# Patient Record
Sex: MALE | Race: BLACK OR AFRICAN AMERICAN | Employment: OTHER | URBAN - METROPOLITAN AREA
[De-identification: names, ages, dates, MRNs, and addresses within clinical notes are randomized per-mention and may not be internally consistent; named-entity substitution may affect disease eponyms.]

---

## 2017-01-05 ENCOUNTER — OFFICE VISIT (OUTPATIENT)
Dept: INTERNAL MEDICINE CLINIC | Age: 62
End: 2017-01-05

## 2017-01-05 VITALS
HEIGHT: 66 IN | WEIGHT: 158.8 LBS | DIASTOLIC BLOOD PRESSURE: 80 MMHG | SYSTOLIC BLOOD PRESSURE: 140 MMHG | HEART RATE: 64 BPM | BODY MASS INDEX: 25.52 KG/M2 | TEMPERATURE: 98.6 F

## 2017-01-05 DIAGNOSIS — Z01.818 PREOP EXAMINATION: ICD-10-CM

## 2017-01-05 DIAGNOSIS — R60.0 EDEMA OF LEFT FOOT: ICD-10-CM

## 2017-01-05 DIAGNOSIS — I10 ESSENTIAL HYPERTENSION, BENIGN: Primary | ICD-10-CM

## 2017-01-05 DIAGNOSIS — I10 ESSENTIAL HYPERTENSION, BENIGN: ICD-10-CM

## 2017-01-05 LAB
A/G RATIO: 1.4 (ref 1.1–2.2)
ALBUMIN SERPL-MCNC: 4 G/DL (ref 3.4–5)
ALP BLD-CCNC: 81 U/L (ref 40–129)
ALT SERPL-CCNC: 15 U/L (ref 10–40)
ANION GAP SERPL CALCULATED.3IONS-SCNC: 16 MMOL/L (ref 3–16)
AST SERPL-CCNC: 17 U/L (ref 15–37)
BASOPHILS ABSOLUTE: 0.1 K/UL (ref 0–0.2)
BASOPHILS RELATIVE PERCENT: 1 %
BILIRUB SERPL-MCNC: 0.7 MG/DL (ref 0–1)
BUN BLDV-MCNC: 28 MG/DL (ref 7–20)
CALCIUM SERPL-MCNC: 9.4 MG/DL (ref 8.3–10.6)
CHLORIDE BLD-SCNC: 97 MMOL/L (ref 99–110)
CHOLESTEROL, TOTAL: 183 MG/DL (ref 0–199)
CO2: 26 MMOL/L (ref 21–32)
CREAT SERPL-MCNC: 1.5 MG/DL (ref 0.9–1.3)
EOSINOPHILS ABSOLUTE: 0.3 K/UL (ref 0–0.6)
EOSINOPHILS RELATIVE PERCENT: 4 %
GFR AFRICAN AMERICAN: 58
GFR NON-AFRICAN AMERICAN: 48
GLOBULIN: 2.9 G/DL
GLUCOSE BLD-MCNC: 145 MG/DL (ref 70–99)
GLUCOSE BLD-MCNC: 173 MG/DL
HCT VFR BLD CALC: 40.6 % (ref 40.5–52.5)
HDLC SERPL-MCNC: 93 MG/DL (ref 40–60)
HEMOGLOBIN: 13.5 G/DL (ref 13.5–17.5)
LDL CHOLESTEROL CALCULATED: 68 MG/DL
LYMPHOCYTES ABSOLUTE: 1.3 K/UL (ref 1–5.1)
LYMPHOCYTES RELATIVE PERCENT: 20 %
MCH RBC QN AUTO: 31.5 PG (ref 26–34)
MCHC RBC AUTO-ENTMCNC: 33.2 G/DL (ref 31–36)
MCV RBC AUTO: 94.9 FL (ref 80–100)
MONOCYTES ABSOLUTE: 0.6 K/UL (ref 0–1.3)
MONOCYTES RELATIVE PERCENT: 9 %
NEUTROPHILS ABSOLUTE: 4.2 K/UL (ref 1.7–7.7)
NEUTROPHILS RELATIVE PERCENT: 66 %
PDW BLD-RTO: 13.4 % (ref 12.4–15.4)
PLATELET # BLD: 243 K/UL (ref 135–450)
PMV BLD AUTO: 8.5 FL (ref 5–10.5)
POTASSIUM SERPL-SCNC: 4 MMOL/L (ref 3.5–5.1)
RBC # BLD: 4.28 M/UL (ref 4.2–5.9)
RBC # BLD: NORMAL 10*6/UL
SODIUM BLD-SCNC: 139 MMOL/L (ref 136–145)
TOTAL PROTEIN: 6.9 G/DL (ref 6.4–8.2)
TRIGL SERPL-MCNC: 108 MG/DL (ref 0–150)
URIC ACID, SERUM: 9.7 MG/DL (ref 3.5–7.2)
VLDLC SERPL CALC-MCNC: 22 MG/DL
WBC # BLD: 6.4 K/UL (ref 4–11)

## 2017-01-05 PROCEDURE — 99243 OFF/OP CNSLTJ NEW/EST LOW 30: CPT | Performed by: NURSE PRACTITIONER

## 2017-01-05 PROCEDURE — 82962 GLUCOSE BLOOD TEST: CPT | Performed by: NURSE PRACTITIONER

## 2017-01-05 PROCEDURE — 93000 ELECTROCARDIOGRAM COMPLETE: CPT | Performed by: NURSE PRACTITIONER

## 2017-01-05 RX ORDER — INDOMETHACIN 50 MG/1
50 CAPSULE ORAL 2 TIMES DAILY WITH MEALS
Qty: 60 CAPSULE | Refills: 3 | Status: SHIPPED | OUTPATIENT
Start: 2017-01-05 | End: 2017-01-05 | Stop reason: CLARIF

## 2017-01-05 ASSESSMENT — ENCOUNTER SYMPTOMS
RESPIRATORY NEGATIVE: 1
BLURRED VISION: 1
ALLERGIC/IMMUNOLOGIC NEGATIVE: 1
GASTROINTESTINAL NEGATIVE: 1

## 2017-01-06 ENCOUNTER — TELEPHONE (OUTPATIENT)
Dept: INTERNAL MEDICINE CLINIC | Age: 62
End: 2017-01-06

## 2017-01-06 DIAGNOSIS — E79.0 ELEVATED BLOOD URIC ACID LEVEL: ICD-10-CM

## 2017-01-06 DIAGNOSIS — M10.9 ACUTE GOUT OF LEFT FOOT, UNSPECIFIED CAUSE: Primary | ICD-10-CM

## 2017-01-06 LAB
ESTIMATED AVERAGE GLUCOSE: 134.1 MG/DL
HBA1C MFR BLD: 6.3 %

## 2017-01-06 RX ORDER — INDOMETHACIN 50 MG/1
50 CAPSULE ORAL 2 TIMES DAILY WITH MEALS
Qty: 60 CAPSULE | Refills: 1 | Status: SHIPPED | OUTPATIENT
Start: 2017-01-06 | End: 2019-02-12

## 2017-01-06 RX ORDER — COLCHICINE 0.6 MG/1
0.6 CAPSULE ORAL 2 TIMES DAILY
Qty: 30 CAPSULE | Refills: 0 | Status: SHIPPED | OUTPATIENT
Start: 2017-01-06 | End: 2021-01-01

## 2017-01-10 ENCOUNTER — TELEPHONE (OUTPATIENT)
Dept: INTERNAL MEDICINE CLINIC | Age: 62
End: 2017-01-10

## 2017-01-30 ENCOUNTER — TELEPHONE (OUTPATIENT)
Dept: INTERNAL MEDICINE CLINIC | Age: 62
End: 2017-01-30

## 2017-01-30 RX ORDER — BLOOD-GLUCOSE METER
KIT MISCELLANEOUS
Qty: 1 KIT | Refills: 0 | Status: SHIPPED | OUTPATIENT
Start: 2017-01-30 | End: 2017-03-03 | Stop reason: SDUPTHER

## 2017-02-01 ENCOUNTER — TELEPHONE (OUTPATIENT)
Dept: INTERNAL MEDICINE CLINIC | Age: 62
End: 2017-02-01

## 2017-02-02 RX ORDER — LANCETS 30 GAUGE
EACH MISCELLANEOUS
Qty: 100 EACH | Refills: 3 | Status: SHIPPED | OUTPATIENT
Start: 2017-02-02 | End: 2021-01-01

## 2017-03-03 ENCOUNTER — OFFICE VISIT (OUTPATIENT)
Dept: INTERNAL MEDICINE CLINIC | Age: 62
End: 2017-03-03

## 2017-03-03 VITALS
DIASTOLIC BLOOD PRESSURE: 82 MMHG | HEART RATE: 60 BPM | BODY MASS INDEX: 26.58 KG/M2 | SYSTOLIC BLOOD PRESSURE: 140 MMHG | HEIGHT: 66 IN | WEIGHT: 165.4 LBS | TEMPERATURE: 98.4 F

## 2017-03-03 DIAGNOSIS — E78.00 PURE HYPERCHOLESTEROLEMIA: ICD-10-CM

## 2017-03-03 DIAGNOSIS — I10 ESSENTIAL HYPERTENSION: Primary | ICD-10-CM

## 2017-03-03 DIAGNOSIS — E11.9 TYPE 2 DIABETES MELLITUS WITHOUT COMPLICATION, WITHOUT LONG-TERM CURRENT USE OF INSULIN (HCC): ICD-10-CM

## 2017-03-03 DIAGNOSIS — Z01.818 PRE-OPERATIVE EXAM: ICD-10-CM

## 2017-03-03 DIAGNOSIS — I10 ESSENTIAL HYPERTENSION: ICD-10-CM

## 2017-03-03 LAB
A/G RATIO: 1.7 (ref 1.1–2.2)
ALBUMIN SERPL-MCNC: 4.3 G/DL (ref 3.4–5)
ALP BLD-CCNC: 79 U/L (ref 40–129)
ALT SERPL-CCNC: 28 U/L (ref 10–40)
ANION GAP SERPL CALCULATED.3IONS-SCNC: 15 MMOL/L (ref 3–16)
AST SERPL-CCNC: 25 U/L (ref 15–37)
BASOPHILS ABSOLUTE: 0.1 K/UL (ref 0–0.2)
BASOPHILS RELATIVE PERCENT: 0.9 %
BILIRUB SERPL-MCNC: 0.5 MG/DL (ref 0–1)
BUN BLDV-MCNC: 30 MG/DL (ref 7–20)
CALCIUM SERPL-MCNC: 9.5 MG/DL (ref 8.3–10.6)
CHLORIDE BLD-SCNC: 100 MMOL/L (ref 99–110)
CO2: 25 MMOL/L (ref 21–32)
CREAT SERPL-MCNC: 1.3 MG/DL (ref 0.9–1.3)
EOSINOPHILS ABSOLUTE: 0.4 K/UL (ref 0–0.6)
EOSINOPHILS RELATIVE PERCENT: 6.6 %
GFR AFRICAN AMERICAN: >60
GFR NON-AFRICAN AMERICAN: 56
GLOBULIN: 2.6 G/DL
GLUCOSE BLD-MCNC: 114 MG/DL
GLUCOSE BLD-MCNC: 92 MG/DL (ref 70–99)
HCT VFR BLD CALC: 41.7 % (ref 40.5–52.5)
HEMOGLOBIN: 13.9 G/DL (ref 13.5–17.5)
LYMPHOCYTES ABSOLUTE: 1.9 K/UL (ref 1–5.1)
LYMPHOCYTES RELATIVE PERCENT: 29.5 %
MCH RBC QN AUTO: 31.9 PG (ref 26–34)
MCHC RBC AUTO-ENTMCNC: 33.4 G/DL (ref 31–36)
MCV RBC AUTO: 95.6 FL (ref 80–100)
MONOCYTES ABSOLUTE: 0.5 K/UL (ref 0–1.3)
MONOCYTES RELATIVE PERCENT: 7.1 %
NEUTROPHILS ABSOLUTE: 3.5 K/UL (ref 1.7–7.7)
NEUTROPHILS RELATIVE PERCENT: 55.9 %
PDW BLD-RTO: 13.8 % (ref 12.4–15.4)
PLATELET # BLD: 238 K/UL (ref 135–450)
PMV BLD AUTO: 8.7 FL (ref 5–10.5)
POTASSIUM SERPL-SCNC: 4.6 MMOL/L (ref 3.5–5.1)
RBC # BLD: 4.36 M/UL (ref 4.2–5.9)
SODIUM BLD-SCNC: 140 MMOL/L (ref 136–145)
TOTAL PROTEIN: 6.9 G/DL (ref 6.4–8.2)
WBC # BLD: 6.3 K/UL (ref 4–11)

## 2017-03-03 PROCEDURE — 82962 GLUCOSE BLOOD TEST: CPT | Performed by: NURSE PRACTITIONER

## 2017-03-03 PROCEDURE — 99242 OFF/OP CONSLTJ NEW/EST SF 20: CPT | Performed by: NURSE PRACTITIONER

## 2017-03-03 ASSESSMENT — ENCOUNTER SYMPTOMS
RESPIRATORY NEGATIVE: 1
EYES NEGATIVE: 1
GASTROINTESTINAL NEGATIVE: 1
ALLERGIC/IMMUNOLOGIC NEGATIVE: 1

## 2017-03-06 ENCOUNTER — TELEPHONE (OUTPATIENT)
Dept: INTERNAL MEDICINE CLINIC | Age: 62
End: 2017-03-06

## 2017-03-23 RX ORDER — LISINOPRIL AND HYDROCHLOROTHIAZIDE 20; 12.5 MG/1; MG/1
1 TABLET ORAL DAILY
Qty: 90 TABLET | Refills: 3 | Status: SHIPPED | OUTPATIENT
Start: 2017-03-23 | End: 2018-03-20 | Stop reason: SDUPTHER

## 2017-03-23 RX ORDER — AMLODIPINE BESYLATE 10 MG/1
10 TABLET ORAL DAILY
Qty: 90 TABLET | Refills: 3 | Status: SHIPPED | OUTPATIENT
Start: 2017-03-23 | End: 2018-03-20 | Stop reason: SDUPTHER

## 2017-03-23 RX ORDER — SIMVASTATIN 20 MG
20 TABLET ORAL NIGHTLY
Qty: 90 TABLET | Refills: 3 | Status: SHIPPED | OUTPATIENT
Start: 2017-03-23 | End: 2017-08-30 | Stop reason: SDUPTHER

## 2017-05-16 ENCOUNTER — OFFICE VISIT (OUTPATIENT)
Dept: INTERNAL MEDICINE CLINIC | Age: 62
End: 2017-05-16

## 2017-05-16 VITALS
BODY MASS INDEX: 26.52 KG/M2 | WEIGHT: 165 LBS | HEART RATE: 87 BPM | HEIGHT: 66 IN | SYSTOLIC BLOOD PRESSURE: 136 MMHG | TEMPERATURE: 97.9 F | DIASTOLIC BLOOD PRESSURE: 82 MMHG | OXYGEN SATURATION: 96 %

## 2017-05-16 DIAGNOSIS — I10 ESSENTIAL HYPERTENSION, BENIGN: Primary | ICD-10-CM

## 2017-05-16 DIAGNOSIS — E79.0 HYPERURICEMIA: ICD-10-CM

## 2017-05-16 DIAGNOSIS — E78.00 PURE HYPERCHOLESTEROLEMIA: ICD-10-CM

## 2017-05-16 LAB — GLUCOSE BLD-MCNC: 106 MG/DL

## 2017-05-16 PROCEDURE — 82962 GLUCOSE BLOOD TEST: CPT | Performed by: INTERNAL MEDICINE

## 2017-05-16 PROCEDURE — 99214 OFFICE O/P EST MOD 30 MIN: CPT | Performed by: INTERNAL MEDICINE

## 2017-05-16 RX ORDER — ASPIRIN 81 MG/1
81 TABLET ORAL DAILY
Qty: 30 TABLET | Refills: 3 | Status: SHIPPED | OUTPATIENT
Start: 2017-05-16 | End: 2017-08-30 | Stop reason: SDUPTHER

## 2017-05-16 RX ORDER — ALLOPURINOL 300 MG/1
300 TABLET ORAL DAILY
Qty: 30 TABLET | Refills: 5 | Status: SHIPPED | OUTPATIENT
Start: 2017-05-16 | End: 2017-08-30 | Stop reason: SDUPTHER

## 2017-05-16 ASSESSMENT — PATIENT HEALTH QUESTIONNAIRE - PHQ9
2. FEELING DOWN, DEPRESSED OR HOPELESS: 0
1. LITTLE INTEREST OR PLEASURE IN DOING THINGS: 0
SUM OF ALL RESPONSES TO PHQ9 QUESTIONS 1 & 2: 0
SUM OF ALL RESPONSES TO PHQ QUESTIONS 1-9: 0

## 2017-05-16 ASSESSMENT — ENCOUNTER SYMPTOMS
RESPIRATORY NEGATIVE: 1
EYES NEGATIVE: 1
GASTROINTESTINAL NEGATIVE: 1

## 2017-05-17 LAB
ALBUMIN SERPL-MCNC: 4.2 G/DL (ref 3.4–5)
ANION GAP SERPL CALCULATED.3IONS-SCNC: 14 MMOL/L (ref 3–16)
BUN BLDV-MCNC: 19 MG/DL (ref 7–20)
CALCIUM SERPL-MCNC: 9.4 MG/DL (ref 8.3–10.6)
CHLORIDE BLD-SCNC: 103 MMOL/L (ref 99–110)
CO2: 25 MMOL/L (ref 21–32)
CREAT SERPL-MCNC: 1.3 MG/DL (ref 0.8–1.3)
ESTIMATED AVERAGE GLUCOSE: 148.5 MG/DL
GFR AFRICAN AMERICAN: >60
GFR NON-AFRICAN AMERICAN: 56
GLUCOSE BLD-MCNC: 88 MG/DL (ref 70–99)
HBA1C MFR BLD: 6.8 %
PHOSPHORUS: 3.4 MG/DL (ref 2.5–4.9)
POTASSIUM SERPL-SCNC: 4.5 MMOL/L (ref 3.5–5.1)
SODIUM BLD-SCNC: 142 MMOL/L (ref 136–145)
URIC ACID, SERUM: 7.8 MG/DL (ref 3.5–7.2)

## 2017-08-30 ENCOUNTER — OFFICE VISIT (OUTPATIENT)
Dept: INTERNAL MEDICINE CLINIC | Age: 62
End: 2017-08-30

## 2017-08-30 VITALS
OXYGEN SATURATION: 99 % | DIASTOLIC BLOOD PRESSURE: 84 MMHG | BODY MASS INDEX: 26.84 KG/M2 | HEART RATE: 97 BPM | TEMPERATURE: 98.1 F | HEIGHT: 66 IN | WEIGHT: 167 LBS | SYSTOLIC BLOOD PRESSURE: 138 MMHG

## 2017-08-30 DIAGNOSIS — E79.0 ELEVATED BLOOD URIC ACID LEVEL: ICD-10-CM

## 2017-08-30 DIAGNOSIS — I10 ESSENTIAL HYPERTENSION, BENIGN: Primary | ICD-10-CM

## 2017-08-30 DIAGNOSIS — E78.00 PURE HYPERCHOLESTEROLEMIA: ICD-10-CM

## 2017-08-30 DIAGNOSIS — E11.21 DIABETIC NEPHROPATHY ASSOCIATED WITH TYPE 2 DIABETES MELLITUS (HCC): ICD-10-CM

## 2017-08-30 LAB
ALBUMIN SERPL-MCNC: 4.5 G/DL (ref 3.4–5)
ANION GAP SERPL CALCULATED.3IONS-SCNC: 19 MMOL/L (ref 3–16)
BILIRUBIN URINE: NEGATIVE
BLOOD, URINE: NEGATIVE
BUN BLDV-MCNC: 29 MG/DL (ref 7–20)
CALCIUM SERPL-MCNC: 9.9 MG/DL (ref 8.3–10.6)
CHLORIDE BLD-SCNC: 98 MMOL/L (ref 99–110)
CLARITY: CLEAR
CO2: 24 MMOL/L (ref 21–32)
COLOR: YELLOW
CREAT SERPL-MCNC: 1.4 MG/DL (ref 0.8–1.3)
EPITHELIAL CELLS, UA: 0 /HPF (ref 0–5)
GFR AFRICAN AMERICAN: >60
GFR NON-AFRICAN AMERICAN: 52
GLUCOSE BLD-MCNC: 100 MG/DL (ref 70–99)
GLUCOSE BLD-MCNC: 111 MG/DL
GLUCOSE URINE: NEGATIVE MG/DL
HYALINE CASTS: 1 /LPF (ref 0–8)
KETONES, URINE: NEGATIVE MG/DL
LEUKOCYTE ESTERASE, URINE: NEGATIVE
MICROSCOPIC EXAMINATION: YES
NITRITE, URINE: NEGATIVE
PH UA: 5.5
PHOSPHORUS: 3.8 MG/DL (ref 2.5–4.9)
POTASSIUM SERPL-SCNC: 4.6 MMOL/L (ref 3.5–5.1)
PROTEIN UA: 100 MG/DL
RBC UA: 4 /HPF (ref 0–4)
SODIUM BLD-SCNC: 141 MMOL/L (ref 136–145)
SPECIFIC GRAVITY UA: 1.02
URIC ACID, SERUM: 3.8 MG/DL (ref 3.5–7.2)
URINE TYPE: ABNORMAL
UROBILINOGEN, URINE: 0.2 E.U./DL
WBC UA: 2 /HPF (ref 0–5)

## 2017-08-30 PROCEDURE — 99214 OFFICE O/P EST MOD 30 MIN: CPT | Performed by: INTERNAL MEDICINE

## 2017-08-30 PROCEDURE — 82962 GLUCOSE BLOOD TEST: CPT | Performed by: INTERNAL MEDICINE

## 2017-08-30 RX ORDER — ASPIRIN 81 MG/1
81 TABLET ORAL DAILY
Qty: 30 TABLET | Refills: 3 | Status: SHIPPED | OUTPATIENT
Start: 2017-08-30 | End: 2018-03-07 | Stop reason: SDUPTHER

## 2017-08-30 RX ORDER — ALLOPURINOL 300 MG/1
300 TABLET ORAL DAILY
Qty: 30 TABLET | Refills: 5 | Status: SHIPPED | OUTPATIENT
Start: 2017-08-30 | End: 2018-03-20 | Stop reason: SDUPTHER

## 2017-08-30 RX ORDER — TADALAFIL 5 MG/1
5 TABLET ORAL DAILY
Qty: 30 TABLET | Refills: 11 | Status: SHIPPED | OUTPATIENT
Start: 2017-08-30 | End: 2021-01-01

## 2017-08-30 RX ORDER — SIMVASTATIN 20 MG
20 TABLET ORAL NIGHTLY
Qty: 90 TABLET | Refills: 3 | Status: SHIPPED | OUTPATIENT
Start: 2017-08-30 | End: 2018-03-20 | Stop reason: SDUPTHER

## 2017-08-30 ASSESSMENT — ENCOUNTER SYMPTOMS
RESPIRATORY NEGATIVE: 1
EYES NEGATIVE: 1
GASTROINTESTINAL NEGATIVE: 1

## 2017-08-30 ASSESSMENT — PATIENT HEALTH QUESTIONNAIRE - PHQ9
1. LITTLE INTEREST OR PLEASURE IN DOING THINGS: 0
SUM OF ALL RESPONSES TO PHQ QUESTIONS 1-9: 0
SUM OF ALL RESPONSES TO PHQ9 QUESTIONS 1 & 2: 0
2. FEELING DOWN, DEPRESSED OR HOPELESS: 0

## 2017-08-31 LAB
ESTIMATED AVERAGE GLUCOSE: 154.2 MG/DL
HBA1C MFR BLD: 7 %

## 2018-02-07 ENCOUNTER — OFFICE VISIT (OUTPATIENT)
Dept: INTERNAL MEDICINE CLINIC | Age: 63
End: 2018-02-07

## 2018-02-07 VITALS
WEIGHT: 166 LBS | HEIGHT: 66 IN | BODY MASS INDEX: 26.68 KG/M2 | HEART RATE: 87 BPM | OXYGEN SATURATION: 98 % | DIASTOLIC BLOOD PRESSURE: 84 MMHG | SYSTOLIC BLOOD PRESSURE: 130 MMHG

## 2018-02-07 DIAGNOSIS — E78.00 PURE HYPERCHOLESTEROLEMIA: ICD-10-CM

## 2018-02-07 DIAGNOSIS — E79.0 HYPERURICEMIA: ICD-10-CM

## 2018-02-07 DIAGNOSIS — I10 ESSENTIAL HYPERTENSION, BENIGN: Primary | ICD-10-CM

## 2018-02-07 DIAGNOSIS — M79.89 RIGHT LEG SWELLING: ICD-10-CM

## 2018-02-07 LAB
A/G RATIO: 1.6 (ref 1.1–2.2)
ALBUMIN SERPL-MCNC: 4.1 G/DL (ref 3.4–5)
ALP BLD-CCNC: 83 U/L (ref 40–129)
ALT SERPL-CCNC: 40 U/L (ref 10–40)
ANION GAP SERPL CALCULATED.3IONS-SCNC: 14 MMOL/L (ref 3–16)
AST SERPL-CCNC: 32 U/L (ref 15–37)
BILIRUB SERPL-MCNC: 0.5 MG/DL (ref 0–1)
BUN BLDV-MCNC: 28 MG/DL (ref 7–20)
CALCIUM SERPL-MCNC: 9.5 MG/DL (ref 8.3–10.6)
CHLORIDE BLD-SCNC: 98 MMOL/L (ref 99–110)
CHOLESTEROL, TOTAL: 168 MG/DL (ref 0–199)
CO2: 28 MMOL/L (ref 21–32)
CREAT SERPL-MCNC: 1.2 MG/DL (ref 0.8–1.3)
EPITHELIAL CELLS, UA: 0 /HPF (ref 0–5)
GFR AFRICAN AMERICAN: >60
GFR NON-AFRICAN AMERICAN: >60
GLOBULIN: 2.6 G/DL
GLUCOSE BLD-MCNC: 102 MG/DL
GLUCOSE BLD-MCNC: 86 MG/DL (ref 70–99)
HDLC SERPL-MCNC: 90 MG/DL (ref 40–60)
HYALINE CASTS: 0 /LPF (ref 0–8)
LDL CHOLESTEROL CALCULATED: 65 MG/DL
POTASSIUM SERPL-SCNC: 5.1 MMOL/L (ref 3.5–5.1)
RBC UA: 5 /HPF (ref 0–4)
SODIUM BLD-SCNC: 140 MMOL/L (ref 136–145)
TOTAL PROTEIN: 6.7 G/DL (ref 6.4–8.2)
TRIGL SERPL-MCNC: 63 MG/DL (ref 0–150)
URIC ACID, SERUM: 3.7 MG/DL (ref 3.5–7.2)
VLDLC SERPL CALC-MCNC: 13 MG/DL
WBC UA: 0 /HPF (ref 0–5)

## 2018-02-07 PROCEDURE — 82962 GLUCOSE BLOOD TEST: CPT | Performed by: INTERNAL MEDICINE

## 2018-02-07 PROCEDURE — 99214 OFFICE O/P EST MOD 30 MIN: CPT | Performed by: INTERNAL MEDICINE

## 2018-02-07 ASSESSMENT — ENCOUNTER SYMPTOMS
RESPIRATORY NEGATIVE: 1
GASTROINTESTINAL NEGATIVE: 1
EYES NEGATIVE: 1

## 2018-02-08 LAB
ESTIMATED AVERAGE GLUCOSE: 142.7 MG/DL
HBA1C MFR BLD: 6.6 %

## 2018-03-05 ENCOUNTER — TELEPHONE (OUTPATIENT)
Dept: INTERNAL MEDICINE CLINIC | Age: 63
End: 2018-03-05

## 2018-03-06 RX ORDER — ASPIRIN 81 MG/1
81 TABLET ORAL DAILY
Qty: 30 TABLET | Refills: 3 | Status: CANCELLED | OUTPATIENT
Start: 2018-03-06

## 2018-03-07 RX ORDER — ASPIRIN 81 MG/1
81 TABLET ORAL DAILY
Qty: 30 TABLET | Refills: 3 | Status: SHIPPED | OUTPATIENT
Start: 2018-03-07 | End: 2019-06-12

## 2018-03-20 ENCOUNTER — TELEPHONE (OUTPATIENT)
Dept: INTERNAL MEDICINE CLINIC | Age: 63
End: 2018-03-20

## 2018-03-20 RX ORDER — LISINOPRIL AND HYDROCHLOROTHIAZIDE 20; 12.5 MG/1; MG/1
1 TABLET ORAL DAILY
Qty: 90 TABLET | Refills: 3 | Status: SHIPPED | OUTPATIENT
Start: 2018-03-20 | End: 2019-02-12

## 2018-03-20 RX ORDER — AMLODIPINE BESYLATE 10 MG/1
10 TABLET ORAL DAILY
Qty: 90 TABLET | Refills: 3 | Status: SHIPPED | OUTPATIENT
Start: 2018-03-20 | End: 2021-01-01

## 2018-03-20 RX ORDER — ALLOPURINOL 300 MG/1
300 TABLET ORAL DAILY
Qty: 30 TABLET | Refills: 5 | Status: SHIPPED | OUTPATIENT
Start: 2018-03-20 | End: 2021-01-01

## 2018-03-20 RX ORDER — SIMVASTATIN 20 MG
20 TABLET ORAL NIGHTLY
Qty: 90 TABLET | Refills: 3 | Status: SHIPPED | OUTPATIENT
Start: 2018-03-20 | End: 2019-02-12

## 2018-05-02 ENCOUNTER — TELEPHONE (OUTPATIENT)
Dept: INTERNAL MEDICINE CLINIC | Age: 63
End: 2018-05-02

## 2018-05-09 RX ORDER — DULAGLUTIDE 1.5 MG/.5ML
INJECTION, SOLUTION SUBCUTANEOUS
Qty: 4 PEN | Refills: 5 | Status: SHIPPED | OUTPATIENT
Start: 2018-05-09 | End: 2021-01-01

## 2019-02-12 ENCOUNTER — OFFICE VISIT (OUTPATIENT)
Dept: INTERNAL MEDICINE CLINIC | Age: 64
End: 2019-02-12
Payer: MEDICARE

## 2019-02-12 VITALS
BODY MASS INDEX: 26.52 KG/M2 | HEART RATE: 67 BPM | SYSTOLIC BLOOD PRESSURE: 100 MMHG | OXYGEN SATURATION: 95 % | HEIGHT: 66 IN | DIASTOLIC BLOOD PRESSURE: 60 MMHG | WEIGHT: 165 LBS

## 2019-02-12 DIAGNOSIS — Z12.11 ENCOUNTER FOR SCREENING COLONOSCOPY: Primary | ICD-10-CM

## 2019-02-12 DIAGNOSIS — Z12.5 PROSTATE CANCER SCREENING: ICD-10-CM

## 2019-02-12 DIAGNOSIS — E11.65 UNCONTROLLED TYPE 2 DIABETES MELLITUS WITH HYPERGLYCEMIA (HCC): ICD-10-CM

## 2019-02-12 DIAGNOSIS — I10 ESSENTIAL HYPERTENSION, BENIGN: ICD-10-CM

## 2019-02-12 DIAGNOSIS — I10 ESSENTIAL HYPERTENSION: ICD-10-CM

## 2019-02-12 DIAGNOSIS — E11.9 TYPE 2 DIABETES MELLITUS WITHOUT COMPLICATION, WITHOUT LONG-TERM CURRENT USE OF INSULIN (HCC): ICD-10-CM

## 2019-02-12 DIAGNOSIS — E78.00 PURE HYPERCHOLESTEROLEMIA: ICD-10-CM

## 2019-02-12 DIAGNOSIS — Z13.220 SCREENING FOR HYPERLIPIDEMIA: ICD-10-CM

## 2019-02-12 DIAGNOSIS — I25.118 CORONARY ARTERY DISEASE OF NATIVE ARTERY OF NATIVE HEART WITH STABLE ANGINA PECTORIS (HCC): ICD-10-CM

## 2019-02-12 PROBLEM — I25.10 CORONARY ARTERY DISEASE INVOLVING NATIVE CORONARY ARTERY: Status: ACTIVE | Noted: 2019-02-12

## 2019-02-12 LAB
ALBUMIN SERPL-MCNC: 4 G/DL (ref 3.4–5)
ALP BLD-CCNC: 108 U/L (ref 40–129)
ALT SERPL-CCNC: 31 U/L (ref 10–40)
AST SERPL-CCNC: 30 U/L (ref 15–37)
BILIRUB SERPL-MCNC: 0.4 MG/DL (ref 0–1)
BILIRUBIN DIRECT: <0.2 MG/DL (ref 0–0.3)
BILIRUBIN, INDIRECT: NORMAL MG/DL (ref 0–1)
CHOLESTEROL, TOTAL: 146 MG/DL (ref 0–199)
HDLC SERPL-MCNC: 73 MG/DL (ref 40–60)
LDL CHOLESTEROL CALCULATED: 51 MG/DL
PROSTATE SPECIFIC ANTIGEN: 0.32 NG/ML (ref 0–4)
TOTAL PROTEIN: 7.2 G/DL (ref 6.4–8.2)
TRIGL SERPL-MCNC: 109 MG/DL (ref 0–150)
VLDLC SERPL CALC-MCNC: 22 MG/DL

## 2019-02-12 PROCEDURE — G8484 FLU IMMUNIZE NO ADMIN: HCPCS | Performed by: INTERNAL MEDICINE

## 2019-02-12 PROCEDURE — 1036F TOBACCO NON-USER: CPT | Performed by: INTERNAL MEDICINE

## 2019-02-12 PROCEDURE — G8598 ASA/ANTIPLAT THER USED: HCPCS | Performed by: INTERNAL MEDICINE

## 2019-02-12 PROCEDURE — 99214 OFFICE O/P EST MOD 30 MIN: CPT | Performed by: INTERNAL MEDICINE

## 2019-02-12 PROCEDURE — G8419 CALC BMI OUT NRM PARAM NOF/U: HCPCS | Performed by: INTERNAL MEDICINE

## 2019-02-12 PROCEDURE — G8427 DOCREV CUR MEDS BY ELIG CLIN: HCPCS | Performed by: INTERNAL MEDICINE

## 2019-02-12 PROCEDURE — 2022F DILAT RTA XM EVC RTNOPTHY: CPT | Performed by: INTERNAL MEDICINE

## 2019-02-12 PROCEDURE — 3045F PR MOST RECENT HEMOGLOBIN A1C LEVEL 7.0-9.0%: CPT | Performed by: INTERNAL MEDICINE

## 2019-02-12 PROCEDURE — 3017F COLORECTAL CA SCREEN DOC REV: CPT | Performed by: INTERNAL MEDICINE

## 2019-02-12 RX ORDER — CLOPIDOGREL BISULFATE 75 MG/1
75 TABLET ORAL DAILY
Status: ON HOLD | COMMUNITY
Start: 2019-01-08 | End: 2021-01-01 | Stop reason: HOSPADM

## 2019-02-12 RX ORDER — ATORVASTATIN CALCIUM 80 MG/1
80 TABLET, FILM COATED ORAL NIGHTLY
COMMUNITY
Start: 2019-01-08

## 2019-02-12 RX ORDER — CARVEDILOL 12.5 MG/1
12.5 TABLET ORAL 2 TIMES DAILY WITH MEALS
Status: ON HOLD | COMMUNITY
Start: 2019-01-15 | End: 2021-01-01 | Stop reason: HOSPADM

## 2019-02-12 ASSESSMENT — ENCOUNTER SYMPTOMS
GASTROINTESTINAL NEGATIVE: 1
RESPIRATORY NEGATIVE: 1
EYES NEGATIVE: 1

## 2019-02-12 ASSESSMENT — PATIENT HEALTH QUESTIONNAIRE - PHQ9
2. FEELING DOWN, DEPRESSED OR HOPELESS: 0
1. LITTLE INTEREST OR PLEASURE IN DOING THINGS: 0
SUM OF ALL RESPONSES TO PHQ QUESTIONS 1-9: 0
SUM OF ALL RESPONSES TO PHQ9 QUESTIONS 1 & 2: 0
SUM OF ALL RESPONSES TO PHQ QUESTIONS 1-9: 0

## 2019-02-13 LAB
ESTIMATED AVERAGE GLUCOSE: 165.7 MG/DL
HBA1C MFR BLD: 7.4 %

## 2019-04-09 DIAGNOSIS — R97.20 ELEVATED PSA: Primary | ICD-10-CM

## 2019-06-12 ENCOUNTER — OFFICE VISIT (OUTPATIENT)
Dept: INTERNAL MEDICINE CLINIC | Age: 64
End: 2019-06-12
Payer: MEDICARE

## 2019-06-12 VITALS
HEIGHT: 65 IN | OXYGEN SATURATION: 99 % | WEIGHT: 161 LBS | BODY MASS INDEX: 26.82 KG/M2 | SYSTOLIC BLOOD PRESSURE: 120 MMHG | HEART RATE: 98 BPM | DIASTOLIC BLOOD PRESSURE: 70 MMHG

## 2019-06-12 DIAGNOSIS — E11.9 TYPE 2 DIABETES MELLITUS WITHOUT COMPLICATION, WITHOUT LONG-TERM CURRENT USE OF INSULIN (HCC): Primary | ICD-10-CM

## 2019-06-12 DIAGNOSIS — E11.9 TYPE 2 DIABETES MELLITUS WITHOUT COMPLICATION, WITHOUT LONG-TERM CURRENT USE OF INSULIN (HCC): ICD-10-CM

## 2019-06-12 DIAGNOSIS — Z12.11 ENCOUNTER FOR SCREENING COLONOSCOPY: ICD-10-CM

## 2019-06-12 LAB
A/G RATIO: 1.5 (ref 1.1–2.2)
ALBUMIN SERPL-MCNC: 4.3 G/DL (ref 3.4–5)
ALP BLD-CCNC: 105 U/L (ref 40–129)
ALT SERPL-CCNC: 22 U/L (ref 10–40)
ANION GAP SERPL CALCULATED.3IONS-SCNC: 16 MMOL/L (ref 3–16)
AST SERPL-CCNC: 24 U/L (ref 15–37)
BILIRUB SERPL-MCNC: 0.3 MG/DL (ref 0–1)
BUN BLDV-MCNC: 48 MG/DL (ref 7–20)
CALCIUM SERPL-MCNC: 9.5 MG/DL (ref 8.3–10.6)
CHLORIDE BLD-SCNC: 96 MMOL/L (ref 99–110)
CO2: 28 MMOL/L (ref 21–32)
CREAT SERPL-MCNC: 1.7 MG/DL (ref 0.8–1.3)
EPITHELIAL CELLS, UA: 0 /HPF (ref 0–5)
GFR AFRICAN AMERICAN: 50
GFR NON-AFRICAN AMERICAN: 41
GLOBULIN: 2.8 G/DL
GLUCOSE BLD-MCNC: 258 MG/DL (ref 70–99)
HYALINE CASTS: 0 /LPF (ref 0–8)
POTASSIUM SERPL-SCNC: 4 MMOL/L (ref 3.5–5.1)
RBC UA: 1 /HPF (ref 0–4)
SODIUM BLD-SCNC: 140 MMOL/L (ref 136–145)
TOTAL PROTEIN: 7.1 G/DL (ref 6.4–8.2)
WBC UA: 1 /HPF (ref 0–5)

## 2019-06-12 PROCEDURE — 99214 OFFICE O/P EST MOD 30 MIN: CPT | Performed by: INTERNAL MEDICINE

## 2019-06-12 PROCEDURE — 3045F PR MOST RECENT HEMOGLOBIN A1C LEVEL 7.0-9.0%: CPT | Performed by: INTERNAL MEDICINE

## 2019-06-12 PROCEDURE — G8598 ASA/ANTIPLAT THER USED: HCPCS | Performed by: INTERNAL MEDICINE

## 2019-06-12 PROCEDURE — 83036 HEMOGLOBIN GLYCOSYLATED A1C: CPT | Performed by: INTERNAL MEDICINE

## 2019-06-12 PROCEDURE — 3017F COLORECTAL CA SCREEN DOC REV: CPT | Performed by: INTERNAL MEDICINE

## 2019-06-12 PROCEDURE — G8427 DOCREV CUR MEDS BY ELIG CLIN: HCPCS | Performed by: INTERNAL MEDICINE

## 2019-06-12 PROCEDURE — 1036F TOBACCO NON-USER: CPT | Performed by: INTERNAL MEDICINE

## 2019-06-12 PROCEDURE — 2022F DILAT RTA XM EVC RTNOPTHY: CPT | Performed by: INTERNAL MEDICINE

## 2019-06-12 PROCEDURE — G8419 CALC BMI OUT NRM PARAM NOF/U: HCPCS | Performed by: INTERNAL MEDICINE

## 2019-06-12 RX ORDER — ISOSORBIDE MONONITRATE 30 MG/1
30 TABLET, EXTENDED RELEASE ORAL DAILY
Status: ON HOLD | COMMUNITY
Start: 2019-03-06 | End: 2022-01-01 | Stop reason: HOSPADM

## 2019-06-12 ASSESSMENT — PATIENT HEALTH QUESTIONNAIRE - PHQ9
1. LITTLE INTEREST OR PLEASURE IN DOING THINGS: 0
2. FEELING DOWN, DEPRESSED OR HOPELESS: 0
SUM OF ALL RESPONSES TO PHQ QUESTIONS 1-9: 0
SUM OF ALL RESPONSES TO PHQ9 QUESTIONS 1 & 2: 0
SUM OF ALL RESPONSES TO PHQ QUESTIONS 1-9: 0

## 2019-06-12 ASSESSMENT — ENCOUNTER SYMPTOMS
EYES NEGATIVE: 1
SHORTNESS OF BREATH: 1
GASTROINTESTINAL NEGATIVE: 1

## 2019-06-12 NOTE — PROGRESS NOTES
normal.   Eyes: Pupils are equal, round, and reactive to light. Conjunctivae and EOM are normal.   Neck: Normal range of motion. Neck supple. No thyromegaly present. Cardiovascular: Normal rate, regular rhythm and normal heart sounds. No murmur heard. Pulmonary/Chest: Effort normal and breath sounds normal. No respiratory distress. He has no wheezes. He has no rales. Abdominal: Soft. Bowel sounds are normal.   Musculoskeletal: Normal range of motion. Neurological: He is alert and oriented to person, place, and time. Skin: Skin is warm and dry. Psychiatric: He has a normal mood and affect. Current Outpatient Medications on File Prior to Visit   Medication Sig Dispense Refill    isosorbide mononitrate (IMDUR) 60 MG extended release tablet Take 60 mg by mouth      sacubitril-valsartan (ENTRESTO) 49-51 MG per tablet Take 1 tablet by mouth      atorvastatin (LIPITOR) 80 MG tablet Take 80 mg by mouth      carvedilol (COREG) 6.25 MG tablet       clopidogrel (PLAVIX) 75 MG tablet Take 75 mg by mouth      Dulaglutide (TRULICITY) 0.19 YD/8.2VY SOPN Lot# [id number] VDT.32/38 2 pen 0    TRULICITY 1.5 LD/2.5FN SOPN INJECT 1.5 MG INTO THE SKIN EVERY 7 DAYS 4 pen 5    allopurinol (ZYLOPRIM) 300 MG tablet Take 1 tablet by mouth daily 30 tablet 5    amLODIPine (NORVASC) 10 MG tablet Take 1 tablet by mouth daily 90 tablet 3    Multiple Vitamins-Minerals (MULTIVITAMIN MEN) TABS Take 1 tablet by mouth daily      Blood Glucose Monitoring Suppl (TRUE METRIX METER) W/DEVICE KIT 1 kit by Does not apply route 2 times daily 1 kit 0    glucose blood VI test strips (ASCENSIA AUTODISC VI;ONE TOUCH ULTRA TEST VI) strip Pt test 2-3 times daily E11.9 100 each 3    Lancets MISC Patient test 2-3 times daily 100 each 3    Colchicine 0.6 MG CAPS Take 1 capsule by mouth 2 times daily 30 capsule 0    aspirin 81 MG chewable tablet Take 81 mg by mouth daily.       tadalafil (CIALIS) 5 MG tablet Take 1 tablet by mouth daily 30 tablet 11     No current facility-administered medications on file prior to visit. Assessment:        Diagnosis Orders   1.  Type 2 diabetes mellitus without complication, without long-term current use of insulin (HCC)  Comprehensive Metabolic Panel    POCT glycosylated hemoglobin (Hb A1C)    MICROSCOPIC URINALYSIS       Hypertension  Stable    Hyperlipidemia  On meds    Dyspnea  From CAD      Plan:      Continue meds    Refills    Refer to GI     Labs     Return in 3 months        DAGO Sandoval MD

## 2019-08-14 ENCOUNTER — TELEPHONE (OUTPATIENT)
Dept: INTERNAL MEDICINE CLINIC | Age: 64
End: 2019-08-14

## 2021-01-01 ENCOUNTER — OFFICE VISIT (OUTPATIENT)
Dept: CARDIOLOGY CLINIC | Age: 66
End: 2021-01-01
Payer: MEDICAID

## 2021-01-01 ENCOUNTER — FOLLOWUP TELEPHONE ENCOUNTER (OUTPATIENT)
Dept: INPATIENT UNIT | Age: 66
End: 2021-01-01

## 2021-01-01 ENCOUNTER — TELEPHONE (OUTPATIENT)
Dept: INTERNAL MEDICINE CLINIC | Age: 66
End: 2021-01-01

## 2021-01-01 ENCOUNTER — APPOINTMENT (OUTPATIENT)
Dept: GENERAL RADIOLOGY | Age: 66
DRG: 286 | End: 2021-01-01
Payer: MEDICARE

## 2021-01-01 ENCOUNTER — TELEPHONE (OUTPATIENT)
Dept: FAMILY MEDICINE CLINIC | Age: 66
End: 2021-01-01

## 2021-01-01 ENCOUNTER — HOSPITAL ENCOUNTER (INPATIENT)
Age: 66
LOS: 16 days | Discharge: HOME | DRG: 286 | End: 2021-11-30
Attending: EMERGENCY MEDICINE | Admitting: INTERNAL MEDICINE
Payer: MEDICARE

## 2021-01-01 ENCOUNTER — TELEPHONE (OUTPATIENT)
Dept: CARDIOLOGY CLINIC | Age: 66
End: 2021-01-01

## 2021-01-01 ENCOUNTER — TELEPHONE (OUTPATIENT)
Dept: PHARMACY | Age: 66
End: 2021-01-01

## 2021-01-01 ENCOUNTER — APPOINTMENT (OUTPATIENT)
Dept: CT IMAGING | Age: 66
DRG: 286 | End: 2021-01-01
Payer: MEDICARE

## 2021-01-01 ENCOUNTER — CLINICAL DOCUMENTATION (OUTPATIENT)
Dept: OTHER | Age: 66
End: 2021-01-01

## 2021-01-01 VITALS
OXYGEN SATURATION: 97 % | SYSTOLIC BLOOD PRESSURE: 110 MMHG | DIASTOLIC BLOOD PRESSURE: 70 MMHG | RESPIRATION RATE: 18 BRPM | HEART RATE: 93 BPM | WEIGHT: 149.25 LBS | TEMPERATURE: 97.7 F | BODY MASS INDEX: 23.99 KG/M2 | HEIGHT: 66 IN

## 2021-01-01 VITALS
BODY MASS INDEX: 27.66 KG/M2 | DIASTOLIC BLOOD PRESSURE: 70 MMHG | HEART RATE: 77 BPM | HEIGHT: 65 IN | WEIGHT: 166 LBS | SYSTOLIC BLOOD PRESSURE: 118 MMHG | OXYGEN SATURATION: 98 %

## 2021-01-01 DIAGNOSIS — I25.10 CORONARY ARTERY DISEASE INVOLVING NATIVE CORONARY ARTERY OF NATIVE HEART WITHOUT ANGINA PECTORIS: Primary | ICD-10-CM

## 2021-01-01 DIAGNOSIS — E78.5 HYPERLIPIDEMIA LDL GOAL <70: ICD-10-CM

## 2021-01-01 DIAGNOSIS — J96.01 ACUTE RESPIRATORY FAILURE WITH HYPOXIA (HCC): ICD-10-CM

## 2021-01-01 DIAGNOSIS — R06.09 DYSPNEA ON EXERTION: ICD-10-CM

## 2021-01-01 DIAGNOSIS — Z95.810 ICD (IMPLANTABLE CARDIOVERTER-DEFIBRILLATOR) IN PLACE: ICD-10-CM

## 2021-01-01 DIAGNOSIS — I50.23 ACUTE ON CHRONIC SYSTOLIC CONGESTIVE HEART FAILURE (HCC): Primary | ICD-10-CM

## 2021-01-01 DIAGNOSIS — I50.22 CHRONIC SYSTOLIC CHF (CONGESTIVE HEART FAILURE), NYHA CLASS 3 (HCC): ICD-10-CM

## 2021-01-01 DIAGNOSIS — I10 ESSENTIAL HYPERTENSION: ICD-10-CM

## 2021-01-01 DIAGNOSIS — E11.65 UNCONTROLLED TYPE 2 DIABETES MELLITUS WITH HYPERGLYCEMIA (HCC): ICD-10-CM

## 2021-01-01 DIAGNOSIS — I51.3 LV (LEFT VENTRICULAR) MURAL THROMBUS: ICD-10-CM

## 2021-01-01 LAB
ALBUMIN SERPL-MCNC: 2.9 G/DL (ref 3.1–4.9)
ALBUMIN SERPL-MCNC: 3.4 G/DL (ref 3.4–5)
ALP BLD-CCNC: 92 U/L (ref 40–129)
ALPHA-1-GLOBULIN: 0.2 G/DL (ref 0.2–0.4)
ALPHA-2-GLOBULIN: 0.5 G/DL (ref 0.4–1.1)
ALT SERPL-CCNC: 21 U/L (ref 10–40)
ANION GAP SERPL CALCULATED.3IONS-SCNC: 11 MMOL/L (ref 3–16)
ANION GAP SERPL CALCULATED.3IONS-SCNC: 12 MMOL/L (ref 3–16)
ANION GAP SERPL CALCULATED.3IONS-SCNC: 13 MMOL/L (ref 3–16)
ANION GAP SERPL CALCULATED.3IONS-SCNC: 13 MMOL/L (ref 3–16)
ANION GAP SERPL CALCULATED.3IONS-SCNC: 14 MMOL/L (ref 3–16)
ANION GAP SERPL CALCULATED.3IONS-SCNC: 14 MMOL/L (ref 3–16)
ANION GAP SERPL CALCULATED.3IONS-SCNC: 15 MMOL/L (ref 3–16)
ANION GAP SERPL CALCULATED.3IONS-SCNC: 16 MMOL/L (ref 3–16)
ANION GAP SERPL CALCULATED.3IONS-SCNC: 8 MMOL/L (ref 3–16)
AST SERPL-CCNC: 32 U/L (ref 15–37)
BASOPHILS ABSOLUTE: 0 K/UL (ref 0–0.2)
BASOPHILS RELATIVE PERCENT: 1.3 %
BETA GLOBULIN: 1 G/DL (ref 0.9–1.6)
BILIRUB SERPL-MCNC: 1.6 MG/DL (ref 0–1)
BILIRUBIN DIRECT: 0.9 MG/DL (ref 0–0.3)
BILIRUBIN URINE: NEGATIVE
BILIRUBIN, INDIRECT: 0.7 MG/DL (ref 0–1)
BLOOD, URINE: ABNORMAL
BUN BLDV-MCNC: 102 MG/DL (ref 7–20)
BUN BLDV-MCNC: 103 MG/DL (ref 7–20)
BUN BLDV-MCNC: 109 MG/DL (ref 7–20)
BUN BLDV-MCNC: 109 MG/DL (ref 7–20)
BUN BLDV-MCNC: 119 MG/DL (ref 7–20)
BUN BLDV-MCNC: 120 MG/DL (ref 7–20)
BUN BLDV-MCNC: 36 MG/DL (ref 7–20)
BUN BLDV-MCNC: 43 MG/DL (ref 7–20)
BUN BLDV-MCNC: 49 MG/DL (ref 7–20)
BUN BLDV-MCNC: 54 MG/DL (ref 7–20)
BUN BLDV-MCNC: 55 MG/DL (ref 7–20)
BUN BLDV-MCNC: 56 MG/DL (ref 7–20)
BUN BLDV-MCNC: 58 MG/DL (ref 7–20)
BUN BLDV-MCNC: 62 MG/DL (ref 7–20)
BUN BLDV-MCNC: 62 MG/DL (ref 7–20)
BUN BLDV-MCNC: 67 MG/DL (ref 7–20)
BUN BLDV-MCNC: 74 MG/DL (ref 7–20)
BUN BLDV-MCNC: 87 MG/DL (ref 7–20)
CALCIUM SERPL-MCNC: 8.3 MG/DL (ref 8.3–10.6)
CALCIUM SERPL-MCNC: 8.4 MG/DL (ref 8.3–10.6)
CALCIUM SERPL-MCNC: 8.4 MG/DL (ref 8.3–10.6)
CALCIUM SERPL-MCNC: 8.5 MG/DL (ref 8.3–10.6)
CALCIUM SERPL-MCNC: 8.6 MG/DL (ref 8.3–10.6)
CALCIUM SERPL-MCNC: 8.7 MG/DL (ref 8.3–10.6)
CALCIUM SERPL-MCNC: 8.8 MG/DL (ref 8.3–10.6)
CALCIUM SERPL-MCNC: 8.9 MG/DL (ref 8.3–10.6)
CHLORIDE BLD-SCNC: 100 MMOL/L (ref 99–110)
CHLORIDE BLD-SCNC: 101 MMOL/L (ref 99–110)
CHLORIDE BLD-SCNC: 103 MMOL/L (ref 99–110)
CHLORIDE BLD-SCNC: 105 MMOL/L (ref 99–110)
CHLORIDE BLD-SCNC: 86 MMOL/L (ref 99–110)
CHLORIDE BLD-SCNC: 88 MMOL/L (ref 99–110)
CHLORIDE BLD-SCNC: 88 MMOL/L (ref 99–110)
CHLORIDE BLD-SCNC: 89 MMOL/L (ref 99–110)
CHLORIDE BLD-SCNC: 90 MMOL/L (ref 99–110)
CHLORIDE BLD-SCNC: 92 MMOL/L (ref 99–110)
CHLORIDE BLD-SCNC: 93 MMOL/L (ref 99–110)
CHLORIDE BLD-SCNC: 93 MMOL/L (ref 99–110)
CHLORIDE BLD-SCNC: 95 MMOL/L (ref 99–110)
CHLORIDE BLD-SCNC: 97 MMOL/L (ref 99–110)
CLARITY: CLEAR
CO2: 24 MMOL/L (ref 21–32)
CO2: 25 MMOL/L (ref 21–32)
CO2: 26 MMOL/L (ref 21–32)
CO2: 27 MMOL/L (ref 21–32)
CO2: 27 MMOL/L (ref 21–32)
CO2: 28 MMOL/L (ref 21–32)
CO2: 28 MMOL/L (ref 21–32)
CO2: 29 MMOL/L (ref 21–32)
CO2: 30 MMOL/L (ref 21–32)
CO2: 31 MMOL/L (ref 21–32)
CO2: 31 MMOL/L (ref 21–32)
CO2: 32 MMOL/L (ref 21–32)
CO2: 32 MMOL/L (ref 21–32)
CO2: 35 MMOL/L (ref 21–32)
COLOR: YELLOW
CREAT SERPL-MCNC: 1.6 MG/DL (ref 0.8–1.3)
CREAT SERPL-MCNC: 1.7 MG/DL (ref 0.8–1.3)
CREAT SERPL-MCNC: 1.7 MG/DL (ref 0.8–1.3)
CREAT SERPL-MCNC: 1.8 MG/DL (ref 0.8–1.3)
CREAT SERPL-MCNC: 1.9 MG/DL (ref 0.8–1.3)
CREAT SERPL-MCNC: 2 MG/DL (ref 0.8–1.3)
CREAT SERPL-MCNC: 2.1 MG/DL (ref 0.8–1.3)
CREAT SERPL-MCNC: 2.1 MG/DL (ref 0.8–1.3)
CREAT SERPL-MCNC: 2.2 MG/DL (ref 0.8–1.3)
CREAT SERPL-MCNC: 2.4 MG/DL (ref 0.8–1.3)
CREAT SERPL-MCNC: 2.5 MG/DL (ref 0.8–1.3)
CREAT SERPL-MCNC: 2.7 MG/DL (ref 0.8–1.3)
CREATININE URINE: 17.6 MG/DL (ref 39–259)
EKG ATRIAL RATE: 81 BPM
EKG DIAGNOSIS: NORMAL
EKG P AXIS: 23 DEGREES
EKG P-R INTERVAL: 188 MS
EKG Q-T INTERVAL: 390 MS
EKG QRS DURATION: 96 MS
EKG QTC CALCULATION (BAZETT): 453 MS
EKG R AXIS: 69 DEGREES
EKG T AXIS: 251 DEGREES
EKG VENTRICULAR RATE: 81 BPM
EOSINOPHILS ABSOLUTE: 0.1 K/UL (ref 0–0.6)
EOSINOPHILS RELATIVE PERCENT: 1.9 %
EPITHELIAL CELLS, UA: 1 /HPF (ref 0–5)
ESTIMATED AVERAGE GLUCOSE: 174.3 MG/DL
FERRITIN: 99.3 NG/ML (ref 30–400)
GAMMA GLOBULIN: 1.3 G/DL (ref 0.6–1.8)
GFR AFRICAN AMERICAN: 29
GFR AFRICAN AMERICAN: 32
GFR AFRICAN AMERICAN: 33
GFR AFRICAN AMERICAN: 37
GFR AFRICAN AMERICAN: 39
GFR AFRICAN AMERICAN: 39
GFR AFRICAN AMERICAN: 41
GFR AFRICAN AMERICAN: 43
GFR AFRICAN AMERICAN: 46
GFR AFRICAN AMERICAN: 49
GFR AFRICAN AMERICAN: 49
GFR AFRICAN AMERICAN: 53
GFR NON-AFRICAN AMERICAN: 24
GFR NON-AFRICAN AMERICAN: 26
GFR NON-AFRICAN AMERICAN: 27
GFR NON-AFRICAN AMERICAN: 30
GFR NON-AFRICAN AMERICAN: 32
GFR NON-AFRICAN AMERICAN: 32
GFR NON-AFRICAN AMERICAN: 34
GFR NON-AFRICAN AMERICAN: 36
GFR NON-AFRICAN AMERICAN: 38
GFR NON-AFRICAN AMERICAN: 41
GFR NON-AFRICAN AMERICAN: 41
GFR NON-AFRICAN AMERICAN: 44
GLUCOSE BLD-MCNC: 106 MG/DL (ref 70–99)
GLUCOSE BLD-MCNC: 110 MG/DL (ref 70–99)
GLUCOSE BLD-MCNC: 115 MG/DL (ref 70–99)
GLUCOSE BLD-MCNC: 118 MG/DL (ref 70–99)
GLUCOSE BLD-MCNC: 121 MG/DL (ref 70–99)
GLUCOSE BLD-MCNC: 124 MG/DL (ref 70–99)
GLUCOSE BLD-MCNC: 125 MG/DL (ref 70–99)
GLUCOSE BLD-MCNC: 125 MG/DL (ref 70–99)
GLUCOSE BLD-MCNC: 130 MG/DL (ref 70–99)
GLUCOSE BLD-MCNC: 131 MG/DL (ref 70–99)
GLUCOSE BLD-MCNC: 136 MG/DL (ref 70–99)
GLUCOSE BLD-MCNC: 138 MG/DL (ref 70–99)
GLUCOSE BLD-MCNC: 138 MG/DL (ref 70–99)
GLUCOSE BLD-MCNC: 141 MG/DL (ref 70–99)
GLUCOSE BLD-MCNC: 141 MG/DL (ref 70–99)
GLUCOSE BLD-MCNC: 145 MG/DL (ref 70–99)
GLUCOSE BLD-MCNC: 146 MG/DL (ref 70–99)
GLUCOSE BLD-MCNC: 146 MG/DL (ref 70–99)
GLUCOSE BLD-MCNC: 149 MG/DL (ref 70–99)
GLUCOSE BLD-MCNC: 151 MG/DL (ref 70–99)
GLUCOSE BLD-MCNC: 152 MG/DL (ref 70–99)
GLUCOSE BLD-MCNC: 153 MG/DL (ref 70–99)
GLUCOSE BLD-MCNC: 154 MG/DL (ref 70–99)
GLUCOSE BLD-MCNC: 155 MG/DL (ref 70–99)
GLUCOSE BLD-MCNC: 156 MG/DL (ref 70–99)
GLUCOSE BLD-MCNC: 157 MG/DL (ref 70–99)
GLUCOSE BLD-MCNC: 162 MG/DL (ref 70–99)
GLUCOSE BLD-MCNC: 163 MG/DL (ref 70–99)
GLUCOSE BLD-MCNC: 164 MG/DL (ref 70–99)
GLUCOSE BLD-MCNC: 165 MG/DL (ref 70–99)
GLUCOSE BLD-MCNC: 166 MG/DL (ref 70–99)
GLUCOSE BLD-MCNC: 167 MG/DL (ref 70–99)
GLUCOSE BLD-MCNC: 167 MG/DL (ref 70–99)
GLUCOSE BLD-MCNC: 172 MG/DL (ref 70–99)
GLUCOSE BLD-MCNC: 172 MG/DL (ref 70–99)
GLUCOSE BLD-MCNC: 176 MG/DL (ref 70–99)
GLUCOSE BLD-MCNC: 177 MG/DL (ref 70–99)
GLUCOSE BLD-MCNC: 177 MG/DL (ref 70–99)
GLUCOSE BLD-MCNC: 184 MG/DL (ref 70–99)
GLUCOSE BLD-MCNC: 185 MG/DL (ref 70–99)
GLUCOSE BLD-MCNC: 186 MG/DL (ref 70–99)
GLUCOSE BLD-MCNC: 190 MG/DL (ref 70–99)
GLUCOSE BLD-MCNC: 193 MG/DL (ref 70–99)
GLUCOSE BLD-MCNC: 194 MG/DL (ref 70–99)
GLUCOSE BLD-MCNC: 197 MG/DL (ref 70–99)
GLUCOSE BLD-MCNC: 198 MG/DL (ref 70–99)
GLUCOSE BLD-MCNC: 198 MG/DL (ref 70–99)
GLUCOSE BLD-MCNC: 200 MG/DL (ref 70–99)
GLUCOSE BLD-MCNC: 207 MG/DL (ref 70–99)
GLUCOSE BLD-MCNC: 211 MG/DL (ref 70–99)
GLUCOSE BLD-MCNC: 217 MG/DL (ref 70–99)
GLUCOSE BLD-MCNC: 218 MG/DL (ref 70–99)
GLUCOSE BLD-MCNC: 221 MG/DL (ref 70–99)
GLUCOSE BLD-MCNC: 222 MG/DL (ref 70–99)
GLUCOSE BLD-MCNC: 227 MG/DL (ref 70–99)
GLUCOSE BLD-MCNC: 241 MG/DL (ref 70–99)
GLUCOSE BLD-MCNC: 251 MG/DL (ref 70–99)
GLUCOSE BLD-MCNC: 270 MG/DL (ref 70–99)
GLUCOSE BLD-MCNC: 272 MG/DL (ref 70–99)
GLUCOSE BLD-MCNC: 273 MG/DL (ref 70–99)
GLUCOSE BLD-MCNC: 285 MG/DL (ref 70–99)
GLUCOSE BLD-MCNC: 285 MG/DL (ref 70–99)
GLUCOSE BLD-MCNC: 294 MG/DL (ref 70–99)
GLUCOSE BLD-MCNC: 295 MG/DL (ref 70–99)
GLUCOSE BLD-MCNC: 305 MG/DL (ref 70–99)
GLUCOSE BLD-MCNC: 307 MG/DL (ref 70–99)
GLUCOSE BLD-MCNC: 322 MG/DL (ref 70–99)
GLUCOSE BLD-MCNC: 334 MG/DL (ref 70–99)
GLUCOSE BLD-MCNC: 345 MG/DL (ref 70–99)
GLUCOSE BLD-MCNC: 346 MG/DL (ref 70–99)
GLUCOSE BLD-MCNC: 354 MG/DL (ref 70–99)
GLUCOSE BLD-MCNC: 355 MG/DL (ref 70–99)
GLUCOSE BLD-MCNC: 365 MG/DL (ref 70–99)
GLUCOSE BLD-MCNC: 369 MG/DL (ref 70–99)
GLUCOSE BLD-MCNC: 380 MG/DL (ref 70–99)
GLUCOSE BLD-MCNC: 413 MG/DL (ref 70–99)
GLUCOSE BLD-MCNC: 69 MG/DL (ref 70–99)
GLUCOSE BLD-MCNC: 71 MG/DL (ref 70–99)
GLUCOSE BLD-MCNC: 75 MG/DL (ref 70–99)
GLUCOSE BLD-MCNC: 82 MG/DL (ref 70–99)
GLUCOSE URINE: NEGATIVE MG/DL
HBA1C MFR BLD: 7.7 %
HCT VFR BLD CALC: 26.2 % (ref 40.5–52.5)
HCT VFR BLD CALC: 26.8 % (ref 40.5–52.5)
HCT VFR BLD CALC: 28.1 % (ref 40.5–52.5)
HCT VFR BLD CALC: 28.3 % (ref 40.5–52.5)
HCT VFR BLD CALC: 28.8 % (ref 40.5–52.5)
HCT VFR BLD CALC: 29.9 % (ref 40.5–52.5)
HCT VFR BLD CALC: 30.2 % (ref 40.5–52.5)
HCT VFR BLD CALC: 30.4 % (ref 40.5–52.5)
HCT VFR BLD CALC: 30.4 % (ref 40.5–52.5)
HCT VFR BLD CALC: 31.4 % (ref 40.5–52.5)
HCT VFR BLD CALC: 31.4 % (ref 40.5–52.5)
HCT VFR BLD CALC: 31.6 % (ref 40.5–52.5)
HCT VFR BLD CALC: 31.8 % (ref 40.5–52.5)
HCT VFR BLD CALC: 32.5 % (ref 40.5–52.5)
HCT VFR BLD CALC: 33.3 % (ref 40.5–52.5)
HCT VFR BLD CALC: 34.9 % (ref 40.5–52.5)
HCT VFR BLD CALC: 38 % (ref 40.5–52.5)
HEMOGLOBIN: 10.2 G/DL (ref 13.5–17.5)
HEMOGLOBIN: 10.3 G/DL (ref 13.5–17.5)
HEMOGLOBIN: 10.4 G/DL (ref 13.5–17.5)
HEMOGLOBIN: 10.5 G/DL (ref 13.5–17.5)
HEMOGLOBIN: 10.7 G/DL (ref 13.5–17.5)
HEMOGLOBIN: 10.9 G/DL (ref 13.5–17.5)
HEMOGLOBIN: 11.1 G/DL (ref 13.5–17.5)
HEMOGLOBIN: 11.7 G/DL (ref 13.5–17.5)
HEMOGLOBIN: 8.7 G/DL (ref 13.5–17.5)
HEMOGLOBIN: 8.7 G/DL (ref 13.5–17.5)
HEMOGLOBIN: 9.4 G/DL (ref 13.5–17.5)
HEMOGLOBIN: 9.7 G/DL (ref 13.5–17.5)
HEMOGLOBIN: 9.7 G/DL (ref 13.5–17.5)
HEMOGLOBIN: 9.8 G/DL (ref 13.5–17.5)
HEMOGLOBIN: 9.9 G/DL (ref 13.5–17.5)
HYALINE CASTS: 0 /LPF (ref 0–8)
INR BLD: 1.65 (ref 0.88–1.12)
INR BLD: 1.66 (ref 0.88–1.12)
INR BLD: 1.73 (ref 0.88–1.12)
INR BLD: 1.75 (ref 0.88–1.12)
INR BLD: 1.9 (ref 0.88–1.12)
INR BLD: 2.06 (ref 0.88–1.12)
INR BLD: 2.09 (ref 0.88–1.12)
INR BLD: 2.56 (ref 0.88–1.12)
INR BLD: 2.66 (ref 0.88–1.12)
INR BLD: 2.71 (ref 0.88–1.12)
INR BLD: 2.8 (ref 0.88–1.12)
INR BLD: 2.83 (ref 0.88–1.12)
INR BLD: 2.95 (ref 0.88–1.12)
INR BLD: 3.09 (ref 0.88–1.12)
INR BLD: 3.42 (ref 0.88–1.12)
INR BLD: 3.51 (ref 0.88–1.12)
INR BLD: 4.08 (ref 0.88–1.12)
IRON SATURATION: 9 % (ref 20–50)
IRON: 24 UG/DL (ref 59–158)
KAPPA, FREE LIGHT CHAINS, SERUM: 87.44 MG/L (ref 3.3–19.4)
KAPPA/LAMBDA RATIO: 2.19 (ref 0.26–1.65)
KAPPA/LAMBDA TEST COMMENT: ABNORMAL
KETONES, URINE: NEGATIVE MG/DL
LAMBDA, FREE LIGHT CHAINS, SERUM: 39.86 MG/L (ref 5.71–26.3)
LEUKOCYTE ESTERASE, URINE: NEGATIVE
LV EF: 20 %
LVEF MODALITY: NORMAL
LYMPHOCYTES ABSOLUTE: 0.6 K/UL (ref 1–5.1)
LYMPHOCYTES RELATIVE PERCENT: 15.8 %
MAGNESIUM: 2.3 MG/DL (ref 1.8–2.4)
MAGNESIUM: 2.4 MG/DL (ref 1.8–2.4)
MAGNESIUM: 2.5 MG/DL (ref 1.8–2.4)
MCH RBC QN AUTO: 32 PG (ref 26–34)
MCH RBC QN AUTO: 32.1 PG (ref 26–34)
MCH RBC QN AUTO: 32.2 PG (ref 26–34)
MCH RBC QN AUTO: 32.3 PG (ref 26–34)
MCH RBC QN AUTO: 32.4 PG (ref 26–34)
MCH RBC QN AUTO: 32.4 PG (ref 26–34)
MCH RBC QN AUTO: 32.5 PG (ref 26–34)
MCH RBC QN AUTO: 32.6 PG (ref 26–34)
MCH RBC QN AUTO: 32.7 PG (ref 26–34)
MCH RBC QN AUTO: 33 PG (ref 26–34)
MCHC RBC AUTO-ENTMCNC: 30.9 G/DL (ref 31–36)
MCHC RBC AUTO-ENTMCNC: 31.7 G/DL (ref 31–36)
MCHC RBC AUTO-ENTMCNC: 32.1 G/DL (ref 31–36)
MCHC RBC AUTO-ENTMCNC: 32.2 G/DL (ref 31–36)
MCHC RBC AUTO-ENTMCNC: 32.2 G/DL (ref 31–36)
MCHC RBC AUTO-ENTMCNC: 32.5 G/DL (ref 31–36)
MCHC RBC AUTO-ENTMCNC: 32.6 G/DL (ref 31–36)
MCHC RBC AUTO-ENTMCNC: 32.6 G/DL (ref 31–36)
MCHC RBC AUTO-ENTMCNC: 32.7 G/DL (ref 31–36)
MCHC RBC AUTO-ENTMCNC: 32.7 G/DL (ref 31–36)
MCHC RBC AUTO-ENTMCNC: 32.8 G/DL (ref 31–36)
MCHC RBC AUTO-ENTMCNC: 32.8 G/DL (ref 31–36)
MCHC RBC AUTO-ENTMCNC: 33 G/DL (ref 31–36)
MCHC RBC AUTO-ENTMCNC: 33.1 G/DL (ref 31–36)
MCHC RBC AUTO-ENTMCNC: 33.1 G/DL (ref 31–36)
MCHC RBC AUTO-ENTMCNC: 33.3 G/DL (ref 31–36)
MCHC RBC AUTO-ENTMCNC: 33.4 G/DL (ref 31–36)
MCV RBC AUTO: 100.4 FL (ref 80–100)
MCV RBC AUTO: 100.4 FL (ref 80–100)
MCV RBC AUTO: 100.6 FL (ref 80–100)
MCV RBC AUTO: 101.4 FL (ref 80–100)
MCV RBC AUTO: 103.8 FL (ref 80–100)
MCV RBC AUTO: 97.4 FL (ref 80–100)
MCV RBC AUTO: 98.5 FL (ref 80–100)
MCV RBC AUTO: 98.6 FL (ref 80–100)
MCV RBC AUTO: 98.7 FL (ref 80–100)
MCV RBC AUTO: 99 FL (ref 80–100)
MCV RBC AUTO: 99.4 FL (ref 80–100)
MCV RBC AUTO: 99.5 FL (ref 80–100)
MICROSCOPIC EXAMINATION: YES
MONOCYTES ABSOLUTE: 0.4 K/UL (ref 0–1.3)
MONOCYTES RELATIVE PERCENT: 11.5 %
NEUTROPHILS ABSOLUTE: 2.4 K/UL (ref 1.7–7.7)
NEUTROPHILS RELATIVE PERCENT: 69.5 %
NITRITE, URINE: NEGATIVE
PDW BLD-RTO: 16.3 % (ref 12.4–15.4)
PDW BLD-RTO: 16.8 % (ref 12.4–15.4)
PDW BLD-RTO: 17.1 % (ref 12.4–15.4)
PDW BLD-RTO: 17.1 % (ref 12.4–15.4)
PDW BLD-RTO: 17.2 % (ref 12.4–15.4)
PDW BLD-RTO: 17.2 % (ref 12.4–15.4)
PDW BLD-RTO: 17.3 % (ref 12.4–15.4)
PDW BLD-RTO: 17.7 % (ref 12.4–15.4)
PDW BLD-RTO: 18.2 % (ref 12.4–15.4)
PDW BLD-RTO: 18.3 % (ref 12.4–15.4)
PDW BLD-RTO: 18.3 % (ref 12.4–15.4)
PDW BLD-RTO: 18.4 % (ref 12.4–15.4)
PDW BLD-RTO: 18.9 % (ref 12.4–15.4)
PDW BLD-RTO: 19.1 % (ref 12.4–15.4)
PDW BLD-RTO: 19.3 % (ref 12.4–15.4)
PERFORMED ON: ABNORMAL
PH UA: 5.5 (ref 5–8)
PLATELET # BLD: 128 K/UL (ref 135–450)
PLATELET # BLD: 146 K/UL (ref 135–450)
PLATELET # BLD: 152 K/UL (ref 135–450)
PLATELET # BLD: 154 K/UL (ref 135–450)
PLATELET # BLD: 163 K/UL (ref 135–450)
PLATELET # BLD: 178 K/UL (ref 135–450)
PLATELET # BLD: 185 K/UL (ref 135–450)
PLATELET # BLD: 196 K/UL (ref 135–450)
PLATELET # BLD: 198 K/UL (ref 135–450)
PLATELET # BLD: 204 K/UL (ref 135–450)
PLATELET # BLD: 206 K/UL (ref 135–450)
PLATELET # BLD: 239 K/UL (ref 135–450)
PLATELET # BLD: 250 K/UL (ref 135–450)
PLATELET # BLD: 272 K/UL (ref 135–450)
PLATELET # BLD: 303 K/UL (ref 135–450)
PLATELET # BLD: 314 K/UL (ref 135–450)
PLATELET # BLD: 319 K/UL (ref 135–450)
PMV BLD AUTO: 7.6 FL (ref 5–10.5)
PMV BLD AUTO: 7.6 FL (ref 5–10.5)
PMV BLD AUTO: 7.7 FL (ref 5–10.5)
PMV BLD AUTO: 7.7 FL (ref 5–10.5)
PMV BLD AUTO: 8.1 FL (ref 5–10.5)
PMV BLD AUTO: 8.2 FL (ref 5–10.5)
PMV BLD AUTO: 8.3 FL (ref 5–10.5)
PMV BLD AUTO: 8.3 FL (ref 5–10.5)
PMV BLD AUTO: 8.4 FL (ref 5–10.5)
PMV BLD AUTO: 8.4 FL (ref 5–10.5)
PMV BLD AUTO: 8.5 FL (ref 5–10.5)
PMV BLD AUTO: 8.6 FL (ref 5–10.5)
PMV BLD AUTO: 8.6 FL (ref 5–10.5)
POTASSIUM REFLEX MAGNESIUM: 3.3 MMOL/L (ref 3.5–5.1)
POTASSIUM REFLEX MAGNESIUM: 3.4 MMOL/L (ref 3.5–5.1)
POTASSIUM REFLEX MAGNESIUM: 3.5 MMOL/L (ref 3.5–5.1)
POTASSIUM REFLEX MAGNESIUM: 3.6 MMOL/L (ref 3.5–5.1)
POTASSIUM REFLEX MAGNESIUM: 3.6 MMOL/L (ref 3.5–5.1)
POTASSIUM REFLEX MAGNESIUM: 3.9 MMOL/L (ref 3.5–5.1)
POTASSIUM REFLEX MAGNESIUM: 4.1 MMOL/L (ref 3.5–5.1)
POTASSIUM REFLEX MAGNESIUM: 4.1 MMOL/L (ref 3.5–5.1)
POTASSIUM REFLEX MAGNESIUM: 4.2 MMOL/L (ref 3.5–5.1)
POTASSIUM REFLEX MAGNESIUM: 4.5 MMOL/L (ref 3.5–5.1)
POTASSIUM SERPL-SCNC: 3.4 MMOL/L (ref 3.5–5.1)
POTASSIUM SERPL-SCNC: 3.5 MMOL/L (ref 3.5–5.1)
POTASSIUM SERPL-SCNC: 3.7 MMOL/L (ref 3.5–5.1)
POTASSIUM SERPL-SCNC: 3.9 MMOL/L (ref 3.5–5.1)
POTASSIUM SERPL-SCNC: 3.9 MMOL/L (ref 3.5–5.1)
POTASSIUM SERPL-SCNC: 4 MMOL/L (ref 3.5–5.1)
PRO-BNP: ABNORMAL PG/ML (ref 0–124)
PROTEIN PROTEIN: 12 MG/DL
PROTEIN UA: NEGATIVE MG/DL
PROTEIN/CREAT RATIO: 0.7 MG/DL
PROTHROMBIN TIME: 19 SEC (ref 9.9–12.7)
PROTHROMBIN TIME: 19.1 SEC (ref 9.9–12.7)
PROTHROMBIN TIME: 20 SEC (ref 9.9–12.7)
PROTHROMBIN TIME: 20.2 SEC (ref 9.9–12.7)
PROTHROMBIN TIME: 22 SEC (ref 9.9–12.7)
PROTHROMBIN TIME: 24 SEC (ref 9.9–12.7)
PROTHROMBIN TIME: 24.3 SEC (ref 9.9–12.7)
PROTHROMBIN TIME: 30.1 SEC (ref 9.9–12.7)
PROTHROMBIN TIME: 31.3 SEC (ref 9.9–12.7)
PROTHROMBIN TIME: 31.9 SEC (ref 9.9–12.7)
PROTHROMBIN TIME: 33 SEC (ref 9.9–12.7)
PROTHROMBIN TIME: 33.4 SEC (ref 9.9–12.7)
PROTHROMBIN TIME: 34.9 SEC (ref 9.9–12.7)
PROTHROMBIN TIME: 36.6 SEC (ref 9.9–12.7)
PROTHROMBIN TIME: 40.7 SEC (ref 9.9–12.7)
PROTHROMBIN TIME: 41.8 SEC (ref 9.9–12.7)
PROTHROMBIN TIME: 48.9 SEC (ref 9.9–12.7)
RBC # BLD: 2.66 M/UL (ref 4.2–5.9)
RBC # BLD: 2.72 M/UL (ref 4.2–5.9)
RBC # BLD: 2.85 M/UL (ref 4.2–5.9)
RBC # BLD: 2.87 M/UL (ref 4.2–5.9)
RBC # BLD: 2.92 M/UL (ref 4.2–5.9)
RBC # BLD: 3.01 M/UL (ref 4.2–5.9)
RBC # BLD: 3.01 M/UL (ref 4.2–5.9)
RBC # BLD: 3.03 M/UL (ref 4.2–5.9)
RBC # BLD: 3.08 M/UL (ref 4.2–5.9)
RBC # BLD: 3.15 M/UL (ref 4.2–5.9)
RBC # BLD: 3.16 M/UL (ref 4.2–5.9)
RBC # BLD: 3.19 M/UL (ref 4.2–5.9)
RBC # BLD: 3.22 M/UL (ref 4.2–5.9)
RBC # BLD: 3.3 M/UL (ref 4.2–5.9)
RBC # BLD: 3.38 M/UL (ref 4.2–5.9)
RBC # BLD: 3.44 M/UL (ref 4.2–5.9)
RBC # BLD: 3.66 M/UL (ref 4.2–5.9)
RBC UA: 1 /HPF (ref 0–4)
SODIUM BLD-SCNC: 129 MMOL/L (ref 136–145)
SODIUM BLD-SCNC: 130 MMOL/L (ref 136–145)
SODIUM BLD-SCNC: 133 MMOL/L (ref 136–145)
SODIUM BLD-SCNC: 134 MMOL/L (ref 136–145)
SODIUM BLD-SCNC: 135 MMOL/L (ref 136–145)
SODIUM BLD-SCNC: 138 MMOL/L (ref 136–145)
SODIUM BLD-SCNC: 138 MMOL/L (ref 136–145)
SODIUM BLD-SCNC: 139 MMOL/L (ref 136–145)
SODIUM BLD-SCNC: 140 MMOL/L (ref 136–145)
SODIUM BLD-SCNC: 141 MMOL/L (ref 136–145)
SODIUM BLD-SCNC: 141 MMOL/L (ref 136–145)
SODIUM BLD-SCNC: 143 MMOL/L (ref 136–145)
SPE/IFE INTERPRETATION: NORMAL
SPECIFIC GRAVITY UA: 1.01 (ref 1–1.03)
TOTAL IRON BINDING CAPACITY: 272 UG/DL (ref 260–445)
TOTAL PROTEIN: 5.9 G/DL (ref 6.4–8.2)
TOTAL PROTEIN: 7 G/DL (ref 6.4–8.2)
TROPONIN: 0.05 NG/ML
URIC ACID, SERUM: 11.2 MG/DL (ref 3.5–7.2)
URINE TYPE: ABNORMAL
UROBILINOGEN, URINE: 1 E.U./DL
WBC # BLD: 10.2 K/UL (ref 4–11)
WBC # BLD: 3.4 K/UL (ref 4–11)
WBC # BLD: 3.5 K/UL (ref 4–11)
WBC # BLD: 3.8 K/UL (ref 4–11)
WBC # BLD: 4.2 K/UL (ref 4–11)
WBC # BLD: 4.4 K/UL (ref 4–11)
WBC # BLD: 4.9 K/UL (ref 4–11)
WBC # BLD: 5.5 K/UL (ref 4–11)
WBC # BLD: 5.7 K/UL (ref 4–11)
WBC # BLD: 6.1 K/UL (ref 4–11)
WBC # BLD: 6.3 K/UL (ref 4–11)
WBC # BLD: 6.6 K/UL (ref 4–11)
WBC # BLD: 6.8 K/UL (ref 4–11)
WBC # BLD: 7.5 K/UL (ref 4–11)
WBC # BLD: 9 K/UL (ref 4–11)
WBC # BLD: 9.4 K/UL (ref 4–11)
WBC # BLD: 9.7 K/UL (ref 4–11)
WBC UA: 1 /HPF (ref 0–5)

## 2021-01-01 PROCEDURE — 97530 THERAPEUTIC ACTIVITIES: CPT

## 2021-01-01 PROCEDURE — 6370000000 HC RX 637 (ALT 250 FOR IP): Performed by: INTERNAL MEDICINE

## 2021-01-01 PROCEDURE — 6360000002 HC RX W HCPCS: Performed by: NURSE PRACTITIONER

## 2021-01-01 PROCEDURE — 4A023N6 MEASUREMENT OF CARDIAC SAMPLING AND PRESSURE, RIGHT HEART, PERCUTANEOUS APPROACH: ICD-10-PCS | Performed by: INTERNAL MEDICINE

## 2021-01-01 PROCEDURE — 2700000000 HC OXYGEN THERAPY PER DAY

## 2021-01-01 PROCEDURE — 36415 COLL VENOUS BLD VENIPUNCTURE: CPT

## 2021-01-01 PROCEDURE — 6370000000 HC RX 637 (ALT 250 FOR IP): Performed by: NURSE PRACTITIONER

## 2021-01-01 PROCEDURE — 85610 PROTHROMBIN TIME: CPT

## 2021-01-01 PROCEDURE — 2580000003 HC RX 258: Performed by: INTERNAL MEDICINE

## 2021-01-01 PROCEDURE — 2060000000 HC ICU INTERMEDIATE R&B

## 2021-01-01 PROCEDURE — 85025 COMPLETE CBC W/AUTO DIFF WBC: CPT

## 2021-01-01 PROCEDURE — 85027 COMPLETE CBC AUTOMATED: CPT

## 2021-01-01 PROCEDURE — 83880 ASSAY OF NATRIURETIC PEPTIDE: CPT

## 2021-01-01 PROCEDURE — 94761 N-INVAS EAR/PLS OXIMETRY MLT: CPT

## 2021-01-01 PROCEDURE — 71045 X-RAY EXAM CHEST 1 VIEW: CPT

## 2021-01-01 PROCEDURE — 97530 THERAPEUTIC ACTIVITIES: CPT | Performed by: PHYSICAL THERAPIST

## 2021-01-01 PROCEDURE — 83550 IRON BINDING TEST: CPT

## 2021-01-01 PROCEDURE — 71046 X-RAY EXAM CHEST 2 VIEWS: CPT

## 2021-01-01 PROCEDURE — 80048 BASIC METABOLIC PNL TOTAL CA: CPT

## 2021-01-01 PROCEDURE — C8929 TTE W OR WO FOL WCON,DOPPLER: HCPCS

## 2021-01-01 PROCEDURE — 81001 URINALYSIS AUTO W/SCOPE: CPT

## 2021-01-01 PROCEDURE — 51798 US URINE CAPACITY MEASURE: CPT

## 2021-01-01 PROCEDURE — 84155 ASSAY OF PROTEIN SERUM: CPT

## 2021-01-01 PROCEDURE — 6360000002 HC RX W HCPCS: Performed by: INTERNAL MEDICINE

## 2021-01-01 PROCEDURE — 97110 THERAPEUTIC EXERCISES: CPT

## 2021-01-01 PROCEDURE — 83540 ASSAY OF IRON: CPT

## 2021-01-01 PROCEDURE — 6360000002 HC RX W HCPCS: Performed by: EMERGENCY MEDICINE

## 2021-01-01 PROCEDURE — 97535 SELF CARE MNGMENT TRAINING: CPT

## 2021-01-01 PROCEDURE — 94760 N-INVAS EAR/PLS OXIMETRY 1: CPT

## 2021-01-01 PROCEDURE — 99232 SBSQ HOSP IP/OBS MODERATE 35: CPT | Performed by: INTERNAL MEDICINE

## 2021-01-01 PROCEDURE — 84165 PROTEIN E-PHORESIS SERUM: CPT

## 2021-01-01 PROCEDURE — 83735 ASSAY OF MAGNESIUM: CPT

## 2021-01-01 PROCEDURE — 99233 SBSQ HOSP IP/OBS HIGH 50: CPT | Performed by: INTERNAL MEDICINE

## 2021-01-01 PROCEDURE — 93005 ELECTROCARDIOGRAM TRACING: CPT | Performed by: EMERGENCY MEDICINE

## 2021-01-01 PROCEDURE — 1200000000 HC SEMI PRIVATE

## 2021-01-01 PROCEDURE — 2580000003 HC RX 258: Performed by: NURSE PRACTITIONER

## 2021-01-01 PROCEDURE — 83036 HEMOGLOBIN GLYCOSYLATED A1C: CPT

## 2021-01-01 PROCEDURE — 99233 SBSQ HOSP IP/OBS HIGH 50: CPT | Performed by: NURSE PRACTITIONER

## 2021-01-01 PROCEDURE — C1769 GUIDE WIRE: HCPCS

## 2021-01-01 PROCEDURE — 99284 EMERGENCY DEPT VISIT MOD MDM: CPT

## 2021-01-01 PROCEDURE — 97116 GAIT TRAINING THERAPY: CPT | Performed by: PHYSICAL THERAPIST

## 2021-01-01 PROCEDURE — 93971 EXTREMITY STUDY: CPT

## 2021-01-01 PROCEDURE — 2500000003 HC RX 250 WO HCPCS

## 2021-01-01 PROCEDURE — 93000 ELECTROCARDIOGRAM COMPLETE: CPT | Performed by: INTERNAL MEDICINE

## 2021-01-01 PROCEDURE — G0008 ADMIN INFLUENZA VIRUS VAC: HCPCS | Performed by: FAMILY MEDICINE

## 2021-01-01 PROCEDURE — 93451 RIGHT HEART CATH: CPT

## 2021-01-01 PROCEDURE — 73070 X-RAY EXAM OF ELBOW: CPT

## 2021-01-01 PROCEDURE — 6360000002 HC RX W HCPCS: Performed by: FAMILY MEDICINE

## 2021-01-01 PROCEDURE — 82728 ASSAY OF FERRITIN: CPT

## 2021-01-01 PROCEDURE — 99232 SBSQ HOSP IP/OBS MODERATE 35: CPT | Performed by: NURSE PRACTITIONER

## 2021-01-01 PROCEDURE — 80076 HEPATIC FUNCTION PANEL: CPT

## 2021-01-01 PROCEDURE — 83883 ASSAY NEPHELOMETRY NOT SPEC: CPT

## 2021-01-01 PROCEDURE — 6360000004 HC RX CONTRAST MEDICATION: Performed by: INTERNAL MEDICINE

## 2021-01-01 PROCEDURE — 84484 ASSAY OF TROPONIN QUANT: CPT

## 2021-01-01 PROCEDURE — 97116 GAIT TRAINING THERAPY: CPT

## 2021-01-01 PROCEDURE — 84156 ASSAY OF PROTEIN URINE: CPT

## 2021-01-01 PROCEDURE — 93451 RIGHT HEART CATH: CPT | Performed by: INTERNAL MEDICINE

## 2021-01-01 PROCEDURE — 96374 THER/PROPH/DIAG INJ IV PUSH: CPT

## 2021-01-01 PROCEDURE — 82570 ASSAY OF URINE CREATININE: CPT

## 2021-01-01 PROCEDURE — 99223 1ST HOSP IP/OBS HIGH 75: CPT | Performed by: INTERNAL MEDICINE

## 2021-01-01 PROCEDURE — 93010 ELECTROCARDIOGRAM REPORT: CPT | Performed by: INTERNAL MEDICINE

## 2021-01-01 PROCEDURE — 90686 IIV4 VACC NO PRSV 0.5 ML IM: CPT | Performed by: FAMILY MEDICINE

## 2021-01-01 PROCEDURE — 99244 OFF/OP CNSLTJ NEW/EST MOD 40: CPT | Performed by: INTERNAL MEDICINE

## 2021-01-01 PROCEDURE — 71275 CT ANGIOGRAPHY CHEST: CPT

## 2021-01-01 PROCEDURE — 84550 ASSAY OF BLOOD/URIC ACID: CPT

## 2021-01-01 PROCEDURE — 97162 PT EVAL MOD COMPLEX 30 MIN: CPT

## 2021-01-01 PROCEDURE — C1751 CATH, INF, PER/CENT/MIDLINE: HCPCS

## 2021-01-01 PROCEDURE — 99024 POSTOP FOLLOW-UP VISIT: CPT | Performed by: NURSE PRACTITIONER

## 2021-01-01 PROCEDURE — C1894 INTRO/SHEATH, NON-LASER: HCPCS

## 2021-01-01 RX ORDER — DOCUSATE SODIUM 50 MG AND SENNOSIDES 8.6 MG 8.6; 5 MG/1; MG/1
1 TABLET, FILM COATED ORAL PRN
COMMUNITY
Start: 2021-01-01 | End: 2022-01-01

## 2021-01-01 RX ORDER — SODIUM CHLORIDE 0.9 % (FLUSH) 0.9 %
5-40 SYRINGE (ML) INJECTION PRN
Status: DISCONTINUED | OUTPATIENT
Start: 2021-01-01 | End: 2021-01-01 | Stop reason: SDUPTHER

## 2021-01-01 RX ORDER — SODIUM CHLORIDE 0.9 % (FLUSH) 0.9 %
5-40 SYRINGE (ML) INJECTION EVERY 12 HOURS SCHEDULED
Status: DISCONTINUED | OUTPATIENT
Start: 2021-01-01 | End: 2021-01-01 | Stop reason: SDUPTHER

## 2021-01-01 RX ORDER — ALLOPURINOL 100 MG/1
100 TABLET ORAL DAILY
Status: DISCONTINUED | OUTPATIENT
Start: 2021-01-01 | End: 2021-01-01 | Stop reason: HOSPADM

## 2021-01-01 RX ORDER — METOPROLOL SUCCINATE 25 MG/1
12.5 TABLET, EXTENDED RELEASE ORAL DAILY
Qty: 30 TABLET | Refills: 3 | Status: SHIPPED | OUTPATIENT
Start: 2021-01-01 | End: 2021-01-01

## 2021-01-01 RX ORDER — TORSEMIDE 20 MG/1
40 TABLET ORAL DAILY
Qty: 30 TABLET | Refills: 3 | Status: ON HOLD | OUTPATIENT
Start: 2021-01-01 | End: 2022-01-01 | Stop reason: HOSPADM

## 2021-01-01 RX ORDER — SODIUM CHLORIDE 9 MG/ML
25 INJECTION, SOLUTION INTRAVENOUS PRN
Status: DISCONTINUED | OUTPATIENT
Start: 2021-01-01 | End: 2021-01-01 | Stop reason: SDUPTHER

## 2021-01-01 RX ORDER — SODIUM CHLORIDE 9 MG/ML
25 INJECTION, SOLUTION INTRAVENOUS PRN
Status: DISCONTINUED | OUTPATIENT
Start: 2021-01-01 | End: 2021-01-01 | Stop reason: HOSPADM

## 2021-01-01 RX ORDER — FUROSEMIDE 10 MG/ML
40 INJECTION INTRAMUSCULAR; INTRAVENOUS ONCE
Status: COMPLETED | OUTPATIENT
Start: 2021-01-01 | End: 2021-01-01

## 2021-01-01 RX ORDER — ACETAMINOPHEN 325 MG/1
650 TABLET ORAL EVERY 4 HOURS PRN
Status: DISCONTINUED | OUTPATIENT
Start: 2021-01-01 | End: 2021-01-01 | Stop reason: HOSPADM

## 2021-01-01 RX ORDER — ISOSORBIDE MONONITRATE 30 MG/1
30 TABLET, EXTENDED RELEASE ORAL DAILY
Status: DISCONTINUED | OUTPATIENT
Start: 2021-01-01 | End: 2021-01-01 | Stop reason: HOSPADM

## 2021-01-01 RX ORDER — ACETAMINOPHEN 650 MG/1
650 SUPPOSITORY RECTAL EVERY 6 HOURS PRN
Status: DISCONTINUED | OUTPATIENT
Start: 2021-01-01 | End: 2021-01-01 | Stop reason: HOSPADM

## 2021-01-01 RX ORDER — NITROGLYCERIN 0.4 MG/1
0.4 TABLET SUBLINGUAL EVERY 5 MIN PRN
COMMUNITY
Start: 2021-01-01 | End: 2022-01-01

## 2021-01-01 RX ORDER — MILRINONE LACTATE 0.2 MG/ML
0.25 INJECTION, SOLUTION INTRAVENOUS CONTINUOUS
Status: DISCONTINUED | OUTPATIENT
Start: 2021-01-01 | End: 2021-01-01

## 2021-01-01 RX ORDER — ALLOPURINOL 100 MG/1
100 TABLET ORAL DAILY
Qty: 30 TABLET | Refills: 3 | Status: SHIPPED | OUTPATIENT
Start: 2021-01-01 | End: 2021-01-01

## 2021-01-01 RX ORDER — TORSEMIDE 20 MG/1
40 TABLET ORAL DAILY
Qty: 30 TABLET | Refills: 3 | Status: SHIPPED | OUTPATIENT
Start: 2021-01-01 | End: 2021-01-01

## 2021-01-01 RX ORDER — ONDANSETRON 2 MG/ML
4 INJECTION INTRAMUSCULAR; INTRAVENOUS EVERY 6 HOURS PRN
Status: DISCONTINUED | OUTPATIENT
Start: 2021-01-01 | End: 2021-01-01 | Stop reason: HOSPADM

## 2021-01-01 RX ORDER — TORSEMIDE 20 MG/1
20 TABLET ORAL DAILY
Status: DISCONTINUED | OUTPATIENT
Start: 2021-01-01 | End: 2021-01-01

## 2021-01-01 RX ORDER — CARVEDILOL 12.5 MG/1
12.5 TABLET ORAL 2 TIMES DAILY WITH MEALS
Status: DISCONTINUED | OUTPATIENT
Start: 2021-01-01 | End: 2021-01-01

## 2021-01-01 RX ORDER — ISOSORBIDE MONONITRATE 30 MG/1
60 TABLET, EXTENDED RELEASE ORAL DAILY
Status: DISCONTINUED | OUTPATIENT
Start: 2021-01-01 | End: 2021-01-01

## 2021-01-01 RX ORDER — PREDNISONE 10 MG/1
10 TABLET ORAL DAILY
Status: COMPLETED | OUTPATIENT
Start: 2021-01-01 | End: 2021-01-01

## 2021-01-01 RX ORDER — PREDNISONE 20 MG/1
20 TABLET ORAL DAILY
Status: COMPLETED | OUTPATIENT
Start: 2021-01-01 | End: 2021-01-01

## 2021-01-01 RX ORDER — FUROSEMIDE 10 MG/ML
40 INJECTION INTRAMUSCULAR; INTRAVENOUS 3 TIMES DAILY
Status: DISCONTINUED | OUTPATIENT
Start: 2021-01-01 | End: 2021-01-01

## 2021-01-01 RX ORDER — POLYETHYLENE GLYCOL 3350 17 G/17G
17 POWDER, FOR SOLUTION ORAL DAILY PRN
Status: DISCONTINUED | OUTPATIENT
Start: 2021-01-01 | End: 2021-01-01 | Stop reason: HOSPADM

## 2021-01-01 RX ORDER — PREDNISONE 1 MG/1
5 TABLET ORAL DAILY
Status: COMPLETED | OUTPATIENT
Start: 2021-01-01 | End: 2021-01-01

## 2021-01-01 RX ORDER — SODIUM CHLORIDE 0.9 % (FLUSH) 0.9 %
10 SYRINGE (ML) INJECTION EVERY 12 HOURS SCHEDULED
Status: DISCONTINUED | OUTPATIENT
Start: 2021-01-01 | End: 2021-01-01

## 2021-01-01 RX ORDER — LEVOTHYROXINE SODIUM 0.03 MG/1
25 TABLET ORAL DAILY
Status: DISCONTINUED | OUTPATIENT
Start: 2021-01-01 | End: 2021-01-01 | Stop reason: HOSPADM

## 2021-01-01 RX ORDER — SODIUM CHLORIDE 0.9 % (FLUSH) 0.9 %
10 SYRINGE (ML) INJECTION PRN
Status: DISCONTINUED | OUTPATIENT
Start: 2021-01-01 | End: 2021-01-01 | Stop reason: HOSPADM

## 2021-01-01 RX ORDER — SPIRONOLACTONE 25 MG/1
25 TABLET ORAL DAILY
Status: DISCONTINUED | OUTPATIENT
Start: 2021-01-01 | End: 2021-01-01

## 2021-01-01 RX ORDER — NICOTINE POLACRILEX 4 MG
15 LOZENGE BUCCAL PRN
Status: DISCONTINUED | OUTPATIENT
Start: 2021-01-01 | End: 2021-01-01 | Stop reason: HOSPADM

## 2021-01-01 RX ORDER — DEXTROSE MONOHYDRATE 25 G/50ML
12.5 INJECTION, SOLUTION INTRAVENOUS PRN
Status: DISCONTINUED | OUTPATIENT
Start: 2021-01-01 | End: 2021-01-01 | Stop reason: HOSPADM

## 2021-01-01 RX ORDER — M-VIT,TX,IRON,MINS/CALC/FOLIC 27MG-0.4MG
1 TABLET ORAL DAILY
COMMUNITY

## 2021-01-01 RX ORDER — TORSEMIDE 20 MG/1
40 TABLET ORAL DAILY
Status: DISCONTINUED | OUTPATIENT
Start: 2021-01-01 | End: 2021-01-01 | Stop reason: HOSPADM

## 2021-01-01 RX ORDER — TORSEMIDE 20 MG/1
40 TABLET ORAL DAILY
Status: DISCONTINUED | OUTPATIENT
Start: 2021-01-01 | End: 2021-01-01

## 2021-01-01 RX ORDER — FUROSEMIDE 10 MG/ML
40 INJECTION INTRAMUSCULAR; INTRAVENOUS 2 TIMES DAILY
Status: DISCONTINUED | OUTPATIENT
Start: 2021-01-01 | End: 2021-01-01

## 2021-01-01 RX ORDER — SPIRONOLACTONE 25 MG/1
12.5 TABLET ORAL DAILY
Status: DISCONTINUED | OUTPATIENT
Start: 2021-01-01 | End: 2021-01-01

## 2021-01-01 RX ORDER — ACETAMINOPHEN 325 MG/1
650 TABLET ORAL EVERY 6 HOURS PRN
Status: DISCONTINUED | OUTPATIENT
Start: 2021-01-01 | End: 2021-01-01 | Stop reason: HOSPADM

## 2021-01-01 RX ORDER — LEVOTHYROXINE SODIUM 0.03 MG/1
25 TABLET ORAL DAILY
Status: ON HOLD | COMMUNITY
Start: 2021-01-01 | End: 2022-01-01 | Stop reason: HOSPADM

## 2021-01-01 RX ORDER — TORSEMIDE 10 MG/1
10 TABLET ORAL 2 TIMES DAILY
Status: ON HOLD | COMMUNITY
Start: 2021-01-01 | End: 2021-01-01 | Stop reason: HOSPADM

## 2021-01-01 RX ORDER — DEXTROSE MONOHYDRATE 50 MG/ML
100 INJECTION, SOLUTION INTRAVENOUS PRN
Status: DISCONTINUED | OUTPATIENT
Start: 2021-01-01 | End: 2021-01-01 | Stop reason: HOSPADM

## 2021-01-01 RX ORDER — SODIUM CHLORIDE 0.9 % (FLUSH) 0.9 %
5-40 SYRINGE (ML) INJECTION EVERY 12 HOURS SCHEDULED
Status: DISCONTINUED | OUTPATIENT
Start: 2021-01-01 | End: 2021-01-01 | Stop reason: HOSPADM

## 2021-01-01 RX ORDER — CARVEDILOL 6.25 MG/1
6.25 TABLET ORAL 2 TIMES DAILY WITH MEALS
Status: DISCONTINUED | OUTPATIENT
Start: 2021-01-01 | End: 2021-01-01

## 2021-01-01 RX ORDER — INSULIN GLARGINE 100 [IU]/ML
15 INJECTION, SOLUTION SUBCUTANEOUS NIGHTLY
Status: DISCONTINUED | OUTPATIENT
Start: 2021-01-01 | End: 2021-01-01 | Stop reason: HOSPADM

## 2021-01-01 RX ORDER — SODIUM FERRIC GLUCONATE COMPLEX IN SUCROSE 12.5 MG/ML
250 INJECTION INTRAVENOUS DAILY
Status: DISCONTINUED | OUTPATIENT
Start: 2021-01-01 | End: 2021-01-01 | Stop reason: CLARIF

## 2021-01-01 RX ORDER — HYDROCORTISONE 0.5 %
CREAM (GRAM) TOPICAL 3 TIMES DAILY PRN
Status: DISCONTINUED | OUTPATIENT
Start: 2021-01-01 | End: 2021-01-01 | Stop reason: HOSPADM

## 2021-01-01 RX ORDER — CLOPIDOGREL BISULFATE 75 MG/1
75 TABLET ORAL DAILY
Status: DISCONTINUED | OUTPATIENT
Start: 2021-01-01 | End: 2021-01-01

## 2021-01-01 RX ORDER — ALLOPURINOL 100 MG/1
100 TABLET ORAL DAILY
Qty: 30 TABLET | Refills: 3 | Status: SHIPPED | OUTPATIENT
Start: 2021-01-01

## 2021-01-01 RX ORDER — MILRINONE LACTATE 0.2 MG/ML
0.38 INJECTION, SOLUTION INTRAVENOUS CONTINUOUS
Status: DISCONTINUED | OUTPATIENT
Start: 2021-01-01 | End: 2021-01-01

## 2021-01-01 RX ORDER — FUROSEMIDE 10 MG/ML
60 INJECTION INTRAMUSCULAR; INTRAVENOUS 2 TIMES DAILY
Status: DISCONTINUED | OUTPATIENT
Start: 2021-01-01 | End: 2021-01-01

## 2021-01-01 RX ORDER — NITROGLYCERIN 0.4 MG/1
0.4 TABLET SUBLINGUAL EVERY 5 MIN PRN
Status: DISCONTINUED | OUTPATIENT
Start: 2021-01-01 | End: 2021-01-01 | Stop reason: HOSPADM

## 2021-01-01 RX ORDER — METOLAZONE 5 MG/1
5 TABLET ORAL ONCE
Status: COMPLETED | OUTPATIENT
Start: 2021-01-01 | End: 2021-01-01

## 2021-01-01 RX ORDER — ATORVASTATIN CALCIUM 40 MG/1
80 TABLET, FILM COATED ORAL NIGHTLY
Status: DISCONTINUED | OUTPATIENT
Start: 2021-01-01 | End: 2021-01-01 | Stop reason: HOSPADM

## 2021-01-01 RX ORDER — GUAIFENESIN/DEXTROMETHORPHAN 100-10MG/5
10 SYRUP ORAL EVERY 4 HOURS PRN
Status: DISCONTINUED | OUTPATIENT
Start: 2021-01-01 | End: 2021-01-01 | Stop reason: HOSPADM

## 2021-01-01 RX ORDER — SODIUM CHLORIDE 0.9 % (FLUSH) 0.9 %
5-40 SYRINGE (ML) INJECTION PRN
Status: DISCONTINUED | OUTPATIENT
Start: 2021-01-01 | End: 2021-01-01 | Stop reason: HOSPADM

## 2021-01-01 RX ORDER — WARFARIN SODIUM 5 MG/1
5 TABLET ORAL
Status: DISCONTINUED | OUTPATIENT
Start: 2021-01-01 | End: 2021-01-01

## 2021-01-01 RX ORDER — WARFARIN SODIUM 7.5 MG/1
TABLET ORAL
Status: ON HOLD | COMMUNITY
Start: 2021-01-01 | End: 2021-01-01 | Stop reason: HOSPADM

## 2021-01-01 RX ORDER — MAGNESIUM SULFATE IN WATER 40 MG/ML
2000 INJECTION, SOLUTION INTRAVENOUS PRN
Status: DISCONTINUED | OUTPATIENT
Start: 2021-01-01 | End: 2021-01-01

## 2021-01-01 RX ORDER — METOPROLOL SUCCINATE 25 MG/1
12.5 TABLET, EXTENDED RELEASE ORAL DAILY
Qty: 30 TABLET | Refills: 3 | Status: ON HOLD | OUTPATIENT
Start: 2021-01-01 | End: 2022-01-01 | Stop reason: HOSPADM

## 2021-01-01 RX ORDER — PREDNISONE 20 MG/1
40 TABLET ORAL DAILY
Status: COMPLETED | OUTPATIENT
Start: 2021-01-01 | End: 2021-01-01

## 2021-01-01 RX ORDER — METOPROLOL SUCCINATE 25 MG/1
12.5 TABLET, EXTENDED RELEASE ORAL DAILY
Status: DISCONTINUED | OUTPATIENT
Start: 2021-01-01 | End: 2021-01-01 | Stop reason: HOSPADM

## 2021-01-01 RX ORDER — POTASSIUM CHLORIDE 20 MEQ/1
40 TABLET, EXTENDED RELEASE ORAL ONCE
Status: COMPLETED | OUTPATIENT
Start: 2021-01-01 | End: 2021-01-01

## 2021-01-01 RX ORDER — ALLOPURINOL 300 MG/1
300 TABLET ORAL DAILY
Status: DISCONTINUED | OUTPATIENT
Start: 2021-01-01 | End: 2021-01-01

## 2021-01-01 RX ORDER — POTASSIUM CHLORIDE 7.45 MG/ML
10 INJECTION INTRAVENOUS PRN
Status: DISCONTINUED | OUTPATIENT
Start: 2021-01-01 | End: 2021-01-01

## 2021-01-01 RX ORDER — ONDANSETRON 4 MG/1
4 TABLET, ORALLY DISINTEGRATING ORAL EVERY 8 HOURS PRN
Status: DISCONTINUED | OUTPATIENT
Start: 2021-01-01 | End: 2021-01-01 | Stop reason: HOSPADM

## 2021-01-01 RX ORDER — INSULIN GLARGINE 100 [IU]/ML
10 INJECTION, SOLUTION SUBCUTANEOUS NIGHTLY
Status: DISCONTINUED | OUTPATIENT
Start: 2021-01-01 | End: 2021-01-01

## 2021-01-01 RX ORDER — ALLOPURINOL 100 MG/1
100 TABLET ORAL DAILY
Status: DISCONTINUED | OUTPATIENT
Start: 2021-01-01 | End: 2021-01-01

## 2021-01-01 RX ORDER — SODIUM CHLORIDE 9 MG/ML
INJECTION, SOLUTION INTRAVENOUS CONTINUOUS
Status: DISCONTINUED | OUTPATIENT
Start: 2021-01-01 | End: 2021-01-01

## 2021-01-01 RX ORDER — TORSEMIDE 20 MG/1
60 TABLET ORAL DAILY
Status: DISCONTINUED | OUTPATIENT
Start: 2021-01-01 | End: 2021-01-01

## 2021-01-01 RX ADMIN — FUROSEMIDE 10 MG/HR: 10 INJECTION, SOLUTION INTRAMUSCULAR; INTRAVENOUS at 19:26

## 2021-01-01 RX ADMIN — INSULIN LISPRO 10 UNITS: 100 INJECTION, SOLUTION INTRAVENOUS; SUBCUTANEOUS at 13:08

## 2021-01-01 RX ADMIN — INSULIN LISPRO 4 UNITS: 100 INJECTION, SOLUTION INTRAVENOUS; SUBCUTANEOUS at 12:50

## 2021-01-01 RX ADMIN — ATORVASTATIN CALCIUM 80 MG: 40 TABLET, FILM COATED ORAL at 21:03

## 2021-01-01 RX ADMIN — RIVAROXABAN 15 MG: 15 TABLET, FILM COATED ORAL at 17:48

## 2021-01-01 RX ADMIN — GUAIFENESIN SYRUP AND DEXTROMETHORPHAN 10 ML: 100; 10 SYRUP ORAL at 23:21

## 2021-01-01 RX ADMIN — SODIUM CHLORIDE, PRESERVATIVE FREE 10 ML: 5 INJECTION INTRAVENOUS at 09:18

## 2021-01-01 RX ADMIN — RIVAROXABAN 15 MG: 15 TABLET, FILM COATED ORAL at 17:35

## 2021-01-01 RX ADMIN — ATORVASTATIN CALCIUM 80 MG: 40 TABLET, FILM COATED ORAL at 21:28

## 2021-01-01 RX ADMIN — FUROSEMIDE 10 MG/HR: 10 INJECTION, SOLUTION INTRAMUSCULAR; INTRAVENOUS at 23:15

## 2021-01-01 RX ADMIN — INSULIN LISPRO 4 UNITS: 100 INJECTION, SOLUTION INTRAVENOUS; SUBCUTANEOUS at 21:04

## 2021-01-01 RX ADMIN — MILRINONE LACTATE 0.38 MCG/KG/MIN: 0.2 INJECTION, SOLUTION INTRAVENOUS at 02:23

## 2021-01-01 RX ADMIN — ATORVASTATIN CALCIUM 80 MG: 40 TABLET, FILM COATED ORAL at 21:45

## 2021-01-01 RX ADMIN — SODIUM CHLORIDE, PRESERVATIVE FREE 10 ML: 5 INJECTION INTRAVENOUS at 10:37

## 2021-01-01 RX ADMIN — ATORVASTATIN CALCIUM 80 MG: 40 TABLET, FILM COATED ORAL at 20:53

## 2021-01-01 RX ADMIN — TORSEMIDE 20 MG: 20 TABLET ORAL at 08:36

## 2021-01-01 RX ADMIN — RIVAROXABAN 15 MG: 15 TABLET, FILM COATED ORAL at 17:39

## 2021-01-01 RX ADMIN — INSULIN LISPRO 12 UNITS: 100 INJECTION, SOLUTION INTRAVENOUS; SUBCUTANEOUS at 17:48

## 2021-01-01 RX ADMIN — POTASSIUM CHLORIDE 10 MEQ: 7.46 INJECTION, SOLUTION INTRAVENOUS at 17:21

## 2021-01-01 RX ADMIN — ATORVASTATIN CALCIUM 80 MG: 40 TABLET, FILM COATED ORAL at 21:08

## 2021-01-01 RX ADMIN — FUROSEMIDE 60 MG: 10 INJECTION, SOLUTION INTRAVENOUS at 18:12

## 2021-01-01 RX ADMIN — INSULIN LISPRO 8 UNITS: 100 INJECTION, SOLUTION INTRAVENOUS; SUBCUTANEOUS at 12:36

## 2021-01-01 RX ADMIN — SODIUM CHLORIDE, PRESERVATIVE FREE 10 ML: 5 INJECTION INTRAVENOUS at 20:35

## 2021-01-01 RX ADMIN — INSULIN LISPRO 3 UNITS: 100 INJECTION, SOLUTION INTRAVENOUS; SUBCUTANEOUS at 21:46

## 2021-01-01 RX ADMIN — Medication 10 ML: at 10:26

## 2021-01-01 RX ADMIN — TORSEMIDE 60 MG: 20 TABLET ORAL at 09:06

## 2021-01-01 RX ADMIN — RIVAROXABAN 15 MG: 15 TABLET, FILM COATED ORAL at 17:41

## 2021-01-01 RX ADMIN — LEVOTHYROXINE SODIUM 25 MCG: 0.03 TABLET ORAL at 05:55

## 2021-01-01 RX ADMIN — INSULIN GLARGINE 15 UNITS: 100 INJECTION, SOLUTION SUBCUTANEOUS at 21:46

## 2021-01-01 RX ADMIN — ATORVASTATIN CALCIUM 80 MG: 40 TABLET, FILM COATED ORAL at 21:22

## 2021-01-01 RX ADMIN — GUAIFENESIN SYRUP AND DEXTROMETHORPHAN 10 ML: 100; 10 SYRUP ORAL at 21:13

## 2021-01-01 RX ADMIN — MILRINONE LACTATE 0.38 MCG/KG/MIN: 0.2 INJECTION, SOLUTION INTRAVENOUS at 13:57

## 2021-01-01 RX ADMIN — ALLOPURINOL 100 MG: 100 TABLET ORAL at 08:11

## 2021-01-01 RX ADMIN — LEVOTHYROXINE SODIUM 25 MCG: 0.03 TABLET ORAL at 06:29

## 2021-01-01 RX ADMIN — SODIUM CHLORIDE, PRESERVATIVE FREE 10 ML: 5 INJECTION INTRAVENOUS at 11:50

## 2021-01-01 RX ADMIN — MILRINONE LACTATE IN DEXTROSE 0.25 MCG/KG/MIN: 200 INJECTION, SOLUTION INTRAVENOUS at 12:50

## 2021-01-01 RX ADMIN — METOPROLOL SUCCINATE 12.5 MG: 25 TABLET, EXTENDED RELEASE ORAL at 08:16

## 2021-01-01 RX ADMIN — METOPROLOL SUCCINATE 12.5 MG: 25 TABLET, EXTENDED RELEASE ORAL at 11:26

## 2021-01-01 RX ADMIN — RIVAROXABAN 15 MG: 15 TABLET, FILM COATED ORAL at 16:52

## 2021-01-01 RX ADMIN — LEVOTHYROXINE SODIUM 25 MCG: 0.03 TABLET ORAL at 06:35

## 2021-01-01 RX ADMIN — FUROSEMIDE 40 MG: 10 INJECTION, SOLUTION INTRAVENOUS at 08:27

## 2021-01-01 RX ADMIN — INSULIN LISPRO 4 UNITS: 100 INJECTION, SOLUTION INTRAVENOUS; SUBCUTANEOUS at 18:27

## 2021-01-01 RX ADMIN — FUROSEMIDE 10 MG/HR: 10 INJECTION, SOLUTION INTRAMUSCULAR; INTRAVENOUS at 12:25

## 2021-01-01 RX ADMIN — INSULIN LISPRO 8 UNITS: 100 INJECTION, SOLUTION INTRAVENOUS; SUBCUTANEOUS at 08:16

## 2021-01-01 RX ADMIN — Medication 10 ML: at 21:10

## 2021-01-01 RX ADMIN — ATORVASTATIN CALCIUM 80 MG: 40 TABLET, FILM COATED ORAL at 20:36

## 2021-01-01 RX ADMIN — POTASSIUM CHLORIDE 10 MEQ: 7.46 INJECTION, SOLUTION INTRAVENOUS at 14:30

## 2021-01-01 RX ADMIN — METOPROLOL SUCCINATE 12.5 MG: 25 TABLET, EXTENDED RELEASE ORAL at 12:38

## 2021-01-01 RX ADMIN — ALLOPURINOL 100 MG: 100 TABLET ORAL at 11:26

## 2021-01-01 RX ADMIN — INSULIN LISPRO 6 UNITS: 100 INJECTION, SOLUTION INTRAVENOUS; SUBCUTANEOUS at 12:51

## 2021-01-01 RX ADMIN — FUROSEMIDE 10 MG/HR: 10 INJECTION, SOLUTION INTRAMUSCULAR; INTRAVENOUS at 15:20

## 2021-01-01 RX ADMIN — ISOSORBIDE MONONITRATE 30 MG: 30 TABLET, EXTENDED RELEASE ORAL at 08:52

## 2021-01-01 RX ADMIN — Medication 10 ML: at 21:45

## 2021-01-01 RX ADMIN — FUROSEMIDE 40 MG: 10 INJECTION, SOLUTION INTRAMUSCULAR; INTRAVENOUS at 21:16

## 2021-01-01 RX ADMIN — INSULIN LISPRO 6 UNITS: 100 INJECTION, SOLUTION INTRAVENOUS; SUBCUTANEOUS at 18:23

## 2021-01-01 RX ADMIN — INSULIN LISPRO 7 UNITS: 100 INJECTION, SOLUTION INTRAVENOUS; SUBCUTANEOUS at 20:33

## 2021-01-01 RX ADMIN — PREDNISONE 10 MG: 10 TABLET ORAL at 09:38

## 2021-01-01 RX ADMIN — MILRINONE LACTATE IN DEXTROSE 0.25 MCG/KG/MIN: 200 INJECTION, SOLUTION INTRAVENOUS at 12:46

## 2021-01-01 RX ADMIN — MILRINONE LACTATE 0.38 MCG/KG/MIN: 0.2 INJECTION, SOLUTION INTRAVENOUS at 12:13

## 2021-01-01 RX ADMIN — CLOPIDOGREL BISULFATE 75 MG: 75 TABLET ORAL at 08:27

## 2021-01-01 RX ADMIN — SACUBITRIL AND VALSARTAN 1 TABLET: 49; 51 TABLET, FILM COATED ORAL at 08:27

## 2021-01-01 RX ADMIN — INSULIN LISPRO 2 UNITS: 100 INJECTION, SOLUTION INTRAVENOUS; SUBCUTANEOUS at 12:25

## 2021-01-01 RX ADMIN — ISOSORBIDE MONONITRATE 30 MG: 30 TABLET, EXTENDED RELEASE ORAL at 09:12

## 2021-01-01 RX ADMIN — INSULIN GLARGINE 15 UNITS: 100 INJECTION, SOLUTION SUBCUTANEOUS at 20:34

## 2021-01-01 RX ADMIN — INSULIN LISPRO 2 UNITS: 100 INJECTION, SOLUTION INTRAVENOUS; SUBCUTANEOUS at 12:14

## 2021-01-01 RX ADMIN — INSULIN LISPRO 6 UNITS: 100 INJECTION, SOLUTION INTRAVENOUS; SUBCUTANEOUS at 18:08

## 2021-01-01 RX ADMIN — ATORVASTATIN CALCIUM 80 MG: 40 TABLET, FILM COATED ORAL at 21:16

## 2021-01-01 RX ADMIN — GUAIFENESIN SYRUP AND DEXTROMETHORPHAN 10 ML: 100; 10 SYRUP ORAL at 04:47

## 2021-01-01 RX ADMIN — TORSEMIDE 40 MG: 20 TABLET ORAL at 12:51

## 2021-01-01 RX ADMIN — METOPROLOL SUCCINATE 12.5 MG: 25 TABLET, EXTENDED RELEASE ORAL at 08:52

## 2021-01-01 RX ADMIN — INSULIN LISPRO 4 UNITS: 100 INJECTION, SOLUTION INTRAVENOUS; SUBCUTANEOUS at 11:59

## 2021-01-01 RX ADMIN — SODIUM CHLORIDE, PRESERVATIVE FREE 10 ML: 5 INJECTION INTRAVENOUS at 21:45

## 2021-01-01 RX ADMIN — RIVAROXABAN 15 MG: 15 TABLET, FILM COATED ORAL at 17:24

## 2021-01-01 RX ADMIN — SACUBITRIL AND VALSARTAN 1 TABLET: 49; 51 TABLET, FILM COATED ORAL at 21:45

## 2021-01-01 RX ADMIN — METOPROLOL SUCCINATE 12.5 MG: 25 TABLET, EXTENDED RELEASE ORAL at 08:59

## 2021-01-01 RX ADMIN — PREDNISONE 20 MG: 20 TABLET ORAL at 09:00

## 2021-01-01 RX ADMIN — MILRINONE LACTATE IN DEXTROSE 0.38 MCG/KG/MIN: 200 INJECTION, SOLUTION INTRAVENOUS at 17:34

## 2021-01-01 RX ADMIN — INSULIN LISPRO 2 UNITS: 100 INJECTION, SOLUTION INTRAVENOUS; SUBCUTANEOUS at 13:10

## 2021-01-01 RX ADMIN — INSULIN LISPRO 1 UNITS: 100 INJECTION, SOLUTION INTRAVENOUS; SUBCUTANEOUS at 21:03

## 2021-01-01 RX ADMIN — ALLOPURINOL 100 MG: 100 TABLET ORAL at 14:38

## 2021-01-01 RX ADMIN — SODIUM CHLORIDE, PRESERVATIVE FREE 10 ML: 5 INJECTION INTRAVENOUS at 08:27

## 2021-01-01 RX ADMIN — FUROSEMIDE 40 MG: 10 INJECTION, SOLUTION INTRAMUSCULAR; INTRAVENOUS at 20:32

## 2021-01-01 RX ADMIN — SODIUM CHLORIDE, PRESERVATIVE FREE 10 ML: 5 INJECTION INTRAVENOUS at 20:55

## 2021-01-01 RX ADMIN — ATORVASTATIN CALCIUM 80 MG: 40 TABLET, FILM COATED ORAL at 20:32

## 2021-01-01 RX ADMIN — RIVAROXABAN 15 MG: 15 TABLET, FILM COATED ORAL at 18:13

## 2021-01-01 RX ADMIN — INSULIN LISPRO 3 UNITS: 100 INJECTION, SOLUTION INTRAVENOUS; SUBCUTANEOUS at 12:46

## 2021-01-01 RX ADMIN — ISOSORBIDE MONONITRATE 30 MG: 30 TABLET, EXTENDED RELEASE ORAL at 09:55

## 2021-01-01 RX ADMIN — METOPROLOL SUCCINATE 12.5 MG: 25 TABLET, EXTENDED RELEASE ORAL at 09:06

## 2021-01-01 RX ADMIN — INSULIN LISPRO 6 UNITS: 100 INJECTION, SOLUTION INTRAVENOUS; SUBCUTANEOUS at 09:13

## 2021-01-01 RX ADMIN — INSULIN LISPRO 2 UNITS: 100 INJECTION, SOLUTION INTRAVENOUS; SUBCUTANEOUS at 12:39

## 2021-01-01 RX ADMIN — INSULIN LISPRO 2 UNITS: 100 INJECTION, SOLUTION INTRAVENOUS; SUBCUTANEOUS at 08:43

## 2021-01-01 RX ADMIN — FUROSEMIDE 60 MG: 10 INJECTION, SOLUTION INTRAVENOUS at 08:43

## 2021-01-01 RX ADMIN — FUROSEMIDE 40 MG: 10 INJECTION, SOLUTION INTRAMUSCULAR; INTRAVENOUS at 18:08

## 2021-01-01 RX ADMIN — INSULIN LISPRO 2 UNITS: 100 INJECTION, SOLUTION INTRAVENOUS; SUBCUTANEOUS at 12:37

## 2021-01-01 RX ADMIN — TORSEMIDE 60 MG: 20 TABLET ORAL at 09:00

## 2021-01-01 RX ADMIN — INSULIN LISPRO 10 UNITS: 100 INJECTION, SOLUTION INTRAVENOUS; SUBCUTANEOUS at 17:35

## 2021-01-01 RX ADMIN — INSULIN LISPRO 2 UNITS: 100 INJECTION, SOLUTION INTRAVENOUS; SUBCUTANEOUS at 17:40

## 2021-01-01 RX ADMIN — ACETAMINOPHEN 650 MG: 325 TABLET ORAL at 08:46

## 2021-01-01 RX ADMIN — ISOSORBIDE MONONITRATE 30 MG: 30 TABLET, EXTENDED RELEASE ORAL at 09:53

## 2021-01-01 RX ADMIN — INSULIN LISPRO 2 UNITS: 100 INJECTION, SOLUTION INTRAVENOUS; SUBCUTANEOUS at 17:22

## 2021-01-01 RX ADMIN — INSULIN LISPRO 1 UNITS: 100 INJECTION, SOLUTION INTRAVENOUS; SUBCUTANEOUS at 20:36

## 2021-01-01 RX ADMIN — ISOSORBIDE MONONITRATE 30 MG: 30 TABLET, EXTENDED RELEASE ORAL at 08:11

## 2021-01-01 RX ADMIN — POTASSIUM BICARBONATE 40 MEQ: 782 TABLET, EFFERVESCENT ORAL at 12:26

## 2021-01-01 RX ADMIN — SPIRONOLACTONE 12.5 MG: 25 TABLET ORAL at 08:35

## 2021-01-01 RX ADMIN — INSULIN LISPRO 2 UNITS: 100 INJECTION, SOLUTION INTRAVENOUS; SUBCUTANEOUS at 18:10

## 2021-01-01 RX ADMIN — ISOSORBIDE MONONITRATE 30 MG: 30 TABLET, EXTENDED RELEASE ORAL at 09:29

## 2021-01-01 RX ADMIN — ALLOPURINOL 100 MG: 100 TABLET ORAL at 09:37

## 2021-01-01 RX ADMIN — LEVOTHYROXINE SODIUM 25 MCG: 0.03 TABLET ORAL at 06:02

## 2021-01-01 RX ADMIN — POTASSIUM CHLORIDE 40 MEQ: 1500 TABLET, EXTENDED RELEASE ORAL at 08:57

## 2021-01-01 RX ADMIN — LEVOTHYROXINE SODIUM 25 MCG: 0.03 TABLET ORAL at 05:17

## 2021-01-01 RX ADMIN — MILRINONE LACTATE 0.38 MCG/KG/MIN: 0.2 INJECTION, SOLUTION INTRAVENOUS at 04:01

## 2021-01-01 RX ADMIN — INSULIN LISPRO 2 UNITS: 100 INJECTION, SOLUTION INTRAVENOUS; SUBCUTANEOUS at 08:35

## 2021-01-01 RX ADMIN — INSULIN LISPRO 6 UNITS: 100 INJECTION, SOLUTION INTRAVENOUS; SUBCUTANEOUS at 08:57

## 2021-01-01 RX ADMIN — TORSEMIDE 40 MG: 20 TABLET ORAL at 08:11

## 2021-01-01 RX ADMIN — SPIRONOLACTONE 12.5 MG: 25 TABLET ORAL at 13:58

## 2021-01-01 RX ADMIN — FUROSEMIDE 10 MG/HR: 10 INJECTION, SOLUTION INTRAMUSCULAR; INTRAVENOUS at 06:24

## 2021-01-01 RX ADMIN — INSULIN GLARGINE 15 UNITS: 100 INJECTION, SOLUTION SUBCUTANEOUS at 21:23

## 2021-01-01 RX ADMIN — SODIUM CHLORIDE, PRESERVATIVE FREE 10 ML: 5 INJECTION INTRAVENOUS at 21:16

## 2021-01-01 RX ADMIN — MILRINONE LACTATE 0.38 MCG/KG/MIN: 0.2 INJECTION, SOLUTION INTRAVENOUS at 00:44

## 2021-01-01 RX ADMIN — GUAIFENESIN SYRUP AND DEXTROMETHORPHAN 10 ML: 100; 10 SYRUP ORAL at 22:20

## 2021-01-01 RX ADMIN — METOLAZONE 5 MG: 5 TABLET ORAL at 12:26

## 2021-01-01 RX ADMIN — INSULIN LISPRO 6 UNITS: 100 INJECTION, SOLUTION INTRAVENOUS; SUBCUTANEOUS at 20:43

## 2021-01-01 RX ADMIN — MILRINONE LACTATE IN DEXTROSE 0.38 MCG/KG/MIN: 200 INJECTION, SOLUTION INTRAVENOUS at 06:37

## 2021-01-01 RX ADMIN — INSULIN LISPRO 2 UNITS: 100 INJECTION, SOLUTION INTRAVENOUS; SUBCUTANEOUS at 08:13

## 2021-01-01 RX ADMIN — ISOSORBIDE MONONITRATE 30 MG: 30 TABLET, EXTENDED RELEASE ORAL at 08:27

## 2021-01-01 RX ADMIN — SODIUM CHLORIDE, PRESERVATIVE FREE 10 ML: 5 INJECTION INTRAVENOUS at 21:28

## 2021-01-01 RX ADMIN — SODIUM CHLORIDE, PRESERVATIVE FREE 10 ML: 5 INJECTION INTRAVENOUS at 21:03

## 2021-01-01 RX ADMIN — INSULIN LISPRO 2 UNITS: 100 INJECTION, SOLUTION INTRAVENOUS; SUBCUTANEOUS at 17:41

## 2021-01-01 RX ADMIN — GUAIFENESIN SYRUP AND DEXTROMETHORPHAN 10 ML: 100; 10 SYRUP ORAL at 06:39

## 2021-01-01 RX ADMIN — ACETAMINOPHEN 650 MG: 325 TABLET ORAL at 23:48

## 2021-01-01 RX ADMIN — INSULIN LISPRO 2 UNITS: 100 INJECTION, SOLUTION INTRAVENOUS; SUBCUTANEOUS at 16:43

## 2021-01-01 RX ADMIN — LEVOTHYROXINE SODIUM 25 MCG: 0.03 TABLET ORAL at 06:23

## 2021-01-01 RX ADMIN — TORSEMIDE 40 MG: 20 TABLET ORAL at 11:26

## 2021-01-01 RX ADMIN — LEVOTHYROXINE SODIUM 25 MCG: 0.03 TABLET ORAL at 05:12

## 2021-01-01 RX ADMIN — POTASSIUM CHLORIDE 10 MEQ: 7.46 INJECTION, SOLUTION INTRAVENOUS at 19:16

## 2021-01-01 RX ADMIN — Medication 10 ML: at 20:39

## 2021-01-01 RX ADMIN — LEVOTHYROXINE SODIUM 25 MCG: 0.03 TABLET ORAL at 07:05

## 2021-01-01 RX ADMIN — FUROSEMIDE 40 MG: 10 INJECTION, SOLUTION INTRAVENOUS at 15:55

## 2021-01-01 RX ADMIN — MENTHOL, METHYL SALICYLATE: 10; 15 CREAM TOPICAL at 19:44

## 2021-01-01 RX ADMIN — MILRINONE LACTATE 0.38 MCG/KG/MIN: 0.2 INJECTION, SOLUTION INTRAVENOUS at 17:03

## 2021-01-01 RX ADMIN — LEVOTHYROXINE SODIUM 25 MCG: 0.03 TABLET ORAL at 06:27

## 2021-01-01 RX ADMIN — GUAIFENESIN SYRUP AND DEXTROMETHORPHAN 10 ML: 100; 10 SYRUP ORAL at 21:19

## 2021-01-01 RX ADMIN — LEVOTHYROXINE SODIUM 25 MCG: 0.03 TABLET ORAL at 05:42

## 2021-01-01 RX ADMIN — FUROSEMIDE 10 MG/HR: 10 INJECTION, SOLUTION INTRAMUSCULAR; INTRAVENOUS at 01:01

## 2021-01-01 RX ADMIN — CARVEDILOL 12.5 MG: 12.5 TABLET, FILM COATED ORAL at 08:43

## 2021-01-01 RX ADMIN — FUROSEMIDE 10 MG/HR: 10 INJECTION, SOLUTION INTRAMUSCULAR; INTRAVENOUS at 08:02

## 2021-01-01 RX ADMIN — LEVOTHYROXINE SODIUM 25 MCG: 0.03 TABLET ORAL at 05:32

## 2021-01-01 RX ADMIN — INSULIN LISPRO 3 UNITS: 100 INJECTION, SOLUTION INTRAVENOUS; SUBCUTANEOUS at 21:07

## 2021-01-01 RX ADMIN — POTASSIUM CHLORIDE 10 MEQ: 7.46 INJECTION, SOLUTION INTRAVENOUS at 15:43

## 2021-01-01 RX ADMIN — METOPROLOL SUCCINATE 12.5 MG: 25 TABLET, EXTENDED RELEASE ORAL at 09:38

## 2021-01-01 RX ADMIN — GUAIFENESIN SYRUP AND DEXTROMETHORPHAN 10 ML: 100; 10 SYRUP ORAL at 21:26

## 2021-01-01 RX ADMIN — INSULIN LISPRO 3 UNITS: 100 INJECTION, SOLUTION INTRAVENOUS; SUBCUTANEOUS at 12:40

## 2021-01-01 RX ADMIN — INSULIN LISPRO 1 UNITS: 100 INJECTION, SOLUTION INTRAVENOUS; SUBCUTANEOUS at 20:55

## 2021-01-01 RX ADMIN — SPIRONOLACTONE 12.5 MG: 25 TABLET ORAL at 08:55

## 2021-01-01 RX ADMIN — ACETAMINOPHEN 650 MG: 325 TABLET ORAL at 08:53

## 2021-01-01 RX ADMIN — LEVOTHYROXINE SODIUM 25 MCG: 0.03 TABLET ORAL at 05:58

## 2021-01-01 RX ADMIN — RIVAROXABAN 15 MG: 15 TABLET, FILM COATED ORAL at 17:22

## 2021-01-01 RX ADMIN — SODIUM CHLORIDE, PRESERVATIVE FREE 10 ML: 5 INJECTION INTRAVENOUS at 10:32

## 2021-01-01 RX ADMIN — POLYETHYLENE GLYCOL 3350 17 G: 17 POWDER, FOR SOLUTION ORAL at 16:43

## 2021-01-01 RX ADMIN — ATORVASTATIN CALCIUM 80 MG: 40 TABLET, FILM COATED ORAL at 21:26

## 2021-01-01 RX ADMIN — MILRINONE LACTATE IN DEXTROSE 0.38 MCG/KG/MIN: 200 INJECTION, SOLUTION INTRAVENOUS at 21:09

## 2021-01-01 RX ADMIN — INSULIN LISPRO 2 UNITS: 100 INJECTION, SOLUTION INTRAVENOUS; SUBCUTANEOUS at 12:52

## 2021-01-01 RX ADMIN — CARVEDILOL 12.5 MG: 12.5 TABLET, FILM COATED ORAL at 08:27

## 2021-01-01 RX ADMIN — MILRINONE LACTATE 0.38 MCG/KG/MIN: 0.2 INJECTION, SOLUTION INTRAVENOUS at 05:43

## 2021-01-01 RX ADMIN — LEVOTHYROXINE SODIUM 25 MCG: 0.03 TABLET ORAL at 05:27

## 2021-01-01 RX ADMIN — ALLOPURINOL 100 MG: 100 TABLET ORAL at 09:00

## 2021-01-01 RX ADMIN — PREDNISONE 20 MG: 20 TABLET ORAL at 08:16

## 2021-01-01 RX ADMIN — INSULIN LISPRO 1 UNITS: 100 INJECTION, SOLUTION INTRAVENOUS; SUBCUTANEOUS at 21:35

## 2021-01-01 RX ADMIN — INSULIN LISPRO 6 UNITS: 100 INJECTION, SOLUTION INTRAVENOUS; SUBCUTANEOUS at 12:42

## 2021-01-01 RX ADMIN — LEVOTHYROXINE SODIUM 25 MCG: 0.03 TABLET ORAL at 06:38

## 2021-01-01 RX ADMIN — INSULIN LISPRO 5 UNITS: 100 INJECTION, SOLUTION INTRAVENOUS; SUBCUTANEOUS at 21:10

## 2021-01-01 RX ADMIN — INSULIN LISPRO 3 UNITS: 100 INJECTION, SOLUTION INTRAVENOUS; SUBCUTANEOUS at 09:01

## 2021-01-01 RX ADMIN — ACETAMINOPHEN 650 MG: 325 TABLET ORAL at 08:55

## 2021-01-01 RX ADMIN — LEVOTHYROXINE SODIUM 25 MCG: 0.03 TABLET ORAL at 06:49

## 2021-01-01 RX ADMIN — ISOSORBIDE MONONITRATE 30 MG: 30 TABLET, EXTENDED RELEASE ORAL at 09:06

## 2021-01-01 RX ADMIN — Medication 10 ML: at 08:58

## 2021-01-01 RX ADMIN — FUROSEMIDE 40 MG: 10 INJECTION, SOLUTION INTRAMUSCULAR; INTRAVENOUS at 10:26

## 2021-01-01 RX ADMIN — INSULIN LISPRO 10 UNITS: 100 INJECTION, SOLUTION INTRAVENOUS; SUBCUTANEOUS at 17:13

## 2021-01-01 RX ADMIN — ISOSORBIDE MONONITRATE 30 MG: 30 TABLET, EXTENDED RELEASE ORAL at 08:43

## 2021-01-01 RX ADMIN — FUROSEMIDE 10 MG/HR: 10 INJECTION, SOLUTION INTRAMUSCULAR; INTRAVENOUS at 22:01

## 2021-01-01 RX ADMIN — CLOPIDOGREL BISULFATE 75 MG: 75 TABLET ORAL at 21:45

## 2021-01-01 RX ADMIN — RIVAROXABAN 15 MG: 15 TABLET, FILM COATED ORAL at 17:26

## 2021-01-01 RX ADMIN — FUROSEMIDE 10 MG/HR: 10 INJECTION, SOLUTION INTRAMUSCULAR; INTRAVENOUS at 09:37

## 2021-01-01 RX ADMIN — PREDNISONE 20 MG: 20 TABLET ORAL at 09:06

## 2021-01-01 RX ADMIN — ATORVASTATIN CALCIUM 80 MG: 40 TABLET, FILM COATED ORAL at 20:55

## 2021-01-01 RX ADMIN — ISOSORBIDE MONONITRATE 30 MG: 30 TABLET, EXTENDED RELEASE ORAL at 08:46

## 2021-01-01 RX ADMIN — INSULIN LISPRO 4 UNITS: 100 INJECTION, SOLUTION INTRAVENOUS; SUBCUTANEOUS at 17:31

## 2021-01-01 RX ADMIN — CARVEDILOL 12.5 MG: 12.5 TABLET, FILM COATED ORAL at 18:12

## 2021-01-01 RX ADMIN — INSULIN LISPRO 2 UNITS: 100 INJECTION, SOLUTION INTRAVENOUS; SUBCUTANEOUS at 17:24

## 2021-01-01 RX ADMIN — IRON SUCROSE 200 MG: 20 INJECTION, SOLUTION INTRAVENOUS at 09:53

## 2021-01-01 RX ADMIN — RIVAROXABAN 15 MG: 15 TABLET, FILM COATED ORAL at 18:23

## 2021-01-01 RX ADMIN — FUROSEMIDE 10 MG/HR: 10 INJECTION, SOLUTION INTRAMUSCULAR; INTRAVENOUS at 04:16

## 2021-01-01 RX ADMIN — ACETAMINOPHEN 650 MG: 325 TABLET ORAL at 08:36

## 2021-01-01 RX ADMIN — INSULIN LISPRO 2 UNITS: 100 INJECTION, SOLUTION INTRAVENOUS; SUBCUTANEOUS at 18:24

## 2021-01-01 RX ADMIN — INSULIN GLARGINE 15 UNITS: 100 INJECTION, SOLUTION SUBCUTANEOUS at 21:07

## 2021-01-01 RX ADMIN — INSULIN LISPRO 5 UNITS: 100 INJECTION, SOLUTION INTRAVENOUS; SUBCUTANEOUS at 21:23

## 2021-01-01 RX ADMIN — MILRINONE LACTATE 0.38 MCG/KG/MIN: 0.2 INJECTION, SOLUTION INTRAVENOUS at 15:18

## 2021-01-01 RX ADMIN — INSULIN LISPRO 2 UNITS: 100 INJECTION, SOLUTION INTRAVENOUS; SUBCUTANEOUS at 18:13

## 2021-01-01 RX ADMIN — METOPROLOL SUCCINATE 12.5 MG: 25 TABLET, EXTENDED RELEASE ORAL at 08:11

## 2021-01-01 RX ADMIN — ALLOPURINOL 100 MG: 100 TABLET ORAL at 08:52

## 2021-01-01 RX ADMIN — LEVOTHYROXINE SODIUM 25 MCG: 0.03 TABLET ORAL at 06:36

## 2021-01-01 RX ADMIN — ATORVASTATIN CALCIUM 80 MG: 40 TABLET, FILM COATED ORAL at 20:50

## 2021-01-01 RX ADMIN — Medication 10 ML: at 20:26

## 2021-01-01 RX ADMIN — INSULIN LISPRO 2 UNITS: 100 INJECTION, SOLUTION INTRAVENOUS; SUBCUTANEOUS at 12:26

## 2021-01-01 RX ADMIN — MILRINONE LACTATE IN DEXTROSE 0.38 MCG/KG/MIN: 200 INJECTION, SOLUTION INTRAVENOUS at 13:08

## 2021-01-01 RX ADMIN — IRON SUCROSE 200 MG: 20 INJECTION, SOLUTION INTRAVENOUS at 09:55

## 2021-01-01 RX ADMIN — Medication 10 ML: at 21:23

## 2021-01-01 RX ADMIN — IRON SUCROSE 200 MG: 20 INJECTION, SOLUTION INTRAVENOUS at 10:34

## 2021-01-01 RX ADMIN — FUROSEMIDE 10 MG/HR: 10 INJECTION, SOLUTION INTRAMUSCULAR; INTRAVENOUS at 10:46

## 2021-01-01 RX ADMIN — MENTHOL, METHYL SALICYLATE: 10; 15 CREAM TOPICAL at 22:20

## 2021-01-01 RX ADMIN — ISOSORBIDE MONONITRATE 30 MG: 30 TABLET, EXTENDED RELEASE ORAL at 09:37

## 2021-01-01 RX ADMIN — INSULIN LISPRO 1 UNITS: 100 INJECTION, SOLUTION INTRAVENOUS; SUBCUTANEOUS at 20:51

## 2021-01-01 RX ADMIN — ATORVASTATIN CALCIUM 80 MG: 40 TABLET, FILM COATED ORAL at 20:25

## 2021-01-01 RX ADMIN — PREDNISONE 40 MG: 20 TABLET ORAL at 08:52

## 2021-01-01 RX ADMIN — RIVAROXABAN 15 MG: 15 TABLET, FILM COATED ORAL at 18:08

## 2021-01-01 RX ADMIN — SPIRONOLACTONE 25 MG: 25 TABLET ORAL at 15:53

## 2021-01-01 RX ADMIN — INSULIN LISPRO 6 UNITS: 100 INJECTION, SOLUTION INTRAVENOUS; SUBCUTANEOUS at 17:40

## 2021-01-01 RX ADMIN — PREDNISONE 5 MG: 5 TABLET ORAL at 08:11

## 2021-01-01 RX ADMIN — ATORVASTATIN CALCIUM 80 MG: 40 TABLET, FILM COATED ORAL at 21:07

## 2021-01-01 RX ADMIN — PREDNISONE 40 MG: 20 TABLET ORAL at 17:26

## 2021-01-01 RX ADMIN — INSULIN LISPRO 2 UNITS: 100 INJECTION, SOLUTION INTRAVENOUS; SUBCUTANEOUS at 18:14

## 2021-01-01 RX ADMIN — MILRINONE LACTATE IN DEXTROSE 0.38 MCG/KG/MIN: 200 INJECTION, SOLUTION INTRAVENOUS at 00:46

## 2021-01-01 RX ADMIN — ALLOPURINOL 100 MG: 100 TABLET ORAL at 08:16

## 2021-01-01 RX ADMIN — ALLOPURINOL 100 MG: 100 TABLET ORAL at 09:06

## 2021-01-01 RX ADMIN — ISOSORBIDE MONONITRATE 30 MG: 30 TABLET, EXTENDED RELEASE ORAL at 09:00

## 2021-01-01 RX ADMIN — FUROSEMIDE 10 MG/HR: 10 INJECTION, SOLUTION INTRAMUSCULAR; INTRAVENOUS at 17:40

## 2021-01-01 RX ADMIN — SODIUM CHLORIDE, PRESERVATIVE FREE 10 ML: 5 INJECTION INTRAVENOUS at 08:43

## 2021-01-01 RX ADMIN — PERFLUTREN 1.5 ML: 6.52 INJECTION, SUSPENSION INTRAVENOUS at 13:19

## 2021-01-01 RX ADMIN — INFLUENZA A VIRUS A/VICTORIA/2570/2019 IVR-215 (H1N1) ANTIGEN (PROPIOLACTONE INACTIVATED), INFLUENZA A VIRUS A/CAMBODIA/E0826360/2020 IVR-224 (H3N2) ANTIGEN (PROPIOLACTONE INACTIVATED), INFLUENZA B VIRUS B/VICTORIA/705/2018 BVR-11 ANTIGEN (PROPIOLACTONE INACTIVATED), INFLUENZA B VIRUS B/PHUKET/3073/2013 BVR-1B ANTIGEN (PROPIOLACTONE INACTIVATED) 0.5 ML: 15; 15; 15; 15 INJECTION, SUSPENSION INTRAMUSCULAR at 15:22

## 2021-01-01 RX ADMIN — ISOSORBIDE MONONITRATE 30 MG: 30 TABLET, EXTENDED RELEASE ORAL at 08:36

## 2021-01-01 RX ADMIN — ACETAMINOPHEN 650 MG: 325 TABLET ORAL at 22:09

## 2021-01-01 RX ADMIN — SPIRONOLACTONE 25 MG: 25 TABLET ORAL at 09:05

## 2021-01-01 RX ADMIN — ACETAMINOPHEN 650 MG: 325 TABLET ORAL at 04:47

## 2021-01-01 RX ADMIN — IOPAMIDOL 75 ML: 755 INJECTION, SOLUTION INTRAVENOUS at 10:53

## 2021-01-01 RX ADMIN — INSULIN LISPRO 1 UNITS: 100 INJECTION, SOLUTION INTRAVENOUS; SUBCUTANEOUS at 20:53

## 2021-01-01 RX ADMIN — ISOSORBIDE MONONITRATE 30 MG: 30 TABLET, EXTENDED RELEASE ORAL at 08:55

## 2021-01-01 RX ADMIN — GUAIFENESIN SYRUP AND DEXTROMETHORPHAN 10 ML: 100; 10 SYRUP ORAL at 09:21

## 2021-01-01 RX ADMIN — RIVAROXABAN 15 MG: 15 TABLET, FILM COATED ORAL at 15:53

## 2021-01-01 RX ADMIN — ISOSORBIDE MONONITRATE 30 MG: 30 TABLET, EXTENDED RELEASE ORAL at 11:26

## 2021-01-01 RX ADMIN — INSULIN LISPRO 6 UNITS: 100 INJECTION, SOLUTION INTRAVENOUS; SUBCUTANEOUS at 11:49

## 2021-01-01 RX ADMIN — ISOSORBIDE MONONITRATE 30 MG: 30 TABLET, EXTENDED RELEASE ORAL at 08:16

## 2021-01-01 RX ADMIN — INSULIN LISPRO 8 UNITS: 100 INJECTION, SOLUTION INTRAVENOUS; SUBCUTANEOUS at 17:46

## 2021-01-01 ASSESSMENT — PAIN DESCRIPTION - ORIENTATION
ORIENTATION: RIGHT

## 2021-01-01 ASSESSMENT — PAIN DESCRIPTION - ONSET
ONSET: ON-GOING
ONSET: ON-GOING
ONSET: GRADUAL
ONSET: ON-GOING
ONSET: ON-GOING

## 2021-01-01 ASSESSMENT — PAIN SCALES - GENERAL
PAINLEVEL_OUTOF10: 0
PAINLEVEL_OUTOF10: 8
PAINLEVEL_OUTOF10: 0
PAINLEVEL_OUTOF10: 5
PAINLEVEL_OUTOF10: 0
PAINLEVEL_OUTOF10: 3
PAINLEVEL_OUTOF10: 0
PAINLEVEL_OUTOF10: 0
PAINLEVEL_OUTOF10: 8
PAINLEVEL_OUTOF10: 0
PAINLEVEL_OUTOF10: 3
PAINLEVEL_OUTOF10: 0
PAINLEVEL_OUTOF10: 8
PAINLEVEL_OUTOF10: 0
PAINLEVEL_OUTOF10: 8
PAINLEVEL_OUTOF10: 0
PAINLEVEL_OUTOF10: 0

## 2021-01-01 ASSESSMENT — PAIN - FUNCTIONAL ASSESSMENT
PAIN_FUNCTIONAL_ASSESSMENT: PREVENTS OR INTERFERES SOME ACTIVE ACTIVITIES AND ADLS

## 2021-01-01 ASSESSMENT — ENCOUNTER SYMPTOMS
SHORTNESS OF BREATH: 1
RESPIRATORY NEGATIVE: 1
SHORTNESS OF BREATH: 0
CHOKING: 0
GASTROINTESTINAL NEGATIVE: 1
SHORTNESS OF BREATH: 1
GASTROINTESTINAL NEGATIVE: 1
EYES NEGATIVE: 1
COUGH: 0
GASTROINTESTINAL NEGATIVE: 1
WHEEZING: 0
COUGH: 0
WHEEZING: 0
EYES NEGATIVE: 1
CHOKING: 0
EYES NEGATIVE: 1

## 2021-01-01 ASSESSMENT — PAIN DESCRIPTION - LOCATION
LOCATION: ARM

## 2021-01-01 ASSESSMENT — PAIN DESCRIPTION - PAIN TYPE
TYPE: ACUTE PAIN;OTHER (COMMENT)
TYPE: ACUTE PAIN

## 2021-01-01 ASSESSMENT — PAIN DESCRIPTION - FREQUENCY
FREQUENCY: CONTINUOUS
FREQUENCY: INTERMITTENT
FREQUENCY: CONTINUOUS

## 2021-01-01 ASSESSMENT — PAIN DESCRIPTION - PROGRESSION
CLINICAL_PROGRESSION: NOT CHANGED
CLINICAL_PROGRESSION: NOT CHANGED
CLINICAL_PROGRESSION: GRADUALLY WORSENING

## 2021-01-01 ASSESSMENT — PAIN SCALES - WONG BAKER
WONGBAKER_NUMERICALRESPONSE: 0

## 2021-01-01 ASSESSMENT — PAIN DESCRIPTION - DIRECTION: RADIATING_TOWARDS: NO

## 2021-01-01 ASSESSMENT — PAIN DESCRIPTION - DESCRIPTORS
DESCRIPTORS: ACHING;TENDER;OTHER (COMMENT)
DESCRIPTORS: ACHING;THROBBING
DESCRIPTORS: ACHING
DESCRIPTORS: DISCOMFORT
DESCRIPTORS: ACHING

## 2021-10-21 NOTE — PROGRESS NOTES
[address] [facility]  1955 October 22, 2021    Reason for Consult: Establish care     CC: \"I have a hernia\"     HPI:  The patient is 61 y.o. male with a past medical history significant for CAD, chronic systolic CHF, ICD in place (8/13/19), LV thrombus, hyperlipidemia and hypertension. He was previously following at [facility]. He presents today to establish care. He denies any recent admissions for heart failure treatment. His biggest complaint seems to be his hernia and not knowing if this can be repaired. He admits to occasional shortness of breath when laying flat to sleep at night. He does experience mild lower extremity swelling and will elevate them when sitting. The swelling will improve by morning. He denies chest pain with rest or exertion. Review of Systems:  Constitutional: No fatigue, weakness, night sweats or fever. HEENT: No new vision difficulties or ringing in the ears. Respiratory: No new SOB, PND, orthopnea or cough. Cardiovascular: See HPI   GI: No n/v, diarrhea, constipation, abdominal pain or changes in bowel habits. No melena, no hematochezia  : No urinary frequency, urgency, incontinence, hematuria or dysuria. Skin: No cyanosis or skin lesions. Musculoskeletal: No new muscle or joint pain. Neurological: No syncope or TIA-like symptoms.   Psychiatric: No anxiety, insomnia or depression     Past Medical History:   Diagnosis Date    Diabetes type 2, uncontrolled ([address] 7/28/2014    Diabetic nephropathy 9/12/2013    ED (erectile dysfunction) 9/12/2013    Essential hypertension, benign 9/12/2013    Hyperlipidemia 9/12/2013    Noncompliance 7/28/2014     Past Surgical History:   Procedure Laterality Date    EYE SURGERY       Family History   Problem Relation Age of Onset    Diabetes Mother     High Blood Pressure Mother     Diabetes Sister     Diabetes Brother      Social History     Tobacco Use    Smoking status: Former Smoker Packs/day: 1.50     Years: 7.00     Pack years: 10.50     Quit date: 1981     Years since quittin.7    Smokeless tobacco: Never Used   Vaping Use    Vaping Use: Never used   Substance Use Topics    Alcohol use: Yes     Alcohol/week: 1.0 standard drinks     Types: 1 Cans of beer per week    Drug use: No       No Known Allergies  Current Outpatient Medications   Medication Sig Dispense Refill    STIMULANT LAXATIVE 8.6-50 MG per tablet       torsemide (DEMADEX) 10 MG tablet       warfarin (COUMADIN) 7.5 MG tablet       nitroGLYCERIN (NITROSTAT) 0.4 MG SL tablet       levothyroxine (SYNTHROID) 25 MCG tablet       isosorbide mononitrate (IMDUR) 60 MG extended release tablet Take 60 mg by mouth      sacubitril-valsartan (ENTRESTO) 49-51 MG per tablet Take 1 tablet by mouth      atorvastatin (LIPITOR) 80 MG tablet Take 80 mg by mouth      carvedilol (COREG) 6.25 MG tablet       clopidogrel (PLAVIX) 75 MG tablet Take 75 mg by mouth      Dulaglutide (TRULICITY) 0.27 NF/4.6QK SOPN Lot# [id number] Exp. 2 pen 0    Blood Glucose Monitoring Suppl (TRUE METRIX METER) W/DEVICE KIT 1 kit by Does not apply route 2 times daily 1 kit 0    glucose blood VI test strips (ASCENSIA AUTODISC VI;ONE TOUCH ULTRA TEST VI) strip Pt test 2-3 times daily E11.9 100 each 3    Lancets MISC Patient test 2-3 times daily 100 each 3     No current facility-administered medications for this visit. Physical Exam:   /70   Pulse 77   Ht 5' 5.35\" (1.66 m)   Wt 166 lb (75.3 kg)   SpO2 98%   BMI 27.33 kg/m²   No intake or output data in the 24 hours ending 10/22/21 1105  Wt Readings from Last 2 Encounters:   10/22/21 166 lb (75.3 kg)   19 161 lb (73 kg)     Constitutional: He is oriented to person, place, and time. He appears well-developed and well-nourished. In no acute distress. Head: Normocephalic and atraumatic. Neck: Neck supple. No JVD present. Carotid bruit is not present.  No mass and no thyromegaly present. No lymphadenopathy present. Cardiovascular: Normal rate, regular rhythm, normal heart sounds and intact distal pulses. Exam reveals no gallop and no friction rub. 3/6 systolic ejection murmur heard at left lower sternal border. Pulmonary/Chest: Effort normal and breath sounds normal. No respiratory distress. He has no wheezes, rhonchi or rales. Abdominal: Soft, non-tender. Bowel sounds and aorta are normal. He exhibits no organomegaly, mass or bruit. Extremities: No edema, cyanosis, or clubbing. Pulses are 2+ radial/carotid/dorsalis pedis and posterior tibial bilaterally. Neurological: He is alert and oriented to person, place, and time. He has normal reflexes. No cranial nerve deficit. Coordination normal.   Skin: Skin is warm and dry. There is no rash or diaphoresis. Psychiatric: He has a normal mood and affect. His speech is normal and behavior is normal.     Personally reviewed and interpreted   EKG Interpretation 10/22/21: Sinus rhythm with frequent uniform PVC's, prior anteroseptal infarct      Procedures:     [facility] 11/30/18 ([facility])   IMPRESSIONS:  Severe single vessel coronary artery disease with 99%   stenosis of the mid LAD. Acute decompensated heart failure in the setting of new onset   cardiomyopathy. Elevated filling pressures with preserved cardiac output and cardiac   index. SUMMARY:     1. LAD: Mid-vessel lesion: There is a discrete, 99%stenosis. 2. Right sided filling pressures are elevated.      RA: 18 mm hg      RV: 60/18 mm hg      PA:60/25(40) mm hg   3. Elevated pulmonary capillary wedge pressure (24 mm Hg). 4. Preserved cardiac output(5 L/min) and cardiac index (2.55      L.min-m2)     L/[facility]HC 12/10/18 ([facility])   IMPRESSIONS:  Selective angiography of LMCA showing critical mid LAD   lesion. Successful PCI with 3.5 x 18 mm MIKE.  Normal cardiac output, m   ildly increased pulmonary pressures with mildly increased filling pre   ssure on inotropic support. SUMMARY:     1. Wedge pressure in the pulmonary capillaries is mildly elevated. 2. Coronary arteries: The left main is unusually long and      trifurcates into the LAD, a ramus intermedius, and the left      circumflex. The left anterior descending gives rise to 2      diagonals and 3 septals and wraps around the apex. The left      circumflex gives rise to 2 obtuse marginals and no      posterolaterals. The first obtuse marginal has a high origin. 3. LAD: Mid-vessel lesion: There is a discrete, 99% stenosis. The      distal vessel supplies a moderate-sized vascular territory. The      lesion is a likely culprit for the patient's clinical      presentation. The lesion was stented (see 1st lesion      intervention). Following intervention, the lesion has a residual      stenosis of 0% and MUKUND grade 3 flow (brisk flow). 4. AV was not crossed due to known apical LV thrombus. 5. RA 6      RV 48/4      PA 50/20 (28)      W 18.   6. Blu CO 5.2 Blu CI 2.9 on Milrinone 0.25 mcg/mg/min. [address] 6/7/19 ([facility])   IMPRESSIONS:  Normal filling pressures. Mildly elevated pulmonary artery pressures. RECOMMENDATIONS:  Continue current medications. Follow up in heart failure clinic. Imaging:     Cardiac MRI 12/4/18 ([facility])   1. The left ventricular size is severely dilated. The left ventricular ejection fraction is 19 % by Smallwood's method. Global left ventricular function is severely decreased. There is akinesis of the apical segments, severe hypokinesis of the basal and mid septal and basal and mid anterior segments and moderate hypokinesis of the lateral segments and mid inferior segment and mild hypokinesis of the basal inferior segment. The left ventricular mass is moderately increased. There appears to be a small intracavitary thrombus (6.5 x 5 mm).    2. There is no evidence of increased iron deposition within the myocardium (T2*myocardium = 51 msec; if < 20 msec, suggestive of iron-overloading of the myocardium).  There is no evidence of myocardial edema. 3. The right ventricular size is mildly dilated. Global right ventricular function is moderately decreased. 4. Left atrial size is severely enlarged. Right atrial size is mildly enlarged.   The Qp/Qs is 1.0 by phase contrast imaging and is consistent with no evidence of shunt. 5. The calculated aortic regurgitant fraction is 1.55 %. There is moderate mitral regurgitation. The calculated mitral regurgitant fraction is 29.02 %. There is moderate tricuspid regurgitation. The calculated tricuspid regurgitant fraction is 37.1 %. The calculated pulmonic regurgitant fraction is 2.39 %. 6. There is a small pericardial effusion. 7. The descending aorta is dilated measuring 27.2 mm. 8. The main pulmonary artery is dilated measuring 34.0 mm.  The IVC and coronary sinus are dilated. 9. There are large left and moderate right pleural effusions.  Basilar atelectasis is associated with the effusions.  There is evidence of mild pulmonary edema. Echo 5/24/19 ([facility])   - Left ventricle: The cavity size was severely dilated. Wall     thickness was normal. Systolic function was severely reduced. The     estimated ejection fraction was in the range of 15% to 20%. Severe     diffuse hypokinesis. Contrast evaluation for LV thrombus not     performed. Doppler parameters are consistent with abnormal left     ventricular relaxation (grade 1 diastolic dysfunction). - Left atrium: The atrium was moderately dilated. - Right ventricle: The cavity size was normal. Systolic function was     mildly reduced. TAPSE: 1.5cm.   - Pulmonary arteries: Systolic pressure could not be accurately     estimated. - Inferior vena cava: The vessel was normal in size. The     respirophasic diameter changes were in the normal range (>= 50%),     consistent with normal central venous pressure.        Lab Review:   Lab Results   Component Value Date    TRIG 109 02/12/2019    HDL 73 02/12/2019    HDL 58 12/13/2011    LDLCALC 51 02/12/2019    LABVLDL 22 02/12/2019   9/3/21  Trig 102 HDL 46 LDL 36  Lab Results   Component Value Date     06/12/2019    K 4.0 06/12/2019    BUN 48 06/12/2019    CREATININE 1.7 06/12/2019     No results for input(s): WBC, HGB, HCT, PLT in the last 72 hours. Assessment:  1. CAD of native coronary arteries without angina  2. Hyperlipidemia with LDL goal <70 mg/dL   3. Chronic systolic CHF, class 3   4. ICD in place   5. LV thrombus     Plan:  I recommend the patient pursue a right heart catheterization. The risks, benefits and alternatives of the procedure were discussed with the patient. The risks include but are not limited to: vascular injury, bleeding, infection, damage to the pulmonary arteries requiring surgery, injury to surrounding structures and death. The patient is amenable to undergoing the procedure. I will have this scheduled. I have personally reviewed all previous testing for this visit today including imaging, lab results and EKG as detailed above. He will continue on oral anticoagulation with Warfarin. I will see him in the office following procedure. This note was scribed in the presence of Lynda Rosenbaum MD by [employer], RN. Physician Attestation:  The scribes documentation has been prepared under my direction and personally reviewed by me in its entirety. I, Dr. Jaelyn Tracy personally performed the services described in this documentation as scribed by my RN in my presence, and I confirm that the note above accurately reflects all work, treatment, procedures, and medical decision making performed by me.

## 2021-10-26 NOTE — TELEPHONE ENCOUNTER
Left message for patient to return the call to the office. Tentative date/time for right heart catheterization on 11/1/21 at 11:00 am with 9:30 am arrival time. The morning of your procedure you will park in the hospital parking lot and report directly to the cath lab to check in.     Pre-Procedure Instructions   1. You will need to fast for at least 8 hours prior to procedure. No caffeine, gum or mints the morning of procedure. 2. You will need to hold your anticoagulation, Coumadin for 2 days prior. 3. Hold your diuretic, torsemide the morning of procedure. 4. Hold all diabetic medications including, Metfomin. If you take Lantus/Levemir only take ½ your normal dose the evening before. All other medications can be taken in the morning with sips of water. 5. Do not use any lotions, creams or perfume the morning of procedure. 6. Pre-procedure lab work will need to be completed 5-7 days prior to procedure. 7. Please have a responsible adult to drive you home after procedure. We advise you have someone stay with you for the first 24 hours for precautionary measures. Depending on your procedure you may require an overnight stay. 8. Cath lab will provide you with all post procedure instructions. If you have any questions regarding the procedure itself or medications, please call [phone] and ask to speak with a nurse.

## 2021-10-28 NOTE — TELEPHONE ENCOUNTER
Patient returned call and he cannot do 11/1. His son can only do later in the week. He is going to check if 11/5 at 1:00 with an arrival time of 11:30. Will place on [address]. He will call to confirm and go over instructions that time.

## 2021-11-01 NOTE — TELEPHONE ENCOUNTER
Pt called needs to reschedule his cath that is scheduled for 11/5/21. He is moving and needs it pushed out.       Stephie Dean # [phone]

## 2021-11-08 NOTE — TELEPHONE ENCOUNTER
----- Message from Bailey Cerrato sent at 2021  2:37 PM EST -----  Subject: Appointment Request    Reason for Call: New Patient Request Appointment    QUESTIONS  Type of Appointment? Established Patient  Reason for appointment request? No appointments available during search  Additional Information for Provider? Patient states that he was left a   voice message from clinical staff stating that he could schedule an   appointment with Dr. Vesna Yancey to establish PCP but it states there are no   appointments listed. I tried calling clinical and there was no answer. ---------------------------------------------------------------------------  --------------  Jaime KAPLAN  What is the best way for the office to contact you? OK to leave message on   voicemail  Preferred Call Back Phone Number? [phone]  ---------------------------------------------------------------------------  --------------  SCRIPT ANSWERS  Relationship to Patient? Self  Specialty Confirmation? Primary Care  Is this the first appointment to establish care for a ? No  Have you been diagnosed with, awaiting test results for, or told that you   are suspected of having COVID-19 (Coronavirus)? (If patient has tested   negative or was tested as a requirement for work, school, or travel and   not based on symptoms, answer no)? No  Within the past two weeks have you developed any of the following symptoms   (answer no if symptoms have been present longer than 2 weeks or began   more than 2 weeks ago)? Fever or Chills, Cough, Shortness of breath or   difficulty breathing, Loss of taste or smell, Sore throat, Nasal   congestion, Sneezing or runny nose, Fatigue or generalized body aches   (answer no if pain is specific to a body part e.g. back pain), Diarrhea,   Headache? No  Have you had close contact with someone with COVID-19 in the last 14 days? No  (Service Expert  click yes below to proceed with [email or web address] As Usual   Scheduling)? Yes

## 2021-11-08 NOTE — TELEPHONE ENCOUNTER
----- Message from [facility] sent at 11/8/2021  1:28 PM EST -----  Subject: Appointment Request    Reason for Call: Routine (Patient Request) No Script    QUESTIONS  Type of Appointment? New Patient/New to Provider  Reason for appointment request? No appointments available during search  Additional Information for Provider? NEW PATIENT REQUESTING TO SEE DR. Dolores Healy FOR CHECK UP  ---------------------------------------------------------------------------  --------------  CALL BACK INFO  What is the best way for the office to contact you? OK to leave message on   voicemail  Preferred Call Back Phone Number? [phone]  ---------------------------------------------------------------------------  --------------  SCRIPT ANSWERS  Relationship to Patient? Self  Specialty Confirmation? Primary Care  (Is the patient requesting to see the provider for a procedure?)? No  (Is the patient requesting to see the provider urgently  today or   tomorrow. )? No  Have you been diagnosed with, awaiting test results for, or told that you   are suspected of having COVID-19 (Coronavirus)? (If patient has tested   negative or was tested as a requirement for work, school, or travel and   not based on symptoms, answer no)? No  Within the past two weeks have you developed any of the following symptoms   (answer no if symptoms have been present longer than 2 weeks or began   more than 2 weeks ago)? Fever or Chills, Cough, Shortness of breath or   difficulty breathing, Loss of taste or smell, Sore throat, Nasal   congestion, Sneezing or runny nose, Fatigue or generalized body aches   (answer no if pain is specific to a body part e.g. back pain), Diarrhea,   Headache? No  Have you had close contact with someone with COVID-19 in the last 14 days? No  (Service Expert  click yes below to proceed with [email or web address] As Usual   Scheduling)?  Yes

## 2021-11-08 NOTE — TELEPHONE ENCOUNTER
Called pt  Lmom advised Dr. Marina Conklin is not currently taking new pts but Dr. Serge Siemens is and can call back to schedule.

## 2021-11-14 PROBLEM — I50.43 CHF (CONGESTIVE HEART FAILURE), NYHA CLASS I, ACUTE ON CHRONIC, COMBINED (HCC): Status: ACTIVE | Noted: 2021-01-01

## 2021-11-14 NOTE — ED PROVIDER NOTES
[facility]  EMERGENCY DEPARTMENTENCOUNTER      Pt Name: Elba Calix  MRN: [id number]  Robgfpetar 1955  Date ofevaluation: 11/14/2021  Provider: Willian Fuentes MD    CHIEF COMPLAINT       Chief Complaint   Patient presents with    Shortness of Breath     Pt c/o SOB x 2 days. Pt reports having a defibrillator and a stent. Denies chest pain at this time. No respiratory distress noted. HISTORY OF PRESENT ILLNESS   (Location/Symptom, Timing/Onset,Context/Setting, Quality, Duration, Modifying Factors, Severity)  Note limiting factors. Elba Calix is a 61 y.o. male  who  has a past medical history of Diabetes type 2, uncontrolled ([address] Diabetic nephropathy, ED (erectile dysfunction), Essential hypertension, benign, Hyperlipidemia, and Noncompliance. 24-year-old male with history of CAD, severe CHF, systolic dysfunction who presents for 2 days of acute on chronic worsening shortness of breath. Has been getting worse over the last few weeks. Patient notes that he has been moving hospitals from [address] to [facility] as well as moving physically from one house to the other and this is caused him to miss many doses of his medication. He is not sure how many doses he has missed over the last week. Having significant leg swelling. Associated with orthopnea and mild cough. Patient does not wear oxygen at baseline. Symptoms are worse with exertion or lying flat. Better with sitting up or rest.  Symptoms are moderate constant and worsening. Similar to previous episodes of CHF exacerbation. Patient was planning on outpatient heart cath with cardiology service. Denies any fever, nausea, vomiting, diarrhea, chest pain, dysuria, hematuria, back pain. NursingNotes were reviewed.     REVIEW OF SYSTEMS    (2-9 systems for level 4, 10 or more for level 5)     Review of Systems    Except as noted above the remainder of the review of systems was reviewed and negative. PAST MEDICAL HISTORY     Past Medical History:   Diagnosis Date    Diabetes type 2, uncontrolled ([facility] 7/28/2014    Diabetic nephropathy 9/12/2013    ED (erectile dysfunction) 9/12/2013    Essential hypertension, benign 9/12/2013    Hyperlipidemia 9/12/2013    Noncompliance 7/28/2014         SURGICALHISTORY       Past Surgical History:   Procedure Laterality Date    EYE SURGERY           CURRENT MEDICATIONS       Previous Medications    ATORVASTATIN (LIPITOR) 80 MG TABLET    Take 80 mg by mouth    BLOOD GLUCOSE MONITORING SUPPL (TRUE METRIX METER) W/DEVICE KIT    1 kit by Does not apply route 2 times daily    CARVEDILOL (COREG) 6.25 MG TABLET        CLOPIDOGREL (PLAVIX) 75 MG TABLET    Take 75 mg by mouth    DULAGLUTIDE (TRULICITY) 6.33 TO/0.3BF SOPN    Lot# [id number] Exp.06/20    GLUCOSE BLOOD VI TEST STRIPS (ASCENSIA AUTODISC VI;ONE TOUCH ULTRA TEST VI) STRIP    Pt test 2-3 times daily E11.9    ISOSORBIDE MONONITRATE (IMDUR) 60 MG EXTENDED RELEASE TABLET    Take 60 mg by mouth    LANCETS MISC    Patient test 2-3 times daily    LEVOTHYROXINE (SYNTHROID) 25 MCG TABLET        NITROGLYCERIN (NITROSTAT) 0.4 MG SL TABLET        SACUBITRIL-VALSARTAN (ENTRESTO) 49-51 MG PER TABLET    Take 1 tablet by mouth    STIMULANT LAXATIVE 8.6-50 MG PER TABLET        TORSEMIDE (DEMADEX) 10 MG TABLET        WARFARIN (COUMADIN) 7.5 MG TABLET                Patient has no known allergies.     FAMILY HISTORY       Family History   Problem Relation Age of Onset    Diabetes Mother     High Blood Pressure Mother     Diabetes Sister     Diabetes Brother           SOCIAL HISTORY       Social History     Socioeconomic History    Marital status:      Spouse name: Not on file    Number of children: Not on file    Years of education: Not on file    Highest education level: Not on file   Occupational History    Not on file   Tobacco Use    Smoking status: Former Smoker     Packs/day: 1.50 Years: 7.00     Pack years: 10.50     Quit date: 1981     Years since quittin.7    Smokeless tobacco: Never Used   Vaping Use    Vaping Use: Never used   Substance and Sexual Activity    Alcohol use: Yes     Alcohol/week: 1.0 standard drink     Types: 1 Cans of beer per week    Drug use: No    Sexual activity: Yes     Partners: Female   Other Topics Concern    Not on file   Social History Narrative    Not on file     Social Determinants of Health     Financial Resource Strain:     Difficulty of Paying Living Expenses: Not on file   Food Insecurity:     Worried About Running Out of Food in the Last Year: Not on file    Karli of Food in the Last Year: Not on file   Transportation Needs:     Lack of Transportation (Medical): Not on file    Lack of Transportation (Non-Medical):  Not on file   Physical Activity:     Days of Exercise per Week: Not on file    Minutes of Exercise per Session: Not on file   Stress:     Feeling of Stress : Not on file   Social Connections:     Frequency of Communication with Friends and Family: Not on file    Frequency of Social Gatherings with Friends and Family: Not on file    Attends Taoist Services: Not on file    Active Member of [address] [place] [address] or Organizations: Not on file    Attends Club or Organization Meetings: Not on file    Marital Status: Not on file   Intimate Partner Violence:     Fear of Current or Ex-Partner: Not on file    Emotionally Abused: Not on file    Physically Abused: Not on file    Sexually Abused: Not on file   Housing Stability:     Unable to Pay for Housing in the Last Year: Not on file    Number of [place] in the Last Year: Not on file    Unstable Housing in the Last Year: Not on file       SCREENINGS             PHYSICAL EXAM    (up to 7 for level 4, 8 or more for level 5)     ED Triage Vitals [21 1404]   BP Temp Temp Source Pulse Resp SpO2 Height Weight   127/83 97.3 °F (36.3 °C) Oral 85 18 95 % 5' 6.5\" (1.689 m) 175 lb 11.3 oz (79.7 kg)       Physical Exam  Vitals and nursing note reviewed. Constitutional:       General: He is not in acute distress. Appearance: Normal appearance. He is well-developed and normal weight. He is not ill-appearing, toxic-appearing or diaphoretic. HENT:      Head: Normocephalic and atraumatic. Mouth/Throat:      Mouth: Mucous membranes are moist.      Pharynx: Oropharynx is clear. Eyes:      Extraocular Movements: Extraocular movements intact. Cardiovascular:      Rate and Rhythm: Normal rate and regular rhythm. Pulses: Normal pulses. Heart sounds: Normal heart sounds. No murmur heard. No gallop. Pulmonary:      Effort: Pulmonary effort is normal. No tachypnea, accessory muscle usage or respiratory distress. Breath sounds: Examination of the right-middle field reveals rales. Examination of the left-middle field reveals rales. Examination of the right-lower field reveals rales. Examination of the left-lower field reveals rales. Rales present. No decreased breath sounds, wheezing or rhonchi. Chest:      Chest wall: No tenderness. Abdominal:      General: Bowel sounds are normal.      Palpations: Abdomen is soft. Tenderness: There is no abdominal tenderness. Musculoskeletal:         General: Normal range of motion. Cervical back: Normal range of motion and neck supple. Right lower leg: Edema present. Left lower leg: Edema present. Skin:     General: Skin is warm and dry. Capillary Refill: Capillary refill takes less than 2 seconds. Findings: No rash. Neurological:      General: No focal deficit present. Mental Status: He is alert and oriented to person, place, and time.    Psychiatric:         Mood and Affect: Mood normal.         Behavior: Behavior normal.         RESULTS     EKG: All EKG's are interpreted by the Emergency Department Physician who either signs or Co-signsthis chart in the absence of a cardiologist.    EKG Interpretation    Interpreted by emergency department physician    Rhythm: normal sinus   Rate: normal  Axis: normal  Ectopy: none  Conduction: normal  ST Segments: normal  T Waves: non specific changes  Q Waves: none  Clinical Impression: non-specific EKG    Paola Dumont MD    RADIOLOGY:   Non-plain filmimages such as CT, Ultrasound and MRI are read by the radiologist.     Interpretation per the Radiologist below, if available at the time ofthis note:    XR CHEST (2 VW)   Final Result   The cardiac silhouette is enlarged, with somewhat globular configuration   which can be seen in pericardial effusion. No focal airspace disease or   pulmonary edema.                ED BEDSIDE ULTRASOUND:   Performed by ED Physician - none    LABS:  Labs Reviewed   CBC WITH AUTO DIFFERENTIAL - Abnormal; Notable for the following components:       Result Value    WBC 3.5 (*)     RBC 3.66 (*)     Hemoglobin 11.7 (*)     Hematocrit 38.0 (*)     .8 (*)     MCHC 30.9 (*)     RDW 19.3 (*)     Lymphocytes Absolute 0.6 (*)     All other components within normal limits    Narrative:     Performed at:  [facility]  [address] [facility] [place] [address]   Phone [phone]   BASIC METABOLIC PANEL W/ REFLEX TO MG FOR LOW K - Abnormal; Notable for the following components:    Glucose 155 (*)     BUN 36 (*)     CREATININE 1.8 (*)     GFR Non- 38 (*)     GFR  46 (*)     All other components within normal limits    Narrative:     Performed at:  [facility]  [address] [place] [place] [address]   Phone [phone]   BRAIN NATRIURETIC PEPTIDE - Abnormal; Notable for the following components:    Pro-BNP 65,067 (*)     All other components within normal limits    Narrative:     Performed at:  [facility]  [address] [facility] [place] [address]   Phone [phone]   TROPONIN - Abnormal; Notable for the following components:    Troponin 0.05 (*)     All other components within normal limits    Narrative:     Performed at:  [facility]  [address] [place], [place] [address]   Phone [phone]       All other labs were within normal range or not returned as of this dictation. EMERGENCY DEPARTMENT COURSE and DIFFERENTIAL DIAGNOSIS/MDM:   Vitals:    Vitals:    11/14/21 1404   BP: 127/83   Pulse: 85   Resp: 18   Temp: 97.3 °F (36.3 °C)   TempSrc: Oral   SpO2: 95%   Weight: 175 lb 11.3 oz (79.7 kg)   Height: 5' 6.5\" (1.689 m)       Patient was given thefollowing medications:  Medications   furosemide (LASIX) injection 40 mg (40 mg IntraVENous Given 11/14/21 9575)       ED COURSE & MEDICAL DECISION MAKING    Pertinent Labs & Imaging studies reviewed. (See chart for details)   -  Patient seen and evaluated in the emergency department. -  Triage and nursing notes reviewed and incorporated. -  Old chart records reviewed and incorporated. -  Differential diagnosis includes: Differential Diagnosis: Acute Coronary Syndrome, Congestive Heart Failure, Myocardial Infarction, Pulmonary Embolus, Pneumonia, Pneumothorax, other    62-year-old male with significant shortness of breath 87% on room air now 95% on 2 L nasal cannula. Patient is normotensive afebrile not tachycardic. Patient has missed multiple doses of his diuretic is supposed to be on torsemide per chart review. Patient has clear signs and symptoms of fluid overload. No chest pain at this time. Will order cardiac work-up chest x-ray and likely plan on admission. Will give IV Lasix and monitor urine output closely. -  Work-up included:  See above  -  ED treatment included: See above  -  Results discussed with patient. REASSESSMENT     ED Course as of 11/14/21 1609   Sun Nov 14, 2021   1608 Labs show a severely elevated BNP slightly elevated troponin.   Patient's oxygen remained stable 95% on 2 L nasal cannula. [SC]      ED Course User Index  [SC] Kane Fitzpatrick MD         CRITICAL CARE TIME   Total Critical Care time was 30 minutes, excluding separately reportable procedures. There was a high probability of clinically significant/life threatening deterioration in the patient's condition which required my urgent intervention. This includes multiple reevaluations, vital sign monitoring, pulse oximetry monitoring, telemetry monitoring, clinical response to the IV medications, reviewing the nursing notes, consultation time, dictation/documentation time, and interpretation of the labwork. (This time excludes time spent performing procedures). CONSULTS:  None    PROCEDURES:  Unless otherwise noted below, none     Procedures    FINAL IMPRESSION      1. Acute on chronic systolic congestive heart failure ([address]    2. Acute respiratory failure with hypoxia (HCC)    3. Dyspnea on exertion          DISPOSITION/PLAN   DISPOSITION Decision To Admit 11/14/2021 04:07:51 PM      PATIENT REFERREDTO:  No follow-up provider specified.     DISCHARGEMEDICATIONS:  New Prescriptions    No medications on file          (Please note that portions of this note were completed with a voice recognition program.  Efforts were made to edit the dictations but occasionally words are mis-transcribed.)    Kane Fitzpatrick MD (electronically signed)  Attending Emergency Physician         Kane Fitzpatrick MD  11/14/21 9444

## 2021-11-14 NOTE — ED NOTES
Pt still unable to void, does not want to be straight cath'd.   Male Vik Mando applied      eLo Curry RN  11/14/21 4178

## 2021-11-14 NOTE — ED NOTES
Pts brother at bedside     Pt here with CC of SOB that started last night, he denies any chest pain. He denies needing 02 at home. Pt is alert and communicating appropriately with RN.      Oleg Fowler RN  11/14/21 3415

## 2021-11-15 NOTE — PROGRESS NOTES
Patient is alert & oriented x4, 2/4 bed rails up, bed in lowest position, fall precautions in place, call light within reach. Morning assessment complete. Per pt, feeling a little better than yesterday. Morning medications given. Pt sitting up in bed eating breakfast. Will cont to monitor and reassess.   Electronically signed by Phuong Dover RN on 11/15/2021 at 8:48 AM

## 2021-11-15 NOTE — PROGRESS NOTES
Comprehensive Nutrition Assessment    Type and Reason for Visit:  Initial    Nutrition Recommendations/Plan:   Continue 2 gm Na / 1500 ml per day  Encouraged pt to contact Dietitian should any questions arise regarding diet    Nutrition Assessment:  Pt with pmh HF, DM, HTN, HLD admitted for acute on chronic CHF. Noted that pt did not take his diuretic for the past 3 weeks. Diet adv to 2 gm Na / 1500 ml per day. Pt reported good po intake. Recall revealed that pt tries to watch salty foods, but was never really told what to avoid. Pt consume 2-3 meals per day, including carryout meals, like fried chicken, fried fish, frequently. Pt also shared that he receives Meals on Wheels to help supplement diet. Information given and discussed on healthy eating with DM and CHF. Discussed appropriate foods to include and choices to avoid. Discussed daily weights, and pt reported current scale not working. Encouraged pt to speak with Nursing to see if a scale can be provided. Discussed dining out and appropriate choices to include. Discussed rationale for fluid modification. Comprehended per feedback. Appears motivated to follow diet prescribed. Time 12 minutes. Malnutrition Assessment:  Malnutrition Status:  No malnutrition    Context:  Acute Illness         Nutrition Related Findings:  Noted BM 11/13. Noted BNP of 65,067. Noted +1 edema to BLE      Wounds:  None       Current Nutrition Therapies:    ADULT DIET; Regular; Low Sodium (2 gm); 1500 ml    Anthropometric Measures:  · Height: 5' 6.5\" (168.9 cm)  · Current Body Weight: 169 lb (76.7 kg)   · Admission Body Weight: 176 lb (79.8 kg)    · Usual Body Weight: 166 lb (75.3 kg) (although MD mentioned a discharge wt last month of 159 lbs.)     · Ideal Body Weight: 145 lbs; % Ideal Body Weight 116.6 %   · BMI: 26.9  · Adjusted Body Weight:  ; No Adjustment   · BMI Categories: Overweight (BMI 25.0-29. 9)       Nutrition Diagnosis:   · Food & Nutrition-related knowledge deficit related to cardiac dysfunction as evidenced by other (comment) (pt report)      Nutrition Interventions:   Food and/or Nutrient Delivery:  Continue Current Diet  Nutrition Education/Counseling:  Education completed   Coordination of Nutrition Care:  Continue to monitor while inpatient    Goals:  consume >/= to 50%       Nutrition Monitoring and Evaluation:   Behavioral-Environmental Outcomes:  None Identified   Food/Nutrient Intake Outcomes:  Food and Nutrient Intake  Physical Signs/Symptoms Outcomes:  Biochemical Data, Constipation, Diarrhea, Weight, Skin, Fluid Status or Edema     Discharge Planning:    Continue current diet     Electronically signed by Phyllis Morel RD, LD on 11/15/21 at 1:14 PM EST    Contact: [phone]

## 2021-11-15 NOTE — PROGRESS NOTES
Physical Therapy    Facility/Department: [address] ORTHOPEDICS  Initial Assessment    NAME: Shruti Patricio  : 1955  MRN: [id number]    Date of Service: 11/15/2021    Assessment / Discharge Recommendations:  -usual baseline is independent with basic ADLs and ambulation in household without assistive device  -anticipate discharge to home when medically ready  -may benefit from Home PT - will update recommendations as he progresses    Body structures, Functions, Activity limitations: Decreased functional mobility ; Decreased ADL status; Decreased endurance; Decreased balance  Prognosis: Good  Decision Making: Medium Complexity  REQUIRES PT FOLLOW UP: Yes  Activity Tolerance  Activity Tolerance: Patient limited by endurance; Patient limited by fatigue       Patient Diagnosis(es): The primary encounter diagnosis was Acute on chronic systolic congestive heart failure ([address] Diagnoses of Acute respiratory failure with hypoxia (HCC) and Dyspnea on exertion were also pertinent to this visit. has a past medical history of Diabetes type 2, uncontrolled ([address] Diabetic nephropathy, ED (erectile dysfunction), Essential hypertension, benign, Hyperlipidemia, and Noncompliance. has a past surgical history that includes eye surgery. Restrictions  Restrictions/Precautions  Restrictions/Precautions: Fall Risk  Position Activity Restriction  Other position/activity restrictions: O2nc, external catheter  Vision/Hearing  Vision: Within Functional Limits  Hearing: Within functional limits     Subjective  General  Chart Reviewed:  Yes  Additional Pertinent Hx: here due to increasing SOB, increasing LE edema   PMH includes CHF, ICD  Response To Previous Treatment: Not applicable  Family / Caregiver Present: No  Follows Commands: Within Functional Limits  Subjective  Subjective: arrived to room as patient awake- resting in bed with elevated HOB - he is oriented and agreeable to PT session and up to ambulate bed to recliner  Pain Screening  Patient Currently in Pain:  (reports no pain)  Orientation  Orientation  Overall Orientation Status: Within Functional Limits  Social/Functional History  Social/Functional History  Lives With: Son  Type of Home: Apartment  Home Layout: One level  Home Access: Stairs to enter without rails  Entrance Stairs - Number of Steps: 2 steps down  Bathroom Shower/Tub: Tub/Shower unit  Bathroom Toilet: Standard  Bathroom Accessibility: Walker accessible  Home Equipment:  (No DME)  Receives Help From: Family  ADL Assistance: Independent  Homemaking Assistance:  (does what he can and his son manages heavy tasks)  Ambulation Assistance: Independent  Transfer Assistance: Independent  Cognition   Cognition  Overall Cognitive Status: [facility]    Objective  Observation/Palpation  Edema: pronounced edema in distal LEs  Motor Control  Gross Motor?: WFL  Sensation  Overall Sensation Status: [facility]  Bed mobility  Supine to Sit: Stand by assistance; Contact guard assistance  Sit to Supine: Unable to assess  Transfers  Sit to Stand: Contact guard assistance  Stand to sit: Contact guard assistance; Stand by assistance  Ambulation  Ambulation?: Yes  Ambulation 1  Surface: level tile  Device: Rolling Walker  Assistance: Contact guard assistance  Quality of Gait: partial step through pattern with adequate base of support  Distance: bed to recliner for ~5 feet  Comments: mild unsteadiness  Stairs/Curb  Stairs?: No  Balance  Comments: midline in sitting at the EOB and in static stance with moderate lean on the walker   -dynamic is fair on rolling walker with cga for safety  Exercises  Ankle Pumps: 30 per hour in easy pace   Plan   Plan  Times per week: 3-5 while on acute floor  Current Treatment Recommendations: Functional Mobility Training, Positioning, Patient/Caregiver Education & Training, ADL/Self-care Training  Safety Devices  Type of devices:  All fall risk precautions in place, Call light within reach, Chair alarm in place, Left in

## 2021-11-15 NOTE — ACP (ADVANCE CARE PLANNING)
Conversation Outcomes:  [] ACP discussion completed  [] Existing advance directive reviewed with patient; no changes to patient's previously recorded wishes  [] New Advance Directive completed  [] Portable Do Not Rescitate prepared for Provider review and signature  [] POLST/POST/MOLST/MOST prepared for Provider review and signature      Follow-up plan:    [] Schedule follow-up conversation to continue planning  [] Referred individual to Provider for additional questions/concerns   [] Advised patient/agent/surrogate to review completed ACP document and update if needed with changes in condition, patient preferences or care setting    [] This note routed to one or more involved healthcare providers        {

## 2021-11-15 NOTE — PLAN OF CARE
Problem: Skin Integrity:  Goal: Will show no infection signs and symptoms  Description: Will show no infection signs and symptoms  11/15/2021 1104 by Rosenda Briseno RN  Outcome: Ongoing  Note: Monitoring pt for signs and symptoms of infection. Will observe for any redness, warmth, or pus. Problem: Skin Integrity:  Goal: Absence of new skin breakdown  Description: Absence of new skin breakdown  11/15/2021 1104 by Rosenda Briseno RN  Outcome: Ongoing  Note: Pt absent from new skin breakdown on this shift      Problem: Falls - Risk of:  Goal: Will remain free from falls  Description: Will remain free from falls  11/15/2021 1104 by Rosenda Briseno RN  Outcome: Ongoing  Note: Fall risk assessment completed. Fall precautions in place. Bed in lowest position, wheels locked, bed/chair exit alarm in place, call light within reach, and non skid footwear on. Walkway free of clutter. Pt alert and oriented and able to make needs known. Pt educated to use call light when needing to get up, and pt utilizes call light to make needs known. Will continue to monitor.         Problem: Falls - Risk of:  Goal: Absence of physical injury  Description: Absence of physical injury  11/15/2021 1104 by Rosenda Briseno RN  Outcome: Ongoing  Note: Pt absent from physical injury on this shift

## 2021-11-15 NOTE — PROGRESS NOTES
PT AAo x4 per this shift. VSS with remaining on 2 L of oxygen per nasal cannula. Pt feli pain. Pt education about diet restrictions. Pt refusing to ambulate per this shift d/t c/o of having a hard time seeing. Pt comfirms he is diabetic. NP Marisabel Chandra notified. See eMAR with NO. Pt able to void throughout this shift. See Is and Os. Pt resting well throughout this shift. No further needs voiced at this time. Fall precautions in place. Bed alarm on. Call light within reach. Will continue to round.  Electronically signed by Cheri Lemus RN on 11/15/2021 at 3:15 AM

## 2021-11-15 NOTE — PROGRESS NOTES
Medication Reconciliation    List of medications patient is currently taking is complete.     Source of information: 1. Conversation with patient at bedside                                      2. EPIC records      Allergies  Patient has no known allergies.     Notes regarding home medications:   1. Verified patient's warfarin dose as 3.75 mg on Thursdays and 7.5 mg all other days. 2. Patient denies missing many doses, but seems uncertain about how often he misses doses. 3. Patient states he took his Torsemide recently but said he was taking two tablets at night instead of the BID dosing. Seems unsure of exact medication history, and may have missed more doses than stated.      Barbara Gaitan, PharmD Candidate 2022  Date and Time: 11/15/21 10:36 AM

## 2021-11-15 NOTE — PROGRESS NOTES
Hospitalist Progress Note      PCP: No primary care provider on file. Chief Complaint. Patient is a 26-year-old male with past medical history of chronic systolic heart failure, LV thrombus, CAD, hyperlipidemia who presented to hospital for shortness of breath bilateral lower extremity swelling. According to the patient he has not been taking his diuretic medication for past 3 weeks and his lower extremity swelling has been getting worse, he also has worsening shortness of breath, he has not been able to lie down flat and also has exertional dyspnea. Otherwise denied fevers chills nausea vomiting diarrhea constipation dysuria. Date of Admission: 11/14/2021      Subjective:   denies chest pain, nausea, vomiting, shortness of breath, fever or chills.  mention feels overall better    Medications:  Reviewed    Infusion Medications    dextrose      sodium chloride       Scheduled Medications    insulin lispro  0-12 Units SubCUTAneous TID WC    insulin lispro  0-6 Units SubCUTAneous Nightly    furosemide  60 mg IntraVENous BID    sodium chloride flush  10 mL IntraVENous 2 times per day    atorvastatin  80 mg Oral Nightly    levothyroxine  25 mcg Oral Daily    carvedilol  12.5 mg Oral BID WC    isosorbide mononitrate  30 mg Oral Daily    warfarin (COUMADIN) daily dosing (placeholder)   Other RX Placeholder     PRN Meds: glucose, dextrose, glucagon (rDNA), dextrose, sodium chloride flush, sodium chloride, potassium chloride, magnesium sulfate, ondansetron **OR** ondansetron, magnesium hydroxide, acetaminophen **OR** acetaminophen, nitroGLYCERIN, perflutren lipid microspheres      Intake/Output Summary (Last 24 hours) at 11/15/2021 1816  Last data filed at 11/15/2021 1605  Gross per 24 hour   Intake 820 ml   Output 1225 ml   Net -405 ml       Physical Exam Performed:    /78   Pulse 69   Temp 97.6 °F (36.4 °C) (Oral)   Resp 14   Ht 5' 6.5\" (1.689 m)   Wt 168 lb 10.4 oz (76.5 kg)   SpO2 99% BMI 26.81 kg/m²     General appearance: No apparent distress,   HEENT:  Conjunctivae/corneas clear. Neck: Supple, with full range of motion. Respiratory:  Normal respiratory effort. Clear to auscultation, bilaterally without Rales/Wheezes/Rhonchi. Cardiovascular: Regular rate and rhythm with normal S1/S2 without murmurs or rubs  Abdomen: Soft, non-tender, non-distended, normal bowel sounds. Musculoskeletal: +1 edema bilaterally  Neurologic:  without any focal sensory/motor deficits. grossly non-focal.  Psychiatric: Alert and oriented, Normal mood  Peripheral Pulses: +2 palpable, equal bilaterally       Labs:   Recent Labs     11/14/21  1429 11/15/21  0546   WBC 3.5* 3.4*   HGB 11.7* 11.1*   HCT 38.0* 34.9*    146     Recent Labs     11/14/21  1429 11/15/21  0546    143   K 4.2 4.5    105   CO2 24 26   BUN 36* 43*   CREATININE 1.8* 2.1*   CALCIUM 8.9 8.8     No results for input(s): AST, ALT, BILIDIR, BILITOT, ALKPHOS in the last 72 hours. Recent Labs     11/14/21  2052 11/15/21  0546   INR 3.42* 3.51*     Recent Labs     11/14/21  1429   TROPONINI 0.05*       Urinalysis:      Lab Results   Component Value Date    NITRU Negative 08/30/2017    WBCUA 1 06/12/2019    RBCUA 1 06/12/2019    BLOODU Negative 08/30/2017    SPECGRAV 1.018 08/30/2017    GLUCOSEU Negative 08/30/2017       Radiology:  XR CHEST (2 VW)   Final Result   The cardiac silhouette is enlarged, with somewhat globular configuration   which can be seen in pericardial effusion. No focal airspace disease or   pulmonary edema. Assessment/Plan:    Active Hospital Problems    Diagnosis     CHF (congestive heart failure), NYHA class I, acute on chronic, combined ([facility] [address] [I50.43]      Patient is a 77-year-old male with past medical history of chronic systolic heart failure, LV thrombus, CAD, hyperlipidemia who presented to hospital for shortness of breath bilateral lower extremity swelling.   According to the patient he has not been taking his diuretic medication for past 3 weeks and his lower extremity swelling has been getting worse, he also has worsening shortness of breath, he has not been able to lie down flat and also has exertional dyspnea. Otherwise denied fevers chills nausea vomiting diarrhea constipation dysuria.     Assessment  Acute on chronic systolic CHF  CAD  Hyperlipidemia  History of LV thrombus     Plan  Start IV Lasix 60 twice daily, consult cardiology, will order echocardiogram to monitor progression of LV thrombus, resume home Coumadin  Strict I's and O's, low-salt diet  Resume home medications  DVT prophylaxis-on Coumadin  Diet: ADULT DIET; Regular;  Low Sodium (2 gm); 1500 ml  Code Status: Full Code    PT/OT Eval Status: ordered    David Wild MD

## 2021-11-15 NOTE — H&P
Hospital Medicine History & Physical      PCP: No primary care provider on file. Date of Admission: 11/14/2021    Chief Complaint:  SOB    History Of Present Illness:    Patient is a 79-year-old male with past medical history of chronic systolic heart failure, LV thrombus, CAD, hyperlipidemia who presented to hospital for shortness of breath bilateral lower extremity swelling. According to the patient he has not been taking his diuretic medication for past 3 weeks and his lower extremity swelling has been getting worse, he also has worsening shortness of breath, he has not been able to lie down flat and also has exertional dyspnea. Otherwise denied fevers chills nausea vomiting diarrhea constipation dysuria. Past Medical History:          Diagnosis Date    Diabetes type 2, uncontrolled ([facility] [address] 7/28/2014    Diabetic nephropathy 9/12/2013    ED (erectile dysfunction) 9/12/2013    Essential hypertension, benign 9/12/2013    Hyperlipidemia 9/12/2013    Noncompliance 7/28/2014       Past Surgical History:          Procedure Laterality Date    EYE SURGERY         Medications Prior to Admission:      Prior to Admission medications    Medication Sig Start Date End Date Taking?  Authorizing Provider   STIMULANT LAXATIVE 8.6-50 MG per tablet  8/23/21   Historical Provider, MD   torsemide (DEMADEX) 10 MG tablet  8/23/21   Historical Provider, MD   warfarin (COUMADIN) 7.5 MG tablet  9/5/21   Historical Provider, MD   nitroGLYCERIN (NITROSTAT) 0.4 MG SL tablet  8/22/21   Historical Provider, MD   levothyroxine (SYNTHROID) 25 MCG tablet  9/4/21   Historical Provider, MD   isosorbide mononitrate (IMDUR) 60 MG extended release tablet Take 60 mg by mouth 3/6/19   Historical Provider, MD   sacubitril-valsartan (ENTRESTO) 49-51 MG per tablet Take 1 tablet by mouth 3/22/19   Historical Provider, MD   atorvastatin (LIPITOR) 80 MG tablet Take 80 mg by mouth 1/8/19   Historical Provider, MD   carvedilol (COREG) 6.25 MG tablet  1/15/19   Historical Provider, MD   clopidogrel (PLAVIX) 75 MG tablet Take 75 mg by mouth 1/8/19   Historical Provider, MD   Dulaglutide (TRULICITY) 7.11 ON/3.3EO SOPN Lot# [id number] Exp.06/20 2/12/19   DAGO Bradley MD   Blood Glucose Monitoring Suppl (TRUE METRIX METER) W/DEVICE KIT 1 kit by Does not apply route 2 times daily 2/2/17   DAGO Bradley MD   glucose blood VI test strips (ASCENSIA AUTODISC VI;ONE TOUCH ULTRA TEST VI) strip Pt test 2-3 times daily E11.9 2/2/17   DAGO Bradley MD   Lancets MISC Patient test 2-3 times daily 2/2/17   DAGO Bradley MD       Allergies:  Patient has no known allergies. Social History:      TOBACCO:   reports that he quit smoking about 40 years ago. He has a 10.50 pack-year smoking history. He has never used smokeless tobacco.  ETOH:   reports current alcohol use of about 1.0 standard drink of alcohol per week. Family History:       Reviewed in detail and non contributory          Problem Relation Age of Onset    Diabetes Mother     High Blood Pressure Mother     Diabetes Sister     Diabetes Brother        REVIEW OF SYSTEMS:   Pertinent positives as noted in the HPI. All other systems reviewed and negative. PHYSICAL EXAM PERFORMED:    /86   Pulse 83   Temp 97.3 °F (36.3 °C) (Oral)   Resp (!) 41   Ht 5' 6.5\" (1.689 m)   Wt 175 lb 11.3 oz (79.7 kg)   SpO2 100%   BMI 27.93 kg/m²     General appearance:  No apparent distress, cooperative. HEENT:  Normal cephalic, atraumatic without obvious deformity. Conjunctivae/corneas clear. Neck: Supple, with full range of motion. No cervical lymphadenopathy  Respiratory:  Normal respiratory effort. Clear to auscultation, bilaterally without Rales/Wheezes/Rhonchi. Cardiovascular:  Regular rate and rhythm with normal S1/S2 without murmurs, rubs or gallops. Abdomen: Soft, non-tender, non-distended, normal bowel sounds.   Musculoskeletal:  +ve ededma noted bilaterally   Skin: no rash visible  Neurologic:  Neurologically intact without any focal sensory/motor deficits. grossly non-focal.  Psychiatric:  Alert and oriented, normal mood  Peripheral Pulses: +2 palpable, equal bilaterally       Labs:     Recent Labs     11/14/21  1429   WBC 3.5*   HGB 11.7*   HCT 38.0*        Recent Labs     11/14/21  1429      K 4.2      CO2 24   BUN 36*   CREATININE 1.8*   CALCIUM 8.9     No results for input(s): AST, ALT, BILIDIR, BILITOT, ALKPHOS in the last 72 hours. No results for input(s): INR in the last 72 hours. Recent Labs     11/14/21  1429   TROPONINI 0.05*       Urinalysis:      Lab Results   Component Value Date    NITRU Negative 08/30/2017    WBCUA 1 06/12/2019    RBCUA 1 06/12/2019    BLOODU Negative 08/30/2017    SPECGRAV 1.018 08/30/2017    GLUCOSEU Negative 08/30/2017       Radiology:       XR CHEST (2 VW)   Final Result   The cardiac silhouette is enlarged, with somewhat globular configuration   which can be seen in pericardial effusion. No focal airspace disease or   pulmonary edema. Active Hospital Problems    Diagnosis Date Noted    CHF (congestive heart failure), NYHA class I, acute on chronic, [facility] ([facility] [I50.43] 11/14/2021       Patient is a 62-year-old male with past medical history of chronic systolic heart failure, LV thrombus, CAD, hyperlipidemia who presented to hospital for shortness of breath bilateral lower extremity swelling. According to the patient he has not been taking his diuretic medication for past 3 weeks and his lower extremity swelling has been getting worse, he also has worsening shortness of breath, he has not been able to lie down flat and also has exertional dyspnea. Otherwise denied fevers chills nausea vomiting diarrhea constipation dysuria.     Assessment  Acute on chronic systolic CHF  CAD  Hyperlipidemia  History of LV thrombus    Plan  Start IV Lasix 40 twice daily, consult cardiology, will order echocardiogram to monitor progression of LV thrombus, resume home Coumadin  Strict I's and O's, low-salt diet  Resume home medications  DVT prophylaxis-on Coumadin  Diet: Diet NPO Effective Now  Code Status: No Order    PT/OT Eval Status: ordered    Dispo - pending clinical improvement       Morteza Ambriz MD    The note was completed using EMR and Dragon dictation system. Every effort was made to ensure accuracy; however, inadvertent computerized transcription errors may be present. Thank you No primary care provider on file. for the opportunity to be involved in this patient's care. If you have any questions or concerns please feel free to contact me at [phone].     Morteza Ambriz MD

## 2021-11-15 NOTE — CONSULTS
Clinical Pharmacy Note  Warfarin Consult    Rafael Guevara is a 61 y.o. male receiving warfarin managed by pharmacy. Patient being bridged with none. Warfarin Indication: LV thrombus  Target INR range: 2-3   Dose prior to admission: 3.75mg on Thurs; 7.5mg all other days    Current warfarin drug-drug interactions:     Recent Labs     11/14/21  1429 11/14/21 2052   HGB 11.7*  --    HCT 38.0*  --    INR  --  3.42*       Assessment/Plan:    Unclear if INR is trending up or down, will hold warfarin tonight. Daily PT/INR until stable within therapeutic range. Thank you for the consult. Will continue to follow.     DANIEL Richard [facility]  11/14/2021 9:18 PM

## 2021-11-15 NOTE — CONSULTS
[address] [facility]  1955    November 15, 2021    Reason for Consult: Shortness of breath    CC: Shortness of breath    HPI:  The patient is 61 y.o. male with a past medical history significant for CAD, chronic systolic CHF with ICD in place (8/13/19), LV thrombus, hyperlipidemia, essential hypertension, type 2 DM with complications of diabetic nephropathy stage 3 who presented to [facility] ED with shortness of breath. I was asked to evaluate him for potential CHF. He follows with me as a new outpatient. I was to perform a right heart catheterization last Friday however the patient failed to show for the scheduled procedure. Magi Craig states that he had a lot going on that caused him to miss his appointment for the right heart catheterization 3 days ago. He does state that he has been taking his diuretics as well as his newly prescribed Entresto therapy. He does not weigh himself daily. He states that he has been short of breath with ambulation and thinks his weight is probably up. He adds that is really his family that made him come to the emergency department. He was more focused on moving in his possessions than his breathing or his CHF. He does describe some lower extremity swelling and states this is somewhat increased from baseline. He denies any chest pain or tightness. Review of Systems:  Constitutional: No fatigue, weakness, night sweats or fever. HEENT: No new vision difficulties or ringing in the ears. Respiratory: Positive for chronic SOB, PND, orthopnea and cough. Cardiovascular: See HPI   GI: No n/v, diarrhea, constipation, abdominal pain or changes in bowel habits. No melena, no hematochezia  : No urinary frequency, urgency, incontinence, hematuria or dysuria. Skin: No cyanosis or skin lesions. Musculoskeletal: No new muscle or joint pain. Neurological: No syncope or TIA-like symptoms.   Psychiatric: No anxiety, insomnia or depression Past Medical History:   Diagnosis Date    Diabetes type 2, uncontrolled ([address] 2014    Diabetic nephropathy 2013    ED (erectile dysfunction) 2013    Essential hypertension, benign 2013    Hyperlipidemia 2013    Noncompliance 2014     Past Surgical History:   Procedure Laterality Date    EYE SURGERY       Family History   Problem Relation Age of Onset    Diabetes Mother     High Blood Pressure Mother     Diabetes Sister     Diabetes Brother      Social History     Tobacco Use    Smoking status: Former Smoker     Packs/day: 1.50     Years: 7.00     Pack years: 10.50     Quit date: 1981     Years since quittin.7    Smokeless tobacco: Never Used   Vaping Use    Vaping Use: Never used   Substance Use Topics    Alcohol use:  Yes     Alcohol/week: 1.0 standard drink     Types: 1 Cans of beer per week    Drug use: No       No Known Allergies  Current Facility-Administered Medications   Medication Dose Route Frequency Provider Last Rate Last Admin    insulin lispro (HUMALOG) injection vial 0-12 Units  0-12 Units SubCUTAneous TID WC Medel Naomi, APRN - CNP   6 Units at 11/15/21 1149    insulin lispro (HUMALOG) injection vial 0-6 Units  0-6 Units SubCUTAneous Nightly Medel Naomi, APRN - CNP        glucose (GLUTOSE) 40 % oral gel 15 g  15 g Oral PRN Medel Naomi, APRN - CNP        dextrose 50 % IV solution  12.5 g IntraVENous PRN Medel Naomi, APRN - CNP        glucagon (rDNA) injection 1 mg  1 mg IntraMUSCular PRN Medel Naomi, APRN - CNP        dextrose 5 % solution  100 mL/hr IntraVENous PRN Medel Naomi, APRN - CNP        sodium chloride flush 0.9 % injection 10 mL  10 mL IntraVENous 2 times per day Marily Torres MD   10 mL at 11/15/21 0827    sodium chloride flush 0.9 % injection 10 mL  10 mL IntraVENous PRN Marily Torres MD        0.9 % sodium chloride infusion  25 mL IntraVENous PRN Marily Torres MD        potassium chloride 10 mEq/100 mL IVPB (Peripheral Line)  10 mEq IntraVENous PRN Db Wall MD        magnesium sulfate 2000 mg in 50 mL IVPB premix  2,000 mg IntraVENous PRN Db Wall MD        ondansetron (ZOFRAN-ODT) disintegrating tablet 4 mg  4 mg Oral Q8H PRN Db Wall MD        Or    ondansetron (ZOFRAN) injection 4 mg  4 mg IntraVENous Q6H PRN Db Wall MD        magnesium hydroxide (MILK OF MAGNESIA) 400 MG/5ML suspension 30 mL  30 mL Oral Daily PRN Db Wall MD        acetaminophen (TYLENOL) tablet 650 mg  650 mg Oral Q6H PRN Db Wall MD        Or    acetaminophen (TYLENOL) suppository 650 mg  650 mg Rectal Q6H PRN Db Wall MD        atorvastatin (LIPITOR) tablet 80 mg  80 mg Oral Nightly Db Wall MD   80 mg at 11/14/21 2145    clopidogrel (PLAVIX) tablet 75 mg  75 mg Oral Daily Db Wall MD   75 mg at 11/15/21 0827    levothyroxine (SYNTHROID) tablet 25 mcg  25 mcg Oral Daily Db Wall MD   25 mcg at 11/15/21 0527    nitroGLYCERIN (NITROSTAT) SL tablet 0.4 mg  0.4 mg SubLINGual Q5 Min PRN Db Wall MD        sacubitril-valsartan (ENTRESTO) 49-51 MG per tablet 1 tablet  1 tablet Oral BID Db Wall MD   1 tablet at 11/15/21 0827    furosemide (LASIX) injection 40 mg  40 mg IntraVENous BID Db Wall MD   40 mg at 11/15/21 0827    perflutren lipid microspheres (DEFINITY) injection 1.65 mg  1.5 mL IntraVENous ONCE PRN Db Wall MD        carvedilol (COREG) tablet 12.5 mg  12.5 mg Oral BID WC Db Wall MD   12.5 mg at 11/15/21 0827    isosorbide mononitrate (IMDUR) extended release tablet 30 mg  30 mg Oral Daily Db Wall MD   30 mg at 11/15/21 0827    warfarin (COUMADIN) daily dosing (placeholder)   Other RX Ebonie Hickey MD           Physical Exam:   /64   Pulse 71   Temp 97.7 °F (36.5 °C) (Oral)   Resp 14   Ht 5' 6.5\" (1.689 m)   Wt 168 lb 10.4 oz (76.5 kg)   SpO2 98%   BMI 26.81 kg/m²     Intake/Output Summary (Last 24 hours) at 11/15/2021 1255  Last data filed at 11/15/2021 1104  Gross per 24 hour   Intake 700 ml   Output 525 ml   Net 175 ml     Wt Readings from Last 2 Encounters:   11/15/21 168 lb 10.4 oz (76.5 kg)   10/22/21 166 lb (75.3 kg)     Constitutional: He is oriented to person, place, and time. He appears well-developed and well-nourished. In no acute distress. Head: Normocephalic and atraumatic. Neck: Neck supple. No JVD present. Carotid bruit is not present. No mass and no thyromegaly present. No lymphadenopathy present. Cardiovascular: Normal rate, regular rhythm, normal heart sounds and intact distal pulses. Exam reveals no gallop and no friction rub. No murmur heard. Pulmonary/Chest: Effort normal and breath sounds normal. No respiratory distress. He has no wheezes, rhonchi or rales. Abdominal: Soft, non-tender. Bowel sounds and aorta are normal. He exhibits no organomegaly, mass or bruit. Extremities: No edema, cyanosis, or clubbing. Pulses are 2+ radial/carotid/dorsalis pedis and posterior tibial bilaterally. Neurological: He is alert and oriented to person, place, and time. He has normal reflexes. No cranial nerve deficit. Coordination normal.   Skin: Skin is warm and dry. There is no rash or diaphoresis. Psychiatric: He has a normal mood and affect. His speech is normal and behavior is normal.     Personally reviewed and interpreted   EKG Interpretation: Sinus rhythm with normal intervals and axis    Lab Review:   Lab Results   Component Value Date    TRIG 109 02/12/2019    HDL 73 02/12/2019    HDL 58 12/13/2011    LDLCALC 51 02/12/2019    LABVLDL 22 02/12/2019     Lab Results   Component Value Date     11/15/2021    K 4.5 11/15/2021    BUN 43 11/15/2021    CREATININE 2.1 11/15/2021     Recent Labs     11/14/21  1429 11/14/21  1429 11/15/21  0546   WBC 3.5*   < > 3.4*   HGB 11.7*  --  11.1*   HCT 38.0*  --  34.9*     --  146    < > = values in this interval not displayed. [facility] 11/30/18 ([facility])   IMPRESSIONS:  Severe single vessel coronary artery disease with 99%   stenosis of the mid LAD. Acute decompensated heart failure in the setting of new onset   cardiomyopathy. Elevated filling pressures with preserved cardiac output and cardiac   index. SUMMARY:     1. LAD: Mid-vessel lesion: There is a discrete, 99%stenosis. 2. Right sided filling pressures are elevated.      RA: 18 mm hg      RV: 60/18 mm hg      PA:60/25(40) mm hg   3. Elevated pulmonary capillary wedge pressure (24 mm Hg). 4. Preserved cardiac output(5 L/min) and cardiac index (2.55      L.min-m2)      L/[facility] 12/10/18 ([facility])   IMPRESSIONS:  Selective angiography of LMCA showing critical mid LAD   lesion. Successful PCI with 3.5 x 18 mm MIKE. Normal cardiac output, m   ildly increased pulmonary pressures with mildly increased filling pre   ssure on inotropic support. SUMMARY:     1. Wedge pressure in the pulmonary capillaries is mildly elevated. 2. Coronary arteries: The left main is unusually long and      trifurcates into the LAD, a ramus intermedius, and the left      circumflex. The left anterior descending gives rise to 2      diagonals and 3 septals and wraps around the apex. The left      circumflex gives rise to 2 obtuse marginals and no      posterolaterals. The first obtuse marginal has a high origin. 3. LAD: Mid-vessel lesion: There is a discrete, 99% stenosis. The      distal vessel supplies a moderate-sized vascular territory. The      lesion is a likely culprit for the patient's clinical      presentation. The lesion was stented (see 1st lesion      intervention). Following intervention, the lesion has a residual      stenosis of 0% and MUKUND grade 3 flow (brisk flow). 4. AV was not crossed due to known apical LV thrombus. 5. RA 6      RV 48/4      PA 50/20 (28)      W 18.   6. Blu CO 5.2 Blu CI 2.9 on Milrinone 0.25 mcg/mg/min.      [address] 6/7/19 ([address] [place] [address])   IMPRESSIONS:  Normal - Left ventricle: The cavity size was severely dilated. Wall     thickness was normal. Systolic function was severely reduced. The     estimated ejection fraction was in the range of 15% to 20%. Severe     diffuse hypokinesis. Contrast evaluation for LV thrombus not     performed. Doppler parameters are consistent with abnormal left     ventricular relaxation (grade 1 diastolic dysfunction). - Left atrium: The atrium was moderately dilated. - Right ventricle: The cavity size was normal. Systolic function was     mildly reduced. TAPSE: 1.5cm.   - Pulmonary arteries: Systolic pressure could not be accurately     estimated. - Inferior vena cava: The vessel was normal in size. The     respirophasic diameter changes were in the normal range (>= 50%),     consistent with normal central venous pressure. Assessment / Plan:  1. Acute on Chronic systolic CHF, class 3   Cedric and I had an extensive discussion about his noncompliance issues. I did tell him that hospice is an option if he does not want to comply with medical therapy. He states that he does want to keep follow-up appointments and be aggressive with his medical treatment. Continue IV Lasix for now with compression stockings. He does have some worsening of his renal function. If this persists he may need inotropic therapy in conjunction with the IV Lasix for volume removal.  I would hold his Entresto therapy for now given his worsening renal function. We can continue beta-blocker therapy. 2. CAD of native coronary arteries without angina  Continue statin and beta-blocker therapy for this. He has no symptoms suggestive of angina and no ischemic changes on his ECG. Okay to stop clopidogrel. 3. Hyperlipidemia with LDL goal <70 mg/dL    Continue statin therapy for his coronary artery disease. 4. ICD in place     5. LV thrombus   Continue Coumadin therapy for this. His INR is supratherapeutic.     6. Chronic Kidney Disease, stage 3  He has some acute worsening of his chronic kidney disease. This may be due in part to diuretic therapy.   I think if IV diuretics continue to worsen his renal function then he will need inotropic therapy in conjunction with this for adequate volume removal.

## 2021-11-15 NOTE — PROGRESS NOTES
Pt sitting in bed right now. No complaints of pain. States feeling better. Male purewick in place. Bed alarm is on. No needs right now.    Electronically signed by Holly Min RN on 11/15/2021 at 6:31 PM

## 2021-11-15 NOTE — PLAN OF CARE
Problem: Skin Integrity:  Goal: Will show no infection signs and symptoms  Description: Will show no infection signs and symptoms  Outcome: Ongoing  Note: Pt assessed for infection, VVS, WBC being monitored. Reviewed information with pt and family, pt verbalized understanding     Goal: Absence of new skin breakdown  Description: Absence of new skin breakdown  Outcome: Ongoing  Note: Tung score assessed. Patient able to turn self. Repositioned patient Q2H and assessed skin. Educated patient on importance of repositioning to prevent skin issues. Problem: Falls - Risk of:  Goal: Will remain free from falls  Description: Will remain free from falls  Outcome: Met This Shift  Note: Patient educated on fall prevention. Call light is within reach, bed locked in lowest position, personal items within reach, and bed alarm is on. Will round on patient per unit guidelines. Goal: Absence of physical injury  Description: Absence of physical injury  Outcome: Met This Shift  Note: Pt is free of injury. No injury noted. Fall precautions in place. Call light within reach. Will monitor.

## 2021-11-15 NOTE — PROGRESS NOTES
Occupational Therapy   Occupational Therapy Initial Assessment  Date: 11/15/2021   Patient Name: Sandi Mcgowan  MRN: [id number]     : 1955    Date of Service: 11/15/2021    Discharge Recommendations:  2-3 sessions per week, Patient would benefit from continued therapy after discharge, Home with Home health OT  OT Equipment Recommendations  Equipment Needed: Yes  Mobility Devices: ADL Assistive Devices  ADL Assistive Devices: Shower Chair with back    Sandi Mcgowan scored a 18/24 on the AM-PAC ADL Inpatient form. Current research shows that an AM-PAC score of 18 or greater is typically associated with a discharge to the patient's home setting. Based on the patient's AM-PAC score, and their current ADL deficits, it is recommended that the patient have 2-3 sessions per week of Occupational Therapy at d/c to increase the patient's independence. At this time, this patient demonstrates the endurance and safety to discharge home with [address] [place] [address] (home vs OP services) and a follow up treatment frequency of 2-3x/wk. Please see assessment section for further patient specific details. If patient discharges prior to next session this note will serve as a discharge summary. Please see below for the latest assessment towards goals. Assessment   Performance deficits / Impairments: Decreased functional mobility ; Decreased balance; Decreased ADL status; Decreased endurance; Decreased high-level IADLs; Decreased strength  Assessment: 62 yo male admitted  for BLE swelling with CHF exacerbation. PMH: CHF, DM, HTN. PTA, pt lives with son and reports independence with ADL, IADL, fx mobility no AD. Today, pt functioning below baseline most limited by weakness and decreased endurance. Pt requiring Min A toileting and Min/CGA for tx within stedy. Pt able to toleraet 2 min standing oral care with CGA. D/t decreased endurance pt requiring total A for fxl mobility further household distance this session. Pt with no SOB noted. Anticipate Min A LB and set up UB ADL based on balance, endurance, cognition, and PLOF. Cont acute OT to address above deficits and hoepful with improvements in CHF exac, pt endurance to improve and able to d.c home with OT 2-3x/week. Pt would benefit from shower chair for energy conservation and fall prevention  Treatment Diagnosis: impaired ADL/fxl mobility  Prognosis: Good  Decision Making: Low Complexity  OT Education: OT Role; Transfer Training; Plan of Care  Patient Education: benefits of shower chair for energy conservation and fall prevention  REQUIRES OT FOLLOW UP: Yes  Activity Tolerance  Activity Tolerance: Patient Tolerated treatment well; Patient limited by fatigue  Activity Tolerance: no SOB noted. Safety Devices  Safety Devices in place: Yes  Type of devices: Nurse notified; Gait belt; Call light within reach; Patient at risk for falls; Left in bed; Bed alarm in place      Patient Diagnosis(es): The primary encounter diagnosis was Acute on chronic systolic congestive heart failure ([address] Diagnoses of Acute respiratory failure with hypoxia (HCC) and Dyspnea on exertion were also pertinent to this visit. has a past medical history of Diabetes type 2, uncontrolled ([address] Diabetic nephropathy, ED (erectile dysfunction), Essential hypertension, benign, Hyperlipidemia, and Noncompliance. has a past surgical history that includes eye surgery. Treatment Diagnosis: impaired ADL/fxl mobility      Restrictions  Restrictions/Precautions  Restrictions/Precautions: Fall Risk  Position Activity Restriction  Other position/activity restrictions: O2nc, external catheter    Subjective   General  Chart Reviewed: Yes  Patient assessed for rehabilitation services?: Yes  Additional Pertinent Hx: 62 yo male admitted 11/13 for BLE swelling with CHF exacerbation.  PMH: CHF, DM, HTN,  Family / Caregiver Present: No  Referring Practitioner: Castro White MD  Diagnosis: CCHF exacerbation  Subjective  Subjective: Pt resting in recliner upon arrival and agreeable to OT eval. Pt with no complaints of pain- tired  General Comment  Comments: RN ok to see  Vital Signs  Temp: 97.6 °F (36.4 °C)  Temp Source: Oral  Pulse: 69  Heart Rate Source: Monitor  Resp: 14  BP: 115/78  BP Location: Right Arm  MAP (mmHg): 90  Oxygen Therapy  SpO2: 99 %  O2 Device: Nasal cannula    Social/Functional History  Social/Functional History  Lives With: Son  Type of Home: Apartment  Home Layout: One level  Home Access: Stairs to enter without rails  Entrance Stairs - Number of Steps: 2 steps down  Bathroom Shower/Tub: Tub/Shower unit  Bathroom Toilet: Standard  Bathroom Accessibility: Walker accessible  Home Equipment:  (No DME)  Receives Help From: Family  ADL Assistance: Independent  Homemaking Assistance:  (does what he can and his son manages heavy tasks)  Ambulation Assistance: Independent  Transfer Assistance: Independent       Objective   Vision: Within Functional Limits  Hearing: Within functional limits    Orientation  Overall Orientation Status: Within Normal Limits  Observation/Palpation  Edema: pronounced edema in distal LEs  Balance  Sitting Balance: Stand by assistance (recliner and EOB in prep for tx, and on stedy pads)  Standing Balance: Contact guard assistance (standing within stedy for oral care x2-3 min and PRN tx)  Functional Mobility  Assist Level: Dependent/Total  Functional Mobility Comments: stedy recliner>toilet>sink side>EOB d/t poor endurance with PT  Toilet Transfers  Toilet - Technique:  (stedy)  Equipment Used: Standard toilet  Toilet Transfer: Minimal assistance  ADL  Grooming: Contact guard assistance (sink side seated and standing oral care, and face washing)  Toileting: Minimal assistance (Assist for balance)  Additional Comments: Anticipate Min A LB and set up UB ADL based on balance, endurance, cognition, and PLOF  Tone RUE  RUE Tone: Normotonic  Tone LUE  LUE Tone: Normotonic  Coordination  Movements Are Fluid And Coordinated: Yes     Bed mobility  Sit to Supine: Contact guard assistance; Stand by assistance  Transfers  Sit to stand: Contact guard assistance  Stand to sit: Contact guard assistance  Transfer Comments: within stedy various times from recliner/toilet/stedy pads. Cues for safety     Cognition  Overall Cognitive Status: WFL        Sensation  Overall Sensation Status: WFL        LUE AROM (degrees)  LUE AROM : WFL  RUE AROM (degrees)  RUE AROM : WFL  LUE Strength  Gross LUE Strength: WFL  RUE Strength  Gross RUE Strength: WFL                Plan   Plan  Times per week: 3-5  Current Treatment Recommendations: Strengthening, Endurance Training, Patient/Caregiver Education & Training, Self-Care / ADL, Balance Training, Pain Management, Functional Mobility Training, Safety Education & Training, Positioning    AM-PAC Score        AM-[facility] Inpatient Daily Activity Raw Score: 18 (11/15/21 1619)  AM-PAC Inpatient ADL T-Scale Score : 38.66 (11/15/21 1619)  ADL Inpatient CMS 0-100% Score: 46.65 (11/15/21 1619)  ADL Inpatient CMS G-Code Modifier : CK (11/15/21 1619)    Goals  Short term goals  Time Frame for Short term goals: prior to d/c  Short term goal 1: toileting SUP  Short term goal 2: ADL tx SUP  Short term goal 3: LB dressing SUP  Short term goal 4: BUE exercises in all planes to improve strength and endurance for ADLs and tx  Short term goal 5:  Tolerate 5 min fxl standing task SUP  Patient Goals   Patient goals : go home       Therapy Time   Individual Concurrent Group Co-treatment   Time In 1450         Time Out 1530         Minutes 40         Timed Code Treatment Minutes: 25 Minutes (15 eval. 25 ADL)       IRMA Claudio, OTR/L

## 2021-11-15 NOTE — PROGRESS NOTES
4 Eyes Skin Assessment     NAME:  Morteza Mccurdy  YOB: 1955  MEDICAL RECORD NUMBER:  [id number]    The patient is being assess for  Admission    I agree that 2 RN's have performed a thorough Head to Toe Skin Assessment on the patient. ALL assessment sites listed below have been assessed. Areas assessed by both nurses:    Head, Face, Ears, Shoulders, Back, Chest, Arms, Elbows, Hands, Sacrum. Buttock, Coccyx, Ischium and Legs. Feet and Heels        Does the Patient have a Wound?  No noted wound(s)       Tung Prevention initiated:  Yes   Wound Care Orders initiated:  No    Pressure Injury (Stage 3,4, Unstageable, DTI, NWPT, and Complex wounds) if present place consult order under [de-identified] No    New and Established Ostomies if present place consult order under : No      Nurse 1 eSignature: Electronically signed by Safia Swanson RN on 11/15/21 at 1:43 AM EST    **SHARE this note so that the co-signing nurse is able to place an eSignature**    Nurse 2 eSignature: Electronically signed by Silke Vinson RN on 11/16/21 at 1:35 AM EST

## 2021-11-15 NOTE — PROGRESS NOTES
Clinical Pharmacy Note  Warfarin Consult    Tram Guevara is a 61 y.o. male receiving warfarin managed by pharmacy. Patient being bridged with none. Warfarin Indication: LV thrombus  Target INR range: 2-3   Dose prior to admission: 3.75mg on Thurs; 7.5mg all other days - GOING TO VERIFY DOSE TODAY WITH PATIENT    Current warfarin drug-drug interactions: none of significance    Recent Labs     11/14/21  1429 11/14/21  2052 11/15/21  0546   HGB 11.7*  --  11.1*   HCT 38.0*  --  34.9*   INR  --  3.42* 3.51*       Assessment/Plan:    INR 3.51 today. Hold warfarin tonight. Thank you for the consult. Will continue to follow. Shaka Servin PharmD.   11/15/2021  9:06 AM

## 2021-11-16 NOTE — PROGRESS NOTES
Report called to Teena Chiu on PCU. Pt to be transferred to 5110.    Electronically signed by Lindsey Medina RN on 11/16/2021 at 10:51 AM

## 2021-11-16 NOTE — CARE COORDINATION
[facility]  Diabetes Education   Progress Note       NAME:  Huey Manning RECORD NUMBER:  [id number]  AGE: 61 y.o. GENDER: male  : 1955  TODAY'S DATE:  2021    Subjective   Reason for Diabetic Education Evaluation and Assessment: general diabetes support    Alessandra Gu agrees to meet with me for diabetes education. He describes his diabetes medication taking as usually consistent with recent missed doses of Trulicity. He intends on resuming regular medication taking and dose not identify any barriers. \"I just got off track. \"        Visit Type: evaluation      Kyle Manning is a 61 y.o. male referred by:     [] Physician  [] Nursing  [] Chart Review   [x] Other: social work    PAST MEDICAL HISTORY        Diagnosis Date    Diabetes type 2, uncontrolled ([address] 2014    Diabetic nephropathy 2013    ED (erectile dysfunction) 2013    Essential hypertension, benign 2013    Hyperlipidemia 2013    Noncompliance 2014       PAST SURGICAL HISTORY    Past Surgical History:   Procedure Laterality Date    EYE SURGERY         FAMILY HISTORY    Family History   Problem Relation Age of Onset    Diabetes Mother     High Blood Pressure Mother     Diabetes Sister     Diabetes Brother        SOCIAL HISTORY    Social History     Tobacco Use    Smoking status: Former Smoker     Packs/day: 1.50     Years: 7.00     Pack years: 10.50     Quit date: 1981     Years since quittin.7    Smokeless tobacco: Never Used   Vaping Use    Vaping Use: Never used   Substance Use Topics    Alcohol use:  Yes     Alcohol/week: 1.0 standard drink     Types: 1 Cans of beer per week    Drug use: No       ALLERGIES    No Known Allergies    MEDICATIONS     influenza virus vaccine  0.5 mL IntraMUSCular Prior to discharge    insulin lispro  0-12 Units SubCUTAneous TID WC    insulin lispro  0-6 Units SubCUTAneous Nightly    sodium chloride flush  10 mL IntraVENous 2 times per day    atorvastatin  80 mg Oral Nightly    levothyroxine  25 mcg Oral Daily    [Held by provider] carvedilol  12.5 mg Oral BID WC    isosorbide mononitrate  30 mg Oral Daily    warfarin (COUMADIN) daily dosing (placeholder)   Other RX Placeholder       Objective        Patient Active Problem List   Diagnosis Code    ED (erectile dysfunction) N52.9    Essential hypertension, benign I10    Hyperlipidemia E78.5    Diabetic nephropathy ([facility] [address] [facility] E11.21    Diabetes type 2, uncontrolled ([facility] [address] E11.65    Noncompliance Z91.19    Elevated blood uric acid level E79.0    Coronary artery disease involving native coronary artery I25.10    Elevated PSA R97.20    CHF (congestive heart failure), NYHA class I, acute on chronic, combined ([facility]) I50.43        /70   Pulse 75   Temp 97.7 °F (36.5 °C) (Oral)   Resp 16   Ht 5' 6\" (1.676 m)   Wt 171 lb 4.8 oz (77.7 kg)   SpO2 94%   BMI 27.65 kg/m²     HgBA1c:    Lab Results   Component Value Date    LABA1C 7.4 02/12/2019       Recent Labs     11/15/21  1609 11/15/21  2131 11/16/21  0605 11/16/21  1118   POCGLU 190* 167* 152* 198*     BUN/Creatinine:    Lab Results   Component Value Date    BUN 49 11/16/2021    CREATININE 2.2 11/16/2021       Assessment        Diabetes Management and Education    Does the patient have a Primary Care Physician? No  Looking for a new PCP. Does the patient require new medication instruction? TBD - anticipate resuming Trulicity at discharge. Person responsible for administration of Insulin/Medication:        [x] Self     [] Caregiver       [] Spouse       [] Other Family Member   []  Other      Does the patient/caregiver monitor Blood Glucoses? Yes  Reviewed glycemic control targets, testing frequency and when to call PCP.      Level of patient/caregiver understanding able to:        [x] Verbalized Understanding   [] Demonstrated Understanding       [] Teach Back       [] Needs Reinforcement     []  Other:      Does the patient/caregiver follow a Meal Plan? He identifies this as an area of self care concern. He has multiple dietary considerations - low carb, low sodium, fluid restrictions and consistent Vitamin K   Reviewed importance of eating three meals per day and plate method for consistent carb intake. Identified common carbs. Reviewed sodium restrictions . Practiced label reading. Vitamin K references provided. Level of patient/caregiver understanding able to:       [] Verbalized Understanding   [] Demonstrated Understanding       [] Teach Back       [x] Needs Reinforcement     []  Other:      Does the patient/caregiver understand S/S of Hypoglycemia? Yes. No recent low blood sugars. Reviewed symptoms, prevention and treatment. Level of patient/caregiver understanding able to:       [x] Verbalized Understanding   [] Demonstrated Understanding       [] Teach Back       [] Needs Reinforcement     []  Other:                    Does the patient/caregiver understand S/S of Hyperglycemia? Yes    Reviewed symptoms, prevention and treatment. Level of patient/caregiver understanding able to:      [x] Verbalized Understanding   [] Demonstrated Understanding       [] Teach Back       [] Needs Reinforcement     []  Other:           Plan        Ongoing diabetes education and blood glucose monitoring.       The following educational and support materials were provided:  · My contact information  · Nutrition in the [employer] Resources   · Academy of Nutrition and Dietetics handout - Vitamin K content of Foods    · The Diabetes Education Program:  Planning Healthy Meals   · Academy of Nutrition and Dietetics handout - Carbohydrate Counting for People with Diabetes                                           Discharge Plan:  Discharge needs: no prescription needs identified     Teaching Time Diabetes Education:  30 minutes     Electronically signed by Ivonne Moarles on 11/16/2021 at 3:36 PM

## 2021-11-16 NOTE — PROGRESS NOTES
4 Eyes Skin Assessment     NAME:  Emilio Arellano  YOB: 1955  MEDICAL RECORD NUMBER:  [id number]    The patient is being assess for  Transfer to New Unit    I agree that 2 RN's have performed a thorough Head to Toe Skin Assessment on the patient. ALL assessment sites listed below have been assessed. Areas assessed by both nurses:    Head, Face, Ears, Shoulders, Back, Chest, Arms, Elbows, Hands, Sacrum. Buttock, Coccyx, Ischium and Legs. Feet and Heels        Does the Patient have a Wound?  No noted wound(s)       Tung Prevention initiated:  No   Wound Care Orders initiated:  No    Pressure Injury (Stage 3,4, Unstageable, DTI, NWPT, and Complex wounds) if present place consult order under [de-identified] No    New and Established Ostomies if present place consult order under : No      Nurse 1 eSignature: Electronically signed by Lj Larios RN on 11/16/21 at 2:09 PM EST    **SHARE this note so that the co-signing nurse is able to place an eSignature**    Nurse 2 eSignature: Electronically signed by Miles Toro RN on 11/16/21 at 2:09 PM EST

## 2021-11-16 NOTE — PROGRESS NOTES
Occupational Therapy  Facility/Department: [address] ORTHOPEDICS  Daily Treatment Note  NAME: Vicente Cabrera  : 1955  MRN: [id number]    Date of Service: 2021    Discharge Recommendations:  2-3 sessions per week, Patient would benefit from continued therapy after discharge, Home with Home health OT  OT Equipment Recommendations  ADL Assistive Devices: Shower Chair with back     Vicente Cabrera scored a 19/24 on the AM-PAC ADL Inpatient form. Current research shows that an AM-PAC score of 18 or greater is typically associated with a discharge to the patient's home setting. Based on the patient's AM-PAC score, and their current ADL deficits, it is recommended that the patient have 2-3 sessions per week of Occupational Therapy at d/c to increase the patient's independence. At this time, this patient demonstrates the endurance and safety to discharge home with home services and a follow up treatment frequency of 2-3x/wk. Please see assessment section for further patient specific details. If patient discharges prior to next session this note will serve as a discharge summary. Please see below for the latest assessment towards goals. Assessment   Performance deficits / Impairments: Decreased functional mobility ; Decreased balance; Decreased ADL status; Decreased endurance; Decreased high-level IADLs; Decreased strength  Assessment: Patient demonstrating improving activity tolerance from previous session, tolerating bathroom mobility at RW level with no noted fatigue or SOB. LB dressing and toileting tasks completed with CGA. Plans to return home with assist of family when medically appropriate, would continue to benefit from OT services after d/c.   Treatment Diagnosis: impaired ADL/fxl mobility  Prognosis: Good  OT Education: OT Role; Transfer Training; Plan of Care; ADL Adaptive Strategies  Patient Education: verb understanding  REQUIRES OT FOLLOW UP: Yes  Activity Tolerance  Activity Tolerance: Patient Tolerated treatment well  Activity Tolerance: no LOB or SOB noted, on NC throughout  Safety Devices  Type of devices: Nurse notified; Gait belt; Call light within reach; Patient at risk for falls; Left in bed; Bed alarm in place         Patient Diagnosis(es): The primary encounter diagnosis was Acute on chronic systolic congestive heart failure ([facility] [address] [address] Diagnoses of Acute respiratory failure with hypoxia (HCC) and Dyspnea on exertion were also pertinent to this visit. has a past medical history of Diabetes type 2, uncontrolled ([facility] [address] Diabetic nephropathy, ED (erectile dysfunction), Essential hypertension, benign, Hyperlipidemia, and Noncompliance. has a past surgical history that includes eye surgery. Restrictions  Restrictions/Precautions  Restrictions/Precautions: Fall Risk  Position Activity Restriction  Other position/activity restrictions: O2nc, external catheter  Subjective   General  Chart Reviewed: Yes  Patient assessed for rehabilitation services?: Yes  Additional Pertinent Hx: 60 yo male admitted 11/13 for BLE swelling with CHF exacerbation. PMH: CHF, DM, HTN,  Family / Caregiver Present: No  Referring Practitioner: Lauryn Menon MD  Diagnosis: CCHF exacerbation  Subjective  Subjective: Patient semi supine in bed upon arrival, agreeable to OT services.   General Comment  Comments: Rn cleared to participate  Vital Signs  Patient Currently in Pain: Denies   Orientation  Orientation  Overall Orientation Status: Within Functional Limits  Objective    ADL  Grooming: Contact guard assistance (-SBA hand hygiene in stance at sink)  UE Dressing: Setup (don gown)  LE Dressing: Contact guard assistance (don briefs)  Toileting: Contact guard assistance        Functional Mobility  Functional - Mobility Device: Rolling Walker  Activity: To/from bathroom  Assist Level: Contact guard assistance  Toilet Transfers  Toilet - Technique: Ambulating  Equipment Used: Standard toilet (with use of grab bar)  Toilet Transfer: Contact guard assistance (v/c for safe hand placement)  Bed mobility  Supine to Sit: Supervision  Transfers  Sit to stand: Stand by assistance (v/c for safe hand placement)  Stand to sit: Stand by assistance (v/c for safe hand placement)  Cognition  Overall Cognitive Status: [facility]     Plan   Plan  Times per week: 3-5  Current Treatment Recommendations: Strengthening, Endurance Training, Patient/Caregiver Education & Training, Self-Care / ADL, Balance Training, Pain Management, Functional Mobility Training, Safety Education & Training, Positioning    AM-PAC Score        AM-[facility] Inpatient Daily Activity Raw Score: 19 (11/16/21 0850)  AM-PAC Inpatient ADL T-Scale Score : 40.22 (11/16/21 0850)  ADL Inpatient CMS 0-100% Score: 42.8 (11/16/21 0850)  ADL Inpatient CMS G-Code Modifier : CK (11/16/21 0850)    Goals  Short term goals  Time Frame for Short term goals: prior to d/c  Short term goal 1: toileting SUP - not met,  ongoing  Short term goal 2: ADL tx SUP - not met,  ongoing  Short term goal 3: LB dressing SUP - not met, ongoing  Short term goal 4: BUE exercises in all planes to improve strength and endurance for ADLs and tx - not met,  ongoing  Short term goal 5:  Tolerate 5 min fxl standing task SUP - not met,  ongoing  Patient Goals   Patient goals : go home       Therapy Time   Individual Concurrent Group Co-treatment   Time In 0757         Time Out 0841         Minutes [facility], OTR/L #[id number]

## 2021-11-16 NOTE — PROGRESS NOTES
Hospitalist Progress Note      PCP: No primary care provider on file. Chief Complaint. Patient is a 71-year-old male with past medical history of chronic systolic heart failure, LV thrombus, CAD, hyperlipidemia who presented to hospital for shortness of breath bilateral lower extremity swelling. According to the patient he has not been taking his diuretic medication for past 3 weeks and his lower extremity swelling has been getting worse, he also has worsening shortness of breath, he has not been able to lie down flat and also has exertional dyspnea. Otherwise denied fevers chills nausea vomiting diarrhea constipation dysuria. Date of Admission: 11/14/2021    Subjective:   Viry Blake, denies chest pain, nausea, vomiting, shortness of breath, fever or chills.  mention feels overall better    Medications:  Reviewed    Infusion Medications    furosemide (LASIX) 1mg/ml infusion 10 mg/hr (11/16/21 1826)    milrinone 0.375 mcg/kg/min (11/16/21 1826)    dextrose      sodium chloride       Scheduled Medications    influenza virus vaccine  0.5 mL IntraMUSCular Prior to discharge    insulin lispro  0-12 Units SubCUTAneous TID WC    insulin lispro  0-6 Units SubCUTAneous Nightly    sodium chloride flush  10 mL IntraVENous 2 times per day    atorvastatin  80 mg Oral Nightly    levothyroxine  25 mcg Oral Daily    [Held by provider] carvedilol  12.5 mg Oral BID WC    isosorbide mononitrate  30 mg Oral Daily    warfarin (COUMADIN) daily dosing (placeholder)   Other RX Placeholder     PRN Meds: polyethylene glycol, glucose, dextrose, glucagon (rDNA), dextrose, sodium chloride flush, sodium chloride, potassium chloride, magnesium sulfate, ondansetron **OR** ondansetron, magnesium hydroxide, acetaminophen **OR** acetaminophen, nitroGLYCERIN      Intake/Output Summary (Last 24 hours) at 11/16/2021 1853  Last data filed at 11/16/2021 1826  Gross per 24 hour   Intake 1410.44 ml   Output 150 ml   Net 1260.44 ml Physical Exam Performed:    /73   Pulse 80   Temp 97.3 °F (36.3 °C) (Oral)   Resp 18   Ht 5' 6\" (1.676 m)   Wt 171 lb 4.8 oz (77.7 kg)   SpO2 93%   BMI 27.65 kg/m²     General appearance: NAD   HEENT:  Conjunctivae/corneas clear. Neck: Supple, with full range of motion. Respiratory:  Normal respiratory effort. Clear to auscultation, bilaterally without Rales/Wheezes/Rhonchi. Cardiovascular: Regular rate and rhythm with normal S1/S2 without murmurs or rubs  Abdomen: Soft, non-tender, non-distended, normal bowel sounds. Musculoskeletal: +1 edema bilaterally  Neurologic:  without any focal sensory/motor deficits. grossly non-focal.  Psychiatric: Alert and oriented, Normal mood  Peripheral Pulses: +2 palpable, equal bilaterally       Labs:   Recent Labs     11/14/21  1429 11/15/21  0546 11/16/21  0517   WBC 3.5* 3.4* 3.8*   HGB 11.7* 11.1* 9.7*   HCT 38.0* 34.9* 30.2*    146 128*     Recent Labs     11/14/21  1429 11/15/21  0546 11/16/21  0517    143 140   K 4.2 4.5 3.9    105 103   CO2 24 26 25   BUN 36* 43* 49*   CREATININE 1.8* 2.1* 2.2*   CALCIUM 8.9 8.8 8.3     No results for input(s): AST, ALT, BILIDIR, BILITOT, ALKPHOS in the last 72 hours. Recent Labs     11/14/21  2052 11/15/21  0546 11/16/21  0517   INR 3.42* 3.51* 4.08*     Recent Labs     11/14/21 1429   TROPONINI 0.05*       Urinalysis:      Lab Results   Component Value Date    NITRU Negative 08/30/2017    WBCUA 1 06/12/2019    RBCUA 1 06/12/2019    BLOODU Negative 08/30/2017    SPECGRAV 1.018 08/30/2017    GLUCOSEU Negative 08/30/2017       Radiology:  XR CHEST (2 VW)   Final Result   The cardiac silhouette is enlarged, with somewhat globular configuration   which can be seen in pericardial effusion. No focal airspace disease or   pulmonary edema.                Assessment/Plan:    Active Hospital Problems    Diagnosis     CHF (congestive heart failure), NYHA class I, acute on chronic, combined ([facility] [I50.43] Patient is a 60-year-old male with past medical history of chronic systolic heart failure, LV thrombus, CAD, hyperlipidemia who presented to hospital for shortness of breath bilateral lower extremity swelling. According to the patient he has not been taking his diuretic medication for past 3 weeks and his lower extremity swelling has been getting worse, he also has worsening shortness of breath, he has not been able to lie down flat and also has exertional dyspnea. Otherwise denied fevers chills nausea vomiting diarrhea constipation dysuria.     Assessment  Acute on chronic systolic CHF  CAD  Hyperlipidemia  History of LV thrombus     Plan  Start IV Lasix 60 twice daily, consult cardiology, will order echocardiogram to monitor progression of LV thrombus - pending, resume home Coumadin  Started on IV milrinon,   Strict I's and O's, low-salt diet  Resume home medications  DVT prophylaxis-on Coumadin  Diet: ADULT DIET; Regular;  Low Sodium (2 gm); 1500 ml  Code Status: Full Code    PT/OT Eval Status: ordered    Dispo - continue IV milrinon, Cr worsening, monitor BMP, consulted neprhology    Andie Franks MD

## 2021-11-16 NOTE — PROGRESS NOTES
PT AAO x4 per this shift. Pt able to ambulate with walker and steady gait. Pt feli pain. VSS with 2 L of oxygen per nasal cannula. Pt feli SOB. Pt tolerating Po fluids. Pt education on diet and fluids restriction. Pt verbalized understanding. No further needs voiced per this shift. Fall precautions in place. Bed alarm on. Call light within reach. Will continue to round.  Electronically signed by Cheri Lemus RN on 11/16/2021 at 3:58 AM

## 2021-11-16 NOTE — PROGRESS NOTES
Patient is alert & oriented x4, x1 assist with walker, 2/4 bed rails up, bed in lowest position, fall precautions in place, call light within reach. Morning medications given. Morning assessment complete. Will cont to monitor and reassess.   Electronically signed by Yesi Morales RN on 11/16/2021 at 9:13 AM

## 2021-11-16 NOTE — PROGRESS NOTES
Pt brought to room 5110 from 3W by wheelchair. Pt oriented to room and handed call light. Pts belongings with him. No questions at this time.

## 2021-11-16 NOTE — PROGRESS NOTES
[facility], [place]   Daily Progress Note      Admit Date:  11/14/2021    Reason for follow up visit: CHF    CC: \"I'm feeling a little better. 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 He was to have a RHC performed last week, however, the patient failed to show for the procedure. Given his SOB and weight gain his family brought him to ED and he was admitted for CHF. He has been receiving IV lasix intermittently. Interval History:  Pt. seen and examined; records reviewed  Remains on O2. Wt today 168# (he states baseline is ~ 145#)  Output down despite IV lasix  Remains SOB but with some improvement  Denies chest pain    Subjective:  Pt with no acute overnight cardiac events. Denies chest pain, cough, palpitations or dizziness  + SOB, + generalized edema    Review of Systems:   · Constitutional: no unanticipated weight loss. There's been no change in energy level, sleep pattern, or activity level. No fevers, chills. · Eyes: No visual changes or diplopia. No scleral icterus. · ENT: No Headaches, hearing loss or vertigo. No mouth sores or sore throat. · Cardiovascular: as reviewed in HPI + generalized edema  · Respiratory: + SOB with minimal exertion; + orthopnea  · Gastrointestinal: No abdominal pain, appetite loss, blood in stools. No change in bowel or bladder habits. · Genitourinary: No dysuria, trouble voiding, or hematuria. · Musculoskeletal:  No gait disturbance, no joint complaints. · Integumentary: No rash or pruritis. · Neurological: No headache, diplopia, change in muscle strength, numbness or tingling. · Psychiatric: No anxiety or depression. · Endocrine: No temperature intolerance. No excessive thirst, fluid intake, or urination. No tremor.   · Hematologic/Lymphatic: No abnormal bruising or bleeding, blood clots or swollen lymph nodes. · Allergic/Immunologic: No nasal congestion or hives. Objective:   /82   Pulse 75   Temp 97.9 °F (36.6 °C) (Oral)   Resp 15   Ht 5' 6.5\" (1.689 m)   Wt 168 lb 6.9 oz (76.4 kg)   SpO2 98%   BMI 26.78 kg/m²     Intake/Output Summary (Last 24 hours) at 11/16/2021 0851  Last data filed at 11/16/2021 0512  Gross per 24 hour   Intake 840 ml   Output 1050 ml   Net -210 ml     Wt Readings from Last 3 Encounters:   11/16/21 168 lb 6.9 oz (76.4 kg)   10/22/21 166 lb (75.3 kg)   06/12/19 161 lb (73 kg)       Physical Exam:  General: Awake, alert, and oriented X4. Up in chair. + SOB and on O2  Skin:  Warm and dry. No new appearing rashes or lesions. Neck:  Supple. + marked JVD (up to angle of the jaw). No carotid bruit  Chest:  Lungs with rales in posterior bases; few scattered coarse breath sounds  Cardiovascular:  RRR. Normal S1 and S2. + S3; I/VI systolic murmur   Abdomen: distended; + bowel sounds; + hepatomegaly   Extremities:  + generalized pitting edema to all extremities; presacral edema; 1+ bilateral DP/PT pulses.     Medications:    insulin lispro  0-12 Units SubCUTAneous TID     insulin lispro  0-6 Units SubCUTAneous Nightly    furosemide  60 mg IntraVENous BID    sodium chloride flush  10 mL IntraVENous 2 times per day    atorvastatin  80 mg Oral Nightly    levothyroxine  25 mcg Oral Daily    carvedilol  12.5 mg Oral BID     isosorbide mononitrate  30 mg Oral Daily    warfarin (COUMADIN) daily dosing (placeholder)   Other RX Placeholder      dextrose      sodium chloride       glucose, dextrose, glucagon (rDNA), dextrose, sodium chloride flush, sodium chloride, potassium chloride, magnesium sulfate, ondansetron **OR** ondansetron, magnesium hydroxide, acetaminophen **OR** acetaminophen, nitroGLYCERIN, perflutren lipid microspheres    Lab Data:  CBC:   Recent Labs     11/14/21  1429 11/15/21  0546 11/16/21  0517   WBC 3.5* 3.4* 3.8*   HGB 11.7* 11.1* 9.7*  146 128*     BMP:    Recent Labs     11/14/21  1429 11/15/21  0546 11/16/21  0517    143 140   K 4.2 4.5 3.9   CO2 24 26 25   BUN 36* 43* 49*   CREATININE 1.8* 2.1* 2.2*     LIVR: No results for input(s): AST, ALT in the last 72 hours. INR:    Recent Labs     11/14/21  2052 11/15/21  0546 11/16/21  0517   INR 3.42* 3.51* 4.08*     Results for Effie Mccoy (MRN [id number]) as of 11/16/2021 08:55   Ref. Range 11/14/2021 14:29   Pro-BNP Latest Ref Range: 0 - 124 pg/mL 65,067 (H)   Troponin Latest Ref Range: <0.01 ng/mL 0.05 (H)     ECG 11/14/2021:  Normal sinus rhythm  Low voltage in the limb leads  Anterolateral infarct , age undetermined  T wave abnormality, consider lateral ischemia  Abnormal ECG  No previous ECGs available    Echo 11/16/2021: pending    CXR 11/14/2021:  The cardiac silhouette is enlarged, with somewhat globular configuration   which can be seen in pericardial effusion.  No focal airspace disease or   pulmonary edema. Echo 7/2/2020 [facility]):  - Left ventricle: The cavity size is severely dilated. Wall thickness is normal. Systolic function     was severely reduced. The estimated ejection fraction was in the range of 10% to 15%. Diffuse     hypokinesis. Doppler parameters are consistent with restrictive physiology, indicative of     decreased left ventricular diastolic compliance and/or increased left atrial pressure. There was     spontaneous echo contrast, indicative of stasis. - Mitral valve: Moderate regurgitation.   - Left atrium: The atrium is severely dilated. - Right ventricle: Pacer wire or catheter noted in right ventricle. Systolic function was mildly     reduced. - Right atrium: The atrium is mildly dilated. - Atrial septum: The septum bowed from left to right, consistent with increased left atrial     pressure.    - Tricuspid valve: Mild-moderate regurgitation.   - Pulmonary arteries: Systolic pressure was severely increased, estimated to be 65mm Hg assuming   that the right atrial pressure was 15 mmHg. - Inferior vena cava: The vessel was dilated. The respirophasic diameter changes were blunted (<     50%), consistent with elevated central venous pressure. [address] 1/31/2020  IMPRESSIONS:  Normal left and right filling pressures with   preserved CO/CI. SUMMARY:   Hemodynamic:   RA 2   RV 28/3   PA 28/10 mean 16   PCWP 4   Blu 4.5/2.5   TD 4.5/2.5   PASat 66%   RECOMMENDATIONS:  Further management per [facility]. Echo 1/9/2020 ():  - Left ventricle: The cavity size is severely dilated. Wall     thickness was increased in a pattern of mild LVH. Systolic     function was severely reduced. The estimated ejection fraction     was in the range of 10% to 15%. Diffuse hypokinesis. No contrast     administered - unable to evaluate for LV thrombus. The     longitudinal strain is -2.47% (markedly reduced). - Aortic valve: Mild thickening.   - Mitral valve: Leaflet tenting. Mild to moderate regurgitation.   - Left atrium: The atrium is severely dilated. - Right ventricle: The cavity size is normal. Pacer wire or ICD     noted in right ventricle. Systolic function was mildly reduced by     objective interpretation. TAPSE: 1.6cm. Tricuspid annular systolic     velocity: 8cm/s.   - Tricuspid valve: Mild-moderate regurgitation.   - Pulmonary arteries: Systolic pressure could not be accurately     estimated, however it is elevated at at least 51 mm Hg, assuming     an RA pressure of 15 mm Hg. - Inferior vena cava: The vessel was dilated. The respirophasic     diameter changes were blunted (< 50%), consistent with elevated     central venous pressure. - Pericardium, extracardiac: A trivial pericardial effusion is     identified. 12/10/2018 LHC/PCI and RHC:  IMPRESSIONS:  Selective angiography of LMCA showing critical mid LAD   lesion. Successful PCI with 3.5 x 18 mm MIKE.  Normal cardiac output, m   ildly increased pulmonary pressures with mildly increased filling pre   ssure on inotropic support. SUMMARY:     1. Wedge pressure in the pulmonary capillaries is mildly elevated. 2. Coronary arteries: The left main is unusually long and      trifurcates into the LAD, a ramus intermedius, and the left      circumflex. The left anterior descending gives rise to 2      diagonals and 3 septals and wraps around the apex. The left      circumflex gives rise to 2 obtuse marginals and no      posterolaterals. The first obtuse marginal has a high origin. 3. LAD: Mid-vessel lesion: There is a discrete, 99% stenosis. The      distal vessel supplies a moderate-sized vascular territory. The      lesion is a likely culprit for the patient's clinical      presentation. The lesion was stented (see 1st lesion      intervention). Following intervention, the lesion has a residual      stenosis of 0% and MUKUND grade 3 flow (brisk flow). 4. AV was not crossed due to known apical LV thrombus. 5. RA 6      RV 48/4      PA 50/20 (28)      W 18.   6. Blu CO 5.2 Blu CI 2.9 on Milrinone 0.25 mcg/mg/min. 12/4/2018 Cardiac MRI ():  Findings are consistent with a severe, ischemic cardiomyopathy.  There is akinesis of the apical segments with an associated small, apical thrombus. The left ventricle is severely dilated with severely reduced systolic function. The right ventricle is mildly dilated with moderately decreased systolic function. The left atrium is severely dilated.  There is mild to moderate mitral regurgitation. The right atrium is mildly dilated.  There is moderate tricuspid regurgitation. The descending aorta is dilated. The pulmonary artery is dilated.  The IVC and coronary sinus are dilated. Please see noncardiac findings below. FINDINGS:     1. The left ventricular size is severely dilated. The left ventricular ejection fraction is 19 % by Smallwood's method. Global left ventricular function is severely decreased.  There is akinesis of the apical segments, severe hypokinesis of the basal and mid septal and basal and mid anterior segments and moderate hypokinesis of the lateral segments and mid inferior segment and mild hypokinesis of the basal inferior segment. The left ventricular mass is moderately increased. There appears to be a small intracavitary thrombus (6.5 x 5 mm). 2. There is no evidence of increased iron deposition within the myocardium (T2*myocardium = 51 msec; if < 20 msec, suggestive of iron-overloading of the myocardium).  There is no evidence of myocardial edema. 3. The right ventricular size is mildly dilated. Global right ventricular function is moderately decreased. 4. Left atrial size is severely enlarged. Right atrial size is mildly enlarged.   The Qp/Qs is 1.0 by phase contrast imaging and is consistent with no evidence of shunt. 5. The calculated aortic regurgitant fraction is 1.55 %. There is moderate mitral regurgitation. The calculated mitral regurgitant fraction is 29.02 %. There is moderate tricuspid regurgitation. The calculated tricuspid regurgitant fraction is 37.1 %. The calculated pulmonic regurgitant fraction is 2.39 %. 6. There is a small pericardial effusion. 7. The descending aorta is dilated measuring 27.2 mm. 8. The main pulmonary artery is dilated measuring 34.0 mm.  The IVC and coronary sinus are dilated. 9. There are large left and moderate right pleural effusions.  Basilar atelectasis is associated with the effusions.  There is evidence of mild pulmonary edema. Telemetry: Sinus rhythm with occasional PVC's    Assessment/Plan:    1.  Acute on chronic systolic HF  -NYHA class III-IV; last LVEF 10-15%  -repeat echo pending  -S/P ICD placement in 2019  -continue carvedilol  -Entresto on hold d/t elevated Cr (will plan to resume prior to discharge)  -no aldactone or SGLT2 inhibitor d/t decreased renal function  -currently he is massively fluid overloaded (wt ~ 20# up)  -will start Milrinone and change intermittent lasix to continuous infusion  -low sodium diet and fluid restriction  -discussed need for compliance and follow up    2. LV thrombus  -on warfarin  -follow up echo    3. SONAL on CKD , stage III  -suspect worsened by fluid retention  -monitor closely with inotropic therapy and IV lasix    4. CAD of native coronary arteries without angina  -prior PCI of LAD  -no angina  -continue medical management with statin, BB  -not on ASA d/t warfarin    5. S/P ICD    6. Hyperlipidemia with LDL goal , 70  -on high intensity statin    7. Anemia  -check iron studies  -consider IV iron infusion    Discussed with Dr. Georgette Min: will place on Milrinone and continuous IV lasix to enhance diuresis. Follow up echo.      Electronically signed by DAHIANA Rios CNP on 11/16/2021 at 8:51 AM

## 2021-11-16 NOTE — PLAN OF CARE
Problem: Skin Integrity:  Goal: Will show no infection signs and symptoms  Description: Will show no infection signs and symptoms  Outcome: Ongoing  Note: Pt assessed for infection,  VVS, WBC being monitored. Reviewed information with pt and family, pt verbalized understanding     Goal: Absence of new skin breakdown  Description: Absence of new skin breakdown  Outcome: Ongoing  Note: Tung score assessed. Patient able to ambulate and turn self. Repositioned patient Q2H and assessed skin. Educated patient on importance of repositioning to prevent skin issues. Problem: Falls - Risk of:  Goal: Will remain free from falls  Description: Will remain free from falls  Outcome: Met This Shift  Note: Patient educated on fall prevention. Call light is within reach, bed locked in lowest position, personal items within reach, and bed alarm is on. Will round on patient per unit guidelines. Goal: Absence of physical injury  Description: Absence of physical injury  Outcome: Met This Shift  Note: Pt is free of injury. No injury noted. Fall precautions in place. Call light within reach. Will monitor. Problem: OXYGENATION/RESPIRATORY FUNCTION  Goal: Patient will maintain patent airway  Outcome: Ongoing  Note: Patient remains free of significant airway secretions. Patient able to effectively cough and clear any secretions that need cleared. Will continue to monitor patient throughout shift. Problem: HEMODYNAMIC STATUS  Goal: Patient has stable vital signs and fluid balance  Outcome: Ongoing  Note: Patient has stable vital signs and fluid balance at this time. Will continue to monitor patient throughout shift and provide care for unstable vital signs or poor fluid balance as needed. Problem: FLUID AND ELECTROLYTE IMBALANCE  Goal: Fluid and electrolyte balance are achieved/maintained  Outcome: Ongoing  Note: Patient monitored for fluid and electrolyte imbalance throughout the day by use of daily labs.

## 2021-11-16 NOTE — PLAN OF CARE
Problem: Skin Integrity:  Goal: Will show no infection signs and symptoms  Description: Will show no infection signs and symptoms  11/16/2021 1407 by Sarah Lazaro RN  Outcome: Ongoing  11/16/2021 0339 by Flaca Ferreira RN  Outcome: Ongoing  Note: Pt assessed for infection,  VVS, WBC being monitored. Reviewed information with pt and family, pt verbalized understanding     Goal: Absence of new skin breakdown  Description: Absence of new skin breakdown  11/16/2021 1407 by Sarah Lazaro RN  Outcome: Ongoing  11/16/2021 0339 by Flaca Ferreira RN  Outcome: Ongoing  Note: Tung score assessed. Patient able to ambulate and turn self. Repositioned patient Q2H and assessed skin. Educated patient on importance of repositioning to prevent skin issues. Problem: Falls - Risk of:  Goal: Will remain free from falls  Description: Will remain free from falls  11/16/2021 1407 by Sarah Lazaro RN  Outcome: Ongoing  11/16/2021 0339 by Flaca Ferreira RN  Outcome: Met This Shift  Note: Patient educated on fall prevention. Call light is within reach, bed locked in lowest position, personal items within reach, and bed alarm is on. Will round on patient per unit guidelines. Goal: Absence of physical injury  Description: Absence of physical injury  11/16/2021 1407 by Sarah Lazaro RN  Outcome: Ongoing  11/16/2021 0339 by Flaca Ferreira RN  Outcome: Met This Shift  Note: Pt is free of injury. No injury noted. Fall precautions in place. Call light within reach. Will monitor.

## 2021-11-16 NOTE — PROGRESS NOTES
Clinical Pharmacy Note  Warfarin Consult    Corinna Enriquez is a 61 y.o. male receiving warfarin managed by pharmacy. Patient being bridged with none. Warfarin Indication: LV thrombus  Target INR range: 2-3   Dose prior to admission: 3.75mg on Thurs; 7.5mg all other days - verified with patient on 11/15/21    Current warfarin drug-drug interactions: none of significance    Recent Labs     11/14/21  1429 11/14/21  2052 11/15/21  0546 11/16/21  0517   HGB 11.7*  --  11.1* 9.7*   HCT 38.0*  --  34.9* 30.2*   INR  --  3.42* 3.51* 4.08*       Assessment/Plan:    INR 4.08 today. Hold warfarin tonight. Thank you for the consult. Will continue to follow.     Lucas Garcia [facility], PharmD   11/16/2021 7:55 AM

## 2021-11-16 NOTE — CONSULTS
[facility]  HEART FAILURE PROGRAM      NAME:  Huey Manning RECORD NUMBER:  [id number]  AGE: 61 y.o. GENDER: male  : 1955  TODAY'S DATE:  2021    Subjective:     VISIT TYPE: evaluation     ADMITTING PHYSICIAN:  Paz Bay MD    PAST MEDICAL HISTORY        Diagnosis Date    Diabetes type 2, uncontrolled ([facility] 2014    Diabetic nephropathy 2013    ED (erectile dysfunction) 2013    Essential hypertension, benign 2013    Hyperlipidemia 2013    Noncompliance 2014       SOCIAL HISTORY    Social History     Tobacco Use    Smoking status: Former Smoker     Packs/day: 1.50     Years: 7.00     Pack years: 10.50     Quit date: 1981     Years since quittin.7    Smokeless tobacco: Never Used   Vaping Use    Vaping Use: Never used   Substance Use Topics    Alcohol use:  Yes     Alcohol/week: 1.0 standard drink     Types: 1 Cans of beer per week    Drug use: No       ALLERGIES    No Known Allergies    MEDICATIONS  Scheduled Meds:   influenza virus vaccine  0.5 mL IntraMUSCular Prior to discharge    insulin lispro  0-12 Units SubCUTAneous TID WC    insulin lispro  0-6 Units SubCUTAneous Nightly    sodium chloride flush  10 mL IntraVENous 2 times per day    atorvastatin  80 mg Oral Nightly    levothyroxine  25 mcg Oral Daily    [Held by provider] carvedilol  12.5 mg Oral BID WC    isosorbide mononitrate  30 mg Oral Daily    warfarin (COUMADIN) daily dosing (placeholder)   Other RX Placeholder       ADMIT DATE: 2021      Objective:     ADMISSION DIAGNOSIS:   Dyspnea on exertion [R06.00]  Acute on chronic systolic congestive heart failure (HCC) [I50.23]  Acute respiratory failure with hypoxia (HCC) [J96.01]  CHF (congestive heart failure), NYHA class I, acute on chronic, combined ([facility] [I50.43]     /73   Pulse 80   Temp 97.3 °F (36.3 °C) (Oral)   Resp 18   Ht 5' 6\" (1.676 m)   Wt 171 lb 4.8 oz (77.7 kg)   SpO2 93% BMI 27.65 kg/m²     ADMIT:  Weight: 175 lb 11.3 oz (79.7 kg)    TODAY: Weight: 171 lb 4.8 oz (77.7 kg)    Wt Readings from Last 10 Encounters:   11/16/21 171 lb 4.8 oz (77.7 kg)   10/22/21 166 lb (75.3 kg)   06/12/19 161 lb (73 kg)   02/12/19 165 lb (74.8 kg)   02/07/18 166 lb (75.3 kg)   08/30/17 167 lb (75.8 kg)   05/16/17 165 lb (74.8 kg)   03/03/17 165 lb 6.4 oz (75 kg)   01/05/17 158 lb 12.8 oz (72 kg)   10/10/16 154 lb 12.8 oz (70.2 kg)          Intake/Output Summary (Last 24 hours) at 11/16/2021 1730  Last data filed at 11/16/2021 1536  Gross per 24 hour   Intake 1477.97 ml   Output 150 ml   Net 1327.97 ml       LABS  BMP:   Lab Results   Component Value Date     11/16/2021    K 3.9 11/16/2021     11/16/2021    CO2 25 11/16/2021    BUN 49 11/16/2021    LABALBU 4.3 06/12/2019    CREATININE 2.2 11/16/2021    CALCIUM 8.3 11/16/2021    GFRAA 37 11/16/2021    GFRAA >60 12/13/2011    LABGLOM 30 11/16/2021    GLUCOSE 162 11/16/2021     CBC:   Recent Labs     11/14/21  1429 11/15/21  0546 11/16/21  0517   WBC 3.5* 3.4* 3.8*   HGB 11.7* 11.1* 9.7*   HCT 38.0* 34.9* 30.2*   .8* 101.4* 100.4*    146 128*     BNP: No results found for: BNP    ECHOCARDIOGRAM:   11/16/21   Summary   Left ventricular cavity size is severely dilated. Ejection fraction is visually estimated to be <20%. There is severe diffuse hypokinesis. Diastolic filling parameters suggest grade II diastolic dysfunction. Definity contrast administered. No left ventricular thrombus was seen. moderate MR   Pacer / ICD wire is visualized in the right ventricle. The right ventricle is dilated. Right ventricular systolic function is moderate to severely reduced.    TAPSE 1.01 cm    Assessment:     CONSULTS:   IP CONSULT TO HOSPITALIST  IP CONSULT TO CARDIOLOGY  IP CONSULT TO PHARMACY  IP CONSULT TO SOCIAL WORK  IP CONSULT TO HEART FAILURE NURSE/COORDINATOR    Patient has a CARDIOLOGY CONSULT: Yes      Patient taking an \"and gets mad at me when I don't. \" I reinforced the need for him to be compliant and to put his health a priority or it will be a poor outcome. \"I know it's best for me to get better and be here. \"    Pt states he has a working digital scale at home and denies a need for a scale when I asked. I reviewed the log sheet and 3/5 rule and HF zones. I went over dietary education. Pt states \"dietician was just in and went over all this. \" He allowed me to reiterate the material. Pt did admit to eating [place] soups and now knows he can not do that. He also shared when he gets depressed he eats poorly. He is aware the possible outcome if he eats poorly. Emotional support provided. I talked with pt about Cardiac Rehab. I have place a Phase 1 referral. I also introduced our [address] I will refer him to there as well since he will need on-going education. Pt does not drive but his son does. I have pt our [facility] HF RN resource line number and encouraged him to call. Pt states he was appreciative of my \"tough love\" approach and understands he needs to do better. I will ensure Scarlet Brito gets pt a 7 day f/u appt prior to dc. CURRENT DIET: ADULT DIET; Regular; Low Sodium (2 gm); 1500 ml    EDUCATIONAL PACKETS PROVIDED- PRINTED FROM [employer]. Titles and material given:  Yes   [x]  What is Heart Failure?   [x]  Heart Failure: Warning Signs of a Flare-Up  [x]  Heart Failure: Making Changes to Your Diet  [x]  Heart Failure: Medications to Help Your Heart   [x]  Other: [facility] ([facility]) HF booklet     PATIENT/CAREGIVER TEACHING:    Level of patient/caregiver understanding able to:   [x] Verbalize understanding   [] Demonstrate understanding       [x] Teach back        [x] Needs reinforcement     []  Other:      TEACHING TIME:  35 minutes       Plan:       DISCHARGE PLAN:  Placement for patient upon discharge: home with support   Hospice Care:  no  Code Status: Full Code  Discharge appointment scheduled: will make appt closer to dc time      RECOMMENDATIONS:   [x]  Encourage to call Rober Soria with any questions or concerns. [x]  Educate further Mr. Vargas Mercado on fluid restriction 48 oz- 64 oz during inpatient stay so he can understand how to measure intake at home. [x]  Continue to educate on S/S of Heart Failure. [x]  Emphasize daily weights, diet, and if changes, to call Heart Failure Resource Line  [x]  Other: Will place Cardiac Rehab Phase 1 and will place a [facility] referral for on-going education and HF management.             Electronically signed by Kristopher Connors, RN, BSN CHFN  on 11/16/2021 at 5:30 PM

## 2021-11-17 NOTE — PROGRESS NOTES
Patient is alert and oriented x4 up in chair. Medications tolerated PO. IV site in rt arm is patent. Patient states 0 pain on 0-10 pain scale. Wound on left knee is healing well and is dry and clean. Call light within reach and will continue to monitor.  Electronically signed by Tin Dietz on 11/17/2021 at 10:54 AM

## 2021-11-17 NOTE — PROGRESS NOTES
Hospitalist Progress Note      PCP: No primary care provider on file. Chief Complaint. Patient is a 61-year-old male with past medical history of chronic systolic heart failure, LV thrombus, CAD, hyperlipidemia who presented to hospital for shortness of breath bilateral lower extremity swelling. According to the patient he has not been taking his diuretic medication for past 3 weeks and his lower extremity swelling has been getting worse, he also has worsening shortness of breath, he has not been able to lie down flat and also has exertional dyspnea. Otherwise denied fevers chills nausea vomiting diarrhea constipation dysuria. Date of Admission: 11/14/2021    Subjective: NAEON, sitting in chair comfortably, denies chest pain, nausea, vomiting, shortness of breath, fever or chills.  mention feels overall better    Medications:  Reviewed    Infusion Medications    furosemide (LASIX) 1mg/ml infusion 10 mg/hr (11/17/21 1711)    milrinone 0.375 mcg/kg/min (11/17/21 1711)    dextrose      sodium chloride       Scheduled Medications    ferric gluconate  250 mg IntraVENous Daily    influenza virus vaccine  0.5 mL IntraMUSCular Prior to discharge    insulin lispro  0-12 Units SubCUTAneous TID WC    insulin lispro  0-6 Units SubCUTAneous Nightly    sodium chloride flush  10 mL IntraVENous 2 times per day    atorvastatin  80 mg Oral Nightly    levothyroxine  25 mcg Oral Daily    [Held by provider] carvedilol  12.5 mg Oral BID WC    isosorbide mononitrate  30 mg Oral Daily     PRN Meds: guaiFENesin-dextromethorphan, polyethylene glycol, glucose, dextrose, glucagon (rDNA), dextrose, sodium chloride flush, sodium chloride, potassium chloride, magnesium sulfate, ondansetron **OR** ondansetron, magnesium hydroxide, acetaminophen **OR** acetaminophen, nitroGLYCERIN      Intake/Output Summary (Last 24 hours) at 11/17/2021 1836  Last data filed at 11/17/2021 1711  Gross per 24 hour   Intake 1852.98 ml Output 2350 ml   Net -497.02 ml       Physical Exam Performed:    /69   Pulse 81   Temp 97.8 °F (36.6 °C) (Oral)   Resp 14   Ht 5' 6\" (1.676 m)   Wt 169 lb 15.6 oz (77.1 kg)   SpO2 91%   BMI 27.43 kg/m²     General appearance: NAD sitting in chair comfortably  HEENT:  Conjunctivae/corneas clear. Neck: Supple, with full range of motion. Respiratory:  Normal respiratory effort. Clear to auscultation, bilaterally without Rales/Wheezes/Rhonchi. Cardiovascular: Regular rate and rhythm with normal S1/S2 without murmurs or rubs  Abdomen: Soft, non-tender, non-distended, normal bowel sounds. Musculoskeletal: +1 edema bilaterally  Neurologic:  without any focal sensory/motor deficits. grossly non-focal.  Psychiatric: Alert and oriented, Normal mood  Peripheral Pulses: +2 palpable, equal bilaterally       Labs:   Recent Labs     11/15/21  0546 11/16/21  0517 11/17/21  0458   WBC 3.4* 3.8* 4.9   HGB 11.1* 9.7* 10.2*   HCT 34.9* 30.2* 31.8*    128* 152     Recent Labs     11/15/21  0546 11/16/21  0517 11/17/21  0458    140 140   K 4.5 3.9 4.1    103 103   CO2 26 25 29   BUN 43* 49* 54*   CREATININE 2.1* 2.2* 1.9*   CALCIUM 8.8 8.3 8.3     No results for input(s): AST, ALT, BILIDIR, BILITOT, ALKPHOS in the last 72 hours. Recent Labs     11/15/21  0546 11/16/21  0517 11/17/21  0458   INR 3.51* 4.08* 3.09*     No results for input(s): CKTOTAL, TROPONINI in the last 72 hours. Urinalysis:      Lab Results   Component Value Date    NITRU Negative 11/17/2021    WBCUA 1 11/17/2021    RBCUA 1 11/17/2021    BLOODU TRACE 11/17/2021    SPECGRAV 1.007 11/17/2021    GLUCOSEU Negative 11/17/2021       Radiology:  XR CHEST (2 VW)   Final Result   The cardiac silhouette is enlarged, with somewhat globular configuration   which can be seen in pericardial effusion. No focal airspace disease or   pulmonary edema.                Assessment/Plan:    Active Hospital Problems    Diagnosis     CHF (congestive heart failure), NYHA class I, acute on chronic, combined ([facility] [I50.43]      Patient is a 62-year-old male with past medical history of chronic systolic heart failure, LV thrombus, CAD, hyperlipidemia who presented to hospital for shortness of breath bilateral lower extremity swelling. According to the patient he has not been taking his diuretic medication for past 3 weeks and his lower extremity swelling has been getting worse, he also has worsening shortness of breath, he has not been able to lie down flat and also has exertional dyspnea. Otherwise denied fevers chills nausea vomiting diarrhea constipation dysuria.     Assessment  Acute on chronic systolic CHF  CAD  Hyperlipidemia  History of LV thrombus     Plan  Start IV Lasix 60 twice daily, consult cardiology, will order echocardiogram to monitor progression of LV thrombus - pending, resume home Coumadin  Started on IV milrinon, IV Lasix  Strict I's and O's, low-salt diet  Resume home medications  DVT prophylaxis-on Coumadin  Diet: ADULT DIET; Regular; 4 carb choices (60 gm/meal);  Low Sodium (2 gm); 1500 ml  Code Status: Full Code    PT/OT Eval Status: ordered    Dispo - continue IV milrinon, IV Lasix, nephrology consulted, monitor BMP, monitor Kristel Lucas MD

## 2021-11-17 NOTE — PROGRESS NOTES
Clinical Pharmacy Note  Warfarin Consult    Patience Shelton is a 61 y.o. male receiving warfarin managed by pharmacy. Patient being bridged with none. Warfarin Indication: LV thrombus  Target INR range: 2-3   Dose prior to admission: 3.75mg on Thurs; 7.5mg all other days - verified with patient on 11/15/21    Current warfarin drug-drug interactions: none of significance    Recent Labs     11/15/21  0546 11/16/21  0517 11/17/21  0458   HGB 11.1* 9.7* 10.2*   HCT 34.9* 30.2* 31.8*   INR 3.51* 4.08* 3.09*       Assessment/Plan:    Warfarin 5mg tonight. PT/INR in am    Thank you for the consult. Will continue to follow.     Electronically signed by Jennifer Calvillo [facility] on 11/17/2021 at 8:08 AM

## 2021-11-17 NOTE — CONSULTS
[address] [place] [address] Nephrology   [facility] [employer]  [phone]  Nephrology Consult Note          Patient ID: Gerardo Hale  Referring/ Physician: Greg Lehman MD      HPI/Summary:   Gerardo Hale is being seen by nephrology for acute kidney injury. This is a [id number] man with systolic heart failure, history of LV thrombus, coronary artery disease, hyperlipidemia who presented the hospital with increasing shortness of breath and worsening lower extremity swelling. Patient has received most of his care at [facility] up until this point. He has a history of heart failure with reduced EF secondary to ischemic cardiomyopathy. He has a single-vessel coronary artery disease status post PCI of the LAD. ICD placed in 2019 last right heart cath was January 29, 2020. Based on review of his last heart failure clinic note it looks like he is supposed to be on carvedilol Imdur Entresto torsemide 20 mg daily. Patient told me he was instructed to stop taking torsemide but was taking it anyway. Last heart failure note said that there was discussion about evaluation for possible LVAD versus transplant. He is on Coumadin for the LV thrombus. Patient says he is never seen a kidney doctor as an outpatient. He denies any chest pain but he just had general decreased stamina and dyspnea on exertion, orthopnea and lower extremity edema. He does not weigh himself at home daily. He said he became to the hospital because he was prompted by his family members to seek medical attention. Blood pressure 100/65  Currently on a Lasix drip of 10 mg/h and milrinone at 0.375  Weight was 175 on admission and is now 169  Satting 93% on room air  Urine output only 875 cc yesterday has already had 1475 cc out today. His baseline creatinine is around 2 over the past 2 years at least.      Plan:   Patient has chronic kidney disease stage IIIb but likely renal function overestimated, his muscle mass is very low.   So probably more like chronic kidney disease stage IV  He has had longstanding albuminuria in the setting of diabetes and hypertension, likely has some underlying diabetic nephropathy as the etiology of his chronic kidney disease. He has severe end-stage heart failure currently needing diuresis. Responding to milrinone and Lasix drip currently. There is mention in his prior heart failure notes from  about possible candidacy for LVAD or transplant. Reaching out to  to see if they have discussed him at VAD/transplant meeting. He has significant CKD and renal function will likely get worse with LVAD and could end up on dialysis. Would like to get a more euvolemic before reinitiating Entresto. Chronic kidney disease stage IIIb  This is likely secondary to diabetic nephropathy in the setting of persistent longstanding albuminuria  To complete CKD work-up will need an SPEP and light chains  Baseline creatinine around 2  No acute electrolyte abnormalities  Risk factors include diabetes, hypertension, severe systolic heart failure  Has had diabetes and hypertension for over 10 years and his last EF is less than 20%  He has had event evidence of albuminuria on urinalysis going back to 2016, 2013 had an  mg  Most recent protein creatinine ratio 700 mg, no RBCs no WBCs  CT abdomen in 2020 showed symmetric kidneys no lesions no masses bilateral perinephric stranding no hydronephrosis. HCV and hepatitis C negative  Prior negative NAHOMY  Prior negative RF factor    Ischemic cardiomyopathy  Decompensated, has evidence of volume overload  proBNP 65,000  Managed with Lasix drip and is on milrinone, appreciate cardiology involvement.   Last EF less than 20%  History of coronary artery disease with stent in the LAD  Has ICD  Most of his records are in the  system  Previously on Entresto but currently not  Echo here showed severe diffuse hypokinesis grade 2 diastolic dysfunction less than 20% EF severely dilated left ventricular cavity, right ventricular systolic function is severely reduced as well has moderate MR and RV's dilated    Diabetes type 2  10 years at least  Longstanding albuminuria  A1c has been ranging between 6 and 6.5 the last few checks. Hypertension  Blood pressure is acceptable right now  Carvedilol held, on positive inotrope right now    [facility] would like to thank you for the opportunity to serve this patient. Please call with any questions or concerns. Simran Kapadia MD  [facility]  Mary Professor [address], [address]  Fax: [phone]  Office: [phone]         CC/Reason for consult:   Reason for consult: CKD  Chief Complaint   Patient presents with    Shortness of Breath     Pt c/o SOB x 2 days. Pt reports having a defibrillator and a stent. Denies chest pain at this time. No respiratory distress noted. Review of Systems:   [address] All other remaining systems are negative. Constitutional:  fever, chills, weakness, weight change, fatigue,      Skin:  rash, pruritus, hair loss, bruising, dry skin, petechiae. Head, Face, Neck   headaches, swelling,  cervical adenopathy. Respiratory: shortness of breath, cough, or wheezing  Cardiovascular: chest pain, palpitations, dizzy, edema  Gastrointestinal: nausea, vomiting, diarrhea, constipation,belly pain    Yellow skin, blood in stool  Musculoskeletal:  back pain, muscle weakness, gait problems,       joint pain or swelling. Genitourinary:  dysuria, poor urine flow, flank pain, blood in urine  Neurologic:  vertigo, TIA'S, syncope, seizures, focal weakness  Psychosocial:  insomnia, anxiety, or depression.   Additional positive findings: -     PMH/SH/FH:    Medical Hx: reviewed and updated as appropriate  Past Medical History:   Diagnosis Date    Diabetes type 2, uncontrolled ([address] 7/28/2014    Diabetic nephropathy 9/12/2013    ED (erectile dysfunction) 9/12/2013    Essential hypertension, benign 2013    Hyperlipidemia 2013    Noncompliance 2014         Surgical Hx: reviewed and updated as appropriate   has a past surgical history that includes eye surgery. Social Hx: reviewed and updated as appropriate  Social History     Tobacco Use    Smoking status: Former Smoker     Packs/day: 1.50     Years: 7.00     Pack years: 10.50     Quit date: 1981     Years since quittin.7    Smokeless tobacco: Never Used   Substance Use Topics    Alcohol use: Yes     Alcohol/week: 1.0 standard drink     Types: 1 Cans of beer per week        Family hx: reviewed and updated as appropriate  family history includes Diabetes in his brother, mother, and sister; High Blood Pressure in his mother. Medications:   influenza virus vaccine, 0.5 mL, Prior to discharge  insulin lispro, 0-12 Units, TID WC  insulin lispro, 0-6 Units, Nightly  sodium chloride flush, 10 mL, 2 times per day  atorvastatin, 80 mg, Nightly  levothyroxine, 25 mcg, Daily  [Held by provider] carvedilol, 12.5 mg, BID WC  isosorbide mononitrate, 30 mg, Daily  warfarin (COUMADIN) daily dosing (placeholder), , RX Placeholder       Patient has no known allergies. Allergies:   No Known Allergies      Physical Exam/Objective:   Vitals:    21 0525   BP: (!) 123/57   Pulse: 66   Resp: 16   Temp: 98.8 °F (37.1 °C)   SpO2: 91%       Intake/Output Summary (Last 24 hours) at 2021 0728  Last data filed at 2021 0542  Gross per 24 hour   Intake 1830.44 ml   Output 875 ml   Net 955.44 ml         General appearance: male in no acute distress, comfortable, communicative, awake and alert. HEENT: no icterus, EOM intact, trachea midline. Neck : no masses, appears symmetrical and + JVD appreciated. Respiratory: Respiratory effort normal, bilateral equal chest rise. No wheeze, basilar crackles   Cardiovascular:  Ausculation shows RRR and  + edema   Abdomen: abdomen is soft, non distended, no masses, no pain with

## 2021-11-17 NOTE — PROGRESS NOTES
[facility]   Daily Progress Note      Admit Date:  11/14/2021    Reason for follow up visit: CHF    CC: \"I think I feel a little better today. \"    62 y/o male with PMH significant for CAD, chronic systolic HF with ICD in place (8/2019), LV thrombus, HTN, HLP and type II diabetes mellitus with diabetic nephropathy stage III who was admitted to [facility] with increased SOB. He was to have a RHC performed last week, however, the patient failed to show for the procedure. Given his SOB and weight gain his family brought him to ED and he was admitted for CHF. He had been receiving IV lasix intermittently and changed to continuous IV lasix and Milrinone yesterday. Interval History:  Pt. seen and examined; records reviewed  BP stable. Wt today 169#  SOB and edema with some improvement  Cr 1.9 today (improving)  I/O inaccurate    Subjective:  Pt with no acute overnight cardiac events. Denies chest pain, cough, palpitations, dizziness or syncope    Review of Systems:   · Constitutional: no unanticipated weight loss. There's been no change in energy level, sleep pattern, or activity level. No fevers, chills. · Eyes: No visual changes or diplopia. No scleral icterus. · ENT: No Headaches, hearing loss or vertigo. No mouth sores or sore throat. · Cardiovascular: as reviewed in HPI  + generalized edema  · Respiratory: No cough or wheezing, no sputum production. No hematemesis.  + SOB with some improvement  · Gastrointestinal: No abdominal pain, appetite loss, blood in stools. No change in bowel or bladder habits. · Genitourinary: No dysuria, trouble voiding, or hematuria. · Musculoskeletal:  No gait disturbance, no joint complaints. · Integumentary: No rash or pruritis. · Neurological: No headache, diplopia, change in muscle strength, numbness or tingling. · Psychiatric: No anxiety or depression. · Endocrine: No temperature intolerance. No excessive thirst, fluid intake, or urination.  No tremor. · Hematologic/Lymphatic: No abnormal bruising or bleeding, blood clots or swollen lymph nodes. · Allergic/Immunologic: No nasal congestion or hives. Objective:   /65   Pulse 68   Temp 98.3 °F (36.8 °C) (Oral)   Resp 14   Ht 5' 6\" (1.676 m)   Wt 169 lb 15.6 oz (77.1 kg)   SpO2 92%   BMI 27.43 kg/m²     Intake/Output Summary (Last 24 hours) at 11/17/2021 1058  Last data filed at 11/17/2021 0925  Gross per 24 hour   Intake 1710.44 ml   Output 1650 ml   Net 60.44 ml     Wt Readings from Last 3 Encounters:   11/17/21 169 lb 15.6 oz (77.1 kg)   10/22/21 166 lb (75.3 kg)   06/12/19 161 lb (73 kg)       Physical Exam:  General: In no acute distress. Awake, alert, and oriented X4. Up in chair. Able to talk in full sentences today without SOB  Skin:  Warm and dry. No new appearing rashes or lesions. Neck:  Supple. + moderate JVD; no carotid bruit  Chest: Lungs with scattered rales; bibasilar rales. No wheezes/rhonchi  Cardiovascular:  irreg;normal S1 and  S2; + S3 gallop   Abdomen: distended, nontender, +bowel sounds.    Extremities:  + generalized pitting edema to lower extremities; presacral. 1+ bilateral DP/PT pulses     Medications:    warfarin  5 mg Oral Once    influenza virus vaccine  0.5 mL IntraMUSCular Prior to discharge    insulin lispro  0-12 Units SubCUTAneous TID     insulin lispro  0-6 Units SubCUTAneous Nightly    sodium chloride flush  10 mL IntraVENous 2 times per day    atorvastatin  80 mg Oral Nightly    levothyroxine  25 mcg Oral Daily    [Held by provider] carvedilol  12.5 mg Oral BID     isosorbide mononitrate  30 mg Oral Daily    warfarin (COUMADIN) daily dosing (placeholder)   Other RX Placeholder      furosemide (LASIX) 1mg/ml infusion 10 mg/hr (11/17/21 0802)    milrinone 0.375 mcg/kg/min (11/17/21 0044)    dextrose      sodium chloride       guaiFENesin-dextromethorphan, polyethylene glycol, glucose, dextrose, glucagon (rDNA), dextrose, sodium chloride flush, sodium chloride, potassium chloride, magnesium sulfate, ondansetron **OR** ondansetron, magnesium hydroxide, acetaminophen **OR** acetaminophen, nitroGLYCERIN    Lab Data:  CBC:   Recent Labs     11/15/21  0546 11/16/21 0517 11/17/21 0458   WBC 3.4* 3.8* 4.9   HGB 11.1* 9.7* 10.2*    128* 152     BMP:    Recent Labs     11/15/21  0546 11/16/21 0517 11/17/21 0458    140 140   K 4.5 3.9 4.1   CO2 26 25 29   BUN 43* 49* 54*   CREATININE 2.1* 2.2* 1.9*     LIVR: No results for input(s): AST, ALT in the last 72 hours. INR:    Recent Labs     11/15/21  0546 11/16/21  0517 11/17/21  0458   INR 3.51* 4.08* 3.09*     Results for Promise Martinez (MRN [id number]) as of 11/17/2021 11:00   Ref. Range 11/14/2021 14:29   Pro-BNP Latest Ref Range: 0 - 124 pg/mL 65,067 (H)   Troponin Latest Ref Range: <0.01 ng/mL 0.05 (H)     11/16/2021 Echo:  Left ventricular cavity size is severely dilated. Ejection fraction is visually estimated to be <20%. There is severe diffuse hypokinesis. Diastolic filling parameters suggest grade II diastolic dysfunction. Definity contrast administered. No left ventricular thrombus was seen. moderate MR   Pacer / ICD wire is visualized in the right ventricle. The right ventricle is dilated. Right ventricular systolic function is moderate to severely reduced. TAPSE 1.01 cm    ECG 11/14/2021:  Normal sinus rhythm  Low voltage in the limb leads  Anterolateral infarct , age undetermined  T wave abnormality, consider lateral ischemia  Abnormal ECG  No previous ECGs available    CXR 11/14/2021:  The cardiac silhouette is enlarged, with somewhat globular configuration   which can be seen in pericardial effusion.  No focal airspace disease or   pulmonary edema.      Echo 7/2/2020 ():  - Left ventricle: The cavity size is severely dilated. Wall thickness is normal. Systolic function     was severely reduced. The estimated ejection fraction was in the range of 10% to 15%. Diffuse     hypokinesis. Doppler parameters are consistent with restrictive physiology, indicative of     decreased left ventricular diastolic compliance and/or increased left atrial pressure. There was     spontaneous echo contrast, indicative of stasis. - Mitral valve: Moderate regurgitation.   - Left atrium: The atrium is severely dilated. - Right ventricle: Pacer wire or catheter noted in right ventricle. Systolic function was mildly     reduced. - Right atrium: The atrium is mildly dilated. - Atrial septum: The septum bowed from left to right, consistent with increased left atrial     pressure. - Tricuspid valve: Mild-moderate regurgitation.   - Pulmonary arteries: Systolic pressure was severely increased, estimated to be 65mm Hg assuming     that the right atrial pressure was 15 mmHg. - Inferior vena cava: The vessel was dilated. The respirophasic diameter changes were blunted (<     50%), consistent with elevated central venous pressure.       [facility] 1/31/2020  IMPRESSIONS:  Normal left and right filling pressures with   preserved CO/CI. SUMMARY:   Hemodynamic:   RA 2   RV 28/3   PA 28/10 mean 16   PCWP 4   Blu 4.5/2.5   TD 4.5/2.5   PASat 66%   RECOMMENDATIONS:  Further management per Heart Failure [facility].      Echo 1/9/2020 ():  - Left ventricle: The cavity size is severely dilated. Wall     thickness was increased in a pattern of mild LVH. Systolic     function was severely reduced. The estimated ejection fraction     was in the range of 10% to 15%. Diffuse hypokinesis. No contrast     administered - unable to evaluate for LV thrombus. The     longitudinal strain is -2.47% (markedly reduced). - Aortic valve: Mild thickening.   - Mitral valve: Leaflet tenting. Mild to moderate regurgitation.   - Left atrium: The atrium is severely dilated. - Right ventricle: The cavity size is normal. Pacer wire or ICD     noted in right ventricle.  Systolic function was mildly reduced by   [employer] interpretation. TAPSE: 1.6cm. Tricuspid annular systolic     velocity: 8cm/s.   - Tricuspid valve: Mild-moderate regurgitation.   - Pulmonary arteries: Systolic pressure could not be accurately     estimated, however it is elevated at at least 51 mm Hg, assuming     an RA pressure of 15 mm Hg. - Inferior vena cava: The vessel was dilated. The respirophasic     diameter changes were blunted (< 50%), consistent with elevated     central venous pressure. - Pericardium, extracardiac: A trivial pericardial effusion is     identified.      12/10/2018 LHC/PCI and RHC:  IMPRESSIONS:  Selective angiography of LMCA showing critical mid LAD   lesion. Successful PCI with 3.5 x 18 mm MIKE. Normal cardiac output, m   ildly increased pulmonary pressures with mildly increased filling pre   ssure on inotropic support. SUMMARY:     1. Wedge pressure in the pulmonary capillaries is mildly elevated. 2. Coronary arteries: The left main is unusually long and      trifurcates into the LAD, a ramus intermedius, and the left      circumflex. The left anterior descending gives rise to 2      diagonals and 3 septals and wraps around the apex. The left      circumflex gives rise to 2 obtuse marginals and no      posterolaterals. The first obtuse marginal has a high origin. 3. LAD: Mid-vessel lesion: There is a discrete, 99% stenosis. The      distal vessel supplies a moderate-sized vascular territory. The      lesion is a likely culprit for the patient's clinical      presentation. The lesion was stented (see 1st lesion      intervention). Following intervention, the lesion has a residual      stenosis of 0% and MUKUND grade 3 flow (brisk flow). 4. AV was not crossed due to known apical LV thrombus.    5. RA 6      RV 48/4      PA 50/20 (28)      W 18.   6. Blu CO 5.2 Blu CI 2.9 on Milrinone 0.25 mcg/mg/min.      12/4/2018 Cardiac MRI ():  Findings are consistent with a severe, ischemic cardiomyopathy.  There is akinesis of the apical segments with an associated small, apical thrombus. The left ventricle is severely dilated with severely reduced systolic function. The right ventricle is mildly dilated with moderately decreased systolic function. The left atrium is severely dilated.  There is mild to moderate mitral regurgitation. The right atrium is mildly dilated.  There is moderate tricuspid regurgitation. The descending aorta is dilated. The pulmonary artery is dilated.  The IVC and coronary sinus are dilated. Please see noncardiac findings below.      FINDINGS:     1. The left ventricular size is severely dilated. The left ventricular ejection fraction is 19 % by Smallwood's method. Global left ventricular function is severely decreased. There is akinesis of the apical segments, severe hypokinesis of the basal and mid septal and basal and mid anterior segments and moderate hypokinesis of the lateral segments and mid inferior segment and mild hypokinesis of the basal inferior segment. The left ventricular mass is moderately increased. There appears to be a small intracavitary thrombus (6.5 x 5 mm). 2. There is no evidence of increased iron deposition within the myocardium (T2*myocardium = 51 msec; if < 20 msec, suggestive of iron-overloading of the myocardium).  There is no evidence of myocardial edema. 3. The right ventricular size is mildly dilated. Global right ventricular function is moderately decreased. 4. Left atrial size is severely enlarged. Right atrial size is mildly enlarged.   The Qp/Qs is 1.0 by phase contrast imaging and is consistent with no evidence of shunt. 5. The calculated aortic regurgitant fraction is 1.55 %. There is moderate mitral regurgitation. The calculated mitral regurgitant fraction is 29.02 %. There is moderate tricuspid regurgitation. The calculated tricuspid regurgitant fraction is 37.1 %. The calculated pulmonic regurgitant fraction is 2.39 %.    6. There is a small pericardial effusion. 7. The descending aorta is dilated measuring 27.2 mm. 8. The main pulmonary artery is dilated measuring 34.0 mm.  The IVC and coronary sinus are dilated. 9. There are large left and moderate right pleural effusions.  Basilar atelectasis is associated with the effusions.  There is evidence of mild pulmonary edema. Telemetry: Sinus rhythm with PVC's    Assessment/Plan:    1. Acute on chronic systolic HF  -NYHA class III-IV; LVEF < 20%  -S/P ICD placement in 2019  -continue carvedilol (currently on hold d/t Milrinone)  -Entresto on hold d/t elevated Cr (will plan to resume prior to discharge)  -no aldactone or SGLT2 inhibitor d/t decreased renal function  -continue to diurese with IV lasix  -continue Milrinone  -low sodium diet and fluid restriction  -discussed and reinforced need for compliance and follow up     2. LV thrombus  -will stop warfarin and add Xarelto 15 mg for compliance reasons once INR < 2.0  -follow up echo demonstrates no thrombus     3. SONAL on CKD , stage III  -suspect worsened by fluid retention  -Cr with slight improvement today  -will continue to diurese     4. CAD of native coronary arteries without angina  -prior PCI of LAD  -nothing suggestive of recurrent angina  -elevated troponin secondary to CHF and not an ACS  -continue medical management with statin, BB  -not on ASA d/t warfarin     5. S/P ICD     6. Hyperlipidemia with LDL goal , 70  -continue high intensity statin     7.  Anemia  -iron deficiency  -defer IV iron infusion to Primary team    Electronically signed by DAHIANA Florez CNP on 11/17/2021 at 10:58 AM

## 2021-11-18 NOTE — PROGRESS NOTES
Occupational Therapy  Facility/Department: [facility]  Daily Treatment Note  NAME: Fidelina Rodríguez  : 1955  MRN: [id number]    Date of Service: 2021    Discharge Recommendations:  2-3 sessions per week, Patient would benefit from continued therapy after discharge, Home with Home health OT  OT Equipment Recommendations  ADL Assistive Devices: Shower Chair with back    Assessment   Performance deficits / Impairments: Decreased functional mobility ; Decreased balance; Decreased ADL status; Decreased endurance; Decreased high-level IADLs; Decreased strength  Assessment: Pt tolerated session well. He completed mobility around the room and in the regalado with CGA/SBA and holding the IV pole. Pt with no LOB. Pt completed transfers with CGA/SBA. Plans to return home with assist of family when medically able. Pt would benefit from [facility] at discharge. Prognosis: Good  REQUIRES OT FOLLOW UP: Yes  Activity Tolerance  Activity Tolerance: Patient Tolerated treatment well  Safety Devices  Type of devices: Call light within reach; Chair alarm in place; Left in chair; Gait belt; Nurse notified         Patient Diagnosis(es): The primary encounter diagnosis was Acute on chronic systolic congestive heart failure ([address] Diagnoses of Acute respiratory failure with hypoxia (HCC) and Dyspnea on exertion were also pertinent to this visit. has a past medical history of Diabetes type 2, uncontrolled ([address] Diabetic nephropathy, ED (erectile dysfunction), Essential hypertension, benign, Hyperlipidemia, and Noncompliance. has a past surgical history that includes eye surgery.     Restrictions  Restrictions/Precautions  Restrictions/Precautions: Fall Risk  Position Activity Restriction  Other position/activity restrictions: O2nc, external catheter  Subjective   General  Chart Reviewed: Yes, Progress Notes  Patient assessed for rehabilitation services?: Yes  Additional Pertinent Hx: 62 yo male admitted  for BLE swelling with CHF exacerbation. PMH: CHF, DM, HTN,  Family / Caregiver Present: No  Referring Practitioner: Marily Torres MD  Diagnosis: CCHF exacerbation  Subjective  Subjective: Pt seen bedside and agreed to OT treatment. General Comment  Comments: Rn cleared to participate      Objective    ADL  Feeding: Other (Comment) (Pt given ice after nursing ok'd.)  Grooming: Stand by assistance (Stood at the sink to wash his hands.)  Toileting: Contact guard assistance; Stand by assistance (Did not have to manage underwear. Pt urinated on toilet.)        Balance  Sitting Balance: Stand by assistance  Standing Balance: Contact guard assistance (CGA/SBa)  Functional Mobility  Functional - Mobility Device: Rolling Walker  Activity: To/from bathroom; Other (Ambulated to end of the regalado and back to his room. cues for navigating around obstacles.)  Assist Level: Contact guard assistance (Holding onto IV pole for stability.)  Functional Mobility Comments: pt with no LOB  Toilet Transfers  Toilet - Technique: Ambulating  Equipment Used: Grab bars  Toilet Transfer: Stand by assistance; Contact guard assistance  Toilet Transfers Comments: Cues for hand placement. Wheelchair Bed Transfers  Wheelchair/Bed - Technique: Ambulating  Equipment Used: Other (recliner)  Level of Asssistance: Stand by assistance; Contact guard assistance  Bed mobility  Supine to Sit: Unable to assess  Sit to Supine: Unable to assess  Comment: Pt up in the recliner at beginning/end of the session.   Transfers  Sit to stand: Contact guard assistance; Stand by assistance  Stand to sit: Contact guard assistance; Stand by assistance                       Cognition  Overall Cognitive Status: [facility]                                         Plan   Plan  Times per week: 3-5  Current Treatment Recommendations: Strengthening, Endurance Training, Patient/Caregiver Education & Training, Self-Care / ADL, Balance Training, Pain Management, Functional Mobility Training, Safety Education & Training, Positioning    AM-PAC Score        AM-PAC Inpatient Daily Activity Raw Score: 19 (11/18/21 1513)  AM-PAC Inpatient ADL T-Scale Score : 40.22 (11/18/21 1513)  ADL Inpatient CMS 0-100% Score: 42.8 (11/18/21 1513)  ADL Inpatient CMS G-Code Modifier : CK (11/18/21 1513)    Goals  Short term goals  Time Frame for Short term goals: prior to d/c  Short term goal 1: toileting SUP - not met,  ongoing  Short term goal 2: ADL tx SUP - not met,  ongoing  Short term goal 3: LB dressing SUP - not met, ongoing  Short term goal 4: BUE exercises in all planes to improve strength and endurance for ADLs and tx - not met,  ongoing  Short term goal 5: Tolerate 5 min fxl standing task SUP - not met,  ongoing  Patient Goals   Patient goals : go home       Therapy Time   Individual Concurrent Group Co-treatment   Time In 1430         Time Out 1510         Minutes 40              This note to serve as OT d/c summary if pt is d/c-ed from hospital prior to next OT session.       Daniela Claire, [address]

## 2021-11-18 NOTE — PROGRESS NOTES
[address] 83   Daily Progress Note      Admit Date:  11/14/2021      Subjective: The patient is 61 y.o. male with a past medical history significant for CAD, chronic systolic CHF with ICD in place (8/13/19), LV thrombus, hyperlipidemia, essential hypertension, type 2 DM with complications of diabetic nephropathy stage 3 who presented to [facility] ED with shortness of breath. I was asked to evaluate him for potential CHF. He follows with me as a new outpatient. I was to perform a right heart catheterization last Friday however the patient failed to show for the scheduled procedure.      Saqib Sampson states that he had a lot going on that caused him to miss his appointment for the right heart catheterization 3 days ago. He does state that he has been taking his diuretics as well as his newly prescribed Entresto therapy. He does not weigh himself daily.     He states that he has been short of breath with ambulation and thinks his weight is probably up. He adds that is really his family that made him come to the emergency department. He was more focused on moving in his possessions than his breathing or his CHF. He does describe some lower extremity swelling and states this is somewhat increased from baseline. He denies any chest pain or tightness. Interval History: Today he is feeling well. States he has been breathing well. Understands the importance of treating his medical problems. Feels that his LE edema is better. Denies shortness of breath, chest pain, palpitations.        Objective:     /65   Pulse 77   Temp 99.2 °F (37.3 °C) (Oral)   Resp 18   Ht 5' 6\" (1.676 m)   Wt 163 lb 5.8 oz (74.1 kg)   SpO2 92%   BMI 26.37 kg/m²      Intake/Output Summary (Last 24 hours) at 11/18/2021 1044  Last data filed at 11/18/2021 0900  Gross per 24 hour   Intake 1514.94 ml   Output 3650 ml   Net -2135.06 ml       Physical Exam:  General:  Awake, alert, NAD  Skin:  Warm and dry  Neck: JVD<8, no carotid bruits  Chest:  Clear to auscultation, no wheezes/rhonchi/rales  Cardiovascular:  RRR, normal S1/S2, no M/R/G  Abdomen:  Soft, nontender, +bowel sounds  Extremities:  No edema  Pulses: 2+ bilat carotid    2+ bilat radial    2+ bilat femoral        Medications:    iron sucrose  200 mg IntraVENous Daily    influenza virus vaccine  0.5 mL IntraMUSCular Prior to discharge    insulin lispro  0-12 Units SubCUTAneous TID     insulin lispro  0-6 Units SubCUTAneous Nightly    sodium chloride flush  10 mL IntraVENous 2 times per day    atorvastatin  80 mg Oral Nightly    levothyroxine  25 mcg Oral Daily    [Held by provider] carvedilol  12.5 mg Oral BID WC    isosorbide mononitrate  30 mg Oral Daily      furosemide (LASIX) 1mg/ml infusion 10 mg/hr (11/18/21 0624)    milrinone 0.375 mcg/kg/min (11/18/21 0620)    dextrose      sodium chloride         Lab Data:  CBC:   Recent Labs     11/16/21 0517 11/17/21 0458 11/18/21 0439   WBC 3.8* 4.9 4.4   HGB 9.7* 10.2* 10.3*   * 152 163     BMP:    Recent Labs     11/16/21 0517 11/17/21 0458 11/18/21 0439    140 140   K 3.9 4.1 3.6   CO2 25 29 27   BUN 49* 54* 56*   CREATININE 2.2* 1.9* 1.7*     LIVR: No results for input(s): AST, ALT in the last 72 hours. PT/INR:   Recent Labs     11/16/21 0517 11/17/21 0458 11/18/21 0439   PROT  --   --  5.9*   INR 4.08* 3.09* 2.06*     APTT: No results for input(s): APTT in the last 72 hours. BNP:  No results for input(s): BNP in the last 72 hours. CARDIAC ENZYMES:No results for input(s): CKMB, CKMBINDEX, TROPONINI in the last 72 hours. Invalid input(s): CKTOTAL;3  FASTING LIPID PANEL:  Lab Results   Component Value Date    CHOL 146 02/12/2019    HDL 73 02/12/2019    HDL 58 12/13/2011    TRIG 109 02/12/2019         Diagnostics:      L/RHC 11/30/18 ([facility])   IMPRESSIONS:  Severe single vessel coronary artery disease with 99%   stenosis of the mid LAD.    Acute decompensated heart failure in the setting of new onset   cardiomyopathy. Elevated filling pressures with preserved cardiac output and cardiac   index. SUMMARY:     1. LAD: Mid-vessel lesion: There is a discrete, 99%stenosis. 2. Right sided filling pressures are elevated.      RA: 18 mm hg      RV: 60/18 mm hg      PA:60/25(40) mm hg   3. Elevated pulmonary capillary wedge pressure (24 mm Hg). 4. Preserved cardiac output(5 L/min) and cardiac index (2.55      L.min-m2)      L/RHC 12/10/18 ([facility])   IMPRESSIONS:  Selective angiography of LMCA showing critical mid LAD   lesion. Successful PCI with 3.5 x 18 mm MIKE. Normal cardiac output, m   ildly increased pulmonary pressures with mildly increased filling pre   ssure on inotropic support. SUMMARY:     1. Wedge pressure in the pulmonary capillaries is mildly elevated. 2. Coronary arteries: The left main is unusually long and      trifurcates into the LAD, a ramus intermedius, and the left      circumflex. The left anterior descending gives rise to 2      diagonals and 3 septals and wraps around the apex. The left      circumflex gives rise to 2 obtuse marginals and no      posterolaterals. The first obtuse marginal has a high origin. 3. LAD: Mid-vessel lesion: There is a discrete, 99% stenosis. The      distal vessel supplies a moderate-sized vascular territory. The      lesion is a likely culprit for the patient's clinical      presentation. The lesion was stented (see 1st lesion      intervention). Following intervention, the lesion has a residual      stenosis of 0% and MUKUND grade 3 flow (brisk flow). 4. AV was not crossed due to known apical LV thrombus. 5. RA 6      RV 48/4      PA 50/20 (28)      W 18.   6. Blu CO 5.2 Blu CI 2.9 on Milrinone 0.25 mcg/mg/min. [address] 6/7/19 ([facility])   IMPRESSIONS:  Normal filling pressures. Mildly elevated pulmonary artery pressures. RECOMMENDATIONS:  Continue current medications.    Follow up in heart failure clinic.      Cardiac MRI 12/4/18 ([facility])   1. The left ventricular size is severely dilated. The left ventricular ejection fraction is 19 % by Smallwood's method. Global left ventricular function is severely decreased. There is akinesis of the apical segments, severe hypokinesis of the basal and mid septal and basal and mid anterior segments and moderate hypokinesis of the lateral segments and mid inferior segment and mild hypokinesis of the basal inferior segment. The left ventricular mass is moderately increased. There appears to be a small intracavitary thrombus (6.5 x 5 mm). 2. There is no evidence of increased iron deposition within the myocardium (T2*myocardium = 51 msec; if < 20 msec, suggestive of iron-overloading of the myocardium).  There is no evidence of myocardial edema. 3. The right ventricular size is mildly dilated. Global right ventricular function is moderately decreased. 4. Left atrial size is severely enlarged. Right atrial size is mildly enlarged.   The Qp/Qs is 1.0 by phase contrast imaging and is consistent with no evidence of shunt. 5. The calculated aortic regurgitant fraction is 1.55 %. There is moderate mitral regurgitation. The calculated mitral regurgitant fraction is 29.02 %. There is moderate tricuspid regurgitation. The calculated tricuspid regurgitant fraction is 37.1 %. The calculated pulmonic regurgitant fraction is 2.39 %. 6. There is a small pericardial effusion. 7. The descending aorta is dilated measuring 27.2 mm. 8. The main pulmonary artery is dilated measuring 34.0 mm.  The IVC and coronary sinus are dilated. 9. There are large left and moderate right pleural effusions.  Basilar atelectasis is associated with the effusions.  There is evidence of mild pulmonary edema.      Echo 5/24/19 ([facility])   - Left ventricle: The cavity size was severely dilated. Wall     thickness was normal. Systolic function was severely reduced.  The     estimated ejection fraction was in the range of 15% to 20%. Severe     diffuse hypokinesis. Contrast evaluation for LV thrombus not     performed. Doppler parameters are consistent with abnormal left     ventricular relaxation (grade 1 diastolic dysfunction). - Left atrium: The atrium was moderately dilated. - Right ventricle: The cavity size was normal. Systolic function was     mildly reduced. TAPSE: 1.5cm.   - Pulmonary arteries: Systolic pressure could not be accurately     estimated. - Inferior vena cava: The vessel was normal in size. The     respirophasic diameter changes were in the normal range (>= 50%),     consistent with normal central venous pressure.     EDUARDO 11/16/21  - Left ventricular cavity size is severely dilated. - Ejection fraction is visually estimated to be <20%. - There is severe diffuse hypokinesis. - Diastolic filling parameters suggest grade II diastolic dysfunction. - Definity contrast administered. - No left ventricular thrombus was seen. moderate MR  - Pacer / ICD wire is visualized in the right ventricle. - The right ventricle is dilated. - Right ventricular systolic function is moderate to severely reduced. Assessment / Plan    Patient Active Problem List   Diagnosis    ED (erectile dysfunction)    Essential hypertension, benign    Hyperlipidemia    Diabetic nephropathy (HCC)    Diabetes type 2, uncontrolled ([address]    Noncompliance    Elevated blood uric acid level    Coronary artery disease involving native coronary artery    Elevated PSA    CHF (congestive heart failure), NYHA class I, acute on chronic, combined ([address]     1. Acute on Chronic systolic CHF, KNJMG 0   Cedric and I had an extensive discussion about his noncompliance issues. I did tell him that hospice is an option if he does not want to comply with medical therapy.   He states that he does want to keep follow-up appointments and be aggressive with his medical treatment.     Continue IV Lasix for now with compression stockings. Added milrinone with IV Lasix for volume removal.  Continue to hold Entresto therapy for now given his worsening renal function. Continue beta-blocker therapy.     2. CAD of native coronary arteries without angina  Continue statin and beta-blocker therapy.       3. Hyperlipidemia with LDL goal <70 mg/dL    Continue statin therapy for his coronary artery disease.     4. ICD in place      5. LV thrombus   Continue Coumadin therapy. His INR has decreased, but is still mildly supratherapeutic at 2.06.     6. Chronic Kidney Disease, stage 3  He has some acute worsening of his chronic kidney disease. This may be due in part to diuretic therapy. His creatinine is still elevated, but decreasing.   Was started on Milrinone therapy in conjunction with diuretic for adequate volume removal.              Signed:  Edward RAMIREZ

## 2021-11-18 NOTE — PROGRESS NOTES
Patient urinated 500 ml. Post void residual bladder scan shows 190 ml. Patient reports no discomfort. Will continue to monitor.

## 2021-11-18 NOTE — PLAN OF CARE
Problem: Skin Integrity:  Goal: Will show no infection signs and symptoms  Description: Will show no infection signs and symptoms  11/18/2021 1337 by Cathy Guzman RN  Outcome: Ongoing     Problem: Skin Integrity:  Goal: Absence of new skin breakdown  Description: Absence of new skin breakdown  11/18/2021 1337 by Cathy Guzman RN  Outcome: Ongoing     Problem: Falls - Risk of:  Goal: Will remain free from falls  Description: Will remain free from falls  11/18/2021 1337 by Cathy Guzman RN  Outcome: Ongoing     Problem: Falls - Risk of:  Goal: Will remain free from falls  Description: Will remain free from falls  11/18/2021 1337 by Cathy Guzman RN  Outcome: Ongoing     Problem: OXYGENATION/RESPIRATORY FUNCTION  Goal: Patient will maintain patent airway  11/18/2021 1337 by Cathy Guzman RN  Outcome: Ongoing     Problem: OXYGENATION/RESPIRATORY FUNCTION  Goal: Patient will maintain patent airway  11/18/2021 1337 by Cathy Guzman RN  Outcome: Ongoing     Problem: HEMODYNAMIC STATUS  Goal: Patient has stable vital signs and fluid balance  11/18/2021 1337 by Cathy Guzman RN  Outcome: Ongoing     Problem: FLUID AND ELECTROLYTE IMBALANCE  Goal: Fluid and electrolyte balance are achieved/maintained  11/18/2021 1337 by Cathy Guzman RN  Outcome: Ongoing     Problem: ACTIVITY INTOLERANCE/IMPAIRED MOBILITY  Goal: Mobility/activity is maintained at optimum level for patient  11/18/2021 1337 by Cathy Guzman RN  Outcome: Ongoing

## 2021-11-18 NOTE — PROGRESS NOTES
Pt in with shortness of breath, chf.  Is on room air. Remains on a lasix and milrinone drips. His weight is down. He had voided 450 cc of urine and checked postvoid residual 4 times and all were 408 cc. Pt denied feeling of having to urinate any more.

## 2021-11-18 NOTE — PROGRESS NOTES
Hospitalist Progress Note      PCP: No primary care provider on file. Chief Complaint. Patient is a 62-year-old male with past medical history of chronic systolic heart failure, LV thrombus, CAD, hyperlipidemia who presented to hospital for shortness of breath bilateral lower extremity swelling. According to the patient he has not been taking his diuretic medication for past 3 weeks and his lower extremity swelling has been getting worse, he also has worsening shortness of breath, he has not been able to lie down flat and also has exertional dyspnea. Otherwise denied fevers chills nausea vomiting diarrhea constipation dysuria. Date of Admission: 11/14/2021    Subjective: NAEON, sitting in chair comfortably, denies chest pain, nausea, vomiting, shortness of breath, fever or chills.  mention feels overall better    Medications:  Reviewed    Infusion Medications    furosemide (LASIX) 1mg/ml infusion 10 mg/hr (11/18/21 1740)    milrinone 0.375 mcg/kg/min (11/18/21 1740)    dextrose      sodium chloride       Scheduled Medications    rivaroxaban  15 mg Oral Dinner    iron sucrose  200 mg IntraVENous Daily    influenza virus vaccine  0.5 mL IntraMUSCular Prior to discharge    insulin lispro  0-12 Units SubCUTAneous TID WC    insulin lispro  0-6 Units SubCUTAneous Nightly    sodium chloride flush  10 mL IntraVENous 2 times per day    atorvastatin  80 mg Oral Nightly    levothyroxine  25 mcg Oral Daily    [Held by provider] carvedilol  12.5 mg Oral BID WC    isosorbide mononitrate  30 mg Oral Daily     PRN Meds: guaiFENesin-dextromethorphan, polyethylene glycol, glucose, dextrose, glucagon (rDNA), dextrose, sodium chloride flush, sodium chloride, potassium chloride, magnesium sulfate, ondansetron **OR** ondansetron, magnesium hydroxide, acetaminophen **OR** acetaminophen, nitroGLYCERIN      Intake/Output Summary (Last 24 hours) at 11/18/2021 1812  Last data filed at 11/18/2021 1740  Gross per 24 hour   Intake 1211.82 ml   Output 2950 ml   Net -1738.18 ml       Physical Exam Performed:    /77   Pulse 85   Temp 97.7 °F (36.5 °C) (Oral)   Resp 16   Ht 5' 6\" (1.676 m)   Wt 163 lb 5.8 oz (74.1 kg)   SpO2 94%   BMI 26.37 kg/m²     General appearance: NAD sitting in chair comfortably  HEENT:  Conjunctivae/corneas clear. Neck: Supple, with full range of motion. Respiratory:  Normal respiratory effort. Clear to auscultation, bilaterally without Rales/Wheezes/Rhonchi. Cardiovascular: Regular rate and rhythm with normal S1/S2 without murmurs or rubs  Abdomen: Soft, non-tender, non-distended, normal bowel sounds. Musculoskeletal: +1 edema bilaterally  Neurologic:  without any focal sensory/motor deficits. grossly non-focal.  Psychiatric: Alert and oriented, Normal mood  Peripheral Pulses: +2 palpable, equal bilaterally       Labs:   Recent Labs     11/16/21 0517 11/17/21 0458 11/18/21 0439   WBC 3.8* 4.9 4.4   HGB 9.7* 10.2* 10.3*   HCT 30.2* 31.8* 31.4*   * 152 163     Recent Labs     11/16/21 0517 11/17/21 0458 11/18/21 0439    140 140   K 3.9 4.1 3.6    103 101   CO2 25 29 27   BUN 49* 54* 56*   CREATININE 2.2* 1.9* 1.7*   CALCIUM 8.3 8.3 8.4     No results for input(s): AST, ALT, BILIDIR, BILITOT, ALKPHOS in the last 72 hours. Recent Labs     11/16/21 0517 11/17/21 0458 11/18/21 0439   INR 4.08* 3.09* 2.06*     No results for input(s): CKTOTAL, TROPONINI in the last 72 hours. Urinalysis:      Lab Results   Component Value Date    NITRU Negative 11/17/2021    WBCUA 1 11/17/2021    RBCUA 1 11/17/2021    BLOODU TRACE 11/17/2021    SPECGRAV 1.007 11/17/2021    GLUCOSEU Negative 11/17/2021       Radiology:  XR CHEST (2 VW)   Final Result   The cardiac silhouette is enlarged, with somewhat globular configuration   which can be seen in pericardial effusion. No focal airspace disease or   pulmonary edema.                Assessment/Plan:    Active Hospital Problems Diagnosis     CHF (congestive heart failure), NYHA class I, acute on chronic, combined ([facility] [I50.43]      Patient is a 72-year-old male with past medical history of chronic systolic heart failure, LV thrombus, CAD, hyperlipidemia who presented to hospital for shortness of breath bilateral lower extremity swelling. According to the patient he has not been taking his diuretic medication for past 3 weeks and his lower extremity swelling has been getting worse, he also has worsening shortness of breath, he has not been able to lie down flat and also has exertional dyspnea. Otherwise denied fevers chills nausea vomiting diarrhea constipation dysuria.     Assessment  Acute on chronic systolic CHF  CAD  Hyperlipidemia  History of LV thrombus     Plan  Continue IV lasix,  Cardiology following,  resume home Coumadin  Started on IV milrinon, IV Lasix, EF<20% on echo, severe diffuse hypokinesis, Diastolic filling parameters suggest grade II diastolic dysfunction  Strict I's and O's, low-salt diet  Resume home medications  DVT prophylaxis-on Coumadin  Diet: ADULT DIET; Regular; 4 carb choices (60 gm/meal);  Low Sodium (2 gm); 1500 ml  Code Status: Full Code    PT/OT Eval Status: ordered    Dispo - continue IV milrinon, IV Lasix, nephrology following, monitor Jemal Vidal MD

## 2021-11-18 NOTE — CARE COORDINATION
CM continues to follow for discharge placement. Chart reviewed. Pt plans to return home at [place]. Per notes, pt may benefit [facility] to assist with medications and compliance. He is currently on Lasix and milrinone gtt's.     Electronically signed by DAVINA Parker RN-[facility]  Case Management  [phone]

## 2021-11-18 NOTE — PLAN OF CARE
Problem: Skin Integrity:  Goal: Will show no infection signs and symptoms  Description: Will show no infection signs and symptoms  Outcome: Ongoing  Goal: Absence of new skin breakdown  Description: Absence of new skin breakdown  Outcome: Ongoing     Problem: Falls - Risk of:  Goal: Will remain free from falls  Description: Will remain free from falls  Outcome: Ongoing  Goal: Absence of physical injury  Description: Absence of physical injury  Outcome: Ongoing   Fall risk assessment completed every shift. All precautions in place. Pt has call light within reach at all times. Room clear of clutter. Pt aware to call for assistance when getting up. Problem: OXYGENATION/RESPIRATORY FUNCTION  Goal: Patient will maintain patent airway  Outcome: Ongoing  Goal: Patient will achieve/maintain normal respiratory rate/effort  Description: Respiratory rate and effort will be within normal limits for the patient  Outcome: Ongoing     Problem: HEMODYNAMIC STATUS  Goal: Patient has stable vital signs and fluid balance  Outcome: Ongoing     Problem: FLUID AND ELECTROLYTE IMBALANCE  Goal: Fluid and electrolyte balance are achieved/maintained  Outcome: Ongoing     Problem: ACTIVITY INTOLERANCE/IMPAIRED MOBILITY  Goal: Mobility/activity is maintained at optimum level for patient  Outcome: Ongoing   Assess breath sounds, oxygen requirements and saturations, and activity tolerance. Monitor intake and output and daily weights and lab values. Encourage fluid and dietary restrictions.

## 2021-11-18 NOTE — PROGRESS NOTES
[facility]                                               Progress note    Summary:   Janel Spencer is being seen by nephrology for SONAL on CKD. Admitted with SOB, decompensated HF. Interval History  Seen and examined at bedside. Still feeling short of breath but improved. His edema is also improved. Reports urinating a lot, using a urinal  Appetite is okay, energy level low has not been up walking yet as we do not know how his oxygen levels will do  Currently on room air    Blood pressure 110/65  Satting 92% on room air  On milrinone 0.375 mics per minute  Lasix 10 mg/h  Urine output 3.7 L yesterday -1.9 L for the day  Weight 168> 171> 169> 163    Still some pulmonary crackles and extremity edema  Sodium 140  Potassium 3.6  Creatinine 1.9, bicarb 27, BUN 56, calcium 8.4  Hemoglobin 10.3  T sat was 9% ferritin 99.3, this is suggestive of iron deficiency in the setting of chronic kidney disease heart failure    Plan:   -Renal function is improving with diuresis.  -He may have some cardiorenal component contributing to this. He still has pulmonary crackles and lower extremity edema so would continue diuresis with IV Lasix  -Cardiology is following, he is on milrinone.  -Continue strict I's and O's and daily standing weights.  -No acute electrolyte abnormalities, check magnesium level.       Betsy Leon MD  [facility]  Office: [phone]    Assessment:   Chronic kidney disease stage IIIb  This is likely secondary to diabetic nephropathy in the setting of persistent longstanding albuminuria  To complete CKD work-up will need an SPEP and light chains  Baseline creatinine around 2  No acute electrolyte abnormalities  Risk factors include diabetes, hypertension, severe systolic heart failure  Has had diabetes and hypertension for over 10 years and his last EF is less than 20%  He has had event evidence of albuminuria on urinalysis going back to 2016, 2013 had an  mg  Most recent protein creatinine ratio 700 mg, no RBCs no WBCs  CT abdomen in 2020 showed symmetric kidneys no lesions no masses bilateral perinephric stranding no hydronephrosis. HCV and hepatitis C negative  Prior negative NAHOMY  Prior negative RF factor    Ischemic cardiomyopathy  Decompensated, has evidence of volume overload  proBNP 65,000  Managed with Lasix drip and is on milrinone, appreciate cardiology involvement. Last EF less than 20%  History of coronary artery disease with stent in the LAD  Has ICD  Most of his records are in the  system  Previously on Entresto but currently not  Echo here showed severe diffuse hypokinesis grade 2 diastolic dysfunction less than 20% EF severely dilated left ventricular cavity, right ventricular systolic function is severely reduced as well has moderate MR and RV's dilated    Diabetes type 2  10 years at least  Longstanding albuminuria  A1c has been ranging between 6 and 6.5 the last few checks. Hypertension  Blood pressure is acceptable right now  Carvedilol held, on positive inotrope right now      HPI/Summary:   Anastacio Nicholas is being seen by nephrology for acute kidney injury. This is a 47-HJJK-IZW man with systolic heart failure, history of LV thrombus, coronary artery disease, hyperlipidemia who presented the hospital with increasing shortness of breath and worsening lower extremity swelling. Patient has received most of his care at [facility] up until this point. He has a history of heart failure with reduced EF secondary to ischemic cardiomyopathy. He has a single-vessel coronary artery disease status post PCI of the LAD. ICD placed in 2019 last right heart cath was January 29, 2020. Based on review of his last heart failure clinic note it looks like he is supposed to be on carvedilol Imdur Entresto torsemide 20 mg daily. Patient told me he was instructed to stop taking torsemide but was taking it anyway.   Last heart failure note said that there was discussion about evaluation for possible LVAD versus transplant. He is on Coumadin for the LV thrombus. Patient says he is never seen a kidney doctor as an outpatient. He denies any chest pain but he just had general decreased stamina and dyspnea on exertion, orthopnea and lower extremity edema. He does not weigh himself at home daily. He said he became to the hospital because he was prompted by his family members to seek medical attention. ROS:   Positives Listed Bold. All other remaining systems are negative. Constitutional:  fever, chills, weakness, weight change, fatigue,      Skin:  rash, pruritus, hair loss, bruising, dry skin, petechiae. Head, Face, Neck   headaches, swelling,  cervical adenopathy. Respiratory: shortness of breath, cough, or wheezing  Cardiovascular: chest pain, palpitations, dizzy, edema  Gastrointestinal: nausea, vomiting, diarrhea, constipation,belly pain    Yellow skin, blood in stool  Musculoskeletal:  back pain, muscle weakness, gait problems,       joint pain or swelling. Genitourinary:  dysuria, poor urine flow, flank pain, blood in urine  Neurologic:  vertigo, TIA'S, syncope, seizures, focal weakness  Psychosocial:  insomnia, anxiety, or depression. Additional positive findings: -     PMH:   Past medical history, surgical history, social history, family history are reviewed and updated as appropriate. Reviewed current medication list.   Allergies reviewed and updated as needed. PE:   Vitals:    11/18/21 0935   BP:    Pulse:    Resp:    Temp:    SpO2: 92%       General appearance: male in NAD, fully alert and oriented. Comfortable. HEENT: EOM intact, no icterus. Trachea is midline. Neck : No masses, appears symmetrical, + JVD  Respiratory: Respiratory effort appears normal, bilateral equal chest rise, no wheeze, + bibasilar crackles left more than right   Cardiovascular:  Ausculation shows RRR + edema  Abdomen: No visible mass or tenderness, non distended. Musculoskeletal:  Joints with no swelling or deformity. Skin:no rashes, ulcers, induration, no jaundice. Neuro: face symmetric, no focal deficits. Appropriate responses.        Lab Results   Component Value Date    CREATININE 1.7 (H) 11/18/2021    BUN 56 (H) 11/18/2021     11/18/2021    K 3.6 11/18/2021     11/18/2021    CO2 27 11/18/2021      Lab Results   Component Value Date    WBC 4.4 11/18/2021    HGB 10.3 (L) 11/18/2021    HCT 31.4 (L) 11/18/2021    MCV 99.5 11/18/2021     11/18/2021     Lab Results   Component Value Date    CALCIUM 8.4 11/18/2021    PHOS 3.8 08/30/2017

## 2021-11-18 NOTE — PROGRESS NOTES
Physical Therapy  Facility/Department: [facility] 5 PROGRESSIVE CARE  Daily Treatment Note  NAME: Apollo Sheehan  : 1955  MRN: [id number]    Date of Service: 2021    Discharge Recommendations:  Home with assist PRN, 2-3 sessions per week   PT Equipment Recommendations  Other: pt would benefit from using an AD but adamantly refuses  Apollo Sheehan scored a 18/24 on the AM-PAC short mobility form. Current research shows that an AM-PAC score of 18 or greater is typically associated with a discharge to the patient's home setting. Based on the patient's AM-PAC score and their current functional mobility deficits, it is recommended that the patient have 2-3 sessions per week of Physical Therapy at d/c to increase the patient's independence. At this time, this patient demonstrates the endurance and safety to discharge home with home PT and a follow up treatment frequency of 2-3x/wk. Please see assessment section for further patient specific details. If patient discharges prior to next session this note will serve as a discharge summary. Please see below for the latest assessment towards goals. Assessment   Body structures, Functions, Activity limitations: Decreased functional mobility ; Decreased ADL status; Decreased endurance; Decreased balance  Assessment: pt is a 60 yo male adm to hosp with LE swelling and SOB; pt reports he will return home at discharge and declines use of any AD. Pt was slighlty wobbly during session but reports it is due to his poor eyesight and being in unfamiliar surroundings; pt is marginal for returning home without an AD or increased assist from his son.   Recommend home with family assist and home PT however would likely benefit from either a walker or a cane but pt refuses  Prognosis: Good  Decision Making: Medium Complexity  PT Education: General Safety  Patient Education: attempted to further discuss his unsteadiness but pt adamant it is due to his eyesight and will be fine once he is in his own apt  Barriers to Learning: decreased insight into safety issues  REQUIRES PT FOLLOW UP: Yes  Activity Tolerance  Activity Tolerance: Patient Tolerated treatment well     Patient Diagnosis(es): The primary encounter diagnosis was Acute on chronic systolic congestive heart failure ([address] Diagnoses of Acute respiratory failure with hypoxia (HCC) and Dyspnea on exertion were also pertinent to this visit. has a past medical history of Diabetes type 2, uncontrolled ([address] Diabetic nephropathy, ED (erectile dysfunction), Essential hypertension, benign, Hyperlipidemia, and Noncompliance. has a past surgical history that includes eye surgery. Restrictions  Restrictions/Precautions  Restrictions/Precautions: Fall Risk  Position Activity Restriction  Other position/activity restrictions: O2nc, external catheter  Subjective   General  Chart Reviewed: Yes  Additional Pertinent Hx: here due to increasing SOB, increasing LE edema   PMH includes CHF, ICD  Response To Previous Treatment: Patient with no complaints from previous session.   Family / Caregiver Present: No  Subjective  Subjective: pt finishing up bathing at sink with PCA upon arrival; agreeable to working with therapy; no c/o pain          Orientation  Orientation  Overall Orientation Status: Within Functional Limits  Cognition   Cognition  Overall Cognitive Status: WFL  Objective   Bed mobility  Comment: up in bathroom upon arrival and stayed in chair at end of session  Transfers  Sit to Stand: Stand by assistance; Contact guard assistance  Stand to sit: Stand by assistance; Contact guard assistance  Ambulation  Ambulation?: Yes  Ambulation 1  Surface: level tile  Device:  (pt held onto IV pole)  Assistance: Contact guard assistance; Stand by assistance  Quality of Gait: pt slighlty wobbly but reports it is because his eyesight is poor and he is in unfamiliar surroundings  Distance: 120' x2  Comments: offered to use an AD but pt reported he would not use one at home and does not feel he needs it; says his AD is his son     Balance  Comments: MI for sitting balance; SBA for static standing with holding onto IV pole            Comment: assisted with donning a new gown after his bathing tasks with PCA; assisted with positioning in chair for comfort with all needs in reach awaiting lunch tray              G-Code     OutComes Score                                                     AM-PAC Score  AM-PAC Inpatient Mobility Raw Score : 18 (11/18/21 1253)  AM-PAC Inpatient T-Scale Score : 43.63 (11/18/21 1253)  Mobility Inpatient CMS 0-100% Score: 46.58 (11/18/21 1253)  Mobility Inpatient CMS G-Code Modifier : CK (11/18/21 1253)          Goals  Short term goals  Time Frame for Short term goals: 2-3 days  Short term goal 1: bed mobility at supervision  Short term goal 2: transfers at supervision  Short term goal 3: ambulation at supervision with rolling walker as needed for safety and effectiveness for household distances    -steps as needed for home entry at [address] [place] [address]  Patient Goals   Patient goals : feel better and to return to his apartment    Plan    Plan  Times per week: 3-5 while on acute floor  Current Treatment Recommendations: Functional Mobility Training, Positioning, Patient/Caregiver Education & Training, ADL/Self-care Training  Safety Devices  Type of devices:  All fall risk precautions in place, Call light within reach, Chair alarm in place, Left in chair, Nurse notified     Therapy Time   Individual Concurrent Group Co-treatment   Time In 1150         Time Out 1243         Minutes 53                 LARA NGO PT    Electronically signed by LARA NGO PT on 11/18/2021 at 12:54 PM

## 2021-11-19 NOTE — PROGRESS NOTES
[facility]                                               Progress note    Summary:   Tolu De León is being seen by nephrology for SONAL on CKD. Admitted with SOB, decompensated HF. Interval History  Seen and examined at bedside. Shortness of breath and edema has improved. Improved pulmonary crackles today. He did well with therapy today. PVR was greater than 300 cc so Sierra inserted    Blood pressure 127/85  Satting 95% on room air  He is on milrinone Lasix 10 mg/h  Weight 168> 171> 169> 163 > 158 pounds    Creatinine 1.7 > 1.6  Bicarb 29, potassium 3.6, magnesium 2.3    Plan:   Volume status improving and renal function improving with diuretics. Suggesting some cardiorenal physiology contributing to his SONAL. -He is down 8 kg from admission but still has some plan to give based on exam.  Agree with IV diuresis today can probably transition to torsemide over the next day or 2.  -Continue strict I's and O's and daily standing weights.  -No acute electrolyte abnormalities      Lora Chavira MD  [facility]phrology  Office: [phone]    Assessment:   Chronic kidney disease stage IIIb  This is likely secondary to diabetic nephropathy in the setting of persistent longstanding albuminuria  To complete CKD work-up will need an SPEP and light chains  Baseline creatinine around 2  No acute electrolyte abnormalities  Risk factors include diabetes, hypertension, severe systolic heart failure  Has had diabetes and hypertension for over 10 years and his last EF is less than 20%  He has had event evidence of albuminuria on urinalysis going back to 2016, 2013 had an  mg  Most recent protein creatinine ratio 700 mg, no RBCs no WBCs  CT abdomen in 2020 showed symmetric kidneys no lesions no masses bilateral perinephric stranding no hydronephrosis.   HCV and hepatitis C negative  Prior negative NAHOMY  Prior negative RF factor    Ischemic cardiomyopathy  Decompensated, has evidence of volume overload  proBNP 65,000  Managed with Lasix drip and is on milrinone, appreciate cardiology involvement. Last EF less than 20%  History of coronary artery disease with stent in the LAD  Has ICD  Most of his records are in the  system  Previously on Entresto but currently not  Echo here showed severe diffuse hypokinesis grade 2 diastolic dysfunction less than 20% EF severely dilated left ventricular cavity, right ventricular systolic function is severely reduced as well has moderate MR and RV's dilated    Diabetes type 2  10 years at least  Longstanding albuminuria  A1c has been ranging between 6 and 6.5 the last few checks. Hypertension  Blood pressure is acceptable right now  Carvedilol held, on positive inotrope right now      HPI/Summary:   Rafael Guevara is being seen by nephrology for acute kidney injury. This is a 89-TBRF-QAD man with systolic heart failure, history of LV thrombus, coronary artery disease, hyperlipidemia who presented the hospital with increasing shortness of breath and worsening lower extremity swelling. Patient has received most of his care at [facility] up until this point. He has a history of heart failure with reduced EF secondary to ischemic cardiomyopathy. He has a single-vessel coronary artery disease status post PCI of the LAD. ICD placed in 2019 last right heart cath was January 29, 2020. Based on review of his last heart failure clinic note it looks like he is supposed to be on carvedilol Imdur Entresto torsemide 20 mg daily. Patient told me he was instructed to stop taking torsemide but was taking it anyway. Last heart failure note said that there was discussion about evaluation for possible LVAD versus transplant. He is on Coumadin for the LV thrombus. Patient says he is never seen a kidney doctor as an outpatient. He denies any chest pain but he just had general decreased stamina and dyspnea on exertion, orthopnea and lower extremity edema.   He does not weigh himself at home daily. He said he became to the hospital because he was prompted by his family members to seek medical attention. ROS:   Positives Listed Bold. All other remaining systems are negative. Constitutional:  fever, chills, weakness, weight change, fatigue,      Skin:  rash, pruritus, hair loss, bruising, dry skin, petechiae. Head, Face, Neck   headaches, swelling,  cervical adenopathy. Respiratory: shortness of breath, cough, or wheezing  Cardiovascular: chest pain, palpitations, dizzy, edema  Gastrointestinal: nausea, vomiting, diarrhea, constipation,belly pain    Yellow skin, blood in stool  Musculoskeletal:  back pain, muscle weakness, gait problems,       joint pain or swelling. Genitourinary:  dysuria, poor urine flow, flank pain, blood in urine  Neurologic:  vertigo, TIA'S, syncope, seizures, focal weakness  Psychosocial:  insomnia, anxiety, or depression. Additional positive findings: -     PMH:   Past medical history, surgical history, social history, family history are reviewed and updated as appropriate. Reviewed current medication list.   Allergies reviewed and updated as needed. PE:   Vitals:    11/19/21 1217   BP: 127/85   Pulse: 86   Resp:    Temp:    SpO2: 95%       General appearance: male in NAD, fully alert and oriented. Comfortable. HEENT: EOM intact, no icterus. Trachea is midline. Neck : No masses, appears symmetrical, + JVD  Respiratory: Respiratory effort appears normal, bilateral equal chest rise, no wheeze, + bibasilar crackles left more than right   Cardiovascular: Ausculation shows RRR + edema  Abdomen: No visible mass or tenderness, non distended. Musculoskeletal:  Joints with no swelling or deformity. Skin:no rashes, ulcers, induration, no jaundice. Neuro: face symmetric, no focal deficits. Appropriate responses.        Lab Results   Component Value Date    CREATININE 1.6 (H) 11/19/2021    BUN 55 (H) 11/19/2021     11/19/2021    K 3.6 11/19/2021     11/19/2021    CO2 29 11/19/2021      Lab Results   Component Value Date    WBC 4.2 11/19/2021    HGB 10.4 (L) 11/19/2021    HCT 31.6 (L) 11/19/2021    MCV 99.0 11/19/2021     11/19/2021     Lab Results   Component Value Date    CALCIUM 8.6 11/19/2021    PHOS 3.8 08/30/2017

## 2021-11-19 NOTE — PROGRESS NOTES
Physical Therapy  Facility/Department: [facility] 5W PROGRESSIVE CARE  Daily Treatment Note  NAME: Bassem Weiss  : 1955  MRN: [id number]    Date of Service: 2021    Discharge Recommendations:  Home with assist PRN, 2-3 sessions per week, Patient would benefit from continued therapy after discharge   PT Equipment Recommendations  Other: pt would benefit from using an AD but adamantly refuses    Assessment   Body structures, Functions, Activity limitations: Decreased functional mobility ; Decreased ADL status; Decreased endurance; Decreased balance  Assessment: Pt agreeable to brief activity, able to complete several standing/balance exercises with Min-CGA . Continues to plan to return home upon D/C. Will likely require assist from son, and home PT level 1. Bassem Weiss scored a 18/24 on the AM-PAC short mobility form. If patient discharges prior to next session this note will serve as a discharge summary. Please see below for the latest assessment towards goals. PT Education: General Safety  REQUIRES PT FOLLOW UP: Yes  Activity Tolerance  Activity Tolerance: Patient Tolerated treatment well     Patient Diagnosis(es): The primary encounter diagnosis was Acute on chronic systolic congestive heart failure ([address] Diagnoses of Acute respiratory failure with hypoxia (HCC) and Dyspnea on exertion were also pertinent to this visit. has a past medical history of Diabetes type 2, uncontrolled ([address] Diabetic nephropathy, ED (erectile dysfunction), Essential hypertension, benign, Hyperlipidemia, and Noncompliance. has a past surgical history that includes eye surgery. Restrictions  Restrictions/Precautions  Restrictions/Precautions: Fall Risk  Position Activity Restriction  Other position/activity restrictions: Rigo Whitman     Subjective   General  Chart Reviewed:  Yes  Additional Pertinent Hx: here due to increasing SOB, increasing LE edema   PMH includes CHF, ICD  Response To Previous Treatment: Patient Please remember to schedule your ultrasound prior to your next office visit with Dr. Gates. If Central Scheduling has not yet contacted you to schedule this, please call them at [phone].    Check in at radiologist desk on the 1st floor for ultrasound on August 15th at 9am.    with no complaints from previous session. Subjective  Subjective: Pt agreeable to brief activity. Does not want to walk far d/t Lasix. Orientation  Orientation  Overall Orientation Status: Within Functional Limits    Objective      Transfers  Sit to Stand: Stand by assistance; Contact guard assistance  Stand to sit: Stand by assistance; Contact guard assistance     Exercises  Comments: Static stand x 1 min., SBA. Lateral weight shift x 10, A/P weight shift x 20, CGA. March in place x 50, Min-CGA. Other Activities: Other (see comment)  Comment: Pt care transferred to physician at end of session. AM-PAC Score  AM-PAC Inpatient Mobility Raw Score : 18 (11/19/21 1344)  AM-PAC Inpatient T-Scale Score : 43.63 (11/19/21 1344)  Mobility Inpatient CMS 0-100% Score: 46.58 (11/19/21 1344)  Mobility Inpatient CMS G-Code Modifier : CK (11/19/21 1344)          Goals  Short term goals  Time Frame for Short term goals: 2-3 days  Short term goal 1: bed mobility at supervision  Short term goal 2: transfers at supervision  Short term goal 3: ambulation at supervision with rolling walker as needed for safety and effectiveness for household distances    -steps as needed for home entry at [address] [place] [address]  Patient Goals   Patient goals : feel better and to return to his apartment    Plan    Plan  Times per week: 3-5 while on acute floor  Current Treatment Recommendations: Functional Mobility Training, Positioning, Patient/Caregiver Education & Training, ADL/Self-care Training  Safety Devices  Type of devices:  All fall risk precautions in place, Call light within reach, Left in bed  Restraints  Initially in place: No     Therapy Time   Individual Concurrent Group Co-treatment   Time In 1330         Time Out 1340         Minutes 10         Timed Code Treatment Minutes: [address], PT    Electronically signed by Pina Morel PT [id number] on 11/19/2021 at 1:45 PM

## 2021-11-19 NOTE — PROGRESS NOTES
Earlene 83   Daily Progress Note      Admit Date:  11/14/2021    Reason for follow up visit: CHF    CC: \"I'm feeling and breathing a lot better. \"    62 y/o male with PMH significant for CAD, chronic systolic HF with ICD in place (8/2019), LV thrombus, HTN, HLP and type II diabetes mellitus with diabetic nephropathy stage III who was admitted to [facility]note 3[facility] with increased Ellin Post was to have a RHC performed last week, however, the patient failed to show for the procedure. Given his SOB and weight gain his family brought him to ED and he was admitted for CHF. He had been receiving IV lasix intermittently and changed to continuous IV lasix and Milrinone and has had good diuresis. Interval History:  Pt. seen and examined; records reviewed  BP stable. Wt today 158#, down from 175# on admit (pt claims dry weight ~ 150#)  Cr now 1.6  SOB and edema much improved  Remains on Milrinone and continuous IV lasix    Subjective:  Pt with no acute overnight cardiac events. Denies chest pain, cough, PND, palpitations or dizziness    Review of Systems:   · Constitutional: no unanticipated weight loss. There's been no change in energy level, sleep pattern, or activity level. No fevers, chills. · Eyes: No visual changes or diplopia. No scleral icterus. · ENT: No Headaches, hearing loss or vertigo. No mouth sores or sore throat. · Cardiovascular: as reviewed in HPI + generalized edema improving  · Respiratory: No cough or wheezing, no sputum production. No hematemesis.  + SOB improving  · Gastrointestinal: No abdominal pain, appetite loss, blood in stools. No change in bowel or bladder habits. · Genitourinary: No dysuria, trouble voiding, or hematuria. · Musculoskeletal:  No gait disturbance, no joint complaints. · Integumentary: No rash or pruritis. · Neurological: No headache, diplopia, change in muscle strength, numbness or tingling. · Psychiatric: No anxiety or depression.   · Endocrine: No temperature intolerance. No excessive thirst, fluid intake, or urination. No tremor. · Hematologic/Lymphatic: No abnormal bruising or bleeding, blood clots or swollen lymph nodes. · Allergic/Immunologic: No nasal congestion or hives. Objective:   /81   Pulse 97   Temp 98.3 °F (36.8 °C)   Resp 17   Ht 5' 6\" (1.676 m)   Wt 158 lb 8.2 oz (71.9 kg)   SpO2 94%   BMI 25.58 kg/m²     Intake/Output Summary (Last 24 hours) at 11/19/2021 1112  Last data filed at 11/19/2021 1003  Gross per 24 hour   Intake 1097.86 ml   Output 5040 ml   Net -3942.14 ml     Wt Readings from Last 3 Encounters:   11/19/21 158 lb 8.2 oz (71.9 kg)   10/22/21 166 lb (75.3 kg)   06/12/19 161 lb (73 kg)       Physical Exam:  General: In no acute distress. Awake, alert, and oriented X4. Up in room  Skin:  Warm and dry. No new appearing rashes or lesions. Neck:  Supple. + mild JVD. No carotid bruit  Chest: Lungs with minimally diminished breath sounds bilaterally; few fine crackles in R posterior base. No wheezes/rhonchi  Cardiovascular: slightly irreg; normal S1 and S2; + S3 gallop  Abdomen:  soft, nontender, nondistended, +bowel sounds. Extremities: 1+ bilateral lower leg edema; bilateral wraps to lower legs.  1+ bilateral Dp/PT pulses    Medications:    metOLazone  5 mg Oral Once    potassium bicarb-citric acid  40 mEq Oral Once    rivaroxaban  15 mg Oral Dinner    iron sucrose  200 mg IntraVENous Daily    influenza virus vaccine  0.5 mL IntraMUSCular Prior to discharge    insulin lispro  0-12 Units SubCUTAneous TID WC    insulin lispro  0-6 Units SubCUTAneous Nightly    sodium chloride flush  10 mL IntraVENous 2 times per day    atorvastatin  80 mg Oral Nightly    levothyroxine  25 mcg Oral Daily    [Held by provider] carvedilol  12.5 mg Oral BID WC    isosorbide mononitrate  30 mg Oral Daily      furosemide (LASIX) 1mg/ml infusion 10 mg/hr (11/19/21 0558)    milrinone 0.375 mcg/kg/min (11/19/21 0558)    dextrose      sodium chloride       guaiFENesin-dextromethorphan, polyethylene glycol, glucose, dextrose, glucagon (rDNA), dextrose, sodium chloride flush, sodium chloride, potassium chloride, magnesium sulfate, ondansetron **OR** ondansetron, magnesium hydroxide, acetaminophen **OR** acetaminophen, nitroGLYCERIN    Lab Data:  CBC:   Recent Labs     11/17/21 0458 11/18/21 0439 11/19/21 0459   WBC 4.9 4.4 4.2   HGB 10.2* 10.3* 10.4*    163 178     BMP:    Recent Labs     11/17/21 0458 11/18/21 0439 11/19/21 0459    140 141   K 4.1 3.6 3.6   CO2 29 27 29   BUN 54* 56* 55*   CREATININE 1.9* 1.7* 1.6*     LIVR: No results for input(s): AST, ALT in the last 72 hours. INR:    Recent Labs     11/17/21 0458 11/18/21 0439 11/19/21 0459   INR 3.09* 2.06* 2.56*     Results for Osbaldo Shaikh (MRN [id number]) as of 11/19/2021 11:14   Ref. Range 11/14/2021 14:29   Pro-BNP Latest Ref Range: 0 - 124 pg/mL 65,067 (H)   Troponin Latest Ref Range: <0.01 ng/mL 0.05 (H)     Results for Osbaldo Shaikh (MRN [id number]) as of 11/19/2021 11:14   Ref. Range 11/19/2021 04:59   Magnesium Latest Ref Range: 1.80 - 2.40 mg/dL 2.30     ECG 11/14/2021:  Normal sinus rhythm  Low voltage in the limb leads  Anterolateral infarct , age undetermined  T wave abnormality, consider lateral ischemia  Abnormal ECG  No previous ECGs available    11/16/2021 Echo:  Left ventricular cavity size is severely dilated.   Ejection fraction is visually estimated to be <20%.  Windell Enedelia is severe diffuse hypokinesis.   Diastolic filling parameters suggest grade II diastolic dysfunction.   Definity contrast administered.   No left ventricular thrombus was seen.   moderate MR   Pacer / ICD wire is visualized in the right ventricle.   The right ventricle is dilated.   Right ventricular systolic function is moderate to severely reduced.   TAPSE 1.01 cm    Echo 7/2/2020 ():  - Left ventricle: The cavity size is severely dilated.  Wall thickness is normal. Systolic function   was severely reduced. The estimated ejection fraction was in the range of 10% to 15%. Diffuse     hypokinesis. Doppler parameters are consistent with restrictive physiology, indicative of     decreased left ventricular diastolic compliance and/or increased left atrial pressure. There was     spontaneous echo contrast, indicative of stasis. - Mitral valve: Moderate regurgitation.   - Left atrium: The atrium is severely dilated. - Right ventricle: Pacer wire or catheter noted in right ventricle. Systolic function was mildly     reduced. - Right atrium: The atrium is mildly dilated. - Atrial septum: The septum bowed from left to right, consistent with increased left atrial     pressure. - Tricuspid valve: Mild-moderate regurgitation.   - Pulmonary arteries: Systolic pressure was severely increased, estimated to be 65mm Hg assuming     that the right atrial pressure was 15 mmHg. - Inferior vena cava: The vessel was dilated. The respirophasic diameter changes were blunted (<     50%), consistent with elevated central venous pressure.       [facility] 1/31/2020  IMPRESSIONS:  Normal left and right filling pressures with   preserved CO/CI. SUMMARY:   Hemodynamic:   RA 2   RV 28/3   PA 28/10 mean 16   PCWP 4   Blu 4.5/2.5   TD 4.5/2.5   PASat 66%   RECOMMENDATIONS:  Further management per [facility].      Echo 1/9/2020 ():  - Left ventricle: The cavity size is severely dilated. Wall     thickness was increased in a pattern of mild LVH. Systolic     function was severely reduced. The estimated ejection fraction     was in the range of 10% to 15%. Diffuse hypokinesis. No contrast     administered - unable to evaluate for LV thrombus. The     longitudinal strain is -2.47% (markedly reduced). - Aortic valve: Mild thickening.   - Mitral valve: Leaflet tenting. Mild to moderate regurgitation.   - Left atrium: The atrium is severely dilated. - Right ventricle:  The cavity size is normal. Pacer wire or ICD     noted in right ventricle. Systolic function was mildly reduced by     objective interpretation. TAPSE: 1.6cm. Tricuspid annular systolic     velocity: 8cm/s.   - Tricuspid valve: Mild-moderate regurgitation.   - Pulmonary arteries: Systolic pressure could not be accurately     estimated, however it is elevated at at least 51 mm Hg, assuming     an RA pressure of 15 mm Hg. - Inferior vena cava: The vessel was dilated. The respirophasic     diameter changes were blunted (< 50%), consistent with elevated     central venous pressure. - Pericardium, extracardiac: A trivial pericardial effusion is     identified.      12/10/2018 LHC/PCI and RHC:  IMPRESSIONS:  Selective angiography of LMCA showing critical mid LAD   lesion. Successful PCI with 3.5 x 18 mm MIKE. Normal cardiac output, m   ildly increased pulmonary pressures with mildly increased filling pre   ssure on inotropic support. SUMMARY:     1. Wedge pressure in the pulmonary capillaries is mildly elevated. 2. Coronary arteries: The left main is unusually long and      trifurcates into the LAD, a ramus intermedius, and the left      circumflex. The left anterior descending gives rise to 2      diagonals and 3 septals and wraps around the apex. The left      circumflex gives rise to 2 obtuse marginals and no      posterolaterals. The first obtuse marginal has a high origin. 3. LAD: Mid-vessel lesion: There is a discrete, 99% stenosis. The      distal vessel supplies a moderate-sized vascular territory. The      lesion is a likely culprit for the patient's clinical      presentation. The lesion was stented (see 1st lesion      intervention). Following intervention, the lesion has a residual      stenosis of 0% and MUKUND grade 3 flow (brisk flow). 4. AV was not crossed due to known apical LV thrombus.    5. RA 6      RV 48/4      PA 50/20 (28)      W 18.   6. Blu CO 5.2 Blu CI 2.9 on Milrinone 0.25 mcg/mg/min.      12/4/2018 Cardiac MRI ([facility]):  Findings are consistent with a severe, ischemic cardiomyopathy.  There is akinesis of the apical segments with an associated small, apical thrombus. The left ventricle is severely dilated with severely reduced systolic function. The right ventricle is mildly dilated with moderately decreased systolic function. The left atrium is severely dilated.  There is mild to moderate mitral regurgitation. The right atrium is mildly dilated.  There is moderate tricuspid regurgitation. The descending aorta is dilated. The pulmonary artery is dilated.  The IVC and coronary sinus are dilated. Please see noncardiac findings below.      FINDINGS:     1. The left ventricular size is severely dilated. The left ventricular ejection fraction is 19 % by Smallwood's method. Global left ventricular function is severely decreased. There is akinesis of the apical segments, severe hypokinesis of the basal and mid septal and basal and mid anterior segments and moderate hypokinesis of the lateral segments and mid inferior segment and mild hypokinesis of the basal inferior segment. The left ventricular mass is moderately increased. There appears to be a small intracavitary thrombus (6.5 x 5 mm). 2. There is no evidence of increased iron deposition within the myocardium (T2*myocardium = 51 msec; if < 20 msec, suggestive of iron-overloading of the myocardium).  There is no evidence of myocardial edema. 3. The right ventricular size is mildly dilated. Global right ventricular function is moderately decreased. 4. Left atrial size is severely enlarged. Right atrial size is mildly enlarged.   The Qp/Qs is 1.0 by phase contrast imaging and is consistent with no evidence of shunt. 5. The calculated aortic regurgitant fraction is 1.55 %. There is moderate mitral regurgitation. The calculated mitral regurgitant fraction is 29.02 %. There is moderate tricuspid regurgitation.  The calculated tricuspid regurgitant fraction is 37.1 %. The calculated pulmonic regurgitant fraction is 2.39 %. 6. There is a small pericardial effusion. 7. The descending aorta is dilated measuring 27.2 mm. 8. The main pulmonary artery is dilated measuring 34.0 mm.  The IVC and coronary sinus are dilated. 9. There are large left and moderate right pleural effusions.  Basilar atelectasis is associated with the effusions.  There is evidence of mild pulmonary edema.     Telemetry:Sinus rhythm with PVC's; 3 beat NSVT    Assessment/Plan:    1. Acute on chronic systolic HF  -NYHA class III-IV; LVEF < 20%  -S/P ICD placement in 2019  -carvedilol on hold d/t Milrinone  -Entresto on hold d/t elevated Cr (will plan to resume prior to discharge)  -no aldactone or SGLT2 inhibitor d/t decreased renal function  -would continue continuous IV lasix infusion today and change to intermittent IV or po depending on status tomorrow  -continue Milrinone at current dose; consider decrease tomorrow with weaning off over 2 days  -resume carvedilol once Milrinone discontinued  -low sodium diet and fluid restriction  -discussed and reinforced need for compliance and follow up     2. LV thrombus  -now on Xarelto  -follow up echo without thrombus  -given H/O noncompliance switched from Warfarin    3. SONAL on CKD , stage III  -suspect worsened by fluid retention  -Cr continues to improve with diuresis     4. CAD of native coronary arteries without angina  -prior PCI of LAD  -no recurrent angina  -elevated troponin secondary to CHF and not an ACS  -continue medical management with statin, BB  -not on ASA d/t Xarelto     5. S/P ICD     6. Hyperlipidemia with LDL goal , 70  -continue high intensity statin     7. Anemia  -iron deficiency  -receiving IV iron    He continues to improve with diuresis. Lengthy discussion today regarding dietary compliance, medication compliance and need for scheduled follow up.      Electronically signed by DAHIANA Jerry - CNP on 11/19/2021 at 11:12 AM

## 2021-11-19 NOTE — PROGRESS NOTES
Patient voiding large amounts, but still had over 300 ml PVR x 2. He had 400 ml returned once16 Fr medellin catheter inserted.

## 2021-11-19 NOTE — PLAN OF CARE
Skin assessment performed each shift per protocol. Patient turned and repositioned every two hours and prn with pillow support. Patient checked for incontence every two hours. Patient free from falls this shift. Fall precautions in place at all times. Bed in lowest position with two side rails up and wheels locked. Call light within reach. Patient able and agreeable to contact for safety appropriately.

## 2021-11-20 PROBLEM — I50.23 ACUTE ON CHRONIC SYSTOLIC CONGESTIVE HEART FAILURE (HCC): Status: ACTIVE | Noted: 2021-01-01

## 2021-11-20 NOTE — PROGRESS NOTES
[facility]   Daily Progress Note      Admit Date:  11/14/2021    Reason for follow up visit: CHF    CC: \"I'm feeling and breathing a lot better. \"    60 y/o male with PMH significant for CAD, chronic systolic HF with ICD in place (8/2019), LV thrombus, HTN, HLP and type II diabetes mellitus with diabetic nephropathy stage III who was admitted to [facility] with increased Velarde Joshua was to have a RHC performed last week, however, the patient failed to show for the procedure. Given his SOB and weight gain his family brought him to ED and he was admitted for CHF. He had been receiving IV lasix intermittently and changed to continuous IV lasix and Milrinone and has had good diuresis. Interval History:  Pt. seen and examined; records reviewed  BP stable. Wt today 158#, down from 175# on admit (pt claims dry weight ~ 150#)  Cr now 1.6  SOB and edema much improved  Remains on Milrinone and continuous IV lasix    Subjective:  Pt with no acute overnight cardiac events. Denies chest pain, cough, PND, palpitations or dizziness     Interval history 11/20/2021  -Patient continues to diurese well and has lost additional 6 pounds since yesterday. .  Denies any significant chest pain or shortness of breath    Interval history 11/21/2021  -Patient continues to diurese well on IV Lasix. He denies any chest pain or shortness of breath. . He is additional 3.3 L down from yesterday. His weight has dropped to 134 pounds    Review of Systems:   · Constitutional: no unanticipated weight loss. There's been no change in energy level, sleep pattern, or activity level. No fevers, chills. · Eyes: No visual changes or diplopia. No scleral icterus. · ENT: No Headaches, hearing loss or vertigo. No mouth sores or sore throat. · Cardiovascular: as reviewed in HPI + generalized edema improving  · Respiratory: No cough or wheezing, no sputum production.  No hematemesis.  + SOB improving  · Gastrointestinal: No abdominal pain, appetite loss, levothyroxine  25 mcg Oral Daily    [Held by provider] carvedilol  12.5 mg Oral BID WC    isosorbide mononitrate  30 mg Oral Daily      furosemide (LASIX) 1mg/ml infusion 10 mg/hr (11/20/21 1046)    milrinone 0.375 mcg/kg/min (11/20/21 0600)    dextrose      sodium chloride       guaiFENesin-dextromethorphan, polyethylene glycol, glucose, dextrose, glucagon (rDNA), dextrose, sodium chloride flush, sodium chloride, potassium chloride, magnesium sulfate, ondansetron **OR** ondansetron, magnesium hydroxide, acetaminophen **OR** acetaminophen, nitroGLYCERIN    Lab Data:  CBC:   Recent Labs     11/18/21 0439 11/19/21 0459 11/20/21 0456   WBC 4.4 4.2 5.5   HGB 10.3* 10.4* 10.9*    178 198     BMP:    Recent Labs     11/18/21 0439 11/19/21 0459 11/20/21 0456    141 139   K 3.6 3.6 4.1   CO2 27 29 32   BUN 56* 55* 58*   CREATININE 1.7* 1.6* 1.7*     LIVR: No results for input(s): AST, ALT in the last 72 hours. INR:    Recent Labs     11/18/21 0439 11/19/21 0459 11/20/21 0456   INR 2.06* 2.56* 2.83*     Results for Guillermo Greenberg (MRN [id number]) as of 11/19/2021 11:14   Ref. Range 11/14/2021 14:29   Pro-BNP Latest Ref Range: 0 - 124 pg/mL 65,067 (H)   Troponin Latest Ref Range: <0.01 ng/mL 0.05 (H)     Results for Guillermo Greenberg (MRN [id number]) as of 11/19/2021 11:14   Ref.  Range 11/19/2021 04:59   Magnesium Latest Ref Range: 1.80 - 2.40 mg/dL 2.30     ECG 11/14/2021:  Normal sinus rhythm  Low voltage in the limb leads  Anterolateral infarct , age undetermined  T wave abnormality, consider lateral ischemia  Abnormal ECG  No previous ECGs available    11/16/2021 Echo:  Left ventricular cavity size is severely dilated.   Ejection fraction is visually estimated to be <20%.   There is severe diffuse hypokinesis.   Diastolic filling parameters suggest grade II diastolic dysfunction.   Definity contrast administered.   No left ventricular thrombus was seen.   moderate MR   Pacer / ICD wire is visualized in the right ventricle.   The right ventricle is dilated.   Right ventricular systolic function is moderate to severely reduced.   TAPSE 1.01 cm    Echo 7/2/2020 [facility]):  - Left ventricle: The cavity size is severely dilated. Wall thickness is normal. Systolic function     was severely reduced. The estimated ejection fraction was in the range of 10% to 15%. Diffuse     hypokinesis. Doppler parameters are consistent with restrictive physiology, indicative of     decreased left ventricular diastolic compliance and/or increased left atrial pressure. There was     spontaneous echo contrast, indicative of stasis. - Mitral valve: Moderate regurgitation.   - Left atrium: The atrium is severely dilated. - Right ventricle: Pacer wire or catheter noted in right ventricle. Systolic function was mildly     reduced. - Right atrium: The atrium is mildly dilated. - Atrial septum: The septum bowed from left to right, consistent with increased left atrial     pressure. - Tricuspid valve: Mild-moderate regurgitation.   - Pulmonary arteries: Systolic pressure was severely increased, estimated to be 65mm Hg assuming     that the right atrial pressure was 15 mmHg. - Inferior vena cava: The vessel was dilated. The respirophasic diameter changes were blunted (<     50%), consistent with elevated central venous pressure.       [facility] 1/31/2020  IMPRESSIONS:  Normal left and right filling pressures with   preserved CO/CI. SUMMARY:   Hemodynamic:   RA 2   RV 28/3   PA 28/10 mean 16   PCWP 4   Blu 4.5/2.5   TD 4.5/2.5   PASat 66%   RECOMMENDATIONS:  Further management per [facility] Failure [facility].      Echo 1/9/2020 ():  - Left ventricle: The cavity size is severely dilated. Wall     thickness was increased in a pattern of mild LVH. Systolic     function was severely reduced. The estimated ejection fraction     was in the range of 10% to 15%. Diffuse hypokinesis. No contrast     administered - unable to evaluate for LV thrombus.  The   longitudinal strain is -2.47% (markedly reduced). - Aortic valve: Mild thickening.   - Mitral valve: Leaflet tenting. Mild to moderate regurgitation.   - Left atrium: The atrium is severely dilated. - Right ventricle: The cavity size is normal. Pacer wire or ICD     noted in right ventricle. Systolic function was mildly reduced by     objective interpretation. TAPSE: 1.6cm. Tricuspid annular systolic     velocity: 8cm/s.   - Tricuspid valve: Mild-moderate regurgitation.   - Pulmonary arteries: Systolic pressure could not be accurately     estimated, however it is elevated at at least 51 mm Hg, assuming     an RA pressure of 15 mm Hg. - Inferior vena cava: The vessel was dilated. The respirophasic     diameter changes were blunted (< 50%), consistent with elevated     central venous pressure. - Pericardium, extracardiac: A trivial pericardial effusion is     identified.      12/10/2018 LHC/PCI and RHC:  IMPRESSIONS:  Selective angiography of LMCA showing critical mid LAD   lesion. Successful PCI with 3.5 x 18 mm MIKE. Normal cardiac output, m   ildly increased pulmonary pressures with mildly increased filling pre   ssure on inotropic support. SUMMARY:     1. Wedge pressure in the pulmonary capillaries is mildly elevated. 2. Coronary arteries: The left main is unusually long and      trifurcates into the LAD, a ramus intermedius, and the left      circumflex. The left anterior descending gives rise to 2      diagonals and 3 septals and wraps around the apex. The left      circumflex gives rise to 2 obtuse marginals and no      posterolaterals. The first obtuse marginal has a high origin. 3. LAD: Mid-vessel lesion: There is a discrete, 99% stenosis. The      distal vessel supplies a moderate-sized vascular territory. The      lesion is a likely culprit for the patient's clinical      presentation. The lesion was stented (see 1st lesion      intervention).  Following intervention, the lesion has a residual      stenosis of 0% and MUKUND grade 3 flow (brisk flow). 4. AV was not crossed due to known apical LV thrombus. 5. RA 6      RV 48/4      PA 50/20 (28)      W 18.   6. Blu CO 5.2 Blu CI 2.9 on Milrinone 0.25 mcg/mg/min.      12/4/2018 Cardiac MRI ():  Findings are consistent with a severe, ischemic cardiomyopathy.  There is akinesis of the apical segments with an associated small, apical thrombus. The left ventricle is severely dilated with severely reduced systolic function. The right ventricle is mildly dilated with moderately decreased systolic function. The left atrium is severely dilated.  There is mild to moderate mitral regurgitation. The right atrium is mildly dilated.  There is moderate tricuspid regurgitation. The descending aorta is dilated. The pulmonary artery is dilated.  The IVC and coronary sinus are dilated. Please see noncardiac findings below.      FINDINGS:     1. The left ventricular size is severely dilated. The left ventricular ejection fraction is 19 % by Smallwood's method. Global left ventricular function is severely decreased. There is akinesis of the apical segments, severe hypokinesis of the basal and mid septal and basal and mid anterior segments and moderate hypokinesis of the lateral segments and mid inferior segment and mild hypokinesis of the basal inferior segment. The left ventricular mass is moderately increased. There appears to be a small intracavitary thrombus (6.5 x 5 mm). 2. There is no evidence of increased iron deposition within the myocardium (T2*myocardium = 51 msec; if < 20 msec, suggestive of iron-overloading of the myocardium).  There is no evidence of myocardial edema. 3. The right ventricular size is mildly dilated. Global right ventricular function is moderately decreased. 4. Left atrial size is severely enlarged.  Right atrial size is mildly enlarged.   The Qp/Qs is 1.0 by phase contrast imaging and is consistent with no evidence of shunt. 5. The calculated aortic regurgitant fraction is 1.55 %. There is moderate mitral regurgitation. The calculated mitral regurgitant fraction is 29.02 %. There is moderate tricuspid regurgitation. The calculated tricuspid regurgitant fraction is 37.1 %. The calculated pulmonic regurgitant fraction is 2.39 %. 6. There is a small pericardial effusion. 7. The descending aorta is dilated measuring 27.2 mm. 8. The main pulmonary artery is dilated measuring 34.0 mm.  The IVC and coronary sinus are dilated. 9. There are large left and moderate right pleural effusions.  Basilar atelectasis is associated with the effusions.  There is evidence of mild pulmonary edema.     Telemetry:Sinus rhythm with PVC's; 3 beat NSVT    Assessment/Plan:    1. Acute on chronic systolic HF  -NYHA class III-IV; LVEF < 20%    -S/P ICD placement in 2019    -We will resume low-dose carvedilol when blood pressure stays above 739 systolic since he is off milrinone therapy  -Entresto on hold d/t elevated Cr (will plan to resume prior to discharge as long as he is his renal function stays stable and blood pressure consistently stays above 334 systolic  -We will add low-dose Aldactone and very closely follow his renal function and potassium level  -no SGLT2 inhibitor d/t decreased renal function but will certainly address it in outpatient setting  -We will discontinue Lasix drip and switch him to oral Demadex therapy from 11/22/2021  -low sodium diet and fluid restriction  -discussed and reinforced need for compliance and follow up     2. LV thrombus  -now on Xarelto  -follow up echo without thrombus  -given H/O noncompliance switched from Warfarin    3. SONAL on CKD , stage III  -suspect worsened by fluid retention  -Cr slightly worsened since yesterday   -Lasix drip will be discontinued.  -We will follow renal function closely  4.  CAD of native coronary arteries without angina  -prior PCI of LAD  -no recurrent angina  -elevated troponin secondary to CHF and not an ACS  -continue medical management with statin, BB  -not on ASA d/t Xarelto     5. S/P ICD     6. Hyperlipidemia with LDL goal , 70  -continue high intensity statin     7. Anemia  -iron deficiency  -Last hemoglobin is up to 10.5 g.    He continues to improve with diuresis. Lengthy discussion today regarding dietary compliance, medication compliance and need for scheduled follow up.      Complexity of medical decision making-high    Electronically signed by Anabel Mcneal MD on 11/20/2021 at 11:40 AM

## 2021-11-20 NOTE — PROGRESS NOTES
[facility]                                               Progress note    Summary:   Torres Moser is being seen by nephrology for SONAL on CKD. Admitted with SOB, decompensated HF. Interval History and plan for today:  The patient serum creatinine was 1.7 today, 1 day earlier, it was 1.6  So over the past 24 hours, the patient kidney function not changed  The patient neuropathy for yesterday was 1650 mL  According to the intake and output, the patient had negative balance about 11.4 liter   He is on Lasix drip at 10 mg/h  The patient most recent blood pressure 97/58  The patient bicarb level was 32. So the patient bicarb level is rising, he presented with a bicarb level of 24 on 11/14/2021. The patient BUN is also rising from 36-58  Therefore, the patient will require diuretic break, probably start from tomorrow. I have reviewed his medication list, and I do not see any concerning medications. The patient was seen and examined in his room  He was resting comfortably  The patient does not require supplemental oxygen  The patient to me that he feels a lot better  The patient has almost no leg edema now his most recent set of vital signs showed temperature 98.7, heart rate 89 blood pressure 97/58    Lab work from this morning showed sodium 139, potassium 4.1, bicarb 32, BUN 30, creatinine 1.7, glucose 110, calcium 8.8    WBC 5.5, hemoglobin 10.9 and the platelet was 985.     Iona Gaines MD  [facility]  Office: [phone]    Assessment:   Chronic kidney disease stage IIIb  This is likely secondary to diabetic nephropathy in the setting of persistent longstanding albuminuria  To complete CKD work-up will need an SPEP and light chains  Baseline creatinine around 2  No acute electrolyte abnormalities  Risk factors include diabetes, hypertension, severe systolic heart failure  Has had diabetes and hypertension for over 10 years and his last EF is less than 20%  He has had event evidence torsemide but was taking it anyway. Last heart failure note said that there was discussion about evaluation for possible LVAD versus transplant. He is on Coumadin for the LV thrombus. Patient says he is never seen a kidney doctor as an outpatient. He denies any chest pain but he just had general decreased stamina and dyspnea on exertion, orthopnea and lower extremity edema. He does not weigh himself at home daily. He said he became to the hospital because he was prompted by his family members to seek medical attention. ROS:   Positives Listed Bold. All other remaining systems are negative. Constitutional:  fever, chills, weakness, weight change, fatigue,      Skin:  rash, pruritus, hair loss, bruising, dry skin, petechiae. Head, Face, Neck   headaches, swelling,  cervical adenopathy. Respiratory: shortness of breath, cough, or wheezing  Cardiovascular: chest pain, palpitations, dizzy, edema  Gastrointestinal: nausea, vomiting, diarrhea, constipation,belly pain    Yellow skin, blood in stool  Musculoskeletal:  back pain, muscle weakness, gait problems,       joint pain or swelling. Genitourinary:  dysuria, poor urine flow, flank pain, blood in urine  Neurologic:  vertigo, TIA'S, syncope, seizures, focal weakness  Psychosocial:  insomnia, anxiety, or depression. Additional positive findings: -     PMH:   Past medical history, surgical history, social history, family history are reviewed and updated as appropriate. Reviewed current medication list.   Allergies reviewed and updated as needed. PE:   Vitals:    11/20/21 1531   BP: (!) 97/58   Pulse: 89   Resp: 16   Temp: 98.7 °F (37.1 °C)   SpO2: 91%       General appearance: male in NAD, fully alert and oriented. Comfortable. HEENT: EOM intact, no icterus. Trachea is midline.    Neck : No masses, appears symmetrical, + JVD  Respiratory: Respiratory effort appears normal, bilateral equal chest rise, no wheeze, + bibasilar crackles left more than right   Cardiovascular: Ausculation shows RRR + edema  Abdomen: No visible mass or tenderness, non distended. Musculoskeletal:  Joints with no swelling or deformity. Skin:no rashes, ulcers, induration, no jaundice. Neuro: face symmetric, no focal deficits. Appropriate responses.        Lab Results   Component Value Date    CREATININE 1.7 (H) 11/20/2021    BUN 58 (H) 11/20/2021     11/20/2021    K 4.1 11/20/2021    CL 95 (L) 11/20/2021    CO2 32 11/20/2021      Lab Results   Component Value Date    WBC 5.5 11/20/2021    HGB 10.9 (L) 11/20/2021    HCT 33.3 (L) 11/20/2021    MCV 98.6 11/20/2021     11/20/2021     Lab Results   Component Value Date    CALCIUM 8.8 11/20/2021    PHOS 3.8 08/30/2017

## 2021-11-20 NOTE — PLAN OF CARE
Skin assessment performed each shift per protocol. Patient turned and repositioned every two hours and prn with pillow support. Patient checked for incontence every two hours. Patient free from falls this shift. Fall precautions in place at all times. Bed in lowest position with two side rails up and wheels locked. Call light within reach. Patient able and agreeable to contact for safety appropriately.     Problem: OXYGENATION/RESPIRATORY FUNCTION  Goal: Patient will maintain patent airway  11/20/2021 1241 by Sina Jamison RN  Outcome: Ongoing  11/19/2021 2333 by Srini Hernández RN  Outcome: Ongoing  Goal: Patient will achieve/maintain normal respiratory rate/effort  Description: Respiratory rate and effort will be within normal limits for the patient  11/20/2021 1241 by Sina Jamison RN  Outcome: Ongoing  11/19/2021 2333 by Srini Hernández RN  Outcome: Ongoing     Problem: HEMODYNAMIC STATUS  Goal: Patient has stable vital signs and fluid balance  11/20/2021 1241 by Sina Jamison RN  Outcome: Ongoing  11/19/2021 2333 by Srini Hernández RN  Outcome: Ongoing     Problem: FLUID AND ELECTROLYTE IMBALANCE  Goal: Fluid and electrolyte balance are achieved/maintained  11/20/2021 1241 by Sina Jamison RN  Outcome: Ongoing  11/19/2021 2333 by Srini Hernández RN  Outcome: Ongoing

## 2021-11-20 NOTE — PROGRESS NOTES
Hospitalist Progress Note      PCP: No primary care provider on file. Chief Complaint. Patient is a 53-year-old male with past medical history of chronic systolic heart failure, LV thrombus, CAD, hyperlipidemia who presented to hospital for shortness of breath bilateral lower extremity swelling. According to the patient he has not been taking his diuretic medication for past 3 weeks and his lower extremity swelling has been getting worse, he also has worsening shortness of breath, he has not been able to lie down flat and also has exertional dyspnea. Otherwise denied fevers chills nausea vomiting diarrhea constipation dysuria. Date of Admission: 11/14/2021    Subjective: Lashawn Rosa, denies chest pain, nausea, vomiting, shortness of breath, fever or chills.  mention feels overall better    Medications:  Reviewed    Infusion Medications    furosemide (LASIX) 1mg/ml infusion 10 mg/hr (11/19/21 1520)    milrinone 0.375 mcg/kg/min (11/19/21 1703)    dextrose      sodium chloride       Scheduled Medications    rivaroxaban  15 mg Oral Dinner    iron sucrose  200 mg IntraVENous Daily    influenza virus vaccine  0.5 mL IntraMUSCular Prior to discharge    insulin lispro  0-12 Units SubCUTAneous TID WC    insulin lispro  0-6 Units SubCUTAneous Nightly    sodium chloride flush  10 mL IntraVENous 2 times per day    atorvastatin  80 mg Oral Nightly    levothyroxine  25 mcg Oral Daily    [Held by provider] carvedilol  12.5 mg Oral BID WC    isosorbide mononitrate  30 mg Oral Daily     PRN Meds: guaiFENesin-dextromethorphan, polyethylene glycol, glucose, dextrose, glucagon (rDNA), dextrose, sodium chloride flush, sodium chloride, potassium chloride, magnesium sulfate, ondansetron **OR** ondansetron, magnesium hydroxide, acetaminophen **OR** acetaminophen, nitroGLYCERIN      Intake/Output Summary (Last 24 hours) at 11/19/2021 1947  Last data filed at 11/19/2021 1522  Gross per 24 hour   Intake 708.44 ml Output 4900 ml   Net -4191.56 ml       Physical Exam Performed:    /71   Pulse 90   Temp 97.6 °F (36.4 °C) (Oral)   Resp 20   Ht 5' 6\" (1.676 m)   Wt 158 lb 8.2 oz (71.9 kg)   SpO2 94%   BMI 25.58 kg/m²     General appearance: NAD  HEENT:  Conjunctivae/corneas clear. Neck: Supple, with full range of motion. Respiratory:  Normal respiratory effort. Clear to auscultation, bilaterally without Rales/Wheezes/Rhonchi. Cardiovascular: Regular rate and rhythm with normal S1/S2 without murmurs or rubs  Abdomen: Soft, non-tender, non-distended, normal bowel sounds. Musculoskeletal: +1 edema bilaterally  Neurologic:  without any focal sensory/motor deficits. grossly non-focal.  Psychiatric: Alert and oriented, Normal mood  Peripheral Pulses: +2 palpable, equal bilaterally       Labs:   Recent Labs     11/17/21 0458 11/18/21 0439 11/19/21 0459   WBC 4.9 4.4 4.2   HGB 10.2* 10.3* 10.4*   HCT 31.8* 31.4* 31.6*    163 178     Recent Labs     11/17/21 0458 11/18/21 0439 11/19/21 0459    140 141   K 4.1 3.6 3.6    101 100   CO2 29 27 29   BUN 54* 56* 55*   CREATININE 1.9* 1.7* 1.6*   CALCIUM 8.3 8.4 8.6     No results for input(s): AST, ALT, BILIDIR, BILITOT, ALKPHOS in the last 72 hours. Recent Labs     11/17/21 0458 11/18/21 0439 11/19/21 0459   INR 3.09* 2.06* 2.56*     No results for input(s): Shadi Luna in the last 72 hours. Urinalysis:      Lab Results   Component Value Date    NITRU Negative 11/17/2021    WBCUA 1 11/17/2021    RBCUA 1 11/17/2021    BLOODU TRACE 11/17/2021    SPECGRAV 1.007 11/17/2021    GLUCOSEU Negative 11/17/2021       Radiology:  XR CHEST (2 VW)   Final Result   The cardiac silhouette is enlarged, with somewhat globular configuration   which can be seen in pericardial effusion. No focal airspace disease or   pulmonary edema.                Assessment/Plan:    Active Hospital Problems    Diagnosis     CHF (congestive heart failure), NYHA class I, acute on chronic, combined [facility]) [I50.43]      Patient is a 19-year-old male with past medical history of chronic systolic heart failure, LV thrombus, CAD, hyperlipidemia who presented to hospital for shortness of breath bilateral lower extremity swelling. According to the patient he has not been taking his diuretic medication for past 3 weeks and his lower extremity swelling has been getting worse, he also has worsening shortness of breath, he has not been able to lie down flat and also has exertional dyspnea. Otherwise denied fevers chills nausea vomiting diarrhea constipation dysuria.     Assessment  Acute on chronic systolic CHF  CAD  Hyperlipidemia  History of LV thrombus     Plan  Continue IV lasix, on milrinone per cardio, resume home Coumadin  Started on IV milrinon, IV Lasix, EF<20% on echo, severe diffuse hypokinesis, Diastolic filling parameters suggest grade II diastolic dysfunction  Strict I's and O's, low-salt diet  Resume home medications  DVT prophylaxis-on Coumadin  Diet: ADULT DIET; Regular; 4 carb choices (60 gm/meal);  Low Sodium (2 gm); 1500 ml  Code Status: Full Code    PT/OT Eval Status: ordered    Dispo - continue IV milrinon, IV Lasix, nephrology following, monitor Cira Lopez MD

## 2021-11-20 NOTE — PLAN OF CARE
Problem: Skin Integrity:  Goal: Will show no infection signs and symptoms  Description: Will show no infection signs and symptoms  11/19/2021 2333 by Castillo Vizcaino RN  Outcome: Ongoing     Problem: Skin Integrity:  Goal: Absence of new skin breakdown  Description: Absence of new skin breakdown  11/19/2021 2333 by Castillo Vizcaino RN  Outcome: Ongoing     Problem: Falls - Risk of:  Goal: Will remain free from falls  Description: Will remain free from falls  11/19/2021 2333 by Castillo Vizcaino RN  Outcome: Ongoing     Problem: ACTIVITY INTOLERANCE/IMPAIRED MOBILITY  Goal: Mobility/activity is maintained at optimum level for patient  11/19/2021 2333 by Castillo Vizcaino RN  Outcome: Ongoing     Problem: FLUID AND ELECTROLYTE IMBALANCE  Goal: Fluid and electrolyte balance are achieved/maintained  11/19/2021 2333 by Castillo Vizcaino RN  Outcome: Ongoing

## 2021-11-21 NOTE — PROGRESS NOTES
Hospitalist Progress Note      PCP: No primary care provider on file. Chief Complaint. Patient is a 59-year-old male with past medical history of chronic systolic heart failure, LV thrombus, CAD, hyperlipidemia who presented to hospital for shortness of breath bilateral lower extremity swelling. According to the patient he has not been taking his diuretic medication for past 3 weeks and his lower extremity swelling has been getting worse, he also has worsening shortness of breath, he has not been able to lie down flat and also has exertional dyspnea. Otherwise denied fevers chills nausea vomiting diarrhea constipation dysuria.     Date of Admission: 11/14/2021    Subjective: Federico Bowling, denies chest pain, nausea, vomiting, shortness of breath, fever or chills    Medications:  Reviewed    Infusion Medications    furosemide (LASIX) 1mg/ml infusion 10 mg/hr (11/20/21 1046)    milrinone 0.375 mcg/kg/min (11/20/21 0600)    dextrose      sodium chloride       Scheduled Medications    rivaroxaban  15 mg Oral Dinner    influenza virus vaccine  0.5 mL IntraMUSCular Prior to discharge    insulin lispro  0-12 Units SubCUTAneous TID WC    insulin lispro  0-6 Units SubCUTAneous Nightly    sodium chloride flush  10 mL IntraVENous 2 times per day    atorvastatin  80 mg Oral Nightly    levothyroxine  25 mcg Oral Daily    [Held by provider] carvedilol  12.5 mg Oral BID WC    isosorbide mononitrate  30 mg Oral Daily     PRN Meds: guaiFENesin-dextromethorphan, polyethylene glycol, glucose, dextrose, glucagon (rDNA), dextrose, sodium chloride flush, sodium chloride, potassium chloride, magnesium sulfate, ondansetron **OR** ondansetron, magnesium hydroxide, acetaminophen **OR** acetaminophen, nitroGLYCERIN      Intake/Output Summary (Last 24 hours) at 11/20/2021 1919  Last data filed at 11/20/2021 1401  Gross per 24 hour   Intake 1000.09 ml   Output 5925 ml   Net -4924.91 ml       Physical Exam Performed:    BP (!) 97/58   Pulse 89   Temp 98.7 °F (37.1 °C) (Oral)   Resp 16   Ht 5' 6\" (1.676 m)   Wt 152 lb 8.9 oz (69.2 kg)   SpO2 91%   BMI 24.62 kg/m²     General appearance: NAD, lying in bed comfortably  HEENT:  Conjunctivae/corneas clear. Neck: Supple, with full range of motion. Respiratory:  Normal respiratory effort. Clear to auscultation, bilaterally without Rales/Wheezes/Rhonchi. Cardiovascular: Regular rate and rhythm with normal S1/S2 without murmurs or rubs  Abdomen: Soft, non-tender, non-distended, normal bowel sounds. Musculoskeletal: +1 edema bilaterally  Neurologic:  without any focal sensory/motor deficits. grossly non-focal.  Psychiatric: Alert and oriented, Normal mood  Peripheral Pulses: +2 palpable, equal bilaterally       Labs:   Recent Labs     11/18/21 0439 11/19/21 0459 11/20/21 0456   WBC 4.4 4.2 5.5   HGB 10.3* 10.4* 10.9*   HCT 31.4* 31.6* 33.3*    178 198     Recent Labs     11/18/21 0439 11/19/21 0459 11/20/21 0456    141 139   K 3.6 3.6 4.1    100 95*   CO2 27 29 32   BUN 56* 55* 58*   CREATININE 1.7* 1.6* 1.7*   CALCIUM 8.4 8.6 8.8     No results for input(s): AST, ALT, BILIDIR, BILITOT, ALKPHOS in the last 72 hours. Recent Labs     11/18/21 0439 11/19/21 0459 11/20/21 0456   INR 2.06* 2.56* 2.83*     No results for input(s): CKTOTAL, TROPONINI in the last 72 hours. Urinalysis:      Lab Results   Component Value Date    NITRU Negative 11/17/2021    WBCUA 1 11/17/2021    RBCUA 1 11/17/2021    BLOODU TRACE 11/17/2021    SPECGRAV 1.007 11/17/2021    GLUCOSEU Negative 11/17/2021       Radiology:  XR CHEST (2 VW)   Final Result   The cardiac silhouette is enlarged, with somewhat globular configuration   which can be seen in pericardial effusion. No focal airspace disease or   pulmonary edema.                Assessment/Plan:    Active Hospital Problems    Diagnosis     Stage 3 chronic kidney disease ([facility] [address] [N18.30]     Acute on chronic systolic congestive heart failure [facility]) [I50.23]      Patient is a 53-year-old male with past medical history of chronic systolic heart failure, LV thrombus, CAD, hyperlipidemia who presented to hospital for shortness of breath bilateral lower extremity swelling. According to the patient he has not been taking his diuretic medication for past 3 weeks and his lower extremity swelling has been getting worse, he also has worsening shortness of breath, he has not been able to lie down flat and also has exertional dyspnea. Otherwise denied fevers chills nausea vomiting diarrhea constipation dysuria.     Assessment  Acute on chronic systolic CHF  CAD  Hyperlipidemia  History of LV thrombus     Plan  Continue IV lasix, on milrinone per cardio, resume home Coumadin  Started on IV milrinon, IV Lasix, EF<20% on echo, severe diffuse hypokinesis, Diastolic filling parameters suggest grade II diastolic dysfunction  Strict I's and O's, low-salt diet  Resume home medications  DVT prophylaxis-on Coumadin  Diet: ADULT DIET; Regular; 4 carb choices (60 gm/meal);  Low Sodium (2 gm); 1500 ml  Code Status: Full Code    PT/OT Eval Status: ordered    Dispo - continue IV milrinon, IV Lasix, plan for dc tomorrow if ok with cardiology  Lu Kehr, MD

## 2021-11-21 NOTE — PLAN OF CARE
Skin assessment performed each shift per protocol. Patient turned and repositioned every two hours and prn with pillow support. Patient checked for incontence every two hours. Patient free from falls this shift. Fall precautions in place at all times. Bed in lowest position with two side rails up and wheels locked. Call light within reach. Patient able and agreeable to contact for safety appropriately.     Problem: OXYGENATION/RESPIRATORY FUNCTION  Goal: Patient will maintain patent airway  Outcome: Ongoing  Goal: Patient will achieve/maintain normal respiratory rate/effort  Description: Respiratory rate and effort will be within normal limits for the patient  Outcome: Ongoing     Problem: HEMODYNAMIC STATUS  Goal: Patient has stable vital signs and fluid balance  Outcome: Ongoing     Problem: FLUID AND ELECTROLYTE IMBALANCE  Goal: Fluid and electrolyte balance are achieved/maintained  Outcome: Ongoing

## 2021-11-21 NOTE — PROGRESS NOTES
mg  Most recent protein creatinine ratio 700 mg, no RBCs no WBCs  CT abdomen in 2020 showed symmetric kidneys no lesions no masses bilateral perinephric stranding no hydronephrosis. HCV and hepatitis C negative  Prior negative NAHOMY  Prior negative RF factor    Ischemic cardiomyopathy  Decompensated, has evidence of volume overload  proBNP 65,000  Managed with Lasix drip and is on milrinone, appreciate cardiology involvement. Last EF less than 20%  History of coronary artery disease with stent in the LAD  Has ICD  Most of his records are in the  system  Previously on Entresto but currently not  Echo here showed severe diffuse hypokinesis grade 2 diastolic dysfunction less than 20% EF severely dilated left ventricular cavity, right ventricular systolic function is severely reduced as well has moderate MR and RV's dilated    Diabetes type 2  10 years at least  Longstanding albuminuria  A1c has been ranging between 6 and 6.5 the last few checks. Hypertension  Blood pressure is acceptable right now  Carvedilol held, on positive inotrope right now      HPI/Summary:   Emilio Arellano is being seen by nephrology for acute kidney injury. This is a 31-[id number]-SIK man with systolic heart failure, history of LV thrombus, coronary artery disease, hyperlipidemia who presented the hospital with increasing shortness of breath and worsening lower extremity swelling. Patient has received most of his care at [facility] up until this point. He has a history of heart failure with reduced EF secondary to ischemic cardiomyopathy. He has a single-vessel coronary artery disease status post PCI of the LAD. ICD placed in 2019 last right heart cath was January 29, 2020. Based on review of his last heart failure clinic note it looks like he is supposed to be on carvedilol Imdur Entresto torsemide 20 mg daily. Patient told me he was instructed to stop taking torsemide but was taking it anyway.   Last heart failure note said that there was discussion about evaluation for possible LVAD versus transplant. He is on Coumadin for the LV thrombus. Patient says he is never seen a kidney doctor as an outpatient. He denies any chest pain but he just had general decreased stamina and dyspnea on exertion, orthopnea and lower extremity edema. He does not weigh himself at home daily. He said he became to the hospital because he was prompted by his family members to seek medical attention. ROS:   Positives Listed Bold. All other remaining systems are negative. Constitutional:  fever, chills, weakness, weight change, fatigue,      Skin:  rash, pruritus, hair loss, bruising, dry skin, petechiae. Head, Face, Neck   headaches, swelling,  cervical adenopathy. Respiratory: shortness of breath, cough, or wheezing  Cardiovascular: chest pain, palpitations, dizzy, edema  Gastrointestinal: nausea, vomiting, diarrhea, constipation,belly pain    Yellow skin, blood in stool  Musculoskeletal:  back pain, muscle weakness, gait problems,       joint pain or swelling. Genitourinary:  dysuria, poor urine flow, flank pain, blood in urine  Neurologic:  vertigo, TIA'S, syncope, seizures, focal weakness  Psychosocial:  insomnia, anxiety, or depression. Additional positive findings: -     PMH:   Past medical history, surgical history, social history, family history are reviewed and updated as appropriate. Reviewed current medication list.   Allergies reviewed and updated as needed. PE:   Vitals:    11/21/21 0808   BP: 106/70   Pulse:    Resp:    Temp: 100 °F (37.8 °C)   SpO2: 91%       General appearance: male in NAD, fully alert and oriented. Comfortable. HEENT: EOM intact, no icterus. Trachea is midline. Neck : No masses, appears symmetrical, + JVD  Respiratory: Respiratory effort appears normal, bilateral equal chest rise, no wheeze, + bibasilar crackles left more than right   Cardiovascular:  Ausculation shows RRR + edema  Abdomen: No visible mass or tenderness, non distended. Musculoskeletal:  Joints with no swelling or deformity. Skin:no rashes, ulcers, induration, no jaundice. Neuro: face symmetric, no focal deficits. Appropriate responses.        Lab Results   Component Value Date    CREATININE 1.8 (H) 11/21/2021    BUN 62 (H) 11/21/2021     (L) 11/21/2021    K 3.5 11/21/2021    CL 90 (L) 11/21/2021    CO2 32 11/21/2021      Lab Results   Component Value Date    WBC 6.8 11/21/2021    HGB 10.5 (L) 11/21/2021    HCT 31.4 (L) 11/21/2021    MCV 97.4 11/21/2021     11/21/2021     Lab Results   Component Value Date    CALCIUM 8.7 11/21/2021    PHOS 3.8 08/30/2017

## 2021-11-22 NOTE — PLAN OF CARE
Problem: Skin Integrity:  Goal: Will show no infection signs and symptoms  Description: Will show no infection signs and symptoms  11/21/2021 2309 by Robbin Floyd RN  Outcome: Ongoing  Note: Skin assessment completed every shift. Pt assessed for incontinence, appropriate barrier cream applied prn. Pt encouraged to turn/rotate every 2 hours. Assistance provided if pt unable to do so themselves. Problem: Skin Integrity:  Goal: Absence of new skin breakdown  Description: Absence of new skin breakdown  11/21/2021 2309 by Robbin Floyd RN  Outcome: Ongoing     Problem: Falls - Risk of:  Goal: Will remain free from falls  Description: Will remain free from falls  11/21/2021 2309 by Robbin Floyd RN  Outcome: Ongoing  Note: Fall risk assessment completed every shift. All precautions in place. Pt has call light within reach at all times. Room clear of clutter. Pt aware to call for assistance when getting up. Problem: OXYGENATION/RESPIRATORY FUNCTION  Goal: Patient will maintain patent airway  11/21/2021 2309 by Robbin Floyd RN  Outcome: Ongoing  Note: Assess breath sounds, oxygen requirements and saturations, and activity tolerance. Monitor intake and output and daily weights and lab values. Encourage fluid and dietary restrictions.          Problem: OXYGENATION/RESPIRATORY FUNCTION  Goal: Patient will achieve/maintain normal respiratory rate/effort  Description: Respiratory rate and effort will be within normal limits for the patient  11/21/2021 2309 by Robbin Floyd RN  Outcome: Ongoing     Problem: HEMODYNAMIC STATUS  Goal: Patient has stable vital signs and fluid balance  11/21/2021 2309 by Robbin Floyd RN  Outcome: Ongoing     Problem: FLUID AND ELECTROLYTE IMBALANCE  Goal: Fluid and electrolyte balance are achieved/maintained  11/21/2021 2309 by Robbin Floyd RN  Outcome: Ongoing     Problem: ACTIVITY INTOLERANCE/IMPAIRED MOBILITY  Goal: Mobility/activity is maintained at optimum level for patient  11/21/2021 2309 by Khushi Oliveira, RN  Outcome: Ongoing

## 2021-11-22 NOTE — PROGRESS NOTES
Hospitalist Progress Note      PCP: No primary care provider on file. Chief Complaint. Patient is a 80-year-old male with past medical history of chronic systolic heart failure, LV thrombus, CAD, hyperlipidemia who presented to hospital for shortness of breath bilateral lower extremity swelling. According to the patient he has not been taking his diuretic medication for past 3 weeks and his lower extremity swelling has been getting worse, he also has worsening shortness of breath, he has not been able to lie down flat and also has exertional dyspnea. Otherwise denied fevers chills nausea vomiting diarrhea constipation dysuria.     Date of Admission: 11/14/2021    Subjective: Lying in bed comfortably, NAEON, denies chest pain, nausea, vomiting, shortness of breath, fever or chills    Medications:  Reviewed    Infusion Medications    dextrose      sodium chloride       Scheduled Medications    spironolactone  12.5 mg Oral Daily    [START ON 11/22/2021] torsemide  20 mg Oral Daily    rivaroxaban  15 mg Oral Dinner    influenza virus vaccine  0.5 mL IntraMUSCular Prior to discharge    insulin lispro  0-12 Units SubCUTAneous TID WC    insulin lispro  0-6 Units SubCUTAneous Nightly    sodium chloride flush  10 mL IntraVENous 2 times per day    atorvastatin  80 mg Oral Nightly    levothyroxine  25 mcg Oral Daily    [Held by provider] carvedilol  12.5 mg Oral BID WC    isosorbide mononitrate  30 mg Oral Daily     PRN Meds: methyl salicylate-menthol, guaiFENesin-dextromethorphan, polyethylene glycol, glucose, dextrose, glucagon (rDNA), dextrose, sodium chloride flush, sodium chloride, potassium chloride, magnesium sulfate, ondansetron **OR** ondansetron, magnesium hydroxide, acetaminophen **OR** acetaminophen, nitroGLYCERIN      Intake/Output Summary (Last 24 hours) at 11/21/2021 1903  Last data filed at 11/21/2021 1400  Gross per 24 hour   Intake 847.28 ml   Output 4350 ml   Net -3502.72 ml Physical Exam Performed:    /75   Pulse 91   Temp 98.1 °F (36.7 °C) (Axillary)   Resp 16   Ht 5' 6\" (1.676 m)   Wt 134 lb 11.2 oz (61.1 kg)   SpO2 100%   BMI 21.74 kg/m²     General appearance: NAD  HEENT:  Conjunctivae/corneas clear. Neck: Supple, with full range of motion. Respiratory:  Normal respiratory effort. Clear to auscultation, bilaterally without Rales/Wheezes/Rhonchi. Cardiovascular: Regular rate and rhythm with normal S1/S2 without murmurs or rubs  Abdomen: Soft, non-tender, non-distended, normal bowel sounds. Musculoskeletal: +1 edema bilaterally  Neurologic:  without any focal sensory/motor deficits. grossly non-focal.  Psychiatric: Alert and oriented, Normal mood  Peripheral Pulses: +2 palpable, equal bilaterally       Labs:   Recent Labs     11/19/21 0459 11/20/21 0456 11/21/21 0448   WBC 4.2 5.5 6.8   HGB 10.4* 10.9* 10.5*   HCT 31.6* 33.3* 31.4*    198 204     Recent Labs     11/19/21 0459 11/20/21 0456 11/21/21 0448    139 135*   K 3.6 4.1 3.5    95* 90*   CO2 29 32 32   BUN 55* 58* 62*   CREATININE 1.6* 1.7* 1.8*   CALCIUM 8.6 8.8 8.7     No results for input(s): AST, ALT, BILIDIR, BILITOT, ALKPHOS in the last 72 hours. Recent Labs     11/19/21 0459 11/20/21 0456 11/21/21 0448   INR 2.56* 2.83* 2.66*     No results for input(s): CKTOTAL, TROPONINI in the last 72 hours. Urinalysis:      Lab Results   Component Value Date    NITRU Negative 11/17/2021    WBCUA 1 11/17/2021    RBCUA 1 11/17/2021    BLOODU TRACE 11/17/2021    SPECGRAV 1.007 11/17/2021    GLUCOSEU Negative 11/17/2021       Radiology:  XR CHEST (2 VW)   Final Result   The cardiac silhouette is enlarged, with somewhat globular configuration   which can be seen in pericardial effusion. No focal airspace disease or   pulmonary edema.                Assessment/Plan:    Active Hospital Problems    Diagnosis     Stage 3 chronic kidney disease ([facility] [address] [N18.30]     CHF (congestive heart failure), NYHA class I, acute on chronic, combined ([facility] [address] [I50.43]      Patient is a 81-year-old male with past medical history of chronic systolic heart failure, LV thrombus, CAD, hyperlipidemia who presented to hospital for shortness of breath bilateral lower extremity swelling. According to the patient he has not been taking his diuretic medication for past 3 weeks and his lower extremity swelling has been getting worse, he also has worsening shortness of breath, he has not been able to lie down flat and also has exertional dyspnea. Otherwise denied fevers chills nausea vomiting diarrhea constipation dysuria.     Assessment  Acute on chronic systolic CHF  CAD  Hyperlipidemia  History of LV thrombus     Plan  Discontinue IV lasix, and milrinone per cardio, resume home Coumadin  Started on IV milrinon, IV Lasix, EF<20% on echo, severe diffuse hypokinesis, Diastolic filling parameters suggest grade II diastolic dysfunction  Strict I's and O's, low-salt diet  Resume home medications  DVT prophylaxis-on Coumadin  Diet: ADULT DIET; Regular; 4 carb choices (60 gm/meal);  Low Sodium (2 gm); 1500 ml  Code Status: Full Code    PT/OT Eval Status: ordered    Dispo -milrinone, Lasix discontinued, likely discharge tomorrow  Leroy Ibarra MD

## 2021-11-22 NOTE — PROGRESS NOTES
Hospitalist Progress Note      PCP: No primary care provider on file. Chief Complaint. Patient is a 29-year-old male with past medical history of chronic systolic heart failure, LV thrombus, CAD, hyperlipidemia who presented to hospital for shortness of breath bilateral lower extremity swelling. According to the patient he has not been taking his diuretic medication for past 3 weeks and his lower extremity swelling has been getting worse, he also has worsening shortness of breath, he has not been able to lie down flat and also has exertional dyspnea. Otherwise denied fevers chills nausea vomiting diarrhea constipation dysuria.     Date of Admission: 11/14/2021    Subjective:  NAEON, sitting in chair comfortably, no chest pain, nausea, vomiting, shortness of breath, fever or chills    Medications:  Reviewed    Infusion Medications    dextrose      sodium chloride       Scheduled Medications    metoprolol succinate  12.5 mg Oral Daily    furosemide  40 mg IntraVENous TID    spironolactone  12.5 mg Oral Daily    torsemide  20 mg Oral Daily    rivaroxaban  15 mg Oral Dinner    influenza virus vaccine  0.5 mL IntraMUSCular Prior to discharge    insulin lispro  0-12 Units SubCUTAneous TID     insulin lispro  0-6 Units SubCUTAneous Nightly    sodium chloride flush  10 mL IntraVENous 2 times per day    atorvastatin  80 mg Oral Nightly    levothyroxine  25 mcg Oral Daily    isosorbide mononitrate  30 mg Oral Daily     PRN Meds: methyl salicylate-menthol, guaiFENesin-dextromethorphan, polyethylene glycol, glucose, dextrose, glucagon (rDNA), dextrose, sodium chloride flush, sodium chloride, potassium chloride, magnesium sulfate, ondansetron **OR** ondansetron, magnesium hydroxide, acetaminophen **OR** acetaminophen, nitroGLYCERIN      Intake/Output Summary (Last 24 hours) at 11/22/2021 1608  Last data filed at 11/22/2021 1531  Gross per 24 hour   Intake 1003.48 ml   Output 2425 ml   Net -1421.52 ml Physical Exam Performed:    /70   Pulse 95   Temp 98.8 °F (37.1 °C) (Oral)   Resp 16   Ht 5' 6\" (1.676 m)   Wt 140 lb 14 oz (63.9 kg)   SpO2 94%   BMI 22.74 kg/m²     General appearance: NAD  HEENT:  Conjunctivae/corneas clear. Neck: Supple, with full range of motion. Respiratory:  Normal respiratory effort. Clear to auscultation, bilaterally without Rales/Wheezes/Rhonchi. Cardiovascular: Bilateral lung crackles noted   Abdomen: Soft, non-tender, non-distended, normal bowel sounds. Musculoskeletal: +1 edema bilaterally  Neurologic:  without any focal sensory/motor deficits. grossly non-focal.  Psychiatric: Alert and oriented, Normal mood  Peripheral Pulses: +2 palpable, equal bilaterally       Labs:   Recent Labs     11/20/21 0456 11/21/21 0448 11/22/21 0434   WBC 5.5 6.8 9.4   HGB 10.9* 10.5* 10.7*   HCT 33.3* 31.4* 32.5*    204 196     Recent Labs     11/21/21 0448 11/22/21 0434 11/22/21  1244   * 135* 135*   K 3.5 3.3* 3.4*   CL 90* 89* 89*   CO2 32 30 35*   BUN 62* 67* 62*   CREATININE 1.8* 1.9* 1.8*   CALCIUM 8.7 8.8 8.6     No results for input(s): AST, ALT, BILIDIR, BILITOT, ALKPHOS in the last 72 hours. Recent Labs     11/20/21 0456 11/21/21 0448 11/22/21 0434   INR 2.83* 2.66* 2.95*     No results for input(s): CKTOTAL, TROPONINI in the last 72 hours. Urinalysis:      Lab Results   Component Value Date    NITRU Negative 11/17/2021    WBCUA 1 11/17/2021    RBCUA 1 11/17/2021    BLOODU TRACE 11/17/2021    SPECGRAV 1.007 11/17/2021    GLUCOSEU Negative 11/17/2021       Radiology:  XR CHEST 1 VIEW   Final Result   Borderline pulmonary vascular congestion. XR CHEST (2 VW)   Final Result   The cardiac silhouette is enlarged, with somewhat globular configuration   which can be seen in pericardial effusion. No focal airspace disease or   pulmonary edema.                Assessment/Plan:    Active Hospital Problems    Diagnosis     Stage 3 chronic kidney disease ([facility] [N18.30]     CHF (congestive heart failure), NYHA class I, acute on chronic, combined ([facility] [I50.43]      Patient is a 71-year-old male with past medical history of chronic systolic heart failure, LV thrombus, CAD, hyperlipidemia who presented to hospital for shortness of breath bilateral lower extremity swelling. According to the patient he has not been taking his diuretic medication for past 3 weeks and his lower extremity swelling has been getting worse, he also has worsening shortness of breath, he has not been able to lie down flat and also has exertional dyspnea. Otherwise denied fevers chills nausea vomiting diarrhea constipation dysuria.     Assessment  Acute on chronic systolic CHF  CAD  Hyperlipidemia  History of LV thrombus     Plan  Start 40 IV lasix 3 times daily, discontinue milrinone per cardio, resume home Coumadin  EF<20% on echo, severe diffuse hypokinesis, Diastolic filling parameters suggest grade II diastolic dysfunction  Strict I's and O's, low-salt diet  Resume home medications  DVT prophylaxis-on Coumadin  Diet: ADULT DIET; Regular; 4 carb choices (60 gm/meal);  Low Sodium (2 gm); 1500 ml  Code Status: Full Code    PT/OT Eval Status: ordered    Dispo -consulted palliative care/hospice for end-stage CHF, continue IV Lasix, cardiology following    Marily Torres MD

## 2021-11-22 NOTE — PROGRESS NOTES
Occupational Therapy  Facility/Department: [facility] 5W PROGRESSIVE CARE  Daily Treatment Note  Should patient be discharged prior to another treatment session, this note shall serve as the discharge summary. NAME: Rodríguez Rizvi  : 1955  MRN: [id number]    Date of Service: 2021    Discharge Recommendations:  2-3 sessions per week, Patient would benefit from continued therapy after discharge, Home with Home health OT       Assessment   Performance deficits / Impairments: Decreased functional mobility ; Decreased balance; Decreased ADL status; Decreased endurance; Decreased high-level IADLs; Decreased strength  Assessment: Pt declined mobility this session but agreed to UE HEP. Pt also discussed pain in RUE which he attributes at [employer]". Pt declined and AROM to assist with edema in RUE but agreed to better positioning. Pt educated on proper tech for supporting RUE and was comfortable after adjustment. Pt completed HEP with green theraband with LUE (tied to bedside table) with good effort. Cont OT POC, 2-3x week with assist of family  Decision Making: Low Complexity  OT Education: OT Role; Transfer Training; Plan of Care; ADL Adaptive Strategies; Home Exercise Program  REQUIRES OT FOLLOW UP: Yes  Activity Tolerance  Activity Tolerance: Patient limited by pain  Safety Devices  Safety Devices in place: Yes  Type of devices: Call light within reach; Chair alarm in place; Left in chair; Gait belt; Nurse notified         Patient Diagnosis(es): The primary encounter diagnosis was Acute on chronic systolic congestive heart failure ([address] Diagnoses of Acute respiratory failure with hypoxia (HCC) and Dyspnea on exertion were also pertinent to this visit. has a past medical history of Diabetes type 2, uncontrolled ([address] Diabetic nephropathy, ED (erectile dysfunction), Essential hypertension, benign, Hyperlipidemia, and Noncompliance.    has a past surgical history that includes eye treatment frequency of 2-3x/wk. Please see assessment section for further patient specific details. If patient discharges prior to next session this note will serve as a discharge summary. Please see below for the latest assessment towards goals. AM-PAC Score        AM-PAC Inpatient Daily Activity Raw Score: 19 (11/22/21 1345)  AM-PAC Inpatient ADL T-Scale Score : 40.22 (11/22/21 1345)  ADL Inpatient CMS 0-100% Score: 42.8 (11/22/21 1345)  ADL Inpatient CMS G-Code Modifier : CK (11/22/21 1345)    Goals  Short term goals  Time Frame for Short term goals: prior to d/c  Short term goal 1: toileting SUP - not met,  ongoing  Short term goal 2: ADL tx SUP - not met,  ongoing  Short term goal 3: LB dressing SUP - not met, ongoing  Short term goal 4: BUE exercises in all planes to improve strength and endurance for ADLs and tx - not met,  ongoing  Short term goal 5:  Tolerate 5 min fxl standing task SUP - not met,  ongoing  Patient Goals   Patient goals : go home       Therapy Time   Individual Concurrent Group Co-treatment   Time In 1310         Time Out 1340         Minutes 30         Timed Code Treatment Minutes: Trudy 91, OT

## 2021-11-22 NOTE — CONSULTS
[address]  CARDIOPULMONARY PHASE I CONSULT        NAME:  Huey Manning RECORD NUMBER:  [id number]  AGE: 61 y.o. GENDER: male  : 1955  TODAY'S DATE:  2021    Subjective:     VISIT TYPE: evaluation     ADMITTING PHYSICIAN:  Morteza Ambriz MD     PAST MEDICAL HISTORY        Diagnosis Date    Diabetes type 2, uncontrolled ([address] 2014    Diabetic nephropathy 2013    ED (erectile dysfunction) 2013    Essential hypertension, benign 2013    Hyperlipidemia 2013    Noncompliance 2014       SOCIAL HISTORY    Social History     Tobacco Use    Smoking status: Former Smoker     Packs/day: 1.50     Years: 7.00     Pack years: 10.50     Quit date: 1981     Years since quittin.8    Smokeless tobacco: Never Used   Vaping Use    Vaping Use: Never used   Substance Use Topics    Alcohol use:  Yes     Alcohol/week: 1.0 standard drink     Types: 1 Cans of beer per week    Drug use: No       ALLERGIES    No Known Allergies    MEDICATIONS  Scheduled Meds:   metoprolol succinate  12.5 mg Oral Daily    spironolactone  12.5 mg Oral Daily    torsemide  20 mg Oral Daily    rivaroxaban  15 mg Oral Dinner    influenza virus vaccine  0.5 mL IntraMUSCular Prior to discharge    insulin lispro  0-12 Units SubCUTAneous TID     insulin lispro  0-6 Units SubCUTAneous Nightly    sodium chloride flush  10 mL IntraVENous 2 times per day    atorvastatin  80 mg Oral Nightly    levothyroxine  25 mcg Oral Daily    isosorbide mononitrate  30 mg Oral Daily       ADMIT DATE: 2021      Objective:     ADMISSION DIAGNOSIS:   Dyspnea on exertion [R06.00]  Acute on chronic systolic congestive heart failure (HCC) [I50.23]  Acute respiratory failure with hypoxia (HCC) [J96.01]  CHF (congestive heart failure), NYHA class I, acute on chronic, combined (HCC) [I50.43]     /67   Pulse 97   Temp 98.7 °F (37.1 °C) (Oral)   Resp 16   Ht 5' 6\" (1.676 m)   Wt 140 lb 14 oz (63.9 kg)   SpO2 97%   BMI 22.74 kg/m²     ADMIT:  Weight: 175 lb 11.3 oz (79.7 kg)    TODAY: Weight: 140 lb 14 oz (63.9 kg)    Wt Readings from Last 3 Encounters:   11/22/21 140 lb 14 oz (63.9 kg)   10/22/21 166 lb (75.3 kg)   06/12/19 161 lb (73 kg)        ECHOCARDIOGRAM:  EF < 20%     HgBA1c:  No components found for: HGBA1C  LIPID PANEL:    Lab Results   Component Value Date    CHOL 146 02/12/2019    HDL 73 02/12/2019    HDL 58 12/13/2011    TRIG 109 02/12/2019        Assessment:     CONSULTS:   IP CONSULT TO HOSPITALIST  IP CONSULT TO CARDIOLOGY  IP CONSULT TO SOCIAL WORK  IP CONSULT TO HEART FAILURE NURSE/COORDINATOR  IP CONSULT TO NEPHROLOGY  IP CONSULT TO CARDIAC REHAB    Patient has a CARDIOLOGY CONSULT: Yes        EDUCATION STATUS: Patient  And sister  [x]  Provided both written and verbal education on Cardiopulmonary Rehabilitation. [x]  Provided instructions for smoking cessation programs. [x]  Provided education of CAD risk factors. [x]  Provided recommendations on activity and exercise. [x]  Provided education on medications. []  Provided education on coronary anatomy and coronary interventions. [x]  Other: CHF     CURRENT DIET: ADULT DIET; Regular; 4 carb choices (60 gm/meal); Low Sodium (2 gm); 1500 ml    EDUCATIONAL PACKETS PROVIDED- PRINTED FROM [employer].     Titles and material given:  Yes   [x]  Managing Heart Disease and Preventing Stroke  []  Heart Owner's Manual  []  Living Well with Heart Failure  []  Other:     PATIENT/CAREGIVER TEACHING:    Level of patient/caregiver understanding able to:   [x] Verbalize understanding   [x] Demonstrate understanding       [x] Teach back        [x] Needs reinforcement     []  Other:       TEACHING TIME:  10 minutes       Plan:       DISCHARGE PLAN:  Placement for patient upon discharge: home with support   Hospice Care:  no  Code Status: Full Code  Discharge appointment scheduled: No     RECOMMENDATIONS:   [x]  Patient/Family instructed to call Cardiopulmonary Rehab ([phone]) upon discharge schedule the initial evaluation  [x]  Encourage to call Cardiopulmonary Rehabilitation with any questions. []  Referral to alternate Cardiopulmonary Rehabilitation facility.    []  Other:    [] Appointment scheduled for   [x] Chooses to not schedule at this time  Pt does not drive, lives with his son, Discussed transportation options        Electronically signed by Nicolasa Martins RN,MS on 11/22/2021 at 1:55 PM

## 2021-11-22 NOTE — PROGRESS NOTES
1030: Patient complaining of Right arm pain. Right elbow swollen down through forearm. Pain is rated at 8/10. Patient can hardly move arm. Pt denies injury. PRN Tylenol given. DR Meade notified via secure message. Awaiting reply. 1144: Patient states he has hx of gout. Per chart review pt used to take allopurinol. Dr Millicent Martins updated on patient status and history. Patients arm remains the same despite tylenol. No new orders.       Electronically signed by Calvin Rendon RN on 11/22/2021 at 6:53 PM

## 2021-11-22 NOTE — PROGRESS NOTES
[address]   Daily Progress Note      Admit Date:  11/14/2021    CC: SOB    HPI:   Louise Bowen is a 61 y.o. male with PMH CAD, SHF s/p ICD (8/2019), LV thrombus, HTN, HLP, DM2, CKD Stage 3/ diabetic nephropathy. Seen by Dr Prachi Dodge as outpatient. Plan was for and RHC on 11/1/2021 and he did not show up for procedure. Admitted to [facility] with SOB and weight gain. He was started on Milrinone and lasix gtt for HF/hypervolemia. Developed SONAL, Neph consulted. Today he is up in a chair. He C/O generalized weakness and fatigue when transferring from bed to chair. He mostly C/O pain in right elbow and right knee he says is from gout. SOB and edema improved since admission. He remains on 2L NC. He gets Sob with activity that improves with rest.   He denies CP, LH, dizziness, palpitations, orthopnea, syncope. Review of Systems:   General: Denies fever, chills  Skin: Denies skin changes, rash, itching, lesions.   HEENT: Denies headache, dizziness, vision changes, nosebleeds, sore throat, nasal drainage  RESP: Denies cough, sputum,wheeze, snoring  CARD: Denies palpitations,  murmur  GI:Denies nausea, vomiting, heartburn, loss of appetite, change in bowels  : Denies frequency, pain, incontinence, polyuria  VASC: Denies claudication, leg cramps, clots  MUSC/SKEL: Denies pain, stiffness, arthritis  PSYCH: Denies anxiety, depression, stress  NEURO: Denies numbness, tingling, weakness,change in mood or memory  HEME: Denies abn bruising, bleeding, anemia  ENDO: Denies intolerance to heat, cold, excessive thirst or hunger, hx thyroid disease    Objective:   /81   Pulse 100   Temp 99.7 °F (37.6 °C) (Oral)   Resp 16   Ht 5' 6\" (1.676 m)   Wt 140 lb 14 oz (63.9 kg)   SpO2 98%   BMI 22.74 kg/m²         Intake/Output Summary (Last 24 hours) at 11/22/2021 0903  Last data filed at 11/22/2021 0840  Gross per 24 hour   Intake 648.48 ml   Output 2375 ml   Net -1726.52 ml     I/O since adm: Neg HCT 33.3* 31.4* 32.5*   MCV 98.6 97.4 98.5    204 196     BMP:   Recent Labs     21  0456 21  0448    135*   K 4.1 3.5   CL 95* 90*   CO2 32 32   BUN 58* 62*   CREATININE 1.7* 1.8*     GLUCOSE:   Recent Labs     21  0456 21  0448   GLUCOSE 110* 124*     LIVER PROFILE:   Lab Results   Component Value Date    AST 24 2019    ALT 22 2019    LABALBU 2.9 2021    BILIDIR <0.2 2019    BILITOT 0.3 2019    ALKPHOS 105 2019     PT/INR:   Lab Results   Component Value Date    PROTIME 34.9 2021    INR 2.95 2021    INR 2.66 2021    INR 2.83 2021     APTT: No results found for: APTT  Pro-BNP:    Lab Results   Component Value Date    PROBNP 31,194 2021    PROBNP 65,067 2021     Parameters:   > 450 pg/mL under age 48  > 900 pg/mL ages 54-65  > 1800 pg/mL over age 76    ENZYMES:  Lab Results   Component Value Date    TROPONINI 0.05 2021     FASTING LIPID PANEL:  Lab Results   Component Value Date    CHOL 146 2019    HDL 73 2019    HDL 58 2011    LDLCALC 51 2019    TRIG 109 2019       Diagnostics:    EK/15/2021 NSR HR 81 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 Ejection fraction is visually estimated to be <20%. There is severe diffuse hypokinesis. Diastolic filling parameters suggest grade II diastolic dysfunction. Definity contrast administered. No left ventricular thrombus was seen. moderate MR  Pacer / ICD wire is visualized in the right ventricle. The right ventricle is dilated. Right ventricular systolic     [facility]):  - Left ventricle: The cavity size is severely dilated. Wall thickness is normal. Systolic function     was severely reduced. The estimated ejection fraction was in the range of 10% to 15%. Diffuse     hypokinesis.  Doppler parameters are consistent with respirophasic     diameter changes were blunted (< 50%), consistent with elevated     central venous pressure. - Pericardium, extracardiac: A trivial pericardial effusion is     identified.     Stress Test:     Cardiac Angiography:   C 1/31/2020  IMPRESSIONS:  Normal left and right filling pressures with   preserved CO/CI. SUMMARY:   Hemodynamic:   RA 2   RV 28/3   PA 28/10 mean 16   PCWP 4   Blu 4.5/2.5   TD 4.5/2.5   PASat 66%   RECOMMENDATIONS:  Further management per Heart Failure Clinic.    12/10/2018 LHC/PCI and RHC:  IMPRESSIONS:  Selective angiography of LMCA showing critical mid LAD   lesion. Successful PCI with 3.5 x 18 mm MIKE. Normal cardiac output, m   ildly increased pulmonary pressures with mildly increased filling pre   ssure on inotropic support. SUMMARY:     1. Wedge pressure in the pulmonary capillaries is mildly elevated. 2. Coronary arteries: The left main is unusually long and      trifurcates into the LAD, a ramus intermedius, and the left      circumflex. The left anterior descending gives rise to 2      diagonals and 3 septals and wraps around the apex. The left      circumflex gives rise to 2 obtuse marginals and no      posterolaterals. The first obtuse marginal has a high origin. 3. LAD: Mid-vessel lesion: There is a discrete, 99% stenosis. The      distal vessel supplies a moderate-sized vascular territory. The      lesion is a likely culprit for the patient's clinical      presentation. The lesion was stented (see 1st lesion      intervention). Following intervention, the lesion has a residual      stenosis of 0% and MUKUND grade 3 flow (brisk flow). 4. AV was not crossed due to known apical LV thrombus. 5. RA 6      RV 48/4      PA 50/20 (28)      W 18.   6. Blu CO 5.2 Blu CI 2.9 on Milrinone 0.25 mcg/mg/min.      12/4/2018 Cardiac MRI ():  Findings are consistent with a severe, ischemic cardiomyopathy.  There is akinesis of the apical segments with an associated dilated measuring 27.2 mm. 8. The main pulmonary artery is dilated measuring 34.0 mm.  The IVC and coronary sinus are dilated. 9. There are large left and moderate right pleural effusions.  Basilar atelectasis is associated with the effusions.  There is evidence of mild pulmonary edema.     Assessment/Plan:  1.) Acute on chronic combined diastolic Gr 2 and systolic heart failure, EF <20%, ICM, moderate MR: Weaned off Milrinone and lasix gtts. Continues with elevated creat 1.8 today. Has been back on 02 for 2 days and lungs with significant crackles bilateral. Although edema improved. He is neg 15 L and wt down 175>140lbs. Repeat BNP today. CXR- pulm edema vs other etiology? NYHA Class III   Stage C  Diuretic: torsemide 20mg daily    Indicated for EF </=40%:   Beta Blocker (evidence based)- change coreg to selective BB>toprol XL for less BP effects  ACEi/ARB/ARNI- hold for SONAL (entresto previously)  SGLT2 Inhibitor- hld for SONAL  Aldosterone Antagonist (BNP within 1 week of DC if new or dose changed)- yeison 12.5mg daily    Cardiac Rehab for EF <35%: OP   ICD counseling: Has ICD   2gm Na diet, daily weight, 64 oz fluid restriction  Avoid NSAIDS and other nephrotoxic meds  [facility]: [facility]  HF RN referral for education  Previously seen at [place] [id number]     2. ) CAD: Prior PCI/MIKE to LAD. Mild elevated troponin most likely from decompensated HF. No evidence of angina/ischemia.    DAPT: on warfarin  Beta Blocker: Toprol XL  ACEi/ARB:  Anti anginal: Imdur  Lipid management/high intensity statin: lipitor  Risk factor management: high blood pressure, high cholesterol, Diabetes, smoking, obesity, family hx  Lifestyle modification: Heart healthy diet, regular exercise, weight loss, smoking cessation, stress reduction    3.) LV thrombus:  F/U ECHO without thrombus  Warfarin changed to xarelto    4.) SONAL on CKD 3: R/T diabetic nephropathy, Neph following  Baseline creat 2, 1.8 today    5.) Anemia: KEVIN   Hgb Stable 10.7      Electronically signed by DAHIANA Marshall CNP on 11/22/2021 at 9:03 AM

## 2021-11-22 NOTE — PROGRESS NOTES
[facility]                                               Progress note    Summary:   Tram Guevara is being seen by nephrology for SONAL on CKD. Admitted with SOB, decompensated HF. Interval History  Seen and examined at bedside. His shortness of breath is better though he is on oxygen now and was not yesterday. His edema is improved. He is having swelling and pain of his right elbow. He says he has a history of gout but has not had a flare in many years. No chest pain no cough no nausea no vomiting no diarrhea. He is off milrinone and Lasix drip. Now on torsemide 20 mg daily started today.,  He is on Aldactone 12.5 mg daily. Urine output 3 L yesterday  -2.4 L for the day  Blood pressure 115/81  Satting 98% on 2 L  Creatinine has been trending up 1.6> 1.7> 1.8 as of yesterday  Awaiting new labs. Plan:   Volume status improved on PO torsemide now. No new labs. UO and BP acceptable. No changes from renal aspect as of now. Pending lab results. Reilly Oshea MD  [facility]  Office: [phone]    Assessment:   Chronic kidney disease stage IIIb  This is likely secondary to diabetic nephropathy in the setting of persistent longstanding albuminuria  To complete CKD work-up will need an SPEP and light chains  Baseline creatinine around 2  No acute electrolyte abnormalities  Risk factors include diabetes, hypertension, severe systolic heart failure  Has had diabetes and hypertension for over 10 years and his last EF is less than 20%  He has had event evidence of albuminuria on urinalysis going back to 2016, 2013 had an  mg  Most recent protein creatinine ratio 700 mg, no RBCs no WBCs  CT abdomen in 2020 showed symmetric kidneys no lesions no masses bilateral perinephric stranding no hydronephrosis.   HCV and hepatitis C negative  Prior negative NAHOMY  Prior negative RF factor    Ischemic cardiomyopathy  Decompensated, has evidence of volume overload  proBNP 65,000  Managed with Lasix drip and is on milrinone, appreciate cardiology involvement. Last EF less than 20%  History of coronary artery disease with stent in the LAD  Has ICD  Most of his records are in the  system  Previously on Entresto but currently not  Echo here showed severe diffuse hypokinesis grade 2 diastolic dysfunction less than 20% EF severely dilated left ventricular cavity, right ventricular systolic function is severely reduced as well has moderate MR and RV's dilated    Diabetes type 2  10 years at least  Longstanding albuminuria  A1c has been ranging between 6 and 6.5 the last few checks. Hypertension  Blood pressure is acceptable right now  Carvedilol held, on positive inotrope right now      HPI/Summary:   Torres Moser is being seen by nephrology for acute kidney injury. This is a 58-HFTA-KALI man with systolic heart failure, history of LV thrombus, coronary artery disease, hyperlipidemia who presented the hospital with increasing shortness of breath and worsening lower extremity swelling. Patient has received most of his care at [facility] up until this point. He has a history of heart failure with reduced EF secondary to ischemic cardiomyopathy. He has a single-vessel coronary artery disease status post PCI of the LAD. ICD placed in 2019 last right heart cath was January 29, 2020. Based on review of his last heart failure clinic note it looks like he is supposed to be on carvedilol Imdur Entresto torsemide 20 mg daily. Patient told me he was instructed to stop taking torsemide but was taking it anyway. Last heart failure note said that there was discussion about evaluation for possible LVAD versus transplant. He is on Coumadin for the LV thrombus. Patient says he is never seen a kidney doctor as an outpatient. He denies any chest pain but he just had general decreased stamina and dyspnea on exertion, orthopnea and lower extremity edema.   He does not weigh himself at home daily. He said he became to the hospital because he was prompted by his family members to seek medical attention. ROS:   Positives Listed Bold. All other remaining systems are negative. Constitutional:  fever, chills, weakness, weight change, fatigue,      Skin:  rash, pruritus, hair loss, bruising, dry skin, petechiae. Head, Face, Neck   headaches, swelling,  cervical adenopathy. Respiratory: shortness of breath, cough, or wheezing  Cardiovascular: chest pain, palpitations, dizzy, edema  Gastrointestinal: nausea, vomiting, diarrhea, constipation,belly pain    Yellow skin, blood in stool  Musculoskeletal:  back pain, muscle weakness, gait problems,       joint pain or swelling. Genitourinary:  dysuria, poor urine flow, flank pain, blood in urine  Neurologic:  vertigo, TIA'S, syncope, seizures, focal weakness  Psychosocial:  insomnia, anxiety, or depression. Additional positive findings: - + elbow pain     PMH:   Past medical history, surgical history, social history, family history are reviewed and updated as appropriate. Reviewed current medication list.   Allergies reviewed and updated as needed. PE:   Vitals:    11/22/21 0758   BP: 115/81   Pulse: 100   Resp: 16   Temp: 99.7 °F (37.6 °C)   SpO2: 98%       General appearance: male in NAD, fully alert and oriented. Comfortable. HEENT: EOM intact, no icterus. Trachea is midline. Neck : No masses, appears symmetrical  Respiratory: Respiratory effort appears normal, bilateral equal chest rise, no wheeze, improved basilar crackles. Cardiovascular: Ausculation shows RRR + edema though improved. Abdomen: No visible mass or tenderness, non distended. Musculoskeletal:  Joints with no swelling or deformity. Skin:no rashes, ulcers, induration, no jaundice. Neuro: face symmetric, no focal deficits. Appropriate responses.        Lab Results   Component Value Date    CREATININE 1.8 (H) 11/21/2021    BUN 62 (H) 11/21/2021     (L) 11/21/2021    K 3.5 11/21/2021    CL 90 (L) 11/21/2021    CO2 32 11/21/2021      Lab Results   Component Value Date    WBC 9.4 11/22/2021    HGB 10.7 (L) 11/22/2021    HCT 32.5 (L) 11/22/2021    MCV 98.5 11/22/2021     11/22/2021     Lab Results   Component Value Date    CALCIUM 8.7 11/21/2021    PHOS 3.8 08/30/2017

## 2021-11-23 NOTE — PROGRESS NOTES
Hospitalist Progress Note      PCP: No primary care provider on file. Chief Complaint. Patient is a 44-year-old male with past medical history of chronic systolic heart failure, LV thrombus, CAD, hyperlipidemia who presented to hospital for shortness of breath bilateral lower extremity swelling. According to the patient he has not been taking his diuretic medication for past 3 weeks and his lower extremity swelling has been getting worse, he also has worsening shortness of breath, he has not been able to lie down flat and also has exertional dyspnea. Otherwise denied fevers chills nausea vomiting diarrhea constipation dysuria. Date of Admission: 11/14/2021    Subjective: Seen and evaluated at bedside, he has swelling and pain in the right elbow, has history of gout.   Lying in bed comfortably, no chest pain, nausea, vomiting, shortness of breath, fever or chills    Medications:  Reviewed    Infusion Medications    sodium chloride      milrinone 0.375 mcg/kg/min (11/23/21 1308)    sodium chloride      dextrose      sodium chloride       Scheduled Medications    sodium chloride flush  5-40 mL IntraVENous 2 times per day    sodium chloride flush  5-40 mL IntraVENous 2 times per day    predniSONE  40 mg Oral Daily    Followed by   [facility] ON 11/25/2021] predniSONE  20 mg Oral Daily    Followed by   [facility] ON 11/28/2021] predniSONE  10 mg Oral Daily    Followed by   [facility] ON 11/29/2021] predniSONE  5 mg Oral Daily    [START ON 11/24/2021] allopurinol  300 mg Oral Daily    metoprolol succinate  12.5 mg Oral Daily    [Held by provider] furosemide  40 mg IntraVENous TID    [Held by provider] torsemide  20 mg Oral Daily    rivaroxaban  15 mg Oral Dinner    influenza virus vaccine  0.5 mL IntraMUSCular Prior to discharge    insulin lispro  0-12 Units SubCUTAneous TID     insulin lispro  0-6 Units SubCUTAneous Nightly    atorvastatin  80 mg Oral Nightly    levothyroxine  25 mcg Oral Daily    isosorbide mononitrate  30 mg Oral Daily     PRN Meds: sodium chloride flush, sodium chloride, sodium chloride flush, sodium chloride, acetaminophen, ondansetron, methyl salicylate-menthol, guaiFENesin-dextromethorphan, polyethylene glycol, glucose, dextrose, glucagon (rDNA), dextrose, sodium chloride flush, sodium chloride, potassium chloride, magnesium sulfate, ondansetron **OR** ondansetron, magnesium hydroxide, acetaminophen **OR** acetaminophen, nitroGLYCERIN      Intake/Output Summary (Last 24 hours) at 11/23/2021 1724  Last data filed at 11/23/2021 1442  Gross per 24 hour   Intake 240 ml   Output 1250 ml   Net -1010 ml       Physical Exam Performed:    /69   Pulse 103   Temp 98.6 °F (37 °C) (Oral)   Resp 18   Ht 5' 6\" (1.676 m)   Wt 141 lb 5 oz (64.1 kg)   SpO2 99%   BMI 22.81 kg/m²     General appearance: NAD, lying in bed comfortably  HEENT:  Conjunctivae/corneas clear. Neck: Supple, with full range of motion. Respiratory:  Normal respiratory effort. Clear to auscultation, bilaterally without Rales/Wheezes/Rhonchi. Cardiovascular: Bilateral lung crackles noted   Abdomen: Soft, non-tender, non-distended, normal bowel sounds. Musculoskeletal: +1 edema bilaterally, right elbow swelling noted  Neurologic:  without any focal sensory/motor deficits.  grossly non-focal.  Psychiatric: Alert and oriented, Normal mood  Peripheral Pulses: +2 palpable, equal bilaterally       Labs:   Recent Labs     11/21/21  0448 11/22/21 0434 11/23/21 0428   WBC 6.8 9.4 10.2   HGB 10.5* 10.7* 9.9*   HCT 31.4* 32.5* 30.4*    196 185     Recent Labs     11/22/21  0434 11/22/21  1244 11/23/21 0428   * 135* 138   K 3.3* 3.4* 3.5   CL 89* 89* 92*   CO2 30 35* 31   BUN 67* 62* 74*   CREATININE 1.9* 1.8* 2.2*   CALCIUM 8.8 8.6 8.8     Recent Labs     11/23/21  0428   AST 32   ALT 21   BILIDIR 0.9*   BILITOT 1.6*   ALKPHOS 92     Recent Labs     11/21/21  0448 11/22/21  0434 11/23/21  0429   INR 2.66* 2.95* 2.71*     No results for input(s): Alejandra Mobley in the last 72 hours. Urinalysis:      Lab Results   Component Value Date    NITRU Negative 11/17/2021    WBCUA 1 11/17/2021    RBCUA 1 11/17/2021    BLOODU TRACE 11/17/2021    SPECGRAV 1.007 11/17/2021    GLUCOSEU Negative 11/17/2021       Radiology:  VL Extremity Venous Right         XR CHEST 1 VIEW   Final Result   Borderline pulmonary vascular congestion. XR CHEST (2 VW)   Final Result   The cardiac silhouette is enlarged, with somewhat globular configuration   which can be seen in pericardial effusion. No focal airspace disease or   pulmonary edema. Assessment/Plan:    Active Hospital Problems    Diagnosis     Stage 3 chronic kidney disease ([facility] [address] [N18.30]     Acute on chronic systolic congestive heart failure ([facility] [address] [I50.23]      Patient is a 79-year-old male with past medical history of chronic systolic heart failure, LV thrombus, CAD, hyperlipidemia who presented to hospital for shortness of breath bilateral lower extremity swelling. According to the patient he has not been taking his diuretic medication for past 3 weeks and his lower extremity swelling has been getting worse, he also has worsening shortness of breath, he has not been able to lie down flat and also has exertional dyspnea.   Otherwise denied fevers chills nausea vomiting diarrhea constipation dysuria.     Assessment  Acute on chronic systolic CHF  CAD  Hyperlipidemia  History of LV thrombus     Plan  S/p right heart cath-consistent with stage IV heart failure, Lasix, milrinone discontinued, home Coumadin  EF<20% on echo, severe diffuse hypokinesis, Diastolic filling parameters suggest grade II diastolic dysfunction  Elevated uric acid-ordered allopurinol, prednisone for elbow swelling likely secondary to gout, will order x-ray right elbow  Strict I's and O's, low-salt diet  Consulted palliative care-full medical management with full code per patient  Resume home medications  DVT prophylaxis-on Coumadin  Diet: ADULT DIET;  Regular; Low Fat/Low Chol/High Fiber/2 gm Na; 1500 ml  Code Status: Full Code    PT/OT Eval Status: ordered    Dispo - end-stage CHF,cardiology following    Tea Mcgregor MD

## 2021-11-23 NOTE — PLAN OF CARE
Problem: Skin Integrity:  Goal: Will show no infection signs and symptoms  Description: Will show no infection signs and symptoms  11/23/2021 1012 by Linda Murray RN  Outcome: Ongoing  11/23/2021 0431 by Francisco Hanks RN  Outcome: Ongoing  Goal: Absence of new skin breakdown  Description: Absence of new skin breakdown  11/23/2021 1012 by Linda Murray RN  Outcome: Ongoing  11/23/2021 0431 by Francisco Hanks RN  Outcome: Ongoing     Problem: Falls - Risk of:  Goal: Will remain free from falls  Description: Will remain free from falls  11/23/2021 1012 by Linda Murray RN  Outcome: Ongoing  11/23/2021 0431 by Francisco Hanks RN  Outcome: Ongoing  Goal: Absence of physical injury  Description: Absence of physical injury  11/23/2021 1012 by Linda Murray RN  Outcome: Ongoing  11/23/2021 0431 by Francisco Hanks RN  Outcome: Ongoing     Problem: OXYGENATION/RESPIRATORY FUNCTION  Goal: Patient will maintain patent airway  11/23/2021 1012 by Linda Murray RN  Outcome: Ongoing  11/23/2021 0431 by Francisco Hanks RN  Outcome: Ongoing  Goal: Patient will achieve/maintain normal respiratory rate/effort  Description: Respiratory rate and effort will be within normal limits for the patient  11/23/2021 1012 by Linda Murray RN  Outcome: Ongoing  11/23/2021 0431 by Francisco Hanks RN  Outcome: Ongoing     Problem: HEMODYNAMIC STATUS  Goal: Patient has stable vital signs and fluid balance  11/23/2021 1012 by Linda Murray RN  Outcome: Ongoing  11/23/2021 0431 by Francisco Hanks RN  Outcome: Ongoing     Problem: FLUID AND ELECTROLYTE IMBALANCE  Goal: Fluid and electrolyte balance are achieved/maintained  11/23/2021 1012 by Linda Murray RN  Outcome: Ongoing  11/23/2021 0431 by Francisco Hanks RN  Outcome: Ongoing     Problem: ACTIVITY INTOLERANCE/IMPAIRED MOBILITY  Goal: Mobility/activity is maintained at optimum level for patient  11/23/2021 1012 by Linda Murray RN  Outcome: Ongoing  11/23/2021 8469 by Rasheeda Mcdonald RN  Outcome: Ongoing     Problem: Pain:  Goal: Pain level will decrease  Description: Pain level will decrease  11/23/2021 1012 by Wilfrido Mayo RN  Outcome: Ongoing  11/23/2021 0431 by Rasheeda Mcdonald RN  Outcome: Ongoing  Goal: Control of acute pain  Description: Control of acute pain  11/23/2021 1012 by Wilfrido Mayo RN  Outcome: Ongoing  11/23/2021 0431 by Rasheeda Mcdonald RN  Outcome: Ongoing  Goal: Control of chronic pain  Description: Control of chronic pain  11/23/2021 1012 by Wilfrido Mayo RN  Outcome: Ongoing  11/23/2021 0431 by Rasheeda Mcdonald RN  Outcome: Ongoing

## 2021-11-23 NOTE — CONSULTS
PALLIATIVE MEDICINE CONSULTATION     Patient name:Cedric Molina   NBL:[id number]    :1955  Room/Bed:V1M-4002/5110-01   LOS: 9 days         Date of consult:2021    Consult Information    Inpatient consult to Palliative Care  Consult performed by: DAHIANA Lopez CNP  Consult ordered by: Maria Del Carmen Ken MD         Reason for consult: Goals of Care, Code Status     Number of admissions past 12 months: 1     ASSESSMENT/RECOMMENDATIONS     61 y.o. male with debility and dyspnea. Symptom Management:  Debility: Patient exhibited a generalized head to weakness with patient's right arm being weaker than his left arm today. Patient requiring assistance with his ADL's at this time. Dyspnea: Patient on 1 liter of oxygen via a nasal cannula. Goals of Care:  Met patient at bedside today. Patient alert and oriented x 4. Patient was decisional today. Reviewed patient's current health status, goals of care and code status. Patient indicated he would like to continue with all aggressive treatment at this time. Patient also indicated he would have to discuss with his family whether or not they would like to pursue home palliative care services upon the patient's discharge. Patient indicated he would like to remain a Full Code at this time. Patient indicated he does not have a living spouse and that his son Krystyna Cody would be his primary decision maker if he was unable to make decisions on his own. Patient/Family Goals of Care :    Met patient at bedside today. Patient alert and oriented x 4. Patient was decisional today. Reviewed patient's current health status, goals of care and code status. Patient indicated he would like to continue with all aggressive treatment at this time. Patient also indicated he would have to discuss with his family whether or not they would like to pursue home palliative care services upon the patient's discharge.   Patient indicated he would like to remain a Full Code at this time. Patient indicated he does not have a living spouse and that his son Samuel Willson would be his primary decision maker if he was unable to make decisions on his own. Disposition/Discharge Plan:   Pending. Advance Directives:  Surrogate Decision Maker: Per patient, Samuel Willson (son). Code status:  Full Code    Case discussed with: patient, floor RN  Thank you for allowing us to participate in the care of this patient. HISTORY     CC: Debility  HPI: The patient is a 61 y.o. male with a past medical history of chronic systolic heart failure, LV thrombus, CAD and hyperlipidemia who presented to hospital for shortness of breath bilateral lower extremity swelling. The patient was admitted with acute on chronic systolic congestive heart failure and acute respiratory failure with hypoxia. Palliative Medicine SymptomScreening/ROS:    Review of Systems   Constitutional: Positive for fatigue. HENT: Negative. Eyes: Negative. Respiratory: Positive for shortness of breath (mild). Negative for cough, choking and wheezing. Cardiovascular: Positive for leg swelling. Gastrointestinal: Negative. Musculoskeletal: Negative. Skin: Negative. Neurological: Positive for weakness (right arm weaker than left arm). Psychiatric/Behavioral: Negative. All other systems reviewed and are negative. A complete 10 count ROS was obtained. Pertinent positives mentioned above in HPI/ROS. All others if not mentioned are negative. Pain:  Patient denied having any pain during my visit. Home med list and hospital medications reviewed in chart as of 11/23/2021     EXAM     Vitals:    11/23/21 1300   BP: 106/69   Pulse: 103   Resp: 18   Temp:    SpO2:        Physical Exam  Vitals reviewed. Constitutional:       Appearance: He is ill-appearing. Interventions: Nasal cannula in place. HENT:      Head: Normocephalic and atraumatic.       Nose: Nose normal.   Eyes: Extraocular Movements: Extraocular movements intact. Pupils: Pupils are equal, round, and reactive to light. Cardiovascular:      Rate and Rhythm: Tachycardia present. Pulses: Normal pulses. Pulmonary:      Effort: Pulmonary effort is normal.      Breath sounds: Normal breath sounds. Abdominal:      General: Bowel sounds are normal.      Palpations: Abdomen is soft. Musculoskeletal:      Cervical back: Neck supple. Right lower leg: Edema (trace) present. Left lower leg: Edema (trace) present. Skin:     General: Skin is warm and dry. Neurological:      Comments: Oriented x4   Psychiatric:         Behavior: Behavior normal.         Thought Content:  Thought content normal.         Judgment: Judgment normal.              Current labs in the epic chart reviewed as of 11/23/2021   Review of previous notes, admits, labs, radiology and testing relevant to this consult done in this chart today 11/23/2021      Total time: 79 minutes  >50% of time spent counseling patient at bedside or POA/family member if applicable , reviewing information and discussing care, coordinating with care team  Signed By: Electronically signed by DAHIANA James CNP on 11/23/2021 at 2:26 PM  Palliative Medicine   [phone]    November 23, 2021

## 2021-11-23 NOTE — PROGRESS NOTES
RHC results noted, Primacor gtt resumed. I saw SW/CM white board note about pt possibly needing home care RN once discharged but would seek HF RN and diabetes educator's input. My answer is yes. Pt would definitely benefit having a home care RN to help keep eyes on him at home. Pt is noncompliant and needs on-going reiteration of education. I did place a [address] referral when I saw pt in consult bu the [facility] referral would be only telehealth (not in person). I added HF instructions to pt's DEE. Thank you.

## 2021-11-23 NOTE — PRE SEDATION
Brief Pre-Op Note/Sedation Assessment      Richi Navarro  1955  [id number]      [id number]  10:34 AM    Planned Procedure: Cardiac Catheterization Procedure    Post Procedure Plan: Return to same level of care    Consent: I have discussed with the patient and/or the patient representative the indication, alternatives, and the possible risks and/or complications of the planned procedure and the anesthesia methods. The patient and/or patient representative appear to understand and agree to proceed. Chief Complaint: Dyspnea      Indications for Cath Procedure:  LV Dysfunction  Anginal Classification within 2 weeks:  No symptoms  NYHA Heart Failure Class within 2 weeks: Class III - Symptoms of HF on less-than-ordinary exertion, Newly Diagnosed?  No, Heart Failure Type: Systolic  Is Cath Lab Visit Valve-related?: No  Surgical Risk: Low  Functional Type: Unknown    Anti- Anginal Meds within 2 weeks:   Yes: Beta Blockers    Stress or Imaging Studies Performed:  None     Vital Signs:  BP (!) 91/57   Pulse 85   Temp 99.7 °F (37.6 °C) (Oral)   Resp 18   Ht 5' 6\" (1.676 m)   Wt 141 lb 5 oz (64.1 kg)   SpO2 90%   BMI 22.81 kg/m²     Allergies:  No Known Allergies    Past Medical History:  Past Medical History:   Diagnosis Date    Diabetes type 2, uncontrolled ([facility] [address] 7/28/2014    Diabetic nephropathy 9/12/2013    ED (erectile dysfunction) 9/12/2013    Essential hypertension, benign 9/12/2013    Hyperlipidemia 9/12/2013    Noncompliance 7/28/2014         Surgical History:  Past Surgical History:   Procedure Laterality Date    EYE SURGERY           Medications:  Current Facility-Administered Medications   Medication Dose Route Frequency Provider Last Rate Last Admin    sodium chloride flush 0.9 % injection 5-40 mL  5-40 mL IntraVENous 2 times per day DAHIANA Encinas CNP   10 mL at 11/23/21 0858    sodium chloride flush 0.9 % injection 5-40 mL  5-40 mL IntraVENous PRN DAHIANA Encinas CNP mL  10 mL IntraVENous PRN Larry Yip MD        0.9 % sodium chloride infusion  25 mL IntraVENous PRN Larry Yip MD        potassium chloride 10 mEq/100 mL IVPB (Peripheral Line)  10 mEq IntraVENous PRN Larry Yip  mL/hr at 11/22/21 1916 10 mEq at 11/22/21 1916    magnesium sulfate 2000 mg in 50 mL IVPB premix  2,000 mg IntraVENous PRN Larry Yip MD        ondansetron (ZOFRAN-ODT) disintegrating tablet 4 mg  4 mg Oral Q8H PRN Larry Yip MD        Or    ondansetron (ZOFRAN) injection 4 mg  4 mg IntraVENous Q6H PRN Larry Yip MD        magnesium hydroxide (MILK OF MAGNESIA) 400 MG/5ML suspension 30 mL  30 mL Oral Daily PRN Larry Yip MD        acetaminophen (TYLENOL) tablet 650 mg  650 mg Oral Q6H PRN Larry Yip MD   650 mg at 11/23/21 0855    Or    acetaminophen (TYLENOL) suppository 650 mg  650 mg Rectal Q6H PRN Larry Yip MD        atorvastatin (LIPITOR) tablet 80 mg  80 mg Oral Nightly Larry Yip MD   80 mg at 11/22/21 2116    levothyroxine (SYNTHROID) tablet 25 mcg  25 mcg Oral Daily Larry Yip MD   25 mcg at 11/23/21 0532    nitroGLYCERIN (NITROSTAT) SL tablet 0.4 mg  0.4 mg SubLINGual Q5 Min PRN Larry Yip MD        isosorbide mononitrate (IMDUR) extended release tablet 30 mg  30 mg Oral Daily Larry Yip MD   30 mg at 11/23/21 0855           Pre-Sedation:    Pre-Sedation Documentation and Exam:  I have personally completed a history, physical exam & review of systems for this patient (see notes). Prior History of Anesthesia Complications:   none    Modified Mallampati:  I (soft palate, uvula, fauces, tonsillar pillars visible)    ASA Classification:  Class 3 - A patient with severe systemic disease that limits activity but is not incapacitating      Valeriano Scale:   Activity:  2 - Able to move 4 extremities voluntarily on command  Respiration:  2 - Able to breathe deeply and cough freely  Circulation:  2 - BP+/- 20mmHg of normal  Consciousness:  2 -

## 2021-11-23 NOTE — PROGRESS NOTES
[facility]                                               Progress note    Summary:   Tram Guevara is being seen by nephrology for SONAL on CKD. Admitted with SOB, decompensated HF. Interval History  Seen and examined at bedside. Reports that he was more somnolent prior to starting milrinone, has perked up now. He is having swelling of his right elbow. Concerned for gout flare. High uric acid and has been aggressively diuresed. No chest pain no SOB. He had RHC, index very low, started back on milrinone. His wedge was 19 and RA pressure 13 so is hypervolemic still     /69  99% on 1 L   UO 2,2 L yesterday   Negative 1.5 L     Cr 1.8 >2.2  K 3.5  Na 138   BUN 74  Uric aci 11.2     Plan:   Worsening renal failure. This acute change is likely cardiorenal . His index is low, he has elevated filling pressures. Expect him to have some improvement with inotrope support. Will start steroids taper for his gout flare and initiate low dose allopurinol. Diuretic holiday today. Will need to resume by tomorrow though. He still has some volume to give based on RHC but will need inoptrope support to do it. Big picture I discussed with him that his heart failure is end stage and the kidneys as well as other organs will be affected by this. His prognosis is poor. I do not know if he is a candidate for advanced heart failure therapies or not.  Palliative care consult is reasonable      Reilly Oshea MD  [facility]  Office: [phone]    Assessment:   Chronic kidney disease stage IIIb  This is likely secondary to diabetic nephropathy in the setting of persistent longstanding albuminuria  To complete CKD work-up will need an SPEP and light chains  Baseline creatinine around 2  No acute electrolyte abnormalities  Risk factors include diabetes, hypertension, severe systolic heart failure  Has had diabetes and hypertension for over 10 years and his last EF is less than 20%  He has had event evidence of albuminuria on urinalysis going back to 2016, 2013 had an  mg  Most recent protein creatinine ratio 700 mg, no RBCs no WBCs  CT abdomen in 2020 showed symmetric kidneys no lesions no masses bilateral perinephric stranding no hydronephrosis. HCV and hepatitis C negative  Prior negative NAHOMY  Prior negative RF factor    Ischemic cardiomyopathy  Decompensated, has evidence of volume overload  proBNP 65,000  Managed with Lasix drip and is on milrinone, appreciate cardiology involvement. Last EF less than 20%  History of coronary artery disease with stent in the LAD  Has ICD  Most of his records are in the  system  Previously on Entresto but currently not  Echo here showed severe diffuse hypokinesis grade 2 diastolic dysfunction less than 20% EF severely dilated left ventricular cavity, right ventricular systolic function is severely reduced as well has moderate MR and RV's dilated    Diabetes type 2  10 years at least  Longstanding albuminuria  A1c has been ranging between 6 and 6.5 the last few checks. Hypertension  Blood pressure is acceptable right now  Carvedilol held, on positive inotrope right now      HPI/Summary:   Ruddy Griffin is being seen by nephrology for acute kidney injury. This is a 29-BPYP-SMITH man with systolic heart failure, history of LV thrombus, coronary artery disease, hyperlipidemia who presented the hospital with increasing shortness of breath and worsening lower extremity swelling. Patient has received most of his care at [facility] up until this point. He has a history of heart failure with reduced EF secondary to ischemic cardiomyopathy. He has a single-vessel coronary artery disease status post PCI of the LAD. ICD placed in 2019 last right heart cath was January 29, 2020. Based on review of his last heart failure clinic note it looks like he is supposed to be on carvedilol Imdur Entresto torsemide 20 mg daily.   Patient told me he was instructed to Cardiovascular: Ausculation shows RRR + edema though improved. Abdomen: No visible mass or tenderness, non distended. Musculoskeletal:  Joints with no swelling or deformity. Skin:no rashes, ulcers, induration, no jaundice. Neuro: face symmetric, no focal deficits. Appropriate responses.        Lab Results   Component Value Date    CREATININE 2.2 (H) 11/23/2021    BUN 74 (H) 11/23/2021     11/23/2021    K 3.5 11/23/2021    CL 92 (L) 11/23/2021    CO2 31 11/23/2021      Lab Results   Component Value Date    WBC 10.2 11/23/2021    HGB 9.9 (L) 11/23/2021    HCT 30.4 (L) 11/23/2021    MCV 98.7 11/23/2021     11/23/2021     Lab Results   Component Value Date    CALCIUM 8.8 11/23/2021    PHOS 3.8 08/30/2017

## 2021-11-23 NOTE — BRIEF OP NOTE
Brief Postoperative Note    Eli Roy  YOB: 1955  [id number]    Pre-operative Diagnosis: CHF    Post-operative Diagnosis: Same    Procedure: RHC,O2 sats,CO    Anesthesia: Local    Surgeons/Assistants: Neo Mejia    Estimated Blood Loss: less than 50     Complications: None    Specimens: Was Not Obtained    Findings:     Pressures        O2 saturations         RA       13 mmHg          33%          RV       48/20(20)          38%          PA        60/37(48)         36%         PCW    19 mm Hg                        Blu         Thermal                            CO/CI       2.94/1.7    3.38/1.96    Findings c/w Stage D/class 4 HF. Moderate Sedation:  Start time:10:34 am   Stop time: 11:00 am  0 mg versed   0  mcg fentanyl   An independent trained observer pushed medications at my direction. We monitored the patient's level of consciousness and vital signs/physiologic status throughout the procedure duration (see start and start times above).        Will start Pt  Back on  Primacor drip  Electronically signed by Fanny Washington MD on 11/23/2021 at 11:19 AM

## 2021-11-23 NOTE — PROCEDURES
[address] [place] [address]                            CARDIAC CATHETERIZATION    PATIENT NAME: Lacie Bowen                        :        1955  MED REC NO:   [id number]                          ROOM:       5110  ACCOUNT NO:   [de-identified]                           ADMIT DATE: 2021  PROVIDER:     Haley Rosa MD    DATE OF PROCEDURE:  2021    PROCEDURE PERFORMED:  Right heart catheterization. PROCEDURE NOTE:  The patient was explained benefits and risks of the  procedure. Informed consent was obtained. The patient's existing  antecubital vein was used for the procedure. It was exchanged over a  guidewire to a 5-Iranian sheath. A 5-Iranian balloon-tip Morton-Radha  catheter was then advanced. Pressure and oxygen saturations were  obtained in the right atrium, right ventricle, pulmonary artery and  pulmonary capillary wedge positions. Cardiac output and cardiac indices  were then obtained. Morton-Radha catheter was subsequently removed and  hemostasis has been achieved post procedure. HEMODYNAMIC DATA:  Right atrial mean pressure was 13 mmHg with oxygen  saturation of 33%. RV pressure 48/20 with mean of 20 mmHg, oxygen  saturation 38%. PA pressure was 60/37, mean of 48 and oxygen saturation  of 36%. The pulmonary capillary wedge pressure was 19 mmHg. Next,  cardiac output by Blu method was 2.9 liters per minute with cardiac  index of 1.7 liters per minute. Thermodilution cardiac output was 3.38  liters per minute with cardiac index of 1.96 liters per minute per body  surface area. OVERALL IMPRESSION:  1. Elevated right heart pressures. 2.  Elevated pulmonary capillary wedge pressure. 3.  Markedly reduced cardiac output and indices with significantly low  oxygen saturation, all consistent with stage D/New York Heart  Association Class IV heart failure.   Estimated blood loss is less than 20 cc  We will start the patient back on Primacor drip. The patient's prognosis is extremely poor at this point and we will  discuss further with the patient and the family about his code status.         Martha Vizcarra MD    D: 11/23/2021 12:01:16       T: 11/23/2021 14:42:34     [email or web address]  Job#: [id number]     Doc#: [id number]    CC:  Awilda Henriquez MD

## 2021-11-23 NOTE — PROGRESS NOTES
Physician Progress Note      Idalia Segal  CSN #:                  [id number]  :                       1955  ADMIT DATE:       2021 2:58 PM  [address] [place] DATE:  RESPONDING  PROVIDER #:        Yvan Holden MD          QUERY TEXT:    Pt admitted with acute on chronic systolic CHF . Pt noted to also have    HTN,CAD, Ischemic cardiomyopathy. If possible, please document in progress   notes and discharge summary the etiology of CHF, if able to be determined. The medical record reflects the following:  Risk Factors: HTN, ischemic cardiomyopathy, CAD, CKD , noncompliance , s/p PCI   of LAD  Clinical Indicators: Documentation of  past medical history significant for   CAD,  Ischemic cardiomyopathy . Last EF less than 20%. ICD in place. ECHO-Echo   here showed severe diffuse hypokinesis grade 2 diastolic dysfunction less   than 20% EF severely dilated left ventricular cavity, right ventricular   systolic function is severely reduced as well has moderate MR and RV's   dilated. Treatment: Cardiology consult, iv lasix, milirinone, low na diet, fluid   restriction, statin, bb, monitor labs    Thank You, Viktor Teresa RN CDS CRCR  [email or web address]  Options provided:  -- CHF due to Hypertensive Heart Disease  -- CHF due to Hypertensive Heart Disease and CAD  -- CHF not due to Hypertension but due to CAD  -- CHF due to Hypertensive Heart Disease and ICMP  -- CHF not due to Hypertension but due to ICMP  -- CHF due to HTN, CAD, and ICMP  -- Other - I will add my own diagnosis  -- Disagree - Not applicable / Not valid  -- Disagree - Clinically unable to determine / Unknown  -- Refer to Clinical Documentation Reviewer    PROVIDER RESPONSE TEXT:    Provider is clinically unable to determine a response to this query. Query created by:  Kaveh Teresa on 2021 12:37 PM      Electronically signed by:  Yvan Holden MD 2021 4:59 PM

## 2021-11-23 NOTE — PROGRESS NOTES
[facility]   Daily Progress Note      Admit Date:  11/14/2021    CC: SOB    HPI:   Sandi Mcgowan is a 61 y.o. male with PMH CAD, SHF s/p ICD (8/2019), LV thrombus, HTN, HLP, DM2, CKD Stage 3/ diabetic nephropathy. Seen by Dr Jalen Yang as outpatient. Plan was for and RHC on 11/1/2021 and he did not show up for procedure. Admitted to [facility] with SOB and weight gain. He was started on Milrinone and lasix gtt for HF/hypervolemia. Developed SONAL, Neph consulted. Today he is restign in bed. O2 has been weaned off. O2 sats have been >95%. Denies SOB at rest. + SOB with activity - improves with rest.   He mostly C/O pain in right elbow and right knee he says is from gout. SOB and edema improved since admission. Rhenda Fely He denies CP, LH, dizziness, palpitations, orthopnea, syncope. Review of Systems:   General: Denies fever, chills  Skin: Denies skin changes, rash, itching, lesions.   HEENT: Denies headache, dizziness, vision changes, nosebleeds, sore throat, nasal drainage  RESP: Denies cough, sputum,wheeze, snoring  CARD: Denies palpitations,  murmur  GI:Denies nausea, vomiting, heartburn, loss of appetite, change in bowels  : Denies frequency, pain, incontinence, polyuria  VASC: Denies claudication, leg cramps, clots  MUSC/SKEL: Denies pain, stiffness, arthritis  PSYCH: Denies anxiety, depression, stress  NEURO: Denies numbness, tingling, weakness,change in mood or memory  HEME: Denies abn bruising, bleeding, anemia  ENDO: Denies intolerance to heat, cold, excessive thirst or hunger, hx thyroid disease    Objective:   /73   Pulse 101   Temp 99.1 °F (37.3 °C) (Oral)   Resp 18   Ht 5' 6\" (1.676 m)   Wt 141 lb 5 oz (64.1 kg)   SpO2 95%   BMI 22.81 kg/m²           Intake/Output Summary (Last 24 hours) at 11/23/2021 0801  Last data filed at 11/23/2021 0356  Gross per 24 hour   Intake 720 ml   Output 2250 ml   Net -1530 ml     I/O since adm: Neg 17L    WEIGHT:Admit Weight: 175 lb 11.3 oz (79.7 kg)         Today  Weight: 141 lb 5 oz (64.1 kg)   DRY WEIGHT: 150lbs  Wt Readings from Last 3 Encounters:   11/23/21 141 lb 5 oz (64.1 kg)   10/22/21 166 lb (75.3 kg)   06/12/19 161 lb (73 kg)       Physical Exam:  GEN: Appears frail, no acute distress  SKIN: Brown, warm, dry. Nails without clubbing. HEENT: PERRLA. Normocephalic, atraumatic. Neck supple. No adenopathy. LUNG: AP diameter normal. Crackles bilateral bases. Exp ronchi upper airways with moist cough. Respiratory effort normal.   HEART: S1S2 A/R. +8 JVD . No carotid bruit. + systolic murmur LSB, 5ICS, nno rub or gallop. ABD: Soft, nontender. +BS X 4 quads. No hepatomegaly. EXT: Radial and pedal pulses 2+ and symmetric. Without varicosities. 1+ pedal/ankle edema. MUSCSKEL: Good ROM X4 extremities. No deformity. NEURO: A/O X3. Calm and cooperative. Telemetry: NSR  with PVC    Medications:    metoprolol succinate  12.5 mg Oral Daily    furosemide  40 mg IntraVENous TID    spironolactone  12.5 mg Oral Daily    torsemide  20 mg Oral Daily    rivaroxaban  15 mg Oral Dinner    influenza virus vaccine  0.5 mL IntraMUSCular Prior to discharge    insulin lispro  0-12 Units SubCUTAneous TID WC    insulin lispro  0-6 Units SubCUTAneous Nightly    sodium chloride flush  10 mL IntraVENous 2 times per day    atorvastatin  80 mg Oral Nightly    levothyroxine  25 mcg Oral Daily    isosorbide mononitrate  30 mg Oral Daily      dextrose      sodium chloride       methyl salicylate-menthol, guaiFENesin-dextromethorphan, polyethylene glycol, glucose, dextrose, glucagon (rDNA), dextrose, sodium chloride flush, sodium chloride, potassium chloride, magnesium sulfate, ondansetron **OR** ondansetron, magnesium hydroxide, acetaminophen **OR** acetaminophen, nitroGLYCERIN    Lab Data: I have reviewed all labs below today.    CBC:   Recent Labs     11/21/21  0448 11/22/21  0434 11/23/21  0428   WBC 6.8 9.4 10.2   HGB 10.5* 10.7* 9.9*   HCT 31.4* 32.5* 30.4*   MCV 97.4 98.5 98.7    196 185     BMP:   Recent Labs     21  0448 21  0434 21  1244   * 135* 135*   K 3.5 3.3* 3.4*   CL 90* 89* 89*   CO2 32 30 35*   BUN 62* 67* 62*   CREATININE 1.8* 1.9* 1.8*     GLUCOSE:   Recent Labs     21  0448 21  0434 21  1244   GLUCOSE 124* 154* 149*     LIVER PROFILE:   Lab Results   Component Value Date    AST 24 2019    ALT 22 2019    LABALBU 2.9 2021    BILIDIR <0.2 2019    BILITOT 0.3 2019    ALKPHOS 105 2019     PT/INR:   Lab Results   Component Value Date    PROTIME 31.9 2021    INR 2.71 2021    INR 2.95 2021    INR 2.66 2021     APTT: No results found for: APTT  Pro-BNP:    Lab Results   Component Value Date    PROBNP >70,000 2021    PROBNP 31,194 2021    PROBNP 65,067 2021     Parameters:   > 450 pg/mL under age 48  > 900 pg/mL ages 54-65  > 1800 pg/mL over age 76    ENZYMES:  Lab Results   Component Value Date    TROPONINI 0.05 2021     FASTING LIPID PANEL:  Lab Results   Component Value Date    CHOL 146 2019    HDL 73 2019    HDL 58 2011    LDLCALC 51 2019    TRIG 109 2019       Diagnostics:    EK/15/2021 NSR HR 81 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 Ejection fraction is visually estimated to be <20%. There is severe diffuse hypokinesis. Diastolic filling parameters suggest grade II diastolic dysfunction. Definity contrast administered. No left ventricular thrombus was seen. moderate MR  Pacer / ICD wire is visualized in the right ventricle. The right ventricle is dilated. Right ventricular systolic    [facility]):  - Left ventricle: The cavity size is severely dilated. Wall thickness is normal. Systolic function     was severely reduced.  The estimated ejection fraction was in the range of 10% to 15%. Diffuse     hypokinesis. Doppler parameters are consistent with restrictive physiology, indicative of     decreased left ventricular diastolic compliance and/or increased left atrial pressure. There was     spontaneous echo contrast, indicative of stasis. - Mitral valve: Moderate regurgitation.   - Left atrium: The atrium is severely dilated. - Right ventricle: Pacer wire or catheter noted in right ventricle. Systolic function was mildly     reduced. - Right atrium: The atrium is mildly dilated. - Atrial septum: The septum bowed from left to right, consistent with increased left atrial     pressure. - Tricuspid valve: Mild-moderate regurgitation.   - Pulmonary arteries: Systolic pressure was severely increased, estimated to be 65mm Hg assuming     that the right atrial pressure was 15 mmHg. - Inferior vena cava: The vessel was dilated. The respirophasic diameter changes were blunted (<     50%), consistent with elevated central venous pressure    Echo 1/9/2020 [facility]):  - Left ventricle: The cavity size is severely dilated. Wall     thickness was increased in a pattern of mild LVH. Systolic     function was severely reduced. The estimated ejection fraction     was in the range of 10% to 15%. Diffuse hypokinesis. No contrast     administered - unable to evaluate for LV thrombus. The     longitudinal strain is -2.47% (markedly reduced). - Aortic valve: Mild thickening.   - Mitral valve: Leaflet tenting. Mild to moderate regurgitation.   - Left atrium: The atrium is severely dilated. - Right ventricle: The cavity size is normal. Pacer wire or ICD     noted in right ventricle. Systolic function was mildly reduced by     objective interpretation. TAPSE: 1.6cm. Tricuspid annular systolic     velocity: 8cm/s.   - Tricuspid valve: Mild-moderate regurgitation.   - Pulmonary arteries: Systolic pressure could not be accurately     estimated, however it is elevated at at least 51 mm Hg, assuming     an RA pressure of 15 mm Hg. - Inferior vena cava: The vessel was dilated. The respirophasic     diameter changes were blunted (< 50%), consistent with elevated     central venous pressure. - Pericardium, extracardiac: A trivial pericardial effusion is     identified.     Stress Test:     Cardiac Angiography:   [facility] 1/31/2020  IMPRESSIONS:  Normal left and right filling pressures with   preserved CO/CI. SUMMARY:   Hemodynamic:   RA 2   RV 28/3   PA 28/10 mean 16   PCWP 4   Blu 4.5/2.5   TD 4.5/2.5   PASat 66%   RECOMMENDATIONS:  Further management per Heart Failure Clinic.    12/10/2018 LHC/PCI and RHC:  IMPRESSIONS:  Selective angiography of LMCA showing critical mid LAD   lesion. Successful PCI with 3.5 x 18 mm MIKE. Normal cardiac output, m   ildly increased pulmonary pressures with mildly increased filling pre   ssure on inotropic support. SUMMARY:     1. Wedge pressure in the pulmonary capillaries is mildly elevated. 2. Coronary arteries: The left main is unusually long and      trifurcates into the LAD, a ramus intermedius, and the left      circumflex. The left anterior descending gives rise to 2      diagonals and 3 septals and wraps around the apex. The left      circumflex gives rise to 2 obtuse marginals and no      posterolaterals. The first obtuse marginal has a high origin. 3. LAD: Mid-vessel lesion: There is a discrete, 99% stenosis. The      distal vessel supplies a moderate-sized vascular territory. The      lesion is a likely culprit for the patient's clinical      presentation. The lesion was stented (see 1st lesion      intervention). Following intervention, the lesion has a residual      stenosis of 0% and MUKUND grade 3 flow (brisk flow). 4. AV was not crossed due to known apical LV thrombus. 5. RA 6      RV 48/4      PA 50/20 (28)      W 18.   6. Blu CO 5.2 Blu CI 2.9 on Milrinone 0.25 mcg/mg/min.      12/4/2018 Cardiac MRI ():  Findings are consistent with a severe, ischemic thrombus  Warfarin changed to xarelto    4.) SONAL on CKD 3: R/T diabetic nephropathy, Neph following  Baseline creat 2, 1.8 today    5.) Anemia: KEVIN   Hgb Stable 10.7    6.) RUE swelling with pain:   Has IV getting intermittent meds - will check venous doppler  Hx gout- check uric acid    Electronically signed by DAHIANA Cortes - CNP on 11/23/2021 at 8:01 AM

## 2021-11-23 NOTE — DISCHARGE INSTR - COC
Continuity of Care Form    Patient Name: Ruddy Griffin   :  1955  MRN:  [id number]    Admit date:  2021  Discharge date:      Code Status Order: Full Code   Advance Directives:      Admitting Physician:  Raghu Ignacio MD  PCP: No primary care provider on file.     Discharging Nurse: [facility] Unit/Room#: S7W-8060/1373-27  Discharging Unit Phone Number: [phone]    Emergency Contact:   Extended Emergency Contact Information  Primary Emergency Contact: Demetrio Raines, [address] [facility] Phone: [phone]  Relation: Brother/Sister  Secondary Emergency Contact: Demetriodeborah Raines, [address] Phone: [phone]  Relation: Child    Past Surgical History:  Past Surgical History:   Procedure Laterality Date    EYE SURGERY         Immunization History:   Immunization History   Administered Date(s) Administered    COVID-19, Pfizer, PF, 30mcg/0.3mL 2021, 2021    Influenza Virus Vaccine 2017       Active Problems:  Patient Active Problem List   Diagnosis Code    ED (erectile dysfunction) N52.9    Essential hypertension, benign I10    Hyperlipidemia E78.5    Diabetic nephropathy ([address] E11.21    Diabetes type 2, uncontrolled ([address] E11.65    Noncompliance Z91.19    Elevated blood uric acid level E79.0    Coronary artery disease involving native coronary artery I25.10    Elevated PSA R97.20    Acute on chronic systolic congestive heart failure (HCC) I50.23    Stage 3 chronic kidney disease ([address] N18.30       Isolation/Infection:   Isolation            No Isolation          Patient Infection Status       None to display            Nurse Assessment:  Last Vital Signs: /74   Pulse 104   Temp 97.8 °F (36.6 °C) (Oral)   Resp 18   Ht 5' 6\" (1.676 m)   Wt 141 lb 5 oz (64.1 kg)   SpO2 97%   BMI 22.81 kg/m²     Last documented pain score (0-10 scale): Pain Level: 0  Last Weight:   Wt Readings from Last 1 Encounters:   21 141 lb 5 oz (64.1 kg)     Mental Status:  oriented and alert    IV Access:  - None    Nursing Mobility/ADLs:  Walking   Assisted  Transfer  Assisted  Bathing  Assisted  Dressing  Assisted  Toileting  Assisted  Feeding  Assisted  Med Admin  Assisted  Med Delivery   whole    Wound Care Documentation and Therapy:        Elimination:  Continence: Bowel: Yes  Bladder: Yes  Urinary Catheter: Removal Date 11/30/2021    Colostomy/Ileostomy/Ileal Conduit: No       Date of Last BM:    Intake/Output Summary (Last 24 hours) at 11/23/2021 1735  Last data filed at 11/23/2021 1442  Gross per 24 hour   Intake 240 ml   Output 1250 ml   Net -1010 ml     I/O last 3 completed shifts: In: 240 [P.O.:240]  Out: 9982 [Urine:1650]    Safety Concerns:     History of Falls (last 30 days)    Impairments/Disabilities:      None    Nutrition Therapy:  Current Nutrition Therapy:   - Oral Diet:  Cardiac, Low Fat, and Low Sodium (2gm)    Routes of Feeding: Oral  Liquids: No Restrictions  Daily Fluid Restriction: yes - amount 1500  Last Modified Barium Swallow with Video (Video Swallowing Test): not done    Treatments at the Time of Hospital Discharge:   Respiratory Treatments:   Oxygen Therapy:  is not on home oxygen therapy. Ventilator:    - No ventilator support    Rehab Therapies: Physical Therapy and Occupational Therapy  Weight Bearing Status/Restrictions: No weight bearing restirctions  Other Medical Equipment (for information only, NOT a DME order):  walker  Other Treatments: ***    Heart Failure Instructions for Daily Management  Patient was treated for acute on chronic combined systolic and diastolic heart failure. he  will require the following:    Please weigh daily on the same scale and approximately the same time of day. Report weight gain of 3 pounds/day or 5 pounds/week to : [facility] # [phone]  Please use hospital discharge weight as baseline reference.    Please monitor for signs and symptoms of and report to MD:  Worsening Heart Failure: sudden weight gain, shortness of breath, lower extremity or general edema/swelling, abdominal bloating/swelling, inability to lie flat, intolerance to usual activity, or cough (especially at night). Report these finding even if no increase in weight. Dehydration:  having difficulty or a decrease in urination, dizziness, worsening fatigue, or new onset/worsening of generalized weakness. Please continue a LOW SODIUM diet and LIMIT fluid intake to 48 - 64 ounces ( 1.5 - 2 liters) per day. questions or concerns call [facility] [phone]  Please continue heart failure education to patient and family/support system. See After Visit Summary for hospital follow up appointment details. Consider spiritual care referral for support and/or completion of advance directives [phone]. Consider: [place] [address] telehealth program if patient agreeable and able to participate, palliative care consult for ongoing goals of care, end of life, and/or chronic disease management discussions, and referral to [facility] [phone]) once SNF/HHC complete. Patient's personal belongings (please select all that are sent with patient):  None    RN SIGNATURE:  Electronically signed by Olinda Pearl RN on 11/30/21 at 11:51 AM EST    CASE MANAGEMENT/SOCIAL WORK SECTION    Inpatient Status Date: ***    Readmission Risk Assessment Score:  Readmission Risk              Risk of Unplanned Readmission:  20           Discharging to Facility/ Agency   Name:   Address:  Phone:  Fax:    Dialysis Facility (if applicable)   Name:  Address:  Dialysis Schedule:  Phone:  Fax:    / signature: {Esignature:[id number]}    PHYSICIAN SECTION    Prognosis: Good    Condition at Discharge: Stable    Rehab Potential (if transferring to Rehab): Good    Recommended Labs or Other Treatments After Discharge: home PT and OT:  Evaluate and treat  Home nursing:  vital sign checks and daily weights -- BMP on Dec 2nd.   Fax result to:  [phone]:  Lindsey Abbott    Physician Certification: I certify the above information and transfer of Shruti Patricio  is necessary for the continuing treatment of the diagnosis listed and that he requires [address] for less 30 days.      Update Admission H&P: No change in H&P    PHYSICIAN SIGNATURE:  Electronically signed by Amarjit Gleason MD on 11/30/21 at 11:22 AM EST

## 2021-11-23 NOTE — PROGRESS NOTES
Patients right arm remains swollen and painful. Appears to be more swollen than normal. Spoke with Maicol Vega NP at bedside. Right arm dopplers ordered. IV access moved to left arm, see LDA. Due to Hx of Gout, uric acid was ordered as well. Uric acid resulted as high. Dr Tamra Guadarrama notified via secure message regarding elevated uric acid and increased swelling in pain. Patient has taken allopurinol in the past. Asked MD if we can possibly reorder this or if we can do anything else for this arm. No new orders at this time.     Electronically signed by Randell Pritchard RN on 11/23/2021 at 12:07 PM

## 2021-11-24 NOTE — PROGRESS NOTES
[facility]                                               Progress note    Summary:   Awa Jolly is being seen by nephrology for SONAL on CKD. Admitted with SOB, decompensated HF. Interval History  Seen and examined at bedside. He feels better today. Pain and swelling in his right elbow improved. No chest pain no SOB. He is awake and alert but seems a bit slow with comprehension. He had RHC, index very low, started back on milrinone yesterday   His wedge was 19 and RA pressure 13 so is hypervolemic still     /71  96% on 1 L   UO 1.1 L yesterday   Negative 860 cc yesterday     Cr 1.8 >2.2 > 2.1  K 3.5 > 3.4  Na 138  > 130  BUN 74 > 87  Uric acid 11.2     Plan:   - UO acceptable yesterday off diuretics  - gout flare precipitated by diuresis probably. On steroids and allopurinol, 100 mg daily to start given SONAL. - azotemia worse because of steroids and diuresis, will have to taper sooner if he is having uremic symptoms  - he has elevated filling pressures on RHC but with worsening azoetmia and gout flare would like to hold diuresis another day if cardiology agreeable. He is stable from a respiratory standpoint and still negative fluid balance       11/23/21: I discussed with him that his heart failure is end stage and the kidneys as well as other organs will be affected by this. His prognosis is poor. I do not know if he is a candidate for advanced heart failure therapies or not. Palliative care consulted.        Valeria Haddad MD  [facility]  Office: [phone]    Assessment:   Chronic kidney disease stage IIIb  This is likely secondary to diabetic nephropathy in the setting of persistent longstanding albuminuria  To complete CKD work-up will need an SPEP and light chains  Baseline creatinine around 2  No acute electrolyte abnormalities  Risk factors include diabetes, hypertension, severe systolic heart failure  Has had diabetes and hypertension for over 10 years and his last EF is less than 20%  He has had event evidence of albuminuria on urinalysis going back to 2016, 2013 had an  mg  Most recent protein creatinine ratio 700 mg, no RBCs no WBCs  CT abdomen in 2020 showed symmetric kidneys no lesions no masses bilateral perinephric stranding no hydronephrosis. HCV and hepatitis C negative  Prior negative NAHOMY  Prior negative RF factor    Ischemic cardiomyopathy  Decompensated, has evidence of volume overload  proBNP 65,000  Managed with Lasix drip and is on milrinone, appreciate cardiology involvement. Last EF less than 20%  History of coronary artery disease with stent in the LAD  Has ICD  Most of his records are in the  system  Previously on Entresto but currently not  Echo here showed severe diffuse hypokinesis grade 2 diastolic dysfunction less than 20% EF severely dilated left ventricular cavity, right ventricular systolic function is severely reduced as well has moderate MR and RV's dilated    Diabetes type 2  10 years at least  Longstanding albuminuria  A1c has been ranging between 6 and 6.5 the last few checks. Hypertension  Blood pressure is acceptable right now  Carvedilol held, on positive inotrope right now      HPI/Summary:   Ruddy Griffin is being seen by nephrology for acute kidney injury. This is a 33-SDHR-REP man with systolic heart failure, history of LV thrombus, coronary artery disease, hyperlipidemia who presented the hospital with increasing shortness of breath and worsening lower extremity swelling. Patient has received most of his care at [facility] up until this point. He has a history of heart failure with reduced EF secondary to ischemic cardiomyopathy. He has a single-vessel coronary artery disease status post PCI of the LAD. ICD placed in 2019 last right heart cath was January 29, 2020. Based on review of his last heart failure clinic note it looks like he is supposed to be on carvedilol Imdur Entresto torsemide 20 mg daily. Patient told me he was instructed to stop taking torsemide but was taking it anyway. Last heart failure note said that there was discussion about evaluation for possible LVAD versus transplant. He is on Coumadin for the LV thrombus. Patient says he is never seen a kidney doctor as an outpatient. He denies any chest pain but he just had general decreased stamina and dyspnea on exertion, orthopnea and lower extremity edema. He does not weigh himself at home daily. He said he became to the hospital because he was prompted by his family members to seek medical attention. ROS:   Positives Listed Bold. All other remaining systems are negative. Constitutional:  fever, chills, weakness, weight change, fatigue,      Skin:  rash, pruritus, hair loss, bruising, dry skin, petechiae. Head, Face, Neck   headaches, swelling,  cervical adenopathy. Respiratory: shortness of breath, cough, or wheezing  Cardiovascular: chest pain, palpitations, dizzy, edema  Gastrointestinal: nausea, vomiting, diarrhea, constipation,belly pain    Yellow skin, blood in stool  Musculoskeletal:  back pain, muscle weakness, gait problems,       joint pain or swelling. Genitourinary:  dysuria, poor urine flow, flank pain, blood in urine  Neurologic:  vertigo, TIA'S, syncope, seizures, focal weakness  Psychosocial:  insomnia, anxiety, or depression. Additional positive findings: - + elbow pain     PMH:   Past medical history, surgical history, social history, family history are reviewed and updated as appropriate. Reviewed current medication list.   Allergies reviewed and updated as needed. PE:   Vitals:    11/24/21 0806   BP: 115/71   Pulse: 92   Resp: 16   Temp: 98.3 °F (36.8 °C)   SpO2: 96%       General appearance: male in NAD, fully alert and oriented. Comfortable. HEENT: EOM intact, no icterus. Trachea is midline.    Neck : No masses, appears symmetrical  Respiratory: Respiratory effort appears normal, bilateral equal chest rise, no wheeze  Cardiovascular: Ausculation shows RRR no edema today   Abdomen: No visible mass or tenderness, non distended. Musculoskeletal:  Joints with no swelling or deformity. Skin:no rashes, ulcers, induration, no jaundice. Neuro: face symmetric, no focal deficits. Appropriate responses.        Lab Results   Component Value Date    CREATININE 2.1 (H) 11/24/2021    BUN 87 (HH) 11/24/2021     (L) 11/24/2021    K 3.4 (L) 11/24/2021    CL 86 (L) 11/24/2021    CO2 31 11/24/2021      Lab Results   Component Value Date    WBC 9.0 11/24/2021    HGB 9.4 (L) 11/24/2021    HCT 28.1 (L) 11/24/2021    MCV 98.6 11/24/2021     11/24/2021     Lab Results   Component Value Date    CALCIUM 8.4 11/24/2021    PHOS 3.8 08/30/2017

## 2021-11-24 NOTE — PROGRESS NOTES
Hospitalist Progress Note      PCP: No primary care provider on file. Chief Complaint. Patient is a 72-year-old male with past medical history of chronic systolic heart failure, LV thrombus, CAD, hyperlipidemia who presented to hospital for shortness of breath bilateral lower extremity swelling. According to the patient he has not been taking his diuretic medication for past 3 weeks and his lower extremity swelling has been getting worse, he also has worsening shortness of breath, he has not been able to lie down flat and also has exertional dyspnea. Otherwise denied fevers chills nausea vomiting diarrhea constipation dysuria.     Date of Admission: 11/14/2021    Subjective: Seen and evaluated at bedside, no acute events overnight, Lying in bed comfortably, no chest pain, nausea, vomiting, shortness of breath, fever or chills    Medications:  Reviewed    Infusion Medications    sodium chloride      milrinone 0.375 mcg/kg/min (11/24/21 0494)    sodium chloride      dextrose      sodium chloride       Scheduled Medications    allopurinol  100 mg Oral Daily    sodium chloride flush  5-40 mL IntraVENous 2 times per day    sodium chloride flush  5-40 mL IntraVENous 2 times per day    [START ON 11/25/2021] predniSONE  20 mg Oral Daily    Followed by   Davon Almaguer ON 11/28/2021] predniSONE  10 mg Oral Daily    Followed by   Davon Almaguer ON 11/29/2021] predniSONE  5 mg Oral Daily    metoprolol succinate  12.5 mg Oral Daily    [Held by provider] furosemide  40 mg IntraVENous TID    rivaroxaban  15 mg Oral Dinner    influenza virus vaccine  0.5 mL IntraMUSCular Prior to discharge    insulin lispro  0-12 Units SubCUTAneous TID     insulin lispro  0-6 Units SubCUTAneous Nightly    atorvastatin  80 mg Oral Nightly    levothyroxine  25 mcg Oral Daily    isosorbide mononitrate  30 mg Oral Daily     PRN Meds: sodium chloride flush, sodium chloride, sodium chloride flush, sodium chloride, acetaminophen, ondansetron, methyl salicylate-menthol, guaiFENesin-dextromethorphan, polyethylene glycol, glucose, dextrose, glucagon (rDNA), dextrose, sodium chloride flush, sodium chloride, potassium chloride, magnesium sulfate, ondansetron **OR** ondansetron, magnesium hydroxide, acetaminophen **OR** acetaminophen, nitroGLYCERIN      Intake/Output Summary (Last 24 hours) at 11/24/2021 1808  Last data filed at 11/24/2021 0809  Gross per 24 hour   Intake 180 ml   Output 700 ml   Net -520 ml       Physical Exam Performed:    BP (!) 100/54   Pulse 87   Temp 98.4 °F (36.9 °C) (Axillary)   Resp 12   Ht 5' 6\" (1.676 m)   Wt 147 lb 0.8 oz (66.7 kg)   SpO2 97%   BMI 23.73 kg/m²     General appearance: not in distress  HEENT:  Conjunctivae/corneas clear. Neck: Supple, with full range of motion. Respiratory:  Normal respiratory effort. Clear to auscultation, bilaterally without Rales/Wheezes/Rhonchi. Cardiovascular: Bilateral lung crackles noted   Abdomen: Soft, non-tender, non-distended, normal bowel sounds. Musculoskeletal: +1 edema bilaterally, right elbow swelling noted  Neurologic:  without any focal sensory/motor deficits. grossly non-focal.  Psychiatric: Alert and oriented, Normal mood  Peripheral Pulses: +2 palpable, equal bilaterally       Labs:   Recent Labs     11/22/21 0434 11/23/21 0428 11/24/21  0509   WBC 9.4 10.2 9.0   HGB 10.7* 9.9* 9.4*   HCT 32.5* 30.4* 28.1*    185 206     Recent Labs     11/22/21  1244 11/23/21 0428 11/24/21  0510   * 138 130*   K 3.4* 3.5 3.4*   CL 89* 92* 86*   CO2 35* 31 31   BUN 62* 74* 87*   CREATININE 1.8* 2.2* 2.1*   CALCIUM 8.6 8.8 8.4     Recent Labs     11/23/21 0428   AST 32   ALT 21   BILIDIR 0.9*   BILITOT 1.6*   ALKPHOS 92     Recent Labs     11/22/21  0434 11/23/21  0429 11/24/21  0509   INR 2.95* 2.71* 2.80*     No results for input(s): CKTOTAL, TROPONINI in the last 72 hours.     Urinalysis:      Lab Results   Component Value Date    NITRU Negative 11/17/2021    WBCUA 1 11/17/2021    RBCUA 1 11/17/2021    BLOODU TRACE 11/17/2021    SPECGRAV 1.007 11/17/2021    GLUCOSEU Negative 11/17/2021       Radiology:  CTA CHEST W CONTRAST   Final Result   There is questionable mild narrowing of the left subclavian vein as it joins   the internal jugular vein. Brachiocephalic and superior vena cava appear   widely patent without evidence of significant central venous obstruction. Multiple small venous collaterals are seen in the visualized left upper   extremity and left supraclavicular region. Findings may be related to the   patient's prior left subclavian pacemaker placement. Cardiomegaly. Main pulmonary artery is enlarged which can be seen with pulmonary   hypertension. XR ELBOW RIGHT (2 VIEWS)   Final Result   Soft tissue swelling, mainly posteriorly. No acute bony abnormality. No   radiopaque foreign body. No soft tissue gas. VL Extremity Venous Right   Final Result      XR CHEST 1 VIEW   Final Result   Borderline pulmonary vascular congestion. XR CHEST (2 VW)   Final Result   The cardiac silhouette is enlarged, with somewhat globular configuration   which can be seen in pericardial effusion. No focal airspace disease or   pulmonary edema. Assessment/Plan:    Active Hospital Problems    Diagnosis     Stage 3 chronic kidney disease ([address] [N18.30]     Acute on chronic systolic congestive heart failure ([address] [I50.23]      Patient is a 82-year-old male with past medical history of chronic systolic heart failure, LV thrombus, CAD, hyperlipidemia who presented to hospital for shortness of breath bilateral lower extremity swelling. According to the patient he has not been taking his diuretic medication for past 3 weeks and his lower extremity swelling has been getting worse, he also has worsening shortness of breath, he has not been able to lie down flat and also has exertional dyspnea.   Otherwise denied fevers chills nausea vomiting diarrhea constipation dysuria.     Assessment  Acute on chronic systolic CHF  CAD  Hyperlipidemia  History of LV thrombus     Plan  S/p right heart cath-consistent with stage IV heart failure, Lasix, milrinone discontinued, home Coumadin  EF<20% on echo, severe diffuse hypokinesis, Diastolic filling parameters suggest grade II diastolic dysfunction  Elevated uric acid-ordered allopurinol, prednisone for elbow swelling likely secondary to gout, will order x-ray right elbow  Strict I's and O's, low-salt diet  Consulted palliative care-full medical management with full code per patient  Resume home medications  DVT prophylaxis-on Coumadin  Diet: ADULT DIET;  Regular; Low Fat/Low Chol/High Fiber/2 gm Na; 1500 ml  Code Status: Full Code    PT/OT Eval Status: ordered    Dispo -  Follow up with cardiology, dc when ok with cardiology    Joselito Rabago MD

## 2021-11-24 NOTE — PROGRESS NOTES
[address]   Daily Progress Note      Admit Date:  11/14/2021    CC: SOB    HPI:   Torres Moser is a 61 y.o. male with PMH CAD, SHF s/p ICD (8/2019), LV thrombus, HTN, HLP, DM2, CKD Stage 3/ diabetic nephropathy. Seen by Dr Hung Rios as outpatient. Plan was for and RHC on 11/1/2021 and he did not show up for procedure. Admitted to [facility] with SOB and weight gain. He was started on Milrinone and lasix gtt for HF/hypervolemia. Milrinone weaned off. Developed SONAL, Neph consulted. Worsening HF after milrinone off with SONAL, elevated BNP, increased O2 demands, pulm edema. Underwent RHC yesterday with Dr. Timothy Figueroa. Showed worsening HF with elevated filling pressures, elevated PCWP and low O2 sats. . Restarted on Milrinone gtt. Today he is resting in bed. He is back on O2 1L NC. O2 sats have been >95%. Denies SOB at rest. + SOB with activity - improves with rest. Associated with generalized weakness and fatigue. SOB and edema improved since admission. He denies CP, LH, dizziness, palpitations, orthopnea, syncope. Discussed RHC results. He understands that his heart is \"weak\" and that his kidneys are effected. Discussed non compliance. He states he is compliant at home but doesn't always take his entresto on time but usually takes it twice daily. He wants to continue medical therapy. Agrees to palliative care consult. Review of Systems:   General: Denies fever, chills  Skin: Denies skin changes, rash, itching, lesions.   HEENT: Denies headache, dizziness, vision changes, nosebleeds, sore throat, nasal drainage  RESP: Denies cough, sputum,wheeze, snoring  CARD: Denies palpitations,  murmur  GI:Denies nausea, vomiting, heartburn, loss of appetite, change in bowels  : Denies frequency, pain, incontinence, polyuria  VASC: Denies claudication, leg cramps, clots  MUSC/SKEL: Denies pain, stiffness, arthritis  PSYCH: Denies anxiety, depression, stress  NEURO: Denies numbness, tingling, rivaroxaban  15 mg Oral Dinner    influenza virus vaccine  0.5 mL IntraMUSCular Prior to discharge    insulin lispro  0-12 Units SubCUTAneous TID WC    insulin lispro  0-6 Units SubCUTAneous Nightly    atorvastatin  80 mg Oral Nightly    levothyroxine  25 mcg Oral Daily    isosorbide mononitrate  30 mg Oral Daily      sodium chloride      milrinone 0.375 mcg/kg/min (11/24/21 0046)    sodium chloride      dextrose      sodium chloride       sodium chloride flush, sodium chloride, sodium chloride flush, sodium chloride, acetaminophen, ondansetron, methyl salicylate-menthol, guaiFENesin-dextromethorphan, polyethylene glycol, glucose, dextrose, glucagon (rDNA), dextrose, sodium chloride flush, sodium chloride, potassium chloride, magnesium sulfate, ondansetron **OR** ondansetron, magnesium hydroxide, acetaminophen **OR** acetaminophen, nitroGLYCERIN    Lab Data: I have reviewed all labs below today.    CBC:   Recent Labs     11/22/21  0434 11/23/21  0428 11/24/21  0509   WBC 9.4 10.2 9.0   HGB 10.7* 9.9* 9.4*   HCT 32.5* 30.4* 28.1*   MCV 98.5 98.7 98.6    185 206     BMP:   Recent Labs     11/22/21  1244 11/23/21  0428 11/24/21  0510   * 138 130*   K 3.4* 3.5 3.4*   CL 89* 92* 86*   CO2 35* 31 31   BUN 62* 74* 87*   CREATININE 1.8* 2.2* 2.1*     GLUCOSE:   Recent Labs     11/22/21  1244 11/23/21  0428 11/24/21  0510   GLUCOSE 149* 71 222*     LIVER PROFILE:   Lab Results   Component Value Date    AST 32 11/23/2021    ALT 21 11/23/2021    LABALBU 3.4 11/23/2021    BILIDIR 0.9 11/23/2021    BILITOT 1.6 11/23/2021    ALKPHOS 92 11/23/2021     PT/INR:   Lab Results   Component Value Date    PROTIME 33.0 11/24/2021    INR 2.80 11/24/2021    INR 2.71 11/23/2021    INR 2.95 11/22/2021     APTT: No results found for: APTT  Pro-BNP:    Lab Results   Component Value Date    PROBNP >70,000 11/22/2021    PROBNP 31,194 11/20/2021    PROBNP 65,067 11/14/2021     Parameters:   > 450 pg/mL under age 48  > 900 pg/mL ages 54-65  > 1800 pg/mL over age 76    ENZYMES:  Lab Results   Component Value Date    TROPONINI 0.05 2021     FASTING LIPID PANEL:  Lab Results   Component Value Date    CHOL 146 2019    HDL 73 2019    HDL 58 2011    LDLCALC 51 2019    TRIG 109 2019       Diagnostics:    EK/15/2021 NSR HR 81 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 Ejection fraction is visually estimated to be <20%. There is severe diffuse hypokinesis. Diastolic filling parameters suggest grade II diastolic dysfunction. Definity contrast administered. No left ventricular thrombus was seen. moderate MR  Pacer / ICD wire is visualized in the right ventricle. The right ventricle is dilated. Right ventricular systolic     [facility]):  - Left ventricle: The cavity size is severely dilated. Wall thickness is normal. Systolic function     was severely reduced. The estimated ejection fraction was in the range of 10% to 15%. Diffuse     hypokinesis. Doppler parameters are consistent with restrictive physiology, indicative of     decreased left ventricular diastolic compliance and/or increased left atrial pressure. There was     spontaneous echo contrast, indicative of stasis. - Mitral valve: Moderate regurgitation.   - Left atrium: The atrium is severely dilated. - Right ventricle: Pacer wire or catheter noted in right ventricle. Systolic function was mildly     reduced. - Right atrium: The atrium is mildly dilated. - Atrial septum: The septum bowed from left to right, consistent with increased left atrial     pressure. - Tricuspid valve: Mild-moderate regurgitation.   - Pulmonary arteries: Systolic pressure was severely increased, estimated to be 65mm Hg assuming     that the right atrial pressure was 15 mmHg. - Inferior vena cava: The vessel was dilated.  The respirophasic diameter changes were blunted (<     50%), consistent with elevated central venous pressure    Echo 1/9/2020 [facility]):  - Left ventricle: The cavity size is severely dilated. Wall     thickness was increased in a pattern of mild LVH. Systolic     function was severely reduced. The estimated ejection fraction     was in the range of 10% to 15%. Diffuse hypokinesis. No contrast     administered - unable to evaluate for LV thrombus. The     longitudinal strain is -2.47% (markedly reduced). - Aortic valve: Mild thickening.   - Mitral valve: Leaflet tenting. Mild to moderate regurgitation.   - Left atrium: The atrium is severely dilated. - Right ventricle: The cavity size is normal. Pacer wire or ICD     noted in right ventricle. Systolic function was mildly reduced by     objective interpretation. TAPSE: 1.6cm. Tricuspid annular systolic     velocity: 8cm/s.   - Tricuspid valve: Mild-moderate regurgitation.   - Pulmonary arteries: Systolic pressure could not be accurately     estimated, however it is elevated at at least 51 mm Hg, assuming     an RA pressure of 15 mm Hg. - Inferior vena cava: The vessel was dilated. The respirophasic     diameter changes were blunted (< 50%), consistent with elevated     central venous pressure. - Pericardium, extracardiac: A trivial pericardial effusion is     identified.     Stress Test:     Cardiac Angiography:   RHC 1/31/2020  IMPRESSIONS:  Normal left and right filling pressures with   preserved CO/CI. SUMMARY:   Hemodynamic:   RA 2   RV 28/3   PA 28/10 mean 16   PCWP 4   Blu 4.5/2.5   TD 4.5/2.5   PASat 66%   RECOMMENDATIONS:  Further management per Heart Failure Clinic.    12/10/2018 LHC/PCI and RHC:  IMPRESSIONS:  Selective angiography of LMCA showing critical mid LAD   lesion. Successful PCI with 3.5 x 18 mm MIKE. Normal cardiac output, m   ildly increased pulmonary pressures with mildly increased filling pre   ssure on inotropic support.    SUMMARY:     1. Wedge pressure in the pulmonary capillaries is mildly elevated. 2. Coronary arteries: The left main is unusually long and      trifurcates into the LAD, a ramus intermedius, and the left      circumflex. The left anterior descending gives rise to 2      diagonals and 3 septals and wraps around the apex. The left      circumflex gives rise to 2 obtuse marginals and no      posterolaterals. The first obtuse marginal has a high origin. 3. LAD: Mid-vessel lesion: There is a discrete, 99% stenosis. The      distal vessel supplies a moderate-sized vascular territory. The      lesion is a likely culprit for the patient's clinical      presentation. The lesion was stented (see 1st lesion      intervention). Following intervention, the lesion has a residual      stenosis of 0% and MUKUND grade 3 flow (brisk flow). 4. AV was not crossed due to known apical LV thrombus. 5. RA 6      RV 48/4      PA 50/20 (28)      W 18.   6. Blu CO 5.2 Blu CI 2.9 on Milrinone 0.25 mcg/mg/min. 12/4/2018 Cardiac MRI ():  Findings are consistent with a severe, ischemic cardiomyopathy.  There is akinesis of the apical segments with an associated small, apical thrombus. The left ventricle is severely dilated with severely reduced systolic function. The right ventricle is mildly dilated with moderately decreased systolic function. The left atrium is severely dilated.  There is mild to moderate mitral regurgitation. The right atrium is mildly dilated.  There is moderate tricuspid regurgitation. The descending aorta is dilated. The pulmonary artery is dilated.  The IVC and coronary sinus are dilated. Please see noncardiac findings below.      FINDINGS:     1. The left ventricular size is severely dilated. The left ventricular ejection fraction is 19 % by Smallwood's method. Global left ventricular function is severely decreased.  There is akinesis of the apical segments, severe hypokinesis of the basal and mid septal and basal and mid anterior segments and moderate hypokinesis of the lateral segments and mid inferior segment and mild hypokinesis of the basal inferior segment. The left ventricular mass is moderately increased. There appears to be a small intracavitary thrombus (6.5 x 5 mm). 2. There is no evidence of increased iron deposition within the myocardium (T2*myocardium = 51 msec; if < 20 msec, suggestive of iron-overloading of the myocardium).  There is no evidence of myocardial edema. 3. The right ventricular size is mildly dilated. Global right ventricular function is moderately decreased. 4. Left atrial size is severely enlarged. Right atrial size is mildly enlarged.   The Qp/Qs is 1.0 by phase contrast imaging and is consistent with no evidence of shunt. 5. The calculated aortic regurgitant fraction is 1.55 %. There is moderate mitral regurgitation. The calculated mitral regurgitant fraction is 29.02 %. There is moderate tricuspid regurgitation. The calculated tricuspid regurgitant fraction is 37.1 %. The calculated pulmonic regurgitant fraction is 2.39 %. 6. There is a small pericardial effusion. 7. The descending aorta is dilated measuring 27.2 mm. 8. The main pulmonary artery is dilated measuring 34.0 mm.  The IVC and coronary sinus are dilated. 9. There are large left and moderate right pleural effusions.  Basilar atelectasis is associated with the effusions.  There is evidence of mild pulmonary edema.     CXR 11/22/2021  Impression   Borderline pulmonary vascular congestion. [address] 11/23/2021  OVERALL IMPRESSION:  1. Elevated right heart pressures. 2.  Elevated pulmonary capillary wedge pressure. 3.  Markedly reduced cardiac output and indices with significantly low  oxygen saturation, all consistent with stage D/New York Heart  Association Class IV heart failure. We will start the patient back on Primacor drip.   The patient's prognosis is extremely poor at this point and we will  discuss further with the patient and the family about his code status. RUE venous doppler 11/23/2021  Summary  The left internal jugular, subclavian, axillary, brachial, radial and ulnar veins, as well as the basilic and cephalic veins,  have been examined. These veins show no thrombus but there is slow venous blood flow especially at the proximal internal jugular and  subclavian veins. This suggests a possible venous occlusive process in the chest and out of the range of the examining  ultrasound probe  Right internal jugular and subclavian veins are normal    Assessment/Plan:  1.) Acute on chronic combined diastolic Gr 2 and systolic heart failure, EF <20%, ICM, moderate MR, dilated RV: RHC shows worsening HF with elevated filling pressures, PCWP and low sats. Restarted Milrinone. Seems slightly improved today with improved lungs sounds/pulm edema. on 1L NC. He continues to auto diurese. Neg 18L. Wt 175>140>141lbs>147lbs - question if wt accurate today. . JVD +8. Trace BLE edema. Repeat BNP >70K- will repeat tomorrow. RHC today. NYHA Class IV  Stage D  Diuretic: Neph recs to hold today D/Tworsening azotemia from steroids and allopurinol started yesterday for gout. Agree with rec - consider diuresis tomorrow    Indicated for EF </=40%:   Beta Blocker (evidence based)- change coreg to selective BB>toprol XL for less BP effects  ACEi/ARB/ARNI- hold for SONAL (entresto previously)  SGLT2 Inhibitor- hld for SONAL  Aldosterone Antagonist (BNP within 1 week of DC if new or dose changed)- hold for SONAL    Cardiac Rehab for EF <35%: OP after Home Health  ICD counseling: Has ICD   2gm Na diet, daily weight, 64 oz fluid restriction  Avoid NSAIDS and other nephrotoxic meds  [facility] Referral: OP  HF RN referral for education  Previously seen at [address] needs for home care addressed by HF RN. Palliative Care consult placed- needs to address goals of care, code status, family support    2. ) CAD: Prior PCI/MIKE to LAD.  Mild elevated troponin most likely from decompensated HF. No evidence of angina/ischemia. DAPT: on xarelto  Beta Blocker: Toprol XL  ACEi/ARB:  Anti anginal: Imdur  Lipid management/high intensity statin: lipitor  Risk factor management: high blood pressure, high cholesterol, Diabetes, smoking, obesity, family hx  Lifestyle modification: Heart healthy diet, regular exercise, weight loss, smoking cessation, stress reduction    3.) LV thrombus:  F/U ECHO without thrombus  Warfarin changed to xarelto    4.) SONAL on CKD 3: R/T diabetic nephropathy, Neph following  Baseline creat 2, 2.1  Today  Per Neph, worsening azotemia D/T steroids/allopurinol started for gout, may need to DC.     5.) Anemia: KEVIN   Hgb Stable 9.4    6.) RUE swelling with pain/abn venous doppler:   Has IV getting intermittent meds - venous doppler without DVT but did show slow flow suggesting venous occlusive process. Discussed ith Dr. Jigar Aldridge- CTA Chest ordered stat.    Hx gout- uric acid elevated, started steroids and allopurinol    Electronically signed by DAHIANA Le - CNP on 11/24/2021 at 9:27 AM

## 2021-11-24 NOTE — PROGRESS NOTES
Physical Therapy  Facility/Department: [address] PROGRESSIVE CARE  Daily Treatment Note  NAME: Bassem Weiss  : 1955  MRN: [id number]    Date of Service: 2021    Discharge Recommendations:  2-3 sessions per week, Patient would benefit from continued therapy after discharge; home with 24 hour assist  PT Equipment Recommendations  Equipment Needed: Yes  Mobility Devices: Gladies Holes: Rolling     Bassem Weiss scored a 18/24 on the AM-PAC short mobility form. Current research shows that an AM-PAC score of 18 or greater is typically associated with a discharge to the patient's home setting. Based on the patient's AM-PAC score and their current functional mobility deficits, it is recommended that the patient have 2-3 sessions per week of Physical Therapy at d/c to increase the patient's independence. At this time, this patient demonstrates the endurance and safety to discharge home with home PT and a follow up treatment frequency of 2-3x/wk. Please see assessment section for further patient specific details. If patient discharges prior to next session this note will serve as a discharge summary. Please see below for the latest assessment towards goals. HOME HEALTH CARE: LEVEL 1 STANDARD     -Initial home health evaluation to occur within 24-48 hours, in patient home    -Home health agency to establish plan of care for patient over 60 day period    -Medication Reconciliation    -PCP Visit scheduled within seven days of discharge    -PT/OT to evaluate with goal of regaining prior level of functioning    -OT to evaluate if patient has [address] needs for personal care         Assessment   Body structures, Functions, Activity limitations: Decreased functional mobility ; Decreased ADL status; Decreased endurance; Decreased balance  Assessment: Pt. is a 60 y/o male here due to increasing SOB, increasing LE edema PMH includes CHF, ICD.  Today, pt was able to perform bed mobility with Rush for RLE and CGA for transfers. Pt demonstrated increrased steadiness when ambulating with RW requiring CGA compared to requiring Rush d/t decreased stability when ambulating using the IV pole for assistance. Pt continues be limited by low endurance and functional activity and would benefit from continued skilled PT to improve functional mobility. Anticipate the need for home PT level 1 at d/c. Recommend initial 24 hour assist.  Prognosis: Good  PT Education: General Safety; PT Role; Plan of Care; Transfer Training; Functional Mobility Training; Gait Training; Equipment; Home Exercise Program; Adaptive Device Training  Patient Education: Pt educated on benefits of using a AD during ambulation to increase steadiness. Pt agreed  REQUIRES PT FOLLOW UP: Yes  Activity Tolerance  Activity Tolerance: Patient Tolerated treatment well; Patient limited by endurance; Patient limited by fatigue     Patient Diagnosis(es): The primary encounter diagnosis was Acute on chronic systolic congestive heart failure ([address] Diagnoses of Acute respiratory failure with hypoxia (HCC) and Dyspnea on exertion were also pertinent to this visit. has a past medical history of Diabetes type 2, uncontrolled ([address] Diabetic nephropathy, ED (erectile dysfunction), Essential hypertension, benign, Hyperlipidemia, and Noncompliance. has a past surgical history that includes eye surgery.     Restrictions  Restrictions/Precautions  Restrictions/Precautions: Fall Risk  Required Braces or Orthoses?: No  Position Activity Restriction  Other position/activity restrictions: 1L O2 nc, Rigo, NICOLE    Social/Functional History  Lives With: Son  Type of Home: Apartment  Home Layout: One level  Home Access: Stairs to enter without rails  Entrance Stairs - Number of Steps: 2 steps down  Bathroom Shower/Tub: Tub/Shower unit  Bathroom Toilet: Standard  Bathroom Accessibility: Walker accessible  Home Equipment:  (No DME)  Receives Help From: Family  ADL Assistance: Independent  Homemaking Assistance:  (does what he can and his son manages heavy tasks)  Ambulation Assistance: Independent  Transfer Assistance: Independent    Subjective   General  Chart Reviewed: Yes  Additional Pertinent Hx: here due to increasing SOB, increasing LE edema   PMH includes CHF, ICD  Response To Previous Treatment: Patient with no complaints from previous session. Family / Caregiver Present: No  Subjective  Subjective: Supine in bed upon arrival, pt is pleasant and agreeable to therapy, states he has no pain today. Objective   Bed mobility  Supine to Sit: Minimal assistance (Pt required help with RLE)  Sit to Supine: Unable to assess (Pt seated in recliner at end of session)  Scooting: Minimal assistance (scooting R hip to EOB)     Transfers  Sit to Stand: Contact guard assistance  Stand to sit: Contact guard assistance  Comment: Pt unstead standing the first time without a RW. Pt appeared more steady with no LOB the second time with RW. Ambulation  Ambulation?: Yes  More Ambulation?: No  Ambulation 1  Surface: level tile  Device: Rolling Walker;  No Device (Pt held onto IV pole during first few steps then transitioned to the RW for better stability and balance)  Assistance: Contact guard assistance; Minimal assistance ([place] during first few steps with Pt holding onto IV pole and leaning onto bed, decreased to CGA once pt was ambulating with RW)  Quality of Gait: Pt showed increased balance when ambulating with the RW  Gait Deviations: Slow Kellie; Decreased step height; Decreased step length  Distance: 5 steps with IV pole, 13' with RW  Comments: SPTA managed lines during ambulation  Stairs/Curb  Stairs?: No     Balance  Sitting - Static: Good  Sitting - Dynamic: Good  Standing - Static: Good; -  Standing - Dynamic: Good; - (at RW)     Exercises  Hip Flexion: Seaded marches x 10 stevan  Hip Abduction: Hip ADD with pillow x10 seated  Knee Long Arc Quad: x10 steavn seated  Ankle Pumps: x20 stevan seated  Other exercises  Other exercises?: Yes  Other exercises 1: Sit to stands x 8 with stevan UE support [place] [facility]; pt required min cues for hand placement with RW                            AM-PAC Score  AM-PAC Inpatient Mobility Raw Score : 18 (11/24/21 1458)  AM-PAC Inpatient T-Scale Score : 43.63 (11/24/21 1458)  Mobility Inpatient CMS 0-100% Score: 46.58 (11/24/21 1458)  Mobility Inpatient CMS G-Code Modifier : CK (11/24/21 1458)          Goals  Short term goals  Time Frame for Short term goals: 2-3 days (ongoing as of 11/24)  Short term goal 1: bed mobility at supervision  Short term goal 2: transfers at supervision  Short term goal 3: ambulation at supervision with rolling walker as needed for safety and effectiveness for household distances    -steps as needed for home entry at [address] [place] [address]  Patient Goals   Patient goals : feel better and to return to his apartment    Plan    Plan  Times per week: 3-5 while on acute floor  Current Treatment Recommendations: Functional Mobility Training, Positioning, Patient/Caregiver Education & Training, ADL/Self-care Training  Safety Devices  Type of devices: All fall risk precautions in place, Call light within reach, Left in chair, Chair alarm in place, Nurse notified, Gait belt (RN Kyle Neumann notified)  Restraints  Initially in place: No     Therapy Time   Individual Concurrent Group Co-treatment   Time In 1405         Time Out 1456         Minutes 51         Timed Code Treatment Minutes: [address]  Therapist was present, directed the patient's care, made skilled judgement, and was responsible for assessment and treatment of the patient.       Electronically signed by Claudia Le, PT [id number] on 11/24/2021 at 4:01 PM

## 2021-11-24 NOTE — PROGRESS NOTES
Nutrition Assessment     Type and Reason for Visit: Reassess    Nutrition Recommendations/Plan:   Regular; Low Fat/Low Chol/High Fiber/2 gm Na; 1500 ml  Will monitor nutritional adequacy, nutrition-related labs, weights, BMs, and clinical progress     Nutrition Assessment:  Follow-up. Pt continues on diet, tolerating and eating most of meals. Pt did receive education from RD on admission. Pt currently -18L. Skin remains intact. Will continue to monitor. Malnutrition Assessment:  Malnutrition Status: No malnutrition    Nutrition Related Findings: Reviewed labs      Current Nutrition Therapies:    ADULT DIET;  Regular; Low Fat/Low Chol/High Fiber/2 gm Na; 1500 ml    Anthropometric Measures:  · Height: 5' 6\" (167.6 cm)  · Current Body Wt: 147 lb (66.7 kg)   · BMI: 23.7    Nutrition Diagnosis:   No nutrition diagnosis at this time    Nutrition Interventions:   Food and/or Nutrient Delivery:  Continue Current Diet  Nutrition Education/Counseling:  Education completed   Coordination of Nutrition Care:  Continue to monitor while inpatient    Goals:  consume >/= to 50%       Nutrition Monitoring and Evaluation:   Behavioral-Environmental Outcomes:  None Identified   Food/Nutrient Intake Outcomes:  Food and Nutrient Intake  Physical Signs/Symptoms Outcomes:  Biochemical Data, Constipation, Diarrhea, Weight, Skin, Fluid Status or Edema     Discharge Planning:    Continue current diet     Electronically signed by Thea Pryor RD, LD on 11/24/21 at 12:28 PM EST    Contact: [phone]

## 2021-11-24 NOTE — PROGRESS NOTES
PALLIATIVE MEDICINE PROGRESS NOTE     Patient name:Cedric QUISPE:[id number] BCS:[birth year]  Room/Bed:A1N-1559/5110-01    LOS: 10 days        ASSESSMENT/RECOMMENDATIONS     61 y.o. male with debility and dyspnea.         Symptom Management:  1. Debility: Patient continues to have bilateral extremity weakness with right arm being weaker than his left arm. He continues to require an assist for his ADL's.    2. Dyspnea: Patient was again on 1 liter of oxygen via a nasal cannula. 3. Goals of Care:  Again met the patient at bedside today. Patient again oriented x 4 and decisional today. Again reviewed patient's current health condition, treatment options, verified goals and verified code status. Patient again indicated he would like to continue on with all aggressive therapy. Patient also again indicated he would like to remain a Full Code. Reviewed possible Palliative Care service options post the patient's hospital discharge and the patient indicated he still has yet to review this service with his family. He again indicated he would review palliative home care service with his family before making a decision.       Patient/Family Goals of Care :    11/24 -  Again met the patient at bedside today. Patient again oriented x 4 and decisional today. Again reviewed patient's current health condition, treatment options, verified goals and verified code status. Patient again indicated he would like to continue on with all aggressive therapy. Patient also again indicated he would like to remain a Full Code. Reviewed possible Palliative Care service options post the patient's hospital discharge and the patient indicated he still has yet to review this service with his family. He again indicated he would review palliative home care service with his family before making a decision.       11/23 - Met patient at bedside today. Patient alert and oriented x 4. Patient was decisional today.   Reviewed patient's current health status, goals of care and code status. Patient indicated he would like to continue with all aggressive treatment at this time. Patient also indicated he would have to discuss with his family whether or not they would like to pursue home palliative care services upon the patient's discharge. Patient indicated he would like to remain a Full Code at this time. Patient indicated he does not have a living spouse and that his son Aleksandra Spencer would be his primary decision maker if he was unable to make decisions on his own.      Disposition/Discharge Plan:   Pending.      Advance Directives:  Surrogate Decision Maker: Per patient, Aleksandra Spencer (son). Code status:  Full Code     Case discussed with: patient,  Thank you for allowing us to participate in the care of this patient. SUBJECTIVE     Chief Complaint: Debility    Last 24 hours:   Patient was again on 1 liter of oxygen via a nasal cannula. Trace edema noted on right arm. ROS:    Review of Systems   Constitutional: Positive for fatigue. HENT: Negative. Eyes: Negative. Respiratory: Positive for shortness of breath. Cardiovascular: Negative. Gastrointestinal: Negative. Musculoskeletal: Negative. Skin: Negative. Neurological: Positive for weakness (right arm remains weaker than left arm). Psychiatric/Behavioral: Negative for agitation, behavioral problems and confusion. All other systems reviewed and are negative. A complete 10 count ROS was obtained. Pertinent positives mentioned above in HPI/ROS. All others if not mentioned are negative. OBJECTIVE   BP (!) 92/52   Pulse 91   Temp 97.6 °F (36.4 °C) (Oral)   Resp 16   Ht 5' 6\" (1.676 m)   Wt 147 lb 0.8 oz (66.7 kg)   SpO2 94%   BMI 23.73 kg/m²   I/O last 3 completed shifts: In: 240 [P.O.:240]  Out: 1100 [Urine:1100]  I/O this shift:  In: 180 [P.O.:180]  Out: -       Physical Exam  Vitals reviewed. Constitutional:       Appearance: He is ill-appearing. Interventions: Nasal cannula in place. HENT:      Head: Normocephalic. Nose: Nose normal.   Eyes:      Pupils: Pupils are equal, round, and reactive to light. Cardiovascular:      Rate and Rhythm: Normal rate. Pulses: Normal pulses. Pulmonary:      Comments: Breath sounds clear but diminished in bases bilaterally. Abdominal:      General: Bowel sounds are normal.      Palpations: Abdomen is soft. Tenderness: There is no guarding. Musculoskeletal:         General: Swelling (trace edema noted on right arm) present. Cervical back: Neck supple. Right lower leg: No edema. Left lower leg: No edema. Skin:     General: Skin is warm and dry. Neurological:      Comments: Was again oriented x 4 today. Psychiatric:         Mood and Affect: Mood normal.         Thought Content:  Thought content normal.         Judgment: Judgment normal.          Total time: 40 minutes  >50% of time spent counseling patient at bedside or POA/family member if applicable , reviewing information and discussing care, coordinating with care team       Signed By: Electronically signed by DAHIANA Casas CNP on 11/24/2021 at 2:11 PM   Palliative Medicine   [phone]    November 24, 2021

## 2021-11-25 NOTE — PLAN OF CARE
Pt able to verbalize understanding of and adherence to nutritional guidelines/dietary allowances or restrictions. Discussed carb/cardiac diet and close glucose monitoring. Discussed importance of strict intake and output monitoring. Pt will maintain absence of respiratory complications. Oxygen saturations >90% at all times with supplemental O2 as needed. Will assess respiratory status every shift and PRN. Encourage to cough and deep breath. Encourage IS. Encouraged patient up out of bed for meals.

## 2021-11-25 NOTE — PROGRESS NOTES
Cardiology Progress Note     Admit Date: 2021     Reason for follow up: acute systolic CHF    HPI and Interval History:   60yo M h/o CAD, ICM with LVEF < 20% s/p ICD implantation for primary prevention of sudden cardiac arrest 2019, LV thrombus, HTN who preents with dyspnea and is diagnosed with acute systolic CHF. Patient seen and examined. Clinical notes reviewed. Telemetry reviewed - SR 90's. No new complaint today. No major events overnight. Denies having angina, shortness of breath, dyspnea on exertion, Orthopnea, PND at the time of this visit. Review of System:  All other systems reviewed except for that noted above. Pertinent negatives and positives are:     · General: negative for fever, chills   · Ophthalmic ROS: negative for - eye pain or loss of vision  · ENT ROS: negative for - headaches, sore throat   · Respiratory: negative for - cough, sputum  · Cardiovascular: Reviewed in HPI  · Gastrointestinal: negative for - abdominal pain, diarrhea, N/V  · Hematology: negative for - bleeding, blood clots, bruising or jaundice  · Genito-Urinary:  negative for - Dysuria or incontinence  · Musculoskeletal: negative for - Joint swelling, muscle pain  · Neurological: negative for - confusion, dizziness, headaches   · Psychiatric: No anxiety, no depression.   · Dermatological: negative for - rash      Physical Examination:  Vitals:    21 0815   BP: 110/67   Pulse: 91   Resp: 14   Temp: 97.9 °F (36.6 °C)   SpO2: 96%        Intake/Output Summary (Last 24 hours) at 2021 1045  Last data filed at 2021 1001  Gross per 24 hour   Intake 460 ml   Output --   Net 460 ml     In: 460 [P.O.:460]  Out: -    Wt Readings from Last 3 Encounters:   21 146 lb 9.7 oz (66.5 kg)   10/22/21 166 lb (75.3 kg)   19 161 lb (73 kg)     Temp  Av.8 °F (36.6 °C)  Min: 97.6 °F (36.4 °C)  Max: 98.4 °F (36.9 °C)  Pulse  Av.8  Min: 87  Max: 98  BP  Min: 92/52  Max: 110/67  SpO2  Av.2 %  Min: 94 %  Max: 98 %    · Telemetry: Sinus rhythm with v-rates 90's bpm  · Constitutional: Alert. Oriented to person, place, and time. No distress. · Head: Normocephalic and atraumatic. · Mouth/Throat: Lips appear moist. Oropharynx is clear and moist.  · Eyes: Conjunctivae normal. EOM are normal.   · Neck: Neck supple. No lymphadenopathy. No rigidity. 14cm JVD present. · Cardiovascular: Normal rate, regular rhythm. Normal S1&S2. Carotid pulse 2+ bilaterally. · Pulmonary/Chest: Bilateral respiratory sounds present. No respiratory accessory muscle use. No wheezes, No rhonchi. + minimal bibasilar rales. · Abdominal: Soft. Normal bowel sounds present. No distension, No tenderness. No splenomegaly. No hernia. · Musculoskeletal: No tenderness. Full range of motion in bilateral upper and lower extremities. 1-2+ pitting BLE edema    · Lymphadenopathy: Has no cervical adenopathy. · Neurological: Alert and oriented. Cranial nerve II-XII grossly intact, No gross deficit to touch. · Skin: Skin is warm and dry. No rash, lesions, ulcerations noted. · Psychiatric: No anxiety nor agitation. Labs, telemetry, diagnostic and imaging results personally reviewed and interpreted. Recent Labs     11/23/21 0428 11/24/21  0510 11/25/21  0443    130* 129*   K 3.5 3.4* 3.9   CL 92* 86* 88*   CO2 31 31 29   BUN 74* 87* 103*   CREATININE 2.2* 2.1* 2.2*     Recent Labs     11/23/21  0428 11/24/21  0509 11/25/21  0443   WBC 10.2 9.0 9.7   HGB 9.9* 9.4* 8.7*   HCT 30.4* 28.1* 26.8*   MCV 98.7 98.6 98.7    206 239     Lab Results   Component Value Date    TROPONINI 0.05 11/14/2021     Estimated Creatinine Clearance: 31 mL/min (A) (based on SCr of 2.2 mg/dL (H)).    No results found for: BNP  Lab Results   Component Value Date    PROTIME 24.3 11/25/2021    PROTIME 33.0 11/24/2021    PROTIME 31.9 11/23/2021    INR 2.09 11/25/2021    INR 2.80 11/24/2021    INR 2.71 11/23/2021     Lab Results   Component Value Date    CHOL 146 02/12/2019    HDL 73 02/12/2019    HDL 58 12/13/2011    TRIG 109 02/12/2019       Scheduled Meds:   allopurinol  100 mg Oral Daily    sodium chloride flush  5-40 mL IntraVENous 2 times per day    sodium chloride flush  5-40 mL IntraVENous 2 times per day    predniSONE  20 mg Oral Daily    Followed by   [address] ON 11/28/2021] predniSONE  10 mg Oral Daily    Followed by   [address] ON 11/29/2021] predniSONE  5 mg Oral Daily    metoprolol succinate  12.5 mg Oral Daily    [Held by provider] furosemide  40 mg IntraVENous TID    rivaroxaban  15 mg Oral Dinner    influenza virus vaccine  0.5 mL IntraMUSCular Prior to discharge    insulin lispro  0-12 Units SubCUTAneous TID WC    insulin lispro  0-6 Units SubCUTAneous Nightly    atorvastatin  80 mg Oral Nightly    levothyroxine  25 mcg Oral Daily    isosorbide mononitrate  30 mg Oral Daily     Continuous Infusions:   sodium chloride      milrinone 0.375 mcg/kg/min (11/25/21 5424)    sodium chloride      dextrose      sodium chloride       PRN Meds:sodium chloride flush, sodium chloride, sodium chloride flush, sodium chloride, acetaminophen, ondansetron, methyl salicylate-menthol, guaiFENesin-dextromethorphan, polyethylene glycol, glucose, dextrose, glucagon (rDNA), dextrose, sodium chloride flush, sodium chloride, potassium chloride, magnesium sulfate, ondansetron **OR** ondansetron, magnesium hydroxide, acetaminophen **OR** acetaminophen, nitroGLYCERIN     Patient Active Problem List    Diagnosis Date Noted    Stage 3 chronic kidney disease ([facility]     Acute on chronic systolic congestive heart failure (HCC) 11/14/2021    Elevated PSA 04/09/2019    Coronary artery disease involving native coronary artery 02/12/2019    Elevated blood uric acid level 01/06/2017    Diabetes type 2, uncontrolled ([facility] 07/28/2014    Noncompliance 07/28/2014    ED (erectile dysfunction) 09/12/2013    Essential hypertension, benign 09/12/2013    Hyperlipidemia 09/12/2013    Diabetic nephropathy ([facility] 09/12/2013      Active Hospital Problems    Diagnosis Date Noted    Stage 3 chronic kidney disease ([facility] [N18.30]     Acute on chronic systolic congestive heart failure ([facility] [I50.23] 11/14/2021       Assessment and Plan:     Acute systolic CHF  -hypervolemic  -currently on Lasix 40mg IV TID. Strict I/O, daily wt, electrolyte repletion prn  -also on milrinone IV infusion  -continue observing renal function (Cr 2.2 today compared to 2.1 yesterday)    ICM with LVEF < 20%  -cont Toprol XL 12.5mg daily  -needing Milrinone but trying to down-titrate  -cont Atorva 80  -currently not on ACEi/ARB/ARNI due to worsening SONAL  -can start SGLT2-I as outpatient    Will follow with you. Thank you for allowing me to participate in the care of this patient. If you have any questions, please do not hesitate to contact me.     Edison Pisano MD, MS, [facility]  Cardiac Electrophysiology  [address]  [address] [facility], [address] [place] [address]  Chetan Gooden [address]  [phone]

## 2021-11-25 NOTE — PROGRESS NOTES
[facility]                                               Progress note    Summary:   Brunilda Solorio is being seen by nephrology for SONAL on CKD. Admitted with SOB, decompensated HF EF 20%. Interval History   feels better today. No  SOB. RHC index very low  back on milrinone   wedge was 19 and RA pressure 13  /71   UO  750  Net Negative  18 litres  JVD dddown  Still has 1 + edema     Cr 1.8 >2.1 > 2.2  K 3.5 > 3.9  Na 138  > 130-->129  BUN 74 > 87-->103  Uric acid 11.2   Wt 175-->147-->146  Has a medellin     Plan:   Seems Lasix was held today by team   If OK with cardiology will D/C IV Lasix  will start on Torsemide 60 mg  in AM   Start on spironolacotone 25 mg   - gout flare precipitated by diuresis    On steroids  allopurinol, 100 mg daily  - azotemia worse because of steroids and diuresis      11/23/21: I discussed with him that his heart failure is end stage and the kidneys as well as other organs will be affected by this. His prognosis is poor. I do not know if he is a candidate for advanced heart failure therapies or not. Palliative care consulted. Loli Stafford MD  [facility]  Office: [phone]    Assessment:   Chronic kidney disease stage IIIb  This is likely secondary to diabetic nephropathy in the setting of persistent longstanding albuminuria  To complete CKD work-up will need an SPEP and light chains  Baseline creatinine around 2  No acute electrolyte abnormalities  Risk factors include diabetes, hypertension, severe systolic heart failure  Has had diabetes and hypertension for over 10 years and his last EF is less than 20%  He has had event evidence of albuminuria on urinalysis going back to 2016, 2013 had an  mg  Most recent protein creatinine ratio 700 mg, no RBCs no WBCs  CT abdomen in 2020 showed symmetric kidneys no lesions no masses bilateral perinephric stranding no hydronephrosis.   HCV and hepatitis C negative  Prior negative NAHOMY  Prior negative RF factor    Ischemic cardiomyopathy  EF less than 20%  Decompensated  proBNP 65,000  Managed with Lasix drip    on milrinone      History of coronary artery disease with stent in the LAD  Has ICD  Most of his records are in the  system  Previously on Entresto but currently not  Echo here showed severe diffuse hypokinesis grade 2 diastolic dysfunction less than 20% EF severely dilated left ventricular cavity, right ventricular systolic function is severely reduced as well has moderate MR and RV's dilated    Diabetes type 2  10 years at least  Longstanding albuminuria  A1c has been ranging between 6 and 6.5 the last few checks. Hypertension  Blood pressure is acceptable right now  Carvedilol held, on positive inotrope right now      HPI/Summary:   Sandi Mcgowan is being seen by nephrology for acute kidney injury. This is a 8-DXWS-HFL man with systolic heart failure, history of LV thrombus, coronary artery disease, hyperlipidemia who presented the hospital with increasing shortness of breath and worsening lower extremity swelling. Patient has received most of his care at [facility] up until this point. He has a history of heart failure with reduced EF secondary to ischemic cardiomyopathy. He has a single-vessel coronary artery disease status post PCI of the LAD. ICD placed in 2019 last right heart cath was January 29, 2020. Based on review of his last heart failure clinic note it looks like he is supposed to be on carvedilol Imdur Entresto torsemide 20 mg daily. Patient told me he was instructed to stop taking torsemide but was taking it anyway. Last heart failure note said that there was discussion about evaluation for possible LVAD versus transplant. He is on Coumadin for the LV thrombus. Patient says he is never seen a kidney doctor as an outpatient. He denies any chest pain but he just had general decreased stamina and dyspnea on exertion, orthopnea and lower extremity edema.   He does not weigh himself at home daily. He said he became to the hospital because he was prompted by his family members to seek medical attention. ROS:   Positives Listed Bold. All other remaining systems are negative. Constitutional:  fever, chills, weakness, weight change, fatigue,      Skin:  rash, pruritus, hair loss, bruising, dry skin, petechiae. Head, Face, Neck   headaches, swelling,  cervical adenopathy. Respiratory: shortness of breath, cough, or wheezing  Cardiovascular: chest pain, palpitations, dizzy, edema  Gastrointestinal: nausea, vomiting, diarrhea, constipation,belly pain    Yellow skin, blood in stool  Musculoskeletal:  back pain, muscle weakness, gait problems,       joint pain or swelling. Genitourinary:  dysuria, poor urine flow, flank pain, blood in urine  Neurologic:  vertigo, TIA'S, syncope, seizures, focal weakness  Psychosocial:  insomnia, anxiety, or depression. Additional positive findings: - + elbow pain     PMH:   Past medical history, surgical history, social history, family history are reviewed and updated as appropriate. Reviewed current medication list.   Allergies reviewed and updated as needed. PE:   Vitals:    11/25/21 1140   BP: 106/63   Pulse: 86   Resp: 18   Temp: 97.9 °F (36.6 °C)   SpO2: 97%       General appearance: male in NAD, fully alert and oriented. Comfortable. HEENT: EOM intact, no icterus. Trachea is midline. Neck : No masses, appears symmetrical  Respiratory: Respiratory effort appears normal, bilateral equal chest rise, no wheeze  Cardiovascular: Ausculation shows RRR no edema today   Abdomen: No visible mass or tenderness, non distended. Musculoskeletal:  Joints with no swelling or deformity. Skin:no rashes, ulcers, induration, no jaundice. Neuro: face symmetric, no focal deficits. Appropriate responses.        Lab Results   Component Value Date    CREATININE 2.2 (H) 11/25/2021     (HH) 11/25/2021     (L) 11/25/2021    K 3.9 11/25/2021    CL 88 (L) 11/25/2021    CO2 29 11/25/2021      Lab Results   Component Value Date    WBC 9.7 11/25/2021    HGB 8.7 (L) 11/25/2021    HCT 26.8 (L) 11/25/2021    MCV 98.7 11/25/2021     11/25/2021     Lab Results   Component Value Date    CALCIUM 8.3 11/25/2021    PHOS 3.8 08/30/2017

## 2021-11-25 NOTE — PROGRESS NOTES
Hospitalist Progress Note      PCP: No primary care provider on file. Chief Complaint. Patient is a 59-year-old male with past medical history of chronic systolic heart failure, LV thrombus, CAD, hyperlipidemia who presented to hospital for shortness of breath bilateral lower extremity swelling. According to the patient he has not been taking his diuretic medication for past 3 weeks and his lower extremity swelling has been getting worse, he also has worsening shortness of breath, he has not been able to lie down flat and also has exertional dyspnea. Otherwise denied fevers chills nausea vomiting diarrhea constipation dysuria.     Date of Admission: 11/14/2021    Subjective: Seen and evaluated at bedside, no acute events overnight,  no chest pain, nausea, vomiting, shortness of breath, fever or chills    Medications:  Reviewed    Infusion Medications    sodium chloride      milrinone 0.375 mcg/kg/min (11/25/21 4033)    sodium chloride      dextrose      sodium chloride       Scheduled Medications    spironolactone  25 mg Oral Daily    torsemide  60 mg Oral Daily    allopurinol  100 mg Oral Daily    sodium chloride flush  5-40 mL IntraVENous 2 times per day    sodium chloride flush  5-40 mL IntraVENous 2 times per day    predniSONE  20 mg Oral Daily    Followed by   Florence Lopez ON 11/28/2021] predniSONE  10 mg Oral Daily    Followed by   Florence Lopez ON 11/29/2021] predniSONE  5 mg Oral Daily    metoprolol succinate  12.5 mg Oral Daily    rivaroxaban  15 mg Oral Dinner    influenza virus vaccine  0.5 mL IntraMUSCular Prior to discharge    insulin lispro  0-12 Units SubCUTAneous TID WC    insulin lispro  0-6 Units SubCUTAneous Nightly    atorvastatin  80 mg Oral Nightly    levothyroxine  25 mcg Oral Daily    isosorbide mononitrate  30 mg Oral Daily     PRN Meds: sodium chloride flush, sodium chloride, sodium chloride flush, sodium chloride, acetaminophen, ondansetron, methyl salicylate-menthol, guaiFENesin-dextromethorphan, polyethylene glycol, glucose, dextrose, glucagon (rDNA), dextrose, sodium chloride flush, sodium chloride, potassium chloride, magnesium sulfate, ondansetron **OR** ondansetron, magnesium hydroxide, acetaminophen **OR** acetaminophen, nitroGLYCERIN      Intake/Output Summary (Last 24 hours) at 11/25/2021 1828  Last data filed at 11/25/2021 1553  Gross per 24 hour   Intake 1443.96 ml   Output 750 ml   Net 693.96 ml       Physical Exam Performed:    BP (!) 107/58   Pulse 83   Temp 98.1 °F (36.7 °C) (Oral)   Resp 16   Ht 5' 6\" (1.676 m)   Wt 146 lb 9.7 oz (66.5 kg)   SpO2 98%   BMI 23.66 kg/m²     General appearance: NAD  HEENT:  Conjunctivae/corneas clear. Neck: Supple, with full range of motion. Respiratory:  Normal respiratory effort. Clear to auscultation, bilaterally without Rales/Wheezes/Rhonchi. Cardiovascular: Bilateral lung crackles noted   Abdomen: Soft, non-tender, non-distended, normal bowel sounds. Musculoskeletal: +1 edema bilaterally, right elbow swelling noted  Neurologic:  without any focal sensory/motor deficits. grossly non-focal.  Psychiatric: Alert and oriented, Normal mood  Peripheral Pulses: +2 palpable, equal bilaterally       Labs:   Recent Labs     11/23/21 0428 11/24/21 0509 11/25/21 0443   WBC 10.2 9.0 9.7   HGB 9.9* 9.4* 8.7*   HCT 30.4* 28.1* 26.8*    206 239     Recent Labs     11/23/21 0428 11/24/21  0510 11/25/21  0443    130* 129*   K 3.5 3.4* 3.9   CL 92* 86* 88*   CO2 31 31 29   BUN 74* 87* 103*   CREATININE 2.2* 2.1* 2.2*   CALCIUM 8.8 8.4 8.3     Recent Labs     11/23/21 0428   AST 32   ALT 21   BILIDIR 0.9*   BILITOT 1.6*   ALKPHOS 92     Recent Labs     11/23/21 0429 11/24/21  0509 11/25/21  0443   INR 2.71* 2.80* 2.09*     No results for input(s): Efrain Figueroa in the last 72 hours.     Urinalysis:      Lab Results   Component Value Date    NITRU Negative 11/17/2021    WBCUA 1 11/17/2021    RBCUA 1 11/17/2021 BLOODU TRACE 11/17/2021    SPECGRAV 1.007 11/17/2021    GLUCOSEU Negative 11/17/2021       Radiology:  CTA CHEST W CONTRAST   Final Result   There is questionable mild narrowing of the left subclavian vein as it joins   the internal jugular vein. Brachiocephalic and superior vena cava appear   widely patent without evidence of significant central venous obstruction. Multiple small venous collaterals are seen in the visualized left upper   extremity and left supraclavicular region. Findings may be related to the   patient's prior left subclavian pacemaker placement. Cardiomegaly. Main pulmonary artery is enlarged which can be seen with pulmonary   hypertension. XR ELBOW RIGHT (2 VIEWS)   Final Result   Soft tissue swelling, mainly posteriorly. No acute bony abnormality. No   radiopaque foreign body. No soft tissue gas. VL Extremity Venous Right   Final Result      XR CHEST 1 VIEW   Final Result   Borderline pulmonary vascular congestion. XR CHEST (2 VW)   Final Result   The cardiac silhouette is enlarged, with somewhat globular configuration   which can be seen in pericardial effusion. No focal airspace disease or   pulmonary edema. Assessment/Plan:    Active Hospital Problems    Diagnosis     Stage 3 chronic kidney disease ([address] [N18.30]     Acute on chronic systolic congestive heart failure ([address] [I50.23]      Patient is a 62-year-old male with past medical history of chronic systolic heart failure, LV thrombus, CAD, hyperlipidemia who presented to hospital for shortness of breath bilateral lower extremity swelling. According to the patient he has not been taking his diuretic medication for past 3 weeks and his lower extremity swelling has been getting worse, he also has worsening shortness of breath, he has not been able to lie down flat and also has exertional dyspnea.   Otherwise denied fevers chills nausea vomiting diarrhea constipation dysuria.     Assessment  Acute on chronic systolic CHF  CAD  Hyperlipidemia  History of LV thrombus     Plan  S/p right heart cath-consistent with stage IV heart failure, dc Lasix, On milrinone, home Coumadin  EF<20% on echo, severe diffuse hypokinesis, Diastolic filling parameters suggest grade II diastolic dysfunction  Elevated uric acid-ordered allopurinol, prednisone for elbow swelling likely secondary to gout, will order x-ray right elbow  Strict I's and O's, low-salt diet  Consulted palliative care-full medical management with full code per patient  Resume home medications  DVT prophylaxis-on Coumadin  Diet: ADULT DIET;  Regular; Low Fat/Low Chol/High Fiber/2 gm Na; 1500 ml  Code Status: Full Code    PT/OT Eval Status: ordered    Dispo -  Follow up with cardiology, on IV milrinone  Marino Claire MD

## 2021-11-25 NOTE — PLAN OF CARE
Problem: Falls - Risk of:  Goal: Will remain free from falls  Description: Will remain free from falls  Outcome: Ongoing  Note: Fall risk assessment completed every shift. All precautions in place. Pt has call light within reach at all times. Room clear of clutter. Pt aware to call for assistance when getting up. Patient assessed for fall risk; fall precautions initiated. Patient and family instructed about safety devices. Environment kept free of clutter and adequate lighting provided. Bed locked and in lowest position. Call light within reach. Will continue to monitor. Problem: FLUID AND ELECTROLYTE IMBALANCE  Goal: Fluid and electrolyte balance are achieved/maintained  Outcome: Ongoing  Note: Educated patient/family on CHF signs/ symptoms, causes, treatments, limited fluid intake, daily weights, low sodium diet, and follow-up. Pt to call attending physician if weight gain of 3 pounds in one day or 5 pounds in one week.

## 2021-11-26 NOTE — PROGRESS NOTES
[facility] [address]   Daily Progress Note      Admit Date:  11/14/2021    Reason for follow up visit:  End stage CHF    CC: \"I'm still SOB. 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 Given his SOB and weight gain his family brought him to ED and he was admitted for CHF. He was placed on IV lasix infusion and milrinone and had good diuresis. On 11/23/2021 he underwent RHC and findings c/w class IV HF (elevated fillinf pressures, elevated PCWP and low O2 sats) and placed back on Milrinone. He has had continued decline and persistent CHF. He has been seen in consult by Palliative care and wants to continue with aggressive treatment. Interval History:  Pt. seen and examined; records reviewed  BP stable. Remains on O2 @ 1L  Wt 153# (? Accuracy). Net diuresis -17+ L since admit  BUN/Cr worsening and at 109/2.7 today   Na+ 129  Hgb 8.7  Denies SOB (although visibly dyspneic with talking and minimal exertion)  Remains on primacor  IV lasix discontinued and now on po torsemide    Subjective:  Pt with no acute overnight cardiac events. + cough (productive at times)  + edema  Denies SOB, chest pain, palpitations, dizziness    Review of Systems:   · Constitutional: no unanticipated weight loss. There's been no change in energy level, sleep pattern, or activity level. No fevers, chills. · Eyes: No visual changes or diplopia. No scleral icterus. · ENT: No Headaches, hearing loss or vertigo. No mouth sores or sore throat. · Cardiovascular: as reviewed in HPI  · Respiratory: + productive cough. No hematemesis. · Gastrointestinal: No abdominal pain, appetite loss, blood in stools. No change in bowel or bladder habits.   · Genitourinary: No dysuria, trouble voiding, or hematuria. · Musculoskeletal:  No gait disturbance, no joint complaints. + uses walker  · Integumentary: No rash or pruritis. · Neurological: No headache, diplopia, change in muscle strength, numbness or tingling. · Psychiatric: No anxiety or depression. · Endocrine: No temperature intolerance. No excessive thirst, fluid intake, or urination. No tremor. · Hematologic/Lymphatic: No abnormal bruising or bleeding, blood clots or swollen lymph nodes. · Allergic/Immunologic: No nasal congestion or hives. Objective:   /73   Pulse 90   Temp 98.2 °F (36.8 °C) (Oral)   Resp 18   Ht 5' 6\" (1.676 m)   Wt 153 lb 10.6 oz (69.7 kg)   SpO2 96%   BMI 24.80 kg/m²     Intake/Output Summary (Last 24 hours) at 11/26/2021 0957  Last data filed at 11/26/2021 0559  Gross per 24 hour   Intake 1343.96 ml   Output 1350 ml   Net -6.04 ml     Wt Readings from Last 3 Encounters:   11/26/21 153 lb 10.6 oz (69.7 kg)   10/22/21 166 lb (75.3 kg)   06/12/19 161 lb (73 kg)       Physical Exam:  General: Awake, alert, and oriented X4. Frail and chronically ill in appearance. O2 in place. Dyspneic with minimal conversation  Skin:  Cool and dry. Neck:  Supple. + mod JVD  Chest:  Lungs with coarse breath sounds; bibasilar crackles  Cardiovascular:  Regular with frequent premature beats; normal S1 and S2; II/VI systolic murmur   Abdomen:  soft, nontender, nondistended,+bowel sounds. Extremities:  2+ bilateral pitting edema to lower extremities; 1+ bilateral DP/PT pulses.  Cap refill brisk    Medications:    spironolactone  25 mg Oral Daily    torsemide  60 mg Oral Daily    allopurinol  100 mg Oral Daily    sodium chloride flush  5-40 mL IntraVENous 2 times per day    sodium chloride flush  5-40 mL IntraVENous 2 times per day    predniSONE  20 mg Oral Daily    Followed by   Verner Forest ON 11/28/2021] predniSONE  10 mg Oral Daily    Followed by   Verner Forest ON 11/29/2021] predniSONE  5 mg Oral Daily  metoprolol succinate  12.5 mg Oral Daily    rivaroxaban  15 mg Oral Dinner    influenza virus vaccine  0.5 mL IntraMUSCular Prior to discharge    insulin lispro  0-12 Units SubCUTAneous TID WC    insulin lispro  0-6 Units SubCUTAneous Nightly    atorvastatin  80 mg Oral Nightly    levothyroxine  25 mcg Oral Daily    isosorbide mononitrate  30 mg Oral Daily      sodium chloride      milrinone 0.375 mcg/kg/min (11/25/21 2109)    sodium chloride      dextrose      sodium chloride       sodium chloride flush, sodium chloride, sodium chloride flush, sodium chloride, acetaminophen, ondansetron, methyl salicylate-menthol, guaiFENesin-dextromethorphan, polyethylene glycol, glucose, dextrose, glucagon (rDNA), dextrose, sodium chloride flush, sodium chloride, potassium chloride, magnesium sulfate, ondansetron **OR** ondansetron, magnesium hydroxide, acetaminophen **OR** acetaminophen, nitroGLYCERIN    Lab Data:  CBC:   Recent Labs     11/24/21  0509 11/25/21 0443 11/26/21 0428   WBC 9.0 9.7 7.5   HGB 9.4* 8.7* 8.7*    239 250     BMP:    Recent Labs     11/24/21  0510 11/25/21 0443 11/26/21 0428   * 129* 129*   K 3.4* 3.9 3.7   CO2 31 29 27   BUN 87* 103* 109*   CREATININE 2.1* 2.2* 2.7*     LIVR: No results for input(s): AST, ALT in the last 72 hours. INR:    Recent Labs     11/24/21  0509 11/25/21 0443 11/26/21 0428   INR 2.80* 2.09* 1.66*     Results for Thuy Escobar (MRN [id number]) as of 11/26/2021 10:02   Ref. Range 11/14/2021 14:29 11/20/2021 04:56 11/22/2021 12:44   Pro-BNP Latest Ref Range: 0 - 124 pg/mL 65,067 (H) 31,194 (H) >70,000 (H)     Results for Thuy Escobar (MRN [id number]) as of 11/26/2021 10:02   Ref.  Range 11/23/2021 04:28   Alk Phos Latest Ref Range: 40 - 129 U/L 92   ALT Latest Ref Range: 10 - 40 U/L 21   AST Latest Ref Range: 15 - 37 U/L 32   Bilirubin Latest Ref Range: 0.0 - 1.0 mg/dL 1.6 (H)   Bilirubin, Direct Latest Ref Range: 0.0 - 0.3 mg/dL 0.9 (H)   Bilirubin, Indirect Latest Ref Range: 0.0 - 1.0 mg/dL 0.7   Total Protein Latest Ref Range: 6.4 - 8.2 g/dL 7.0     11/24/2021 CTA chest/RUE:  There is questionable mild narrowing of the left subclavian vein as it joins   the internal jugular vein.  Brachiocephalic and superior vena cava appear   widely patent without evidence of significant central venous obstruction.       Multiple small venous collaterals are seen in the visualized left upper   extremity and left supraclavicular region.  Findings may be related to the   patient's prior left subclavian pacemaker placement.       Cardiomegaly.       Main pulmonary artery is enlarged which can be seen with pulmonary   hypertension. 11/23/2021 Venous doppler: The left internal jugular, subclavian, axillary, brachial, radial and ulnar    veins, as well as the basilic and cephalic veins, have been examined.    These veins show no thrombus but there is slow venous blood flow especially    at the proximal internal jugular and subclavian veins. This suggests a    possible venous occlusive process in the chest and out of the range of the    examining ultrasound probe    Right internal jugular and subclavian veins are normal     11/23/2021:  OVERALL IMPRESSION:  1. Elevated right heart pressures. 2.  Elevated pulmonary capillary wedge pressure. 3.  Markedly reduced cardiac output and indices with significantly low  oxygen saturation, all consistent with stage D/New York Heart  Association Class IV heart failure. Echo 11/16/2021:  Left ventricular cavity size is severely dilated. Ejection fraction is visually estimated to be <20%. There is severe diffuse hypokinesis. Diastolic filling parameters suggest grade II diastolic dysfunction. Definity contrast administered. No left ventricular thrombus was seen. moderate MR  Pacer / ICD wire is visualized in the right ventricle. The right ventricle is dilated.   Right ventricular systolic function is moderate to severely reduced. TAPSE 1.01 cm    Echo 7/2/2020 [facility]):  - Left ventricle: The cavity size is severely dilated. Wall thickness is normal. Systolic function     was severely reduced. The estimated ejection fraction was in the range of 10% to 15%. Diffuse     hypokinesis. Doppler parameters are consistent with restrictive physiology, indicative of     decreased left ventricular diastolic compliance and/or increased left atrial pressure. There was     spontaneous echo contrast, indicative of stasis. - Mitral valve: Moderate regurgitation.   - Left atrium: The atrium is severely dilated. - Right ventricle: Pacer wire or catheter noted in right ventricle. Systolic function was mildly     reduced. - Right atrium: The atrium is mildly dilated. - Atrial septum: The septum bowed from left to right, consistent with increased left atrial     pressure. - Tricuspid valve: Mild-moderate regurgitation.   - Pulmonary arteries: Systolic pressure was severely increased, estimated to be 65mm Hg assuming     that the right atrial pressure was 15 mmHg. - Inferior vena cava: The vessel was dilated. The respirophasic diameter changes were blunted (<     50%), consistent with elevated central venous pressure.       [facility] 1/31/2020  IMPRESSIONS:  Normal left and right filling pressures with   preserved CO/CI. SUMMARY:   Hemodynamic:   RA 2   RV 28/3   PA 28/10 mean 16   PCWP 4   Blu 4.5/2.5   TD 4.5/2.5   PASat 66%   RECOMMENDATIONS:  Further management per [facility] Failure [facility].      Echo 1/9/2020 ():  - Left ventricle: The cavity size is severely dilated. Wall     thickness was increased in a pattern of mild LVH. Systolic     function was severely reduced. The estimated ejection fraction     was in the range of 10% to 15%. Diffuse hypokinesis. No contrast     administered - unable to evaluate for LV thrombus. The     longitudinal strain is -2.47% (markedly reduced).    - Aortic valve: Mild thickening.   - Mitral valve: Leaflet tenting. Mild to moderate regurgitation.   - Left atrium: The atrium is severely dilated. - Right ventricle: The cavity size is normal. Pacer wire or ICD     noted in right ventricle. Systolic function was mildly reduced by     objective interpretation. TAPSE: 1.6cm. Tricuspid annular systolic     velocity: 8cm/s.   - Tricuspid valve: Mild-moderate regurgitation.   - Pulmonary arteries: Systolic pressure could not be accurately     estimated, however it is elevated at at least 51 mm Hg, assuming     an RA pressure of 15 mm Hg. - Inferior vena cava: The vessel was dilated. The respirophasic     diameter changes were blunted (< 50%), consistent with elevated     central venous pressure. - Pericardium, extracardiac: A trivial pericardial effusion is     identified.      12/10/2018 LHC/PCI and RHC:  IMPRESSIONS:  Selective angiography of LMCA showing critical mid LAD   lesion. Successful PCI with 3.5 x 18 mm MIKE. Normal cardiac output, m   ildly increased pulmonary pressures with mildly increased filling pre   ssure on inotropic support. SUMMARY:     1. Wedge pressure in the pulmonary capillaries is mildly elevated. 2. Coronary arteries: The left main is unusually long and      trifurcates into the LAD, a ramus intermedius, and the left      circumflex. The left anterior descending gives rise to 2      diagonals and 3 septals and wraps around the apex. The left      circumflex gives rise to 2 obtuse marginals and no      posterolaterals. The first obtuse marginal has a high origin. 3. LAD: Mid-vessel lesion: There is a discrete, 99% stenosis. The      distal vessel supplies a moderate-sized vascular territory. The      lesion is a likely culprit for the patient's clinical      presentation. The lesion was stented (see 1st lesion      intervention). Following intervention, the lesion has a residual      stenosis of 0% and MUKUND grade 3 flow (brisk flow). 4. AV was not crossed due to known apical LV thrombus. 5. RA 6      RV 48/4      PA 50/20 (28)      W 18.   6. Blu CO 5.2 Blu CI 2.9 on Milrinone 0.25 mcg/mg/min.      12/4/2018 Cardiac MRI ():  Findings are consistent with a severe, ischemic cardiomyopathy.  There is akinesis of the apical segments with an associated small, apical thrombus. The left ventricle is severely dilated with severely reduced systolic function. The right ventricle is mildly dilated with moderately decreased systolic function. The left atrium is severely dilated.  There is mild to moderate mitral regurgitation. The right atrium is mildly dilated.  There is moderate tricuspid regurgitation. The descending aorta is dilated. The pulmonary artery is dilated.  The IVC and coronary sinus are dilated. Please see noncardiac findings below.      FINDINGS:     1. The left ventricular size is severely dilated. The left ventricular ejection fraction is 19 % by Smallwood's method. Global left ventricular function is severely decreased. There is akinesis of the apical segments, severe hypokinesis of the basal and mid septal and basal and mid anterior segments and moderate hypokinesis of the lateral segments and mid inferior segment and mild hypokinesis of the basal inferior segment. The left ventricular mass is moderately increased. There appears to be a small intracavitary thrombus (6.5 x 5 mm). 2. There is no evidence of increased iron deposition within the myocardium (T2*myocardium = 51 msec; if < 20 msec, suggestive of iron-overloading of the myocardium).  There is no evidence of myocardial edema. 3. The right ventricular size is mildly dilated. Global right ventricular function is moderately decreased. 4. Left atrial size is severely enlarged. Right atrial size is mildly enlarged.   The Qp/Qs is 1.0 by phase contrast imaging and is consistent with no evidence of shunt. 5. The calculated aortic regurgitant fraction is 1.55 %. There is moderate mitral regurgitation.  The calculated mitral regurgitant fraction is 29.02 %. There is moderate tricuspid regurgitation. The calculated tricuspid regurgitant fraction is 37.1 %. The calculated pulmonic regurgitant fraction is 2.39 %. 6. There is a small pericardial effusion. 7. The descending aorta is dilated measuring 27.2 mm. 8. The main pulmonary artery is dilated measuring 34.0 mm.  The IVC and coronary sinus are dilated. 9. There are large left and moderate right pleural effusions.  Basilar atelectasis is associated with the effusions.  There is evidence of mild pulmonary edema.     Telemetry: Sinus rhythm-sinus tachycardia with PVC's; couplets    Assessment/Plan:    1. Acute on chronic biventricular heart failure  -acute on chronic combined systolic and diastolic HF with LVEF < 33%  -R heart dilated with severely reduced function  -remains decompensated; NYHA class IV; Stage D  -S/P ICD placement in 2019  -now on po torsemide; aldactone held d/t increased Cr   -will decrease Milrinone and plan to wean off tomorrow  -continue Imdur and Toprol  -not on ACE/ARB/ARNI d/t  elevated Cr  -no SGLT2 inhibitor d/t elevated Cr  -long term prognosis is poor. Had discussion regarding his end stage heart failure (he wants to continue with aggressive treatment)  -? LVAD candidate; will need to discuss with Dr. Isis Frazier (his primary cardiologist); ? His support system at home (son lives with him)    2. SONAL on CKD, stage III  -IV lasix now discontinued  -Cr up to 2.7  -nephrology following    3. CAD of native coronary arteries without angina  -prior PCI of LAD  -angina quiet  -continue statin and low dose BB    4. H/O LV thrombus  -recent echo without demonstrated thrombus  -warfarin changed to Xarelto     5. Hyperlipidemia with LDL goal , 70  -continue high intensity statin     6.  Anemia, iron deficiency  -received IV iron  -last Hgb 8.7      Electronically signed by DAHIANA Perez - CNP on 11/26/2021 at 9:57 AM

## 2021-11-26 NOTE — PROGRESS NOTES
ambulate with CGA, requiring min cues for hand placement during transfers and for walker contorl. Pt continues be limited by low endurance with functional activity and would benefit from continued skilled PT to improve functional mobility. Anticipate the need for home PT level 1 at d/c with initial 24 hour assist for safety. Prognosis: Good  PT Education: General Safety; PT Role; Plan of Care; Transfer Training; Functional Mobility Training; Gait Training; Equipment; Home Exercise Program; Adaptive Device Training  Patient Education: Pt educated on performing seated exercises for stevan LE edema. REQUIRES PT FOLLOW UP: Yes  Activity Tolerance  Activity Tolerance: Patient Tolerated treatment well; Patient limited by endurance; Patient limited by fatigue     Patient Diagnosis(es): The primary encounter diagnosis was Acute on chronic systolic congestive heart failure ([facility] [place] [address] Diagnoses of Acute respiratory failure with hypoxia (HCC) and Dyspnea on exertion were also pertinent to this visit. has a past medical history of Diabetes type 2, uncontrolled ([address] Diabetic nephropathy, ED (erectile dysfunction), Essential hypertension, benign, Hyperlipidemia, and Noncompliance. has a past surgical history that includes eye surgery.     Restrictions  Restrictions/Precautions  Restrictions/Precautions: Fall Risk  Required Braces or Orthoses?: No  Position Activity Restriction  Other position/activity restrictions: 1L O2 nc, Sierra, IV     Social/Functional History  Lives With: Son  Type of Home: Apartment  Home Layout: One level  Home Access: Stairs to enter without rails  Entrance Stairs - Number of Steps: 2 steps down  Bathroom Shower/Tub: Tub/Shower unit  Bathroom Toilet: Standard  Bathroom Accessibility: Walker accessible  Home Equipment:  (No DME)  Receives Help From: Family  ADL Assistance: Independent  Homemaking Assistance:  (does what he can and his son manages heavy tasks)  Ambulation Assistance: Independent  Transfer Assistance: Independent    Subjective   General  Chart Reviewed: Yes  Additional Pertinent Hx: here due to increasing SOB, increasing LE edema   PMH includes CHF, ICD  Response To Previous Treatment: Patient with no complaints from previous session. Family / Caregiver Present: No  Subjective  Subjective: Supine in bed upon arrival, pt is pleasant and agreeable to therapy. no c/o pain       Objective   Bed mobility  Supine to Sit: Contact guard assistance  Sit to Supine: Unable to assess  Comment: Pt in recliner at end of session. Requires increased time with bed mobility.      Transfers  Sit to Stand: Contact guard assistance  Stand to sit: Contact guard assistance  Comment: Pt required moderate cues for hand placement     Ambulation  Ambulation?: Yes  More Ambulation?: No  Ambulation 1  Surface: level tile  Device: Rolling Walker  Assistance: Contact guard assistance  Gait Deviations: Slow Kellie; Decreased step height; Decreased step length  Distance: 15'  Comments: SPTA managed lines during ambulation, pt required min cues for walker control     Balance  Posture: Good  Sitting - Static: Good  Sitting - Dynamic: Good  Standing - Static: Good  Standing - Dynamic: Good; -     Exercises  Hip Flexion: standing marches x 10 stevan  Hip Abduction: Hip ADD with pillow x10 seated  Knee Long Arc Quad: x15 stevan seated  Ankle Pumps: x20 stevan seated  Other exercises  Other exercises 1: Sit to stands x 10 with stevan UE support at RW; pt required min cues for hand placement with RW                     AM-PAC Score  AM-PAC Inpatient Mobility Raw Score : 18 (11/26/21 1033)  AM-PAC Inpatient T-Scale Score : 43.63 (11/26/21 1033)  Mobility Inpatient CMS 0-100% Score: 46.58 (11/26/21 1033)  Mobility Inpatient CMS G-Code Modifier : CK (11/26/21 1033)          Goals  Short term goals  Time Frame for Short term goals: 2-3 days (ongoing as of 11/26)  Short term goal 1: bed mobility at supervision  Short term goal 2: transfers at supervision  Short term goal 3: ambulation at supervision with rolling walker as needed for safety and effectiveness for household distances    -steps as needed for home entry at [facility]  Patient Goals   Patient goals : feel better and to return to his apartment    Plan    Plan  Times per week: 3-5 while on acute floor  Current Treatment Recommendations: Functional Mobility Training, Positioning, Patient/Caregiver Education & Training, ADL/Self-care Training  Safety Devices  Type of devices: All fall risk precautions in place, Call light within reach, Left in chair, Chair alarm in place, Nurse notified, Gait belt (CEZAR Bailey notified)  Restraints  Initially in place: No     Therapy Time   Individual Concurrent Group Co-treatment   Time In 0952         Time Out 1032         Minutes 40         Timed Code Treatment Minutes: [address] [place], [place]  Therapist was present, directed the patient's care, made skilled judgement, and was responsible for assessment and treatment of the patient.       Electronically signed by Shelbi Mandujano on 11/26/2021 at 11:07 AM

## 2021-11-26 NOTE — PLAN OF CARE
Problem: Falls - Risk of:  Goal: Will remain free from falls  Description: Will remain free from falls  11/26/2021 0128 by Naheed Mckeon RN  Outcome: Ongoing  Note: Fall risk assessment completed every shift. All precautions in place. Pt has call light within reach at all times. Room clear of clutter. Pt aware to call for assistance when getting up. Patient assessed for fall risk; fall precautions initiated. Patient and family instructed about safety devices. Environment kept free of clutter and adequate lighting provided. Bed locked and in lowest position. Call light within reach. Will continue to monitor. Problem: FLUID AND ELECTROLYTE IMBALANCE  Goal: Fluid and electrolyte balance are achieved/maintained  11/26/2021 0128 by Naheed Mckeon RN  Outcome: Ongoing  Note: Educated patient/family on CHF signs/ symptoms, causes, treatments, limited fluid intake, daily weights, low sodium diet, and follow-up. Pt to call attending physician if weight gain of 3 pounds in one day or 5 pounds in one week. Problem: Pain:  Goal: Pain level will decrease  Description: Pain level will decrease  Outcome: Ongoing  Note: Pain/discomfort being managed with PRN analgesics per MD orders. Pt able to express presence and absence of pain and rate pain appropriately using numerical scale.

## 2021-11-26 NOTE — PROGRESS NOTES
Occupational Therapy  Facility/Department: [facility] 5 PROGRESSIVE CARE  Daily Treatment Note and Tentative D/C    NAME: Brenna Mancilla  : 1955  MRN: [id number]    Date of Service: 2021    Discharge Recommendations: Brenna Mancilla scored a 19/24 on the AM-PAC ADL Inpatient form. Current research shows that an AM-PAC score of 18 or greater is typically associated with a discharge to the patient's home setting. Based on the patient's AM-PAC score, and their current ADL deficits, it is recommended that the patient have 2-3 sessions per week of Occupational Therapy at d/c to increase the patient's independence. At this time, this patient demonstrates the endurance and safety to discharge home with [facility] and a follow up treatment frequency of 2-3x/wk. Please see assessment section for further patient specific details. If patient discharges prior to next session this note will serve as a discharge summary. Please see below for the latest assessment towards goals. 2-3 sessions per week, Patient would benefit from continued therapy after discharge, Home with Home health OT  OT Equipment Recommendations  Equipment Needed: Yes  ADL Assistive Devices: Shower Chair with back    Assessment   Performance deficits / Impairments: Decreased functional mobility ; Decreased balance; Decreased ADL status; Decreased endurance; Decreased high-level IADLs; Decreased strength  Assessment: Pt tolerated session well. Pt completes functional mobility and transfers with SBA/CGA and use of RW. Pt able to tolerate ~2 minutes in stance while completing x10 marches x3 sets. Pt ambulates ~25ft in room with SBA/CGA. Pt with no LOB or reports of light headedness or dizziness. Continue with POC.   Prognosis: Good  OT Education: OT Role; Transfer Training; Plan of Care  REQUIRES OT FOLLOW UP: Yes  Activity Tolerance  Activity Tolerance: Patient limited by fatigue; Patient Tolerated treatment well  Safety Devices  Safety Devices in place: Yes  Type of devices: Call light within reach; Chair alarm in place; Left in chair; Gait belt; Nurse notified         Patient Diagnosis(es): The primary encounter diagnosis was Acute on chronic systolic congestive heart failure ([address] Diagnoses of Acute respiratory failure with hypoxia (HCC) and Dyspnea on exertion were also pertinent to this visit. has a past medical history of Diabetes type 2, uncontrolled ([address] Diabetic nephropathy, ED (erectile dysfunction), Essential hypertension, benign, Hyperlipidemia, and Noncompliance. has a past surgical history that includes eye surgery. Restrictions  Restrictions/Precautions  Restrictions/Precautions: Fall Risk  Required Braces or Orthoses?: No  Position Activity Restriction  Other position/activity restrictions: 1L O2 nc, Sierra, IV  Subjective   General  Chart Reviewed: Yes  Patient assessed for rehabilitation services?: Yes  Additional Pertinent Hx: 60 yo male admitted 11/13 for BLE swelling with CHF exacerbation. PMH: CHF, DM, HTN,  Family / Caregiver Present: No  Referring Practitioner: Andie Franks MD  Diagnosis: CCHF exacerbation  Subjective  Subjective: Pt agreeable to OT treatment with encouragement. Pt with no reports of pain. General Comment  Comments: Rn cleared to participate      Orientation  Orientation  Overall Orientation Status: Within Functional Limits  Objective    ADL  Feeding: Independent        Balance  Sitting Balance: Supervision  Standing Balance: Contact guard assistance (SBA/CGA; RW)  Standing Balance  Time: ~2 minutes x3 sets  Activity: static standing; x10 marches in stance x3 sets  Comment: no LOB; SBA/CGA for balance  Functional Mobility  Functional - Mobility Device: Rolling Walker  Activity: Other (~25ft)  Assist Level: Contact guard assistance (SBA/CGA)  Functional Mobility Comments: no overt LOB; no noted SOB  Wheelchair Bed Transfers  Wheelchair/Bed - Technique: Ambulating (RW)  Equipment Used:  Other (chair)  Level of Asssistance: Contact guard assistance; Stand by assistance  Bed mobility  Supine to Sit: Unable to assess  Sit to Supine: Unable to assess  Scooting: Unable to assess  Transfers  Sit to stand: Contact guard assistance; Stand by assistance  Stand to sit: Contact guard assistance; Stand by assistance  Transfer Comments: to/from RW; increased time to complete; completed x3 reps           Cognition  Overall Cognitive Status: [facility]           Plan   Plan  Times per week: 3-5  Current Treatment Recommendations: Strengthening, Endurance Training, Patient/Caregiver Education & Training, Self-Care / ADL, Balance Training, Pain Management, Functional Mobility Training, Safety Education & Training, Positioning    AM-PAC Score        AM-PAC Inpatient Daily Activity Raw Score: 19 (11/26/21 1433)  AM-PAC Inpatient ADL T-Scale Score : 40.22 (11/26/21 1433)  ADL Inpatient CMS 0-100% Score: 42.8 (11/26/21 1433)  ADL Inpatient CMS G-Code Modifier : CK (11/26/21 1433)    Goals  Short term goals  Time Frame for Short term goals: prior to d/c; ongoing 11/26  Short term goal 1: toileting SUP - not met,  ongoing  Short term goal 2: ADL tx SUP - not met,  ongoing  Short term goal 3: LB dressing SUP - not met, ongoing  Short term goal 4: BUE exercises in all planes to improve strength and endurance for ADLs and tx - not met,  ongoing  Short term goal 5:  Tolerate 5 min fxl standing task SUP - not met,  ongoing  Patient Goals   Patient goals : go home       Therapy Time   Individual Concurrent Group Co-treatment   Time In 4886         Time Out 1434         Minutes 30         Timed Code Treatment Minutes: [address] [facility], OTR/L

## 2021-11-26 NOTE — PROGRESS NOTES
[facility]                                               Progress note    Summary:   Rodríguez Rizvi is being seen by nephrology for SONAL on CKD. Admitted with SOB, decompensated HF EF 20%. Interval History  Seen at bedside. No  SOB or pain. Patient having lunch. Oriented x 3 and comfortable. RHC index very low  On milrinone   Wedge was 19 and RA pressure 13    UO  750  Net Negative  18 litres  JVD down  Still has 1 + edema   Urine output 1.3 liters  Cr 1.8 >2.1 > 2.2 > 2.7  Na 138  > 130-->129 > 129  BUN 74 > 87-->103 > 109  Uric acid 11.2   Wt 175-->147-->146 > 153([facility])  Has a medellin     Plan:   On Torsemide now  Monitor Cr trend and I/O. No urgent indication of dialysis. No signs of uremia. Patient is oriented x 3. BUN is also high due to diuretics and Prednisone  Gout flare precipitated by diuresis. On steroids and allopurinol  Monitor electrolytes and replete as needed. Will follow. Discussed with patient and he agree with plan. Ted Morales MD  [facility]  Office: [phone]    Assessment:   Chronic kidney disease stage IIIb  This is likely secondary to diabetic nephropathy in the setting of persistent longstanding albuminuria  To complete CKD work-up will need an SPEP and light chains  Baseline creatinine around 2  No acute electrolyte abnormalities  Risk factors include diabetes, hypertension, severe systolic heart failure  Has had diabetes and hypertension for over 10 years and his last EF is less than 20%  He has had event evidence of albuminuria on urinalysis going back to 2016, 2013 had an  mg  Most recent protein creatinine ratio 700 mg, no RBCs no WBCs  CT abdomen in 2020 showed symmetric kidneys no lesions no masses bilateral perinephric stranding no hydronephrosis.   HCV and hepatitis C negative  Prior negative NAHOMY  Prior negative RF factor    Ischemic cardiomyopathy  EF less than 20%  Decompensated  proBNP 65,000  Managed with Lasix drip on milrinone      History of coronary artery disease with stent in the LAD  Has ICD  Most of his records are in the  system  Previously on Entresto but currently not  Echo here showed severe diffuse hypokinesis grade 2 diastolic dysfunction less than 20% EF severely dilated left ventricular cavity, right ventricular systolic function is severely reduced as well has moderate MR and RV's dilated    Diabetes type 2  10 years at least  Longstanding albuminuria  A1c has been ranging between 6 and 6.5 the last few checks. Hypertension  Blood pressure is acceptable right now  Carvedilol held, on positive inotrope right now      HPI/Summary:   Shelley Navarrete is being seen by nephrology for acute kidney injury. This is a 77-[id number]-UFA man with systolic heart failure, history of LV thrombus, coronary artery disease, hyperlipidemia who presented the hospital with increasing shortness of breath and worsening lower extremity swelling. Patient has received most of his care at [facility] up until this point. He has a history of heart failure with reduced EF secondary to ischemic cardiomyopathy. He has a single-vessel coronary artery disease status post PCI of the LAD. ICD placed in 2019 last right heart cath was January 29, 2020. Based on review of his last heart failure clinic note it looks like he is supposed to be on carvedilol Imdur Entresto torsemide 20 mg daily. Patient told me he was instructed to stop taking torsemide but was taking it anyway. Last heart failure note said that there was discussion about evaluation for possible LVAD versus transplant. He is on Coumadin for the LV thrombus. Patient says he is never seen a kidney doctor as an outpatient. He denies any chest pain but he just had general decreased stamina and dyspnea on exertion, orthopnea and lower extremity edema. He does not weigh himself at home daily.   He said he became to the hospital because he was prompted by his family members to seek medical attention. ROS:   Positives Listed Bold. All other remaining systems are negative. Constitutional:  fever, chills, weakness, weight change, fatigue,      Skin:  rash, pruritus, hair loss, bruising, dry skin, petechiae. Head, Face, Neck   headaches, swelling,  cervical adenopathy. Respiratory: shortness of breath, cough, or wheezing  Cardiovascular: chest pain, palpitations, dizzy, edema  Gastrointestinal: nausea, vomiting, diarrhea, constipation,belly pain    Yellow skin, blood in stool  Musculoskeletal:  back pain, muscle weakness, gait problems,       joint pain or swelling. Genitourinary:  dysuria, poor urine flow, flank pain, blood in urine  Neurologic:  vertigo, TIA'S, syncope, seizures, focal weakness  Psychosocial:  insomnia, anxiety, or depression. Additional positive findings: - + elbow pain     PMH:   Past medical history, surgical history, social history, family history are reviewed and updated as appropriate. Reviewed current medication list.   Allergies reviewed and updated as needed. PE:   Vitals:    11/26/21 1105   BP: 107/67   Pulse: 91   Resp: 18   Temp: 98 °F (36.7 °C)   SpO2: 95%       General appearance: male in NAD, Comfortable. HEENT: EOM intact, no icterus. Trachea is midline. Neck : No masses, appears symmetrical  Respiratory: Respiratory effort appears normal, bilateral equal chest rise, no wheeze  Cardiovascular: Ausculation shows RRR, no edema  Abdomen: No visible mass or tenderness, non distended. Musculoskeletal:  Joints with no swelling or deformity. Skin:no rashes, ulcers, induration, no jaundice. Neuro: fully alert and oriented.       Lab Results   Component Value Date    CREATININE 2.7 (H) 11/26/2021     (HH) 11/26/2021     (L) 11/26/2021    K 3.7 11/26/2021    CL 88 (L) 11/26/2021    CO2 27 11/26/2021      Lab Results   Component Value Date    WBC 7.5 11/26/2021    HGB 8.7 (L) 11/26/2021    HCT 26.2 (L) 11/26/2021    MCV 98.7 11/26/2021     11/26/2021     Lab Results   Component Value Date    CALCIUM 8.6 11/26/2021    PHOS 3.8 08/30/2017

## 2021-11-27 NOTE — PROGRESS NOTES
Hospitalist Progress Note      PCP: No primary care provider on file. Chief Complaint. Patient is a 62-year-old male with past medical history of chronic systolic heart failure, LV thrombus, CAD, hyperlipidemia who presented to hospital for shortness of breath bilateral lower extremity swelling. According to the patient he has not been taking his diuretic medication for past 3 weeks and his lower extremity swelling has been getting worse, he also has worsening shortness of breath, he has not been able to lie down flat and also has exertional dyspnea. Otherwise denied fevers chills nausea vomiting diarrhea constipation dysuria.     Date of Admission: 11/14/2021    Subjective: No acute events overnight, seen and evaluated at bedside, no chest pain, nausea, vomiting, shortness of breath, fever or chills    Medications:  Reviewed     Infusion Medications    sodium chloride      milrinone 0.25 mcg/kg/min (11/26/21 1250)    sodium chloride      dextrose      sodium chloride       Scheduled Medications    insulin glargine  10 Units SubCUTAneous Nightly    torsemide  60 mg Oral Daily    allopurinol  100 mg Oral Daily    sodium chloride flush  5-40 mL IntraVENous 2 times per day    sodium chloride flush  5-40 mL IntraVENous 2 times per day    predniSONE  20 mg Oral Daily    Followed by   Tera Early ON 11/28/2021] predniSONE  10 mg Oral Daily    Followed by   Tera Early ON 11/29/2021] predniSONE  5 mg Oral Daily    metoprolol succinate  12.5 mg Oral Daily    rivaroxaban  15 mg Oral Dinner    influenza virus vaccine  0.5 mL IntraMUSCular Prior to discharge    insulin lispro  0-12 Units SubCUTAneous TID     insulin lispro  0-6 Units SubCUTAneous Nightly    atorvastatin  80 mg Oral Nightly    levothyroxine  25 mcg Oral Daily    isosorbide mononitrate  30 mg Oral Daily     PRN Meds: sodium chloride flush, sodium chloride, sodium chloride flush, sodium chloride, acetaminophen, ondansetron, methyl salicylate-menthol, guaiFENesin-dextromethorphan, polyethylene glycol, glucose, dextrose, glucagon (rDNA), dextrose, sodium chloride flush, sodium chloride, ondansetron **OR** ondansetron, acetaminophen **OR** acetaminophen, nitroGLYCERIN      Intake/Output Summary (Last 24 hours) at 11/26/2021 2010  Last data filed at 11/26/2021 1749  Gross per 24 hour   Intake 120 ml   Output 900 ml   Net -780 ml       Physical Exam Performed:    /65   Pulse 95   Temp 98.4 °F (36.9 °C) (Oral)   Resp 18   Ht 5' 6\" (1.676 m)   Wt 153 lb 10.6 oz (69.7 kg)   SpO2 93%   BMI 24.80 kg/m²     General appearance: NAD  HEENT:  Conjunctivae/corneas clear. Neck: Supple, with full range of motion. Respiratory:  Normal respiratory effort. Clear to auscultation, bilaterally without Rales/Wheezes/Rhonchi. Cardiovascular: Bilateral lung crackles noted   Abdomen: Soft, non-tender, non-distended, normal bowel sounds. Musculoskeletal: +1 edema bilaterally, right elbow swelling noted  Neurologic:  without any focal sensory/motor deficits. grossly non-focal.  Psychiatric: Alert and oriented, Normal mood  Peripheral Pulses: +2 palpable, equal bilaterally       Labs:   Recent Labs     11/24/21  0509 11/25/21 0443 11/26/21 0428   WBC 9.0 9.7 7.5   HGB 9.4* 8.7* 8.7*   HCT 28.1* 26.8* 26.2*    239 250     Recent Labs     11/24/21  0510 11/25/21 0443 11/26/21 0428   * 129* 129*   K 3.4* 3.9 3.7   CL 86* 88* 88*   CO2 31 29 27   BUN 87* 103* 109*   CREATININE 2.1* 2.2* 2.7*   CALCIUM 8.4 8.3 8.6     No results for input(s): AST, ALT, BILIDIR, BILITOT, ALKPHOS in the last 72 hours. Recent Labs     11/24/21  0509 11/25/21 0443 11/26/21 0428   INR 2.80* 2.09* 1.66*     No results for input(s): CKTOTAL, TROPONINI in the last 72 hours.     Urinalysis:      Lab Results   Component Value Date    NITRU Negative 11/17/2021    WBCUA 1 11/17/2021    RBCUA 1 11/17/2021    BLOODU TRACE 11/17/2021    SPECGRAV 1.007 11/17/2021 GLUCOSEU Negative 11/17/2021       Radiology:  CTA CHEST W CONTRAST   Final Result   There is questionable mild narrowing of the left subclavian vein as it joins   the internal jugular vein. Brachiocephalic and superior vena cava appear   widely patent without evidence of significant central venous obstruction. Multiple small venous collaterals are seen in the visualized left upper   extremity and left supraclavicular region. Findings may be related to the   patient's prior left subclavian pacemaker placement. Cardiomegaly. Main pulmonary artery is enlarged which can be seen with pulmonary   hypertension. XR ELBOW RIGHT (2 VIEWS)   Final Result   Soft tissue swelling, mainly posteriorly. No acute bony abnormality. No   radiopaque foreign body. No soft tissue gas. VL Extremity Venous Right   Final Result      XR CHEST 1 VIEW   Final Result   Borderline pulmonary vascular congestion. XR CHEST (2 VW)   Final Result   The cardiac silhouette is enlarged, with somewhat globular configuration   which can be seen in pericardial effusion. No focal airspace disease or   pulmonary edema. Assessment/Plan:    Active Hospital Problems    Diagnosis     Acute kidney injury superimposed on CKD ([address] [N17.9, N18.9]     Biventricular heart failure ([address] [I50.82]     Stage 3 chronic kidney disease ([address] [N18.30]     Acute on chronic combined systolic and diastolic HF (heart failure) ([address] [I50.43]      Patient is a 82-year-old male with past medical history of chronic systolic heart failure, LV thrombus, CAD, hyperlipidemia who presented to hospital for shortness of breath bilateral lower extremity swelling. According to the patient he has not been taking his diuretic medication for past 3 weeks and his lower extremity swelling has been getting worse, he also has worsening shortness of breath, he has not been able to lie down flat and also has exertional dyspnea.

## 2021-11-27 NOTE — PROGRESS NOTES
Hospitalist Progress Note      PCP: No primary care provider on file. Chief Complaint. Patient is a 81-year-old male with past medical history of chronic systolic heart failure, LV thrombus, CAD, hyperlipidemia who presented to hospital for shortness of breath bilateral lower extremity swelling. According to the patient he has not been taking his diuretic medication for past 3 weeks and his lower extremity swelling has been getting worse, he also has worsening shortness of breath, he has not been able to lie down flat and also has exertional dyspnea. Otherwise denied fevers chills nausea vomiting diarrhea constipation dysuria.     Date of Admission: 11/14/2021    Subjective: ALLEGRAON, seen and evaluated at bedside, no chest pain, nausea, vomiting, shortness of breath, fever or chills    Medications:  Reviewed     Infusion Medications    dextrose      sodium chloride      milrinone 0.25 mcg/kg/min (11/27/21 1246)    sodium chloride      dextrose      sodium chloride       Scheduled Medications    furosemide  40 mg IntraVENous BID    insulin lispro  2 Units SubCUTAneous TID WC    insulin glargine  15 Units SubCUTAneous Nightly    insulin lispro  0-18 Units SubCUTAneous TID WC    insulin lispro  0-9 Units SubCUTAneous Nightly    torsemide  60 mg Oral Daily    allopurinol  100 mg Oral Daily    sodium chloride flush  5-40 mL IntraVENous 2 times per day    sodium chloride flush  5-40 mL IntraVENous 2 times per day    [START ON 11/28/2021] predniSONE  10 mg Oral Daily    Followed by   Verner [place] ON 11/29/2021] predniSONE  5 mg Oral Daily    metoprolol succinate  12.5 mg Oral Daily    rivaroxaban  15 mg Oral Dinner    influenza virus vaccine  0.5 mL IntraMUSCular Prior to discharge    atorvastatin  80 mg Oral Nightly    levothyroxine  25 mcg Oral Daily    isosorbide mononitrate  30 mg Oral Daily     PRN Meds: glucose, dextrose, glucagon (rDNA), dextrose, sodium chloride flush, sodium chloride, sodium chloride flush, sodium chloride, acetaminophen, ondansetron, methyl salicylate-menthol, guaiFENesin-dextromethorphan, polyethylene glycol, glucose, dextrose, glucagon (rDNA), dextrose, sodium chloride flush, sodium chloride, ondansetron **OR** ondansetron, acetaminophen **OR** acetaminophen, nitroGLYCERIN      Intake/Output Summary (Last 24 hours) at 11/27/2021 1839  Last data filed at 11/27/2021 1750  Gross per 24 hour   Intake 818.54 ml   Output 1700 ml   Net -881.46 ml       Physical Exam Performed:    /74   Pulse 87   Temp 98 °F (36.7 °C) (Oral)   Resp 18   Ht 5' 6\" (1.676 m)   Wt 149 lb 4 oz (67.7 kg)   SpO2 95%   BMI 24.09 kg/m²     General appearance: NAD, sitting in recliner  HEENT:  Conjunctivae/corneas clear. Neck: Supple, with full range of motion. Respiratory:  Normal respiratory effort. Clear to auscultation, bilaterally without Rales/Wheezes/Rhonchi. Cardiovascular: Bilateral lung crackles noted   Abdomen: Soft, non-tender, non-distended, normal bowel sounds. Musculoskeletal: +1 edema bilaterally, right elbow swelling noted  Neurologic:  without any focal sensory/motor deficits. grossly non-focal.  Psychiatric: Alert and oriented, Normal mood  Peripheral Pulses: +2 palpable, equal bilaterally       Labs:   Recent Labs     11/25/21 0443 11/26/21 0428 11/27/21 0445   WBC 9.7 7.5 6.1   HGB 8.7* 8.7* 9.4*   HCT 26.8* 26.2* 28.3*    250 272     Recent Labs     11/25/21 0443 11/26/21 0428 11/27/21 0445   * 129* 129*   K 3.9 3.7 4.0   CL 88* 88* 89*   CO2 29 27 28   * 109* 120*   CREATININE 2.2* 2.7* 2.4*   CALCIUM 8.3 8.6 8.6     No results for input(s): AST, ALT, BILIDIR, BILITOT, ALKPHOS in the last 72 hours. Recent Labs     11/25/21 0443 11/26/21 0428 11/27/21  0445   INR 2.09* 1.66* 1.65*     No results for input(s): CKTOTAL, TROPONINI in the last 72 hours.     Urinalysis:      Lab Results   Component Value Date    NITRU Negative 11/17/2021    WBCUA 1 11/17/2021    RBCUA 1 11/17/2021    BLOODU TRACE 11/17/2021    SPECGRAV 1.007 11/17/2021    GLUCOSEU Negative 11/17/2021       Radiology:  CTA CHEST W CONTRAST   Final Result   There is questionable mild narrowing of the left subclavian vein as it joins   the internal jugular vein. Brachiocephalic and superior vena cava appear   widely patent without evidence of significant central venous obstruction. Multiple small venous collaterals are seen in the visualized left upper   extremity and left supraclavicular region. Findings may be related to the   patient's prior left subclavian pacemaker placement. Cardiomegaly. Main pulmonary artery is enlarged which can be seen with pulmonary   hypertension. XR ELBOW RIGHT (2 VIEWS)   Final Result   Soft tissue swelling, mainly posteriorly. No acute bony abnormality. No   radiopaque foreign body. No soft tissue gas. VL Extremity Venous Right   Final Result      XR CHEST 1 VIEW   Final Result   Borderline pulmonary vascular congestion. XR CHEST (2 VW)   Final Result   The cardiac silhouette is enlarged, with somewhat globular configuration   which can be seen in pericardial effusion. No focal airspace disease or   pulmonary edema. Assessment/Plan:    Active Hospital Problems    Diagnosis     Acute kidney injury superimposed on CKD ([address] [N17.9, N18.9]     Biventricular heart failure ([address] [I50.82]     Stage 3 chronic kidney disease ([address] [N18.30]     Acute on chronic combined systolic and diastolic HF (heart failure) ([address] [I50.43]      Patient is a 74-year-old male with past medical history of chronic systolic heart failure, LV thrombus, CAD, hyperlipidemia who presented to hospital for shortness of breath bilateral lower extremity swelling.   According to the patient he has not been taking his diuretic medication for past 3 weeks and his lower extremity swelling has been getting worse, he also has worsening shortness of breath, he has not been able to lie down flat and also has exertional dyspnea. Otherwise denied fevers chills nausea vomiting diarrhea constipation dysuria.     Assessment  Acute on chronic systolic CHF  CAD  Hyperlipidemia  History of LV thrombus  Hyperglycemia due to diabetes mellitus  Acute gout     Plan  S/p right heart cath-consistent with stage IV heart failure, dc Lasix, On milrinone, home Coumadin  EF<20% on echo, severe diffuse hypokinesis, Diastolic filling parameters suggest grade II diastolic dysfunction  Hypoglycemia has been worsened due to being on the steroids due to acute gout flare, switch to her insulin sliding scale, increase long-acting insulin, will consult diabetes coordinator  Elevated uric acid-ordered allopurinol, prednisone for elbow swelling likely secondary to gout, will order x-ray right elbow  Strict I's and O's, low-salt diet  Consulted palliative care-full medical management with full code per patient  Resume home medications  DVT prophylaxis-on Coumadin  Diet: ADULT DIET; Regular; 4 carb choices (60 gm/meal);  Low Fat/Low Chol/High Fiber/2 gm Na; 1500 ml  Code Status: Full Code    PT/OT Eval Status: ordered    Dispo -  Follow up with cardiology, on IV milrinone, dc 1-2 days    Hortencia Leong MD

## 2021-11-27 NOTE — PLAN OF CARE
Problem: Skin Integrity:  Goal: Will show no infection signs and symptoms  Description: Will show no infection signs and symptoms  Outcome: Ongoing  Goal: Absence of new skin breakdown  Description: Absence of new skin breakdown  Outcome: Ongoing     Problem: Falls - Risk of:  Goal: Will remain free from falls  Description: Will remain free from falls  Outcome: Ongoing  Goal: Absence of physical injury  Description: Absence of physical injury  Outcome: Ongoing   Fall risk assessment completed every shift. All precautions in place. Pt has call light within reach at all times. Room clear of clutter. Pt aware to call for assistance when getting up. Problem: OXYGENATION/RESPIRATORY FUNCTION  Goal: Patient will maintain patent airway  Outcome: Ongoing  Goal: Patient will achieve/maintain normal respiratory rate/effort  Description: Respiratory rate and effort will be within normal limits for the patient  Outcome: Ongoing     Problem: HEMODYNAMIC STATUS  Goal: Patient has stable vital signs and fluid balance  Outcome: Ongoing     Problem: FLUID AND ELECTROLYTE IMBALANCE  Goal: Fluid and electrolyte balance are achieved/maintained  Outcome: Ongoing     Problem: ACTIVITY INTOLERANCE/IMPAIRED MOBILITY  Goal: Mobility/activity is maintained at optimum level for patient  Outcome: Ongoing   Assess breath sounds, oxygen requirements and saturations, and activity tolerance. Monitor intake and output and daily weights and lab values. Encourage fluid and dietary restrictions. Problem: Pain:  Goal: Pain level will decrease  Description: Pain level will decrease  Outcome: Ongoing  Goal: Control of acute pain  Description: Control of acute pain  Outcome: Ongoing  Goal: Control of chronic pain  Description: Control of chronic pain  Outcome: Ongoing   Pain/discomfort being managed with PRN analgesics per MD orders. Pt able to express presence and absence of pain and rate pain appropriately using numerical scale.

## 2021-11-27 NOTE — PROGRESS NOTES
hydronephrosis. HCV and hepatitis C negative  Prior negative NAHOMY  Prior negative RF factor    Ischemic cardiomyopathy  EF less than 20%  Decompensated  proBNP 65,000  Managed with Lasix drip    on milrinone      History of coronary artery disease with stent in the LAD  Has ICD  Most of his records are in the  system  Previously on Entresto but currently not  Echo here showed severe diffuse hypokinesis grade 2 diastolic dysfunction less than 20% EF severely dilated left ventricular cavity, right ventricular systolic function is severely reduced as well has moderate MR and RV's dilated    Diabetes type 2  10 years at least  Longstanding albuminuria  A1c has been ranging between 6 and 6.5 the last few checks. Hypertension  Blood pressure is acceptable right now  Carvedilol held, on positive inotrope right now      HPI/Summary:   Tram Guevara is being seen by nephrology for acute kidney injury. This is a 66-GTUM-GYI man with systolic heart failure, history of LV thrombus, coronary artery disease, hyperlipidemia who presented the hospital with increasing shortness of breath and worsening lower extremity swelling. Patient has received most of his care at [facility] up until this point. He has a history of heart failure with reduced EF secondary to ischemic cardiomyopathy. He has a single-vessel coronary artery disease status post PCI of the LAD. ICD placed in 2019 last right heart cath was January 29, 2020. Based on review of his last heart failure clinic note it looks like he is supposed to be on carvedilol Imdur Entresto torsemide 20 mg daily. Patient told me he was instructed to stop taking torsemide but was taking it anyway. Last heart failure note said that there was discussion about evaluation for possible LVAD versus transplant. He is on Coumadin for the LV thrombus. Patient says he is never seen a kidney doctor as an outpatient.   He denies any chest pain but he just had general decreased stamina and dyspnea on exertion, orthopnea and lower extremity edema. He does not weigh himself at home daily. He said he became to the hospital because he was prompted by his family members to seek medical attention. ROS:   Positives Listed Bold. All other remaining systems are negative. Constitutional:  fever, chills, weakness, weight change, fatigue,      Skin:  rash, pruritus, hair loss, bruising, dry skin, petechiae. Head, Face, Neck   headaches, swelling,  cervical adenopathy. Respiratory: shortness of breath, cough, or wheezing  Cardiovascular: chest pain, palpitations, dizzy, edema  Gastrointestinal: nausea, vomiting, diarrhea, constipation,belly pain    Yellow skin, blood in stool  Musculoskeletal:  back pain, muscle weakness, gait problems,       joint pain or swelling. Genitourinary:  dysuria, poor urine flow, flank pain, blood in urine  Neurologic:  vertigo, TIA'S, syncope, seizures, focal weakness  Psychosocial:  insomnia, anxiety, or depression. Additional positive findings: - + elbow pain     PMH:   Past medical history, surgical history, social history, family history are reviewed and updated as appropriate. Reviewed current medication list.   Allergies reviewed and updated as needed. PE:   Vitals:    11/27/21 0950   BP:    Pulse:    Resp:    Temp:    SpO2: 94%       General appearance: male in NAD, fully alert and oriented. Comfortable. HEENT: EOM intact, no icterus. Trachea is midline. Neck : No masses, appears symmetrical  Respiratory: Respiratory effort appears normal, bilateral equal chest rise, no wheeze  Cardiovascular: Ausculation shows RRR no edema today   Abdomen: No visible mass or tenderness, non distended. Musculoskeletal:  Joints with no swelling or deformity. Skin:no rashes, ulcers, induration, no jaundice. Neuro: face symmetric, no focal deficits. Appropriate responses.        Lab Results   Component Value Date    CREATININE 2.4 (H) 11/27/2021  (HH) 11/27/2021     (L) 11/27/2021    K 4.0 11/27/2021    CL 89 (L) 11/27/2021    CO2 28 11/27/2021      Lab Results   Component Value Date    WBC 6.1 11/27/2021    HGB 9.4 (L) 11/27/2021    HCT 28.3 (L) 11/27/2021    MCV 98.6 11/27/2021     11/27/2021     Lab Results   Component Value Date    CALCIUM 8.6 11/27/2021    PHOS 3.8 08/30/2017

## 2021-11-27 NOTE — PLAN OF CARE
Problem: Skin Integrity:  Goal: Will show no infection signs and symptoms  Description: Will show no infection signs and symptoms  11/27/2021 1030 by Natalia Way RN  Outcome: Ongoing     Problem: Skin Integrity:  Goal: Absence of new skin breakdown  Description: Absence of new skin breakdown  11/27/2021 1030 by Natalia Way RN  Outcome: Ongoing     Problem: Falls - Risk of:  Goal: Will remain free from falls  Description: Will remain free from falls  11/27/2021 1030 by Natalia Way RN  Outcome: Ongoing     Problem: Falls - Risk of:  Goal: Absence of physical injury  Description: Absence of physical injury  11/27/2021 1030 by Natalia Way RN  Outcome: Ongoing     Problem: OXYGENATION/RESPIRATORY FUNCTION  Goal: Patient will maintain patent airway  11/27/2021 1030 by Natalia Way RN  Outcome: Ongoing     Problem: OXYGENATION/RESPIRATORY FUNCTION  Goal: Patient will achieve/maintain normal respiratory rate/effort  Description: Respiratory rate and effort will be within normal limits for the patient  11/27/2021 1030 by Natalia Way RN  Outcome: Ongoing     Problem: HEMODYNAMIC STATUS  Goal: Patient has stable vital signs and fluid balance  11/27/2021 1030 by Natalia Way RN  Outcome: Ongoing     Problem: FLUID AND ELECTROLYTE IMBALANCE  Goal: Fluid and electrolyte balance are achieved/maintained  11/27/2021 1030 by Natalia Way RN  Outcome: Ongoing     Problem: ACTIVITY INTOLERANCE/IMPAIRED MOBILITY  Goal: Mobility/activity is maintained at optimum level for patient  11/27/2021 1030 by Natalia Way RN  Outcome: Ongoing     Problem: Pain:  Description: Pain management should include both nonpharmacologic and pharmacologic interventions.   Goal: Pain level will decrease  Description: Pain level will decrease  11/27/2021 1030 by Natalia Way RN  Outcome: Ongoing     Problem: Pain:  Description: Pain management should include both nonpharmacologic and pharmacologic interventions. Goal: Control of acute pain  Description: Control of acute pain  11/27/2021 1030 by Jenny Francisco RN  Outcome: Ongoing     Problem: Pain:  Description: Pain management should include both nonpharmacologic and pharmacologic interventions.   Goal: Control of chronic pain  Description: Control of chronic pain  11/27/2021 1030 by Jenny Francisco RN  Outcome: Ongoing

## 2021-11-27 NOTE — PROGRESS NOTES
Cardiology Progress Note     Admit Date: 2021     Reason for follow up: acute systolic CHF    HPI and Interval History:   60yo M h/o CAD, ICM with LVEF < 20% s/p ICD implantation for primary prevention of sudden cardiac arrest 2019, LV thrombus, HTN who preents with dyspnea and is diagnosed with acute systolic CHF. Patient seen and examined. Clinical notes reviewed. Telemetry reviewed - SR 90's. No new complaint today. No major events overnight. Denies having angina, shortness of breath, dyspnea on exertion, Orthopnea, PND at the time of this visit. Review of System:  All other systems reviewed except for that noted above. Pertinent negatives and positives are:     · General: negative for fever, chills   · Ophthalmic ROS: negative for - eye pain or loss of vision  · ENT ROS: negative for - headaches, sore throat   · Respiratory: negative for - cough, sputum  · Cardiovascular: Reviewed in HPI  · Gastrointestinal: negative for - abdominal pain, diarrhea, N/V  · Hematology: negative for - bleeding, blood clots, bruising or jaundice  · Genito-Urinary:  negative for - Dysuria or incontinence  · Musculoskeletal: negative for - Joint swelling, muscle pain  · Neurological: negative for - confusion, dizziness, headaches   · Psychiatric: No anxiety, no depression. · Dermatological: negative for - rash      Physical Examination:  Vitals:    21 0600   BP:    Pulse: 99   Resp:    Temp:    SpO2:         Intake/Output Summary (Last 24 hours) at 2021 0854  Last data filed at 2021 0647  Gross per 24 hour   Intake 429.68 ml   Output 1300 ml   Net -870.32 ml     In: 429.7 [P.O.:210;  I.V.:219.7]  Out: 1300    Wt Readings from Last 3 Encounters:   21 149 lb 4 oz (67.7 kg)   10/22/21 166 lb (75.3 kg)   19 161 lb (73 kg)     Temp  Av.9 °F (36.6 °C)  Min: 97.4 °F (36.3 °C)  Max: 98.4 °F (36.9 °C)  Pulse  Av.1  Min: 85  Max: 128  BP  Min: 102/65  Max: 120/74  SpO2  Av.2 %  Min: 93 %  Max: 96 %    · Telemetry: Sinus rhythm with v-rates 90's bpm  · Constitutional: Alert. Oriented to person, place, and time. No distress. · Head: Normocephalic and atraumatic. · Mouth/Throat: Lips appear moist. Oropharynx is clear and moist.  · Eyes: Conjunctivae normal. EOM are normal.   · Neck: Neck supple. No lymphadenopathy. No rigidity. 13 JVD present. · Cardiovascular: Normal rate, regular rhythm. Normal S1&S2. Carotid pulse 2+ bilaterally. · Pulmonary/Chest: Bilateral respiratory sounds present. No respiratory accessory muscle use. No wheezes, No rhonchi. + bibasilar rales. · Abdominal: Soft. Normal bowel sounds present. No distension, No tenderness. No splenomegaly. No hernia. · Musculoskeletal: No tenderness. Full range of motion in bilateral upper and lower extremities. RLE 1+ pitting edema    · Lymphadenopathy: Has no cervical adenopathy. · Neurological: Alert and oriented. Cranial nerve II-XII grossly intact, No gross deficit to touch. · Skin: Skin is warm and dry. No rash, lesions, ulcerations noted. · Psychiatric: No anxiety nor agitation. Labs, telemetry, diagnostic and imaging results personally reviewed and interpreted. Recent Labs     11/25/21 0443 11/26/21 0428 11/27/21 0445   * 129* 129*   K 3.9 3.7 4.0   CL 88* 88* 89*   CO2 29 27 28   * 109* 120*   CREATININE 2.2* 2.7* 2.4*     Recent Labs     11/25/21 0443 11/26/21 0428 11/27/21 0445   WBC 9.7 7.5 6.1   HGB 8.7* 8.7* 9.4*   HCT 26.8* 26.2* 28.3*   MCV 98.7 98.7 98.6    250 272     Lab Results   Component Value Date    TROPONINI 0.05 11/14/2021     Estimated Creatinine Clearance: 28 mL/min (A) (based on SCr of 2.4 mg/dL (H)).    No results found for: BNP  Lab Results   Component Value Date    PROTIME 19.0 11/27/2021    PROTIME 19.1 11/26/2021    PROTIME 24.3 11/25/2021    INR 1.65 11/27/2021    INR 1.66 11/26/2021    INR 2.09 11/25/2021     Lab Results   Component Value Date    CHOL 146 02/12/2019    HDL 73 02/12/2019    HDL 58 12/13/2011    TRIG 109 02/12/2019       Scheduled Meds:   insulin glargine  15 Units SubCUTAneous Nightly    insulin lispro  0-18 Units SubCUTAneous TID WC    insulin lispro  0-9 Units SubCUTAneous Nightly    torsemide  60 mg Oral Daily    allopurinol  100 mg Oral Daily    sodium chloride flush  5-40 mL IntraVENous 2 times per day    sodium chloride flush  5-40 mL IntraVENous 2 times per day    predniSONE  20 mg Oral Daily    Followed by   Ariana Crews ON 11/28/2021] predniSONE  10 mg Oral Daily    Followed by   Ariana Crews ON 11/29/2021] predniSONE  5 mg Oral Daily    metoprolol succinate  12.5 mg Oral Daily    rivaroxaban  15 mg Oral Dinner    influenza virus vaccine  0.5 mL IntraMUSCular Prior to discharge    atorvastatin  80 mg Oral Nightly    levothyroxine  25 mcg Oral Daily    isosorbide mononitrate  30 mg Oral Daily     Continuous Infusions:   dextrose      sodium chloride      milrinone 0.25 mcg/kg/min (11/27/21 0557)    sodium chloride      dextrose      sodium chloride       PRN Meds:glucose, dextrose, glucagon (rDNA), dextrose, sodium chloride flush, sodium chloride, sodium chloride flush, sodium chloride, acetaminophen, ondansetron, methyl salicylate-menthol, guaiFENesin-dextromethorphan, polyethylene glycol, glucose, dextrose, glucagon (rDNA), dextrose, sodium chloride flush, sodium chloride, ondansetron **OR** ondansetron, acetaminophen **OR** acetaminophen, nitroGLYCERIN     Patient Active Problem List    Diagnosis Date Noted    Acute kidney [facility] superimposed on CKD [facility]     Biventricular heart failure (HCC)     Stage 3 chronic kidney disease ([address]     Acute on chronic combined systolic and diastolic HF (heart failure) (HCC) 11/14/2021    Elevated PSA 04/09/2019    Coronary artery disease involving native coronary artery 02/12/2019    Elevated blood uric acid level 01/06/2017    Diabetes type 2, uncontrolled ([address] 07/28/2014    Noncompliance 07/28/2014    ED (erectile dysfunction) 09/12/2013    Essential hypertension, benign 09/12/2013    Hyperlipidemia 09/12/2013    Diabetic nephropathy ([facility] 09/12/2013      Active Hospital Problems    Diagnosis Date Noted    Acute kidney [facility] superimposed on CKD ([facility] [N17.9, N18.9]     Biventricular heart failure ([facility] [I50.82]     Stage 3 chronic kidney disease ([facility] [N18.30]     Acute on chronic combined systolic and diastolic HF (heart failure) ([facility] [I50.43] 11/14/2021       Assessment and Plan:     Acute systolic CHF  -Lasix IV on hold due to increased Cr from 2.2 to 2.7 and back down to 2.4 today  -still on milrinone IV  -still hypervolemic. Will restart Lasix but at 40mg IV BID today. Strict I/O, daily wts, electrolyte repletion prn    ICM with LVEF < 20%  -cont Toprol XL 12.5mg daily, Atorva 80, Imdur  -not on ACEi/ARB/ARNI/SGLT2-I due to SONAL  -s/p ICD 2019 for primary prevention of SCA. Will follow with you. Thank you for allowing me to participate in the care of this patient. If you have any questions, please do not hesitate to contact me.     Edison Pisano MD, MS, [facility]  Cardiac Electrophysiology  [address]  [address] [facility], [address] [place] [address]  [facility] [address]  [phone]

## 2021-11-28 NOTE — PROGRESS NOTES
Hospitalist Progress Note      PCP: No primary care provider on file. Chief Complaint. Patient is a 81-year-old male with past medical history of chronic systolic heart failure, LV thrombus, CAD, hyperlipidemia who presented to hospital for shortness of breath bilateral lower extremity swelling. According to the patient he has not been taking his diuretic medication for past 3 weeks and his lower extremity swelling has been getting worse, he also has worsening shortness of breath, he has not been able to lie down flat and also has exertional dyspnea. Otherwise denied fevers chills nausea vomiting diarrhea constipation dysuria.     Date of Admission: 11/14/2021    Subjective: ALLEGRAON, seen and evaluated at bedside, no chest pain, nausea, vomiting, shortness of breath, fever or chills    Medications:  Reviewed     Infusion Medications    dextrose      sodium chloride      sodium chloride      dextrose      sodium chloride       Scheduled Medications    torsemide  40 mg Oral Daily    insulin lispro  2 Units SubCUTAneous TID WC    insulin glargine  15 Units SubCUTAneous Nightly    insulin lispro  0-18 Units SubCUTAneous TID WC    insulin lispro  0-9 Units SubCUTAneous Nightly    allopurinol  100 mg Oral Daily    sodium chloride flush  5-40 mL IntraVENous 2 times per day    sodium chloride flush  5-40 mL IntraVENous 2 times per day    [START ON 11/29/2021] predniSONE  5 mg Oral Daily    metoprolol succinate  12.5 mg Oral Daily    rivaroxaban  15 mg Oral Dinner    influenza virus vaccine  0.5 mL IntraMUSCular Prior to discharge    atorvastatin  80 mg Oral Nightly    levothyroxine  25 mcg Oral Daily    isosorbide mononitrate  30 mg Oral Daily     PRN Meds: glucose, dextrose, glucagon (rDNA), dextrose, sodium chloride flush, sodium chloride, sodium chloride flush, sodium chloride, acetaminophen, ondansetron, methyl salicylate-menthol, guaiFENesin-dextromethorphan, polyethylene glycol, glucose, dextrose, glucagon (rDNA), dextrose, sodium chloride flush, sodium chloride, ondansetron **OR** ondansetron, acetaminophen **OR** acetaminophen, nitroGLYCERIN      Intake/Output Summary (Last 24 hours) at 11/28/2021 1708  Last data filed at 11/28/2021 1612  Gross per 24 hour   Intake 606.58 ml   Output 3225 ml   Net -2618.42 ml       Physical Exam Performed:    /70   Pulse 90   Temp 98.2 °F (36.8 °C) (Oral)   Resp 18   Ht 5' 6\" (1.676 m)   Wt 145 lb 11.6 oz (66.1 kg)   SpO2 94%   BMI 23.52 kg/m²     General appearance: NAD  HEENT:  Conjunctivae/corneas clear. Neck: Supple, with full range of motion. Respiratory:  Normal respiratory effort. Clear to auscultation, bilaterally without Rales/Wheezes/Rhonchi. Cardiovascular: Bilateral lung crackles noted   Abdomen: Soft, non-tender, non-distended, normal bowel sounds. Musculoskeletal: +1 edema bilaterally, right elbow swelling noted  Neurologic:  without any focal sensory/motor deficits. grossly non-focal.  Psychiatric: Alert and oriented, Normal mood  Peripheral Pulses: +2 palpable, equal bilaterally       Labs:   Recent Labs     11/26/21 0428 11/27/21 0445 11/28/21 0453   WBC 7.5 6.1 6.3   HGB 8.7* 9.4* 9.7*   HCT 26.2* 28.3* 29.9*    272 303     Recent Labs     11/26/21 0428 11/27/21 0445 11/28/21 0453   * 129* 133*   K 3.7 4.0 3.9   CL 88* 89* 93*   CO2 27 28 28   * 120* 119*   CREATININE 2.7* 2.4* 2.5*   CALCIUM 8.6 8.6 8.6     No results for input(s): AST, ALT, BILIDIR, BILITOT, ALKPHOS in the last 72 hours. Recent Labs     11/26/21 0428 11/27/21 0445 11/28/21 0453   INR 1.66* 1.65* 1.75*     No results for input(s): CKTOTAL, TROPONINI in the last 72 hours.     Urinalysis:      Lab Results   Component Value Date    NITRU Negative 11/17/2021    WBCUA 1 11/17/2021    RBCUA 1 11/17/2021    BLOODU TRACE 11/17/2021    SPECGRAV 1.007 11/17/2021    GLUCOSEU Negative 11/17/2021       Radiology:  CTA CHEST W CONTRAST   Final Result   There is questionable mild narrowing of the left subclavian vein as it joins   the internal jugular vein. Brachiocephalic and superior vena cava appear   widely patent without evidence of significant central venous obstruction. Multiple small venous collaterals are seen in the visualized left upper   extremity and left supraclavicular region. Findings may be related to the   patient's prior left subclavian pacemaker placement. Cardiomegaly. Main pulmonary artery is enlarged which can be seen with pulmonary   hypertension. XR ELBOW RIGHT (2 VIEWS)   Final Result   Soft tissue swelling, mainly posteriorly. No acute bony abnormality. No   radiopaque foreign body. No soft tissue gas. VL Extremity Venous Right   Final Result      XR CHEST 1 VIEW   Final Result   Borderline pulmonary vascular congestion. XR CHEST (2 VW)   Final Result   The cardiac silhouette is enlarged, with somewhat globular configuration   which can be seen in pericardial effusion. No focal airspace disease or   pulmonary edema. Assessment/Plan:    Active Hospital Problems    Diagnosis     Acute kidney injury superimposed on CKD ([address] [N17.9, N18.9]     Biventricular heart failure ([address] [I50.82]     Stage 3 chronic kidney disease ([address] [N18.30]     Acute on chronic combined systolic and diastolic HF (heart failure) ([address] [I50.43]      Patient is a 68-year-old male with past medical history of chronic systolic heart failure, LV thrombus, CAD, hyperlipidemia who presented to hospital for shortness of breath bilateral lower extremity swelling. According to the patient he has not been taking his diuretic medication for past 3 weeks and his lower extremity swelling has been getting worse, he also has worsening shortness of breath, he has not been able to lie down flat and also has exertional dyspnea.   Otherwise denied fevers chills nausea vomiting diarrhea constipation dysuria.     Assessment  Acute on chronic systolic CHF  CAD  Hyperlipidemia  History of LV thrombus  Hyperglycemia due to diabetes mellitus  Acute gout     Plan  S/p right heart cath-consistent with stage IV heart failure, dc Lasix, plan for DC milrinone today, home Coumadin  EF<20% on echo, severe diffuse hypokinesis, Diastolic filling parameters suggest grade II diastolic dysfunction  Hypoglycemia has been worsened due to being on the steroids due to acute gout flare, switch to her insulin sliding scale, increase long-acting insulin, will consult diabetes coordinator  Elevated uric acid-ordered allopurinol, prednisone for elbow swelling likely secondary to gout, will order x-ray right elbow  Strict I's and O's, low-salt diet  Consulted palliative care-full medical management with full code per patient  Resume home medications  DVT prophylaxis-on Coumadin  Diet: ADULT DIET; Regular; 4 carb choices (60 gm/meal);  Low Fat/Low Chol/High Fiber/2 gm Na; 1500 ml  Code Status: Full Code    PT/OT Eval Status: ordered    Dispo - Follow up with cardiology, on IV milrinone, dc 1-2 days    Greg Lehman MD

## 2021-11-28 NOTE — PROGRESS NOTES
Cardiology Progress Note     Admit Date: 2021     Reason for follow up: acute systolic CHF    HPI and Interval History:   60yo M h/o CAD, ICM with LVEF < 20% s/p ICD implantation for primary prevention of sudden cardiac arrest 2019, LV thrombus, HTN who preents with dyspnea and is diagnosed with acute systolic CHF. Interval History: Patient seen and examined. Clinical notes reviewed. Telemetry reviewed - -110bpm while active in room. No new complaint today. No major events overnight. Denies having angina, shortness of breath, dyspnea on exertion, Orthopnea, PND at the time of this visit. Review of System:  All other systems reviewed except for that noted above. Pertinent negatives and positives are:     · General: negative for fever, chills   · Ophthalmic ROS: negative for - eye pain or loss of vision  · ENT ROS: negative for - headaches, sore throat   · Respiratory: negative for - cough, sputum  · Cardiovascular: Reviewed in HPI  · Gastrointestinal: negative for - abdominal pain, diarrhea, N/V  · Hematology: negative for - bleeding, blood clots, bruising or jaundice  · Genito-Urinary:  negative for - Dysuria or incontinence  · Musculoskeletal: negative for - Joint swelling, muscle pain  · Neurological: negative for - confusion, dizziness, headaches   · Psychiatric: No anxiety, no depression. · Dermatological: negative for - rash      Physical Examination:  Vitals:    21 0938   BP: 116/74   Pulse: 97   Resp: 18   Temp: 97.6 °F (36.4 °C)   SpO2: 95%        Intake/Output Summary (Last 24 hours) at 2021 1020  Last data filed at 2021 0600  Gross per 24 hour   Intake 681.44 ml   Output 1900 ml   Net -1218.56 ml     In: 652.6 [P.O.:570;  I.V.:82.6]  Out: 1900    Wt Readings from Last 3 Encounters:   21 145 lb 11.6 oz (66.1 kg)   10/22/21 166 lb (75.3 kg)   19 161 lb (73 kg)     Temp  Av °F (36.7 °C)  Min: 97.6 °F (36.4 °C)  Max: 98.6 °F (37 °C)  Pulse  Av.1 Pt saw MD today, see MD notes for full assessment.  Medications given as ordered, pt tolerated well.  Verified upcoming appointments with patient.  Reminded pt to call with any questions or concerns. Pt discharged ambulatory in stable condition at 1117.   Min: 80  Max: 107  BP  Min: 106/66  Max: 116/74  SpO2  Av.2 %  Min: 91 %  Max: 97 %    · Telemetry: Sinus rhythm with v-rates 80's bpm while at rest  · Constitutional: Alert. Oriented to person, place, and time. No distress. · Head: Normocephalic and atraumatic. · Mouth/Throat: Lips appear moist. Oropharynx is clear and moist.  · Eyes: Conjunctivae normal. EOM are normal.   · Neck: Neck supple. No lymphadenopathy. No rigidity. 12cm JVD present. · Cardiovascular: Normal rate, regular rhythm. Normal S1&S2. Carotid pulse 2+ bilaterally. · Pulmonary/Chest: Bilateral respiratory sounds present. No respiratory accessory muscle use. No wheezes, No rhonchi. Minimal bibasilar rales. · Abdominal: Soft. Normal bowel sounds present. No distension, No tenderness. No splenomegaly. No hernia. · Musculoskeletal: No tenderness. Full range of motion in bilateral upper and lower extremities. No pitting BLE edema    · Lymphadenopathy: Has no cervical adenopathy. · Neurological: Alert and oriented. Cranial nerve II-XII grossly intact, No gross deficit to touch. · Skin: Skin is warm and dry. No rash, lesions, ulcerations noted. · Psychiatric: No anxiety nor agitation. Labs, telemetry, diagnostic and imaging results personally reviewed and interpreted. Recent Labs     21   * 129* 133*   K 3.7 4.0 3.9   CL 88* 89* 93*   CO2    * 120* 119*   CREATININE 2.7* 2.4* 2.5*     Recent Labs     21   WBC 7.5 6.1 6.3   HGB 8.7* 9.4* 9.7*   HCT 26.2* 28.3* 29.9*   MCV 98.7 98.6 99.4    272 303     Lab Results   Component Value Date    TROPONINI 0.05 2021     Estimated Creatinine Clearance: 27 mL/min (A) (based on SCr of 2.5 mg/dL (H)).    No results found for: BNP  Lab Results   Component Value Date    PROTIME 20.2 2021    PROTIME 19.0 2021    PROTIME 19.1 2021    INR 1.75 2021    INR 1.65 11/27/2021    INR 1.66 11/26/2021     Lab Results   Component Value Date    CHOL 146 02/12/2019    HDL 73 02/12/2019    HDL 58 12/13/2011    TRIG 109 02/12/2019       Scheduled Meds:   furosemide  40 mg IntraVENous BID    insulin lispro  2 Units SubCUTAneous TID WC    insulin glargine  15 Units SubCUTAneous Nightly    insulin lispro  0-18 Units SubCUTAneous TID WC    insulin lispro  0-9 Units SubCUTAneous Nightly    torsemide  60 mg Oral Daily    allopurinol  100 mg Oral Daily    sodium chloride flush  5-40 mL IntraVENous 2 times per day    sodium chloride flush  5-40 mL IntraVENous 2 times per day    [START ON 11/29/2021] predniSONE  5 mg Oral Daily    metoprolol succinate  12.5 mg Oral Daily    rivaroxaban  15 mg Oral Dinner    influenza virus vaccine  0.5 mL IntraMUSCular Prior to discharge    atorvastatin  80 mg Oral Nightly    levothyroxine  25 mcg Oral Daily    isosorbide mononitrate  30 mg Oral Daily     Continuous Infusions:   dextrose      sodium chloride      milrinone 0.25 mcg/kg/min (11/28/21 0600)    sodium chloride      dextrose      sodium chloride       PRN Meds:glucose, dextrose, glucagon (rDNA), dextrose, sodium chloride flush, sodium chloride, sodium chloride flush, sodium chloride, acetaminophen, ondansetron, methyl salicylate-menthol, guaiFENesin-dextromethorphan, polyethylene glycol, glucose, dextrose, glucagon (rDNA), dextrose, sodium chloride flush, sodium chloride, ondansetron **OR** ondansetron, acetaminophen **OR** acetaminophen, nitroGLYCERIN     Patient Active Problem List    Diagnosis Date Noted    [facility]     Biventricular heart failure (HCC)     Stage 3 chronic kidney disease ([facility]     Acute on chronic combined systolic and diastolic HF (heart failure) (HCC) 11/14/2021    Elevated PSA 04/09/2019    Coronary artery disease involving native coronary artery 02/12/2019    Elevated blood uric acid level 01/06/2017    Diabetes type 2, uncontrolled ([facility] 07/28/2014    Noncompliance 07/28/2014    ED (erectile dysfunction) 09/12/2013    Essential hypertension, benign 09/12/2013    Hyperlipidemia 09/12/2013    Diabetic [facility]ropathy ([email or web address] 09/12/2013      Active Hospital Problems    Diagnosis Date Noted    Acute kidney injury superimposed on CKD ([facility] [N17.9, N18.9]     Biventricular heart failure ([facility] [I50.82]     Stage 3 chronic kidney disease ([facility] [N18.30]     Acute on chronic combined systolic and diastolic HF (heart failure) ([facility] [I50.43] 11/14/2021       Assessment and Plan:     Acute systolic CHF  -near euvolemic  -Still on milrinone IV. Will stop today.   -will taper diuretic to just having torsemide but reduced from 60mg daily to now 40mg daily. Will stop IV lasix. Strict I/O, daily weights, electrolyte repletion prn. Monitor Cr closely. ICM with LVEF < 20%  -cont Toprol XL 12.5mg daily, atorva 80, imdur  -not on acei/arb/arni/sglt2-I due to SONAL  -s/p ICD 2019 for primary prevention of SCA    Will follow with you. Thank you for allowing me to participate in the care of this patient. If you have any questions, please do not hesitate to contact me.     Natalia Fox MD, MS, [facility], [facility]  Cardiac Electrophysiology  [address]  [address] [facility], [facility] DanielChetan Torres [facility] [address]  [phone]

## 2021-11-28 NOTE — PROGRESS NOTES
In with chf, remains on primacore drip. Weight down form yesterday. No complaints of shortness of breath tolerated walking to the bathroom for a bowel movement.   Has been without need/

## 2021-11-29 NOTE — PLAN OF CARE
Problem: Skin Integrity:  Goal: Absence of new skin breakdown  Description: Absence of new skin breakdown  11/29/2021 0333 by Chiquita Garrison RN  Outcome: Ongoing     Problem: Falls - Risk of:  Goal: Will remain free from falls  Description: Will remain free from falls  11/29/2021 1434 by Marianne Rivera RN  Outcome: Ongoing  11/29/2021 0333 by Chiquita Garrison RN  Outcome: Ongoing     Problem: OXYGENATION/RESPIRATORY FUNCTION  Goal: Patient will maintain patent airway  11/29/2021 1434 by Marianne Rivera RN  Outcome: Ongoing  11/29/2021 0333 by Chiquita Garrison RN  Outcome: Ongoing

## 2021-11-29 NOTE — CARE COORDINATION
CM continues to follow Mr. Kelly Rosario for discharge planning needs. Chart reviewed. He is from home and was independent PTA. Up and about in his room with a walker. Chronic FC. Continues to diurese. Hx CHF with decompensated EF 20%. The diabetes educator is to see hime. CHF nurse following, as well. Will discuss setting up [facility] with a SN at [place] to assist with medication education, changes, and compliance.     Electronically signed by Yarely Ward RN on 11/29/2021 at 12:15 PM  Case Management  [phone]

## 2021-11-29 NOTE — CARE COORDINATION
[facility]  Diabetes Education   Progress Note       NAME:  Huey Manning RECORD NUMBER:  [id number]  AGE: 61 y.o. GENDER: male  : 1955  TODAY'S DATE:  2021    Subjective   Reason for Diabetic Education Evaluation and Assessment: general diabetes support    Deon Case reports that he has a large supply of Trulicity at home. He prefers to resume at discharge instead of using insulin. Visit Type: evaluation      Shruti Patricio is a 61 y.o. male referred by:     [x] Physician  [] Nursing  [] Chart Review   [] Other:     PAST MEDICAL HISTORY        Diagnosis Date    Diabetes type 2, uncontrolled ([address] 2014    Diabetic nephropathy 2013    ED (erectile dysfunction) 2013    Essential hypertension, benign 2013    Hyperlipidemia 2013    Noncompliance 2014       PAST SURGICAL HISTORY    Past Surgical History:   Procedure Laterality Date    EYE SURGERY         FAMILY HISTORY    Family History   Problem Relation Age of Onset    Diabetes Mother     High Blood Pressure Mother     Diabetes Sister     Diabetes Brother        SOCIAL HISTORY    Social History     Tobacco Use    Smoking status: Former Smoker     Packs/day: 1.50     Years: 7.00     Pack years: 10.50     Quit date: 1981     Years since quittin.8    Smokeless tobacco: Never Used   Vaping Use    Vaping Use: Never used   Substance Use Topics    Alcohol use:  Yes     Alcohol/week: 1.0 standard drink     Types: 1 Cans of beer per week    Drug use: No       ALLERGIES    No Known Allergies    MEDICATIONS     sodium chloride flush  5-40 mL IntraVENous 2 times per day    torsemide  40 mg Oral Daily    insulin lispro  2 Units SubCUTAneous TID WC    insulin glargine  15 Units SubCUTAneous Nightly    insulin lispro  0-18 Units SubCUTAneous TID WC    insulin lispro  0-9 Units SubCUTAneous Nightly    allopurinol  100 mg Oral Daily    metoprolol succinate  12.5 mg Oral Daily    rivaroxaban  15 mg Oral Dinner    influenza virus vaccine  0.5 mL IntraMUSCular Prior to discharge    atorvastatin  80 mg Oral Nightly    levothyroxine  25 mcg Oral Daily    isosorbide mononitrate  30 mg Oral Daily       Objective        Patient Active Problem List   Diagnosis Code    ED (erectile dysfunction) N52.9    Essential hypertension, benign I10    Hyperlipidemia E78.5    [facility] E11.21    Diabetes type 2, uncontrolled ([facility] E11.65    Noncompliance Z91.19    Elevated blood uric acid level E79.0    Coronary artery disease involving native coronary artery I25.10    Elevated PSA R97.20    Acute on chronic combined systolic and diastolic HF (heart failure) ([facility] I50.43    Stage 3 chronic kidney disease ([facility]) N18.30    Acute kidney injury superimposed on CKD ([facility] N17.9, N18.9    Biventricular heart failure ([facility]) I50.82        /70   Pulse 93   Temp 98.8 °F (37.1 °C) (Oral)   Resp 20   Ht 5' 6\" (1.676 m)   Wt 145 lb 8.1 oz (66 kg)   SpO2 97%   BMI 23.48 kg/m²     HgBA1c:    Lab Results   Component Value Date    LABA1C 7.7 11/27/2021       Recent Labs     11/28/21  1156 11/28/21  1654 11/28/21 2006 11/29/21  0829   POCGLU 217* 200* 272* 145*       BUN/Creatinine:    Lab Results   Component Value Date     11/29/2021    CREATININE 1.9 11/29/2021       Assessment        Diabetes Management and Education    Does the patient have a Primary Care Physician? No     Does the patient require new medication instruction? TBD   Discussed impact of steroids on blood sugar. Level of patient/caregiver understanding able to:       [x] Verbalized Understanding   [] Demonstrated Understanding       [] Teach Back       [] Needs Reinforcement     []  Other:        Does the patient/caregiver monitor Blood Glucoses? Yes  Reviewed glycemic control targets, testing frequency and when to call PCP.      Level of patient/caregiver understanding able to:        [x] Verbalized Understanding   [] Demonstrated Understanding       [] Teach Back       [] Needs Reinforcement     []  Other:      Does the patient/caregiver follow a Meal Plan? Yes - declines review. Reviewed importance of eating three meals per day. Level of patient/caregiver understanding able to:       [x] Verbalized Understanding   [] Demonstrated Understanding       [] Teach Back       [] Needs Reinforcement     []  Other:      Does the patient/caregiver understand S/S of Hypoglycemia? Yes    Does the patient/caregiver understand S/S of Hyperglycemia? No:     Reviewed symptoms, prevention and treatment. Level of patient/caregiver understanding able to:        [x] Verbalized Understanding   [] Demonstrated Understanding       [] Teach Back       [] Needs Reinforcement     []  Other:           Plan        Ongoing diabetes education and blood glucose monitoring. [facility].       Patient preference for continuing on Trulicity at discharge is reasonable with   HgBA1c:    Lab Results   Component Value Date    LABA1C 7.7 11/27/2021       The following educational and support materials were provided:  · Blood Glucose Log Book                                           Discharge Plan:  Discharge needs: no prescription needs identified       Teaching Time Diabetes Education:  20 minutes     Electronically signed by Roland Nunez on 11/29/2021 at 10:11 AM

## 2021-11-29 NOTE — PROGRESS NOTES
Physical Therapy  Facility/Department: [address] PROGRESSIVE CARE  Daily Treatment Note  NAME: Anastacio Nicholas  : 1955  MRN: [id number]    Date of Service: 2021    Discharge Recommendations:  2-3 sessions per week, Patient would benefit from continued therapy after discharge, 24 hour supervision or assist   PT Equipment Recommendations  Equipment Needed: Yes  Dulcie Sicks: Rolling  Other: Will continue to assess  Anastacio Nicholas scored a 18/24 on the AM-PAC short mobility form. Current research shows that an AM-PAC score of 18 or greater is typically associated with a discharge to the patient's home setting. Based on the patient's AM-PAC score and their current functional mobility deficits, it is recommended that the patient have 2-3 sessions per week of Physical Therapy at d/c to increase the patient's independence. At this time, this patient demonstrates the endurance and safety to discharge home with home PT and a follow up treatment frequency of 2-3x/wk. Please see assessment section for further patient specific details. If patient discharges prior to next session this note will serve as a discharge summary. Please see below for the latest assessment towards goals. Assessment   Body structures, Functions, Activity limitations: Decreased functional mobility ; Decreased ADL status; Decreased endurance; Decreased balance  Assessment: Pt. is a 62 y/o male here due to increasing SOB, increasing LE edema PMH includes CHF, ICD. Today, pt was able to perform bed mobility, transfers and ambulate with CGA. Pt continues be limited by low endurance with functional activity and would benefit from continued skilled PT in hosptal to improve functional mobility. Anticipate the need for home PT level 1 at d/c with initial 24 hour assist for safety.   Prognosis: Good  Decision Making: Medium Complexity  PT Education: General Safety  Barriers to Learning: decreased insight into safety issues  REQUIRES PT FOLLOW UP: Yes  Activity Tolerance  Activity Tolerance: Patient Tolerated treatment well; Patient limited by endurance; Patient limited by fatigue     Patient Diagnosis(es): The primary encounter diagnosis was Acute on chronic systolic congestive heart failure ([facility] [address] Diagnoses of Acute respiratory failure with hypoxia ([address] Dyspnea on exertion, and Uncontrolled type 2 diabetes mellitus with hyperglycemia ([address] were also pertinent to this visit. has a past medical history of Diabetes type 2, uncontrolled ([address] Diabetic nephropathy, ED (erectile dysfunction), Essential hypertension, benign, Hyperlipidemia, and Noncompliance. has a past surgical history that includes eye surgery. Social/Functional History  Lives With: Son  Type of Home: Apartment  Home Layout: One level  Home Access: Stairs to enter without rails  Entrance Stairs - Number of Steps: 2 steps down  Bathroom Shower/Tub: Tub/Shower unit  Bathroom Toilet: Standard  Bathroom Accessibility: Walker accessible  Home Equipment:  (No DME)  Receives Help From: Family  ADL Assistance: Independent  Homemaking Assistance:  (does what he can and his son manages heavy tasks)  Ambulation Assistance: Independent  Transfer Assistance: Independent    Restrictions  Restrictions/Precautions  Restrictions/Precautions: Fall Risk  Required Braces or Orthoses?: No  Position Activity Restriction  Other position/activity restrictions: room air  Subjective   General  Chart Reviewed: Yes  Additional Pertinent Hx: here due to increasing SOB, increasing LE edema   PMH includes CHF, ICD  Response To Previous Treatment: Patient with no complaints from previous session.   Family / Caregiver Present: No  Subjective  Subjective: pt up in bathroom upon arrival with OT; pt agreeable to working with therapy  General Comment  Comments: pt reports he wants a new gown          Orientation  Orientation  Overall Orientation Status: Within Functional Limits  Cognition   Cognition  Overall Cognitive Status: WFL  Objective      Transfers  Sit to Stand: Contact guard assistance  Stand to sit: Contact guard assistance  Ambulation  Ambulation?: Yes  Ambulation 1  Surface: level tile  Device: Rolling Walker  Assistance: Contact guard assistance  Quality of Gait: slow pace; no overt LOB  Gait Deviations: Slow Kellie; Decreased step height; Decreased step length  Distance: 25'     Balance  Comments: MI for sitting balance; SBA for static standing at sink to wash his hands            Comment: pt used commode; assisted with changing his hospital gown; pt positioned in chair for comfort with all needs in reach              G-Code     OutComes Score                                                     AM-PAC Score  AM-PAC Inpatient Mobility Raw Score : 18 (11/29/21 1426)  AM-PAC Inpatient T-Scale Score : 43.63 (11/29/21 1426)  Mobility Inpatient CMS 0-100% Score: 46.58 (11/29/21 1426)  Mobility Inpatient CMS G-Code Modifier : CK (11/29/21 1426)          Goals  Short term goals  Time Frame for Short term goals: by discharge  Short term goal 1: bed mobility at supervision  Short term goal 2: transfers at supervision  Short term goal 3: ambulation at supervision with rolling walker as needed for safety and effectiveness for household distances    -steps as needed for home entry at [facility]  Patient Goals   Patient goals : feel better and to return to his apartment    Plan    Plan  Times per week: 3-5 while on acute floor  Current Treatment Recommendations: Functional Mobility Training, Positioning, Patient/Caregiver Education & Training, ADL/Self-care Training  Safety Devices  Type of devices:  All fall risk precautions in place, Call light within reach, Left in chair, Chair alarm in place, Nurse notified, Gait belt  Restraints  Initially in place: No     Therapy Time   Individual Concurrent Group Co-treatment   Time In 1410         Time Out 1426         Minutes 16                 LARA NGO, PT    Electronically signed by LARA MASSIMO NGO, PT on 11/29/2021 at 2:27 PM

## 2021-11-29 NOTE — PROGRESS NOTES
[facility]                                               Progress note    Summary:   Emilio Arellano is being seen by nephrology for SONAL on CKD. Admitted with SOB, decompensated HF EF 20%. Interval History  Patient feel fine. No SOB on room air  Having breakfast.   Cr better    Plan:   - Diuretics per cardiology  - gout flare precipitated by diuresis    On steroids  allopurinol, 100 mg daily  - Monitor I/O, vitals and labs. Will follow. Brissa Loza MD  [facility]  Office: [phone]    Assessment:   Chronic kidney disease stage IIIb  This is likely secondary to diabetic nephropathy in the setting of persistent longstanding albuminuria  To complete CKD work-up will need an SPEP and light chains  Baseline creatinine around 2  No acute electrolyte abnormalities  Risk factors include diabetes, hypertension, severe systolic heart failure  Has had diabetes and hypertension for over 10 years and his last EF is less than 20%  He has had event evidence of albuminuria on urinalysis going back to 2016, 2013 had an  mg  Most recent protein creatinine ratio 700 mg, no RBCs no WBCs  CT abdomen in 2020 showed symmetric kidneys no lesions no masses bilateral perinephric stranding no hydronephrosis.   HCV and hepatitis C negative  Prior negative NAHOMY  Prior negative RF factor    Ischemic cardiomyopathy  EF less than 20%  Decompensated  proBNP 65,000  Managed with Lasix drip    on milrinone      History of coronary artery disease with stent in the LAD  Has ICD  Most of his records are in the  system  Previously on Entresto but currently not  Echo here showed severe diffuse hypokinesis grade 2 diastolic dysfunction less than 20% EF severely dilated left ventricular cavity, right ventricular systolic function is severely reduced as well has moderate MR and RV's dilated    Diabetes type 2  10 years at least  Longstanding albuminuria  A1c has been ranging between 6 and 6.5 the last few stool  Musculoskeletal:  back pain, muscle weakness, gait problems,       joint pain or swelling. Genitourinary:  dysuria, poor urine flow, flank pain, blood in urine  Neurologic:  vertigo, TIA'S, syncope, seizures, focal weakness  Psychosocial:  insomnia, anxiety, or depression. PMH:   Past medical history, surgical history, social history, family history are reviewed and updated as appropriate. Reviewed current medication list.   Allergies reviewed and updated as needed. PE:   Vitals:    11/29/21 0302   BP: 109/70   Pulse: 93   Resp: 20   Temp: 98.8 °F (37.1 °C)   SpO2: 97%       General appearance: male in NAD, fully alert and oriented. Comfortable. HEENT: EOM intact, no icterus. Trachea is midline. Neck : No masses, appears symmetrical  Respiratory: Respiratory effort appears normal, bilateral equal chest rise, no wheeze  Cardiovascular: Ausculation shows RRR no edema today   Abdomen: No visible mass or tenderness, non distended. Musculoskeletal:  Joints with no swelling or deformity. Skin:no rashes, ulcers, induration, no jaundice. Neuro: face symmetric, no focal deficits. Appropriate responses.        Lab Results   Component Value Date    CREATININE 1.9 (H) 11/29/2021     (HH) 11/29/2021     11/29/2021    K 3.7 11/29/2021    CL 97 (L) 11/29/2021    CO2 29 11/29/2021      Lab Results   Component Value Date    WBC 6.6 11/29/2021    HGB 9.4 (L) 11/29/2021    HCT 28.8 (L) 11/29/2021    MCV 98.7 11/29/2021     11/29/2021     Lab Results   Component Value Date    CALCIUM 8.6 11/29/2021    PHOS 3.8 08/30/2017

## 2021-11-29 NOTE — PROGRESS NOTES
[facility]                                               Progress note    Summary:   Onetha Melissa is being seen by nephrology for SONAL on CKD. Admitted with SOB, decompensated HF EF 20%. Interval History   feels better today. No  SOB. RHC index very low  Milrinone  Being stopped  wedge was 19 and RA pressure 13  /71   UO  750  Net Negative  18 litres  JVD dddown  Still has 1 + edema     Cr 1.8 >2.7 > 2.4-->2.5  K 3.5 > 3.9  Na 138  > 130-->129  BUN 74 > 87-->103-->120  Uric acid 11.2   Wt 175-->149-->145  Has a medellin   JVD UP     Plan:   Cardiology stopped IV Lasix now   Will stay with n Torsemide   Leave up to them to titrate and adjust diuretics    on spironolacotone 25 mg   - gout flare precipitated by diuresis    On steroids  allopurinol, 100 mg daily  - azotemia worse because of steroids and diuresis  He might need trial of HD if does not improve       11/23/21:Dr Fitzpatrick  discussed with him that his heart failure is end stage and the kidneys as well as other organs will be affected by this. His prognosis is poor. I do not know if he is a candidate for advanced heart failure therapies or not. Palliative care consulted.        Cristofer Barroso MD  [facility]  Office: [phone]    Assessment:   Chronic kidney disease stage IIIb  This is likely secondary to diabetic nephropathy in the setting of persistent longstanding albuminuria  To complete CKD work-up will need an SPEP and light chains  Baseline creatinine around 2  No acute electrolyte abnormalities  Risk factors include diabetes, hypertension, severe systolic heart failure  Has had diabetes and hypertension for over 10 years and his last EF is less than 20%  He has had event evidence of albuminuria on urinalysis going back to 2016, 2013 had an  mg  Most recent protein creatinine ratio 700 mg, no RBCs no WBCs  CT abdomen in 2020 showed symmetric kidneys no lesions no masses bilateral perinephric stranding no stamina and dyspnea on exertion, orthopnea and lower extremity edema. He does not weigh himself at home daily. He said he became to the hospital because he was prompted by his family members to seek medical attention. ROS:   Positives Listed Bold. All other remaining systems are negative. Constitutional:  fever, chills, weakness, weight change, fatigue,      Skin:  rash, pruritus, hair loss, bruising, dry skin, petechiae. Head, Face, Neck   headaches, swelling,  cervical adenopathy. Respiratory: shortness of breath, cough, or wheezing  Cardiovascular: chest pain, palpitations, dizzy, edema  Gastrointestinal: nausea, vomiting, diarrhea, constipation,belly pain    Yellow skin, blood in stool  Musculoskeletal:  back pain, muscle weakness, gait problems,       joint pain or swelling. Genitourinary:  dysuria, poor urine flow, flank pain, blood in urine  Neurologic:  vertigo, TIA'S, syncope, seizures, focal weakness  Psychosocial:  insomnia, anxiety, or depression. Additional positive findings: - + elbow pain     PMH:   Past medical history, surgical history, social history, family history are reviewed and updated as appropriate. Reviewed current medication list.   Allergies reviewed and updated as needed. PE:   Vitals:    11/28/21 1932   BP: 117/73   Pulse: 100   Resp: 18   Temp: 98.2 °F (36.8 °C)   SpO2: 98%       General appearance: male in NAD, fully alert and oriented. Comfortable. HEENT: EOM intact, no icterus. Trachea is midline. Neck : No masses, appears symmetrical  Respiratory: Respiratory effort appears normal, bilateral equal chest rise, no wheeze  Cardiovascular: Ausculation shows RRR no edema today   Abdomen: No visible mass or tenderness, non distended. Musculoskeletal:  Joints with no swelling or deformity. Skin:no rashes, ulcers, induration, no jaundice. Neuro: face symmetric, no focal deficits. Appropriate responses.        Lab Results   Component Value Date CREATININE 2.5 (H) 11/28/2021     (HH) 11/28/2021     (L) 11/28/2021    K 3.9 11/28/2021    CL 93 (L) 11/28/2021    CO2 28 11/28/2021      Lab Results   Component Value Date    WBC 6.3 11/28/2021    HGB 9.7 (L) 11/28/2021    HCT 29.9 (L) 11/28/2021    MCV 99.4 11/28/2021     11/28/2021     Lab Results   Component Value Date    CALCIUM 8.6 11/28/2021    PHOS 3.8 08/30/2017

## 2021-11-29 NOTE — PROGRESS NOTES
[address]   Daily Progress Note      Admit Date:  11/14/2021      Subjective:   Mr. Ulus Lennox is a 60yo male with a past medical history significant for CAD, chronic systolic CHF with ICD in place (8/13/19), LV thrombus, hyperlipidemia, essential hypertension, type 2 DM with complications of diabetic nephropathy stage 3 who presented to [facility] ED with shortness of breath. I was asked to evaluate him for potential CHF. He follows with me as a new outpatient. He has been hospitalized with acute on chronic decompensated systolic CHF. Interval History:  Michelle Kinney seems to be doing better. He says he has been ambulating and denies any significant shortness of breath. Denies any chest pain. Sinus rhythm on telemetry.       Objective:     /72   Pulse 94   Temp 97.4 °F (36.3 °C) (Axillary)   Resp 17   Ht 5' 6\" (1.676 m)   Wt 145 lb 8.1 oz (66 kg)   SpO2 94%   BMI 23.48 kg/m²      Intake/Output Summary (Last 24 hours) at 11/29/2021 1452  Last data filed at 11/29/2021 0934  Gross per 24 hour   Intake 708.89 ml   Output 2600 ml   Net -1891.11 ml       Physical Exam:  General:  Awake, alert, NAD  Skin:  Warm and dry  Neck:  JVD<8, no carotid bruits  Chest:  Clear to auscultation, no wheezes/rhonchi/rales  Cardiovascular:  RRR, normal S1/S2, no M/R/G  Abdomen:  Soft, nontender, +bowel sounds  Extremities:  No edema  Pulses: 2+ bilat carotid    2+ bilat radial    2+ bilat femoral        Medications:    sodium chloride flush  5-40 mL IntraVENous 2 times per day    torsemide  40 mg Oral Daily    insulin lispro  2 Units SubCUTAneous TID WC    insulin glargine  15 Units SubCUTAneous Nightly    insulin lispro  0-18 Units SubCUTAneous TID WC    insulin lispro  0-9 Units SubCUTAneous Nightly    allopurinol  100 mg Oral Daily    metoprolol succinate  12.5 mg Oral Daily    rivaroxaban  15 mg Oral Dinner    influenza virus vaccine  0.5 mL IntraMUSCular Prior to discharge    atorvastatin  80 mg Oral Nightly    levothyroxine  25 mcg Oral Daily    isosorbide mononitrate  30 mg Oral Daily      sodium chloride      dextrose      dextrose         Lab Data:  CBC:   Recent Labs     11/27/21 0445 11/28/21 0453 11/29/21 0439   WBC 6.1 6.3 6.6   HGB 9.4* 9.7* 9.4*    303 314     BMP:    Recent Labs     11/27/21 0445 11/28/21 0453 11/29/21 0439   * 133* 138   K 4.0 3.9 3.7   CO2 28 28 29   * 119* 109*   CREATININE 2.4* 2.5* 1.9*     LIVR: No results for input(s): AST, ALT in the last 72 hours. PT/INR:   Recent Labs     11/27/21 0445 11/28/21 0453 11/29/21 0439   INR 1.65* 1.75* 1.73*     Lab Results   Component Value Date    CHOL 146 02/12/2019    HDL 73 02/12/2019    HDL 58 12/13/2011    TRIG 109 02/12/2019         Right Heart Catheterization 11/23/21:  Findings:     Pressures        O2 saturations         RA       13 mmHg          33%          RV       48/20(20)          38%          PA        60/37(48)         36%         PCW    19 mm Hg                         Blu         Thermal                            CO/CI       2.94/1.7    3.38/1.96     Findings c/w Stage D/class 4 HF. Assessment / Plan:   1. Acute on chronic Systolic CHF, class 3  Cedric appears euvolemic on exam.  I would continue his current medications including beta-blockade and ACE inhibitor therapy. Continue his oral torsemide. 2. CAD of native coronary arteries without angina  He has no angina. Continue statin therapy along with aspirin therapy in addition to beta-blockade. 3. LV Thrombus  He is anticoagulated for this now with low-dose rivaroxaban    4. CKD, stage 3  Improved. If he continues to improve he likely could be discharged to home tomorrow. Compliance will be difficult for him I fear. He is high risk for his 30-day readmission. We will make sure that he has close follow-up in our office. If he is unable to be compliant he may unfortunately need Hospice.       Signed:  Hodan Guadarrama Tom Mcintyre MD

## 2021-11-29 NOTE — PROGRESS NOTES
Occupational Therapy   Occupational Therapy progress note  Date: 2021   Patient Name: Gerardo Hale  MRN: [id number]     : 1955    Date of Service: 2021    Discharge Recommendations:  2-3 sessions per week, Patient would benefit from continued therapy after discharge, Home with Home health OT  OT Equipment Recommendations  Equipment Needed: Yes  ADL Assistive Devices: Shower Chair with back    Gerardo  scored a 19/24 on the AM-PAC ADL Inpatient form. Current research shows that an AM-PAC score of 18 or greater is typically associated with a discharge to the patient's home setting. Based on the patient's AM-PAC score, and their current ADL deficits, it is recommended that the patient have 2-3 sessions per week of Occupational Therapy at d/c to increase the patient's independence. At this time, this patient demonstrates the endurance and safety to discharge home with home therapy and a follow up treatment frequency of 2-3x/wk. Please see assessment section for further patient specific details. If patient discharges prior to next session this note will serve as a discharge summary. Please see below for the latest assessment towards goals. Assessment   Performance deficits / Impairments: Decreased functional mobility ; Decreased balance; Decreased ADL status; Decreased endurance; Decreased high-level IADLs; Decreased strength  Assessment: Pt tolerated session well. Pt completes functional mobility and transfers with SBA/CGA and use of RW. Pt able to tolerate ~2 minutes in stancefor grooming tasks at sink. Pt ambulates ~25ft in room with SBA/CGA and RW. Pt reporting generalized weakness from prolonged hospital stay. Pt will benefit from continued skilled therapy while in hospital. Anticipate will be safe to return home at discharge with family support and home OT.   Prognosis: Good  OT Education: OT Role; Transfer Training; Plan of Care  REQUIRES OT FOLLOW UP: Yes  Activity Tolerance  Activity Tolerance: Patient limited by fatigue; Patient Tolerated treatment well  Safety Devices  Safety Devices in place: Yes  Type of devices: Call light within reach; Chair alarm in place; Left in chair; Gait belt; Nurse notified           Patient Diagnosis(es): The primary encounter diagnosis was Acute on chronic systolic congestive heart failure ([address] Diagnoses of Acute respiratory failure with hypoxia ([address] Dyspnea on exertion, and Uncontrolled type 2 diabetes mellitus with hyperglycemia ([address] were also pertinent to this visit. has a past medical history of Diabetes type 2, uncontrolled ([address] Diabetic nephropathy, ED (erectile dysfunction), Essential hypertension, benign, Hyperlipidemia, and Noncompliance. has a past surgical history that includes eye surgery. Treatment Diagnosis: impaired ADL/fxl mobility      Restrictions  Restrictions/Precautions  Restrictions/Precautions: Fall Risk  Required Braces or Orthoses?: No  Position Activity Restriction  Other position/activity restrictions: room air    Subjective   General  Chart Reviewed: Yes  Patient assessed for rehabilitation services?: Yes  Additional Pertinent Hx: 60 yo male admitted 11/13 for BLE swelling with CHF exacerbation. PMH: CHF, DM, HTN,  Family / Caregiver Present: No  Referring Practitioner: Don Martel MD  Diagnosis: CHF exacerbation  Subjective  Subjective: Therapist responded to bed alarm going off. Pt sitting EOB and asking to go to bathroom. Pt denied pain.   General Comment  Comments: Rn cleared to participate     Social/Functional History  Social/Functional History  Lives With: Son  Type of Home: Apartment  Home Layout: One level  Home Access: Stairs to enter without rails  Entrance Stairs - Number of Steps: 2 steps down  Bathroom Shower/Tub: Tub/Shower unit  Bathroom Toilet: Standard  Bathroom Accessibility: Walker accessible  Home Equipment:  (No DME)  Receives Help From: Family  ADL Assistance: Independent  Homemaking Assistance:  (does what he can and his son manages heavy tasks)  Ambulation Assistance: Independent  Transfer Assistance: Independent       Objective        Balance  Sitting Balance: Stand by assistance  Standing Balance: Contact guard assistance  Standing Balance  Time: ~ 2 min for grooming tasks  Functional Mobility  Functional Mobility Comments: bed > bathroom > chair with RW and CGA  Toilet Transfers  Toilet - Technique: Ambulating  Equipment Used: Grab bars  Toilet Transfer: Stand by assistance; Contact guard assistance  Toilet Transfers Comments: Cues for hand placement.      ADL  Grooming: Contact guard assistance (standing at sink to wash hands)  Toileting: Contact guard assistance (medellin = total A; pt able to lateral weight shift for hygiene after BM with CGA)        Bed mobility  Supine to Sit:  (pt EOB as therapist entered room.)  Sit to Supine:  (pt in chair at end of session with alarm activated)  Transfers  Sit to stand: Contact guard assistance  Stand to sit: Contact guard assistance  Transfer Comments: to/from RW; increased time to complete     Cognition  Overall Cognitive Status: [facility]     Plan   Plan  Times per week: 3-5  Current Treatment Recommendations: Strengthening, Endurance Training, Patient/Caregiver Education & Training, Self-Care / ADL, Balance Training, Pain Management, Functional Mobility Training, Safety Education & Training, Positioning     AM-PAC Score  AM-[facility] Inpatient Daily Activity Raw Score: 19 (11/29/21 1420)  AM-PAC Inpatient ADL T-Scale Score : 40.22 (11/29/21 1420)  ADL Inpatient CMS 0-100% Score: 42.8 (11/29/21 1420)  ADL Inpatient CMS G-Code Modifier : CK (11/29/21 1420)    Goals  Short term goals  Time Frame for Short term goals: prior to d/c; ongoing 11/29  Short term goal 1: toileting SUP - not met,  ongoing  Short term goal 2: ADL tx SUP - not met,  ongoing  Short term goal 3: LB dressing SUP - not met, ongoing  Short term goal 4: BUE exercises in all planes to improve strength and endurance for ADLs and tx - not met,  ongoing  Short term goal 5: Tolerate 5 min fxl standing task SUP - not met,  ongoing  Patient Goals   Patient goals : go home       Therapy Time   Individual Concurrent Group Co-treatment   Time In 1345         Time Out 1415         Minutes 30         Timed Code Treatment Minutes: 30 Minutes     This note to serve as OT d/c summary if pt is d/c-ed prior to next therapy session.     Celena Urbina, OTR/L

## 2021-11-30 NOTE — PROGRESS NOTES
Physical Therapy  Facility/Department: [facility] 5W PROGRESSIVE CARE  Daily Treatment Note  NAME: Tram Guevara  : 1955  MRN: [id number]    Date of Service: 2021    Discharge Recommendations:  Home with assist PRN   PT Equipment Recommendations  Equipment Needed: Yes  Mobility Devices: Shanti Welsh: Rolling  Other: pt not safe to amb without a RW; pt demonstates increased safety with using a RW  Tram Guevara scored a 19/24 on the AM-PAC short mobility form. At this time, no further PT is recommended upon discharge as pt refusing home therapy. Recommend patient returns to prior setting with prior services. Assessment   Body structures, Functions, Activity limitations: Decreased functional mobility ; Decreased ADL status; Decreased endurance; Decreased balance  Assessment: pt making progress in therapy; he is safe with using a RW for mobility tasks; pt declining home therapy at this time. Recommend home with PRN assist and using a RW for mobility tasks in his apt  Prognosis: Good  PT Education: General Safety  REQUIRES PT FOLLOW UP: Yes  Activity Tolerance  Activity Tolerance: Patient Tolerated treatment well     Patient Diagnosis(es): The primary encounter diagnosis was Acute on chronic systolic congestive heart failure ([address] Diagnoses of Acute respiratory failure with hypoxia ([address] Dyspnea on exertion, and Uncontrolled type 2 diabetes mellitus with hyperglycemia ([address] were also pertinent to this visit. has a past medical history of Diabetes type 2, uncontrolled ([address] Diabetic nephropathy, ED (erectile dysfunction), Essential hypertension, benign, Hyperlipidemia, and Noncompliance. has a past surgical history that includes eye surgery.   Social/Functional History  Lives With: Son  Type of Home: Apartment  Home Layout: One level  Home Access: Stairs to enter without rails  Entrance Stairs - Number of Steps: 2 steps down  Bathroom Shower/Tub: Tub/Shower unit  Bathroom Toilet: Standard  Bathroom Accessibility: Walker accessible  Home Equipment:  (No DME)  Receives Help From: Family  ADL Assistance: Independent  Homemaking Assistance:  (does what he can and his son manages heavy tasks)  Ambulation Assistance: Independent  Transfer Assistance: Independent    Restrictions  Restrictions/Precautions  Restrictions/Precautions: Up as Tolerated  Required Braces or Orthoses?: No  Position Activity Restriction  Other position/activity restrictions: room air  Subjective   General  Chart Reviewed: Yes  Additional Pertinent Hx: here due to increasing SOB, increasing LE edema   PMH includes CHF, ICD  Response To Previous Treatment: Patient with no complaints from previous session.   Family / Caregiver Present: No  Subjective  Subjective: pt very pleasant and agreeable to working with therapy          Orientation  Orientation  Overall Orientation Status: Within Functional Limits  Cognition   Cognition  Overall Cognitive Status: WFL  Objective   Bed mobility  Comment: pt up in chair at beginning and end of session  Transfers  Sit to Stand: Stand by assistance  Stand to sit: Stand by assistance  Ambulation  Ambulation?: Yes  Ambulation 1  Surface: level tile  Device: Rolling Walker  Assistance: Stand by assistance  Quality of Gait: slow steady pace  Gait Deviations: Slow Kellie; Decreased step height; Decreased step length  Distance: 75'x2  Comments: attempted to amb without RW but pt too unsteady to attempt     Balance  Comments: MI for sitting balance; SBA for standing with RW            Comment: assisted with positioning in chair for comfort with all needs in reach              G-Code     OutComes Score                                                     AM-PAC Score  AM-PAC Inpatient Mobility Raw Score : 19 (11/30/21 1206)  AM-PAC Inpatient T-Scale Score : 45.44 (11/30/21 1206)  Mobility Inpatient CMS 0-100% Score: 41.77 (11/30/21 1206)  Mobility Inpatient CMS G-Code Modifier : CK (11/30/21 1206)          Goals  Short term goals  Time Frame for Short term goals: by discharge  Short term goal 1: bed mobility at supervision  Short term goal 2: transfers at supervision  Short term goal 3: ambulation at supervision with rolling walker as needed for safety and effectiveness for household distances    -steps as needed for home entry at [address] [place] [address]  Patient Goals   Patient goals : feel better and to return to his apartment    Plan    Plan  Times per week: 3-5 while on acute floor  Current Treatment Recommendations: Functional Mobility Training, Positioning, Patient/Caregiver Education & Training, ADL/Self-care Training  Safety Devices  Type of devices: Call light within reach, Left in chair, Chair alarm in place, Nurse notified, All fall risk precautions in place  Restraints  Initially in place: No     Therapy Time   Individual Concurrent Group Co-treatment   Time In 1135         Time Out 1205         Minutes 30                 LARA NGO PT    Electronically signed by LARA NGO PT on 11/30/2021 at 12:06 PM

## 2021-11-30 NOTE — CARE COORDINATION
[facility]  Diabetes Education   Progress Note       NAME:  Huey Manning RECORD NUMBER:  [id number]  AGE: 61 y.o. GENDER: male  : 1955  TODAY'S DATE:  2021    Subjective   Reason for Diabetic Education Evaluation and Assessment: general diabetes support    Kallie Desouza is planning for discharge today. Visit Type: follow-up      Flavia Nichols is a 61 y.o. male referred by:     [] Physician  [x] Nursing  [] Chart Review   [] Other:     PAST MEDICAL HISTORY        Diagnosis Date    Diabetes type 2, uncontrolled ([address] 2014    Diabetic nephropathy 2013    ED (erectile dysfunction) 2013    Essential hypertension, benign 2013    Hyperlipidemia 2013    Noncompliance 2014       PAST SURGICAL HISTORY    Past Surgical History:   Procedure Laterality Date    EYE SURGERY         FAMILY HISTORY    Family History   Problem Relation Age of Onset    Diabetes Mother     High Blood Pressure Mother     Diabetes Sister     Diabetes Brother        SOCIAL HISTORY    Social History     Tobacco Use    Smoking status: Former Smoker     Packs/day: 1.50     Years: 7.00     Pack years: 10.50     Quit date: 1981     Years since quittin.8    Smokeless tobacco: Never Used   Vaping Use    Vaping Use: Never used   Substance Use Topics    Alcohol use:  Yes     Alcohol/week: 1.0 standard drink     Types: 1 Cans of beer per week    Drug use: No       ALLERGIES    No Known Allergies    MEDICATIONS     sodium chloride flush  5-40 mL IntraVENous 2 times per day    torsemide  40 mg Oral Daily    insulin lispro  2 Units SubCUTAneous TID WC    insulin glargine  15 Units SubCUTAneous Nightly    insulin lispro  0-18 Units SubCUTAneous TID WC    insulin lispro  0-9 Units SubCUTAneous Nightly    allopurinol  100 mg Oral Daily    metoprolol succinate  12.5 mg Oral Daily    rivaroxaban  15 mg Oral Dinner    influenza virus vaccine  0.5 mL IntraMUSCular Prior to discharge    atorvastatin  80 mg Oral Nightly    levothyroxine  25 mcg Oral Daily    isosorbide mononitrate  30 mg Oral Daily       Objective        Patient Active Problem List   Diagnosis Code    ED (erectile dysfunction) N52.9    Essential hypertension, benign I10    Hyperlipidemia E78.5    Diabetic nephropathy ([facility] E11.21    Diabetes type 2, uncontrolled ([facility] E11.65    Noncompliance Z91.19    Elevated blood uric acid level E79.0    Coronary artery disease involving native coronary artery I25.10    Elevated PSA R97.20    Acute on chronic combined systolic and diastolic HF (heart failure) ([facility]) I50.43    Stage 3 chronic kidney disease ([address] N18.30    Acute kidney injury superimposed on CKD ([facility] N17.9, N18.9    Biventricular heart failure ([facility]) I50.82        /78   Pulse 99   Temp 98.4 °F (36.9 °C) (Oral)   Resp 15   Ht 5' 6\" (1.676 m)   Wt 149 lb 4 oz (67.7 kg)   SpO2 93%   BMI 24.09 kg/m²     HgBA1c:    Lab Results   Component Value Date    LABA1C 7.7 11/27/2021       Recent Labs     11/29/21  1555 11/29/21  2053 11/30/21  0826 11/30/21  1154   POCGLU 207* 211* 69* 125*       BUN/Creatinine:    Lab Results   Component Value Date     11/30/2021    CREATININE 2.0 11/30/2021       Assessment        Diabetes Management and Education    Does the patient have a Primary Care Physician? No     Does the patient require new medication instruction? No  Reviewed plan to resume Trulicity weekly. He identified Mondays as the best day. He will start today then schedule every Monday. Level of patient/caregiver understanding able to:       [x] Verbalized Understanding   [] Demonstrated Understanding       [] Teach Back       [] Needs Reinforcement     []  Other:        Does the patient/caregiver monitor Blood Glucoses? Yes  Reviewed glycemic control targets, testing frequency and when to call PCP.      Level of patient/caregiver understanding able to:        [x] Verbalized Understanding   [] Demonstrated Understanding       [] Teach Back       [] Needs Reinforcement     []  Other:        Plan        Ongoing diabetes education and blood glucose monitoring. Attempted to schedule PCP. Prefers [facility]. Message sent by scheduling team.        Plan follow up with [facility].        Teaching Time Diabetes Education:  20 minutes     Electronically signed by Elizabeth Camejo on 11/30/2021 at 12:27 PM

## 2021-11-30 NOTE — PROGRESS NOTES
PALLIATIVE MEDICINE PROGRESS NOTE     Patient name:Cedric Molina    BTM:[id number] CVB:[birth year]  Room/Bed:F1K-8860/5110-01    LOS: 16 days        ASSESSMENT/RECOMMENDATIONS     61 y.o. male with debility and dyspnea.         Symptom Management:  1. Debility: Patient alert today and sitting up in his chair. Patient indicated today that he feels stronger today when compared to my previous patient visit. Patient indicated he is now able to perform of his ADL's with just some assistance required. 2. Dyspnea: Patient breathing on room air and without distress during my visit today. 3. Goals of Care:  Met patient in his room today. Patient sitting up in chair. Patient remains oriented x 4 and decisional.  Again reviewed patient's current health status, goals and code status. Also reviewed home palliative care options for the patient. The patient indicated he was able to speak with his family and the patient is not interested in palliative home care at this time. Patient again indicated he wishes to continue on with aggressive treatment and he also wishes to remain a Full Code.       Patient/Family Goals of Care :    11/30 - Met patient in his room today. Patient sitting up in chair. Patient remains oriented x 4 and decisional.  Again reviewed patient's current health status, goals and code status. Also reviewed home palliative care options for the patient. The patient indicated he was able to speak with his family and the patient is not interested in palliative home care at this time. Patient again indicated he wishes to continue on with aggressive treatment and he also wishes to remain a Full Code. 11/24 -  Again met the patient at bedside today. Patient again oriented x 4 and decisional today. Again reviewed patient's current health condition, treatment options, verified goals and verified code status. Patient again indicated he would like to continue on with all aggressive therapy.   Patient also again indicated he would like to remain a Full Code. Reviewed possible Palliative Care service options post the patient's hospital discharge and the patient indicated he still has yet to review this service with his family. He again indicated he would review palliative home care service with his family before making a decision.       11/23 - Met patient at bedside today. Patient alert and oriented x 4. Patient was decisional today. Reviewed patient's current health status, goals of care and code status. Patient indicated he would like to continue with all aggressive treatment at this time. Patient also indicated he would have to discuss with his family whether or not they would like to pursue home palliative care services upon the patient's discharge. Patient indicated he would like to remain a Full Code at this time. Patient indicated he does not have a living spouse and that his son Isabela Herring would be his primary decision maker if he was unable to make decisions on his own.      Disposition/Discharge Plan:   Pending.      Advance Directives:  Surrogate Decision Maker: Per patient, Isabela Herring (son). Code status:  Full Code     Case discussed with: patient  Thank you for allowing us to participate in the care of this patient. SUBJECTIVE     Chief Complaint: Debility    Last 24 hours:   Patient on room air and comfortable at rest with speech during my visit. Patient indicated he feels he is building strength and is looking forward to being discharged home. Patient declined home palliative care services today. ROS:    Review of Systems   Constitutional: Positive for fatigue (improving). HENT: Negative. Eyes: Negative. Respiratory: Negative. Negative for cough, choking, shortness of breath and wheezing. Cardiovascular: Positive for leg swelling. Gastrointestinal: Negative. Musculoskeletal: Negative. Skin: Negative. Neurological: Positive for weakness.    Psychiatric/Behavioral: Negative. All other systems reviewed and are negative. A complete 10 count ROS was obtained. Pertinent positives mentioned above in HPI/ROS. All others if not mentioned are negative. OBJECTIVE   /78   Pulse 99   Temp 98.4 °F (36.9 °C) (Oral)   Resp 15   Ht 5' 6\" (1.676 m)   Wt 149 lb 4 oz (67.7 kg)   SpO2 93%   BMI 24.09 kg/m²   I/O last 3 completed shifts: In: 240 [P.O.:240]  Out: 6919 [Urine:2775]  I/O this shift:  In: -   Out: 300 [Urine:300]      Physical Exam  Vitals reviewed. Constitutional:       General: He is not in acute distress. Interventions: Nasal cannula in place. HENT:      Head: Normocephalic and atraumatic. Nose: Nose normal.   Eyes:      Extraocular Movements: Extraocular movements intact. Pupils: Pupils are equal, round, and reactive to light. Cardiovascular:      Rate and Rhythm: Normal rate. Pulses: Normal pulses. Pulmonary:      Effort: Pulmonary effort is normal. No respiratory distress. Breath sounds: Normal breath sounds. Abdominal:      General: Bowel sounds are normal.      Palpations: Abdomen is soft. Musculoskeletal:      Cervical back: Neck supple. Right lower leg: Edema (1+) present. Left lower leg: Edema (1+) present. Skin:     General: Skin is warm and dry. Neurological:      Comments: Was again oriented x 4 today. Psychiatric:         Behavior: Behavior normal.         Thought Content:  Thought content normal.         Judgment: Judgment normal.          Total time: 36 minutes  >50% of time spent counseling patient at bedside or POA/family member if applicable , reviewing information and discussing care, coordinating with care team       Signed By: Electronically signed by DAHIANA Andersen CNP on 11/30/2021 at 1:06 PM   Palliative Medicine   [phone]    November 30, 2021

## 2021-11-30 NOTE — PROGRESS NOTES
[facility]   Daily Progress Note      Admit Date:  11/14/2021    Reason for follow up visit: CHF    CC: \"I feel much better. \"    62 y/o male with PMH significant for CAD, chronic systolic HF with ICD in place (8/2019), LV thrombus, HTN, HLP and type II diabetes mellitus with diabetic nephropathy stage III who was admitted to [facility] with increased SOB and CHF.  He was placed on IV lasix infusion and milrinone and had good diuresis. On 11/23/2021 he underwent RHC and findings c/w class IV HF (elevated fillinf pressures, elevated PCWP and low O2 sats) and placed back on Milrinone. He received further IV diuretic and now on po. He has had good diuresis.      Interval History:  Pt. seen and examined; records reviewed  BP stable. Wt 149# (168# on admit)  Net diuresis -24 L since admit  BUN/cr 102/2.0  SOB much improved  Edema much improved    Subjective:  Pt with no acute overnight cardiac events. Denies chest pain, cough, PND, palpitations, dizziness, syncope  + edema much improved    Review of Systems:   · Constitutional: no unanticipated weight loss. There's been no change in energy level, sleep pattern, or activity level. No fevers, chills. · Eyes: No visual changes or diplopia. No scleral icterus. · ENT: No Headaches, hearing loss or vertigo. No mouth sores or sore throat. · Cardiovascular: as reviewed in HPI  · Respiratory: No sputum production. No hematemesis. + cough improved  · Gastrointestinal: No abdominal pain, appetite loss, blood in stools. No change in bowel or bladder habits. · Genitourinary: No dysuria, trouble voiding, or hematuria. · Musculoskeletal:  No gait disturbance, no joint complaints. · Integumentary: No rash or pruritis. · Neurological: No headache, diplopia, change in muscle strength, numbness or tingling. · Psychiatric: No anxiety or depression. · Endocrine: No temperature intolerance. No excessive thirst, fluid intake, or urination.  No tremor. · Hematologic/Lymphatic: No abnormal bruising or bleeding, blood clots or swollen lymph nodes. · Allergic/Immunologic: No nasal congestion or hives. Objective:   /78   Pulse 99   Temp 98.4 °F (36.9 °C) (Oral)   Resp 15   Ht 5' 6\" (1.676 m)   Wt 149 lb 4 oz (67.7 kg)   SpO2 93%   BMI 24.09 kg/m²     Intake/Output Summary (Last 24 hours) at 11/30/2021 1150  Last data filed at 11/30/2021 1126  Gross per 24 hour   Intake --   Output 3075 ml   Net -3075 ml     Wt Readings from Last 3 Encounters:   11/30/21 149 lb 4 oz (67.7 kg)   10/22/21 166 lb (75.3 kg)   06/12/19 161 lb (73 kg)       Physical Exam:  General: In no acute distress. Awake, alert, and oriented X4. Up in chair. Skin:  Warm and dry. No new appearing rashes or lesions. Neck:  Supple. No JVD or carotid bruits auscultated  Chest: Lungs clear to auscultation. No wheezes/rhonchi/rales  Cardiovascular:  RRR. Normal S1 and S2; soft systolic murmur   Abdomen:  soft, nontender, nondistended, +bowel sounds. Extremities:  Trace bilateral ankle/pedal edema. No clubbing or cyanosis. 2+ bilateral radial/DP/PT pulses.      Medications:    sodium chloride flush  5-40 mL IntraVENous 2 times per day    torsemide  40 mg Oral Daily    insulin lispro  2 Units SubCUTAneous TID WC    insulin glargine  15 Units SubCUTAneous Nightly    insulin lispro  0-18 Units SubCUTAneous TID WC    insulin lispro  0-9 Units SubCUTAneous Nightly    allopurinol  100 mg Oral Daily    metoprolol succinate  12.5 mg Oral Daily    rivaroxaban  15 mg Oral Dinner    influenza virus vaccine  0.5 mL IntraMUSCular Prior to discharge    atorvastatin  80 mg Oral Nightly    levothyroxine  25 mcg Oral Daily    isosorbide mononitrate  30 mg Oral Daily      sodium chloride      dextrose      dextrose       sodium chloride, sodium chloride flush, glucose, dextrose, glucagon (rDNA), dextrose, acetaminophen, ondansetron, methyl salicylate-menthol, guaiFENesin-dextromethorphan, polyethylene glycol, glucose, dextrose, glucagon (rDNA), dextrose, sodium chloride flush, ondansetron **OR** ondansetron, acetaminophen **OR** acetaminophen, nitroGLYCERIN    Lab Data:  CBC:   Recent Labs     11/28/21 0453 11/29/21 0439 11/30/21  0506   WBC 6.3 6.6 5.7   HGB 9.7* 9.4* 9.8*    314 319     BMP:    Recent Labs     11/28/21 0453 11/29/21 0439 11/30/21  0506   * 138 134*   K 3.9 3.7 3.7   CO2 28 29 29   * 109* 102*   CREATININE 2.5* 1.9* 2.0*     LIVR: No results for input(s): AST, ALT in the last 72 hours. INR:    Recent Labs     11/28/21 0453 11/29/21 0439 11/30/21  0506   INR 1.75* 1.73* 1.90*     11/23/2021:  OVERALL IMPRESSION:  1.  Elevated right heart pressures. 2.  Elevated pulmonary capillary wedge pressure. 3. Leocadia Brittle reduced cardiac output and indices with significantly low  oxygen saturation, all consistent with stage D/New York Heart  Association Class IV heart failure.     Echo 11/16/2021:  Left ventricular cavity size is severely dilated. Ejection fraction is visually estimated to be <20%. There is severe diffuse hypokinesis. Diastolic filling parameters suggest grade II diastolic dysfunction. Definity contrast administered. No left ventricular thrombus was seen. moderate MR  Pacer / ICD wire is visualized in the right ventricle. The right ventricle is dilated. Right ventricular systolic function is moderate to severely reduced. TAPSE 1.01 cm    Telemetry: Sinus rhythm with PVC's    Assessment/Plan:    1. Acute on chronic biventricular heart failure  -euvolemic on exam  -LVEF < 20%; Stage D  -continue Toprol and torsemide  -S/P ICD placement in 2019  -not on ACE/ARB/ARNI d/t  elevated Cr  -no SGLT2 inhibitor d/t elevated Cr    2. H/O LV thrombus  -anticoagulated now with rivaroxaban    3. SONAL on CKD stage III  -improving  -nephrology following    4.  CAD of native coronary arteries without angina  -prior PCI of LAD  -no recurrent angina  -continue BB and statin    5. Hyperlipidemia with LDL goal , 70  -continue high intensity statin    Stable from cardiac standpoint and okay to discharge when ok with admitting MD's    Will arrange follow up: scheduled on 12/7/2021 @ 2PM with Dr. Asif Ventura and discussed low-fat/low sodium diet, monitoring of daily weights, fluid restriction, worsening signs and symptoms of heart failure and when to call, and the importance of regular exercise and activity. Discharge Cardiac meds;   Atorvastatin 80 mg daily  Imdur 30 mg daily  Toprol Xl 12.5 mg daily  Xarelto 15 mg daily  Torsemide 40 mg daily    Thank you for allowing us to participate in Mr. Jamila Butcher alecia.     Electronically signed by DAHIANA Perez CNP on 11/30/2021 at 11:50 AM

## 2021-11-30 NOTE — PROGRESS NOTES
Dc order noted. Pharm notified to do dc med rev review. Pt has received > 60 mins of HF education and was seen by myself in consult as well. I had already placed a Cardiac Rehab and [facility] referral on 11/16. HF dc instructions are on AVS. I called SW/CM to ensure pt goes out with some type of home care/ skilled nurse to help with his HF management. Cardiology 7 day follow up appt is in place for 12/7 at 2:00 with Dr Georgette Min.

## 2021-11-30 NOTE — PROGRESS NOTES
[facility]                                               Progress note    Summary:   Gerardo  is being seen by nephrology for SONAL on CKD. Admitted with SOB, decompensated HF EF 20%. Interval History  Patient feel fine. No SOB on room air  Pending discharge today  Cr at baseline    Plan:   - Diuretics per cardiology, now on torsemide 40 mg daily.  - gout flare precipitated by diuresis, completed steroids. On allopurinol, 100 mg daily  - Monitor I/O, vitals and labs. - ok for discharge from a nephrology perspective. Edgar Richter MD  [facility]  Office: [phone]    Assessment:   Chronic kidney disease stage IIIb  This is likely secondary to diabetic nephropathy in the setting of persistent longstanding albuminuria  To complete CKD work-up will need an SPEP and light chains  Baseline creatinine around 2  No acute electrolyte abnormalities  Risk factors include diabetes, hypertension, severe systolic heart failure  Has had diabetes and hypertension for over 10 years and his last EF is less than 20%  He has had event evidence of albuminuria on urinalysis going back to 2016, 2013 had an  mg  Most recent protein creatinine ratio 700 mg, no RBCs no WBCs  CT abdomen in 2020 showed symmetric kidneys no lesions no masses bilateral perinephric stranding no hydronephrosis.   HCV and hepatitis C negative  Prior negative NAHOMY  Prior negative RF factor      History of coronary artery disease with stent in the LAD/ischemic cardiomyopthy  Has ICD  Most of his records are in the  system  Previously on Entresto but currently not  Echo here showed severe diffuse hypokinesis grade 2 diastolic dysfunction less than 20% EF severely dilated left ventricular cavity, right ventricular systolic function is severely reduced as well has moderate MR and RV's dilated    Diabetes type 2  10 years at least  Longstanding albuminuria  A1c has been ranging between 6 and 6.5 the last few checks. Hypertension  Blood pressure is acceptable right now  On torsemide 40 mg daily, toprol XL 12.5 mg daily, imdur 30 mg daily      HPI/Summary:   Anastacio Nicholas is being seen by nephrology for acute kidney injury. This is a 89-RGMY-PTH man with systolic heart failure, history of LV thrombus, coronary artery disease, hyperlipidemia who presented the hospital with increasing shortness of breath and worsening lower extremity swelling. Patient has received most of his care at [facility] up until this point. He has a history of heart failure with reduced EF secondary to ischemic cardiomyopathy. He has a single-vessel coronary artery disease status post PCI of the LAD. ICD placed in 2019 last right heart cath was January 29, 2020. Based on review of his last heart failure clinic note it looks like he is supposed to be on carvedilol Imdur Entresto torsemide 20 mg daily. Patient told me he was instructed to stop taking torsemide but was taking it anyway. Last heart failure note said that there was discussion about evaluation for possible LVAD versus transplant. He is on Coumadin for the LV thrombus. Patient says he is never seen a kidney doctor as an outpatient. He denies any chest pain but he just had general decreased stamina and dyspnea on exertion, orthopnea and lower extremity edema. He does not weigh himself at home daily. He said he became to the hospital because he was prompted by his family members to seek medical attention. ROS:   Positives Listed Bold. All other remaining systems are negative. Constitutional:  fever, chills, weakness, weight change, fatigue,      Skin:  rash, pruritus, hair loss, bruising, dry skin, petechiae. Head, Face, Neck   headaches, swelling,  cervical adenopathy.      Respiratory: shortness of breath, cough, or wheezing  Cardiovascular: chest pain, palpitations, dizzy, edema  Gastrointestinal: nausea, vomiting, diarrhea, constipation,belly pain Yellow skin, blood in stool  Musculoskeletal:  back pain, muscle weakness, gait problems,       joint pain or swelling. Genitourinary:  dysuria, poor urine flow, flank pain, blood in urine  Neurologic:  vertigo, TIA'S, syncope, seizures, focal weakness  Psychosocial:  insomnia, anxiety, or depression. PMH:   Past medical history, surgical history, social history, family history are reviewed and updated as appropriate. Reviewed current medication list.   Allergies reviewed and updated as needed. PE:   Vitals:    11/30/21 1423   BP: 110/70   Pulse: 93   Resp: 18   Temp: 97.7 °F (36.5 °C)   SpO2: 97%       General appearance: male in NAD, fully alert and oriented. Comfortable. HEENT: EOM intact, no icterus. Trachea is midline. Neck : No masses, appears symmetrical  Respiratory: Respiratory effort appears normal, bilateral equal chest rise, no wheeze  Cardiovascular: Ausculation shows RRR no edema today   Abdomen: No visible mass or tenderness, non distended. Musculoskeletal:  Joints with no swelling or deformity. Skin:no rashes, ulcers, induration, no jaundice. Neuro: face symmetric, no focal deficits. Appropriate responses.        Lab Results   Component Value Date    CREATININE 2.0 (H) 11/30/2021     (HH) 11/30/2021     (L) 11/30/2021    K 3.7 11/30/2021    CL 93 (L) 11/30/2021    CO2 29 11/30/2021      Lab Results   Component Value Date    WBC 5.7 11/30/2021    HGB 9.8 (L) 11/30/2021    HCT 30.4 (L) 11/30/2021    .4 (H) 11/30/2021     11/30/2021     Lab Results   Component Value Date    CALCIUM 8.5 11/30/2021    PHOS 3.8 08/30/2017

## 2021-11-30 NOTE — PROGRESS NOTES
Occupational Therapy  Facility/Department: [facility]  Daily Treatment Note  NAME: Corinna Enriquez  : 1955  MRN: [id number]    Date of Service: 2021    Discharge Recommendations:  2-3 sessions per week, Patient would benefit from continued therapy after discharge, Home with Home health OT  OT Equipment Recommendations  Equipment Needed: Yes  Mobility Devices: Refugio Rear: Rolling  ADL Assistive Devices: Shower Chair with back    Assessment   Performance deficits / Impairments: Decreased functional mobility ; Decreased balance; Decreased ADL status; Decreased endurance; Decreased high-level IADLs; Decreased strength  Assessment: Patient demonstrating increasing standing activity tolerance for bathing tasks in stance at sink, completing with SBA this date. Attempted bathroom mobility without AD, as this was patient's baseline and plan for return home; however, patient with increased unsteadiness and 1 LOB requiring increased assistance to correct. Increased safety (CGA-SBA) with use of RW, patient verb understanding and agreement with recommendation to continue use of RW at home. Would benefit from [facility] after d/c in order to maximize safety and independence with ADL tasks, planning to d/c home with assist of family. Treatment Diagnosis: impaired ADL/fxl mobility  Prognosis: Good  OT Education: OT Role; Transfer Training; Plan of Care; ADL Adaptive Strategies  Patient Education: d/c planning - verb understanding  REQUIRES OT FOLLOW UP: Yes  Activity Tolerance  Activity Tolerance: Patient Tolerated treatment well  Safety Devices  Safety Devices in place: Yes  Type of devices: Call light within reach; Chair alarm in place; Left in chair; Gait belt; Nurse notified         Patient Diagnosis(es): The primary encounter diagnosis was Acute on chronic systolic congestive heart failure ([address]  Diagnoses of Acute respiratory failure with hypoxia ([address] Dyspnea on exertion, and Uncontrolled type 2 diabetes mellitus with hyperglycemia ([facility] [address] were also pertinent to this visit. has a past medical history of Diabetes type 2, uncontrolled ([address] Diabetic nephropathy, ED (erectile dysfunction), Essential hypertension, benign, Hyperlipidemia, and Noncompliance. has a past surgical history that includes eye surgery. Restrictions  Restrictions/Precautions  Restrictions/Precautions: Up as Tolerated  Required Braces or Orthoses?: No  Position Activity Restriction  Other position/activity restrictions: room air  Subjective   General  Chart Reviewed: Yes  Patient assessed for rehabilitation services?: Yes  Additional Pertinent Hx: 62 yo male admitted 11/13 for BLE swelling with CHF exacerbation. PMH: CHF, DM, HTN,  Family / Caregiver Present: No  Referring Practitioner: Castro White MD  Diagnosis: CHF exacerbation  Subjective  Subjective: Patient seated EOB upon arrival, agreeable to OT services. Entering per request of RN d/t patient reporting concerns with discharge - patient reporting primary source of anxiety overnight was related to removal of [place] (although he feels better now that it is removed and he has been able to void) and with medication changes - patient informed that RN would review this information with discharge paperwork, RN notified and aware of patient concerns. Patient voicing no concerns with functional abilities at discharge home; although there will be time when nephew is gone from the house and patient is home alone, he reports that he has his sister or other family who he can call if needed.   General Comment  Comments: Rn cleared to participate  Vital Signs  Patient Currently in Pain: Denies   Orientation  Orientation  Overall Orientation Status: Within Functional Limits  Objective    ADL  Grooming: Supervision  UE Bathing: Stand by assistance (in stance at sink)  LE Bathing: Stand by assistance; Setup (in stance at sink; slight fatigue and unsteadiness noted in LE as activity progressed but patient declined need for seated rest break and demo'd no LOB)  UE Dressing: Stand by assistance; Setup  LE Dressing: Setup; Contact guard assistance; Stand by assistance (assist to adjust 1/2 compression socks only d/t discomfort in wrist with adjusting, family would be able to assist with this at home. SBA for completion of briefs, pants, belt and shoes.)  Toileting: Stand by assistance  Functional Mobility  Functional - Mobility Device: No device  Activity: To/from bathroom  Assist Level: Minimal assistance  Functional Mobility Comments: Assessed mobility without AD as patient initially stating this was his plan for returning home; noted reaching out for furniture for increased support and 1 minor LOB required MIN assist to correct when furniture not available, otherwise completed with CGA. Completed remainder of bathroom mobility and mobility in hallway with RW and CGA-SBA.   Toilet Transfers  Toilet - Technique: Ambulating  Equipment Used: Grab bars  Toilet Transfer: Stand by assistance  Transfers  Sit to stand: Stand by assistance  Stand to sit: Stand by assistance     Plan   Plan  Times per week: 3-5  Current Treatment Recommendations: Strengthening, Endurance Training, Patient/Caregiver Education & Training, Self-Care / ADL, Balance Training, Pain Management, Functional Mobility Training, Safety Education & Training, Positioning    AM-PAC Score        [facility] Daily Activity Raw Score: 19 (11/30/21 1035)  AM-PAC Inpatient ADL T-Scale Score : 40.22 (11/30/21 1035)  ADL Inpatient CMS 0-100% Score: 42.8 (11/30/21 1035)  ADL Inpatient CMS G-Code Modifier : CK (11/30/21 1035)    Goals  Short term goals  Time Frame for Short term goals: prior to d/c  Short term goal 1: toileting SUP - not met,  ongoing -SBA  Short term goal 2: ADL tx SUP - not met,  ongoing SBA  Short term goal 3: LB dressing SUP - not met, ongoing SBA  Short term goal 4: BUE exercises in all planes to improve strength and endurance for ADLs and tx - not addressed,  ongoing  Short term goal 5:  Tolerate 5 min fxl standing task SUP - goal met 11/30  Patient Goals   Patient goals : go home       Therapy Time   Individual Concurrent Group Co-treatment   Time In 0918         Time Out 1018         Minutes [address], OTR/L [address][id number]

## 2021-11-30 NOTE — PROGRESS NOTES
Hospitalist Progress Note      PCP: No primary care provider on file. Date of Admission: 11/14/2021    Chief Complaint: SOB    Hospital Course:     Subjective: no SOB, CP, other complaints at this time       Medications:  Reviewed    Infusion Medications    sodium chloride      dextrose      dextrose       Scheduled Medications    sodium chloride flush  5-40 mL IntraVENous 2 times per day    torsemide  40 mg Oral Daily    insulin lispro  2 Units SubCUTAneous TID WC    insulin glargine  15 Units SubCUTAneous Nightly    insulin lispro  0-18 Units SubCUTAneous TID WC    insulin lispro  0-9 Units SubCUTAneous Nightly    allopurinol  100 mg Oral Daily    metoprolol succinate  12.5 mg Oral Daily    rivaroxaban  15 mg Oral Dinner    influenza virus vaccine  0.5 mL IntraMUSCular Prior to discharge    atorvastatin  80 mg Oral Nightly    levothyroxine  25 mcg Oral Daily    isosorbide mononitrate  30 mg Oral Daily     PRN Meds: sodium chloride, sodium chloride flush, glucose, dextrose, glucagon (rDNA), dextrose, acetaminophen, ondansetron, methyl salicylate-menthol, guaiFENesin-dextromethorphan, polyethylene glycol, glucose, dextrose, glucagon (rDNA), dextrose, sodium chloride flush, ondansetron **OR** ondansetron, acetaminophen **OR** acetaminophen, nitroGLYCERIN      Intake/Output Summary (Last 24 hours) at 11/29/2021 1931  Last data filed at 11/29/2021 1437  Gross per 24 hour   Intake 680 ml   Output 2575 ml   Net -1895 ml       Exam:    /72   Pulse 94   Temp 97.4 °F (36.3 °C) (Axillary)   Resp 17   Ht 5' 6\" (1.676 m)   Wt 145 lb 8.1 oz (66 kg)   SpO2 94%   BMI 23.48 kg/m²     General appearance: No apparent distress, appears stated age and cooperative. HEENT: Pupils equal, round, and reactive to light. Conjunctivae/corneas clear. Neck: Supple, with full range of motion. No jugular venous distention. Trachea midline. Respiratory:  Normal respiratory effort.  Clear to auscultation, bilaterally without Rales/Wheezes/Rhonchi. Cardiovascular: Regular rate and rhythm with normal S1/S2 without murmurs, rubs or gallops. Abdomen: Soft, non-tender, non-distended with normal bowel sounds. Musculoskeletal: No clubbing, cyanosis or edema bilaterally. Full range of motion without deformity. Skin: Skin color, texture, turgor normal.  No rashes or lesions. Neurologic:  Neurovascularly intact without any focal sensory/motor deficits. Cranial nerves: II-XII intact, grossly non-focal.  Psychiatric: Alert and oriented, thought content appropriate, normal insight  Capillary Refill: Brisk,< 3 seconds   Peripheral Pulses: +2 palpable, equal bilaterally       Labs:   Recent Labs     11/27/21 0445 11/28/21 0453 11/29/21 0439   WBC 6.1 6.3 6.6   HGB 9.4* 9.7* 9.4*   HCT 28.3* 29.9* 28.8*    303 314     Recent Labs     11/27/21 0445 11/28/21 0453 11/29/21 0439   * 133* 138   K 4.0 3.9 3.7   CL 89* 93* 97*   CO2 28 28 29   * 119* 109*   CREATININE 2.4* 2.5* 1.9*   CALCIUM 8.6 8.6 8.6     No results for input(s): AST, ALT, BILIDIR, BILITOT, ALKPHOS in the last 72 hours. Recent Labs     11/27/21 0445 11/28/21 0453 11/29/21 0439   INR 1.65* 1.75* 1.73*     No results for input(s): CKTOTAL, TROPONINI in the last 72 hours. Urinalysis:      Lab Results   Component Value Date    NITRU Negative 11/17/2021    WBCUA 1 11/17/2021    RBCUA 1 11/17/2021    BLOODU TRACE 11/17/2021    SPECGRAV 1.007 11/17/2021    GLUCOSEU Negative 11/17/2021       Radiology:  CTA CHEST W CONTRAST   Final Result   There is questionable mild narrowing of the left subclavian vein as it joins   the internal jugular vein. Brachiocephalic and superior vena cava appear   widely patent without evidence of significant central venous obstruction. Multiple small venous collaterals are seen in the visualized left upper   extremity and left supraclavicular region.   Findings may be related to the   patient's prior left subclavian pacemaker placement. Cardiomegaly. Main pulmonary artery is enlarged which can be seen with pulmonary   hypertension. XR ELBOW RIGHT (2 VIEWS)   Final Result   Soft tissue swelling, mainly posteriorly. No acute bony abnormality. No   radiopaque foreign body. No soft tissue gas. VL Extremity Venous Right   Final Result      XR CHEST 1 VIEW   Final Result   Borderline pulmonary vascular congestion. XR CHEST (2 VW)   Final Result   The cardiac silhouette is enlarged, with somewhat globular configuration   which can be seen in pericardial effusion. No focal airspace disease or   pulmonary edema. Assessment/Plan:    Active Hospital Problems    Diagnosis Date Noted    Acute kidney injury superimposed on CKD ([address] [N17.9, N18.9]     Biventricular heart failure ([address] [I50.82]     Stage 3 chronic kidney disease ([address] [N18.30]     Acute on chronic combined systolic and diastolic HF (heart failure) ([address] [I50.43] 11/14/2021        Assessment  Acute on chronic systolic CHF  CAD  Hyperlipidemia  History of LV thrombus  Hyperglycemia due to diabetes mellitus  Acute gout     Plan  S/p right heart cath-consistent with stage IV heart failure, dc Lasix, plan for DC milrinone today, home Coumadin  EF<20% on echo, severe diffuse hypokinesis, Diastolic filling parameters suggest grade II diastolic dysfunction  Hypoglycemia has been worsened due to being on the steroids due to acute gout flare, switch to her insulin sliding scale, increase long-acting insulin, will consult diabetes coordinator  Elevated uric acid-ordered allopurinol, prednisone for elbow swelling likely secondary to gout, will order x-ray right elbow  Strict I's and O's, low-salt diet  Consulted palliative care-full medical management with full code per patient  Resume home medications    DVT Prophylaxis: Coumadin  Diet: ADULT DIET; Regular; 4 carb choices (60 gm/meal);  Low Fat/Low Chol/High Fiber/2 gm Na; 1500 ml  Code Status: Full Code    PT/OT Eval Status: home PT    Giovanni Lee MD

## 2021-11-30 NOTE — CARE COORDINATION
Case Management Assessment            Discharge Note                    Date / Time of Note: 11/30/2021 12:47 PM                  Discharge Note Completed by: Britney Mayberry RN     There is a discharge order noted for Mr. Brandie Martinez today. Dr. Dawit Green has entered orders for [employer]. Met with the pt at the bedside to discuss choices for [address] [place] [address]. He stated he did not want or need HHC. Encouraged him to accept a [facility]. nurse to assist with continued education and medication management. He is quite adamant he does not need assistance. He was given the list of [address] companies. Explained if he felt he would benefit from a nurse at home, he can call his PCP for service orders and a referral. He verbalized understanding. His son will transport him home via private car.     Patient Name: Brunilda Solorio   YOB: 1955  Diagnosis: Dyspnea on exertion [R06.00]  Acute on chronic systolic congestive heart failure ([address] [I50.23]  Acute respiratory failure with hypoxia ([address] [J96.01]  CHF (congestive heart failure), NYHA class I, acute on chronic, combined ([address] [I50.43]   Date / Time: 11/14/2021  2:58 PM    Current PCP Lory Kauffman MD [facility]'s    Advance Directives:  Code Status: Full Code    Financial:  Payor: Marcial Rosario / Plan: [facility] MEDIC[facility] COMPLETE / Product Type: *No Product type* /      Pharmacy:    [address] [place] [address]  Phone: [phone] Fax: [phone] [facility] [address] [facility], [address] [phone] - [phone]  [facility] [address]  Phone: [phone] Fax: [phone]    [address], [facility] [phone] Shawn Martinez [phone]  [address] [facility]  [address] [place] [address]  Phone: [phone] Fax: [phone] [facility] Annalee [address] [place] [address]  [address] [facility]  Phone: [phone] Fax: [phone]      Assistance purchasing medications?: Potential Assistance Purchasing Medications: No    ADLS:  Current PT AM-PAC Score: 19 /24  Current OT AM-PAC Score: 19 /24      DISCHARGE Disposition: Home with Home Health Care: Dr Whit Barton entered [facility] orders but Mr Stephanie Palmer declined     IMM Completed:   Yes, Case management has presented and reviewed IMM letter #2 to the patient and/or family/ POA. Patient and/or family/POA verbalized understanding of their medicare rights and appeal process if needed. Patient and/or family/POA has signed, initialed and placed today's date (11/30/21) and time (1300) on IMM letter #2 on the the appropriate lines. Patient and/or family/POA, copy of letter offered and they are aware that this original copy of IMM letter #2 is available prior to discharge from the paper chart on the unit. Electronic documentation has been entered into epic for IMM letter #2 and original paper copy has been added to the paper chart at the nurses station. Transportation:  Transportation PLAN for discharge: family   Mode of Transport: [address] ordered at discharge: Pt has declined [facility]    The Patient and/or patient representative Liza Castro and his family were provided with a choice of provider and agrees with the discharge plan Yes    Freedom of choice list was provided with basic dialogue that supports the patient's individualized plan of care/goals and shares the quality data associated with the providers.  Yes      Saritha Figueredo RN  Case Management  [phone]

## 2021-11-30 NOTE — CONSULTS
Pharmacy Heart Failure Medication Reconciliation Note    Pt discharged from [facility] today. Chief Complaint   Patient presents with    Shortness of Breath     Pt c/o SOB x 2 days. Pt reports having a defibrillator and a stent. Denies chest pain at this time. No respiratory distress noted. Diya Peters has a diagnosis of systolic heart failure (last EF = < 20% on 11/16/21). Pertinent Labs:  BMP:   Lab Results   Component Value Date     11/30/2021    K 3.7 11/30/2021    K 3.4 11/22/2021    CL 93 11/30/2021    CO2 29 11/30/2021     11/30/2021    CREATININE 2.0 11/30/2021     BNP:   Lab Results   Component Value Date    PROBNP >70,000 11/22/2021    PROBNP 31,194 11/20/2021    PROBNP 65,067 11/14/2021       Patient taking an ACEI / ARB / Entresto for EF </= 40%:  Yes     Patient taking a BETA BLOCKER (Coreg, Toprol XL or bisoprolol) for EF </= 40%:  Yes  Patient taking a LOOP DIURETIC: Yes  Patient taking a ALDOSTERONE RECEPTOR ANTAGONIST for EF </= 35%: No: SONAL during admission  Patient taking an SGLT2I for EF </= 40%: No, cardiology will address in the outpatient setting    Patient has a diagnosis of Atrial Fibrillation: No.     Patient has a diagnosis of type 2 diabetes:  Yes. If yes, patient prescribed the following anti-hyperglycemic medication at discharge: [facility]      Corrections to discharge medications include:  Spoke with hospitalist about continuing Entresto. She is okay with continuing with f/u lab work. She will order this and have results sent to the [address] [place] [address] to address.      Discharge Medications:         Medication List      START taking these medications    allopurinol 100 MG tablet  Commonly known as: ZYLOPRIM  Take 1 tablet by mouth daily     metoprolol succinate 25 MG extended release tablet  Commonly known as: TOPROL XL  Take 0.5 tablets by mouth daily     rivaroxaban 15 MG Tabs tablet  Commonly known as: XARELTO  Take 1 tablet by mouth Daily with supper CHANGE how you take these medications    Dulaglutide 0.75 MG/0.5ML Sopn  Commonly known as: Trulicity  Lot# [id number] Exp.06/20  What changed:   · how much to take  · how to take this  · when to take this  · additional instructions     torsemide 20 MG tablet  Commonly known as: DEMADEX  Take 2 tablets by mouth daily  What changed:   · medication strength  · how much to take  · when to take this        CONTINUE taking these medications    atorvastatin 80 MG tablet  Commonly known as: LIPITOR     isosorbide mononitrate 30 MG extended release tablet  Commonly known as: IMDUR     levothyroxine 25 MCG tablet  Commonly known as: SYNTHROID     nitroGLYCERIN 0.4 MG SL tablet  Commonly known as: NITROSTAT     sacubitril-valsartan 49-51 MG per tablet  Commonly known as: ENTRESTO     Stimulant Laxative 8.6-50 MG per tablet  Generic drug: senna-docusate     therapeutic multivitamin-minerals tablet        STOP taking these medications    carvedilol 12.5 MG tablet  Commonly known as: COREG     clopidogrel 75 MG tablet  Commonly known as: PLAVIX     warfarin 7.5 MG tablet  Commonly known as: COUMADIN           Where to Get Your Medications      These medications were sent to [address] [place] [address], [address] [place] [address] & [facility]  [address] [address]    Phone: [phone]   · allopurinol 100 MG tablet  · metoprolol succinate 25 MG extended release tablet  · rivaroxaban 15 MG Tabs tablet  · torsemide 20 MG tablet         Liliya Mcmanus, PharmD  Heart Failure Discharge Medication Reconciliation Program  [phone]

## 2021-12-02 NOTE — PROGRESS NOTES
HF RN attempted a 3rd and final attempt to reach pt for a 72 hour hospital follow up call. I was unable to reach pt but left a secure voicemail. I requested a return call and also reminded pt of his upcoming cardiology follow up appt for 12/7 at 2:00 with Dr Jaelyn Tracy.

## 2021-12-03 NOTE — TELEPHONE ENCOUNTER
New Diabetes referral.  Left message to please call to schedule an appointment. We will coordinate with the heart failure visit as well.      Shannon Ramirez, PharmD  [address] [place] [address]  Diabetes Service  [phone]

## 2021-12-07 NOTE — TELEPHONE ENCOUNTER
Unable to reach patient.      Closed his referral    Tom Jack, PharmD  [facility]  [phone]

## 2021-12-07 NOTE — TELEPHONE ENCOUNTER
Outbound call from [facility]. Attempting to schedule patient for New CHF/DM visit per referrals placed. LM for return call. This is my 3rd and final attempt to reach this patient.

## 2021-12-07 NOTE — DISCHARGE SUMMARY
light. Conjunctivae/corneas clear. Neck: Supple, with full range of motion. No jugular venous distention. Trachea midline. Respiratory:  Normal respiratory effort. Clear to auscultation, bilaterally without Rales/Wheezes/Rhonchi. Cardiovascular: Regular rate and rhythm with normal S1/S2 without murmurs, rubs or gallops. Abdomen: Soft, non-tender, non-distended with normal bowel sounds. Musculoskelatal: No clubbing, cyanosis or edema bilaterally. Full range of motion without deformity. Skin: Skin color, texture, turgor normal.  No rashes or lesions. Neurologic:  Neurovascularly intact without any focal sensory/motor deficits. Cranial nerves: II-XII intact, grossly non-focal.  Psychiatric: Alert and oriented, thought content appropriate, normal insight      Consults:     IP CONSULT TO HOSPITALIST  IP CONSULT TO CARDIOLOGY  IP CONSULT TO SOCIAL WORK  IP CONSULT TO HEART FAILURE NURSE/COORDINATOR  IP CONSULT TO NEPHROLOGY  IP CONSULT TO CARDIAC REHAB  IP CONSULT TO PALLIATIVE CARE  IP CONSULT TO DIABETES EDUCATOR  IP CONSULT TO DIABETES EDUCATOR  IP CONSULT TO HOME CARE NEEDS    Significant Diagnostic Studies:           Radiology:  CTA CHEST W CONTRAST   Final Result   There is questionable mild narrowing of the left subclavian vein as it joins   the internal jugular vein. Brachiocephalic and superior vena cava appear   widely patent without evidence of significant central venous obstruction.       Multiple small venous collaterals are seen in the visualized left upper   extremity and left supraclavicular region. Findings may be related to the   patient's prior left subclavian pacemaker placement.       Cardiomegaly.       Main pulmonary artery is enlarged which can be seen with pulmonary   hypertension.           XR ELBOW RIGHT (2 VIEWS)   Final Result   Soft tissue swelling, mainly posteriorly. No acute bony abnormality. No   radiopaque foreign body.   No soft tissue gas.           VL Extremity Venous Right Final Result       XR CHEST 1 VIEW   Final Result   Borderline pulmonary vascular congestion.           XR CHEST (2 VW)   Final Result   The cardiac silhouette is enlarged, with somewhat globular configuration   which can be seen in pericardial effusion. No focal airspace disease or   pulmonary edema.             PCP/SNF to follow up: CHF management    Disposition:  Home    Condition on D/C:  Stable     Discharge Instructions/Follow-up:  F/u with PCP within 1 week    Code Status:  Prior     Activity: activity as tolerated    Diet: cardiac diet    Labs:  For convenience and continuity at follow-up the following most recent labs are provided:      CBC:    Lab Results   Component Value Date    WBC 5.7 11/30/2021    HGB 9.8 11/30/2021    HCT 30.4 11/30/2021     11/30/2021       Renal:    Lab Results   Component Value Date     11/30/2021    K 3.7 11/30/2021    K 3.4 11/22/2021    CL 93 11/30/2021    CO2 29 11/30/2021     11/30/2021    CREATININE 2.0 11/30/2021    CALCIUM 8.5 11/30/2021    PHOS 3.8 08/30/2017       Discharge Medications:     Discharge Medication List as of 11/30/2021 12:36 PM           Details   torsemide (DEMADEX) 20 MG tablet Take 2 tablets by mouth daily, Disp-30 tablet, R-3Normal      allopurinol (ZYLOPRIM) 100 MG tablet Take 1 tablet by mouth daily, Disp-30 tablet, R-3Normal      metoprolol succinate (TOPROL XL) 25 MG extended release tablet Take 0.5 tablets by mouth daily, Disp-30 tablet, R-3Normal      rivaroxaban (XARELTO) 15 MG TABS tablet Take 1 tablet by mouth Daily with supper, Disp-42 tablet, R-2Normal              Details   Multiple Vitamins-Minerals (THERAPEUTIC MULTIVITAMIN-MINERALS) tablet Take 1 tablet by mouth dailyHistorical Med      STIMULANT LAXATIVE 8.6-50 MG per tablet Take 1 tablet by mouth nightly , DAWHistorical Med      nitroGLYCERIN (NITROSTAT) 0.4 MG SL tablet Place 0.4 mg under the tongue every 5 minutes as needed Historical Med      levothyroxine (SYNTHROID) 25 MCG tablet Take 25 mcg by mouth Daily Historical Med      isosorbide mononitrate (IMDUR) 30 MG extended release tablet Take 30 mg by mouth daily Historical Med      sacubitril-valsartan (ENTRESTO) 49-51 MG per tablet Take 1 tablet by mouth 2 times daily Historical Med      atorvastatin (LIPITOR) 80 MG tablet Take 80 mg by mouth daily Historical Med      Dulaglutide (TRULICITY) 0.73 AF/9.6JK SOPN Lot# [id number] Exp.06/20, Disp-2 pen, R-0Sample             Time Spent on discharge is more than 45 minutes in the examination, evaluation, counseling and review of medications and discharge plan. Signed: Ely Mcleod MD   12/7/2021      Thank you No primary care provider on file. for the opportunity to be involved in this patient's care. If you have any questions or concerns please feel free to contact me at [phone].

## 2021-12-13 NOTE — TELEPHONE ENCOUNTER
Pt calling. Stated he would like to schedule with a new provider. Scheduled Dr. Myra Maria on 1.3.2022    Stated he would need to call back to get suite number and phone number.

## 2022-01-01 ENCOUNTER — HOSPITAL ENCOUNTER (EMERGENCY)
Age: 67
Discharge: HOME | DRG: 391 | End: 2022-05-16
Attending: STUDENT IN AN ORGANIZED HEALTH CARE EDUCATION/TRAINING PROGRAM
Payer: MEDICARE

## 2022-01-01 ENCOUNTER — APPOINTMENT (OUTPATIENT)
Dept: INTERVENTIONAL RADIOLOGY/VASCULAR | Age: 67
DRG: 391 | End: 2022-01-01
Payer: MEDICARE

## 2022-01-01 ENCOUNTER — APPOINTMENT (OUTPATIENT)
Dept: GENERAL RADIOLOGY | Age: 67
DRG: 308 | End: 2022-01-01
Payer: MEDICARE

## 2022-01-01 ENCOUNTER — TELEPHONE (OUTPATIENT)
Dept: PHARMACY | Facility: CLINIC | Age: 67
End: 2022-01-01

## 2022-01-01 ENCOUNTER — APPOINTMENT (OUTPATIENT)
Dept: GENERAL RADIOLOGY | Age: 67
DRG: 871 | End: 2022-01-01
Payer: MEDICARE

## 2022-01-01 ENCOUNTER — TELEPHONE (OUTPATIENT)
Dept: PHARMACY | Age: 67
End: 2022-01-01

## 2022-01-01 ENCOUNTER — APPOINTMENT (OUTPATIENT)
Dept: CT IMAGING | Age: 67
DRG: 308 | End: 2022-01-01
Payer: MEDICARE

## 2022-01-01 ENCOUNTER — TELEPHONE (OUTPATIENT)
Dept: CARDIOLOGY CLINIC | Age: 67
End: 2022-01-01

## 2022-01-01 ENCOUNTER — APPOINTMENT (OUTPATIENT)
Dept: GENERAL RADIOLOGY | Age: 67
DRG: 286 | End: 2022-01-01
Payer: MEDICARE

## 2022-01-01 ENCOUNTER — FOLLOWUP TELEPHONE ENCOUNTER (OUTPATIENT)
Dept: INPATIENT UNIT | Age: 67
End: 2022-01-01

## 2022-01-01 ENCOUNTER — APPOINTMENT (OUTPATIENT)
Dept: GENERAL RADIOLOGY | Age: 67
DRG: 391 | End: 2022-01-01
Payer: MEDICARE

## 2022-01-01 ENCOUNTER — OFFICE VISIT (OUTPATIENT)
Dept: INTERNAL MEDICINE CLINIC | Age: 67
End: 2022-01-01
Payer: MEDICARE

## 2022-01-01 ENCOUNTER — HOSPITAL ENCOUNTER (INPATIENT)
Age: 67
LOS: 17 days | Discharge: NURSING, REHAB OR LONG-TERM CARE | DRG: 391 | End: 2022-06-03
Attending: EMERGENCY MEDICINE | Admitting: STUDENT IN AN ORGANIZED HEALTH CARE EDUCATION/TRAINING PROGRAM
Payer: MEDICARE

## 2022-01-01 ENCOUNTER — TELEPHONE (OUTPATIENT)
Dept: INTERNAL MEDICINE CLINIC | Age: 67
End: 2022-01-01

## 2022-01-01 ENCOUNTER — APPOINTMENT (OUTPATIENT)
Dept: GENERAL RADIOLOGY | Age: 67
DRG: 291 | End: 2022-01-01
Payer: MEDICARE

## 2022-01-01 ENCOUNTER — APPOINTMENT (OUTPATIENT)
Dept: MRI IMAGING | Age: 67
DRG: 391 | End: 2022-01-01
Payer: MEDICARE

## 2022-01-01 ENCOUNTER — HOSPITAL ENCOUNTER (INPATIENT)
Age: 67
LOS: 4 days | Discharge: NURSING, REHAB OR LONG-TERM CARE | DRG: 640 | End: 2022-06-17
Attending: EMERGENCY MEDICINE | Admitting: STUDENT IN AN ORGANIZED HEALTH CARE EDUCATION/TRAINING PROGRAM
Payer: MEDICARE

## 2022-01-01 ENCOUNTER — HOSPITAL ENCOUNTER (INPATIENT)
Age: 67
LOS: 4 days | DRG: 871 | End: 2022-07-19
Attending: EMERGENCY MEDICINE | Admitting: INTERNAL MEDICINE
Payer: MEDICARE

## 2022-01-01 ENCOUNTER — APPOINTMENT (OUTPATIENT)
Dept: CT IMAGING | Age: 67
DRG: 391 | End: 2022-01-01
Payer: MEDICARE

## 2022-01-01 ENCOUNTER — HOSPITAL ENCOUNTER (INPATIENT)
Age: 67
LOS: 18 days | Discharge: HOME HEALTH | DRG: 308 | End: 2022-04-13
Attending: STUDENT IN AN ORGANIZED HEALTH CARE EDUCATION/TRAINING PROGRAM | Admitting: STUDENT IN AN ORGANIZED HEALTH CARE EDUCATION/TRAINING PROGRAM
Payer: MEDICARE

## 2022-01-01 ENCOUNTER — APPOINTMENT (OUTPATIENT)
Dept: ULTRASOUND IMAGING | Age: 67
DRG: 291 | End: 2022-01-01
Payer: MEDICARE

## 2022-01-01 ENCOUNTER — APPOINTMENT (OUTPATIENT)
Dept: GENERAL RADIOLOGY | Age: 67
DRG: 640 | End: 2022-01-01
Payer: MEDICARE

## 2022-01-01 ENCOUNTER — APPOINTMENT (OUTPATIENT)
Dept: ULTRASOUND IMAGING | Age: 67
DRG: 871 | End: 2022-01-01
Payer: MEDICARE

## 2022-01-01 ENCOUNTER — APPOINTMENT (OUTPATIENT)
Dept: ULTRASOUND IMAGING | Age: 67
DRG: 391 | End: 2022-01-01
Payer: MEDICARE

## 2022-01-01 ENCOUNTER — APPOINTMENT (OUTPATIENT)
Dept: INTERVENTIONAL RADIOLOGY/VASCULAR | Age: 67
DRG: 308 | End: 2022-01-01
Payer: MEDICARE

## 2022-01-01 ENCOUNTER — HOSPITAL ENCOUNTER (INPATIENT)
Age: 67
LOS: 14 days | Discharge: HOME | DRG: 286 | End: 2022-02-17
Attending: EMERGENCY MEDICINE | Admitting: INTERNAL MEDICINE
Payer: MEDICARE

## 2022-01-01 ENCOUNTER — HOSPITAL ENCOUNTER (INPATIENT)
Age: 67
LOS: 3 days | Discharge: HOME | DRG: 291 | End: 2022-06-27
Attending: STUDENT IN AN ORGANIZED HEALTH CARE EDUCATION/TRAINING PROGRAM | Admitting: INTERNAL MEDICINE
Payer: MEDICARE

## 2022-01-01 VITALS
DIASTOLIC BLOOD PRESSURE: 62 MMHG | RESPIRATION RATE: 16 BRPM | TEMPERATURE: 98.1 F | HEART RATE: 87 BPM | SYSTOLIC BLOOD PRESSURE: 94 MMHG | BODY MASS INDEX: 23.14 KG/M2 | OXYGEN SATURATION: 97 % | WEIGHT: 143.96 LBS | HEIGHT: 66 IN

## 2022-01-01 VITALS
SYSTOLIC BLOOD PRESSURE: 96 MMHG | HEIGHT: 66 IN | OXYGEN SATURATION: 98 % | DIASTOLIC BLOOD PRESSURE: 65 MMHG | BODY MASS INDEX: 24.45 KG/M2 | WEIGHT: 152.12 LBS | HEART RATE: 87 BPM | TEMPERATURE: 98.5 F | RESPIRATION RATE: 16 BRPM

## 2022-01-01 VITALS
DIASTOLIC BLOOD PRESSURE: 82 MMHG | BODY MASS INDEX: 28.25 KG/M2 | HEART RATE: 73 BPM | SYSTOLIC BLOOD PRESSURE: 113 MMHG | OXYGEN SATURATION: 99 % | HEIGHT: 67 IN | WEIGHT: 180 LBS

## 2022-01-01 VITALS
HEART RATE: 85 BPM | HEIGHT: 67 IN | SYSTOLIC BLOOD PRESSURE: 112 MMHG | BODY MASS INDEX: 24.5 KG/M2 | TEMPERATURE: 97.8 F | OXYGEN SATURATION: 95 % | RESPIRATION RATE: 18 BRPM | WEIGHT: 156.09 LBS | DIASTOLIC BLOOD PRESSURE: 60 MMHG

## 2022-01-01 VITALS
BODY MASS INDEX: 26.47 KG/M2 | WEIGHT: 164 LBS | HEART RATE: 91 BPM | SYSTOLIC BLOOD PRESSURE: 96 MMHG | DIASTOLIC BLOOD PRESSURE: 72 MMHG

## 2022-01-01 VITALS
WEIGHT: 154.76 LBS | HEIGHT: 66 IN | DIASTOLIC BLOOD PRESSURE: 81 MMHG | HEART RATE: 76 BPM | RESPIRATION RATE: 16 BRPM | OXYGEN SATURATION: 100 % | BODY MASS INDEX: 24.87 KG/M2 | SYSTOLIC BLOOD PRESSURE: 112 MMHG | TEMPERATURE: 97.9 F

## 2022-01-01 VITALS
RESPIRATION RATE: 8 BRPM | BODY MASS INDEX: 25.02 KG/M2 | TEMPERATURE: 95.8 F | HEIGHT: 67 IN | WEIGHT: 159.39 LBS | HEART RATE: 84 BPM | DIASTOLIC BLOOD PRESSURE: 24 MMHG | OXYGEN SATURATION: 100 % | SYSTOLIC BLOOD PRESSURE: 45 MMHG

## 2022-01-01 VITALS
RESPIRATION RATE: 31 BRPM | HEART RATE: 80 BPM | HEIGHT: 66 IN | DIASTOLIC BLOOD PRESSURE: 79 MMHG | SYSTOLIC BLOOD PRESSURE: 115 MMHG | WEIGHT: 139.55 LBS | BODY MASS INDEX: 22.43 KG/M2 | TEMPERATURE: 98.1 F | OXYGEN SATURATION: 100 %

## 2022-01-01 VITALS
HEART RATE: 89 BPM | TEMPERATURE: 97.8 F | SYSTOLIC BLOOD PRESSURE: 99 MMHG | RESPIRATION RATE: 25 BRPM | HEIGHT: 66 IN | OXYGEN SATURATION: 98 % | BODY MASS INDEX: 24.09 KG/M2 | DIASTOLIC BLOOD PRESSURE: 65 MMHG | WEIGHT: 149.91 LBS

## 2022-01-01 DIAGNOSIS — I10 ESSENTIAL HYPERTENSION, BENIGN: ICD-10-CM

## 2022-01-01 DIAGNOSIS — R55 SYNCOPE AND COLLAPSE: Primary | ICD-10-CM

## 2022-01-01 DIAGNOSIS — I50.23 ACUTE ON CHRONIC SYSTOLIC CHF (CONGESTIVE HEART FAILURE) (HCC): ICD-10-CM

## 2022-01-01 DIAGNOSIS — J96.02 ACUTE HYPERCAPNIC RESPIRATORY FAILURE (HCC): ICD-10-CM

## 2022-01-01 DIAGNOSIS — E86.1 HYPOTENSION DUE TO HYPOVOLEMIA: Primary | ICD-10-CM

## 2022-01-01 DIAGNOSIS — R53.83 OTHER FATIGUE: Primary | ICD-10-CM

## 2022-01-01 DIAGNOSIS — Z99.2 ESRD NEEDING DIALYSIS (HCC): ICD-10-CM

## 2022-01-01 DIAGNOSIS — R09.89 PULMONARY CONGESTION: ICD-10-CM

## 2022-01-01 DIAGNOSIS — E11.65 UNCONTROLLED TYPE 2 DIABETES MELLITUS WITH HYPERGLYCEMIA (HCC): ICD-10-CM

## 2022-01-01 DIAGNOSIS — N18.6 ESRD NEEDING DIALYSIS (HCC): ICD-10-CM

## 2022-01-01 DIAGNOSIS — Z95.810 ICD (IMPLANTABLE CARDIOVERTER-DEFIBRILLATOR) IN PLACE: ICD-10-CM

## 2022-01-01 DIAGNOSIS — E78.2 MIXED HYPERLIPIDEMIA: ICD-10-CM

## 2022-01-01 DIAGNOSIS — I95.89 OTHER SPECIFIED HYPOTENSION: ICD-10-CM

## 2022-01-01 DIAGNOSIS — R17 ELEVATED BILIRUBIN: ICD-10-CM

## 2022-01-01 DIAGNOSIS — I50.82 BIVENTRICULAR HEART FAILURE (HCC): ICD-10-CM

## 2022-01-01 DIAGNOSIS — R60.0 BILATERAL LOWER EXTREMITY EDEMA: ICD-10-CM

## 2022-01-01 DIAGNOSIS — J90 PLEURAL EFFUSION: ICD-10-CM

## 2022-01-01 DIAGNOSIS — I95.9 HYPOTENSION, UNSPECIFIED HYPOTENSION TYPE: ICD-10-CM

## 2022-01-01 DIAGNOSIS — R60.9 PERIPHERAL EDEMA: ICD-10-CM

## 2022-01-01 DIAGNOSIS — J96.02 ACUTE RESPIRATORY FAILURE WITH HYPOXIA AND HYPERCAPNIA (HCC): ICD-10-CM

## 2022-01-01 DIAGNOSIS — I50.9 ACUTE ON CHRONIC CONGESTIVE HEART FAILURE, UNSPECIFIED HEART FAILURE TYPE (HCC): Primary | ICD-10-CM

## 2022-01-01 DIAGNOSIS — N18.6 ESRD (END STAGE RENAL DISEASE) (HCC): Primary | ICD-10-CM

## 2022-01-01 DIAGNOSIS — I95.89 HYPOTENSION DUE TO HYPOVOLEMIA: Primary | ICD-10-CM

## 2022-01-01 DIAGNOSIS — Z95.0 PACEMAKER: ICD-10-CM

## 2022-01-01 DIAGNOSIS — H11.33 SUBCONJUNCTIVAL HEMORRHAGE OF BOTH EYES: Primary | ICD-10-CM

## 2022-01-01 DIAGNOSIS — J96.01 ACUTE RESPIRATORY FAILURE WITH HYPOXIA AND HYPERCAPNIA (HCC): ICD-10-CM

## 2022-01-01 DIAGNOSIS — I50.43 ACUTE ON CHRONIC COMBINED SYSTOLIC AND DIASTOLIC HF (HEART FAILURE) (HCC): ICD-10-CM

## 2022-01-01 DIAGNOSIS — Z99.81 REQUIRES OXYGEN THERAPY: ICD-10-CM

## 2022-01-01 DIAGNOSIS — J90 PLEURAL EFFUSION, BILATERAL: ICD-10-CM

## 2022-01-01 DIAGNOSIS — N18.31 STAGE 3A CHRONIC KIDNEY DISEASE (HCC): Primary | ICD-10-CM

## 2022-01-01 DIAGNOSIS — N18.4 STAGE 4 CHRONIC KIDNEY DISEASE (HCC): ICD-10-CM

## 2022-01-01 DIAGNOSIS — R06.09 DYSPNEA ON EXERTION: ICD-10-CM

## 2022-01-01 DIAGNOSIS — I51.7 CARDIOMEGALY: ICD-10-CM

## 2022-01-01 DIAGNOSIS — H57.89 EYE DISCHARGE: ICD-10-CM

## 2022-01-01 DIAGNOSIS — E11.21 DIABETIC NEPHROPATHY ASSOCIATED WITH TYPE 2 DIABETES MELLITUS (HCC): ICD-10-CM

## 2022-01-01 DIAGNOSIS — R41.82 ALTERED MENTAL STATUS, UNSPECIFIED ALTERED MENTAL STATUS TYPE: ICD-10-CM

## 2022-01-01 DIAGNOSIS — I25.118 CORONARY ARTERY DISEASE OF NATIVE ARTERY OF NATIVE HEART WITH STABLE ANGINA PECTORIS (HCC): ICD-10-CM

## 2022-01-01 DIAGNOSIS — H54.7 ALTERATION IN VISION: ICD-10-CM

## 2022-01-01 DIAGNOSIS — R74.01 TRANSAMINITIS: Primary | ICD-10-CM

## 2022-01-01 DIAGNOSIS — Z09 HOSPITAL DISCHARGE FOLLOW-UP: Primary | ICD-10-CM

## 2022-01-01 LAB
A/G RATIO: 0.7 (ref 1.1–2.2)
A/G RATIO: 0.8 (ref 1.1–2.2)
A/G RATIO: 0.9 (ref 1.1–2.2)
A/G RATIO: 1 (ref 1.1–2.2)
A/G RATIO: 1.1 (ref 1.1–2.2)
A/G RATIO: 1.2 (ref 1.1–2.2)
A/G RATIO: 1.3 (ref 1.1–2.2)
A/G RATIO: 1.4 (ref 1.1–2.2)
A/G RATIO: 1.4 (ref 1.1–2.2)
A/G RATIO: 1.6 (ref 1.1–2.2)
A/G RATIO: 1.7 (ref 1.1–2.2)
ABO/RH: NORMAL
ACANTHOCYTES: ABNORMAL
ACANTHOCYTES: ABNORMAL
ALBUMIN SERPL-MCNC: 2.6 G/DL (ref 3.4–5)
ALBUMIN SERPL-MCNC: 2.7 G/DL (ref 3.4–5)
ALBUMIN SERPL-MCNC: 2.8 G/DL (ref 3.4–5)
ALBUMIN SERPL-MCNC: 2.9 G/DL (ref 3.4–5)
ALBUMIN SERPL-MCNC: 3 G/DL (ref 3.4–5)
ALBUMIN SERPL-MCNC: 3.1 G/DL (ref 3.4–5)
ALBUMIN SERPL-MCNC: 3.2 G/DL (ref 3.4–5)
ALBUMIN SERPL-MCNC: 3.3 G/DL (ref 3.4–5)
ALBUMIN SERPL-MCNC: 3.4 G/DL (ref 3.4–5)
ALBUMIN SERPL-MCNC: 3.5 G/DL (ref 3.4–5)
ALBUMIN SERPL-MCNC: 3.6 G/DL (ref 3.4–5)
ALBUMIN SERPL-MCNC: 3.7 G/DL (ref 3.4–5)
ALBUMIN SERPL-MCNC: 3.8 G/DL (ref 3.4–5)
ALBUMIN SERPL-MCNC: 3.9 G/DL (ref 3.4–5)
ALBUMIN SERPL-MCNC: 4.2 G/DL (ref 3.4–5)
ALP BLD-CCNC: 103 U/L (ref 40–129)
ALP BLD-CCNC: 107 U/L (ref 40–129)
ALP BLD-CCNC: 111 U/L (ref 40–129)
ALP BLD-CCNC: 114 U/L (ref 40–129)
ALP BLD-CCNC: 115 U/L (ref 40–129)
ALP BLD-CCNC: 115 U/L (ref 40–129)
ALP BLD-CCNC: 117 U/L (ref 40–129)
ALP BLD-CCNC: 117 U/L (ref 40–129)
ALP BLD-CCNC: 118 U/L (ref 40–129)
ALP BLD-CCNC: 119 U/L (ref 40–129)
ALP BLD-CCNC: 119 U/L (ref 40–129)
ALP BLD-CCNC: 120 U/L (ref 40–129)
ALP BLD-CCNC: 121 U/L (ref 40–129)
ALP BLD-CCNC: 121 U/L (ref 40–129)
ALP BLD-CCNC: 122 U/L (ref 40–129)
ALP BLD-CCNC: 127 U/L (ref 40–129)
ALP BLD-CCNC: 127 U/L (ref 40–129)
ALP BLD-CCNC: 128 U/L (ref 40–129)
ALP BLD-CCNC: 166 U/L (ref 40–129)
ALP BLD-CCNC: 181 U/L (ref 40–129)
ALP BLD-CCNC: 184 U/L (ref 40–129)
ALP BLD-CCNC: 184 U/L (ref 40–129)
ALP BLD-CCNC: 185 U/L (ref 40–129)
ALP BLD-CCNC: 188 U/L (ref 40–129)
ALP BLD-CCNC: 191 U/L (ref 40–129)
ALP BLD-CCNC: 192 U/L (ref 40–129)
ALP BLD-CCNC: 200 U/L (ref 40–129)
ALP BLD-CCNC: 203 U/L (ref 40–129)
ALP BLD-CCNC: 204 U/L (ref 40–129)
ALP BLD-CCNC: 206 U/L (ref 40–129)
ALP BLD-CCNC: 209 U/L (ref 40–129)
ALP BLD-CCNC: 210 U/L (ref 40–129)
ALP BLD-CCNC: 215 U/L (ref 40–129)
ALP BLD-CCNC: 216 U/L (ref 40–129)
ALP BLD-CCNC: 238 U/L (ref 40–129)
ALP BLD-CCNC: 262 U/L (ref 40–129)
ALP BLD-CCNC: 274 U/L (ref 40–129)
ALP BLD-CCNC: 277 U/L (ref 40–129)
ALP BLD-CCNC: 278 U/L (ref 40–129)
ALP BLD-CCNC: 284 U/L (ref 40–129)
ALP BLD-CCNC: 288 U/L (ref 40–129)
ALP BLD-CCNC: 300 U/L (ref 40–129)
ALT SERPL-CCNC: 104 U/L (ref 10–40)
ALT SERPL-CCNC: 109 U/L (ref 10–40)
ALT SERPL-CCNC: 12 U/L (ref 10–40)
ALT SERPL-CCNC: 129 U/L (ref 10–40)
ALT SERPL-CCNC: 13 U/L (ref 10–40)
ALT SERPL-CCNC: 13 U/L (ref 10–40)
ALT SERPL-CCNC: 14 U/L (ref 10–40)
ALT SERPL-CCNC: 15 U/L (ref 10–40)
ALT SERPL-CCNC: 150 U/L (ref 10–40)
ALT SERPL-CCNC: 16 U/L (ref 10–40)
ALT SERPL-CCNC: 161 U/L (ref 10–40)
ALT SERPL-CCNC: 163 U/L (ref 10–40)
ALT SERPL-CCNC: 179 U/L (ref 10–40)
ALT SERPL-CCNC: 18 U/L (ref 10–40)
ALT SERPL-CCNC: 18 U/L (ref 10–40)
ALT SERPL-CCNC: 19 U/L (ref 10–40)
ALT SERPL-CCNC: 19 U/L (ref 10–40)
ALT SERPL-CCNC: 193 U/L (ref 10–40)
ALT SERPL-CCNC: 197 U/L (ref 10–40)
ALT SERPL-CCNC: 22 U/L (ref 10–40)
ALT SERPL-CCNC: 23 U/L (ref 10–40)
ALT SERPL-CCNC: 233 U/L (ref 10–40)
ALT SERPL-CCNC: 26 U/L (ref 10–40)
ALT SERPL-CCNC: 46 U/L (ref 10–40)
ALT SERPL-CCNC: 47 U/L (ref 10–40)
ALT SERPL-CCNC: 59 U/L (ref 10–40)
ALT SERPL-CCNC: 60 U/L (ref 10–40)
ALT SERPL-CCNC: 65 U/L (ref 10–40)
ALT SERPL-CCNC: 67 U/L (ref 10–40)
ALT SERPL-CCNC: 76 U/L (ref 10–40)
ALT SERPL-CCNC: 89 U/L (ref 10–40)
ALT SERPL-CCNC: 99 U/L (ref 10–40)
AMMONIA: 31 UMOL/L (ref 16–60)
AMMONIA: 37 UMOL/L (ref 16–60)
ANAEROBIC CULTURE: ABNORMAL
ANION GAP SERPL CALCULATED.3IONS-SCNC: 10 MMOL/L (ref 3–16)
ANION GAP SERPL CALCULATED.3IONS-SCNC: 11 MMOL/L (ref 3–16)
ANION GAP SERPL CALCULATED.3IONS-SCNC: 12 MMOL/L (ref 3–16)
ANION GAP SERPL CALCULATED.3IONS-SCNC: 13 MMOL/L (ref 3–16)
ANION GAP SERPL CALCULATED.3IONS-SCNC: 14 MMOL/L (ref 3–16)
ANION GAP SERPL CALCULATED.3IONS-SCNC: 15 MMOL/L (ref 3–16)
ANION GAP SERPL CALCULATED.3IONS-SCNC: 16 MMOL/L (ref 3–16)
ANION GAP SERPL CALCULATED.3IONS-SCNC: 17 MMOL/L (ref 3–16)
ANION GAP SERPL CALCULATED.3IONS-SCNC: 18 MMOL/L (ref 3–16)
ANION GAP SERPL CALCULATED.3IONS-SCNC: 19 MMOL/L (ref 3–16)
ANION GAP SERPL CALCULATED.3IONS-SCNC: 20 MMOL/L (ref 3–16)
ANION GAP SERPL CALCULATED.3IONS-SCNC: 21 MMOL/L (ref 3–16)
ANION GAP SERPL CALCULATED.3IONS-SCNC: 22 MMOL/L (ref 3–16)
ANION GAP SERPL CALCULATED.3IONS-SCNC: 9 MMOL/L (ref 3–16)
ANISOCYTOSIS: ABNORMAL
ANISOCYTOSIS: ABNORMAL
ANTIBODY SCREEN: NORMAL
APPEARANCE FLUID: NORMAL
APTT: 115 SEC (ref 23–34.3)
APTT: 165.5 SEC (ref 23–34.3)
APTT: 39.8 SEC (ref 23–34.3)
APTT: 62 SEC (ref 23–34.3)
APTT: 65.8 SEC (ref 23–34.3)
APTT: 67.5 SEC (ref 23–34.3)
AST SERPL-CCNC: 120 U/L (ref 15–37)
AST SERPL-CCNC: 129 U/L (ref 15–37)
AST SERPL-CCNC: 144 U/L (ref 15–37)
AST SERPL-CCNC: 156 U/L (ref 15–37)
AST SERPL-CCNC: 17 U/L (ref 15–37)
AST SERPL-CCNC: 179 U/L (ref 15–37)
AST SERPL-CCNC: 18 U/L (ref 15–37)
AST SERPL-CCNC: 19 U/L (ref 15–37)
AST SERPL-CCNC: 20 U/L (ref 15–37)
AST SERPL-CCNC: 21 U/L (ref 15–37)
AST SERPL-CCNC: 22 U/L (ref 15–37)
AST SERPL-CCNC: 221 U/L (ref 15–37)
AST SERPL-CCNC: 27 U/L (ref 15–37)
AST SERPL-CCNC: 28 U/L (ref 15–37)
AST SERPL-CCNC: 28 U/L (ref 15–37)
AST SERPL-CCNC: 30 U/L (ref 15–37)
AST SERPL-CCNC: 308 U/L (ref 15–37)
AST SERPL-CCNC: 31 U/L (ref 15–37)
AST SERPL-CCNC: 350 U/L (ref 15–37)
AST SERPL-CCNC: 383 U/L (ref 15–37)
AST SERPL-CCNC: 429 U/L (ref 15–37)
AST SERPL-CCNC: 44 U/L (ref 15–37)
AST SERPL-CCNC: 440 U/L (ref 15–37)
AST SERPL-CCNC: 475 U/L (ref 15–37)
AST SERPL-CCNC: 495 U/L (ref 15–37)
AST SERPL-CCNC: 57 U/L (ref 15–37)
AST SERPL-CCNC: 68 U/L (ref 15–37)
AST SERPL-CCNC: 71 U/L (ref 15–37)
AST SERPL-CCNC: 77 U/L (ref 15–37)
AST SERPL-CCNC: 82 U/L (ref 15–37)
AST SERPL-CCNC: 97 U/L (ref 15–37)
BACTERIA: ABNORMAL /HPF
BASE EXCESS ARTERIAL: -2 (ref -3–3)
BASE EXCESS ARTERIAL: -3 (ref -3–3)
BASE EXCESS ARTERIAL: -5.2 MMOL/L (ref -3–3)
BASE EXCESS ARTERIAL: -9 (ref -3–3)
BASE EXCESS ARTERIAL: 0 (ref -3–3)
BASE EXCESS ARTERIAL: 1.4 MMOL/L (ref -3–3)
BASE EXCESS ARTERIAL: 2 (ref -3–3)
BASE EXCESS VENOUS: -0.3 MMOL/L (ref -2–3)
BASE EXCESS VENOUS: 0 MMOL/L (ref -2–3)
BASE EXCESS VENOUS: 0.3 MMOL/L (ref -2–3)
BASOPHILS ABSOLUTE: 0 K/UL (ref 0–0.2)
BASOPHILS ABSOLUTE: 0.1 K/UL (ref 0–0.2)
BASOPHILS ABSOLUTE: 0.2 K/UL (ref 0–0.2)
BASOPHILS RELATIVE PERCENT: 0.1 %
BASOPHILS RELATIVE PERCENT: 0.2 %
BASOPHILS RELATIVE PERCENT: 0.2 %
BASOPHILS RELATIVE PERCENT: 0.3 %
BASOPHILS RELATIVE PERCENT: 0.4 %
BASOPHILS RELATIVE PERCENT: 0.5 %
BASOPHILS RELATIVE PERCENT: 0.6 %
BASOPHILS RELATIVE PERCENT: 0.6 %
BASOPHILS RELATIVE PERCENT: 0.7 %
BASOPHILS RELATIVE PERCENT: 0.8 %
BASOPHILS RELATIVE PERCENT: 0.9 %
BASOPHILS RELATIVE PERCENT: 1 %
BASOPHILS RELATIVE PERCENT: 1.1 %
BASOPHILS RELATIVE PERCENT: 1.2 %
BASOPHILS RELATIVE PERCENT: 1.3 %
BASOPHILS RELATIVE PERCENT: 1.4 %
BASOPHILS RELATIVE PERCENT: 1.5 %
BASOPHILS RELATIVE PERCENT: 1.5 %
BASOPHILS RELATIVE PERCENT: 1.6 %
BASOPHILS RELATIVE PERCENT: 1.6 %
BASOPHILS RELATIVE PERCENT: 1.7 %
BASOPHILS RELATIVE PERCENT: 1.7 %
BASOPHILS RELATIVE PERCENT: 1.9 %
BASOPHILS RELATIVE PERCENT: 2 %
BASOPHILS RELATIVE PERCENT: 2.1 %
BASOPHILS RELATIVE PERCENT: 2.2 %
BASOPHILS RELATIVE PERCENT: 2.5 %
BASOPHILS RELATIVE PERCENT: 2.5 %
BASOPHILS RELATIVE PERCENT: 4 %
BETA-HYDROXYBUTYRATE: 0.24 MMOL/L (ref 0–0.27)
BILIRUB SERPL-MCNC: 0.6 MG/DL (ref 0–1)
BILIRUB SERPL-MCNC: 0.7 MG/DL (ref 0–1)
BILIRUB SERPL-MCNC: 0.8 MG/DL (ref 0–1)
BILIRUB SERPL-MCNC: 1 MG/DL (ref 0–1)
BILIRUB SERPL-MCNC: 1 MG/DL (ref 0–1)
BILIRUB SERPL-MCNC: 10.1 MG/DL (ref 0–1)
BILIRUB SERPL-MCNC: 10.1 MG/DL (ref 0–1)
BILIRUB SERPL-MCNC: 10.6 MG/DL (ref 0–1)
BILIRUB SERPL-MCNC: 11 MG/DL (ref 0–1)
BILIRUB SERPL-MCNC: 11.7 MG/DL (ref 0–1)
BILIRUB SERPL-MCNC: 12.2 MG/DL (ref 0–1)
BILIRUB SERPL-MCNC: 12.5 MG/DL (ref 0–1)
BILIRUB SERPL-MCNC: 2.1 MG/DL (ref 0–1)
BILIRUB SERPL-MCNC: 2.1 MG/DL (ref 0–1)
BILIRUB SERPL-MCNC: 2.2 MG/DL (ref 0–1)
BILIRUB SERPL-MCNC: 2.3 MG/DL (ref 0–1)
BILIRUB SERPL-MCNC: 2.6 MG/DL (ref 0–1)
BILIRUB SERPL-MCNC: 4.3 MG/DL (ref 0–1)
BILIRUB SERPL-MCNC: 5.4 MG/DL (ref 0–1)
BILIRUB SERPL-MCNC: 5.4 MG/DL (ref 0–1)
BILIRUB SERPL-MCNC: 6 MG/DL (ref 0–1)
BILIRUB SERPL-MCNC: 6.5 MG/DL (ref 0–1)
BILIRUB SERPL-MCNC: 6.9 MG/DL (ref 0–1)
BILIRUB SERPL-MCNC: 7.2 MG/DL (ref 0–1)
BILIRUB SERPL-MCNC: 7.9 MG/DL (ref 0–1)
BILIRUB SERPL-MCNC: 8 MG/DL (ref 0–1)
BILIRUB SERPL-MCNC: 8.8 MG/DL (ref 0–1)
BILIRUB SERPL-MCNC: 9.2 MG/DL (ref 0–1)
BILIRUBIN DIRECT: 1.2 MG/DL (ref 0–0.3)
BILIRUBIN DIRECT: 1.3 MG/DL (ref 0–0.3)
BILIRUBIN DIRECT: 1.3 MG/DL (ref 0–0.3)
BILIRUBIN DIRECT: 1.5 MG/DL (ref 0–0.3)
BILIRUBIN DIRECT: 4.7 MG/DL (ref 0–0.3)
BILIRUBIN URINE: ABNORMAL
BILIRUBIN URINE: NEGATIVE
BILIRUBIN, INDIRECT: 0.8 MG/DL (ref 0–1)
BILIRUBIN, INDIRECT: 0.9 MG/DL (ref 0–1)
BILIRUBIN, INDIRECT: 1 MG/DL (ref 0–1)
BILIRUBIN, INDIRECT: 1.1 MG/DL (ref 0–1)
BILIRUBIN, INDIRECT: 1.8 MG/DL (ref 0–1)
BLOOD BANK DISPENSE STATUS: NORMAL
BLOOD BANK DISPENSE STATUS: NORMAL
BLOOD BANK PRODUCT CODE: NORMAL
BLOOD BANK PRODUCT CODE: NORMAL
BLOOD CULTURE, ROUTINE: ABNORMAL
BLOOD CULTURE, ROUTINE: NORMAL
BLOOD, URINE: ABNORMAL
BLOOD, URINE: NEGATIVE
BODY FLUID CULTURE, STERILE: NORMAL
BPU ID: NORMAL
BPU ID: NORMAL
BUN BLDV-MCNC: 100 MG/DL (ref 7–20)
BUN BLDV-MCNC: 103 MG/DL (ref 7–20)
BUN BLDV-MCNC: 103 MG/DL (ref 7–20)
BUN BLDV-MCNC: 108 MG/DL (ref 7–20)
BUN BLDV-MCNC: 112 MG/DL (ref 7–20)
BUN BLDV-MCNC: 118 MG/DL (ref 7–20)
BUN BLDV-MCNC: 128 MG/DL (ref 7–20)
BUN BLDV-MCNC: 13 MG/DL (ref 7–20)
BUN BLDV-MCNC: 14 MG/DL (ref 7–20)
BUN BLDV-MCNC: 14 MG/DL (ref 7–20)
BUN BLDV-MCNC: 15 MG/DL (ref 7–20)
BUN BLDV-MCNC: 21 MG/DL (ref 7–20)
BUN BLDV-MCNC: 25 MG/DL (ref 7–20)
BUN BLDV-MCNC: 25 MG/DL (ref 7–20)
BUN BLDV-MCNC: 27 MG/DL (ref 7–20)
BUN BLDV-MCNC: 28 MG/DL (ref 7–20)
BUN BLDV-MCNC: 31 MG/DL (ref 7–20)
BUN BLDV-MCNC: 32 MG/DL (ref 7–20)
BUN BLDV-MCNC: 33 MG/DL (ref 7–20)
BUN BLDV-MCNC: 35 MG/DL (ref 7–20)
BUN BLDV-MCNC: 36 MG/DL (ref 7–20)
BUN BLDV-MCNC: 36 MG/DL (ref 7–20)
BUN BLDV-MCNC: 39 MG/DL (ref 7–20)
BUN BLDV-MCNC: 39 MG/DL (ref 7–20)
BUN BLDV-MCNC: 40 MG/DL (ref 7–20)
BUN BLDV-MCNC: 41 MG/DL (ref 7–20)
BUN BLDV-MCNC: 42 MG/DL (ref 7–20)
BUN BLDV-MCNC: 42 MG/DL (ref 7–20)
BUN BLDV-MCNC: 43 MG/DL (ref 7–20)
BUN BLDV-MCNC: 44 MG/DL (ref 7–20)
BUN BLDV-MCNC: 44 MG/DL (ref 7–20)
BUN BLDV-MCNC: 46 MG/DL (ref 7–20)
BUN BLDV-MCNC: 48 MG/DL (ref 7–20)
BUN BLDV-MCNC: 48 MG/DL (ref 7–20)
BUN BLDV-MCNC: 50 MG/DL (ref 7–20)
BUN BLDV-MCNC: 51 MG/DL (ref 7–20)
BUN BLDV-MCNC: 52 MG/DL (ref 7–20)
BUN BLDV-MCNC: 53 MG/DL (ref 7–20)
BUN BLDV-MCNC: 54 MG/DL (ref 7–20)
BUN BLDV-MCNC: 55 MG/DL (ref 7–20)
BUN BLDV-MCNC: 56 MG/DL (ref 7–20)
BUN BLDV-MCNC: 58 MG/DL (ref 7–20)
BUN BLDV-MCNC: 60 MG/DL (ref 7–20)
BUN BLDV-MCNC: 63 MG/DL (ref 7–20)
BUN BLDV-MCNC: 64 MG/DL (ref 7–20)
BUN BLDV-MCNC: 64 MG/DL (ref 7–20)
BUN BLDV-MCNC: 65 MG/DL (ref 7–20)
BUN BLDV-MCNC: 65 MG/DL (ref 7–20)
BUN BLDV-MCNC: 67 MG/DL (ref 7–20)
BUN BLDV-MCNC: 68 MG/DL (ref 7–20)
BUN BLDV-MCNC: 68 MG/DL (ref 7–20)
BUN BLDV-MCNC: 69 MG/DL (ref 7–20)
BUN BLDV-MCNC: 69 MG/DL (ref 7–20)
BUN BLDV-MCNC: 72 MG/DL (ref 7–20)
BUN BLDV-MCNC: 75 MG/DL (ref 7–20)
BUN BLDV-MCNC: 75 MG/DL (ref 7–20)
BUN BLDV-MCNC: 78 MG/DL (ref 7–20)
BUN BLDV-MCNC: 79 MG/DL (ref 7–20)
BUN BLDV-MCNC: 79 MG/DL (ref 7–20)
BUN BLDV-MCNC: 80 MG/DL (ref 7–20)
BUN BLDV-MCNC: 80 MG/DL (ref 7–20)
BUN BLDV-MCNC: 81 MG/DL (ref 7–20)
BUN BLDV-MCNC: 82 MG/DL (ref 7–20)
BUN BLDV-MCNC: 82 MG/DL (ref 7–20)
BUN BLDV-MCNC: 86 MG/DL (ref 7–20)
BUN BLDV-MCNC: 90 MG/DL (ref 7–20)
BUN BLDV-MCNC: 98 MG/DL (ref 7–20)
BURR CELLS: ABNORMAL
C DIFF TOXIN/ANTIGEN: NORMAL
CALCIUM IONIZED: 1.03 MMOL/L (ref 1.12–1.32)
CALCIUM IONIZED: 1.05 MMOL/L (ref 1.12–1.32)
CALCIUM IONIZED: 1.05 MMOL/L (ref 1.12–1.32)
CALCIUM IONIZED: 1.06 MMOL/L (ref 1.12–1.32)
CALCIUM IONIZED: 1.07 MMOL/L (ref 1.12–1.32)
CALCIUM IONIZED: 1.07 MMOL/L (ref 1.12–1.32)
CALCIUM IONIZED: 1.09 MMOL/L (ref 1.12–1.32)
CALCIUM IONIZED: 1.1 MMOL/L (ref 1.12–1.32)
CALCIUM IONIZED: 1.11 MMOL/L (ref 1.12–1.32)
CALCIUM IONIZED: 1.14 MMOL/L (ref 1.12–1.32)
CALCIUM IONIZED: 1.15 MMOL/L (ref 1.12–1.32)
CALCIUM SERPL-MCNC: 7.9 MG/DL (ref 8.3–10.6)
CALCIUM SERPL-MCNC: 8 MG/DL (ref 8.3–10.6)
CALCIUM SERPL-MCNC: 8.1 MG/DL (ref 8.3–10.6)
CALCIUM SERPL-MCNC: 8.1 MG/DL (ref 8.3–10.6)
CALCIUM SERPL-MCNC: 8.2 MG/DL (ref 8.3–10.6)
CALCIUM SERPL-MCNC: 8.3 MG/DL (ref 8.3–10.6)
CALCIUM SERPL-MCNC: 8.4 MG/DL (ref 8.3–10.6)
CALCIUM SERPL-MCNC: 8.5 MG/DL (ref 8.3–10.6)
CALCIUM SERPL-MCNC: 8.6 MG/DL (ref 8.3–10.6)
CALCIUM SERPL-MCNC: 8.7 MG/DL (ref 8.3–10.6)
CALCIUM SERPL-MCNC: 8.8 MG/DL (ref 8.3–10.6)
CALCIUM SERPL-MCNC: 8.9 MG/DL (ref 8.3–10.6)
CALCIUM SERPL-MCNC: 9 MG/DL (ref 8.3–10.6)
CALCIUM SERPL-MCNC: 9.2 MG/DL (ref 8.3–10.6)
CALCIUM SERPL-MCNC: 9.4 MG/DL (ref 8.3–10.6)
CARBOXYHEMOGLOBIN ARTERIAL: 1.6 % (ref 0–1.5)
CARBOXYHEMOGLOBIN ARTERIAL: 1.6 % (ref 0–1.5)
CARBOXYHEMOGLOBIN: 1.1 % (ref 0–1.5)
CARBOXYHEMOGLOBIN: 1.1 % (ref 0–1.5)
CARBOXYHEMOGLOBIN: 1.2 % (ref 0–1.5)
CELL COUNT FLUID TYPE: NORMAL
CHLORIDE BLD-SCNC: 100 MMOL/L (ref 99–110)
CHLORIDE BLD-SCNC: 101 MMOL/L (ref 99–110)
CHLORIDE BLD-SCNC: 102 MMOL/L (ref 99–110)
CHLORIDE BLD-SCNC: 102 MMOL/L (ref 99–110)
CHLORIDE BLD-SCNC: 103 MMOL/L (ref 99–110)
CHLORIDE BLD-SCNC: 104 MMOL/L (ref 99–110)
CHLORIDE BLD-SCNC: 105 MMOL/L (ref 99–110)
CHLORIDE BLD-SCNC: 106 MMOL/L (ref 99–110)
CHLORIDE BLD-SCNC: 86 MMOL/L (ref 99–110)
CHLORIDE BLD-SCNC: 87 MMOL/L (ref 99–110)
CHLORIDE BLD-SCNC: 88 MMOL/L (ref 99–110)
CHLORIDE BLD-SCNC: 89 MMOL/L (ref 99–110)
CHLORIDE BLD-SCNC: 90 MMOL/L (ref 99–110)
CHLORIDE BLD-SCNC: 91 MMOL/L (ref 99–110)
CHLORIDE BLD-SCNC: 92 MMOL/L (ref 99–110)
CHLORIDE BLD-SCNC: 93 MMOL/L (ref 99–110)
CHLORIDE BLD-SCNC: 94 MMOL/L (ref 99–110)
CHLORIDE BLD-SCNC: 95 MMOL/L (ref 99–110)
CHLORIDE BLD-SCNC: 96 MMOL/L (ref 99–110)
CHLORIDE BLD-SCNC: 97 MMOL/L (ref 99–110)
CHLORIDE BLD-SCNC: 98 MMOL/L (ref 99–110)
CHLORIDE BLD-SCNC: 99 MMOL/L (ref 99–110)
CHOLESTEROL, TOTAL: 95 MG/DL (ref 0–199)
CLARITY: ABNORMAL
CLARITY: CLEAR
CLOT EVALUATION: NORMAL
CO2: 14 MMOL/L (ref 21–32)
CO2: 15 MMOL/L (ref 21–32)
CO2: 15 MMOL/L (ref 21–32)
CO2: 17 MMOL/L (ref 21–32)
CO2: 18 MMOL/L (ref 21–32)
CO2: 19 MMOL/L (ref 21–32)
CO2: 20 MMOL/L (ref 21–32)
CO2: 21 MMOL/L (ref 21–32)
CO2: 22 MMOL/L (ref 21–32)
CO2: 23 MMOL/L (ref 21–32)
CO2: 24 MMOL/L (ref 21–32)
CO2: 24 MMOL/L (ref 21–32)
CO2: 25 MMOL/L (ref 21–32)
CO2: 26 MMOL/L (ref 21–32)
CO2: 27 MMOL/L (ref 21–32)
CO2: 27 MMOL/L (ref 21–32)
CO2: 28 MMOL/L (ref 21–32)
CO2: 29 MMOL/L (ref 21–32)
CO2: 30 MMOL/L (ref 21–32)
CO2: 31 MMOL/L (ref 21–32)
CO2: 32 MMOL/L (ref 21–32)
COLOR FLUID: YELLOW
COLOR: YELLOW
COMMENT UA: ABNORMAL
COMMENT UA: ABNORMAL
CORTISOL - AM: 18.7 UG/DL (ref 4.3–22.4)
CORTISOL TOTAL: 7.8 UG/DL
CREAT SERPL-MCNC: 1.2 MG/DL (ref 0.8–1.3)
CREAT SERPL-MCNC: 1.4 MG/DL (ref 0.8–1.3)
CREAT SERPL-MCNC: 1.6 MG/DL (ref 0.8–1.3)
CREAT SERPL-MCNC: 1.8 MG/DL (ref 0.8–1.3)
CREAT SERPL-MCNC: 1.9 MG/DL (ref 0.8–1.3)
CREAT SERPL-MCNC: 2 MG/DL (ref 0.8–1.3)
CREAT SERPL-MCNC: 2.1 MG/DL (ref 0.8–1.3)
CREAT SERPL-MCNC: 2.2 MG/DL (ref 0.8–1.3)
CREAT SERPL-MCNC: 2.3 MG/DL (ref 0.8–1.3)
CREAT SERPL-MCNC: 2.4 MG/DL (ref 0.8–1.3)
CREAT SERPL-MCNC: 2.4 MG/DL (ref 0.8–1.3)
CREAT SERPL-MCNC: 2.5 MG/DL (ref 0.8–1.3)
CREAT SERPL-MCNC: 2.6 MG/DL (ref 0.8–1.3)
CREAT SERPL-MCNC: 2.7 MG/DL (ref 0.8–1.3)
CREAT SERPL-MCNC: 2.8 MG/DL (ref 0.8–1.3)
CREAT SERPL-MCNC: 2.9 MG/DL (ref 0.8–1.3)
CREAT SERPL-MCNC: 3 MG/DL (ref 0.8–1.3)
CREAT SERPL-MCNC: 3.1 MG/DL (ref 0.8–1.3)
CREAT SERPL-MCNC: 3.2 MG/DL (ref 0.8–1.3)
CREAT SERPL-MCNC: 3.2 MG/DL (ref 0.8–1.3)
CREAT SERPL-MCNC: 3.3 MG/DL (ref 0.8–1.3)
CREAT SERPL-MCNC: 3.4 MG/DL (ref 0.8–1.3)
CREAT SERPL-MCNC: 3.5 MG/DL (ref 0.8–1.3)
CREAT SERPL-MCNC: 3.7 MG/DL (ref 0.8–1.3)
CREAT SERPL-MCNC: 3.7 MG/DL (ref 0.8–1.3)
CREAT SERPL-MCNC: 4.1 MG/DL (ref 0.8–1.3)
CREAT SERPL-MCNC: 4.1 MG/DL (ref 0.8–1.3)
CREAT SERPL-MCNC: 4.2 MG/DL (ref 0.8–1.3)
CREAT SERPL-MCNC: 4.2 MG/DL (ref 0.8–1.3)
CREAT SERPL-MCNC: 4.3 MG/DL (ref 0.8–1.3)
CREAT SERPL-MCNC: 4.4 MG/DL (ref 0.8–1.3)
CREAT SERPL-MCNC: 4.5 MG/DL (ref 0.8–1.3)
CREAT SERPL-MCNC: 4.6 MG/DL (ref 0.8–1.3)
CREAT SERPL-MCNC: 4.7 MG/DL (ref 0.8–1.3)
CREAT SERPL-MCNC: 4.9 MG/DL (ref 0.8–1.3)
CREAT SERPL-MCNC: 5 MG/DL (ref 0.8–1.3)
CREAT SERPL-MCNC: 5.1 MG/DL (ref 0.8–1.3)
CREAT SERPL-MCNC: 5.4 MG/DL (ref 0.8–1.3)
CREAT SERPL-MCNC: 5.7 MG/DL (ref 0.8–1.3)
CREAT SERPL-MCNC: 6 MG/DL (ref 0.8–1.3)
CREAT SERPL-MCNC: 6 MG/DL (ref 0.8–1.3)
CREAT SERPL-MCNC: 8.1 MG/DL (ref 0.8–1.3)
CRENATED RBC'S: ABNORMAL
CULTURE CATHETER TIP: ABNORMAL
CULTURE, BLOOD 2: ABNORMAL
CULTURE, BLOOD 2: ABNORMAL
CULTURE, BLOOD 2: NORMAL
CULTURE, BLOOD 2: NORMAL
DESCRIPTION BLOOD BANK: NORMAL
DESCRIPTION BLOOD BANK: NORMAL
EKG ATRIAL RATE: 104 BPM
EKG ATRIAL RATE: 74 BPM
EKG ATRIAL RATE: 89 BPM
EKG ATRIAL RATE: 97 BPM
EKG ATRIAL RATE: 99 BPM
EKG DIAGNOSIS: NORMAL
EKG P AXIS: 19 DEGREES
EKG P AXIS: 49 DEGREES
EKG P AXIS: 55 DEGREES
EKG P AXIS: 60 DEGREES
EKG P AXIS: 95 DEGREES
EKG P-R INTERVAL: 160 MS
EKG P-R INTERVAL: 168 MS
EKG P-R INTERVAL: 200 MS
EKG P-R INTERVAL: 200 MS
EKG P-R INTERVAL: 210 MS
EKG Q-T INTERVAL: 350 MS
EKG Q-T INTERVAL: 376 MS
EKG Q-T INTERVAL: 384 MS
EKG Q-T INTERVAL: 396 MS
EKG Q-T INTERVAL: 434 MS
EKG QRS DURATION: 102 MS
EKG QRS DURATION: 102 MS
EKG QRS DURATION: 104 MS
EKG QRS DURATION: 104 MS
EKG QRS DURATION: 98 MS
EKG QTC CALCULATION (BAZETT): 460 MS
EKG QTC CALCULATION (BAZETT): 481 MS
EKG QTC CALCULATION (BAZETT): 481 MS
EKG QTC CALCULATION (BAZETT): 482 MS
EKG QTC CALCULATION (BAZETT): 487 MS
EKG R AXIS: -14 DEGREES
EKG R AXIS: -6 DEGREES
EKG R AXIS: -9 DEGREES
EKG R AXIS: 248 DEGREES
EKG R AXIS: 61 DEGREES
EKG T AXIS: 102 DEGREES
EKG T AXIS: 114 DEGREES
EKG T AXIS: 13 DEGREES
EKG T AXIS: 78 DEGREES
EKG T AXIS: 83 DEGREES
EKG VENTRICULAR RATE: 104 BPM
EKG VENTRICULAR RATE: 74 BPM
EKG VENTRICULAR RATE: 89 BPM
EKG VENTRICULAR RATE: 97 BPM
EKG VENTRICULAR RATE: 99 BPM
EOSINOPHILS ABSOLUTE: 0 K/UL (ref 0–0.6)
EOSINOPHILS ABSOLUTE: 0.1 K/UL (ref 0–0.6)
EOSINOPHILS ABSOLUTE: 0.2 K/UL (ref 0–0.6)
EOSINOPHILS ABSOLUTE: 0.3 K/UL (ref 0–0.6)
EOSINOPHILS ABSOLUTE: 0.4 K/UL (ref 0–0.6)
EOSINOPHILS RELATIVE PERCENT: 0 %
EOSINOPHILS RELATIVE PERCENT: 0.1 %
EOSINOPHILS RELATIVE PERCENT: 0.2 %
EOSINOPHILS RELATIVE PERCENT: 0.2 %
EOSINOPHILS RELATIVE PERCENT: 0.3 %
EOSINOPHILS RELATIVE PERCENT: 0.3 %
EOSINOPHILS RELATIVE PERCENT: 0.6 %
EOSINOPHILS RELATIVE PERCENT: 0.7 %
EOSINOPHILS RELATIVE PERCENT: 0.8 %
EOSINOPHILS RELATIVE PERCENT: 1 %
EOSINOPHILS RELATIVE PERCENT: 1.1 %
EOSINOPHILS RELATIVE PERCENT: 1.1 %
EOSINOPHILS RELATIVE PERCENT: 1.2 %
EOSINOPHILS RELATIVE PERCENT: 1.5 %
EOSINOPHILS RELATIVE PERCENT: 1.9 %
EOSINOPHILS RELATIVE PERCENT: 1.9 %
EOSINOPHILS RELATIVE PERCENT: 2.1 %
EOSINOPHILS RELATIVE PERCENT: 2.2 %
EOSINOPHILS RELATIVE PERCENT: 2.3 %
EOSINOPHILS RELATIVE PERCENT: 2.4 %
EOSINOPHILS RELATIVE PERCENT: 2.8 %
EOSINOPHILS RELATIVE PERCENT: 2.8 %
EOSINOPHILS RELATIVE PERCENT: 2.9 %
EOSINOPHILS RELATIVE PERCENT: 2.9 %
EOSINOPHILS RELATIVE PERCENT: 3 %
EOSINOPHILS RELATIVE PERCENT: 3.3 %
EOSINOPHILS RELATIVE PERCENT: 3.6 %
EOSINOPHILS RELATIVE PERCENT: 3.9 %
EOSINOPHILS RELATIVE PERCENT: 4 %
EOSINOPHILS RELATIVE PERCENT: 4.1 %
EOSINOPHILS RELATIVE PERCENT: 4.5 %
EOSINOPHILS RELATIVE PERCENT: 4.7 %
EOSINOPHILS RELATIVE PERCENT: 4.7 %
EOSINOPHILS RELATIVE PERCENT: 5.1 %
EOSINOPHILS RELATIVE PERCENT: 5.3 %
EOSINOPHILS RELATIVE PERCENT: 5.5 %
EOSINOPHILS RELATIVE PERCENT: 5.6 %
EOSINOPHILS RELATIVE PERCENT: 6.2 %
EOSINOPHILS RELATIVE PERCENT: 6.4 %
EOSINOPHILS RELATIVE PERCENT: 6.5 %
EOSINOPHILS RELATIVE PERCENT: 6.5 %
EOSINOPHILS RELATIVE PERCENT: 6.6 %
EOSINOPHILS RELATIVE PERCENT: 6.6 %
EOSINOPHILS RELATIVE PERCENT: 7.9 %
EOSINOPHILS RELATIVE PERCENT: 8.6 %
EOSINOPHILS RELATIVE PERCENT: 8.9 %
EOSINOPHILS RELATIVE PERCENT: 9.7 %
EPITHELIAL CELLS, UA: 0 /HPF (ref 0–5)
EPITHELIAL CELLS, UA: 1 /HPF (ref 0–5)
EPITHELIAL CELLS, UA: 12 /HPF (ref 0–5)
ESTIMATED AVERAGE GLUCOSE: 168.6 MG/DL
ETHANOL: NORMAL MG/DL (ref 0–0.08)
ETHANOL: NORMAL MG/DL (ref 0–0.08)
FERRITIN: 263.6 NG/ML (ref 30–400)
FERRITIN: 271.8 NG/ML (ref 30–400)
FERRITIN: 729.1 NG/ML (ref 30–400)
FOLATE: >20 NG/ML (ref 4.78–24.2)
FUNGUS (MYCOLOGY) CULTURE: NORMAL
FUNGUS STAIN: NORMAL
GFR AFRICAN AMERICAN: 11
GFR AFRICAN AMERICAN: 11
GFR AFRICAN AMERICAN: 12
GFR AFRICAN AMERICAN: 13
GFR AFRICAN AMERICAN: 14
GFR AFRICAN AMERICAN: 15
GFR AFRICAN AMERICAN: 16
GFR AFRICAN AMERICAN: 17
GFR AFRICAN AMERICAN: 18
GFR AFRICAN AMERICAN: 18
GFR AFRICAN AMERICAN: 20
GFR AFRICAN AMERICAN: 20
GFR AFRICAN AMERICAN: 21
GFR AFRICAN AMERICAN: 22
GFR AFRICAN AMERICAN: 23
GFR AFRICAN AMERICAN: 24
GFR AFRICAN AMERICAN: 24
GFR AFRICAN AMERICAN: 25
GFR AFRICAN AMERICAN: 26
GFR AFRICAN AMERICAN: 27
GFR AFRICAN AMERICAN: 28
GFR AFRICAN AMERICAN: 29
GFR AFRICAN AMERICAN: 30
GFR AFRICAN AMERICAN: 32
GFR AFRICAN AMERICAN: 33
GFR AFRICAN AMERICAN: 33
GFR AFRICAN AMERICAN: 35
GFR AFRICAN AMERICAN: 37
GFR AFRICAN AMERICAN: 39
GFR AFRICAN AMERICAN: 41
GFR AFRICAN AMERICAN: 43
GFR AFRICAN AMERICAN: 46
GFR AFRICAN AMERICAN: 53
GFR AFRICAN AMERICAN: 8
GFR AFRICAN AMERICAN: >60
GFR AFRICAN AMERICAN: >60
GFR NON-AFRICAN AMERICAN: 10
GFR NON-AFRICAN AMERICAN: 11
GFR NON-AFRICAN AMERICAN: 11
GFR NON-AFRICAN AMERICAN: 12
GFR NON-AFRICAN AMERICAN: 12
GFR NON-AFRICAN AMERICAN: 13
GFR NON-AFRICAN AMERICAN: 14
GFR NON-AFRICAN AMERICAN: 15
GFR NON-AFRICAN AMERICAN: 15
GFR NON-AFRICAN AMERICAN: 17
GFR NON-AFRICAN AMERICAN: 17
GFR NON-AFRICAN AMERICAN: 18
GFR NON-AFRICAN AMERICAN: 19
GFR NON-AFRICAN AMERICAN: 20
GFR NON-AFRICAN AMERICAN: 21
GFR NON-AFRICAN AMERICAN: 22
GFR NON-AFRICAN AMERICAN: 23
GFR NON-AFRICAN AMERICAN: 24
GFR NON-AFRICAN AMERICAN: 25
GFR NON-AFRICAN AMERICAN: 26
GFR NON-AFRICAN AMERICAN: 27
GFR NON-AFRICAN AMERICAN: 27
GFR NON-AFRICAN AMERICAN: 29
GFR NON-AFRICAN AMERICAN: 30
GFR NON-AFRICAN AMERICAN: 32
GFR NON-AFRICAN AMERICAN: 34
GFR NON-AFRICAN AMERICAN: 36
GFR NON-AFRICAN AMERICAN: 38
GFR NON-AFRICAN AMERICAN: 44
GFR NON-AFRICAN AMERICAN: 51
GFR NON-AFRICAN AMERICAN: 7
GFR NON-AFRICAN AMERICAN: 9
GFR NON-AFRICAN AMERICAN: 9
GFR NON-AFRICAN AMERICAN: >60
GI BACTERIAL PATHOGENS BY PCR: NORMAL
GI BACTERIAL PATHOGENS BY PCR: NORMAL
GLUCOSE BLD-MCNC: 100 MG/DL (ref 70–99)
GLUCOSE BLD-MCNC: 101 MG/DL (ref 70–99)
GLUCOSE BLD-MCNC: 101 MG/DL (ref 70–99)
GLUCOSE BLD-MCNC: 102 MG/DL (ref 70–99)
GLUCOSE BLD-MCNC: 102 MG/DL (ref 70–99)
GLUCOSE BLD-MCNC: 103 MG/DL (ref 70–99)
GLUCOSE BLD-MCNC: 104 MG/DL (ref 70–99)
GLUCOSE BLD-MCNC: 105 MG/DL (ref 70–99)
GLUCOSE BLD-MCNC: 106 MG/DL (ref 70–99)
GLUCOSE BLD-MCNC: 107 MG/DL (ref 70–99)
GLUCOSE BLD-MCNC: 108 MG/DL (ref 70–99)
GLUCOSE BLD-MCNC: 108 MG/DL (ref 70–99)
GLUCOSE BLD-MCNC: 109 MG/DL (ref 70–99)
GLUCOSE BLD-MCNC: 111 MG/DL (ref 70–99)
GLUCOSE BLD-MCNC: 112 MG/DL (ref 70–99)
GLUCOSE BLD-MCNC: 113 MG/DL (ref 70–99)
GLUCOSE BLD-MCNC: 113 MG/DL (ref 70–99)
GLUCOSE BLD-MCNC: 114 MG/DL (ref 70–99)
GLUCOSE BLD-MCNC: 115 MG/DL (ref 70–99)
GLUCOSE BLD-MCNC: 116 MG/DL (ref 70–99)
GLUCOSE BLD-MCNC: 117 MG/DL (ref 70–99)
GLUCOSE BLD-MCNC: 118 MG/DL (ref 70–99)
GLUCOSE BLD-MCNC: 118 MG/DL (ref 70–99)
GLUCOSE BLD-MCNC: 120 MG/DL (ref 70–99)
GLUCOSE BLD-MCNC: 121 MG/DL (ref 70–99)
GLUCOSE BLD-MCNC: 121 MG/DL (ref 70–99)
GLUCOSE BLD-MCNC: 122 MG/DL (ref 70–99)
GLUCOSE BLD-MCNC: 123 MG/DL (ref 70–99)
GLUCOSE BLD-MCNC: 124 MG/DL (ref 70–99)
GLUCOSE BLD-MCNC: 125 MG/DL (ref 70–99)
GLUCOSE BLD-MCNC: 126 MG/DL (ref 70–99)
GLUCOSE BLD-MCNC: 127 MG/DL (ref 70–99)
GLUCOSE BLD-MCNC: 128 MG/DL (ref 70–99)
GLUCOSE BLD-MCNC: 129 MG/DL (ref 70–99)
GLUCOSE BLD-MCNC: 130 MG/DL (ref 70–99)
GLUCOSE BLD-MCNC: 131 MG/DL (ref 70–99)
GLUCOSE BLD-MCNC: 131 MG/DL (ref 70–99)
GLUCOSE BLD-MCNC: 133 MG/DL (ref 70–99)
GLUCOSE BLD-MCNC: 133 MG/DL (ref 70–99)
GLUCOSE BLD-MCNC: 134 MG/DL (ref 70–99)
GLUCOSE BLD-MCNC: 135 MG/DL (ref 70–99)
GLUCOSE BLD-MCNC: 136 MG/DL (ref 70–99)
GLUCOSE BLD-MCNC: 137 MG/DL (ref 70–99)
GLUCOSE BLD-MCNC: 138 MG/DL (ref 70–99)
GLUCOSE BLD-MCNC: 139 MG/DL (ref 70–99)
GLUCOSE BLD-MCNC: 139 MG/DL (ref 70–99)
GLUCOSE BLD-MCNC: 140 MG/DL (ref 70–99)
GLUCOSE BLD-MCNC: 140 MG/DL (ref 70–99)
GLUCOSE BLD-MCNC: 141 MG/DL (ref 70–99)
GLUCOSE BLD-MCNC: 141 MG/DL (ref 70–99)
GLUCOSE BLD-MCNC: 142 MG/DL (ref 70–99)
GLUCOSE BLD-MCNC: 143 MG/DL (ref 70–99)
GLUCOSE BLD-MCNC: 146 MG/DL (ref 70–99)
GLUCOSE BLD-MCNC: 147 MG/DL (ref 70–99)
GLUCOSE BLD-MCNC: 148 MG/DL (ref 70–99)
GLUCOSE BLD-MCNC: 148 MG/DL (ref 70–99)
GLUCOSE BLD-MCNC: 149 MG/DL (ref 70–99)
GLUCOSE BLD-MCNC: 150 MG/DL (ref 70–99)
GLUCOSE BLD-MCNC: 151 MG/DL (ref 70–99)
GLUCOSE BLD-MCNC: 152 MG/DL (ref 70–99)
GLUCOSE BLD-MCNC: 153 MG/DL (ref 70–99)
GLUCOSE BLD-MCNC: 154 MG/DL (ref 70–99)
GLUCOSE BLD-MCNC: 154 MG/DL (ref 70–99)
GLUCOSE BLD-MCNC: 155 MG/DL (ref 70–99)
GLUCOSE BLD-MCNC: 155 MG/DL (ref 70–99)
GLUCOSE BLD-MCNC: 156 MG/DL (ref 70–99)
GLUCOSE BLD-MCNC: 158 MG/DL (ref 70–99)
GLUCOSE BLD-MCNC: 159 MG/DL (ref 70–99)
GLUCOSE BLD-MCNC: 161 MG/DL (ref 70–99)
GLUCOSE BLD-MCNC: 161 MG/DL (ref 70–99)
GLUCOSE BLD-MCNC: 162 MG/DL (ref 70–99)
GLUCOSE BLD-MCNC: 162 MG/DL (ref 70–99)
GLUCOSE BLD-MCNC: 163 MG/DL (ref 70–99)
GLUCOSE BLD-MCNC: 164 MG/DL (ref 70–99)
GLUCOSE BLD-MCNC: 165 MG/DL (ref 70–99)
GLUCOSE BLD-MCNC: 165 MG/DL (ref 70–99)
GLUCOSE BLD-MCNC: 166 MG/DL (ref 70–99)
GLUCOSE BLD-MCNC: 167 MG/DL (ref 70–99)
GLUCOSE BLD-MCNC: 168 MG/DL (ref 70–99)
GLUCOSE BLD-MCNC: 168 MG/DL (ref 70–99)
GLUCOSE BLD-MCNC: 169 MG/DL (ref 70–99)
GLUCOSE BLD-MCNC: 170 MG/DL (ref 70–99)
GLUCOSE BLD-MCNC: 171 MG/DL (ref 70–99)
GLUCOSE BLD-MCNC: 171 MG/DL (ref 70–99)
GLUCOSE BLD-MCNC: 172 MG/DL (ref 70–99)
GLUCOSE BLD-MCNC: 172 MG/DL (ref 70–99)
GLUCOSE BLD-MCNC: 173 MG/DL (ref 70–99)
GLUCOSE BLD-MCNC: 174 MG/DL (ref 70–99)
GLUCOSE BLD-MCNC: 175 MG/DL (ref 70–99)
GLUCOSE BLD-MCNC: 178 MG/DL (ref 70–99)
GLUCOSE BLD-MCNC: 179 MG/DL (ref 70–99)
GLUCOSE BLD-MCNC: 179 MG/DL (ref 70–99)
GLUCOSE BLD-MCNC: 181 MG/DL (ref 70–99)
GLUCOSE BLD-MCNC: 182 MG/DL (ref 70–99)
GLUCOSE BLD-MCNC: 182 MG/DL (ref 70–99)
GLUCOSE BLD-MCNC: 184 MG/DL (ref 70–99)
GLUCOSE BLD-MCNC: 185 MG/DL (ref 70–99)
GLUCOSE BLD-MCNC: 185 MG/DL (ref 70–99)
GLUCOSE BLD-MCNC: 186 MG/DL (ref 70–99)
GLUCOSE BLD-MCNC: 187 MG/DL (ref 70–99)
GLUCOSE BLD-MCNC: 188 MG/DL (ref 70–99)
GLUCOSE BLD-MCNC: 189 MG/DL (ref 70–99)
GLUCOSE BLD-MCNC: 191 MG/DL (ref 70–99)
GLUCOSE BLD-MCNC: 191 MG/DL (ref 70–99)
GLUCOSE BLD-MCNC: 193 MG/DL (ref 70–99)
GLUCOSE BLD-MCNC: 194 MG/DL (ref 70–99)
GLUCOSE BLD-MCNC: 195 MG/DL (ref 70–99)
GLUCOSE BLD-MCNC: 197 MG/DL (ref 70–99)
GLUCOSE BLD-MCNC: 197 MG/DL (ref 70–99)
GLUCOSE BLD-MCNC: 198 MG/DL (ref 70–99)
GLUCOSE BLD-MCNC: 199 MG/DL (ref 70–99)
GLUCOSE BLD-MCNC: 201 MG/DL (ref 70–99)
GLUCOSE BLD-MCNC: 202 MG/DL (ref 70–99)
GLUCOSE BLD-MCNC: 202 MG/DL (ref 70–99)
GLUCOSE BLD-MCNC: 203 MG/DL (ref 70–99)
GLUCOSE BLD-MCNC: 205 MG/DL (ref 70–99)
GLUCOSE BLD-MCNC: 207 MG/DL (ref 70–99)
GLUCOSE BLD-MCNC: 208 MG/DL (ref 70–99)
GLUCOSE BLD-MCNC: 209 MG/DL (ref 70–99)
GLUCOSE BLD-MCNC: 209 MG/DL (ref 70–99)
GLUCOSE BLD-MCNC: 210 MG/DL (ref 70–99)
GLUCOSE BLD-MCNC: 211 MG/DL (ref 70–99)
GLUCOSE BLD-MCNC: 211 MG/DL (ref 70–99)
GLUCOSE BLD-MCNC: 212 MG/DL (ref 70–99)
GLUCOSE BLD-MCNC: 214 MG/DL (ref 70–99)
GLUCOSE BLD-MCNC: 215 MG/DL (ref 70–99)
GLUCOSE BLD-MCNC: 215 MG/DL (ref 70–99)
GLUCOSE BLD-MCNC: 216 MG/DL (ref 70–99)
GLUCOSE BLD-MCNC: 217 MG/DL (ref 70–99)
GLUCOSE BLD-MCNC: 218 MG/DL (ref 70–99)
GLUCOSE BLD-MCNC: 219 MG/DL (ref 70–99)
GLUCOSE BLD-MCNC: 22 MG/DL (ref 70–99)
GLUCOSE BLD-MCNC: 221 MG/DL (ref 70–99)
GLUCOSE BLD-MCNC: 223 MG/DL (ref 70–99)
GLUCOSE BLD-MCNC: 224 MG/DL (ref 70–99)
GLUCOSE BLD-MCNC: 229 MG/DL (ref 70–99)
GLUCOSE BLD-MCNC: 230 MG/DL (ref 70–99)
GLUCOSE BLD-MCNC: 235 MG/DL (ref 70–99)
GLUCOSE BLD-MCNC: 235 MG/DL (ref 70–99)
GLUCOSE BLD-MCNC: 241 MG/DL (ref 70–99)
GLUCOSE BLD-MCNC: 248 MG/DL (ref 70–99)
GLUCOSE BLD-MCNC: 248 MG/DL (ref 70–99)
GLUCOSE BLD-MCNC: 25 MG/DL (ref 70–99)
GLUCOSE BLD-MCNC: 253 MG/DL (ref 70–99)
GLUCOSE BLD-MCNC: 26 MG/DL (ref 70–99)
GLUCOSE BLD-MCNC: 265 MG/DL (ref 70–99)
GLUCOSE BLD-MCNC: 275 MG/DL (ref 70–99)
GLUCOSE BLD-MCNC: 277 MG/DL (ref 70–99)
GLUCOSE BLD-MCNC: 278 MG/DL (ref 70–99)
GLUCOSE BLD-MCNC: 29 MG/DL (ref 70–99)
GLUCOSE BLD-MCNC: 297 MG/DL (ref 70–99)
GLUCOSE BLD-MCNC: 328 MG/DL (ref 70–99)
GLUCOSE BLD-MCNC: 332 MG/DL (ref 70–99)
GLUCOSE BLD-MCNC: 40 MG/DL (ref 70–99)
GLUCOSE BLD-MCNC: 41 MG/DL (ref 70–99)
GLUCOSE BLD-MCNC: 43 MG/DL (ref 70–99)
GLUCOSE BLD-MCNC: 443 MG/DL (ref 70–99)
GLUCOSE BLD-MCNC: 49 MG/DL (ref 70–99)
GLUCOSE BLD-MCNC: 49 MG/DL (ref 70–99)
GLUCOSE BLD-MCNC: 50 MG/DL (ref 70–99)
GLUCOSE BLD-MCNC: 51 MG/DL (ref 70–99)
GLUCOSE BLD-MCNC: 53 MG/DL (ref 70–99)
GLUCOSE BLD-MCNC: 55 MG/DL (ref 70–99)
GLUCOSE BLD-MCNC: 58 MG/DL (ref 70–99)
GLUCOSE BLD-MCNC: 59 MG/DL (ref 70–99)
GLUCOSE BLD-MCNC: 59 MG/DL (ref 70–99)
GLUCOSE BLD-MCNC: 60 MG/DL (ref 70–99)
GLUCOSE BLD-MCNC: 62 MG/DL (ref 70–99)
GLUCOSE BLD-MCNC: 64 MG/DL (ref 70–99)
GLUCOSE BLD-MCNC: 65 MG/DL (ref 70–99)
GLUCOSE BLD-MCNC: 67 MG/DL (ref 70–99)
GLUCOSE BLD-MCNC: 68 MG/DL (ref 70–99)
GLUCOSE BLD-MCNC: 69 MG/DL (ref 70–99)
GLUCOSE BLD-MCNC: 71 MG/DL (ref 70–99)
GLUCOSE BLD-MCNC: 71 MG/DL (ref 70–99)
GLUCOSE BLD-MCNC: 72 MG/DL (ref 70–99)
GLUCOSE BLD-MCNC: 73 MG/DL (ref 70–99)
GLUCOSE BLD-MCNC: 73 MG/DL (ref 70–99)
GLUCOSE BLD-MCNC: 74 MG/DL (ref 70–99)
GLUCOSE BLD-MCNC: 75 MG/DL (ref 70–99)
GLUCOSE BLD-MCNC: 77 MG/DL (ref 70–99)
GLUCOSE BLD-MCNC: 78 MG/DL (ref 70–99)
GLUCOSE BLD-MCNC: 79 MG/DL (ref 70–99)
GLUCOSE BLD-MCNC: 79 MG/DL (ref 70–99)
GLUCOSE BLD-MCNC: 80 MG/DL (ref 70–99)
GLUCOSE BLD-MCNC: 81 MG/DL (ref 70–99)
GLUCOSE BLD-MCNC: 81 MG/DL (ref 70–99)
GLUCOSE BLD-MCNC: 82 MG/DL (ref 70–99)
GLUCOSE BLD-MCNC: 84 MG/DL (ref 70–99)
GLUCOSE BLD-MCNC: 84 MG/DL (ref 70–99)
GLUCOSE BLD-MCNC: 85 MG/DL (ref 70–99)
GLUCOSE BLD-MCNC: 86 MG/DL (ref 70–99)
GLUCOSE BLD-MCNC: 86 MG/DL (ref 70–99)
GLUCOSE BLD-MCNC: 87 MG/DL (ref 70–99)
GLUCOSE BLD-MCNC: 87 MG/DL (ref 70–99)
GLUCOSE BLD-MCNC: 88 MG/DL (ref 70–99)
GLUCOSE BLD-MCNC: 88 MG/DL (ref 70–99)
GLUCOSE BLD-MCNC: 89 MG/DL (ref 70–99)
GLUCOSE BLD-MCNC: 89 MG/DL (ref 70–99)
GLUCOSE BLD-MCNC: 90 MG/DL (ref 70–99)
GLUCOSE BLD-MCNC: 91 MG/DL (ref 70–99)
GLUCOSE BLD-MCNC: 91 MG/DL (ref 70–99)
GLUCOSE BLD-MCNC: 92 MG/DL (ref 70–99)
GLUCOSE BLD-MCNC: 92 MG/DL (ref 70–99)
GLUCOSE BLD-MCNC: 93 MG/DL (ref 70–99)
GLUCOSE BLD-MCNC: 93 MG/DL (ref 70–99)
GLUCOSE BLD-MCNC: 94 MG/DL (ref 70–99)
GLUCOSE BLD-MCNC: 95 MG/DL (ref 70–99)
GLUCOSE BLD-MCNC: 96 MG/DL (ref 70–99)
GLUCOSE BLD-MCNC: 96 MG/DL (ref 70–99)
GLUCOSE BLD-MCNC: 97 MG/DL (ref 70–99)
GLUCOSE BLD-MCNC: 98 MG/DL (ref 70–99)
GLUCOSE BLD-MCNC: 99 MG/DL (ref 70–99)
GLUCOSE URINE: NEGATIVE MG/DL
GRAM STAIN RESULT: ABNORMAL
GRAM STAIN RESULT: NORMAL
HAV IGM SER IA-ACNC: NORMAL
HAV IGM SER IA-ACNC: NORMAL
HBA1C MFR BLD: 7.5 %
HBV SURFACE AB TITR SER: 279.4 MIU/ML
HBV SURFACE AB TITR SER: 331.7 MIU/ML
HCO3 ARTERIAL: 18.5 MMOL/L (ref 21–29)
HCO3 ARTERIAL: 19.7 MMOL/L (ref 21–29)
HCO3 ARTERIAL: 22.8 MMOL/L (ref 21–29)
HCO3 ARTERIAL: 22.9 MMOL/L (ref 21–29)
HCO3 ARTERIAL: 24.8 MMOL/L (ref 21–29)
HCO3 ARTERIAL: 26 MMOL/L (ref 21–29)
HCO3 ARTERIAL: 27 MMOL/L (ref 21–29)
HCO3 VENOUS: 27.3 MMOL/L (ref 24–28)
HCO3 VENOUS: 27.6 MMOL/L (ref 24–28)
HCO3 VENOUS: 28.1 MMOL/L (ref 24–28)
HCT VFR BLD CALC: 19.8 % (ref 40.5–52.5)
HCT VFR BLD CALC: 20.4 % (ref 40.5–52.5)
HCT VFR BLD CALC: 21.4 % (ref 40.5–52.5)
HCT VFR BLD CALC: 23.8 % (ref 40.5–52.5)
HCT VFR BLD CALC: 24.4 % (ref 40.5–52.5)
HCT VFR BLD CALC: 25.4 % (ref 40.5–52.5)
HCT VFR BLD CALC: 25.9 % (ref 40.5–52.5)
HCT VFR BLD CALC: 25.9 % (ref 40.5–52.5)
HCT VFR BLD CALC: 26 % (ref 40.5–52.5)
HCT VFR BLD CALC: 26.1 % (ref 40.5–52.5)
HCT VFR BLD CALC: 26.2 % (ref 40.5–52.5)
HCT VFR BLD CALC: 26.4 % (ref 40.5–52.5)
HCT VFR BLD CALC: 26.4 % (ref 40.5–52.5)
HCT VFR BLD CALC: 27 % (ref 40.5–52.5)
HCT VFR BLD CALC: 27 % (ref 40.5–52.5)
HCT VFR BLD CALC: 27.1 % (ref 40.5–52.5)
HCT VFR BLD CALC: 27.1 % (ref 40.5–52.5)
HCT VFR BLD CALC: 27.2 % (ref 40.5–52.5)
HCT VFR BLD CALC: 27.3 % (ref 40.5–52.5)
HCT VFR BLD CALC: 27.5 % (ref 40.5–52.5)
HCT VFR BLD CALC: 27.6 % (ref 40.5–52.5)
HCT VFR BLD CALC: 27.6 % (ref 40.5–52.5)
HCT VFR BLD CALC: 27.7 % (ref 40.5–52.5)
HCT VFR BLD CALC: 27.9 % (ref 40.5–52.5)
HCT VFR BLD CALC: 28 % (ref 40.5–52.5)
HCT VFR BLD CALC: 28.3 % (ref 40.5–52.5)
HCT VFR BLD CALC: 28.5 % (ref 40.5–52.5)
HCT VFR BLD CALC: 28.6 % (ref 40.5–52.5)
HCT VFR BLD CALC: 28.7 % (ref 40.5–52.5)
HCT VFR BLD CALC: 28.9 % (ref 40.5–52.5)
HCT VFR BLD CALC: 29.2 % (ref 40.5–52.5)
HCT VFR BLD CALC: 29.3 % (ref 40.5–52.5)
HCT VFR BLD CALC: 29.3 % (ref 40.5–52.5)
HCT VFR BLD CALC: 29.4 % (ref 40.5–52.5)
HCT VFR BLD CALC: 29.5 % (ref 40.5–52.5)
HCT VFR BLD CALC: 29.7 % (ref 40.5–52.5)
HCT VFR BLD CALC: 30.2 % (ref 40.5–52.5)
HCT VFR BLD CALC: 30.3 % (ref 40.5–52.5)
HCT VFR BLD CALC: 30.5 % (ref 40.5–52.5)
HCT VFR BLD CALC: 30.6 % (ref 40.5–52.5)
HCT VFR BLD CALC: 30.6 % (ref 40.5–52.5)
HCT VFR BLD CALC: 30.8 % (ref 40.5–52.5)
HCT VFR BLD CALC: 31.1 % (ref 40.5–52.5)
HCT VFR BLD CALC: 31.1 % (ref 40.5–52.5)
HCT VFR BLD CALC: 31.3 % (ref 40.5–52.5)
HCT VFR BLD CALC: 31.8 % (ref 40.5–52.5)
HCT VFR BLD CALC: 32.1 % (ref 40.5–52.5)
HCT VFR BLD CALC: 32.3 % (ref 40.5–52.5)
HCT VFR BLD CALC: 32.5 % (ref 40.5–52.5)
HCT VFR BLD CALC: 32.8 % (ref 40.5–52.5)
HCT VFR BLD CALC: 32.8 % (ref 40.5–52.5)
HCT VFR BLD CALC: 32.9 % (ref 40.5–52.5)
HCT VFR BLD CALC: 33.5 % (ref 40.5–52.5)
HCT VFR BLD CALC: 33.6 % (ref 40.5–52.5)
HCT VFR BLD CALC: 34.3 % (ref 40.5–52.5)
HCT VFR BLD CALC: 34.7 % (ref 40.5–52.5)
HCT VFR BLD CALC: 34.7 % (ref 40.5–52.5)
HCT VFR BLD CALC: 35.2 % (ref 40.5–52.5)
HCT VFR BLD CALC: 35.8 % (ref 40.5–52.5)
HCT VFR BLD CALC: 36.2 % (ref 40.5–52.5)
HCT VFR BLD CALC: 40 % (ref 40.5–52.5)
HDLC SERPL-MCNC: 48 MG/DL (ref 40–60)
HEMATOLOGY PATH CONSULT: NO
HEMATOLOGY PATH CONSULT: NORMAL
HEMATOLOGY PATH CONSULT: YES
HEMOGLOBIN, ART, EXTENDED: 9.1 G/DL
HEMOGLOBIN, ART, EXTENDED: 9.8 G/DL (ref 13.5–17.5)
HEMOGLOBIN, VEN, REDUCED: 54.4 %
HEMOGLOBIN, VEN, REDUCED: 62.7 %
HEMOGLOBIN, VEN, REDUCED: 65.7 %
HEMOGLOBIN: 10 G/DL (ref 13.5–17.5)
HEMOGLOBIN: 10.1 G/DL (ref 13.5–17.5)
HEMOGLOBIN: 10.2 G/DL (ref 13.5–17.5)
HEMOGLOBIN: 10.3 G/DL (ref 13.5–17.5)
HEMOGLOBIN: 10.4 G/DL (ref 13.5–17.5)
HEMOGLOBIN: 10.5 G/DL (ref 13.5–17.5)
HEMOGLOBIN: 10.5 G/DL (ref 13.5–17.5)
HEMOGLOBIN: 10.6 G/DL (ref 13.5–17.5)
HEMOGLOBIN: 10.6 G/DL (ref 13.5–17.5)
HEMOGLOBIN: 10.8 G/DL (ref 13.5–17.5)
HEMOGLOBIN: 10.8 G/DL (ref 13.5–17.5)
HEMOGLOBIN: 11 G/DL (ref 13.5–17.5)
HEMOGLOBIN: 11.3 G/DL (ref 13.5–17.5)
HEMOGLOBIN: 11.3 G/DL (ref 13.5–17.5)
HEMOGLOBIN: 11.6 G/DL (ref 13.5–17.5)
HEMOGLOBIN: 11.7 G/DL (ref 13.5–17.5)
HEMOGLOBIN: 12.8 G/DL (ref 13.5–17.5)
HEMOGLOBIN: 6.6 G/DL (ref 13.5–17.5)
HEMOGLOBIN: 6.7 G/DL (ref 13.5–17.5)
HEMOGLOBIN: 7 G/DL (ref 13.5–17.5)
HEMOGLOBIN: 7.7 G/DL (ref 13.5–17.5)
HEMOGLOBIN: 7.9 G/DL (ref 13.5–17.5)
HEMOGLOBIN: 8 G/DL (ref 13.5–17.5)
HEMOGLOBIN: 8.1 G/DL (ref 13.5–17.5)
HEMOGLOBIN: 8.2 G/DL (ref 13.5–17.5)
HEMOGLOBIN: 8.3 G/DL (ref 13.5–17.5)
HEMOGLOBIN: 8.4 G/DL (ref 13.5–17.5)
HEMOGLOBIN: 8.5 G/DL (ref 13.5–17.5)
HEMOGLOBIN: 8.6 G/DL (ref 13.5–17.5)
HEMOGLOBIN: 8.6 G/DL (ref 13.5–17.5)
HEMOGLOBIN: 8.8 G/DL (ref 13.5–17.5)
HEMOGLOBIN: 8.8 G/DL (ref 13.5–17.5)
HEMOGLOBIN: 8.9 G/DL (ref 13.5–17.5)
HEMOGLOBIN: 9 G/DL (ref 13.5–17.5)
HEMOGLOBIN: 9.1 G/DL (ref 13.5–17.5)
HEMOGLOBIN: 9.2 G/DL (ref 13.5–17.5)
HEMOGLOBIN: 9.4 G/DL (ref 13.5–17.5)
HEMOGLOBIN: 9.5 G/DL (ref 13.5–17.5)
HEMOGLOBIN: 9.5 G/DL (ref 13.5–17.5)
HEMOGLOBIN: 9.6 G/DL (ref 13.5–17.5)
HEMOGLOBIN: 9.6 G/DL (ref 13.5–17.5)
HEMOGLOBIN: 9.7 G/DL (ref 13.5–17.5)
HEMOGLOBIN: 9.7 G/DL (ref 13.5–17.5)
HEMOGLOBIN: 9.8 G/DL (ref 13.5–17.5)
HEMOGLOBIN: 9.8 G/DL (ref 13.5–17.5)
HEMOGLOBIN: 9.9 G/DL (ref 13.5–17.5)
HEPATITIS B CORE IGM ANTIBODY: NORMAL
HEPATITIS B CORE IGM ANTIBODY: NORMAL
HEPATITIS B CORE TOTAL ANTIBODY: NEGATIVE
HEPATITIS B SURFACE ANTIGEN INTERPRETATION: NORMAL
HEPATITIS C ANTIBODY INTERPRETATION: NORMAL
HEPATITIS C ANTIBODY INTERPRETATION: NORMAL
HYALINE CASTS: 11 /LPF (ref 0–8)
HYALINE CASTS: 7 /LPF (ref 0–8)
HYALINE CASTS: ABNORMAL /LPF (ref 0–2)
HYALINE CASTS: ABNORMAL /LPF (ref 0–2)
INR BLD: 1.45 (ref 0.87–1.14)
INR BLD: 1.48 (ref 0.88–1.12)
INR BLD: 1.52 (ref 0.87–1.14)
INR BLD: 1.53 (ref 0.87–1.14)
INR BLD: 1.53 (ref 0.87–1.14)
INR BLD: 1.58 (ref 0.87–1.14)
INR BLD: 1.66 (ref 0.87–1.14)
INR BLD: 1.75 (ref 0.87–1.14)
INR BLD: 1.76 (ref 0.87–1.14)
INR BLD: 1.79 (ref 0.87–1.14)
INR BLD: 1.93 (ref 0.87–1.14)
INR BLD: 1.96 (ref 0.88–1.12)
INR BLD: 1.97 (ref 0.88–1.12)
INR BLD: 2.2 (ref 0.88–1.12)
INR BLD: 2.22 (ref 0.88–1.12)
INR BLD: 2.23 (ref 0.88–1.12)
INR BLD: 2.31 (ref 0.88–1.12)
INR BLD: 2.68 (ref 0.87–1.14)
INR BLD: 3.03 (ref 0.88–1.12)
INR BLD: 3.1 (ref 0.88–1.12)
INR BLD: 5.71 (ref 0.88–1.12)
IRON SATURATION: 13 % (ref 20–50)
IRON SATURATION: 20 % (ref 20–50)
IRON SATURATION: 22 % (ref 20–50)
IRON: 35 UG/DL (ref 59–158)
IRON: 40 UG/DL (ref 59–158)
IRON: 63 UG/DL (ref 59–158)
KETONES, URINE: 15 MG/DL
KETONES, URINE: ABNORMAL MG/DL
KETONES, URINE: NEGATIVE MG/DL
KETONES, URINE: NEGATIVE MG/DL
LACTATE: 1.37 MMOL/L (ref 0.4–2)
LACTATE: 2.1 MMOL/L (ref 0.4–2)
LACTATE: 2.84 MMOL/L (ref 0.4–2)
LACTATE: 4.12 MMOL/L (ref 0.4–2)
LACTATE: 7.46 MMOL/L (ref 0.4–2)
LACTIC ACID: 2.3 MMOL/L (ref 0.4–2)
LACTIC ACID: 2.6 MMOL/L (ref 0.4–2)
LACTIC ACID: 2.7 MMOL/L (ref 0.4–2)
LACTIC ACID: 2.8 MMOL/L (ref 0.4–2)
LACTIC ACID: 3 MMOL/L (ref 0.4–2)
LACTIC ACID: 3.8 MMOL/L (ref 0.4–2)
LACTIC ACID: 4 MMOL/L (ref 0.4–2)
LACTIC ACID: 4.3 MMOL/L (ref 0.4–2)
LACTIC ACID: 4.7 MMOL/L (ref 0.4–2)
LACTIC ACID: 6.1 MMOL/L (ref 0.4–2)
LDL CHOLESTEROL CALCULATED: 38 MG/DL
LEUKOCYTE ESTERASE, URINE: NEGATIVE
LV EF: 20 %
LVEF MODALITY: NORMAL
LYMPHOCYTES ABSOLUTE: 0.1 K/UL (ref 1–5.1)
LYMPHOCYTES ABSOLUTE: 0.2 K/UL (ref 1–5.1)
LYMPHOCYTES ABSOLUTE: 0.3 K/UL (ref 1–5.1)
LYMPHOCYTES ABSOLUTE: 0.4 K/UL (ref 1–5.1)
LYMPHOCYTES ABSOLUTE: 0.5 K/UL (ref 1–5.1)
LYMPHOCYTES ABSOLUTE: 0.6 K/UL (ref 1–5.1)
LYMPHOCYTES ABSOLUTE: 0.7 K/UL (ref 1–5.1)
LYMPHOCYTES RELATIVE PERCENT: 10 %
LYMPHOCYTES RELATIVE PERCENT: 10.2 %
LYMPHOCYTES RELATIVE PERCENT: 10.4 %
LYMPHOCYTES RELATIVE PERCENT: 10.6 %
LYMPHOCYTES RELATIVE PERCENT: 10.9 %
LYMPHOCYTES RELATIVE PERCENT: 11.1 %
LYMPHOCYTES RELATIVE PERCENT: 11.3 %
LYMPHOCYTES RELATIVE PERCENT: 11.4 %
LYMPHOCYTES RELATIVE PERCENT: 11.6 %
LYMPHOCYTES RELATIVE PERCENT: 11.7 %
LYMPHOCYTES RELATIVE PERCENT: 11.7 %
LYMPHOCYTES RELATIVE PERCENT: 11.8 %
LYMPHOCYTES RELATIVE PERCENT: 12 %
LYMPHOCYTES RELATIVE PERCENT: 12.2 %
LYMPHOCYTES RELATIVE PERCENT: 12.3 %
LYMPHOCYTES RELATIVE PERCENT: 12.6 %
LYMPHOCYTES RELATIVE PERCENT: 12.6 %
LYMPHOCYTES RELATIVE PERCENT: 12.8 %
LYMPHOCYTES RELATIVE PERCENT: 12.9 %
LYMPHOCYTES RELATIVE PERCENT: 12.9 %
LYMPHOCYTES RELATIVE PERCENT: 13.1 %
LYMPHOCYTES RELATIVE PERCENT: 13.3 %
LYMPHOCYTES RELATIVE PERCENT: 13.4 %
LYMPHOCYTES RELATIVE PERCENT: 13.4 %
LYMPHOCYTES RELATIVE PERCENT: 14.1 %
LYMPHOCYTES RELATIVE PERCENT: 14.7 %
LYMPHOCYTES RELATIVE PERCENT: 14.7 %
LYMPHOCYTES RELATIVE PERCENT: 15 %
LYMPHOCYTES RELATIVE PERCENT: 15.1 %
LYMPHOCYTES RELATIVE PERCENT: 15.6 %
LYMPHOCYTES RELATIVE PERCENT: 15.8 %
LYMPHOCYTES RELATIVE PERCENT: 18.3 %
LYMPHOCYTES RELATIVE PERCENT: 2.8 %
LYMPHOCYTES RELATIVE PERCENT: 20.2 %
LYMPHOCYTES RELATIVE PERCENT: 4 %
LYMPHOCYTES RELATIVE PERCENT: 4.2 %
LYMPHOCYTES RELATIVE PERCENT: 4.4 %
LYMPHOCYTES RELATIVE PERCENT: 4.5 %
LYMPHOCYTES RELATIVE PERCENT: 4.5 %
LYMPHOCYTES RELATIVE PERCENT: 4.6 %
LYMPHOCYTES RELATIVE PERCENT: 6.4 %
LYMPHOCYTES RELATIVE PERCENT: 6.7 %
LYMPHOCYTES RELATIVE PERCENT: 6.9 %
LYMPHOCYTES RELATIVE PERCENT: 7.2 %
LYMPHOCYTES RELATIVE PERCENT: 7.8 %
LYMPHOCYTES RELATIVE PERCENT: 8 %
LYMPHOCYTES RELATIVE PERCENT: 8 %
LYMPHOCYTES RELATIVE PERCENT: 8.1 %
LYMPHOCYTES RELATIVE PERCENT: 8.3 %
LYMPHOCYTES RELATIVE PERCENT: 8.6 %
LYMPHOCYTES RELATIVE PERCENT: 8.7 %
LYMPHOCYTES RELATIVE PERCENT: 8.9 %
LYMPHOCYTES RELATIVE PERCENT: 9.2 %
LYMPHOCYTES RELATIVE PERCENT: 9.4 %
LYMPHOCYTES RELATIVE PERCENT: 9.4 %
LYMPHOCYTES RELATIVE PERCENT: 9.5 %
LYMPHOCYTES RELATIVE PERCENT: 9.8 %
LYMPHOCYTES RELATIVE PERCENT: 9.8 %
LYMPHOCYTES RELATIVE PERCENT: 9.9 %
LYMPHOCYTES, BODY FLUID: 8 %
MACROCYTES: ABNORMAL
MACROCYTES: ABNORMAL
MACROPHAGE FLUID: 66 %
MAGNESIUM: 1.8 MG/DL (ref 1.8–2.4)
MAGNESIUM: 1.9 MG/DL (ref 1.8–2.4)
MAGNESIUM: 2 MG/DL (ref 1.8–2.4)
MAGNESIUM: 2.1 MG/DL (ref 1.8–2.4)
MAGNESIUM: 2.2 MG/DL (ref 1.8–2.4)
MAGNESIUM: 2.3 MG/DL (ref 1.8–2.4)
MAGNESIUM: 2.4 MG/DL (ref 1.8–2.4)
MCH RBC QN AUTO: 32.1 PG (ref 26–34)
MCH RBC QN AUTO: 32.4 PG (ref 26–34)
MCH RBC QN AUTO: 32.9 PG (ref 26–34)
MCH RBC QN AUTO: 33 PG (ref 26–34)
MCH RBC QN AUTO: 33.1 PG (ref 26–34)
MCH RBC QN AUTO: 33.3 PG (ref 26–34)
MCH RBC QN AUTO: 33.3 PG (ref 26–34)
MCH RBC QN AUTO: 33.4 PG (ref 26–34)
MCH RBC QN AUTO: 33.5 PG (ref 26–34)
MCH RBC QN AUTO: 33.5 PG (ref 26–34)
MCH RBC QN AUTO: 33.6 PG (ref 26–34)
MCH RBC QN AUTO: 33.7 PG (ref 26–34)
MCH RBC QN AUTO: 33.8 PG (ref 26–34)
MCH RBC QN AUTO: 33.9 PG (ref 26–34)
MCH RBC QN AUTO: 34 PG (ref 26–34)
MCH RBC QN AUTO: 34.1 PG (ref 26–34)
MCH RBC QN AUTO: 34.2 PG (ref 26–34)
MCH RBC QN AUTO: 34.2 PG (ref 26–34)
MCH RBC QN AUTO: 34.4 PG (ref 26–34)
MCH RBC QN AUTO: 34.4 PG (ref 26–34)
MCH RBC QN AUTO: 34.5 PG (ref 26–34)
MCH RBC QN AUTO: 34.6 PG (ref 26–34)
MCH RBC QN AUTO: 34.7 PG (ref 26–34)
MCH RBC QN AUTO: 34.8 PG (ref 26–34)
MCH RBC QN AUTO: 34.8 PG (ref 26–34)
MCH RBC QN AUTO: 34.9 PG (ref 26–34)
MCH RBC QN AUTO: 35 PG (ref 26–34)
MCH RBC QN AUTO: 35.1 PG (ref 26–34)
MCH RBC QN AUTO: 35.2 PG (ref 26–34)
MCH RBC QN AUTO: 35.3 PG (ref 26–34)
MCH RBC QN AUTO: 35.3 PG (ref 26–34)
MCH RBC QN AUTO: 35.4 PG (ref 26–34)
MCH RBC QN AUTO: 35.8 PG (ref 26–34)
MCH RBC QN AUTO: 35.9 PG (ref 26–34)
MCH RBC QN AUTO: 35.9 PG (ref 26–34)
MCH RBC QN AUTO: 36.1 PG (ref 26–34)
MCH RBC QN AUTO: 36.1 PG (ref 26–34)
MCH RBC QN AUTO: 36.5 PG (ref 26–34)
MCHC RBC AUTO-ENTMCNC: 30.5 G/DL (ref 31–36)
MCHC RBC AUTO-ENTMCNC: 30.9 G/DL (ref 31–36)
MCHC RBC AUTO-ENTMCNC: 31.2 G/DL (ref 31–36)
MCHC RBC AUTO-ENTMCNC: 31.2 G/DL (ref 31–36)
MCHC RBC AUTO-ENTMCNC: 31.7 G/DL (ref 31–36)
MCHC RBC AUTO-ENTMCNC: 31.7 G/DL (ref 31–36)
MCHC RBC AUTO-ENTMCNC: 31.8 G/DL (ref 31–36)
MCHC RBC AUTO-ENTMCNC: 31.8 G/DL (ref 31–36)
MCHC RBC AUTO-ENTMCNC: 32 G/DL (ref 31–36)
MCHC RBC AUTO-ENTMCNC: 32.1 G/DL (ref 31–36)
MCHC RBC AUTO-ENTMCNC: 32.2 G/DL (ref 31–36)
MCHC RBC AUTO-ENTMCNC: 32.3 G/DL (ref 31–36)
MCHC RBC AUTO-ENTMCNC: 32.3 G/DL (ref 31–36)
MCHC RBC AUTO-ENTMCNC: 32.4 G/DL (ref 31–36)
MCHC RBC AUTO-ENTMCNC: 32.5 G/DL (ref 31–36)
MCHC RBC AUTO-ENTMCNC: 32.6 G/DL (ref 31–36)
MCHC RBC AUTO-ENTMCNC: 32.7 G/DL (ref 31–36)
MCHC RBC AUTO-ENTMCNC: 32.8 G/DL (ref 31–36)
MCHC RBC AUTO-ENTMCNC: 32.9 G/DL (ref 31–36)
MCHC RBC AUTO-ENTMCNC: 33 G/DL (ref 31–36)
MCHC RBC AUTO-ENTMCNC: 33.2 G/DL (ref 31–36)
MCHC RBC AUTO-ENTMCNC: 33.3 G/DL (ref 31–36)
MCHC RBC AUTO-ENTMCNC: 33.4 G/DL (ref 31–36)
MCHC RBC AUTO-ENTMCNC: 33.5 G/DL (ref 31–36)
MCHC RBC AUTO-ENTMCNC: 33.6 G/DL (ref 31–36)
MCHC RBC AUTO-ENTMCNC: 33.6 G/DL (ref 31–36)
MCHC RBC AUTO-ENTMCNC: 33.7 G/DL (ref 31–36)
MCHC RBC AUTO-ENTMCNC: 33.8 G/DL (ref 31–36)
MCHC RBC AUTO-ENTMCNC: 33.8 G/DL (ref 31–36)
MCHC RBC AUTO-ENTMCNC: 33.9 G/DL (ref 31–36)
MCHC RBC AUTO-ENTMCNC: 33.9 G/DL (ref 31–36)
MCHC RBC AUTO-ENTMCNC: 34 G/DL (ref 31–36)
MCHC RBC AUTO-ENTMCNC: 34.1 G/DL (ref 31–36)
MCHC RBC AUTO-ENTMCNC: 34.2 G/DL (ref 31–36)
MCHC RBC AUTO-ENTMCNC: 34.3 G/DL (ref 31–36)
MCHC RBC AUTO-ENTMCNC: 34.3 G/DL (ref 31–36)
MCHC RBC AUTO-ENTMCNC: 34.6 G/DL (ref 31–36)
MCHC RBC AUTO-ENTMCNC: 34.7 G/DL (ref 31–36)
MCV RBC AUTO: 100 FL (ref 80–100)
MCV RBC AUTO: 100.2 FL (ref 80–100)
MCV RBC AUTO: 100.3 FL (ref 80–100)
MCV RBC AUTO: 100.3 FL (ref 80–100)
MCV RBC AUTO: 100.4 FL (ref 80–100)
MCV RBC AUTO: 100.7 FL (ref 80–100)
MCV RBC AUTO: 101.2 FL (ref 80–100)
MCV RBC AUTO: 101.3 FL (ref 80–100)
MCV RBC AUTO: 101.4 FL (ref 80–100)
MCV RBC AUTO: 102 FL (ref 80–100)
MCV RBC AUTO: 102.1 FL (ref 80–100)
MCV RBC AUTO: 102.5 FL (ref 80–100)
MCV RBC AUTO: 102.5 FL (ref 80–100)
MCV RBC AUTO: 102.9 FL (ref 80–100)
MCV RBC AUTO: 103.1 FL (ref 80–100)
MCV RBC AUTO: 103.1 FL (ref 80–100)
MCV RBC AUTO: 103.3 FL (ref 80–100)
MCV RBC AUTO: 103.5 FL (ref 80–100)
MCV RBC AUTO: 103.7 FL (ref 80–100)
MCV RBC AUTO: 103.8 FL (ref 80–100)
MCV RBC AUTO: 104 FL (ref 80–100)
MCV RBC AUTO: 104 FL (ref 80–100)
MCV RBC AUTO: 104.2 FL (ref 80–100)
MCV RBC AUTO: 104.3 FL (ref 80–100)
MCV RBC AUTO: 104.4 FL (ref 80–100)
MCV RBC AUTO: 104.7 FL (ref 80–100)
MCV RBC AUTO: 104.7 FL (ref 80–100)
MCV RBC AUTO: 104.8 FL (ref 80–100)
MCV RBC AUTO: 104.9 FL (ref 80–100)
MCV RBC AUTO: 104.9 FL (ref 80–100)
MCV RBC AUTO: 105.2 FL (ref 80–100)
MCV RBC AUTO: 105.2 FL (ref 80–100)
MCV RBC AUTO: 105.3 FL (ref 80–100)
MCV RBC AUTO: 105.5 FL (ref 80–100)
MCV RBC AUTO: 105.6 FL (ref 80–100)
MCV RBC AUTO: 105.6 FL (ref 80–100)
MCV RBC AUTO: 105.8 FL (ref 80–100)
MCV RBC AUTO: 106.2 FL (ref 80–100)
MCV RBC AUTO: 106.3 FL (ref 80–100)
MCV RBC AUTO: 106.4 FL (ref 80–100)
MCV RBC AUTO: 106.4 FL (ref 80–100)
MCV RBC AUTO: 106.5 FL (ref 80–100)
MCV RBC AUTO: 106.6 FL (ref 80–100)
MCV RBC AUTO: 106.7 FL (ref 80–100)
MCV RBC AUTO: 106.8 FL (ref 80–100)
MCV RBC AUTO: 106.9 FL (ref 80–100)
MCV RBC AUTO: 107.1 FL (ref 80–100)
MCV RBC AUTO: 107.2 FL (ref 80–100)
MCV RBC AUTO: 107.5 FL (ref 80–100)
MCV RBC AUTO: 107.5 FL (ref 80–100)
MCV RBC AUTO: 107.8 FL (ref 80–100)
MCV RBC AUTO: 108 FL (ref 80–100)
MCV RBC AUTO: 108.1 FL (ref 80–100)
MCV RBC AUTO: 108.8 FL (ref 80–100)
MCV RBC AUTO: 109.4 FL (ref 80–100)
MCV RBC AUTO: 109.5 FL (ref 80–100)
MCV RBC AUTO: 110.6 FL (ref 80–100)
MCV RBC AUTO: 111.7 FL (ref 80–100)
MCV RBC AUTO: 97.9 FL (ref 80–100)
MCV RBC AUTO: 98.6 FL (ref 80–100)
MCV RBC AUTO: 98.9 FL (ref 80–100)
MCV RBC AUTO: 99.4 FL (ref 80–100)
MCV RBC AUTO: 99.4 FL (ref 80–100)
MESOTHELIAL FLUID: 6 %
METHEMOGLOBIN ARTERIAL: 0.4 % (ref 0–1.4)
METHEMOGLOBIN ARTERIAL: 0.7 %
METHEMOGLOBIN VENOUS: 0.5 % (ref 0–1.5)
METHEMOGLOBIN VENOUS: 0.6 % (ref 0–1.5)
METHEMOGLOBIN VENOUS: 0.7 % (ref 0–1.5)
MICROSCOPIC EXAMINATION: YES
MONOCYTE, FLUID: 5 %
MONOCYTES ABSOLUTE: 0.1 K/UL (ref 0–1.3)
MONOCYTES ABSOLUTE: 0.2 K/UL (ref 0–1.3)
MONOCYTES ABSOLUTE: 0.3 K/UL (ref 0–1.3)
MONOCYTES ABSOLUTE: 0.4 K/UL (ref 0–1.3)
MONOCYTES ABSOLUTE: 0.5 K/UL (ref 0–1.3)
MONOCYTES ABSOLUTE: 0.6 K/UL (ref 0–1.3)
MONOCYTES ABSOLUTE: 0.7 K/UL (ref 0–1.3)
MONOCYTES ABSOLUTE: 0.8 K/UL (ref 0–1.3)
MONOCYTES ABSOLUTE: 1.1 K/UL (ref 0–1.3)
MONOCYTES RELATIVE PERCENT: 10 %
MONOCYTES RELATIVE PERCENT: 10.2 %
MONOCYTES RELATIVE PERCENT: 10.4 %
MONOCYTES RELATIVE PERCENT: 10.8 %
MONOCYTES RELATIVE PERCENT: 10.8 %
MONOCYTES RELATIVE PERCENT: 11 %
MONOCYTES RELATIVE PERCENT: 11.1 %
MONOCYTES RELATIVE PERCENT: 11.9 %
MONOCYTES RELATIVE PERCENT: 12.1 %
MONOCYTES RELATIVE PERCENT: 12.2 %
MONOCYTES RELATIVE PERCENT: 12.4 %
MONOCYTES RELATIVE PERCENT: 12.7 %
MONOCYTES RELATIVE PERCENT: 12.8 %
MONOCYTES RELATIVE PERCENT: 13.4 %
MONOCYTES RELATIVE PERCENT: 13.9 %
MONOCYTES RELATIVE PERCENT: 14 %
MONOCYTES RELATIVE PERCENT: 14.6 %
MONOCYTES RELATIVE PERCENT: 15 %
MONOCYTES RELATIVE PERCENT: 16.2 %
MONOCYTES RELATIVE PERCENT: 17.1 %
MONOCYTES RELATIVE PERCENT: 21.5 %
MONOCYTES RELATIVE PERCENT: 3.5 %
MONOCYTES RELATIVE PERCENT: 5.8 %
MONOCYTES RELATIVE PERCENT: 6 %
MONOCYTES RELATIVE PERCENT: 6.2 %
MONOCYTES RELATIVE PERCENT: 6.3 %
MONOCYTES RELATIVE PERCENT: 6.5 %
MONOCYTES RELATIVE PERCENT: 6.7 %
MONOCYTES RELATIVE PERCENT: 7.2 %
MONOCYTES RELATIVE PERCENT: 7.4 %
MONOCYTES RELATIVE PERCENT: 7.4 %
MONOCYTES RELATIVE PERCENT: 7.5 %
MONOCYTES RELATIVE PERCENT: 7.6 %
MONOCYTES RELATIVE PERCENT: 7.7 %
MONOCYTES RELATIVE PERCENT: 7.8 %
MONOCYTES RELATIVE PERCENT: 8 %
MONOCYTES RELATIVE PERCENT: 8.1 %
MONOCYTES RELATIVE PERCENT: 8.2 %
MONOCYTES RELATIVE PERCENT: 8.3 %
MONOCYTES RELATIVE PERCENT: 8.4 %
MONOCYTES RELATIVE PERCENT: 8.5 %
MONOCYTES RELATIVE PERCENT: 8.6 %
MONOCYTES RELATIVE PERCENT: 8.7 %
MONOCYTES RELATIVE PERCENT: 8.8 %
MONOCYTES RELATIVE PERCENT: 8.9 %
MONOCYTES RELATIVE PERCENT: 9 %
MONOCYTES RELATIVE PERCENT: 9.3 %
MONOCYTES RELATIVE PERCENT: 9.6 %
MONOCYTES RELATIVE PERCENT: 9.6 %
MONOCYTES RELATIVE PERCENT: 9.8 %
MONOCYTES RELATIVE PERCENT: 9.8 %
MUCUS: ABNORMAL /LPF
NEUTROPHIL, FLUID: 15 %
NEUTROPHILS ABSOLUTE: 1.8 K/UL (ref 1.7–7.7)
NEUTROPHILS ABSOLUTE: 2.2 K/UL (ref 1.7–7.7)
NEUTROPHILS ABSOLUTE: 2.2 K/UL (ref 1.7–7.7)
NEUTROPHILS ABSOLUTE: 2.3 K/UL (ref 1.7–7.7)
NEUTROPHILS ABSOLUTE: 2.4 K/UL (ref 1.7–7.7)
NEUTROPHILS ABSOLUTE: 2.4 K/UL (ref 1.7–7.7)
NEUTROPHILS ABSOLUTE: 2.6 K/UL (ref 1.7–7.7)
NEUTROPHILS ABSOLUTE: 2.7 K/UL (ref 1.7–7.7)
NEUTROPHILS ABSOLUTE: 2.8 K/UL (ref 1.7–7.7)
NEUTROPHILS ABSOLUTE: 2.9 K/UL (ref 1.7–7.7)
NEUTROPHILS ABSOLUTE: 3 K/UL (ref 1.7–7.7)
NEUTROPHILS ABSOLUTE: 3.1 K/UL (ref 1.7–7.7)
NEUTROPHILS ABSOLUTE: 3.2 K/UL (ref 1.7–7.7)
NEUTROPHILS ABSOLUTE: 3.3 K/UL (ref 1.7–7.7)
NEUTROPHILS ABSOLUTE: 3.4 K/UL (ref 1.7–7.7)
NEUTROPHILS ABSOLUTE: 3.5 K/UL (ref 1.7–7.7)
NEUTROPHILS ABSOLUTE: 3.5 K/UL (ref 1.7–7.7)
NEUTROPHILS ABSOLUTE: 3.6 K/UL (ref 1.7–7.7)
NEUTROPHILS ABSOLUTE: 3.7 K/UL (ref 1.7–7.7)
NEUTROPHILS ABSOLUTE: 3.8 K/UL (ref 1.7–7.7)
NEUTROPHILS ABSOLUTE: 3.8 K/UL (ref 1.7–7.7)
NEUTROPHILS ABSOLUTE: 3.9 K/UL (ref 1.7–7.7)
NEUTROPHILS ABSOLUTE: 4 K/UL (ref 1.7–7.7)
NEUTROPHILS ABSOLUTE: 4.1 K/UL (ref 1.7–7.7)
NEUTROPHILS ABSOLUTE: 4.2 K/UL (ref 1.7–7.7)
NEUTROPHILS ABSOLUTE: 4.3 K/UL (ref 1.7–7.7)
NEUTROPHILS ABSOLUTE: 4.4 K/UL (ref 1.7–7.7)
NEUTROPHILS ABSOLUTE: 4.5 K/UL (ref 1.7–7.7)
NEUTROPHILS ABSOLUTE: 4.8 K/UL (ref 1.7–7.7)
NEUTROPHILS ABSOLUTE: 4.8 K/UL (ref 1.7–7.7)
NEUTROPHILS ABSOLUTE: 5 K/UL (ref 1.7–7.7)
NEUTROPHILS ABSOLUTE: 5.9 K/UL (ref 1.7–7.7)
NEUTROPHILS ABSOLUTE: 6.6 K/UL (ref 1.7–7.7)
NEUTROPHILS RELATIVE PERCENT: 62.2 %
NEUTROPHILS RELATIVE PERCENT: 64.3 %
NEUTROPHILS RELATIVE PERCENT: 66.7 %
NEUTROPHILS RELATIVE PERCENT: 66.7 %
NEUTROPHILS RELATIVE PERCENT: 67.5 %
NEUTROPHILS RELATIVE PERCENT: 68.1 %
NEUTROPHILS RELATIVE PERCENT: 69.2 %
NEUTROPHILS RELATIVE PERCENT: 69.8 %
NEUTROPHILS RELATIVE PERCENT: 69.8 %
NEUTROPHILS RELATIVE PERCENT: 70 %
NEUTROPHILS RELATIVE PERCENT: 70.6 %
NEUTROPHILS RELATIVE PERCENT: 70.7 %
NEUTROPHILS RELATIVE PERCENT: 71 %
NEUTROPHILS RELATIVE PERCENT: 71.6 %
NEUTROPHILS RELATIVE PERCENT: 71.8 %
NEUTROPHILS RELATIVE PERCENT: 71.9 %
NEUTROPHILS RELATIVE PERCENT: 72 %
NEUTROPHILS RELATIVE PERCENT: 73 %
NEUTROPHILS RELATIVE PERCENT: 73.2 %
NEUTROPHILS RELATIVE PERCENT: 73.4 %
NEUTROPHILS RELATIVE PERCENT: 74 %
NEUTROPHILS RELATIVE PERCENT: 74.2 %
NEUTROPHILS RELATIVE PERCENT: 74.4 %
NEUTROPHILS RELATIVE PERCENT: 74.6 %
NEUTROPHILS RELATIVE PERCENT: 74.7 %
NEUTROPHILS RELATIVE PERCENT: 74.8 %
NEUTROPHILS RELATIVE PERCENT: 75 %
NEUTROPHILS RELATIVE PERCENT: 75.2 %
NEUTROPHILS RELATIVE PERCENT: 75.3 %
NEUTROPHILS RELATIVE PERCENT: 75.6 %
NEUTROPHILS RELATIVE PERCENT: 75.7 %
NEUTROPHILS RELATIVE PERCENT: 75.9 %
NEUTROPHILS RELATIVE PERCENT: 76 %
NEUTROPHILS RELATIVE PERCENT: 76.3 %
NEUTROPHILS RELATIVE PERCENT: 76.5 %
NEUTROPHILS RELATIVE PERCENT: 77 %
NEUTROPHILS RELATIVE PERCENT: 77.4 %
NEUTROPHILS RELATIVE PERCENT: 77.4 %
NEUTROPHILS RELATIVE PERCENT: 78 %
NEUTROPHILS RELATIVE PERCENT: 78.1 %
NEUTROPHILS RELATIVE PERCENT: 78.9 %
NEUTROPHILS RELATIVE PERCENT: 79.4 %
NEUTROPHILS RELATIVE PERCENT: 79.4 %
NEUTROPHILS RELATIVE PERCENT: 79.5 %
NEUTROPHILS RELATIVE PERCENT: 79.9 %
NEUTROPHILS RELATIVE PERCENT: 80.6 %
NEUTROPHILS RELATIVE PERCENT: 80.9 %
NEUTROPHILS RELATIVE PERCENT: 81.2 %
NEUTROPHILS RELATIVE PERCENT: 81.2 %
NEUTROPHILS RELATIVE PERCENT: 82 %
NEUTROPHILS RELATIVE PERCENT: 83 %
NEUTROPHILS RELATIVE PERCENT: 83.5 %
NEUTROPHILS RELATIVE PERCENT: 83.7 %
NEUTROPHILS RELATIVE PERCENT: 83.8 %
NEUTROPHILS RELATIVE PERCENT: 84.9 %
NEUTROPHILS RELATIVE PERCENT: 85.5 %
NEUTROPHILS RELATIVE PERCENT: 88.2 %
NEUTROPHILS RELATIVE PERCENT: 89.2 %
NEUTROPHILS RELATIVE PERCENT: 91.6 %
NITRITE, URINE: NEGATIVE
NUCLEATED CELLS FLUID: 151 /CUMM
NUMBER OF CELLS COUNTED FLUID: 100
O2 SAT, ARTERIAL: 100 % (ref 93–100)
O2 SAT, ARTERIAL: 86 % (ref 93–100)
O2 SAT, ARTERIAL: 90 % (ref 93–100)
O2 SAT, ARTERIAL: 94 % (ref 93–100)
O2 SAT, ARTERIAL: 96.9 %
O2 SAT, ARTERIAL: 99 % (ref 93–100)
O2 SAT, ARTERIAL: >100 % (ref 93–100)
O2 SAT, VEN: 33 %
O2 SAT, VEN: 36 %
O2 SAT, VEN: 45 %
O2 THERAPY: ABNORMAL
OCCULT BLOOD SCREENING: ABNORMAL
ORGANISM: ABNORMAL
PARATHYROID HORMONE INTACT: 103.8 PG/ML (ref 14–72)
PCO2 ARTERIAL: 34.7 MMHG (ref 35–45)
PCO2 ARTERIAL: 39.5 MM HG (ref 35–45)
PCO2 ARTERIAL: 39.5 MM HG (ref 35–45)
PCO2 ARTERIAL: 40.1 MM HG (ref 35–45)
PCO2 ARTERIAL: 41.9 MM HG (ref 35–45)
PCO2 ARTERIAL: 44.1 MM HG (ref 35–45)
PCO2 ARTERIAL: 49.7 MMHG (ref 35–45)
PCO2, VEN: 58.8 MMHG (ref 41–51)
PCO2, VEN: 58.9 MMHG (ref 41–51)
PCO2, VEN: 62.2 MMHG (ref 41–51)
PDW BLD-RTO: 15.5 % (ref 12.4–15.4)
PDW BLD-RTO: 15.5 % (ref 12.4–15.4)
PDW BLD-RTO: 15.8 % (ref 12.4–15.4)
PDW BLD-RTO: 15.9 % (ref 12.4–15.4)
PDW BLD-RTO: 16 % (ref 12.4–15.4)
PDW BLD-RTO: 16.1 % (ref 12.4–15.4)
PDW BLD-RTO: 16.2 % (ref 12.4–15.4)
PDW BLD-RTO: 16.3 % (ref 12.4–15.4)
PDW BLD-RTO: 16.4 % (ref 12.4–15.4)
PDW BLD-RTO: 17 % (ref 12.4–15.4)
PDW BLD-RTO: 17.1 % (ref 12.4–15.4)
PDW BLD-RTO: 17.2 % (ref 12.4–15.4)
PDW BLD-RTO: 17.3 % (ref 12.4–15.4)
PDW BLD-RTO: 17.4 % (ref 12.4–15.4)
PDW BLD-RTO: 17.5 % (ref 12.4–15.4)
PDW BLD-RTO: 17.5 % (ref 12.4–15.4)
PDW BLD-RTO: 17.6 % (ref 12.4–15.4)
PDW BLD-RTO: 17.6 % (ref 12.4–15.4)
PDW BLD-RTO: 17.7 % (ref 12.4–15.4)
PDW BLD-RTO: 17.8 % (ref 12.4–15.4)
PDW BLD-RTO: 17.9 % (ref 12.4–15.4)
PDW BLD-RTO: 18 % (ref 12.4–15.4)
PDW BLD-RTO: 18.1 % (ref 12.4–15.4)
PDW BLD-RTO: 18.3 % (ref 12.4–15.4)
PDW BLD-RTO: 18.3 % (ref 12.4–15.4)
PDW BLD-RTO: 18.6 % (ref 12.4–15.4)
PDW BLD-RTO: 18.7 % (ref 12.4–15.4)
PDW BLD-RTO: 18.7 % (ref 12.4–15.4)
PDW BLD-RTO: 18.8 % (ref 12.4–15.4)
PDW BLD-RTO: 19 % (ref 12.4–15.4)
PDW BLD-RTO: 19 % (ref 12.4–15.4)
PDW BLD-RTO: 19.2 % (ref 12.4–15.4)
PDW BLD-RTO: 19.6 % (ref 12.4–15.4)
PDW BLD-RTO: 20 % (ref 12.4–15.4)
PDW BLD-RTO: 20 % (ref 12.4–15.4)
PDW BLD-RTO: 20.1 % (ref 12.4–15.4)
PDW BLD-RTO: 20.1 % (ref 12.4–15.4)
PDW BLD-RTO: 20.4 % (ref 12.4–15.4)
PDW BLD-RTO: 20.4 % (ref 12.4–15.4)
PDW BLD-RTO: 20.5 % (ref 12.4–15.4)
PDW BLD-RTO: 20.6 % (ref 12.4–15.4)
PDW BLD-RTO: 20.7 % (ref 12.4–15.4)
PDW BLD-RTO: 20.7 % (ref 12.4–15.4)
PDW BLD-RTO: 20.9 % (ref 12.4–15.4)
PDW BLD-RTO: 21.1 % (ref 12.4–15.4)
PDW BLD-RTO: 21.1 % (ref 12.4–15.4)
PDW BLD-RTO: 22.1 % (ref 12.4–15.4)
PDW BLD-RTO: 22.3 % (ref 12.4–15.4)
PDW BLD-RTO: 22.5 % (ref 12.4–15.4)
PDW BLD-RTO: 22.5 % (ref 12.4–15.4)
PDW BLD-RTO: 23.1 % (ref 12.4–15.4)
PERFORMED ON: ABNORMAL
PERFORMED ON: NORMAL
PH ARTERIAL: 7.25 (ref 7.35–7.45)
PH ARTERIAL: 7.32 (ref 7.35–7.45)
PH ARTERIAL: 7.35 (ref 7.35–7.45)
PH ARTERIAL: 7.36 (ref 7.35–7.45)
PH ARTERIAL: 7.37 (ref 7.35–7.45)
PH ARTERIAL: 7.41 (ref 7.35–7.45)
PH ARTERIAL: 7.42 (ref 7.35–7.45)
PH UA: 5 (ref 5–8)
PH UA: 5.5 (ref 5–8)
PH VENOUS: 7.16 (ref 7.35–7.45)
PH VENOUS: 7.25 (ref 7.35–7.45)
PH VENOUS: 7.26 (ref 7.35–7.45)
PH VENOUS: 7.27 (ref 7.35–7.45)
PH VENOUS: 7.28 (ref 7.35–7.45)
PH VENOUS: 7.28 (ref 7.35–7.45)
PH VENOUS: 7.29 (ref 7.35–7.45)
PH VENOUS: 7.31 (ref 7.35–7.45)
PH VENOUS: 7.32 (ref 7.35–7.45)
PH VENOUS: 7.33 (ref 7.35–7.45)
PH VENOUS: 7.36 (ref 7.35–7.45)
PH VENOUS: 7.36 (ref 7.35–7.45)
PH VENOUS: 7.38 (ref 7.35–7.45)
PHOSPHORUS: 3.4 MG/DL (ref 2.5–4.9)
PHOSPHORUS: 3.4 MG/DL (ref 2.5–4.9)
PHOSPHORUS: 3.5 MG/DL (ref 2.5–4.9)
PHOSPHORUS: 3.5 MG/DL (ref 2.5–4.9)
PHOSPHORUS: 3.6 MG/DL (ref 2.5–4.9)
PHOSPHORUS: 3.8 MG/DL (ref 2.5–4.9)
PHOSPHORUS: 4.1 MG/DL (ref 2.5–4.9)
PHOSPHORUS: 4.1 MG/DL (ref 2.5–4.9)
PHOSPHORUS: 4.5 MG/DL (ref 2.5–4.9)
PHOSPHORUS: 4.7 MG/DL (ref 2.5–4.9)
PHOSPHORUS: 5.4 MG/DL (ref 2.5–4.9)
PHOSPHORUS: 5.7 MG/DL (ref 2.5–4.9)
PHOSPHORUS: 6.1 MG/DL (ref 2.5–4.9)
PHOSPHORUS: 7.2 MG/DL (ref 2.5–4.9)
PLATELET # BLD: 100 K/UL (ref 135–450)
PLATELET # BLD: 104 K/UL (ref 135–450)
PLATELET # BLD: 107 K/UL (ref 135–450)
PLATELET # BLD: 119 K/UL (ref 135–450)
PLATELET # BLD: 125 K/UL (ref 135–450)
PLATELET # BLD: 125 K/UL (ref 135–450)
PLATELET # BLD: 127 K/UL (ref 135–450)
PLATELET # BLD: 128 K/UL (ref 135–450)
PLATELET # BLD: 128 K/UL (ref 135–450)
PLATELET # BLD: 130 K/UL (ref 135–450)
PLATELET # BLD: 130 K/UL (ref 135–450)
PLATELET # BLD: 131 K/UL (ref 135–450)
PLATELET # BLD: 134 K/UL (ref 135–450)
PLATELET # BLD: 139 K/UL (ref 135–450)
PLATELET # BLD: 141 K/UL (ref 135–450)
PLATELET # BLD: 146 K/UL (ref 135–450)
PLATELET # BLD: 148 K/UL (ref 135–450)
PLATELET # BLD: 149 K/UL (ref 135–450)
PLATELET # BLD: 150 K/UL (ref 135–450)
PLATELET # BLD: 150 K/UL (ref 135–450)
PLATELET # BLD: 155 K/UL (ref 135–450)
PLATELET # BLD: 155 K/UL (ref 135–450)
PLATELET # BLD: 158 K/UL (ref 135–450)
PLATELET # BLD: 162 K/UL (ref 135–450)
PLATELET # BLD: 164 K/UL (ref 135–450)
PLATELET # BLD: 164 K/UL (ref 135–450)
PLATELET # BLD: 170 K/UL (ref 135–450)
PLATELET # BLD: 171 K/UL (ref 135–450)
PLATELET # BLD: 172 K/UL (ref 135–450)
PLATELET # BLD: 173 K/UL (ref 135–450)
PLATELET # BLD: 175 K/UL (ref 135–450)
PLATELET # BLD: 175 K/UL (ref 135–450)
PLATELET # BLD: 177 K/UL (ref 135–450)
PLATELET # BLD: 177 K/UL (ref 135–450)
PLATELET # BLD: 181 K/UL (ref 135–450)
PLATELET # BLD: 182 K/UL (ref 135–450)
PLATELET # BLD: 189 K/UL (ref 135–450)
PLATELET # BLD: 191 K/UL (ref 135–450)
PLATELET # BLD: 191 K/UL (ref 135–450)
PLATELET # BLD: 196 K/UL (ref 135–450)
PLATELET # BLD: 197 K/UL (ref 135–450)
PLATELET # BLD: 199 K/UL (ref 135–450)
PLATELET # BLD: 207 K/UL (ref 135–450)
PLATELET # BLD: 207 K/UL (ref 135–450)
PLATELET # BLD: 208 K/UL (ref 135–450)
PLATELET # BLD: 231 K/UL (ref 135–450)
PLATELET # BLD: 245 K/UL (ref 135–450)
PLATELET # BLD: 247 K/UL (ref 135–450)
PLATELET # BLD: 250 K/UL (ref 135–450)
PLATELET # BLD: 256 K/UL (ref 135–450)
PLATELET # BLD: 259 K/UL (ref 135–450)
PLATELET # BLD: 260 K/UL (ref 135–450)
PLATELET # BLD: 262 K/UL (ref 135–450)
PLATELET # BLD: 263 K/UL (ref 135–450)
PLATELET # BLD: 294 K/UL (ref 135–450)
PLATELET # BLD: 313 K/UL (ref 135–450)
PLATELET # BLD: 329 K/UL (ref 135–450)
PLATELET # BLD: 73 K/UL (ref 135–450)
PLATELET # BLD: 81 K/UL (ref 135–450)
PLATELET # BLD: 86 K/UL (ref 135–450)
PLATELET # BLD: 86 K/UL (ref 135–450)
PLATELET # BLD: 87 K/UL (ref 135–450)
PLATELET # BLD: 90 K/UL (ref 135–450)
PLATELET # BLD: 90 K/UL (ref 135–450)
PLATELET # BLD: 91 K/UL (ref 135–450)
PLATELET # BLD: 95 K/UL (ref 135–450)
PLATELET SLIDE REVIEW: ABNORMAL
PLATELET SLIDE REVIEW: ABNORMAL
PLATELET SLIDE REVIEW: ADEQUATE
PMV BLD AUTO: 10 FL (ref 5–10.5)
PMV BLD AUTO: 10.2 FL (ref 5–10.5)
PMV BLD AUTO: 10.3 FL (ref 5–10.5)
PMV BLD AUTO: 10.4 FL (ref 5–10.5)
PMV BLD AUTO: 10.5 FL (ref 5–10.5)
PMV BLD AUTO: 11 FL (ref 5–10.5)
PMV BLD AUTO: 11 FL (ref 5–10.5)
PMV BLD AUTO: 11.8 FL (ref 5–10.5)
PMV BLD AUTO: 6.8 FL (ref 5–10.5)
PMV BLD AUTO: 6.9 FL (ref 5–10.5)
PMV BLD AUTO: 6.9 FL (ref 5–10.5)
PMV BLD AUTO: 7.2 FL (ref 5–10.5)
PMV BLD AUTO: 7.4 FL (ref 5–10.5)
PMV BLD AUTO: 7.5 FL (ref 5–10.5)
PMV BLD AUTO: 7.6 FL (ref 5–10.5)
PMV BLD AUTO: 7.7 FL (ref 5–10.5)
PMV BLD AUTO: 7.8 FL (ref 5–10.5)
PMV BLD AUTO: 7.9 FL (ref 5–10.5)
PMV BLD AUTO: 7.9 FL (ref 5–10.5)
PMV BLD AUTO: 8 FL (ref 5–10.5)
PMV BLD AUTO: 8.1 FL (ref 5–10.5)
PMV BLD AUTO: 8.2 FL (ref 5–10.5)
PMV BLD AUTO: 8.3 FL (ref 5–10.5)
PMV BLD AUTO: 8.5 FL (ref 5–10.5)
PMV BLD AUTO: 8.7 FL (ref 5–10.5)
PMV BLD AUTO: 8.7 FL (ref 5–10.5)
PMV BLD AUTO: 8.8 FL (ref 5–10.5)
PMV BLD AUTO: 8.9 FL (ref 5–10.5)
PMV BLD AUTO: 9 FL (ref 5–10.5)
PMV BLD AUTO: 9.1 FL (ref 5–10.5)
PMV BLD AUTO: 9.1 FL (ref 5–10.5)
PMV BLD AUTO: 9.2 FL (ref 5–10.5)
PMV BLD AUTO: 9.2 FL (ref 5–10.5)
PMV BLD AUTO: 9.3 FL (ref 5–10.5)
PMV BLD AUTO: 9.4 FL (ref 5–10.5)
PMV BLD AUTO: 9.4 FL (ref 5–10.5)
PMV BLD AUTO: 9.5 FL (ref 5–10.5)
PMV BLD AUTO: 9.6 FL (ref 5–10.5)
PMV BLD AUTO: 9.6 FL (ref 5–10.5)
PMV BLD AUTO: 9.7 FL (ref 5–10.5)
PMV BLD AUTO: 9.8 FL (ref 5–10.5)
PMV BLD AUTO: 9.9 FL (ref 5–10.5)
PO2 ARTERIAL: 113 MM HG (ref 75–108)
PO2 ARTERIAL: 155 MMHG (ref 75–108)
PO2 ARTERIAL: 174.5 MM HG (ref 75–108)
PO2 ARTERIAL: 59.6 MM HG (ref 75–108)
PO2 ARTERIAL: 63.2 MM HG (ref 75–108)
PO2 ARTERIAL: 71.7 MM HG (ref 75–108)
PO2 ARTERIAL: 87.5 MMHG (ref 75–108)
PO2, VEN: 32.3 MMHG (ref 25–40)
PO2, VEN: <30 MMHG (ref 25–40)
PO2, VEN: <30 MMHG (ref 25–40)
POC POTASSIUM: 3.7 MMOL/L (ref 3.5–5.1)
POC POTASSIUM: 4.1 MMOL/L (ref 3.5–5.1)
POC POTASSIUM: 4.2 MMOL/L (ref 3.5–5.1)
POC SAMPLE TYPE: ABNORMAL
POC SODIUM: 136 MMOL/L (ref 136–145)
POC SODIUM: 137 MMOL/L (ref 136–145)
POIKILOCYTES: ABNORMAL
POIKILOCYTES: ABNORMAL
POLYCHROMASIA: ABNORMAL
POTASSIUM REFLEX MAGNESIUM: 3.7 MMOL/L (ref 3.5–5.1)
POTASSIUM REFLEX MAGNESIUM: 4.4 MMOL/L (ref 3.5–5.1)
POTASSIUM REFLEX MAGNESIUM: 4.5 MMOL/L (ref 3.5–5.1)
POTASSIUM REFLEX MAGNESIUM: 4.5 MMOL/L (ref 3.5–5.1)
POTASSIUM REFLEX MAGNESIUM: 4.6 MMOL/L (ref 3.5–5.1)
POTASSIUM REFLEX MAGNESIUM: 4.8 MMOL/L (ref 3.5–5.1)
POTASSIUM REFLEX MAGNESIUM: 4.9 MMOL/L (ref 3.5–5.1)
POTASSIUM REFLEX MAGNESIUM: 5.1 MMOL/L (ref 3.5–5.1)
POTASSIUM REFLEX MAGNESIUM: 6.3 MMOL/L (ref 3.5–5.1)
POTASSIUM SERPL-SCNC: 3.3 MMOL/L (ref 3.5–5.1)
POTASSIUM SERPL-SCNC: 3.4 MMOL/L (ref 3.5–5.1)
POTASSIUM SERPL-SCNC: 3.4 MMOL/L (ref 3.5–5.1)
POTASSIUM SERPL-SCNC: 3.5 MMOL/L (ref 3.5–5.1)
POTASSIUM SERPL-SCNC: 3.6 MMOL/L (ref 3.5–5.1)
POTASSIUM SERPL-SCNC: 3.6 MMOL/L (ref 3.5–5.1)
POTASSIUM SERPL-SCNC: 3.7 MMOL/L (ref 3.5–5.1)
POTASSIUM SERPL-SCNC: 3.7 MMOL/L (ref 3.5–5.1)
POTASSIUM SERPL-SCNC: 3.8 MMOL/L (ref 3.5–5.1)
POTASSIUM SERPL-SCNC: 3.8 MMOL/L (ref 3.5–5.1)
POTASSIUM SERPL-SCNC: 3.9 MMOL/L (ref 3.5–5.1)
POTASSIUM SERPL-SCNC: 4 MMOL/L (ref 3.5–5.1)
POTASSIUM SERPL-SCNC: 4.1 MMOL/L (ref 3.5–5.1)
POTASSIUM SERPL-SCNC: 4.2 MMOL/L (ref 3.5–5.1)
POTASSIUM SERPL-SCNC: 4.3 MMOL/L (ref 3.5–5.1)
POTASSIUM SERPL-SCNC: 4.4 MMOL/L (ref 3.5–5.1)
POTASSIUM SERPL-SCNC: 4.5 MMOL/L (ref 3.5–5.1)
POTASSIUM SERPL-SCNC: 4.6 MMOL/L (ref 3.5–5.1)
POTASSIUM SERPL-SCNC: 4.7 MMOL/L (ref 3.5–5.1)
POTASSIUM SERPL-SCNC: 4.8 MMOL/L (ref 3.5–5.1)
POTASSIUM SERPL-SCNC: 4.9 MMOL/L (ref 3.5–5.1)
POTASSIUM SERPL-SCNC: 4.9 MMOL/L (ref 3.5–5.1)
POTASSIUM SERPL-SCNC: 5.2 MMOL/L (ref 3.5–5.1)
POTASSIUM SERPL-SCNC: 5.2 MMOL/L (ref 3.5–5.1)
POTASSIUM SERPL-SCNC: 5.5 MMOL/L (ref 3.5–5.1)
POTASSIUM SERPL-SCNC: 5.9 MMOL/L (ref 3.5–5.1)
PRO-BNP: ABNORMAL PG/ML (ref 0–124)
PROCALCITONIN: 1.28 NG/ML (ref 0–0.15)
PROTEIN UA: 100 MG/DL
PROTEIN UA: 30 MG/DL
PROTHROMBIN TIME: 17 SEC (ref 9.9–12.7)
PROTHROMBIN TIME: 17.6 SEC (ref 11.7–14.5)
PROTHROMBIN TIME: 18.2 SEC (ref 11.7–14.5)
PROTHROMBIN TIME: 18.3 SEC (ref 11.7–14.5)
PROTHROMBIN TIME: 18.3 SEC (ref 11.7–14.5)
PROTHROMBIN TIME: 18.8 SEC (ref 11.7–14.5)
PROTHROMBIN TIME: 19.6 SEC (ref 11.7–14.5)
PROTHROMBIN TIME: 20.4 SEC (ref 11.7–14.5)
PROTHROMBIN TIME: 20.5 SEC (ref 11.7–14.5)
PROTHROMBIN TIME: 20.8 SEC (ref 11.7–14.5)
PROTHROMBIN TIME: 22.1 SEC (ref 11.7–14.5)
PROTHROMBIN TIME: 22.8 SEC (ref 9.9–12.7)
PROTHROMBIN TIME: 22.9 SEC (ref 9.9–12.7)
PROTHROMBIN TIME: 25.7 SEC (ref 9.9–12.7)
PROTHROMBIN TIME: 25.9 SEC (ref 9.9–12.7)
PROTHROMBIN TIME: 26 SEC (ref 9.9–12.7)
PROTHROMBIN TIME: 27 SEC (ref 9.9–12.7)
PROTHROMBIN TIME: 28.6 SEC (ref 11.7–14.5)
PROTHROMBIN TIME: 35.8 SEC (ref 9.9–12.7)
PROTHROMBIN TIME: 36.7 SEC (ref 9.9–12.7)
PROTHROMBIN TIME: 69.4 SEC (ref 9.9–12.7)
RBC # BLD: 1.87 M/UL (ref 4.2–5.9)
RBC # BLD: 1.9 M/UL (ref 4.2–5.9)
RBC # BLD: 2.21 M/UL (ref 4.2–5.9)
RBC # BLD: 2.27 M/UL (ref 4.2–5.9)
RBC # BLD: 2.35 M/UL (ref 4.2–5.9)
RBC # BLD: 2.44 M/UL (ref 4.2–5.9)
RBC # BLD: 2.46 M/UL (ref 4.2–5.9)
RBC # BLD: 2.48 M/UL (ref 4.2–5.9)
RBC # BLD: 2.49 M/UL (ref 4.2–5.9)
RBC # BLD: 2.52 M/UL (ref 4.2–5.9)
RBC # BLD: 2.52 M/UL (ref 4.2–5.9)
RBC # BLD: 2.53 M/UL (ref 4.2–5.9)
RBC # BLD: 2.54 M/UL (ref 4.2–5.9)
RBC # BLD: 2.57 M/UL (ref 4.2–5.9)
RBC # BLD: 2.58 M/UL (ref 4.2–5.9)
RBC # BLD: 2.58 M/UL (ref 4.2–5.9)
RBC # BLD: 2.59 M/UL (ref 4.2–5.9)
RBC # BLD: 2.59 M/UL (ref 4.2–5.9)
RBC # BLD: 2.6 M/UL (ref 4.2–5.9)
RBC # BLD: 2.62 M/UL (ref 4.2–5.9)
RBC # BLD: 2.64 M/UL (ref 4.2–5.9)
RBC # BLD: 2.65 M/UL (ref 4.2–5.9)
RBC # BLD: 2.66 M/UL (ref 4.2–5.9)
RBC # BLD: 2.67 M/UL (ref 4.2–5.9)
RBC # BLD: 2.72 M/UL (ref 4.2–5.9)
RBC # BLD: 2.73 M/UL (ref 4.2–5.9)
RBC # BLD: 2.74 M/UL (ref 4.2–5.9)
RBC # BLD: 2.75 M/UL (ref 4.2–5.9)
RBC # BLD: 2.79 M/UL (ref 4.2–5.9)
RBC # BLD: 2.79 M/UL (ref 4.2–5.9)
RBC # BLD: 2.83 M/UL (ref 4.2–5.9)
RBC # BLD: 2.84 M/UL (ref 4.2–5.9)
RBC # BLD: 2.86 M/UL (ref 4.2–5.9)
RBC # BLD: 2.87 M/UL (ref 4.2–5.9)
RBC # BLD: 2.87 M/UL (ref 4.2–5.9)
RBC # BLD: 2.88 M/UL (ref 4.2–5.9)
RBC # BLD: 2.88 M/UL (ref 4.2–5.9)
RBC # BLD: 2.89 M/UL (ref 4.2–5.9)
RBC # BLD: 2.9 M/UL (ref 4.2–5.9)
RBC # BLD: 2.93 M/UL (ref 4.2–5.9)
RBC # BLD: 2.94 M/UL (ref 4.2–5.9)
RBC # BLD: 2.95 M/UL (ref 4.2–5.9)
RBC # BLD: 2.97 M/UL (ref 4.2–5.9)
RBC # BLD: 2.99 M/UL (ref 4.2–5.9)
RBC # BLD: 2.99 M/UL (ref 4.2–5.9)
RBC # BLD: 3.01 M/UL (ref 4.2–5.9)
RBC # BLD: 3.04 M/UL (ref 4.2–5.9)
RBC # BLD: 3.06 M/UL (ref 4.2–5.9)
RBC # BLD: 3.06 M/UL (ref 4.2–5.9)
RBC # BLD: 3.1 M/UL (ref 4.2–5.9)
RBC # BLD: 3.14 M/UL (ref 4.2–5.9)
RBC # BLD: 3.14 M/UL (ref 4.2–5.9)
RBC # BLD: 3.15 M/UL (ref 4.2–5.9)
RBC # BLD: 3.16 M/UL (ref 4.2–5.9)
RBC # BLD: 3.19 M/UL (ref 4.2–5.9)
RBC # BLD: 3.21 M/UL (ref 4.2–5.9)
RBC # BLD: 3.22 M/UL (ref 4.2–5.9)
RBC # BLD: 3.24 M/UL (ref 4.2–5.9)
RBC # BLD: 3.45 M/UL (ref 4.2–5.9)
RBC # BLD: 3.46 M/UL (ref 4.2–5.9)
RBC # BLD: 3.62 M/UL (ref 4.2–5.9)
RBC FLUID: 2000 /CUMM
RBC UA: 1 /HPF (ref 0–4)
RBC UA: 2 /HPF (ref 0–4)
RBC UA: 2 /HPF (ref 0–4)
RBC UA: ABNORMAL /HPF (ref 0–4)
REASON FOR REJECTION: NORMAL
REJECTED TEST: NORMAL
REPORT: NORMAL
SARS-COV-2, NAAT: DETECTED
SARS-COV-2: NOT DETECTED
SARS-COV-2: NOT DETECTED
SLIDE REVIEW: ABNORMAL
SODIUM BLD-SCNC: 121 MMOL/L (ref 136–145)
SODIUM BLD-SCNC: 122 MMOL/L (ref 136–145)
SODIUM BLD-SCNC: 125 MMOL/L (ref 136–145)
SODIUM BLD-SCNC: 127 MMOL/L (ref 136–145)
SODIUM BLD-SCNC: 128 MMOL/L (ref 136–145)
SODIUM BLD-SCNC: 129 MMOL/L (ref 136–145)
SODIUM BLD-SCNC: 130 MMOL/L (ref 136–145)
SODIUM BLD-SCNC: 131 MMOL/L (ref 136–145)
SODIUM BLD-SCNC: 132 MMOL/L (ref 136–145)
SODIUM BLD-SCNC: 133 MMOL/L (ref 136–145)
SODIUM BLD-SCNC: 134 MMOL/L (ref 136–145)
SODIUM BLD-SCNC: 135 MMOL/L (ref 136–145)
SODIUM BLD-SCNC: 136 MMOL/L (ref 136–145)
SODIUM BLD-SCNC: 137 MMOL/L (ref 136–145)
SODIUM BLD-SCNC: 138 MMOL/L (ref 136–145)
SODIUM BLD-SCNC: 139 MMOL/L (ref 136–145)
SODIUM BLD-SCNC: 140 MMOL/L (ref 136–145)
SODIUM BLD-SCNC: 141 MMOL/L (ref 136–145)
SODIUM BLD-SCNC: 143 MMOL/L (ref 136–145)
SODIUM BLD-SCNC: 145 MMOL/L (ref 136–145)
SPECIFIC GRAVITY UA: 1.01 (ref 1–1.03)
SPECIFIC GRAVITY UA: 1.01 (ref 1–1.03)
SPECIFIC GRAVITY UA: 1.02 (ref 1–1.03)
SPECIFIC GRAVITY UA: >=1.03 (ref 1–1.03)
T4 FREE: 1.1 NG/DL (ref 0.9–1.8)
T4 FREE: 1.1 NG/DL (ref 0.9–1.8)
TCO2 ARTERIAL: 20 MMOL/L
TCO2 ARTERIAL: 20.7 MMOL/L
TCO2 ARTERIAL: 24 MMOL/L
TCO2 ARTERIAL: 24 MMOL/L
TCO2 ARTERIAL: 26 MMOL/L
TCO2 ARTERIAL: 27 MMOL/L
TCO2 ARTERIAL: 29 MMOL/L
TCO2 CALC VENOUS: 29 MMOL/L
TCO2 CALC VENOUS: 29 MMOL/L
TCO2 CALC VENOUS: 30 MMOL/L
TOTAL IRON BINDING CAPACITY: 198 UG/DL (ref 260–445)
TOTAL IRON BINDING CAPACITY: 271 UG/DL (ref 260–445)
TOTAL IRON BINDING CAPACITY: 282 UG/DL (ref 260–445)
TOTAL PROTEIN: 5.7 G/DL (ref 6.4–8.2)
TOTAL PROTEIN: 5.8 G/DL (ref 6.4–8.2)
TOTAL PROTEIN: 5.9 G/DL (ref 6.4–8.2)
TOTAL PROTEIN: 5.9 G/DL (ref 6.4–8.2)
TOTAL PROTEIN: 6 G/DL (ref 6.4–8.2)
TOTAL PROTEIN: 6.1 G/DL (ref 6.4–8.2)
TOTAL PROTEIN: 6.2 G/DL (ref 6.4–8.2)
TOTAL PROTEIN: 6.3 G/DL (ref 6.4–8.2)
TOTAL PROTEIN: 6.4 G/DL (ref 6.4–8.2)
TOTAL PROTEIN: 6.5 G/DL (ref 6.4–8.2)
TOTAL PROTEIN: 6.6 G/DL (ref 6.4–8.2)
TOTAL PROTEIN: 6.6 G/DL (ref 6.4–8.2)
TOTAL PROTEIN: 6.7 G/DL (ref 6.4–8.2)
TOTAL PROTEIN: 6.7 G/DL (ref 6.4–8.2)
TOTAL PROTEIN: 6.8 G/DL (ref 6.4–8.2)
TOTAL PROTEIN: 7.1 G/DL (ref 6.4–8.2)
TOTAL PROTEIN: 7.2 G/DL (ref 6.4–8.2)
TOTAL PROTEIN: 7.3 G/DL (ref 6.4–8.2)
TOTAL PROTEIN: 7.9 G/DL (ref 6.4–8.2)
TRANSFERRIN: 223 MG/DL (ref 200–360)
TRIGL SERPL-MCNC: 43 MG/DL (ref 0–150)
TROPONIN: 0.07 NG/ML
TROPONIN: 0.07 NG/ML
TROPONIN: 0.08 NG/ML
TROPONIN: 0.17 NG/ML
TROPONIN: 0.26 NG/ML
TROPONIN: 0.26 NG/ML
TROPONIN: 0.28 NG/ML
TROPONIN: 0.39 NG/ML
TSH REFLEX: 21.57 UIU/ML (ref 0.27–4.2)
TSH SERPL DL<=0.05 MIU/L-ACNC: 12 UIU/ML (ref 0.27–4.2)
URINE REFLEX TO CULTURE: ABNORMAL
URINE REFLEX TO CULTURE: ABNORMAL
URINE TYPE: ABNORMAL
UROBILINOGEN, URINE: 0.2 E.U./DL
UROBILINOGEN, URINE: 1 E.U./DL
VANCOMYCIN RANDOM: 10.4 UG/ML
VANCOMYCIN RANDOM: 14 UG/ML
VANCOMYCIN RANDOM: 17.2 UG/ML
VANCOMYCIN RANDOM: 19.2 UG/ML
VANCOMYCIN RANDOM: 9.9 UG/ML
VITAMIN B-12: 1521 PG/ML (ref 211–911)
VITAMIN D 25-HYDROXY: 32.5 NG/ML
VLDLC SERPL CALC-MCNC: 9 MG/DL
VOLUME: 1000 ML
WBC # BLD: 2.8 K/UL (ref 4–11)
WBC # BLD: 3 K/UL (ref 4–11)
WBC # BLD: 3.1 K/UL (ref 4–11)
WBC # BLD: 3.1 K/UL (ref 4–11)
WBC # BLD: 3.2 K/UL (ref 4–11)
WBC # BLD: 3.2 K/UL (ref 4–11)
WBC # BLD: 3.3 K/UL (ref 4–11)
WBC # BLD: 3.4 K/UL (ref 4–11)
WBC # BLD: 3.5 K/UL (ref 4–11)
WBC # BLD: 3.6 K/UL (ref 4–11)
WBC # BLD: 3.7 K/UL (ref 4–11)
WBC # BLD: 3.7 K/UL (ref 4–11)
WBC # BLD: 3.8 K/UL (ref 4–11)
WBC # BLD: 3.9 K/UL (ref 4–11)
WBC # BLD: 4.1 K/UL (ref 4–11)
WBC # BLD: 4.2 K/UL (ref 4–11)
WBC # BLD: 4.3 K/UL (ref 4–11)
WBC # BLD: 4.4 K/UL (ref 4–11)
WBC # BLD: 4.4 K/UL (ref 4–11)
WBC # BLD: 4.5 K/UL (ref 4–11)
WBC # BLD: 4.6 K/UL (ref 4–11)
WBC # BLD: 4.7 K/UL (ref 4–11)
WBC # BLD: 4.8 K/UL (ref 4–11)
WBC # BLD: 4.9 K/UL (ref 4–11)
WBC # BLD: 4.9 K/UL (ref 4–11)
WBC # BLD: 5 K/UL (ref 4–11)
WBC # BLD: 5.1 K/UL (ref 4–11)
WBC # BLD: 5.2 K/UL (ref 4–11)
WBC # BLD: 5.2 K/UL (ref 4–11)
WBC # BLD: 5.3 K/UL (ref 4–11)
WBC # BLD: 5.3 K/UL (ref 4–11)
WBC # BLD: 5.4 K/UL (ref 4–11)
WBC # BLD: 5.5 K/UL (ref 4–11)
WBC # BLD: 5.6 K/UL (ref 4–11)
WBC # BLD: 5.9 K/UL (ref 4–11)
WBC # BLD: 5.9 K/UL (ref 4–11)
WBC # BLD: 6.1 K/UL (ref 4–11)
WBC # BLD: 6.4 K/UL (ref 4–11)
WBC # BLD: 7.2 K/UL (ref 4–11)
WBC # BLD: 7.9 K/UL (ref 4–11)
WBC UA: 0 /HPF (ref 0–5)
WBC UA: 1 /HPF (ref 0–5)
WBC UA: 4 /HPF (ref 0–5)
WBC UA: ABNORMAL /HPF (ref 0–5)
WOUND/ABSCESS: ABNORMAL

## 2022-01-01 PROCEDURE — 90935 HEMODIALYSIS ONE EVALUATION: CPT

## 2022-01-01 PROCEDURE — 6370000000 HC RX 637 (ALT 250 FOR IP): Performed by: STUDENT IN AN ORGANIZED HEALTH CARE EDUCATION/TRAINING PROGRAM

## 2022-01-01 PROCEDURE — 99232 SBSQ HOSP IP/OBS MODERATE 35: CPT | Performed by: NURSE PRACTITIONER

## 2022-01-01 PROCEDURE — 2580000003 HC RX 258: Performed by: INTERNAL MEDICINE

## 2022-01-01 PROCEDURE — 6360000002 HC RX W HCPCS: Performed by: STUDENT IN AN ORGANIZED HEALTH CARE EDUCATION/TRAINING PROGRAM

## 2022-01-01 PROCEDURE — 6370000000 HC RX 637 (ALT 250 FOR IP): Performed by: INTERNAL MEDICINE

## 2022-01-01 PROCEDURE — 83735 ASSAY OF MAGNESIUM: CPT

## 2022-01-01 PROCEDURE — 87505 NFCT AGENT DETECTION GI: CPT

## 2022-01-01 PROCEDURE — 94760 N-INVAS EAR/PLS OXIMETRY 1: CPT

## 2022-01-01 PROCEDURE — 2580000003 HC RX 258: Performed by: NURSE PRACTITIONER

## 2022-01-01 PROCEDURE — 93005 ELECTROCARDIOGRAM TRACING: CPT | Performed by: NURSE PRACTITIONER

## 2022-01-01 PROCEDURE — 80053 COMPREHEN METABOLIC PANEL: CPT

## 2022-01-01 PROCEDURE — 6360000002 HC RX W HCPCS: Performed by: NURSE PRACTITIONER

## 2022-01-01 PROCEDURE — 99223 1ST HOSP IP/OBS HIGH 75: CPT | Performed by: INTERNAL MEDICINE

## 2022-01-01 PROCEDURE — 82330 ASSAY OF CALCIUM: CPT

## 2022-01-01 PROCEDURE — 36415 COLL VENOUS BLD VENIPUNCTURE: CPT

## 2022-01-01 PROCEDURE — 80076 HEPATIC FUNCTION PANEL: CPT

## 2022-01-01 PROCEDURE — 2580000003 HC RX 258: Performed by: STUDENT IN AN ORGANIZED HEALTH CARE EDUCATION/TRAINING PROGRAM

## 2022-01-01 PROCEDURE — 51798 US URINE CAPACITY MEASURE: CPT

## 2022-01-01 PROCEDURE — 93005 ELECTROCARDIOGRAM TRACING: CPT | Performed by: STUDENT IN AN ORGANIZED HEALTH CARE EDUCATION/TRAINING PROGRAM

## 2022-01-01 PROCEDURE — 84439 ASSAY OF FREE THYROXINE: CPT

## 2022-01-01 PROCEDURE — 2700000000 HC OXYGEN THERAPY PER DAY

## 2022-01-01 PROCEDURE — 83605 ASSAY OF LACTIC ACID: CPT

## 2022-01-01 PROCEDURE — 6360000002 HC RX W HCPCS: Performed by: INTERNAL MEDICINE

## 2022-01-01 PROCEDURE — 97530 THERAPEUTIC ACTIVITIES: CPT

## 2022-01-01 PROCEDURE — 2580000003 HC RX 258: Performed by: EMERGENCY MEDICINE

## 2022-01-01 PROCEDURE — 93010 ELECTROCARDIOGRAM REPORT: CPT | Performed by: INTERNAL MEDICINE

## 2022-01-01 PROCEDURE — 82947 ASSAY GLUCOSE BLOOD QUANT: CPT

## 2022-01-01 PROCEDURE — 2709999900 IR NONTUNNELED VASCULAR CATHETER > 5 YEARS

## 2022-01-01 PROCEDURE — 90945 DIALYSIS ONE EVALUATION: CPT

## 2022-01-01 PROCEDURE — 94761 N-INVAS EAR/PLS OXIMETRY MLT: CPT

## 2022-01-01 PROCEDURE — 2060000000 HC ICU INTERMEDIATE R&B

## 2022-01-01 PROCEDURE — 82803 BLOOD GASES ANY COMBINATION: CPT

## 2022-01-01 PROCEDURE — 6370000000 HC RX 637 (ALT 250 FOR IP): Performed by: NURSE PRACTITIONER

## 2022-01-01 PROCEDURE — 80048 BASIC METABOLIC PNL TOTAL CA: CPT

## 2022-01-01 PROCEDURE — 87040 BLOOD CULTURE FOR BACTERIA: CPT

## 2022-01-01 PROCEDURE — 87070 CULTURE OTHR SPECIMN AEROBIC: CPT

## 2022-01-01 PROCEDURE — 99222 1ST HOSP IP/OBS MODERATE 55: CPT | Performed by: INTERNAL MEDICINE

## 2022-01-01 PROCEDURE — 85730 THROMBOPLASTIN TIME PARTIAL: CPT

## 2022-01-01 PROCEDURE — P9047 ALBUMIN (HUMAN), 25%, 50ML: HCPCS | Performed by: STUDENT IN AN ORGANIZED HEALTH CARE EDUCATION/TRAINING PROGRAM

## 2022-01-01 PROCEDURE — 99291 CRITICAL CARE FIRST HOUR: CPT | Performed by: INTERNAL MEDICINE

## 2022-01-01 PROCEDURE — 99285 EMERGENCY DEPT VISIT HI MDM: CPT

## 2022-01-01 PROCEDURE — 87205 SMEAR GRAM STAIN: CPT

## 2022-01-01 PROCEDURE — 85610 PROTHROMBIN TIME: CPT

## 2022-01-01 PROCEDURE — 86706 HEP B SURFACE ANTIBODY: CPT

## 2022-01-01 PROCEDURE — 85025 COMPLETE CBC W/AUTO DIFF WBC: CPT

## 2022-01-01 PROCEDURE — 87635 SARS-COV-2 COVID-19 AMP PRB: CPT

## 2022-01-01 PROCEDURE — 32555 ASPIRATE PLEURA W/ IMAGING: CPT

## 2022-01-01 PROCEDURE — 99233 SBSQ HOSP IP/OBS HIGH 50: CPT | Performed by: INTERNAL MEDICINE

## 2022-01-01 PROCEDURE — 6360000002 HC RX W HCPCS: Performed by: EMERGENCY MEDICINE

## 2022-01-01 PROCEDURE — 2500000003 HC RX 250 WO HCPCS

## 2022-01-01 PROCEDURE — 86901 BLOOD TYPING SEROLOGIC RH(D): CPT

## 2022-01-01 PROCEDURE — 1036F TOBACCO NON-USER: CPT | Performed by: FAMILY MEDICINE

## 2022-01-01 PROCEDURE — 71045 X-RAY EXAM CHEST 1 VIEW: CPT

## 2022-01-01 PROCEDURE — 99233 SBSQ HOSP IP/OBS HIGH 50: CPT | Performed by: NURSE PRACTITIONER

## 2022-01-01 PROCEDURE — 84484 ASSAY OF TROPONIN QUANT: CPT

## 2022-01-01 PROCEDURE — 2000000000 HC ICU R&B

## 2022-01-01 PROCEDURE — 2500000003 HC RX 250 WO HCPCS: Performed by: FAMILY MEDICINE

## 2022-01-01 PROCEDURE — 6370000000 HC RX 637 (ALT 250 FOR IP)

## 2022-01-01 PROCEDURE — 36556 INSERT NON-TUNNEL CV CATH: CPT

## 2022-01-01 PROCEDURE — 76937 US GUIDE VASCULAR ACCESS: CPT

## 2022-01-01 PROCEDURE — U0005 INFEC AGEN DETEC AMPLI PROBE: HCPCS

## 2022-01-01 PROCEDURE — 93005 ELECTROCARDIOGRAM TRACING: CPT | Performed by: EMERGENCY MEDICINE

## 2022-01-01 PROCEDURE — 1200000000 HC SEMI PRIVATE

## 2022-01-01 PROCEDURE — 97162 PT EVAL MOD COMPLEX 30 MIN: CPT

## 2022-01-01 PROCEDURE — 80202 ASSAY OF VANCOMYCIN: CPT

## 2022-01-01 PROCEDURE — 02HV33Z INSERTION OF INFUSION DEVICE INTO SUPERIOR VENA CAVA, PERCUTANEOUS APPROACH: ICD-10-PCS | Performed by: RADIOLOGY

## 2022-01-01 PROCEDURE — 9990000010 HC NO CHARGE VISIT

## 2022-01-01 PROCEDURE — 87015 SPECIMEN INFECT AGNT CONCNTJ: CPT

## 2022-01-01 PROCEDURE — 97535 SELF CARE MNGMENT TRAINING: CPT

## 2022-01-01 PROCEDURE — 85027 COMPLETE CBC AUTOMATED: CPT

## 2022-01-01 PROCEDURE — 97110 THERAPEUTIC EXERCISES: CPT

## 2022-01-01 PROCEDURE — 97116 GAIT TRAINING THERAPY: CPT

## 2022-01-01 PROCEDURE — 87077 CULTURE AEROBIC IDENTIFY: CPT

## 2022-01-01 PROCEDURE — C1769 GUIDE WIRE: HCPCS

## 2022-01-01 PROCEDURE — C1751 CATH, INF, PER/CENT/MIDLINE: HCPCS

## 2022-01-01 PROCEDURE — 85014 HEMATOCRIT: CPT

## 2022-01-01 PROCEDURE — 1123F ACP DISCUSS/DSCN MKR DOCD: CPT | Performed by: INTERNAL MEDICINE

## 2022-01-01 PROCEDURE — G8427 DOCREV CUR MEDS BY ELIG CLIN: HCPCS | Performed by: FAMILY MEDICINE

## 2022-01-01 PROCEDURE — 97166 OT EVAL MOD COMPLEX 45 MIN: CPT

## 2022-01-01 PROCEDURE — 80069 RENAL FUNCTION PANEL: CPT

## 2022-01-01 PROCEDURE — 83880 ASSAY OF NATRIURETIC PEPTIDE: CPT

## 2022-01-01 PROCEDURE — 0JH63XZ INSERTION OF TUNNELED VASCULAR ACCESS DEVICE INTO CHEST SUBCUTANEOUS TISSUE AND FASCIA, PERCUTANEOUS APPROACH: ICD-10-PCS | Performed by: RADIOLOGY

## 2022-01-01 PROCEDURE — 2500000003 HC RX 250 WO HCPCS: Performed by: STUDENT IN AN ORGANIZED HEALTH CARE EDUCATION/TRAINING PROGRAM

## 2022-01-01 PROCEDURE — 37799 UNLISTED PX VASCULAR SURGERY: CPT

## 2022-01-01 PROCEDURE — 36620 INSERTION CATHETER ARTERY: CPT

## 2022-01-01 PROCEDURE — 82746 ASSAY OF FOLIC ACID SERUM: CPT

## 2022-01-01 PROCEDURE — 99282 EMERGENCY DEPT VISIT SF MDM: CPT

## 2022-01-01 PROCEDURE — G8419 CALC BMI OUT NRM PARAM NOF/U: HCPCS | Performed by: FAMILY MEDICINE

## 2022-01-01 PROCEDURE — 5A1D70Z PERFORMANCE OF URINARY FILTRATION, INTERMITTENT, LESS THAN 6 HOURS PER DAY: ICD-10-PCS | Performed by: INTERNAL MEDICINE

## 2022-01-01 PROCEDURE — 80061 LIPID PANEL: CPT

## 2022-01-01 PROCEDURE — 97161 PT EVAL LOW COMPLEX 20 MIN: CPT

## 2022-01-01 PROCEDURE — 05HN33Z INSERTION OF INFUSION DEVICE INTO LEFT INTERNAL JUGULAR VEIN, PERCUTANEOUS APPROACH: ICD-10-PCS | Performed by: RADIOLOGY

## 2022-01-01 PROCEDURE — P9047 ALBUMIN (HUMAN), 25%, 50ML: HCPCS | Performed by: INTERNAL MEDICINE

## 2022-01-01 PROCEDURE — 87186 SC STD MICRODIL/AGAR DIL: CPT

## 2022-01-01 PROCEDURE — 81001 URINALYSIS AUTO W/SCOPE: CPT

## 2022-01-01 PROCEDURE — 77001 FLUOROGUIDE FOR VEIN DEVICE: CPT

## 2022-01-01 PROCEDURE — 80074 ACUTE HEPATITIS PANEL: CPT

## 2022-01-01 PROCEDURE — 82607 VITAMIN B-12: CPT

## 2022-01-01 PROCEDURE — 76705 ECHO EXAM OF ABDOMEN: CPT

## 2022-01-01 PROCEDURE — G8482 FLU IMMUNIZE ORDER/ADMIN: HCPCS | Performed by: FAMILY MEDICINE

## 2022-01-01 PROCEDURE — 02H633Z INSERTION OF INFUSION DEVICE INTO RIGHT ATRIUM, PERCUTANEOUS APPROACH: ICD-10-PCS | Performed by: RADIOLOGY

## 2022-01-01 PROCEDURE — 74176 CT ABD & PELVIS W/O CONTRAST: CPT

## 2022-01-01 PROCEDURE — 84466 ASSAY OF TRANSFERRIN: CPT

## 2022-01-01 PROCEDURE — 3046F HEMOGLOBIN A1C LEVEL >9.0%: CPT | Performed by: FAMILY MEDICINE

## 2022-01-01 PROCEDURE — 87340 HEPATITIS B SURFACE AG IA: CPT

## 2022-01-01 PROCEDURE — 70482 CT ORBIT/EAR/FOSSA W/O&W/DYE: CPT

## 2022-01-01 PROCEDURE — 6370000000 HC RX 637 (ALT 250 FOR IP): Performed by: FAMILY MEDICINE

## 2022-01-01 PROCEDURE — 05PYX3Z REMOVAL OF INFUSION DEVICE FROM UPPER VEIN, EXTERNAL APPROACH: ICD-10-PCS | Performed by: RADIOLOGY

## 2022-01-01 PROCEDURE — 36430 TRANSFUSION BLD/BLD COMPNT: CPT

## 2022-01-01 PROCEDURE — 36589 REMOVAL TUNNELED CV CATH: CPT

## 2022-01-01 PROCEDURE — 84132 ASSAY OF SERUM POTASSIUM: CPT

## 2022-01-01 PROCEDURE — 99221 1ST HOSP IP/OBS SF/LOW 40: CPT | Performed by: NURSE PRACTITIONER

## 2022-01-01 PROCEDURE — G8417 CALC BMI ABV UP PARAM F/U: HCPCS | Performed by: INTERNAL MEDICINE

## 2022-01-01 PROCEDURE — 83540 ASSAY OF IRON: CPT

## 2022-01-01 PROCEDURE — 88112 CYTOPATH CELL ENHANCE TECH: CPT

## 2022-01-01 PROCEDURE — 4A023N6 MEASUREMENT OF CARDIAC SAMPLING AND PRESSURE, RIGHT HEART, PERCUTANEOUS APPROACH: ICD-10-PCS | Performed by: INTERNAL MEDICINE

## 2022-01-01 PROCEDURE — 82306 VITAMIN D 25 HYDROXY: CPT

## 2022-01-01 PROCEDURE — 82010 KETONE BODYS QUAN: CPT

## 2022-01-01 PROCEDURE — 1111F DSCHRG MED/CURRENT MED MERGE: CPT | Performed by: INTERNAL MEDICINE

## 2022-01-01 PROCEDURE — 2580000003 HC RX 258

## 2022-01-01 PROCEDURE — 87075 CULTR BACTERIA EXCEPT BLOOD: CPT

## 2022-01-01 PROCEDURE — G8427 DOCREV CUR MEDS BY ELIG CLIN: HCPCS | Performed by: INTERNAL MEDICINE

## 2022-01-01 PROCEDURE — 84100 ASSAY OF PHOSPHORUS: CPT

## 2022-01-01 PROCEDURE — 86850 RBC ANTIBODY SCREEN: CPT

## 2022-01-01 PROCEDURE — 93971 EXTREMITY STUDY: CPT

## 2022-01-01 PROCEDURE — 87449 NOS EACH ORGANISM AG IA: CPT

## 2022-01-01 PROCEDURE — 2022F DILAT RTA XM EVC RTNOPTHY: CPT | Performed by: FAMILY MEDICINE

## 2022-01-01 PROCEDURE — 83550 IRON BINDING TEST: CPT

## 2022-01-01 PROCEDURE — 88305 TISSUE EXAM BY PATHOLOGIST: CPT

## 2022-01-01 PROCEDURE — C1729 CATH, DRAINAGE: HCPCS

## 2022-01-01 PROCEDURE — 36600 WITHDRAWAL OF ARTERIAL BLOOD: CPT

## 2022-01-01 PROCEDURE — 84443 ASSAY THYROID STIM HORMONE: CPT

## 2022-01-01 PROCEDURE — 3051F HG A1C>EQUAL 7.0%<8.0%: CPT | Performed by: INTERNAL MEDICINE

## 2022-01-01 PROCEDURE — 82077 ASSAY SPEC XCP UR&BREATH IA: CPT

## 2022-01-01 PROCEDURE — C1894 INTRO/SHEATH, NON-LASER: HCPCS

## 2022-01-01 PROCEDURE — 84295 ASSAY OF SERUM SODIUM: CPT

## 2022-01-01 PROCEDURE — 87324 CLOSTRIDIUM AG IA: CPT

## 2022-01-01 PROCEDURE — 71250 CT THORAX DX C-: CPT

## 2022-01-01 PROCEDURE — G0378 HOSPITAL OBSERVATION PER HR: HCPCS

## 2022-01-01 PROCEDURE — 82728 ASSAY OF FERRITIN: CPT

## 2022-01-01 PROCEDURE — 99204 OFFICE O/P NEW MOD 45 MIN: CPT | Performed by: FAMILY MEDICINE

## 2022-01-01 PROCEDURE — 99232 SBSQ HOSP IP/OBS MODERATE 35: CPT | Performed by: INTERNAL MEDICINE

## 2022-01-01 PROCEDURE — 96374 THER/PROPH/DIAG INJ IV PUSH: CPT

## 2022-01-01 PROCEDURE — 87150 DNA/RNA AMPLIFIED PROBE: CPT

## 2022-01-01 PROCEDURE — 93005 ELECTROCARDIOGRAM TRACING: CPT | Performed by: PHYSICIAN ASSISTANT

## 2022-01-01 PROCEDURE — U0003 INFECTIOUS AGENT DETECTION BY NUCLEIC ACID (DNA OR RNA); SEVERE ACUTE RESPIRATORY SYNDROME CORONAVIRUS 2 (SARS-COV-2) (CORONAVIRUS DISEASE [COVID-19]), AMPLIFIED PROBE TECHNIQUE, MAKING USE OF HIGH THROUGHPUT TECHNOLOGIES AS DESCRIBED BY CMS-2020-01-R: HCPCS

## 2022-01-01 PROCEDURE — 93970 EXTREMITY STUDY: CPT

## 2022-01-01 PROCEDURE — 36558 INSERT TUNNELED CV CATH: CPT

## 2022-01-01 PROCEDURE — 87102 FUNGUS ISOLATION CULTURE: CPT

## 2022-01-01 PROCEDURE — 99152 MOD SED SAME PHYS/QHP 5/>YRS: CPT

## 2022-01-01 PROCEDURE — 93306 TTE W/DOPPLER COMPLETE: CPT

## 2022-01-01 PROCEDURE — 74181 MRI ABDOMEN W/O CONTRAST: CPT

## 2022-01-01 PROCEDURE — 82140 ASSAY OF AMMONIA: CPT

## 2022-01-01 PROCEDURE — 4040F PNEUMOC VAC/ADMIN/RCVD: CPT | Performed by: INTERNAL MEDICINE

## 2022-01-01 PROCEDURE — 3017F COLORECTAL CA SCREEN DOC REV: CPT | Performed by: INTERNAL MEDICINE

## 2022-01-01 PROCEDURE — 0JPTXXZ REMOVAL OF TUNNELED VASCULAR ACCESS DEVICE FROM TRUNK SUBCUTANEOUS TISSUE AND FASCIA, EXTERNAL APPROACH: ICD-10-PCS | Performed by: RADIOLOGY

## 2022-01-01 PROCEDURE — 02HV33Z INSERTION OF INFUSION DEVICE INTO SUPERIOR VENA CAVA, PERCUTANEOUS APPROACH: ICD-10-PCS | Performed by: STUDENT IN AN ORGANIZED HEALTH CARE EDUCATION/TRAINING PROGRAM

## 2022-01-01 PROCEDURE — 82533 TOTAL CORTISOL: CPT

## 2022-01-01 PROCEDURE — 71046 X-RAY EXAM CHEST 2 VIEWS: CPT

## 2022-01-01 PROCEDURE — 6360000002 HC RX W HCPCS: Performed by: RADIOLOGY

## 2022-01-01 PROCEDURE — 70450 CT HEAD/BRAIN W/O DYE: CPT

## 2022-01-01 PROCEDURE — 85018 HEMOGLOBIN: CPT

## 2022-01-01 PROCEDURE — 84145 PROCALCITONIN (PCT): CPT

## 2022-01-01 PROCEDURE — 05HM33Z INSERTION OF INFUSION DEVICE INTO RIGHT INTERNAL JUGULAR VEIN, PERCUTANEOUS APPROACH: ICD-10-PCS | Performed by: RADIOLOGY

## 2022-01-01 PROCEDURE — 86704 HEP B CORE ANTIBODY TOTAL: CPT

## 2022-01-01 PROCEDURE — 83970 ASSAY OF PARATHORMONE: CPT

## 2022-01-01 PROCEDURE — 99215 OFFICE O/P EST HI 40 MIN: CPT | Performed by: INTERNAL MEDICINE

## 2022-01-01 PROCEDURE — 96365 THER/PROPH/DIAG IV INF INIT: CPT

## 2022-01-01 PROCEDURE — P9016 RBC LEUKOCYTES REDUCED: HCPCS

## 2022-01-01 PROCEDURE — 97165 OT EVAL LOW COMPLEX 30 MIN: CPT

## 2022-01-01 PROCEDURE — 83036 HEMOGLOBIN GLYCOSYLATED A1C: CPT

## 2022-01-01 PROCEDURE — 6360000004 HC RX CONTRAST MEDICATION: Performed by: INTERNAL MEDICINE

## 2022-01-01 PROCEDURE — 3017F COLORECTAL CA SCREEN DOC REV: CPT | Performed by: FAMILY MEDICINE

## 2022-01-01 PROCEDURE — 02H633Z INSERTION OF INFUSION DEVICE INTO RIGHT ATRIUM, PERCUTANEOUS APPROACH: ICD-10-PCS | Performed by: STUDENT IN AN ORGANIZED HEALTH CARE EDUCATION/TRAINING PROGRAM

## 2022-01-01 PROCEDURE — 76000 FLUOROSCOPY <1 HR PHYS/QHP: CPT

## 2022-01-01 PROCEDURE — 2709999900 IR REMOVE TUNNELED CVAD WO SQ PORT/PUMP

## 2022-01-01 PROCEDURE — 6360000002 HC RX W HCPCS: Performed by: FAMILY MEDICINE

## 2022-01-01 PROCEDURE — G0328 FECAL BLOOD SCRN IMMUNOASSAY: HCPCS

## 2022-01-01 PROCEDURE — 2022F DILAT RTA XM EVC RTNOPTHY: CPT | Performed by: INTERNAL MEDICINE

## 2022-01-01 PROCEDURE — 0W9G3ZZ DRAINAGE OF PERITONEAL CAVITY, PERCUTANEOUS APPROACH: ICD-10-PCS | Performed by: RADIOLOGY

## 2022-01-01 PROCEDURE — 93451 RIGHT HEART CATH: CPT

## 2022-01-01 PROCEDURE — 99283 EMERGENCY DEPT VISIT LOW MDM: CPT

## 2022-01-01 PROCEDURE — 1036F TOBACCO NON-USER: CPT | Performed by: INTERNAL MEDICINE

## 2022-01-01 PROCEDURE — 86900 BLOOD TYPING SEROLOGIC ABO: CPT

## 2022-01-01 PROCEDURE — 89051 BODY FLUID CELL COUNT: CPT

## 2022-01-01 PROCEDURE — 86923 COMPATIBILITY TEST ELECTRIC: CPT

## 2022-01-01 PROCEDURE — 2709999900 IR TUNNELED CVC PLACE WO SQ PORT/PUMP > 5 YEARS

## 2022-01-01 PROCEDURE — 2580000003 HC RX 258: Performed by: HOSPITALIST

## 2022-01-01 PROCEDURE — 93451 RIGHT HEART CATH: CPT | Performed by: INTERNAL MEDICINE

## 2022-01-01 PROCEDURE — 94660 CPAP INITIATION&MGMT: CPT

## 2022-01-01 PROCEDURE — 87076 CULTURE ANAEROBE IDENT EACH: CPT

## 2022-01-01 RX ORDER — SODIUM CHLORIDE 9 MG/ML
25 INJECTION, SOLUTION INTRAVENOUS PRN
Status: DISCONTINUED | OUTPATIENT
Start: 2022-01-01 | End: 2022-01-01 | Stop reason: HOSPADM

## 2022-01-01 RX ORDER — HEPARIN SODIUM 10000 [USP'U]/100ML
5-30 INJECTION, SOLUTION INTRAVENOUS CONTINUOUS
Status: DISCONTINUED | OUTPATIENT
Start: 2022-01-01 | End: 2022-01-01 | Stop reason: HOSPADM

## 2022-01-01 RX ORDER — METOPROLOL SUCCINATE 25 MG/1
12.5 TABLET, EXTENDED RELEASE ORAL DAILY
Status: DISCONTINUED | OUTPATIENT
Start: 2022-01-01 | End: 2022-01-01 | Stop reason: HOSPADM

## 2022-01-01 RX ORDER — METOPROLOL SUCCINATE 25 MG/1
25 TABLET, EXTENDED RELEASE ORAL DAILY
Status: DISCONTINUED | OUTPATIENT
Start: 2022-01-01 | End: 2022-01-01 | Stop reason: HOSPADM

## 2022-01-01 RX ORDER — SPIRONOLACTONE 100 MG/1
100 TABLET, FILM COATED ORAL ONCE
Status: COMPLETED | OUTPATIENT
Start: 2022-01-01 | End: 2022-01-01

## 2022-01-01 RX ORDER — MILRINONE LACTATE 0.2 MG/ML
0.2 INJECTION, SOLUTION INTRAVENOUS CONTINUOUS
Status: DISCONTINUED | OUTPATIENT
Start: 2022-01-01 | End: 2022-01-01

## 2022-01-01 RX ORDER — SODIUM CHLORIDE 9 MG/ML
INJECTION, SOLUTION INTRAVENOUS PRN
Status: DISCONTINUED | OUTPATIENT
Start: 2022-01-01 | End: 2022-01-01 | Stop reason: HOSPADM

## 2022-01-01 RX ORDER — SODIUM CHLORIDE 0.9 % (FLUSH) 0.9 %
5-40 SYRINGE (ML) INJECTION PRN
Status: DISCONTINUED | OUTPATIENT
Start: 2022-01-01 | End: 2022-01-01 | Stop reason: HOSPADM

## 2022-01-01 RX ORDER — NICOTINE POLACRILEX 4 MG
15 LOZENGE BUCCAL PRN
Status: DISCONTINUED | OUTPATIENT
Start: 2022-01-01 | End: 2022-01-01 | Stop reason: HOSPADM

## 2022-01-01 RX ORDER — HEPARIN SODIUM 1000 [USP'U]/ML
1000 INJECTION, SOLUTION INTRAVENOUS; SUBCUTANEOUS PRN
Status: DISCONTINUED | OUTPATIENT
Start: 2022-01-01 | End: 2022-01-01 | Stop reason: HOSPADM

## 2022-01-01 RX ORDER — DEXTROSE MONOHYDRATE 100 MG/ML
INJECTION, SOLUTION INTRAVENOUS CONTINUOUS
Status: DISCONTINUED | OUTPATIENT
Start: 2022-01-01 | End: 2022-01-01

## 2022-01-01 RX ORDER — LEVOTHYROXINE SODIUM 0.05 MG/1
50 TABLET ORAL
Status: DISCONTINUED | OUTPATIENT
Start: 2022-01-01 | End: 2022-01-01 | Stop reason: HOSPADM

## 2022-01-01 RX ORDER — ACETAMINOPHEN 325 MG/1
650 TABLET ORAL EVERY 6 HOURS PRN
Status: DISCONTINUED | OUTPATIENT
Start: 2022-01-01 | End: 2022-01-01 | Stop reason: HOSPADM

## 2022-01-01 RX ORDER — SODIUM CHLORIDE 0.9 % (FLUSH) 0.9 %
5-40 SYRINGE (ML) INJECTION PRN
Status: DISCONTINUED | OUTPATIENT
Start: 2022-01-01 | End: 2022-01-01

## 2022-01-01 RX ORDER — CLOPIDOGREL BISULFATE 75 MG/1
75 TABLET ORAL DAILY
COMMUNITY

## 2022-01-01 RX ORDER — ACETAZOLAMIDE 250 MG/1
500 TABLET ORAL 2 TIMES DAILY
Status: COMPLETED | OUTPATIENT
Start: 2022-01-01 | End: 2022-01-01

## 2022-01-01 RX ORDER — HEPARIN SODIUM 1000 [USP'U]/ML
3200 INJECTION, SOLUTION INTRAVENOUS; SUBCUTANEOUS PRN
Status: DISCONTINUED | OUTPATIENT
Start: 2022-01-01 | End: 2022-01-01 | Stop reason: HOSPADM

## 2022-01-01 RX ORDER — DEXTROSE MONOHYDRATE 25 G/50ML
12.5 INJECTION, SOLUTION INTRAVENOUS PRN
Status: DISCONTINUED | OUTPATIENT
Start: 2022-01-01 | End: 2022-01-01 | Stop reason: HOSPADM

## 2022-01-01 RX ORDER — BLOOD-GLUCOSE METER
1 KIT MISCELLANEOUS DAILY PRN
COMMUNITY
End: 2022-01-01 | Stop reason: SDUPTHER

## 2022-01-01 RX ORDER — SODIUM CHLORIDE 0.9 % (FLUSH) 0.9 %
5-40 SYRINGE (ML) INJECTION EVERY 12 HOURS SCHEDULED
Status: DISCONTINUED | OUTPATIENT
Start: 2022-01-01 | End: 2022-01-01 | Stop reason: SDUPTHER

## 2022-01-01 RX ORDER — DEXTROSE MONOHYDRATE 50 MG/ML
INJECTION, SOLUTION INTRAVENOUS CONTINUOUS
Status: DISCONTINUED | OUTPATIENT
Start: 2022-01-01 | End: 2022-01-01

## 2022-01-01 RX ORDER — POLYETHYLENE GLYCOL 3350 17 G/17G
17 POWDER, FOR SOLUTION ORAL DAILY PRN
Status: DISCONTINUED | OUTPATIENT
Start: 2022-01-01 | End: 2022-01-01 | Stop reason: HOSPADM

## 2022-01-01 RX ORDER — DEXTROSE MONOHYDRATE 50 MG/ML
100 INJECTION, SOLUTION INTRAVENOUS PRN
Status: DISCONTINUED | OUTPATIENT
Start: 2022-01-01 | End: 2022-01-01 | Stop reason: HOSPADM

## 2022-01-01 RX ORDER — ONDANSETRON 4 MG/1
4 TABLET, ORALLY DISINTEGRATING ORAL EVERY 8 HOURS PRN
Status: DISCONTINUED | OUTPATIENT
Start: 2022-01-01 | End: 2022-01-01 | Stop reason: HOSPADM

## 2022-01-01 RX ORDER — MIDODRINE HYDROCHLORIDE 5 MG/1
10 TABLET ORAL ONCE
Status: DISCONTINUED | OUTPATIENT
Start: 2022-01-01 | End: 2022-01-01

## 2022-01-01 RX ORDER — ISOSORBIDE MONONITRATE 30 MG/1
30 TABLET, EXTENDED RELEASE ORAL DAILY
Qty: 30 TABLET | Refills: 3 | Status: ON HOLD | OUTPATIENT
Start: 2022-01-01 | End: 2022-01-01 | Stop reason: HOSPADM

## 2022-01-01 RX ORDER — ALLOPURINOL 100 MG/1
100 TABLET ORAL DAILY
Status: DISCONTINUED | OUTPATIENT
Start: 2022-01-01 | End: 2022-01-01 | Stop reason: HOSPADM

## 2022-01-01 RX ORDER — FUROSEMIDE 10 MG/ML
40 INJECTION INTRAMUSCULAR; INTRAVENOUS ONCE
Status: COMPLETED | OUTPATIENT
Start: 2022-01-01 | End: 2022-01-01

## 2022-01-01 RX ORDER — LEVOTHYROXINE SODIUM 0.07 MG/1
75 TABLET ORAL DAILY
Status: DISCONTINUED | OUTPATIENT
Start: 2022-01-01 | End: 2022-01-01 | Stop reason: HOSPADM

## 2022-01-01 RX ORDER — MIDODRINE HYDROCHLORIDE 5 MG/1
10 TABLET ORAL
Status: COMPLETED | OUTPATIENT
Start: 2022-01-01 | End: 2022-01-01

## 2022-01-01 RX ORDER — MIDAZOLAM HYDROCHLORIDE 1 MG/ML
5 INJECTION, SOLUTION INTRAMUSCULAR; INTRAVENOUS
Status: DISCONTINUED | OUTPATIENT
Start: 2022-01-01 | End: 2022-01-01 | Stop reason: HOSPADM

## 2022-01-01 RX ORDER — MIDODRINE HYDROCHLORIDE 10 MG/1
10 TABLET ORAL
Qty: 90 TABLET | Refills: 0 | Status: SHIPPED | OUTPATIENT
Start: 2022-01-01

## 2022-01-01 RX ORDER — LOPERAMIDE HYDROCHLORIDE 2 MG/1
2 CAPSULE ORAL ONCE
Status: COMPLETED | OUTPATIENT
Start: 2022-01-01 | End: 2022-01-01

## 2022-01-01 RX ORDER — M-VIT,TX,IRON,MINS/CALC/FOLIC 27MG-0.4MG
1 TABLET ORAL DAILY
Status: DISCONTINUED | OUTPATIENT
Start: 2022-01-01 | End: 2022-01-01 | Stop reason: HOSPADM

## 2022-01-01 RX ORDER — PROMETHAZINE HYDROCHLORIDE 25 MG/ML
6.25 INJECTION, SOLUTION INTRAMUSCULAR; INTRAVENOUS EVERY 6 HOURS PRN
Status: DISCONTINUED | OUTPATIENT
Start: 2022-01-01 | End: 2022-01-01 | Stop reason: HOSPADM

## 2022-01-01 RX ORDER — SODIUM CHLORIDE 9 MG/ML
25 INJECTION, SOLUTION INTRAVENOUS PRN
Status: DISCONTINUED | OUTPATIENT
Start: 2022-01-01 | End: 2022-01-01

## 2022-01-01 RX ORDER — SODIUM CHLORIDE 0.9 % (FLUSH) 0.9 %
5-40 SYRINGE (ML) INJECTION EVERY 12 HOURS SCHEDULED
Status: DISCONTINUED | OUTPATIENT
Start: 2022-01-01 | End: 2022-01-01 | Stop reason: HOSPADM

## 2022-01-01 RX ORDER — MAGNESIUM SULFATE 1 G/100ML
1000 INJECTION INTRAVENOUS PRN
Status: DISCONTINUED | OUTPATIENT
Start: 2022-01-01 | End: 2022-01-01 | Stop reason: HOSPADM

## 2022-01-01 RX ORDER — MIDODRINE HYDROCHLORIDE 5 MG/1
5 TABLET ORAL
Qty: 90 TABLET | Refills: 3 | Status: ON HOLD | OUTPATIENT
Start: 2022-01-01 | End: 2022-01-01 | Stop reason: HOSPADM

## 2022-01-01 RX ORDER — LACTULOSE 10 G/15ML
30 SOLUTION ORAL 3 TIMES DAILY
Status: DISCONTINUED | OUTPATIENT
Start: 2022-01-01 | End: 2022-01-01 | Stop reason: HOSPADM

## 2022-01-01 RX ORDER — ALBUMIN (HUMAN) 12.5 G/50ML
25 SOLUTION INTRAVENOUS
Status: COMPLETED | OUTPATIENT
Start: 2022-01-01 | End: 2022-01-01

## 2022-01-01 RX ORDER — PANTOPRAZOLE SODIUM 40 MG/1
40 TABLET, DELAYED RELEASE ORAL
Status: DISCONTINUED | OUTPATIENT
Start: 2022-01-01 | End: 2022-01-01 | Stop reason: HOSPADM

## 2022-01-01 RX ORDER — ATORVASTATIN CALCIUM 80 MG/1
80 TABLET, FILM COATED ORAL NIGHTLY
Status: DISCONTINUED | OUTPATIENT
Start: 2022-01-01 | End: 2022-01-01 | Stop reason: HOSPADM

## 2022-01-01 RX ORDER — MIDODRINE HYDROCHLORIDE 5 MG/1
10 TABLET ORAL ONCE
Status: COMPLETED | OUTPATIENT
Start: 2022-01-01 | End: 2022-01-01

## 2022-01-01 RX ORDER — DOPAMINE HYDROCHLORIDE 160 MG/100ML
1-20 INJECTION, SOLUTION INTRAVENOUS CONTINUOUS
Status: DISCONTINUED | OUTPATIENT
Start: 2022-01-01 | End: 2022-01-01 | Stop reason: HOSPADM

## 2022-01-01 RX ORDER — ATORVASTATIN CALCIUM 80 MG/1
80 TABLET, FILM COATED ORAL DAILY
Status: DISCONTINUED | OUTPATIENT
Start: 2022-01-01 | End: 2022-01-01 | Stop reason: HOSPADM

## 2022-01-01 RX ORDER — ONDANSETRON 2 MG/ML
4 INJECTION INTRAMUSCULAR; INTRAVENOUS EVERY 6 HOURS PRN
Status: DISCONTINUED | OUTPATIENT
Start: 2022-01-01 | End: 2022-01-01 | Stop reason: HOSPADM

## 2022-01-01 RX ORDER — MIDODRINE HYDROCHLORIDE 10 MG/1
10 TABLET ORAL
Status: COMPLETED | OUTPATIENT
Start: 2022-01-01 | End: 2022-01-01

## 2022-01-01 RX ORDER — METOPROLOL SUCCINATE 25 MG/1
12.5 TABLET, EXTENDED RELEASE ORAL DAILY
Qty: 30 TABLET | Refills: 3 | Status: ON HOLD | OUTPATIENT
Start: 2022-01-01 | End: 2022-01-01 | Stop reason: CLARIF

## 2022-01-01 RX ORDER — HEPARIN SODIUM 1000 [USP'U]/ML
3600 INJECTION, SOLUTION INTRAVENOUS; SUBCUTANEOUS PRN
Status: DISCONTINUED | OUTPATIENT
Start: 2022-01-01 | End: 2022-01-01 | Stop reason: HOSPADM

## 2022-01-01 RX ORDER — MILRINONE LACTATE 0.2 MG/ML
0.25 INJECTION, SOLUTION INTRAVENOUS CONTINUOUS
Status: DISPENSED | OUTPATIENT
Start: 2022-01-01 | End: 2022-01-01

## 2022-01-01 RX ORDER — MIDODRINE HYDROCHLORIDE 5 MG/1
5 TABLET ORAL ONCE
Status: DISCONTINUED | OUTPATIENT
Start: 2022-01-01 | End: 2022-01-01 | Stop reason: HOSPADM

## 2022-01-01 RX ORDER — INSULIN LISPRO 100 [IU]/ML
0-6 INJECTION, SOLUTION INTRAVENOUS; SUBCUTANEOUS NIGHTLY
Status: DISCONTINUED | OUTPATIENT
Start: 2022-01-01 | End: 2022-01-01 | Stop reason: HOSPADM

## 2022-01-01 RX ORDER — FUROSEMIDE 10 MG/ML
60 INJECTION INTRAMUSCULAR; INTRAVENOUS 2 TIMES DAILY
Status: DISCONTINUED | OUTPATIENT
Start: 2022-01-01 | End: 2022-01-01

## 2022-01-01 RX ORDER — HEPARIN SODIUM 1000 [USP'U]/ML
80 INJECTION, SOLUTION INTRAVENOUS; SUBCUTANEOUS ONCE
Status: COMPLETED | OUTPATIENT
Start: 2022-01-01 | End: 2022-01-01

## 2022-01-01 RX ORDER — HEPARIN SODIUM 1000 [USP'U]/ML
40 INJECTION, SOLUTION INTRAVENOUS; SUBCUTANEOUS PRN
Status: DISCONTINUED | OUTPATIENT
Start: 2022-01-01 | End: 2022-01-01

## 2022-01-01 RX ORDER — POTASSIUM CHLORIDE 750 MG/1
20 TABLET, FILM COATED, EXTENDED RELEASE ORAL 2 TIMES DAILY
Status: DISCONTINUED | OUTPATIENT
Start: 2022-01-01 | End: 2022-01-01

## 2022-01-01 RX ORDER — MIDODRINE HYDROCHLORIDE 10 MG/1
10 TABLET ORAL PRN
Qty: 90 TABLET | Refills: 1 | Status: SHIPPED | OUTPATIENT
Start: 2022-01-01 | End: 2022-01-01

## 2022-01-01 RX ORDER — POTASSIUM CHLORIDE 750 MG/1
10 TABLET, FILM COATED, EXTENDED RELEASE ORAL ONCE
Status: COMPLETED | OUTPATIENT
Start: 2022-01-01 | End: 2022-01-01

## 2022-01-01 RX ORDER — ALBUMIN (HUMAN) 12.5 G/50ML
25 SOLUTION INTRAVENOUS
Status: ACTIVE | OUTPATIENT
Start: 2022-01-01 | End: 2022-01-01

## 2022-01-01 RX ORDER — AMIODARONE HYDROCHLORIDE 200 MG/1
200 TABLET ORAL 2 TIMES DAILY
Qty: 60 TABLET | Refills: 1 | Status: SHIPPED | OUTPATIENT
Start: 2022-01-01 | End: 2022-01-01 | Stop reason: HOSPADM

## 2022-01-01 RX ORDER — LEVOTHYROXINE SODIUM 0.05 MG/1
TABLET ORAL
Qty: 90 TABLET | OUTPATIENT
Start: 2022-01-01

## 2022-01-01 RX ORDER — MIDODRINE HYDROCHLORIDE 5 MG/1
5 TABLET ORAL
Status: DISCONTINUED | OUTPATIENT
Start: 2022-01-01 | End: 2022-01-01 | Stop reason: HOSPADM

## 2022-01-01 RX ORDER — LEVOTHYROXINE SODIUM 0.03 MG/1
25 TABLET ORAL
Status: DISCONTINUED | OUTPATIENT
Start: 2022-01-01 | End: 2022-01-01

## 2022-01-01 RX ORDER — MIDODRINE HYDROCHLORIDE 10 MG/1
10 TABLET ORAL
Status: DISCONTINUED | OUTPATIENT
Start: 2022-01-01 | End: 2022-01-01 | Stop reason: HOSPADM

## 2022-01-01 RX ORDER — METOLAZONE 5 MG/1
10 TABLET ORAL 2 TIMES DAILY
Status: DISCONTINUED | OUTPATIENT
Start: 2022-01-01 | End: 2022-01-01

## 2022-01-01 RX ORDER — LIDOCAINE HYDROCHLORIDE 10 MG/ML
INJECTION, SOLUTION EPIDURAL; INFILTRATION; INTRACAUDAL; PERINEURAL
Status: DISCONTINUED
Start: 2022-01-01 | End: 2022-01-01 | Stop reason: WASHOUT

## 2022-01-01 RX ORDER — HEPARIN SODIUM 1000 [USP'U]/ML
1000 INJECTION, SOLUTION INTRAVENOUS; SUBCUTANEOUS ONCE
Status: DISCONTINUED | OUTPATIENT
Start: 2022-01-01 | End: 2022-01-01 | Stop reason: HOSPADM

## 2022-01-01 RX ORDER — FENTANYL CITRATE 50 UG/ML
INJECTION, SOLUTION INTRAMUSCULAR; INTRAVENOUS DAILY PRN
Status: COMPLETED | OUTPATIENT
Start: 2022-01-01 | End: 2022-01-01

## 2022-01-01 RX ORDER — SPIRONOLACTONE 25 MG/1
25 TABLET ORAL DAILY
Status: DISCONTINUED | OUTPATIENT
Start: 2022-01-01 | End: 2022-01-01

## 2022-01-01 RX ORDER — CARVEDILOL 12.5 MG/1
12.5 TABLET ORAL 2 TIMES DAILY WITH MEALS
Status: ON HOLD | COMMUNITY
End: 2022-01-01 | Stop reason: HOSPADM

## 2022-01-01 RX ORDER — POTASSIUM CHLORIDE 7.45 MG/ML
10 INJECTION INTRAVENOUS PRN
Status: DISCONTINUED | OUTPATIENT
Start: 2022-01-01 | End: 2022-01-01 | Stop reason: HOSPADM

## 2022-01-01 RX ORDER — CLOPIDOGREL BISULFATE 75 MG/1
75 TABLET ORAL DAILY
Status: DISCONTINUED | OUTPATIENT
Start: 2022-01-01 | End: 2022-01-01 | Stop reason: HOSPADM

## 2022-01-01 RX ORDER — HEPARIN SODIUM 10000 [USP'U]/100ML
0-3000 INJECTION, SOLUTION INTRAVENOUS CONTINUOUS
Status: DISCONTINUED | OUTPATIENT
Start: 2022-01-01 | End: 2022-01-01

## 2022-01-01 RX ORDER — METAXALONE 800 MG/1
400 TABLET ORAL ONCE
Status: COMPLETED | OUTPATIENT
Start: 2022-01-01 | End: 2022-01-01

## 2022-01-01 RX ORDER — SPIRONOLACTONE 25 MG/1
25 TABLET ORAL DAILY
Qty: 30 TABLET | Refills: 3 | Status: SHIPPED | OUTPATIENT
Start: 2022-01-01 | End: 2022-01-01

## 2022-01-01 RX ORDER — 0.9 % SODIUM CHLORIDE 0.9 %
1000 INTRAVENOUS SOLUTION INTRAVENOUS ONCE
Status: COMPLETED | OUTPATIENT
Start: 2022-01-01 | End: 2022-01-01

## 2022-01-01 RX ORDER — MIDODRINE HYDROCHLORIDE 5 MG/1
5 TABLET ORAL
Status: DISCONTINUED | OUTPATIENT
Start: 2022-01-01 | End: 2022-01-01

## 2022-01-01 RX ORDER — METOLAZONE 5 MG/1
2.5 TABLET ORAL ONCE
Status: DISCONTINUED | OUTPATIENT
Start: 2022-01-01 | End: 2022-01-01

## 2022-01-01 RX ORDER — ASPIRIN 81 MG/1
81 TABLET, CHEWABLE ORAL DAILY
Status: DISCONTINUED | OUTPATIENT
Start: 2022-01-01 | End: 2022-01-01 | Stop reason: HOSPADM

## 2022-01-01 RX ORDER — LIDOCAINE HYDROCHLORIDE 10 MG/ML
5 INJECTION, SOLUTION EPIDURAL; INFILTRATION; INTRACAUDAL; PERINEURAL ONCE
Status: DISCONTINUED | OUTPATIENT
Start: 2022-01-01 | End: 2022-01-01 | Stop reason: HOSPADM

## 2022-01-01 RX ORDER — TORSEMIDE 20 MG/1
60 TABLET ORAL DAILY
Status: DISCONTINUED | OUTPATIENT
Start: 2022-01-01 | End: 2022-01-01

## 2022-01-01 RX ORDER — ISOSORBIDE MONONITRATE 30 MG/1
30 TABLET, EXTENDED RELEASE ORAL DAILY
Status: DISCONTINUED | OUTPATIENT
Start: 2022-01-01 | End: 2022-01-01 | Stop reason: HOSPADM

## 2022-01-01 RX ORDER — PANTOPRAZOLE SODIUM 40 MG/1
40 TABLET, DELAYED RELEASE ORAL
Status: DISCONTINUED | OUTPATIENT
Start: 2022-01-01 | End: 2022-01-01

## 2022-01-01 RX ORDER — PHYTONADIONE 5 MG/1
5 TABLET ORAL ONCE
Status: DISCONTINUED | OUTPATIENT
Start: 2022-01-01 | End: 2022-01-01

## 2022-01-01 RX ORDER — TORSEMIDE 20 MG/1
60 TABLET ORAL DAILY
Status: DISCONTINUED | OUTPATIENT
Start: 2022-01-01 | End: 2022-01-01 | Stop reason: HOSPADM

## 2022-01-01 RX ORDER — INSULIN LISPRO 100 [IU]/ML
0-12 INJECTION, SOLUTION INTRAVENOUS; SUBCUTANEOUS
Status: DISCONTINUED | OUTPATIENT
Start: 2022-01-01 | End: 2022-01-01 | Stop reason: HOSPADM

## 2022-01-01 RX ORDER — HEPARIN SODIUM 1000 [USP'U]/ML
80 INJECTION, SOLUTION INTRAVENOUS; SUBCUTANEOUS PRN
Status: DISCONTINUED | OUTPATIENT
Start: 2022-01-01 | End: 2022-01-01

## 2022-01-01 RX ORDER — MIDODRINE HYDROCHLORIDE 5 MG/1
10 TABLET ORAL
Status: DISCONTINUED | OUTPATIENT
Start: 2022-01-01 | End: 2022-01-01 | Stop reason: HOSPADM

## 2022-01-01 RX ORDER — POTASSIUM CHLORIDE 29.8 MG/ML
20 INJECTION INTRAVENOUS PRN
Status: DISCONTINUED | OUTPATIENT
Start: 2022-01-01 | End: 2022-01-01 | Stop reason: HOSPADM

## 2022-01-01 RX ORDER — SPIRONOLACTONE 25 MG/1
25 TABLET ORAL DAILY
Status: DISCONTINUED | OUTPATIENT
Start: 2022-01-01 | End: 2022-01-01 | Stop reason: HOSPADM

## 2022-01-01 RX ORDER — SPIRONOLACTONE 25 MG/1
25 TABLET ORAL DAILY
Qty: 30 TABLET | Refills: 3 | Status: ON HOLD | OUTPATIENT
Start: 2022-01-01 | End: 2022-01-01 | Stop reason: HOSPADM

## 2022-01-01 RX ORDER — SODIUM CHLORIDE 0.9 % (FLUSH) 0.9 %
5-40 SYRINGE (ML) INJECTION EVERY 12 HOURS SCHEDULED
Status: DISCONTINUED | OUTPATIENT
Start: 2022-01-01 | End: 2022-01-01

## 2022-01-01 RX ORDER — PANTOPRAZOLE SODIUM 40 MG/1
40 TABLET, DELAYED RELEASE ORAL
Qty: 30 TABLET | Refills: 3 | Status: SHIPPED | OUTPATIENT
Start: 2022-01-01

## 2022-01-01 RX ORDER — MIDODRINE HYDROCHLORIDE 10 MG/1
10 TABLET ORAL ONCE
Status: COMPLETED | OUTPATIENT
Start: 2022-01-01 | End: 2022-01-01

## 2022-01-01 RX ORDER — ACETAMINOPHEN 325 MG/1
650 TABLET ORAL EVERY 4 HOURS PRN
Status: DISCONTINUED | OUTPATIENT
Start: 2022-01-01 | End: 2022-01-01 | Stop reason: HOSPADM

## 2022-01-01 RX ORDER — HEPARIN SODIUM 1000 [USP'U]/ML
40 INJECTION, SOLUTION INTRAVENOUS; SUBCUTANEOUS PRN
Status: DISCONTINUED | OUTPATIENT
Start: 2022-01-01 | End: 2022-01-01 | Stop reason: HOSPADM

## 2022-01-01 RX ORDER — METOLAZONE 5 MG/1
5 TABLET ORAL ONCE
Status: COMPLETED | OUTPATIENT
Start: 2022-01-01 | End: 2022-01-01

## 2022-01-01 RX ORDER — MILRINONE LACTATE 0.2 MG/ML
0.12 INJECTION, SOLUTION INTRAVENOUS CONTINUOUS
Status: DISCONTINUED | OUTPATIENT
Start: 2022-01-01 | End: 2022-01-01

## 2022-01-01 RX ORDER — HEPARIN SODIUM 1000 [USP'U]/ML
80 INJECTION, SOLUTION INTRAVENOUS; SUBCUTANEOUS ONCE
Status: DISCONTINUED | OUTPATIENT
Start: 2022-01-01 | End: 2022-01-01 | Stop reason: ALTCHOICE

## 2022-01-01 RX ORDER — NOREPINEPHRINE BITARTRATE 1 MG/ML
INJECTION, SOLUTION INTRAVENOUS
Status: DISPENSED
Start: 2022-01-01 | End: 2022-01-01

## 2022-01-01 RX ORDER — CALCIUM GLUCONATE 10 MG/ML
1000 INJECTION, SOLUTION INTRAVENOUS PRN
Status: DISCONTINUED | OUTPATIENT
Start: 2022-01-01 | End: 2022-01-01 | Stop reason: HOSPADM

## 2022-01-01 RX ORDER — LEVOTHYROXINE SODIUM 0.05 MG/1
50 TABLET ORAL
Qty: 30 TABLET | Refills: 3 | Status: SHIPPED | OUTPATIENT
Start: 2022-01-01

## 2022-01-01 RX ORDER — SODIUM CHLORIDE 9 MG/ML
25 INJECTION, SOLUTION INTRAVENOUS PRN
Status: DISCONTINUED | OUTPATIENT
Start: 2022-01-01 | End: 2022-01-01 | Stop reason: SDUPTHER

## 2022-01-01 RX ORDER — ALBUMIN (HUMAN) 12.5 G/50ML
25 SOLUTION INTRAVENOUS ONCE
Status: COMPLETED | OUTPATIENT
Start: 2022-01-01 | End: 2022-01-01

## 2022-01-01 RX ORDER — ACETAMINOPHEN 650 MG/1
650 SUPPOSITORY RECTAL EVERY 6 HOURS PRN
Status: DISCONTINUED | OUTPATIENT
Start: 2022-01-01 | End: 2022-01-01 | Stop reason: HOSPADM

## 2022-01-01 RX ORDER — METOLAZONE 5 MG/1
2.5 TABLET ORAL ONCE
Status: COMPLETED | OUTPATIENT
Start: 2022-01-01 | End: 2022-01-01

## 2022-01-01 RX ORDER — HEPARIN SODIUM 1000 [USP'U]/ML
80 INJECTION, SOLUTION INTRAVENOUS; SUBCUTANEOUS PRN
Status: DISCONTINUED | OUTPATIENT
Start: 2022-01-01 | End: 2022-01-01 | Stop reason: HOSPADM

## 2022-01-01 RX ORDER — BLOOD-GLUCOSE METER
1 KIT MISCELLANEOUS DAILY
Qty: 1 KIT | Refills: 0 | Status: SHIPPED | OUTPATIENT
Start: 2022-01-01

## 2022-01-01 RX ORDER — MIDAZOLAM HYDROCHLORIDE 1 MG/ML
INJECTION INTRAMUSCULAR; INTRAVENOUS DAILY PRN
Status: COMPLETED | OUTPATIENT
Start: 2022-01-01 | End: 2022-01-01

## 2022-01-01 RX ORDER — CALCIUM GLUCONATE 20 MG/ML
2000 INJECTION, SOLUTION INTRAVENOUS PRN
Status: DISCONTINUED | OUTPATIENT
Start: 2022-01-01 | End: 2022-01-01 | Stop reason: HOSPADM

## 2022-01-01 RX ORDER — ALBUMIN (HUMAN) 12.5 G/50ML
25 SOLUTION INTRAVENOUS ONCE
Status: DISCONTINUED | OUTPATIENT
Start: 2022-01-01 | End: 2022-01-01 | Stop reason: HOSPADM

## 2022-01-01 RX ORDER — SODIUM CHLORIDE 0.9 % (FLUSH) 0.9 %
5-40 SYRINGE (ML) INJECTION PRN
Status: DISCONTINUED | OUTPATIENT
Start: 2022-01-01 | End: 2022-01-01 | Stop reason: SDUPTHER

## 2022-01-01 RX ORDER — LOPERAMIDE HYDROCHLORIDE 2 MG/1
2 CAPSULE ORAL 4 TIMES DAILY PRN
Status: DISCONTINUED | OUTPATIENT
Start: 2022-01-01 | End: 2022-01-01 | Stop reason: HOSPADM

## 2022-01-01 RX ORDER — AMIODARONE HYDROCHLORIDE 200 MG/1
200 TABLET ORAL 2 TIMES DAILY
Status: DISCONTINUED | OUTPATIENT
Start: 2022-01-01 | End: 2022-01-01 | Stop reason: HOSPADM

## 2022-01-01 RX ORDER — POTASSIUM CHLORIDE 7.45 MG/ML
10 INJECTION INTRAVENOUS
Status: DISPENSED | OUTPATIENT
Start: 2022-01-01 | End: 2022-01-01

## 2022-01-01 RX ORDER — METOPROLOL SUCCINATE 25 MG/1
12.5 TABLET, EXTENDED RELEASE ORAL DAILY
Qty: 30 TABLET | Refills: 3 | Status: ON HOLD | OUTPATIENT
Start: 2022-01-01 | End: 2022-01-01 | Stop reason: HOSPADM

## 2022-01-01 RX ORDER — ALBUMIN (HUMAN) 12.5 G/50ML
25 SOLUTION INTRAVENOUS
Status: DISPENSED | OUTPATIENT
Start: 2022-01-01 | End: 2022-01-01

## 2022-01-01 RX ORDER — SODIUM CHLORIDE 9 MG/ML
INJECTION, SOLUTION INTRAVENOUS PRN
Status: DISCONTINUED | OUTPATIENT
Start: 2022-01-01 | End: 2022-01-01

## 2022-01-01 RX ORDER — METOPROLOL SUCCINATE 25 MG/1
12.5 TABLET, EXTENDED RELEASE ORAL DAILY
Status: DISCONTINUED | OUTPATIENT
Start: 2022-01-01 | End: 2022-01-01

## 2022-01-01 RX ORDER — PHYTONADIONE 5 MG/1
10 TABLET ORAL DAILY
Status: COMPLETED | OUTPATIENT
Start: 2022-01-01 | End: 2022-01-01

## 2022-01-01 RX ORDER — DIPHENHYDRAMINE HYDROCHLORIDE 50 MG/ML
12.5 INJECTION INTRAMUSCULAR; INTRAVENOUS ONCE
Status: DISCONTINUED | OUTPATIENT
Start: 2022-01-01 | End: 2022-01-01

## 2022-01-01 RX ORDER — SODIUM CHLORIDE 9 MG/ML
INJECTION, SOLUTION INTRAVENOUS CONTINUOUS
Status: DISCONTINUED | OUTPATIENT
Start: 2022-01-01 | End: 2022-01-01

## 2022-01-01 RX ORDER — BISACODYL 10 MG
10 SUPPOSITORY, RECTAL RECTAL ONCE
Status: DISCONTINUED | OUTPATIENT
Start: 2022-01-01 | End: 2022-01-01

## 2022-01-01 RX ORDER — DOBUTAMINE HYDROCHLORIDE 200 MG/100ML
2.5-1 INJECTION INTRAVENOUS CONTINUOUS
Status: DISCONTINUED | OUTPATIENT
Start: 2022-01-01 | End: 2022-01-01 | Stop reason: HOSPADM

## 2022-01-01 RX ORDER — MILRINONE LACTATE 0.2 MG/ML
0.25 INJECTION, SOLUTION INTRAVENOUS CONTINUOUS
Status: DISCONTINUED | OUTPATIENT
Start: 2022-01-01 | End: 2022-01-01

## 2022-01-01 RX ORDER — MIDODRINE HYDROCHLORIDE 10 MG/1
10 TABLET ORAL PRN
Status: DISCONTINUED | OUTPATIENT
Start: 2022-01-01 | End: 2022-01-01 | Stop reason: HOSPADM

## 2022-01-01 RX ORDER — BISACODYL 10 MG
10 SUPPOSITORY, RECTAL RECTAL ONCE
Status: DISCONTINUED | OUTPATIENT
Start: 2022-01-01 | End: 2022-01-01 | Stop reason: HOSPADM

## 2022-01-01 RX ORDER — ISOSORBIDE MONONITRATE 30 MG/1
30 TABLET, EXTENDED RELEASE ORAL DAILY
Status: DISCONTINUED | OUTPATIENT
Start: 2022-01-01 | End: 2022-01-01

## 2022-01-01 RX ORDER — 0.9 % SODIUM CHLORIDE 0.9 %
500 INTRAVENOUS SOLUTION INTRAVENOUS ONCE
Status: DISCONTINUED | OUTPATIENT
Start: 2022-01-01 | End: 2022-01-01

## 2022-01-01 RX ORDER — ISOSORBIDE MONONITRATE 30 MG/1
30 TABLET, EXTENDED RELEASE ORAL DAILY
Status: ON HOLD | COMMUNITY
End: 2022-01-01 | Stop reason: HOSPADM

## 2022-01-01 RX ORDER — 0.9 % SODIUM CHLORIDE 0.9 %
100 INTRAVENOUS SOLUTION INTRAVENOUS PRN
Status: DISCONTINUED | OUTPATIENT
Start: 2022-01-01 | End: 2022-01-01 | Stop reason: HOSPADM

## 2022-01-01 RX ORDER — HEPARIN SODIUM 1000 [USP'U]/ML
1000 INJECTION, SOLUTION INTRAVENOUS; SUBCUTANEOUS ONCE
Status: DISCONTINUED | OUTPATIENT
Start: 2022-01-01 | End: 2022-01-01

## 2022-01-01 RX ORDER — DEXTROSE MONOHYDRATE 100 MG/ML
INJECTION, SOLUTION INTRAVENOUS
Status: DISPENSED
Start: 2022-01-01 | End: 2022-01-01

## 2022-01-01 RX ORDER — HEPARIN SODIUM 5000 [USP'U]/ML
5000 INJECTION, SOLUTION INTRAVENOUS; SUBCUTANEOUS EVERY 8 HOURS SCHEDULED
Status: DISCONTINUED | OUTPATIENT
Start: 2022-01-01 | End: 2022-01-01

## 2022-01-01 RX ORDER — LEVOTHYROXINE SODIUM 0.05 MG/1
50 TABLET ORAL
Qty: 30 TABLET | Refills: 3 | Status: SHIPPED | OUTPATIENT
Start: 2022-01-01 | End: 2022-01-01

## 2022-01-01 RX ADMIN — EPOETIN ALFA-EPBX 10000 UNITS: 10000 INJECTION, SOLUTION INTRAVENOUS; SUBCUTANEOUS at 11:25

## 2022-01-01 RX ADMIN — METOPROLOL SUCCINATE 12.5 MG: 25 TABLET, EXTENDED RELEASE ORAL at 09:33

## 2022-01-01 RX ADMIN — MIDODRINE HYDROCHLORIDE 10 MG: 10 TABLET ORAL at 12:49

## 2022-01-01 RX ADMIN — RIVAROXABAN 15 MG: 15 TABLET, FILM COATED ORAL at 19:11

## 2022-01-01 RX ADMIN — FUROSEMIDE 7.5 MG/HR: 10 INJECTION, SOLUTION INTRAMUSCULAR; INTRAVENOUS at 04:25

## 2022-01-01 RX ADMIN — PANTOPRAZOLE SODIUM 40 MG: 40 TABLET, DELAYED RELEASE ORAL at 05:48

## 2022-01-01 RX ADMIN — ACETAMINOPHEN 650 MG: 325 TABLET ORAL at 05:11

## 2022-01-01 RX ADMIN — NOREPINEPHRINE BITARTRATE 44 MCG/MIN: 1 SOLUTION INTRAVENOUS at 06:05

## 2022-01-01 RX ADMIN — ISOSORBIDE MONONITRATE 30 MG: 30 TABLET, EXTENDED RELEASE ORAL at 08:58

## 2022-01-01 RX ADMIN — FUROSEMIDE 60 MG: 10 INJECTION, SOLUTION INTRAMUSCULAR; INTRAVENOUS at 09:16

## 2022-01-01 RX ADMIN — SPIRONOLACTONE 100 MG: 100 TABLET ORAL at 12:54

## 2022-01-01 RX ADMIN — SODIUM CHLORIDE, PRESERVATIVE FREE 10 ML: 5 INJECTION INTRAVENOUS at 21:54

## 2022-01-01 RX ADMIN — INSULIN LISPRO 2 UNITS: 100 INJECTION, SOLUTION INTRAVENOUS; SUBCUTANEOUS at 17:44

## 2022-01-01 RX ADMIN — INSULIN LISPRO 4 UNITS: 100 INJECTION, SOLUTION INTRAVENOUS; SUBCUTANEOUS at 18:25

## 2022-01-01 RX ADMIN — LEVOTHYROXINE SODIUM 25 MCG: 25 TABLET ORAL at 06:04

## 2022-01-01 RX ADMIN — APIXABAN 10 MG: 5 TABLET, FILM COATED ORAL at 20:55

## 2022-01-01 RX ADMIN — INSULIN LISPRO 2 UNITS: 100 INJECTION, SOLUTION INTRAVENOUS; SUBCUTANEOUS at 17:56

## 2022-01-01 RX ADMIN — SACUBITRIL AND VALSARTAN 1 TABLET: 24; 26 TABLET, FILM COATED ORAL at 08:21

## 2022-01-01 RX ADMIN — METOPROLOL SUCCINATE 12.5 MG: 25 TABLET, EXTENDED RELEASE ORAL at 11:32

## 2022-01-01 RX ADMIN — SODIUM CHLORIDE, PRESERVATIVE FREE 5 ML: 5 INJECTION INTRAVENOUS at 08:49

## 2022-01-01 RX ADMIN — SODIUM CHLORIDE, PRESERVATIVE FREE 10 ML: 5 INJECTION INTRAVENOUS at 20:09

## 2022-01-01 RX ADMIN — LEVOTHYROXINE SODIUM 50 MCG: 50 TABLET ORAL at 06:47

## 2022-01-01 RX ADMIN — MILRINONE LACTATE IN DEXTROSE 0.2 MCG/KG/MIN: 200 INJECTION, SOLUTION INTRAVENOUS at 04:42

## 2022-01-01 RX ADMIN — PANTOPRAZOLE SODIUM 40 MG: 40 TABLET, DELAYED RELEASE ORAL at 17:10

## 2022-01-01 RX ADMIN — LEVOTHYROXINE SODIUM 50 MCG: 50 TABLET ORAL at 06:31

## 2022-01-01 RX ADMIN — ALLOPURINOL 100 MG: 100 TABLET ORAL at 08:40

## 2022-01-01 RX ADMIN — FUROSEMIDE 60 MG: 10 INJECTION, SOLUTION INTRAMUSCULAR; INTRAVENOUS at 16:51

## 2022-01-01 RX ADMIN — SODIUM CHLORIDE, PRESERVATIVE FREE 10 ML: 5 INJECTION INTRAVENOUS at 08:27

## 2022-01-01 RX ADMIN — MIDODRINE HYDROCHLORIDE 5 MG: 5 TABLET ORAL at 09:58

## 2022-01-01 RX ADMIN — INSULIN LISPRO 1 UNITS: 100 INJECTION, SOLUTION INTRAVENOUS; SUBCUTANEOUS at 21:11

## 2022-01-01 RX ADMIN — PANTOPRAZOLE SODIUM 40 MG: 40 TABLET, DELAYED RELEASE ORAL at 06:26

## 2022-01-01 RX ADMIN — APIXABAN 2.5 MG: 2.5 TABLET, FILM COATED ORAL at 12:18

## 2022-01-01 RX ADMIN — CEFEPIME HYDROCHLORIDE 2000 MG: 2 INJECTION, POWDER, FOR SOLUTION INTRAVENOUS at 12:52

## 2022-01-01 RX ADMIN — LACTULOSE 30 G: 20 SOLUTION ORAL at 16:57

## 2022-01-01 RX ADMIN — NOREPINEPHRINE BITARTRATE 100 MCG/MIN: 1 SOLUTION INTRAVENOUS at 21:47

## 2022-01-01 RX ADMIN — LEVOTHYROXINE SODIUM 50 MCG: 50 TABLET ORAL at 06:18

## 2022-01-01 RX ADMIN — METOPROLOL SUCCINATE 12.5 MG: 25 TABLET, EXTENDED RELEASE ORAL at 08:41

## 2022-01-01 RX ADMIN — LEVOTHYROXINE SODIUM 50 MCG: 0.05 TABLET ORAL at 06:52

## 2022-01-01 RX ADMIN — LEVOTHYROXINE SODIUM 50 MCG: 50 TABLET ORAL at 06:19

## 2022-01-01 RX ADMIN — SODIUM CHLORIDE, PRESERVATIVE FREE 10 ML: 5 INJECTION INTRAVENOUS at 22:45

## 2022-01-01 RX ADMIN — NOREPINEPHRINE BITARTRATE 100 MCG/MIN: 1 SOLUTION INTRAVENOUS at 15:18

## 2022-01-01 RX ADMIN — HYDROCORTISONE SODIUM SUCCINATE 100 MG: 100 INJECTION, POWDER, FOR SOLUTION INTRAMUSCULAR; INTRAVENOUS at 05:48

## 2022-01-01 RX ADMIN — LACTULOSE 20 G: 20 SOLUTION ORAL at 12:00

## 2022-01-01 RX ADMIN — ATORVASTATIN CALCIUM 80 MG: 80 TABLET, FILM COATED ORAL at 08:10

## 2022-01-01 RX ADMIN — INSULIN LISPRO 6 UNITS: 100 INJECTION, SOLUTION INTRAVENOUS; SUBCUTANEOUS at 21:55

## 2022-01-01 RX ADMIN — RIVAROXABAN 15 MG: 15 TABLET, FILM COATED ORAL at 18:16

## 2022-01-01 RX ADMIN — APIXABAN 2.5 MG: 5 TABLET, FILM COATED ORAL at 22:57

## 2022-01-01 RX ADMIN — MIDODRINE HYDROCHLORIDE 10 MG: 5 TABLET ORAL at 07:01

## 2022-01-01 RX ADMIN — SODIUM CHLORIDE, PRESERVATIVE FREE 10 ML: 5 INJECTION INTRAVENOUS at 09:18

## 2022-01-01 RX ADMIN — SPIRONOLACTONE 25 MG: 25 TABLET ORAL at 15:37

## 2022-01-01 RX ADMIN — MIDODRINE HYDROCHLORIDE 10 MG: 10 TABLET ORAL at 16:09

## 2022-01-01 RX ADMIN — PANTOPRAZOLE SODIUM 40 MG: 40 TABLET, DELAYED RELEASE ORAL at 06:00

## 2022-01-01 RX ADMIN — CEFEPIME HYDROCHLORIDE 1000 MG: 1 INJECTION, POWDER, FOR SOLUTION INTRAMUSCULAR; INTRAVENOUS at 00:23

## 2022-01-01 RX ADMIN — LACTULOSE 30 G: 20 SOLUTION ORAL at 12:08

## 2022-01-01 RX ADMIN — INSULIN LISPRO 1 UNITS: 100 INJECTION, SOLUTION INTRAVENOUS; SUBCUTANEOUS at 08:49

## 2022-01-01 RX ADMIN — SODIUM CHLORIDE, PRESERVATIVE FREE 10 ML: 5 INJECTION INTRAVENOUS at 20:43

## 2022-01-01 RX ADMIN — LEVOTHYROXINE SODIUM 50 MCG: 50 TABLET ORAL at 06:26

## 2022-01-01 RX ADMIN — ALLOPURINOL 100 MG: 100 TABLET ORAL at 10:29

## 2022-01-01 RX ADMIN — RIVAROXABAN 15 MG: 15 TABLET, FILM COATED ORAL at 17:38

## 2022-01-01 RX ADMIN — INSULIN LISPRO 1 UNITS: 100 INJECTION, SOLUTION INTRAVENOUS; SUBCUTANEOUS at 08:32

## 2022-01-01 RX ADMIN — LACTULOSE 30 G: 20 SOLUTION ORAL at 10:18

## 2022-01-01 RX ADMIN — NOREPINEPHRINE BITARTRATE 21 MCG/MIN: 1 SOLUTION INTRAVENOUS at 07:36

## 2022-01-01 RX ADMIN — SODIUM CHLORIDE, PRESERVATIVE FREE 10 ML: 5 INJECTION INTRAVENOUS at 21:10

## 2022-01-01 RX ADMIN — CEFEPIME HYDROCHLORIDE 1000 MG: 1 INJECTION, POWDER, FOR SOLUTION INTRAMUSCULAR; INTRAVENOUS at 17:47

## 2022-01-01 RX ADMIN — PANTOPRAZOLE SODIUM 40 MG: 40 TABLET, DELAYED RELEASE ORAL at 16:24

## 2022-01-01 RX ADMIN — SODIUM CHLORIDE, PRESERVATIVE FREE 10 ML: 5 INJECTION INTRAVENOUS at 09:00

## 2022-01-01 RX ADMIN — MIDODRINE HYDROCHLORIDE 10 MG: 10 TABLET ORAL at 12:47

## 2022-01-01 RX ADMIN — PANTOPRAZOLE SODIUM 40 MG: 40 TABLET, DELAYED RELEASE ORAL at 15:35

## 2022-01-01 RX ADMIN — POTASSIUM CHLORIDE 20 MEQ: 750 TABLET, FILM COATED, EXTENDED RELEASE ORAL at 21:14

## 2022-01-01 RX ADMIN — MIDODRINE HYDROCHLORIDE 5 MG: 10 TABLET ORAL at 12:30

## 2022-01-01 RX ADMIN — PANTOPRAZOLE SODIUM 40 MG: 40 TABLET, DELAYED RELEASE ORAL at 16:57

## 2022-01-01 RX ADMIN — Medication 1 TABLET: at 09:45

## 2022-01-01 RX ADMIN — LEVOTHYROXINE SODIUM 50 MCG: 50 TABLET ORAL at 05:55

## 2022-01-01 RX ADMIN — LEVOTHYROXINE SODIUM 50 MCG: 50 TABLET ORAL at 05:07

## 2022-01-01 RX ADMIN — METOPROLOL SUCCINATE 12.5 MG: 25 TABLET, EXTENDED RELEASE ORAL at 08:21

## 2022-01-01 RX ADMIN — ISOSORBIDE MONONITRATE 30 MG: 30 TABLET, EXTENDED RELEASE ORAL at 08:21

## 2022-01-01 RX ADMIN — HYDROCORTISONE SODIUM SUCCINATE 100 MG: 100 INJECTION, POWDER, FOR SOLUTION INTRAMUSCULAR; INTRAVENOUS at 04:15

## 2022-01-01 RX ADMIN — Medication 1 TABLET: at 08:38

## 2022-01-01 RX ADMIN — GLUCAGON HYDROCHLORIDE 1 MG: KIT at 01:14

## 2022-01-01 RX ADMIN — ALLOPURINOL 100 MG: 100 TABLET ORAL at 08:46

## 2022-01-01 RX ADMIN — MIDODRINE HYDROCHLORIDE 10 MG: 10 TABLET ORAL at 12:08

## 2022-01-01 RX ADMIN — LEVOTHYROXINE SODIUM 25 MCG: 25 TABLET ORAL at 06:05

## 2022-01-01 RX ADMIN — ISOSORBIDE MONONITRATE 30 MG: 30 TABLET, EXTENDED RELEASE ORAL at 08:38

## 2022-01-01 RX ADMIN — LEVOTHYROXINE SODIUM 50 MCG: 0.05 TABLET ORAL at 07:56

## 2022-01-01 RX ADMIN — MIDODRINE HYDROCHLORIDE 10 MG: 10 TABLET ORAL at 14:12

## 2022-01-01 RX ADMIN — ISOSORBIDE MONONITRATE 30 MG: 30 TABLET, EXTENDED RELEASE ORAL at 09:24

## 2022-01-01 RX ADMIN — ATORVASTATIN CALCIUM 80 MG: 80 TABLET, FILM COATED ORAL at 08:31

## 2022-01-01 RX ADMIN — PANTOPRAZOLE SODIUM 40 MG: 40 TABLET, DELAYED RELEASE ORAL at 17:18

## 2022-01-01 RX ADMIN — INSULIN LISPRO 3 UNITS: 100 INJECTION, SOLUTION INTRAVENOUS; SUBCUTANEOUS at 12:05

## 2022-01-01 RX ADMIN — Medication: at 05:59

## 2022-01-01 RX ADMIN — DEXTROSE MONOHYDRATE 125 ML: 100 INJECTION, SOLUTION INTRAVENOUS at 11:53

## 2022-01-01 RX ADMIN — APIXABAN 2.5 MG: 2.5 TABLET, FILM COATED ORAL at 21:20

## 2022-01-01 RX ADMIN — INSULIN LISPRO 4 UNITS: 100 INJECTION, SOLUTION INTRAVENOUS; SUBCUTANEOUS at 13:01

## 2022-01-01 RX ADMIN — PANTOPRAZOLE SODIUM 40 MG: 40 TABLET, DELAYED RELEASE ORAL at 06:31

## 2022-01-01 RX ADMIN — ATORVASTATIN CALCIUM 80 MG: 80 TABLET, FILM COATED ORAL at 09:46

## 2022-01-01 RX ADMIN — DEXTROSE MONOHYDRATE: 50 INJECTION, SOLUTION INTRAVENOUS at 17:48

## 2022-01-01 RX ADMIN — CALCIUM GLUCONATE 1000 MG: 10 INJECTION, SOLUTION INTRAVENOUS at 13:15

## 2022-01-01 RX ADMIN — Medication: at 04:51

## 2022-01-01 RX ADMIN — FUROSEMIDE 5 MG/HR: 10 INJECTION, SOLUTION INTRAMUSCULAR; INTRAVENOUS at 16:49

## 2022-01-01 RX ADMIN — SPIRONOLACTONE 25 MG: 25 TABLET ORAL at 08:55

## 2022-01-01 RX ADMIN — LACTULOSE 30 G: 20 SOLUTION ORAL at 21:38

## 2022-01-01 RX ADMIN — AMIODARONE HYDROCHLORIDE 200 MG: 200 TABLET ORAL at 20:40

## 2022-01-01 RX ADMIN — INSULIN LISPRO 1 UNITS: 100 INJECTION, SOLUTION INTRAVENOUS; SUBCUTANEOUS at 13:03

## 2022-01-01 RX ADMIN — MIDODRINE HYDROCHLORIDE 10 MG: 10 TABLET ORAL at 10:15

## 2022-01-01 RX ADMIN — CEFEPIME HYDROCHLORIDE 1000 MG: 1 INJECTION, POWDER, FOR SOLUTION INTRAMUSCULAR; INTRAVENOUS at 19:39

## 2022-01-01 RX ADMIN — INSULIN LISPRO 2 UNITS: 100 INJECTION, SOLUTION INTRAVENOUS; SUBCUTANEOUS at 12:02

## 2022-01-01 RX ADMIN — APIXABAN 10 MG: 5 TABLET, FILM COATED ORAL at 20:10

## 2022-01-01 RX ADMIN — DEXTROSE MONOHYDRATE 125 ML: 100 INJECTION, SOLUTION INTRAVENOUS at 18:26

## 2022-01-01 RX ADMIN — METOLAZONE 10 MG: 5 TABLET ORAL at 09:26

## 2022-01-01 RX ADMIN — MIDODRINE HYDROCHLORIDE 5 MG: 5 TABLET ORAL at 16:12

## 2022-01-01 RX ADMIN — INSULIN LISPRO 2 UNITS: 100 INJECTION, SOLUTION INTRAVENOUS; SUBCUTANEOUS at 14:49

## 2022-01-01 RX ADMIN — MIDODRINE HYDROCHLORIDE 5 MG: 10 TABLET ORAL at 17:21

## 2022-01-01 RX ADMIN — FUROSEMIDE 10 MG/HR: 10 INJECTION, SOLUTION INTRAMUSCULAR; INTRAVENOUS at 09:59

## 2022-01-01 RX ADMIN — PANTOPRAZOLE SODIUM 40 MG: 40 TABLET, DELAYED RELEASE ORAL at 07:59

## 2022-01-01 RX ADMIN — INSULIN LISPRO 2 UNITS: 100 INJECTION, SOLUTION INTRAVENOUS; SUBCUTANEOUS at 08:36

## 2022-01-01 RX ADMIN — HEPARIN SODIUM 880 UNITS/HR: 10000 INJECTION, SOLUTION INTRAVENOUS at 16:41

## 2022-01-01 RX ADMIN — INSULIN LISPRO 2 UNITS: 100 INJECTION, SOLUTION INTRAVENOUS; SUBCUTANEOUS at 12:38

## 2022-01-01 RX ADMIN — SODIUM CHLORIDE, PRESERVATIVE FREE 10 ML: 5 INJECTION INTRAVENOUS at 20:24

## 2022-01-01 RX ADMIN — ATORVASTATIN CALCIUM 80 MG: 80 TABLET, FILM COATED ORAL at 08:58

## 2022-01-01 RX ADMIN — AMIODARONE HYDROCHLORIDE 200 MG: 200 TABLET ORAL at 20:50

## 2022-01-01 RX ADMIN — INSULIN LISPRO 2 UNITS: 100 INJECTION, SOLUTION INTRAVENOUS; SUBCUTANEOUS at 16:52

## 2022-01-01 RX ADMIN — SODIUM CHLORIDE, PRESERVATIVE FREE 10 ML: 5 INJECTION INTRAVENOUS at 08:20

## 2022-01-01 RX ADMIN — PANTOPRAZOLE SODIUM 40 MG: 40 TABLET, DELAYED RELEASE ORAL at 06:40

## 2022-01-01 RX ADMIN — SODIUM CHLORIDE, PRESERVATIVE FREE 10 ML: 5 INJECTION INTRAVENOUS at 09:04

## 2022-01-01 RX ADMIN — PANTOPRAZOLE SODIUM 40 MG: 40 TABLET, DELAYED RELEASE ORAL at 05:14

## 2022-01-01 RX ADMIN — INSULIN LISPRO 1 UNITS: 100 INJECTION, SOLUTION INTRAVENOUS; SUBCUTANEOUS at 18:00

## 2022-01-01 RX ADMIN — MIDODRINE HYDROCHLORIDE 10 MG: 5 TABLET ORAL at 12:18

## 2022-01-01 RX ADMIN — LEVOTHYROXINE SODIUM 50 MCG: 50 TABLET ORAL at 07:01

## 2022-01-01 RX ADMIN — ALLOPURINOL 100 MG: 100 TABLET ORAL at 09:04

## 2022-01-01 RX ADMIN — ALLOPURINOL 100 MG: 100 TABLET ORAL at 09:42

## 2022-01-01 RX ADMIN — AMIODARONE HYDROCHLORIDE 200 MG: 200 TABLET ORAL at 20:24

## 2022-01-01 RX ADMIN — Medication 1 TABLET: at 09:01

## 2022-01-01 RX ADMIN — CALCIUM GLUCONATE 1000 MG: 10 INJECTION, SOLUTION INTRAVENOUS at 18:59

## 2022-01-01 RX ADMIN — VANCOMYCIN HYDROCHLORIDE 1250 MG: 10 INJECTION, POWDER, LYOPHILIZED, FOR SOLUTION INTRAVENOUS at 12:24

## 2022-01-01 RX ADMIN — AMIODARONE HYDROCHLORIDE 200 MG: 200 TABLET ORAL at 20:04

## 2022-01-01 RX ADMIN — LEVOTHYROXINE SODIUM 50 MCG: 50 TABLET ORAL at 06:28

## 2022-01-01 RX ADMIN — ASPIRIN 81 MG 81 MG: 81 TABLET ORAL at 08:15

## 2022-01-01 RX ADMIN — SODIUM CHLORIDE, PRESERVATIVE FREE 10 ML: 5 INJECTION INTRAVENOUS at 11:33

## 2022-01-01 RX ADMIN — MIDODRINE HYDROCHLORIDE 5 MG: 5 TABLET ORAL at 12:42

## 2022-01-01 RX ADMIN — MIDODRINE HYDROCHLORIDE 10 MG: 10 TABLET ORAL at 15:35

## 2022-01-01 RX ADMIN — ALBUMIN (HUMAN) 25 G: 0.25 INJECTION, SOLUTION INTRAVENOUS at 15:30

## 2022-01-01 RX ADMIN — PANTOPRAZOLE SODIUM 40 MG: 40 TABLET, DELAYED RELEASE ORAL at 05:16

## 2022-01-01 RX ADMIN — EPOETIN ALFA-EPBX 20000 UNITS: 20000 INJECTION, SOLUTION INTRAVENOUS; SUBCUTANEOUS at 11:48

## 2022-01-01 RX ADMIN — SODIUM CHLORIDE, PRESERVATIVE FREE 10 ML: 5 INJECTION INTRAVENOUS at 01:14

## 2022-01-01 RX ADMIN — ASPIRIN 81 MG 81 MG: 81 TABLET ORAL at 08:47

## 2022-01-01 RX ADMIN — ALLOPURINOL 100 MG: 100 TABLET ORAL at 11:32

## 2022-01-01 RX ADMIN — INSULIN LISPRO 1 UNITS: 100 INJECTION, SOLUTION INTRAVENOUS; SUBCUTANEOUS at 21:52

## 2022-01-01 RX ADMIN — SPIRONOLACTONE 100 MG: 100 TABLET ORAL at 13:00

## 2022-01-01 RX ADMIN — Medication: at 01:30

## 2022-01-01 RX ADMIN — MIDODRINE HYDROCHLORIDE 5 MG: 5 TABLET ORAL at 09:33

## 2022-01-01 RX ADMIN — MIDODRINE HYDROCHLORIDE 5 MG: 10 TABLET ORAL at 12:58

## 2022-01-01 RX ADMIN — INSULIN LISPRO 1 UNITS: 100 INJECTION, SOLUTION INTRAVENOUS; SUBCUTANEOUS at 20:51

## 2022-01-01 RX ADMIN — MIDODRINE HYDROCHLORIDE 10 MG: 10 TABLET ORAL at 07:35

## 2022-01-01 RX ADMIN — ALLOPURINOL 100 MG: 100 TABLET ORAL at 08:47

## 2022-01-01 RX ADMIN — ATORVASTATIN CALCIUM 80 MG: 80 TABLET, FILM COATED ORAL at 08:40

## 2022-01-01 RX ADMIN — PANTOPRAZOLE SODIUM 40 MG: 40 TABLET, DELAYED RELEASE ORAL at 06:15

## 2022-01-01 RX ADMIN — INSULIN LISPRO 2 UNITS: 100 INJECTION, SOLUTION INTRAVENOUS; SUBCUTANEOUS at 12:11

## 2022-01-01 RX ADMIN — SODIUM CHLORIDE, PRESERVATIVE FREE 10 ML: 5 INJECTION INTRAVENOUS at 09:28

## 2022-01-01 RX ADMIN — RIVAROXABAN 15 MG: 15 TABLET, FILM COATED ORAL at 18:29

## 2022-01-01 RX ADMIN — SODIUM CHLORIDE, PRESERVATIVE FREE 10 ML: 5 INJECTION INTRAVENOUS at 12:03

## 2022-01-01 RX ADMIN — VASOPRESSIN 0.03 UNITS/MIN: 20 INJECTION INTRAVENOUS at 09:19

## 2022-01-01 RX ADMIN — MEROPENEM 1000 MG: 1 INJECTION, POWDER, FOR SOLUTION INTRAVENOUS at 14:42

## 2022-01-01 RX ADMIN — MIDODRINE HYDROCHLORIDE 10 MG: 5 TABLET ORAL at 10:36

## 2022-01-01 RX ADMIN — RIVAROXABAN 15 MG: 15 TABLET, FILM COATED ORAL at 16:52

## 2022-01-01 RX ADMIN — POTASSIUM CHLORIDE 20 MEQ: 750 TABLET, FILM COATED, EXTENDED RELEASE ORAL at 09:46

## 2022-01-01 RX ADMIN — DEXTROSE MONOHYDRATE: 50 INJECTION, SOLUTION INTRAVENOUS at 14:37

## 2022-01-01 RX ADMIN — FUROSEMIDE 7.5 MG/HR: 10 INJECTION, SOLUTION INTRAMUSCULAR; INTRAVENOUS at 12:15

## 2022-01-01 RX ADMIN — LACTULOSE 30 G: 20 SOLUTION ORAL at 21:51

## 2022-01-01 RX ADMIN — METOPROLOL SUCCINATE 12.5 MG: 25 TABLET, EXTENDED RELEASE ORAL at 08:38

## 2022-01-01 RX ADMIN — INSULIN LISPRO 1 UNITS: 100 INJECTION, SOLUTION INTRAVENOUS; SUBCUTANEOUS at 21:49

## 2022-01-01 RX ADMIN — DEXTROSE MONOHYDRATE: 100 INJECTION, SOLUTION INTRAVENOUS at 03:52

## 2022-01-01 RX ADMIN — POTASSIUM CHLORIDE 20 MEQ: 750 TABLET, FILM COATED, EXTENDED RELEASE ORAL at 20:57

## 2022-01-01 RX ADMIN — MILRINONE LACTATE IN DEXTROSE 0.25 MCG/KG/MIN: 200 INJECTION, SOLUTION INTRAVENOUS at 09:43

## 2022-01-01 RX ADMIN — METOLAZONE 10 MG: 5 TABLET ORAL at 08:47

## 2022-01-01 RX ADMIN — RIVAROXABAN 15 MG: 15 TABLET, FILM COATED ORAL at 17:32

## 2022-01-01 RX ADMIN — ATORVASTATIN CALCIUM 80 MG: 80 TABLET, FILM COATED ORAL at 09:28

## 2022-01-01 RX ADMIN — INSULIN LISPRO 8 UNITS: 100 INJECTION, SOLUTION INTRAVENOUS; SUBCUTANEOUS at 12:25

## 2022-01-01 RX ADMIN — CLOPIDOGREL BISULFATE 75 MG: 75 TABLET ORAL at 11:32

## 2022-01-01 RX ADMIN — ALLOPURINOL 100 MG: 100 TABLET ORAL at 08:09

## 2022-01-01 RX ADMIN — METOPROLOL SUCCINATE 12.5 MG: 25 TABLET, EXTENDED RELEASE ORAL at 09:46

## 2022-01-01 RX ADMIN — FUROSEMIDE 10 MG/HR: 10 INJECTION, SOLUTION INTRAMUSCULAR; INTRAVENOUS at 03:24

## 2022-01-01 RX ADMIN — SODIUM CHLORIDE, PRESERVATIVE FREE 10 ML: 5 INJECTION INTRAVENOUS at 08:37

## 2022-01-01 RX ADMIN — ATORVASTATIN CALCIUM 80 MG: 80 TABLET, FILM COATED ORAL at 08:47

## 2022-01-01 RX ADMIN — SPIRONOLACTONE 25 MG: 25 TABLET ORAL at 10:31

## 2022-01-01 RX ADMIN — ALLOPURINOL 100 MG: 100 TABLET ORAL at 10:22

## 2022-01-01 RX ADMIN — LEVOTHYROXINE SODIUM 50 MCG: 50 TABLET ORAL at 06:03

## 2022-01-01 RX ADMIN — AMIODARONE HYDROCHLORIDE 200 MG: 200 TABLET ORAL at 09:48

## 2022-01-01 RX ADMIN — METOLAZONE 10 MG: 5 TABLET ORAL at 16:14

## 2022-01-01 RX ADMIN — VASOPRESSIN 0.03 UNITS/MIN: 20 INJECTION INTRAVENOUS at 01:17

## 2022-01-01 RX ADMIN — METOPROLOL SUCCINATE 25 MG: 25 TABLET, EXTENDED RELEASE ORAL at 08:55

## 2022-01-01 RX ADMIN — ISOSORBIDE MONONITRATE 30 MG: 30 TABLET, EXTENDED RELEASE ORAL at 08:29

## 2022-01-01 RX ADMIN — PANTOPRAZOLE SODIUM 40 MG: 40 TABLET, DELAYED RELEASE ORAL at 16:31

## 2022-01-01 RX ADMIN — FUROSEMIDE 30 MG/HR: 10 INJECTION, SOLUTION INTRAMUSCULAR; INTRAVENOUS at 16:13

## 2022-01-01 RX ADMIN — CEFEPIME HYDROCHLORIDE 1000 MG: 1 INJECTION, POWDER, FOR SOLUTION INTRAMUSCULAR; INTRAVENOUS at 18:43

## 2022-01-01 RX ADMIN — ALLOPURINOL 100 MG: 100 TABLET ORAL at 08:25

## 2022-01-01 RX ADMIN — METOPROLOL SUCCINATE 12.5 MG: 25 TABLET, EXTENDED RELEASE ORAL at 08:07

## 2022-01-01 RX ADMIN — ATORVASTATIN CALCIUM 80 MG: 80 TABLET, FILM COATED ORAL at 08:54

## 2022-01-01 RX ADMIN — ALLOPURINOL 100 MG: 100 TABLET ORAL at 11:41

## 2022-01-01 RX ADMIN — PANTOPRAZOLE SODIUM 40 MG: 40 TABLET, DELAYED RELEASE ORAL at 16:20

## 2022-01-01 RX ADMIN — ATORVASTATIN CALCIUM 80 MG: 80 TABLET, FILM COATED ORAL at 12:58

## 2022-01-01 RX ADMIN — RIVAROXABAN 15 MG: 15 TABLET, FILM COATED ORAL at 17:31

## 2022-01-01 RX ADMIN — LEVOTHYROXINE SODIUM 50 MCG: 0.05 TABLET ORAL at 06:15

## 2022-01-01 RX ADMIN — SODIUM CHLORIDE, PRESERVATIVE FREE 10 ML: 5 INJECTION INTRAVENOUS at 20:49

## 2022-01-01 RX ADMIN — Medication: at 00:45

## 2022-01-01 RX ADMIN — SPIRONOLACTONE 25 MG: 25 TABLET ORAL at 08:36

## 2022-01-01 RX ADMIN — SODIUM CHLORIDE 250 ML: 9 INJECTION, SOLUTION INTRAVENOUS at 20:01

## 2022-01-01 RX ADMIN — FUROSEMIDE 40 MG: 10 INJECTION, SOLUTION INTRAMUSCULAR; INTRAVENOUS at 13:46

## 2022-01-01 RX ADMIN — SPIRONOLACTONE 25 MG: 25 TABLET ORAL at 09:02

## 2022-01-01 RX ADMIN — SODIUM CHLORIDE, PRESERVATIVE FREE 10 ML: 5 INJECTION INTRAVENOUS at 08:31

## 2022-01-01 RX ADMIN — METOPROLOL SUCCINATE 12.5 MG: 25 TABLET, EXTENDED RELEASE ORAL at 09:28

## 2022-01-01 RX ADMIN — METOPROLOL SUCCINATE 12.5 MG: 25 TABLET, EXTENDED RELEASE ORAL at 08:59

## 2022-01-01 RX ADMIN — SODIUM CHLORIDE, PRESERVATIVE FREE 10 ML: 5 INJECTION INTRAVENOUS at 20:38

## 2022-01-01 RX ADMIN — VANCOMYCIN HYDROCHLORIDE 750 MG: 10 INJECTION, POWDER, LYOPHILIZED, FOR SOLUTION INTRAVENOUS at 11:07

## 2022-01-01 RX ADMIN — FUROSEMIDE 10 MG/HR: 10 INJECTION, SOLUTION INTRAMUSCULAR; INTRAVENOUS at 21:10

## 2022-01-01 RX ADMIN — LOPERAMIDE HYDROCHLORIDE 2 MG: 2 CAPSULE ORAL at 06:14

## 2022-01-01 RX ADMIN — MIDODRINE HYDROCHLORIDE 5 MG: 5 TABLET ORAL at 12:06

## 2022-01-01 RX ADMIN — MIDODRINE HYDROCHLORIDE 10 MG: 5 TABLET ORAL at 17:31

## 2022-01-01 RX ADMIN — LEVOTHYROXINE SODIUM 50 MCG: 50 TABLET ORAL at 06:30

## 2022-01-01 RX ADMIN — SODIUM CHLORIDE, PRESERVATIVE FREE 10 ML: 5 INJECTION INTRAVENOUS at 20:06

## 2022-01-01 RX ADMIN — LACTULOSE 30 G: 20 SOLUTION ORAL at 16:21

## 2022-01-01 RX ADMIN — Medication 10 MEQ: at 15:46

## 2022-01-01 RX ADMIN — MIDODRINE HYDROCHLORIDE 5 MG: 5 TABLET ORAL at 16:34

## 2022-01-01 RX ADMIN — LEVOTHYROXINE SODIUM 25 MCG: 25 TABLET ORAL at 06:15

## 2022-01-01 RX ADMIN — FUROSEMIDE 60 MG: 10 INJECTION, SOLUTION INTRAMUSCULAR; INTRAVENOUS at 10:32

## 2022-01-01 RX ADMIN — MIDODRINE HYDROCHLORIDE 10 MG: 10 TABLET ORAL at 17:49

## 2022-01-01 RX ADMIN — MIDODRINE HYDROCHLORIDE 10 MG: 10 TABLET ORAL at 08:58

## 2022-01-01 RX ADMIN — CALCIUM GLUCONATE 1000 MG: 10 INJECTION, SOLUTION INTRAVENOUS at 01:21

## 2022-01-01 RX ADMIN — SODIUM CHLORIDE, PRESERVATIVE FREE 10 ML: 5 INJECTION INTRAVENOUS at 08:47

## 2022-01-01 RX ADMIN — SPIRONOLACTONE 25 MG: 25 TABLET ORAL at 08:58

## 2022-01-01 RX ADMIN — CLOPIDOGREL BISULFATE 75 MG: 75 TABLET ORAL at 09:46

## 2022-01-01 RX ADMIN — MIDODRINE HYDROCHLORIDE 10 MG: 5 TABLET ORAL at 12:03

## 2022-01-01 RX ADMIN — MIDODRINE HYDROCHLORIDE 10 MG: 10 TABLET ORAL at 21:23

## 2022-01-01 RX ADMIN — FUROSEMIDE 60 MG: 10 INJECTION, SOLUTION INTRAMUSCULAR; INTRAVENOUS at 08:40

## 2022-01-01 RX ADMIN — MIDODRINE HYDROCHLORIDE 10 MG: 10 TABLET ORAL at 11:23

## 2022-01-01 RX ADMIN — SODIUM CHLORIDE, PRESERVATIVE FREE 10 ML: 5 INJECTION INTRAVENOUS at 10:21

## 2022-01-01 RX ADMIN — ISOSORBIDE MONONITRATE 30 MG: 30 TABLET, EXTENDED RELEASE ORAL at 09:02

## 2022-01-01 RX ADMIN — PANTOPRAZOLE SODIUM 40 MG: 40 TABLET, DELAYED RELEASE ORAL at 17:38

## 2022-01-01 RX ADMIN — METOPROLOL SUCCINATE 12.5 MG: 25 TABLET, EXTENDED RELEASE ORAL at 08:25

## 2022-01-01 RX ADMIN — MIDODRINE HYDROCHLORIDE 10 MG: 5 TABLET ORAL at 12:34

## 2022-01-01 RX ADMIN — LEVOTHYROXINE SODIUM 25 MCG: 25 TABLET ORAL at 06:50

## 2022-01-01 RX ADMIN — CALCIUM GLUCONATE 1000 MG: 10 INJECTION, SOLUTION INTRAVENOUS at 05:18

## 2022-01-01 RX ADMIN — HYDROCORTISONE SODIUM SUCCINATE 100 MG: 100 INJECTION, POWDER, FOR SOLUTION INTRAMUSCULAR; INTRAVENOUS at 21:05

## 2022-01-01 RX ADMIN — PANTOPRAZOLE SODIUM 40 MG: 40 TABLET, DELAYED RELEASE ORAL at 05:23

## 2022-01-01 RX ADMIN — SODIUM CHLORIDE, PRESERVATIVE FREE 10 ML: 5 INJECTION INTRAVENOUS at 20:50

## 2022-01-01 RX ADMIN — METOPROLOL SUCCINATE 25 MG: 25 TABLET, EXTENDED RELEASE ORAL at 13:22

## 2022-01-01 RX ADMIN — POTASSIUM CHLORIDE 10 MEQ: 750 TABLET, FILM COATED, EXTENDED RELEASE ORAL at 17:57

## 2022-01-01 RX ADMIN — MIDODRINE HYDROCHLORIDE 10 MG: 5 TABLET ORAL at 16:31

## 2022-01-01 RX ADMIN — PANTOPRAZOLE SODIUM 40 MG: 40 TABLET, DELAYED RELEASE ORAL at 17:27

## 2022-01-01 RX ADMIN — ISOSORBIDE MONONITRATE 30 MG: 30 TABLET, EXTENDED RELEASE ORAL at 08:10

## 2022-01-01 RX ADMIN — FUROSEMIDE 7.5 MG/HR: 10 INJECTION, SOLUTION INTRAMUSCULAR; INTRAVENOUS at 15:29

## 2022-01-01 RX ADMIN — MIDODRINE HYDROCHLORIDE 5 MG: 10 TABLET ORAL at 17:38

## 2022-01-01 RX ADMIN — CEFEPIME HYDROCHLORIDE 1000 MG: 1 INJECTION, POWDER, FOR SOLUTION INTRAMUSCULAR; INTRAVENOUS at 20:43

## 2022-01-01 RX ADMIN — CLOPIDOGREL BISULFATE 75 MG: 75 TABLET ORAL at 12:51

## 2022-01-01 RX ADMIN — SODIUM CHLORIDE, PRESERVATIVE FREE 10 ML: 5 INJECTION INTRAVENOUS at 08:11

## 2022-01-01 RX ADMIN — AMIODARONE HYDROCHLORIDE 200 MG: 200 TABLET ORAL at 10:31

## 2022-01-01 RX ADMIN — LACTULOSE 30 G: 20 SOLUTION ORAL at 11:28

## 2022-01-01 RX ADMIN — SODIUM CHLORIDE, PRESERVATIVE FREE 10 ML: 5 INJECTION INTRAVENOUS at 19:54

## 2022-01-01 RX ADMIN — SODIUM CHLORIDE, PRESERVATIVE FREE 10 ML: 5 INJECTION INTRAVENOUS at 12:59

## 2022-01-01 RX ADMIN — INSULIN LISPRO 1 UNITS: 100 INJECTION, SOLUTION INTRAVENOUS; SUBCUTANEOUS at 21:36

## 2022-01-01 RX ADMIN — MILRINONE LACTATE 0.12 MCG/KG/MIN: 0.2 INJECTION, SOLUTION INTRAVENOUS at 10:48

## 2022-01-01 RX ADMIN — SODIUM CHLORIDE, PRESERVATIVE FREE 10 ML: 5 INJECTION INTRAVENOUS at 09:07

## 2022-01-01 RX ADMIN — AMIODARONE HYDROCHLORIDE 200 MG: 200 TABLET ORAL at 21:11

## 2022-01-01 RX ADMIN — PHYTONADIONE 10 MG: 10 INJECTION, EMULSION INTRAMUSCULAR; INTRAVENOUS; SUBCUTANEOUS at 02:39

## 2022-01-01 RX ADMIN — ISOSORBIDE MONONITRATE 30 MG: 30 TABLET, EXTENDED RELEASE ORAL at 08:25

## 2022-01-01 RX ADMIN — AMIODARONE HYDROCHLORIDE 200 MG: 200 TABLET ORAL at 21:14

## 2022-01-01 RX ADMIN — SODIUM CHLORIDE, PRESERVATIVE FREE 10 ML: 5 INJECTION INTRAVENOUS at 20:55

## 2022-01-01 RX ADMIN — SODIUM CHLORIDE, PRESERVATIVE FREE 10 ML: 5 INJECTION INTRAVENOUS at 11:29

## 2022-01-01 RX ADMIN — PANTOPRAZOLE SODIUM 40 MG: 40 TABLET, DELAYED RELEASE ORAL at 17:57

## 2022-01-01 RX ADMIN — GLUCAGON HYDROCHLORIDE 1 MG: KIT at 05:54

## 2022-01-01 RX ADMIN — CLOPIDOGREL BISULFATE 75 MG: 75 TABLET ORAL at 12:18

## 2022-01-01 RX ADMIN — SODIUM CHLORIDE, PRESERVATIVE FREE 10 ML: 5 INJECTION INTRAVENOUS at 21:49

## 2022-01-01 RX ADMIN — CLOPIDOGREL BISULFATE 75 MG: 75 TABLET ORAL at 08:46

## 2022-01-01 RX ADMIN — NOREPINEPHRINE BITARTRATE 21 MCG/MIN: 1 SOLUTION INTRAVENOUS at 21:27

## 2022-01-01 RX ADMIN — SACUBITRIL AND VALSARTAN 1 TABLET: 24; 26 TABLET, FILM COATED ORAL at 21:14

## 2022-01-01 RX ADMIN — ATORVASTATIN CALCIUM 80 MG: 80 TABLET, FILM COATED ORAL at 09:16

## 2022-01-01 RX ADMIN — PANTOPRAZOLE SODIUM 40 MG: 40 TABLET, DELAYED RELEASE ORAL at 07:01

## 2022-01-01 RX ADMIN — APIXABAN 2.5 MG: 5 TABLET, FILM COATED ORAL at 22:17

## 2022-01-01 RX ADMIN — PANTOPRAZOLE SODIUM 40 MG: 40 TABLET, DELAYED RELEASE ORAL at 16:55

## 2022-01-01 RX ADMIN — SODIUM CHLORIDE, PRESERVATIVE FREE 10 ML: 5 INJECTION INTRAVENOUS at 10:04

## 2022-01-01 RX ADMIN — ALLOPURINOL 100 MG: 100 TABLET ORAL at 10:18

## 2022-01-01 RX ADMIN — MIDODRINE HYDROCHLORIDE 5 MG: 5 TABLET ORAL at 11:41

## 2022-01-01 RX ADMIN — LEVOTHYROXINE SODIUM 50 MCG: 50 TABLET ORAL at 06:52

## 2022-01-01 RX ADMIN — METOPROLOL SUCCINATE 25 MG: 25 TABLET, EXTENDED RELEASE ORAL at 09:04

## 2022-01-01 RX ADMIN — AMIODARONE HYDROCHLORIDE 200 MG: 200 TABLET ORAL at 20:48

## 2022-01-01 RX ADMIN — AMIODARONE HYDROCHLORIDE 200 MG: 200 TABLET ORAL at 20:43

## 2022-01-01 RX ADMIN — SPIRONOLACTONE 25 MG: 25 TABLET ORAL at 08:21

## 2022-01-01 RX ADMIN — RIVAROXABAN 15 MG: 15 TABLET, FILM COATED ORAL at 17:17

## 2022-01-01 RX ADMIN — METOPROLOL SUCCINATE 12.5 MG: 25 TABLET, EXTENDED RELEASE ORAL at 11:41

## 2022-01-01 RX ADMIN — CEFEPIME HYDROCHLORIDE 1000 MG: 1 INJECTION, POWDER, FOR SOLUTION INTRAMUSCULAR; INTRAVENOUS at 20:03

## 2022-01-01 RX ADMIN — FUROSEMIDE 60 MG: 10 INJECTION, SOLUTION INTRAMUSCULAR; INTRAVENOUS at 17:01

## 2022-01-01 RX ADMIN — ACETAZOLAMIDE 500 MG: 250 TABLET ORAL at 16:09

## 2022-01-01 RX ADMIN — SACUBITRIL AND VALSARTAN 1 TABLET: 24; 26 TABLET, FILM COATED ORAL at 20:48

## 2022-01-01 RX ADMIN — METOLAZONE 5 MG: 5 TABLET ORAL at 13:01

## 2022-01-01 RX ADMIN — AMIODARONE HYDROCHLORIDE 200 MG: 200 TABLET ORAL at 09:16

## 2022-01-01 RX ADMIN — SODIUM CHLORIDE, PRESERVATIVE FREE 10 ML: 5 INJECTION INTRAVENOUS at 21:00

## 2022-01-01 RX ADMIN — Medication 1 TABLET: at 09:42

## 2022-01-01 RX ADMIN — Medication 10 MEQ: at 20:46

## 2022-01-01 RX ADMIN — DEXTROSE MONOHYDRATE: 100 INJECTION, SOLUTION INTRAVENOUS at 02:37

## 2022-01-01 RX ADMIN — SACUBITRIL AND VALSARTAN 0.5 TABLET: 24; 26 TABLET, FILM COATED ORAL at 20:50

## 2022-01-01 RX ADMIN — ALLOPURINOL 100 MG: 100 TABLET ORAL at 12:58

## 2022-01-01 RX ADMIN — PANTOPRAZOLE SODIUM 40 MG: 40 TABLET, DELAYED RELEASE ORAL at 16:30

## 2022-01-01 RX ADMIN — ATORVASTATIN CALCIUM 80 MG: 80 TABLET, FILM COATED ORAL at 08:36

## 2022-01-01 RX ADMIN — SODIUM CHLORIDE, PRESERVATIVE FREE 10 ML: 5 INJECTION INTRAVENOUS at 21:20

## 2022-01-01 RX ADMIN — SODIUM CHLORIDE, PRESERVATIVE FREE 10 ML: 5 INJECTION INTRAVENOUS at 09:48

## 2022-01-01 RX ADMIN — ALBUMIN (HUMAN) 25 G: 0.25 INJECTION, SOLUTION INTRAVENOUS at 10:28

## 2022-01-01 RX ADMIN — INSULIN LISPRO 1 UNITS: 100 INJECTION, SOLUTION INTRAVENOUS; SUBCUTANEOUS at 20:44

## 2022-01-01 RX ADMIN — MIDODRINE HYDROCHLORIDE 5 MG: 5 TABLET ORAL at 12:28

## 2022-01-01 RX ADMIN — LEVOTHYROXINE SODIUM 50 MCG: 50 TABLET ORAL at 05:28

## 2022-01-01 RX ADMIN — ALLOPURINOL 100 MG: 100 TABLET ORAL at 09:28

## 2022-01-01 RX ADMIN — MIDODRINE HYDROCHLORIDE 5 MG: 5 TABLET ORAL at 08:49

## 2022-01-01 RX ADMIN — ALBUMIN (HUMAN) 25 G: 0.25 INJECTION, SOLUTION INTRAVENOUS at 14:58

## 2022-01-01 RX ADMIN — ATORVASTATIN CALCIUM 80 MG: 80 TABLET, FILM COATED ORAL at 09:01

## 2022-01-01 RX ADMIN — MIDODRINE HYDROCHLORIDE 5 MG: 5 TABLET ORAL at 16:30

## 2022-01-01 RX ADMIN — ASPIRIN 81 MG 81 MG: 81 TABLET ORAL at 08:21

## 2022-01-01 RX ADMIN — RIVAROXABAN 15 MG: 15 TABLET, FILM COATED ORAL at 17:44

## 2022-01-01 RX ADMIN — ALLOPURINOL 100 MG: 100 TABLET ORAL at 08:16

## 2022-01-01 RX ADMIN — PANTOPRAZOLE SODIUM 40 MG: 40 TABLET, DELAYED RELEASE ORAL at 06:01

## 2022-01-01 RX ADMIN — SACUBITRIL AND VALSARTAN 1 TABLET: 24; 26 TABLET, FILM COATED ORAL at 08:36

## 2022-01-01 RX ADMIN — METOPROLOL SUCCINATE 25 MG: 25 TABLET, EXTENDED RELEASE ORAL at 12:31

## 2022-01-01 RX ADMIN — MILRINONE LACTATE IN DEXTROSE 0.2 MCG/KG/MIN: 200 INJECTION, SOLUTION INTRAVENOUS at 01:17

## 2022-01-01 RX ADMIN — FUROSEMIDE 10 MG/HR: 10 INJECTION, SOLUTION INTRAMUSCULAR; INTRAVENOUS at 12:56

## 2022-01-01 RX ADMIN — INSULIN LISPRO 1 UNITS: 100 INJECTION, SOLUTION INTRAVENOUS; SUBCUTANEOUS at 19:11

## 2022-01-01 RX ADMIN — DEXTROSE MONOHYDRATE: 50 INJECTION, SOLUTION INTRAVENOUS at 12:04

## 2022-01-01 RX ADMIN — LEVOTHYROXINE SODIUM 50 MCG: 50 TABLET ORAL at 05:25

## 2022-01-01 RX ADMIN — ALLOPURINOL 100 MG: 100 TABLET ORAL at 12:18

## 2022-01-01 RX ADMIN — INSULIN LISPRO 2 UNITS: 100 INJECTION, SOLUTION INTRAVENOUS; SUBCUTANEOUS at 17:39

## 2022-01-01 RX ADMIN — LEVOTHYROXINE SODIUM 25 MCG: 25 TABLET ORAL at 06:06

## 2022-01-01 RX ADMIN — CALCIUM GLUCONATE 1000 MG: 10 INJECTION, SOLUTION INTRAVENOUS at 18:13

## 2022-01-01 RX ADMIN — MIDODRINE HYDROCHLORIDE 10 MG: 10 TABLET ORAL at 17:18

## 2022-01-01 RX ADMIN — INSULIN LISPRO 2 UNITS: 100 INJECTION, SOLUTION INTRAVENOUS; SUBCUTANEOUS at 12:42

## 2022-01-01 RX ADMIN — ALLOPURINOL 100 MG: 100 TABLET ORAL at 17:57

## 2022-01-01 RX ADMIN — INSULIN LISPRO 2 UNITS: 100 INJECTION, SOLUTION INTRAVENOUS; SUBCUTANEOUS at 17:17

## 2022-01-01 RX ADMIN — SODIUM CHLORIDE, PRESERVATIVE FREE 10 ML: 5 INJECTION INTRAVENOUS at 09:24

## 2022-01-01 RX ADMIN — Medication 1 TABLET: at 08:58

## 2022-01-01 RX ADMIN — RIVAROXABAN 15 MG: 15 TABLET, FILM COATED ORAL at 17:50

## 2022-01-01 RX ADMIN — INSULIN LISPRO 2 UNITS: 100 INJECTION, SOLUTION INTRAVENOUS; SUBCUTANEOUS at 17:40

## 2022-01-01 RX ADMIN — DEXTROSE MONOHYDRATE 250 ML: 100 INJECTION, SOLUTION INTRAVENOUS at 05:15

## 2022-01-01 RX ADMIN — CEFEPIME HYDROCHLORIDE 2000 MG: 2 INJECTION, POWDER, FOR SOLUTION INTRAVENOUS at 14:31

## 2022-01-01 RX ADMIN — ATORVASTATIN CALCIUM 80 MG: 80 TABLET, FILM COATED ORAL at 09:26

## 2022-01-01 RX ADMIN — IRON SUCROSE 100 MG: 20 INJECTION, SOLUTION INTRAVENOUS at 15:57

## 2022-01-01 RX ADMIN — CLOPIDOGREL BISULFATE 75 MG: 75 TABLET ORAL at 08:58

## 2022-01-01 RX ADMIN — POTASSIUM CHLORIDE 20 MEQ: 750 TABLET, FILM COATED, EXTENDED RELEASE ORAL at 20:30

## 2022-01-01 RX ADMIN — LACTULOSE 30 G: 20 SOLUTION ORAL at 10:15

## 2022-01-01 RX ADMIN — Medication 10 ML: at 08:16

## 2022-01-01 RX ADMIN — VANCOMYCIN HYDROCHLORIDE 1000 MG: 1 INJECTION, POWDER, LYOPHILIZED, FOR SOLUTION INTRAVENOUS at 01:14

## 2022-01-01 RX ADMIN — LACTULOSE 30 G: 20 SOLUTION ORAL at 16:09

## 2022-01-01 RX ADMIN — METOPROLOL SUCCINATE 25 MG: 25 TABLET, EXTENDED RELEASE ORAL at 08:47

## 2022-01-01 RX ADMIN — FUROSEMIDE 10 MG/HR: 10 INJECTION, SOLUTION INTRAMUSCULAR; INTRAVENOUS at 07:39

## 2022-01-01 RX ADMIN — SODIUM CHLORIDE, PRESERVATIVE FREE 10 ML: 5 INJECTION INTRAVENOUS at 21:06

## 2022-01-01 RX ADMIN — SODIUM BICARBONATE: 84 INJECTION, SOLUTION INTRAVENOUS at 22:25

## 2022-01-01 RX ADMIN — VANCOMYCIN HYDROCHLORIDE 1000 MG: 1 INJECTION, POWDER, LYOPHILIZED, FOR SOLUTION INTRAVENOUS at 17:26

## 2022-01-01 RX ADMIN — SACUBITRIL AND VALSARTAN 1 TABLET: 24; 26 TABLET, FILM COATED ORAL at 20:43

## 2022-01-01 RX ADMIN — MIDODRINE HYDROCHLORIDE 10 MG: 5 TABLET ORAL at 15:23

## 2022-01-01 RX ADMIN — METOPROLOL SUCCINATE 12.5 MG: 25 TABLET, EXTENDED RELEASE ORAL at 08:36

## 2022-01-01 RX ADMIN — SPIRONOLACTONE 25 MG: 25 TABLET ORAL at 08:08

## 2022-01-01 RX ADMIN — SODIUM CHLORIDE, PRESERVATIVE FREE 10 ML: 5 INJECTION INTRAVENOUS at 21:11

## 2022-01-01 RX ADMIN — PANTOPRAZOLE SODIUM 40 MG: 40 TABLET, DELAYED RELEASE ORAL at 16:12

## 2022-01-01 RX ADMIN — LEVOTHYROXINE SODIUM 25 MCG: 25 TABLET ORAL at 06:11

## 2022-01-01 RX ADMIN — INSULIN LISPRO 1 UNITS: 100 INJECTION, SOLUTION INTRAVENOUS; SUBCUTANEOUS at 11:51

## 2022-01-01 RX ADMIN — FUROSEMIDE 7.5 MG/HR: 10 INJECTION, SOLUTION INTRAMUSCULAR; INTRAVENOUS at 00:59

## 2022-01-01 RX ADMIN — ALLOPURINOL 100 MG: 100 TABLET ORAL at 09:40

## 2022-01-01 RX ADMIN — MIDODRINE HYDROCHLORIDE 5 MG: 5 TABLET ORAL at 16:42

## 2022-01-01 RX ADMIN — CLOPIDOGREL BISULFATE 75 MG: 75 TABLET ORAL at 08:40

## 2022-01-01 RX ADMIN — INSULIN LISPRO 1 UNITS: 100 INJECTION, SOLUTION INTRAVENOUS; SUBCUTANEOUS at 21:19

## 2022-01-01 RX ADMIN — INSULIN LISPRO 1 UNITS: 100 INJECTION, SOLUTION INTRAVENOUS; SUBCUTANEOUS at 09:04

## 2022-01-01 RX ADMIN — FUROSEMIDE 20 MG/HR: 10 INJECTION, SOLUTION INTRAMUSCULAR; INTRAVENOUS at 16:16

## 2022-01-01 RX ADMIN — Medication: at 04:52

## 2022-01-01 RX ADMIN — METOPROLOL SUCCINATE 12.5 MG: 25 TABLET, EXTENDED RELEASE ORAL at 14:09

## 2022-01-01 RX ADMIN — CLOPIDOGREL BISULFATE 75 MG: 75 TABLET ORAL at 08:53

## 2022-01-01 RX ADMIN — SODIUM CHLORIDE, PRESERVATIVE FREE 10 ML: 5 INJECTION INTRAVENOUS at 13:27

## 2022-01-01 RX ADMIN — SALINE NASAL SPRAY 1 SPRAY: 1.5 SOLUTION NASAL at 04:55

## 2022-01-01 RX ADMIN — RIVAROXABAN 15 MG: 15 TABLET, FILM COATED ORAL at 17:40

## 2022-01-01 RX ADMIN — POTASSIUM CHLORIDE 20 MEQ: 750 TABLET, FILM COATED, EXTENDED RELEASE ORAL at 09:24

## 2022-01-01 RX ADMIN — VASOPRESSIN 0.03 UNITS/MIN: 20 INJECTION INTRAVENOUS at 20:54

## 2022-01-01 RX ADMIN — APIXABAN 2.5 MG: 5 TABLET, FILM COATED ORAL at 08:15

## 2022-01-01 RX ADMIN — MIDODRINE HYDROCHLORIDE 10 MG: 5 TABLET ORAL at 02:07

## 2022-01-01 RX ADMIN — PANTOPRAZOLE SODIUM 40 MG: 40 TABLET, DELAYED RELEASE ORAL at 05:55

## 2022-01-01 RX ADMIN — ALLOPURINOL 100 MG: 100 TABLET ORAL at 08:21

## 2022-01-01 RX ADMIN — LEVOTHYROXINE SODIUM 50 MCG: 50 TABLET ORAL at 06:22

## 2022-01-01 RX ADMIN — ALLOPURINOL 100 MG: 100 TABLET ORAL at 08:38

## 2022-01-01 RX ADMIN — LEVOTHYROXINE SODIUM 25 MCG: 25 TABLET ORAL at 06:20

## 2022-01-01 RX ADMIN — CLOPIDOGREL BISULFATE 75 MG: 75 TABLET ORAL at 08:29

## 2022-01-01 RX ADMIN — POTASSIUM CHLORIDE 20 MEQ: 750 TABLET, FILM COATED, EXTENDED RELEASE ORAL at 20:49

## 2022-01-01 RX ADMIN — LEVOTHYROXINE SODIUM 50 MCG: 50 TABLET ORAL at 06:17

## 2022-01-01 RX ADMIN — ALBUMIN (HUMAN) 25 G: 0.25 INJECTION, SOLUTION INTRAVENOUS at 09:04

## 2022-01-01 RX ADMIN — ATORVASTATIN CALCIUM 80 MG: 80 TABLET, FILM COATED ORAL at 10:26

## 2022-01-01 RX ADMIN — PANTOPRAZOLE SODIUM 40 MG: 40 TABLET, DELAYED RELEASE ORAL at 06:28

## 2022-01-01 RX ADMIN — ALLOPURINOL 100 MG: 100 TABLET ORAL at 08:10

## 2022-01-01 RX ADMIN — LEVOTHYROXINE SODIUM 25 MCG: 25 TABLET ORAL at 06:14

## 2022-01-01 RX ADMIN — PANTOPRAZOLE SODIUM 40 MG: 40 TABLET, DELAYED RELEASE ORAL at 12:01

## 2022-01-01 RX ADMIN — MIDODRINE HYDROCHLORIDE 10 MG: 10 TABLET ORAL at 16:20

## 2022-01-01 RX ADMIN — ALLOPURINOL 100 MG: 100 TABLET ORAL at 10:26

## 2022-01-01 RX ADMIN — DEXTROSE MONOHYDRATE: 50 INJECTION, SOLUTION INTRAVENOUS at 03:46

## 2022-01-01 RX ADMIN — IOPAMIDOL 75 ML: 755 INJECTION, SOLUTION INTRAVENOUS at 13:01

## 2022-01-01 RX ADMIN — Medication 1 TABLET: at 08:16

## 2022-01-01 RX ADMIN — FUROSEMIDE 10 MG/HR: 10 INJECTION, SOLUTION INTRAMUSCULAR; INTRAVENOUS at 17:19

## 2022-01-01 RX ADMIN — MIDODRINE HYDROCHLORIDE 10 MG: 10 TABLET ORAL at 08:29

## 2022-01-01 RX ADMIN — INSULIN LISPRO 4 UNITS: 100 INJECTION, SOLUTION INTRAVENOUS; SUBCUTANEOUS at 12:53

## 2022-01-01 RX ADMIN — ATORVASTATIN CALCIUM 80 MG: 80 TABLET, FILM COATED ORAL at 09:42

## 2022-01-01 RX ADMIN — INSULIN LISPRO 2 UNITS: 100 INJECTION, SOLUTION INTRAVENOUS; SUBCUTANEOUS at 12:09

## 2022-01-01 RX ADMIN — HYDROCORTISONE SODIUM SUCCINATE 100 MG: 100 INJECTION, POWDER, FOR SOLUTION INTRAMUSCULAR; INTRAVENOUS at 20:11

## 2022-01-01 RX ADMIN — METOPROLOL SUCCINATE 12.5 MG: 25 TABLET, EXTENDED RELEASE ORAL at 08:52

## 2022-01-01 RX ADMIN — INSULIN LISPRO 6 UNITS: 100 INJECTION, SOLUTION INTRAVENOUS; SUBCUTANEOUS at 08:50

## 2022-01-01 RX ADMIN — DEXTROSE MONOHYDRATE: 100 INJECTION, SOLUTION INTRAVENOUS at 04:43

## 2022-01-01 RX ADMIN — SACUBITRIL AND VALSARTAN 1 TABLET: 24; 26 TABLET, FILM COATED ORAL at 08:40

## 2022-01-01 RX ADMIN — PANTOPRAZOLE SODIUM 40 MG: 40 TABLET, DELAYED RELEASE ORAL at 16:04

## 2022-01-01 RX ADMIN — SODIUM CHLORIDE, PRESERVATIVE FREE 10 ML: 5 INJECTION INTRAVENOUS at 12:32

## 2022-01-01 RX ADMIN — MILRINONE LACTATE IN DEXTROSE 0.2 MCG/KG/MIN: 200 INJECTION, SOLUTION INTRAVENOUS at 10:50

## 2022-01-01 RX ADMIN — LACTULOSE 30 G: 20 SOLUTION ORAL at 20:43

## 2022-01-01 RX ADMIN — LEVOTHYROXINE SODIUM 50 MCG: 50 TABLET ORAL at 05:18

## 2022-01-01 RX ADMIN — Medication 1 TABLET: at 08:21

## 2022-01-01 RX ADMIN — AMIODARONE HYDROCHLORIDE 200 MG: 200 TABLET ORAL at 08:47

## 2022-01-01 RX ADMIN — FUROSEMIDE 7.5 MG/HR: 10 INJECTION, SOLUTION INTRAMUSCULAR; INTRAVENOUS at 15:19

## 2022-01-01 RX ADMIN — HEPARIN SODIUM 18 UNITS/KG/HR: 5000 INJECTION, SOLUTION INTRAVENOUS; SUBCUTANEOUS at 11:38

## 2022-01-01 RX ADMIN — Medication 1 TABLET: at 08:37

## 2022-01-01 RX ADMIN — MIDODRINE HYDROCHLORIDE 5 MG: 5 TABLET ORAL at 08:54

## 2022-01-01 RX ADMIN — SODIUM CHLORIDE, PRESERVATIVE FREE 10 ML: 5 INJECTION INTRAVENOUS at 09:01

## 2022-01-01 RX ADMIN — ATORVASTATIN CALCIUM 80 MG: 80 TABLET, FILM COATED ORAL at 13:23

## 2022-01-01 RX ADMIN — CALCIUM GLUCONATE 1000 MG: 10 INJECTION, SOLUTION INTRAVENOUS at 06:34

## 2022-01-01 RX ADMIN — METOPROLOL SUCCINATE 12.5 MG: 25 TABLET, EXTENDED RELEASE ORAL at 10:20

## 2022-01-01 RX ADMIN — LACTULOSE 30 G: 20 SOLUTION ORAL at 15:35

## 2022-01-01 RX ADMIN — LACTULOSE 30 G: 20 SOLUTION ORAL at 09:06

## 2022-01-01 RX ADMIN — AMIODARONE HYDROCHLORIDE 200 MG: 200 TABLET ORAL at 20:54

## 2022-01-01 RX ADMIN — PHENYLEPHRINE HYDROCHLORIDE 120 MCG/MIN: 10 INJECTION INTRAVENOUS at 14:53

## 2022-01-01 RX ADMIN — PANTOPRAZOLE SODIUM 40 MG: 40 TABLET, DELAYED RELEASE ORAL at 06:18

## 2022-01-01 RX ADMIN — MIDODRINE HYDROCHLORIDE 10 MG: 5 TABLET ORAL at 11:20

## 2022-01-01 RX ADMIN — INSULIN LISPRO 1 UNITS: 100 INJECTION, SOLUTION INTRAVENOUS; SUBCUTANEOUS at 17:22

## 2022-01-01 RX ADMIN — INSULIN LISPRO 4 UNITS: 100 INJECTION, SOLUTION INTRAVENOUS; SUBCUTANEOUS at 18:16

## 2022-01-01 RX ADMIN — RIVAROXABAN 15 MG: 15 TABLET, FILM COATED ORAL at 17:18

## 2022-01-01 RX ADMIN — ATORVASTATIN CALCIUM 80 MG: 80 TABLET, FILM COATED ORAL at 08:37

## 2022-01-01 RX ADMIN — MIDODRINE HYDROCHLORIDE 10 MG: 10 TABLET ORAL at 11:28

## 2022-01-01 RX ADMIN — PANTOPRAZOLE SODIUM 40 MG: 40 TABLET, DELAYED RELEASE ORAL at 17:22

## 2022-01-01 RX ADMIN — Medication 1 TABLET: at 08:07

## 2022-01-01 RX ADMIN — SODIUM CHLORIDE, PRESERVATIVE FREE 10 ML: 5 INJECTION INTRAVENOUS at 21:07

## 2022-01-01 RX ADMIN — ALLOPURINOL 100 MG: 100 TABLET ORAL at 13:23

## 2022-01-01 RX ADMIN — SODIUM CHLORIDE, PRESERVATIVE FREE 10 ML: 5 INJECTION INTRAVENOUS at 10:30

## 2022-01-01 RX ADMIN — RIVAROXABAN 15 MG: 15 TABLET, FILM COATED ORAL at 17:30

## 2022-01-01 RX ADMIN — MIDODRINE HYDROCHLORIDE 5 MG: 5 TABLET ORAL at 11:48

## 2022-01-01 RX ADMIN — FUROSEMIDE 60 MG: 10 INJECTION, SOLUTION INTRAMUSCULAR; INTRAVENOUS at 17:32

## 2022-01-01 RX ADMIN — RIVAROXABAN 15 MG: 15 TABLET, FILM COATED ORAL at 16:47

## 2022-01-01 RX ADMIN — SPIRONOLACTONE 25 MG: 25 TABLET ORAL at 08:17

## 2022-01-01 RX ADMIN — PANTOPRAZOLE SODIUM 40 MG: 40 TABLET, DELAYED RELEASE ORAL at 17:19

## 2022-01-01 RX ADMIN — TORSEMIDE 60 MG: 20 TABLET ORAL at 08:07

## 2022-01-01 RX ADMIN — LEVOTHYROXINE SODIUM 50 MCG: 50 TABLET ORAL at 06:15

## 2022-01-01 RX ADMIN — INSULIN LISPRO 1 UNITS: 100 INJECTION, SOLUTION INTRAVENOUS; SUBCUTANEOUS at 08:48

## 2022-01-01 RX ADMIN — Medication 1 TABLET: at 08:29

## 2022-01-01 RX ADMIN — ALLOPURINOL 100 MG: 100 TABLET ORAL at 14:10

## 2022-01-01 RX ADMIN — ALLOPURINOL 100 MG: 100 TABLET ORAL at 08:22

## 2022-01-01 RX ADMIN — PHENYLEPHRINE HYDROCHLORIDE 250 MCG/MIN: 10 INJECTION INTRAVENOUS at 22:22

## 2022-01-01 RX ADMIN — SODIUM CHLORIDE, PRESERVATIVE FREE 10 ML: 5 INJECTION INTRAVENOUS at 20:04

## 2022-01-01 RX ADMIN — MILRINONE LACTATE IN DEXTROSE 0.25 MCG/KG/MIN: 200 INJECTION, SOLUTION INTRAVENOUS at 02:37

## 2022-01-01 RX ADMIN — HYDROCORTISONE SODIUM SUCCINATE 100 MG: 100 INJECTION, POWDER, FOR SOLUTION INTRAMUSCULAR; INTRAVENOUS at 12:45

## 2022-01-01 RX ADMIN — INSULIN LISPRO 1 UNITS: 100 INJECTION, SOLUTION INTRAVENOUS; SUBCUTANEOUS at 20:49

## 2022-01-01 RX ADMIN — INSULIN LISPRO 4 UNITS: 100 INJECTION, SOLUTION INTRAVENOUS; SUBCUTANEOUS at 17:53

## 2022-01-01 RX ADMIN — PHENYLEPHRINE HYDROCHLORIDE 300 MCG/MIN: 10 INJECTION INTRAVENOUS at 00:42

## 2022-01-01 RX ADMIN — LACTULOSE 30 G: 20 SOLUTION ORAL at 20:09

## 2022-01-01 RX ADMIN — LEVOTHYROXINE SODIUM 50 MCG: 0.05 TABLET ORAL at 06:45

## 2022-01-01 RX ADMIN — DEXTROSE MONOHYDRATE: 100 INJECTION, SOLUTION INTRAVENOUS at 11:55

## 2022-01-01 RX ADMIN — SODIUM CHLORIDE, PRESERVATIVE FREE 10 ML: 5 INJECTION INTRAVENOUS at 21:22

## 2022-01-01 RX ADMIN — SPIRONOLACTONE 25 MG: 25 TABLET ORAL at 08:38

## 2022-01-01 RX ADMIN — ATORVASTATIN CALCIUM 80 MG: 80 TABLET, FILM COATED ORAL at 08:49

## 2022-01-01 RX ADMIN — LACTULOSE 30 G: 20 SOLUTION ORAL at 20:38

## 2022-01-01 RX ADMIN — MIDODRINE HYDROCHLORIDE 5 MG: 5 TABLET ORAL at 11:42

## 2022-01-01 RX ADMIN — LEVOTHYROXINE SODIUM 50 MCG: 50 TABLET ORAL at 05:23

## 2022-01-01 RX ADMIN — ISOSORBIDE MONONITRATE 30 MG: 30 TABLET, EXTENDED RELEASE ORAL at 09:28

## 2022-01-01 RX ADMIN — INSULIN LISPRO 1 UNITS: 100 INJECTION, SOLUTION INTRAVENOUS; SUBCUTANEOUS at 20:40

## 2022-01-01 RX ADMIN — IRON SUCROSE 200 MG: 20 INJECTION, SOLUTION INTRAVENOUS at 08:16

## 2022-01-01 RX ADMIN — ALLOPURINOL 100 MG: 100 TABLET ORAL at 09:33

## 2022-01-01 RX ADMIN — PANTOPRAZOLE SODIUM 40 MG: 40 TABLET, DELAYED RELEASE ORAL at 06:45

## 2022-01-01 RX ADMIN — Medication 10 MEQ: at 17:36

## 2022-01-01 RX ADMIN — INSULIN LISPRO 2 UNITS: 100 INJECTION, SOLUTION INTRAVENOUS; SUBCUTANEOUS at 13:21

## 2022-01-01 RX ADMIN — METOPROLOL SUCCINATE 12.5 MG: 25 TABLET, EXTENDED RELEASE ORAL at 10:29

## 2022-01-01 RX ADMIN — SODIUM CHLORIDE, PRESERVATIVE FREE 10 ML: 5 INJECTION INTRAVENOUS at 08:53

## 2022-01-01 RX ADMIN — SODIUM CHLORIDE, PRESERVATIVE FREE 10 ML: 5 INJECTION INTRAVENOUS at 21:23

## 2022-01-01 RX ADMIN — PANTOPRAZOLE SODIUM 40 MG: 40 TABLET, DELAYED RELEASE ORAL at 16:42

## 2022-01-01 RX ADMIN — ALLOPURINOL 100 MG: 100 TABLET ORAL at 09:48

## 2022-01-01 RX ADMIN — INSULIN LISPRO 1 UNITS: 100 INJECTION, SOLUTION INTRAVENOUS; SUBCUTANEOUS at 20:54

## 2022-01-01 RX ADMIN — EPOETIN ALFA-EPBX 20000 UNITS: 20000 INJECTION, SOLUTION INTRAVENOUS; SUBCUTANEOUS at 11:12

## 2022-01-01 RX ADMIN — TORSEMIDE 60 MG: 20 TABLET ORAL at 08:25

## 2022-01-01 RX ADMIN — DEXTROSE MONOHYDRATE 100 ML/HR: 50 INJECTION, SOLUTION INTRAVENOUS at 10:26

## 2022-01-01 RX ADMIN — RIVAROXABAN 15 MG: 15 TABLET, FILM COATED ORAL at 17:41

## 2022-01-01 RX ADMIN — APIXABAN 10 MG: 5 TABLET, FILM COATED ORAL at 08:46

## 2022-01-01 RX ADMIN — AMIODARONE HYDROCHLORIDE 200 MG: 200 TABLET ORAL at 21:34

## 2022-01-01 RX ADMIN — NOREPINEPHRINE BITARTRATE 52 MCG/MIN: 1 SOLUTION INTRAVENOUS at 10:31

## 2022-01-01 RX ADMIN — PANTOPRAZOLE SODIUM 40 MG: 40 TABLET, DELAYED RELEASE ORAL at 05:28

## 2022-01-01 RX ADMIN — ACETAZOLAMIDE 500 MG: 250 TABLET ORAL at 20:39

## 2022-01-01 RX ADMIN — AMIODARONE HYDROCHLORIDE 200 MG: 200 TABLET ORAL at 08:40

## 2022-01-01 RX ADMIN — PANTOPRAZOLE SODIUM 40 MG: 40 TABLET, DELAYED RELEASE ORAL at 16:34

## 2022-01-01 RX ADMIN — HYDROCORTISONE SODIUM SUCCINATE 100 MG: 100 INJECTION, POWDER, FOR SOLUTION INTRAMUSCULAR; INTRAVENOUS at 13:49

## 2022-01-01 RX ADMIN — MILRINONE LACTATE IN DEXTROSE 0.25 MCG/KG/MIN: 200 INJECTION, SOLUTION INTRAVENOUS at 10:16

## 2022-01-01 RX ADMIN — MIDODRINE HYDROCHLORIDE 5 MG: 5 TABLET ORAL at 14:10

## 2022-01-01 RX ADMIN — APIXABAN 2.5 MG: 2.5 TABLET, FILM COATED ORAL at 09:28

## 2022-01-01 RX ADMIN — MIDODRINE HYDROCHLORIDE 5 MG: 5 TABLET ORAL at 16:04

## 2022-01-01 RX ADMIN — SODIUM CHLORIDE, PRESERVATIVE FREE 10 ML: 5 INJECTION INTRAVENOUS at 20:54

## 2022-01-01 RX ADMIN — ALLOPURINOL 100 MG: 100 TABLET ORAL at 09:20

## 2022-01-01 RX ADMIN — ATORVASTATIN CALCIUM 80 MG: 80 TABLET, FILM COATED ORAL at 08:09

## 2022-01-01 RX ADMIN — ALBUMIN (HUMAN) 25 G: 0.25 INJECTION, SOLUTION INTRAVENOUS at 12:41

## 2022-01-01 RX ADMIN — POTASSIUM CHLORIDE 20 MEQ: 750 TABLET, FILM COATED, EXTENDED RELEASE ORAL at 08:29

## 2022-01-01 RX ADMIN — PANTOPRAZOLE SODIUM 40 MG: 40 TABLET, DELAYED RELEASE ORAL at 17:26

## 2022-01-01 RX ADMIN — MIDODRINE HYDROCHLORIDE 10 MG: 10 TABLET ORAL at 16:57

## 2022-01-01 RX ADMIN — ATORVASTATIN CALCIUM 80 MG: 80 TABLET, FILM COATED ORAL at 08:16

## 2022-01-01 RX ADMIN — MIDODRINE HYDROCHLORIDE 5 MG: 10 TABLET ORAL at 17:19

## 2022-01-01 RX ADMIN — AMIODARONE HYDROCHLORIDE 200 MG: 200 TABLET ORAL at 08:31

## 2022-01-01 RX ADMIN — PANTOPRAZOLE SODIUM 40 MG: 40 TABLET, DELAYED RELEASE ORAL at 16:54

## 2022-01-01 RX ADMIN — APIXABAN 10 MG: 5 TABLET, FILM COATED ORAL at 20:13

## 2022-01-01 RX ADMIN — MIDODRINE HYDROCHLORIDE 5 MG: 5 TABLET ORAL at 14:20

## 2022-01-01 RX ADMIN — POTASSIUM CHLORIDE 20 MEQ: 750 TABLET, FILM COATED, EXTENDED RELEASE ORAL at 09:01

## 2022-01-01 RX ADMIN — LACTULOSE 30 G: 20 SOLUTION ORAL at 14:20

## 2022-01-01 RX ADMIN — SPIRONOLACTONE 25 MG: 25 TABLET ORAL at 09:24

## 2022-01-01 RX ADMIN — METOPROLOL SUCCINATE 12.5 MG: 25 TABLET, EXTENDED RELEASE ORAL at 08:53

## 2022-01-01 RX ADMIN — ATORVASTATIN CALCIUM 80 MG: 80 TABLET, FILM COATED ORAL at 08:07

## 2022-01-01 RX ADMIN — AMIODARONE HYDROCHLORIDE 200 MG: 200 TABLET ORAL at 08:15

## 2022-01-01 RX ADMIN — ASPIRIN 81 MG 81 MG: 81 TABLET ORAL at 08:55

## 2022-01-01 RX ADMIN — LACTULOSE 30 G: 20 SOLUTION ORAL at 20:08

## 2022-01-01 RX ADMIN — DEXTROSE MONOHYDRATE 100 ML/HR: 50 INJECTION, SOLUTION INTRAVENOUS at 22:57

## 2022-01-01 RX ADMIN — ACETAMINOPHEN 650 MG: 325 TABLET ORAL at 23:30

## 2022-01-01 RX ADMIN — ISOSORBIDE MONONITRATE 30 MG: 30 TABLET, EXTENDED RELEASE ORAL at 08:36

## 2022-01-01 RX ADMIN — PHYTONADIONE 10 MG: 5 TABLET ORAL at 17:30

## 2022-01-01 RX ADMIN — ALLOPURINOL 100 MG: 100 TABLET ORAL at 08:52

## 2022-01-01 RX ADMIN — MIDODRINE HYDROCHLORIDE 5 MG: 10 TABLET ORAL at 08:36

## 2022-01-01 RX ADMIN — PANTOPRAZOLE SODIUM 40 MG: 40 TABLET, DELAYED RELEASE ORAL at 06:47

## 2022-01-01 RX ADMIN — FUROSEMIDE 10 MG/HR: 10 INJECTION, SOLUTION INTRAMUSCULAR; INTRAVENOUS at 17:32

## 2022-01-01 RX ADMIN — INSULIN LISPRO 1 UNITS: 100 INJECTION, SOLUTION INTRAVENOUS; SUBCUTANEOUS at 21:39

## 2022-01-01 RX ADMIN — ALLOPURINOL 100 MG: 100 TABLET ORAL at 08:29

## 2022-01-01 RX ADMIN — LEVOTHYROXINE SODIUM 50 MCG: 50 TABLET ORAL at 06:01

## 2022-01-01 RX ADMIN — SPIRONOLACTONE 25 MG: 25 TABLET ORAL at 08:10

## 2022-01-01 RX ADMIN — PANTOPRAZOLE SODIUM 40 MG: 40 TABLET, DELAYED RELEASE ORAL at 06:19

## 2022-01-01 RX ADMIN — CALCIUM GLUCONATE 1000 MG: 10 INJECTION, SOLUTION INTRAVENOUS at 00:37

## 2022-01-01 RX ADMIN — MIDODRINE HYDROCHLORIDE 10 MG: 10 TABLET ORAL at 17:42

## 2022-01-01 RX ADMIN — SACUBITRIL AND VALSARTAN 1 TABLET: 24; 26 TABLET, FILM COATED ORAL at 09:16

## 2022-01-01 RX ADMIN — ALLOPURINOL 100 MG: 100 TABLET ORAL at 10:31

## 2022-01-01 RX ADMIN — SACUBITRIL AND VALSARTAN 1 TABLET: 24; 26 TABLET, FILM COATED ORAL at 08:15

## 2022-01-01 RX ADMIN — SODIUM CHLORIDE, PRESERVATIVE FREE 10 ML: 5 INJECTION INTRAVENOUS at 20:02

## 2022-01-01 RX ADMIN — INSULIN LISPRO 2 UNITS: 100 INJECTION, SOLUTION INTRAVENOUS; SUBCUTANEOUS at 21:10

## 2022-01-01 RX ADMIN — MIDODRINE HYDROCHLORIDE 10 MG: 10 TABLET ORAL at 16:55

## 2022-01-01 RX ADMIN — SODIUM CHLORIDE, PRESERVATIVE FREE 10 ML: 5 INJECTION INTRAVENOUS at 09:29

## 2022-01-01 RX ADMIN — INSULIN LISPRO 1 UNITS: 100 INJECTION, SOLUTION INTRAVENOUS; SUBCUTANEOUS at 17:38

## 2022-01-01 RX ADMIN — HEPARIN SODIUM 3200 UNITS: 1000 INJECTION INTRAVENOUS; SUBCUTANEOUS at 11:54

## 2022-01-01 RX ADMIN — MILRINONE LACTATE 0.12 MCG/KG/MIN: 0.2 INJECTION, SOLUTION INTRAVENOUS at 20:39

## 2022-01-01 RX ADMIN — METOPROLOL SUCCINATE 25 MG: 25 TABLET, EXTENDED RELEASE ORAL at 08:31

## 2022-01-01 RX ADMIN — ALLOPURINOL 100 MG: 100 TABLET ORAL at 08:37

## 2022-01-01 RX ADMIN — ASPIRIN 81 MG 81 MG: 81 TABLET ORAL at 09:16

## 2022-01-01 RX ADMIN — TORSEMIDE 60 MG: 20 TABLET ORAL at 08:36

## 2022-01-01 RX ADMIN — CALCIUM GLUCONATE 1000 MG: 10 INJECTION, SOLUTION INTRAVENOUS at 20:08

## 2022-01-01 RX ADMIN — LEVOTHYROXINE SODIUM 50 MCG: 0.05 TABLET ORAL at 07:59

## 2022-01-01 RX ADMIN — ATORVASTATIN CALCIUM 80 MG: 80 TABLET, FILM COATED ORAL at 08:29

## 2022-01-01 RX ADMIN — Medication 1 TABLET: at 09:28

## 2022-01-01 RX ADMIN — HEPARIN SODIUM 3600 UNITS: 1000 INJECTION INTRAVENOUS; SUBCUTANEOUS at 16:20

## 2022-01-01 RX ADMIN — MILRINONE LACTATE IN DEXTROSE 0.25 MCG/KG/MIN: 200 INJECTION, SOLUTION INTRAVENOUS at 06:55

## 2022-01-01 RX ADMIN — FUROSEMIDE 10 MG/HR: 10 INJECTION, SOLUTION INTRAMUSCULAR; INTRAVENOUS at 02:49

## 2022-01-01 RX ADMIN — ATORVASTATIN CALCIUM 80 MG: 80 TABLET, FILM COATED ORAL at 08:38

## 2022-01-01 RX ADMIN — LEVOTHYROXINE SODIUM 50 MCG: 50 TABLET ORAL at 06:00

## 2022-01-01 RX ADMIN — MIDODRINE HYDROCHLORIDE 10 MG: 5 TABLET ORAL at 09:25

## 2022-01-01 RX ADMIN — ATORVASTATIN CALCIUM 80 MG: 80 TABLET, FILM COATED ORAL at 09:33

## 2022-01-01 RX ADMIN — SODIUM CHLORIDE, PRESERVATIVE FREE 10 ML: 5 INJECTION INTRAVENOUS at 09:15

## 2022-01-01 RX ADMIN — ASPIRIN 81 MG 81 MG: 81 TABLET ORAL at 09:26

## 2022-01-01 RX ADMIN — PANTOPRAZOLE SODIUM 40 MG: 40 TABLET, DELAYED RELEASE ORAL at 06:37

## 2022-01-01 RX ADMIN — MIDODRINE HYDROCHLORIDE 10 MG: 5 TABLET ORAL at 12:41

## 2022-01-01 RX ADMIN — DOBUTAMINE IN DEXTROSE 10 MCG/KG/MIN: 200 INJECTION, SOLUTION INTRAVENOUS at 09:22

## 2022-01-01 RX ADMIN — LEVOTHYROXINE SODIUM 50 MCG: 50 TABLET ORAL at 05:19

## 2022-01-01 RX ADMIN — SODIUM CHLORIDE, PRESERVATIVE FREE 10 ML: 5 INJECTION INTRAVENOUS at 21:34

## 2022-01-01 RX ADMIN — NOREPINEPHRINE BITARTRATE 100 MCG/MIN: 1 SOLUTION INTRAVENOUS at 00:17

## 2022-01-01 RX ADMIN — MIDODRINE HYDROCHLORIDE 5 MG: 5 TABLET ORAL at 16:57

## 2022-01-01 RX ADMIN — RIVAROXABAN 15 MG: 15 TABLET, FILM COATED ORAL at 16:42

## 2022-01-01 RX ADMIN — INSULIN LISPRO 1 UNITS: 100 INJECTION, SOLUTION INTRAVENOUS; SUBCUTANEOUS at 21:53

## 2022-01-01 RX ADMIN — RIVAROXABAN 15 MG: 15 TABLET, FILM COATED ORAL at 17:53

## 2022-01-01 RX ADMIN — INSULIN LISPRO 2 UNITS: 100 INJECTION, SOLUTION INTRAVENOUS; SUBCUTANEOUS at 17:51

## 2022-01-01 RX ADMIN — LEVOTHYROXINE SODIUM 50 MCG: 50 TABLET ORAL at 05:21

## 2022-01-01 RX ADMIN — APIXABAN 2.5 MG: 2.5 TABLET, FILM COATED ORAL at 21:21

## 2022-01-01 RX ADMIN — ALLOPURINOL 100 MG: 100 TABLET ORAL at 08:55

## 2022-01-01 RX ADMIN — DEXTROSE MONOHYDRATE 250 ML: 100 INJECTION, SOLUTION INTRAVENOUS at 17:30

## 2022-01-01 RX ADMIN — LEVOTHYROXINE SODIUM 50 MCG: 50 TABLET ORAL at 05:14

## 2022-01-01 RX ADMIN — INSULIN LISPRO 2 UNITS: 100 INJECTION, SOLUTION INTRAVENOUS; SUBCUTANEOUS at 11:31

## 2022-01-01 RX ADMIN — INSULIN LISPRO 2 UNITS: 100 INJECTION, SOLUTION INTRAVENOUS; SUBCUTANEOUS at 18:32

## 2022-01-01 RX ADMIN — LEVOTHYROXINE SODIUM 50 MCG: 50 TABLET ORAL at 05:06

## 2022-01-01 RX ADMIN — SODIUM CHLORIDE, PRESERVATIVE FREE 10 ML: 5 INJECTION INTRAVENOUS at 08:10

## 2022-01-01 RX ADMIN — METOPROLOL SUCCINATE 12.5 MG: 25 TABLET, EXTENDED RELEASE ORAL at 12:49

## 2022-01-01 RX ADMIN — ATORVASTATIN CALCIUM 80 MG: 80 TABLET, FILM COATED ORAL at 08:15

## 2022-01-01 RX ADMIN — INSULIN LISPRO 1 UNITS: 100 INJECTION, SOLUTION INTRAVENOUS; SUBCUTANEOUS at 17:45

## 2022-01-01 RX ADMIN — METOPROLOL SUCCINATE 12.5 MG: 25 TABLET, EXTENDED RELEASE ORAL at 08:49

## 2022-01-01 RX ADMIN — MILRINONE LACTATE IN DEXTROSE 0.25 MCG/KG/MIN: 200 INJECTION, SOLUTION INTRAVENOUS at 07:47

## 2022-01-01 RX ADMIN — RIVAROXABAN 15 MG: 15 TABLET, FILM COATED ORAL at 18:00

## 2022-01-01 RX ADMIN — DOBUTAMINE IN DEXTROSE 2.5 MCG/KG/MIN: 200 INJECTION, SOLUTION INTRAVENOUS at 16:14

## 2022-01-01 RX ADMIN — FUROSEMIDE 60 MG: 10 INJECTION, SOLUTION INTRAMUSCULAR; INTRAVENOUS at 17:30

## 2022-01-01 RX ADMIN — MILRINONE LACTATE IN DEXTROSE 0.25 MCG/KG/MIN: 200 INJECTION, SOLUTION INTRAVENOUS at 11:50

## 2022-01-01 RX ADMIN — INSULIN LISPRO 1 UNITS: 100 INJECTION, SOLUTION INTRAVENOUS; SUBCUTANEOUS at 09:21

## 2022-01-01 RX ADMIN — ALLOPURINOL 100 MG: 100 TABLET ORAL at 09:46

## 2022-01-01 RX ADMIN — METOPROLOL SUCCINATE 12.5 MG: 25 TABLET, EXTENDED RELEASE ORAL at 09:01

## 2022-01-01 RX ADMIN — SODIUM CHLORIDE, PRESERVATIVE FREE 10 ML: 5 INJECTION INTRAVENOUS at 21:14

## 2022-01-01 RX ADMIN — FUROSEMIDE 10 MG/HR: 10 INJECTION, SOLUTION INTRAMUSCULAR; INTRAVENOUS at 21:13

## 2022-01-01 RX ADMIN — PANTOPRAZOLE SODIUM 40 MG: 40 TABLET, DELAYED RELEASE ORAL at 17:42

## 2022-01-01 RX ADMIN — Medication 1 TABLET: at 08:10

## 2022-01-01 RX ADMIN — INSULIN LISPRO 2 UNITS: 100 INJECTION, SOLUTION INTRAVENOUS; SUBCUTANEOUS at 19:01

## 2022-01-01 RX ADMIN — SODIUM CHLORIDE, PRESERVATIVE FREE 10 ML: 5 INJECTION INTRAVENOUS at 12:52

## 2022-01-01 RX ADMIN — CLOPIDOGREL BISULFATE 75 MG: 75 TABLET ORAL at 08:49

## 2022-01-01 RX ADMIN — ASPIRIN 81 MG 81 MG: 81 TABLET ORAL at 13:23

## 2022-01-01 RX ADMIN — METOLAZONE 10 MG: 5 TABLET ORAL at 08:15

## 2022-01-01 RX ADMIN — SODIUM CHLORIDE, PRESERVATIVE FREE 10 ML: 5 INJECTION INTRAVENOUS at 10:27

## 2022-01-01 RX ADMIN — SODIUM CHLORIDE, PRESERVATIVE FREE 10 ML: 5 INJECTION INTRAVENOUS at 20:44

## 2022-01-01 RX ADMIN — ALLOPURINOL 100 MG: 100 TABLET ORAL at 12:51

## 2022-01-01 RX ADMIN — INSULIN LISPRO 2 UNITS: 100 INJECTION, SOLUTION INTRAVENOUS; SUBCUTANEOUS at 17:19

## 2022-01-01 RX ADMIN — METOPROLOL SUCCINATE 12.5 MG: 25 TABLET, EXTENDED RELEASE ORAL at 11:28

## 2022-01-01 RX ADMIN — APIXABAN 2.5 MG: 5 TABLET, FILM COATED ORAL at 21:05

## 2022-01-01 RX ADMIN — PANTOPRAZOLE SODIUM 40 MG: 40 TABLET, DELAYED RELEASE ORAL at 05:21

## 2022-01-01 RX ADMIN — Medication 16 G: at 06:50

## 2022-01-01 RX ADMIN — INSULIN LISPRO 2 UNITS: 100 INJECTION, SOLUTION INTRAVENOUS; SUBCUTANEOUS at 11:51

## 2022-01-01 RX ADMIN — MIDODRINE HYDROCHLORIDE 5 MG: 5 TABLET ORAL at 17:57

## 2022-01-01 RX ADMIN — Medication 1 TABLET: at 08:36

## 2022-01-01 RX ADMIN — CLOPIDOGREL BISULFATE 75 MG: 75 TABLET ORAL at 08:22

## 2022-01-01 RX ADMIN — MIDODRINE HYDROCHLORIDE 5 MG: 10 TABLET ORAL at 12:09

## 2022-01-01 RX ADMIN — INSULIN LISPRO 1 UNITS: 100 INJECTION, SOLUTION INTRAVENOUS; SUBCUTANEOUS at 12:53

## 2022-01-01 RX ADMIN — METOPROLOL SUCCINATE 25 MG: 25 TABLET, EXTENDED RELEASE ORAL at 10:31

## 2022-01-01 RX ADMIN — MIDODRINE HYDROCHLORIDE 5 MG: 5 TABLET ORAL at 08:53

## 2022-01-01 RX ADMIN — MILRINONE LACTATE IN DEXTROSE 0.25 MCG/KG/MIN: 200 INJECTION, SOLUTION INTRAVENOUS at 03:21

## 2022-01-01 RX ADMIN — METAXALONE 400 MG: 800 TABLET ORAL at 22:01

## 2022-01-01 RX ADMIN — ASPIRIN 81 MG 81 MG: 81 TABLET ORAL at 08:31

## 2022-01-01 RX ADMIN — SACUBITRIL AND VALSARTAN 1 TABLET: 24; 26 TABLET, FILM COATED ORAL at 20:04

## 2022-01-01 RX ADMIN — SODIUM CHLORIDE, PRESERVATIVE FREE 10 ML: 5 INJECTION INTRAVENOUS at 20:39

## 2022-01-01 RX ADMIN — METOPROLOL SUCCINATE 25 MG: 25 TABLET, EXTENDED RELEASE ORAL at 09:33

## 2022-01-01 RX ADMIN — LACTULOSE 30 G: 20 SOLUTION ORAL at 09:46

## 2022-01-01 RX ADMIN — CLOPIDOGREL BISULFATE 75 MG: 75 TABLET ORAL at 14:10

## 2022-01-01 RX ADMIN — INSULIN LISPRO 1 UNITS: 100 INJECTION, SOLUTION INTRAVENOUS; SUBCUTANEOUS at 17:19

## 2022-01-01 RX ADMIN — INSULIN LISPRO 3 UNITS: 100 INJECTION, SOLUTION INTRAVENOUS; SUBCUTANEOUS at 21:14

## 2022-01-01 RX ADMIN — ALLOPURINOL 100 MG: 100 TABLET ORAL at 12:31

## 2022-01-01 RX ADMIN — MIDODRINE HYDROCHLORIDE 5 MG: 5 TABLET ORAL at 11:32

## 2022-01-01 RX ADMIN — DEXTROSE MONOHYDRATE: 100 INJECTION, SOLUTION INTRAVENOUS at 00:56

## 2022-01-01 RX ADMIN — CEFEPIME HYDROCHLORIDE 1000 MG: 1 INJECTION, POWDER, FOR SOLUTION INTRAMUSCULAR; INTRAVENOUS at 18:00

## 2022-01-01 RX ADMIN — FUROSEMIDE 30 MG/HR: 10 INJECTION, SOLUTION INTRAMUSCULAR; INTRAVENOUS at 08:47

## 2022-01-01 RX ADMIN — IRON SUCROSE 100 MG: 20 INJECTION, SOLUTION INTRAVENOUS at 14:58

## 2022-01-01 RX ADMIN — ALLOPURINOL 100 MG: 100 TABLET ORAL at 09:16

## 2022-01-01 RX ADMIN — DEXTROSE MONOHYDRATE 250 ML: 100 INJECTION, SOLUTION INTRAVENOUS at 06:56

## 2022-01-01 RX ADMIN — MILRINONE LACTATE IN DEXTROSE 0.25 MCG/KG/MIN: 200 INJECTION, SOLUTION INTRAVENOUS at 13:18

## 2022-01-01 RX ADMIN — DEXTROSE MONOHYDRATE 250 ML: 100 INJECTION, SOLUTION INTRAVENOUS at 09:06

## 2022-01-01 RX ADMIN — DEXTROSE MONOHYDRATE: 100 INJECTION, SOLUTION INTRAVENOUS at 17:31

## 2022-01-01 RX ADMIN — ATORVASTATIN CALCIUM 80 MG: 80 TABLET, FILM COATED ORAL at 10:32

## 2022-01-01 RX ADMIN — IRON SUCROSE 200 MG: 20 INJECTION, SOLUTION INTRAVENOUS at 10:51

## 2022-01-01 RX ADMIN — FUROSEMIDE 60 MG: 10 INJECTION, SOLUTION INTRAMUSCULAR; INTRAVENOUS at 17:43

## 2022-01-01 RX ADMIN — SODIUM CHLORIDE, PRESERVATIVE FREE 10 ML: 5 INJECTION INTRAVENOUS at 10:37

## 2022-01-01 RX ADMIN — METOPROLOL SUCCINATE 12.5 MG: 25 TABLET, EXTENDED RELEASE ORAL at 08:58

## 2022-01-01 RX ADMIN — MIDODRINE HYDROCHLORIDE 5 MG: 5 TABLET ORAL at 17:30

## 2022-01-01 RX ADMIN — INSULIN LISPRO 1 UNITS: 100 INJECTION, SOLUTION INTRAVENOUS; SUBCUTANEOUS at 20:58

## 2022-01-01 RX ADMIN — MIDODRINE HYDROCHLORIDE 5 MG: 5 TABLET ORAL at 12:38

## 2022-01-01 RX ADMIN — ATORVASTATIN CALCIUM 80 MG: 80 TABLET, FILM COATED ORAL at 08:25

## 2022-01-01 RX ADMIN — APIXABAN 2.5 MG: 5 TABLET, FILM COATED ORAL at 09:26

## 2022-01-01 RX ADMIN — LEVOTHYROXINE SODIUM 75 MCG: 0.07 TABLET ORAL at 06:25

## 2022-01-01 RX ADMIN — SACUBITRIL AND VALSARTAN 1 TABLET: 24; 26 TABLET, FILM COATED ORAL at 08:54

## 2022-01-01 RX ADMIN — SODIUM CHLORIDE, PRESERVATIVE FREE 10 ML: 5 INJECTION INTRAVENOUS at 08:23

## 2022-01-01 RX ADMIN — ALLOPURINOL 100 MG: 100 TABLET ORAL at 10:15

## 2022-01-01 RX ADMIN — FUROSEMIDE 7.5 MG/HR: 10 INJECTION, SOLUTION INTRAMUSCULAR; INTRAVENOUS at 18:26

## 2022-01-01 RX ADMIN — ATORVASTATIN CALCIUM 80 MG: 80 TABLET, FILM COATED ORAL at 12:51

## 2022-01-01 RX ADMIN — SODIUM CHLORIDE, PRESERVATIVE FREE 10 ML: 5 INJECTION INTRAVENOUS at 21:52

## 2022-01-01 RX ADMIN — MILRINONE LACTATE IN DEXTROSE 0.25 MCG/KG/MIN: 200 INJECTION, SOLUTION INTRAVENOUS at 15:30

## 2022-01-01 RX ADMIN — FUROSEMIDE 10 MG/HR: 10 INJECTION, SOLUTION INTRAMUSCULAR; INTRAVENOUS at 15:46

## 2022-01-01 RX ADMIN — MEROPENEM 1000 MG: 1 INJECTION, POWDER, FOR SOLUTION INTRAVENOUS at 15:54

## 2022-01-01 RX ADMIN — INSULIN LISPRO 2 UNITS: 100 INJECTION, SOLUTION INTRAVENOUS; SUBCUTANEOUS at 12:24

## 2022-01-01 RX ADMIN — MILRINONE LACTATE IN DEXTROSE 0.25 MCG/KG/MIN: 200 INJECTION, SOLUTION INTRAVENOUS at 09:50

## 2022-01-01 RX ADMIN — CLOPIDOGREL BISULFATE 75 MG: 75 TABLET ORAL at 09:28

## 2022-01-01 RX ADMIN — PHYTONADIONE 10 MG: 5 TABLET ORAL at 10:22

## 2022-01-01 RX ADMIN — LEVOTHYROXINE SODIUM 50 MCG: 50 TABLET ORAL at 06:29

## 2022-01-01 RX ADMIN — CALCIUM GLUCONATE 1000 MG: 10 INJECTION, SOLUTION INTRAVENOUS at 06:04

## 2022-01-01 RX ADMIN — HEPARIN SODIUM 3200 UNITS: 1000 INJECTION INTRAVENOUS; SUBCUTANEOUS at 16:16

## 2022-01-01 RX ADMIN — ALLOPURINOL 100 MG: 100 TABLET ORAL at 08:58

## 2022-01-01 RX ADMIN — INSULIN LISPRO 1 UNITS: 100 INJECTION, SOLUTION INTRAVENOUS; SUBCUTANEOUS at 21:35

## 2022-01-01 RX ADMIN — ASPIRIN 81 MG 81 MG: 81 TABLET ORAL at 10:31

## 2022-01-01 RX ADMIN — PANTOPRAZOLE SODIUM 40 MG: 40 TABLET, DELAYED RELEASE ORAL at 16:09

## 2022-01-01 RX ADMIN — POTASSIUM CHLORIDE 20 MEQ: 750 TABLET, FILM COATED, EXTENDED RELEASE ORAL at 20:42

## 2022-01-01 RX ADMIN — POTASSIUM CHLORIDE 20 MEQ: 750 TABLET, FILM COATED, EXTENDED RELEASE ORAL at 20:40

## 2022-01-01 RX ADMIN — MIDODRINE HYDROCHLORIDE 10 MG: 10 TABLET ORAL at 12:16

## 2022-01-01 RX ADMIN — FUROSEMIDE 10 MG/HR: 10 INJECTION, SOLUTION INTRAMUSCULAR; INTRAVENOUS at 06:32

## 2022-01-01 RX ADMIN — EPOETIN ALFA-EPBX 10000 UNITS: 10000 INJECTION, SOLUTION INTRAVENOUS; SUBCUTANEOUS at 16:01

## 2022-01-01 RX ADMIN — LACTULOSE 30 G: 20 SOLUTION ORAL at 22:42

## 2022-01-01 RX ADMIN — INSULIN LISPRO 1 UNITS: 100 INJECTION, SOLUTION INTRAVENOUS; SUBCUTANEOUS at 20:47

## 2022-01-01 RX ADMIN — SODIUM CHLORIDE, PRESERVATIVE FREE 10 ML: 5 INJECTION INTRAVENOUS at 08:42

## 2022-01-01 RX ADMIN — MILRINONE LACTATE IN DEXTROSE 0.25 MCG/KG/MIN: 200 INJECTION, SOLUTION INTRAVENOUS at 22:12

## 2022-01-01 RX ADMIN — MIDODRINE HYDROCHLORIDE 10 MG: 10 TABLET ORAL at 21:10

## 2022-01-01 RX ADMIN — Medication 1 TABLET: at 09:24

## 2022-01-01 RX ADMIN — LEVOTHYROXINE SODIUM 25 MCG: 25 TABLET ORAL at 05:41

## 2022-01-01 RX ADMIN — GLUCAGON HYDROCHLORIDE 1 MG: KIT at 17:19

## 2022-01-01 RX ADMIN — INSULIN LISPRO 1 UNITS: 100 INJECTION, SOLUTION INTRAVENOUS; SUBCUTANEOUS at 20:55

## 2022-01-01 RX ADMIN — Medication: at 00:30

## 2022-01-01 RX ADMIN — INSULIN LISPRO 2 UNITS: 100 INJECTION, SOLUTION INTRAVENOUS; SUBCUTANEOUS at 12:31

## 2022-01-01 RX ADMIN — SODIUM CHLORIDE, PRESERVATIVE FREE 10 ML: 5 INJECTION INTRAVENOUS at 23:32

## 2022-01-01 RX ADMIN — POTASSIUM CHLORIDE 20 MEQ: 750 TABLET, FILM COATED, EXTENDED RELEASE ORAL at 14:58

## 2022-01-01 RX ADMIN — ALLOPURINOL 100 MG: 100 TABLET ORAL at 09:24

## 2022-01-01 RX ADMIN — MEROPENEM 1000 MG: 1 INJECTION, POWDER, FOR SOLUTION INTRAVENOUS at 01:02

## 2022-01-01 RX ADMIN — SODIUM CHLORIDE, PRESERVATIVE FREE 10 ML: 5 INJECTION INTRAVENOUS at 11:15

## 2022-01-01 RX ADMIN — LACTULOSE 30 G: 20 SOLUTION ORAL at 20:45

## 2022-01-01 RX ADMIN — RIVAROXABAN 15 MG: 15 TABLET, FILM COATED ORAL at 16:51

## 2022-01-01 RX ADMIN — VANCOMYCIN HYDROCHLORIDE 1750 MG: 10 INJECTION, POWDER, LYOPHILIZED, FOR SOLUTION INTRAVENOUS at 17:16

## 2022-01-01 RX ADMIN — INSULIN LISPRO 2 UNITS: 100 INJECTION, SOLUTION INTRAVENOUS; SUBCUTANEOUS at 09:30

## 2022-01-01 RX ADMIN — MIDODRINE HYDROCHLORIDE 5 MG: 5 TABLET ORAL at 18:52

## 2022-01-01 RX ADMIN — SODIUM CHLORIDE, PRESERVATIVE FREE 10 ML: 5 INJECTION INTRAVENOUS at 21:59

## 2022-01-01 RX ADMIN — SODIUM CHLORIDE, PRESERVATIVE FREE 10 ML: 5 INJECTION INTRAVENOUS at 09:27

## 2022-01-01 RX ADMIN — MIDODRINE HYDROCHLORIDE 5 MG: 5 TABLET ORAL at 17:10

## 2022-01-01 RX ADMIN — INSULIN LISPRO 1 UNITS: 100 INJECTION, SOLUTION INTRAVENOUS; SUBCUTANEOUS at 08:17

## 2022-01-01 RX ADMIN — ASPIRIN 81 MG 81 MG: 81 TABLET ORAL at 08:40

## 2022-01-01 RX ADMIN — LEVOTHYROXINE SODIUM 50 MCG: 50 TABLET ORAL at 05:24

## 2022-01-01 RX ADMIN — ATORVASTATIN CALCIUM 80 MG: 80 TABLET, FILM COATED ORAL at 08:21

## 2022-01-01 RX ADMIN — CEFEPIME HYDROCHLORIDE 1000 MG: 1 INJECTION, POWDER, FOR SOLUTION INTRAMUSCULAR; INTRAVENOUS at 20:05

## 2022-01-01 RX ADMIN — ISOSORBIDE MONONITRATE 30 MG: 30 TABLET, EXTENDED RELEASE ORAL at 09:46

## 2022-01-01 RX ADMIN — SPIRONOLACTONE 25 MG: 25 TABLET ORAL at 08:40

## 2022-01-01 RX ADMIN — DOBUTAMINE IN DEXTROSE 5 MCG/KG/MIN: 200 INJECTION, SOLUTION INTRAVENOUS at 21:28

## 2022-01-01 RX ADMIN — MIDODRINE HYDROCHLORIDE 10 MG: 5 TABLET ORAL at 16:34

## 2022-01-01 RX ADMIN — LEVOTHYROXINE SODIUM 50 MCG: 50 TABLET ORAL at 06:40

## 2022-01-01 RX ADMIN — LACTULOSE 30 G: 20 SOLUTION ORAL at 20:35

## 2022-01-01 RX ADMIN — METOPROLOL SUCCINATE 25 MG: 25 TABLET, EXTENDED RELEASE ORAL at 09:16

## 2022-01-01 RX ADMIN — ALLOPURINOL 100 MG: 100 TABLET ORAL at 09:27

## 2022-01-01 RX ADMIN — LACTULOSE 30 G: 20 SOLUTION ORAL at 17:04

## 2022-01-01 RX ADMIN — MIDODRINE HYDROCHLORIDE 5 MG: 5 TABLET ORAL at 08:52

## 2022-01-01 RX ADMIN — MILRINONE LACTATE IN DEXTROSE 0.25 MCG/KG/MIN: 200 INJECTION, SOLUTION INTRAVENOUS at 19:50

## 2022-01-01 RX ADMIN — SODIUM CHLORIDE, PRESERVATIVE FREE 10 ML: 5 INJECTION INTRAVENOUS at 20:34

## 2022-01-01 RX ADMIN — ISOSORBIDE MONONITRATE 30 MG: 30 TABLET, EXTENDED RELEASE ORAL at 09:42

## 2022-01-01 RX ADMIN — METOPROLOL SUCCINATE 25 MG: 25 TABLET, EXTENDED RELEASE ORAL at 08:10

## 2022-01-01 RX ADMIN — MEROPENEM 1000 MG: 1 INJECTION, POWDER, FOR SOLUTION INTRAVENOUS at 03:20

## 2022-01-01 RX ADMIN — DEXTROSE MONOHYDRATE: 100 INJECTION, SOLUTION INTRAVENOUS at 00:40

## 2022-01-01 RX ADMIN — NOREPINEPHRINE BITARTRATE 10 MCG/MIN: 1 SOLUTION INTRAVENOUS at 13:41

## 2022-01-01 RX ADMIN — LEVOTHYROXINE SODIUM 50 MCG: 50 TABLET ORAL at 06:13

## 2022-01-01 RX ADMIN — RIVAROXABAN 15 MG: 15 TABLET, FILM COATED ORAL at 17:43

## 2022-01-01 RX ADMIN — SODIUM CHLORIDE, PRESERVATIVE FREE 40 ML: 5 INJECTION INTRAVENOUS at 09:34

## 2022-01-01 RX ADMIN — LEVOTHYROXINE SODIUM 25 MCG: 25 TABLET ORAL at 08:17

## 2022-01-01 RX ADMIN — SACUBITRIL AND VALSARTAN 0.5 TABLET: 24; 26 TABLET, FILM COATED ORAL at 09:40

## 2022-01-01 RX ADMIN — SACUBITRIL AND VALSARTAN 1 TABLET: 24; 26 TABLET, FILM COATED ORAL at 20:44

## 2022-01-01 RX ADMIN — FUROSEMIDE 10 MG/HR: 10 INJECTION, SOLUTION INTRAMUSCULAR; INTRAVENOUS at 16:44

## 2022-01-01 RX ADMIN — SPIRONOLACTONE 25 MG: 25 TABLET ORAL at 09:28

## 2022-01-01 RX ADMIN — SODIUM CHLORIDE, PRESERVATIVE FREE 10 ML: 5 INJECTION INTRAVENOUS at 20:48

## 2022-01-01 RX ADMIN — SODIUM CHLORIDE, PRESERVATIVE FREE 10 ML: 5 INJECTION INTRAVENOUS at 21:33

## 2022-01-01 RX ADMIN — INSULIN LISPRO 8 UNITS: 100 INJECTION, SOLUTION INTRAVENOUS; SUBCUTANEOUS at 08:59

## 2022-01-01 RX ADMIN — ALLOPURINOL 100 MG: 100 TABLET ORAL at 17:30

## 2022-01-01 RX ADMIN — METOPROLOL SUCCINATE 25 MG: 25 TABLET, EXTENDED RELEASE ORAL at 08:41

## 2022-01-01 RX ADMIN — SACUBITRIL AND VALSARTAN 1 TABLET: 24; 26 TABLET, FILM COATED ORAL at 08:07

## 2022-01-01 RX ADMIN — MIDODRINE HYDROCHLORIDE 5 MG: 5 TABLET ORAL at 08:22

## 2022-01-01 RX ADMIN — SACUBITRIL AND VALSARTAN 1 TABLET: 24; 26 TABLET, FILM COATED ORAL at 20:24

## 2022-01-01 RX ADMIN — PANTOPRAZOLE SODIUM 40 MG: 40 TABLET, DELAYED RELEASE ORAL at 05:53

## 2022-01-01 RX ADMIN — LEVOTHYROXINE SODIUM 50 MCG: 50 TABLET ORAL at 05:53

## 2022-01-01 RX ADMIN — ALLOPURINOL 100 MG: 100 TABLET ORAL at 09:01

## 2022-01-01 RX ADMIN — SPIRONOLACTONE 25 MG: 25 TABLET ORAL at 08:29

## 2022-01-01 RX ADMIN — SODIUM CHLORIDE, PRESERVATIVE FREE 10 ML: 5 INJECTION INTRAVENOUS at 08:56

## 2022-01-01 RX ADMIN — INSULIN LISPRO 1 UNITS: 100 INJECTION, SOLUTION INTRAVENOUS; SUBCUTANEOUS at 17:34

## 2022-01-01 RX ADMIN — APIXABAN 2.5 MG: 5 TABLET, FILM COATED ORAL at 08:12

## 2022-01-01 RX ADMIN — SODIUM CHLORIDE, PRESERVATIVE FREE 10 ML: 5 INJECTION INTRAVENOUS at 09:42

## 2022-01-01 RX ADMIN — ATORVASTATIN CALCIUM 80 MG: 80 TABLET, FILM COATED ORAL at 09:48

## 2022-01-01 RX ADMIN — SODIUM CHLORIDE, PRESERVATIVE FREE 10 ML: 5 INJECTION INTRAVENOUS at 20:47

## 2022-01-01 RX ADMIN — DEXTROSE MONOHYDRATE 100 ML/HR: 50 INJECTION, SOLUTION INTRAVENOUS at 10:31

## 2022-01-01 RX ADMIN — PANTOPRAZOLE SODIUM 40 MG: 40 TABLET, DELAYED RELEASE ORAL at 17:49

## 2022-01-01 RX ADMIN — MIDODRINE HYDROCHLORIDE 5 MG: 5 TABLET ORAL at 17:26

## 2022-01-01 RX ADMIN — LEVOTHYROXINE SODIUM 25 MCG: 25 TABLET ORAL at 06:21

## 2022-01-01 RX ADMIN — SODIUM CHLORIDE, PRESERVATIVE FREE 10 ML: 5 INJECTION INTRAVENOUS at 19:36

## 2022-01-01 RX ADMIN — METOLAZONE 10 MG: 5 TABLET ORAL at 21:14

## 2022-01-01 RX ADMIN — VANCOMYCIN HYDROCHLORIDE 500 MG: 500 INJECTION, POWDER, LYOPHILIZED, FOR SOLUTION INTRAVENOUS at 23:11

## 2022-01-01 RX ADMIN — METOLAZONE 10 MG: 5 TABLET ORAL at 20:40

## 2022-01-01 RX ADMIN — MIDODRINE HYDROCHLORIDE 5 MG: 5 TABLET ORAL at 08:29

## 2022-01-01 RX ADMIN — IRON SUCROSE 200 MG: 20 INJECTION, SOLUTION INTRAVENOUS at 09:02

## 2022-01-01 RX ADMIN — ALLOPURINOL 100 MG: 100 TABLET ORAL at 09:26

## 2022-01-01 RX ADMIN — INSULIN LISPRO 2 UNITS: 100 INJECTION, SOLUTION INTRAVENOUS; SUBCUTANEOUS at 08:31

## 2022-01-01 RX ADMIN — PANTOPRAZOLE SODIUM 40 MG: 40 TABLET, DELAYED RELEASE ORAL at 06:29

## 2022-01-01 RX ADMIN — AMIODARONE HYDROCHLORIDE 200 MG: 200 TABLET ORAL at 20:34

## 2022-01-01 RX ADMIN — MIDODRINE HYDROCHLORIDE 5 MG: 10 TABLET ORAL at 08:09

## 2022-01-01 RX ADMIN — MILRINONE LACTATE IN DEXTROSE 0.25 MCG/KG/MIN: 200 INJECTION, SOLUTION INTRAVENOUS at 00:54

## 2022-01-01 RX ADMIN — METOPROLOL SUCCINATE 12.5 MG: 25 TABLET, EXTENDED RELEASE ORAL at 12:39

## 2022-01-01 RX ADMIN — Medication: at 19:59

## 2022-01-01 RX ADMIN — SODIUM CHLORIDE, PRESERVATIVE FREE 10 ML: 5 INJECTION INTRAVENOUS at 23:47

## 2022-01-01 RX ADMIN — APIXABAN 10 MG: 5 TABLET, FILM COATED ORAL at 08:58

## 2022-01-01 RX ADMIN — FUROSEMIDE 60 MG: 10 INJECTION, SOLUTION INTRAMUSCULAR; INTRAVENOUS at 08:21

## 2022-01-01 RX ADMIN — HYDROCORTISONE SODIUM SUCCINATE 100 MG: 100 INJECTION, POWDER, FOR SOLUTION INTRAMUSCULAR; INTRAVENOUS at 13:35

## 2022-01-01 RX ADMIN — EPOETIN ALFA-EPBX 20000 UNITS: 20000 INJECTION, SOLUTION INTRAVENOUS; SUBCUTANEOUS at 11:20

## 2022-01-01 RX ADMIN — ALLOPURINOL 100 MG: 100 TABLET ORAL at 09:06

## 2022-01-01 RX ADMIN — MIDODRINE HYDROCHLORIDE 10 MG: 10 TABLET ORAL at 11:48

## 2022-01-01 RX ADMIN — MIDODRINE HYDROCHLORIDE 5 MG: 5 TABLET ORAL at 11:54

## 2022-01-01 RX ADMIN — PANTOPRAZOLE SODIUM 40 MG: 40 TABLET, DELAYED RELEASE ORAL at 06:52

## 2022-01-01 RX ADMIN — RIVAROXABAN 15 MG: 15 TABLET, FILM COATED ORAL at 17:58

## 2022-01-01 RX ADMIN — SPIRONOLACTONE 25 MG: 25 TABLET ORAL at 09:46

## 2022-01-01 RX ADMIN — MIDODRINE HYDROCHLORIDE 10 MG: 5 TABLET ORAL at 08:40

## 2022-01-01 RX ADMIN — METOPROLOL SUCCINATE 12.5 MG: 25 TABLET, EXTENDED RELEASE ORAL at 08:47

## 2022-01-01 RX ADMIN — ATORVASTATIN CALCIUM 80 MG: 80 TABLET, FILM COATED ORAL at 14:09

## 2022-01-01 RX ADMIN — ALLOPURINOL 100 MG: 100 TABLET ORAL at 08:07

## 2022-01-01 RX ADMIN — HEPARIN SODIUM 5420 UNITS: 1000 INJECTION INTRAVENOUS; SUBCUTANEOUS at 16:58

## 2022-01-01 RX ADMIN — AMIODARONE HYDROCHLORIDE 200 MG: 200 TABLET ORAL at 08:54

## 2022-01-01 RX ADMIN — MIDODRINE HYDROCHLORIDE 5 MG: 5 TABLET ORAL at 09:46

## 2022-01-01 RX ADMIN — ATORVASTATIN CALCIUM 80 MG: 80 TABLET, FILM COATED ORAL at 09:23

## 2022-01-01 RX ADMIN — ALLOPURINOL 100 MG: 100 TABLET ORAL at 08:36

## 2022-01-01 RX ADMIN — SODIUM CHLORIDE, PRESERVATIVE FREE 10 ML: 5 INJECTION INTRAVENOUS at 21:05

## 2022-01-01 RX ADMIN — ATORVASTATIN CALCIUM 80 MG: 80 TABLET, FILM COATED ORAL at 21:20

## 2022-01-01 RX ADMIN — SODIUM CHLORIDE, PRESERVATIVE FREE 10 ML: 5 INJECTION INTRAVENOUS at 09:45

## 2022-01-01 RX ADMIN — RIVAROXABAN 15 MG: 15 TABLET, FILM COATED ORAL at 17:01

## 2022-01-01 RX ADMIN — SPIRONOLACTONE 25 MG: 25 TABLET ORAL at 08:37

## 2022-01-01 RX ADMIN — SACUBITRIL AND VALSARTAN 1 TABLET: 24; 26 TABLET, FILM COATED ORAL at 10:31

## 2022-01-01 RX ADMIN — METOPROLOL SUCCINATE 12.5 MG: 25 TABLET, EXTENDED RELEASE ORAL at 14:13

## 2022-01-01 RX ADMIN — ALLOPURINOL 100 MG: 100 TABLET ORAL at 08:53

## 2022-01-01 RX ADMIN — ALLOPURINOL 100 MG: 100 TABLET ORAL at 12:08

## 2022-01-01 RX ADMIN — ATORVASTATIN CALCIUM 80 MG: 80 TABLET, FILM COATED ORAL at 21:22

## 2022-01-01 RX ADMIN — ALLOPURINOL 100 MG: 100 TABLET ORAL at 08:15

## 2022-01-01 RX ADMIN — MILRINONE LACTATE IN DEXTROSE 0.25 MCG/KG/MIN: 200 INJECTION, SOLUTION INTRAVENOUS at 17:35

## 2022-01-01 RX ADMIN — ISOSORBIDE MONONITRATE 30 MG: 30 TABLET, EXTENDED RELEASE ORAL at 08:07

## 2022-01-01 RX ADMIN — ACETAMINOPHEN 650 MG: 325 TABLET ORAL at 01:25

## 2022-01-01 RX ADMIN — MEROPENEM 1000 MG: 1 INJECTION, POWDER, FOR SOLUTION INTRAVENOUS at 14:34

## 2022-01-01 RX ADMIN — APIXABAN 2.5 MG: 2.5 TABLET, FILM COATED ORAL at 08:40

## 2022-01-01 RX ADMIN — MIDODRINE HYDROCHLORIDE 5 MG: 5 TABLET ORAL at 12:17

## 2022-01-01 RX ADMIN — LEVOTHYROXINE SODIUM 75 MCG: 0.07 TABLET ORAL at 18:18

## 2022-01-01 RX ADMIN — LEVOTHYROXINE SODIUM 50 MCG: 50 TABLET ORAL at 06:27

## 2022-01-01 RX ADMIN — AMIODARONE HYDROCHLORIDE 200 MG: 200 TABLET ORAL at 13:24

## 2022-01-01 RX ADMIN — LACTULOSE 20 G: 20 SOLUTION ORAL at 15:45

## 2022-01-01 RX ADMIN — SODIUM CHLORIDE, PRESERVATIVE FREE 10 ML: 5 INJECTION INTRAVENOUS at 20:00

## 2022-01-01 RX ADMIN — FENTANYL CITRATE 25 MCG: 50 INJECTION INTRAMUSCULAR; INTRAVENOUS at 14:50

## 2022-01-01 RX ADMIN — INSULIN LISPRO 2 UNITS: 100 INJECTION, SOLUTION INTRAVENOUS; SUBCUTANEOUS at 12:57

## 2022-01-01 RX ADMIN — INSULIN LISPRO 1 UNITS: 100 INJECTION, SOLUTION INTRAVENOUS; SUBCUTANEOUS at 21:10

## 2022-01-01 RX ADMIN — ALLOPURINOL 100 MG: 100 TABLET ORAL at 08:31

## 2022-01-01 RX ADMIN — AMIODARONE HYDROCHLORIDE 200 MG: 200 TABLET ORAL at 09:33

## 2022-01-01 RX ADMIN — METOLAZONE 10 MG: 5 TABLET ORAL at 21:34

## 2022-01-01 RX ADMIN — DEXTROSE MONOHYDRATE 250 ML: 100 INJECTION, SOLUTION INTRAVENOUS at 04:41

## 2022-01-01 RX ADMIN — LEVOTHYROXINE SODIUM 50 MCG: 50 TABLET ORAL at 05:48

## 2022-01-01 RX ADMIN — INSULIN LISPRO 1 UNITS: 100 INJECTION, SOLUTION INTRAVENOUS; SUBCUTANEOUS at 09:36

## 2022-01-01 RX ADMIN — PANTOPRAZOLE SODIUM 40 MG: 40 TABLET, DELAYED RELEASE ORAL at 07:56

## 2022-01-01 RX ADMIN — SPIRONOLACTONE 25 MG: 25 TABLET ORAL at 08:15

## 2022-01-01 RX ADMIN — INSULIN LISPRO 4 UNITS: 100 INJECTION, SOLUTION INTRAVENOUS; SUBCUTANEOUS at 12:23

## 2022-01-01 RX ADMIN — METOPROLOL SUCCINATE 12.5 MG: 25 TABLET, EXTENDED RELEASE ORAL at 10:50

## 2022-01-01 RX ADMIN — MEROPENEM 500 MG: 500 INJECTION, POWDER, FOR SOLUTION INTRAVENOUS at 22:53

## 2022-01-01 RX ADMIN — MEROPENEM 500 MG: 500 INJECTION, POWDER, FOR SOLUTION INTRAVENOUS at 01:04

## 2022-01-01 RX ADMIN — SPIRONOLACTONE 25 MG: 25 TABLET ORAL at 09:16

## 2022-01-01 RX ADMIN — CLOPIDOGREL BISULFATE 75 MG: 75 TABLET ORAL at 16:24

## 2022-01-01 RX ADMIN — ATORVASTATIN CALCIUM 80 MG: 80 TABLET, FILM COATED ORAL at 09:04

## 2022-01-01 RX ADMIN — MIDODRINE HYDROCHLORIDE 10 MG: 10 TABLET ORAL at 14:58

## 2022-01-01 RX ADMIN — ALLOPURINOL 100 MG: 100 TABLET ORAL at 12:01

## 2022-01-01 RX ADMIN — MIDAZOLAM 0.5 MG: 1 INJECTION INTRAMUSCULAR; INTRAVENOUS at 14:50

## 2022-01-01 RX ADMIN — AMIODARONE HYDROCHLORIDE 200 MG: 200 TABLET ORAL at 09:26

## 2022-01-01 RX ADMIN — FUROSEMIDE 7.5 MG/HR: 10 INJECTION, SOLUTION INTRAMUSCULAR; INTRAVENOUS at 07:47

## 2022-01-01 RX ADMIN — MIDODRINE HYDROCHLORIDE 10 MG: 10 TABLET ORAL at 16:54

## 2022-01-01 RX ADMIN — APIXABAN 10 MG: 5 TABLET, FILM COATED ORAL at 12:49

## 2022-01-01 RX ADMIN — METOLAZONE 2.5 MG: 5 TABLET ORAL at 12:41

## 2022-01-01 RX ADMIN — SODIUM CHLORIDE, PRESERVATIVE FREE 10 ML: 5 INJECTION INTRAVENOUS at 20:58

## 2022-01-01 RX ADMIN — METOPROLOL SUCCINATE 25 MG: 25 TABLET, EXTENDED RELEASE ORAL at 14:43

## 2022-01-01 RX ADMIN — EPOETIN ALFA-EPBX 10000 UNITS: 10000 INJECTION, SOLUTION INTRAVENOUS; SUBCUTANEOUS at 14:58

## 2022-01-01 RX ADMIN — RIVAROXABAN 15 MG: 15 TABLET, FILM COATED ORAL at 17:19

## 2022-01-01 RX ADMIN — FUROSEMIDE 60 MG: 10 INJECTION, SOLUTION INTRAMUSCULAR; INTRAVENOUS at 08:55

## 2022-01-01 RX ADMIN — LACTULOSE 30 G: 20 SOLUTION ORAL at 18:52

## 2022-01-01 RX ADMIN — POTASSIUM CHLORIDE 20 MEQ: 750 TABLET, FILM COATED, EXTENDED RELEASE ORAL at 08:37

## 2022-01-01 RX ADMIN — PANTOPRAZOLE SODIUM 40 MG: 40 TABLET, DELAYED RELEASE ORAL at 18:52

## 2022-01-01 RX ADMIN — ATORVASTATIN CALCIUM 80 MG: 80 TABLET, FILM COATED ORAL at 12:31

## 2022-01-01 RX ADMIN — HEPARIN SODIUM 1220 UNITS/HR: 10000 INJECTION, SOLUTION INTRAVENOUS at 17:01

## 2022-01-01 RX ADMIN — MEROPENEM 1000 MG: 1 INJECTION, POWDER, FOR SOLUTION INTRAVENOUS at 02:59

## 2022-01-01 RX ADMIN — METOPROLOL SUCCINATE 12.5 MG: 25 TABLET, EXTENDED RELEASE ORAL at 09:42

## 2022-01-01 RX ADMIN — MIDODRINE HYDROCHLORIDE 5 MG: 10 TABLET ORAL at 12:02

## 2022-01-01 RX ADMIN — MIDODRINE HYDROCHLORIDE 10 MG: 10 TABLET ORAL at 08:10

## 2022-01-01 RX ADMIN — LEVOTHYROXINE SODIUM 50 MCG: 50 TABLET ORAL at 05:16

## 2022-01-01 RX ADMIN — MIDODRINE HYDROCHLORIDE 5 MG: 5 TABLET ORAL at 08:46

## 2022-01-01 RX ADMIN — LACTULOSE 30 G: 20 SOLUTION ORAL at 11:41

## 2022-01-01 RX ADMIN — MIDODRINE HYDROCHLORIDE 5 MG: 5 TABLET ORAL at 16:24

## 2022-01-01 RX ADMIN — SODIUM CHLORIDE 1000 ML: 9 INJECTION, SOLUTION INTRAVENOUS at 13:29

## 2022-01-01 RX ADMIN — INSULIN LISPRO 2 UNITS: 100 INJECTION, SOLUTION INTRAVENOUS; SUBCUTANEOUS at 21:23

## 2022-01-01 RX ADMIN — METOPROLOL SUCCINATE 12.5 MG: 25 TABLET, EXTENDED RELEASE ORAL at 08:23

## 2022-01-01 RX ADMIN — NOREPINEPHRINE BITARTRATE 85 MCG/MIN: 1 SOLUTION INTRAVENOUS at 18:16

## 2022-01-01 RX ADMIN — LEVOTHYROXINE SODIUM 50 MCG: 0.05 TABLET ORAL at 06:37

## 2022-01-01 RX ADMIN — METOPROLOL SUCCINATE 25 MG: 25 TABLET, EXTENDED RELEASE ORAL at 09:26

## 2022-01-01 RX ADMIN — FUROSEMIDE 5 MG/HR: 10 INJECTION, SOLUTION INTRAMUSCULAR; INTRAVENOUS at 10:16

## 2022-01-01 RX ADMIN — ATORVASTATIN CALCIUM 80 MG: 80 TABLET, FILM COATED ORAL at 08:59

## 2022-01-01 RX ADMIN — DEXTROSE MONOHYDRATE 125 ML: 100 INJECTION, SOLUTION INTRAVENOUS at 07:52

## 2022-01-01 RX ADMIN — AMIODARONE HYDROCHLORIDE 200 MG: 200 TABLET ORAL at 12:04

## 2022-01-01 RX ADMIN — MIDODRINE HYDROCHLORIDE 10 MG: 10 TABLET ORAL at 10:29

## 2022-01-01 RX ADMIN — LACTULOSE 30 G: 20 SOLUTION ORAL at 10:29

## 2022-01-01 RX ADMIN — INSULIN LISPRO 4 UNITS: 100 INJECTION, SOLUTION INTRAVENOUS; SUBCUTANEOUS at 18:03

## 2022-01-01 RX ADMIN — PANTOPRAZOLE SODIUM 40 MG: 40 TABLET, DELAYED RELEASE ORAL at 06:22

## 2022-01-01 RX ADMIN — METOPROLOL SUCCINATE 12.5 MG: 25 TABLET, EXTENDED RELEASE ORAL at 08:29

## 2022-01-01 RX ADMIN — METOPROLOL SUCCINATE 25 MG: 25 TABLET, EXTENDED RELEASE ORAL at 08:15

## 2022-01-01 RX ADMIN — MIDODRINE HYDROCHLORIDE 5 MG: 5 TABLET ORAL at 10:22

## 2022-01-01 RX ADMIN — INSULIN LISPRO 1 UNITS: 100 INJECTION, SOLUTION INTRAVENOUS; SUBCUTANEOUS at 21:33

## 2022-01-01 RX ADMIN — FUROSEMIDE 7.5 MG/HR: 10 INJECTION, SOLUTION INTRAMUSCULAR; INTRAVENOUS at 21:22

## 2022-01-01 RX ADMIN — INSULIN LISPRO 2 UNITS: 100 INJECTION, SOLUTION INTRAVENOUS; SUBCUTANEOUS at 12:28

## 2022-01-01 RX ADMIN — METOPROLOL SUCCINATE 12.5 MG: 25 TABLET, EXTENDED RELEASE ORAL at 08:17

## 2022-01-01 RX ADMIN — ISOSORBIDE MONONITRATE 30 MG: 30 TABLET, EXTENDED RELEASE ORAL at 08:16

## 2022-01-01 RX ADMIN — LACTULOSE 30 G: 20 SOLUTION ORAL at 09:32

## 2022-01-01 RX ADMIN — LEVOTHYROXINE SODIUM 25 MCG: 25 TABLET ORAL at 05:11

## 2022-01-01 RX ADMIN — DEXTROSE MONOHYDRATE: 100 INJECTION, SOLUTION INTRAVENOUS at 06:28

## 2022-01-01 RX ADMIN — INSULIN LISPRO 1 UNITS: 100 INJECTION, SOLUTION INTRAVENOUS; SUBCUTANEOUS at 18:29

## 2022-01-01 RX ADMIN — RIVAROXABAN 15 MG: 15 TABLET, FILM COATED ORAL at 17:24

## 2022-01-01 RX ADMIN — ALLOPURINOL 100 MG: 100 TABLET ORAL at 11:28

## 2022-01-01 ASSESSMENT — PAIN SCALES - GENERAL
PAINLEVEL_OUTOF10: 0
PAINLEVEL_OUTOF10: 4
PAINLEVEL_OUTOF10: 0
PAINLEVEL_OUTOF10: 7
PAINLEVEL_OUTOF10: 0
PAINLEVEL_OUTOF10: 1
PAINLEVEL_OUTOF10: 0
PAINLEVEL_OUTOF10: 3
PAINLEVEL_OUTOF10: 0

## 2022-01-01 ASSESSMENT — ENCOUNTER SYMPTOMS
CHOKING: 0
VOMITING: 0
DIARRHEA: 0
COUGH: 0
RECTAL PAIN: 0
CONSTIPATION: 0
SHORTNESS OF BREATH: 1
EYE DISCHARGE: 0
STRIDOR: 0
SHORTNESS OF BREATH: 0
SHORTNESS OF BREATH: 0
RESPIRATORY NEGATIVE: 1
EYE REDNESS: 0
GASTROINTESTINAL NEGATIVE: 1
EYE ITCHING: 0
EYE PAIN: 0
BACK PAIN: 0
EYES NEGATIVE: 1
EYES NEGATIVE: 1
SORE THROAT: 0
BACK PAIN: 0
EYES NEGATIVE: 1
APNEA: 0
VOMITING: 0
PHOTOPHOBIA: 0
NAUSEA: 0
VOMITING: 0
ABDOMINAL PAIN: 0
DIARRHEA: 0
SHORTNESS OF BREATH: 1
COUGH: 0
COLOR CHANGE: 0
VOMITING: 0
CHEST TIGHTNESS: 0
COUGH: 0
ABDOMINAL PAIN: 0
CHEST TIGHTNESS: 0
BLOOD IN STOOL: 0
DIARRHEA: 0
NAUSEA: 0
DIARRHEA: 1
ABDOMINAL PAIN: 1
SHORTNESS OF BREATH: 1
COLOR CHANGE: 0
RESPIRATORY NEGATIVE: 1
WHEEZING: 0
ANAL BLEEDING: 0
DIARRHEA: 0
ABDOMINAL DISTENTION: 0
CHEST TIGHTNESS: 0
NAUSEA: 0
NAUSEA: 0
RHINORRHEA: 0
COUGH: 0
GASTROINTESTINAL NEGATIVE: 1
VOMITING: 0
NAUSEA: 0
GASTROINTESTINAL NEGATIVE: 1
ABDOMINAL PAIN: 0
CONSTIPATION: 0
VOMITING: 0
COUGH: 0
WHEEZING: 0
RESPIRATORY NEGATIVE: 1

## 2022-01-01 ASSESSMENT — LIFESTYLE VARIABLES
HOW OFTEN DO YOU HAVE A DRINK CONTAINING ALCOHOL: 4 OR MORE TIMES A WEEK
HOW OFTEN DO YOU HAVE A DRINK CONTAINING ALCOHOL: NEVER
HOW OFTEN DO YOU HAVE A DRINK CONTAINING ALCOHOL: NEVER
HOW MANY STANDARD DRINKS CONTAINING ALCOHOL DO YOU HAVE ON A TYPICAL DAY: 1 OR 2

## 2022-01-01 ASSESSMENT — PATIENT HEALTH QUESTIONNAIRE - PHQ9
SUM OF ALL RESPONSES TO PHQ QUESTIONS 1-9: 0
SUM OF ALL RESPONSES TO PHQ QUESTIONS 1-9: 1
SUM OF ALL RESPONSES TO PHQ QUESTIONS 1-9: 1
2. FEELING DOWN, DEPRESSED OR HOPELESS: 0
SUM OF ALL RESPONSES TO PHQ QUESTIONS 1-9: 1
SUM OF ALL RESPONSES TO PHQ QUESTIONS 1-9: 0
SUM OF ALL RESPONSES TO PHQ QUESTIONS 1-9: 0
2. FEELING DOWN, DEPRESSED OR HOPELESS: 0
SUM OF ALL RESPONSES TO PHQ QUESTIONS 1-9: 1
SUM OF ALL RESPONSES TO PHQ9 QUESTIONS 1 & 2: 0
1. LITTLE INTEREST OR PLEASURE IN DOING THINGS: 0
SUM OF ALL RESPONSES TO PHQ9 QUESTIONS 1 & 2: 1
1. LITTLE INTEREST OR PLEASURE IN DOING THINGS: 1
SUM OF ALL RESPONSES TO PHQ QUESTIONS 1-9: 0

## 2022-01-01 ASSESSMENT — PAIN DESCRIPTION - ORIENTATION: ORIENTATION: LEFT

## 2022-01-01 ASSESSMENT — PAIN DESCRIPTION - LOCATION
LOCATION: WRIST
LOCATION: HEAD

## 2022-01-01 ASSESSMENT — PAIN SCALES - WONG BAKER
WONGBAKER_NUMERICALRESPONSE: 0

## 2022-01-01 ASSESSMENT — VISUAL ACUITY
OU: 20/70
OS: 20/70
OD: 20/70

## 2022-01-01 ASSESSMENT — PAIN - FUNCTIONAL ASSESSMENT
PAIN_FUNCTIONAL_ASSESSMENT: NONE - DENIES PAIN
PAIN_FUNCTIONAL_ASSESSMENT: NONE - DENIES PAIN
PAIN_FUNCTIONAL_ASSESSMENT: ACTIVITIES ARE NOT PREVENTED
PAIN_FUNCTIONAL_ASSESSMENT: ACTIVITIES ARE NOT PREVENTED
PAIN_FUNCTIONAL_ASSESSMENT: 0-10
PAIN_FUNCTIONAL_ASSESSMENT: NONE - DENIES PAIN

## 2022-01-01 ASSESSMENT — PAIN DESCRIPTION - DESCRIPTORS
DESCRIPTORS: DISCOMFORT
DESCRIPTORS: ACHING

## 2022-01-01 ASSESSMENT — PAIN DESCRIPTION - ONSET: ONSET: ON-GOING

## 2022-01-01 ASSESSMENT — PAIN DESCRIPTION - FREQUENCY: FREQUENCY: INTERMITTENT

## 2022-01-01 ASSESSMENT — PAIN DESCRIPTION - PAIN TYPE: TYPE: ACUTE PAIN

## 2022-01-20 NOTE — PROGRESS NOTES
Mckenzie Powers (:  1955) is a 61 y.o. male,New patient, here for evaluation of the following chief complaint(s):  New Patient      SUBJECTIVE:  Patient is new to the practice. Pt was admitted to the hospital on  until 21. Did not follow up with any specialist visits yet  Pt states he is having some swelling with his feet and and his face at some time. Has not seen cardiologist - -will send referral  Has not seen nephrology - will send referral     Wants to get set up with home health visits    Wrote out all medications and the timing that he should be taking them. Last eye surgery took place a few years ago -- goes to [facility] -- had multiple surgeries -- cataract removal, as well as others. Now he feels like his vision is more cross-eyed, when he is looking at the TV. When he looks out of his left eye, the vision is more stable. But when he looks out of his right eye, the vision is not clear. Admitted to the hospital from  - 21    S/p right heart cath-consistent with stage IV heart failure, dc Lasix, plan for DC milrinone today, home Coumadin  EF<20% on echo, severe diffuse hypokinesis, Diastolic filling parameters suggest grade II diastolic dysfunction    Note from cardio:  1. Acute on Chronic systolic CHF, LXXVL 2   Cedric and I had an extensive discussion about his noncompliance issues. I did tell him that hospice is an option if he does not want to comply with medical therapy. He states that he does want to keep follow-up appointments and be aggressive with his medical treatment.     Continue IV Lasix for now with compression stockings. He does have some worsening of his renal function. If this persists he may need inotropic therapy in conjunction with the IV Lasix for volume removal.  I would hold his Entresto therapy for now given his worsening renal function. We can continue beta-blocker therapy.   2. CAD of native coronary arteries without angina  Continue statin and beta-blocker therapy for this. He has no symptoms suggestive of angina and no ischemic changes on his ECG. Okay to stop clopidogrel. 3. Hyperlipidemia with LDL goal <70 mg/dL    Continue statin therapy for his coronary artery disease.   4. ICD in place   5. LV thrombus   Continue Coumadin therapy for this. His INR is supratherapeutic. 6. Chronic Kidney Disease, stage 3  He has some acute worsening of his chronic kidney disease. This may be due in part to diuretic therapy. I think if IV diuretics continue to worsen his renal function then he will need inotropic therapy in conjunction with this for adequate volume removal.    I have reviewed the chart notes available from myself and other providers. I have reviewed and addressed all active problems and created or updated the problems list in detail, as needed    I have extensively reviewed and reconciled the medication list, discontinued medications not taking or no longer appropriate, and updated the active meds list    OBJECTIVE:  Review of Systems   Constitutional: Positive for unexpected weight change (water weight). Negative for chills and fever. Respiratory: Negative for cough and shortness of breath. Cardiovascular: Negative for leg swelling. Gastrointestinal: Negative for constipation, diarrhea, nausea and vomiting. Endocrine: Negative for polyuria. Genitourinary: Positive for decreased urine volume and frequency. Skin: Negative for rash. Vitals:    01/20/22 1418   BP: 113/82   Pulse: 73   SpO2: 99%   Weight: 180 lb (81.6 kg)   Height: 5' 6.5\" (1.689 m)      Body mass index is 28.62 kg/m². Physical Exam  Constitutional:       Appearance: Normal appearance. Cardiovascular:      Rate and Rhythm: Normal rate and regular rhythm. Pulses: Normal pulses. Heart sounds: Normal heart sounds. Murmur: ICD. Pulmonary:      Effort: Pulmonary effort is normal.      Breath sounds: Normal breath sounds. Musculoskeletal:      Right lower leg: Edema present. Left lower leg: Edema present. Neurological:      General: No focal deficit present. Mental Status: He is alert. Mental status is at baseline. Lab Results   Component Value Date    LABA1C 7.7 11/27/2021     Lab Results   Component Value Date    WBC 5.7 11/30/2021    HGB 9.8 (L) 11/30/2021    HCT 30.4 (L) 11/30/2021    .4 (H) 11/30/2021     11/30/2021      Lab Results   Component Value Date     11/30/2021    K 3.7 11/30/2021    K 3.4 11/22/2021    CL 93 11/30/2021    CO2 29 11/30/2021     11/30/2021    CREATININE 2.0 11/30/2021    GLUCOSE 82 11/30/2021    CALCIUM 8.5 11/30/2021       Lab Results   Component Value Date    CHOL 146 02/12/2019    CHOL 168 02/07/2018    CHOL 183 01/05/2017     Lab Results   Component Value Date    TRIG 109 02/12/2019    TRIG 63 02/07/2018    TRIG 108 01/05/2017     Lab Results   Component Value Date    HDL 73 (H) 02/12/2019    HDL 90 (H) 02/07/2018    HDL 93 (H) 01/05/2017     Lab Results   Component Value Date    LDLCALC 51 02/12/2019    LDLCALC 65 02/07/2018    LDLCALC 68 01/05/2017     Lab Results   Component Value Date    LABVLDL 22 02/12/2019    LABVLDL 13 02/07/2018    LABVLDL 22 01/05/2017     No results found for: CHOLHDLRATIO     The 10-year ASCVD risk score (Brenda vAina, et al., 2013) is: 18.5%    Values used to calculate the score:      Age: 61 years      Sex: Male      Is Non- : Yes      Diabetic: Yes      Tobacco smoker: No      Systolic Blood Pressure: 524 mmHg      Is BP treated: Yes      HDL Cholesterol: 73 mg/dL      Total Cholesterol: 146 mg/dL     Patient received counseling and, if relevant, printed instructions for all symptoms listed in CC and HPI, as well as for all diagnoses brought onto today's visit note below.  Typical counseling includes, but is not limited to, non-pharmacologic measures to manage listed symptoms and conditions; appropriate use, risks and benefits for all prescribed medications; potential interactions between medications both prescribed and OTC; diet; exercise; healthy behaviors; and goalsetting to improve health. Patient or responsible party was involved in shared decision making and had opportunity to have all questions answered. Except as noted below, all chronic problems have been reviewed and are stable to continue medications or other therapy as previously documented in the patient's chart, with changes per orders or documentation below:    1. Stage 3a chronic kidney disease ([facility]  -     Liza Melara MD, Nephrology, [facility]  2. Acute on chronic combined systolic and diastolic HF (heart failure) (HCC)  -     Jose Cooney MD, Cardiology, [facility]  3. Coronary artery disease of native artery of native heart with stable angina pectoris [facility])  -     Jose Cooney MD, Cardiology, [facility]  4. Essential hypertension, benign  5. Diabetic nephropathy associated with type 2 diabetes mellitus ([facility]  -     Lipid Panel; Future  -     AFL - Purnima Villarreal DPM, Podiatry, [place]  6. Biventricular heart failure (Alek Thomason MD, Cardiology, [facility]  7. Alteration in vision  -     AFL - Virgil Gee MD, Ophthalmology, [facility]  8. Eye discharge  -     Mickie Palafox MD, Ophthalmology, [facility]  9. Mixed hyperlipidemia  10. Uncontrolled type 2 diabetes mellitus with hyperglycemia ([facility])  -     Lipid Panel; Future  -     Microalbumin / Creatinine Urine Ratio; Future  -     AFL - Purnima Villarreal DPM, Podiatry, [place]  11.  ICD (implantable cardioverter-defibrillator) in place          Problem List        Unprioritized    Essential hypertension, benign    Hyperlipidemia    Relevant Medications    atorvastatin (LIPITOR) 80 MG tablet    isosorbide mononitrate (IMDUR) 30 MG extended release tablet sacubitril-valsartan (ENTRESTO) 49-51 MG per tablet    nitroGLYCERIN (NITROSTAT) 0.4 MG SL tablet    torsemide (DEMADEX) 20 MG tablet    metoprolol succinate (TOPROL XL) 25 MG extended release tablet    rivaroxaban (XARELTO) 15 MG TABS tablet    Diabetic nephropathy (HCC)    Relevant Medications    Dulaglutide (TRULICITY) 8.59 QR/0.4MU SOPN    Other Relevant Orders    Lipid Panel    Stefanie Madison, DPM, Podiatry, [place]    Diabetes type 2, uncontrolled ([facility]    Relevant Medications    Dulaglutide (TRULICITY) 0.99 DV/2.0ND SOPN    Other Relevant Orders    Lipid Panel    Microalbumin / Creatinine Urine Ratio    Stefanie Madison, DPM, Podiatry, [place]    Coronary artery disease involving native coronary artery    Relevant Medications    atorvastatin (LIPITOR) 80 MG tablet    isosorbide mononitrate (IMDUR) 30 MG extended release tablet    sacubitril-valsartan (ENTRESTO) 49-51 MG per tablet    nitroGLYCERIN (NITROSTAT) 0.4 MG SL tablet    torsemide (DEMADEX) 20 MG tablet    metoprolol succinate (TOPROL XL) 25 MG extended release tablet    rivaroxaban (XARELTO) 15 MG TABS tablet    Other Relevant Orders    Haydee Marina MD, Cardiology, [facility]    Acute on chronic combined systolic and diastolic HF (heart failure) ([facility]    Relevant Orders    Colt Painter MD, Cardiology, [facility]    Stage 3 chronic kidney disease [facility]) - Primary    Relevant Orders    ANNIKA Anthony MD, Nephrology, [facility]    Biventricular heart failure [facility])    Relevant Orders    Colt Painter MD, Cardiology, [facility]        Orders Placed This Encounter   Procedures    Lipid Panel     Standing Status:   Future     Standing Expiration Date:   1/20/2023     Order Specific Question:   Is Patient Fasting?/# of Hours     Answer:   yes -- ok to do even if not fasting per Dr. Hastings Memory / Creatinine Urine Ratio     Standing Status:   Future Standing Expiration Date:   1/20/2023   Uday Alexander MD, Cardiology, [facility]     Referral Priority:   Routine     Referral Type:   Eval and Treat     Referral Reason:   Specialty Services Required     Requested Specialty:   Cardiology     Number of Visits Requested:   1   Rosalva Linares MD, Ophthalmology, [facility]     Referral Priority:   Routine     Referral Type:   Eval and Treat     Referral Reason:   Specialty Services Required     Referred to Provider:   Janel Casillas MD     Requested Specialty:   Ophthalmology     Number of Visits Requested:   1   Miya Shen MD, Nephrology, [facility]     Referral Priority:   Routine     Referral Type:   Eval and Treat     Referral Reason:   Specialty Services Required     Referred to Provider:   Moisés Caballero MD     Requested Specialty:   Nephrology     Number of Visits Requested:   1    ANNIKA Rausch DPM, Podiatry, [place]     Referral Priority:   Routine     Referral Type:   Eval and Treat     Referral Reason:   Specialty Services Required     Referred to Provider:   Srinivas Snyder DPM     Requested Specialty:   Podiatry     Number of Visits Requested:   1       Return in about 4 weeks (around 2/17/2022). [facility] - Internal Medicine and Pediatrics  Dr. Pascual Montgomery D.O.  - Family Medicine and OMT      Usual doctor's hours are:     Monday 7:30 am to 6:00 pm           Tuesday  7:30 am to 5:00 pm                                               Wednesday 7:30 am to 5:00 pm                                               Thursday 7:30 am to 5:00 pm                                               Friday 7:30 am to 4:00 pm           Saturdays, Sundays, and after hours: E-Visits are available    We observe most federal holidays and Good Friday. We ask that you only contact the office one time per issue or question, and please allow one full business day for a call back.  Calling us back multiple times keeps us from being able to complete the work efficiently for you and our other patients. For medication renewals, please call your pharmacist to contact us, and be sure to allow at least 3 business days for processing before you need to  your medication. If you are sick or need an appointment that hasn't been planned, same day appointments are available every day the office is open: Monday, Tuesday, Wednesday, Thursday, and Friday. Call during office hours to schedule, even if it may not be with your regular physician. You may also call the office after 8 am on office days if you need to be seen from an issue the night before. During hours when the office is not normally open, your call will go to the messaging service which cannot provide any service other than paging the doctor. No prescriptions or other nonurgent matters will be handled and no voicemail is available, so please call back during office hours for these matters.        Electronically signed by Pascual Montgomery DO on 1/20/2022 at 3:18 PM.

## 2022-01-21 NOTE — TELEPHONE ENCOUNTER
Called and spoke with patient he confirms that he has swelling of his lower extremities which can make it difficult for him to walk. Reports shortness of breath on exertion but states that it is not new for him. He reports that he has been taking is torsemide 40 mg daily. Tries to follow low sodium diet but expresses difficult at times. I scheduled him for follow up with Dr. Narendra Rodrigez on 2/4/2022 at 10:15 am.  I asked patient if I could speak with his sister Geneva Painting whom is not listed on HIPAA form and he stated that I could speak with her. Spoke with Dr. Narendra Rodrigez. He instructed to have him come into the office next week to see NP Corbin Verma and check labs BNP BMP prior. I called Lola at [phone] no answer left message instructing to call back.

## 2022-01-21 NOTE — TELEPHONE ENCOUNTER
Estefanía Sanchez, nurse family hired to take care of pt, calling states that pt is retaining fluid, went to see pcp, she wanted them to call us, the family hired [place] due to pt not taking his meds correctly or at all sometimes. He was taking his torsemide 20 mg 2 tablets daily instead of BID. She thinks he has gained at least  10 lbs since xmas. She says he is swollen that he can't walk sometimes. Dr. Yee Burleigh didn't have any appts until 2/4/22 and she doesn't want to wait that long.     Advised her that we cannot talk to her due to hippa that we would have to call his son, she said ok that he would then tell her what to do

## 2022-01-26 NOTE — TELEPHONE ENCOUNTER
Called son Kiarra Mcintyre. Relayed messge from [facility]. Patient verbalized and confirmed understanding. He will relay message to patient.

## 2022-02-03 PROBLEM — I50.23 ACUTE ON CHRONIC SYSTOLIC CHF (CONGESTIVE HEART FAILURE) (HCC): Status: ACTIVE | Noted: 2022-01-01

## 2022-02-03 NOTE — H&P
Hospital Medicine History & Physical      PCP: Britton Sanderson DO    Date of Admission: 2/3/2022    Date of Service: Pt seen/examined on 2/3/2022 and Admitted to Inpatient. Chief Complaint:  Shortness of breath      History Of Present Illness: The patient is a 61 y.o. male with hx combined systolic and diastolic CHF (EF < 44% with grade II DD), type 2 DM, HTN, and HLD who presents to [facility] with shortness of breath. Patient states that since Monday, he has had progressively worsening shortness of breath, especially with ambulation. He has also noted worsening swelling in his legs, orthopnea. He is unsure if his weight has changed. Denies chest pain, abdominal pain, nausea, vomiting, constipation, diarrhea, and dysuria. In the ED, labs were significant for a BUN/Cr of 56/2.4, glucose of 154, pro-BNP of 21,659, troponin of 0.08. CXR showed mild pulmonary vascular congestion and stable cardiomegaly. Past Medical History:        Diagnosis Date    Diabetes type 2, uncontrolled ([address] 7/28/2014    Diabetic nephropathy 9/12/2013    ED (erectile dysfunction) 9/12/2013    Essential hypertension, benign 9/12/2013    Hyperlipidemia 9/12/2013    Noncompliance 7/28/2014       Past Surgical History:        Procedure Laterality Date    EYE SURGERY         Medications Prior to Admission:    Prior to Admission medications    Medication Sig Start Date End Date Taking?  Authorizing Provider   torsemide (DEMADEX) 20 MG tablet Take 2 tablets by mouth daily 11/30/21   Guille Melo MD   allopurinol (ZYLOPRIM) 100 MG tablet Take 1 tablet by mouth daily 11/30/21   Guille Melo MD   metoprolol succinate (TOPROL XL) 25 MG extended release tablet Take 0.5 tablets by mouth daily 11/30/21   Guille Melo MD   rivaroxaban (XARELTO) 15 MG TABS tablet Take 1 tablet by mouth Daily with supper 11/30/21   Katarina Romero MD   Multiple Vitamins-Minerals (THERAPEUTIC MULTIVITAMIN-MINERALS) tablet Take 1 tablet by mouth daily    Historical Provider, MD   STIMULANT LAXATIVE 8.6-50 MG per tablet Take 1 tablet by mouth nightly  8/23/21   Historical Provider, MD   nitroGLYCERIN (NITROSTAT) 0.4 MG SL tablet Place 0.4 mg under the tongue every 5 minutes as needed for Chest pain  8/22/21   Historical Provider, MD   levothyroxine (SYNTHROID) 25 MCG tablet Take 25 mcg by mouth Daily  9/4/21   Historical Provider, MD   isosorbide mononitrate (IMDUR) 30 MG extended release tablet Take 30 mg by mouth daily  3/6/19   Historical Provider, MD   sacubitril-valsartan (ENTRESTO) 49-51 MG per tablet Take 1 tablet by mouth 2 times daily  3/22/19   Historical Provider, MD   atorvastatin (LIPITOR) 80 MG tablet Take 80 mg by mouth daily  1/8/19   Historical Provider, MD   Dulaglutide (TRULICITY) 9.62 GD/3.2HP SOPN Lot# [id number] Exp.06/20  Patient taking differently: Inject 0.75 mg into the muscle every 7 days  2/12/19   DAGO Hdz MD       Allergies:  Patient has no known allergies. Social History:  The patient currently lives at home. TOBACCO:   reports that he quit smoking about 41 years ago. He has a 10.50 pack-year smoking history. He has never used smokeless tobacco.  ETOH:   reports current alcohol use of about 1.0 standard drink of alcohol per week. Family History:  Reviewed in detail and negative for DM, Early CAD, Cancer, CVA. Positive as follows:        Problem Relation Age of Onset    Diabetes Mother     High Blood Pressure Mother     Diabetes Sister     Diabetes Brother        REVIEW OF SYSTEMS:   Positive for and as noted in the HPI. All other systems reviewed and negative. PHYSICAL EXAM:    BP (!) 106/91   Pulse 66   Temp 97.4 °F (36.3 °C) (Tympanic)   Resp 19   SpO2 97%     General appearance: No apparent distress appears stated age and cooperative.   HEENT Normal cephalic, atraumatic without obvious deformity. Pupils equal, round, and reactive to light. Extra ocular muscles intact. Conjunctivae/corneas clear. Neck: Supple, No jugular venous distention/bruits. Trachea midline without thyromegaly or adenopathy with full range of motion. Lungs: No increased work of breathing, no accessory muscle use, bibasilar crackles. Heart: Regular rate and rhythm with Normal S1/S2 without murmurs, rubs or gallops, point of maximum impulse non-displaced  Abdomen: Soft, non-tender or non-distended without rigidity or guarding and positive bowel sounds all four quadrants. Extremities: No clubbing, cyanosis, 3+ bilateral pitting edema bilaterally. Full range of motion without deformity and normal gait intact. Skin: Skin color, texture, turgor normal.  No rashes or lesions. Neurologic: Alert and oriented X 3, neurovascularly intact with sensory/motor intact upper extremities/lower extremities, bilaterally. Cranial nerves: II-XII intact, grossly non-focal.  Mental status: Alert, oriented, thought content appropriate. Capillary Refill: Acceptable  < 3 seconds  Peripheral Pulses: +3 Easily felt, not easily obliterated with pressure      CXR:  I have reviewed the CXR with the following interpretation: mild pulmonary vascular congestion  EKG:  I have reviewed the EKG with the following interpretation: NSR with nonspecific ST-T changes    XR CHEST PORTABLE   Final Result   Mild pulmonary vascular congestion. Stable cardiomegaly. CBC   Recent Labs     02/03/22  1225   WBC 3.2*   HGB 11.0*   HCT 34.3*   *      RENAL  Recent Labs     02/03/22  1225      K 3.7      CO2 24   BUN 56*   CREATININE 2.4*     LFT'S  Recent Labs     02/03/22  1225   AST 28   ALT 22   BILITOT 0.8   ALKPHOS 107     COAG  No results for input(s): INR in the last 72 hours.   CARDIAC ENZYMES  Recent Labs     02/03/22  1225   TROPONINI 0.08*       U/A:    Lab Results   Component Value Date COLORU YELLOW 11/17/2021    WBCUA 1 11/17/2021    RBCUA 1 11/17/2021    CLARITYU Clear 11/17/2021    SPECGRAV 1.007 11/17/2021    LEUKOCYTESUR Negative 11/17/2021    BLOODU TRACE 11/17/2021    GLUCOSEU Negative 11/17/2021       ABG  No results found for: OWJ3FMP, BEART, P6ZRBUML, PHART, THGBART, VJL3URN, PO2ART, LTS1CBW        Active Hospital Problems    Diagnosis Date Noted    Acute on chronic systolic CHF (congestive heart failure) ([facility] [I50.23] 02/03/2022         PHYSICIANS CERTIFICATION:    I certify that Jennifer Jimenez is expected to be hospitalized for more than 2 midnights based on the following assessment and plan:      ASSESSMENT/PLAN:      Shortness of breath likely 2/2 acute on chronic combined systolic and diastolic CHF exacerbation - Clinically patient presents with orthopnea, peripheral edema, crackles on exam.   -IV Lasix gtt @ 10  -milrinone gtt  -strict I/O's  -daily weights  -medellin catheter  -1500 ml fluid restriction, low sodium diet  -daily renal panel  -cardiology consulted, recs appreciated  -CHF nurse consulted, recs appreciated  -continue home statin, BB, Entresto, Xarelto    SONAL on CKD III - suspect cardiorenal syndrome  -management as above  -nephrology consulted, recs appreciated  -avoid nephrotoxic medications  -trend and monitor  -hold Entresto    CAD  -cardiology consulted  -tele monitoring  -continue home meds  -hold Entresto due to SONAL    Type 2 DM  -SSI  -hypoglycemia protocol  -POCT glucose checks  -carb control diet    Essential hypertension  -continue BB  -hold Entresto due to SONAL    Hyperlipidemia  -continue home meds    Hypothyroidism  -continue home meds      DVT Prophylaxis: Xarelto  Diet: ADULT DIET; Regular; Low Sodium (2 gm); 1500 ml  Code Status: Full Code  PT/OT Eval Status: ordered    Dispo - admit PCU tele inpatient       Usha Arias MD    Thank you Razia Elias DO for the opportunity to be involved in this patient's care.  If you have any questions or concerns please feel free to contact me at [phone].

## 2022-02-03 NOTE — TELEPHONE ENCOUNTER
Adarsh Rodriguez \"apple\" pts son is calling in stating that his dad was on the ground today and couldn't get up. He had to help him up and realized that his left arm is 3 inches more swollen at the elbow than his right arm. He can be reached at [phone].

## 2022-02-03 NOTE — PROGRESS NOTES
4 Eyes Skin Assessment     NAME:  Roxie Byrne  YOB: 1955  MEDICAL RECORD NUMBER:  [id number]    The patient is being assess for  Admission    I agree that 2 RN's have performed a thorough Head to Toe Skin Assessment on the patient. ALL assessment sites listed below have been assessed. Areas assessed by both nurses:    Head, Face, Ears, Shoulders, Back, Chest, Arms, Elbows, Hands, Sacrum. Buttock, Coccyx, Ischium and Legs. Feet and Heels        Does the Patient have a Wound? Yes wound(s) were present on assessment.  LDA wound assessment was Initiated and completed small abrasion to R mccray       Tung Prevention initiated:  Yes   Wound Care Orders initiated:  No    Pressure Injury (Stage 3,4, Unstageable, DTI, NWPT, and Complex wounds) if present place consult order under [de-identified] No    New and Established Ostomies if present place consult order under : No      Nurse 1 eSignature: Electronically signed by Jon Cabrera RN on 2/3/22 at 6:46 PM EST    **SHARE this note so that the co-signing nurse is able to place an eSignature**    Nurse 2 eSignature: Electronically signed by Francy Thomas RN on 2/3/22 at 6:47 PM EST

## 2022-02-03 NOTE — ED PROVIDER NOTES
[facility]  EMERGENCY DEPARTMENTENCOUNTER      Pt Name: Mary Raza  MRN: [id number]  [place] 1955  Date ofevaluation: 2/3/2022  Provider: Unique Hartman MD    CHIEF COMPLAINT       Chief Complaint   Patient presents with    Leg Swelling     pt complains of acute on chronic BLE edema. mild sob on exertion. denies cough. HISTORY OF PRESENT ILLNESS   (Location/Symptom, Timing/Onset,Context/Setting, Quality, Duration, Modifying Factors, Severity)  Note limiting factors. Mary Raza is a 61 y.o. male  who  has a past medical history of Diabetes type 2, uncontrolled ([address] Diabetic nephropathy, ED (erectile dysfunction), Essential hypertension, benign, Hyperlipidemia, and Noncompliance. who presents to the emergency department      53-year-old male presents with bilateral lower extremity swelling with associated orthopnea and exertional dyspnea. Patient has history of CHF and CKD. Patient states he takes diuretics however he has been peeing less than normal.  Denies any noncompliance with his medication. States that his legs been gradually worsening for last few weeks to months. Now with associated shortness of breath. Symptoms are chronic. Denies chest pain. Denies other associated symptoms. Worse with lying flat or exertion. Better with sitting up. Risk factors are history of CKD and CHF. For further review of systems see below. NursingNotes were reviewed. REVIEW OF SYSTEMS    (2-9 systems for level 4, 10 or more for level 5)     Review of Systems   Constitutional: Negative. Negative for fatigue and fever. HENT: Negative for congestion, rhinorrhea and sore throat. Eyes: Negative. Respiratory: Positive for shortness of breath. Negative for cough, chest tightness and wheezing. Cardiovascular: Positive for leg swelling. Negative for chest pain. Gastrointestinal: Negative for abdominal pain, diarrhea, nausea and vomiting. Endocrine: Negative. Genitourinary: Negative. Musculoskeletal: Negative. Skin: Negative for color change and rash. Allergic/Immunologic: Negative for environmental allergies and immunocompromised state. Neurological: Negative for dizziness, light-headedness and headaches. Hematological: Negative. All other systems reviewed and are negative. Except as noted above the remainder of the review of systems was reviewed and negative.        PAST MEDICAL HISTORY     Past Medical History:   Diagnosis Date    Diabetes type 2, uncontrolled ([facility] [address] 7/28/2014    Diabetic nephropathy 9/12/2013    ED (erectile dysfunction) 9/12/2013    Essential hypertension, benign 9/12/2013    Hyperlipidemia 9/12/2013    Noncompliance 7/28/2014         SURGICALHISTORY       Past Surgical History:   Procedure Laterality Date    EYE SURGERY           CURRENT MEDICATIONS       Previous Medications    ALLOPURINOL (ZYLOPRIM) 100 MG TABLET    Take 1 tablet by mouth daily    ATORVASTATIN (LIPITOR) 80 MG TABLET    Take 80 mg by mouth daily     DULAGLUTIDE (TRULICITY) 3.32 AZ/7.7II SOPN    Lot# [id number] Exp.06/20    ISOSORBIDE MONONITRATE (IMDUR) 30 MG EXTENDED RELEASE TABLET    Take 30 mg by mouth daily     LEVOTHYROXINE (SYNTHROID) 25 MCG TABLET    Take 25 mcg by mouth Daily     METOPROLOL SUCCINATE (TOPROL XL) 25 MG EXTENDED RELEASE TABLET    Take 0.5 tablets by mouth daily    MULTIPLE VITAMINS-MINERALS (THERAPEUTIC MULTIVITAMIN-MINERALS) TABLET    Take 1 tablet by mouth daily    NITROGLYCERIN (NITROSTAT) 0.4 MG SL TABLET    Place 0.4 mg under the tongue every 5 minutes as needed for Chest pain     RIVAROXABAN (XARELTO) 15 MG TABS TABLET    Take 1 tablet by mouth Daily with supper    SACUBITRIL-VALSARTAN (ENTRESTO) 49-51 MG PER TABLET    Take 1 tablet by mouth 2 times daily     STIMULANT LAXATIVE 8.6-50 MG PER TABLET    Take 1 tablet by mouth nightly     TORSEMIDE (DEMADEX) 20 MG TABLET    Take 2 tablets by mouth daily Patient has no known allergies. FAMILY HISTORY       Family History   Problem Relation Age of Onset    Diabetes Mother     High Blood Pressure Mother     Diabetes Sister     Diabetes Brother           SOCIAL HISTORY       Social History     Socioeconomic History    Marital status:      Spouse name: Not on file    Number of children: Not on file    Years of education: Not on file    Highest education level: Not on file   Occupational History    Not on file   Tobacco Use    Smoking status: Former Smoker     Packs/day: 1.50     Years: 7.00     Pack years: 10.50     Quit date: 1981     Years since quittin.0    Smokeless tobacco: Never Used   Vaping Use    Vaping Use: Never used   Substance and Sexual Activity    Alcohol use: Yes     Alcohol/week: 1.0 standard drink     Types: 1 Cans of beer per week    Drug use: No    Sexual activity: Yes     Partners: Female   Other Topics Concern    Not on file   Social History Narrative    Not on file     Social Determinants of Health     Financial Resource Strain:     Difficulty of Paying Living Expenses: Not on file   Food Insecurity:     Worried About Running Out of Food in the Last Year: Not on file    Karli of Food in the Last Year: Not on file   Transportation Needs:     Lack of Transportation (Medical): Not on file    Lack of Transportation (Non-Medical):  Not on file   Physical Activity:     Days of Exercise per Week: Not on file    Minutes of Exercise per Session: Not on file   Stress:     Feeling of Stress : Not on file   Social Connections:     Frequency of Communication with Friends and Family: Not on file    Frequency of Social Gatherings with Friends and Family: Not on file    Attends Confucianist Services: Not on file    Active Member of Clubs or Organizations: Not on file    Attends Club or Organization Meetings: Not on file    Marital Status: Not on file   Intimate Partner Violence:     Fear of Current or Ex-Partner: Not on file    Emotionally Abused: Not on file    Physically Abused: Not on file    Sexually Abused: Not on file   Housing Stability:     Unable to Pay for Housing in the Last Year: Not on file    Number of [place] in the Last Year: Not on file    Unstable Housing in the Last Year: Not on file       SCREENINGS             PHYSICAL EXAM    (up to 7 for level 4, 8 or more for level 5)     ED Triage Vitals [02/03/22 1111]   BP Temp Temp Source Pulse Resp SpO2 Height Weight   103/79 97.4 °F (36.3 °C) Tympanic 72 18 97 % -- --       Physical Exam  Vitals and nursing note reviewed. Constitutional:       General: He is not in acute distress. Appearance: He is well-developed and normal weight. He is not ill-appearing, toxic-appearing or diaphoretic. HENT:      Head: Normocephalic and atraumatic. Right Ear: External ear normal.      Left Ear: External ear normal.      Mouth/Throat:      Mouth: Mucous membranes are moist.      Pharynx: Oropharynx is clear. Eyes:      Extraocular Movements: Extraocular movements intact. Cardiovascular:      Rate and Rhythm: Normal rate and regular rhythm. Pulses: Normal pulses. Heart sounds: Normal heart sounds. Pulmonary:      Effort: Pulmonary effort is normal.      Breath sounds: Examination of the right-lower field reveals rales. Examination of the left-lower field reveals rales. Rales present. No decreased breath sounds, wheezing or rhonchi. Chest:      Chest wall: No tenderness. Abdominal:      General: Bowel sounds are normal.      Palpations: Abdomen is soft. Tenderness: There is no abdominal tenderness. Musculoskeletal:         General: Normal range of motion. Cervical back: Normal range of motion and neck supple. Right lower leg: Edema present. Left lower leg: Edema present. Comments: Bilateral 2+ pitting edema to lower extremities   Skin:     General: Skin is warm and dry.       Capillary Refill: Capillary refill takes less than 2 seconds. Findings: No rash. Neurological:      General: No focal deficit present. Mental Status: He is alert and oriented to person, place, and time. Psychiatric:         Mood and Affect: Mood normal.         Behavior: Behavior normal.         RESULTS     EKG: All EKG's are interpreted by the Emergency Department Physician who either signs or Co-signsthis chart in the absence of a cardiologist.    EKG Interpretation    Interpreted by emergency department physician    Rhythm: normal sinus   Rate: normal  Axis: left  Ectopy: none  Conduction: normal  ST Segments: nonspecific changes  T Waves: no acute change  Q Waves: none    Clinical Impression: non-specific EKG with concerns for age-indeterminate anterior lateral ischemic changes which are unchanged from previous EKG dated November 14, 2021. QRS duration normal, normal QT interval, normal WI interval.    Jackie Ibarra MD      RADIOLOGY:   Non-plain filmimages such as CT, Ultrasound and MRI are read by the radiologist.     Interpretation per the Radiologist below, if available at the time ofthis note:    XR CHEST PORTABLE   Final Result   Mild pulmonary vascular congestion. Stable cardiomegaly.                ED BEDSIDE ULTRASOUND:   Performed by ED Physician - none    LABS:  Labs Reviewed   CBC WITH AUTO DIFFERENTIAL - Abnormal; Notable for the following components:       Result Value    WBC 3.2 (*)     RBC 3.21 (*)     Hemoglobin 11.0 (*)     Hematocrit 34.3 (*)     .6 (*)     MCH 34.1 (*)     RDW 17.6 (*)     Platelets 349 (*)     Lymphocytes Absolute 0.4 (*)     All other components within normal limits    Narrative:     Performed at:  [address] [address]   Phone [phone]   COMPREHENSIVE METABOLIC PANEL W/ REFLEX TO MG FOR LOW K - Abnormal; Notable for the following components:    Glucose 154 (*)     BUN 56 (*)     CREATININE 2.4 (*)     GFR Non- 27 (*)     GFR  33 (*)     Calcium 8.0 (*)     Total Protein 5.8 (*)     Albumin 3.0 (*)     All other components within normal limits    Narrative:     Performed at:  [facility]  [address] [facility] [address] [address]   Phone [phone]   TROPONIN - Abnormal; Notable for the following components:    Troponin 0.08 (*)     All other components within normal limits    Narrative:     Performed at:  [facility]  [address] [facility] [address] [address]   Phone [phone]   BRAIN NATRIURETIC PEPTIDE - Abnormal; Notable for the following components:    Pro-BNP 21,659 (*)     All other components within normal limits    Narrative:     Performed at:  [facility]  [address] [facility] [address] [address]   Phone [phone]   URINE RT REFLEX TO CULTURE       All other labs were within normal range or not returned as of this dictation. EMERGENCY DEPARTMENT COURSE and DIFFERENTIAL DIAGNOSIS/MDM:   Vitals:    Vitals:    02/03/22 1111 02/03/22 1345   BP: 103/79 (!) 106/91   Pulse: 72 66   Resp: 18 19   Temp: 97.4 °F (36.3 °C)    TempSrc: Tympanic    SpO2: 97%        Patient was given thefollowing medications:  Medications   furosemide (LASIX) injection 40 mg (40 mg IntraVENous Given 2/3/22 1346)       ED COURSE & MEDICAL DECISION MAKING    Pertinent Labs & Imaging studies reviewed. (See chart for details)   -  Patient seen and evaluated in the emergency department. -  Triage and nursing notes reviewed and incorporated. -  Old chart records reviewed and incorporated.   -  Differential diagnosis includes: Differential Diagnosis: Acute Coronary Syndrome, Congestive Heart Failure, Myocardial Infarction, Pulmonary Embolus, Pneumonia, Pneumothorax, other    61-year-old male presents with bilateral leg swelling and orthopnea with rales on his lung exam concerning for acute CHF exacerbation and/or fluid overload from CKD. Patient has been taking diuretics but he has had decreased urine output. Afebrile not tachycardic saturating on room air currently no tachypnea, normotensive. Will obtain cardiac work-up as well as chest x-ray and likely give diuretics for possible admission. Will reeval closely. -  Work-up included:  See above  -  ED treatment included: See above  -  Results discussed with patient. REASSESSMENT     ED Course as of 02/03/22 1347   Thu Feb 03, 2022   1225 Significant signs of fluid overload on laboratory work [SC]   1346 BNP significantly elevated troponin significantly elevated likely due to CKD. Will give patient Lasix and plan on admission. Vital signs remained stable. [SC]      ED Course User Index  [SC] Katy Fernandez MD         CRITICAL CARE TIME   Total Critical Care time was 30 minutes, excluding separately reportable procedures. There was a high probability of clinically significant/life threatening deterioration in the patient's condition which required my urgent intervention. CONSULTS:  None    PROCEDURES:  Unless otherwise noted below, none     Procedures    FINAL IMPRESSION      1. Acute on chronic congestive heart failure, unspecified heart failure type (HCC)    2. Stage 4 chronic kidney disease ([facility] [address]    3. Dyspnea on exertion    4. Bilateral lower extremity edema          DISPOSITION/PLAN   DISPOSITION        PATIENT REFERREDTO:  No follow-up provider specified.     DISCHARGEMEDICATIONS:  New Prescriptions    No medications on file          (Please note that portions of this note were completed with a voice recognition program.  Efforts were made to edit the dictations but occasionally words are mis-transcribed.)    Katy Fernandez MD (electronically signed)  Attending Emergency Physician         Katy Fernandez MD  02/03/22 4093

## 2022-02-03 NOTE — TELEPHONE ENCOUNTER
Spoke Devin (okay per hippa) who is concerned that his father may have had a fall yesterday. Left arm is about 3 inches bigger than the right. His abdomen is very distended, he has gained approximately 20-60 pounds since being hospitalized in November and he is having difficulty breathing. Devin specifically says, \"I can hear a whistle and rumble sound when he is sleeping as if he is trying to clear his lungs. \"     He did put the patient on speaker and I suggested he proceed to the ER for further evaluation as he may be in acute heart failure. Both the patient and Devin triplett/hillary.

## 2022-02-04 NOTE — PROGRESS NOTES
Occupational Therapy   Occupational Therapy Initial Assessment and Tentative D/C    Date: 2022   Patient Name: Margoth Burks  MRN: [id number]     : 1955    Date of Service: 2022    Discharge Recommendations: Margoth Burks scored a 21/24 on the AM-PAC ADL Inpatient form. Current research shows that an AM-PAC score of 18 or greater is typically associated with a discharge to the patient's home setting. If patient discharges prior to next session this note will serve as a discharge summary. Please see below for the latest assessment towards goals. Continue to assess pending progress,Home with assist PRN  OT Equipment Recommendations  Equipment Needed: Yes  Mobility Devices: ADL Assistive Devices  ADL Assistive Devices: Shower Chair with back (if pt does not currently have one, pt would benefit)    Assessment   Performance deficits / Impairments: Decreased functional mobility ; Decreased strength;Decreased endurance;Decreased ADL status; Decreased balance;Decreased high-level IADLs  Assessment: 82-year-old male presents with bilateral lower extremity swelling with associated orthopnea and exertional dyspnea. Patient has history of CHF and CKD. Patient states he takes diuretics however he has been peeing less than normal.  Denies any noncompliance with his medication. States that his legs been gradually worsening for last few weeks to months. Now with associated shortness of breath. Symptoms are chronic. Denies chest pain. Denies other associated symptoms. Worse with lying flat or exertion. Better with sitting up. Risk factors are history of CKD and CHF. For further review of systems see below. PTA pt from home with son where pt was Ind with mobility and ADLs with use of 4WW. Pt currently requires SBA/CGA and use of RW for mobility and transfers. Pt with no LOB. Pt with no noted SOB. Anticipate pt needing up to SBA for ADLs. Pt will benefit from skilled OT services at this time.  Anticipate pt safe to return home with PRN assist.  Prognosis: Good  Decision Making: Low Complexity  Exam: see above  Assistance / Modification: RW  OT Education: OT Role;Plan of Care;Transfer Training  REQUIRES OT FOLLOW UP: Yes  Activity Tolerance  Activity Tolerance: Patient Tolerated treatment well  Safety Devices  Safety Devices in place: Yes  Type of devices: Left in bed;Call light within reach; Bed alarm in place;Nurse notified           Patient Diagnosis(es): The primary encounter diagnosis was Acute on chronic congestive heart failure, unspecified heart failure type ([address] Diagnoses of Stage 4 chronic kidney disease ([address] Dyspnea on exertion, and Bilateral lower extremity edema were also pertinent to this visit. has a past medical history of Diabetes type 2, uncontrolled ([address] Diabetic nephropathy, ED (erectile dysfunction), Essential hypertension, benign, Hyperlipidemia, and Noncompliance. has a past surgical history that includes eye surgery. Restrictions  Restrictions/Precautions  Restrictions/Precautions: Fall Risk    Subjective   General  Chart Reviewed: Yes  Patient assessed for rehabilitation services?: Yes  Additional Pertinent Hx: per ED note, 73-year-old male presents with bilateral lower extremity swelling with associated orthopnea and exertional dyspnea. Patient has history of CHF and CKD. Patient states he takes diuretics however he has been peeing less than normal.  Denies any noncompliance with his medication. States that his legs been gradually worsening for last few weeks to months. Now with associated shortness of breath. Symptoms are chronic. Denies chest pain. Denies other associated symptoms. Worse with lying flat or exertion. Better with sitting up. Risk factors are history of CKD and CHF. For further review of systems see below. Family / Caregiver Present: No  Referring Practitioner: Lluvia Ugalde MD  Subjective  Subjective: Pt agreeable to OT evaluation.  Pt with no reports of pain. Vital Signs  Temp: 97.5 °F (36.4 °C)  Temp Source: Oral  Pulse: 71  Heart Rate Source: Monitor  Resp: 16  BP: 101/71  MAP (mmHg): 81  Oxygen Therapy  SpO2: 97 %  O2 Device: None (Room air)  Social/Functional History  Social/Functional History  Lives With: Son  Type of Home: Apartment  Home Layout: One level  Home Access: Stairs to enter without rails  Entrance Stairs - Number of Steps: 2 steps down  Bathroom Shower/Tub: Tub/Shower unit  Bathroom Toilet: Standard  Bathroom Accessibility: Walker accessible  Home Equipment: 4 wheeled walker  Receives Help From: Family  ADL Assistance: Independent  Homemaking Assistance:  (does what he can and his son manages heavy tasks)  Ambulation Assistance: Independent (4KO as needed)  Transfer Assistance: Independent       Objective   Vision: Within Functional Limits  Hearing: Within functional limits    Orientation  Overall Orientation Status: Within Functional Limits     Balance  Sitting Balance: Independent  Standing Balance: Stand by assistance (RW)  Functional Mobility  Functional - Mobility Device: Rolling Walker  Activity: Other; To/from bathroom (~15ft, 30ft)  Assist Level: Contact guard assistance (SBA/CGA)  Functional Mobility Comments: no LOB; no noted SOB  Toilet Transfers  Toilet - Technique: Ambulating (RW)  Equipment Used: Grab bars  Toilet Transfer: Stand by assistance  Wheelchair Bed Transfers  Wheelchair/Bed - Technique: Ambulating (RW)  Equipment Used: Bed  Level of Asssistance: Stand by assistance  ADL  Additional Comments: Anticipate pt needing up to SBA for ADLs based on ROM, strength, and balance  Tone RUE  RUE Tone: Normotonic  Tone LUE  LUE Tone: Normotonic  Coordination  Movements Are Fluid And Coordinated: Yes     Bed mobility  Supine to Sit: Independent  Sit to Supine: Independent  Scooting: Independent  Transfers  Sit to stand: Stand by assistance  Stand to sit: Stand by assistance  Transfer Comments: to/from RW; pt pulling up on RW Cognition  Overall Cognitive Status: WFL                 LUE AROM (degrees)  LUE AROM : WFL  RUE AROM (degrees)  RUE AROM : WFL  LUE Strength  Gross LUE Strength: WFL  RUE Strength  Gross RUE Strength: WFL                   Plan   Plan  Times per week: 3-5x  Current Treatment Recommendations: Strengthening,Functional Mobility Training,Gait Training,Endurance Training,Balance Training,Safety Education & Training,Self-Care / ADL,Patient/Caregiver Education & Training,Equipment Evaluation, Education, & procurement      AM-PAC Score        AM-PAC Inpatient Daily Activity Raw Score: 21 (02/04/22 0740)  AM-PAC Inpatient ADL T-Scale Score : 44.27 (02/04/22 0740)  ADL Inpatient CMS 0-100% Score: 32.79 (02/04/22 0740)  ADL Inpatient CMS G-Code Modifier : Harjinder Connelly (02/04/22 0740)    Goals  Short term goals  Time Frame for Short term goals: prior to D/C  Short term goal 1: complete functional mobility and transfers Mod Ind  Short term goal 2: complete toileting Mod Ind  Short term goal 3: complete bathing and dressing Mod Ind  Short term goal 4: complete grooming in stance at sink [address] term goals  Time Frame for Long term goals : STG=LTG  Patient Goals   Patient goals : return home       Therapy Time   Individual Concurrent Group Co-treatment   Time In 0710         Time Out 0738         Minutes 28         Timed Code Treatment Minutes: 13 Minutes (15 minute eval)       Linda Woodard OTR/L

## 2022-02-04 NOTE — CONSULTS
Consult received. Pt has been seen in consult by my partner, Esha Mendez CHF RN, and has received all of the heart failure education in the past.  He has a long history of non-compliance, and was less than receptive to our information at his last admission. Will continue to follow along to ensure all metrics are met. He has appropriate discharge instructions in place, and a follow up appointment has been arranged.

## 2022-02-04 NOTE — CARE COORDINATION
Discharge Planning:  SW attempted to reach pt via phone in the room. No answer. SW will attempt later as time permits.    [place], [place]  [phone]  Electronically signed by Pancho Sotelo on 2/4/2022 at 12:15 PM

## 2022-02-04 NOTE — CONSULTS
Thanks for consulting [facility] for the care of [facility]. Full consult will follow  Please call with questions at-  24 Hrs Answering service [phone]  Perfect serve, or cell phone 7 am - [address], [place]  [facility] nephrology  [facility]. [facility]

## 2022-02-04 NOTE — CONSULTS
[facility]  1955 February 4, 2022    Reason for Consult: CHF    CC: Leg Swelling    HPI:  The patient is 61 y.o. male with a past medical history significant for CAD, chronic systolic CHF with ICD in place (8/13/19), LV thrombus, hyperlipidemia, essential hypertension, type 2 DM with complications of diabetic nephropathy stage 3 who presented to [facility] ED with shortness of breath and leg swelling. He was discharged from [facility] on 11/30/21 after being hospitalized with CHF. He has not kept any of his follow-up appointments since being discharged. His weight at that time was 145lbs. Jovani Anderson states that he came to the ED with leg swelling. He relates that his water pills at home have not been taking off the swelling. He says that he has been taking his medications at home. He has not been weighing himself daily. He relates that the last time he weighed himself was two weeks ago. He states that he has been short of breath. There has been no chest pain or tightness. Review of Systems:  Constitutional: No fatigue, weakness, night sweats or fever. HEENT: No new vision difficulties or ringing in the ears. Respiratory: Positive for new SOB, PND, orthopnea. He denies any cough. Cardiovascular: See HPI   GI: No n/v, diarrhea, constipation, abdominal pain or changes in bowel habits. No melena, no hematochezia  : No urinary frequency, urgency, incontinence, hematuria or dysuria. Skin: No cyanosis or skin lesions. Musculoskeletal: Positive for lower extremity swelling. No new muscle or joint pain. Neurological: No syncope or TIA-like symptoms.   Psychiatric: No anxiety, insomnia or depression     Past Medical History:   Diagnosis Date    Diabetes type 2, uncontrolled ([facility] [address] 7/28/2014    Diabetic nephropathy 9/12/2013    ED (erectile dysfunction) 9/12/2013    Essential hypertension, benign 9/12/2013    Hyperlipidemia 9/12/2013    Noncompliance 2014     Past Surgical History:   Procedure Laterality Date    EYE SURGERY       Family History   Problem Relation Age of Onset    Diabetes Mother     High Blood Pressure Mother     Diabetes Sister     Diabetes Brother      Social History     Tobacco Use    Smoking status: Former Smoker     Packs/day: 1.50     Years: 7.00     Pack years: 10.50     Quit date: 1981     Years since quittin.0    Smokeless tobacco: Never Used   Vaping Use    Vaping Use: Never used   Substance Use Topics    Alcohol use:  Yes     Alcohol/week: 1.0 standard drink     Types: 1 Cans of beer per week    Drug use: No       No Known Allergies  Current Facility-Administered Medications   Medication Dose Route Frequency Provider Last Rate Last Admin    allopurinol (ZYLOPRIM) tablet 100 mg  100 mg Oral Daily Virginia Chan MD   100 mg at 22 9584    atorvastatin (LIPITOR) tablet 80 mg  80 mg Oral Daily Virginia Chan MD   80 mg at 22 0617    isosorbide mononitrate (IMDUR) extended release tablet 30 mg  30 mg Oral Daily Virginia Chan MD   30 mg at 22 9673    levothyroxine (SYNTHROID) tablet 25 mcg  25 mcg Oral QAM AC Virginia Chan MD   25 mcg at 22 3180    metoprolol succinate (TOPROL XL) extended release tablet 12.5 mg  12.5 mg Oral Daily Virginia Chan MD   12.5 mg at 22 7126    therapeutic multivitamin-minerals 1 tablet  1 tablet Oral Daily Virginia Chan MD   1 tablet at 22 7304    rivaroxaban (XARELTO) tablet 15 mg  15 mg Oral Dinner Virginia Chan MD   15 mg at 22 1731    [Held by provider] sacubitril-valsartan (ENTRESTO) 49-51 MG per tablet 1 tablet  1 tablet Oral BID Virginia Chan MD        sodium chloride flush 0.9 % injection 5-40 mL  5-40 mL IntraVENous 2 times per day Virginia Chan MD   10 mL at 22 0942    sodium chloride flush 0.9 % injection 5-40 mL  5-40 mL IntraVENous PRN Virginia Chan MD        0.9 % sodium chloride infusion 25 mL IntraVENous PRN Josefina Lo MD        ondansetron (ZOFRAN-ODT) disintegrating tablet 4 mg  4 mg Oral Q8H PRN Josefina Lo MD        Or    ondansetron [facility]) injection 4 mg  4 mg IntraVENous Q6H PRN Josefina Lo MD        polyethylene glycol [facility]) packet 17 g  17 g Oral Daily PRN Josefina Lo MD        acetaminophen (TYLENOL) tablet 650 mg  650 mg Oral Q6H PRN Josefina Lo MD        Or    acetaminophen (TYLENOL) suppository 650 mg  650 mg Rectal Q6H PRN Josefina Lo MD        [facility]) 20 mg in dextrose 5 % 100 mL infusion  0.25 mcg/kg/min IntraVENous Continuous Josefina Lo MD 6.5 mL/hr at 02/04/22 0943 0.25 mcg/kg/min at 02/04/22 0943    furosemide (LASIX) 100 mg in dextrose 5 % 100 mL infusion  10 mg/hr IntraVENous Continuous Josefina Lo MD 10 mL/hr at 02/04/22 0249 10 mg/hr at 02/04/22 0249    insulin lispro (HUMALOG) injection vial 0-12 Units  0-12 Units SubCUTAneous TID WC Josefina Lo MD        insulin lispro (HUMALOG) injection vial 0-6 Units  0-6 Units SubCUTAneous Nightly Josefina Lo MD   1 Units at 02/03/22 2133    glucose (GLUTOSE) 40 % oral gel 15 g  15 g Oral PRN Josefina Lo MD        dextrose 50 % IV solution  12.5 g IntraVENous PRN Josefina Lo MD        glucagon (rDNA) injection 1 mg  1 mg IntraMUSCular PRN Josefina Lo MD        dextrose 5 % solution  100 mL/hr IntraVENous PRN Josefina Lo MD           Physical Exam:   /71   Pulse 71   Temp 97.5 °F (36.4 °C) (Oral)   Resp 16   Ht 5' 6\" (1.676 m)   Wt 191 lb (86.6 kg)   SpO2 97%   BMI 30.83 kg/m²     Intake/Output Summary (Last 24 hours) at 2/4/2022 1025  Last data filed at 2/4/2022 2118  Gross per 24 hour   Intake 360 ml   Output 1505 ml   Net -1145 ml     Wt Readings from Last 2 Encounters:   02/04/22 191 lb (86.6 kg)   01/20/22 180 lb (81.6 kg)     Constitutional: He is oriented to person, place, and time.  He appears well-developed and well-nourished. In no acute distress. Head: Normocephalic and atraumatic. Neck: Neck supple. No JVD present. Carotid bruit is not present. No mass and no thyromegaly present. No lymphadenopathy present. Cardiovascular: Normal rate, regular rhythm, normal heart sounds and intact distal pulses. Exam reveals no gallop and no friction rub. No murmur heard. Pulmonary/Chest: Effort normal and breath sounds normal. No respiratory distress. He has no wheezes, rhonchi or rales. Abdominal: Soft, non-tender. Bowel sounds and aorta are normal. He exhibits no organomegaly, mass or bruit. Extremities: 3+ bilateral LE edema. No cyanosis, or clubbing. Pulses are 2+ radial/carotid/dorsalis pedis and posterior tibial bilaterally. Neurological: He is alert and oriented to person, place, and time. He has normal reflexes. No cranial nerve deficit. Coordination normal.   Skin: Skin is warm and dry. There is no rash or diaphoresis. Psychiatric: He has a normal mood and affect. His speech is normal and behavior is normal.     Personally reviewed and interpreted   EKG Interpretation: Sinus rhythm with poor R wave progression, low voltage    Lab Review:   Lab Results   Component Value Date    TRIG 43 02/04/2022    HDL 48 02/04/2022    HDL 58 12/13/2011    LDLCALC 38 02/04/2022    LABVLDL 9 02/04/2022     Lab Results   Component Value Date     02/04/2022    K 3.4 02/04/2022    K 3.7 02/03/2022    BUN 56 02/04/2022    CREATININE 2.3 02/04/2022     Recent Labs     02/03/22  1225 02/03/22  1225 02/04/22  0620   WBC 3.2*   < > 3.9*   HGB 11.0*  --  10.4*   HCT 34.3*  --  31.8*   *  --  125*    < > = values in this interval not displayed. Assessment / Plan:    1. Acute on Chronic Systolic CHF, class 4  I agree with the milrinone and lasix drips for Cedric. This is what has worked best for him in the past.  His understanding of what \"follow-up\" means is poor.    Hold Toprol for now while he is on the Phosphodiesterase inhibitor. Okay to hold [place] as well    2. Acute on Chronic Kidney Disease, stage 3    Creatinine is mildly up. Hold Entresto but okay to add aldactone if okay with Nephrology (Dr. Dinah Pruitt)    I again had a conversation with Vashti Suero about Hospice versus actually being treated for CHF. He states \"I was supposed to see you in the office today\".  I pointed out to him that see me in the office is supposed to keep him out of the hospital.

## 2022-02-04 NOTE — DISCHARGE INSTR - DIET
Good nutrition is important when healing from an illness, injury, or surgery. Follow any nutrition recommendations given to you during your hospital stay. If you were given an oral nutrition supplement while in the hospital, continue to take this supplement at home. You can take it with meals, in-between meals, and/or before bedtime. These supplements can be purchased at most local grocery stores, pharmacies, and chain super-stores. If you have any questions about your diet or nutrition, call the hospital and ask for the dietitian. Goal Sodium Intake: less than 2000 mg (unless your physician tells you a different number)   Goal Fluid Intake: less than 64 oz (unless your physician tells you a different number)    This nutrition therapy will help you feel better and support your heart. This plan focuses on:  Limiting sodium in your diet. Salt (sodium) makes your body hold water. When your body holds too much water, you can feel shortness of breath and swelling. You can prevent these symptoms by eating less salt. Limiting fluid in your diet. For some patients, drinking too much fluid can make heart failure worse. It can cause symptoms such as shortness of breath and swelling. Limiting fluids can help relieve some of your symptoms. You can achieve these goals by:  Reading food labels to keep track of how much sodium is in the foods you eat. Limiting foods that are high in sodium. Checking your weight to make sure youre not retaining too much fluid. The nutrition plan for heart failure usually limits the sodium you get from food and drinks to 2,000 milligrams per day. Salt is the main source of sodium. Read the nutrition label to find out how much sodium is in 1 serving of a food. Select foods with 140 milligrams of sodium or less per serving. Foods with more than 300 milligrams of sodium per serving may not fit into a reduced-sodium meal plan. Check serving sizes.  If you eat more than 1 serving, you will get more sodium than the amount listed. Cutting Back on Sodium  Avoid processed foods. Eat more fresh foods. Fresh and frozen fruits and vegetables without added juices or sauces are naturally low in sodium. Fresh meats are lower in sodium than processed meats, such as mcgill, sausage, and hot dogs. Read the nutrition label or ask your  to help you find a fresh meat that is low in sodium. Eat less salt, at the table and when cooking. Just 1 teaspoon of table salt has 2,300 milligrams of sodium. Leave the salt out of recipes for pasta, casseroles, and soups. Be a smart . Look for food packages that say salt-free or sodium-free.  These items contain less than 5 milligrams of sodium per serving. Very-low-sodium products contain less than 35 milligrams of sodium per serving. Low-sodium products contain less than 140 milligrams of sodium per serving. Unsalted or no added salt products may still be high in sodium. Check the nutrition label. Add flavors to your food without adding sodium. Try lemon juice, lime juice, fruit juice, or vinegar. Dry or fresh herbs add flavor. Try basil, bay leaf, dill, rosemary, parsley, bella, dry mustard, nutmeg, thyme, and paprika. Pepper, red pepper flakes, and cayenne pepper can add spice to your meals without adding sodium. Hot sauce contains sodium, but if you use just a drop or two, it will not add up to much. Buy a sodium-free seasoning blend or make your own at home. Use caution when you eat outside your home. Restaurant foods can be very high in sodium. Ask for nutrition information. Many restaurants provide nutrition facts on their menus or websites. Let your  know that you want your food to be cooked without salt. Ask for your salad dressing and sauces to come on the side.     Fluid Restriction  Your doctor may ask you to follow a fluid restriction in addition to taking diuretics (water pills).  Ask your doctor how much fluid you can have. Foods that are liquid at room temperature are considered a fluid, such as popsicles, soup, ice cream, and Jell-O.      Foods Not Recommended  Breads or crackers topped with salt  Prepackaged bread crumbs   Self-rising flours   Canned vegetables (unless they are salt free or low sodium)  Sauerkraut and pickled vegetables   Canned or dried soups (unless they are salt free or low sodium)  [place] fries, onion rings, and other fried foods   Processed cheeses   Cottage cheese   Cured meats: mcgill, ham, sausage, pepperoni, and hot dogs   Canned meats  Smoked fish and meats   Frozen meals (that have more than 600 mg sodium)  Salter butter or margarine   Any type of salt seasoning (sea salt, kosher salt, onion salt, garlic salt, seasoning blends made with salt)  Ketchup, BBQ sauce, soy sauce, Worcestershire sauce  Salsa, pickles, olives, relish  Salad dressings: ranch, blue cheese, [facility] Dietitian Office: [phone]

## 2022-02-04 NOTE — CONSULTS
Nutrition Note    Consult \"Heart Failure diet guidelines\". Pt with hx of CHF. Pt has been educated in the past. Will try to meet with pt on 2/7 for diet education. If pt to D/C prior, will place diet education in D/C instructions.      Electronically signed by Aurelia Ovalle RD, LD on 2/4/22 at 12:32 PM EST    Contact: [phone]

## 2022-02-04 NOTE — PROGRESS NOTES
Physical Therapy    Facility/Department: [address] PROGRESSIVE CARE  Initial Assessment/Treatment Session    NAME: Denise Leon  : 1955  MRN: [id number]    Date of Service: 2022    Discharge Recommendations:  Patient would benefit from continued therapy after discharge,S Level 1,Home with Home health PT,Home with assist PRN   PT Equipment Recommendations  Equipment Needed: No    Assessment   Body structures, Functions, Activity limitations: Decreased endurance  Assessment: Pt is a 61 y.o. M. admitted 2/3 for SOB, CHF, SONAL. Pt presents pleasant and agreeable to evaluation, demonstrating functional LE strength. He was able to complete bed mobility and transfers independently, and tolerated several minutes of static standing without difficulty. He appears to be functioning near his baseline for mobility tasks, but would benefit from continued therapy to improve his endurance. Anticipate safe return home with prn assist, regular use of his walker. He would benefit from home PT (PT educated pt on benefits of home care). It is unclear if he will choose to initiate it now, or at a later time. Denise Leon scored a 22/24 on the AM-PAC short mobility form. Current research shows that an AM-PAC score of 18 or greater is typically associated with a discharge to the patient's home setting. Based on the patient's AM-PAC score and their current functional mobility deficits, it is recommended that the patient have 2-3 sessions per week of Physical Therapy at d/c to increase the patient's independence. At this time, this patient demonstrates the endurance and safety to discharge home with home PT level 1 and a follow up treatment frequency of 2-3x/wk. Please see assessment section for further patient specific details. If patient discharges prior to next session this note will serve as a discharge summary. Please see below for the latest assessment towards goals.      Specific instructions for Next Treatment: Improve endurance  Prognosis: Good  Decision Making: Low Complexity  History: See below  Exam: Strength; ROM; Balance; Transfers  Clinical Presentation: Stable  PT Education: Goals; General Safety;PT Role;Home Exercise Program;Plan of Care; Functional Mobility Training; Injury Prevention;Equipment  REQUIRES PT FOLLOW UP: Yes  Activity Tolerance  Activity Tolerance: Patient Tolerated treatment well       Patient Diagnosis(es): The primary encounter diagnosis was Acute on chronic congestive heart failure, unspecified heart failure type ([facility] [address] Diagnoses of Stage 4 chronic kidney disease ([address] Dyspnea on exertion, and Bilateral lower extremity edema were also pertinent to this visit. has a past medical history of Diabetes type 2, uncontrolled ([address] Diabetic nephropathy, ED (erectile dysfunction), Essential hypertension, benign, Hyperlipidemia, and Noncompliance. has a past surgical history that includes eye surgery. Restrictions  Restrictions/Precautions  Restrictions/Precautions: Fall Risk        Subjective  General  Chart Reviewed: Yes  Patient assessed for rehabilitation services?: Yes  Additional Pertinent Hx: per ED note, 68-year-old male presents with bilateral lower extremity swelling with associated orthopnea and exertional dyspnea. Patient has history of CHF and CKD. Patient states he takes diuretics however he has been peeing less than normal.  Denies any noncompliance with his medication. States that his legs been gradually worsening for last few weeks to months. Now with associated shortness of breath. Symptoms are chronic. Denies chest pain. Denies other associated symptoms. Worse with lying flat or exertion. Better with sitting up. Risk factors are history of CKD and CHF. For further review of systems see below.   Response To Previous Treatment: Not applicable  Referring Practitioner: Dr. Blayne Kwan  Referral Date : 02/03/22  Diagnosis: CHF; SONAL on CKD  Follows Commands: Within Functional Limits  Subjective  Subjective: Pt pleasant and agreeable to evaluation. Denies pain or fatigue. Wants to reduce his LE swelling. Pain Screening  Patient Currently in Pain: No    Orientation  Orientation  Overall Orientation Status: Within Normal Limits     Social/Functional History  Social/Functional History  Lives With: Son  Type of Home: Apartment  Home Layout: One level  Home Access: Stairs to enter without rails  Entrance Stairs - Number of Steps: 2 steps down  Bathroom Shower/Tub: Tub/Shower unit  Bathroom Toilet: Standard  Bathroom Accessibility: Walker accessible  Home Equipment: 4 wheeled walker  Receives Help From: Family  ADL Assistance: Independent  Homemaking Assistance:  (does what he can and his son manages heavy tasks)  Ambulation Assistance: Independent (5II as needed)  Transfer Assistance: Independent    Objective    AROM RLE (degrees)  RLE AROM: WFL  RLE General AROM: Hip Flex, Knee Flex/Ext WFL  AROM LLE (degrees)  LLE AROM : WFL  LLE General AROM: Hip Flex, Knee Flex/Ext WFL  AROM RUE (degrees)  RUE AROM : WFL  AROM LUE (degrees)  LUE AROM : WFL    Strength RLE  Strength RLE: WFL  Comment: Hip Flex, Knee Flex/Ext WFL  Strength LLE  Strength LLE: WFL  Comment: Hip Flex, Knee Flex/Ext WFL  Strength RUE  Strength RUE: WFL  Strength LUE  Strength LUE: WFL     Tone RLE  RLE Tone: Normotonic  Tone LLE  LLE Tone: Normotonic  Motor Control  Gross Motor?: WFL     Bed mobility  Supine to Sit: Independent  Sit to Supine: Independent  Scooting: Independent     Transfers  Sit to Stand: Modified independent  Stand to sit: Modified independent     Ambulation  Ambulation?: No  Stairs/Curb  Stairs?: No     Balance  Comments: Static stand x 2 min., no LOB.  (I). Plan   Plan  Times per week: 2-3x  Specific instructions for Next Treatment: Improve endurance  Current Treatment Recommendations: Endurance Training  Safety Devices  Type of devices:  All fall risk precautions in place,Call light within reach,Left in bed,Nurse notified,Bed alarm in place  Restraints  Initially in place: No    AM-PAC Score  AM-PAC Inpatient Mobility Raw Score : 22 (02/04/22 0833)  AM-PAC Inpatient T-Scale Score : 53.28 (02/04/22 5575)  Mobility Inpatient CMS 0-100% Score: 20.91 (02/04/22 6602)  Mobility Inpatient CMS G-Code Modifier : CJ (02/04/22 9515)          Goals  Short term goals  Time Frame for Short term goals: In 2-3 days pt will perform  Short term goal 1: Ambulation 48' with walker, Mod I. Short term goal 2: Demonstrate independence with HEP. Patient Goals   Patient goals : To return home       Therapy Time   Individual Concurrent Group Co-treatment   Time In 0812         Time Out 0835         Minutes 23         Timed Code Treatment Minutes: 8 Minutes   Evaluation time: 15 min.      Terri Stanford PT    Electronically signed by Terri Stanford PT [id number] on 2/4/2022 at 8:38 AM

## 2022-02-04 NOTE — PROGRESS NOTES
Hospitalist Progress Note      PCP: Kane Ibarra DO    Date of Admission: 2/3/2022    Chief Complaint: shortness of breath    Hospital Course: The patient is a 61 y.o. male with hx combined systolic and diastolic CHF (EF < 52% with grade II DD), type 2 DM, HTN, and HLD who presents to [facility] with shortness of breath. Patient states that since Monday, he has had progressively worsening shortness of breath, especially with ambulation. He has also noted worsening swelling in his legs, orthopnea. He is unsure if his weight has changed. Denies chest pain, abdominal pain, nausea, vomiting, constipation, diarrhea, and dysuria.     In the ED, labs were significant for a BUN/Cr of 56/2.4, glucose of 154, pro-BNP of 21,659, troponin of 0.08. CXR showed mild pulmonary vascular congestion and stable cardiomegaly. Subjective: Pt seen and examined. Feels ok today, urinating somewhat he says.        Medications:  Reviewed    Infusion Medications    sodium chloride      milrinone 0.25 mcg/kg/min (02/04/22 8757)    furosemide (LASIX) 1mg/ml infusion 10 mg/hr (02/04/22 1256)    dextrose       Scheduled Medications    allopurinol  100 mg Oral Daily    atorvastatin  80 mg Oral Daily    isosorbide mononitrate  30 mg Oral Daily    levothyroxine  25 mcg Oral QAM AC    metoprolol succinate  12.5 mg Oral Daily    therapeutic multivitamin-minerals  1 tablet Oral Daily    rivaroxaban  15 mg Oral Dinner    [Held by provider] sacubitril-valsartan  1 tablet Oral BID    sodium chloride flush  5-40 mL IntraVENous 2 times per day    insulin lispro  0-12 Units SubCUTAneous TID     insulin lispro  0-6 Units SubCUTAneous Nightly     PRN Meds: sodium chloride flush, sodium chloride, ondansetron **OR** ondansetron, polyethylene glycol, acetaminophen **OR** acetaminophen, glucose, dextrose, glucagon (rDNA), dextrose      Intake/Output Summary (Last 24 hours) at 2/4/2022 9113  Last data filed at 2/4/2022 6292  Gross per 24 hour   Intake 720 ml   Output 1505 ml   Net -785 ml       Exam:    /71   Pulse 78   Temp 98.1 °F (36.7 °C) (Oral)   Resp 18   Ht 5' 6\" (1.676 m)   Wt 191 lb (86.6 kg)   SpO2 95%   BMI 30.83 kg/m²     General appearance: No apparent distress appears stated age and cooperative. HEENT Normal cephalic, atraumatic without obvious deformity. Pupils equal, round, and reactive to light. Extra ocular muscles intact. Conjunctivae/corneas clear. Neck: Supple, No jugular venous distention/bruits. Trachea midline without thyromegaly or adenopathy with full range of motion. Lungs: No increased work of breathing, no accessory muscle use, bibasilar crackles. Heart: Regular rate and rhythm with Normal S1/S2 without murmurs, rubs or gallops, point of maximum impulse non-displaced  Abdomen: Soft, non-tender or non-distended without rigidity or guarding and positive bowel sounds all four quadrants. Extremities: No clubbing, cyanosis, 3+ bilateral pitting edema bilaterally. Full range of motion without deformity and normal gait intact. Skin: Skin color, texture, turgor normal.  No rashes or lesions. Neurologic: Alert and oriented X 3, neurovascularly intact with sensory/motor intact upper extremities/lower extremities, bilaterally. Cranial nerves: II-XII intact, grossly non-focal.  Mental status: Alert, oriented, thought content appropriate. Capillary Refill: Acceptable  < 3 seconds  Peripheral Pulses: +3 Easily felt, not easily obliterated with pressure    Labs:   Recent Labs     02/03/22  1225 02/04/22  0620   WBC 3.2* 3.9*   HGB 11.0* 10.4*   HCT 34.3* 31.8*   * 125*     Recent Labs     02/03/22  1225 02/04/22  0620    143   K 3.7 3.4*    106   CO2 24 26   BUN 56* 56*   CREATININE 2.4* 2.3*   CALCIUM 8.0* 7.9*     Recent Labs     02/03/22  1225   AST 28   ALT 22   BILITOT 0.8   ALKPHOS 107     No results for input(s): INR in the last 72 hours.   Recent Labs 02/03/22  1225 02/03/22  1702 02/03/22  1840   TROPONINI 0.08* 0.08* 0.07*       Urinalysis:      Lab Results   Component Value Date    NITRU Negative 02/03/2022    WBCUA 1 02/03/2022    RBCUA 1 02/03/2022    BLOODU Negative 02/03/2022    SPECGRAV 1.010 02/03/2022    GLUCOSEU Negative 02/03/2022       Radiology:  XR CHEST PORTABLE   Final Result   Mild pulmonary vascular congestion. Stable cardiomegaly. Assessment/Plan:    Active Hospital Problems    Diagnosis Date Noted    Acute on chronic systolic CHF (congestive heart failure) ([facility]) [I50.23] 02/03/2022       Shortness of breath likely 2/2 acute on chronic combined systolic and diastolic CHF exacerbation - Clinically patient presents with orthopnea, peripheral edema, crackles on exam.   -IV Lasix gtt @ 10  -milrinone gtt  -strict I/O's  -daily weights  -medellin catheter  -1500 ml fluid restriction, low sodium diet  -daily renal panel  -cardiology consulted, recs appreciated  -CHF nurse consulted, recs appreciated  -continue home statin, BB, Entresto, Xarelto     SONAL on CKD III - suspect cardiorenal syndrome  -management as above  -nephrology consulted, recs appreciated  -avoid nephrotoxic medications  -trend and monitor  -hold Entresto     CAD  -cardiology consulted  -tele monitoring  -continue home meds  -hold Entresto due to SONAL     Type 2 DM  -SSI  -hypoglycemia protocol  -POCT glucose checks  -carb control diet     Essential hypertension  -continue BB  -hold Entresto due to SONAL     Hyperlipidemia  -continue home meds     Hypothyroidism  -continue home meds      DVT Prophylaxis: Xarelto  Diet: ADULT DIET; Regular;  Low Sodium (2 gm); 1500 ml  Code Status: Full Code    PT/OT Eval Status: ordered    Dispo - continue care    Rajesh Harrison MD

## 2022-02-04 NOTE — PLAN OF CARE
Problem: OXYGENATION/RESPIRATORY FUNCTION  Goal: Patient will maintain patent airway  Outcome: Ongoing  Goal: Patient will achieve/maintain normal respiratory rate/effort  Description: Respiratory rate and effort will be within normal limits for the patient  Outcome: Ongoing     Problem: HEMODYNAMIC STATUS  Goal: Patient has stable vital signs and fluid balance  Outcome: Ongoing     Problem: FLUID AND ELECTROLYTE IMBALANCE  Goal: Fluid and electrolyte balance are achieved/maintained  Outcome: Ongoing     Problem: ACTIVITY INTOLERANCE/IMPAIRED MOBILITY  Goal: Mobility/activity is maintained at optimum level for patient  Outcome: Ongoing     Problem: Falls - Risk of:  Goal: Will remain free from falls  Description: Will remain free from falls  Outcome: Ongoing  Patient assessed for fall risk; fall precautions initiated. Patient and family instructed about safety devices. Environment kept free of clutter and adequate lighting provided. Bed locked and in lowest position. Call light within reach. Will continue to monitor.     Goal: Absence of physical injury  Description: Absence of physical injury  Outcome: Ongoing

## 2022-02-05 NOTE — PROGRESS NOTES
[address]   Daily Progress Note      Admit Date:  2/3/2022      Subjective:   Mr. Odin Pugh is a 61 y.o. male with a past medical history significant for CAD, chronic systolic CHF with ICD in place (8/13/19), LV thrombus, hyperlipidemia, essential hypertension, type 2 DM with complications of diabetic nephropathy stage 3 who presented to [facility] ED with shortness of breath and leg swelling. He was discharged from [facility] on 11/30/21 after being hospitalized with CHF. He has not kept any of his follow-up appointments since being discharged. His weight at that time was 145lbs. Interval History:  Nabor Guzmán reports his breathing is fine. Good urine output overnight. He remains on milrinone and lasix drip at 10mg/hr. Sinus rhythm on telemetry.        Objective:     /73   Pulse (!) 43   Temp 98.1 °F (36.7 °C) (Oral)   Resp 16   Ht 5' 6\" (1.676 m)   Wt 188 lb 0.8 oz (85.3 kg)   SpO2 97%   BMI 30.35 kg/m²      Intake/Output Summary (Last 24 hours) at 2/5/2022 8648  Last data filed at 2/5/2022 3944  Gross per 24 hour   Intake 730 ml   Output 3025 ml   Net -2295 ml       Physical Exam:  General:  Awake, alert, NAD  Skin:  Warm and dry  Neck:  JVD<8, no carotid bruits  Chest:  Clear to auscultation, no wheezes/rhonchi/rales  Cardiovascular:  RRR, normal S1/S2, no M/R/G  Abdomen:  Soft, nontender, +bowel sounds  Extremities:  2+ bilateral LE edema  Pulses: 2+ bilat carotid    2+ bilat radial    2+ bilat femoral        Medications:    potassium chloride  20 mEq Oral BID    spironolactone  25 mg Oral Daily    allopurinol  100 mg Oral Daily    atorvastatin  80 mg Oral Daily    isosorbide mononitrate  30 mg Oral Daily    levothyroxine  25 mcg Oral QAM AC    [Held by provider] metoprolol succinate  12.5 mg Oral Daily    therapeutic multivitamin-minerals  1 tablet Oral Daily    rivaroxaban  15 mg Oral Dinner    [Held by provider] sacubitril-valsartan  1 tablet Oral BID    sodium chloride flush  5-40 mL IntraVENous 2 times per day    insulin lispro  0-12 Units SubCUTAneous TID WC    insulin lispro  0-6 Units SubCUTAneous Nightly      sodium chloride      milrinone 0.25 mcg/kg/min (02/05/22 0054)    furosemide (LASIX) 1mg/ml infusion 10 mg/hr (02/05/22 0632)    dextrose         Lab Data:  CBC:   Recent Labs     02/03/22  1225 02/04/22  0620 02/05/22  0547   WBC 3.2* 3.9* 3.5*   HGB 11.0* 10.4* 10.0*   * 125* 125*     BMP:    Recent Labs     02/04/22  0620 02/04/22  1730 02/05/22  0801    141 145   K 3.4* 3.5 3.4*   CO2 26 27 26   BUN 56* 50* 55*   CREATININE 2.3* 2.1* 2.0*     LIVR:   Recent Labs     02/03/22  1225   AST 28   ALT 22     PT/INR:   Recent Labs     02/03/22  1225   PROT 5.8*     Recent Labs     02/03/22  1225 02/03/22  1702 02/03/22  1840   TROPONINI 0.08* 0.08* 0.07*     FASTING LIPID PANEL:  Lab Results   Component Value Date    CHOL 95 02/04/2022    HDL 48 02/04/2022    HDL 58 12/13/2011    TRIG 43 02/04/2022         Assessment / Plan:   1. Acute on Chronic Systolic CHF, class 4  I agree with the milrinone and lasix drips for Cedric. This is what has worked best for him in the past.  His understanding of what \"follow-up\" means is poor. Hold Toprol for now while he is on the Phosphodiesterase inhibitor. Okay to hold Entresto as well  Will start SGLT2 inhibitor as an outpatient.      2. Acute on Chronic Kidney Disease, stage 3     Creatinine is down a bit today. Hold Entresto but okay to add aldactone if okay with Nephrology (Dr. Krystal Tinsley)       Diana Moise and I again talked about follow-up in the office.  .         Signed:  Shandra Rodarte MD

## 2022-02-05 NOTE — PROGRESS NOTES
dextrose, glucagon (rDNA), dextrose      Intake/Output Summary (Last 24 hours) at 2/5/2022 1045  Last data filed at 2/5/2022 9712  Gross per 24 hour   Intake 730 ml   Output 3025 ml   Net -2295 ml       Exam:    /73   Pulse (!) 43   Temp 98.1 °F (36.7 °C) (Oral)   Resp 16   Ht 5' 6\" (1.676 m)   Wt 188 lb 0.8 oz (85.3 kg)   SpO2 97%   BMI 30.35 kg/m²     General appearance: No apparent distress appears stated age and cooperative. HEENT Normal cephalic, atraumatic without obvious deformity. Pupils equal, round, and reactive to light. Extra ocular muscles intact. Conjunctivae/corneas clear. Neck: Supple, No jugular venous distention/bruits. Trachea midline without thyromegaly or adenopathy with full range of motion. Lungs: No increased work of breathing, no accessory muscle use, bibasilar crackles. Heart: Regular rate and rhythm with Normal S1/S2 without murmurs, rubs or gallops, point of maximum impulse non-displaced  Abdomen: Soft, non-tender or non-distended without rigidity or guarding and positive bowel sounds all four quadrants. Extremities: No clubbing, cyanosis, 3+ bilateral pitting edema bilaterally. Full range of motion without deformity and normal gait intact. Skin: Skin color, texture, turgor normal.  No rashes or lesions. Neurologic: Alert and oriented X 3, neurovascularly intact with sensory/motor intact upper extremities/lower extremities, bilaterally. Cranial nerves: II-XII intact, grossly non-focal.  Mental status: Alert, oriented, thought content appropriate.   Capillary Refill: Acceptable  < 3 seconds  Peripheral Pulses: +3 Easily felt, not easily obliterated with pressure    Labs:   Recent Labs     02/03/22  1225 02/04/22  0620 02/05/22  0547   WBC 3.2* 3.9* 3.5*   HGB 11.0* 10.4* 10.0*   HCT 34.3* 31.8* 30.5*   * 125* 125*     Recent Labs     02/04/22  0620 02/04/22  1730 02/05/22  0801    141 145   K 3.4* 3.5 3.4*    105 106   CO2 26 27 26   BUN 56* 50* 55*   CREATININE 2.3* 2.1* 2.0*   CALCIUM 7.9* 8.1* 8.1*     Recent Labs     02/03/22  1225   AST 28   ALT 22   BILITOT 0.8   ALKPHOS 107     No results for input(s): INR in the last 72 hours. Recent Labs     02/03/22  1225 02/03/22  1702 02/03/22  1840   TROPONINI 0.08* 0.08* 0.07*       Urinalysis:      Lab Results   Component Value Date    NITRU Negative 02/03/2022    WBCUA 1 02/03/2022    RBCUA 1 02/03/2022    BLOODU Negative 02/03/2022    SPECGRAV 1.010 02/03/2022    GLUCOSEU Negative 02/03/2022       Radiology:  XR CHEST PORTABLE   Final Result   Mild pulmonary vascular congestion. Stable cardiomegaly. Assessment/Plan:    Active Hospital Problems    Diagnosis Date Noted    Acute on chronic systolic CHF (congestive heart failure) ([facility]) [I50.23] 02/03/2022       Shortness of breath likely 2/2 acute on chronic combined systolic and diastolic CHF exacerbation - Clinically patient presents with orthopnea, peripheral edema, crackles on exam.   -continue IV Lasix gtt @ 10  -continue milrinone gtt  -strict I/O's  -daily weights  -medellin catheter  -1500 ml fluid restriction, low sodium diet  -daily renal panel  -cardiology consulted, recs appreciated  -CHF nurse consulted, recs appreciated  -continue home statin, BB, Xarelto, hold Entresto     SONAL on CKD III - suspect cardiorenal syndrome  -management as above  -nephrology consulted, recs appreciated  -avoid nephrotoxic medications  -trend and monitor  -hold Entresto     CAD  -cardiology consulted  -tele monitoring  -continue home meds  -hold Entresto due to SONAL     Type 2 DM  -SSI  -hypoglycemia protocol  -POCT glucose checks  -carb control diet     Essential hypertension  -continue BB  -hold Entresto due to SONAL     Hyperlipidemia  -continue home meds     Hypothyroidism  -continue home meds      DVT Prophylaxis: Xarelto  Diet: ADULT DIET; Regular;  Low Sodium (2 gm); 1500 ml  Code Status: Full Code    PT/OT Eval Status: ordered    Dispo - continue alecia Lemus MD

## 2022-02-05 NOTE — PROGRESS NOTES
[address] [place] [address] Nephrology   [facility]. [facility]  [phone]  Nephrology Consult Note          Patient ID: Margoth Burks  Referring/ Physician: Josefina Lo MD      HPI/Summary:   Margoth Burks is being seen by nephrology for SONAL. This is a 89-TDKG-NHE man with systolic heart failure, history of LV thrombus, coronary artery disease, hyperlipidemia who presented the hospital with increasing shortness of breath and worsening lower extremity swelling. Blood pressure 102/70  Satting 97% on room air  He is on milrinone 0.25  He is on Lasix drip 10 mg/h  Urine output 3100 cc yesterday  Weight 188 pounds  Of note his last discharge weight was 169 pounds  His baseline creatinine is around 2 over the past 2 years at least.      Plan:   - anticipate his renal function will continue to improved with diuresis   - continue on lasix gtt at 10 mg/hr  - potassium replacement to keep at 4  - agree with holding entresto until more euvolemic.   - BID renal panel, daily mag for electrolyet replacement. - On 20 KCL BID and aldactone 25 mg daily         SONAL on Chronic kidney disease stage IIIb  This is likely secondary to diabetic nephropathy and SONAL from cardiorenal syndrome. Baseline creatinine around 2  Cr 2.3 at time of consult  No acute electrolyte abnormalities  Has had diabetes and hypertension for over 10 years and his last EF is less than 20%  He has had event evidence of albuminuria on urinalysis going back to 2016, 2013 had an  mg  Most recent protein creatinine ratio 700 mg   CT abdomen in 2020 showed symmetric kidneys no lesions no masses bilateral perinephric stranding no hydronephrosis. HCV and hepatitis C negative  Prior negative NAHOMY  Prior negative RF factor    Ischemic cardiomyopathy  Decompensated, has evidence of volume overload  proBNP 55283  Managed with Lasix drip and is on milrinone, appreciate cardiology involvement.   Last EF less than 20%  History of coronary artery disease with stent in the LAD  Has ICD  entresto held     Diabetes type 2  10 years at least  Longstanding albuminuria  A1c has been ranging between 6 and 6.5 the last few checks. Hypertension  On inotrope BP acceptable. [facility] would like to thank you for the opportunity to serve this patient. Please call with any questions or concerns. Vivke Sands, [address] [facility]ph[facility]  [facility]  [place], [address]  Fax: [phone]  Office: [phone]         CC/Reason for consult:   Reason for consult: SONAL  Chief Complaint   Patient presents with    Leg Swelling     pt complains of acute on chronic BLE edema. mild sob on exertion. denies cough. Review of Systems:   [address] All other remaining systems are negative. Constitutional:  fever, chills, weakness, weight change, fatigue,      Skin:  rash, pruritus, hair loss, bruising, dry skin, petechiae. Head, Face, Neck   headaches, swelling,  cervical adenopathy. Respiratory: shortness of breath, cough, or wheezing  Cardiovascular: chest pain, palpitations, dizzy, edema  Gastrointestinal: nausea, vomiting, diarrhea, constipation,belly pain    Yellow skin, blood in stool  Musculoskeletal:  back pain, muscle weakness, gait problems,       joint pain or swelling. Genitourinary:  dysuria, poor urine flow, flank pain, blood in urine  Neurologic:  vertigo, TIA'S, syncope, seizures, focal weakness  Psychosocial:  insomnia, anxiety, or depression. Additional positive findings: -     PMH/SH/FH:    Medical Hx: reviewed and updated as appropriate  Past Medical History:   Diagnosis Date    Diabetes type 2, uncontrolled ([address] 7/28/2014    Diabetic nephropathy 9/12/2013    ED (erectile dysfunction) 9/12/2013    Essential hypertension, benign 9/12/2013    Hyperlipidemia 9/12/2013    Noncompliance 7/28/2014         Surgical Hx: reviewed and updated as appropriate   has a past surgical history that includes eye surgery.      Social Hx: reviewed and updated as appropriate  Social History     Tobacco Use    Smoking status: Former Smoker     Packs/day: 1.50     Years: 7.00     Pack years: 10.50     Quit date: 1981     Years since quittin.0    Smokeless tobacco: Never Used   Substance Use Topics    Alcohol use: Yes     Alcohol/week: 1.0 standard drink     Types: 1 Cans of beer per week        Family hx: reviewed and updated as appropriate  family history includes Diabetes in his brother, mother, and sister; High Blood Pressure in his mother. Medications:   potassium chloride, 20 mEq, BID  spironolactone, 25 mg, Daily  allopurinol, 100 mg, Daily  atorvastatin, 80 mg, Daily  isosorbide mononitrate, 30 mg, Daily  levothyroxine, 25 mcg, QAM AC  [Held by provider] metoprolol succinate, 12.5 mg, Daily  therapeutic multivitamin-minerals, 1 tablet, Daily  rivaroxaban, 15 mg, Dinner  [Held by provider] sacubitril-valsartan, 1 tablet, BID  sodium chloride flush, 5-40 mL, 2 times per day  insulin lispro, 0-12 Units, TID WC  insulin lispro, 0-6 Units, Nightly       Patient has no known allergies. Allergies:   No Known Allergies      Physical Exam/Objective:   Vitals:    22 1215   BP: 102/70   Pulse: 68   Resp: 18   Temp: 97.3 °F (36.3 °C)   SpO2:        Intake/Output Summary (Last 24 hours) at 2022 1342  Last data filed at 2022 4766  Gross per 24 hour   Intake 610 ml   Output 2775 ml   Net -2165 ml         General appearance:  in no acute distress, comfortable, communicative, awake and alert. HEENT: no icterus, EOM intact, trachea midline. Neck : no masses, appears symmetrical + JVD  Respiratory: Respiratory effort normal, bilateral equal chest rise. No wheeze, + crackles. Cardiovascular: Ausculation shows RRR and  ++ edema   Abdomen: abdomen is soft, non distended, no masses, no pain with palpation.   Musculoskeletal:  no joint swelling, no deformity, strength grossly normal.   Skin: no rashes, no induration, no tightening, no jaundice   Neuro:   Follows commands, moves all extremities spontaneously       Data:   CBC:   Recent Labs     02/03/22  1225 02/04/22  0620 02/05/22  0547   WBC 3.2* 3.9* 3.5*   HGB 11.0* 10.4* 10.0*   HCT 34.3* 31.8* 30.5*   * 125* 125*     BMP:    Recent Labs     02/04/22  0620 02/04/22  1730 02/05/22  0547 02/05/22  0801    141  --  145   K 3.4* 3.5  --  3.4*    105  --  106   CO2 26 27  --  26   BUN 56* 50*  --  55*   CREATININE 2.3* 2.1*  --  2.0*   GLUCOSE 126* 125*  --  67*   MG 2.20  --  2.30  --

## 2022-02-05 NOTE — CARE COORDINATION
Case Management Assessment           Initial Evaluation                Date / Time of Evaluation: 2/5/2022 9:59 AM                 Assessment Completed by: Tal Schmidt RN    Patient Name: Juliana Ac     YOB: 1955  Diagnosis: Dyspnea on exertion [R06.00]  Bilateral lower extremity edema [R60.0]  Acute on chronic systolic CHF (congestive heart failure) ([address] [I50.23]  Stage 4 chronic kidney disease ([address] [N18.4]  Acute on chronic congestive heart failure, unspecified heart failure type ([address] [I50.9]     Date / Time: 2/3/2022 11:11 AM    Patient Admission Status: Inpatient    Current PCP: Cecilia Santos DO    Chart Reviewed: Yes    Emergency Contacts:  Extended Emergency Contact Information  Primary Emergency Contact: Moraima Vasquez Phone: [phone]  Relation: Brother/Sister  Secondary Emergency Contact: Pedro [address] [place] [address] [address] [facility] Phone: [phone]  Relation: Child    Advance Directives:   Code Status: Full Code    Financial  Payor: [address] / Plan: [facility] MEDICARE COMPLETE / Product Type: *No Product type* /     Lisa [address], [address] [facility] [phone]  [address] [place] [address]  Phone: [phone] Fax: [phone]    [facility] [address] [address] [facility], [address] [phone] - F [phone]  [facility] [address]  Phone: [phone] Fax: [phone]    [facility] [address] [facility], [address] [facility] [phone] Jeremías Joe [phone]  [address] [place] [address]  Phone: [phone] Fax: [phone]    Does Patient want to participate in local refill/ meds to beds program?:  yes    Meds To Beds General Rules:  1. Can ONLY be done Monday- Friday between 8:30am-5pm  2. Prescription(s) must be in pharmacy by 3pm to be filled same day  3. Copy of patient's insurance/ prescription drug card and patient face sheet must be sent along with the prescription(s)  4. Cost of Rx cannot be added to hospital bill. If financial assistance is needed, please contact unit  or ;  or  CANNOT provide pharmacy voucher for patients co-pays  5. Patients can then  the prescription on their way out of the hospital at discharge, or pharmacy can deliver to the bedside if staff is available. (payment due at time of pick-up or delivery - cash, check, or card accepted)     Able to afford home medications/ co-pay costs: Yes    ADLS Independent with son's support  Support Systems: Children    PT AM-PAC: 22 /24  OT AM-PAC: 21 /24    [place]: apartment with his son    Plans to RETURN to current housing: Yes    Checo Henning  Currently ACTIVE with [address]: No    Durable Medical Equipment  Equipment: 4 wheeled walker    DISCHARGE PLAN:  Disposition: Home- No Services Needed  Pt adamantly declined [address] for discharge: family     The Patient and/or patient representative Stephani Rodas and his family were provided with a choice of provider and agrees with the discharge plan Yes    Freedom of choice list was provided with basic dialogue that supports the patient's individualized plan of care/goals and shares the quality data associated with the providers.  Yes      Governor Jose RN  Case Management  [phone]

## 2022-02-06 NOTE — PLAN OF CARE
Problem: OXYGENATION/RESPIRATORY FUNCTION  Goal: Patient will maintain patent airway  2/5/2022 2145 by Lesly Vanegas RN  Outcome: Ongoing  2/5/2022 0759 by Lj Diaz RN  Outcome: Ongoing  Goal: Patient will achieve/maintain normal respiratory rate/effort  Description: Respiratory rate and effort will be within normal limits for the patient  2/5/2022 2145 by Lesly Vanegas RN  Outcome: Ongoing  2/5/2022 0759 by Lj Diaz RN  Outcome: Ongoing     Problem: HEMODYNAMIC STATUS  Goal: Patient has stable vital signs and fluid balance  2/5/2022 2145 by Lesly Vanegas RN  Outcome: Ongoing  2/5/2022 0759 by Lj Diaz RN  Outcome: Ongoing     Problem: FLUID AND ELECTROLYTE IMBALANCE  Goal: Fluid and electrolyte balance are achieved/maintained  2/5/2022 2145 by Lesly Vanegas RN  Outcome: Ongoing  2/5/2022 0759 by Lj Diaz RN  Outcome: Ongoing

## 2022-02-06 NOTE — PROGRESS NOTES
Hospitalist Progress Note      PCP: Vanessa Shabazz DO    Date of Admission: 2/3/2022    Chief Complaint: shortness of breath    Hospital Course: The patient is a 61 y.o. male with hx combined systolic and diastolic CHF (EF < 19% with grade II DD), type 2 DM, HTN, and HLD who presents to [facility] with shortness of breath. Patient states that since Monday, he has had progressively worsening shortness of breath, especially with ambulation. He has also noted worsening swelling in his legs, orthopnea. He is unsure if his weight has changed. Denies chest pain, abdominal pain, nausea, vomiting, constipation, diarrhea, and dysuria.     In the ED, labs were significant for a BUN/Cr of 56/2.4, glucose of 154, pro-BNP of 21,659, troponin of 0.08. CXR showed mild pulmonary vascular congestion and stable cardiomegaly. Subjective: Pt seen and examined. Feels ok, breathing improved. Feels that he is losing \"water\".       Medications:  Reviewed    Infusion Medications    sodium chloride      milrinone 0.25 mcg/kg/min (02/06/22 7874)    furosemide (LASIX) 1mg/ml infusion 10 mg/hr (02/05/22 0804)    dextrose       Scheduled Medications    metOLazone  5 mg Oral Once    potassium chloride  20 mEq Oral BID    spironolactone  25 mg Oral Daily    allopurinol  100 mg Oral Daily    atorvastatin  80 mg Oral Daily    isosorbide mononitrate  30 mg Oral Daily    levothyroxine  25 mcg Oral QAM AC    [Held by provider] metoprolol succinate  12.5 mg Oral Daily    therapeutic multivitamin-minerals  1 tablet Oral Daily    rivaroxaban  15 mg Oral Dinner    [Held by provider] sacubitril-valsartan  1 tablet Oral BID    sodium chloride flush  5-40 mL IntraVENous 2 times per day    insulin lispro  0-12 Units SubCUTAneous TID WC    insulin lispro  0-6 Units SubCUTAneous Nightly     PRN Meds: potassium chloride, sodium chloride flush, sodium chloride, ondansetron **OR** ondansetron, polyethylene glycol, acetaminophen **OR** acetaminophen, glucose, dextrose, glucagon (rDNA), dextrose      Intake/Output Summary (Last 24 hours) at 2/6/2022 1139  Last data filed at 2/6/2022 0422  Gross per 24 hour   Intake 360 ml   Output 3475 ml   Net -3115 ml       Exam:    /79   Pulse 88   Temp 98.3 °F (36.8 °C) (Oral)   Resp 16   Ht 5' 6\" (1.676 m)   Wt 185 lb 6.5 oz (84.1 kg)   SpO2 97%   BMI 29.93 kg/m²     General appearance: No apparent distress appears stated age and cooperative. HEENT Normal cephalic, atraumatic without obvious deformity. Pupils equal, round, and reactive to light. Extra ocular muscles intact. Conjunctivae/corneas clear. Neck: Supple, No jugular venous distention/bruits. Trachea midline without thyromegaly or adenopathy with full range of motion. Lungs: No increased work of breathing, no accessory muscle use, bibasilar crackles. Heart: Regular rate and rhythm with Normal S1/S2 without murmurs, rubs or gallops, point of maximum impulse non-displaced  Abdomen: Soft, non-tender or non-distended without rigidity or guarding and positive bowel sounds all four quadrants. Extremities: No clubbing, cyanosis, 3+ bilateral pitting edema bilaterally. Full range of motion without deformity and normal gait intact. Skin: Skin color, texture, turgor normal.  No rashes or lesions. Neurologic: Alert and oriented X 3, neurovascularly intact with sensory/motor intact upper extremities/lower extremities, bilaterally. Cranial nerves: II-XII intact, grossly non-focal.  Mental status: Alert, oriented, thought content appropriate.   Capillary Refill: Acceptable  < 3 seconds  Peripheral Pulses: +3 Easily felt, not easily obliterated with pressure    Labs:   Recent Labs     02/04/22  0620 02/05/22  0547 02/06/22  0532   WBC 3.9* 3.5* 5.5   HGB 10.4* 10.0* 10.8*   HCT 31.8* 30.5* 32.9*   * 125* 149     Recent Labs     02/04/22  1730 02/05/22  0801 02/05/22  1733    145 141   K 3.5 3.4* 3.6    106 105   CO2 27 26 25   BUN 50* 55* 54*   CREATININE 2.1* 2.0* 1.9*   CALCIUM 8.1* 8.1* 8.0*     Recent Labs     02/03/22  1225   AST 28   ALT 22   BILITOT 0.8   ALKPHOS 107     No results for input(s): INR in the last 72 hours. Recent Labs     02/03/22  1225 02/03/22  1702 02/03/22  1840   TROPONINI 0.08* 0.08* 0.07*       Urinalysis:      Lab Results   Component Value Date    NITRU Negative 02/03/2022    WBCUA 1 02/03/2022    RBCUA 1 02/03/2022    BLOODU Negative 02/03/2022    SPECGRAV 1.010 02/03/2022    GLUCOSEU Negative 02/03/2022       Radiology:  XR CHEST PORTABLE   Final Result   Mild pulmonary vascular congestion. Stable cardiomegaly. Assessment/Plan:    Active Hospital Problems    Diagnosis Date Noted    Acute on chronic systolic CHF (congestive heart failure) ([facility]) [I50.23] 02/03/2022       Shortness of breath likely 2/2 acute on chronic combined systolic and diastolic CHF exacerbation - Clinically patient presents with orthopnea, peripheral edema, crackles on exam.   -continue IV Lasix gtt @ 10  -continue milrinone gtt  -given dose of metolazone today  -strict I/O's  -daily weights  -medellin catheter  -1500 ml fluid restriction, low sodium diet  -daily renal panel  -cardiology consulted, recs appreciated  -CHF nurse consulted, recs appreciated  -continue home statin, Xarelto, aldactone; hold Entresto and BB     SONAL on CKD III - suspect cardiorenal syndrome  -management as above  -nephrology consulted, recs appreciated  -avoid nephrotoxic medications  -trend and monitor  -hold Entresto     CAD  -cardiology consulted  -tele monitoring  -continue home meds  -hold Entresto due to SONAL     Type 2 DM  -SSI  -hypoglycemia protocol  -POCT glucose checks  -carb control diet     Essential hypertension  -hold BB  -hold Entresto due to SONAL  -continue aldactone, Imdur     Hyperlipidemia  -continue home meds     Hypothyroidism  -continue home meds      DVT Prophylaxis: Xarelto  Diet: ADULT DIET; Regular;  Low Sodium (2 gm); 1500 ml  Code Status: Full Code    PT/OT Eval Status: ordered    Dispo - continue care    Usman Bishop MD

## 2022-02-06 NOTE — PROGRESS NOTES
[address] [facility] Nephrology   [facility]  [phone]  Nephrology Consult Note          Patient ID: Elvin Patel  Referring/ Physician: Capo Mcdonnell MD      HPI/Summary:   Elvin Patel is being seen by nephrology for SONAL. This is a 89-GLIX-LWM man with systolic heart failure, history of LV thrombus, coronary artery disease, hyperlipidemia who presented the hospital with increasing shortness of breath and worsening lower extremity swelling. Blood pressure 122/79  Satting 97% on room air  He is on milrinone 0.25  He is on Lasix drip 10 mg/h  Urine output 3700 cc yesterday  Weight 185 lbs from 190 lbs peak and last discharge weight was 169 pounds  His baseline creatinine is around 2 over the past 2 years at least.    Plan:   - Renal function stable now 2.1 which is baseline and monitor with diuresis   - continue on lasix gtt at 10 mg/hr  - potassium replacement extra 40 mEq IV today to keep at 4  - agree with holding entresto until more euvolemic.   - BID renal panel, daily mag for electrolyet replacement. - On 20 KCL BID and aldactone 25 mg daily         SONAL on Chronic kidney disease stage IIIb  This is likely secondary to diabetic nephropathy and SONAL from cardiorenal syndrome. Baseline creatinine around 2  Cr 2.3 at time of consult  No acute electrolyte abnormalities  Has had diabetes and hypertension for over 10 years and his last EF is less than 20%  He has had event evidence of albuminuria on urinalysis going back to 2016, 2013 had an  mg  Most recent protein creatinine ratio 700 mg   CT abdomen in 2020 showed symmetric kidneys no lesions no masses bilateral perinephric stranding no hydronephrosis. HCV and hepatitis C negative  Prior negative NAHOMY  Prior negative RF factor    Ischemic cardiomyopathy  Decompensated, has evidence of volume overload  proBNP 73967  Managed with Lasix drip and is on milrinone, appreciate cardiology involvement.   Last EF less than 20%  History of coronary artery disease with stent in the LAD  Has ICD  entresto held     Diabetes type 2  10 years at least  Longstanding albuminuria  A1c has been ranging between 6 and 6.5 the last few checks. Hypertension  On inotrope BP acceptable. [facility] would like to thank you for the opportunity to serve this patient. Please call with any questions or concerns. Regis Edwards, [address] [facility] Nephrology  [facility]  [place], [address]  Fax: [phone]  Office: [phone]         CC/Reason for consult:   Reason for consult: SONAL  Chief Complaint   Patient presents with    Leg Swelling     pt complains of acute on chronic BLE edema. mild sob on exertion. denies cough. Review of Systems:   [address]. All other remaining systems are negative. Constitutional:  fever, chills, weakness, weight change, fatigue,      Skin:  rash, pruritus, hair loss, bruising, dry skin, petechiae. Head, Face, Neck   headaches, swelling,  cervical adenopathy. Respiratory: shortness of breath, cough, or wheezing  Cardiovascular: chest pain, palpitations, dizzy, edema  Gastrointestinal: nausea, vomiting, diarrhea, constipation,belly pain    Yellow skin, blood in stool  Musculoskeletal:  back pain, muscle weakness, gait problems,       joint pain or swelling. Genitourinary:  dysuria, poor urine flow, flank pain, blood in urine  Neurologic:  vertigo, TIA'S, syncope, seizures, focal weakness  Psychosocial:  insomnia, anxiety, or depression.   Additional positive findings: -     PMH/SH/FH:    Medical Hx: reviewed and updated as appropriate  Past Medical History:   Diagnosis Date    Diabetes type 2, uncontrolled ([facility] 7/28/2014    Diabetic nephropathy 9/12/2013    ED (erectile dysfunction) 9/12/2013    Essential hypertension, benign 9/12/2013    Hyperlipidemia 9/12/2013    Noncompliance 7/28/2014         Surgical Hx: reviewed and updated as appropriate   has a past surgical history that includes eye surgery. Social Hx: reviewed and updated as appropriate  Social History     Tobacco Use    Smoking status: Former Smoker     Packs/day: 1.50     Years: 7.00     Pack years: 10.50     Quit date: 1981     Years since quittin.0    Smokeless tobacco: Never Used   Substance Use Topics    Alcohol use: Yes     Alcohol/week: 1.0 standard drink     Types: 1 Cans of beer per week        Family hx: reviewed and updated as appropriate  family history includes Diabetes in his brother, mother, and sister; High Blood Pressure in his mother. Medications:   potassium chloride, 20 mEq, BID  spironolactone, 25 mg, Daily  allopurinol, 100 mg, Daily  atorvastatin, 80 mg, Daily  isosorbide mononitrate, 30 mg, Daily  levothyroxine, 25 mcg, QAM AC  [Held by provider] metoprolol succinate, 12.5 mg, Daily  therapeutic multivitamin-minerals, 1 tablet, Daily  rivaroxaban, 15 mg, Dinner  [Held by provider] sacubitril-valsartan, 1 tablet, BID  sodium chloride flush, 5-40 mL, 2 times per day  insulin lispro, 0-12 Units, TID WC  insulin lispro, 0-6 Units, Nightly       Patient has no known allergies. Allergies:   No Known Allergies      Physical Exam/Objective:   Vitals:    22 0802   BP: 122/79   Pulse: 88   Resp: 16   Temp: 98.3 °F (36.8 °C)   SpO2: 97%       Intake/Output Summary (Last 24 hours) at 2022 1349  Last data filed at 2022 0422  Gross per 24 hour   Intake 120 ml   Output 3475 ml   Net -3355 ml         General appearance:  in no acute distress, comfortable, communicative, awake and alert. HEENT: no icterus, EOM intact, trachea midline. Neck : no masses, appears symmetrical + JVD  Respiratory: Respiratory effort normal, bilateral equal chest rise. No wheeze, + crackles. Cardiovascular: Ausculation shows RRR and  ++ edema   Abdomen: abdomen is soft, non distended, no masses, no pain with palpation.   Musculoskeletal:  no joint swelling, no deformity, strength grossly normal.   Skin: no rashes, no induration, no tightening, no jaundice   Neuro: Follows commands, moves all extremities spontaneously       Data:   CBC:   Recent Labs     02/04/22  0620 02/05/22  0547 02/06/22  0532   WBC 3.9* 3.5* 5.5   HGB 10.4* 10.0* 10.8*   HCT 31.8* 30.5* 32.9*   * 125* 149     BMP:    Recent Labs     02/04/22  0620 02/04/22  1730 02/05/22  0547 02/05/22  0801 02/05/22  1733 02/06/22  0532 02/06/22  1127      < >  --  145 141  --  140   K 3.4*   < >  --  3.4* 3.6  --  3.7      < >  --  106 105  --  102   CO2 26   < >  --  26 25  --  26   BUN 56*   < >  --  55* 54*  --  53*   CREATININE 2.3*   < >  --  2.0* 1.9*  --  2.1*   GLUCOSE 126*   < >  --  67* 173*  --  175*   MG 2.20  --  2.30  --   --  2.20  --     < > = values in this interval not displayed.

## 2022-02-06 NOTE — PLAN OF CARE
Problem: OXYGENATION/RESPIRATORY FUNCTION  Goal: Patient will maintain patent airway  2/6/2022 1043 by Fang Wilburn RN  Outcome: Ongoing  2/6/2022 1043 by Fang Wilburn RN  Outcome: Ongoing  2/5/2022 2145 by Rajiv Gagnon RN  Outcome: Ongoing  Goal: Patient will achieve/maintain normal respiratory rate/effort  Description: Respiratory rate and effort will be within normal limits for the patient  2/6/2022 1043 by Fang Wilburn RN  Outcome: Ongoing  2/6/2022 1043 by Fang Wilburn RN  Outcome: Ongoing  2/5/2022 2145 by Rajiv Gagnon RN  Outcome: Ongoing     Problem: HEMODYNAMIC STATUS  Goal: Patient has stable vital signs and fluid balance  2/6/2022 1043 by Fang Wilburn RN  Outcome: Ongoing  2/6/2022 1043 by Fang Wilburn RN  Outcome: Ongoing  2/5/2022 2145 by Rajiv Gagnon RN  Outcome: Ongoing     Problem: FLUID AND ELECTROLYTE IMBALANCE  Goal: Fluid and electrolyte balance are achieved/maintained  2/6/2022 1043 by Fang Wilburn RN  Outcome: Ongoing  2/6/2022 1043 by Fang Wilburn RN  Outcome: Ongoing  2/5/2022 2145 by Rajiv Gagnon RN  Outcome: Ongoing     Problem: ACTIVITY INTOLERANCE/IMPAIRED MOBILITY  Goal: Mobility/activity is maintained at optimum level for patient  2/6/2022 1043 by Fang Wilburn RN  Outcome: Ongoing  2/6/2022 1043 by Fang Wilburn RN  Outcome: Ongoing  2/5/2022 2145 by Rajiv Gagnon RN  Outcome: Ongoing     Problem: Falls - Risk of:  Goal: Will remain free from falls  Description: Will remain free from falls  2/6/2022 1043 by Fang Wilburn RN  Outcome: Ongoing  2/6/2022 1043 by Fang Wilburn RN  Outcome: Ongoing  2/5/2022 2145 by Rajiv Gagnon RN  Outcome: Ongoing  Goal: Absence of physical injury  Description: Absence of physical injury  2/6/2022 1043 by Fang Wilburn RN  Outcome: Ongoing  2/6/2022 1043 by Fang Wilburn RN  Outcome: Ongoing  2/5/2022 2145 by Rajiv Gagnon RN  Outcome: Ongoing

## 2022-02-06 NOTE — PROGRESS NOTES
[address]   Daily Progress Note      Admit Date:  2/3/2022      Subjective:   Mr. Itzel Alvarez is a 61 y.o. male with a past medical history significant for CAD, chronic systolic CHF with ICD in place (8/13/19), LV thrombus, hyperlipidemia, essential hypertension, type 2 DM with complications of diabetic nephropathy stage 3 who presented to [facility] ED with shortness of breath and leg swelling. He was discharged from [facility] on 11/30/21 after being hospitalized with CHF. He has not kept any of his follow-up appointments since being discharged. His weight at that time was 145lbs. Interval History:  Willam Geronimo is comfortable and says her breathing is good. Good urine output overnight. He remains on milrinone and lasix drip at 10mg/hr. Sinus rhythm on telemetry. Objective:     /79   Pulse 88   Temp 98.3 °F (36.8 °C) (Oral)   Resp 16   Ht 5' 6\" (1.676 m)   Wt 185 lb 6.5 oz (84.1 kg)   SpO2 97%   BMI 29.93 kg/m²      Intake/Output Summary (Last 24 hours) at 2/6/2022 1128  Last data filed at 2/6/2022 0422  Gross per 24 hour   Intake 360 ml   Output 3475 ml   Net -3115 ml       Physical Exam:  General:  Awake, alert, NAD  Skin:  Warm and dry  Neck:  JVD<8, no carotid bruits  Chest:  Clear to auscultation, no wheezes/rhonchi/rales  Cardiovascular:  RRR, normal S1/S2, no M/R/G  Abdomen:  Soft, nontender, +bowel sounds  Extremities:  Anasarca to hips.   Pulses: 2+ bilat carotid    2+ bilat radial    2+ bilat femoral        Medications:    metOLazone  5 mg Oral Once    potassium chloride  20 mEq Oral BID    spironolactone  25 mg Oral Daily    allopurinol  100 mg Oral Daily    atorvastatin  80 mg Oral Daily    isosorbide mononitrate  30 mg Oral Daily    levothyroxine  25 mcg Oral QAM AC    [Held by provider] metoprolol succinate  12.5 mg Oral Daily    therapeutic multivitamin-minerals  1 tablet Oral Daily    rivaroxaban  15 mg Oral Dinner    [Held by provider] sacubitril-valsartan  1 tablet Oral BID    sodium chloride flush  5-40 mL IntraVENous 2 times per day    insulin lispro  0-12 Units SubCUTAneous TID WC    insulin lispro  0-6 Units SubCUTAneous Nightly      sodium chloride      milrinone 0.25 mcg/kg/min (02/06/22 0950)    furosemide (LASIX) 1mg/ml infusion 10 mg/hr (02/05/22 1644)    dextrose         Lab Data:  CBC:   Recent Labs     02/04/22  0620 02/05/22  0547 02/06/22  0532   WBC 3.9* 3.5* 5.5   HGB 10.4* 10.0* 10.8*   * 125* 149     BMP:    Recent Labs     02/04/22  1730 02/05/22  0801 02/05/22  1733    145 141   K 3.5 3.4* 3.6   CO2 27 26 25   BUN 50* 55* 54*   CREATININE 2.1* 2.0* 1.9*     LIVR:   Recent Labs     02/03/22  1225   AST 28   ALT 22     PT/INR:   Recent Labs     02/03/22  1225   PROT 5.8*     Recent Labs     02/03/22  1225 02/03/22  1702 02/03/22  1840   TROPONINI 0.08* 0.08* 0.07*     FASTING LIPID PANEL:  Lab Results   Component Value Date    CHOL 95 02/04/2022    HDL 48 02/04/2022    HDL 58 12/13/2011    TRIG 43 02/04/2022         Assessment / Plan:   1. Acute on Chronic Systolic CHF, class 4  IContinue the milrinone and lasix drips for Cedric. Augment his diuresis with a single dose of metolazone 5mg once today. His understanding of what \"follow-up\" means is poor. Hold Toprol for now while he is on the Phosphodiesterase inhibitor. Hold Entresto as well  Will start SGLT2 inhibitor as an outpatient.      2. Acute on Chronic Kidney Disease, stage 3     Creatinine is improving with diuresis today. Hold Entresto but okay to add aldactone if okay with Nephrology (Dr. Alma Ortega)       Savage Ochoa and I again talked about follow-up in the office. He verbalizes understanding.           Signed:  Ysabel Suarez MD

## 2022-02-07 NOTE — PLAN OF CARE
Problem: OXYGENATION/RESPIRATORY FUNCTION  Goal: Patient will maintain patent airway  2/6/2022 1947 by Apollo Landis RN  Outcome: Ongoing  2/6/2022 1043 by Wesley Kee RN  Outcome: Ongoing  2/6/2022 1043 by Wesley Kee RN  Outcome: Ongoing  Goal: Patient will achieve/maintain normal respiratory rate/effort  Description: Respiratory rate and effort will be within normal limits for the patient  2/6/2022 1947 by Apollo Landis RN  Outcome: Ongoing  2/6/2022 1043 by Wesley Kee RN  Outcome: Ongoing  2/6/2022 1043 by Wesley Kee RN  Outcome: Ongoing     Problem: HEMODYNAMIC STATUS  Goal: Patient has stable vital signs and fluid balance  2/6/2022 1947 by Apollo Landis RN  Outcome: Ongoing  2/6/2022 1043 by Wesley Kee RN  Outcome: Ongoing  2/6/2022 1043 by Wesley Kee RN  Outcome: Ongoing     Problem: FLUID AND ELECTROLYTE IMBALANCE  Goal: Fluid and electrolyte balance are achieved/maintained  2/6/2022 1947 by Apollo Landis RN  Outcome: Ongoing  2/6/2022 1043 by Wesley Kee RN  Outcome: Ongoing  2/6/2022 1043 by Wesley Kee RN  Outcome: Ongoing

## 2022-02-07 NOTE — PROGRESS NOTES
[facility] Nephrology   [facility]  [phone]  Nephrology Note          Patient ID: Holly Montgomery  Referring/ Physician: Ailyn Crawford MD      HPI/Summary:   Holly Montgomery is being seen by nephrology for SONAL. This is a 87-XOG[age]-VTR man with systolic heart failure, history of LV thrombus, coronary artery disease, hyperlipidemia who presented the hospital with increasing shortness of breath and worsening lower extremity swelling. Interval hx:   Seen and examined at bedside  He says his breathing is much better and his edema is coming down some. He has no other complaints. Blood pressure 120/67  Some Lasix drip at 10 mg/h and milrinone 0.25  He is on room air  Urine output 2.3 L yesterday's -7.7 L for the admission  His weight is coming down was 190 on admission 173 now, this was standing weight    Labs reviewed  His potassium is 4.2 creatinine 1.9 bicarb 26  Magnesium 2.3  He got one dose of metolazone 5 mg yesterday  He is on 20 mEq twice daily of potassium      Plan:   -He is tolerating diuresis well but still has a ways to go agree with Lasix drip  Will continue Aldactone 25 mg daily  Creatinine stable  No acute electrolyte issues        SONAL on Chronic kidney disease stage IIIb  This is likely secondary to diabetic nephropathy and SONAL from cardiorenal syndrome. Baseline creatinine around 2  Cr 2.3 at time of consult  No acute electrolyte abnormalities  Has had diabetes and hypertension for over 10 years and his last EF is less than 20%  He has had event evidence of albuminuria on urinalysis going back to 2016, 2013 had an  mg  Most recent protein creatinine ratio 700 mg   CT abdomen in 2020 showed symmetric kidneys no lesions no masses bilateral perinephric stranding no hydronephrosis.   HCV and hepatitis C negative  Prior negative NAHOMY  Prior negative RF factor    Ischemic cardiomyopathy  Decompensated, has evidence of volume overload  proBNP [id number]  Managed with Lasix drip and is on bell, appreciate cardiology involvement. Last EF less than 20%  History of coronary artery disease with stent in the LAD  Has ICD  entresto held     Diabetes type 2  10 years at least  Longstanding albuminuria  A1c has been ranging between 6 and 6.5 the last few checks. Hypertension  On inotrope BP acceptable. [facility] would like to thank you for the opportunity to serve this patient. Please call with any questions or concerns. Daniel Piña MD  [facility]  [address]  [place], [address]  Fax: [phone]  Office: [phone]         CC/Reason for consult:   Reason for consult: SONAL  Chief Complaint   Patient presents with    Leg Swelling     pt complains of acute on chronic BLE edema. mild sob on exertion. denies cough. Review of Systems:   [address] All other remaining systems are negative. Constitutional:  fever, chills, weakness, weight change, fatigue,      Skin:  rash, pruritus, hair loss, bruising, dry skin, petechiae. Head, Face, Neck   headaches, swelling,  cervical adenopathy. Respiratory: shortness of breath, cough, or wheezing  Cardiovascular: chest pain, palpitations, dizzy, edema  Gastrointestinal: nausea, vomiting, diarrhea, constipation,belly pain    Yellow skin, blood in stool  Musculoskeletal:  back pain, muscle weakness, gait problems,       joint pain or swelling. Genitourinary:  dysuria, poor urine flow, flank pain, blood in urine  Neurologic:  vertigo, TIA'S, syncope, seizures, focal weakness  Psychosocial:  insomnia, anxiety, or depression.   Additional positive findings: -     PMH/SH/FH:    Medical Hx: reviewed and updated as appropriate  Past Medical History:   Diagnosis Date    Diabetes type 2, uncontrolled ([address] 7/28/2014    Diabetic nephropathy 9/12/2013    ED (erectile dysfunction) 9/12/2013    Essential hypertension, benign 9/12/2013    Hyperlipidemia 9/12/2013    Noncompliance 7/28/2014 Surgical Hx: reviewed and updated as appropriate   has a past surgical history that includes eye surgery. Social Hx: reviewed and updated as appropriate  Social History     Tobacco Use    Smoking status: Former Smoker     Packs/day: 1.50     Years: 7.00     Pack years: 10.50     Quit date: 1981     Years since quittin.0    Smokeless tobacco: Never Used   Substance Use Topics    Alcohol use: Yes     Alcohol/week: 1.0 standard drink     Types: 1 Cans of beer per week        Family hx: reviewed and updated as appropriate  family history includes Diabetes in his brother, mother, and sister; High Blood Pressure in his mother. Medications:   lidocaine 1 % injection, 5 mL, Once  sodium chloride flush, 5-40 mL, 2 times per day  potassium chloride, 20 mEq, BID  spironolactone, 25 mg, Daily  allopurinol, 100 mg, Daily  atorvastatin, 80 mg, Daily  isosorbide mononitrate, 30 mg, Daily  levothyroxine, 25 mcg, QAM AC  [Held by provider] metoprolol succinate, 12.5 mg, Daily  therapeutic multivitamin-minerals, 1 tablet, Daily  rivaroxaban, 15 mg, Dinner  [Held by provider] sacubitril-valsartan, 1 tablet, BID  insulin lispro, 0-12 Units, TID WC  insulin lispro, 0-6 Units, Nightly       Patient has no known allergies. Allergies:   No Known Allergies      Physical Exam/Objective:   Vitals:    22 1039   BP:    Pulse:    Resp:    Temp:    SpO2: 96%       Intake/Output Summary (Last 24 hours) at 2022 1234  Last data filed at 2022 1024  Gross per 24 hour   Intake 680 ml   Output 1300 ml   Net -620 ml         General appearance:  in no acute distress, comfortable, communicative, awake and alert. HEENT: no icterus, EOM intact, trachea midline. Neck : no masses, appears symmetrical + JVD  Respiratory: Respiratory effort normal, bilateral equal chest rise. No wheeze, + crackles. Cardiovascular:  Ausculation shows RRR and  + edema   Abdomen: abdomen is soft, non distended, no masses, no pain with palpation. Musculoskeletal:  no joint swelling, no deformity, strength grossly normal.   Skin: no rashes, no induration, no tightening, no jaundice   Neuro: Follows commands, moves all extremities spontaneously       Data:   CBC:   Recent Labs     02/05/22  0547 02/06/22  0532 02/07/22  0600   WBC 3.5* 5.5 5.5   HGB 10.0* 10.8* 10.8*   HCT 30.5* 32.9* 32.5*   * 149 130*     BMP:    Recent Labs     02/05/22  0547 02/05/22  0801 02/05/22  1733 02/06/22  0532 02/06/22  1127 02/06/22  1820 02/07/22  0600   NA  --    < >   < >  --  140 138 141   K  --    < >   < >  --  3.7 3.9 4.2   CL  --    < >   < >  --  102 100 103   CO2  --    < >   < >  --  26 27 26   BUN  --    < >   < >  --  53* 53* 55*   CREATININE  --    < >   < >  --  2.1* 1.9* 1.9*   GLUCOSE  --    < >   < >  --  175* 125* 100*   MG 2.30  --   --  2.20  --   --  2.30    < > = values in this interval not displayed.

## 2022-02-07 NOTE — PROGRESS NOTES
Physician Progress Note      Aung Johns  CSN #:                  [id number]  :                       1955  ADMIT DATE:       2/3/2022 11:11 AM  DISCH DATE:  RESPONDING  PROVIDER #:        Louise Hernandez MD          QUERY TEXT:    Pt admitted with CHF Pt noted to also have hypertension, CAD, ischemic   cardiomyopathy. If possible, please document in progress notes and discharge   summary the etiology of CHF, if able to be determined. The medical record reflects the following:  Risk Factors: htn, chf, ckd, ischemic cardiomyopathy  Clinical Indicators: Documentation of  medical history significant for CAD,   chronic systolic CHF with ICD in place (19), LV thrombus, hyperlipidemia,   essential hypertension, type 2 DM with complications of diabetic nephropathy   stage 3 . Ischemic cardiomyopathy   Last EF less than 20%  Treatment: Cardiology consult, lasix driip, milrinone drip, monitor labs, i/o,   low na fluid restricted diet, daily wts    Thank You, Indra Teresa RN CDS CRCR  [email or web address]  Options provided:  -- CHF due to Hypertensive Heart Disease  -- CHF due to Hypertensive Heart Disease and CAD  -- CHF not due to Hypertension but due to CAD  -- CHF due to Hypertensive Heart Disease and ICMP  -- CHF not due to Hypertension but due to ICMP  -- CHF due to HTN, CAD, and ICMP  -- Other - I will add my own diagnosis  -- Disagree - Not applicable / Not valid  -- Disagree - Clinically unable to determine / Unknown  -- Refer to Clinical Documentation Reviewer    PROVIDER RESPONSE TEXT:    This patient has CHF due to hypertensive heart disease and CAD. Query created by:  Janay Teresa on 2022 7:28 AM      Electronically signed by:  Louise Hernandez MD 2022 3:12 PM

## 2022-02-07 NOTE — PROGRESS NOTES
Per infectious disease RN / hospital policy, unable to send c diff sample d/t lack of leuokocytosis and fever. Stool sample sent for GI pathogens. Awaiting results.      Electronically signed by Hay Burgess RN on 2/7/2022 at 2:16 PM

## 2022-02-07 NOTE — PROGRESS NOTES
Nutrition Education    · Verbally reviewed information with Patient  · Educated on CHF diet  · Refer to Patient Education activity for more details. Pt previously educated on CHF diet. Pt gave diet recall and seems to understand. He gets MOW, one meal per day and only eats one other meal.  Seems to do quite well. The fluid restriction is the difficult part for him. Reviewed what he is taking in each day.   Also suggested ice rather than water and gum or hard candy to help with thirst.    Electronically signed by Aguilar Queen RD, STEVE on 2/7/22 at 4:21 PM EST    Contact: [phone]

## 2022-02-07 NOTE — PROGRESS NOTES
Hospitalist Progress Note      PCP: Jimmy Hughes DO    Date of Admission: 2/3/2022    Chief Complaint: shortness of breath    Hospital Course: The patient is a 61 y.o. male with hx combined systolic and diastolic CHF (EF < 51% with grade II DD), type 2 DM, HTN, and HLD who presents to [facility] with shortness of breath. Patient states that since Monday, he has had progressively worsening shortness of breath, especially with ambulation. He has also noted worsening swelling in his legs, orthopnea. He is unsure if his weight has changed. Denies chest pain, abdominal pain, nausea, vomiting, constipation, diarrhea, and dysuria.     In the ED, labs were significant for a BUN/Cr of 56/2.4, glucose of 154, pro-BNP of 21,659, troponin of 0.08. CXR showed mild pulmonary vascular congestion and stable cardiomegaly. Subjective: Pt seen and examined. Profuse diarrhea today.       Medications:  Reviewed    Infusion Medications    sodium chloride      milrinone 0.25 mcg/kg/min (02/07/22 1307)    furosemide (LASIX) 1mg/ml infusion 10 mg/hr (02/07/22 1307)    dextrose       Scheduled Medications    lidocaine 1 % injection  5 mL IntraDERmal Once    sodium chloride flush  5-40 mL IntraVENous 2 times per day    potassium chloride  20 mEq Oral BID    spironolactone  25 mg Oral Daily    allopurinol  100 mg Oral Daily    atorvastatin  80 mg Oral Daily    isosorbide mononitrate  30 mg Oral Daily    levothyroxine  25 mcg Oral QAM AC    [Held by provider] metoprolol succinate  12.5 mg Oral Daily    therapeutic multivitamin-minerals  1 tablet Oral Daily    rivaroxaban  15 mg Oral Dinner    [Held by provider] sacubitril-valsartan  1 tablet Oral BID    insulin lispro  0-12 Units SubCUTAneous TID WC    insulin lispro  0-6 Units SubCUTAneous Nightly     PRN Meds: sodium chloride flush, sodium chloride, potassium chloride, ondansetron **OR** ondansetron, polyethylene glycol, acetaminophen **OR** acetaminophen, glucose, dextrose, glucagon (rDNA), dextrose      Intake/Output Summary (Last 24 hours) at 2/7/2022 1510  Last data filed at 2/7/2022 1448  Gross per 24 hour   Intake 2099.88 ml   Output 600 ml   Net 1499.88 ml       Exam:    /67   Pulse 90   Temp 98.5 °F (36.9 °C) (Oral)   Resp 18   Ht 5' 6\" (1.676 m)   Wt 173 lb 11.6 oz (78.8 kg)   SpO2 96%   BMI 28.04 kg/m²     General appearance: No apparent distress appears stated age and cooperative. HEENT Normal cephalic, atraumatic without obvious deformity. Pupils equal, round, and reactive to light. Extra ocular muscles intact. Conjunctivae/corneas clear. Neck: Supple, No jugular venous distention/bruits. Trachea midline without thyromegaly or adenopathy with full range of motion. Lungs: No increased work of breathing, no accessory muscle use, bibasilar crackles. Heart: Regular rate and rhythm with Normal S1/S2 without murmurs, rubs or gallops, point of maximum impulse non-displaced  Abdomen: Soft, non-tender or non-distended without rigidity or guarding and positive bowel sounds all four quadrants. Extremities: No clubbing, cyanosis, 3+ bilateral pitting edema bilaterally. Full range of motion without deformity and normal gait intact. Skin: Skin color, texture, turgor normal.  No rashes or lesions. Neurologic: Alert and oriented X 3, neurovascularly intact with sensory/motor intact upper extremities/lower extremities, bilaterally. Cranial nerves: II-XII intact, grossly non-focal.  Mental status: Alert, oriented, thought content appropriate.   Capillary Refill: Acceptable  < 3 seconds  Peripheral Pulses: +3 Easily felt, not easily obliterated with pressure    Labs:   Recent Labs     02/05/22  0547 02/06/22  0532 02/07/22  0600   WBC 3.5* 5.5 5.5   HGB 10.0* 10.8* 10.8*   HCT 30.5* 32.9* 32.5*   * 149 130*     Recent Labs     02/06/22  1127 02/06/22  1820 02/07/22  0600    138 141   K 3.7 3.9 4.2    100 103   CO2 26 27 26   BUN 53* 53* 55*   CREATININE 2.1* 1.9* 1.9*   CALCIUM 8.5 8.5 8.6     No results for input(s): AST, ALT, BILIDIR, BILITOT, ALKPHOS in the last 72 hours. No results for input(s): INR in the last 72 hours. No results for input(s): Bernardo Sol in the last 72 hours. Urinalysis:      Lab Results   Component Value Date    NITRU Negative 02/03/2022    WBCUA 1 02/03/2022    RBCUA 1 02/03/2022    BLOODU Negative 02/03/2022    SPECGRAV 1.010 02/03/2022    GLUCOSEU Negative 02/03/2022       Radiology:  XR CHEST PORTABLE   Final Result   Mild pulmonary vascular congestion. Stable cardiomegaly. Assessment/Plan:    Active Hospital Problems    Diagnosis Date Noted    Acute on chronic systolic CHF (congestive heart failure) ([facility]) [I50.23] 02/03/2022       Shortness of breath likely 2/2 acute on chronic combined systolic and diastolic CHF exacerbation - Clinically patient presents with orthopnea, peripheral edema, crackles on exam.   -continue IV Lasix gtt @ 10  -continue milrinone gtt  -given dose of metolazone today  -strict I/O's  -daily weights  -medellin catheter  -1500 ml fluid restriction, low sodium diet  -daily renal panel  -cardiology consulted, recs appreciated  -CHF nurse consulted, recs appreciated  -continue home statin, Xarelto, aldactone; hold Entresto and BB     SONAL on CKD III - suspect cardiorenal syndrome  -management as above  -nephrology consulted, recs appreciated  -avoid nephrotoxic medications  -trend and monitor  -hold Entresto     CAD  -cardiology consulted  -tele monitoring  -continue home meds  -hold Entresto due to SONAL     Type 2 DM  -SSI  -hypoglycemia protocol  -POCT glucose checks  -carb control diet     Essential hypertension  -hold BB  -hold Entresto due to SONAL  -continue aldactone, Imdur     Hyperlipidemia  -continue home meds     Hypothyroidism  -continue home meds    Diarrhea  -send GI PCR and C.  Diff and if negative, will start Imodium PRN      DVT Prophylaxis: Xarelto  Diet: ADULT DIET; Regular;  Low Sodium (2 gm); 1500 ml  Code Status: Full Code    PT/OT Eval Status: ordered    Dispo - continue care    Tonnie Fleischer, MD

## 2022-02-07 NOTE — PLAN OF CARE
Pt A&Ox4; VSS on RA. Lasix and Milirone gtt infusing through PIVs. Unable to assess strict output d/t pt's current dilemma with constant diarrhea - Liliya with cardiology made aware. Stool sample sent for GI pathogens. Unable to send for c diff d/t hospital policy - Dr Meri Amador updated. Pt up in chair resting comfortably. Good PO (food) intake. Adhering well to fluid restriction.    Electronically signed by Cole Frankel RN on 2/7/2022 at 2:41 PM      Problem: OXYGENATION/RESPIRATORY FUNCTION  Goal: Patient will maintain patent airway  Outcome: Ongoing  Goal: Patient will achieve/maintain normal respiratory rate/effort  Description: Respiratory rate and effort will be within normal limits for the patient  Outcome: Ongoing     Problem: HEMODYNAMIC STATUS  Goal: Patient has stable vital signs and fluid balance  Outcome: Ongoing     Problem: FLUID AND ELECTROLYTE IMBALANCE  Goal: Fluid and electrolyte balance are achieved/maintained  Outcome: Ongoing     Problem: ACTIVITY INTOLERANCE/IMPAIRED MOBILITY  Goal: Mobility/activity is maintained at optimum level for patient  Outcome: Ongoing     Problem: Falls - Risk of:  Goal: Will remain free from falls  Description: Will remain free from falls  Outcome: Ongoing  Goal: Absence of physical injury  Description: Absence of physical injury  Outcome: Ongoing

## 2022-02-07 NOTE — PROGRESS NOTES
This RN noticed an order for midline placement had been placed on 2/6. Pt currently does not have midline. Call to [facility] indicated that midline order had not been called in. Per tech, someone will be out later today to place midline. Consent and questionnaire in hard chart.      Electronically signed by Elmer Tiwari RN on 2/7/2022 at 3:30 PM

## 2022-02-07 NOTE — CONSULTS
PALLIATIVE MEDICINE CONSULTATION     Patient name:Cedric Molina   F:[id number]    :1955  Room/Bed:H7R-4937/5267-01   LOS: 4 days         Date of consult:2022    Consult Information    Inpatient consult to Palliative Care  Consult performed by: DAHIANA Rivera CNP  Consult ordered by: Capo Mcdonnell MD         Reason for consult: Goals of Care, Code Status    Number of admissions past 12 months: 2    ASSESSMENT/RECOMMENDATIONS     61 y.o. male with debility and swelling. Symptom Management:  Debility:  Patient with bilateral leg swelling. He did appear to be weak (bilateral extremities) while sitting up today. He appears to require some assistance with his ADL's. Swelling: Patient taking Spironolactone daily and receiving Lasix continuously. Goals of Care:  Met patient at his bedside today. Patient was oriented x 4 and decisional today. Patient indicated he would like to name his son Rashid Cavazos as his POA. Referral placed to spiritual services. Patient's current health status, goals of care and code status were reviewed. Patient indicated he would like to remain a Full Code at this time. The patient would like to continue on with all aggressive therapy. The patient indicated [facility] to help assist with in home care after his discharge. Patient/Family Goals of Care :     - Met patient at his bedside today. Patient was oriented x 4 and decisional today. Patient indicated he would like to name his son Rashid Cavazos as his POA. Referral placed to spiritual services. Patient's current health status, goals of care and code status were reviewed. Patient indicated he would like to remain a Full Code at this time. The patient would like to continue on with all aggressive therapy.   The patient indicated he would like to [facility] hospital stay to help assist with in home care after his Comments: Breath sounds clear but diminished bilaterally  Abdominal:      General: Bowel sounds are normal.      Palpations: Abdomen is soft. Musculoskeletal:      Cervical back: Neck supple. Right lower leg: Edema (2+) present. Left lower leg: Edema (2+) present. Skin:     General: Skin is warm and dry. Neurological:      Comments: Oriented x4   Psychiatric:         Mood and Affect: Mood normal.         Behavior: Behavior normal.         Thought Content:  Thought content normal.         Judgment: Judgment normal.            Current labs in the epic chart reviewed as of 2/7/2022   Review of previous notes, admits, labs, radiology and testing relevant to this consult done in this chart today 2/7/2022      Total time: 84 minutes  >50% of time spent counseling patient at bedside or POA/family member if applicable , reviewing information and discussing care, coordinating with care team  Signed By: Electronically signed by DAHIANA Ram CNP on 2/7/2022 at 2:27 PM  Palliative Medicine   [phone]    February 7, 2022

## 2022-02-07 NOTE — PROGRESS NOTES
[facility] 83   Daily Progress Note      Admit Date:  2/3/2022    CC: SOB    HPI:   Radha Sepulveda is a 61 y.o. male with PMH CAD, SHF s/p ICD (8/2019), LV thrombus, HTN, HLP, DM2, CKD Stage 3/ diabetic nephropathy  Seen by Dr Lupe Silva as outpatient. Plan was for and RHC on 11/1/2021 and he did not show up for procedure. Adm [facility] 11/2021 with HF/SONAL - on Milrinone and lasix gtt. Had [address] with elevated filling pressure. DC wt 145lbs. Non compliant with follow up after DC     Adm to [facility] with acute on chronic systolic heart failure. Started on milrinone and lasix gtts. Also received metolazone. He has been diuresing well but continues with significant amount of BLE, abd and back edema. He denies SOB at rest. Mild SOB with activity. Associated with generalized fatigue and weakness. Today he is having large amount of diarrhea and he is not able to save urine output. Review of Systems:   General: Denies fever, chills  Skin: Denies skin changes, rash, itching, lesions.   HEENT: Denies headache, dizziness, vision changes, nosebleeds, sore throat, nasal drainage  RESP: Denies cough, sputum, dyspnea, wheeze, snoring  CARD: Denies palpitations,  murmur  GI:Denies nausea, vomiting, heartburn, loss of appetite, change in bowels  : Denies frequency, pain, incontinence, polyuria  VASC: Denies claudication, leg cramps, clots  MUSC/SKEL: Denies pain, stiffness, arthritis  PSYCH: Denies anxiety, depression, stress  NEURO: Denies numbness, tingling, weakness,change in mood or memory  HEME: Denies abn bruising, bleeding, anemia  ENDO: Denies intolerance to heat, cold, excessive thirst or hunger, hx thyroid disease    Objective:   /67   Pulse 90   Temp 98.5 °F (36.9 °C) (Oral)   Resp 18   Ht 5' 6\" (1.676 m)   Wt 173 lb 11.6 oz (78.8 kg)   SpO2 95%   BMI 28.04 kg/m²         Intake/Output Summary (Last 24 hours) at 2/7/2022 0945  Last data filed at 2/6/2022 4397  Gross per 24 hour   Intake 440 ml Output 1700 ml   Net -1260 ml     I/O since adm: neg 7.9L +    WEIGHT:Admit Weight: 190 lb 14.7 oz (86.6 kg)         Today  Weight: 173 lb 11.6 oz (78.8 kg)   DRY WEIGHT:  Wt Readings from Last 3 Encounters:   02/07/22 173 lb 11.6 oz (78.8 kg)   01/20/22 180 lb (81.6 kg)   11/30/21 149 lb 4 oz (67.7 kg)       Physical Exam:  GEN: Appears ill, no acute distress  SKIN: Brown, warm, dry. Nails without clubbing. HEENT: PERRLA. Normocephalic, atraumatic. Neck supple. No adenopathy. LUNG: AP diameter normal. Diminished  Bilateral bases. No wheeze, rales, or ronchi. Respiratory effort normal.  HEART: S1S2 A/R. + JVD. No carotid bruit. No murmur, rub or gallop. ABD: Distended, Soft, nontender. +BS X 4 quads. No hepatomegaly. EXT: Radial and pedal pulses 2+ and symmetric. Without varicosities. 4+ pedal to sacral edema. MUSCSKEL: Good ROM X4 extremities. No deformity. NEURO: A/O X3. Calm and cooperative.      Telemetry: NSR HR 97 with PVCs    Medications:    lidocaine 1 % injection  5 mL IntraDERmal Once    sodium chloride flush  5-40 mL IntraVENous 2 times per day    potassium chloride  20 mEq Oral BID    spironolactone  25 mg Oral Daily    allopurinol  100 mg Oral Daily    atorvastatin  80 mg Oral Daily    isosorbide mononitrate  30 mg Oral Daily    levothyroxine  25 mcg Oral QAM AC    [Held by provider] metoprolol succinate  12.5 mg Oral Daily    therapeutic multivitamin-minerals  1 tablet Oral Daily    rivaroxaban  15 mg Oral Dinner    [Held by provider] sacubitril-valsartan  1 tablet Oral BID    insulin lispro  0-12 Units SubCUTAneous TID     insulin lispro  0-6 Units SubCUTAneous Nightly      sodium chloride      milrinone 0.25 mcg/kg/min (02/06/22 0950)    furosemide (LASIX) 1mg/ml infusion 10 mg/hr (02/06/22 1546)    dextrose       sodium chloride flush, sodium chloride, potassium chloride, ondansetron **OR** ondansetron, polyethylene glycol, acetaminophen **OR** acetaminophen, glucose, dextrose, glucagon (rDNA), dextrose    Lab Data: I have reviewed all labs below today. CBC:   Recent Labs     02/05/22  0547 02/06/22  0532 02/07/22  0600   WBC 3.5* 5.5 5.5   HGB 10.0* 10.8* 10.8*   HCT 30.5* 32.9* 32.5*   .9* 104.3* 103.5*   * 149 130*     BMP:   Recent Labs     02/06/22  1127 02/06/22  1820 02/07/22  0600    138 141   K 3.7 3.9 4.2    100 103   CO2 26 27 26   BUN 53* 53* 55*   CREATININE 2.1* 1.9* 1.9*     GLUCOSE:   Recent Labs     02/06/22  1127 02/06/22  1820 02/07/22  0600   GLUCOSE 175* 125* 100*     LIVER PROFILE:   Lab Results   Component Value Date    AST 28 02/03/2022    ALT 22 02/03/2022    LABALBU 3.0 02/03/2022    BILIDIR 0.9 11/23/2021    BILITOT 0.8 02/03/2022    ALKPHOS 107 02/03/2022     PT/INR:   Lab Results   Component Value Date    PROTIME 22.0 11/30/2021    INR 1.90 11/30/2021    INR 1.73 11/29/2021    INR 1.75 11/28/2021     APTT: No results found for: APTT  Pro-BNP:    Lab Results   Component Value Date    PROBNP 23,626 02/06/2022    PROBNP 21,659 02/03/2022    PROBNP >70,000 11/22/2021     Parameters:   > 450 pg/mL under age 48  > 900 pg/mL ages 54-65  > 1800 pg/mL over age 76    ENZYMES:  Lab Results   Component Value Date    TROPONINI 0.07 02/03/2022    TROPONINI 0.08 02/03/2022    TROPONINI 0.08 02/03/2022     FASTING LIPID PANEL:  Lab Results   Component Value Date    CHOL 95 02/04/2022    HDL 48 02/04/2022    HDL 58 12/13/2011    LDLCALC 38 02/04/2022    TRIG 43 02/04/2022       Diagnostics:    EKG:     ECHO:11/16/2021  Summary  Left ventricular cavity size is severely dilated. Ejection fraction is visually estimated to be <20%. There is severe diffuse hypokinesis. Diastolic filling parameters suggest grade II diastolic dysfunction. Definity contrast administered. No left ventricular thrombus was seen. moderate MR  Pacer / ICD wire is visualized in the right ventricle. The right ventricle is dilated.   Right ventricular systolic     0/8/6004 ():  - Left ventricle: The cavity size is severely dilated. Wall thickness is normal. Systolic function     was severely reduced. The estimated ejection fraction was in the range of 10% to 15%. Diffuse     hypokinesis. Doppler parameters are consistent with restrictive physiology, indicative of     decreased left ventricular diastolic compliance and/or increased left atrial pressure. There was     spontaneous echo contrast, indicative of stasis. - Mitral valve: Moderate regurgitation.   - Left atrium: The atrium is severely dilated. - Right ventricle: Pacer wire or catheter noted in right ventricle. Systolic function was mildly     reduced. - Right atrium: The atrium is mildly dilated. - Atrial septum: The septum bowed from left to right, consistent with increased left atrial     pressure. - Tricuspid valve: Mild-moderate regurgitation.   - Pulmonary arteries: Systolic pressure was severely increased, estimated to be 65mm Hg assuming     that the right atrial pressure was 15 mmHg. - Inferior vena cava: The vessel was dilated. The respirophasic diameter changes were blunted (<     50%), consistent with elevated central venous pressure     Echo 1/9/2020 [facility]):  - Left ventricle: The cavity size is severely dilated. Wall     thickness was increased in a pattern of mild LVH. Systolic     function was severely reduced. The estimated ejection fraction     was in the range of 10% to 15%. Diffuse hypokinesis. No contrast     administered - unable to evaluate for LV thrombus. The     longitudinal strain is -2.47% (markedly reduced). - Aortic valve: Mild thickening.   - Mitral valve: Leaflet tenting. Mild to moderate regurgitation.   - Left atrium: The atrium is severely dilated. - Right ventricle: The cavity size is normal. Pacer wire or ICD     noted in right ventricle. Systolic function was mildly reduced by     objective interpretation. TAPSE: 1.6cm. Tricuspid annular systolic   velocity: 8cm/s.   - Tricuspid valve: Mild-moderate regurgitation.   - Pulmonary arteries: Systolic pressure could not be accurately     estimated, however it is elevated at at least 51 mm Hg, assuming     an RA pressure of 15 mm Hg. - Inferior vena cava: The vessel was dilated. The respirophasic     diameter changes were blunted (< 50%), consistent with elevated     central venous pressure. - Pericardium, extracardiac: A trivial pericardial effusion is     identified.     Cardiac Angiography:   RHC 1/31/2020  IMPRESSIONS:  Normal left and right filling pressures with   preserved CO/CI. SUMMARY:   Hemodynamic:   RA 2   RV 28/3   PA 28/10 mean 16   PCWP 4   Blu 4.5/2.5   TD 4.5/2.5   PASat 66%   RECOMMENDATIONS:  Further management per Heart Failure [facility].     12/10/2018 LHC/PCI and RHC:  IMPRESSIONS:  Selective angiography of LMCA showing critical mid LAD   lesion. Successful PCI with 3.5 x 18 mm MIKE. Normal cardiac output, m   ildly increased pulmonary pressures with mildly increased filling pre   ssure on inotropic support. SUMMARY:    1. Wedge pressure in the pulmonary capillaries is mildly elevated. 2. Coronary arteries: The left main is unusually long and      trifurcates into the LAD, a ramus intermedius, and the left      circumflex. The left anterior descending gives rise to 2      diagonals and 3 septals and wraps around the apex. The left      circumflex gives rise to 2 obtuse marginals and no      posterolaterals. The first obtuse marginal has a high origin. 3. LAD: Mid-vessel lesion: There is a discrete, 99% stenosis. The      distal vessel supplies a moderate-sized vascular territory. The      lesion is a likely culprit for the patient's clinical      presentation. The lesion was stented (see 1st lesion      intervention). Following intervention, the lesion has a residual      stenosis of 0% and MUKUND grade 3 flow (brisk flow). 4. AV was not crossed due to known apical LV thrombus. 5. RA 6      RV 48/4      PA 50/20 (28)      W 18.   6. Blu CO 5.2 Blu CI 2.9 on Milrinone 0.25 mcg/mg/min.      12/4/2018 Cardiac MRI ():  Findings are consistent with a severe, ischemic cardiomyopathy.  There is akinesis of the apical segments with an associated small, apical thrombus. The left ventricle is severely dilated with severely reduced systolic function. The right ventricle is mildly dilated with moderately decreased systolic function. The left atrium is severely dilated.  There is mild to moderate mitral regurgitation. The right atrium is mildly dilated.  There is moderate tricuspid regurgitation. The descending aorta is dilated. The pulmonary artery is dilated.  The IVC and coronary sinus are dilated. Please see noncardiac findings below.      FINDINGS:     1. The left ventricular size is severely dilated. The left ventricular ejection fraction is 19 % by Smallwood's method. Global left ventricular function is severely decreased. There is akinesis of the apical segments, severe hypokinesis of the basal and mid septal and basal and mid anterior segments and moderate hypokinesis of the lateral segments and mid inferior segment and mild hypokinesis of the basal inferior segment. The left ventricular mass is moderately increased. There appears to be a small intracavitary thrombus (6.5 x 5 mm). 2. There is no evidence of increased iron deposition within the myocardium (T2*myocardium = 51 msec; if < 20 msec, suggestive of iron-overloading of the myocardium).  There is no evidence of myocardial edema. 3. The right ventricular size is mildly dilated. Global right ventricular function is moderately decreased. 4. Left atrial size is severely enlarged. Right atrial size is mildly enlarged.   The Qp/Qs is 1.0 by phase contrast imaging and is consistent with no evidence of shunt. 5. The calculated aortic regurgitant fraction is 1.55 %. There is moderate mitral regurgitation.  The calculated mitral regurgitant fraction is 29.02 %. There is moderate tricuspid regurgitation. The calculated tricuspid regurgitant fraction is 37.1 %. The calculated pulmonic regurgitant fraction is 2.39 %. 6. There is a small pericardial effusion. 7. The descending aorta is dilated measuring 27.2 mm. 8. The main pulmonary artery is dilated measuring 34.0 mm.  The IVC and coronary sinus are dilated. 9. There are large left and moderate right pleural effusions.  Basilar atelectasis is associated with the effusions.  There is evidence of mild pulmonary edema.     RHC 11/23/2021  OVERALL IMPRESSION:  1.  Elevated right heart pressures. 2.  Elevated pulmonary capillary wedge pressure. 3.  Markedly reduced cardiac output and indices with significantly low  oxygen saturation, all consistent with stage D/[place] Heart  Association Class IV heart failure. We will start the patient back on Primacor drip. The patient's prognosis is extremely poor at this point and we will  discuss further with the patient and the family about his code status.     RUE venous doppler 11/23/2021  Summary  The left internal jugular, subclavian, axillary, brachial, radial and ulnar veins, as well as the basilic and cephalic veins,  have been examined. These veins show no thrombus but there is slow venous blood flow especially at the proximal internal jugular and  subclavian veins. 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 DW 145lbs. Adm wt 190lbs>173lbs today. Elevated BNP.   NYHA Class IV  Stage D   Diuretic: Lisa lasix gtt (SONAL better with creat 1.9 today)   Cont Milrinone gtt  Indicated for EF </=40%:   Beta Blocker (evidence based)- Toprol XL -held while on milrinone  ACEi/ARB/ARNI- entresto held - SONAL  SGLT2 Inhibitor- hold for SONAL- consider at DC  Aldosterone Antagonist (BNP within 1 week of DC if new or dose changed)- aldactone     Cardiac Rehab for EF <35%: OP after Home Health  ICD counseling: Has ICD   2gm Na diet, daily weight, 64 oz fluid restriction  Avoid NSAIDS and other nephrotoxic meds  [facility] Referral: OP  HF RN referral for education  DC needs for home care addressed by HF RN. Palliative Care consult      2. ) CAD: Prior PCI/MIKE to LAD. Mild elevated troponin most likely from decompensated HF. No evidence of angina/ischemia.    DAPT: on xarelto  Beta Blocker: Toprol XL-hold  ACEi/ARB:  Anti anginal: Imdur  Lipid management/high intensity statin: lipitor  Risk factor management: high blood pressure, high cholesterol, Diabetes, smoking, obesity, family hx  Lifestyle modification: Heart healthy diet, regular exercise, weight loss, smoking cessation, stress reduction     3.) LV thrombus:  -F/U ECHO without thrombus  - cont xarelto     4.) SONAL on CKD 3: R/T diabetic nephropathy, Neph following  Baseline creat 2, Today 1.9    Electronically signed by DAHIANA Arteaga - CNP on 2/7/2022 at 9:45 AM

## 2022-02-08 NOTE — PROGRESS NOTES
Pt was supposed to get midline placement last night, however, there was some miscommunication between the infusion RN and staff RN regarding whether or not nephro was ok with midline and if the order was . Pt is now refusing midline placement d/t risk of infection that his brother and friend warned him about. This RN explained to pt the risk of infection with midline is significantly less than that of PICC lines, aseptic technique utilized by staff to eliminate risk of infection, etc; Pt was also educated on the benefits of midline placement. However, pt is still not comfortable receiving a midline. Pt still has two PIVs with Milirone and Lasix gtt running through. No complications at this time. Tyree Mccoy, with IV therapy, notified. Order discontinued per protocol.     Electronically signed by Aakash Ken RN on 2022 at 4:53 PM

## 2022-02-08 NOTE — PLAN OF CARE
Problem: OXYGENATION/RESPIRATORY FUNCTION  Goal: Patient will maintain patent airway  2/8/2022 0051 by Pj Walls RN  Outcome: Ongoing     Problem: OXYGENATION/RESPIRATORY FUNCTION  Goal: Patient will achieve/maintain normal respiratory rate/effort  Description: Respiratory rate and effort will be within normal limits for the patient  2/8/2022 0051 by Pj Walls RN  Outcome: Ongoing     Problem: HEMODYNAMIC STATUS  Goal: Patient has stable vital signs and fluid balance  2/8/2022 0051 by Pj Walls RN  Outcome: Ongoing     Problem: FLUID AND ELECTROLYTE IMBALANCE  Goal: Fluid and electrolyte balance are achieved/maintained  2/8/2022 0051 by Pj Walls RN  Outcome: Ongoing     Problem: ACTIVITY INTOLERANCE/IMPAIRED MOBILITY  Goal: Mobility/activity is maintained at optimum level for patient  2/8/2022 0051 by Pj Walls RN  Outcome: Ongoing     Problem: Falls - Risk of:  Goal: Will remain free from falls  Description: Will remain free from falls  2/8/2022 0051 by Pj Walls RN  Outcome: Ongoing   Patient assessed for fall risk; fall precautions initiated. Patient and family instructed about safety devices. Environment kept free of clutter and adequate lighting provided. Bed locked and in lowest position. Call light within reach. Will continue to monitor. Problem: Falls - Risk of:  Goal: Absence of physical injury  Description: Absence of physical injury  2/8/2022 0051 by Pj Walls RN  Outcome: Ongoing     Problem: Skin Integrity:  Goal: Will show no infection signs and symptoms  Description: Will show no infection signs and symptoms  Outcome: Ongoing   Skin assessment completed every shift. Pt assessed for incontinence, appropriate barrier cream applied prn. Pt encouraged to turn/rotate every 2 hours. Assistance provided if pt unable to do so themselves.       Problem: Skin Integrity:  Goal: Absence of new skin breakdown  Description: Absence of new skin breakdown  Outcome: Ongoing [Annual Wellness Visit] : an annual wellness visit

## 2022-02-08 NOTE — PROGRESS NOTES
[facility]    Followed up on 2/7/2022 (evening) re Midline order; discussed w RN on night shift that order was placed by Stanislav Corbin (Nephrology) and if Midline is still needed, to please contact Nephrology to get an updated order for Midline; she states that she will have dayshift do this on 2/8, being that Nephrology is more accessible during the day. Followed up again on 2/8/2022 w Helio Triana RN re if there is still a need for the Midline and need for new order from Nephrology; she informed me that pt is now refusing the Midline.     Should pt change his mind, a new Midline order, cleared by Nephrology, is needed in order to place the Midline; CEZAR Steven, ESTRELLITA RN, BSN, Jaden Ventura.

## 2022-02-08 NOTE — PROGRESS NOTES
[address] [facility]   [facility] [place]  [phone]  Nephrology Note          Patient ID: Jovani Lema  Referring/ Physician: Alhaji Lainez MD      HPI/Summary:   Jovani Lema is being seen by nephrology for SONAL. This is a 4-IMDT-GID man with systolic heart failure, history of LV thrombus, coronary artery disease, hyperlipidemia who presented the hospital with increasing shortness of breath and worsening lower extremity swelling. Interval hx:   Seen and examined at bedside  No SOB, edema is still significant in LE. He has no other complaints. No chest pain, no dizziness, no SOB    Blood pressure 103/69  95% sat on room air. Urine output 4.4 L past day   Negative 2 L past day   Wt 188 > 173 > 161    Labs reviewed  Cr stable 1.8      k 4.1  BICARB 28 > 30   Hgb 11.7      Plan:   - he still has a lot of volume on him , has a lot of edema  - still needs IV diuresis, cardiology has been managing. Will continue Aldactone 25 mg daily, tolerating this. Creatinine stable  No acute electrolyte issues  On KCL replacement standing         SONAL on Chronic kidney disease stage IIIb  This is likely secondary to diabetic nephropathy and SONAL from cardiorenal syndrome. Baseline creatinine around 2  Cr 2.3 at time of consult  No acute electrolyte abnormalities  Has had diabetes and hypertension for over 10 years and his last EF is less than 20%  He has had event evidence of albuminuria on urinalysis going back to 2016, 2013 had an  mg  Most recent protein creatinine ratio 700 mg   CT abdomen in 2020 showed symmetric kidneys no lesions no masses bilateral perinephric stranding no hydronephrosis. HCV and hepatitis C negative  Prior negative NAHOMY  Prior negative RF factor    Ischemic cardiomyopathy  Decompensated, has evidence of volume overload  proBNP 96206  Managed with Lasix drip and is on milrinone, appreciate cardiology involvement.   Last EF less than 20%  History of coronary artery surgery. Social Hx: reviewed and updated as appropriate  Social History     Tobacco Use    Smoking status: Former Smoker     Packs/day: 1.50     Years: 7.00     Pack years: 10.50     Quit date: 1981     Years since quittin.0    Smokeless tobacco: Never Used   Substance Use Topics    Alcohol use: Yes     Alcohol/week: 1.0 standard drink     Types: 1 Cans of beer per week        Family hx: reviewed and updated as appropriate  family history includes Diabetes in his brother, mother, and sister; High Blood Pressure in his mother. Medications:   lidocaine 1 % injection, 5 mL, Once  sodium chloride flush, 5-40 mL, 2 times per day  potassium chloride, 20 mEq, BID  spironolactone, 25 mg, Daily  allopurinol, 100 mg, Daily  atorvastatin, 80 mg, Daily  isosorbide mononitrate, 30 mg, Daily  levothyroxine, 25 mcg, QAM AC  [Held by provider] metoprolol succinate, 12.5 mg, Daily  therapeutic multivitamin-minerals, 1 tablet, Daily  rivaroxaban, 15 mg, Dinner  [Held by provider] sacubitril-valsartan, 1 tablet, BID  insulin lispro, 0-12 Units, TID WC  insulin lispro, 0-6 Units, Nightly       Patient has no known allergies. Allergies:   No Known Allergies      Physical Exam/Objective:   Vitals:    22 1123   BP: 103/69   Pulse: 96   Resp: 18   Temp: 98.5 °F (36.9 °C)   SpO2: 95%       Intake/Output Summary (Last 24 hours) at 2022 1346  Last data filed at 2022 1147  Gross per 24 hour   Intake 1225.18 ml   Output 5975 ml   Net -4749.82 ml         General appearance:  in no acute distress, comfortable, communicative, awake and alert. HEENT: no icterus, EOM intact, trachea midline. Neck : no masses, appears symmetrical + JVD  Respiratory: Respiratory effort normal, bilateral equal chest rise. No wheeze, + crackles. Cardiovascular: Ausculation shows RRR and  + edema   Abdomen: abdomen is soft, non distended, no masses, no pain with palpation.   Musculoskeletal:  no joint swelling, no deformity, strength grossly normal.   Skin: no rashes, no induration, no tightening, no jaundice   Neuro: Follows commands, moves all extremities spontaneously       Data:   CBC:   Recent Labs     02/06/22  0532 02/07/22  0600 02/08/22  0545   WBC 5.5 5.5 5.4   HGB 10.8* 10.8* 11.7*   HCT 32.9* 32.5* 35.8*    130* 146     BMP:    Recent Labs     02/06/22  0532 02/06/22  1127 02/07/22  0600 02/07/22  1919 02/08/22  0545   NA  --    < > 141 139 140   K  --    < > 4.2 4.4 4.1   CL  --    < > 103 99 98*   CO2  --    < > 26 28 30   BUN  --    < > 55* 53* 56*   CREATININE  --    < > 1.9* 1.8* 1.8*   GLUCOSE  --    < > 100* 140* 127*   MG 2.20  --  2.30  --  2.40    < > = values in this interval not displayed.

## 2022-02-08 NOTE — CONSULTS
[facility]  HEART FAILURE PROGRAM      NAME:  Huey Manning RECORD NUMBER:  [id number]  AGE: 61 y.o. GENDER: male  : 1955  TODAY'S DATE:  2022    Subjective:     VISIT TYPE: evaluation     ADMITTING PHYSICIAN:  Michele Nicole MD    PAST MEDICAL HISTORY        Diagnosis Date    [facility] 2014    Diabetic nephropathy 2013    ED (erectile dysfunction) 2013    Essential hypertension, benign 2013    Hyperlipidemia 2013    Noncompliance 2014       SOCIAL HISTORY    Social History     Tobacco Use    Smoking status: Former Smoker     Packs/day: 1.50     Years: 7.00     Pack years: 10.50     Quit date: 1981     Years since quittin.0    Smokeless tobacco: Never Used   Vaping Use    Vaping Use: Never used   Substance Use Topics    Alcohol use:  Yes     Alcohol/week: 1.0 standard drink     Types: 1 Cans of beer per week    Drug use: No       ALLERGIES    No Known Allergies    MEDICATIONS  Scheduled Meds:   lidocaine 1 % injection  5 mL IntraDERmal Once    sodium chloride flush  5-40 mL IntraVENous 2 times per day    potassium chloride  20 mEq Oral BID    spironolactone  25 mg Oral Daily    allopurinol  100 mg Oral Daily    atorvastatin  80 mg Oral Daily    isosorbide mononitrate  30 mg Oral Daily    levothyroxine  25 mcg Oral QAM AC    [Held by provider] metoprolol succinate  12.5 mg Oral Daily    therapeutic multivitamin-minerals  1 tablet Oral Daily    rivaroxaban  15 mg Oral Dinner    [Held by provider] sacubitril-valsartan  1 tablet Oral BID    insulin lispro  0-12 Units SubCUTAneous TID WC    insulin lispro  0-6 Units SubCUTAneous Nightly       ADMIT DATE: 2/3/2022      Objective:     ADMISSION DIAGNOSIS:   Dyspnea on exertion [R06.00]  Bilateral lower extremity edema [R60.0]  Acute on chronic systolic CHF (congestive heart failure) (HCC) [I50.23]  Stage 4 chronic kidney disease ([facility] [N18.4]  Acute on chronic congestive heart failure, unspecified heart failure type ([facility] [I50.9]     /69   Pulse 96   Temp 98.5 °F (36.9 °C) (Oral)   Resp 18   Ht 5' 6\" (1.676 m)   Wt 161 lb 9.6 oz (73.3 kg)   SpO2 95%   BMI 26.08 kg/m²     ADMIT:  Weight: 190 lb 14.7 oz (86.6 kg)    TODAY: Weight: 161 lb 9.6 oz (73.3 kg)    Wt Readings from Last 10 Encounters:   02/08/22 161 lb 9.6 oz (73.3 kg)   01/20/22 180 lb (81.6 kg)   11/30/21 149 lb 4 oz (67.7 kg)   10/22/21 166 lb (75.3 kg)   06/12/19 161 lb (73 kg)   02/12/19 165 lb (74.8 kg)   02/07/18 166 lb (75.3 kg)   08/30/17 167 lb (75.8 kg)   05/16/17 165 lb (74.8 kg)   03/03/17 165 lb 6.4 oz (75 kg)          Intake/Output Summary (Last 24 hours) at 2/8/2022 1343  Last data filed at 2/8/2022 1147  Gross per 24 hour   Intake 1225.18 ml   Output 5975 ml   Net -4749.82 ml       LABS  BMP:   Lab Results   Component Value Date     02/08/2022    K 4.1 02/08/2022    K 3.7 02/03/2022    CL 98 02/08/2022    CO2 30 02/08/2022    BUN 56 02/08/2022    LABALBU 3.0 02/03/2022    CREATININE 1.8 02/08/2022    CALCIUM 9.2 02/08/2022    GFRAA 46 02/08/2022    GFRAA >60 12/13/2011    LABGLOM 38 02/08/2022    GLUCOSE 127 02/08/2022     CBC:   Recent Labs     02/06/22  0532 02/07/22  0600 02/08/22  0545   WBC 5.5 5.5 5.4   HGB 10.8* 10.8* 11.7*   HCT 32.9* 32.5* 35.8*   .3* 103.5* 103.8*    130* 146     BNP: No results found for: BNP    ECHOCARDIOGRAM:   11/16/21   Summary   Left ventricular cavity size is severely dilated. Ejection fraction is visually estimated to be <20%. There is severe diffuse hypokinesis. Diastolic filling parameters suggest grade II diastolic dysfunction. Definity contrast administered. No left ventricular thrombus was seen. moderate MR   Pacer / ICD wire is visualized in the right ventricle. The right ventricle is dilated. Right ventricular systolic function is moderate to severely reduced.    TAPSE 1.01 cm    Assessment:     CONSULTS:   IP CONSULT TO HEART FAILURE NURSE/COORDINATOR  IP CONSULT TO DIETITIAN  IP CONSULT TO CARDIOLOGY  IP CONSULT TO PALLIATIVE CARE  IP CONSULT TO NEPHROLOGY  IP CONSULT TO Zena    Patient has a CARDIOLOGY CONSULT: Yes- follows with Dr Danielle Gauthier       Patient taking an ACEI/ARB:  Yes  - Entresto on hold presently      Patient taking a BETA BLOCKER:  Yes- on hold presently as on [employer]     SCALE AVAILABLE:  Yes - pt has a digital scale as well as a scale he just received from Medicare/Medicaid which he states he likes better and can see easier. EDUCATION STATUS: Patient   [x]  Provided both written and verbal education on Heart Failure signs/symptoms. [x]  Provided instructions on daily medications. [x]  Provided instructions to monitor and record weight daily. [x]  Provided instructions to call if weight increases 3 lbs in one day or 5 lbs in one week. [x]  Received verbal acknowledgment/understanding of Heart Failure related causes. [x]  Provided instructions on how to maintain a low sodium diet. []  Provided recommendations for smoking cessation programs  [x]  Provided recommendations on activity and exercise      [x]  Other:    HF RN consulted, chart reviewed. Pt admitted for acute on chronic combined systolic HF, EF < 19%, grade II. Pt is up approx 40 lbs. Is presently on lasix and milrinone gtt. He was last here 11/14-11/30 and was dc'd at wt of 149 lbs. He used to follow at [facility] but transitioned his care to Dr Danielle Gauthier on 10/22/21 as a New Pt. Plan was for pt to get labs and to have a RHC but pt did not do either. He had the RHC done inpt in Nov. Pt unfortunately was a No Show again to his cardiology f/u appt on 12/7. Dr Danielle Gauthier spoke with pt this admit about his need for compliance in regards to f/u. Pt did get established with a New PCP Dr Sue Drake on 1/20 (after he No Showed on 1/3). She also talked with pt about need for compliance.  She offered Hospice services if he wants to chose not to f/u. Pt declined and said he would do better. Upon entering room pt was up in recliner with feet elevated. I re-introduced myself and my role in his care. He remembers meeting with me last admit and somewhat reluctantly agreed to spend time with me again. \"You were tough love last time. \" I said \"apparently not enough\" (he smirked). I inquired how he could be up 40 lbs if he was doing what was requested of him. Last admit, he admitted that he stopped taking his diuretic tomasa \"it made me look like I was on crack tomasa got so thin. \" He states he did not stop taking any of his medications this time. Extensive teaching done, again. Pt will need on-going reiteration. Teaching done on What is HF, systolic & diastolic dysfunction, S&S of HF, importance of daily wts, log sheet, HF Zones, and who and when to call. Pt states his brother sees Dr Angelia Blackwell and he follows the instructions closely \"and gets mad at me when I don't, my sister too. \" I informed pt that it's good to have family support who want to see you did what you need for your health. I reinforced the need for him to be compliant and to put his health a priority or it will be a poor outcome. Pt states he has a working scale at home and denies a need for a scale when I asked. I asked why he wasn't using it and he said it was too hard to see so he just got a new one thru Medicare/Medicaid \"that has a needle and it's easier for me to see. \" I offered a talking scale but he said \"no way, I don't want that. \" I stressed he needs to call when his wt is up. \"I don't do that because I don't have time to come in and they'll want me to come in. \" I explained if he called early on with his S&S then could likely be taken care of via close phone communications. \"I didn't know that. \" I explained after so many lbs then it gets to a point of requiring a hospitalization ie his 40+ lbs this admit.      I reviewed the log sheet and 3/5 rule and HF zones. I went over dietary education. Pt states \"dietician was just in and went over all this. \" He allowed me to reiterate the material. He gets low Na MOW once a day and then eats his house food once a day. He is aware the possible outcome if he eats poorly. Emotional support provided. \"I know Dr Angelia Blackwell does not want to hear excuses and that's all I'm pretty much giving you guys is excuses. I'll try to be better. \" Pt lives with his son Carlin Awan who works full time. He has a brother Armin Elam who works (he also has HF). He states his sister Soy Zapata is the one who helps him with his appts. I offered to call her. He dialed her on speaker phone. I explained my role etc, reviewed the basics. She shared she does not drive but she helps him arrange either an Driss for appts or insurance transportation. I told her of the 2/15 appt with Luna Corral HF NP (she originally called and cancelled it since he was in the hospital but I informed her we want him to have that appt. She verbalized understanding). She is also aware of his upcoming PCP appt. I will place another [facility] referral as well to offer to do telehealth calls with him to check his status. I again reiterated the importance of showing up physically to his MD/NP follow up appts. CURRENT DIET: ADULT DIET; Regular; Low Sodium (2 gm); 1500 ml    EDUCATIONAL PACKETS PROVIDED- PRINTED FROM [employer]. Titles and material given:  Yes   [x]  What is Heart Failure?   [x]  Heart Failure: Warning Signs of a Flare-Up  [x]  Heart Failure: Making Changes to Your Diet  [x]  Heart Failure: Medications to Help Your Heart   [x]  Other:  (pt already received our [place] HF Education booklet and states he still has it at home)    PATIENT/CAREGIVER TEACHING:    Level of patient/caregiver understanding able to:   [x] Verbalize understanding   [] Demonstrate understanding       [x] Teach back        [x] Needs reinforcement     []  Other:      TEACHING TIME:  30 minutes Plan:       DISCHARGE PLAN:  Placement for patient upon discharge: home with support   Hospice Care:  no  Code Status: Full Code  Discharge appointment scheduled: I talked to pt's sister Vineet Kraft as she helps him with appts. I told her I will reach out to her with a day and time once he gets closer to dc. Pt in agreement of plan as well.  (tentatively appt is in place for 2/15 with [facility])    RECOMMENDATIONS:   [x]  Encourage to call Heart Failure Resource Line with any questions or concerns. [x]  Educate further Mr. Rachelle Holt on fluid restriction 48 oz- 64 oz during inpatient stay so he can understand how to measure intake at home. [x]  Continue to educate on S/S of Heart Failure. [x]  Emphasize daily weights, diet, and if changes, to call Heart Failure Resource Line  [x]  Other: [facility]health calls to try and help keep pt's HF managed. Declined CR since no transportation.             Electronically signed by Gina Cordon, RN, BSN CHFN  on 2/8/2022 at 1:43 PM

## 2022-02-08 NOTE — ACP (ADVANCE CARE PLANNING)
Advance Care Planning     Advance Care Planning Activator (Inpatient)  Conversation Note      Date of ACP Conversation: 2/7/2022     Conversation Conducted with: Patient, he was oriented x4    ACP Activator: DAHIANA Zelaya CNP    Health Care Decision Maker:     Current Designated Health Care Decision Maker:     Primary Decision Maker: Howard Brochure - [phone]    Secondary Decision Maker: Anna Nur - Brother/Sister - [phone]      Care Preferences    Ventilation: \"If you were in your present state of health and suddenly became very ill and were unable to breathe on your own, what would your preference be about the use of a ventilator (breathing machine) if it were available to you? \"      Would the patient desire the use of ventilator (breathing machine)?: yes    \"If your health worsens and it becomes clear that your chance of recovery is unlikely, what would your preference be about the use of a ventilator (breathing machine) if it were available to you? \"     Would the patient desire the use of ventilator (breathing machine)?: Yes      Resuscitation  \"CPR works best to restart the heart when there is a sudden event, like a heart attack, in someone who is otherwise healthy. Unfortunately, CPR does not typically restart the heart for people who have serious health conditions or who are very sick. \"    \"In the event your heart stopped as a result of an underlying serious health condition, would you want attempts to be made to restart your heart (answer \"yes\" for attempt to resuscitate) or would you prefer a natural death (answer \"no\" for do not attempt to resuscitate)? \" yes      Conversation Outcomes:  - ACP discussion completed
Advance Care Planning     Advance Care Planning Inpatient Note  [facility] Department    Today's Date: 2/8/2022  Unit: [facility]N PROGRESSIVE CARE    Received request from IDT Member. Upon review of chart and communication with care team, patient's decision making abilities are not in question. . Patient was/were present in the room during visit. Goals of ACP Conversation:  Discuss advance care planning documents    Health Care Decision Makers:       Primary Decision Maker: Howard Brochure - [phone]    Secondary Decision Maker: Anna Nur - Brother/Sister - [phone]    Summary:  Verified Healthcare Decision Maker    Advance Care Planning Documents (Patient Wishes):  None     Assessment:   confirmed that Mr. Mina Mahoney designates his son Alina Acuna as primary decision maker if needed. However, he does not wish to complete a HCPOA document at this time. Mr. Mina Mahoney wants to discuss with son. This  clarified that until legal document is completed all his children could be involved in medical decision making.     Interventions:  Discussed and provided education on state decision maker hierarchy  Encouraged ongoing ACP conversation with future decision makers and loved ones  Reviewed but did not complete ACP document    Care Preferences Communicated:   No    Outcomes/Plan:  Teach Back Method used to verify the patient's and/or Healthcare Decision Maker's understanding of key information in the advance directive documents    Electronically signed by Lashonda Rodrigues. [employer] on 2/8/2022 at 11:51 AM
orders.     Length of ACP Conversation in minutes:   5    Conversation Outcomes:  [x] ACP discussion completed  [] Existing advance directive reviewed with patient; no changes to patient's previously recorded wishes  [] New Advance Directive completed  [] Portable Do Not Rescitate prepared for Provider review and signature  [] POLST/POST/MOLST/MOST prepared for Provider review and signature      Follow-up plan:    [] Schedule follow-up conversation to continue planning  [] Referred individual to Provider for additional questions/concerns   [] Advised patient/agent/surrogate to review completed ACP document and update if needed with changes in condition, patient preferences or care setting    [x] This note routed to one or more involved healthcare providers    Electronically signed by Geovani Arriaga MSN RN-BC [id number]

## 2022-02-08 NOTE — PROGRESS NOTES
Arrived to place [facility]. Patient has  order and needs approval from nephrology. Patient currently has wo PIV in place for access. Off report to bedside RN.

## 2022-02-08 NOTE — PROGRESS NOTES
[address]   Daily Progress Note      Admit Date:  2/3/2022    CC: SOB    HPI:   Christi Negrete is a 61 y.o. male with PMH CAD, SHF s/p ICD (8/2019), LV thrombus, HTN, HLP, DM2, CKD Stage 3/ diabetic nephropathy  Seen by Dr Julisa Duke as outpatient. Plan was for and RHC on 11/1/2021 and he did not show up for procedure. Adm [facility] 11/2021 with HF/SONAL - on Milrinone and lasix gtt. Had [address] with elevated filling pressure. DC wt 145lbs. Non compliant with follow up after DC     Adm to [facility] with acute on chronic systolic heart failure. Started on milrinone and lasix gtts. Also received metolazone. He has been diuresing well but continues with significant amount of BLE, abd and back edema. He denies SOB at rest. Mild SOB with activity. Associated with generalized fatigue and weakness. Diarrhea improved today. Today he is starting to feel better. Improved SOB and energy level. Ambulating in room    Review of Systems:   General: Denies fever, chills  Skin: Denies skin changes, rash, itching, lesions.   HEENT: Denies headache, dizziness, vision changes, nosebleeds, sore throat, nasal drainage  RESP: Denies cough, sputum, dyspnea, wheeze, snoring  CARD: Denies palpitations,  murmur  GI:Denies nausea, vomiting, heartburn, loss of appetite, change in bowels  : Denies frequency, pain, incontinence, polyuria  VASC: Denies claudication, leg cramps, clots  MUSC/SKEL: Denies pain, stiffness, arthritis  PSYCH: Denies anxiety, depression, stress  NEURO: Denies numbness, tingling, weakness,change in mood or memory  HEME: Denies abn bruising, bleeding, anemia  ENDO: Denies intolerance to heat, cold, excessive thirst or hunger, hx thyroid disease    Objective:   /79   Pulse 98   Temp 98.5 °F (36.9 °C) (Axillary)   Resp 18   Ht 5' 6\" (1.676 m)   Wt 161 lb 9.6 oz (73.3 kg)   SpO2 96%   BMI 26.08 kg/m²           Intake/Output Summary (Last 24 hours) at 2/8/2022 0906  Last data filed at 2/8/2022 0814  Gross per 24 hour   Intake 2525.06 ml   Output 5075 ml   Net -2549.94 ml     I/O since adm: neg 10L +    WEIGHT:Admit Weight: 190 lb 14.7 oz (86.6 kg)         Today  Weight: 161 lb 9.6 oz (73.3 kg)   DRY WEIGHT:  Wt Readings from Last 3 Encounters:   02/08/22 161 lb 9.6 oz (73.3 kg)   01/20/22 180 lb (81.6 kg)   11/30/21 149 lb 4 oz (67.7 kg)       Physical Exam:  GEN: Appears ill, no acute distress  SKIN: Brown, warm, dry. Nails without clubbing. HEENT: PERRLA. Normocephalic, atraumatic. Neck supple. No adenopathy. LUNG: AP diameter normal. Diminished  Bilateral bases. No wheeze, rales, or ronchi. Respiratory effort normal.  HEART: S1S2 A/R. + JVD. No carotid bruit. No murmur, rub or gallop. ABD: Distended, Soft, nontender. +BS X 4 quads. No hepatomegaly. EXT: Radial and pedal pulses 2+ and symmetric. Without varicosities. 4+ pedal to sacral edema. MUSCSKEL: Good ROM X4 extremities. No deformity. NEURO: A/O X3. Calm and cooperative.      Telemetry: NSR  with PVCs    Medications:    lidocaine 1 % injection  5 mL IntraDERmal Once    sodium chloride flush  5-40 mL IntraVENous 2 times per day    potassium chloride  20 mEq Oral BID    spironolactone  25 mg Oral Daily    allopurinol  100 mg Oral Daily    atorvastatin  80 mg Oral Daily    isosorbide mononitrate  30 mg Oral Daily    levothyroxine  25 mcg Oral QAM AC    [Held by provider] metoprolol succinate  12.5 mg Oral Daily    therapeutic multivitamin-minerals  1 tablet Oral Daily    rivaroxaban  15 mg Oral Dinner    [Held by provider] sacubitril-valsartan  1 tablet Oral BID    insulin lispro  0-12 Units SubCUTAneous TID WC    insulin lispro  0-6 Units SubCUTAneous Nightly      sodium chloride      milrinone 0.25 mcg/kg/min (02/08/22 0604)    furosemide (LASIX) 1mg/ml infusion 10 mg/hr (02/08/22 0739)    dextrose       sodium chloride flush, sodium chloride, potassium chloride, ondansetron **OR** ondansetron, polyethylene glycol, acetaminophen **OR** acetaminophen, glucose, dextrose, glucagon (rDNA), dextrose    Lab Data: I have reviewed all labs below today. CBC:   Recent Labs     02/06/22  0532 02/07/22  0600 02/08/22  0545   WBC 5.5 5.5 5.4   HGB 10.8* 10.8* 11.7*   HCT 32.9* 32.5* 35.8*   .3* 103.5* 103.8*    130* 146     BMP:   Recent Labs     02/07/22  0600 02/07/22 1919 02/08/22  0545    139 140   K 4.2 4.4 4.1    99 98*   CO2 26 28 30   BUN 55* 53* 56*   CREATININE 1.9* 1.8* 1.8*     GLUCOSE:   Recent Labs     02/07/22  0600 02/07/22 1919 02/08/22  0545   GLUCOSE 100* 140* 127*     LIVER PROFILE:   Lab Results   Component Value Date    AST 28 02/03/2022    ALT 22 02/03/2022    LABALBU 3.0 02/03/2022    BILIDIR 0.9 11/23/2021    BILITOT 0.8 02/03/2022    ALKPHOS 107 02/03/2022     PT/INR:   Lab Results   Component Value Date    PROTIME 22.0 11/30/2021    INR 1.90 11/30/2021    INR 1.73 11/29/2021    INR 1.75 11/28/2021     APTT: No results found for: APTT  Pro-BNP:    Lab Results   Component Value Date    PROBNP 23,626 02/06/2022    PROBNP 21,659 02/03/2022    PROBNP >70,000 11/22/2021     Parameters:   > 450 pg/mL under age 48  > 900 pg/mL ages 54-65  > 1800 pg/mL over age 76    ENZYMES:  Lab Results   Component Value Date    TROPONINI 0.07 02/03/2022    TROPONINI 0.08 02/03/2022    TROPONINI 0.08 02/03/2022     FASTING LIPID PANEL:  Lab Results   Component Value Date    CHOL 95 02/04/2022    HDL 48 02/04/2022    HDL 58 12/13/2011    LDLCALC 38 02/04/2022    TRIG 43 02/04/2022       Diagnostics:    EKG:     ECHO:11/16/2021  Summary  Left ventricular cavity size is severely dilated. Ejection fraction is visually estimated to be <20%. There is severe diffuse hypokinesis. Diastolic filling parameters suggest grade II diastolic dysfunction. Definity contrast administered. No left ventricular thrombus was seen. moderate MR  Pacer / ICD wire is visualized in the right ventricle. The right ventricle is dilated.   Right ventricular systolic     9/6/6039 ():  - Left ventricle: The cavity size is severely dilated. Wall thickness is normal. Systolic function     was severely reduced. The estimated ejection fraction was in the range of 10% to 15%. Diffuse     hypokinesis. Doppler parameters are consistent with restrictive physiology, indicative of     decreased left ventricular diastolic compliance and/or increased left atrial pressure. There was     spontaneous echo contrast, indicative of stasis. - Mitral valve: Moderate regurgitation.   - Left atrium: The atrium is severely dilated. - Right ventricle: Pacer wire or catheter noted in right ventricle. Systolic function was mildly     reduced. - Right atrium: The atrium is mildly dilated. - Atrial septum: The septum bowed from left to right, consistent with increased left atrial     pressure. - Tricuspid valve: Mild-moderate regurgitation.   - Pulmonary arteries: Systolic pressure was severely increased, estimated to be 65mm Hg assuming     that the right atrial pressure was 15 mmHg. - Inferior vena cava: The vessel was dilated. The respirophasic diameter changes were blunted (<     50%), consistent with elevated central venous pressure     Echo 1/9/2020 [facility], [place]):  - Left ventricle: The cavity size is severely dilated. Wall     thickness was increased in a pattern of mild LVH. Systolic     function was severely reduced. The estimated ejection fraction     was in the range of 10% to 15%. Diffuse hypokinesis. No contrast     administered - unable to evaluate for LV thrombus. The     longitudinal strain is -2.47% (markedly reduced). - Aortic valve: Mild thickening.   - Mitral valve: Leaflet tenting. Mild to moderate regurgitation.   - Left atrium: The atrium is severely dilated. - Right ventricle: The cavity size is normal. Pacer wire or ICD     noted in right ventricle. Systolic function was mildly reduced by     objective interpretation. TAPSE: 1.6cm. Tricuspid annular systolic     velocity: 8cm/s.   - Tricuspid valve: Mild-moderate regurgitation.   - Pulmonary arteries: Systolic pressure could not be accurately     estimated, however it is elevated at at least 51 mm Hg, assuming     an RA pressure of 15 mm Hg. - Inferior vena cava: The vessel was dilated. The respirophasic     diameter changes were blunted (< 50%), consistent with elevated     central venous pressure. - Pericardium, extracardiac: A trivial pericardial effusion is     identified.     Cardiac Angiography:   RHC 1/31/2020  IMPRESSIONS:  Normal left and right filling pressures with   preserved CO/CI. SUMMARY:   Hemodynamic:   RA 2   RV 28/3   PA 28/10 mean 16   PCWP 4   Blu 4.5/2.5   TD 4.5/2.5   PASat 66%   RECOMMENDATIONS:  Further management per [facility] Failure [facility].     12/10/2018 LHC/PCI and RHC:  IMPRESSIONS:  Selective angiography of LMCA showing critical mid LAD   lesion. Successful PCI with 3.5 x 18 mm MIKE. Normal cardiac output, m   ildly increased pulmonary pressures with mildly increased filling pre   ssure on inotropic support. SUMMARY:    1. Wedge pressure in the pulmonary capillaries is mildly elevated. 2. Coronary arteries: The left main is unusually long and      trifurcates into the LAD, a ramus intermedius, and the left      circumflex. The left anterior descending gives rise to 2      diagonals and 3 septals and wraps around the apex. The left      circumflex gives rise to 2 obtuse marginals and no      posterolaterals. The first obtuse marginal has a high origin. 3. LAD: Mid-vessel lesion: There is a discrete, 99% stenosis. The      distal vessel supplies a moderate-sized vascular territory. The      lesion is a likely culprit for the patient's clinical      presentation. The lesion was stented (see 1st lesion      intervention). Following intervention, the lesion has a residual      stenosis of 0% and MUKUND grade 3 flow (brisk flow).    4. AV was not crossed due to known apical LV thrombus. 5. RA 6      RV 48/4      PA 50/20 (28)      W 18.   6. Blu CO 5.2 Blu CI 2.9 on Milrinone 0.25 mcg/mg/min.      12/4/2018 Cardiac MRI ():  Findings are consistent with a severe, ischemic cardiomyopathy.  There is akinesis of the apical segments with an associated small, apical thrombus. The left ventricle is severely dilated with severely reduced systolic function. The right ventricle is mildly dilated with moderately decreased systolic function. The left atrium is severely dilated.  There is mild to moderate mitral regurgitation. The right atrium is mildly dilated.  There is moderate tricuspid regurgitation. The descending aorta is dilated. The pulmonary artery is dilated.  The IVC and coronary sinus are dilated. Please see noncardiac findings below.      FINDINGS:     1. The left ventricular size is severely dilated. The left ventricular ejection fraction is 19 % by Smallwood's method. Global left ventricular function is severely decreased. There is akinesis of the apical segments, severe hypokinesis of the basal and mid septal and basal and mid anterior segments and moderate hypokinesis of the lateral segments and mid inferior segment and mild hypokinesis of the basal inferior segment. The left ventricular mass is moderately increased. There appears to be a small intracavitary thrombus (6.5 x 5 mm). 2. There is no evidence of increased iron deposition within the myocardium (T2*myocardium = 51 msec; if < 20 msec, suggestive of iron-overloading of the myocardium).  There is no evidence of myocardial edema. 3. The right ventricular size is mildly dilated. Global right ventricular function is moderately decreased. 4. Left atrial size is severely enlarged. Right atrial size is mildly enlarged.   The Qp/Qs is 1.0 by phase contrast imaging and is consistent with no evidence of shunt. 5. The calculated aortic regurgitant fraction is 1.55 %. There is moderate mitral regurgitation. The calculated mitral regurgitant fraction is 29.02 %. There is moderate tricuspid regurgitation. The calculated tricuspid regurgitant fraction is 37.1 %. The calculated pulmonic regurgitant fraction is 2.39 %. 6. There is a small pericardial effusion. 7. The descending aorta is dilated measuring 27.2 mm. 8. The main pulmonary artery is dilated measuring 34.0 mm.  The IVC and coronary sinus are dilated. 9. There are large left and moderate right pleural effusions.  Basilar atelectasis is associated with the effusions.  There is evidence of mild pulmonary edema.     RHC 11/23/2021  OVERALL IMPRESSION:  1.  Elevated right heart pressures. 2.  Elevated pulmonary capillary wedge pressure. 3.  Markedly reduced cardiac output and indices with significantly low  oxygen saturation, all consistent with stage D/[place] Heart  Association Class IV heart failure. We will start the patient back on Primacor drip. The patient's prognosis is extremely poor at this point and we will  discuss further with the patient and the family about his code status.     RUE venous doppler 11/23/2021  Summary  The left internal jugular, subclavian, axillary, brachial, radial and ulnar veins, as well as the basilic and cephalic veins,  have been examined. These veins show no thrombus but there is slow venous blood flow especially at the proximal internal jugular and  subclavian veins. 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 DW 145lbs. Adm wt 190lbs>161lbs today. Elevated BNP>rpt tomorrow.  Plan is to start to wean milrinone and lasix in AM.   NYHA Class IV  Stage D   Diuretic: Lisa lasix gtt (SONAL better with creat 1.9 today)   Cont Milrinone gtt  Indicated for EF </=40%:   Beta Blocker (evidence based)- Toprol XL -held while on milrinone  ACEi/ARB/ARNI- entresto held - SONAL  SGLT2 Inhibitor- hold for SONAL- consider at DC  Aldosterone Antagonist (BNP within 1 week of DC if new or dose changed)- aldactone     Cardiac Rehab for EF <35%: OP after Home Health  ICD counseling: Has ICD   2gm Na diet, daily weight, 64 oz fluid restriction  Avoid NSAIDS and other nephrotoxic meds  [facility] Referral: OP  HF RN referral for education  DC needs for home care addressed by HF RN. Palliative Care consult      2. ) CAD: Prior PCI/MIKE to LAD. Mild elevated troponin most likely from decompensated HF. No evidence of angina/ischemia. DAPT: on xarelto  Beta Blocker: Toprol XL-hold  ACEi/ARB:  Anti anginal: Imdur  Lipid management/high intensity statin: lipitor  Risk factor management: high blood pressure, high cholesterol, Diabetes, smoking, obesity, family hx  Lifestyle modification: Heart healthy diet, regular exercise, weight loss, smoking cessation, stress reduction     3.) LV thrombus:  -F/U ECHO without thrombus  - cont xarelto     4.) SONAL on CKD 3: R/T diabetic nephropathy, Neph following  Baseline creat 2, Today 1.8    5.) Anemia: Iron studies low in November, but does no look like he was on iron/ferrous sulfate on admission.  Will repeat iron studies    Electronically signed by LARA Eason, APRN - CNP on 2/8/2022 at 9:06 AM

## 2022-02-08 NOTE — PROGRESS NOTES
Occupational Therapy  Facility/Department: [address] PROGRESSIVE CARE  Daily Treatment Note  NAME: Jennifer Jimenez  : 1955  MRN: [id number]    Date of Service: 2022    Discharge Recommendations:  Patient would benefit from continued therapy after discharge,Home with assist PRN,2-3 sessions per week  OT Equipment Recommendations  ADL Assistive Devices: Shower Chair with back (May benefit from TTB?)    Assessment   Performance deficits / Impairments: Decreased functional mobility ; Decreased strength;Decreased endurance;Decreased ADL status; Decreased balance;Decreased high-level IADLs  Assessment: Pt tolerated session well this date. Pt performed bed mob SBA with increased time and fxl mob/transfers with SBA + cues for walker safety. Pt performed toileting, UB bathing and grooming tasks with SBA (both seated and standing). Min A for LE dressing and max A for LE bathing. No LOB or signs of fatigue. Pt improving and anticipate safe to return home with assist PRN and home OT. Will continue to follow during acute hospitalization. OT Education: OT Role;Plan of Care;Transfer Training;ADL Adaptive Strategies  REQUIRES OT FOLLOW UP: Yes  Activity Tolerance  Activity Tolerance: Patient Tolerated treatment well  Safety Devices  Safety Devices in place: Yes  Type of devices: Call light within reach;Nurse notified; Left in chair;Gait belt; Chair alarm in place         Patient Diagnosis(es): The primary encounter diagnosis was Acute on chronic congestive heart failure, unspecified heart failure type ([address] Diagnoses of Stage 4 chronic kidney disease ([address] Dyspnea on exertion, and Bilateral lower extremity edema were also pertinent to this visit. has a past medical history of Diabetes type 2, uncontrolled ([address] Diabetic nephropathy, ED (erectile dysfunction), Essential hypertension, benign, Hyperlipidemia, and Noncompliance.    has a past surgical history that includes eye surgery. Restrictions  Restrictions/Precautions  Restrictions/Precautions: Fall Risk  Position Activity Restriction  Other position/activity restrictions: IV in RUE     Subjective   General  Chart Reviewed: Yes,Orders,Progress Notes  Patient assessed for rehabilitation services?: Yes  Additional Pertinent Hx: per ED note, 81-year-old male presents with bilateral lower extremity swelling with associated orthopnea and exertional dyspnea. Patient has history of CHF and CKD. Patient states he takes diuretics however he has been peeing less than normal.  Denies any noncompliance with his medication. States that his legs been gradually worsening for last few weeks to months. Now with associated shortness of breath. Symptoms are chronic. Denies chest pain. Denies other associated symptoms. Worse with lying flat or exertion. Better with sitting up. Risk factors are history of CKD and CHF. For further review of systems see below. Response to previous treatment: Patient with no complaints from previous session  Family / Caregiver Present: No  Referring Practitioner: Lakshmi Higuera MD  Subjective  Subjective: Pt met b/s for OT follow-up. Pt agreeable to therapy and denies pain  General Comment  Comments: Per RN ok to see      Orientation  Orientation  Overall Orientation Status: Within Functional Limits  Objective    ADL  Grooming: Stand by assistance (Washed face seated, brushed teeth in stance)  UE Bathing: Stand by assistance (Seated at sink, washed UB areas)  LE Bathing: Maximum assistance (Assistance for washing/drying lower legs, feet and posterior tarik area.  Pt able to wash upper legs and anterior tarik area)  UE Dressing:  (assistance with gown change)  LE Dressing: Minimal assistance (Assistance for managing socks)  Toileting: Stand by assistance (No clothes to manage, voided sitting down on commode)        Balance  Sitting Balance: Independent  Standing Balance: Stand by assistance (RW)  Functional Mobility  Functional - Mobility Device: Rolling Walker  Activity: To/from bathroom  Assist Level: Stand by assistance  Functional Mobility Comments: no LOB or SOB  Toilet Transfers  Toilet - Technique: Ambulating (RW)  Equipment Used: Grab bars  Toilet Transfer: Stand by assistance  Toilet Transfers Comments: Cues for hand placement  Bed mobility  Supine to Sit: Independent (Increased time)  Sit to Supine: Unable to assess (Pt in recliner at end of session)  Scooting: Independent (Increased time)  Transfers  Sit to stand: Stand by assistance  Stand to sit: Stand by assistance  Transfer Comments: to/from RW; cues for hand placement        Cognition  Overall Cognitive Status: [facility]        Plan   Plan  Times per week: 3-5x  Current Treatment Recommendations: Strengthening,Functional Mobility Training,Gait Training,Endurance Training,Balance Training,Safety Education & Training,Self-Care / ADL,Patient/Caregiver Education & Training,Equipment Evaluation, Education, & procurement    AM-PAC Score        AM-PAC Inpatient Daily Activity Raw Score: 21 (02/08/22 1034)  AM-PAC Inpatient ADL T-Scale Score : 44.27 (02/08/22 1034)  ADL Inpatient CMS 0-100% Score: 32.79 (02/08/22 1034)  ADL Inpatient CMS G-Code Modifier : Willene Oppenheim (02/08/22 1034)    Goals  Short term goals  Time Frame for Short term goals: prior to D/C  Short term goal 1: complete functional mobility and transfers Mod Ind  Short term goal 2: complete toileting Mod Ind  Short term goal 3: complete bathing and dressing Mod Ind  Short term goal 4: complete grooming in stance at sink Mod Ind  Long term goals  Time Frame for Long term goals : STG=LTG  Patient Goals   Patient goals : return home       Therapy Time   Individual Concurrent Group Co-treatment   Time In 0945         Time Out 1042         Minutes 57         Timed Code Treatment Minutes: [address], OT   Electronically signed by PEACE Rojas/BRANT  License # [id number]

## 2022-02-09 NOTE — PROGRESS NOTES
Hospitalist Progress Note      PCP: Itzel French DO    Date of Admission: 2/3/2022    Subjective: cont to tylere well on lasix gtt, on milrinone gtt    Medications:  Reviewed    Infusion Medications    sodium chloride      milrinone 0.25 mcg/kg/min (02/08/22 1150)    furosemide (LASIX) 1mg/ml infusion 10 mg/hr (02/08/22 1719)    dextrose       Scheduled Medications    lidocaine 1 % injection  5 mL IntraDERmal Once    sodium chloride flush  5-40 mL IntraVENous 2 times per day    potassium chloride  20 mEq Oral BID    spironolactone  25 mg Oral Daily    allopurinol  100 mg Oral Daily    atorvastatin  80 mg Oral Daily    isosorbide mononitrate  30 mg Oral Daily    levothyroxine  25 mcg Oral QAM AC    [Held by provider] metoprolol succinate  12.5 mg Oral Daily    therapeutic multivitamin-minerals  1 tablet Oral Daily    rivaroxaban  15 mg Oral Dinner    [Held by provider] sacubitril-valsartan  1 tablet Oral BID    insulin lispro  0-12 Units SubCUTAneous TID WC    insulin lispro  0-6 Units SubCUTAneous Nightly     PRN Meds: sodium chloride flush, sodium chloride, potassium chloride, ondansetron **OR** ondansetron, polyethylene glycol, acetaminophen **OR** acetaminophen, glucose, dextrose, glucagon (rDNA), dextrose      Intake/Output Summary (Last 24 hours) at 2/8/2022 2306  Last data filed at 2/8/2022 1959  Gross per 24 hour   Intake 1065.18 ml   Output 5825 ml   Net -4759.82 ml       Physical Exam Performed:    /71   Pulse 101   Temp 99.9 °F (37.7 °C) (Oral)   Resp 18   Ht 5' 6\" (1.676 m)   Wt 161 lb 9.6 oz (73.3 kg)   SpO2 95%   BMI 26.08 kg/m²         General appearance: No apparent distress appears stated age and cooperative. HEENT Normal cephalic, atraumatic without obvious deformity.  Pupils equal, round, and reactive to light.  Extra ocular muscles intact.  Conjunctivae/corneas clear.   Neck: Supple, No jugular venous distention/bruits.  Trachea midline without thyromegaly or adenopathy with full range of motion. Lungs: No increased work of breathing, no accessory muscle use, bibasilar crackles. Heart: Regular rate and rhythm with Normal S1/S2 without murmurs, rubs or gallops, point of maximum impulse non-displaced  Abdomen: Soft, non-tender or non-distended without rigidity or guarding and positive bowel sounds all four quadrants. Extremities: No clubbing, cyanosis, 3+ bilateral pitting edema bilaterally.  Full range of motion without deformity and normal gait intact. Skin: Skin color, texture, turgor normal.  No rashes or lesions. Neurologic: Alert and oriented X 3, neurovascularly intact with sensory/motor intact upper extremities/lower extremities, bilaterally.  Cranial nerves: II-XII intact, grossly non-focal.  Mental status: Alert, oriented, thought content appropriate. Capillary Refill: Acceptable  < 3 seconds  Peripheral Pulses: +3 Easily felt, not easily obliterated with pressure     Labs:   Recent Labs     02/06/22  0532 02/07/22  0600 02/08/22  0545   WBC 5.5 5.5 5.4   HGB 10.8* 10.8* 11.7*   HCT 32.9* 32.5* 35.8*    130* 146     Recent Labs     02/07/22  1919 02/08/22  0545 02/08/22  1859    140 137   K 4.4 4.1 4.4   CL 99 98* 95*   CO2 28 30 28   BUN 53* 56* 58*   CREATININE 1.8* 1.8* 1.9*   CALCIUM 8.9 9.2 9.0     No results for input(s): AST, ALT, BILIDIR, BILITOT, ALKPHOS in the last 72 hours. No results for input(s): INR in the last 72 hours. No results for input(s): Burnice Placer in the last 72 hours. Urinalysis:      Lab Results   Component Value Date    NITRU Negative 02/03/2022    WBCUA 1 02/03/2022    RBCUA 1 02/03/2022    BLOODU Negative 02/03/2022    SPECGRAV 1.010 02/03/2022    GLUCOSEU Negative 02/03/2022       Radiology:  XR CHEST PORTABLE   Final Result   Mild pulmonary vascular congestion. Stable cardiomegaly.                  Assessment/Plan:    Active Hospital Problems    Diagnosis     Acute on chronic systolic CHF (congestive heart failure) ([facility]) [I50.23]             Shortness of breath likely 2/2 acute on chronic combined systolic and diastolic CHF exacerbation - Clinically patient presents with orthopnea, peripheral edema, crackles on exam.   -continue IV Lasix gtt @ 10  -continue milrinone gtt  -given dose of metolazone today  -strict I/O's, net -11L  -daily weights  -medellin catheter  -1500 ml fluid restriction, low sodium diet  -daily renal panel  -cardiology consulted, recs appreciated  -CHF nurse consulted, recs appreciated  -continue home statin, Xarelto, aldactone; hold Entresto and BB  = LV thrombus - on xarelto     SONAL on CKD III - suspect cardiorenal syndrome  -management as above  -nephrology consulted, recs appreciated  -avoid nephrotoxic medications  -trend and monitor  -hold Entresto     CAD  -cardiology consulted  -tele monitoring  -continue home meds  -hold Entresto due to SONAL     Type 2 DM  -SSI  -hypoglycemia protocol  -POCT glucose checks  -carb control diet     Essential hypertension  -hold BB  -hold Entresto due to SONAL  -continue aldactone, Imdur     Hyperlipidemia  -continue home meds     Hypothyroidism  -continue home meds     Diarrhea  -send GI PCR and C. Diff and if negative, will start Imodium PRN        DVT Prophylaxis: Xarelto  Diet: ADULT DIET; Regular;  Low Sodium (2 gm); 1500 ml  Code Status: Full Code    PT/OT Eval Status: ordered    Sean Rios MD

## 2022-02-09 NOTE — PROGRESS NOTES
Occupational Therapy    Attempted OT follow-up. Pt sitting up in recliner upon arrival.  Pt declining therapy due to already worked with PT and also \"the girls\" keep coming in and getting him up. Educated pt on importance of activity to keep up strength for discharge home. Pt continued to decline. Reported he would try tomorrow morning if attempt prior to \"the girls\" coming into bother him. Will attempt back as schedule permits.   Ada Jackson, [address] [place] [address]

## 2022-02-09 NOTE — PROGRESS NOTES
Demonstrate independence with HEP. Patient Goals   Patient goals : To return home    Plan    Plan  Times per week: 2-3x  Specific instructions for Next Treatment: Improve endurance  Current Treatment Recommendations: Endurance Training  Safety Devices  Type of devices:  All fall risk precautions in place,Call light within reach,Nurse notified,Left in chair,Gait belt,Chair alarm in place  Restraints  Initially in place: No     Therapy Time   Individual Concurrent Group Co-treatment   Time In 0907         Time Out 0930         Minutes 23         Timed Code Treatment Minutes: [facility],   [facility]

## 2022-02-09 NOTE — PROGRESS NOTES
[address] [place] [address] Nephrology   [facility]  [phone]  Nephrology Note          Patient ID: Santos Singleton  Referring/ Physician: Wilma Velázquez MD      HPI/Summary:   Santos Singleton is being seen by nephrology for SONAL. This is a 22-GOO[age]-JTS man with systolic heart failure, history of LV thrombus, coronary artery disease, hyperlipidemia who presented the hospital with increasing shortness of breath and worsening lower extremity swelling. Interval hx:   Seen and examined at bedside  No SOB  He has no other complaints. No chest pain, no dizziness  + edema    Blood pressure 97/63  97% sat on room air. Urine output 4.9 L past day   Negative 3 L past day   Wt 188 > 173 > 161 > 157    Labs reviewed  Cr stable 1.8 > 1.9    k 4.3  BICARB 28 > 30 > 31  Hgb 10.6      Plan:   - his weights are coming down and diuresing well with stable renal function but still has a lot of edema. Will continue Aldactone 25 mg daily, tolerating this. Creatinine stable  No acute electrolyte issues  On KCL replacement standing   His lasix was decreased by half        SONAL on Chronic kidney disease stage IIIb  This is likely secondary to diabetic nephropathy and SONAL from cardiorenal syndrome. Baseline creatinine around 2  Cr 2.3 at time of consult  No acute electrolyte abnormalities  Has had diabetes and hypertension for over 10 years and his last EF is less than 20%  He has had event evidence of albuminuria on urinalysis going back to 2016, 2013 had an  mg  Most recent protein creatinine ratio 700 mg   CT abdomen in 2020 showed symmetric kidneys no lesions no masses bilateral perinephric stranding no hydronephrosis. HCV and hepatitis C negative  Prior negative NAHOMY  Prior negative RF factor    Ischemic cardiomyopathy  Decompensated, has evidence of volume overload  proBNP 12673  Managed with Lasix drip and is on milrinone, appreciate cardiology involvement.   Last EF less than 20%  History of coronary artery disease with stent in the LAD  Has ICD  entresto held     Diabetes type 2  10 years at least  Longstanding albuminuria  A1c has been ranging between 6 and 6.5 the last few checks. Hypertension  On inotrope BP acceptable. [facility] would like to thank you for the opportunity to serve this patient. Please call with any questions or concerns. Tiffanie Rubio MD  [facility]  [address]  [place], [address]  Fax: [phone]  Office: [phone]         CC/Reason for consult:   Reason for consult: SONAL  Chief Complaint   Patient presents with    Leg Swelling     pt complains of acute on chronic BLE edema. mild sob on exertion. denies cough. Review of Systems:   [address] All other remaining systems are negative. Constitutional:  fever, chills, weakness, weight change, fatigue,      Skin:  rash, pruritus, hair loss, bruising, dry skin, petechiae. Head, Face, Neck   headaches, swelling,  cervical adenopathy. Respiratory: shortness of breath, cough, or wheezing  Cardiovascular: chest pain, palpitations, dizzy, edema  Gastrointestinal: nausea, vomiting, diarrhea, constipation,belly pain    Yellow skin, blood in stool  Musculoskeletal:  back pain, muscle weakness, gait problems,       joint pain or swelling. Genitourinary:  dysuria, poor urine flow, flank pain, blood in urine  Neurologic:  vertigo, TIA'S, syncope, seizures, focal weakness  Psychosocial:  insomnia, anxiety, or depression.   Additional positive findings: -     PMH/SH/FH:    Medical Hx: reviewed and updated as appropriate  Past Medical History:   Diagnosis Date    Diabetes type 2, uncontrolled ([address] 7/28/2014    Diabetic nephropathy 9/12/2013    ED (erectile dysfunction) 9/12/2013    Essential hypertension, benign 9/12/2013    Hyperlipidemia 9/12/2013    Noncompliance 7/28/2014         Surgical Hx: reviewed and updated as appropriate   has a past surgical history that includes eye surgery. Social Hx: reviewed and updated as appropriate  Social History     Tobacco Use    Smoking status: Former Smoker     Packs/day: 1.50     Years: 7.00     Pack years: 10.50     Quit date: 1981     Years since quittin.0    Smokeless tobacco: Never Used   Substance Use Topics    Alcohol use: Yes     Alcohol/week: 1.0 standard drink     Types: 1 Cans of beer per week        Family hx: reviewed and updated as appropriate  family history includes Diabetes in his brother, mother, and sister; High Blood Pressure in his mother. Medications:   metoprolol succinate, 12.5 mg, Daily  iron sucrose, 200 mg, Daily  lidocaine 1 % injection, 5 mL, Once  sodium chloride flush, 5-40 mL, 2 times per day  potassium chloride, 20 mEq, BID  spironolactone, 25 mg, Daily  allopurinol, 100 mg, Daily  atorvastatin, 80 mg, Daily  isosorbide mononitrate, 30 mg, Daily  levothyroxine, 25 mcg, QAM AC  therapeutic multivitamin-minerals, 1 tablet, Daily  rivaroxaban, 15 mg, Dinner  [Held by provider] sacubitril-valsartan, 1 tablet, BID  insulin lispro, 0-12 Units, TID WC  insulin lispro, 0-6 Units, Nightly       Patient has no known allergies. Allergies:   No Known Allergies      Physical Exam/Objective:   Vitals:    22 1519   BP: 97/63   Pulse: 97   Resp: 18   Temp: 98.9 °F (37.2 °C)   SpO2: 97%       Intake/Output Summary (Last 24 hours) at 2022 1554  Last data filed at 2022 1336  Gross per 24 hour   Intake 2370.28 ml   Output 3250 ml   Net -879.72 ml         General appearance:  in no acute distress, comfortable, communicative, awake and alert. HEENT: no icterus, EOM intact, trachea midline. Neck : no masses, appears symmetrical + JVD  Respiratory: Respiratory effort normal, bilateral equal chest rise. No wheeze, + crackles. Cardiovascular: Ausculation shows RRR and  + edema   Abdomen: abdomen is soft, non distended, no masses, no pain with palpation.   Musculoskeletal:  no joint swelling, no deformity, strength grossly normal.   Skin: no rashes, no induration, no tightening, no jaundice   Neuro: Follows commands, moves all extremities spontaneously       Data:   CBC:   Recent Labs     02/07/22  0600 02/08/22  0545 02/09/22  0511   WBC 5.5 5.4 4.7   HGB 10.8* 11.7* 10.6*   HCT 32.5* 35.8* 31.1*   * 146 139     BMP:    Recent Labs     02/07/22  0600 02/07/22  1919 02/08/22  0545 02/08/22  1859 02/09/22  0511      < > 140 137 135*   K 4.2   < > 4.1 4.4 4.3      < > 98* 95* 94*   CO2 26   < > 30 28 31   BUN 55*   < > 56* 58* 56*   CREATININE 1.9*   < > 1.8* 1.9* 1.9*   GLUCOSE 100*   < > 127* 156* 125*   MG 2.30  --  2.40  --   --     < > = values in this interval not displayed.

## 2022-02-09 NOTE — PROGRESS NOTES
2/9/2022 1003  Last data filed at 2/9/2022 3488  Gross per 24 hour   Intake 2014.56 ml   Output 4300 ml   Net -2285.44 ml     I/O since adm: neg 13L +    WEIGHT:Admit Weight: 190 lb 14.7 oz (86.6 kg)         Today  Weight: 157 lb 6.5 oz (71.4 kg)   DRY WEIGHT:  Wt Readings from Last 3 Encounters:   02/09/22 157 lb 6.5 oz (71.4 kg)   01/20/22 180 lb (81.6 kg)   11/30/21 149 lb 4 oz (67.7 kg)       Physical Exam:  GEN: Appears ill, no acute distress  SKIN: Brown, warm, dry. Nails without clubbing. HEENT: PERRLA. Normocephalic, atraumatic. Neck supple. No adenopathy. LUNG: AP diameter normal. Diminished  Bilateral bases with fine crackles R>L. No wheeze or ronchi. Respiratory effort normal.  HEART: S1S2 A/R. + JVD. No carotid bruit. No murmur, rub or gallop. ABD: Distended, Soft, nontender. +BS X 4 quads. No hepatomegaly. EXT: Radial and pedal pulses 2+ and symmetric. Without varicosities. 3-4+ pedal to sacral edema. MUSCSKEL: Good ROM X4 extremities. No deformity. NEURO: A/O X3. Calm and cooperative.      Telemetry: NSR  with PVCs    Medications:    lidocaine 1 % injection  5 mL IntraDERmal Once    sodium chloride flush  5-40 mL IntraVENous 2 times per day    potassium chloride  20 mEq Oral BID    spironolactone  25 mg Oral Daily    allopurinol  100 mg Oral Daily    atorvastatin  80 mg Oral Daily    isosorbide mononitrate  30 mg Oral Daily    levothyroxine  25 mcg Oral QAM AC    [Held by provider] metoprolol succinate  12.5 mg Oral Daily    therapeutic multivitamin-minerals  1 tablet Oral Daily    rivaroxaban  15 mg Oral Dinner    [Held by provider] sacubitril-valsartan  1 tablet Oral BID    insulin lispro  0-12 Units SubCUTAneous TID WC    insulin lispro  0-6 Units SubCUTAneous Nightly      sodium chloride      milrinone 0.25 mcg/kg/min (02/09/22 0516)    furosemide (LASIX) 1mg/ml infusion 10 mg/hr (02/09/22 0516)    dextrose       sodium chloride flush, sodium chloride, potassium chloride, ondansetron **OR** ondansetron, polyethylene glycol, acetaminophen **OR** acetaminophen, glucose, dextrose, glucagon (rDNA), dextrose    Lab Data: I have reviewed all labs below today. CBC:   Recent Labs     02/07/22  0600 02/08/22  0545 02/09/22  0511   WBC 5.5 5.4 4.7   HGB 10.8* 11.7* 10.6*   HCT 32.5* 35.8* 31.1*   .5* 103.8* 102.5*   * 146 139     Results for Jeny Hunter (MRN [id number]) as of 2/9/2022 10:18   Ref.  Range 2/9/2022 05:11   Iron Latest Ref Range: 59 - 158 ug/dL 35 (L)   Iron Saturation Latest Ref Range: 20 - 50 % 13 (L)   TIBC Latest Ref Range: 260 - 445 ug/dL 271   Transferrin Latest Ref Range: 200.0 - 360.0 mg/dL 223.0       BMP:   Recent Labs     02/08/22  0545 02/08/22  1859 02/09/22  0511    137 135*   K 4.1 4.4 4.3   CL 98* 95* 94*   CO2 30 28 31   BUN 56* 58* 56*   CREATININE 1.8* 1.9* 1.9*     GLUCOSE:   Recent Labs     02/08/22  0545 02/08/22  1859 02/09/22  0511   GLUCOSE 127* 156* 125*     LIVER PROFILE:   Lab Results   Component Value Date    AST 28 02/03/2022    ALT 22 02/03/2022    LABALBU 3.0 02/03/2022    BILIDIR 0.9 11/23/2021    BILITOT 0.8 02/03/2022    ALKPHOS 107 02/03/2022     PT/INR:   Lab Results   Component Value Date    PROTIME 22.0 11/30/2021    INR 1.90 11/30/2021    INR 1.73 11/29/2021    INR 1.75 11/28/2021     APTT: No results found for: APTT  Pro-BNP:    Lab Results   Component Value Date    PROBNP 35,157 02/09/2022    PROBNP 23,626 02/06/2022    PROBNP 21,659 02/03/2022     Parameters:   > 450 pg/mL under age 48  > 900 pg/mL ages 54-65  > 1800 pg/mL over age 76    ENZYMES:  Lab Results   Component Value Date    TROPONINI 0.07 02/03/2022    TROPONINI 0.08 02/03/2022    TROPONINI 0.08 02/03/2022     FASTING LIPID PANEL:  Lab Results   Component Value Date    CHOL 95 02/04/2022    HDL 48 02/04/2022    HDL 58 12/13/2011    LDLCALC 38 02/04/2022    TRIG 43 02/04/2022       Diagnostics:    EKG:     ECHO:11/16/2021  Summary  Left strain is -2.47% (markedly reduced). - Aortic valve: Mild thickening.   - Mitral valve: Leaflet tenting. Mild to moderate regurgitation.   - Left atrium: The atrium is severely dilated. - Right ventricle: The cavity size is normal. Pacer wire or ICD     noted in right ventricle. Systolic function was mildly reduced by     objective interpretation. TAPSE: 1.6cm. Tricuspid annular systolic     velocity: 8cm/s.   - Tricuspid valve: Mild-moderate regurgitation.   - Pulmonary arteries: Systolic pressure could not be accurately     estimated, however it is elevated at at least 51 mm Hg, assuming     an RA pressure of 15 mm Hg. - Inferior vena cava: The vessel was dilated. The respirophasic     diameter changes were blunted (< 50%), consistent with elevated     central venous pressure. - Pericardium, extracardiac: A trivial pericardial effusion is     identified.     Cardiac Angiography:   RHC 1/31/2020  IMPRESSIONS:  Normal left and right filling pressures with   preserved CO/CI. SUMMARY:   Hemodynamic:   RA 2   RV 28/3   PA 28/10 mean 16   PCWP 4   Blu 4.5/2.5   TD 4.5/2.5   PASat 66%   RECOMMENDATIONS:  Further management per [facility] Failure [facility].     12/10/2018 LHC/PCI and RHC:  IMPRESSIONS:  Selective angiography of LMCA showing critical mid LAD   lesion. Successful PCI with 3.5 x 18 mm MIKE. Normal cardiac output, m   ildly increased pulmonary pressures with mildly increased filling pre   ssure on inotropic support. SUMMARY:    1. Wedge pressure in the pulmonary capillaries is mildly elevated. 2. Coronary arteries: The left main is unusually long and      trifurcates into the LAD, a ramus intermedius, and the left      circumflex. The left anterior descending gives rise to 2      diagonals and 3 septals and wraps around the apex. The left      circumflex gives rise to 2 obtuse marginals and no      posterolaterals. The first obtuse marginal has a high origin.    3. LAD: Mid-vessel lesion: There is a discrete, 99% stenosis. The      distal vessel supplies a moderate-sized vascular territory. The      lesion is a likely culprit for the patient's clinical      presentation. The lesion was stented (see 1st lesion      intervention). Following intervention, the lesion has a residual      stenosis of 0% and MUKUND grade 3 flow (brisk flow). 4. AV was not crossed due to known apical LV thrombus. 5. RA 6      RV 48/4      PA 50/20 (28)      W 18.   6. Blu CO 5.2 Blu CI 2.9 on Milrinone 0.25 mcg/mg/min.      12/4/2018 Cardiac MRI ():  Findings are consistent with a severe, ischemic cardiomyopathy.  There is akinesis of the apical segments with an associated small, apical thrombus. The left ventricle is severely dilated with severely reduced systolic function. The right ventricle is mildly dilated with moderately decreased systolic function. The left atrium is severely dilated.  There is mild to moderate mitral regurgitation. The right atrium is mildly dilated.  There is moderate tricuspid regurgitation. The descending aorta is dilated. The pulmonary artery is dilated.  The IVC and coronary sinus are dilated. Please see noncardiac findings below.      FINDINGS:     1. The left ventricular size is severely dilated. The left ventricular ejection fraction is 19 % by Smallwood's method. Global left ventricular function is severely decreased. There is akinesis of the apical segments, severe hypokinesis of the basal and mid septal and basal and mid anterior segments and moderate hypokinesis of the lateral segments and mid inferior segment and mild hypokinesis of the basal inferior segment. The left ventricular mass is moderately increased. There appears to be a small intracavitary thrombus (6.5 x 5 mm).    2. There is no evidence of increased iron deposition within the myocardium (T2*myocardium = 51 msec; if < 20 msec, suggestive of iron-overloading of the myocardium).  There is no evidence of myocardial edema.   3. The right ventricular size is mildly dilated. Global right ventricular function is moderately decreased. 4. Left atrial size is severely enlarged. Right atrial size is mildly enlarged.   The Qp/Qs is 1.0 by phase contrast imaging and is consistent with no evidence of shunt. 5. The calculated aortic regurgitant fraction is 1.55 %. There is moderate mitral regurgitation. The calculated mitral regurgitant fraction is 29.02 %. There is moderate tricuspid regurgitation. The calculated tricuspid regurgitant fraction is 37.1 %. The calculated pulmonic regurgitant fraction is 2.39 %. 6. There is a small pericardial effusion. 7. The descending aorta is dilated measuring 27.2 mm. 8. The main pulmonary artery is dilated measuring 34.0 mm.  The IVC and coronary sinus are dilated. 9. There are large left and moderate right pleural effusions.  Basilar atelectasis is associated with the effusions.  There is evidence of mild pulmonary edema.     [facility] 11/23/2021  OVERALL IMPRESSION:  1.  Elevated right heart pressures. 2.  Elevated pulmonary capillary wedge pressure. 3.  Markedly reduced cardiac output and indices with significantly low  oxygen saturation, all consistent with stage D/[place] Heart  Association Class IV heart failure. We will start the patient back on Primacor drip. The patient's prognosis is extremely poor at this point and we will  discuss further with the patient and the family about his code status.     RUE venous doppler 11/23/2021  Summary  The left internal jugular, subclavian, axillary, brachial, radial and ulnar veins, as well as the basilic and cephalic veins,  have been examined. These veins show no thrombus but there is slow venous blood flow especially at the proximal internal jugular and  subclavian veins.  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 DW 145lbs. Adm wt 190lbs>157lbs today. Elevated BNP- may be falsely elevated with SONAL. Start to wean milrinone and lasix today. NYHA Class IV  Stage D   Diuretic: Decrease lasix gtt 5mg/hr(SONAL better with creat 1.9 today)   Milrinone gtt-decrease to 0.125mcg/kg/min  Indicated for EF </=40%:   Beta Blocker (evidence based)- Toprol XL - restart low dose  ACEi/ARB/ARNI- entresto held - SONAL  SGLT2 Inhibitor- hold for SONAL- consider at DC  Aldosterone Antagonist (BNP within 1 week of DC if new or dose changed)- aldactone     Cardiac Rehab for EF <35%: OP after Home Health  ICD counseling: Has ICD   2gm Na diet, daily weight, 64 oz fluid restriction  Avoid NSAIDS and other nephrotoxic meds  [facility]erral: OP  HF RN referral for education  DC needs for home care addressed by HF RN. Palliative Care consult      2. ) CAD: Prior PCI/MIKE to LAD. Mild elevated troponin most likely from decompensated HF. No evidence of angina/ischemia. DAPT: on xarelto  Beta Blocker: Toprol XL  ACEi/ARB:  Anti anginal: Imdur  Lipid management/high intensity statin: lipitor  Risk factor management: high blood pressure, high cholesterol, Diabetes, smoking, obesity, family hx  Lifestyle modification: Heart healthy diet, regular exercise, weight loss, smoking cessation, stress reduction     3.) LV thrombus:  -F/U ECHO without thrombus  - cont xarelto     4.) SONAL on CKD 3: R/T diabetic nephropathy, Neph following  Baseline creat 2, Today 1.9    5.) Anemia: Iron studies low today.    Add venofer X 3 doses then ferrous sulfate    Electronically signed by DAHIANA Naidu - CNP on 2/9/2022 at 10:03 AM

## 2022-02-09 NOTE — PLAN OF CARE
Problem: OXYGENATION/RESPIRATORY FUNCTION  Goal: Patient will maintain patent airway  2/9/2022 1006 by Paula Rosado RN  Outcome: Ongoing     Problem: OXYGENATION/RESPIRATORY FUNCTION  Goal: Patient will achieve/maintain normal respiratory rate/effort  Description: Respiratory rate and effort will be within normal limits for the patient  2/9/2022 1006 by Paula Rosado RN  Outcome: Ongoing     Problem: HEMODYNAMIC STATUS  Goal: Patient has stable vital signs and fluid balance  2/9/2022 1006 by Paula Rosado RN  Outcome: Ongoing     Problem: FLUID AND ELECTROLYTE IMBALANCE  Goal: Fluid and electrolyte balance are achieved/maintained  2/9/2022 1006 by Paula Rosado RN  Outcome: Ongoing     Problem: ACTIVITY INTOLERANCE/IMPAIRED MOBILITY  Goal: Mobility/activity is maintained at optimum level for patient  2/9/2022 1006 by Paula Rosado RN  Outcome: Ongoing     Problem: Falls - Risk of:  Goal: Will remain free from falls  Description: Will remain free from falls  2/9/2022 1006 by Paula Rosado RN  Outcome: Ongoing     Problem: Falls - Risk of:  Goal: Absence of physical injury  Description: Absence of physical injury  2/9/2022 1006 by Paula Rosado RN  Outcome: Ongoing     Problem: Skin Integrity:  Goal: Will show no infection signs and symptoms  Description: Will show no infection signs and symptoms  2/9/2022 1006 by Paula Rosado RN  Outcome: Ongoing     Problem: Skin Integrity:  Goal: Absence of new skin breakdown  Description: Absence of new skin breakdown  2/9/2022 1006 by Paula Rosado RN  Outcome: Ongoing

## 2022-02-10 NOTE — PROGRESS NOTES
Occupational Therapy    Attempted OT follow-up. Pt supine in bed sleeping. Pt responded when name called but did not open his eyes. Pt reporting does not feel well and will participate tomorrow. Reminded him he reported this yesterday but continued to decline therapy. Pt did not open his eyes at all when speaking. Will continue to follow as able.   Kaylan Vela, [address]

## 2022-02-10 NOTE — PROGRESS NOTES
[facility]   Daily Progress Note      Admit Date:  2/3/2022    CC: SOB    HPI:   Janett Medina is a 61 y.o. male with PMH CAD, SHF s/p ICD (8/2019), LV thrombus, HTN, HLP, DM2, CKD Stage 3/ diabetic nephropathy  Seen by Dr Ai Henriquez as outpatient. Plan was for and RHC on 11/1/2021 and he did not show up for procedure. Adm [facility] 11/2021 with HF/SONAL - on Milrinone and lasix gtt. Had [address] with elevated filling pressure. DC wt 145lbs. Non compliant with follow up after DC     Adm to [facility] with acute on chronic systolic heart failure. Started on milrinone and lasix gtts. Also received metolazone. He has been diuresing well but continues with significant amount of BLE, abd and back edema. He denies SOB at rest. Mild SOB with activity. Associated with generalized fatigue and weakness. Attempted to wean milrinone and lasix yesterday/overnight. He continues with significant amount of BLE edema and abd distention. Has not improved very much since yesterday and he was only neg 200 last 24 hrs. He feels more tired today and C/O SOB and  pain in his legs with ambulation. He continues with low gr fevers. He denies CP, LH, dizziness, palpitations, syncope. Review of Systems:   General: Denies fever, chills  Skin: Denies skin changes, rash, itching, lesions.   HEENT: Denies headache, dizziness, vision changes, nosebleeds, sore throat, nasal drainage  RESP: Denies cough, sputum, dyspnea, wheeze, snoring  CARD: Denies palpitations,  murmur  GI:Denies nausea, vomiting, heartburn, loss of appetite, change in bowels  : Denies frequency, pain, incontinence, polyuria  VASC: Denies claudication, leg cramps, clots  MUSC/SKEL: Denies pain, stiffness, arthritis  PSYCH: Denies anxiety, depression, stress  NEURO: Denies numbness, tingling, weakness,change in mood or memory  HEME: Denies abn bruising, bleeding, anemia  ENDO: Denies intolerance to heat, cold, excessive thirst or hunger, hx thyroid disease    Objective:   /73   Pulse 107   Temp 100.1 °F (37.8 °C) (Oral)   Resp 18   Ht 5' 6\" (1.676 m)   Wt 156 lb 4.9 oz (70.9 kg)   SpO2 91%   BMI 25.23 kg/m²        Intake/Output Summary (Last 24 hours) at 2/10/2022 0851  Last data filed at 2/10/2022 0295  Gross per 24 hour   Intake 1272.83 ml   Output 2075 ml   Net -802.17 ml     I/O since adm: neg 13L +    WEIGHT:Admit Weight: 190 lb 14.7 oz (86.6 kg)         Today  Weight: 156 lb 4.9 oz (70.9 kg)   DRY WEIGHT:  Wt Readings from Last 3 Encounters:   02/10/22 156 lb 4.9 oz (70.9 kg)   01/20/22 180 lb (81.6 kg)   11/30/21 149 lb 4 oz (67.7 kg)       Physical Exam:  GEN: Appears ill, no acute distress  SKIN: Brown, warm, dry. Nails without clubbing. HEENT: PERRLA. Normocephalic, atraumatic. Neck supple. No adenopathy. LUNG: AP diameter normal. Diminished  Bilateral bases with fine crackles R>L. No wheeze or ronchi. Respiratory effort normal.   HEART: S1S2 A/R. + JVD. No carotid bruit. + systolic murmur LSB 5ICS, no rub or gallop. ABD: Distended, Soft, nontender. +BS X 4 quads. No hepatomegaly. EXT: Radial and pedal pulses 2+ and symmetric. Without varicosities. 3-4+ pedal to sacral edema. MUSCSKEL: Good ROM X4 extremities. No deformity. NEURO: A/O X3. Calm and cooperative.      Telemetry: NSR  with PVCs    Medications:    metoprolol succinate  12.5 mg Oral Daily    iron sucrose  200 mg IntraVENous Daily    lidocaine 1 % injection  5 mL IntraDERmal Once    sodium chloride flush  5-40 mL IntraVENous 2 times per day    potassium chloride  20 mEq Oral BID    spironolactone  25 mg Oral Daily    allopurinol  100 mg Oral Daily    atorvastatin  80 mg Oral Daily    isosorbide mononitrate  30 mg Oral Daily    levothyroxine  25 mcg Oral QAM AC    therapeutic multivitamin-minerals  1 tablet Oral Daily    rivaroxaban  15 mg Oral Dinner    [Held by provider] sacubitril-valsartan  1 tablet Oral BID    insulin lispro  0-12 Units SubCUTAneous TID WC    insulin lispro  0-6 Units SubCUTAneous Nightly      milrinone 0.125 mcg/kg/min (02/09/22 2039)    sodium chloride      furosemide (LASIX) 1mg/ml infusion 5 mg/hr (02/09/22 1649)    dextrose       sodium chloride flush, sodium chloride, potassium chloride, ondansetron **OR** ondansetron, polyethylene glycol, acetaminophen **OR** acetaminophen, glucose, dextrose, glucagon (rDNA), dextrose    Lab Data: I have reviewed all labs below today. CBC:   Recent Labs     02/08/22  0545 02/09/22  0511 02/10/22  0534   WBC 5.4 4.7 5.5   HGB 11.7* 10.6* 10.2*   HCT 35.8* 31.1* 30.6*   .8* 102.5* 103.7*    139 150     Results for Angela Renteria (MRN [id number]) as of 2/9/2022 10:18   Ref.  Range 2/9/2022 05:11   Iron Latest Ref Range: 59 - 158 ug/dL 35 (L)   Iron Saturation Latest Ref Range: 20 - 50 % 13 (L)   TIBC Latest Ref Range: 260 - 445 ug/dL 271   Transferrin Latest Ref Range: 200.0 - 360.0 mg/dL 223.0       BMP:   Recent Labs     02/09/22  0511 02/09/22  1804 02/10/22  0533   * 133* 136   K 4.3 4.6 4.5   CL 94* 91* 95*   CO2 31 32 29   BUN 56* 56* 65*   CREATININE 1.9* 1.8* 2.1*     GLUCOSE:   Recent Labs     02/09/22  0511 02/09/22  1804 02/10/22  0533   GLUCOSE 125* 137* 151*     LIVER PROFILE:   Lab Results   Component Value Date    AST 28 02/03/2022    ALT 22 02/03/2022    LABALBU 3.0 02/03/2022    BILIDIR 0.9 11/23/2021    BILITOT 0.8 02/03/2022    ALKPHOS 107 02/03/2022     PT/INR:   Lab Results   Component Value Date    PROTIME 22.0 11/30/2021    INR 1.90 11/30/2021    INR 1.73 11/29/2021    INR 1.75 11/28/2021     APTT: No results found for: APTT  Pro-BNP:    Lab Results   Component Value Date    PROBNP 35,157 02/09/2022    PROBNP 23,626 02/06/2022    PROBNP 21,659 02/03/2022     Parameters:   > 450 pg/mL under age 48  > 900 pg/mL ages 54-65  > 1800 pg/mL over age 76    ENZYMES:  Lab Results   Component Value Date    TROPONINI 0.07 02/03/2022    TROPONINI 0.08   thickness was increased in a pattern of mild LVH. Systolic     function was severely reduced. The estimated ejection fraction     was in the range of 10% to 15%. Diffuse hypokinesis. No contrast     administered - unable to evaluate for LV thrombus. The     longitudinal strain is -2.47% (markedly reduced). - Aortic valve: Mild thickening.   - Mitral valve: Leaflet tenting. Mild to moderate regurgitation.   - Left atrium: The atrium is severely dilated. - Right ventricle: The cavity size is normal. Pacer wire or ICD     noted in right ventricle. Systolic function was mildly reduced by     objective interpretation. TAPSE: 1.6cm. Tricuspid annular systolic     velocity: 8cm/s.   - Tricuspid valve: Mild-moderate regurgitation.   - Pulmonary arteries: Systolic pressure could not be accurately     estimated, however it is elevated at at least 51 mm Hg, assuming     an RA pressure of 15 mm Hg. - Inferior vena cava: The vessel was dilated. The respirophasic     diameter changes were blunted (< 50%), consistent with elevated     central venous pressure. - Pericardium, extracardiac: A trivial pericardial effusion is     identified.     Cardiac Angiography:   RHC 1/31/2020  IMPRESSIONS:  Normal left and right filling pressures with   preserved CO/CI. SUMMARY:   Hemodynamic:   RA 2   RV 28/3   PA 28/10 mean 16   PCWP 4   Blu 4.5/2.5   TD 4.5/2.5   PASat 66%   RECOMMENDATIONS:  Further management per [facility] Failure [facility].     12/10/2018 LHC/PCI and RHC:  IMPRESSIONS:  Selective angiography of LMCA showing critical mid LAD   lesion. Successful PCI with 3.5 x 18 mm MIKE. Normal cardiac output, m   ildly increased pulmonary pressures with mildly increased filling pre   ssure on inotropic support. SUMMARY:    1. Wedge pressure in the pulmonary capillaries is mildly elevated. 2. Coronary arteries: The left main is unusually long and      trifurcates into the LAD, a ramus intermedius, and the left      circumflex.  The left anterior descending gives rise to 2      diagonals and 3 septals and wraps around the apex. The left      circumflex gives rise to 2 obtuse marginals and no      posterolaterals. The first obtuse marginal has a high origin. 3. LAD: Mid-vessel lesion: There is a discrete, 99% stenosis. The      distal vessel supplies a moderate-sized vascular territory. The      lesion is a likely culprit for the patient's clinical      presentation. The lesion was stented (see 1st lesion      intervention). Following intervention, the lesion has a residual      stenosis of 0% and MUKUND grade 3 flow (brisk flow). 4. AV was not crossed due to known apical LV thrombus. 5. RA 6      RV 48/4      PA 50/20 (28)      W 18.   6. Blu CO 5.2 Blu CI 2.9 on Milrinone 0.25 mcg/mg/min.      12/4/2018 Cardiac MRI ():  Findings are consistent with a severe, ischemic cardiomyopathy.  There is akinesis of the apical segments with an associated small, apical thrombus. The left ventricle is severely dilated with severely reduced systolic function. The right ventricle is mildly dilated with moderately decreased systolic function. The left atrium is severely dilated.  There is mild to moderate mitral regurgitation. The right atrium is mildly dilated.  There is moderate tricuspid regurgitation. The descending aorta is dilated. The pulmonary artery is dilated.  The IVC and coronary sinus are dilated. Please see noncardiac findings below.      FINDINGS:     1. The left ventricular size is severely dilated. The left ventricular ejection fraction is 19 % by Smallwood's method. Global left ventricular function is severely decreased. There is akinesis of the apical segments, severe hypokinesis of the basal and mid septal and basal and mid anterior segments and moderate hypokinesis of the lateral segments and mid inferior segment and mild hypokinesis of the basal inferior segment. The left ventricular mass is moderately increased. There appears to be a small intracavitary thrombus (6.5 x 5 mm). 2. There is no evidence of increased iron deposition within the myocardium (T2*myocardium = 51 msec; if < 20 msec, suggestive of iron-overloading of the myocardium).  There is no evidence of myocardial edema. 3. The right ventricular size is mildly dilated. Global right ventricular function is moderately decreased. 4. Left atrial size is severely enlarged. Right atrial size is mildly enlarged.   The Qp/Qs is 1.0 by phase contrast imaging and is consistent with no evidence of shunt. 5. The calculated aortic regurgitant fraction is 1.55 %. There is moderate mitral regurgitation. The calculated mitral regurgitant fraction is 29.02 %. There is moderate tricuspid regurgitation. The calculated tricuspid regurgitant fraction is 37.1 %. The calculated pulmonic regurgitant fraction is 2.39 %. 6. There is a small pericardial effusion. 7. The descending aorta is dilated measuring 27.2 mm. 8. The main pulmonary artery is dilated measuring 34.0 mm.  The IVC and coronary sinus are dilated. 9. There are large left and moderate right pleural effusions.  Basilar atelectasis is associated with the effusions.  There is evidence of mild pulmonary edema.     RHC 11/23/2021  OVERALL IMPRESSION:  1.  Elevated right heart pressures. 2.  Elevated pulmonary capillary wedge pressure. 3.  Markedly reduced cardiac output and indices with significantly low  oxygen saturation, all consistent with stage D/New York Heart  Association Class IV heart failure. We will start the patient back on Primacor drip. The patient's prognosis is extremely poor at this point and we will  discuss further with the patient and the family about his code status.     RUE venous doppler 11/23/2021  Summary  The left internal jugular, subclavian, axillary, brachial, radial and ulnar veins, as well as the basilic and cephalic veins,  have been examined.   These veins show no thrombus but there is slow venous blood flow especially at the proximal internal jugular and  subclavian veins. This suggests a possible venous occlusive process in the chest and out of the range of the examining  ultrasound probe  Right internal jugular and subclavian veins are normal     Assessment/Plan:  1.) Acute on chronic combined diastolic Gr 2 and systolic heart failure, EF <20%, ICM, moderate MR, dilated RV: Attempted to wean milrinone and lasix yesterday and overnight. He only diuresed ~ 200 in last 24 hrs and continues with significant amount of BLE edema and SOB with activity. DW 145lbs. Adm wt 190lbs>156lbs today- down 1 lb since yesterday. Elevated BNP- may be falsely elevated with SONAL. NYHA Class IV  Stage D   Diuretic: Resume lasix at 5mg/hr    Milrinone gtt-resume milrinone 0.25mcg/kg/min  Indicated for EF </=40%:   Beta Blocker (evidence based)- Toprol XL - restart low dose  ACEi/ARB/ARNI- entresto held - SONAL  SGLT2 Inhibitor- hold for SONAL- consider at DC  Aldosterone Antagonist (BNP within 1 week of DC if new or dose changed)- aldactone     Cardiac Rehab for EF <35%: OP after Home Health  ICD counseling: Has ICD   2gm Na diet, daily weight, 64 oz fluid restriction  Avoid NSAIDS and other nephrotoxic meds  [facility] Referral: OP  HF RN referral for education  DC needs for home care addressed by HF RN. Palliative Care consult      2. ) CAD: Prior PCI/MIKE to LAD. Mild elevated troponin most likely from decompensated HF. No evidence of angina/ischemia.    DAPT: on xarelto  Beta Blocker: Toprol XL  ACEi/ARB:  Anti anginal: Imdur  Lipid management/high intensity statin: lipitor  Risk factor management: high blood pressure, high cholesterol, Diabetes, smoking, obesity, family hx  Lifestyle modification: Heart healthy diet, regular exercise, weight loss, smoking cessation, stress reduction     3.) LV thrombus:  -F/U ECHO without thrombus  - cont xarelto     4.) SONAL on CKD 3: R/T diabetic nephropathy, Neph following  Baseline creat 2, Today 2.1    5.) Anemia: Iron studies low today.    Add venofer X 3 doses then ferrous sulfate    6.) Low grade fevers:   CXR    Electronically signed by DAHIANA Harley CNP on 2/10/2022 at 8:51 AM

## 2022-02-10 NOTE — PROGRESS NOTES
Nutrition Assessment     Type and Reason for Visit: Initial (LOS)    Nutrition Recommendations/Plan:   Continue Low Na diet with 1500 mL FR. Nutrition Assessment:  LOS. Pt with PMH of HF and DM presented with leg swelling and SOB. Pt -13.7 L fluid. Pt eating well since admission, >75%. Pt was provided diet education upon admission. CHF nurse also spoke with pt. No nutrition interventions needed at this time. Malnutrition Assessment:  Malnutrition Status: No malnutrition    Nutrition Related Findings: BM 2/9; +1/2 BLE and trace BUE edema; Glu 139      Current Nutrition Therapies:    ADULT DIET; Regular;  Low Sodium (2 gm); 1500 ml    Anthropometric Measures:  · Height: 5' 6\" (167.6 cm)  · Current Body Wt: 156 lb (70.8 kg)   · BMI: 25.2    Nutrition Diagnosis:   No nutrition diagnosis at this time     Nutrition Interventions:   Food and/or Nutrient Delivery:  Continue Current Diet  Nutrition Education/Counseling:  No recommendation at this time   Coordination of Nutrition Care:  Continue to monitor while inpatient      Discharge Planning:    No discharge needs at this time     Electronically signed by Gabriella Roy RD, LD on 2/10/22 at 11:13 AM EST    Contact: [phone]

## 2022-02-10 NOTE — PROGRESS NOTES
[facility]   [facility]  [phone]  Nephrology Note          Patient ID: Britt Swanson  Referring/ Physician: Randi Yoo MD      HPI/Summary:   Britt Swanson is being seen by nephrology for SONAL. This is a 89-HDAX-FTS man with systolic heart failure, history of LV thrombus, coronary artery disease, hyperlipidemia who presented the hospital with increasing shortness of breath and worsening lower extremity swelling. Interval hx:   Seen and examined at bedside  Has persistent lower extremity edema  Having some heart burn so not eating right now. Otherwise has been having an appetite. He has no chest pain or SOB. Blood pressure 138/73  91% sat on room air. Urine output 1.8 L   Negative 287 cc   Wt 188 > 173 > 161 > 157 > 156    Labs reviewed  Cr 1.8 > 1.9 > 2.1    k 4.5  BICARB 28 > 30 > 31 > 29  Hgb 10.2    Getting IV iron for anemia, low tsat   Off milrinone  Off lasix gtt       Plan:   - her renal function is close to baseline, no acute electrolyte issues. - will stop the standing KCL orders  - agree with IV iron  - no other changes from renal aspect         SONAL on Chronic kidney disease stage IIIb  This is likely secondary to diabetic nephropathy and SONAL from cardiorenal syndrome. Baseline creatinine around 2  Cr 2.3 at time of consult  No acute electrolyte abnormalities  Has had diabetes and hypertension for over 10 years and his last EF is less than 20%  He has had event evidence of albuminuria on urinalysis going back to 2016, 2013 had an  mg  Most recent protein creatinine ratio 700 mg   CT abdomen in 2020 showed symmetric kidneys no lesions no masses bilateral perinephric stranding no hydronephrosis. HCV and hepatitis C negative  Prior negative NAHOMY  Prior negative RF factor    Ischemic cardiomyopathy  Decompensated, has evidence of volume overload  proBNP 08092  Managed with Lasix drip and is on milrinone, appreciate cardiology involvement.   Last EF less than 20%  History of coronary artery disease with stent in the LAD  Has ICD  entresto held     Diabetes type 2  10 years at least  Longstanding albuminuria  A1c has been ranging between 6 and 6.5 the last few checks. Hypertension  On inotrope BP acceptable. [facility] would like to thank you for the opportunity to serve this patient. Please call with any questions or concerns. Slime Hrenandes MD  [facility]  [address]  [place], [address]  Fax: [phone]  Office: [phone]         CC/Reason for consult:   Reason for consult: SONAL  Chief Complaint   Patient presents with    Leg Swelling     pt complains of acute on chronic BLE edema. mild sob on exertion. denies cough. Review of Systems:   [address] All other remaining systems are negative. Constitutional:  fever, chills, weakness, weight change, fatigue,      Skin:  rash, pruritus, hair loss, bruising, dry skin, petechiae. Head, Face, Neck   headaches, swelling,  cervical adenopathy. Respiratory: shortness of breath, cough, or wheezing  Cardiovascular: chest pain, palpitations, dizzy, edema  Gastrointestinal: nausea, vomiting, diarrhea, constipation,belly pain    Yellow skin, blood in stool  Musculoskeletal:  back pain, muscle weakness, gait problems,       joint pain or swelling. Genitourinary:  dysuria, poor urine flow, flank pain, blood in urine  Neurologic:  vertigo, TIA'S, syncope, seizures, focal weakness  Psychosocial:  insomnia, anxiety, or depression.   Additional positive findings: -     PMH/SH/FH:    Medical Hx: reviewed and updated as appropriate  Past Medical History:   Diagnosis Date    Diabetes type 2, uncontrolled ([address] 7/28/2014    Diabetic nephropathy 9/12/2013    ED (erectile dysfunction) 9/12/2013    Essential hypertension, benign 9/12/2013    Hyperlipidemia 9/12/2013    Noncompliance 7/28/2014         Surgical Hx: reviewed and updated as appropriate has a past surgical history that includes eye surgery. Social Hx: reviewed and updated as appropriate  Social History     Tobacco Use    Smoking status: Former Smoker     Packs/day: 1.50     Years: 7.00     Pack years: 10.50     Quit date: 1981     Years since quittin.0    Smokeless tobacco: Never Used   Substance Use Topics    Alcohol use: Yes     Alcohol/week: 1.0 standard drink     Types: 1 Cans of beer per week        Family hx: reviewed and updated as appropriate  family history includes Diabetes in his brother, mother, and sister; High Blood Pressure in his mother. Medications:   metoprolol succinate, 12.5 mg, Daily  iron sucrose, 200 mg, Daily  lidocaine 1 % injection, 5 mL, Once  sodium chloride flush, 5-40 mL, 2 times per day  potassium chloride, 20 mEq, BID  spironolactone, 25 mg, Daily  allopurinol, 100 mg, Daily  atorvastatin, 80 mg, Daily  isosorbide mononitrate, 30 mg, Daily  levothyroxine, 25 mcg, QAM AC  therapeutic multivitamin-minerals, 1 tablet, Daily  rivaroxaban, 15 mg, Dinner  [Held by provider] sacubitril-valsartan, 1 tablet, BID  insulin lispro, 0-12 Units, TID WC  insulin lispro, 0-6 Units, Nightly       Patient has no known allergies. Allergies:   No Known Allergies      Physical Exam/Objective:   Vitals:    02/10/22 0825   BP: 138/73   Pulse: 107   Resp: 18   Temp: 100.1 °F (37.8 °C)   SpO2: 91%       Intake/Output Summary (Last 24 hours) at 2/10/2022 0931  Last data filed at 2/10/2022 2867  Gross per 24 hour   Intake 1272.83 ml   Output 2075 ml   Net -802.17 ml         General appearance:  in no acute distress, comfortable, communicative, awake and alert. HEENT: no icterus, EOM intact, trachea midline. Neck : no masses, appears symmetrical   Respiratory: Respiratory effort normal, bilateral equal chest rise. No wheeze, + crackles. Cardiovascular:  Ausculation shows RRR and  + edema   Abdomen: abdomen is soft, non distended, no masses, no pain with palpation. Musculoskeletal:  no joint swelling, no deformity, strength grossly normal.   Skin: no rashes, no induration, no tightening, no jaundice   Neuro: Follows commands, moves all extremities spontaneously       Data:   CBC:   Recent Labs     02/08/22  0545 02/09/22  0511 02/10/22  0534   WBC 5.4 4.7 5.5   HGB 11.7* 10.6* 10.2*   HCT 35.8* 31.1* 30.6*    139 150     BMP:    Recent Labs     02/08/22  0545 02/08/22  1859 02/09/22  0511 02/09/22  1804 02/10/22  0533      < > 135* 133* 136   K 4.1   < > 4.3 4.6 4.5   CL 98*   < > 94* 91* 95*   CO2 30   < > 31 32 29   BUN 56*   < > 56* 56* 65*   CREATININE 1.8*   < > 1.9* 1.8* 2.1*   GLUCOSE 127*   < > 125* 137* 151*   MG 2.40  --   --   --   --     < > = values in this interval not displayed.

## 2022-02-10 NOTE — PROGRESS NOTES
Physical Therapy  Attempted to see pt for PT session, pt going to X-ray at this time. Will attempt later as schedule permits. Electronically signed by Tuyet Alvarado PT on 2/10/2022 at 11:33 AM  PT returned to pt's room at about 2:45 as pt was repeatedly refusing OT session, pt does not want to participate in therapy despite encouragement that he needs to keep moving to keep his strength up for discharge. Pt refused ex, getting up to bathroom, and ambulation.   Electronically signed by Tuyet Alvarado PT on 2/10/2022 at 3:22 PM

## 2022-02-10 NOTE — PROGRESS NOTES
Hospitalist Progress Note      PCP: Britton Sanderson DO    Date of Admission: 2/3/2022    Subjective: feeling better, diuresing well - SOB improved    Medications:  Reviewed    Infusion Medications    milrinone 0.125 mcg/kg/min (02/09/22 2039)    sodium chloride      furosemide (LASIX) 1mg/ml infusion 5 mg/hr (02/09/22 1649)    dextrose       Scheduled Medications    metoprolol succinate  12.5 mg Oral Daily    iron sucrose  200 mg IntraVENous Daily    lidocaine 1 % injection  5 mL IntraDERmal Once    sodium chloride flush  5-40 mL IntraVENous 2 times per day    potassium chloride  20 mEq Oral BID    spironolactone  25 mg Oral Daily    allopurinol  100 mg Oral Daily    atorvastatin  80 mg Oral Daily    isosorbide mononitrate  30 mg Oral Daily    levothyroxine  25 mcg Oral QAM AC    therapeutic multivitamin-minerals  1 tablet Oral Daily    rivaroxaban  15 mg Oral Dinner    [Held by provider] sacubitril-valsartan  1 tablet Oral BID    insulin lispro  0-12 Units SubCUTAneous TID WC    insulin lispro  0-6 Units SubCUTAneous Nightly     PRN Meds: sodium chloride flush, sodium chloride, potassium chloride, ondansetron **OR** ondansetron, polyethylene glycol, acetaminophen **OR** acetaminophen, glucose, dextrose, glucagon (rDNA), dextrose      Intake/Output Summary (Last 24 hours) at 2/9/2022 2058  Last data filed at 2/9/2022 1850  Gross per 24 hour   Intake 2187.39 ml   Output 2550 ml   Net -362.61 ml       Physical Exam Performed:    /76   Pulse 103   Temp 99.1 °F (37.3 °C) (Oral)   Resp 16   Ht 5' 6\" (1.676 m)   Wt 157 lb 6.5 oz (71.4 kg)   SpO2 93%   BMI 25.41 kg/m²     General appearance: No apparent distress appears stated age and cooperative. HEENT Normal cephalic, atraumatic without obvious deformity.  Pupils equal, round, and reactive to light.  Extra ocular muscles intact.  Conjunctivae/corneas clear.   Neck: Supple, No jugular venous distention/bruits.  Trachea midline without thyromegaly or adenopathy with full range of motion. Lungs: No increased work of breathing, no accessory muscle use, bibasilar crackles. Heart: Regular rate and rhythm with Normal S1/S2 without murmurs, rubs or gallops, point of maximum impulse non-displaced  Abdomen: Soft, non-tender or non-distended without rigidity or guarding and positive bowel sounds all four quadrants. Extremities: No clubbing, cyanosis, 3+ bilateral pitting edema bilaterally.  Full range of motion without deformity and normal gait intact. Skin: Skin color, texture, turgor normal.  No rashes or lesions. Neurologic: Alert and oriented X 3, neurovascularly intact with sensory/motor intact upper extremities/lower extremities, bilaterally.  Cranial nerves: II-XII intact, grossly non-focal.  Mental status: Alert, oriented, thought content appropriate. Capillary Refill: Acceptable  < 3 seconds  Peripheral Pulses: +3 Easily felt, not easily obliterated with pressure       Labs:   Recent Labs     02/07/22  0600 02/08/22  0545 02/09/22  0511   WBC 5.5 5.4 4.7   HGB 10.8* 11.7* 10.6*   HCT 32.5* 35.8* 31.1*   * 146 139     Recent Labs     02/08/22  1859 02/09/22  0511 02/09/22  1804    135* 133*   K 4.4 4.3 4.6   CL 95* 94* 91*   CO2 28 31 32   BUN 58* 56* 56*   CREATININE 1.9* 1.9* 1.8*   CALCIUM 9.0 8.6 8.7     No results for input(s): AST, ALT, BILIDIR, BILITOT, ALKPHOS in the last 72 hours. No results for input(s): INR in the last 72 hours. No results for input(s): Earnie Lent in the last 72 hours. Urinalysis:      Lab Results   Component Value Date    NITRU Negative 02/03/2022    WBCUA 1 02/03/2022    RBCUA 1 02/03/2022    BLOODU Negative 02/03/2022    SPECGRAV 1.010 02/03/2022    GLUCOSEU Negative 02/03/2022       Radiology:  XR CHEST PORTABLE   Final Result   Mild pulmonary vascular congestion. Stable cardiomegaly.                  Assessment/Plan:    Active Hospital Problems    Diagnosis     Acute on chronic systolic CHF (congestive heart failure) ([facility]) [I50.23]          Shortness of breath likely 2/2 acute on chronic combined systolic and diastolic CHF exacerbation - Clinically patient presents with orthopnea, peripheral edema, crackles on exam.   -started on IV Lasix gtt @ 10-->5 today  -continue milrinone gtt  -given dose of metolazone today  -strict I/O's, net -13L  -daily weights  -medellin catheter  -1500 ml fluid restriction, low sodium diet  -daily renal panel  -cardiology consulted, recs appreciated  -CHF nurse consulted, recs appreciated  -continue home statin, Xarelto, aldactone; hold Entresto and BB  - LV thrombus - on xarelto     SONAL on CKD III - suspect cardiorenal syndrome  -management as above  -nephrology consulted, recs appreciated  -avoid nephrotoxic medications  -trend and monitor  -hold Entresto     CAD  -cardiology consulted  -tele monitoring  -continue home meds  -hold Entresto due to SONAL     Type 2 DM  -SSI  -hypoglycemia protocol  -POCT glucose checks  -carb control diet     Essential hypertension  -hold BB  -hold Entresto due to SONAL  -continue aldactone, Imdur     Hyperlipidemia  -continue home meds     Hypothyroidism  -continue home meds     Diarrhea  -send GI PCR and C. Diff and if negative, will start Imodium PRN        DVT Prophylaxis: Xarelto  Diet: ADULT DIET; Regular;  Low Sodium (2 gm); 1500 ml  Code Status: Full Code    PT/OT Eval Status: ordered    Radha Huber MD

## 2022-02-11 NOTE — PLAN OF CARE
Problem: OXYGENATION/RESPIRATORY FUNCTION  Goal: Patient will maintain patent airway  2/11/2022 0851 by Jackie Bishop RN  Outcome: Ongoing     Problem: OXYGENATION/RESPIRATORY FUNCTION  Goal: Patient will achieve/maintain normal respiratory rate/effort  Description: Respiratory rate and effort will be within normal limits for the patient  2/11/2022 0851 by Jackie Bishop RN  Outcome: Ongoing     Problem: HEMODYNAMIC STATUS  Goal: Patient has stable vital signs and fluid balance  2/11/2022 0851 by Jackie Bishop RN  Outcome: Ongoing     Problem: FLUID AND ELECTROLYTE IMBALANCE  Goal: Fluid and electrolyte balance are achieved/maintained  2/11/2022 0851 by Jackie Bishop RN  Outcome: Ongoing     Problem: ACTIVITY INTOLERANCE/IMPAIRED MOBILITY  Goal: Mobility/activity is maintained at optimum level for patient  2/11/2022 0851 by Jackie Bishop RN  Outcome: Ongoing     Problem: Falls - Risk of:  Goal: Will remain free from falls  Description: Will remain free from falls  2/11/2022 0851 by Jackie Bishop RN  Outcome: Ongoing     Problem: Falls - Risk of:  Goal: Absence of physical injury  Description: Absence of physical injury  2/11/2022 0851 by Jackie Bishop RN  Outcome: Ongoing     Problem: Skin Integrity:  Goal: Will show no infection signs and symptoms  Description: Will show no infection signs and symptoms  2/11/2022 0851 by Jackie Bishop RN  Outcome: Ongoing     Problem: Skin Integrity:  Goal: Absence of new skin breakdown  Description: Absence of new skin breakdown  2/11/2022 0851 by Jackie Bishop RN  Outcome: Ongoing

## 2022-02-11 NOTE — PROGRESS NOTES
Patient returned from cath lab. Right femoral site is soft, dry, clean and intact without sign of bleeding, redness or hematoma. Patient is lying flat. Bedrest until 1700. VSS.

## 2022-02-11 NOTE — PROGRESS NOTES
[address] [place] [address] Nephrology   [facility]  [phone]  Nephrology Note          Patient ID: Vidya Armenta  Referring/ Physician: Irvin Lai MD      HPI/Summary:   Vidya Armenta is being seen by nephrology for SONAL. This is a 35-GOHP-VLJ man with systolic heart failure, history of LV thrombus, coronary artery disease, hyperlipidemia who presented the hospital with increasing shortness of breath and worsening lower extremity swelling. Interval hx:   Seen and examined at bedside  Has persistent lower extremity edema  Weight unchanged today, 156 lbs. Reported  lbs  Made 1075 mL urine yesterday  Remains on lasix infusion 5 mg/h and milrinone  Cr up to 2.5 today    Plan:   - renal function is close to baseline, no acute electrolyte issues. - no other changes from renal aspect   - continue diuresis and milrinone per cardiology. Noted plans for RHC today      Assessment:  SONAL on Chronic kidney disease stage IIIb  This is likely secondary to diabetic nephropathy and SONAL from cardiorenal syndrome. Baseline creatinine around 2  Cr 2.3 at time of consult  No acute electrolyte abnormalities  Has had diabetes and hypertension for over 10 years and his last EF is less than 20%  He has had event evidence of albuminuria on urinalysis going back to 2016, 2013 had an  mg  Most recent protein creatinine ratio 700 mg   CT abdomen in 2020 showed symmetric kidneys no lesions no masses bilateral perinephric stranding no hydronephrosis. HCV and hepatitis C negative  Prior negative NAHOMY  Prior negative RF factor    Ischemic cardiomyopathy  Decompensated, has evidence of volume overload  proBNP 32249  Managed with Lasix drip and is on milrinone, appreciate cardiology involvement.   Last EF less than 20%  History of coronary artery disease with stent in the LAD  Has ICD  entresto held     Diabetes type 2  10 years at least  Longstanding albuminuria  A1c has been ranging between 6 and 6.5 the last few checks. Hypertension  On inotrope BP acceptable. [facility] would like to thank you for the opportunity to serve this patient. Please call with any questions or concerns. Pat Forte MD  [facility]  [address], [address]  Fax: [phone]  Office: [phone]         CC/Reason for consult:   Reason for consult: SONAL  Chief Complaint   Patient presents with    Leg Swelling     pt complains of acute on chronic BLE edema. mild sob on exertion. denies cough. Review of Systems:   [address] All other remaining systems are negative. Constitutional:  fever, chills, weakness, weight change, fatigue,      Skin:  rash, pruritus, hair loss, bruising, dry skin, petechiae. Head, Face, Neck   headaches, swelling,  cervical adenopathy. Respiratory: shortness of breath, cough, or wheezing  Cardiovascular: chest pain, palpitations, dizzy, edema  Gastrointestinal: nausea, vomiting, diarrhea, constipation,belly pain    Yellow skin, blood in stool  Musculoskeletal:  back pain, muscle weakness, gait problems,       joint pain or swelling. Genitourinary:  dysuria, poor urine flow, flank pain, blood in urine  Neurologic:  vertigo, TIA'S, syncope, seizures, focal weakness  Psychosocial:  insomnia, anxiety, or depression. Additional positive findings: -     PMH/SH/FH:    Medical Hx: reviewed and updated as appropriate  Past Medical History:   Diagnosis Date    Diabetes type 2, uncontrolled ([address] 7/28/2014    Diabetic nephropathy 9/12/2013    ED (erectile dysfunction) 9/12/2013    Essential hypertension, benign 9/12/2013    Hyperlipidemia 9/12/2013    Noncompliance 7/28/2014         Surgical Hx: reviewed and updated as appropriate   has a past surgical history that includes eye surgery.      Social Hx: reviewed and updated as appropriate  Social History     Tobacco Use    Smoking status: Former Smoker     Packs/day: 1.50     Years: 7.00     Pack 02/09/22  0511 02/10/22  0534 02/11/22  0542   WBC 4.7 5.5 5.3   HGB 10.6* 10.2* 9.5*   HCT 31.1* 30.6* 27.9*    150 164     BMP:    Recent Labs     02/10/22  0533 02/10/22  1752 02/11/22  0542    132* 134*   K 4.5 5.2* 4.2   CL 95* 93* 92*   CO2 29 23 29   BUN 65* 60* 67*   CREATININE 2.1* 2.1* 2.5*   GLUCOSE 151* 115* 118*

## 2022-02-11 NOTE — PROCEDURES
[address] [address]                            CARDIAC CATHETERIZATION    PATIENT NAME: Sanket Dallas                        :        1955  MED REC NO:   [id number]                          ROOM:       [id number]  ACCOUNT NO:   [de-identified]                           ADMIT DATE: 2022  PROVIDER:     Jammie Dexter MD    DATE OF PROCEDURE:  2022    INDICATION FOR PROCEDURE:  Acute-on-chronic systolic congestive heart  failure, class IV. PROCEDURE:  The risks and benefits of the procedure were explained to  the patient. Informed consent was obtained. He was brought to the  cardiac catheterization lab and placed on the table and prepped and  draped in sterile fashion. Of note, the patient is on room air but he is on a milrinone drip at  0.25 mcg per kilogram per minute. He has also been on a Lasix drip. Anesthesia was provided in the right groin with 1% lidocaine injected  subcutaneously and deep. The right femoral vein was accessed and  cannulated and a 7-Frisian sheath inserted using Seldinger technique. The pulmonary artery catheter was advanced to the inferior vena cava  where an oxygen saturation was obtained. The catheter was then advanced  into the right atrium for an oxygen saturation. Catheter was flushed  and placed on pressure. After pressure tracings in the right atrium,  the catheter was advanced sequentially for pressure tracings into the  right ventricle, pulmonary artery and pulmonary capillary wedge  positions. The balloon was deflated and a pulmonary arterial oxygen  saturation was obtained. Cardiac outputs by thermodilution were performed and a Blu equation was  calculated. We then performed another pulmonary capillary wedge  pressure assessment. Pulmonary artery catheter was removed out through the sheath.   The right  femoral venous sheath was removed and manual pressure applied for  hemostasis. There were no complications from the procedure. Estimated  blood loss was less than 20 mL. FINDINGS:  1.  Evidence of right-sided volume overload with a right atrial pressure  of 11 mmHg and a pulmonary capillary wedge pressure of 22 mmHg. 2.  Pulmonary hypertension with an instantaneous pressure of 56/25 for a  mean pulmonary arterial pressure of 37 mmHg. 3.  Reduced cardiac output by thermodilution at 3.96 liters per minute  with a cardiac index of 1.4 liters per kilogram per minute. By Tal Leong, the cardiac index was 2.14 liters per kilogram per minute. Of  note, the patient is on 0.25 mcg per kilogram per minute of milrinone. 4.  Pulmonary vascular resistance of 197 dynes per second. 5. Low mixed venous oxygen saturations with an IVC of 40%, right atrium  42%, and pulmonary artery of 38%. The patient's oxygen saturation was  89% to 90% on room air. PLAN:  The patient will be continued on the milrinone drip. I would  continue to diurese him with the IV Lasix drip which is currently at 5  mg per hour. I would increase this to 7.5 mg per hour.         Cesia Higgins MD    D: 02/11/2022 14:56:40       T: 02/11/2022 14:59:55     [email or web address]  Job#: [id number]     Doc#: [id number]    CC:  Alka Crow

## 2022-02-11 NOTE — PLAN OF CARE
Problem: OXYGENATION/RESPIRATORY FUNCTION  Goal: Patient will maintain patent airway  2/10/2022 2330 by Lesly Vanegas RN  Outcome: Ongoing  2/10/2022 1643 by Soo Horton RN  Outcome: Ongoing  Goal: Patient will achieve/maintain normal respiratory rate/effort  Description: Respiratory rate and effort will be within normal limits for the patient  2/10/2022 2330 by Lesly Vanegas RN  Outcome: Ongoing  2/10/2022 1643 by Soo Horton RN  Outcome: Ongoing     Problem: HEMODYNAMIC STATUS  Goal: Patient has stable vital signs and fluid balance  2/10/2022 2330 by Lesly Vanegas RN  Outcome: Ongoing  2/10/2022 1643 by Soo Horton RN  Outcome: Ongoing     Problem: FLUID AND ELECTROLYTE IMBALANCE  Goal: Fluid and electrolyte balance are achieved/maintained  2/10/2022 2330 by Lesly Vanegas RN  Outcome: Ongoing  2/10/2022 1643 by Soo Horton RN  Outcome: Ongoing

## 2022-02-11 NOTE — PROGRESS NOTES
[address]   Daily Progress Note      Admit Date:  2/3/2022    CC: SOB    HPI:   Homa Templeton is a 61 y.o. male with PMH CAD, SHF s/p ICD (8/2019), LV thrombus, HTN, HLP, DM2, CKD Stage 3/ diabetic nephropathy  Seen by Dr Mona Nava as outpatient. Plan was for and RHC on 11/1/2021 and he did not show up for procedure. Adm [facility] 11/2021 with HF/SONAL - on Milrinone and lasix gtt. Had [address] with elevated filling pressure. DC wt 145lbs. Non compliant with follow up after DC     Adm to [facility] with acute on chronic systolic heart failure. Started on milrinone and lasix gtts. Also received metolazone. He has been diuresing well but continues with significant amount of BLE, abd and back edema. He denies SOB at rest. Mild SOB with activity. Associated with generalized fatigue and weakness. Attempted to wean milrinone and lasix yesterday. Conitnued with significant amount of BLE edema and abd distention. Went back up on lasix and milrinone. His UO has declined over last day- he is +606. He feels more tired today and C/O SOB and  pain in his legs with ambulation. He continues with low gr fevers. He denies CP, LH, dizziness, palpitations, syncope. Review of Systems:   General: Denies fever, chills  Skin: Denies skin changes, rash, itching, lesions.   HEENT: Denies headache, dizziness, vision changes, nosebleeds, sore throat, nasal drainage  RESP: Denies cough, sputum, dyspnea, wheeze, snoring  CARD: Denies palpitations,  murmur  GI:Denies nausea, vomiting, heartburn, loss of appetite, change in bowels  : Denies frequency, pain, incontinence, polyuria  VASC: Denies claudication, leg cramps, clots  MUSC/SKEL: Denies pain, stiffness, arthritis  PSYCH: Denies anxiety, depression, stress  NEURO: Denies numbness, tingling, weakness,change in mood or memory  HEME: Denies abn bruising, bleeding, anemia  ENDO: Denies intolerance to heat, cold, excessive thirst or hunger, hx thyroid disease    Objective: /69   Pulse 99   Temp 99.1 °F (37.3 °C) (Oral)   Resp 17   Ht 5' 6\" (1.676 m)   Wt 156 lb 4.9 oz (70.9 kg)   SpO2 93%   BMI 25.23 kg/m²        Intake/Output Summary (Last 24 hours) at 2/11/2022 1428  Last data filed at 2/11/2022 1103  Gross per 24 hour   Intake 1121.82 ml   Output 650 ml   Net 471.82 ml     I/O since adm: neg 12L +    WEIGHT:Admit Weight: 190 lb 14.7 oz (86.6 kg)         Today  Weight: 156 lb 4.9 oz (70.9 kg)   DRY WEIGHT:  Wt Readings from Last 3 Encounters:   02/11/22 156 lb 4.9 oz (70.9 kg)   01/20/22 180 lb (81.6 kg)   11/30/21 149 lb 4 oz (67.7 kg)       Physical Exam:  GEN: Appears ill, no acute distress  SKIN: Brown, warm, dry. Nails without clubbing. HEENT: PERRLA. Normocephalic, atraumatic. Neck supple. No adenopathy. LUNG: AP diameter normal. Diminished  Bilateral bases with fine crackles R>L. No wheeze or ronchi. Respiratory effort normal.   HEART: S1S2 A/R. + JVD. No carotid bruit. + systolic murmur LSB 5ICS, no rub or gallop. ABD: Distended, Soft, nontender. +BS X 4 quads. No hepatomegaly. EXT: Radial and pedal pulses 2+ and symmetric. Without varicosities. 3+ pedal to sacral edema. MUSCSKEL: Good ROM X4 extremities. No deformity. NEURO: A/O X3. Calm and cooperative.      Telemetry: NSR  with PVCs    Medications:    sodium chloride flush  5-40 mL IntraVENous 2 times per day    metoprolol succinate  12.5 mg Oral Daily    lidocaine 1 % injection  5 mL IntraDERmal Once    sodium chloride flush  5-40 mL IntraVENous 2 times per day    spironolactone  25 mg Oral Daily    allopurinol  100 mg Oral Daily    atorvastatin  80 mg Oral Daily    isosorbide mononitrate  30 mg Oral Daily    levothyroxine  25 mcg Oral QAM AC    therapeutic multivitamin-minerals  1 tablet Oral Daily    rivaroxaban  15 mg Oral Dinner    [Held by provider] sacubitril-valsartan  1 tablet Oral BID    insulin lispro  0-12 Units SubCUTAneous TID WC    insulin lispro  0-6 Units SubCUTAneous Nightly      milrinone 0.25 mcg/kg/min (02/11/22 1103)    furosemide (LASIX) 1mg/ml infusion 5 mg/hr (02/11/22 1103)    sodium chloride      sodium chloride      dextrose       sodium chloride flush, sodium chloride, sodium chloride flush, sodium chloride, potassium chloride, ondansetron **OR** ondansetron, polyethylene glycol, acetaminophen **OR** acetaminophen, glucose, dextrose, glucagon (rDNA), dextrose    Lab Data: I have reviewed all labs below today. CBC:   Recent Labs     02/09/22  0511 02/10/22  0534 02/11/22  0542   WBC 4.7 5.5 5.3   HGB 10.6* 10.2* 9.5*   HCT 31.1* 30.6* 27.9*   .5* 103.7* 102.5*    150 164     Results for Maurice Arias (MRN [id number]) as of 2/9/2022 10:18   Ref.  Range 2/9/2022 05:11   Iron Latest Ref Range: 59 - 158 ug/dL 35 (L)   Iron Saturation Latest Ref Range: 20 - 50 % 13 (L)   TIBC Latest Ref Range: 260 - 445 ug/dL 271   Transferrin Latest Ref Range: 200.0 - 360.0 mg/dL 223.0       BMP:   Recent Labs     02/10/22  0533 02/10/22  1752 02/11/22  0542    132* 134*   K 4.5 5.2* 4.2   CL 95* 93* 92*   CO2 29 23 29   BUN 65* 60* 67*   CREATININE 2.1* 2.1* 2.5*     GLUCOSE:   Recent Labs     02/10/22  0533 02/10/22  1752 02/11/22  0542   GLUCOSE 151* 115* 118*     LIVER PROFILE:   Lab Results   Component Value Date    AST 28 02/03/2022    ALT 22 02/03/2022    LABALBU 3.0 02/03/2022    BILIDIR 0.9 11/23/2021    BILITOT 0.8 02/03/2022    ALKPHOS 107 02/03/2022     PT/INR:   Lab Results   Component Value Date    PROTIME 22.0 11/30/2021    INR 1.90 11/30/2021    INR 1.73 11/29/2021    INR 1.75 11/28/2021     APTT: No results found for: APTT  Pro-BNP:    Lab Results   Component Value Date    PROBNP 35,157 02/09/2022    PROBNP 23,626 02/06/2022    PROBNP 21,659 02/03/2022     Parameters:   > 450 pg/mL under age 48  > 900 pg/mL ages 54-65  > 1800 pg/mL over age 76    ENZYMES:  Lab Results   Component Value Date    TROPONINI 0.07 02/03/2022    TROPONINI 0.08 02/03/2022    TROPONINI 0.08 02/03/2022     FASTING LIPID PANEL:  Lab Results   Component Value Date    CHOL 95 02/04/2022    HDL 48 02/04/2022    HDL 58 12/13/2011    LDLCALC 38 02/04/2022    TRIG 43 02/04/2022       Diagnostics:    EKG:     ECHO:11/16/2021  Summary  Left ventricular cavity size is severely dilated. Ejection fraction is visually estimated to be <20%. There is severe diffuse hypokinesis. Diastolic filling parameters suggest grade II diastolic dysfunction. Definity contrast administered. No left ventricular thrombus was seen. moderate MR  Pacer / ICD wire is visualized in the right ventricle. The right ventricle is dilated. Right ventricular systolic     4/5/9081 ():  - Left ventricle: The cavity size is severely dilated. Wall thickness is normal. Systolic function     was severely reduced. The estimated ejection fraction was in the range of 10% to 15%. Diffuse     hypokinesis. Doppler parameters are consistent with restrictive physiology, indicative of     decreased left ventricular diastolic compliance and/or increased left atrial pressure. There was     spontaneous echo contrast, indicative of stasis. - Mitral valve: Moderate regurgitation.   - Left atrium: The atrium is severely dilated. - Right ventricle: Pacer wire or catheter noted in right ventricle. Systolic function was mildly     reduced. - Right atrium: The atrium is mildly dilated. - Atrial septum: The septum bowed from left to right, consistent with increased left atrial     pressure. - Tricuspid valve: Mild-moderate regurgitation.   - Pulmonary arteries: Systolic pressure was severely increased, estimated to be 65mm Hg assuming     that the right atrial pressure was 15 mmHg. - Inferior vena cava: The vessel was dilated. The respirophasic diameter changes were blunted (<     50%), consistent with elevated central venous pressure     Echo 1/9/2020 [facility]):  - Left ventricle: The cavity size is severely dilated. Wall     thickness was increased in a pattern of mild LVH. Systolic     function was severely reduced. The estimated ejection fraction     was in the range of 10% to 15%. Diffuse hypokinesis. No contrast     administered - unable to evaluate for LV thrombus. The     longitudinal strain is -2.47% (markedly reduced). - Aortic valve: Mild thickening.   - Mitral valve: Leaflet tenting. Mild to moderate regurgitation.   - Left atrium: The atrium is severely dilated. - Right ventricle: The cavity size is normal. Pacer wire or ICD     noted in right ventricle. Systolic function was mildly reduced by     objective interpretation. TAPSE: 1.6cm. Tricuspid annular systolic     velocity: 8cm/s.   - Tricuspid valve: Mild-moderate regurgitation.   - Pulmonary arteries: Systolic pressure could not be accurately     estimated, however it is elevated at at least 51 mm Hg, assuming     an RA pressure of 15 mm Hg. - Inferior vena cava: The vessel was dilated. The respirophasic     diameter changes were blunted (< 50%), consistent with elevated     central venous pressure. - Pericardium, extracardiac: A trivial pericardial effusion is     identified.     Cardiac Angiography:   RHC 1/31/2020  IMPRESSIONS:  Normal left and right filling pressures with   preserved CO/CI. SUMMARY:   Hemodynamic:   RA 2   RV 28/3   PA 28/10 mean 16   PCWP 4   Blu 4.5/2.5   TD 4.5/2.5   PASat 66%   RECOMMENDATIONS:  Further management per [facility] Failure [facility].     12/10/2018 LHC/PCI and RHC:  IMPRESSIONS:  Selective angiography of LMCA showing critical mid LAD   lesion. Successful PCI with 3.5 x 18 mm MIKE. Normal cardiac output, m   ildly increased pulmonary pressures with mildly increased filling pre   ssure on inotropic support. SUMMARY:    1. Wedge pressure in the pulmonary capillaries is mildly elevated. 2. Coronary arteries:  The left main is unusually long and      trifurcates into the LAD, a ramus intermedius, and the left    circumflex. The left anterior descending gives rise to 2      diagonals and 3 septals and wraps around the apex. The left      circumflex gives rise to 2 obtuse marginals and no      posterolaterals. The first obtuse marginal has a high origin. 3. LAD: Mid-vessel lesion: There is a discrete, 99% stenosis. The      distal vessel supplies a moderate-sized vascular territory. The      lesion is a likely culprit for the patient's clinical      presentation. The lesion was stented (see 1st lesion      intervention). Following intervention, the lesion has a residual      stenosis of 0% and MUKUND grade 3 flow (brisk flow). 4. AV was not crossed due to known apical LV thrombus. 5. RA 6      RV 48/4      PA 50/20 (28)      W 18.   6. Blu CO 5.2 Blu CI 2.9 on Milrinone 0.25 mcg/mg/min.      12/4/2018 Cardiac MRI ():  Findings are consistent with a severe, ischemic cardiomyopathy.  There is akinesis of the apical segments with an associated small, apical thrombus. The left ventricle is severely dilated with severely reduced systolic function. The right ventricle is mildly dilated with moderately decreased systolic function. The left atrium is severely dilated.  There is mild to moderate mitral regurgitation. The right atrium is mildly dilated.  There is moderate tricuspid regurgitation. The descending aorta is dilated. The pulmonary artery is dilated.  The IVC and coronary sinus are dilated. Please see noncardiac findings below.      FINDINGS:     1. The left ventricular size is severely dilated. The left ventricular ejection fraction is 19 % by Smallwood's method. Global left ventricular function is severely decreased. There is akinesis of the apical segments, severe hypokinesis of the basal and mid septal and basal and mid anterior segments and moderate hypokinesis of the lateral segments and mid inferior segment and mild hypokinesis of the basal inferior segment.  The left ventricular mass is moderately increased. There appears to be a small intracavitary thrombus (6.5 x 5 mm). 2. There is no evidence of increased iron deposition within the myocardium (T2*myocardium = 51 msec; if < 20 msec, suggestive of iron-overloading of the myocardium).  There is no evidence of myocardial edema. 3. The right ventricular size is mildly dilated. Global right ventricular function is moderately decreased. 4. Left atrial size is severely enlarged. Right atrial size is mildly enlarged.   The Qp/Qs is 1.0 by phase contrast imaging and is consistent with no evidence of shunt. 5. The calculated aortic regurgitant fraction is 1.55 %. There is moderate mitral regurgitation. The calculated mitral regurgitant fraction is 29.02 %. There is moderate tricuspid regurgitation. The calculated tricuspid regurgitant fraction is 37.1 %. The calculated pulmonic regurgitant fraction is 2.39 %. 6. There is a small pericardial effusion. 7. The descending aorta is dilated measuring 27.2 mm. 8. The main pulmonary artery is dilated measuring 34.0 mm.  The IVC and coronary sinus are dilated. 9. There are large left and moderate right pleural effusions.  Basilar atelectasis is associated with the effusions.  There is evidence of mild pulmonary edema.     RHC 11/23/2021  OVERALL IMPRESSION:  1.  Elevated right heart pressures. 2.  Elevated pulmonary capillary wedge pressure. 3.  Markedly reduced cardiac output and indices with significantly low  oxygen saturation, all consistent with stage D/New York Heart  Association Class IV heart failure. We will start the patient back on Primacor drip. The patient's prognosis is extremely poor at this point and we will  discuss further with the patient and the family about his code status.     RUE venous doppler 11/23/2021  Summary  The left internal jugular, subclavian, axillary, brachial, radial and ulnar veins, as well as the basilic and cephalic veins,  have been examined.   These veins show no thrombus but there is slow venous blood flow especially at the proximal internal jugular and  subclavian veins. This suggests a possible venous occlusive process in the chest and out of the range of the examining  ultrasound probe  Right internal jugular and subclavian veins are normal     CXR 2/10/2022  Impression   Cardiomegaly with mild edema. Assessment/Plan:  1.) Acute on chronic combined diastolic Gr 2 and systolic heart failure, EF <20%, ICM, moderate MR, dilated RV: Attempted to wean milrinone and lasix. Had decreased UO. BLE does appear less today, he continues with SOB with activity. DW 145lbs. Adm wt 190lbs>156lbs today- same as yesterday Elevated BNP- may be falsely elevated with SONAL. Getting RHC today. NYHA Class IV  Stage D   Diuretic: lasix at 5mg/hr    Milrinone gtt- milrinone 0.25mcg/kg/min  Indicated for EF </=40%:   Beta Blocker (evidence based)- Toprol XL - restart low dose  ACEi/ARB/ARNI- entresto held - SONAL  SGLT2 Inhibitor- hold for SONAL- consider at DC  Aldosterone Antagonist (BNP within 1 week of DC if new or dose changed)- aldactone     Cardiac Rehab for EF <35%: OP after Home Health  ICD counseling: Has ICD   2gm Na diet, daily weight, 64 oz fluid restriction  Avoid NSAIDS and other nephrotoxic meds  [facility] Referral: OP  HF RN referral for education  DC needs for home care addressed by HF RN. Palliative Care consult      2. ) CAD: Prior PCI/MIKE to LAD. Mild elevated troponin most likely from decompensated HF. No evidence of angina/ischemia.    DAPT: on xarelto  Beta Blocker: Toprol XL  ACEi/ARB:  Anti anginal: Imdur  Lipid management/high intensity statin: lipitor  Risk factor management: high blood pressure, high cholesterol, Diabetes, smoking, obesity, family hx  Lifestyle modification: Heart healthy diet, regular exercise, weight loss, smoking cessation, stress reduction     3.) LV thrombus:  -F/U ECHO without thrombus  - cont xarelto     4.) SONAL on CKD 3: R/T

## 2022-02-11 NOTE — PROGRESS NOTES
Hospitalist Progress Note      PCP: Nathaniel Gilbert DO    Date of Admission: 2/3/2022    Subjective: feeling better, hasnt diuresed much overnight    Medications:  Reviewed    Infusion Medications    milrinone 0.25 mcg/kg/min (02/10/22 1016)    furosemide (LASIX) 1mg/ml infusion 5 mg/hr (02/10/22 1016)    sodium chloride      sodium chloride      dextrose       Scheduled Medications    sodium chloride flush  5-40 mL IntraVENous 2 times per day    metoprolol succinate  12.5 mg Oral Daily    iron sucrose  200 mg IntraVENous Daily    lidocaine 1 % injection  5 mL IntraDERmal Once    sodium chloride flush  5-40 mL IntraVENous 2 times per day    spironolactone  25 mg Oral Daily    allopurinol  100 mg Oral Daily    atorvastatin  80 mg Oral Daily    isosorbide mononitrate  30 mg Oral Daily    levothyroxine  25 mcg Oral QAM AC    therapeutic multivitamin-minerals  1 tablet Oral Daily    rivaroxaban  15 mg Oral Dinner    [Held by provider] sacubitril-valsartan  1 tablet Oral BID    insulin lispro  0-12 Units SubCUTAneous TID WC    insulin lispro  0-6 Units SubCUTAneous Nightly     PRN Meds: sodium chloride flush, sodium chloride, sodium chloride flush, sodium chloride, potassium chloride, ondansetron **OR** ondansetron, polyethylene glycol, acetaminophen **OR** acetaminophen, glucose, dextrose, glucagon (rDNA), dextrose      Intake/Output Summary (Last 24 hours) at 2/10/2022 1904  Last data filed at 2/10/2022 1849  Gross per 24 hour   Intake 1200 ml   Output 1825 ml   Net -625 ml       Physical Exam Performed:    /68   Pulse 105   Temp 99.7 °F (37.6 °C) (Oral)   Resp 18   Ht 5' 6\" (1.676 m)   Wt 156 lb 4.9 oz (70.9 kg)   SpO2 91%   BMI 25.23 kg/m²        General appearance: No apparent distress appears stated age and cooperative. HEENT Normal cephalic, atraumatic without obvious deformity.  Pupils equal, round, and reactive to light.  Extra ocular muscles intact.  Conjunctivae/corneas clear. Neck: Supple, No jugular venous distention/bruits.  Trachea midline without thyromegaly or adenopathy with full range of motion. Lungs: No increased work of breathing, no accessory muscle use, bibasilar crackles. Heart: Regular rate and rhythm with Normal S1/S2 without murmurs, rubs or gallops, point of maximum impulse non-displaced  Abdomen: Soft, non-tender or non-distended without rigidity or guarding and positive bowel sounds all four quadrants. Extremities: No clubbing, cyanosis, 3+ bilateral pitting edema bilaterally.  Full range of motion without deformity and normal gait intact. Skin: Skin color, texture, turgor normal.  No rashes or lesions. Neurologic: Alert and oriented X 3, neurovascularly intact with sensory/motor intact upper extremities/lower extremities, bilaterally.  Cranial nerves: II-XII intact, grossly non-focal.  Mental status: Alert, oriented, thought content appropriate. Capillary Refill: Acceptable  < 3 seconds  Peripheral Pulses: +3 Easily felt, not easily obliterated with pressure       Labs:   Recent Labs     02/08/22  0545 02/09/22  0511 02/10/22  0534   WBC 5.4 4.7 5.5   HGB 11.7* 10.6* 10.2*   HCT 35.8* 31.1* 30.6*    139 150     Recent Labs     02/09/22  1804 02/10/22  0533 02/10/22  1752   * 136 132*   K 4.6 4.5 5.2*   CL 91* 95* 93*   CO2 32 29 23   BUN 56* 65* 60*   CREATININE 1.8* 2.1* 2.1*   CALCIUM 8.7 8.5 8.4     No results for input(s): AST, ALT, BILIDIR, BILITOT, ALKPHOS in the last 72 hours. No results for input(s): INR in the last 72 hours. No results for input(s): [place] in the last 72 hours. Urinalysis:      Lab Results   Component Value Date    NITRU Negative 02/03/2022    WBCUA 1 02/03/2022    RBCUA 1 02/03/2022    BLOODU Negative 02/03/2022    SPECGRAV 1.010 02/03/2022    GLUCOSEU Negative 02/03/2022       Radiology:  XR CHEST (2 VW)   Final Result   Cardiomegaly with mild edema.          XR CHEST PORTABLE Final Result   Mild pulmonary vascular congestion. Stable cardiomegaly. Assessment/Plan:    Active Hospital Problems    Diagnosis     Acute on chronic systolic CHF (congestive heart failure) ([facility]) [I50.23]             Shortness of breath likely 2/2 acute on chronic combined systolic and diastolic CHF exacerbation - Clinically patient presents with orthopnea, peripheral edema, crackles on exam.   -started on IV Lasix gtt @ 10-->5/hr  -continue milrinone gtt  -given dose of metolazone today  -strict I/O's, net -13L  -daily weights  -medellin catheter  -1500 ml fluid restriction, low sodium diet  -daily renal panel  -cardiology consulted, recs appreciated  -CHF nurse consulted, recs appreciated  -continue home statin, Xarelto, aldactone; hold Entresto and BB  - LV thrombus - on xarelto     SONAL on CKD III - suspect cardiorenal syndrome  -management as above  -nephrology consulted, recs appreciated  -avoid nephrotoxic medications  -trend and monitor  -hold Entresto     CAD  -cardiology consulted  -tele monitoring  -continue home meds  -hold Entresto due to SONAL     Type 2 DM  -SSI  -hypoglycemia protocol  -POCT glucose checks  -carb control diet     Essential hypertension  -hold BB  -hold Entresto due to SONAL  -continue aldactone, Imdur     Hyperlipidemia  -continue home meds     Hypothyroidism  -continue home meds     Diarrhea  -send GI PCR and C. Diff and if negative, will start Imodium PRN        DVT Prophylaxis: Xarelto  Diet: ADULT DIET; Regular;  Low Sodium (2 gm); 1500 ml  Diet NPO Exceptions are: Sips of Water with Meds  Code Status: Full Code    PT/OT Eval Status: ordered    Joanie Ng MD

## 2022-02-11 NOTE — ANESTHESIA PRE-OP
H&P Update    I have reviewed the history and physical and examined the patient and find no relevant changes. I have reviewed with the patient and/or family the risks, benefits, and alternatives to the procedure. Pre-sedation Assessment    Patient:  Gonzalez Barry   :   1955  Intended Procedure: right heart catheterization    [facility] nurse's notes reviewed and agreed. Medications reviewed  Allergies: No Known Allergies      Pre-Procedure Assessment/Plan:  ASA 3 - Patient with moderate systemic disease with functional limitations    Level of Sedation Plan:No sedation    Anginal Classification w/in 2 weeks: 0    Heart Failure w/in 2 weeks:  If yes NYHA class: 4    Post Procedure plan: Return to same level of care

## 2022-02-11 NOTE — PROGRESS NOTES
Physical Therapy  Attempt  Pt out of room for RHC. Will continue to follow.    Electronically signed by Terri Stanford, PT [id number] on 2/11/2022 at 2:02 PM

## 2022-02-11 NOTE — PROGRESS NOTES
Occupational Therapy    Attempted to see pt for OT follow-up. Pt returned from cardiac catheter and is on bedrest until 1700.   Will continue to follow while in the hospital.  Armani Loyd, [address]

## 2022-02-12 NOTE — PROGRESS NOTES
ax[place]  Abdomen:  Soft, nontender, +bowel sounds  Extremities:  2+ LE edema    Cardiac Diagnosis:  diabetes, hypertension, hyperlipidemia, coronary artery disease and CHF    Medications:    sodium chloride flush  5-40 mL IntraVENous 2 times per day    sodium chloride flush  5-40 mL IntraVENous 2 times per day    metoprolol succinate  12.5 mg Oral Daily    lidocaine 1 % injection  5 mL IntraDERmal Once    sodium chloride flush  5-40 mL IntraVENous 2 times per day    spironolactone  25 mg Oral Daily    allopurinol  100 mg Oral Daily    atorvastatin  80 mg Oral Daily    isosorbide mononitrate  30 mg Oral Daily    levothyroxine  25 mcg Oral QAM AC    therapeutic multivitamin-minerals  1 tablet Oral Daily    rivaroxaban  15 mg Oral Dinner    [Held by provider] sacubitril-valsartan  1 tablet Oral BID    insulin lispro  0-12 Units SubCUTAneous TID WC    insulin lispro  0-6 Units SubCUTAneous Nightly      sodium chloride      milrinone 0.25 mcg/kg/min (02/12/22 0930)    furosemide (LASIX) 1mg/ml infusion 7.5 mg/hr (02/12/22 0930)    dextrose       sodium chloride flush, sodium chloride, acetaminophen, sodium chloride flush, sodium chloride flush, potassium chloride, ondansetron **OR** ondansetron, polyethylene glycol, acetaminophen **OR** acetaminophen, glucose, dextrose, glucagon (rDNA), dextrose    Lab Data:  CBC:   Recent Labs     02/10/22  0534 02/11/22  0542 02/12/22  0722   WBC 5.5 5.3 4.5   HGB 10.2* 9.5* 9.7*    164 191     BMP:    Recent Labs     02/10/22  1752 02/11/22  0542 02/11/22  1746   * 134* 136   K 5.2* 4.2 4.2   CO2 23 29 32   BUN 60* 67* 69*   CREATININE 2.1* 2.5* 2.1*     LIVR: No results for input(s): AST, ALT in the last 72 hours. INR:  No results for input(s): INR in the last 72 hours. APTT: No results for input(s): APTT in the last 72 hours. BNP:  No results for input(s): BNP in the last 72 hours. Imaging: [facility] (2/11/2022 )1.   Evidence of right-sided volume overload with a right atrial pressure  of 11 mmHg and a pulmonary capillary wedge pressure of 22 mmHg. 2.  Pulmonary hypertension with an instantaneous pressure of 56/25 for a  mean pulmonary arterial pressure of 37 mmHg. 3.  Reduced cardiac output by thermodilution at 3.96 liters per minute  with a cardiac index of 1.4 liters per kilogram per minute. By Denzel Stake, the cardiac index was 2.14 liters per kilogram per minute. Of  note, the patient is on 0.25 mcg per kilogram per minute of milrinone. 4.  Pulmonary vascular resistance of 197 dynes per second. 5. Low mixed venous oxygen saturations with an IVC of 40%, right atrium  42%, and pulmonary artery of 38%. The patient's oxygen saturation was  89% to 90% on room air. Echocardiogram 11/16/2021   Left ventricular cavity size is severely dilated. Ejection fraction is visually estimated to be <20%. There is severe diffuse hypokinesis. Diastolic filling parameters suggest grade II diastolic dysfunction. Definity contrast administered. No left ventricular thrombus was seen. moderate MR   Pacer / ICD wire is visualized in the right ventricle. The right ventricle is dilated. Right ventricular systolic function is moderate to severely reduced. TAPSE 1.01 cm    Assessment:Plan:  1) acute on chronic systolic heart failure. NYHA class IV, stage D  Currently on milrinone 0.25 mcg/kg/min and Lasix 7.5 mg/hr  -Had modest urine output in last 24 hours,  -We will give him 2.5 mg of p.o.  Zaroxolyn to accelerate urine output  -Currently on low-dose Toprol and isosorbide therapy  -His Entresto is currently on hold due to renal insufficiency  -Not on Aldactone therapy or surgery to his renal insufficiency  -He will not be an ideal candidate for SGLT2 inhibitor therapy in outpatient setting due to his renal abnormal renal function and his very poor compliance  -We will continue to follow renal function closely    CAD s/p remote LAD PCI  -No evidence of angina  -Currently on low-dose beta-blocker and high dose statin therapy  -Not on aspirin due to chronic Xarelto therapy    LV thrombus  No evidence of thrombus on recent echocardiogram.  -We will maintain him on chronic Xarelto therapy    SONAL on CKD  -Renal function abnormal but stable bilaterally likely due to his severe cardiomyopathy    Anemia  -Hemoglobin stable at 9.7 gms    Patient's overall prognosis is poor.  Will have palliative care see the patient    Electronically signed by Macario Mandel MD on 2/12/2022 at 11:19 AM

## 2022-02-12 NOTE — PROGRESS NOTES
intact.  Conjunctivae/corneas clear. Neck: Supple, No jugular venous distention/bruits.  Trachea midline without thyromegaly or adenopathy with full range of motion. Lungs: No increased work of breathing, no accessory muscle use, bibasilar crackles. Heart: Regular rate and rhythm with Normal S1/S2 without murmurs, rubs or gallops, point of maximum impulse non-displaced  Abdomen: Soft, non-tender or non-distended without rigidity or guarding and positive bowel sounds all four quadrants. Extremities: No clubbing, cyanosis, 3+ bilateral pitting edema bilaterally.  Full range of motion without deformity and normal gait intact. Skin: Skin color, texture, turgor normal.  No rashes or lesions. Neurologic: Alert and oriented X 3, neurovascularly intact with sensory/motor intact upper extremities/lower extremities, bilaterally.  Cranial nerves: II-XII intact, grossly non-focal.  Mental status: Alert, oriented, thought content appropriate. Capillary Refill: Acceptable  < 3 seconds  Peripheral Pulses: +3 Easily felt, not easily obliterated with pressure       Labs:   Recent Labs     02/09/22  0511 02/10/22  0534 02/11/22  0542   WBC 4.7 5.5 5.3   HGB 10.6* 10.2* 9.5*   HCT 31.1* 30.6* 27.9*    150 164     Recent Labs     02/10/22  1752 02/11/22  0542 02/11/22  1746   * 134* 136   K 5.2* 4.2 4.2   CL 93* 92* 93*   CO2 23 29 32   BUN 60* 67* 69*   CREATININE 2.1* 2.5* 2.1*   CALCIUM 8.4 8.6 8.8     No results for input(s): AST, ALT, BILIDIR, BILITOT, ALKPHOS in the last 72 hours. No results for input(s): INR in the last 72 hours. No results for input(s): Sanaz Moreno in the last 72 hours. Urinalysis:      Lab Results   Component Value Date    NITRU Negative 02/03/2022    WBCUA 1 02/03/2022    RBCUA 1 02/03/2022    BLOODU Negative 02/03/2022    SPECGRAV 1.010 02/03/2022    GLUCOSEU Negative 02/03/2022       Radiology:  XR CHEST (2 VW)   Final Result   Cardiomegaly with mild edema.          XR CHEST PORTABLE   Final Result   Mild pulmonary vascular congestion. Stable cardiomegaly. Assessment/Plan:    Active Hospital Problems    Diagnosis     Acute on chronic systolic CHF (congestive heart failure) ([facility]) [I50.23]            Shortness of breath likely 2/2 acute on chronic combined systolic and diastolic CHF exacerbation - Clinically patient presents with orthopnea, peripheral edema, crackles on exam.   -started on IV Lasix gtt @ 10-->5/hr  -continue milrinone gtt  -given dose of metolazone today  -strict I/O's, net -13L  -daily weights  -medellin catheter  -1500 ml fluid restriction, low sodium diet  -daily renal panel  -cardiology consulted, recs appreciated  -CHF nurse consulted, recs appreciated  -continue home statin, Xarelto, aldactone; hold Entresto and BB  - LV thrombus - on xarelto     SONAL on CKD III - suspect cardiorenal syndrome  -management as above  -nephrology consulted, recs appreciated  -avoid nephrotoxic medications  -trend and monitor  -hold Entresto     CAD  -cardiology consulted  -tele monitoring  -continue home meds  -hold Entresto due to SONAL     Type 2 DM  -SSI  -hypoglycemia protocol  -POCT glucose checks  -carb control diet     Essential hypertension  -hold BB  -hold Entresto due to SONAL  -continue aldactone, Imdur     Hyperlipidemia  -continue home meds     Hypothyroidism  -continue home meds     Diarrhea  -send GI PCR and C. Diff and if negative, will start Imodium PRN        DVT Prophylaxis: Xarelto  Diet: ADULT DIET; Regular;  No Added Salt (3-4 gm)  Code Status: Full Code    PT/OT Eval Status: ordered    Sean Rios MD

## 2022-02-12 NOTE — PROGRESS NOTES
[address] [place] [address] Nephrology   [facility]  [phone]  Nephrology Note          Patient ID: Hernando Douglas  Referring/ Physician: Mindy Simon MD      HPI/Summary:   Hernando Douglas is being seen by nephrology for SONAL. This is a 68-PUUK-HDE man with systolic heart failure, history of LV thrombus, coronary artery disease, hyperlipidemia who presented the hospital with increasing shortness of breath and worsening lower extremity swelling. Interval hx:   Seen and examined at bedside  Has persistent lower extremity edema  Weight improved today at 154 lbs  Reported  lbs  Made 1650 mL urine yesterday  Remains on lasix infusion, rate increased to  7.5 mg/h. On milrinone  Cr up to 2.0 today  Had a RHC yesterday, has elevated filling pressures. Plan:   - renal function is close to baseline, no acute electrolyte issues. - no other changes from renal aspect   - continue diuresis and milrinone per cardiology. Noted diuretics increased due to elevated filling pressures. Assessment:  SONAL on Chronic kidney disease stage IIIb  This is likely secondary to diabetic nephropathy and SONAL from cardiorenal syndrome. Baseline creatinine around 2  Cr 2.3 at time of consult  No acute electrolyte abnormalities  Has had diabetes and hypertension for over 10 years and his last EF is less than 20%  He has had event evidence of albuminuria on urinalysis going back to 2016, 2013 had an  mg  Most recent protein creatinine ratio 700 mg   CT abdomen in 2020 showed symmetric kidneys no lesions no masses bilateral perinephric stranding no hydronephrosis. HCV and hepatitis C negative  Prior negative NAHOMY  Prior negative RF factor    Ischemic cardiomyopathy  Decompensated, has evidence of volume overload  proBNP [id number]  Managed with Lasix drip and is on milrinone, appreciate cardiology involvement.   Last EF less than 20%  History of coronary artery disease with stent in the LAD  Has ICD  entresto held     Diabetes type 2  10 years at least  Longstanding albuminuria  A1c has been ranging between 6 and 6.5 the last few checks. Hypertension  On inotrope BP acceptable. [facility] would like to thank you for the opportunity to serve this patient. Please call with any questions or concerns. David Park MD  [facility]  [address], [address]  Fax: [phone]  Office: [phone]         CC/Reason for consult:   Reason for consult: SONAL  Chief Complaint   Patient presents with    Leg Swelling     pt complains of acute on chronic BLE edema. mild sob on exertion. denies cough. Review of Systems:   [address] All other remaining systems are negative. Constitutional:  fever, chills, weakness, weight change, fatigue,      Skin:  rash, pruritus, hair loss, bruising, dry skin, petechiae. Head, Face, Neck   headaches, swelling,  cervical adenopathy. Respiratory: shortness of breath, cough, or wheezing  Cardiovascular: chest pain, palpitations, dizzy, edema  Gastrointestinal: nausea, vomiting, diarrhea, constipation,belly pain    Yellow skin, blood in stool  Musculoskeletal:  back pain, muscle weakness, gait problems,       joint pain or swelling. Genitourinary:  dysuria, poor urine flow, flank pain, blood in urine  Neurologic:  vertigo, TIA'S, syncope, seizures, focal weakness  Psychosocial:  insomnia, anxiety, or depression. Additional positive findings: -     PMH/SH/FH:    Medical Hx: reviewed and updated as appropriate  Past Medical History:   Diagnosis Date    Diabetes type 2, uncontrolled ([address] 7/28/2014    Diabetic nephropathy 9/12/2013    ED (erectile dysfunction) 9/12/2013    Essential hypertension, benign 9/12/2013    Hyperlipidemia 9/12/2013    Noncompliance 7/28/2014         Surgical Hx: reviewed and updated as appropriate   has a past surgical history that includes eye surgery.      Social Hx: reviewed and updated as appropriate  Social History     Tobacco Use    Smoking status: Former Smoker     Packs/day: 1.50     Years: 7.00     Pack years: 10.50     Quit date: 1981     Years since quittin.0    Smokeless tobacco: Never Used   Substance Use Topics    Alcohol use: Yes     Alcohol/week: 1.0 standard drink     Types: 1 Cans of beer per week        Family hx: reviewed and updated as appropriate  family history includes Diabetes in his brother, mother, and sister; High Blood Pressure in his mother. Medications:   sodium chloride flush, 5-40 mL, 2 times per day  sodium chloride flush, 5-40 mL, 2 times per day  metoprolol succinate, 12.5 mg, Daily  lidocaine 1 % injection, 5 mL, Once  sodium chloride flush, 5-40 mL, 2 times per day  spironolactone, 25 mg, Daily  allopurinol, 100 mg, Daily  atorvastatin, 80 mg, Daily  isosorbide mononitrate, 30 mg, Daily  levothyroxine, 25 mcg, QAM AC  therapeutic multivitamin-minerals, 1 tablet, Daily  rivaroxaban, 15 mg, Dinner  [Held by provider] sacubitril-valsartan, 1 tablet, BID  insulin lispro, 0-12 Units, TID WC  insulin lispro, 0-6 Units, Nightly       Patient has no known allergies. Allergies:   No Known Allergies      Physical Exam/Objective:   Vitals:    22 1230   BP: 101/66   Pulse: 96   Resp:    Temp:    SpO2:        Intake/Output Summary (Last 24 hours) at 2022 1307  Last data filed at 2022 0931  Gross per 24 hour   Intake 752.85 ml   Output 1950 ml   Net -1197.15 ml         General appearance:  in no acute distress, comfortable, communicative, awake and alert. HEENT: no icterus, EOM intact, trachea midline. Neck : no masses, appears symmetrical   Respiratory: Respiratory effort normal, bilateral equal chest rise. No wheeze, + crackles. Cardiovascular: Ausculation shows RRR and  + edema   Abdomen: abdomen is soft, non distended, no masses, no pain with palpation.   Musculoskeletal:  no joint swelling, no deformity, strength grossly normal.   Skin: no rashes, no induration, no tightening, no jaundice   Neuro:   Follows commands, moves all extremities spontaneously       Data:   CBC:   Recent Labs     02/10/22  0534 02/11/22  0542 02/12/22  0722   WBC 5.5 5.3 4.5   HGB 10.2* 9.5* 9.7*   HCT 30.6* 27.9* 29.3*    164 191     BMP:    Recent Labs     02/11/22  0542 02/11/22  1746 02/12/22  1100   * 136 134*   K 4.2 4.2 4.0   CL 92* 93* 93*   CO2 29 32 30   BUN 67* 69* 60*   CREATININE 2.5* 2.1* 2.0*   GLUCOSE 118* 104* 172*

## 2022-02-13 NOTE — PROGRESS NOTES
Hospitalist Progress Note      PCP: Brooklynn Hernandez DO    Date of Admission: 2/3/2022    Subjective: diuresed a little bit better overnight    Medications:  Reviewed    Infusion Medications    sodium chloride      milrinone 0.25 mcg/kg/min (02/12/22 1318)    furosemide (LASIX) 1mg/ml infusion 7.5 mg/hr (02/12/22 1826)    dextrose       Scheduled Medications    sodium chloride flush  5-40 mL IntraVENous 2 times per day    sodium chloride flush  5-40 mL IntraVENous 2 times per day    metoprolol succinate  12.5 mg Oral Daily    lidocaine 1 % injection  5 mL IntraDERmal Once    sodium chloride flush  5-40 mL IntraVENous 2 times per day    spironolactone  25 mg Oral Daily    allopurinol  100 mg Oral Daily    atorvastatin  80 mg Oral Daily    isosorbide mononitrate  30 mg Oral Daily    levothyroxine  25 mcg Oral QAM AC    therapeutic multivitamin-minerals  1 tablet Oral Daily    rivaroxaban  15 mg Oral Dinner    [Held by provider] sacubitril-valsartan  1 tablet Oral BID    insulin lispro  0-12 Units SubCUTAneous TID WC    insulin lispro  0-6 Units SubCUTAneous Nightly     PRN Meds: sodium chloride flush, sodium chloride, acetaminophen, sodium chloride flush, sodium chloride flush, potassium chloride, ondansetron **OR** ondansetron, polyethylene glycol, acetaminophen **OR** acetaminophen, glucose, dextrose, glucagon (rDNA), dextrose      Intake/Output Summary (Last 24 hours) at 2/12/2022 2346  Last data filed at 2/12/2022 2332  Gross per 24 hour   Intake 825.69 ml   Output 2625 ml   Net -1799.31 ml       Physical Exam Performed:    BP (!) 93/59   Pulse 96   Temp 98.9 °F (37.2 °C) (Oral)   Resp 18   Ht 5' 6\" (1.676 m)   Wt 154 lb 5.2 oz (70 kg)   SpO2 92%   BMI 24.91 kg/m²        General appearance: No apparent distress appears stated age and cooperative. HEENT Normal cephalic, atraumatic without obvious deformity.  Pupils equal, round, and reactive to light.  Extra ocular muscles intact.  Conjunctivae/corneas clear. Neck: Supple, No jugular venous distention/bruits.  Trachea midline without thyromegaly or adenopathy with full range of motion. Lungs: No increased work of breathing, no accessory muscle use, bibasilar crackles. Heart: Regular rate and rhythm with Normal S1/S2 without murmurs, rubs or gallops, point of maximum impulse non-displaced  Abdomen: Soft, non-tender or non-distended without rigidity or guarding and positive bowel sounds all four quadrants. Extremities: No clubbing, cyanosis, 3+ bilateral pitting edema bilaterally.  Full range of motion without deformity and normal gait intact. Skin: Skin color, texture, turgor normal.  No rashes or lesions. Neurologic: Alert and oriented X 3, neurovascularly intact with sensory/motor intact upper extremities/lower extremities, bilaterally.  Cranial nerves: II-XII intact, grossly non-focal.  Mental status: Alert, oriented, thought content appropriate. Capillary Refill: Acceptable  < 3 seconds  Peripheral Pulses: +3 Easily felt, not easily obliterated with pressure       Labs:   Recent Labs     02/10/22  0534 02/11/22  0542 02/12/22  0722   WBC 5.5 5.3 4.5   HGB 10.2* 9.5* 9.7*   HCT 30.6* 27.9* 29.3*    164 191     Recent Labs     02/11/22  1746 02/12/22  1100 02/12/22  1859    134* 134*   K 4.2 4.0 4.1   CL 93* 93* 92*   CO2 32 30 29   BUN 69* 60* 68*   CREATININE 2.1* 2.0* 2.3*   CALCIUM 8.8 8.5 8.4     No results for input(s): AST, ALT, BILIDIR, BILITOT, ALKPHOS in the last 72 hours. No results for input(s): INR in the last 72 hours. No results for input(s): Evonnie Montane in the last 72 hours. Urinalysis:      Lab Results   Component Value Date    NITRU Negative 02/03/2022    WBCUA 1 02/03/2022    RBCUA 1 02/03/2022    BLOODU Negative 02/03/2022    SPECGRAV 1.010 02/03/2022    GLUCOSEU Negative 02/03/2022       Radiology:  XR CHEST (2 VW)   Final Result   Cardiomegaly with mild edema.          XR CHEST PORTABLE Final Result   Mild pulmonary vascular congestion. Stable cardiomegaly. Assessment/Plan:    Active Hospital Problems    Diagnosis     Acute on chronic systolic CHF (congestive heart failure) ([facility]) [I50.23]            Shortness of breath likely 2/2 acute on chronic combined systolic and diastolic CHF exacerbation - Clinically patient presents with orthopnea, peripheral edema, crackles on exam.   -started on IV Lasix gtt @ 10-->5-->7.5mg/hr  -continue milrinone gtt  -given dose of metolazone today  -strict I/O's, net -13L  -daily weights  -medellin catheter  -1500 ml fluid restriction, low sodium diet  -daily renal panel  -cardiology consulted, recs appreciated  -CHF nurse consulted, recs appreciated  -continue home statin, Xarelto, aldactone; hold Entresto and BB  - LV thrombus - on xarelto     SONAL on CKD III - suspect cardiorenal syndrome  -management as above  -nephrology consulted, recs appreciated  -avoid nephrotoxic medications  -trend and monitor  -hold Entresto     CAD  -cardiology consulted  -tele monitoring  -continue home meds  -hold Entresto due to SONAL     Type 2 DM  -SSI  -hypoglycemia protocol  -POCT glucose checks  -carb control diet     Essential hypertension  -hold BB  -hold Entresto due to SOANL  -continue aldactone, Imdur     Hyperlipidemia  -continue home meds     Hypothyroidism  -continue home meds     Diarrhea  -send GI PCR and C. Diff and if negative, will start Imodium PRN        DVT Prophylaxis: Xarelto  Diet: ADULT DIET; Regular;  No Added Salt (3-4 gm)  Code Status: Full Code    PT/OT Eval Status: ordered    Jennifer Banks MD

## 2022-02-13 NOTE — PROGRESS NOTES
[address]   Daily Progress Note      Admit Date:  2/3/2022    CC: \"  Mckenzie Powers is a 61 y.o. male with PMH CAD, SHF s/p ICD (8/2019), LV thrombus, HTN, HLP, DM2, CKD Stage 3/ diabetic nephropathy  Seen by Dr Shilo Valentin as outpatient. Plan was for and RHC on 11/1/2021 and he did not show up for procedure. Adm [facility] 11/2021 with HF/SONAL - on Milrinone and lasix gtt. Had [address] with elevated filling pressure. DC wt 145lbs. Non compliant with follow up after DC      Adm to [facility] with acute on chronic systolic heart failure. Started on milrinone and lasix gtts. Also received metolazone. He has been diuresing well but continues with significant amount of BLE, abd and back edema. He denies SOB at rest. Mild SOB with activity. Associated with generalized fatigue and weakness.     Subjective:  Patient underwent right heart catheterization on 2/11/2022 by Dr. Shilo Valentin which again showed fluid overload state with pulmonary capillary wedge pressure of 22 mmHg, patient currently is on milrinone drip and Lasix drip was increased to 7.5 mg/h. Patient had moderate urine output and has lost 2 additional pounds in last 24 hours    Interval history 2/13/2022  -Patient seem to be diuresing well on inotropic therapy and Lasix drip.   He received 2.5 mg of Zaroxolyn yesterday and he has lost 9 additional pounds since yesterday    Objective:   /70   Pulse 93   Temp 98.8 °F (37.1 °C) (Oral)   Resp 20   Ht 5' 6\" (1.676 m)   Wt 145 lb 8.1 oz (66 kg)   SpO2 94%   BMI 23.48 kg/m²       Intake/Output Summary (Last 24 hours) at 2/13/2022 1417  Last data filed at 2/13/2022 1013  Gross per 24 hour   Intake 480 ml   Output 2350 ml   Net -1870 ml     Wt Readings from Last 3 Encounters:   02/13/22 145 lb 8.1 oz (66 kg)   01/20/22 180 lb (81.6 kg)   11/30/21 149 lb 4 oz (67.7 kg)     Telemetry: Sinus rhythm/sinus tachycardia with PACs    Physical Exam:  General:  NAD, Awake, alert and oriented X4  Skin:  Warm and dry  Neck:  Supple, elevated JVP appreciated, no bruit  Chest:  Clear to auscultation, no wheezes/rhonchi/rales  Cardiovascular:  Regular rate. U8K5. 3/6 systolic murmur at the apex radiating to axilla  Abdomen:  Soft, nontender, +bowel sounds  Extremities:  2+ LE edema    Cardiac Diagnosis:  diabetes, hypertension, hyperlipidemia, coronary artery disease and CHF    Medications:    sodium chloride flush  5-40 mL IntraVENous 2 times per day    sodium chloride flush  5-40 mL IntraVENous 2 times per day    metoprolol succinate  12.5 mg Oral Daily    lidocaine 1 % injection  5 mL IntraDERmal Once    sodium chloride flush  5-40 mL IntraVENous 2 times per day    spironolactone  25 mg Oral Daily    allopurinol  100 mg Oral Daily    atorvastatin  80 mg Oral Daily    isosorbide mononitrate  30 mg Oral Daily    levothyroxine  25 mcg Oral QAM AC    therapeutic multivitamin-minerals  1 tablet Oral Daily    rivaroxaban  15 mg Oral Dinner    [Held by provider] sacubitril-valsartan  1 tablet Oral BID    insulin lispro  0-12 Units SubCUTAneous TID WC    insulin lispro  0-6 Units SubCUTAneous Nightly      sodium chloride      milrinone 0.25 mcg/kg/min (02/13/22 0747)    furosemide (LASIX) 1mg/ml infusion 7.5 mg/hr (02/13/22 0747)    dextrose       sodium chloride flush, sodium chloride, acetaminophen, sodium chloride flush, sodium chloride flush, potassium chloride, ondansetron **OR** ondansetron, polyethylene glycol, acetaminophen **OR** acetaminophen, glucose, dextrose, glucagon (rDNA), dextrose    Lab Data:  CBC:   Recent Labs     02/11/22  0542 02/12/22  0722 02/13/22  0515   WBC 5.3 4.5 4.4   HGB 9.5* 9.7* 9.9*    191 207     BMP:    Recent Labs     02/12/22  1100 02/12/22  1859 02/13/22  0515   * 134* 134*   K 4.0 4.1 3.9   CO2 30 29 29   BUN 60* 68* 68*   CREATININE 2.0* 2.3* 2.2*     LIVR: No results for input(s): AST, ALT in the last 72 hours.   INR:  No results for input(s): INR in the last 72 hours. APTT: No results for input(s): APTT in the last 72 hours. BNP:  No results for input(s): BNP in the last 72 hours. Imaging: RHC (2/11/2022 )1. Evidence of right-sided volume overload with a right atrial pressure  of 11 mmHg and a pulmonary capillary wedge pressure of 22 mmHg. 2.  Pulmonary hypertension with an instantaneous pressure of 56/25 for a  mean pulmonary arterial pressure of 37 mmHg. 3.  Reduced cardiac output by thermodilution at 3.96 liters per minute  with a cardiac index of 1.4 liters per kilogram per minute. By Clau Ascencion, the cardiac index was 2.14 liters per kilogram per minute. Of  note, the patient is on 0.25 mcg per kilogram per minute of milrinone. 4.  Pulmonary vascular resistance of 197 dynes per second. 5. Low mixed venous oxygen saturations with an IVC of 40%, right atrium  42%, and pulmonary artery of 38%. The patient's oxygen saturation was  89% to 90% on room air. Echocardiogram 11/16/2021   Left ventricular cavity size is severely dilated. Ejection fraction is visually estimated to be <20%. There is severe diffuse hypokinesis. Diastolic filling parameters suggest grade II diastolic dysfunction. Definity contrast administered. No left ventricular thrombus was seen. moderate MR   Pacer / ICD wire is visualized in the right ventricle. The right ventricle is dilated. Right ventricular systolic function is moderate to severely reduced. TAPSE 1.01 cm    Assessment:Plan:  1) acute on chronic systolic heart failure.   NYHA class IV, stage D  Currently on milrinone 0.25 mcg/kg/min and Lasix 7.5 mg/hr  -We will continue  -Had good urine output in last 24 hours, and has lost 9 additional pounds  -Currently on low-dose Toprol and isosorbide therapy  -His Entresto is currently on hold due to renal insufficiency  -Not on Aldactone therapy or surgery to his renal insufficiency  -He will not be an ideal candidate for SGLT2 inhibitor therapy in outpatient setting due to his renal abnormal renal function and his very poor compliance  -We will continue to follow renal function closely    CAD s/p remote LAD PCI  -No evidence of angina  -Currently on low-dose beta-blocker and high dose statin therapy  -Not on aspirin due to chronic Xarelto therapy    LV thrombus  No evidence of thrombus on recent echocardiogram.  -We will maintain him on chronic Xarelto therapy    SONAL on CKD  -Renal function abnormal but stable bilaterally likely due to his severe cardiomyopathy    Anemia  -Hemoglobin stable at 9.7 gms    Patient's overall prognosis is poor.  Will have palliative care see the patient    Electronically signed by Jessica Gregory MD on 2/13/2022 at 2:17 PM

## 2022-02-13 NOTE — PROGRESS NOTES
[address] [place] [address] Nephrology   [facility]  [phone]  Nephrology Note          Patient ID: Debbie Blanton  Referring/ Physician: Ivanna Buitrago MD      HPI/Summary:   Debbie Blanton is being seen by nephrology for SONAL. This is a 71-LIDW-NFK man with systolic heart failure, history of LV thrombus, coronary artery disease, hyperlipidemia who presented the hospital with increasing shortness of breath and worsening lower extremity swelling. Interval hx:   Seen and examined at bedside  Has persistent lower extremity edema, improving  Weight improved today at 145 lbs  Reported  lbs  Made 2450 mL urine yesterday  Remains on lasix infusion 7.5 mg/h. On milrinone  Cr 2.2 today  Had a RHC 2/11/2022, has elevated filling pressures. Plan:   - renal function is close to baseline, no acute electrolyte issues. - no other changes from renal aspect   - continue diuresis and milrinone per cardiology. Noted diuretics increased due to elevated filling pressures. Assessment:  SONAL on Chronic kidney disease stage IIIb  This is likely secondary to diabetic nephropathy and SONAL from cardiorenal syndrome. Baseline creatinine around 2  Cr 2.3 at time of consult  No acute electrolyte abnormalities  Has had diabetes and hypertension for over 10 years and his last EF is less than 20%  He has had event evidence of albuminuria on urinalysis going back to 2016, 2013 had an  mg  Most recent protein creatinine ratio 700 mg   CT abdomen in 2020 showed symmetric kidneys no lesions no masses bilateral perinephric stranding no hydronephrosis. HCV and hepatitis C negative  Prior negative NAHOMY  Prior negative RF factor    Ischemic cardiomyopathy  Decompensated, has evidence of volume overload  proBNP 20416  Managed with Lasix drip and is on milrinone, appreciate cardiology involvement.   Last EF less than 20%  History of coronary artery disease with stent in the LAD  Has ICD  entresto held     Diabetes type 2  10 years at least  Longstanding albuminuria  A1c has been ranging between 6 and 6.5 the last few checks. Hypertension  On inotrope BP acceptable. [facility] would like to thank you for the opportunity to serve this patient. Please call with any questions or concerns. Jessica Prescott MD  [facility]  [address], [address]  Fax: [phone]  Office: [phone]         CC/Reason for consult:   Reason for consult: SONAL  Chief Complaint   Patient presents with    Leg Swelling     pt complains of acute on chronic BLE edema. mild sob on exertion. denies cough. Review of Systems:   [address] All other remaining systems are negative. Constitutional:  fever, chills, weakness, weight change, fatigue,      Skin:  rash, pruritus, hair loss, bruising, dry skin, petechiae. Head, Face, Neck   headaches, swelling,  cervical adenopathy. Respiratory: shortness of breath, cough, or wheezing  Cardiovascular: chest pain, palpitations, dizzy, edema  Gastrointestinal: nausea, vomiting, diarrhea, constipation,belly pain    Yellow skin, blood in stool  Musculoskeletal:  back pain, muscle weakness, gait problems,       joint pain or swelling. Genitourinary:  dysuria, poor urine flow, flank pain, blood in urine  Neurologic:  vertigo, TIA'S, syncope, seizures, focal weakness  Psychosocial:  insomnia, anxiety, or depression. Additional positive findings: -     PMH/SH/FH:    Medical Hx: reviewed and updated as appropriate  Past Medical History:   Diagnosis Date    Diabetes type 2, uncontrolled ([address] 7/28/2014    Diabetic nephropathy 9/12/2013    ED (erectile dysfunction) 9/12/2013    Essential hypertension, benign 9/12/2013    Hyperlipidemia 9/12/2013    Noncompliance 7/28/2014         Surgical Hx: reviewed and updated as appropriate   has a past surgical history that includes eye surgery.      Social Hx: reviewed and updated as appropriate  Social History Tobacco Use    Smoking status: Former Smoker     Packs/day: 1.50     Years: 7.00     Pack years: 10.50     Quit date: 1981     Years since quittin.0    Smokeless tobacco: Never Used   Substance Use Topics    Alcohol use: Yes     Alcohol/week: 1.0 standard drink     Types: 1 Cans of beer per week        Family hx: reviewed and updated as appropriate  family history includes Diabetes in his brother, mother, and sister; High Blood Pressure in his mother. Medications:   sodium chloride flush, 5-40 mL, 2 times per day  sodium chloride flush, 5-40 mL, 2 times per day  metoprolol succinate, 12.5 mg, Daily  lidocaine 1 % injection, 5 mL, Once  sodium chloride flush, 5-40 mL, 2 times per day  spironolactone, 25 mg, Daily  allopurinol, 100 mg, Daily  atorvastatin, 80 mg, Daily  isosorbide mononitrate, 30 mg, Daily  levothyroxine, 25 mcg, QAM AC  therapeutic multivitamin-minerals, 1 tablet, Daily  rivaroxaban, 15 mg, Dinner  [Held by provider] sacubitril-valsartan, 1 tablet, BID  insulin lispro, 0-12 Units, TID WC  insulin lispro, 0-6 Units, Nightly       Patient has no known allergies. Allergies:   No Known Allergies      Physical Exam/Objective:   Vitals:    22 1245   BP: 109/70   Pulse: 93   Resp:    Temp:    SpO2:        Intake/Output Summary (Last 24 hours) at 2022 1300  Last data filed at 2022 1013  Gross per 24 hour   Intake 600 ml   Output 2350 ml   Net -1750 ml         General appearance:  in no acute distress, comfortable, communicative, awake and alert. HEENT: no icterus, EOM intact, trachea midline. Neck : no masses, appears symmetrical   Respiratory: Respiratory effort normal, bilateral equal chest rise. No wheeze, + crackles. Cardiovascular: Ausculation shows RRR and  + edema   Abdomen: abdomen is soft, non distended, no masses, no pain with palpation.   Musculoskeletal:  no joint swelling, no deformity, strength grossly normal.   Skin: no rashes, no induration, no tightening, no jaundice   Neuro:   Follows commands, moves all extremities spontaneously       Data:   CBC:   Recent Labs     02/11/22  0542 02/12/22  0722 02/13/22  0515   WBC 5.3 4.5 4.4   HGB 9.5* 9.7* 9.9*   HCT 27.9* 29.3* 29.3*    191 207     BMP:    Recent Labs     02/12/22  1100 02/12/22  1859 02/13/22  0515   * 134* 134*   K 4.0 4.1 3.9   CL 93* 92* 91*   CO2 30 29 29   BUN 60* 68* 68*   CREATININE 2.0* 2.3* 2.2*   GLUCOSE 172* 169* 104*

## 2022-02-14 NOTE — PROGRESS NOTES
Patient indicated he would like to name his son Maricel Granger as his POA. Referral placed to spiritual services. Patient's current health status, goals of care and code status were reviewed. Patient indicated he would like to remain a Full Code at this time. The patient would like to continue on with all aggressive therapy. The patient indicated he would like to meet with [facility] post his hospital stay to help assist with in home care after his discharge.           Disposition/Discharge Plan:   An outpatient [facility] referral was ordered for post-discharge to followup with the patient once they return home.     Advance Directives:  Surrogate Decision Maker: Patient has 3 children. He does not wish to fill out POA paperwork at this time. Maricel Granger is primary contact for the family. Code status:  Full Code     Case discussed with: patient  Thank you for allowing us to participate in the care of this patient. SUBJECTIVE     Chief Complaint: Debility    Last 24 hours:   Patient again was oriented x 4 today. Patient continues to slowly diurese. ROS:    Review of Systems   Constitutional: Positive for fatigue. HENT: Negative. Eyes: Negative. Respiratory: Negative. Cardiovascular: Positive for leg swelling. Gastrointestinal: Negative. Musculoskeletal: Negative. Skin: Negative. Neurological: Positive for weakness. Psychiatric/Behavioral: Negative. All other systems reviewed and are negative. A complete 10 count ROS was obtained. Pertinent positives mentioned above in HPI/ROS. All others if not mentioned are negative. OBJECTIVE   BP 96/62   Pulse 94   Temp 97.8 °F (36.6 °C) (Oral)   Resp 20   Ht 5' 6\" (1.676 m)   Wt 153 lb 7 oz (69.6 kg)   SpO2 99%   BMI 24.77 kg/m²   I/O last 3 completed shifts: In: 1230.6 [P.O.:840; I.V.:390.6]  Out: 3750 [VZBPL:3895]  I/O this shift: In: 960.9 [P.O.:720; I.V.:240.9]  Out: 800 [Urine:800]      Physical Exam  Vitals reviewed. Constitutional:       Appearance: He is ill-appearing. HENT:      Head: Normocephalic. Nose: Nose normal.   Eyes:      Pupils: Pupils are equal, round, and reactive to light. Cardiovascular:      Rate and Rhythm: Normal rate. Pulses: Normal pulses. Pulmonary:      Comments: Breath sounds clear but diminished in bases bilaterally. Abdominal:      General: Bowel sounds are normal.      Palpations: Abdomen is soft. Musculoskeletal:      Cervical back: Neck supple. Right lower leg: Edema (3+) present. Left lower leg: Edema (2+) present. Skin:     General: Skin is warm and dry. Neurological:      Comments: Again oriented x4   Psychiatric:         Mood and Affect: Mood normal.         Thought Content:  Thought content normal.         Judgment: Judgment normal.            Total time: 39 minutes  >50% of time spent counseling patient at bedside or POA/family member if applicable , reviewing information and discussing care, coordinating with care team       Signed By: Electronically signed by DAHIANA Szymanski CNP on 2/14/2022 at 4:20 PM   Palliative Medicine   [phone]    February 14, 2022

## 2022-02-14 NOTE — PLAN OF CARE
Problem: OXYGENATION/RESPIRATORY FUNCTION  Goal: Patient will maintain patent airway  2/14/2022 0207 by Lorraine Almeida RN  Outcome: Ongoing     Problem: OXYGENATION/RESPIRATORY FUNCTION  Goal: Patient will achieve/maintain normal respiratory rate/effort  Description: Respiratory rate and effort will be within normal limits for the patient  2/14/2022 0207 by Lorraine Almeida RN  Outcome: Ongoing     Problem: HEMODYNAMIC STATUS  Goal: Patient has stable vital signs and fluid balance  2/14/2022 0207 by Lorraine Almeida RN  Outcome: Ongoing     Problem: FLUID AND ELECTROLYTE IMBALANCE  Goal: Fluid and electrolyte balance are achieved/maintained  2/14/2022 0207 by Lorraine Almeida RN  Outcome: Ongoing     Problem: ACTIVITY INTOLERANCE/IMPAIRED MOBILITY  Goal: Mobility/activity is maintained at optimum level for patient  2/14/2022 0207 by Lorraine Almeida RN  Outcome: Ongoing     Problem: Falls - Risk of:  Goal: Will remain free from falls  Description: Will remain free from falls  2/14/2022 0207 by Lorraine Almeida RN  Outcome: Ongoing     Problem: Falls - Risk of:  Goal: Absence of physical injury  Description: Absence of physical injury  2/14/2022 0207 by Lorraine Almeida RN  Outcome: Ongoing     Problem: Skin Integrity:  Goal: Will show no infection signs and symptoms  Description: Will show no infection signs and symptoms  2/14/2022 0207 by Lorraine Almeida RN  Outcome: Ongoing     Problem: Skin Integrity:  Goal: Absence of new skin breakdown  Description: Absence of new skin breakdown  2/14/2022 0207 by Lorraine Almeida RN  Outcome: Ongoing     Problem: Infection:  Goal: Will remain free from infection  Description: Will remain free from infection  Outcome: Ongoing     Problem: Safety:  Goal: Free from accidental physical injury  Description: Free from accidental physical injury  Outcome: Ongoing     Problem: Safety:  Goal: Free from intentional harm  Description: Free from intentional harm  Outcome: Ongoing     Problem: Daily Care:  Goal: Daily care needs are met  Description: Daily care needs are met  Outcome: Ongoing     Problem: Pain:  Goal: Patient's pain/discomfort is manageable  Description: Patient's pain/discomfort is manageable  Outcome: Ongoing     Problem: Skin Integrity:  Goal: Skin integrity will stabilize  Description: Skin integrity will stabilize  Outcome: Ongoing     Problem: Discharge Planning:  Goal: Patients continuum of care needs are met  Description: Patients continuum of care needs are met  Outcome: Ongoing

## 2022-02-14 NOTE — PROGRESS NOTES
[address] 83   Daily Progress Note      Admit Date:  2/3/2022    Reason for follow up visit: CHF    CC: \"I'm feeling better. \"    60 y/o male with PMH significant for CAD, systolic HF/S/P ICD in 7925, LV thrombus, HTN, HLP, CKD, stage III and diabetes mellitus, type II who was admitted to [facility] with decompensated CHF. He was admitted with increasing edema and SOB and placed on IV lasix, milrinone and metolazone. Continues to diurese. Dry weight 145#    Interval History:  Pt. seen and examined; records reviewed  BP noted. Remains on room air. Net diuresis -16+ L since admission; weight 190-153#  SOB has improved; edema continues to improve  Cr stable at 2.3  Remains on Lasix infusion at 7.5 mg/hr and Milrinone @ 0.25 mcg    Subjective:  Pt with no acute overnight cardiac events.  + SOBOE improving; edema improving  Denies chest pain, cough, palpitations or dizziness    Review of Systems:   · Constitutional: no unanticipated weight loss. There's been no change in energy level, sleep pattern, or activity level. No fevers, chills. · Eyes: No visual changes or diplopia. No scleral icterus. · ENT: No Headaches, hearing loss or vertigo. No mouth sores or sore throat. · Cardiovascular: as reviewed in HPI  · Respiratory: No cough or wheezing, no sputum production. No hematemesis. · Gastrointestinal: No abdominal pain, appetite loss, blood in stools. No change in bowel or bladder habits. · Genitourinary: No dysuria, trouble voiding, or hematuria. · Musculoskeletal:  No gait disturbance, no joint complaints. · Integumentary: No rash or pruritis. · Neurological: No headache, diplopia, change in muscle strength, numbness or tingling. · Psychiatric: No anxiety or depression. · Endocrine: No temperature intolerance. No excessive thirst, fluid intake, or urination. No tremor. · Hematologic/Lymphatic: No abnormal bruising or bleeding, blood clots or swollen lymph nodes.   · Allergic/Immunologic: No nasal congestion or hives. Objective:   BP 98/61   Pulse 90   Temp 98 °F (36.7 °C) (Oral)   Resp 20   Ht 5' 6\" (1.676 m)   Wt 153 lb 7 oz (69.6 kg)   SpO2 98%   BMI 24.77 kg/m²     Intake/Output Summary (Last 24 hours) at 2/14/2022 1027  Last data filed at 2/14/2022 1008  Gross per 24 hour   Intake 1350.56 ml   Output 1850 ml   Net -499.44 ml     Wt Readings from Last 3 Encounters:   02/14/22 153 lb 7 oz (69.6 kg)   01/20/22 180 lb (81.6 kg)   11/30/21 149 lb 4 oz (67.7 kg)       Physical Exam:  General: In no acute distress. Awake, alert, and oriented X4. Up in chair  Skin:  Warm and dry. Neck:  Supple. + JVD; no carotid bruit  Chest: Lungs with bibasilar crackles. No wheezes/rhonchi  Cardiovascular:  RRR. III/VI MR murmur   Abdomen:  soft, nontender, nondistended, +bowel sounds. Extremities:  1+ bilateral pretibial edema. No clubbing or cyanosis.  2+ bilateral radial/DP/PT pulses    Medications:    metoprolol succinate  12.5 mg Oral Daily    lidocaine 1 % injection  5 mL IntraDERmal Once    sodium chloride flush  5-40 mL IntraVENous 2 times per day    spironolactone  25 mg Oral Daily    allopurinol  100 mg Oral Daily    atorvastatin  80 mg Oral Daily    isosorbide mononitrate  30 mg Oral Daily    levothyroxine  25 mcg Oral QAM AC    therapeutic multivitamin-minerals  1 tablet Oral Daily    rivaroxaban  15 mg Oral Dinner    [Held by provider] sacubitril-valsartan  1 tablet Oral BID    insulin lispro  0-12 Units SubCUTAneous TID WC    insulin lispro  0-6 Units SubCUTAneous Nightly      sodium chloride      milrinone 0.25 mcg/kg/min (02/14/22 0655)    furosemide (LASIX) 1mg/ml infusion 7.5 mg/hr (02/13/22 2122)    dextrose       sodium chloride flush, sodium chloride, acetaminophen, sodium chloride flush, sodium chloride flush, potassium chloride, ondansetron **OR** ondansetron, polyethylene glycol, acetaminophen **OR** acetaminophen, glucose, dextrose, glucagon (rDNA), dextrose    Lab Data:  CBC:   Recent Labs     02/12/22  0722 02/13/22  0515   WBC 4.5 4.4   HGB 9.7* 9.9*    207     BMP:    Recent Labs     02/12/22  1859 02/13/22  0515 02/13/22  1818   * 134* 133*   K 4.1 3.9 4.0   CO2 29 29 28   BUN 68* 68* 65*   CREATININE 2.3* 2.2* 2.3*     Results for Mary Cadena (MRN [id number]) as of 2/14/2022 10:36   Ref. Range 2/6/2022 05:32 2/9/2022 05:11 2/12/2022 07:22   Pro-BNP Latest Ref Range: 0 - 124 pg/mL 23,626 (H) 35,157 (H) 32,014 (H)     2/11/2022 RHC:  FINDINGS:  1.  Evidence of right-sided volume overload with a right atrial pressure  of 11 mmHg and a pulmonary capillary wedge pressure of 22 mmHg. 2.  Pulmonary hypertension with an instantaneous pressure of 56/25 for a  mean pulmonary arterial pressure of 37 mmHg. 3.  Reduced cardiac output by thermodilution at 3.96 liters per minute  with a cardiac index of 1.4 liters per kilogram per minute. By Shannon Jordi, the cardiac index was 2.14 liters per kilogram per minute. Of  note, the patient is on 0.25 mcg per kilogram per minute of milrinone. 4.  Pulmonary vascular resistance of 197 dynes per second. 5. Low mixed venous oxygen saturations with an IVC of 40%, right atrium  42%, and pulmonary artery of 38%. The patient's oxygen saturation was  89% to 90% on room air. 11/16/2021 Echo:   Left ventricular cavity size is severely dilated. Ejection fraction is visually estimated to be <20%. There is severe diffuse hypokinesis. Diastolic filling parameters suggest grade II diastolic dysfunction. Definity contrast administered. No left ventricular thrombus was seen. moderate MR   Pacer / ICD wire is visualized in the right ventricle. The right ventricle is dilated. Right ventricular systolic function is moderate to severely reduced. TAPSE 1.01 cm       Telemetry: Sinus rhythm with frequent PAC's, PVC and couplets    Assessment/Plan:    1.  Acute on chronic systolic heart failure, NYHA class IV  -Stage D; LVEF < 20% on echo in 11/2021  -continues to diurese on IV lasix and milrinone  -has received metolazone; may need additional dose (will reevaluate tomorrow)  -continue Toprol, isosorbide and aldactone  -not on Entresto d/t elevated Cr  -not a candidate for SGLT2 inhibitor d/t H/O noncompliance (can readdress as outpatient)  -S/P ICD in 2019    2. SONAL on CKD  -Cr stable and maintaining between 2.1-2.5  -nephrology following    3. LV thrombus  -on chronic Xarelto therpay  -not noted on most recent echo    4 Anemia  -Cr stable  -Rx and work up per Primary team    5.  Coronary artery disease  -prior PCI of LAD  -no recurrent angina  -continue BB and statin  -not on ASA d/t Xarelto      Electronically signed by DAHIANA Ordoñez - CNP on 2/14/2022 at 10:27 AM

## 2022-02-14 NOTE — PROGRESS NOTES
Conjunctivae/corneas clear. Neck: Supple, No jugular venous distention/bruits.  Trachea midline without thyromegaly or adenopathy with full range of motion. Lungs: No increased work of breathing, no accessory muscle use, bibasilar crackles. Heart: Regular rate and rhythm with Normal S1/S2 without murmurs, rubs or gallops, point of maximum impulse non-displaced  Abdomen: Soft, non-tender or non-distended without rigidity or guarding and positive bowel sounds all four quadrants. Extremities: No clubbing, cyanosis, 3+ bilateral pitting edema bilaterally.  Full range of motion without deformity and normal gait intact. Skin: Skin color, texture, turgor normal.  No rashes or lesions. Neurologic: Alert and oriented X 3, neurovascularly intact with sensory/motor intact upper extremities/lower extremities, bilaterally.  Cranial nerves: II-XII intact, grossly non-focal.  Mental status: Alert, oriented, thought content appropriate. Capillary Refill: Acceptable  < 3 seconds  Peripheral Pulses: +3 Easily felt, not easily obliterated with pressure       Labs:   Recent Labs     02/11/22  0542 02/12/22  0722 02/13/22  0515   WBC 5.3 4.5 4.4   HGB 9.5* 9.7* 9.9*   HCT 27.9* 29.3* 29.3*    191 207     Recent Labs     02/12/22  1859 02/13/22  0515 02/13/22  1818   * 134* 133*   K 4.1 3.9 4.0   CL 92* 91* 92*   CO2 29 29 28   BUN 68* 68* 65*   CREATININE 2.3* 2.2* 2.3*   CALCIUM 8.4 8.3 8.4     No results for input(s): AST, ALT, BILIDIR, BILITOT, ALKPHOS in the last 72 hours. No results for input(s): INR in the last 72 hours. No results for input(s): Dominique Richmond in the last 72 hours. Urinalysis:      Lab Results   Component Value Date    NITRU Negative 02/03/2022    WBCUA 1 02/03/2022    RBCUA 1 02/03/2022    BLOODU Negative 02/03/2022    SPECGRAV 1.010 02/03/2022    GLUCOSEU Negative 02/03/2022       Radiology:  XR CHEST (2 VW)   Final Result   Cardiomegaly with mild edema.          XR CHEST PORTABLE Final Result   Mild pulmonary vascular congestion. Stable cardiomegaly. Assessment/Plan:    Active Hospital Problems    Diagnosis     Acute on chronic congestive heart failure (HCC) [I50.9]     Acute on chronic systolic CHF (congestive heart failure) (HCC) [I50.23]               Shortness of breath likely 2/2 acute on chronic combined systolic and diastolic CHF exacerbation - Clinically patient presents with orthopnea, peripheral edema, crackles on exam.   -started on IV Lasix gtt @ 10-->5-->7.5mg/hr  -continue milrinone gtt  -given dose of metolazone today  -strict I/O's, net -13L  -daily weights  -medellin catheter  -1500 ml fluid restriction, low sodium diet  -daily renal panel  -cardiology consulted, recs appreciated  -CHF nurse consulted, recs appreciated  -continue home statin, Xarelto, aldactone; hold Entresto and BB  - LV thrombus - on xarelto     SONAL on CKD III - suspect cardiorenal syndrome  -management as above  -nephrology consulted, recs appreciated  -avoid nephrotoxic medications  -trend and monitor  -hold Entresto     CAD  -cardiology consulted  -tele monitoring  -continue home meds  -hold Entresto due to SONAL     Type 2 DM  -SSI  -hypoglycemia protocol  -POCT glucose checks  -carb control diet     Essential hypertension  -hold BB  -hold Entresto due to SONAL  -continue aldactone, Imdur     Hyperlipidemia  -continue home meds     Hypothyroidism  -continue home meds     Diarrhea  -send GI PCR and C. Diff and if negative, will start Imodium PRN        DVT Prophylaxis: Xarelto  Diet: ADULT DIET; Regular;  No Added Salt (3-4 gm)  Code Status: Full Code    PT/OT Eval Status: ordered    Carol Villatoro MD

## 2022-02-14 NOTE — PROGRESS NOTES
[address] [place] [address] Nephrology   [facility]. [facility]  [phone]  Nephrology Note          Patient ID: Matty Valdes  Referring/ Physician: Barry Lester MD      HPI/Summary:   Matty Valdes is being seen by nephrology for SONAL. This is a 69-VMTU-EUU man with systolic heart failure, history of LV thrombus, coronary artery disease, hyperlipidemia who presented the hospital with increasing shortness of breath and worsening lower extremity swelling. Interval hx:   Seen and examined at bedside  Has persistent lower extremity edema, improving  Weight unchanged at 153 lbs  Reported  lbs  Made 2400 mL urine yesterday, net -ve 1.2 L  Remains on lasix infusion 7.5 mg/h. On milrinone  Cr 2.0 today  Had a RHC 2/11/2022, has elevated filling pressures. Plan:   - renal function is close to baseline, no acute electrolyte issues. K is 3.9, will give 10 mEq PO KCL to keep K > 4.0, especially in anticipation of him needing metolazone soon. - no other changes from renal aspect   - continue diuresis and milrinone per cardiology      Assessment:  SONAL on Chronic kidney disease stage IIIb  This is likely secondary to diabetic nephropathy and SONAL from cardiorenal syndrome. Baseline creatinine around 2  Cr 2.3 at time of consult  No acute electrolyte abnormalities  Has had diabetes and hypertension for over 10 years and his last EF is less than 20%  He has had event evidence of albuminuria on urinalysis going back to 2016, 2013 had an  mg  Most recent protein creatinine ratio 700 mg   CT abdomen in 2020 showed symmetric kidneys no lesions no masses bilateral perinephric stranding no hydronephrosis. HCV and hepatitis C negative  Prior negative NAHOMY  Prior negative RF factor    Ischemic cardiomyopathy  Decompensated, has evidence of volume overload  proBNP 37533  Managed with Lasix drip and is on milrinone, appreciate cardiology involvement.   Last EF less than 20%  History of coronary artery disease with stent in the LAD  Has ICD  entresto held     Diabetes type 2  10 years at least  Longstanding albuminuria  A1c has been ranging between 6 and 6.5 the last few checks. Hypertension  On inotrope BP acceptable. [facility] would like to thank you for the opportunity to serve this patient. Please call with any questions or concerns. Sulma Fish MD  [facility]  Mary Professor [address], [address]  Fax: [phone]  Office: [phone]         CC/Reason for consult:   Reason for consult: SONAL  Chief Complaint   Patient presents with    Leg Swelling     pt complains of acute on chronic BLE edema. mild sob on exertion. denies cough. Review of Systems:   [address] All other remaining systems are negative. Constitutional:  fever, chills, weakness, weight change, fatigue,      Skin:  rash, pruritus, hair loss, bruising, dry skin, petechiae. Head, Face, Neck   headaches, swelling,  cervical adenopathy. Respiratory: shortness of breath, cough, or wheezing  Cardiovascular: chest pain, palpitations, dizzy, edema  Gastrointestinal: nausea, vomiting, diarrhea, constipation,belly pain    Yellow skin, blood in stool  Musculoskeletal:  back pain, muscle weakness, gait problems,       joint pain or swelling. Genitourinary:  dysuria, poor urine flow, flank pain, blood in urine  Neurologic:  vertigo, TIA'S, syncope, seizures, focal weakness  Psychosocial:  insomnia, anxiety, or depression. Additional positive findings: -     PMH/SH/FH:    Medical Hx: reviewed and updated as appropriate  Past Medical History:   Diagnosis Date    Diabetes type 2, uncontrolled ([address] 7/28/2014    Diabetic nephropathy 9/12/2013    ED (erectile dysfunction) 9/12/2013    Essential hypertension, benign 9/12/2013    Hyperlipidemia 9/12/2013    Noncompliance 7/28/2014         Surgical Hx: reviewed and updated as appropriate   has a past surgical history that includes eye surgery.      Social Hx: reviewed and updated as appropriate  Social History     Tobacco Use    Smoking status: Former Smoker     Packs/day: 1.50     Years: 7.00     Pack years: 10.50     Quit date: 1981     Years since quittin.0    Smokeless tobacco: Never Used   Substance Use Topics    Alcohol use: Yes     Alcohol/week: 1.0 standard drink     Types: 1 Cans of beer per week        Family hx: reviewed and updated as appropriate  family history includes Diabetes in his brother, mother, and sister; High Blood Pressure in his mother. Medications:   potassium chloride, 10 mEq, Once  metoprolol succinate, 12.5 mg, Daily  lidocaine 1 % injection, 5 mL, Once  sodium chloride flush, 5-40 mL, 2 times per day  spironolactone, 25 mg, Daily  allopurinol, 100 mg, Daily  atorvastatin, 80 mg, Daily  isosorbide mononitrate, 30 mg, Daily  levothyroxine, 25 mcg, QAM AC  therapeutic multivitamin-minerals, 1 tablet, Daily  rivaroxaban, 15 mg, Dinner  [Held by provider] sacubitril-valsartan, 1 tablet, BID  insulin lispro, 0-12 Units, TID WC  insulin lispro, 0-6 Units, Nightly       Patient has no known allergies. Allergies:   No Known Allergies      Physical Exam/Objective:   Vitals:    22 1132   BP: 102/66   Pulse: 88   Resp:    Temp:    SpO2:        Intake/Output Summary (Last 24 hours) at 2022 1520  Last data filed at 2022 1425  Gross per 24 hour   Intake 1951.47 ml   Output 2650 ml   Net -698.53 ml         General appearance:  in no acute distress, comfortable, communicative, awake and alert. HEENT: no icterus, EOM intact, trachea midline. Neck : no masses, appears symmetrical   Respiratory: Respiratory effort normal, bilateral equal chest rise. No wheeze, + crackles. Cardiovascular: Ausculation shows RRR and  + edema   Abdomen: abdomen is soft, non distended, no masses, no pain with palpation.   Musculoskeletal:  no joint swelling, no deformity, strength grossly normal.   Skin: no rashes, no induration, no tightening, no jaundice   Neuro:   Follows commands, moves all extremities spontaneously       Data:   CBC:   Recent Labs     02/12/22  0722 02/13/22  0515 02/14/22  1009   WBC 4.5 4.4 4.8   HGB 9.7* 9.9* 10.2*   HCT 29.3* 29.3* 30.8*    207 260     BMP:    Recent Labs     02/13/22  0515 02/13/22  1818 02/14/22  1009   * 133* 135*   K 3.9 4.0 3.9   CL 91* 92* 93*   CO2 29 28 31   BUN 68* 65* 64*   CREATININE 2.2* 2.3* 2.0*   GLUCOSE 104* 149* 153*

## 2022-02-14 NOTE — PLAN OF CARE
Problem: OXYGENATION/RESPIRATORY FUNCTION  Goal: Patient will maintain patent airway  Outcome: Ongoing  Goal: Patient will achieve/maintain normal respiratory rate/effort  Description: Respiratory rate and effort will be within normal limits for the patient  Outcome: Ongoing     Problem: HEMODYNAMIC STATUS  Goal: Patient has stable vital signs and fluid balance  Outcome: Ongoing     Problem: FLUID AND ELECTROLYTE IMBALANCE  Goal: Fluid and electrolyte balance are achieved/maintained  Outcome: Ongoing     Problem: ACTIVITY INTOLERANCE/IMPAIRED MOBILITY  Goal: Mobility/activity is maintained at optimum level for patient  Outcome: Ongoing     Problem: Falls - Risk of:  Goal: Will remain free from falls  Description: Will remain free from falls  Outcome: Ongoing  Goal: Absence of physical injury  Description: Absence of physical injury  Outcome: Ongoing     Problem: Skin Integrity:  Goal: Will show no infection signs and symptoms  Description: Will show no infection signs and symptoms  Outcome: Ongoing  Goal: Absence of new skin breakdown  Description: Absence of new skin breakdown  Outcome: Ongoing     Problem: Infection:  Goal: Will remain free from infection  Description: Will remain free from infection  Outcome: Ongoing     Problem: Safety:  Goal: Free from accidental physical injury  Description: Free from accidental physical injury  Outcome: Ongoing  Goal: Free from intentional harm  Description: Free from intentional harm  Outcome: Ongoing     Problem: Daily Care:  Goal: Daily care needs are met  Description: Daily care needs are met  Outcome: Ongoing     Problem: Pain:  Goal: Patient's pain/discomfort is manageable  Description: Patient's pain/discomfort is manageable  Outcome: Ongoing     Problem: Skin Integrity:  Goal: Skin integrity will stabilize  Description: Skin integrity will stabilize  Outcome: Ongoing     Problem: Discharge Planning:  Goal: Patients continuum of care needs are met  Description: Patients continuum of care needs are met  Outcome: Ongoing

## 2022-02-15 NOTE — CARE COORDINATION
Case management follow up & chart review. Patient still getting diuresed, on Lasix drip. Remains on Milrinone drip. Plan home with family support. OT recs shower chair w/ back. Monitor for home care needs.   Electronically signed by Brunilda Ochoa RN Case Management [phone] on 2/15/2022 at 1:28 PM

## 2022-02-15 NOTE — PROGRESS NOTES
coronary artery disease with stent in the LAD  Has ICD  entresto held     Diabetes type 2  10 years at least  Longstanding albuminuria  A1c has been ranging between 6 and 6.5 the last few checks. Hypertension  On inotrope BP acceptable. [facility] would like to thank you for the opportunity to serve this patient. Please call with any questions or concerns. Pat Forte MD  [facility]  [address], [address]  Fax: [phone]  Office: [phone]         CC/Reason for consult:   Reason for consult: SONAL  Chief Complaint   Patient presents with    Leg Swelling     pt complains of acute on chronic BLE edema. mild sob on exertion. denies cough. Review of Systems:   [address]. All other remaining systems are negative. Constitutional:  fever, chills, weakness, weight change, fatigue,      Skin:  rash, pruritus, hair loss, bruising, dry skin, petechiae. Head, Face, Neck   headaches, swelling,  cervical adenopathy. Respiratory: shortness of breath, cough, or wheezing  Cardiovascular: chest pain, palpitations, dizzy, edema  Gastrointestinal: nausea, vomiting, diarrhea, constipation,belly pain    Yellow skin, blood in stool  Musculoskeletal:  back pain, muscle weakness, gait problems,       joint pain or swelling. Genitourinary:  dysuria, poor urine flow, flank pain, blood in urine  Neurologic:  vertigo, TIA'S, syncope, seizures, focal weakness  Psychosocial:  insomnia, anxiety, or depression.   Additional positive findings: -     PMH/SH/FH:    Medical Hx: reviewed and updated as appropriate  Past Medical History:   Diagnosis Date    Diabetes type 2, uncontrolled ([address] 7/28/2014    Diabetic nephropathy 9/12/2013    ED (erectile dysfunction) 9/12/2013    Essential hypertension, benign 9/12/2013    Hyperlipidemia 9/12/2013    Noncompliance 7/28/2014         Surgical Hx: reviewed and updated as appropriate   has a past surgical history that includes eye surgery. Social Hx: reviewed and updated as appropriate  Social History     Tobacco Use    Smoking status: Former Smoker     Packs/day: 1.50     Years: 7.00     Pack years: 10.50     Quit date: 1981     Years since quittin.0    Smokeless tobacco: Never Used   Substance Use Topics    Alcohol use: Yes     Alcohol/week: 1.0 standard drink     Types: 1 Cans of beer per week        Family hx: reviewed and updated as appropriate  family history includes Diabetes in his brother, mother, and sister; High Blood Pressure in his mother. Medications:   [START ON 2022] levothyroxine, 50 mcg, QAM AC  torsemide, 60 mg, Daily  metoprolol succinate, 12.5 mg, Daily  lidocaine 1 % injection, 5 mL, Once  sodium chloride flush, 5-40 mL, 2 times per day  spironolactone, 25 mg, Daily  allopurinol, 100 mg, Daily  atorvastatin, 80 mg, Daily  isosorbide mononitrate, 30 mg, Daily  therapeutic multivitamin-minerals, 1 tablet, Daily  rivaroxaban, 15 mg, Dinner  [Held by provider] sacubitril-valsartan, 1 tablet, BID  insulin lispro, 0-12 Units, TID WC  insulin lispro, 0-6 Units, Nightly       Patient has no known allergies. Allergies:   No Known Allergies      Physical Exam/Objective:   Vitals:    02/15/22 1113   BP: 101/66   Pulse: 96   Resp: 16   Temp: 98 °F (36.7 °C)   SpO2: 96%       Intake/Output Summary (Last 24 hours) at 2/15/2022 1133  Last data filed at 2/15/2022 2993  Gross per 24 hour   Intake 1799.07 ml   Output 2750 ml   Net -950.93 ml         General appearance:  in no acute distress, comfortable, communicative, awake and alert. HEENT: no icterus, EOM intact, trachea midline. Neck : no masses, appears symmetrical   Respiratory: Respiratory effort normal, bilateral equal chest rise. No wheeze, + crackles. Cardiovascular: Ausculation shows RRR and  + edema   Abdomen: abdomen is soft, non distended, no masses, no pain with palpation.   Musculoskeletal:  no joint swelling, no deformity, strength grossly normal.   Skin: no rashes, no induration, no tightening, no jaundice   Neuro:   Follows commands, moves all extremities spontaneously       Data:   CBC:   Recent Labs     02/13/22  0515 02/14/22  1009 02/15/22  0538   WBC 4.4 4.8 4.6   HGB 9.9* 10.2* 9.2*   HCT 29.3* 30.8* 27.2*    260 263     BMP:    Recent Labs     02/14/22  1009 02/14/22  1752 02/15/22  0538   * 134* 132*   K 3.9 4.1 3.9   CL 93* 92* 90*   CO2 31 31 32   BUN 64* 78* 81*   CREATININE 2.0* 2.1* 2.2*   GLUCOSE 153* 136* 151*

## 2022-02-15 NOTE — PROGRESS NOTES
Hospitalist Progress Note      PCP: Julia Jamison DO    Date of Admission: 2/3/2022       Hospital course; The patient is a 61 y.o. male with hx combined systolic and diastolic CHF (EF < 91% with grade II DD), type 2 DM, HTN, and HLD who presents to [facility] with progressive shortness of breath especially with ambulation, SOA, orthopnea. He is unsure if his weight has changed. Denies chest pain, abdominal pain, nausea, vomiting, constipation, diarrhea, and dysuria.     In the ED, labs were significant for a BUN/Cr of 56/2.4, glucose of 154, pro-BNP of 21,659, troponin of 0.08. CXR showed mild pulmonary vascular congestion and stable cardiomegaly.     Subjective:   diuresing on lasix  gtt    Medications:  Reviewed    Infusion Medications    sodium chloride      milrinone 0.25 mcg/kg/min (02/15/22 0651)    furosemide (LASIX) 1mg/ml infusion 7.5 mg/hr (02/15/22 0651)    dextrose       Scheduled Medications    metoprolol succinate  12.5 mg Oral Daily    lidocaine 1 % injection  5 mL IntraDERmal Once    sodium chloride flush  5-40 mL IntraVENous 2 times per day    spironolactone  25 mg Oral Daily    allopurinol  100 mg Oral Daily    atorvastatin  80 mg Oral Daily    isosorbide mononitrate  30 mg Oral Daily    levothyroxine  25 mcg Oral QAM AC    therapeutic multivitamin-minerals  1 tablet Oral Daily    rivaroxaban  15 mg Oral Dinner    [Held by provider] sacubitril-valsartan  1 tablet Oral BID    insulin lispro  0-12 Units SubCUTAneous TID WC    insulin lispro  0-6 Units SubCUTAneous Nightly     PRN Meds: sodium chloride flush, sodium chloride, acetaminophen, sodium chloride flush, sodium chloride flush, potassium chloride, ondansetron **OR** ondansetron, polyethylene glycol, acetaminophen **OR** acetaminophen, glucose, dextrose, glucagon (rDNA), dextrose      Intake/Output Summary (Last 24 hours) at 2/15/2022 1035  Last data filed at 2/15/2022 0928  Gross per 24 hour   Intake 1799.07 ml   Output 2750 ml   Net -950.93 ml       Physical Exam Performed:    /74   Pulse 102   Temp 98.4 °F (36.9 °C) (Oral)   Resp 18   Ht 5' 6\" (1.676 m)   Wt 149 lb 11.1 oz (67.9 kg)   SpO2 95%   BMI 24.16 kg/m²        General appearance: No apparent distress appears stated age and cooperative. HEENT Normal cephalic, atraumatic without obvious deformity.  Pupils equal, round, and reactive to light.  Extra ocular muscles intact.  Conjunctivae/corneas clear. Neck: Supple, No jugular venous distention/bruits.  Trachea midline without thyromegaly or adenopathy with full range of motion. Lungs: No increased work of breathing, no accessory muscle use, bibasilar crackles. Heart: Regular rate and rhythm with Normal S1/S2 without murmurs, rubs or gallops, point of maximum impulse non-displaced  Abdomen: Soft, non-tender or non-distended without rigidity or guarding and positive bowel sounds all four quadrants. Extremities: No clubbing, cyanosis, 3+ bilateral pitting edema bilaterally.  Full range of motion without deformity and normal gait intact. Skin: Skin color, texture, turgor normal.  No rashes or lesions. Neurologic: Alert and oriented X 3, neurovascularly intact with sensory/motor intact upper extremities/lower extremities, bilaterally.  Cranial nerves: II-XII intact, grossly non-focal.  Mental status: Alert, oriented, thought content appropriate.   Capillary Refill: Acceptable  < 3 seconds  Peripheral Pulses: +3 Easily felt, not easily obliterated with pressure       Labs:   Recent Labs     02/13/22  0515 02/14/22  1009 02/15/22  0538   WBC 4.4 4.8 4.6   HGB 9.9* 10.2* 9.2*   HCT 29.3* 30.8* 27.2*    260 263     Recent Labs     02/14/22  1009 02/14/22  1752 02/15/22  0538   * 134* 132*   K 3.9 4.1 3.9   CL 93* 92* 90*   CO2 31 31 32   BUN 64* 78* 81*   CREATININE 2.0* 2.1* 2.2*   CALCIUM 8.6 8.2* 8.4     No results for input(s): AST, ALT, BILIDIR, BILITOT, ALKPHOS in the last 72 will start Imodium PRN        DVT Prophylaxis: Xarelto  Diet: ADULT DIET; Regular;  No Added Salt (3-4 gm)  Code Status: Full Code    PT/OT Eval Status: ordered    Dispo - cont care, cont diuresis    Tess Chandler MD

## 2022-02-15 NOTE — PLAN OF CARE
Problem: OXYGENATION/RESPIRATORY FUNCTION  Goal: Patient will maintain patent airway  2/15/2022 1019 by Jenni Reddy RN  Outcome: Ongoing     Problem: HEMODYNAMIC STATUS  Goal: Patient has stable vital signs and fluid balance  2/15/2022 1019 by Jenni Reddy RN  Outcome: Ongoing     Problem: ACTIVITY INTOLERANCE/IMPAIRED MOBILITY  Goal: Mobility/activity is maintained at optimum level for patient  2/15/2022 1019 by Jenni Reddy RN  Outcome: Ongoing     Problem: Safety:  Goal: Free from intentional harm  Description: Free from intentional harm  2/15/2022 1019 by Jenni Reddy RN  Outcome: Ongoing     Problem: Skin Integrity:  Goal: Skin integrity will stabilize  Description: Skin integrity will stabilize  2/15/2022 1019 by Jenni Reddy RN  Outcome: Ongoing

## 2022-02-15 NOTE — PLAN OF CARE
Problem: OXYGENATION/RESPIRATORY FUNCTION  Goal: Patient will maintain patent airway  2/15/2022 0026 by Ivanna Dao RN  Outcome: Ongoing     Problem: OXYGENATION/RESPIRATORY FUNCTION  Goal: Patient will achieve/maintain normal respiratory rate/effort  Description: Respiratory rate and effort will be within normal limits for the patient  2/15/2022 0026 by Ivanna Dao RN  Outcome: Ongoing     Problem: HEMODYNAMIC STATUS  Goal: Patient has stable vital signs and fluid balance  2/15/2022 0026 by Ivanna Dao RN  Outcome: Ongoing     Problem: FLUID AND ELECTROLYTE IMBALANCE  Goal: Fluid and electrolyte balance are achieved/maintained  2/15/2022 0026 by Ivanna Dao RN  Outcome: Ongoing     Problem: ACTIVITY INTOLERANCE/IMPAIRED MOBILITY  Goal: Mobility/activity is maintained at optimum level for patient  2/15/2022 0026 by Ivanna Dao RN  Outcome: Ongoing     Problem: Falls - Risk of:  Goal: Will remain free from falls  Description: Will remain free from falls  2/15/2022 0026 by Ivanna Dao RN  Outcome: Ongoing     Problem: Falls - Risk of:  Goal: Absence of physical injury  Description: Absence of physical injury  2/15/2022 0026 by Ivanna Dao RN  Outcome: Ongoing     Problem: Skin Integrity:  Goal: Will show no infection signs and symptoms  Description: Will show no infection signs and symptoms  2/15/2022 0026 by Ivanna Dao RN  Outcome: Ongoing     Problem: Skin Integrity:  Goal: Absence of new skin breakdown  Description: Absence of new skin breakdown  2/15/2022 0026 by Ivanna Dao RN  Outcome: Ongoing     Problem: Infection:  Goal: Will remain free from infection  Description: Will remain free from infection  2/15/2022 0026 by Ivanna Dao RN  Outcome: Ongoing     Problem: Safety:  Goal: Free from accidental physical injury  Description: Free from accidental physical injury  2/15/2022 0026 by Ivanna Dao RN  Outcome: Ongoing     Problem: Safety:  Goal: Free from intentional harm  Description: Free from intentional harm  2/15/2022 0026 by Felix Young RN  Outcome: Ongoing     Problem: Daily Care:  Goal: Daily care needs are met  Description: Daily care needs are met  2/15/2022 0026 by Felix Young RN  Outcome: Ongoing     Problem: Pain:  Goal: Patient's pain/discomfort is manageable  Description: Patient's pain/discomfort is manageable  2/15/2022 0026 by Felix Young RN  Outcome: Ongoing     Problem: Skin Integrity:  Goal: Skin integrity will stabilize  Description: Skin integrity will stabilize  2/15/2022 0026 by Felix Young RN  Outcome: Ongoing     Problem: Discharge Planning:  Goal: Patients continuum of care needs are met  Description: Patients continuum of care needs are met  2/15/2022 0026 by Felix Young RN  Outcome: Ongoing

## 2022-02-15 NOTE — PROGRESS NOTES
Occupational Therapy  Facility/Department: [address] PROGRESSIVE CARE  Daily Treatment Note  NAME: Radha Sepulveda  : 1955  MRN: [id number]    Date of Service: 2/15/2022    Discharge Recommendations:  Patient would benefit from continued therapy after discharge,Home with assist PRN,2-3 sessions per week  OT Equipment Recommendations  ADL Assistive Devices: Shower Chair with back    Assessment   Performance deficits / Impairments: Decreased functional mobility ; Decreased strength;Decreased endurance;Decreased ADL status; Decreased balance;Decreased high-level IADLs  Assessment: Pt tolerated session well. Pt was declining therapy at the end of last week but agreed to therapy this date. He completed mobility in the room with SBA using RW. He completed grooming and UB bathing tasks without assist.  Anticipte pt to return home with assist PRN and HHOT. Prognosis: Good  REQUIRES OT FOLLOW UP: Yes  Activity Tolerance  Activity Tolerance: Patient Tolerated treatment well  Safety Devices  Type of devices: Call light within reach; Chair alarm in place; Left in chair;Gait belt         Patient Diagnosis(es): The primary encounter diagnosis was Acute on chronic congestive heart failure, unspecified heart failure type ([address] Diagnoses of Stage 4 chronic kidney disease ([address] Dyspnea on exertion, Bilateral lower extremity edema, and Acute on chronic systolic CHF (congestive heart failure) ([address] were also pertinent to this visit. has a past medical history of Diabetes type 2, uncontrolled ([address] Diabetic nephropathy, ED (erectile dysfunction), Essential hypertension, benign, Hyperlipidemia, and Noncompliance. has a past surgical history that includes eye surgery.     Restrictions  Restrictions/Precautions  Restrictions/Precautions: Fall Risk  Position Activity Restriction  Other position/activity restrictions: IV in BUE  Subjective   General  Chart Reviewed: Yes,Progress Notes  Patient assessed for rehabilitation services?: Yes  Additional Pertinent Hx: per ED note, 62-year-old male presents with bilateral lower extremity swelling with associated orthopnea and exertional dyspnea. Patient has history of CHF and CKD. Patient states he takes diuretics however he has been peeing less than normal.  Denies any noncompliance with his medication. States that his legs been gradually worsening for last few weeks to months. Now with associated shortness of breath. Symptoms are chronic. Denies chest pain. Denies other associated symptoms. Worse with lying flat or exertion. Better with sitting up. Risk factors are history of CKD and CHF. For further review of systems see below. Response to previous treatment: Patient with no complaints from previous session  Family / Caregiver Present: No  Referring Practitioner: Rolanda Bradley MD  Subjective  Subjective: Pt seen bedside and agreed to washing at the sink. General Comment  Comments: Per RN ok to see      Objective    ADL  Feeding: Independent  Grooming: Contact guard assistance (Pt washed his face standing at the sink. Pt with 1 LOB but self corrected. He completed oral care seated at the sink.)  UE Bathing: Supervision (Seated at the sink.)  LE Bathing: None (Declined washing LB this date.)  UE Dressing:  (Assist to yosvany gown due to telemetry and IV's.)  LE Dressing: Other (Comment)  Toileting: Stand by assistance (Pt urinated in a urinal.  Total given to nurse. Assist to empty urinal.)        Balance  Sitting Balance: Independent  Standing Balance: Contact guard assistance  Functional Mobility  Functional - Mobility Device: Rolling Walker  Activity: To/from bathroom  Assist Level: Stand by assistance  Functional Mobility Comments: Assist to manage IV pole. Wheelchair Bed Transfers  Wheelchair/Bed - Technique: Ambulating  Equipment Used:  Other (recliner, chair with arms)  Level of Asssistance: Stand by assistance  Bed mobility  Supine to Sit: Modified independent (HOB elevated)  Transfers  Sit to stand: Stand by assistance  Stand to sit: Stand by assistance                                                                 Plan   Plan  Times per week: 3-5x  Current Treatment Recommendations: Strengthening,Functional Mobility Training,Gait Training,Endurance Training,Balance Training,Safety Education & Training,Self-Care / ADL,Patient/Caregiver Education & Training,Equipment Evaluation, Education, & procurement    AM-PAC Score        AM-PAC Inpatient Daily Activity Raw Score: 21 (02/15/22 0842)  AM-PAC Inpatient ADL T-Scale Score : 44.27 (02/15/22 0842)  ADL Inpatient CMS 0-100% Score: 32.79 (02/15/22 5951)  ADL Inpatient CMS G-Code Modifier : Yaima Murdockisraelmiley (02/15/22 5577)    Goals  Short term goals  Time Frame for Short term goals: prior to D/C  Short term goal 1: complete functional mobility and transfers Mod Ind  Short term goal 2: complete toileting Mod Ind  Short term goal 3: complete bathing and dressing Mod Ind  Short term goal 4: complete grooming in stance at sink [address] term goals  Time Frame for Long term goals : STG=LTG  Patient Goals   Patient goals : return home       Therapy Time   Individual Concurrent Group Co-treatment   Time In 0745         Time Out 0830         Minutes 45              This note to serve as OT d/c summary if pt is d/c-ed from hospital prior to next OT session.       Marixa Dan, [address]

## 2022-02-15 NOTE — PROGRESS NOTES
[facility]   Daily Progress Note      Admit Date:  2/3/2022    Reason for follow up visit: CHF    CC: \"I'm feeling better. \"    62 y/o male with PMH significant for CAD, systolic HF/S/P ICD in 5705, LV thrombus, HTN, HLP, CKD, stage III and diabetes mellitus, type II who was admitted to [facility] with decompensated CHF. He was admitted with increasing edema and SOB and placed on IV lasix, milrinone and metolazone. Dry weight 145#    Interval History:  Pt. seen and examined; records reviewed  BP stable. Wt today 149#  Remains on Lasix and milrinone  Net diuresis > 17L  SOB much improved  BUN/Cr 81/2.2    Subjective:  Pt with no acute overnight cardiac events.   + SOB and edema improving  Denies chest pain, cough, palpitations or dizziness    Review of Systems:   · Constitutional: no unanticipated weight loss. There's been no change in energy level, sleep pattern, or activity level. No fevers, chills. · Eyes: No visual changes or diplopia. No scleral icterus. · ENT: No Headaches, hearing loss or vertigo. No mouth sores or sore throat. · Cardiovascular: as reviewed in HPI  · Respiratory: No cough or wheezing, no sputum production. No hematemesis. · Gastrointestinal: No abdominal pain, appetite loss, blood in stools. No change in bowel or bladder habits. · Genitourinary: No dysuria, trouble voiding, or hematuria. · Musculoskeletal:  No gait disturbance, no joint complaints. · Integumentary: No rash or pruritis. · Neurological: No headache, diplopia, change in muscle strength, numbness or tingling. · Psychiatric: No anxiety or depression. · Endocrine: No temperature intolerance. No excessive thirst, fluid intake, or urination. No tremor. · Hematologic/Lymphatic: No abnormal bruising or bleeding, blood clots or swollen lymph nodes. · Allergic/Immunologic: No nasal congestion or hives.     Objective:   /74   Pulse 102   Temp 98.4 °F (36.9 °C) (Oral)   Resp 18   Ht 5' 6\" (1.676 m)   Wt 149 lb 11.1 oz (67.9 kg)   SpO2 95%   BMI 24.16 kg/m²     Intake/Output Summary (Last 24 hours) at 2/15/2022 1112  Last data filed at 2/15/2022 9607  Gross per 24 hour   Intake 1799.07 ml   Output 2750 ml   Net -950.93 ml     Wt Readings from Last 3 Encounters:   02/15/22 149 lb 11.1 oz (67.9 kg)   01/20/22 180 lb (81.6 kg)   11/30/21 149 lb 4 oz (67.7 kg)       Physical Exam:  General: In no acute distress. Awake, alert, and oriented X4. Up in chair. Skin:  Warm and dry. No new appearing rashes or lesions. Neck:  Supple. No JVD or carotid bruit appreciated. Chest: Lungs with paninspiratory crackles in posterior bases. Cardiovascular:  RRR. II/VI holosystolic murmur   Abdomen:  Soft, nontender, nondistended, +bowel sounds. Extremities:  1+ bilateral ankle and pedal edema. No clubbing or cyanosis. 2+ bilateral radial/DP/PT pulses.  Cap refill brisk    Medications:    [START ON 2/16/2022] levothyroxine  50 mcg Oral QAM AC    metoprolol succinate  12.5 mg Oral Daily    lidocaine 1 % injection  5 mL IntraDERmal Once    sodium chloride flush  5-40 mL IntraVENous 2 times per day    spironolactone  25 mg Oral Daily    allopurinol  100 mg Oral Daily    atorvastatin  80 mg Oral Daily    isosorbide mononitrate  30 mg Oral Daily    therapeutic multivitamin-minerals  1 tablet Oral Daily    rivaroxaban  15 mg Oral Dinner    [Held by provider] sacubitril-valsartan  1 tablet Oral BID    insulin lispro  0-12 Units SubCUTAneous TID WC    insulin lispro  0-6 Units SubCUTAneous Nightly      sodium chloride      milrinone 0.25 mcg/kg/min (02/15/22 0651)    furosemide (LASIX) 1mg/ml infusion 7.5 mg/hr (02/15/22 0651)    dextrose       sodium chloride flush, sodium chloride, acetaminophen, sodium chloride flush, sodium chloride flush, potassium chloride, ondansetron **OR** ondansetron, polyethylene glycol, acetaminophen **OR** acetaminophen, glucose, dextrose, glucagon (rDNA), dextrose    Lab Data:  CBC:   Recent Labs 02/13/22  0515 02/14/22  1009 02/15/22  0538   WBC 4.4 4.8 4.6   HGB 9.9* 10.2* 9.2*    260 263     BMP:    Recent Labs     02/14/22  1009 02/14/22  1752 02/15/22  0538   * 134* 132*   K 3.9 4.1 3.9   CO2 31 31 32   BUN 64* 78* 81*   CREATININE 2.0* 2.1* 2.2*     Results for Sanket Dallas (MRN [id number]) as of 2/15/2022 11:14   Ref. Range 2/6/2022 05:32 2/9/2022 05:11 2/12/2022 07:22   Pro-BNP Latest Ref Range: 0 - 124 pg/mL 23,626 (H) 35,157 (H) 32,014 (H)     2/11/2022 RHC:  FINDINGS:  1.  Evidence of right-sided volume overload with a right atrial pressure  of 11 mmHg and a pulmonary capillary wedge pressure of 22 mmHg. 2.  Pulmonary hypertension with an instantaneous pressure of 56/25 for a  mean pulmonary arterial pressure of 37 mmHg. 3.  Reduced cardiac output by thermodilution at 3.96 liters per minute  with a cardiac index of 1.4 liters per kilogram per minute.  By Blu  equation, the cardiac index was 2.14 liters per kilogram per minute.  Of  note, the patient is on 0.25 mcg per kilogram per minute of milrinone. 4.  Pulmonary vascular resistance of 197 dynes per second. 5.  Low mixed venous oxygen saturations with an IVC of 40%, right atrium  42%, and pulmonary artery of 38%.  The patient's oxygen saturation was  89% to 90% on room air.     11/16/2021 Echo:   Left ventricular cavity size is severely dilated.   Ejection fraction is visually estimated to be <20%.   There is severe diffuse hypokinesis.   Diastolic filling parameters suggest grade II diastolic dysfunction.   Definity contrast administered.   No left ventricular thrombus was seen.   moderate MR   Pacer / ICD wire is visualized in the right ventricle.   The right ventricle is dilated.   Right ventricular systolic function is moderate to severely reduced.   TAPSE 1.01 cm    Telemetry: Sinus rhythm-sinus tachycardia with occasional PVC's    Assessment/Plan:    1.  Acute on chronic systolic heart failure, NYHA class IV  -Stage D; LVEF < 20% on echo in 11/2021  -remains on IV lasix and milrinone (will begin to wean and reintroduce po meds)  -continue Toprol, isosorbide and aldactone  -not on Entresto d/t elevated Cr (consider restarting at lower dose tomorrow)  -not a candidate for SGLT2 inhibitor d/t H/O noncompliance (can readdress as outpatient)  -S/P ICD in 2019     2. SONAL on CKD  -Cr stable and maintaining between 2.1-2.5  -nephrology following     3. LV thrombus  -on chronic Xarelto therpay  -not noted on most recent echo     4 Anemia  -Hgb stable  -Rx and work up per Primary team     5.  Coronary artery disease  -prior PCI of LAD  -continue medical management with BB and statin  -nothing to suggest angina      Electronically signed by DAHIANA Hernández - CNP on 2/15/2022 at 11:12 AM

## 2022-02-15 NOTE — PROGRESS NOTES
Hospitalist Progress Note      PCP: Melida Dykes DO    Date of Admission: 2/3/2022     Subjective: diuresing on lasix and milrinone gtt    Medications:  Reviewed    Infusion Medications    sodium chloride      milrinone 0.25 mcg/kg/min (02/14/22 1216)    furosemide (LASIX) 1mg/ml infusion 7.5 mg/hr (02/15/22 0059)    dextrose       Scheduled Medications    metoprolol succinate  12.5 mg Oral Daily    lidocaine 1 % injection  5 mL IntraDERmal Once    sodium chloride flush  5-40 mL IntraVENous 2 times per day    spironolactone  25 mg Oral Daily    allopurinol  100 mg Oral Daily    atorvastatin  80 mg Oral Daily    isosorbide mononitrate  30 mg Oral Daily    levothyroxine  25 mcg Oral QAM AC    therapeutic multivitamin-minerals  1 tablet Oral Daily    rivaroxaban  15 mg Oral Dinner    [Held by provider] sacubitril-valsartan  1 tablet Oral BID    insulin lispro  0-12 Units SubCUTAneous TID WC    insulin lispro  0-6 Units SubCUTAneous Nightly     PRN Meds: sodium chloride flush, sodium chloride, acetaminophen, sodium chloride flush, sodium chloride flush, potassium chloride, ondansetron **OR** ondansetron, polyethylene glycol, acetaminophen **OR** acetaminophen, glucose, dextrose, glucagon (rDNA), dextrose      Intake/Output Summary (Last 24 hours) at 2/15/2022 0130  Last data filed at 2/14/2022 2205  Gross per 24 hour   Intake 1680.91 ml   Output 2175 ml   Net -494.09 ml       Physical Exam Performed:    /62   Pulse 98   Temp 97.5 °F (36.4 °C) (Oral)   Resp 18   Ht 5' 6\" (1.676 m)   Wt 153 lb 7 oz (69.6 kg)   SpO2 100%   BMI 24.77 kg/m²        General appearance: No apparent distress appears stated age and cooperative. HEENT Normal cephalic, atraumatic without obvious deformity.  Pupils equal, round, and reactive to light.  Extra ocular muscles intact.  Conjunctivae/corneas clear.   Neck: Supple, No jugular venous distention/bruits.  Trachea midline without thyromegaly or adenopathy with full range of motion. Lungs: No increased work of breathing, no accessory muscle use, bibasilar crackles. Heart: Regular rate and rhythm with Normal S1/S2 without murmurs, rubs or gallops, point of maximum impulse non-displaced  Abdomen: Soft, non-tender or non-distended without rigidity or guarding and positive bowel sounds all four quadrants. Extremities: No clubbing, cyanosis, 3+ bilateral pitting edema bilaterally.  Full range of motion without deformity and normal gait intact. Skin: Skin color, texture, turgor normal.  No rashes or lesions. Neurologic: Alert and oriented X 3, neurovascularly intact with sensory/motor intact upper extremities/lower extremities, bilaterally.  Cranial nerves: II-XII intact, grossly non-focal.  Mental status: Alert, oriented, thought content appropriate. Capillary Refill: Acceptable  < 3 seconds  Peripheral Pulses: +3 Easily felt, not easily obliterated with pressure       Labs:   Recent Labs     02/12/22  0722 02/13/22  0515 02/14/22  1009   WBC 4.5 4.4 4.8   HGB 9.7* 9.9* 10.2*   HCT 29.3* 29.3* 30.8*    207 260     Recent Labs     02/13/22  1818 02/14/22  1009 02/14/22  1752   * 135* 134*   K 4.0 3.9 4.1   CL 92* 93* 92*   CO2 28 31 31   BUN 65* 64* 78*   CREATININE 2.3* 2.0* 2.1*   CALCIUM 8.4 8.6 8.2*     No results for input(s): AST, ALT, BILIDIR, BILITOT, ALKPHOS in the last 72 hours. No results for input(s): INR in the last 72 hours. No results for input(s): Skippy Gault in the last 72 hours. Urinalysis:      Lab Results   Component Value Date    NITRU Negative 02/03/2022    WBCUA 1 02/03/2022    RBCUA 1 02/03/2022    BLOODU Negative 02/03/2022    SPECGRAV 1.010 02/03/2022    GLUCOSEU Negative 02/03/2022       Radiology:  XR CHEST (2 VW)   Final Result   Cardiomegaly with mild edema. XR CHEST PORTABLE   Final Result   Mild pulmonary vascular congestion. Stable cardiomegaly.                  Assessment/Plan:    Active Hospital Problems    Diagnosis     LV (left ventricular) mural thrombus [I51.3]     Acute on chronic congestive heart failure (HCC) [I50.9]     Acute on chronic systolic CHF (congestive heart failure) [facility]) [I50.23]     SONAL (acute kidney injury) ([facility] [address] [N17.9]            Shortness of breath likely 2/2 acute on chronic combined systolic and diastolic CHF exacerbation - Clinically patient presents with orthopnea, peripheral edema, crackles on exam.   -started on IV Lasix gtt @ 10-->5-->7.5mg/hr  -continue milrinone gtt  -given dose of metolazone today  -strict I/O's, net -16L  -daily weights  -medellin catheter  -1500 ml fluid restriction, low sodium diet  -daily renal panel  -cardiology consulted, recs appreciated  -CHF nurse consulted, recs appreciated  -continue home statin, Xarelto, aldactone; hold Entresto and BB  - LV thrombus - on xarelto     SONAL on CKD III - suspect cardiorenal syndrome  -management as above  -nephrology consulted, recs appreciated  -avoid nephrotoxic medications  -trend and monitor  -hold Entresto     CAD  -cardiology consulted  -tele monitoring  -continue home meds  -hold Entresto due to SONAL     Type 2 DM  -SSI  -hypoglycemia protocol  -POCT glucose checks  -carb control diet     Essential hypertension  -hold BB  -hold Entresto due to SONAL  -continue aldactone, Imdur     Hyperlipidemia  -continue home meds     Hypothyroidism  -continue home meds     Diarrhea  -send GI PCR and C. Diff and if negative, will start Imodium PRN        DVT Prophylaxis: Xarelto  Diet: ADULT DIET; Regular;  No Added Salt (3-4 gm)  Code Status: Full Code    PT/OT Eval Status: ordered    Dispo - cont care, cont diuresis    Compa Dobbs MD

## 2022-02-16 NOTE — PROGRESS NOTES
[address] [place] [address] Nephrology   [facility]  [phone]  Nephrology Note          Patient ID: Mckenzie Powers  Referring/ Physician: Dixon Lang MD      HPI/Summary:   Mckenzie Powers is being seen by nephrology for SONAL. This is a [id number]-OSZ man with systolic heart failure, history of LV thrombus, coronary artery disease, hyperlipidemia who presented the hospital with increasing shortness of breath and worsening lower extremity swelling. Interval hx:   Had a RHC 2/11/2022, has elevated filling pressures. Seen and examined at bedside  LE edema improving. Weight stable at 149 today  Reported  lbs  Made 2150 mL urine yesterday, net -ve 1.3 L. Total net -ve ~ 19 L since admission  On milrinone, weaning dose down. Off lasix infusion. Transitioned to torsemide 60 mg daily. Cr 2.1 today      Plan:   - renal function is close to baseline, no acute electrolyte issues. - continue diuresis and milrinone per cardiology, transitioned to PO torsemide 60 mg daily. - entresto resumed today. Would accept an up to 30% increase in Cr (up to 2.8) with re-introduction of entresto. Assessment:  SONAL on Chronic kidney disease stage IIIb  This is likely secondary to diabetic nephropathy and SONAL from cardiorenal syndrome. Baseline creatinine around 2  Cr 2.3 at time of consult  No acute electrolyte abnormalities  Has had diabetes and hypertension for over 10 years and his last EF is less than 20%  He has had event evidence of albuminuria on urinalysis going back to 2016, 2013 had an  mg  Most recent protein creatinine ratio 700 mg   CT abdomen in 2020 showed symmetric kidneys no lesions no masses bilateral perinephric stranding no hydronephrosis. HCV and hepatitis C negative  Prior negative NAHOMY  Prior negative RF factor    Ischemic cardiomyopathy  Decompensated, has evidence of volume overload  proBNP 24038  Managed with Lasix drip and is on milrinone, appreciate cardiology involvement.   Last EF a past surgical history that includes eye surgery. Social Hx: reviewed and updated as appropriate  Social History     Tobacco Use    Smoking status: Former Smoker     Packs/day: 1.50     Years: 7.00     Pack years: 10.50     Quit date: 1981     Years since quittin.0    Smokeless tobacco: Never Used   Substance Use Topics    Alcohol use: Yes     Alcohol/week: 1.0 standard drink     Types: 1 Cans of beer per week        Family hx: reviewed and updated as appropriate  family history includes Diabetes in his brother, mother, and sister; High Blood Pressure in his mother. Medications:   levothyroxine, 50 mcg, QAM AC  torsemide, 60 mg, Daily  sacubitril-valsartan, 1 tablet, BID  metoprolol succinate, 12.5 mg, Daily  lidocaine 1 % injection, 5 mL, Once  sodium chloride flush, 5-40 mL, 2 times per day  spironolactone, 25 mg, Daily  allopurinol, 100 mg, Daily  atorvastatin, 80 mg, Daily  isosorbide mononitrate, 30 mg, Daily  therapeutic multivitamin-minerals, 1 tablet, Daily  rivaroxaban, 15 mg, Dinner  insulin lispro, 0-12 Units, TID WC  insulin lispro, 0-6 Units, Nightly       Patient has no known allergies. Allergies:   No Known Allergies      Physical Exam/Objective:   Vitals:    22 0712   BP: 104/73   Pulse: 100   Resp: 16   Temp: 98.1 °F (36.7 °C)   SpO2: 92%       Intake/Output Summary (Last 24 hours) at 2022 0934  Last data filed at 2022 0933  Gross per 24 hour   Intake 719.06 ml   Output 2175 ml   Net -1455.94 ml         General appearance:  in no acute distress, comfortable, communicative, awake and alert. HEENT: no icterus, EOM intact, trachea midline. Neck : no masses, appears symmetrical   Respiratory: Respiratory effort normal, bilateral equal chest rise. No wheeze, + crackles. Cardiovascular: Ausculation shows RRR and  + edema   Abdomen: abdomen is soft, non distended, no masses, no pain with palpation.   Musculoskeletal:  no joint swelling, no deformity, strength grossly normal.   Skin: no rashes, no induration, no tightening, no jaundice   Neuro:   Follows commands, moves all extremities spontaneously       Data:   CBC:   Recent Labs     02/14/22  1009 02/15/22  0538 02/16/22  0754   WBC 4.8 4.6 5.0   HGB 10.2* 9.2* 9.9*   HCT 30.8* 27.2* 29.7*    263 294     BMP:    Recent Labs     02/15/22  0538 02/15/22  1731 02/16/22  0754   * 134* 136   K 3.9 4.2 4.2   CL 90* 92* 93*   CO2 32 31 30   BUN 81* 82* 72*   CREATININE 2.2* 2.2* 2.1*   GLUCOSE 151* 211* 96

## 2022-02-16 NOTE — PROGRESS NOTES
Hospitalist Progress Note      PCP: Laisha Cooney DO    Date of Admission: 2/3/2022       Hospital course; The patient is a 61 y.o. male with hx combined systolic and diastolic CHF (EF < 33% with grade II DD), type 2 DM, HTN, and HLD who presents to [facility] with progressive shortness of breath especially with ambulation, SOA, orthopnea. He is unsure if his weight has changed. Denies chest pain, abdominal pain, nausea, vomiting, constipation, diarrhea, and dysuria.     In the ED, labs were significant for a BUN/Cr of 56/2.4, glucose of 154, pro-BNP of 21,659, troponin of 0.08. CXR showed mild pulmonary vascular congestion and stable cardiomegaly.     Subjective:   diuresing on lasix  gtt    Medications:  Reviewed    Infusion Medications    sodium chloride      milrinone 0.25 mcg/kg/min (02/16/22 1145)    dextrose       Scheduled Medications    levothyroxine  50 mcg Oral QAM AC    torsemide  60 mg Oral Daily    sacubitril-valsartan  1 tablet Oral BID    metoprolol succinate  12.5 mg Oral Daily    lidocaine 1 % injection  5 mL IntraDERmal Once    sodium chloride flush  5-40 mL IntraVENous 2 times per day    spironolactone  25 mg Oral Daily    allopurinol  100 mg Oral Daily    atorvastatin  80 mg Oral Daily    isosorbide mononitrate  30 mg Oral Daily    therapeutic multivitamin-minerals  1 tablet Oral Daily    rivaroxaban  15 mg Oral Dinner    insulin lispro  0-12 Units SubCUTAneous TID WC    insulin lispro  0-6 Units SubCUTAneous Nightly     PRN Meds: sodium chloride flush, sodium chloride, acetaminophen, sodium chloride flush, sodium chloride flush, potassium chloride, ondansetron **OR** ondansetron, polyethylene glycol, acetaminophen **OR** acetaminophen, glucose, dextrose, glucagon (rDNA), dextrose      Intake/Output Summary (Last 24 hours) at 2/16/2022 1205  Last data filed at 2/16/2022 0933  Gross per 24 hour   Intake 719.06 ml   Output 1725 ml   Net -1005.94 ml       Physical results for input(s): Ina Divine in the last 72 hours. Urinalysis:      Lab Results   Component Value Date    NITRU Negative 02/03/2022    WBCUA 1 02/03/2022    RBCUA 1 02/03/2022    BLOODU Negative 02/03/2022    SPECGRAV 1.010 02/03/2022    GLUCOSEU Negative 02/03/2022       Radiology:  XR CHEST (2 VW)   Final Result   Cardiomegaly with mild edema. XR CHEST PORTABLE   Final Result   Mild pulmonary vascular congestion. Stable cardiomegaly. Assessment/Plan:    Active Hospital Problems    Diagnosis     LV (left ventricular) mural thrombus [I51.3]     Acute on chronic congestive heart failure (HCC) [I50.9]     Acute on chronic systolic CHF (congestive heart failure) ([facility] [address] [I50.23]     [facility] [N17.9]            1. Decompensated chronic combined systolic and diastolic CHF exacerbation - with fluid overload + SOB.  -cont Torsemide  -Lasix gtt DCd   -entresto restarted  -continue milrinone gtt  -strict I/O's, net -16L  -daily weights  -medellin catheter  -1500 ml fluid restriction  -low sodium diet  -daily renal panel  -cardiology + CHF nurse on board  -continue statin, Xarelto, aldactone; hold Entresto and BB  -  2. LV thrombus   - on xarelto     3. SONAL on CKD III - cardiorenal syndrome  -nephrology on board  -avoid nephrotoxic medications  -trend and monitor  -hold Entresto     4. CAD  -cardiology consulted  -tele monitoring  -continue home meds  -hold Entresto due to SONAL     5. Type 2 Diabetes Mellitus  -SSI  -hypoglycemia protocol  -POCT glucose checks  -carb control diet     6. Essential hypertension- good BP profile so far  -hold BB  -hold Entresto due to SONAL  -continue aldactone, Imdur     7. Hyperlipidemia  -continue home meds     8. Hypothyroidism- undertreated with TSH 12.00  -cont with thyroxine 50mcg qd  -recheck TSH in 4-6 weeks    9. Diarrhea  -send GI PCR and C.  Diff and if negative, will start Imodium PRN        DVT Prophylaxis: Xarelto  Diet: ADULT DIET; Regular;  No Added Salt (3-4 gm)  Code Status: Full Code    PT/OT Eval Status: ordered    Dispo - cont care, cont diuresis    Laina Medley MD

## 2022-02-16 NOTE — PLAN OF CARE
Problem: OXYGENATION/RESPIRATORY FUNCTION  Goal: Patient will maintain patent airway  2/16/2022 0756 by Claudy Man RN  Outcome: Ongoing     Problem: HEMODYNAMIC STATUS  Goal: Patient has stable vital signs and fluid balance  2/16/2022 0756 by Claudy Man RN  Outcome: Ongoing     Problem: ACTIVITY INTOLERANCE/IMPAIRED MOBILITY  Goal: Mobility/activity is maintained at optimum level for patient  2/16/2022 0756 by Claudy Man RN  Outcome: Ongoing     Problem: Safety:  Goal: Free from intentional harm  Description: Free from intentional harm  2/16/2022 0756 by Claudy Man RN  Outcome: Ongoing     Problem: Discharge Planning:  Goal: Patients continuum of care needs are met  Description: Patients continuum of care needs are met  2/16/2022 0756 by Claudy Man RN  Outcome: Ongoing

## 2022-02-16 NOTE — PLAN OF CARE
Problem: OXYGENATION/RESPIRATORY FUNCTION  Goal: Patient will maintain patent airway  2/15/2022 2350 by Deo Beaulieu RN  Outcome: Ongoing     Problem: OXYGENATION/RESPIRATORY FUNCTION  Goal: Patient will achieve/maintain normal respiratory rate/effort  Description: Respiratory rate and effort will be within normal limits for the patient  2/15/2022 2350 by Deo Beaulieu RN  Outcome: Ongoing     Problem: HEMODYNAMIC STATUS  Goal: Patient has stable vital signs and fluid balance  2/15/2022 2350 by Deo Beaulieu RN  Outcome: Ongoing     Problem: FLUID AND ELECTROLYTE IMBALANCE  Goal: Fluid and electrolyte balance are achieved/maintained  2/15/2022 2350 by Deo Beaulieu RN  Outcome: Ongoing     Problem: ACTIVITY INTOLERANCE/IMPAIRED MOBILITY  Goal: Mobility/activity is maintained at optimum level for patient  2/15/2022 2350 by Deo Beaulieu RN  Outcome: Ongoing     Problem: Falls - Risk of:  Goal: Will remain free from falls  Description: Will remain free from falls  2/15/2022 2350 by Deo Beaulieu RN  Outcome: Ongoing     Problem: Falls - Risk of:  Goal: Absence of physical injury  Description: Absence of physical injury  2/15/2022 2350 by Deo Beaulieu RN  Outcome: Ongoing     Problem: Skin Integrity:  Goal: Will show no infection signs and symptoms  Description: Will show no infection signs and symptoms  2/15/2022 2350 by Deo Beaulieu RN  Outcome: Ongoing     Problem: Skin Integrity:  Goal: Absence of new skin breakdown  Description: Absence of new skin breakdown  2/15/2022 2350 by Deo Beaulieu RN  Outcome: Ongoing     Problem: Infection:  Goal: Will remain free from infection  Description: Will remain free from infection  2/15/2022 2350 by Deo Beaulieu RN  Outcome: Ongoing     Problem: Safety:  Goal: Free from accidental physical injury  Description: Free from accidental physical injury  2/15/2022 2350 by Deo Beaulieu RN  Outcome: Ongoing     Problem: Safety:  Goal: Free from intentional harm  Description: Free from intentional harm  2/15/2022 2350 by Сергей Munoz RN  Outcome: Ongoing     Problem: Daily Care:  Goal: Daily care needs are met  Description: Daily care needs are met  2/15/2022 2350 by Сергей Munoz RN  Outcome: Ongoing     Problem: Pain:  Goal: Patient's pain/discomfort is manageable  Description: Patient's pain/discomfort is manageable  2/15/2022 2350 by Сергей Munoz RN  Outcome: Ongoing     Problem: Skin Integrity:  Goal: Skin integrity will stabilize  Description: Skin integrity will stabilize  2/15/2022 2350 by Сергей Munoz RN  Outcome: Ongoing     Problem: Discharge Planning:  Goal: Patients continuum of care needs are met  Description: Patients continuum of care needs are met  2/15/2022 2350 by Сергей Munoz RN  Outcome: Ongoing

## 2022-02-16 NOTE — PROGRESS NOTES
Earlene Colon   Daily Progress Note      Admit Date:  2/3/2022    Reason for follow up visit: CHF    CC: \"I feel better. My swelling is really better. \"    60 y/o male with PMH significant for CAD, systolic HF/S/P ICD in 5839, LV thrombus, HTN, HLP, CKD, stage III and diabetes mellitus, type II who was admitted to [facility] with decompensated CHF. He was admitted with increasing edema and SOB and placed on IV lasix, milrinone and metolazone.      Interval History:  Pt. seen and examined; records reviewed  BP stable. Wt stable at 149#  Net diuresis > 18.5L  BUN/Cr 72/2.1  Changed to po diuretic today and resume low dose Entresto    Subjective:  Pt with no acute overnight cardiac events.  + SOB and edema improved  Denies chest pain, cough, palpitations or dizziness    Review of Systems:   · Constitutional: no unanticipated weight loss. There's been no change in energy level, sleep pattern, or activity level. No fevers, chills. · Eyes: No visual changes or diplopia. No scleral icterus. · ENT: No Headaches, hearing loss or vertigo. No mouth sores or sore throat. · Cardiovascular: as reviewed in HPI  · Respiratory: No cough or wheezing, no sputum production. No hematemesis. · Gastrointestinal: No abdominal pain, appetite loss, blood in stools. No change in bowel or bladder habits. · Genitourinary: No dysuria, trouble voiding, or hematuria. · Musculoskeletal:  No gait disturbance, no joint complaints. · Integumentary: No rash or pruritis. · Neurological: No headache, diplopia, change in muscle strength, numbness or tingling. · Psychiatric: No anxiety or depression. · Endocrine: No temperature intolerance. No excessive thirst, fluid intake, or urination. No tremor. · Hematologic/Lymphatic: No abnormal bruising or bleeding, blood clots or swollen lymph nodes. · Allergic/Immunologic: No nasal congestion or hives.     Objective:   /73   Pulse 100   Temp 98.1 °F (36.7 °C) (Oral)   Resp 16   Ht 5' 6\" (1.676 m)   Wt 149 lb 14.6 oz (68 kg)   SpO2 92%   BMI 24.20 kg/m²     Intake/Output Summary (Last 24 hours) at 2/16/2022 1010  Last data filed at 2/16/2022 0933  Gross per 24 hour   Intake 719.06 ml   Output 2175 ml   Net -1455.94 ml     Wt Readings from Last 3 Encounters:   02/16/22 149 lb 14.6 oz (68 kg)   01/20/22 180 lb (81.6 kg)   11/30/21 149 lb 4 oz (67.7 kg)       Physical Exam:  General: In no acute distress. Awake, alert, and oriented X4. Up in chair. Skin:  Warm and dry. No new appearing rashes or lesions. Neck:  Supple. No JVD or carotid bruit appreciated. Chest: Lungs clearer with decreased breath sounds in posterior bases. No wheezes/rhonchi/rales  Cardiovascular:  RRR. II/VI holosystolic murmur  Abdomen:  soft, nontender, nondistended, +bowel sounds. Extremities: Trace-1+ bilateral ankle/pedal edema. No clubbing or cyanosis. 2+ bilateral radial/DP/PT pulses.  Cap refill brisk    Medications:    levothyroxine  50 mcg Oral QAM AC    torsemide  60 mg Oral Daily    sacubitril-valsartan  1 tablet Oral BID    metoprolol succinate  12.5 mg Oral Daily    lidocaine 1 % injection  5 mL IntraDERmal Once    sodium chloride flush  5-40 mL IntraVENous 2 times per day    spironolactone  25 mg Oral Daily    allopurinol  100 mg Oral Daily    atorvastatin  80 mg Oral Daily    isosorbide mononitrate  30 mg Oral Daily    therapeutic multivitamin-minerals  1 tablet Oral Daily    rivaroxaban  15 mg Oral Dinner    insulin lispro  0-12 Units SubCUTAneous TID WC    insulin lispro  0-6 Units SubCUTAneous Nightly      sodium chloride      milrinone 0.25 mcg/kg/min (02/16/22 0611)    dextrose       sodium chloride flush, sodium chloride, acetaminophen, sodium chloride flush, sodium chloride flush, potassium chloride, ondansetron **OR** ondansetron, polyethylene glycol, acetaminophen **OR** acetaminophen, glucose, dextrose, glucagon (rDNA), dextrose    Lab Data:  CBC:   Recent Labs     02/14/22  1009 02/15/22  0538 02/16/22  0754   WBC 4.8 4.6 5.0   HGB 10.2* 9.2* 9.9*    263 294     BMP:    Recent Labs     02/15/22  0538 02/15/22  1731 02/16/22  0754   * 134* 136   K 3.9 4.2 4.2   CO2 32 31 30   BUN 81* 82* 72*   CREATININE 2.2* 2.2* 2.1*     2/11/2022 RHC:  FINDINGS:  1.  Evidence of right-sided volume overload with a right atrial pressure  of 11 mmHg and a pulmonary capillary wedge pressure of 22 mmHg. 2.  Pulmonary hypertension with an instantaneous pressure of 56/25 for a  mean pulmonary arterial pressure of 37 mmHg. 3.  Reduced cardiac output by thermodilution at 3.96 liters per minute  with a cardiac index of 1.4 liters per kilogram per minute.  By Blu  equation, the cardiac index was 2.14 liters per kilogram per minute.  Of  note, the patient is on 0.25 mcg per kilogram per minute of milrinone. 4.  Pulmonary vascular resistance of 197 dynes per second. 5.  Low mixed venous oxygen saturations with an IVC of 40%, right atrium  42%, and pulmonary artery of 38%.  The patient's oxygen saturation was  89% to 90% on room air.     11/16/2021 Echo:   Left ventricular cavity size is severely dilated.   Ejection fraction is visually estimated to be <20%.   There is severe diffuse hypokinesis.   Diastolic filling parameters suggest grade II diastolic dysfunction.   Definity contrast administered.   No left ventricular thrombus was seen.   moderate MR   Pacer / ICD wire is visualized in the right ventricle.   The right ventricle is dilated.   Right ventricular systolic function is moderate to severely reduced.   TAPSE 1.01 cm    Telemetry: Sinus rhythm-sinus tachycardia; occasional PVC's    Assessment/Plan:    1.  Acute on chronic systolic heart failure, NYHA class IV  -Stage D; LVEF < 20% on echo in 11/2021  -discontinue Milrinone later today  -now on po diuretic and Entresto (low dose)  -continue Toprol, aldactone and isosorbide  -not a candidate for SGLT2 inhibitor d/t H/O noncompliance (can

## 2022-02-16 NOTE — PROGRESS NOTES
Occupational Therapy  Facility/Department: [address] PROGRESSIVE CARE  Daily Treatment Note  NAME: Lynda Dc  : 1955  MRN: [id number]    Date of Service: 2022    Discharge Recommendations:  Home with assist PRN,Home with Home health OT,S Level 1  OT Equipment Recommendations  ADL Assistive Devices: Shower Chair with back  Lynda Dc scored a 21/24 on the AM-PAC ADL Inpatient form. Current research shows that an AM-PAC score of 18 or greater is typically associated with a discharge to the patient's home setting. Based on the patient's AM-PAC score, and their current ADL deficits, it is recommended that the patient have 2-3 sessions per week of Occupational Therapy at d/c to increase the patient's independence. At this time, this patient demonstrates the endurance and safety to discharge home with [facility] and a follow up treatment frequency of 2-3x/wk. Please see assessment section for further patient specific details. If patient discharges prior to next session this note will serve as a discharge summary. Please see below for the latest assessment towards goals. Assessment   Performance deficits / Impairments: Decreased functional mobility ; Decreased strength;Decreased endurance;Decreased ADL status; Decreased balance;Decreased high-level IADLs  Assessment: Discussed with OTR: Pt is progressing in tasks. Pt Supervision for transfers and SBA for functional mob using RW. Pt Supervision for standing grooming tasks and demonstrated Supervision for LB dressing. Anticipate pt will be safe to return home with assist prn, HHOT for safety eval (shower transfers, mob). Prognosis: Good  OT Education: OT Role;Plan of Care;Transfer Training;ADL Adaptive Strategies  REQUIRES OT FOLLOW UP: Yes  Activity Tolerance  Activity Tolerance: Patient Tolerated treatment well  Safety Devices  Safety Devices in place: Yes  Type of devices: Call light within reach; Chair alarm in place; Left in chair         Patient Diagnosis(es): The primary encounter diagnosis was Acute on chronic congestive heart failure, unspecified heart failure type ([facility] [address] Diagnoses of Stage 4 chronic kidney disease ([address] Dyspnea on exertion, Bilateral lower extremity edema, and Acute on chronic systolic CHF (congestive heart failure) ([address] were also pertinent to this visit. has a past medical history of Diabetes type 2, uncontrolled ([address] Diabetic nephropathy, ED (erectile dysfunction), Essential hypertension, benign, Hyperlipidemia, and Noncompliance. has a past surgical history that includes eye surgery. Restrictions  Restrictions/Precautions  Restrictions/Precautions: Fall Risk  Position Activity Restriction  Other position/activity restrictions: IV  Subjective   General  Chart Reviewed: Samuel Larson  Patient assessed for rehabilitation services?: Yes  Additional Pertinent Hx: per ED note, 70-year-old male presents with bilateral lower extremity swelling with associated orthopnea and exertional dyspnea. Patient has history of CHF and CKD. Patient states he takes diuretics however he has been peeing less than normal.  Denies any noncompliance with his medication. States that his legs been gradually worsening for last few weeks to months. Now with associated shortness of breath. Symptoms are chronic. Denies chest pain. Denies other associated symptoms. Worse with lying flat or exertion. Better with sitting up. Risk factors are history of CKD and CHF. For further review of systems see below. Response to previous treatment: Patient with no complaints from previous session  Family / Caregiver Present: No  Referring Practitioner: Jorene Cooks, MD  Subjective  Subjective: Pt met BS, in bed. Pt agreeable to OT. Pt denied pain. Pt reports poor vision.   General Comment  Comments: Per RN ok to see      Orientation  Orientation  Overall Orientation Status: Within Functional Limits  Objective    ADL  Grooming: Supervision (standing at sink to wash hands.)  LE Dressing:  (Pt wering odell hose and footies. Pt able to cross LE's to remove footies and straighten odell hose. Pt donned footies w/o difficulty)  Additional Comments: Anticipate pt needing up to Supervision for ADLs based on ROM, strength, and balance        Standing Balance  Time: 7-8 min  Activity: standing for ADL's and amb with RW. Functional Mobility  Functional - Mobility Device: Rolling Walker  Activity: To/from bathroom  Assist Level: Stand by assistance  Functional Mobility Comments: Assist to manage IV pole. Pt steady, amb slowly. Pt reporting poor vision. Wheelchair Bed Transfers  Wheelchair/Bed - Technique: Ambulating  Equipment Used: Bed (recliner)  Level of Asssistance: Supervision  Wheelchair Transfers Comments: RW, very min cues for hand placement  Bed mobility  Supine to Sit: Modified independent  Sit to Supine: Unable to assess         Cognition  Overall Cognitive Status: [facility]         Plan   Plan  Times per week: 3-5x  Current Treatment Recommendations: Strengthening,Functional Mobility Training,Gait Training,Endurance Training,Balance Training,Safety Education & Training,Self-Care / ADL,Patient/Caregiver Education & Training,Equipment Evaluation, Education, & procurement    AM-PAC Score        AM-PAC Inpatient Daily Activity Raw Score: 21 (02/16/22 0855)  AM-PAC Inpatient ADL T-Scale Score : 44.27 (02/16/22 0855)  ADL Inpatient CMS 0-100% Score: 32.79 (02/16/22 0855)  ADL Inpatient CMS G-Code Modifier : Claritza Wilkerson (02/16/22 0142)    Goals  Short term goals  Time Frame for Short term goals: prior to D/C.  status: progressing.  Goals ongoing  Short term goal 1: complete functional mobility and transfers Mod Ind  Short term goal 2: complete toileting Mod Ind  Short term goal 3: complete bathing and dressing Mod Ind  Short term goal 4: complete grooming in stance at sink Mod Ind  Long term goals  Time Frame for Long term goals : STG=LTG  Patient Goals   Patient goals : return home       Therapy Time   Individual Concurrent Group Co-treatment   Time In 0815         Time Out 0849         Minutes Jaden KEEN/BRANT,515

## 2022-02-17 NOTE — PROGRESS NOTES
Hospitalist Progress Note      PCP: Shruthi Camara DO    Date of Admission: 2/3/2022       Hospital course; The patient is a 61 y.o. male with hx combined systolic and diastolic CHF (EF < 12% with grade II DD), type 2 DM, HTN, and HLD who presents to [facility] with progressive shortness of breath especially with ambulation, SOA, orthopnea. He is unsure if his weight has changed. Denies chest pain, abdominal pain, nausea, vomiting, constipation, diarrhea, and dysuria.     In the ED, labs were significant for a BUN/Cr of 56/2.4, glucose of 154, pro-BNP of 21,659, troponin of 0.08. CXR showed mild pulmonary vascular congestion and stable cardiomegaly.     Subjective:   diuresing on lasix  gtt    Medications:  Reviewed    Infusion Medications    sodium chloride      dextrose       Scheduled Medications    levothyroxine  50 mcg Oral QAM AC    torsemide  60 mg Oral Daily    sacubitril-valsartan  1 tablet Oral BID    metoprolol succinate  12.5 mg Oral Daily    lidocaine 1 % injection  5 mL IntraDERmal Once    sodium chloride flush  5-40 mL IntraVENous 2 times per day    spironolactone  25 mg Oral Daily    allopurinol  100 mg Oral Daily    atorvastatin  80 mg Oral Daily    isosorbide mononitrate  30 mg Oral Daily    therapeutic multivitamin-minerals  1 tablet Oral Daily    rivaroxaban  15 mg Oral Dinner    insulin lispro  0-12 Units SubCUTAneous TID WC    insulin lispro  0-6 Units SubCUTAneous Nightly     PRN Meds: sodium chloride flush, sodium chloride, acetaminophen, sodium chloride flush, sodium chloride flush, potassium chloride, ondansetron **OR** ondansetron, polyethylene glycol, acetaminophen **OR** acetaminophen, glucose, dextrose, glucagon (rDNA), dextrose      Intake/Output Summary (Last 24 hours) at 2/17/2022 1111  Last data filed at 2/17/2022 0640  Gross per 24 hour   Intake 780 ml   Output 1400 ml   Net -620 ml       Physical Exam Performed:    /71   Pulse 99   Temp 98 °F (36.7 °C) (Oral)   Resp 16   Ht 5' 6\" (1.676 m)   Wt 152 lb 1.9 oz (69 kg)   SpO2 93%   BMI 24.55 kg/m²        General appearance: No apparent distress appears stated age and cooperative. HEENT Normal cephalic, atraumatic without obvious deformity.  Pupils equal, round, and reactive to light.  Extra ocular muscles intact.  Conjunctivae/corneas clear. Neck: Supple, No jugular venous distention/bruits.  Trachea midline without thyromegaly or adenopathy with full range of motion. Lungs: No increased work of breathing, no accessory muscle use, bibasilar crackles. Heart: Regular rate and rhythm with Normal S1/S2 without murmurs, rubs or gallops, point of maximum impulse non-displaced  Abdomen: Soft, non-tender or non-distended without rigidity or guarding and positive bowel sounds all four quadrants. Extremities: No clubbing, cyanosis, 3+ bilateral pitting edema bilaterally.  Full range of motion without deformity and normal gait intact. Skin: Skin color, texture, turgor normal.  No rashes or lesions. Neurologic: Alert and oriented X 3, neurovascularly intact with sensory/motor intact upper extremities/lower extremities, bilaterally.  Cranial nerves: II-XII intact, grossly non-focal.  Mental status: Alert, oriented, thought content appropriate. Capillary Refill: Acceptable  < 3 seconds  Peripheral Pulses: +3 Easily felt, not easily obliterated with pressure       Labs:   Recent Labs     02/15/22  0538 02/16/22  0754 02/17/22  0543   WBC 4.6 5.0 4.9   HGB 9.2* 9.9* 10.1*   HCT 27.2* 29.7* 29.5*    294 329     Recent Labs     02/15/22  1731 02/16/22  0754 02/17/22  0543   * 136 134*   K 4.2 4.2 4.2   CL 92* 93* 92*   CO2 31 30 29   BUN 82* 72* 90*   CREATININE 2.2* 2.1* 2.3*   CALCIUM 8.4 8.3 8.3     No results for input(s): AST, ALT, BILIDIR, BILITOT, ALKPHOS in the last 72 hours. No results for input(s): INR in the last 72 hours.   No results for input(s): Bk Young in the last 72 hours.    Urinalysis:      Lab Results   Component Value Date    NITRU Negative 02/03/2022    WBCUA 1 02/03/2022    RBCUA 1 02/03/2022    BLOODU Negative 02/03/2022    SPECGRAV 1.010 02/03/2022    GLUCOSEU Negative 02/03/2022       Radiology:  XR CHEST (2 VW)   Final Result   Cardiomegaly with mild edema. XR CHEST PORTABLE   Final Result   Mild pulmonary vascular congestion. Stable cardiomegaly. Assessment/Plan:    Active Hospital Problems    Diagnosis     LV (left ventricular) mural thrombus [I51.3]     Acute on chronic congestive heart failure (HCC) [I50.9]     Acute on chronic systolic CHF (congestive heart failure) ([facility] [address] [I50.23]     S[facility]AL [facility]acute kidney [facility]) [facility] [N17.9]            1. Decompensated chronic combined systolic and diastolic CHF exacerbation - with fluid overload + SOB.  -cont Torsemide  -Lasix gtt DCd   -entresto restarted  -continue milrinone gtt  -strict I/O's, net -16L  -daily weights  -medellin catheter  -1500 ml fluid restriction  -low sodium diet  -daily renal panel  -cardiology + CHF nurse on board  -continue statin, Xarelto, aldactone; hold Entresto and BB  -  2. LV thrombus   - on xarelto     3. SONAL on CKD III - cardiorenal syndrome  -nephrology on board  -avoid nephrotoxic medications  -trend and monitor  -hold Entresto     4. CAD  -cardiology consulted  -tele monitoring  -continue home meds  -hold Entresto due to SONAL     5. Type 2 Diabetes Mellitus  -SSI  -hypoglycemia protocol  -POCT glucose checks  -carb control diet     6. Essential hypertension- good BP profile so far  -hold BB  -hold Entresto due to SONAL  -continue aldactone, Imdur     7. Hyperlipidemia  -continue home meds     8. Hypothyroidism- undertreated with TSH 12.00  -cont with thyroxine 50mcg qd  -recheck TSH in 4-6 weeks    9. Diarrhea  -send GI PCR and C. Diff and if negative, will start Imodium PRN        DVT Prophylaxis: Xarelto  Diet: ADULT DIET; Regular;  No Added Salt (3-4 gm)  Code Status: Full Code    PT/OT Eval Status: ordered    Dispo - cont care, cont diuresis    Pedro Jamison MD

## 2022-02-17 NOTE — PLAN OF CARE
Problem: HEMODYNAMIC STATUS  Goal: Patient has stable vital signs and fluid balance  Outcome: Ongoing     Problem: FLUID AND ELECTROLYTE IMBALANCE  Goal: Fluid and electrolyte balance are achieved/maintained  Outcome: Ongoing     Problem: ACTIVITY INTOLERANCE/IMPAIRED MOBILITY  Goal: Mobility/activity is maintained at optimum level for patient  Outcome: Ongoing     Problem: Falls - Risk of:  Goal: Will remain free from falls  Description: Will remain free from falls  Outcome: Ongoing     Problem: Skin Integrity:  Goal: Absence of new skin breakdown  Description: Absence of new skin breakdown  Outcome: Ongoing

## 2022-02-17 NOTE — PROGRESS NOTES
[address] [facility]   [facility] [employer]  [phone]  Nephrology Note          Patient ID: Gonzalez Barry  Referring/ Physician: Severiano Jennings MD      HPI/Summary:   Gonzalez Barry is being seen by nephrology for SONAL. This is a 5-[facility] man with systolic heart failure, history of LV thrombus, coronary artery disease, hyperlipidemia who presented the hospital with increasing shortness of breath and worsening lower extremity swelling. Interval hx:   Had a RHC 2/11/2022, has elevated filling pressures. Seen and examined at bedside  LE edema improving. Weight today 152 lbs  Off milrinone today  Made 1825 mL urine yesterday, net -ve 0.8 L. Total net -ve ~ 19.5 L since admission  Off lasix infusion. Transitioned to torsemide 60 mg daily. Cr 2.1 today      Plan:   - renal function is close to baseline, no acute electrolyte issues. - Cr stable after resuming entrestol.  - ok for discharge from nephrology perspective. - will plan on outpatient followup      Assessment:  SONAL on Chronic kidney disease stage IIIb  This is likely secondary to diabetic nephropathy and SONAL from cardiorenal syndrome. Baseline creatinine around 2  Cr 2.3 at time of consult  No acute electrolyte abnormalities  Has had diabetes and hypertension for over 10 years and his last EF is less than 20%  He has had event evidence of albuminuria on urinalysis going back to 2016, 2013 had an  mg  Most recent protein creatinine ratio 700 mg   CT abdomen in 2020 showed symmetric kidneys no lesions no masses bilateral perinephric stranding no hydronephrosis. HCV and hepatitis C negative  Prior negative NAHOMY  Prior negative RF factor    Ischemic cardiomyopathy  Decompensated, has evidence of volume overload  proBNP 93568  Managed with Lasix drip and is on milrinone, appreciate cardiology involvement.   Last EF less than 20%  History of coronary artery disease with stent in the LAD  Has ICD  entresto held     Diabetes type 2  10 years at least  Longstanding albuminuria  A1c has been ranging between 6 and 6.5 the last few checks. Hypertension  On inotrope BP acceptable. [facility] would like to thank you for the opportunity to serve this patient. Please call with any questions or concerns. Casimiro Stafford MD  [facility]  [address], [address]  Fax: [phone]  Office: [phone]         CC/Reason for consult:   Reason for consult: SONAL  Chief Complaint   Patient presents with    Leg Swelling     pt complains of acute on chronic BLE edema. mild sob on exertion. denies cough. Review of Systems:   [address] All other remaining systems are negative. Constitutional:  fever, chills, weakness, weight change, fatigue,      Skin:  rash, pruritus, hair loss, bruising, dry skin, petechiae. Head, Face, Neck   headaches, swelling,  cervical adenopathy. Respiratory: shortness of breath, cough, or wheezing  Cardiovascular: chest pain, palpitations, dizzy, edema  Gastrointestinal: nausea, vomiting, diarrhea, constipation,belly pain    Yellow skin, blood in stool  Musculoskeletal:  back pain, muscle weakness, gait problems,       joint pain or swelling. Genitourinary:  dysuria, poor urine flow, flank pain, blood in urine  Neurologic:  vertigo, TIA'S, syncope, seizures, focal weakness  Psychosocial:  insomnia, anxiety, or depression. Additional positive findings: -     PMH/SH/FH:    Medical Hx: reviewed and updated as appropriate  Past Medical History:   Diagnosis Date    Diabetes type 2, uncontrolled ([address] 7/28/2014    Diabetic nephropathy 9/12/2013    ED (erectile dysfunction) 9/12/2013    Essential hypertension, benign 9/12/2013    Hyperlipidemia 9/12/2013    Noncompliance 7/28/2014         Surgical Hx: reviewed and updated as appropriate   has a past surgical history that includes eye surgery.      Social Hx: reviewed and updated as appropriate  Social History Tobacco Use    Smoking status: Former Smoker     Packs/day: 1.50     Years: 7.00     Pack years: 10.50     Quit date: 1981     Years since quittin.0    Smokeless tobacco: Never Used   Substance Use Topics    Alcohol use: Yes     Alcohol/week: 1.0 standard drink     Types: 1 Cans of beer per week        Family hx: reviewed and updated as appropriate  family history includes Diabetes in his brother, mother, and sister; High Blood Pressure in his mother. Medications:   levothyroxine, 50 mcg, QAM AC  torsemide, 60 mg, Daily  sacubitril-valsartan, 1 tablet, BID  metoprolol succinate, 12.5 mg, Daily  lidocaine 1 % injection, 5 mL, Once  sodium chloride flush, 5-40 mL, 2 times per day  spironolactone, 25 mg, Daily  allopurinol, 100 mg, Daily  atorvastatin, 80 mg, Daily  isosorbide mononitrate, 30 mg, Daily  therapeutic multivitamin-minerals, 1 tablet, Daily  rivaroxaban, 15 mg, Dinner  insulin lispro, 0-12 Units, TID WC  insulin lispro, 0-6 Units, Nightly       Patient has no known allergies. Allergies:   No Known Allergies      Physical Exam/Objective:   Vitals:    22 1123   BP: 100/66   Pulse: 93   Resp: 16   Temp: 97.7 °F (36.5 °C)   SpO2: 98%       Intake/Output Summary (Last 24 hours) at 2022 1137  Last data filed at 2022 1112  Gross per 24 hour   Intake 1020 ml   Output 1900 ml   Net -880 ml         General appearance:  in no acute distress, comfortable, communicative, awake and alert. HEENT: no icterus, EOM intact, trachea midline. Neck : no masses, appears symmetrical   Respiratory: Respiratory effort normal, bilateral equal chest rise. No wheeze, + crackles. Cardiovascular: Ausculation shows RRR and  + edema   Abdomen: abdomen is soft, non distended, no masses, no pain with palpation. Musculoskeletal:  no joint swelling, no deformity, strength grossly normal.   Skin: no rashes, no induration, no tightening, no jaundice   Neuro:   Follows commands, moves all extremities spontaneously       Data:   CBC:   Recent Labs     02/15/22  0538 02/16/22  0754 02/17/22  0543   WBC 4.6 5.0 4.9   HGB 9.2* 9.9* 10.1*   HCT 27.2* 29.7* 29.5*    294 329     BMP:    Recent Labs     02/15/22  1731 02/16/22  0754 02/17/22  0543   * 136 134*   K 4.2 4.2 4.2   CL 92* 93* 92*   CO2 31 30 29   BUN 82* 72* 90*   CREATININE 2.2* 2.1* 2.3*   GLUCOSE 211* 96 148*

## 2022-02-17 NOTE — PROGRESS NOTES
[facility]   Daily Progress Note      Admit Date:  2/3/2022    Reason for follow up visit: CHF    CC: \"I feel pretty good today. My breathing and swelling are much better. \"    62 y/o male with PMH significant for CAD, systolic HF/S/P ICD in 6407, LV thrombus, HTN, HLP, CKD, stage III and diabetes mellitus, type II who was admitted to [facility] with decompensated CHF. He was admitted with increasing edema and SOB and placed on IV lasix, milrinone and metolazone.  He has been diuresed and now on po meds with adjustment. Interval History:  Pt. seen and examined; records reviewed  BP stable. Wt today 152# (? Accuracy as he had net negative diuresis)  Net diuresis -19.5L since admit; Wt 190# on admit  SOB and edema much improved  Denies chest pain  Cr stable at 2.3  Milrinone discontinued last evening    Subjective:  Pt with no acute overnight cardiac events. + continued improvement in SOB and edema  Denies chest pain, palpitations, dizziness    Review of Systems:   · Constitutional: no unanticipated weight loss. There's been no change in energy level, sleep pattern, or activity level. No fevers, chills. · Eyes: No visual changes or diplopia. No scleral icterus. · ENT: No Headaches, hearing loss or vertigo. No mouth sores or sore throat. · Cardiovascular: as reviewed in HPI  · Respiratory: No cough or wheezing, no sputum production. No hematemesis. · Gastrointestinal: No abdominal pain, appetite loss, blood in stools. No change in bowel or bladder habits. · Genitourinary: No dysuria, trouble voiding, or hematuria. · Musculoskeletal:  No gait disturbance, no joint complaints. · Integumentary: No rash or pruritis. · Neurological: No headache, diplopia, change in muscle strength, numbness or tingling. · Psychiatric: No anxiety or depression. · Endocrine: No temperature intolerance. No excessive thirst, fluid intake, or urination. No tremor.   · Hematologic/Lymphatic: No abnormal bruising or bleeding, blood clots or swollen lymph nodes. · Allergic/Immunologic: No nasal congestion or hives. Objective:   /71   Pulse 99   Temp 98 °F (36.7 °C) (Oral)   Resp 16   Ht 5' 6\" (1.676 m)   Wt 152 lb 1.9 oz (69 kg)   SpO2 93%   BMI 24.55 kg/m²     Intake/Output Summary (Last 24 hours) at 2/17/2022 1101  Last data filed at 2/17/2022 0640  Gross per 24 hour   Intake 780 ml   Output 1400 ml   Net -620 ml     Wt Readings from Last 3 Encounters:   02/17/22 152 lb 1.9 oz (69 kg)   01/20/22 180 lb (81.6 kg)   11/30/21 149 lb 4 oz (67.7 kg)       Physical Exam:  General: In no acute distress. Awake, alert, and oriented X4. Up in room  Skin:  Warm and dry. Neck:  Supple. No JVD or carotid bruit appreciated. Chest: Lungs clear with mildly diminished breath sound in posterior bases. No wheezes/rhonchi/rales  Cardiovascular:  RRR. Normal S1 and S2; I/VI holosystolic murmur   Abdomen:  soft, nontender, nondistended, +bowel sounds. Extremities: Trace ankle edema. No clubbing or cyanosis. 2+ bilateral radial/DP/PT pulses. Cap refill brisk.     Medications:    levothyroxine  50 mcg Oral QAM AC    torsemide  60 mg Oral Daily    sacubitril-valsartan  1 tablet Oral BID    metoprolol succinate  12.5 mg Oral Daily    lidocaine 1 % injection  5 mL IntraDERmal Once    sodium chloride flush  5-40 mL IntraVENous 2 times per day    spironolactone  25 mg Oral Daily    allopurinol  100 mg Oral Daily    atorvastatin  80 mg Oral Daily    isosorbide mononitrate  30 mg Oral Daily    therapeutic multivitamin-minerals  1 tablet Oral Daily    rivaroxaban  15 mg Oral Dinner    insulin lispro  0-12 Units SubCUTAneous TID WC    insulin lispro  0-6 Units SubCUTAneous Nightly      sodium chloride      dextrose       sodium chloride flush, sodium chloride, acetaminophen, sodium chloride flush, sodium chloride flush, potassium chloride, ondansetron **OR** ondansetron, polyethylene glycol, acetaminophen **OR** acetaminophen, glucose, dextrose, glucagon (rDNA), dextrose    Lab Data:  CBC:   Recent Labs     02/15/22  0538 02/16/22  0754 02/17/22  0543   WBC 4.6 5.0 4.9   HGB 9.2* 9.9* 10.1*    294 329     BMP:    Recent Labs     02/15/22  1731 02/16/22  0754 02/17/22  0543   * 136 134*   K 4.2 4.2 4.2   CO2 31 30 29   BUN 82* 72* 90*   CREATININE 2.2* 2.1* 2.3*     2/11/2022 RHC:  FINDINGS:  1.  Evidence of right-sided volume overload with a right atrial pressure  of 11 mmHg and a pulmonary capillary wedge pressure of 22 mmHg. 2.  Pulmonary hypertension with an instantaneous pressure of 56/25 for a  mean pulmonary arterial pressure of 37 mmHg. 3.  Reduced cardiac output by thermodilution at 3.96 liters per minute  with a cardiac index of 1.4 liters per kilogram per minute.  By Blu  equation, the cardiac index was 2.14 liters per kilogram per minute.  Of  note, the patient is on 0.25 mcg per kilogram per minute of milrinone. 4.  Pulmonary vascular resistance of 197 dynes per second. 5.  Low mixed venous oxygen saturations with an IVC of 40%, right atrium  42%, and pulmonary artery of 38%.  The patient's oxygen saturation was  89% to 90% on room air.     11/16/2021 Echo:   Left ventricular cavity size is severely dilated.   Ejection fraction is visually estimated to be <20%.   There is severe diffuse hypokinesis.   Diastolic filling parameters suggest grade II diastolic dysfunction.   Definity contrast administered.   No left ventricular thrombus was seen.   moderate MR   Pacer / ICD wire is visualized in the right ventricle.   The right ventricle is dilated.   Right ventricular systolic function is moderate to severely reduced.   TAPSE 1.01 cm    Telemetry: Sinus rhythm-sinus tachycardia; occasional PVC    Assessment/Plan:    1.  Acute on chronic systolic heart failure, NYHA class IV  -Stage D; LVEF < 20% on echo in 11/2021  -off milrinone at present  -tolerating low dose Entresto and diuretic(torsemide)  -continue Toprol and aldactone  -continue isosorbide  -not on SGLT2 inhibitor (can readdress as outpatient)  -S/P ICD in 2019     2. SONAL on CKD  -Cr stable (maintains between 2.1-2.5)  -per nephrology okay to accept Cr as high as 2.8 with reintroduction of Entresto  -will continue to monitor as outpatient  -nephrology following     3. LV thrombus  -continue Xarelto    4 Anemia  -Hgb stable   -Rx per primary team     5. Coronary artery disease  -prior PCI of LAD  -no recurrent angina  -continue medical management    Stable from cardiac standpoint and okay to discharge from cardiac standpoint when okay with admitting MD    Follow with cardiology on 2/23/2022 @ 10:20 AM (Luz Gordon CNP)    Emphasized and discussed  low-fat/low sodium diet, monitoring of daily weights, fluid restriction, worsening signs and symptoms of heart failure and when to call, and the importance of regular exercise and activity. Discharge Cardiac meds:  Atorvastatin 80 mg daily  Isosorbide mononitrate 30 mg daily  Toprol XL 12.5 mg daily  Xarelto 15 mg daily  Entresto 24/26 mg BID  Spironolactone 25 mg daily  Torsemide 60 mg daily    Thank you for allowing us to participate in Mr. Rebeca Bernheim care!       Electronically signed by DAHIANA Dawkins CNP on 2/17/2022 at 11:01 AM

## 2022-02-17 NOTE — PROGRESS NOTES
Discharge orders acknowledged by RN . Discharge teaching completed with pt and family. AVS reviewed and all questions answered. Medication regimen reviewed and pt understands schedule. Follow up appointments also reviewed with pt and resources given for discharge. Pt was sent electronic to be filled and understands schedule. IV removed. Telemonitor removed and returned to [facility]. 60+ minutes of education completed. Required core measures completed. Pt vitals WDL. Pt discharged with all belongings to home with sister. Pt transported off of unit via wheelchair. No complications.        Electronically signed by Rissa Rand RN on 2/17/2022 at 5:26 PM

## 2022-02-17 NOTE — CARE COORDINATION
CASE MANAGEMENT DISCHARGE SUMMARY:  Met with patient. Patient will discharge home. Denies home needs. Declines needing shower chair, says he has an unopened shower chair at home. Family transport. No additional needs to note.     DISCHARGE DATE: 2/17/2022    DISCHARGED TO: Home with family support     HOME CARE AGENCY: Declines wanting, will f/u with PCP if needed            TRANSPORTATION: Family             TIME: TBD    Electronically signed by Kristin Foster RN Case Management [phone] on 2/17/2022 at 4:38 PM

## 2022-02-17 NOTE — PROGRESS NOTES
Occupational Therapy  Facility/Department: [address] PROGRESSIVE CARE  Daily Treatment Note  NAME: Radha Sepulveda  : 1955  MRN: [id number]    Date of Service: 2022    Discharge Recommendations:  Home with assist PRN,Home with Home health OT,S Level 1  OT Equipment Recommendations  ADL Assistive Devices: Shower Chair with back  Radha Sepulveda scored a 21/24 on the AM-PAC ADL Inpatient form. Current research shows that an AM-PAC score of 18 or greater is typically associated with a discharge to the patient's home setting. Based on the patient's AM-PAC score, and their current ADL deficits, it is recommended that the patient have 2-3 sessions per week of Occupational Therapy at d/c to increase the patient's independence. At this time, this patient demonstrates the endurance and safety to discharge home with [facility] and a follow up treatment frequency of 2-3x/wk. Please see assessment section for further patient specific details. If patient discharges prior to next session this note will serve as a discharge summary. Please see below for the latest assessment towards goals. Assessment   Performance deficits / Impairments: Decreased functional mobility ; Decreased strength;Decreased endurance;Decreased ADL status; Decreased balance;Decreased high-level IADLs  Assessment: Discussed with OTR: Pt continues to need Supervision for transfers, SBA for functional mob using RW and min cues for safety, and decreased vision. Pt Supervision for doff/donning footies and Min A to doff/yosvany odell hose, using bag over foot to slide hose on. Anticipate pt will be safe to return home with assist prn, HHOT for safety eval (shower transfers, mob).   Prognosis: Good  OT Education: OT Role;Plan of Care;Transfer Training;ADL Adaptive Strategies  REQUIRES OT FOLLOW UP: Yes  Activity Tolerance  Activity Tolerance: Patient Tolerated treatment well  Safety Devices  Safety Devices in place: Yes  Type of devices: Call light within reach;Chair alarm in place; Left in chair         Patient Diagnosis(es): The primary encounter diagnosis was Acute on chronic congestive heart failure, unspecified heart failure type ([address] Diagnoses of Stage 4 chronic kidney disease ([address] Dyspnea on exertion, Bilateral lower extremity edema, and Acute on chronic systolic CHF (congestive heart failure) ([address] were also pertinent to this visit. has a past medical history of Diabetes type 2, uncontrolled ([address] Diabetic nephropathy, ED (erectile dysfunction), Essential hypertension, benign, Hyperlipidemia, and Noncompliance. has a past surgical history that includes eye surgery. Restrictions  Restrictions/Precautions  Restrictions/Precautions: Fall Risk  Position Activity Restriction  Other position/activity restrictions: IV  Subjective   General  Chart Reviewed: Yes,Progress Notes  Patient assessed for rehabilitation services?: Yes  Additional Pertinent Hx: per ED note, 53-year-old male presents with bilateral lower extremity swelling with associated orthopnea and exertional dyspnea. Patient has history of CHF and CKD. Patient states he takes diuretics however he has been peeing less than normal.  Denies any noncompliance with his medication. States that his legs been gradually worsening for last few weeks to months. Now with associated shortness of breath. Symptoms are chronic. Denies chest pain. Denies other associated symptoms. Worse with lying flat or exertion. Better with sitting up. Risk factors are history of CKD and CHF. For further review of systems see below. Response to previous treatment: Patient with no complaints from previous session  Family / Caregiver Present: No  Referring Practitioner: Donna Call MD  Subjective  Subjective: Pt in recliner. Pt agreeable to OT. Pt denied pain.   General Comment  Comments: Per RN ok to see      Orientation  Orientation  Overall Orientation Status: Within Functional Limits  Objective ADL  LE Dressing: Verbal cueing;Minimal assistance; Increased time to complete (MIn A (to don odell hose using bag to slide over feet easier), to Supervision to doff/don footies.)  Additional Comments: Anticipate pt needing up to Supervision for ADLs based on ROM, strength, and balance        Standing Balance  Time: not timed  Activity: Pt amb in room with RW, SBA and occasionally running in to objects. Pt with decreased vision. Wheelchair Bed Transfers  Wheelchair/Bed - Technique: Ambulating  Equipment Used:  (recliner)  Level of Asssistance: Supervision  Wheelchair Transfers Comments: RW, very min cues for hand placement      Cognition  Overall Cognitive Status: WFL         Plan   Plan  Times per week: 3-5x  Current Treatment Recommendations: Strengthening,Functional Mobility Training,Gait Training,Endurance Training,Balance Training,Safety Education & Training,Self-Care / ADL,Patient/Caregiver Education & Training,Equipment Evaluation, Education, & procurement    AM-PAC Score        AM-PAC Inpatient Daily Activity Raw Score: 21 (02/17/22 1452)  AM-PAC Inpatient ADL T-Scale Score : 44.27 (02/17/22 1452)  ADL Inpatient CMS 0-100% Score: 32.79 (02/17/22 1452)  ADL Inpatient CMS G-Code Modifier : Hayley Wood (02/17/22 1452)    Goals  Short term goals  Time Frame for Short term goals: prior to D/C.  status: progressing.  Goals ongoing  Short term goal 1: complete functional mobility and transfers Mod Ind  Short term goal 2: complete toileting Mod Ind  Short term goal 3: complete bathing and dressing Mod Ind  Short term goal 4: complete grooming in stance at sink Mod Ind  Long term goals  Time Frame for Long term goals : STG=LTG  Patient Goals   Patient goals : return home       Therapy Time   Individual Concurrent Group Co-treatment   Time In 1428         Time Out 1453         Minutes ellmattu [address] KEEN/L,515

## 2022-02-17 NOTE — DISCHARGE SUMMARY
Hospital Medicine Discharge Summary    Patient: Jace Lassiter     Gender: male  : 1955   Age: 61 y.o. MRN: [id number]    Admitting Physician: Destinee Montero MD  Discharge Physician: Maxi Hicks MD     Code Status: Prior      Admit Date: 2/3/2022   Discharge Date:   22    Disposition:  Home    Discharge Diagnoses:  1. Decompensated chronic combined systolic and diastolic CHF exacerbation - with fluid overload + SOB. 2. LV thrombus - on xarelto     3. SONAL on CKD III - cardiorenal syndrome   4. CAD-cardiology consulted  5. Type 2 Diabetes Mellitus  6. Essential hypertension- good BP profile so far  7. Hyperlipidemia  -continue home meds   8. Hypothyroidism- undertreated with TSH 12.00  -cont with thyroxine 50mcg qd  -recheck TSH in 4-6 weeks     9. Diarrhea GI PCR and C. Diff negative.     Follow-up appointments:  As arranged with cardiology    Outpatient to do list: cbc, cmp weekly    Condition at Discharge:  Stable    Hospital Course: The patient is a 62 y. o. male with hx combined systolic and diastolic CHF (EF < 30% with grade II DD), type 2 DM, HTN, and HLD who presents to [facility] with progressive shortness of breath especially with ambulation, SOA, orthopnea. He denied chest pain, abdominal pain, nausea, vomiting, constipation, diarrhea, and dysuria.   He was fluid overloaded and diuresed with a asix gtt. He had a cardiorenal syndrome for which nephrology saw him and will follow him. With symptom improvement he was [facility] on the below medications.                 Discharge Medications:   Discharge Medication List as of 2022  4:42 PM      START taking these medications    Details   spironolactone (ALDACTONE) 25 MG tablet Take 1 tablet by mouth daily, Disp-30 tablet, R-3Normal           Discharge Medication List as of 2022  4:42 PM      CONTINUE these medications which have CHANGED    Details   isosorbide mononitrate (IMDUR) 30 MG extended release tablet Take 1 tablet by mouth daily, Disp-30 tablet, R-3Normal      metoprolol succinate (TOPROL XL) 25 MG extended release tablet Take 0.5 tablets by mouth daily, Disp-30 tablet, R-3Normal      torsemide 60 MG TABS Take 60 mg by mouth daily, Disp-30 tablet, R-3Normal      levothyroxine (SYNTHROID) 50 MCG tablet Take 1 tablet by mouth every morning (before breakfast), Disp-30 tablet, R-3Normal           Discharge Medication List as of 2/17/2022  4:42 PM      CONTINUE these medications which have NOT CHANGED    Details   allopurinol (ZYLOPRIM) 100 MG tablet Take 1 tablet by mouth daily, Disp-30 tablet, R-3Normal      rivaroxaban (XARELTO) 15 MG TABS tablet Take 1 tablet by mouth Daily with supper, Disp-42 tablet, R-2Normal      Multiple Vitamins-Minerals (THERAPEUTIC MULTIVITAMIN-MINERALS) tablet Take 1 tablet by mouth dailyHistorical Med      STIMULANT LAXATIVE 8.6-50 MG per tablet Take 1 tablet by mouth nightly , DAWHistorical Med      nitroGLYCERIN (NITROSTAT) 0.4 MG SL tablet Place 0.4 mg under the tongue every 5 minutes as needed for Chest pain Historical Med      sacubitril-valsartan (ENTRESTO) 49-51 MG per tablet Take 1 tablet by mouth 2 times daily Historical Med      atorvastatin (LIPITOR) 80 MG tablet Take 80 mg by mouth daily Historical Med      Dulaglutide (TRULICITY) 2.75 ZO/9.7IA SOPN Lot# [id number] Exp.06/20, Disp-2 pen, R-0Sample           Discharge Medication List as of 2/17/2022  4:42 PM          Discharge ROS:  A complete review of systems was asked and negative except for above     Discharge Exam:    BP 96/65   Pulse 87   Temp 98.5 °F (36.9 °C) (Oral)   Resp 16   Ht 5' 6\" (1.676 m)   Wt 152 lb 1.9 oz (69 kg)   SpO2 98%   BMI 24.55 kg/m²   General appearance:  NAD  HEENT:   Normal cephalic, atraumatic, moist mucous membranes, no oropharyngeal erythema or exudate  Neck: Supple, trachea midline, no anterior cervical or SC LAD  Heart[de-identified] Normal s1/s2, RRR, no murmurs, gallops, or rubs.  Bipedal edema  Lungs: Bibasilar crackles bilaterally. Abdomen: Soft, non-tender, non-distended, bowel sounds present, no masses  Musculoskeletal:  No clubbing, no cyanosis, mild bipedal edema  Skin: No lesion or masses  Neurologic:  Neurovascularly intact without any focal sensory/motor deficits. Cranial nerves: II-XII intact, grossly non-focal.  Psychiatric:  A & O x3  Neuro: Grossly intact, moves all four extremities     Labs: For convenience and continuity at follow-up the following most recent labs are provided:    Lab Results   Component Value Date    WBC 4.9 02/17/2022    HGB 10.1 02/17/2022    HCT 29.5 02/17/2022    .1 02/17/2022     02/17/2022     02/17/2022    K 4.2 02/17/2022    K 3.7 02/03/2022    CL 92 02/17/2022    CO2 29 02/17/2022    BUN 90 02/17/2022    CREATININE 2.3 02/17/2022    CALCIUM 8.3 02/17/2022    PHOS 3.8 08/30/2017    ALKPHOS 107 02/03/2022    ALT 22 02/03/2022    AST 28 02/03/2022    BILITOT 0.8 02/03/2022    BILIDIR 0.9 11/23/2021    LABALBU 3.0 02/03/2022    LDLCALC 38 02/04/2022    TRIG 43 02/04/2022     Lab Results   Component Value Date    INR 1.90 (H) 11/30/2021    INR 1.73 (H) 11/29/2021    INR 1.75 (H) 11/28/2021       Radiology:  XR CHEST (2 VW)    Result Date: 2/10/2022  EXAMINATION: TWO XRAY VIEWS OF THE CHEST 2/10/2022 11:42 am COMPARISON: Chest radiograph February 3, 2020 and priors. HISTORY: ORDERING SYSTEM PROVIDED HISTORY: sob TECHNOLOGIST PROVIDED HISTORY: Reason for exam:->sob Reason for Exam: sob FINDINGS: Cardiopericardial silhouette is again noted to be enlarged. There are mild bilateral hazy opacities suggestive of edema with mild interstitial thickening. Edema appears slightly improved compared to prior. No definite effusion. No pneumothorax. Left subclavian approach single lead pacer/ICD is again seen. No acute osseous abnormality. Cardiomegaly with mild edema.      XR CHEST PORTABLE    Result Date: 2/3/2022  EXAMINATION: ONE XRAY VIEW OF THE CHEST 2/3/2022 12:21 pm COMPARISON: November 22, 2021 HISTORY: ORDERING SYSTEM PROVIDED HISTORY: CHF, concern for fluid overload TECHNOLOGIST PROVIDED HISTORY: Reason for exam:->CHF, concern for fluid overload Reason for Exam: chf FINDINGS: The left chest wall pacemaker and the lead are stable. The cardiomediastinal silhouette is stable. There is mild pulmonary vascular congestion. There is no pleural effusion. There is no pneumothorax. There is no acute osseous abnormality. Mild pulmonary vascular congestion. Stable cardiomegaly. EKG     Rhythm: normal sinus   Rate: normal  Clinical Impression: no acute changes        The patient was seen and examined on day of discharge and this discharge summary is in conjunction with any daily progress note from day of discharge. Time Spent on discharge is 30 minutes  in the examination, evaluation, counseling and review of medications and discharge plan. Note that more than 30 minutes was spent in preparing discharge papers, discussing discharge with patient, medication review, etc.       Signed:    Tess Chandler MD   2/17/2022      Thank you Aylin Nuñez DO for the opportunity to be involved in this patient's care.  If you have any questions or concerns please feel free to contact me at [facility]

## 2022-02-18 NOTE — TELEPHONE ENCOUNTER
New referral 2/9/22. Reached out to schedule appt. No answer. Mailbox full.     Jean Carlos Sutton, HildaD  [facility]  Heart Failure Service  [phone]

## 2022-02-21 NOTE — TELEPHONE ENCOUNTER
[facility] Transitions Initial Follow Up Call    Outreach made within 2 business days of discharge: Yes    Patient: Matty Valdes Patient : 1955   MRN: [id number]  Reason for Admission: There are no discharge diagnoses documented for the most recent discharge.   Discharge Date: 22           Follow Up  Future Appointments   Date Time Provider Shaheen Lazo   2022 10:20 AM DAHIANA Dubois - [facility]       Sailaja Lara LPN

## 2022-02-23 NOTE — TELEPHONE ENCOUNTER
Outbound call from [facility] attempting to schedule for new CHF visit. Unable to leave  as mailbox is full.

## 2022-02-24 NOTE — TELEPHONE ENCOUNTER
Outbound call from [facility] attempting to schedule pt for new CHF visit. Unable to leave  as mailbox is full.

## 2022-02-28 NOTE — TELEPHONE ENCOUNTER
Left message for return call to outpatient wellness center. Attempting to schedule patient for new heart failure visit. This is my third and final attempt to reach this patient.

## 2022-03-26 PROBLEM — I50.9 CHF (CONGESTIVE HEART FAILURE) (HCC): Status: ACTIVE | Noted: 2022-01-01

## 2022-03-26 PROBLEM — R55 SYNCOPE: Status: ACTIVE | Noted: 2022-01-01

## 2022-03-26 PROBLEM — R55 SYNCOPE AND COLLAPSE: Status: ACTIVE | Noted: 2022-01-01

## 2022-03-26 NOTE — PROGRESS NOTES
Gastroenterology Note  Patient:   Jaime Jameson   :    1955   Facility:   [facility]   Date:     3/26/2022  Consultant:   Holli Villavicencio MD, MD      Subjective:     61 y.o. male admitted 3/25/2022 with Syncope and collapse [R55] and seen for abnormal CT of the liver with ascites. .    Present  Diet Order: ADULT DIET; Regular; 4 carb choices (60 gm/meal);  Low Sodium (2 gm); 1500 ml      Current Medications include:   Scheduled Meds:   allopurinol  100 mg Oral Daily    atorvastatin  80 mg Oral Daily    isosorbide mononitrate  30 mg Oral Daily    levothyroxine  50 mcg Oral QAM AC    metoprolol succinate  12.5 mg Oral Daily    rivaroxaban  15 mg Oral Dinner    sodium chloride flush  5-40 mL IntraVENous 2 times per day    torsemide  60 mg Oral Daily    [Held by provider] sacubitril-valsartan  1 tablet Oral BID    [Held by provider] spironolactone  25 mg Oral Daily     Continuous Infusions:   sodium chloride       PRN Meds:.sodium chloride flush, sodium chloride, perflutren lipid microspheres, ondansetron    Allergies: No Known Allergies    Objective:   Vital Signs:  Temp (24hrs), Av.1 °F (36.7 °C), Min:97.5 °F (36.4 °C), Max:98.5 °F (75.5 °C)     Systolic (42XHV), IOE:767 , Min:105 , BDL:904      Diastolic (47JEL), LKA:40, Min:70, Max:115     Pulse  Av.1  Min: 93  Max: 109  /81   Pulse 102   Temp 98.5 °F (36.9 °C) (Oral)   Resp 12   Ht 5' 6\" (1.676 m)   Wt 184 lb 1.4 oz (83.5 kg)   SpO2 97%   BMI 29.71 kg/m²          Lab and Imaging Review   Recent Labs     227 22  0357   WBC 3.9* 3.8*   HGB 12.8* 11.3*   .6* 109.4*    177    139   K 4.9 4.8    105   CO2 15* 18*   BUN 79* 75*   CREATININE 2.6* 2.6*   GLUCOSE 197* 168*   CALCIUM 9.4 8.6   PROT 7.9 6.5   LABALBU 4.2 3.5   AST 27 22   ALT 23 18   ALKPHOS 166* 127   BILITOT 1.0 0.8   MG  --  2.10       Assessment:     Patient Active Problem List Diagnosis Date Noted    Syncope 03/26/2022    CHF (congestive heart failure) ([facility] 03/26/2022    Syncope and collapse 03/26/2022    LV (left ventricular) mural thrombus     Acute on chronic congestive heart failure (HCC)     Acute on chronic systolic CHF (congestive heart failure) ([facility] 02/03/2022    [facility]     Biventricular heart failure (HCC)     Stage 3 chronic kidney disease (HCC)     Acute on chronic combined systolic and diastolic HF (heart failure) ([facility] 11/14/2021    Elevated PSA 04/09/2019    Coronary artery disease involving native coronary artery 02/12/2019    Elevated blood uric acid level 01/06/2017    Diabetes type 2, uncontrolled ([facility] 07/28/2014    Noncompliance 07/28/2014    ED (erectile dysfunction) 09/12/2013    Essential hypertension, benign 09/12/2013    Hyperlipidemia 09/12/2013    Diabetic nephropathy ([facility] 09/12/2013       Plan:   1. Called for consult. Chart reviewed. Patient getting ECHO. Will see tomorrow.

## 2022-03-26 NOTE — H&P
Hospital Medicine History & Physical      PCP: Ana Alvarado DO    Date of Admission: 3/25/2022    Date of Service: Pt seen/examined on 3/25/2022 and admitted to inpatient    Chief Complaint: Syncope      History Of Present Illness: The patient is a 61 y.o. male with past medical history as below but no history of cirrhosis ascites before who presents to [facility] with concern that patient had an episode of syncope at home but noted to be very brief. Patient notes that he is not feeling unwell recently. He has been overall functional and has been taking his medications although uncertain as to whether he adheres to his regiment strictly. Son who is at bedside initially is currently not at bedside but noted that patient was apparently getting up today and coming out of the kitchen while using his walker but unfortunately while walking to the bedroom he noted that he went to the ground and unfortunately lost consciousness but uncertain as to how long he was down for. He denies having any noted symptoms of chest pain/shortness of breath/dizziness/lightheadedness during this episode of syncope. He denies any other recent symptoms of fever, chills, poor appetite, nausea, vomiting, diarrhea, abdominal pain, chest pain, dysuria, blood in urine/stool/sputum. Past Medical History:        Diagnosis Date    CHF (congestive heart failure) ([address]     Diabetes type 2, uncontrolled ([address] 7/28/2014    Diabetic nephropathy 9/12/2013    ED (erectile dysfunction) 9/12/2013    Essential hypertension, benign 9/12/2013    Hyperlipidemia 9/12/2013    Noncompliance 7/28/2014       Past Surgical History:        Procedure Laterality Date    EYE SURGERY         Medications Prior to Admission:    Prior to Admission medications    Medication Sig Start Date End Date Taking? Authorizing Provider   isosorbide mononitrate (IMDUR) 30 MG extended release tablet Take 1 tablet by mouth daily 2/18/22   Katie Durbin MD   metoprolol succinate (TOPROL XL) 25 MG extended release tablet Take 0.5 tablets by mouth daily 2/18/22   Katie Durbin MD   torsemide 60 MG TABS Take 60 mg by mouth daily 2/18/22   Katie Durbin MD   levothyroxine (SYNTHROID) 50 MCG tablet Take 1 tablet by mouth every morning (before breakfast) 2/18/22   Katie Durbin MD   spironolactone (ALDACTONE) 25 MG tablet Take 1 tablet by mouth daily 2/18/22   Katie Durbin MD   allopurinol (ZYLOPRIM) 100 MG tablet Take 1 tablet by mouth daily 11/30/21   Troy Salmon MD   rivaroxaban Jackie Running) 15 MG TABS tablet Take 1 tablet by mouth Daily with supper 11/30/21   Troy Salmon MD   Multiple Vitamins-Minerals (THERAPEUTIC MULTIVITAMIN-MINERALS) tablet Take 1 tablet by mouth daily    Historical Provider, MD   STIMULANT LAXATIVE 8.6-50 MG per tablet Take 1 tablet by mouth nightly  8/23/21   Historical Provider, MD   nitroGLYCERIN (NITROSTAT) 0.4 MG SL tablet Place 0.4 mg under the tongue every 5 minutes as needed for Chest pain   Patient not taking: Reported on 3/26/2022 8/22/21   Historical Provider, MD   sacubitril-valsartan (ENTRESTO) 49-51 MG per tablet Take 1 tablet by mouth 2 times daily  3/22/19   Historical Provider, MD   atorvastatin (LIPITOR) 80 MG tablet Take 80 mg by mouth daily  1/8/19   Historical Provider, MD   Dulaglutide (TRULICITY) 7.33 VW/6.9UY SOPN Lot# [id number] Exp.06/20  Patient taking differently: Inject 0.75 mg into the muscle every 7 days  2/12/19   DAGO Cunningham MD       Allergies:  Patient has no known allergies. Social History:  The patient currently lives home    TOBACCO:   reports that he quit smoking about 41 years ago. He has a 10.50 pack-year smoking history.  He has never used smokeless tobacco.  ETOH:   reports current alcohol use of about 1.0 standard drink of alcohol per week. Family History:  Reviewed in detail and negative for DM, Early CAD, Cancer, CVA. Positive as follows:        Problem Relation Age of Onset    Diabetes Mother     High Blood Pressure Mother     Diabetes Sister     Diabetes Brother        REVIEW OF SYSTEMS:   as noted in the HPI. All other systems reviewed and negative. PHYSICAL EXAM:    /81   Pulse 102   Temp 98.5 °F (36.9 °C) (Oral)   Resp 20   Ht 5' 6\" (1.676 m)   Wt 184 lb 1.4 oz (83.5 kg)   SpO2 97%   BMI 29.71 kg/m²     General appearance: No acute respiratory distress, alert and oriented x4, pleasant and conversational  HEENT Normal cephalic, atraumatic without obvious deformity. Pupils equal, round, and reactive to light. Extra ocular muscles intact. Conjunctivae/corneas clear. Moist mucous membranes  Neck: Supple, no JVD  Lungs: Diminished breath sounds, some slight crackles bilaterally to the bases posteriorly  Heart: Regular rate and rhythm with Normal S1/S2 without murmurs, rubs or gallops, point of maximum impulse non-displaced  Abdomen: Slightly firm and distended abdomen, slight fluid wave is noted, active bowel sounds, nontender  Extremities: Trace bilateral lower extremity pitting edema symmetrically  Skin: No rashes  Neurologic: Grossly intact neurologically with strength is 5/5 all extremities  Mental status: Alert, oriented, thought content appropriate. Capillary Refill: Acceptable  < 3 seconds  Peripheral Pulses: +3 Easily felt, not easily obliterated with pressure      CT abdomen pelvis IV contrast:  There is marked cardiomegaly, with trace pleural effusions, as well as   moderate intra-abdominal and pelvic ascites, as well as diffuse anasarca.    Hepatic vein reflux is noted on the previous CT examination is well.  This   combination of findings can be seen in the setting of passive liver   congestion related to congestive failure.  Questionable mild surface   nodularity of the liver makes it difficult to exclude underlying cirrhosis.       Minimal bibasilar airspace disease, possibly related to atelectasis or   pneumonia, though with less consolidation than seen on the previous exam.       Mild appearing bilateral renal atrophy.       Suspected fat and ascitic fluid containing left inguinal hernia.  No bowel   herniation. CT head without contrast: No acute process but prominence of sulcal and/or CSF spaces suggesting a mild degree of cerebral atrophy  Chest x-ray: Stable cardiomegaly with possible mild degree of vascular congestion through exaggerated by suboptimal inspiration but no focal infiltrates noted    CBC   Recent Labs     03/25/22 2157 03/26/22  0357   WBC 3.9* 3.8*   HGB 12.8* 11.3*   HCT 40.0* 35.2*    177      RENAL  Recent Labs     03/25/22 2157 03/26/22  0357    139   K 4.9 4.8    105   CO2 15* 18*   BUN 79* 75*   CREATININE 2.6* 2.6*     LFT'S  Recent Labs     03/25/22 2157 03/26/22  0357   AST 27 22   ALT 23 18   BILITOT 1.0 0.8   ALKPHOS 166* 127     COAG  No results for input(s): INR in the last 72 hours.   CARDIAC ENZYMES  Recent Labs     03/25/22 2157 03/26/22  0234 03/26/22  0357   TROPONINI 0.08* 0.08* 0.07*       U/A:    Lab Results   Component Value Date    COLORU Yellow 03/26/2022    WBCUA 0 03/26/2022    RBCUA 2 03/26/2022    CLARITYU SL CLOUDY 03/26/2022    SPECGRAV 1.015 03/26/2022    LEUKOCYTESUR Negative 03/26/2022    BLOODU Negative 03/26/2022    GLUCOSEU Negative 03/26/2022       ABG  No results found for: GDV4VKI, BEART, R6CIYZAY, PHART, THGBART, ORO6CGJ, PO2ART, THO4GDK        Active Hospital Problems    Diagnosis Date Noted    Syncope [R55] 03/26/2022    [facility]F [facility]congestive [facility] failure) ([facility] [I50.9] 03/26/2022    Syncope and collapse [R55] 03/26/2022    Stage 3 chronic kidney disease ([facility] [N18.30]     Diabetes type 2, uncontrolled ([address] [E11.65] 07/28/2014    Essential hypertension, benign [I10] 09/12/2013         PHYSICIANS CERTIFICATION:    I certify that Reymundo Chavarria is expected to be hospitalized for greater than 2 midnights based on the following assessment and plan:      ASSESSMENT/PLAN:  · Syncope  · CHF  · Leukopenia  · Stage III chronic kidney disease  · Type 2 diabetes  · Hypertension    Plan:  · Syncope is uncertain etiology, has not had another episode here in the ED, could be secondary due to patient having multiple medications for diuresis and blood pressure control. CT scan shows evidence of significant fluid overload with anasarca and ascites but of uncertain etiology. Ascites is noted but of uncertain etiology  · Trending orthostatic blood pressures  · Initial troponin elevated, no chest pain noted, trending troponins but low suspicious for ACS process  · Ordering transthoracic echo for the morning  · Restart patient's home torsemide, holding Aldactone and Entresto for now pending repeat labs in the morning  · Restarting other home medications  · Cardiology consult for syncope and history of CHF  · GI consult for syncope and history of CHF but with ascites and anasarca of uncertain etiology  · Neurochecks every 4  · PT and OT consult  · Repeat labs in the morning    DVT Prophylaxis: Xarelto  Diet: ADULT DIET; Regular; 4 carb choices (60 gm/meal); Low Sodium (2 gm); 1500 ml  Code Status: Full Code  PT/OT Eval Status: Ambulatory    Dispo -pending clinical course       Luis F Bernard DO    Thank you Sulma Galdamez DO for the opportunity to be involved in this patient's care. If you have any questions or concerns please feel free to contact me at [phone].

## 2022-03-26 NOTE — ED NOTES
Report called to [facility] RN, all questions answered.      Bryant Garcia RN  03/26/22 2344

## 2022-03-26 NOTE — ED TRIAGE NOTES
Patient presents to ED via EMS for c/o syncopal episode. Per EMS, patient's son called squad due to finding patient lying on the floor. Patient states he was walking and passed out. Upon arrival to ED, patient states he feels \"fine and back to normal.\" Patient denies any pain or complaints at this time.  Patient reports this has happened in the past.

## 2022-03-26 NOTE — ED PROVIDER NOTES
[facility]      Pt Name: Joelle Hinton  MRN: [id number]  [place] 1955  Date of evaluation: 3/25/2022  Provider: JULIO Hurt    This patient was not seen and evaluated by the attending physician No att. providers found. CHIEF COMPLAINT       Chief Complaint   Patient presents with    Loss of Consciousness       CRITICAL CARE TIME   I performed a total Critical Care time of 31 minutes, excluding separately reportable procedures. There was a high probability of clinically significant/life threatening deterioration in the patient's condition which required my urgent intervention. Not limited to multiple reexaminations, discussions with attending physician and consultants. HISTORY OF PRESENT ILLNESS  (Location/Symptom, Timing/Onset, Context/Setting, Quality, Duration, Modifying Factors, Severity.)   Joelle Hinton is a 61 y.o. male who presents to the emergency department via EMS from home where he lives with his son after passing out. He states he felt fine today no chest pain or shortness of breath. He had eaten his dinner he was getting up walking when he collapsed to the ground. He vaguely remembers getting lightheaded prior to this. His son called 48. He has a history of diabetes, coronary artery disease, congestive heart failure. He states he did not take his Xarelto today. Denies pain in his hips arms or neck. He tells me he always has leg swelling and shortness of breath and that this is not worse than normal.    Nursing Notes were reviewed and I agree. REVIEW OF SYSTEMS    (2-9 systems for level 4, 10 or more for level 5)     Review of Systems   Constitutional: Negative for fever. Respiratory: Positive for shortness of breath. Negative for cough. Cardiovascular: Negative for chest pain. Gastrointestinal: Negative for abdominal pain, diarrhea and vomiting.    Musculoskeletal: Negative for arthralgias, back pain, neck pain and neck stiffness. Skin: Negative for rash. Neurological: Positive for syncope. Negative for weakness and numbness. Psychiatric/Behavioral: Negative for agitation, behavioral problems and confusion. Except as noted above the remainder of the review of systems was reviewed and negative.        PAST MEDICAL HISTORY         Diagnosis Date    [facility]     Diabetes [facility], uncontrolled [facility] 7/28/2014    Diabetic nephropathy 9/12/2013    ED (erectile dysfunction) 9/12/2013    Essential hypertension, benign 9/12/2013    Hyperlipidemia 9/12/2013    Noncompliance 7/28/2014       SURGICAL HISTORY           Procedure Laterality Date    EYE SURGERY         CURRENT MEDICATIONS       Previous Medications    ALLOPURINOL (ZYLOPRIM) 100 MG TABLET    Take 1 tablet by mouth daily    ATORVASTATIN (LIPITOR) 80 MG TABLET    Take 80 mg by mouth daily     DULAGLUTIDE (TRULICITY) 5.97 SG/5.5EB SOPN    Lot# [id number] Exp.06/20    ISOSORBIDE MONONITRATE (IMDUR) 30 MG EXTENDED RELEASE TABLET    Take 1 tablet by mouth daily    LEVOTHYROXINE (SYNTHROID) 50 MCG TABLET    Take 1 tablet by mouth every morning (before breakfast)    METOPROLOL SUCCINATE (TOPROL XL) 25 MG EXTENDED RELEASE TABLET    Take 0.5 tablets by mouth daily    MULTIPLE VITAMINS-MINERALS (THERAPEUTIC MULTIVITAMIN-MINERALS) TABLET    Take 1 tablet by mouth daily    NITROGLYCERIN (NITROSTAT) 0.4 MG SL TABLET    Place 0.4 mg under the tongue every 5 minutes as needed for Chest pain     RIVAROXABAN (XARELTO) 15 MG TABS TABLET    Take 1 tablet by mouth Daily with supper    SACUBITRIL-VALSARTAN (ENTRESTO) 49-51 MG PER TABLET    Take 1 tablet by mouth 2 times daily     SPIRONOLACTONE (ALDACTONE) 25 MG TABLET    Take 1 tablet by mouth daily    STIMULANT LAXATIVE 8.6-50 MG PER TABLET    Take 1 tablet by mouth nightly     TORSEMIDE 60 MG TABS    Take 60 mg by mouth daily       ALLERGIES     Patient has no known allergies. FAMILY HISTORY           Problem Relation Age of Onset    Diabetes Mother     High Blood Pressure Mother     Diabetes Sister     Diabetes Brother      Family Status   Relation Name Status    Mother      Father      Sister  Alive    Brother  Alive        SOCIAL HISTORY      reports that he quit smoking about 41 years ago. He has a 10.50 pack-year smoking history. He has never used smokeless tobacco. He reports current alcohol use of about 1.0 standard drink of alcohol per week. He reports that he does not use drugs. PHYSICAL EXAM    (up to 7 for level 4, 8 or more for level 5)     ED Triage Vitals [22]   BP Temp Temp Source Pulse Resp SpO2 Height Weight   (!) 129/102 97.5 °F (36.4 °C) Rectal 106 16 98 % -- --       Physical Exam  Vitals and nursing note reviewed. Constitutional:       Appearance: Normal appearance. He is not ill-appearing. HENT:      Head: Normocephalic and atraumatic. Mouth/Throat:      Mouth: Mucous membranes are moist.   Eyes:      Pupils: Pupils are equal, round, and reactive to light. Cardiovascular:      Rate and Rhythm: Normal rate. Pulses: Normal pulses. Pulmonary:      Effort: Pulmonary effort is normal. No respiratory distress. Abdominal:      Tenderness: There is no abdominal tenderness. There is no guarding. Musculoskeletal:         General: Normal range of motion. Cervical back: Normal range of motion. Skin:     General: Skin is warm. Neurological:      General: No focal deficit present. Mental Status: He is alert and oriented to person, place, and time. Cranial Nerves: No cranial nerve deficit. Motor: No weakness.       Gait: Gait normal.   Psychiatric:         Mood and Affect: Mood normal.         Behavior: Behavior normal.         DIAGNOSTIC RESULTS     EKG: All EKG's are interpreted by JULIO Talley in the absence of a cardiologist.    EKG interpreted by myself - please refer to attending physician's note for complete EKG interpretation:    Rhythm: sinus rhythm   No evidence of acute ischemia or injury. RADIOLOGY:   Non-plain film images such as CT, Ultrasound and MRI are read by the radiologist. Plain radiographic images are visualized and preliminarily interpreted by JULIO Bishop with the below findings:    Reviewed radiologist's interpretation. Interpretation per the Radiologist below, if available at the time of this note:    XR CHEST (2 VW)   Final Result      1. Possible mild degree of vascular congestion though exaggerated by a   suboptimal inspiration. No focal pulmonary infiltrates are noted. 2.  Stable cardiomegaly. CT HEAD WO CONTRAST   Final Result      1. No acute intracranial abnormality noted. 2.  Prominence of the sulci and/or CSF spaces suggests a possible mild degree   of cerebral atrophy.                LABS:  Labs Reviewed   TROPONIN - Abnormal; Notable for the following components:       Result Value    Troponin 0.08 (*)     All other components within normal limits   BRAIN NATRIURETIC PEPTIDE - Abnormal; Notable for the following components:    Pro-BNP 34,786 (*)     All other components within normal limits   CBC WITH AUTO DIFFERENTIAL - Abnormal; Notable for the following components:    WBC 3.9 (*)     RBC 3.62 (*)     Hemoglobin 12.8 (*)     Hematocrit 40.0 (*)     .6 (*)     MCH 35.4 (*)     RDW 19.0 (*)     Lymphocytes Absolute 0.7 (*)     Anisocytosis 1+ (*)     Macrocytes 1+ (*)     Polychromasia Occasional (*)     Poikilocytes 1+ (*)     Acanthocytes Occasional (*)     F[place] Cells Occasional (*)     All other components within normal limits   COMPREHENSIVE METABOLIC PANEL W/ REFLEX TO MG FOR LOW K - Abnormal; Notable for the following components:    CO2 15 (*)     Anion Gap 22 (*)     Glucose 197 (*)     BUN 79 (*)     CREATININE 2.6 (*)     GFR Non- 25 (*)     GFR African American 30 (*)     Alkaline Phosphatase 166 (*)     All other components within normal limits       All other labs were within normal range or not returned as of this dictation. EMERGENCY DEPARTMENT COURSE and DIFFERENTIAL DIAGNOSIS/MDM:   Vitals:    Vitals:    03/25/22 2130 03/25/22 2155 03/25/22 2210 03/25/22 2310   BP: (!) 133/96 (!) 138/107 (!) 146/115 (!) 126/92   Pulse: 101 101 94 109   Resp: 22 25 21 21   Temp:       TempSrc:       SpO2:    96%     Patient is afebrile and not hypoxic. His pulse is around 105 on arrival.  He is denying chest pain he states he has chronic shortness of breath that is unchanged. He was up and walking when he collapsed. Did not bite his tongue no bowel or bladder incontinence. His baseline renal function as above. Agreeable to admission for syncope and collapse we will consult the hospitalist.    CONSULTS:  None    PROCEDURES:  Procedures      FINAL IMPRESSION      1. Syncope and collapse          DISPOSITION/PLAN   DISPOSITION Decision To Admit 03/25/2022 11:40:15 PM      PATIENT REFERRED TO:  No follow-up provider specified.     DISCHARGE MEDICATIONS:  New Prescriptions    No medications on file       (Please note that portions of this note were completed with a voice recognition program.  Efforts were made to edit the dictations but occasionally words are mis-transcribed.)    Spencer Morales [place]  03/25/22 1801

## 2022-03-26 NOTE — PROGRESS NOTES
[facility] called [employer] to have patient's pacemaker [employer]. It will be set to have someone come out tomorrow 3/27.

## 2022-03-26 NOTE — PROGRESS NOTES
Hospitalist Progress Note      PCP: Apollo Elias DO    Date of Admission: 3/25/2022    Subjective: no dyspnea or chest pain at rest, no fever or chill. Medications:  Reviewed    Infusion Medications    sodium chloride       Scheduled Medications    allopurinol  100 mg Oral Daily    atorvastatin  80 mg Oral Daily    isosorbide mononitrate  30 mg Oral Daily    levothyroxine  50 mcg Oral QAM AC    metoprolol succinate  12.5 mg Oral Daily    rivaroxaban  15 mg Oral Dinner    sodium chloride flush  5-40 mL IntraVENous 2 times per day    torsemide  60 mg Oral Daily    [Held by provider] sacubitril-valsartan  1 tablet Oral BID    [Held by provider] spironolactone  25 mg Oral Daily     PRN Meds: sodium chloride flush, sodium chloride, perflutren lipid microspheres, ondansetron      Intake/Output Summary (Last 24 hours) at 3/26/2022 1450  Last data filed at 3/26/2022 1400  Gross per 24 hour   Intake 360 ml   Output 1350 ml   Net -990 ml       Physical Exam Performed:    /81   Pulse 102   Temp 98.5 °F (36.9 °C) (Oral)   Resp 12   Ht 5' 6\" (1.676 m)   Wt 184 lb 1.4 oz (83.5 kg)   SpO2 97%   BMI 29.71 kg/m²     General appearance: No apparent distress  Neck: Supple  Respiratory:  Bilateral fine crackles   Cardiovascular: Regular rate and rhythm with normal S1/S2 without murmurs, rubs or gallops. Abdomen: Soft, non-tender  Musculoskeletal: No clubbing, cyanosis. Bilateral leg edema. Skin: Skin color, texture, turgor normal.  No rashes or lesions.   Neurologic:  No focal weakness   Psychiatric: Alert and oriented  Capillary Refill: Brisk,3 seconds, normal   Peripheral Pulses: +2 palpable, equal bilaterally       Labs:   Recent Labs     03/25/22 2157 03/26/22  0357   WBC 3.9* 3.8*   HGB 12.8* 11.3*   HCT 40.0* 35.2*    177     Recent Labs     03/25/22 2157 03/26/22  0357    139   K 4.9 4.8    105   CO2 15* 18*   BUN 79* 75*   CREATININE 2.6* 2.6*   CALCIUM 9.4 8.6 Recent Labs     03/25/22  2157 03/26/22  0357   AST 27 22   ALT 23 18   BILITOT 1.0 0.8   ALKPHOS 166* 127     No results for input(s): INR in the last 72 hours. Recent Labs     03/25/22  2157 03/26/22  0234 03/26/22  0357   TROPONINI 0.08* 0.08* 0.07*       Urinalysis:      Lab Results   Component Value Date    NITRU Negative 03/26/2022    WBCUA 0 03/26/2022    RBCUA 2 03/26/2022    BLOODU Negative 03/26/2022    SPECGRAV 1.015 03/26/2022    GLUCOSEU Negative 03/26/2022       Radiology:  CT ABDOMEN PELVIS WO CONTRAST Additional Contrast? None   Final Result   There is marked cardiomegaly, with trace pleural effusions, as well as   moderate intra-abdominal and pelvic ascites, as well as diffuse anasarca. Hepatic vein reflux is noted on the previous CT examination is well. This   combination of findings can be seen in the setting of passive liver   congestion related to congestive failure. Questionable mild surface   nodularity of the liver makes it difficult to exclude underlying cirrhosis. Minimal bibasilar airspace disease, possibly related to atelectasis or   pneumonia, though with less consolidation than seen on the previous exam.      Mild appearing bilateral renal atrophy. Suspected fat and ascitic fluid containing left inguinal hernia. No bowel   herniation. XR CHEST (2 VW)   Final Result      1. Possible mild degree of vascular congestion though exaggerated by a   suboptimal inspiration. No focal pulmonary infiltrates are noted. 2.  Stable cardiomegaly. CT HEAD WO CONTRAST   Final Result      1. No acute intracranial abnormality noted. 2.  Prominence of the sulci and/or CSF spaces suggests a possible mild degree   of cerebral atrophy.                  Assessment/Plan:    Active Hospital Problems    Diagnosis     Syncope [R55]     CHF (congestive heart failure) (HCC) [I50.9]     Syncope and collapse [R55]     Stage 3 chronic kidney disease ([address] [N18.30]     Diabetes type 2, uncontrolled ([facility] [E11.65]     Essential hypertension, benign [I10]      1. Syncope and collapse, no clear etiology, patient had another episode in emergency department, CT scan positive for anasarca. 2. Congestive heart failure, likely with acute exacerbation with presentation of cardiomegaly, bilateral pleural effusion and ascites and lower extremity edema, cardiology consulted. Ejection fraction less than 20% with diffuse hypokinesis on echo along with grade 2 diastolic dysfunction  3. Stage III CKD, monitor closely  4. Diabetes mellitus, sliding scale  5. Essential hypertension, p.o. medications  6. Elevated troponin, flat, likely due to kidney function no chest pain  7. Liver nodularity and ascites, even though ascites thought to be due to passive hepatic congestion but I cannot completely exclude cirrhotic etiology at this time GI consulted        Diet: ADULT DIET; Regular; 4 carb choices (60 gm/meal);  Low Sodium (2 gm); 1500 ml  Code Status: Full Code      Alonzo Delarosa MD

## 2022-03-26 NOTE — PROGRESS NOTES
Physician Progress Note      Rosaura Roper  CSN #:                  [id number]  :                       1955  ADMIT DATE:       3/25/2022 9:12 PM  [address] [place] DATE:  RESPONDING  PROVIDER #:        Richie Tipton MD          QUERY TEXT:    Dear Dr. Austyn Tripp,  Pt admitted with Syncope and Collapse and has CHF documented. If possible,   please document in progress notes and discharge summary further specificity   regarding the type and acuity of CHF:    The medical record reflects the following:  Risk Factors: Hx DM, CKD,CAD and HTN, CHF  Clinical Indicators:3/25 ED after episode of loss of consciousness, \" always   has leg swelling and shortness of breath not worse than usual\", CXR- Possible   mild degree of vascular congestion, trop=0.08, Pro-BNP=34,786, Admit for   ? Syncope and CHF noted  (Echo from 2021 EF=<20% and Grade 2 diastolic   dysfunction)  Treatment: [facility] and Aldactone on home meds, Echo  Thank you,  Jair Zafar RN, [email or web address]  Options provided:  -- Chronic Systolic and Diastolic CHF  -- Acute on Chronic Systolic and Diastolic CHF  -- Acute on Chronic Systolic CHF/HFrEF  -- Chronic Systolic CHF/HFrEF  -- Other - I will add my own diagnosis  -- Disagree - Not applicable / Not valid  -- Disagree - Clinically unable to determine / Unknown  -- Refer to Clinical Documentation Reviewer    PROVIDER RESPONSE TEXT:    This patient is in acute on chronic systolic and diastolic CHF. Query created by:  [id number] on 3/26/2022 12:15 PM      Electronically signed by:  Richie Tipton MD 3/26/2022 7:08 PM

## 2022-03-26 NOTE — PROGRESS NOTES
Patient resting quietly in bed at this time with no complaints of pain or shortness of breath. Breakfast was eaten and scheduled medications were given without difficulty. Patient has an ECHO ordered and will be going down for this this morning/afternoon. No needs at this time, call light within reach.

## 2022-03-26 NOTE — PROGRESS NOTES
Pt arrived via stretcher from ED to room 8789. Heart monitor connected and verified with CMU sinus tachy. VS, assessment, and admission complete. 4 eyes assessment complete. Pt oriented to unit and room. Call light and bedside table in reach. All questions answered. Pt resting quietly in bed with no complaints or voiced needs at this time. Will continue to monitor.

## 2022-03-26 NOTE — CONSULTS
Referring Physician: Dr. Marian Chapin  Reason for Consultation: \"syncope, hx of CHF, noted to have effusions and also noted to have ascites and anasarca\"  Chief Complaint: passed out    Subjective:   History of Present Illness:  Jemal Bañuelos is a 61 y.o. patient who presented to the hospital with complaints of syncope. The patient has known systolic heart failure with EF less than 20%. He established care with Dr. Cheri Louise but previously followed at [facility]. The patient was discharged from the hospital in 2/2022 and subsequently missed or canceled 2 follow-up appointments. Regardless, he presents after a syncopal episode at home. The patient seems to have poor insight and was unaware that he has gained 30 pounds from his prior hospitalization. He reports compliance with his medications. He says that he was in his usual state of health until the syncopal event. He does not recall the details and states that his son found him on the floor. He does not recall becoming lightheaded. He does not believe his ICD discharged. He denies recent chest pains. He has significant edema but denies any acute worsening. He has chronic dyspnea on exertion with minimal activity. He also endorses intermittent orthopnea. Past Medical History:   has a past medical history of CHF (congestive heart failure) ([address] Diabetes type 2, uncontrolled ([address] Diabetic nephropathy, ED (erectile dysfunction), Essential hypertension, benign, Hyperlipidemia, and Noncompliance. Surgical History:   has a past surgical history that includes eye surgery. Social History:   reports that he quit smoking about 41 years ago. He has a 10.50 pack-year smoking history. He has never used smokeless tobacco. He reports current alcohol use of about 1.0 standard drink of alcohol per week. He reports that he does not use drugs.      Family History:  family history includes Diabetes in his brother, mother, and sister; High Blood Pressure in his mother. Home Medications:  Were reviewed and are listed in nursing record and/or below  Prior to Admission medications    Medication Sig Start Date End Date Taking?  Authorizing Provider   isosorbide mononitrate (IMDUR) 30 MG extended release tablet Take 1 tablet by mouth daily 2/18/22   Melissa Johnson MD   metoprolol succinate (TOPROL XL) 25 MG extended release tablet Take 0.5 tablets by mouth daily 2/18/22   Melissa Johnson MD   torsemide 60 MG TABS Take 60 mg by mouth daily 2/18/22   Melissa Johnson MD   levothyroxine (SYNTHROID) 50 MCG tablet Take 1 tablet by mouth every morning (before breakfast) 2/18/22   Melissa Johnson MD   spironolactone (ALDACTONE) 25 MG tablet Take 1 tablet by mouth daily 2/18/22   Melissa Johnson MD   allopurinol (ZYLOPRIM) 100 MG tablet Take 1 tablet by mouth daily 11/30/21   Ronald Herrera MD   rivaroxaban Johnita Jump) 15 MG TABS tablet Take 1 tablet by mouth Daily with supper 11/30/21   Ronald Herrera MD   Multiple Vitamins-Minerals (THERAPEUTIC MULTIVITAMIN-MINERALS) tablet Take 1 tablet by mouth daily    Historical Provider, MD   STIMULANT LAXATIVE 8.6-50 MG per tablet Take 1 tablet by mouth nightly  8/23/21   Historical Provider, MD   nitroGLYCERIN (NITROSTAT) 0.4 MG SL tablet Place 0.4 mg under the tongue every 5 minutes as needed for Chest pain   Patient not taking: Reported on 3/26/2022 8/22/21   Historical Provider, MD   sacubitril-valsartan (ENTRESTO) 49-51 MG per tablet Take 1 tablet by mouth 2 times daily  3/22/19   Historical Provider, MD   atorvastatin (LIPITOR) 80 MG tablet Take 80 mg by mouth daily  1/8/19   Historical Provider, MD   Dulaglutide (TRULICITY) 2.31 EK/3.2SS SOPN Lot# [id number] Exp.06/20  Patient taking differently: Inject 0.75 mg into the muscle every 7 days  2/12/19   DAGO Benoit MD      CURRENT Medications:  allopurinol (ZYLOPRIM) tablet 100 mg, Daily  atorvastatin (LIPITOR) tablet 80 mg, Daily  isosorbide mononitrate (IMDUR) extended release tablet 30 mg, Daily  levothyroxine (SYNTHROID) tablet 50 mcg, QAM AC  metoprolol succinate (TOPROL XL) extended release tablet 12.5 mg, Daily  rivaroxaban (XARELTO) tablet 15 mg, Dinner  sodium chloride flush 0.9 % injection 5-40 mL, 2 times per day  sodium chloride flush 0.9 % injection 5-40 mL, PRN  0.9 % sodium chloride infusion, PRN  torsemide (DEMADEX) tablet 60 mg, Daily  [Held by provider] sacubitril-valsartan (ENTRESTO) 49-51 MG per tablet 1 tablet, BID  [Held by provider] spironolactone (ALDACTONE) tablet 25 mg, Daily  perflutren lipid microspheres (DEFINITY) injection 1.65 mg, ONCE PRN  ondansetron (ZOFRAN) injection 4 mg, Q6H PRN    Allergies:  Patient has no known allergies. Review of Systems:   · Constitutional: no unanticipated weight loss. There's been no change in energy level, sleep pattern, or activity level. No fevers, chills. · Eyes: No visual changes or diplopia. No scleral icterus. · ENT: No Headaches, hearing loss or vertigo. No mouth sores or sore throat. · Cardiovascular: as reviewed in HPI  · Respiratory: No cough or wheezing, no sputum production. No hemoptysis. · Gastrointestinal: No abdominal pain, appetite loss, blood in stools. No change in bowel or bladder habits. · Genitourinary: No dysuria, trouble voiding, or hematuria. · Musculoskeletal:  No gait disturbance, no joint complaints. · Integumentary: No rash or pruritis. · Neurological: No headache, diplopia, change in muscle strength, numbness or tingling. · Psychiatric: No anxiety or depression. · Endocrine: No temperature intolerance. No excessive thirst, fluid intake, or urination. No tremor. · Hematologic/Lymphatic: No abnormal bruising or bleeding, blood clots or swollen lymph nodes. · Allergic/Immunologic: No nasal congestion or hives.     Objective:   PHYSICAL EXAM:    Vitals:    03/26/22 0516   BP: 122/88   Pulse: 102   Resp: 12   Temp: 98.1 °F (36.7 °C)   SpO2: 97    Weight: 184 lb 1.4 oz (83.5 kg)       General Appearance:  Alert, cooperative, no distress, appears stated age. Head:  Normocephalic, without obvious abnormality, atraumatic. Eyes:  Pupils equal and round. No scleral icterus. Mouth: Moist mucosa, no pharyngeal erythema. Nose: Nares normal. No drainage or sinus tenderness. Neck: Supple, symmetrical, trachea midline. No adenopathy. No tenderness/mass/nodules. No carotid bruit or elevated JVD. Lungs:   Clear to auscultation bilaterally, respirations unlabored. No wheeze, rales, or rhonchi. Chest Wall:  No tenderness or deformity. Heart:  Regular rate. S1/S2 normal. No murmur, rub, or gallop. Abdomen:   Soft, non-tender, bowel sounds active.  + Ascites   Musculoskeletal: No muscle wasting or digital clubbing. Extremities: Extremities normal, atraumatic. No cyanosis. 2+ BLE edema. Pulses: 2+ radial and carotid pulses, symmetric. Skin: No rashes or lesions. Pysch: Normal mood and affect. Alert and oriented x 4. Questionable insight and judgment. Neurologic: Follows simple commands. Moves all extremities.       Labs     CBC:   Lab Results   Component Value Date    WBC 3.8 03/26/2022    RBC 3.22 03/26/2022    HGB 11.3 03/26/2022    HCT 35.2 03/26/2022    .4 03/26/2022    RDW 18.8 03/26/2022     03/26/2022     CMP:  Lab Results   Component Value Date     03/26/2022    K 4.8 03/26/2022    K 4.9 03/25/2022     03/26/2022    CO2 18 03/26/2022    BUN 75 03/26/2022    CREATININE 2.6 03/26/2022    GFRAA 30 03/26/2022    GFRAA >60 12/13/2011    AGRATIO 1.2 03/26/2022    LABGLOM 25 03/26/2022    GLUCOSE 168 03/26/2022    PROT 6.5 03/26/2022    PROT 7.7 12/13/2011    CALCIUM 8.6 03/26/2022    BILITOT 0.8 03/26/2022    ALKPHOS 127 03/26/2022    AST 22 03/26/2022    ALT 18 03/26/2022     PT/INR:  No results found for: PTINR  HgBA1c:  Lab Results   Component Value Date    LABA1C 7.7 11/27/2021     Lab Results   Component Value Date    TROPONINI 0.07 (H) 03/26/2022       Cardiac Data     EKG: Personally interpreted. 3/25/22. Sinus with PVCs. Cannot rule out anterior infarct. Nonspecific T wave abnormality. Echo: Personally interpreted. 3/26/22. Overall left ventricular systolic function appears severely reduced. Ejection fraction is visually estimated to be <20% with diffuse hypokinesis. Severely dilated left ventricle. Normal left ventricular wall thickness. Diastolic filling parameters suggest grade II diastolic dysfunction. The right ventricle appears severely dilated with moderately reduced systolic function. The left and right atria are severely dilated. Severe tricuspid regurgitation. Moderate mitral regurgitation. Estimated pulmonary artery systolic pressure is severely elevated at 66 mmHg assuming a right atrial pressure of 15 mmHg. Cath: 2/3/22. 1.  Evidence of right-sided volume overload with a right atrial pressure of 11 mmHg and a pulmonary capillary wedge pressure of 22 mmHg. 2.  Pulmonary hypertension with an instantaneous pressure of 56/25 for a mean pulmonary arterial pressure of 37 mmHg. 3.  Reduced cardiac output by thermodilution at 3.96 liters per minute with a cardiac index of 1.4 liters per kilogram per minute. By Giovani Mireles, the cardiac index was 2.14 liters per kilogram per minute. Of note, the patient is on 0.25 mcg per kilogram per minute of milrinone. 4.  Pulmonary vascular resistance of 197 dynes per second. 5. Low mixed venous oxygen saturations with an IVC of 40%, right atrium 42%, and pulmonary artery of 38%. The patient's oxygen saturation was 89% to 90% on room air. Telemetry: Personally interpreted. Sinus. Assessment and Plan   1) Syncope and collapse. Cannot rule out an arrhythmia genic event. Will have Biotronik device interrogated to exclude a VT event. 2) Acute on chronic systolic heart failure s/p Biotronik ICD. NYHA class IV. EF < 20%.  Weight 152 when discharged 2/17/22 and reportedly 184 today. Patient appears hypervolemic. Start IV Lasix. Required inotrope on previous admission. For now, continue Toprol-XL, Entresto, and Aldactone. Consider SGLT2i as an outpatient. Will consult CHF nurse coordinator. 3) CKD Stage IIIb. Continue to monitor with attempts at diuresis.    4) LV thrombus. Not evident on most recent echo. Continue Xarelto    5) Demand ischemia. History is not consistent with acute coronary syndrome. Elevated troponin may be related to decompensated CHF. Will have device interrogated for arrhythmias.    6) Coronary artery disease s/p PCI of LAD. Continue medical management and risk factor modification including use of ASA, statin, and beta-blocker. 7) Macrocytic anemia. Will defer to primary team.    8) Type II DM. Will defer management to primary team.    Overall, the problems requiring hospitalization are high in severity. Thank you for allowing us to participate in the care of [facility]. Zee Patton.  Hyacinth Ellery, [address] [place] [address] Road  3/26/2022 6:58 AM

## 2022-03-26 NOTE — PROGRESS NOTES
Medication Reconciliation    List of medications patient is currently taking is complete. Source of information: 1. Conversation with son over the phone                                      2. EPIC records      Allergies  Patient has no known allergies. Notes regarding home medications:   1. Patient has been out of torsemide and son has not been able to  for patient. 2. Patient sometimes misses doses per son. Son sets up meds for patient and keeps track of when patient misses. 3. Discussed getting him set up with [facility] pharmacy for Medi+set and delivery to assist with compliance.

## 2022-03-26 NOTE — PLAN OF CARE
Problem: Falls - Risk of:  Goal: Will remain free from falls  Description: Will remain free from falls  Outcome: Ongoing   Fall risk assessment completed per unit protocol. Patient's bed is in the lowest position, call light is within reach and the patient's room is free of clutter. The patient has been instructed to call for assistance before getting out of bed or the chair. Problem: Pain:  Goal: Patient's pain/discomfort is manageable  Description: Patient's pain/discomfort is manageable  Outcome: Ongoing   Pain/discomfort being managed with PRN analgesics per MD orders. Pt able to express presence and absence of pain and rate pain appropriately using numerical scale. Problem: Skin Integrity:  Goal: Skin integrity will stabilize  Description: Skin integrity will stabilize  Outcome: Ongoing   Patient's skin has been assessed per unit protocol and the patient is being repositioned or encouraged to turn every two hours to prevent skin breakdown and promote healing.

## 2022-03-26 NOTE — PROGRESS NOTES
4 Eyes Skin Assessment     NAME:  Aashish Campbell  YOB: 1955  MEDICAL RECORD NUMBER:  [id number]    The patient is being assess for  Admission    I agree that 2 RN's have performed a thorough Head to Toe Skin Assessment on the patient. ALL assessment sites listed below have been assessed. Areas assessed by both nurses:    Head, Face, Ears, Shoulders, Back, Chest, Arms, Elbows, Hands, Sacrum. Buttock, Coccyx, Ischium and Legs. Feet and Heels        Does the Patient have a Wound?  No noted wound(s)       Tung Prevention initiated:  No   Wound Care Orders initiated:  No    Pressure Injury (Stage 3,4, Unstageable, DTI, NWPT, and Complex wounds) if present place consult order under [de-identified] No    New and Established Ostomies if present place consult order under : No      Nurse 1 eSignature: Electronically signed by Alex Crabtree RN on 3/26/22 at 3:19 AM EDT    **SHARE this note so that the co-signing nurse is able to place an eSignature**    Nurse 2 eSignature: Electronically signed by Eugenia Bullock RN on 3/26/22 at 4:09 AM EDT

## 2022-03-26 NOTE — ED NOTES
Patient's brother at bedside, states he believes patient is \"giving up due to a broken heart from his wife passing about a year ago. \" Patient's brother states his sister mainly takes care of patient's needs along with patient's son who lives with him but feel due to everyone working and having their own families to take care of, patient needs more help. Brother also states patient has hx of CHF and DM and does not comply with his diet restrictions and that multiple family members bring him food/drinks he should not be having. Patient's brother interested in home health and education with diet restrictions and helping keep an eye on patient. Will consult social work.      Donovan Maldonado, CEZAR  03/25/22 9415

## 2022-03-26 NOTE — ED NOTES
Patient's son at bedside who patient lives with. Stated he heard a noise and found patient on the ground. Patient had fallen and hit his head on a chair in which he broke and states he lost consciousness for approximately 2-3 minutes.       Anel Pacheco RN  03/25/22 4624

## 2022-03-27 NOTE — CONSULTS
[facility]   GI Consult Note      Reason for Consult:  Elevated LFT  Requesting Physician: Reece    CHIEF COMPLAINT:  Passed out    History Obtained From:  patient, electronic medical record    HISTORY OF PRESENT ILLNESS:                The patient is a 61 y.o. male with significant past medical history of severe systolic heart failure with EF of 20 %. Admitted with syncope due to VT. CT showed a questionable nodular liver with only an isolated elevation in his alkaline phosphatase. He denies alcohol abuse.     Past Medical History:        Diagnosis Date    CHF (congestive heart failure) (HCC)     Diabetes type 2, uncontrolled ([address] 7/28/2014    Diabetic nephropathy 9/12/2013    ED (erectile dysfunction) 9/12/2013    Essential hypertension, benign 9/12/2013    Hyperlipidemia 9/12/2013    Noncompliance 7/28/2014     Past Surgical History:        Procedure Laterality Date    EYE SURGERY       Current Medications:    Current Facility-Administered Medications: amiodarone (CORDARONE) tablet 200 mg, 200 mg, Oral, BID  [START ON 3/28/2022] metoprolol succinate (TOPROL XL) extended release tablet 25 mg, 25 mg, Oral, Daily  allopurinol (ZYLOPRIM) tablet 100 mg, 100 mg, Oral, Daily  atorvastatin (LIPITOR) tablet 80 mg, 80 mg, Oral, Daily  levothyroxine (SYNTHROID) tablet 50 mcg, 50 mcg, Oral, QAM AC  rivaroxaban (XARELTO) tablet 15 mg, 15 mg, Oral, Dinner  sodium chloride flush 0.9 % injection 5-40 mL, 5-40 mL, IntraVENous, 2 times per day  sodium chloride flush 0.9 % injection 5-40 mL, 5-40 mL, IntraVENous, PRN  0.9 % sodium chloride infusion, 25 mL, IntraVENous, PRN  spironolactone (ALDACTONE) tablet 25 mg, 25 mg, Oral, Daily  perflutren lipid microspheres (DEFINITY) injection 1.65 mg, 1.5 mL, IntraVENous, ONCE PRN  ondansetron (ZOFRAN) injection 4 mg, 4 mg, IntraVENous, Q6H PRN  sacubitril-valsartan (ENTRESTO) 24-26 MG per tablet 1 tablet, 1 tablet, Oral, BID  furosemide (LASIX) injection 60 mg, 60 mg, IntraVENous, BID  aspirin chewable tablet 81 mg, 81 mg, Oral, Daily  Allergies:  Patient has no known allergies. Social History:    Social History     Socioeconomic History    Marital status:      Spouse name: Not on file    Number of children: Not on file    Years of education: Not on file    Highest education level: Not on file   Occupational History    Not on file   Tobacco Use    Smoking status: Former Smoker     Packs/day: 1.50     Years: 7.00     Pack years: 10.50     Quit date: 1981     Years since quittin.1    Smokeless tobacco: Never Used   Vaping Use    Vaping Use: Never used   Substance and Sexual Activity    Alcohol use: Yes     Alcohol/week: 1.0 standard drink     Types: 1 Cans of beer per week    Drug use: No    Sexual activity: Yes     Partners: Female   Other Topics Concern    Not on file   Social History Narrative    Not on file     Social Determinants of Health     Financial Resource Strain:     Difficulty of Paying Living Expenses: Not on file   Food Insecurity:     Worried About Running Out of Food in the Last Year: Not on file    Karli of Food in the Last Year: Not on file   Transportation Needs:     Lack of Transportation (Medical): Not on file    Lack of Transportation (Non-Medical):  Not on file   Physical Activity:     Days of Exercise per Week: Not on file    Minutes of Exercise per Session: Not on file   Stress:     Feeling of Stress : Not on file   Social Connections:     Frequency of Communication with Friends and Family: Not on file    Frequency of Social Gatherings with Friends and Family: Not on file    Attends Sikhism Services: Not on file    Active Member of Clubs or Organizations: Not on file    Attends Club or Organization Meetings: Not on file    Marital Status: Not on file   Intimate Partner Violence:     Fear of Current or Ex-Partner: Not on file    Emotionally Abused: Not on file    Physically Abused: Not on file   Ne Sexually Abused: Not on file   Housing Stability:     Unable to Pay for Housing in the Last Year: Not on file    Number of Places Lived in the Last Year: Not on file    Unstable Housing in the Last Year: Not on file       Family History:       Problem Relation Age of Onset    Diabetes Mother     High Blood Pressure Mother     Diabetes Sister     Diabetes Brother      REVIEW OF SYSTEMS:    CONSTITUTIONAL:  negative  EYES:  negative  HEENT:  negative  RESPIRATORY:  negative  CARDIOVASCULAR:  negative  GASTROINTESTINAL:  negative  INTEGUMENT/BREAST:  negative  HEMATOLOGIC/LYMPHATIC:  negative  ENDOCRINE:  negative  MUSCULOSKELETAL:  negative  NEUROLOGICAL:  negative  PHYSICAL EXAM:    General Appearance: alert and oriented to person, place and time, well developed and well- nourished, in no acute distress  Skin: warm and dry, no rash or erythema  Head: normocephalic and atraumatic  Eyes: pupils equal, round, and reactive to light, extraocular eye movements intact, conjunctivae normal  ENT: tympanic membrane, external ear and ear canal normal bilaterally, nose without deformity, nasal mucosa and turbinates normal without polyps  Neck: supple and non-tender without mass, no thyromegaly or thyroid nodules, no cervical lymphadenopathy  Pulmonary/Chest: clear to auscultation bilaterally- no wheezes, rales or rhonchi, normal air movement, no respiratory distress  Cardiovascular: normal rate, regular rhythm, normal S1 and S2, no murmurs, rubs, clicks, or gallops, distal pulses intact, no carotid bruits  Abdomen: soft, non-tender, non-distended, normal bowel sounds, no masses or organomegaly  Extremities: no cyanosis, clubbing or edema  Musculoskeletal: normal range of motion, no joint swelling, deformity or tenderness  Neurologic: reflexes normal and symmetric, no cranial nerve deficit, gait, coordination and speech normal  Vitals:    BP (!) 146/128   Pulse 87   Temp 98.1 °F (36.7 °C) (Oral)   Resp 19   Ht 5' 6\" (1.676 m)   Wt 178 lb 1.6 oz (80.8 kg)   SpO2 97%   BMI 28.75 kg/m²     DATA:    CBC with Differential:    Lab Results   Component Value Date    WBC 3.1 03/27/2022    RBC 2.90 03/27/2022    HGB 10.5 03/27/2022    HCT 31.3 03/27/2022     03/27/2022    .8 03/27/2022    MCH 36.1 03/27/2022    MCHC 33.5 03/27/2022    RDW 18.7 03/27/2022    SEGSPCT 60.7 12/13/2011    LYMPHOPCT 14.1 03/27/2022    MONOPCT 7.6 03/27/2022    EOSPCT 2.9 12/13/2011    BASOPCT 1.4 03/27/2022    MONOSABS 0.2 03/27/2022    LYMPHSABS 0.4 03/27/2022    EOSABS 0.2 03/27/2022    BASOSABS 0.0 03/27/2022    DIFFTYPE Auto-K 12/13/2011     CMP:    Lab Results   Component Value Date     03/27/2022    K 4.4 03/27/2022    K 4.9 03/25/2022     03/27/2022    CO2 20 03/27/2022    BUN 80 03/27/2022    CREATININE 2.5 03/27/2022    GFRAA 32 03/27/2022    GFRAA >60 12/13/2011    AGRATIO 1.2 03/27/2022    LABGLOM 26 03/27/2022    GLUCOSE 188 03/27/2022    PROT 6.0 03/27/2022    PROT 7.7 12/13/2011    LABALBU 3.3 03/27/2022    CALCIUM 8.4 03/27/2022    BILITOT 0.7 03/27/2022    ALKPHOS 111 03/27/2022    AST 18 03/27/2022    ALT 15 03/27/2022     BMP:    Lab Results   Component Value Date     03/27/2022    K 4.4 03/27/2022    K 4.9 03/25/2022     03/27/2022    CO2 20 03/27/2022    BUN 80 03/27/2022    LABALBU 3.3 03/27/2022    CREATININE 2.5 03/27/2022    CALCIUM 8.4 03/27/2022    GFRAA 32 03/27/2022    GFRAA >60 12/13/2011    LABGLOM 26 03/27/2022    GLUCOSE 188 03/27/2022       IMPRESSION/RECOMMENDATIONS:      1. Ascites and nodular liver likely due to chronic heart failure. He denies alcohol abuse. Had syncope due to VT. No workup for liver disease is needed in the setting of his chronic heart failure. Diuretics per cardiology.     Electronically signed by Alem Duval MD on 3/27/2022 at 1:41 PM

## 2022-03-27 NOTE — PROGRESS NOTES
Patient's pacemaker interrogation was completed 3/26 and then the report was faxed over and placed on the paper chart.

## 2022-03-27 NOTE — PROGRESS NOTES
Referring Physician: Dr. Elsa Garcia  Reason for Consultation: \"syncope, hx of CHF, noted to have effusions and also noted to have ascites and anasarca\"  Chief Complaint: passed out    Subjective:   History of Present Illness:  Quentin Tucker is a 61 y.o. patient who presented to the hospital with complaints of syncope. The patient has known systolic heart failure with EF less than 20%. He established care with Dr. Ernestine Rush but previously followed at [facility]. The patient was discharged from the hospital in 2/2022 and subsequently missed or canceled 2 follow-up appointments. Regardless, he presents after a syncopal episode at home. The patient seems to have poor insight and was unaware that he has gained 30 pounds from his prior hospitalization. He reports compliance with his medications. He says that he was in his usual state of health until the syncopal event. He does not recall the details and states that his son found him on the floor. He does not recall becoming lightheaded. He does not believe his ICD discharged. He denies recent chest pains. He has significant edema but denies any acute worsening. He has chronic dyspnea on exertion with minimal activity. He also endorses intermittent orthopnea. Interval history:  No acute overnight cardiac events. Device interrogation showed the patient had a VT arrest.  The VT was aborted by [facility]. The patient did not receive a defibrillation. He has not had any significant arrhythmias while hospitalized. He feels his lower extremity edema is somewhat improved. He denies associated chest pains or shortness of breath at rest today. Past Medical History:   has a past medical history of CHF (congestive heart failure) ([address] Diabetes type 2, uncontrolled ([address] Diabetic nephropathy, ED (erectile dysfunction), Essential hypertension, benign, Hyperlipidemia, and Noncompliance. Surgical History:   has a past surgical history that includes eye surgery.      Social History:   reports that he quit smoking about 41 years ago. He has a 10.50 pack-year smoking history. He has never used smokeless tobacco. He reports current alcohol use of about 1.0 standard drink of alcohol per week. He reports that he does not use drugs. Family History:  family history includes Diabetes in his brother, mother, and sister; High Blood Pressure in his mother. Home Medications:  Were reviewed and are listed in nursing record and/or below  Prior to Admission medications    Medication Sig Start Date End Date Taking?  Authorizing Provider   isosorbide mononitrate (IMDUR) 30 MG extended release tablet Take 1 tablet by mouth daily 2/18/22   Kathia Snider MD   metoprolol succinate (TOPROL XL) 25 MG extended release tablet Take 0.5 tablets by mouth daily 2/18/22   Kathia Snider MD   torsemide 60 MG TABS Take 60 mg by mouth daily 2/18/22   Kathia Snider MD   levothyroxine (SYNTHROID) 50 MCG tablet Take 1 tablet by mouth every morning (before breakfast) 2/18/22   Kathia Snider MD   spironolactone (ALDACTONE) 25 MG tablet Take 1 tablet by mouth daily 2/18/22   Kathia Snider MD   allopurinol (ZYLOPRIM) 100 MG tablet Take 1 tablet by mouth daily 11/30/21   Joshua Johnson MD   rivaroxaban Clerance [place]) 15 MG TABS tablet Take 1 tablet by mouth Daily with supper 11/30/21   Joshua Johnson MD   Multiple Vitamins-Minerals (THERAPEUTIC MULTIVITAMIN-MINERALS) tablet Take 1 tablet by mouth daily    Historical Provider, MD   STIMULANT LAXATIVE 8.6-50 MG per tablet Take 1 tablet by mouth as needed  8/23/21   Historical Provider, MD   nitroGLYCERIN (NITROSTAT) 0.4 MG SL tablet Place 0.4 mg under the tongue every 5 minutes as needed for Chest pain   Patient not taking: Reported on 3/26/2022 8/22/21   Historical Provider, MD   sacubitril-valsartan (ENTRESTO) 49-51 MG per tablet Take 1 tablet by mouth 2 times daily  3/22/19   Historical Provider, MD   atorvastatin (LIPITOR) 80 MG tablet Take 80 mg by mouth daily  1/8/19   Historical Provider, MD   Dulaglutide (TRULICITY) 0.54 VE/3.6LT SOPN Lot# [id number] Exp.06/20  Patient taking differently: Inject 0.75 mg into the muscle every 7 days  2/12/19   DAGO Quinteros MD      CURRENT Medications:  allopurinol (ZYLOPRIM) tablet 100 mg, Daily  atorvastatin (LIPITOR) tablet 80 mg, Daily  isosorbide mononitrate (IMDUR) extended release tablet 30 mg, Daily  levothyroxine (SYNTHROID) tablet 50 mcg, QAM AC  metoprolol succinate (TOPROL XL) extended release tablet 12.5 mg, Daily  rivaroxaban (XARELTO) tablet 15 mg, Dinner  sodium chloride flush 0.9 % injection 5-40 mL, 2 times per day  sodium chloride flush 0.9 % injection 5-40 mL, PRN  0.9 % sodium chloride infusion, PRN  spironolactone (ALDACTONE) tablet 25 mg, Daily  perflutren lipid microspheres (DEFINITY) injection 1.65 mg, ONCE PRN  ondansetron (ZOFRAN) injection 4 mg, Q6H PRN  sacubitril-valsartan (ENTRESTO) 24-26 MG per tablet 1 tablet, BID  furosemide (LASIX) injection 60 mg, BID  aspirin chewable tablet 81 mg, Daily    Allergies:  Patient has no known allergies. Review of Systems:   · Constitutional: no unanticipated weight loss. There's been no change in energy level, sleep pattern, or activity level. No fevers, chills. · Eyes: No visual changes or diplopia. No scleral icterus. · ENT: No Headaches, hearing loss or vertigo. No mouth sores or sore throat. · Cardiovascular: as reviewed in HPI  · Respiratory: No cough or wheezing, no sputum production. No hemoptysis. · Gastrointestinal: No abdominal pain, appetite loss, blood in stools. No change in bowel or bladder habits. · Genitourinary: No dysuria, trouble voiding, or hematuria. · Musculoskeletal:  No gait disturbance, no joint complaints. · Integumentary: No rash or pruritis. · Neurological: No headache, diplopia, change in muscle strength, numbness or tingling. · Psychiatric: No anxiety or depression.   · Endocrine: No temperature intolerance. No excessive thirst, fluid intake, or urination. No tremor. · Hematologic/Lymphatic: No abnormal bruising or bleeding, blood clots or swollen lymph nodes. · Allergic/Immunologic: No nasal congestion or hives. Objective:   PHYSICAL EXAM:    Vitals:    03/27/22 0744   BP:  111/80   Pulse:  88   Resp:  14   Temp:  97.7   SpO2: 95%   Weight: 178 lb 1.6 oz (80.8 kg)       General Appearance:  Alert, cooperative, no distress, appears stated age. Head:  Normocephalic, without obvious abnormality, atraumatic. Eyes:  Pupils equal and round. No scleral icterus. Mouth: Moist mucosa, no pharyngeal erythema. Nose: Nares normal. No drainage or sinus tenderness. Neck: Supple, symmetrical, trachea midline. No adenopathy. No tenderness/mass/nodules. No carotid bruit or elevated JVD. Lungs:   Clear to auscultation bilaterally, respirations unlabored. No wheeze, rales, or rhonchi. Chest Wall:  No tenderness or deformity. Heart:  Regular rate. S1/S2 normal. No murmur, rub, or gallop. Abdomen:   Soft, non-tender, bowel sounds active.  + Ascites   Musculoskeletal: No muscle wasting or digital clubbing. Extremities: Extremities normal, atraumatic. No cyanosis. 2+ BLE edema. Pulses: 2+ radial and carotid pulses, symmetric. Skin: No rashes or lesions. Pysch: Normal mood and affect. Alert and oriented x 4. Questionable insight and judgment. Neurologic: Follows simple commands. Moves all extremities.       Labs     CBC:   Lab Results   Component Value Date    WBC 3.1 03/27/2022    RBC 2.90 03/27/2022    HGB 10.5 03/27/2022    HCT 31.3 03/27/2022    .8 03/27/2022    RDW 18.7 03/27/2022     03/27/2022     CMP:  Lab Results   Component Value Date     03/27/2022    K 4.4 03/27/2022    K 4.9 03/25/2022     03/27/2022    CO2 20 03/27/2022    BUN 80 03/27/2022    CREATININE 2.5 03/27/2022    GFRAA 32 03/27/2022    GFRAA >60 12/13/2011    AGRATIO 1.2 03/27/2022 LABGLOM 26 03/27/2022    GLUCOSE 188 03/27/2022    PROT 6.0 03/27/2022    PROT 7.7 12/13/2011    CALCIUM 8.4 03/27/2022    BILITOT 0.7 03/27/2022    ALKPHOS 111 03/27/2022    AST 18 03/27/2022    ALT 15 03/27/2022     PT/INR:  No results found for: PTINR  HgBA1c:  Lab Results   Component Value Date    LABA1C 7.7 11/27/2021     Lab Results   Component Value Date    TROPONINI 0.07 (H) 03/26/2022       Cardiac Data     EKG: Personally interpreted. 3/25/22. Sinus with PVCs. Cannot rule out anterior infarct. Nonspecific T wave abnormality. Echo: Personally interpreted. 3/26/22. Overall left ventricular systolic function appears severely reduced. Ejection fraction is visually estimated to be <20% with diffuse hypokinesis. Severely dilated left ventricle. Normal left ventricular wall thickness. Diastolic filling parameters suggest grade II diastolic dysfunction. The right ventricle appears severely dilated with moderately reduced systolic function. The left and right atria are severely dilated. Severe tricuspid regurgitation. Moderate mitral regurgitation. Estimated pulmonary artery systolic pressure is severely elevated at 66 mmHg assuming a right atrial pressure of 15 mmHg. Cath: 2/3/22. 1.  Evidence of right-sided volume overload with a right atrial pressure of 11 mmHg and a pulmonary capillary wedge pressure of 22 mmHg. 2.  Pulmonary hypertension with an instantaneous pressure of 56/25 for a mean pulmonary arterial pressure of 37 mmHg. 3.  Reduced cardiac output by thermodilution at 3.96 liters per minute with a cardiac index of 1.4 liters per kilogram per minute. By Edna Rossi, the cardiac index was 2.14 liters per kilogram per minute. Of note, the patient is on 0.25 mcg per kilogram per minute of milrinone. 4.  Pulmonary vascular resistance of 197 dynes per second. 5. Low mixed venous oxygen saturations with an IVC of 40%, right atrium 42%, and pulmonary artery of 38%.   The patient's oxygen saturation was 89% to 90% on room air. Telemetry: Personally interpreted. Sinus. Assessment and Plan   1) Ventricular tachycardia. Device interrogation shows VT at 8:48 PM which is when the patient says he \"passed out. \"  The arrhythmia was terminated by ATP. Will start oral amiodarone and increase Toprol-XL. 2) Acute on chronic systolic heart failure s/p Biotronik ICD. NYHA class IV. EF < 20%. Weight 152 when discharged 2/17/22 and reportedly 178 today. Patient appears hypervolemic. Continue IV Lasix. Required inotrope on previous admission. For now, continue Toprol-XL, Entresto, and Aldactone. Consider SGLT2i as an outpatient. Will consult CHF nurse coordinator. 3) CKD Stage IIIb. Continue to monitor with attempts at diuresis.    4) LV thrombus. Not evident on most recent echo. Continue Xarelto    5) Demand ischemia. History is not consistent with acute coronary syndrome. Elevated troponin may be related to decompensated CHF and VT.       6) Coronary artery disease s/p PCI of LAD. Continue medical management and risk factor modification including use of ASA, statin, and beta-blocker. 7) Macrocytic anemia. Will defer to primary team.    8) Type II DM. Will defer management to primary team.    Overall, the problems requiring hospitalization are high in severity. Thank you for allowing us to participate in the care of [facility]. Judit Farris.  Mar Storey, [address] [place] [address] Road  3/27/2022 10:17 AM

## 2022-03-27 NOTE — PLAN OF CARE
Problem: Falls - Risk of:  Goal: Will remain free from falls  Description: Will remain free from falls  3/26/2022 2351 by Mohammed Boast, RN  Outcome: Ongoing     Problem: Falls - Risk of:  Goal: Absence of physical injury  Description: Absence of physical injury  Outcome: Ongoing     Problem: Pain:  Goal: Patient's pain/discomfort is manageable  Description: Patient's pain/discomfort is manageable  3/26/2022 2351 by Mohammed Boast, RN  Outcome: Ongoing     Problem: Skin Integrity:  Goal: Skin integrity will stabilize  Description: Skin integrity will stabilize  3/26/2022 2351 by Mohammed Boast, RN  Outcome: Ongoing     Problem: Infection:  Goal: Will remain free from infection  Description: Will remain free from infection  Outcome: Ongoing     Problem: Safety:  Goal: Free from accidental physical injury  Description: Free from accidental physical injury  Outcome: Ongoing     Problem: Daily Care:  Goal: Daily care needs are met  Description: Daily care needs are met  Outcome: Ongoing

## 2022-03-27 NOTE — PROGRESS NOTES
Physical Therapy    Facility/Department: [address] PROGRESSIVE CARE  Initial Assessment    NAME: Froilan Santos  : 1955  MRN: [id number]    Date of Service: 3/27/2022    Discharge Recommendations:  Continue to assess pending progress,Home with assist PRN   PT Equipment Recommendations  Other: Will monitor for potential equipt needs. Froilan Santos scored a 19/24 on the AM-PAC short mobility form. At this time, no further PT is recommended upon discharge. Assessment   Body structures, Functions, Activity limitations: Decreased functional mobility ; Decreased endurance;Decreased balance  Assessment: 62 y/o male admit 3/25/2022 with Syncope/Collapse, Acute Exac CHF. CT Head negative. PMH as noted including CHF, CKD, ICD place, DM, Diabetic Nephropathy. PTA pt living with son in apt setting with 2 steps to enter; independent daily care and functional mobility. Anticipate adequate progress for d/c home; do not anticipate need cont PT upon d/c although will monitor. Prognosis: Good  Decision Making: Low Complexity  History: 62 y/o male admit 3/25/2022 with Syncope/Collapse, Acute Exac CHF. CT Head negative. PMH as noted including CHF, CKD, ICD place, DM, Diabetic Nephropathy. Exam: See above. Clinical Presentation: See above. Patient Education: Role of PT, POC, Need to call for assist, Safe use of Walker. Barriers to Learning: None. REQUIRES PT FOLLOW UP: Yes  Activity Tolerance  Activity Tolerance: Patient Tolerated treatment well       Patient Diagnosis(es): The encounter diagnosis was Syncope and collapse.     has a past medical history of CHF (congestive heart failure) ([address] Diabetes type 2, uncontrolled ([address] Diabetic nephropathy, ED (erectile dysfunction), Essential hypertension, benign, Hyperlipidemia, and Noncompliance. has a past surgical history that includes eye surgery.     Restrictions  Restrictions/Precautions  Restrictions/Precautions: Fall Risk  Vision/Hearing  Vision: Impaired  Vision Exceptions: Cataracts  Hearing: Within functional limits     Subjective  General  Chart Reviewed: Yes  Patient assessed for rehabilitation services?: Yes  Additional Pertinent Hx: 60 y/o male admit 3/25/2022 with Syncope/Collapse, Acute Exac CHF. CT Head negative. PMH as noted including CHF, CKD, ICD place, DM, Diabetic Nephropathy. Family / Caregiver Present: No  Referring Practitioner: Dr. Osito Charles. Follows Commands: Within Functional Limits  Subjective  Subjective: Pt agreeable to PT Eval/Rx. Pain Screening  Patient Currently in Pain: Denies          Orientation  Orientation  Overall Orientation Status: Within Functional Limits  Social/Functional History  Social/Functional History  Lives With: Son  Type of Home: Apartment  Home Layout: One level  Home Access: Stairs to enter without rails (2 steps (descend) to enter.)  Bathroom Shower/Tub: Tub/Shower unit  Bathroom Toilet: Standard  Bathroom Accessibility: Accessible  Home Equipment: 4 wheeled walker  ADL Assistance: [address] [place] [address] Avenue:  (Shared with son.)  Ambulation Assistance: Independent (Without assist device pta.)  Transfer Assistance: Independent  Additional Comments: Pt reports adequate assist/support upon d/c. Cognition   Cognition  Overall Cognitive Status: WFL    Objective     Observation/Palpation  Observation: Edematous B LEs. AROM RLE (degrees)  RLE AROM: WFL  AROM LLE (degrees)  LLE AROM : WFL  AROM RUE (degrees)  RUE AROM : WFL  AROM LUE (degrees)  LUE AROM : WFL  Strength RLE  Strength RLE: WFL  Strength LLE  Strength LLE: WFL  Strength RUE  Strength RUE: WFL  Strength LUE  Strength LUE: WFL        Bed mobility  Supine to Sit: Supervision  Sit to Supine: Supervision  Transfers  Sit to Stand: Contact guard assistance (With Gisel [place]. Cues for safe hand placement.)  Stand to sit: Contact guard assistance (With Gisel [place]. Cues for safe hand placement.)  Comment: Toilet Transfers CGA.   Independent pericare. Ambulation  Ambulation?: Yes  Ambulation 1  Surface: level tile  Device: Rolling Walker  Distance: Pt amb to/from bathroom (25' x 2) with Walker CGA. Diminished step length/clearance although no LE buckling/giving way. Cues for safe transitional mvts. Balance  Sitting - Static: Good  Sitting - Dynamic: Good  Standing - Static: Good;-  Standing - Dynamic: Fair;+        Plan   Plan  Times per week: 3-5x week while in acute care setting. Current Treatment Recommendations: Functional Mobility Training,Transfer Training,Gait Training,Safety Education & Training,Patient/Caregiver Education & Training  Safety Devices  Type of devices: Bed alarm in place,Call light within reach,Left in bed,Nurse notified         AM-PAC Score  AM-PAC Inpatient Mobility Raw Score : 19 (03/27/22 1146)  AM-PAC Inpatient T-Scale Score : 45.44 (03/27/22 1146)  Mobility Inpatient CMS 0-100% Score: 41.77 (03/27/22 1146)  Mobility Inpatient CMS G-Code Modifier : CK (03/27/22 1146)          Goals  Short term goals  Time Frame for Short term goals: Upon d/c acute care setting. Short term goal 1: Bed Mob Independent. Short term goal 2: Transfers with/without assist device Supervision. Short term goal 3: Amb with/without assist device 50-75' SBA/Supervision. Patient Goals   Patient goals : Return home.        Therapy Time   Individual Concurrent Group Co-treatment   Time In 1000         Time Out 1040         Minutes [facility]

## 2022-03-27 NOTE — PROGRESS NOTES
Hospitalist Progress Note      PCP: Day Salinas DO    Date of Admission: 3/25/2022      Subjective: Feels ok, no chest pain or SOB at rest, no muscle cramps. Medications:  Reviewed    Infusion Medications    sodium chloride       Scheduled Medications    allopurinol  100 mg Oral Daily    atorvastatin  80 mg Oral Daily    isosorbide mononitrate  30 mg Oral Daily    levothyroxine  50 mcg Oral QAM AC    metoprolol succinate  12.5 mg Oral Daily    rivaroxaban  15 mg Oral Dinner    sodium chloride flush  5-40 mL IntraVENous 2 times per day    spironolactone  25 mg Oral Daily    sacubitril-valsartan  1 tablet Oral BID    furosemide  60 mg IntraVENous BID    aspirin  81 mg Oral Daily     PRN Meds: sodium chloride flush, sodium chloride, perflutren lipid microspheres, ondansetron      Intake/Output Summary (Last 24 hours) at 3/27/2022 0854  Last data filed at 3/27/2022 0732  Gross per 24 hour   Intake 420 ml   Output 3450 ml   Net -3030 ml       Physical Exam Performed:    /80   Pulse 88   Temp 97.7 °F (36.5 °C) (Oral)   Resp 14   Ht 5' 6\" (1.676 m)   Wt 178 lb 1.6 oz (80.8 kg)   SpO2 95%   BMI 28.75 kg/m²     General appearance: No apparent distress  Neck: Supple  Respiratory:  Normal respiratory effort. Clear to auscultation, bilaterally without Rales/Wheezes/Rhonchi. Cardiovascular: Regular rate and rhythm with normal S1/S2 without murmurs, rubs or gallops. Abdomen: Soft, non-tender, non-distended  Musculoskeletal: No clubbing, cyanosis. Bilateral leg edema. Skin: Skin color, texture, turgor normal.  No rashes or lesions.   Neurologic:  No focal weakness   Psychiatric: Alert and oriented  Capillary Refill: Brisk,3 seconds, normal   Peripheral Pulses: +2 palpable, equal bilaterally       Labs:   Recent Labs     03/25/22 2157 03/26/22  0357 03/27/22  0626   WBC 3.9* 3.8* 3.1*   HGB 12.8* 11.3* 10.5*   HCT 40.0* 35.2* 31.3*    177 155     Recent Labs     03/25/22 2157 03/26/22  0357 03/27/22  0626    139 138   K 4.9 4.8 4.4    105 104   CO2 15* 18* 20*   BUN 79* 75* 80*   CREATININE 2.6* 2.6* 2.5*   CALCIUM 9.4 8.6 8.4     Recent Labs     03/25/22 2157 03/26/22  0357 03/27/22  0626   AST 27 22 18   ALT 23 18 15   BILITOT 1.0 0.8 0.7   ALKPHOS 166* 127 111     No results for input(s): INR in the last 72 hours. Recent Labs     03/25/22 2157 03/26/22  0234 03/26/22 0357   TROPONINI 0.08* 0.08* 0.07*       Urinalysis:      Lab Results   Component Value Date    NITRU Negative 03/26/2022    WBCUA 0 03/26/2022    RBCUA 2 03/26/2022    BLOODU Negative 03/26/2022    SPECGRAV 1.015 03/26/2022    GLUCOSEU Negative 03/26/2022       Radiology:  CT ABDOMEN PELVIS WO CONTRAST Additional Contrast? None   Final Result   There is marked cardiomegaly, with trace pleural effusions, as well as   moderate intra-abdominal and pelvic ascites, as well as diffuse anasarca. Hepatic vein reflux is noted on the previous CT examination is well. This   combination of findings can be seen in the setting of passive liver   congestion related to congestive failure. Questionable mild surface   nodularity of the liver makes it difficult to exclude underlying cirrhosis. Minimal bibasilar airspace disease, possibly related to atelectasis or   pneumonia, though with less consolidation than seen on the previous exam.      Mild appearing bilateral renal atrophy. Suspected fat and ascitic fluid containing left inguinal hernia. No bowel   herniation. XR CHEST (2 VW)   Final Result      1. Possible mild degree of vascular congestion though exaggerated by a   suboptimal inspiration. No focal pulmonary infiltrates are noted. 2.  Stable cardiomegaly. CT HEAD WO CONTRAST   Final Result      1. No acute intracranial abnormality noted. 2.  Prominence of the sulci and/or CSF spaces suggests a possible mild degree   of cerebral atrophy. Assessment/Plan:    Active Hospital Problems    Diagnosis     Syncope [R55]     CHF (congestive heart failure) ([facility] [I50.9]     Syncope and collapse [R55]     ICD (implantable cardioverter-defibrillator) in place [Z95.810]     Demand ischemia ([facility] [I24.8]     Acute on chronic systolic heart failure, NYHA class 4 (HCC) [I50.23]     Stage 3 chronic kidney disease ([facility] [N18.30]     Diabetes type 2, uncontrolled ([facility] [E11.65]     Essential hypertension, benign [I10]      1. Syncope and collapse, no clear etiology, patient had another episode in emergency department, CT scan positive for anasarca. Cardiology consulted, and his device need to be checked by [employer] to exclude VT event  2. Congestive heart failure, likely with acute exacerbation with presentation of cardiomegaly, bilateral pleural effusion and ascites and lower extremity edema, cardiology consulted. Ejection fraction less than 20% with diffuse hypokinesis on echo along with grade 2 diastolic dysfunction, IV Lasix, continue Toprol Entresto and Aldactone now  3. Stage III CKD, monitor closely  4. Diabetes mellitus, sliding scale  5. Essential hypertension, p.o. medications  6. Elevated troponin, flat, likely due to kidney function no chest pain  7. Liver nodularity and ascites, even though ascites thought to be due to passive hepatic congestion but I cannot completely exclude cirrhotic etiology at this time GI consulted and recommendation pending      Diet: ADULT DIET; Regular; 4 carb choices (60 gm/meal);  Low Sodium (2 gm); 1500 ml  Code Status: Full Code        Dispo -ongoing management    Pablo Ramirez MD

## 2022-03-27 NOTE — PLAN OF CARE
Problem: Falls - Risk of:  Goal: Will remain free from falls  Description: Will remain free from falls  3/27/2022 1244 by Ranjit Desai RN  Outcome: Ongoing   Fall risk assessment completed per unit protocol. Patient's bed is in the lowest position, call light is within reach and the patient's room is free of clutter. The patient has been instructed to call for assistance before getting out of bed or the chair. Problem: Pain:  Goal: Patient's pain/discomfort is manageable  Description: Patient's pain/discomfort is manageable  3/27/2022 1244 by Ranjit Desai RN  Outcome: Ongoing   Pain/discomfort being managed with PRN analgesics per MD orders. Pt able to express presence and absence of pain and rate pain appropriately using numerical scale. Problem: Skin Integrity:  Goal: Skin integrity will stabilize  Description: Skin integrity will stabilize  3/27/2022 1244 by Ranjit Desai RN  Outcome: Ongoing   Patient's skin has been assessed per unit protocol and the patient is being repositioned or encouraged to turn every two hours to prevent skin breakdown and promote healing.

## 2022-03-28 NOTE — PROGRESS NOTES
Occupational Therapy   Occupational Therapy Initial Assessment and Tentative D/C    Date: 3/28/2022   Patient Name: Froilan Santos  MRN: [id number]     : 1955    Date of Service: 3/28/2022    Discharge Recommendations: Froilan Santos scored a 21/24 on the AM-PAC ADL Inpatient form. Current research shows that an AM-PAC score of 18 or greater is typically associated with a discharge to the patient's home setting. If patient discharges prior to next session this note will serve as a discharge summary. Please see below for the latest assessment towards goals. Continue to assess pending progress,Home with assist PRN  OT Equipment Recommendations  Equipment Needed: No    Assessment   Performance deficits / Impairments: Decreased functional mobility ; Decreased strength;Decreased endurance;Decreased ADL status; Decreased balance;Decreased high-level IADLs  Assessment: Froilan Santos is a 61 y.o. male who presents to the emergency department via EMS from home where he lives with his son after passing out. He states he felt fine today no chest pain or shortness of breath. He had eaten his dinner he was getting up walking when he collapsed to the ground. He vaguely remembers getting lightheaded prior to this. His son called 46. He has a history of diabetes, coronary artery disease, congestive heart failure. He states he did not take his Xarelto today. Denies pain in his hips arms or neck. He tells me he always has leg swelling and shortness of breath and that this is not worse than normal. PTA pt from home with son where pt was Ind with mobility and ADLs. Pt currently requires SBA for mobility and transfers with use of RW. Pt able to ambulate household distances in room with no LOB. No reports of light headedness/dizziness. Anticipate pt needing up to SBA for ADLs. Pt's BP taken supine 106/84, seated 109/79, in stance 111/80. Pt will benefit from skilled OT services at this time.  Anticipate pt safe to return home with PRN assist.  Prognosis: Good  Decision Making: Low Complexity  Exam: see above  Assistance / Modification: RW  OT Education: OT Role;Plan of Care;Transfer Training  REQUIRES OT FOLLOW UP: Yes  Activity Tolerance  Activity Tolerance: Patient Tolerated treatment well  Safety Devices  Safety Devices in place: Yes  Type of devices: Left in chair;Chair alarm in place;Call light within reach;Nurse notified           Patient Diagnosis(es): The encounter diagnosis was Syncope and collapse.     has a past medical history of CHF (congestive heart failure) ([address] Diabetes type 2, uncontrolled ([address] Diabetic nephropathy, ED (erectile dysfunction), Essential hypertension, benign, Hyperlipidemia, and Noncompliance. has a past surgical history that includes eye surgery. Restrictions  Restrictions/Precautions  Restrictions/Precautions: Fall Risk    Subjective   General  Chart Reviewed: Yes  Patient assessed for rehabilitation services?: Yes  Additional Pertinent Hx: per ED note, Maribel Banks is a 61 y.o. male who presents to the emergency department via EMS from home where he lives with his son after passing out. He states he felt fine today no chest pain or shortness of breath. He had eaten his dinner he was getting up walking when he collapsed to the ground. He vaguely remembers getting lightheaded prior to this. His son called 46. He has a history of diabetes, coronary artery disease, congestive heart failure. He states he did not take his Xarelto today. Denies pain in his hips arms or neck. He tells me he always has leg swelling and shortness of breath and that this is not worse than normal.  Family / Caregiver Present: No  Referring Practitioner: Emmanuel Bolton,   Subjective  Subjective: Pt agreeable to OT evaluation. Pt reports no pain. P with no reports of light headedness/dizziness.      Social/Functional History  Social/Functional History  Lives With: Son  Type of Home: [address] [place] [address]  Plan   Plan  Times per week: 3-5x  Current Treatment Recommendations: Strengthening,Functional Mobility Training,Gait Training,Endurance Training,Balance Training,Self-Care / ADL,Safety Education & Training,Patient/Caregiver Education & Training      AM-PAC Score        AM-PAC Inpatient Daily Activity Raw Score: 21 (03/28/22 0807)  AM-PAC Inpatient ADL T-Scale Score : 44.27 (03/28/22 0807)  ADL Inpatient CMS 0-100% Score: 32.79 (03/28/22 0807)  ADL Inpatient CMS G-Code Modifier : Peter Fountain (03/28/22 5922)    Goals  Short term goals  Time Frame for Short term goals: prior to D/C  Short term goal 1: complete functional mobility and transfers Mod Ind  Short term goal 2: complete bathing and dressing Mod Ind  Short term goal 3: complete toileting Mod Ind  Short term goal 4: complete grooming in stance at sink [address] term goals  Time Frame for Long term goals : STG=LTG  Patient Goals   Patient goals : return home       Therapy Time   Individual Concurrent Group Co-treatment   Time In 0740         Time Out 0805         Minutes 25         Timed Code Treatment Minutes: 10 Minutes (15 minute eval)       Dean Ghotra OTR/L

## 2022-03-28 NOTE — PROGRESS NOTES
Hospitalist Progress Note  3/28/2022 11:03 AM    PCP: La Chavez DO    [id number]     Date of Admission: 3/25/2022                                                                                                                     HOSPITAL COURSE    Patient demographics:  The patient  Quentin Tucker is a 61 y.o. male     Significant past medical history:   Patient Active Problem List   Diagnosis    ED (erectile dysfunction)    Essential hypertension, benign    Hyperlipidemia    Diabetic nephropathy ([address]    Diabetes type 2, uncontrolled ([address]    Noncompliance    Elevated blood uric acid level    Coronary artery disease involving native coronary artery    Elevated PSA    Acute on chronic combined systolic and diastolic HF (heart failure) (HCC)    Stage 3 chronic kidney disease ([address]    SONAL (acute kidney injury) ([address]    Biventricular heart failure (HCC)    Acute on chronic systolic heart failure, NYHA class 4 ([facility])    Acute on chronic congestive heart failure ([facility])    LV (left ventricular) mural thrombus    Syncope    CHF (congestive heart failure) ([address]    Syncope and collapse    ICD (implantable cardioverter-defibrillator) in place    Demand ischemia ([address]    Ventricular tachycardia ([address]         Presenting symptoms:  Syncope    Diagnostic workup:  CT HEAD WO CONTRAST  CT ABDOMEN PELVIS WO CONTRAST          CONSULTS DURING ADMISSION :   IP CONSULT TO CARDIOLOGY  IP CONSULT TO GI  IP CONSULT TO HEART FAILURE NURSE/COORDINATOR      Patient was diagnosed with:  Syncope  CHF  Leukopenia  Stage III chronic kidney disease  Type 2 diabetes  Hypertension        Treatment while inpatient:  pt presented to [facility] with concern that patient had an episode of syncope at home but noted to be very brief.                                                                                       ----------------------------------------------------------      SUBJECTIVE COMPLAINTS- follow up for Syncope    Diet: ADULT DIET; Regular; 4 carb choices (60 gm/meal); Low Sodium (2 gm); 1500 ml      OBJECTIVE:   Patient Active Problem List   Diagnosis    ED (erectile dysfunction)    Essential hypertension, benign    Hyperlipidemia    [facility]ropathy ([facility]    Diabetes type 2, uncontrolled ([facility]    Noncompliance    Elevated blood uric acid level    Coronary artery disease involving native coronary artery    Elevated PSA    Acute on chronic combined systolic and diastolic HF (heart failure) ([facility])    Stage 3 chronic kidney disease ([facility]    SONAL (acute kidney injury) ([facility]    Biventricular heart failure ([facility])    Acute on chronic systolic heart failure, NYHA class 4 ([facility])    Acute on chronic congestive heart failure ([facility])    LV (left ventricular) mural thrombus    Syncope    CHF (congestive heart failure) ([facility])    Syncope and collapse    ICD (implantable cardioverter-defibrillator) in place    Demand ischemia ([facility]    Ventricular tachycardia ([facility]       Allergies  Patient has no known allergies.     Medications    Scheduled Meds:   amiodarone  200 mg Oral BID    metoprolol succinate  25 mg Oral Daily    allopurinol  100 mg Oral Daily    atorvastatin  80 mg Oral Daily    levothyroxine  50 mcg Oral QAM AC    rivaroxaban  15 mg Oral Dinner    sodium chloride flush  5-40 mL IntraVENous 2 times per day    spironolactone  25 mg Oral Daily    sacubitril-valsartan  1 tablet Oral BID    furosemide  60 mg IntraVENous BID    aspirin  81 mg Oral Daily     Continuous Infusions:   sodium chloride       PRN Meds:  sodium chloride flush, sodium chloride, perflutren lipid microspheres, ondansetron    Vitals   Vitals /wt   Patient Vitals for the past 8 hrs:   BP Temp Temp src Pulse Resp SpO2 Weight   03/28/22 0755 111/80 97.6 °F (36.4 °C) Oral 86 25 97 % --   03/28/22 0415 -- -- -- -- -- -- 175 lb 11.3 oz (79.7 kg)   03/28/22 0339 92/72 98.1 °F (36.7 °C) Axillary 87 21 96 % --        72HR INTAKE/OUTPUT:      Intake/Output Summary (Last 24 hours) at 3/28/2022 1103  Last data filed at 3/28/2022 0929  Gross per 24 hour   Intake 1290 ml   Output 2525 ml   Net -1235 ml       Exam:    Gen:   Alert and oriented ×3   Eyes: PERRL. No sclera icterus. No conjunctival injection. ENT: No discharge. Pharynx clear. External appearance of ears and nose normal.  Neck: Trachea midline. No obvious mass. Resp: No accessory muscle use. No crackles. No wheezes. No rhonchi. CV: Regular rate. Regular rhythm. No murmur or rub. No edema. GI: Non-tender. Non-distended. No hernia. Skin: Warm, dry, normal texture and turgor. Lymph: No cervical LAD. No supraclavicular LAD. M/S: / Ext. No cyanosis. No clubbing. No joint deformity. Neuro: CN 2-12 are intact,  no neurologic deficits noted. PT/INR: No results for input(s): PROTIME, INR in the last 72 hours. APTT: No results for input(s): APTT in the last 72 hours. CBC:   Recent Labs     03/25/22 2157 03/26/22 0357 03/27/22 0626 03/28/22  0411   WBC 3.9* 3.8* 3.1* 3.4*   HGB 12.8* 11.3* 10.5* 10.5*   HCT 40.0* 35.2* 31.3* 32.1*   .6* 109.4* 107.8* 108.0*    177 155 177       BMP:   Recent Labs     03/25/22 2157 03/26/22 0357 03/27/22 0626 03/28/22  0411    139 138 136   K 4.9 4.8 4.4 4.5    105 104 101   CO2 15* 18* 20* 20*   BUN 79* 75* 80* 69*   CREATININE 2.6* 2.6* 2.5* 2.4*       LIVER PROFILE:   Recent Labs     03/25/22 2157 03/26/22 0357 03/27/22 0626 03/28/22  0411   ALKPHOS 166* 127 111 115   AST 27 22 18 20   ALT 23 18 15 14   BILITOT 1.0 0.8 0.7 0.7     No results for input(s): AMYLASE in the last 72 hours. No results for input(s): LIPASE in the last 72 hours.     UA:  Recent Labs     03/26/22  0110   WBCUA 0   RBCUA 2       TROPONIN:   Recent Labs     03/25/22  2157 03/26/22  0234 03/26/22  0357   TROPONINI 0.08* 0.08* 0.07*       Lab Results   Component Value Date/Time    URRFLXCULT Not Indicated 02/03/2022 03:40 PM       No results for input(s): TSHREFLEX in the last 72 hours. No components found for: [id number]  POC GLUCOSE:  No results for input(s): POCGLU in the last 72 hours. No results for input(s): LABA1C in the last 72 hours. Lab Results   Component Value Date    LABA1C 7.7 11/27/2021     Ejection fraction is visually estimated to be <20% with diffuse hypokinesis. Severely dilated left ventricle. (3/25/2022)    ASSESSMENT AND PLAN     Syncope and collapse,   VT  Amiodrone   Cardiology following      Congestive heart failure,   likely with acute exacerbation with presentation of cardiomegaly, bilateral pleural effusion and ascites and lower extremity edema, cardiology consulted.  Ejection fraction less than 20% with diffuse hypokinesis on echo along with grade 2 diastolic dysfunction, IV Lasix, continue Toprol Entresto and Aldactone now     Stage III CKD, monitor closely     Diabetes mellitus, sliding scale     Essential hypertension, p.o. medications      Elevated troponin, flat, likely due to kidney function no chest pain      Liver nodularity and ascites, even though ascites thought to be due to passive hepatic congestion but I cannot completely exclude cirrhotic etiology at this time GI consulted and recommendation pending         Code Status: Full Code        Dispo - cc        The patient and / or the family were informed of the results of any tests, a time was given to answer questions, a plan was proposed and they agreed with plan. Marta Steiner MD    This note was transcribed using [id number] [employer]. Please disregard any translational errors.

## 2022-03-28 NOTE — PROGRESS NOTES
Referring Physician: Dr. Tammi Riley  Reason for Consultation: \"syncope, hx of CHF, noted to have effusions and also noted to have ascites and anasarca\"  Chief Complaint: Legs are still swollen    Subjective:   History of Present Illness:  Rickey Ortiz is a 61 y.o. patient who presented to the hospital with complaints of syncope. The patient has known systolic heart failure with EF less than 20%. He established care with Dr. Jordyn Andersen but previously followed at [facility]. The patient was discharged from the hospital in 2/2022 and subsequently missed or canceled 2 follow-up appointments. Regardless, he presents after a syncopal episode at home. The patient seems to have poor insight and was unaware that he has gained 30 pounds from his prior hospitalization. He reports compliance with his medications. He says that he was in his usual state of health until the syncopal event. He does not recall the details and states that his son found him on the floor. He does not recall becoming lightheaded. He does not believe his ICD discharged. He denies recent chest pains. He has significant edema but denies any acute worsening. He has chronic dyspnea on exertion with minimal activity. He also endorses intermittent orthopnea. Interval history:  No acute overnight cardiac events. Device interrogation showed the patient had a VT arrest.  The VT was aborted by [facility]. The patient did not receive a defibrillation. He has not had any significant arrhythmias while hospitalized. He feels his lower extremity edema is somewhat improved but still present. He denies associated chest pains or shortness of breath at rest today. Past Medical History:   has a past medical history of CHF (congestive heart failure) ([address] Diabetes type 2, uncontrolled ([address] Diabetic nephropathy, ED (erectile dysfunction), Essential hypertension, benign, Hyperlipidemia, and Noncompliance.     Surgical History:   has a past surgical history that includes eye surgery. Social History:   reports that he quit smoking about 41 years ago. He has a 10.50 pack-year smoking history. He has never used smokeless tobacco. He reports current alcohol use of about 1.0 standard drink of alcohol per week. He reports that he does not use drugs. Family History:  family history includes Diabetes in his brother, mother, and sister; High Blood Pressure in his mother. Home Medications:  Were reviewed and are listed in nursing record and/or below  Prior to Admission medications    Medication Sig Start Date End Date Taking?  Authorizing Provider   isosorbide mononitrate (IMDUR) 30 MG extended release tablet Take 1 tablet by mouth daily 2/18/22   Kathia Snider MD   metoprolol succinate (TOPROL XL) 25 MG extended release tablet Take 0.5 tablets by mouth daily 2/18/22   Kathia Snider MD   torsemide 60 MG TABS Take 60 mg by mouth daily 2/18/22   Kathia Snider MD   levothyroxine (SYNTHROID) 50 MCG tablet Take 1 tablet by mouth every morning (before breakfast) 2/18/22   Kathia Snider MD   spironolactone (ALDACTONE) 25 MG tablet Take 1 tablet by mouth daily 2/18/22   Kathia Snider MD   allopurinol (ZYLOPRIM) 100 MG tablet Take 1 tablet by mouth daily 11/30/21   Joshua Johnson MD   rivaroxaban [place]) 15 MG TABS tablet Take 1 tablet by mouth Daily with supper 11/30/21   Joshua Johnson MD   Multiple Vitamins-Minerals (THERAPEUTIC MULTIVITAMIN-MINERALS) tablet Take 1 tablet by mouth daily    Historical Provider, MD   STIMULANT LAXATIVE 8.6-50 MG per tablet Take 1 tablet by mouth as needed  8/23/21   Historical Provider, MD   nitroGLYCERIN (NITROSTAT) 0.4 MG SL tablet Place 0.4 mg under the tongue every 5 minutes as needed for Chest pain   Patient not taking: Reported on 3/26/2022 8/22/21   Historical Provider, MD   sacubitril-valsartan (ENTRESTO) 49-51 MG per tablet Take 1 tablet by mouth 2 times daily  3/22/19   Historical Provider, MD atorvastatin (LIPITOR) 80 MG tablet Take 80 mg by mouth daily  1/8/19   Historical Provider, MD   Dulaglutide (TRULICITY) 3.76 XW/0.5IC SOPN Lot# [id number] Exp.06/20  Patient taking differently: Inject 0.75 mg into the muscle every 7 days  2/12/19   DAGO Ruano MD      CURRENT Medications:  amiodarone (CORDARONE) tablet 200 mg, BID  metoprolol succinate (TOPROL XL) extended release tablet 25 mg, Daily  allopurinol (ZYLOPRIM) tablet 100 mg, Daily  atorvastatin (LIPITOR) tablet 80 mg, Daily  levothyroxine (SYNTHROID) tablet 50 mcg, QAM AC  rivaroxaban (XARELTO) tablet 15 mg, Dinner  sodium chloride flush 0.9 % injection 5-40 mL, 2 times per day  sodium chloride flush 0.9 % injection 5-40 mL, PRN  0.9 % sodium chloride infusion, PRN  spironolactone (ALDACTONE) tablet 25 mg, Daily  perflutren lipid microspheres (DEFINITY) injection 1.65 mg, ONCE PRN  ondansetron (ZOFRAN) injection 4 mg, Q6H PRN  sacubitril-valsartan (ENTRESTO) 24-26 MG per tablet 1 tablet, BID  furosemide (LASIX) injection 60 mg, BID  aspirin chewable tablet 81 mg, Daily    Allergies:  Patient has no known allergies. Review of Systems:   · Constitutional: no unanticipated weight loss. There's been no change in energy level, sleep pattern, or activity level. No fevers, chills. · Eyes: No visual changes or diplopia. No scleral icterus. · ENT: No Headaches, hearing loss or vertigo. No mouth sores or sore throat. · Cardiovascular: as reviewed in HPI  · Respiratory: No cough or wheezing, no sputum production. No hemoptysis. · Gastrointestinal: No abdominal pain, appetite loss, blood in stools. No change in bowel or bladder habits. · Genitourinary: No dysuria, trouble voiding, or hematuria. · Musculoskeletal:  No gait disturbance, no joint complaints. · Integumentary: No rash or pruritis. · Neurological: No headache, diplopia, change in muscle strength, numbness or tingling. · Psychiatric: No anxiety or depression.   · Endocrine: No temperature intolerance. No excessive thirst, fluid intake, or urination. No tremor. · Hematologic/Lymphatic: No abnormal bruising or bleeding, blood clots or swollen lymph nodes. · Allergic/Immunologic: No nasal congestion or hives. Objective:   PHYSICAL EXAM:    Vitals:    03/28/22 0755   BP: 111/80   Pulse: 86   Resp: 25   Temp: 97.6 °F (36.4 °C)   SpO2: 97%     Weight: 175 lb 11.3 oz (79.7 kg)       General Appearance:  Alert, cooperative, no distress, appears stated age. Head:  Normocephalic, without obvious abnormality, atraumatic. Eyes:  Pupils equal and round. No scleral icterus. Mouth: Moist mucosa, no pharyngeal erythema. Nose: Nares normal. No drainage or sinus tenderness. Neck: Supple, symmetrical, trachea midline. No adenopathy. No tenderness/mass/nodules. No carotid bruit or elevated JVD. Lungs:   Clear to auscultation bilaterally, respirations unlabored. No wheeze, rales, or rhonchi. Chest Wall:  No tenderness or deformity. Heart:  Regular rate. S1/S2 normal. No murmur, rub, or gallop. Abdomen:   Soft, non-tender, bowel sounds active.  + Ascites   Musculoskeletal: No muscle wasting or digital clubbing. Extremities: Extremities normal, atraumatic. No cyanosis. 2+ BLE edema. Pulses: 2+ radial and carotid pulses, symmetric. Skin: No rashes or lesions. Pysch: Normal mood and affect. Alert and oriented x 4. Questionable insight and judgment. Neurologic: Follows simple commands. Moves all extremities.       Labs     CBC:   Lab Results   Component Value Date    WBC 3.4 03/28/2022    RBC 2.97 03/28/2022    HGB 10.5 03/28/2022    HCT 32.1 03/28/2022    .0 03/28/2022    RDW 18.3 03/28/2022     03/28/2022     CMP:  Lab Results   Component Value Date     03/28/2022    K 4.5 03/28/2022    K 4.9 03/25/2022     03/28/2022    CO2 20 03/28/2022    BUN 69 03/28/2022    CREATININE 2.4 03/28/2022    GFRAA 33 03/28/2022    GFRAA >60 12/13/2011    AGRATIO 1.2 03/28/2022    LABGLOM 27 03/28/2022    GLUCOSE 147 03/28/2022    PROT 5.8 03/28/2022    PROT 7.7 12/13/2011    CALCIUM 8.4 03/28/2022    BILITOT 0.7 03/28/2022    ALKPHOS 115 03/28/2022    AST 20 03/28/2022    ALT 14 03/28/2022     PT/INR:  No results found for: PTINR  HgBA1c:  Lab Results   Component Value Date    LABA1C 7.7 11/27/2021     Lab Results   Component Value Date    TROPONINI 0.07 (H) 03/26/2022       Cardiac Data     EKG: Personally interpreted. 3/25/22. Sinus with PVCs. Cannot rule out anterior infarct. Nonspecific T wave abnormality. Echo: Personally interpreted. 3/26/22. Overall left ventricular systolic function appears severely reduced. Ejection fraction is visually estimated to be <20% with diffuse hypokinesis. Severely dilated left ventricle. Normal left ventricular wall thickness. Diastolic filling parameters suggest grade II diastolic dysfunction. The right ventricle appears severely dilated with moderately reduced systolic function. The left and right atria are severely dilated. Severe tricuspid regurgitation. Moderate mitral regurgitation. Estimated pulmonary artery systolic pressure is severely elevated at 66 mmHg assuming a right atrial pressure of 15 mmHg. Cath: 2/3/22. 1.  Evidence of right-sided volume overload with a right atrial pressure of 11 mmHg and a pulmonary capillary wedge pressure of 22 mmHg. 2.  Pulmonary hypertension with an instantaneous pressure of 56/25 for a mean pulmonary arterial pressure of 37 mmHg. 3.  Reduced cardiac output by thermodilution at 3.96 liters per minute with a cardiac index of 1.4 liters per kilogram per minute. By Kanchan Fink, the cardiac index was 2.14 liters per kilogram per minute. Of note, the patient is on 0.25 mcg per kilogram per minute of milrinone. 4.  Pulmonary vascular resistance of 197 dynes per second. 5. Low mixed venous oxygen saturations with an IVC of 40%, right atrium 42%, and pulmonary artery of 38%.   The

## 2022-03-28 NOTE — PLAN OF CARE
Problem: Falls - Risk of:  Goal: Will remain free from falls  Description: Will remain free from falls  3/27/2022 2225 by Jayce Johnson RN  Outcome: Ongoing     Problem: Falls - Risk of:  Goal: Absence of physical injury  Description: Absence of physical injury  Outcome: Ongoing     Problem: Pain:  Goal: Patient's pain/discomfort is manageable  Description: Patient's pain/discomfort is manageable  3/27/2022 2225 by Jayce Johnson RN  Outcome: Ongoing     Problem: Skin Integrity:  Goal: Skin integrity will stabilize  Description: Skin integrity will stabilize  3/27/2022 2225 by Jayce Johnson RN  Outcome: Ongoing     Problem: Safety:  Goal: Free from accidental physical injury  Description: Free from accidental physical injury  Outcome: Ongoing     Problem: Daily Care:  Goal: Daily care needs are met  Description: Daily care needs are met  Outcome: Ongoing

## 2022-03-28 NOTE — CARE COORDINATION
Case Management Assessment           Initial Evaluation                Date / Time of Evaluation: 3/28/2022 9:34 AM                 Assessment Completed by: Liana Booker RN    Patient Name: Bereket Khan     YOB: 1955  Diagnosis: Syncope and collapse [R55]     Date / Time: 3/25/2022  9:12 PM    Patient Admission Status: Inpatient    Current PCP: Caitie Anthony DO    Chart Reviewed: Yes  Patient/ Family Interviewed: Yes    Emergency Contacts:  Extended Emergency Contact Information  Primary Emergency Contact: Candance Sindy, Moraima Harryjordyn Iraheta Phone: [phone]  Relation: Brother/Sister  Secondary Emergency Contact: Chel Luna  Home Phone: [phone]  Relation: Child    Advance Directives:   Code Status: Full Code    Financial  Payor: Lacie Hadley / Plan: [place] / Product Type: *No Product type* /       [place], [address], [address]  [address] [place] [address]  Phone: [phone] Fax: [phone]    [facility] [address] [facility], [address] [phone] - F [phone]  [place] [address] [place] [address]  Phone: [phone] Fax: [phone] [address] [facility] [address] [facility] [place], [address] [facility] [phone] Burn[place] Sender [phone]  [address] [place] [address]  Phone: [phone] Fax: [phone]    [facility] [phone] [place] Sender [phone]  [address] [place] [phone]  Phone: [phone] Fax: [phone]      Potential assistance Purchasing Medications: Potential Assistance Purchasing Medications: No  Does Patient want to participate in local refill/ meds to beds program?: Yes    Meds To Beds General Rules:  1. Can ONLY be done Monday- Friday between 8:30am-5pm  2.  Prescription(s) must be in pharmacy by 3pm to be filled same day  3. Copy of patient's insurance/ prescription drug card and patient face sheet must be sent along with the prescription(s)  4. Cost of Rx cannot be added to hospital bill. If financial assistance is needed, please contact unit  or ;  or  CANNOT provide pharmacy voucher for patients co-pays  5. Patients can then  the prescription on their way out of the hospital at discharge, or pharmacy can deliver to the bedside if staff is available. (payment due at time of pick-up or delivery - cash, check, or card accepted)     Able to afford home medications/ co-pay costs: Yes    ADLS Independent prior to admission. Family is supportive as needed  Support Systems: Children,Family Members    PT AM-PAC: 19 /24  OT AM-PAC: 21 /24    [place]: first level apartment alone  Steps: 2 steps to enter without rails    Plans to RETURN to current housing: Yes    [address] 80  Currently ACTIVE with [address]: No  Mr Beba Thapa declines home care    Durable Medical Equipment  Equipment: rollator    DISCHARGE PLAN:  Disposition: Home- No Services Needed    Transportation PLAN for discharge: family     The Patient and/or patient representative Vladimir Cleary and his family were provided with a choice of provider and agrees with the discharge plan Yes    Freedom of choice list was provided with basic dialogue that supports the patient's individualized plan of care/goals and shares the quality data associated with the providers.  Yes    France Land RN  [phone]

## 2022-03-28 NOTE — PROGRESS NOTES
Physical Therapy  Facility/Department: [address] PROGRESSIVE CARE  Daily Treatment Note  NAME: Jemal Bañuelos  : 1955  MRN: [id number]    Date of Service: 3/28/2022    Discharge Recommendations:  Continue to assess pending progress,Home with assist PRN   PT Equipment Recommendations  Other: Will monitor for potential equipt needs. Jemla Bañuelos scored a 20/24 on the AM-PAC short mobility form. At this time, no further PT is recommended upon discharge. Assessment   Body structures, Functions, Activity limitations: Decreased functional mobility ; Decreased endurance;Decreased balance  Assessment: 60 y/o male admit 3/25/2022 with Syncope/Collapse, Acute Exac CHF. CT Head negative. PMH as noted including CHF, CKD, ICD place, DM, Diabetic Nephropathy. PTA pt living with son in apt setting with 2 steps to enter; independent daily care and functional mobility. Anticipate adequate progress for d/c home; do not anticipate need cont PT upon d/c although will monitor. Prognosis: Good  Decision Making: Low Complexity  History: 60 y/o male admit 3/25/2022 with Syncope/Collapse, Acute Exac CHF. CT Head negative. PMH as noted including CHF, CKD, ICD place, DM, Diabetic Nephropathy. Exam: See above. Clinical Presentation: See above. Patient Education: Role of PT, POC, Need to call for assist, Safe use of Walker. Barriers to Learning: None. Activity Tolerance  Activity Tolerance: Patient Tolerated treatment well     Patient Diagnosis(es): The encounter diagnosis was Syncope and collapse.     has a past medical history of CHF (congestive heart failure) ([address] Diabetes type 2, uncontrolled ([address] Diabetic nephropathy, ED (erectile dysfunction), Essential hypertension, benign, Hyperlipidemia, and Noncompliance. has a past surgical history that includes eye surgery. Restrictions  Restrictions/Precautions  Restrictions/Precautions: Fall Risk  Subjective   General  Chart Reviewed:  Yes  Additional Pertinent Hx: 61 y/o male admit 3/25/2022 with Syncope/Collapse, Acute Exac CHF. CT Head negative. PMH as noted including CHF, CKD, ICD place, DM, Diabetic Nephropathy. Response To Previous Treatment: Patient with no complaints from previous session. Family / Caregiver Present: No  Referring Practitioner: Dr. Anna Sebastian. Subjective  Subjective: Pt agreeable to PT Rx. Orientation  Orientation  Overall Orientation Status: Within Functional Limits  Cognition   Cognition  Overall Cognitive Status: WFL  Objective   Bed mobility  Sit to Supine: Supervision  Transfers  Sit to Stand: Stand by assistance (With Rodger Layman.)  Stand to sit: Stand by assistance (With Rodger Layman.)  Comment: Toilet Transfers SBA. Independent pericare. Ambulation  Ambulation?: Yes  Ambulation 1  Surface: level tile  Device: Rolling Walker  Distance: Pt amb to/from bathroom (25' x 2) and additional 36' with Walker SBA. Diminished step length/clearance although no LE buckling/giving way. Cues for safe transitional mvts although no specific LOB noted. AM-PAC Score  AM-PAC Inpatient Mobility Raw Score : 20 (03/28/22 1422)  AM-PAC Inpatient T-Scale Score : 47.67 (03/28/22 1422)  Mobility Inpatient CMS 0-100% Score: 35.83 (03/28/22 1422)  Mobility Inpatient CMS G-Code Modifier : CJ (03/28/22 1422)          Goals  Short term goals  Time Frame for Short term goals: Upon d/c acute care setting. Short term goal 1: Bed Mob Independent. Short term goal 2: Transfers with/without assist device Supervision. Short term goal 3: Amb with/without assist device 50-75' SBA/Supervision. Patient Goals   Patient goals : Return home. Plan    Plan  Times per week: 3-5x week while in acute care setting.   Current Treatment Recommendations: Functional Mobility Training,Transfer Training,Gait Training,Safety Education & Training,Patient/Caregiver Education & Training  Safety Devices  Type of devices: Call light within reach,Chair alarm in place,Left in chair,Nurse notified     Therapy Time   Individual Concurrent Group Co-treatment   Time In [address]         Time Out 1350         Minutes [address]

## 2022-03-29 NOTE — PROGRESS NOTES
Physical Therapy  Pt politely declining PT Rx this afternoon; agreeable tomorrow. Unsuccess attempt gentle encouragement. Will cont current POC.   Sarah Queen, PT

## 2022-03-29 NOTE — PROGRESS NOTES
Hospitalist Progress Note  3/29/2022 11:17 AM    PCP: Kaya Garcias DO    [id number]     Date of Admission: 3/25/2022                                                                                                                     HOSPITAL COURSE    Patient demographics:  The patient  Solo Moy is a 61 y.o. male     Significant past medical history:   Patient Active Problem List   Diagnosis    ED (erectile dysfunction)    Essential hypertension, benign    Hyperlipidemia    Diabetic nephropathy ([address]    Diabetes type 2, uncontrolled ([address]    Noncompliance    Elevated blood uric acid level    Coronary artery disease involving native coronary artery    Elevated PSA    Acute on chronic combined systolic and diastolic HF (heart failure) (HCC)    Stage 3 chronic kidney disease ([address]    SONAL (acute kidney injury) ([address]    Biventricular heart failure (HCC)    Acute on chronic systolic heart failure, NYHA class 4 ([facility])    Acute on chronic congestive heart failure ([facility])    LV (left ventricular) mural thrombus    Syncope    CHF (congestive heart failure) ([address]    Syncope and collapse    ICD (implantable cardioverter-defibrillator) in place    Demand ischemia ([address]    Ventricular tachycardia ([address]         Presenting symptoms:  Syncope    Diagnostic workup:  CT HEAD WO CONTRAST  CT ABDOMEN PELVIS WO CONTRAST          CONSULTS DURING ADMISSION :   IP CONSULT TO CARDIOLOGY  IP CONSULT TO GI  IP CONSULT TO HEART FAILURE NURSE/COORDINATOR      Patient was diagnosed with:  Syncope  CHF  Leukopenia  Stage III chronic kidney disease  Type 2 diabetes  Hypertension        Treatment while inpatient:  pt presented to [facility] with concern that patient had an episode of syncope at home but noted to be very brief.                                                                                       ----------------------------------------------------------      SUBJECTIVE COMPLAINTS- follow up for for: [id number]  POC GLUCOSE:  No results for input(s): POCGLU in the last 72 hours. No results for input(s): LABA1C in the last 72 hours. Lab Results   Component Value Date    LABA1C 7.7 11/27/2021     Ejection fraction is visually estimated to be <20% with diffuse hypokinesis. Severely dilated left ventricle. (3/25/2022)    ASSESSMENT AND PLAN     Syncope and collapse,   VT  Amiodrone   Cardiology following      Congestive heart failure,  cardiomegaly, bilateral pleural effusion and ascites and lower extremity edema  Ejection fraction less than 20% with diffuse hypokinesis on echo along with grade 2 diastolic dysfunction,   IV Lasix, fluid balance -4 L  continue Toprol   Entresto and Aldactone now     Stage III CKD, monitor closely     Diabetes mellitus, sliding scale     Essential hypertension, p.o. medications      Elevated troponin, flat, likely due to kidney function no chest pain      Liver nodularity and ascites, even though ascites thought to be due to passive hepatic congestion but I cannot completely exclude cirrhotic etiology at this time GI consulted and recommendation pending         Code Status: Full Code        Dispo - home in am        The patient and / or the family were informed of the results of any tests, a time was given to answer questions, a plan was proposed and they agreed with plan. Mckenna Holguin MD    This note was transcribed using [id number] [employer]. Please disregard any translational errors.

## 2022-03-29 NOTE — CONSULTS
[facility]  HEART FAILURE PROGRAM      NAME:  Huey Manning RECORD NUMBER:  [id number]  AGE: 61 y.o. GENDER: male  : 1955  TODAY'S DATE:  3/29/2022    Subjective:     VISIT TYPE: evaluation     ADMITTING PHYSICIAN:  Kelly Tamayo DO    PAST MEDICAL HISTORY        Diagnosis Date    [facility]     Diabetes type 2, uncontrolled ([facility] 2014    Diabetic nephropathy 2013    ED (erectile dysfunction) 2013    Essential hypertension, benign 2013    Hyperlipidemia 2013    Noncompliance 2014       SOCIAL HISTORY    Social History     Tobacco Use    Smoking status: Former Smoker     Packs/day: 1.50     Years: 7.00     Pack years: 10.50     Quit date: 1981     Years since quittin.1    Smokeless tobacco: Never Used   Vaping Use    Vaping Use: Never used   Substance Use Topics    Alcohol use:  Yes     Alcohol/week: 1.0 standard drink     Types: 1 Cans of beer per week    Drug use: No       ALLERGIES    No Known Allergies    MEDICATIONS  Scheduled Meds:   amiodarone  200 mg Oral BID    metoprolol succinate  25 mg Oral Daily    allopurinol  100 mg Oral Daily    atorvastatin  80 mg Oral Daily    levothyroxine  50 mcg Oral QAM AC    rivaroxaban  15 mg Oral Dinner    sodium chloride flush  5-40 mL IntraVENous 2 times per day    spironolactone  25 mg Oral Daily    sacubitril-valsartan  1 tablet Oral BID    furosemide  60 mg IntraVENous BID    aspirin  81 mg Oral Daily       ADMIT DATE: 3/25/2022      Objective:     ADMISSION DIAGNOSIS:   Syncope and collapse [R55]     BP 94/71   Pulse 78   Temp 97.7 °F (36.5 °C) (Oral)   Resp 28   Ht 5' 6\" (1.676 m)   Wt 177 lb 4 oz (80.4 kg)   SpO2 95%   BMI 28.61 kg/m²     ADMIT:  Weight: 189 lb 9.5 oz (86 kg)    TODAY: Weight: 177 lb 4 oz (80.4 kg)    Wt Readings from Last 10 Encounters:   22 177 lb 4 oz (80.4 kg)   22 152 lb 1.9 oz (69 kg)   22 180 lb (81.6 kg)   11/30/21 149 lb 4 oz (67.7 kg)   10/22/21 166 lb (75.3 kg)   06/12/19 161 lb (73 kg)   02/12/19 165 lb (74.8 kg)   02/07/18 166 lb (75.3 kg)   08/30/17 167 lb (75.8 kg)   05/16/17 165 lb (74.8 kg)          Intake/Output Summary (Last 24 hours) at 3/29/2022 1401  Last data filed at 3/29/2022 1307  Gross per 24 hour   Intake 900 ml   Output 550 ml   Net 350 ml       LABS  BMP:   Lab Results   Component Value Date     03/29/2022    K 4.2 03/29/2022    K 4.9 03/25/2022     03/29/2022    CO2 19 03/29/2022    BUN 86 03/29/2022    LABALBU 3.6 03/29/2022    CREATININE 2.3 03/29/2022    CALCIUM 8.4 03/29/2022    GFRAA 35 03/29/2022    GFRAA >60 12/13/2011    LABGLOM 29 03/29/2022    GLUCOSE 158 03/29/2022     CBC:   Recent Labs     03/27/22  0626 03/28/22  0411 03/29/22  0435   WBC 3.1* 3.4* 3.1*   HGB 10.5* 10.5* 11.3*   HCT 31.3* 32.1* 34.7*   .8* 108.0* 108.8*    177 164     BNP: No results found for: BNP    ECHOCARDIOGRAM:   3/26/22  Summary   Overall left ventricular systolic function appears severely reduced. Ejection fraction is visually estimated to be <20% with diffuse hypokinesis. Severely dilated left ventricle. Normal left ventricular wall thickness. Diastolic filling parameters suggest grade II diastolic dysfunction. The right ventricle appears severely dilated with moderately reduced   systolic function. The left and right atria are severely dilated. Severe tricuspid regurgitation. Moderate mitral regurgitation. Estimated pulmonary artery systolic pressure is severely elevated at 66 mmHg   assuming a right atrial pressure of 15 mmHg.     Assessment:     CONSULTS:   IP CONSULT TO CARDIOLOGY  IP CONSULT TO GI  IP CONSULT TO HEART FAILURE NURSE/COORDINATOR    Patient has a CARDIOLOGY CONSULT: Yes- Dr Shad Hernandez      Patient taking an ACEI/ARB:  Yes- entresto       Patient taking a BETA BLOCKER:  Yes- toprol     SCALE AVAILABLE:  Yes- has 3 scales, only likes 1 of them. I again encouraged him to actually USE it. EDUCATION STATUS: Patient   [x]  Provided both written and verbal education on Heart Failure signs/symptoms. [x]  Provided instructions on daily medications. [x]  Provided instructions to monitor and record weight daily. [x]  Provided instructions to call if weight increases 3 lbs in one day or 5 lbs in one week. [x]  Received verbal acknowledgment/understanding of Heart Failure related causes. [x]  Provided instructions on how to maintain a low sodium diet. []  Provided recommendations for smoking cessation programs  [x]  Provided recommendations on activity and exercise      [x]  Other:    HF RN consulted, chart reviewed. Pt admitted for acute on chronic combined systolic HF, EF < 01%, grade II. Pt is up approx 37 lbs. He was recently here 2/3-2/17 for HF and I met with him during that stay (as well as in Nov) for reiteration of HF education. Noncompliance is on-going and his main issue. He used to follow at [facility] but transitioned his care to Dr Sourav Downs on 10/22/21 as a New Pt. Plan was for pt to get labs and to have a RHC but pt did not do either. He had the [address] done inpt in Nov. Pt unfortunately continues with some No Show appts to which I spoke with him about, again. His sister Saritha Hernandez schedules his [facility] [place] or transportation thru his insurance (which is free) for his appts. I even called [place] last admit to let her know appt date. No Show. Dr Sourav Downs spoke with pt last admit about his need for compliance in regards to f/u. Pt did get established with a New PCP Dr Yadiel Carrillo on 1/20 (after he No Showed on 1/3). She also talked with pt about need for compliance. She offered Hospice services if he wants to chose not to f/u. Pt declined and said he would do better. I again talked Hospice with pt this admit due to his on-going noncompliance. \"No, I just need to do better. \"      Upon entering room pt was up in recliner with feet elevated. I re-introduced myself and my role in his care. He remembers meeting with me last admit and agreed to spend time with me again. \"You were tough love last time. \" I said \"apparently not enough\" (he smirked). I inquired how he could be up 37 lbs if he was doing what was requested of him. He states he did not stop taking any of his medications this time however, \"I was acting slap happy at home and people were bringing me all kinds of down home foods and I was eating it all even though I know I shouldn't be. \" He shared his brother Alex Foster called the many siblings and friends and apparently put a stop to this. Tylor also has HF and follows compliantly with Dr Deangelo Smith. He said \"my son's a rat and calls Tylor and tells him things. \" Apparently pt was eating what he wasn't supposed to so his son Parikh Staff picked up the phone and told his dad he was calling 911 if he doesn't stop. \"I told him I'd put my foot up his a## if he didn't hang up. \" I explained his son was trying to help him and explained he doesn't want to see his dad dead on the ground  (close to what he say this admit with pt' syncopal episode due to run of VT). Pt started to cry and said \"I never got to say good bye to my wife and I still cry about that. I don't want to go out like that and do that to my son. I need to do better. \"       Extensive teaching done, again. Pt will need on-going reiteration. Teaching done on What is HF, systolic & diastolic dysfunction, S&S of HF, importance of daily wts, log sheet, HF Zones, and who and when to call. Pt states his brother sees Dr Deangelo Smith and he follows the instructions closely \"and gets mad at me when I don't, my sister too. \" (he has 5 siblings). I informed pt that it's good to have family support who want to see you did what you need for your health.  I reinforced the need for him to be compliant and to put his health a priority or it will be a poor outcome.      Pt states he has a working scale at home and denies a need for a scale when I asked. I asked why he wasn't using it and he said it was too hard to see so he got a new one thru Medicare/Medicaid \"that has a needle and it's easier for me to see. \" I offered a talking scale but he said \"no way, I don't want that. \" I stressed he needs to call when his wt is up. \"I don't do that because I don't have time to come in and they'll want me to come in. \" I explained if he called early on with his S&S then could likely be taken care of via close phone communications. \"I didn't know that. \" I explained after so many lbs then it gets to a point of requiring a hospitalization ie his 37+ lbs this admit.      I reviewed the log sheet and 3/5 rule and HF zones. I went over dietary education. Pt states \"dietician always see's me and goes over all this. \" He allowed me to reiterate the material. He gets low Na MOW once a day and then eats his house food once a day. He is aware the possible outcome if he eats poorly. Emotional support provided.      \"I know Dr Jordyn Andersen does not want to hear excuses and that's all I'm pretty much giving you guys is excuses. I'll try to be better. \" Pt lives with his son Nila Corea who works full time. He has a brother Holly Yoder who works (he also has HF). He states his sister Loyd Porter is the one who helps him with his appts. I offered to call her. He dialed her on speaker phone. I explained my role etc, reviewed the basics. She shared she does not drive but she helps him arrange either an [place] for appts or insurance transportation. I stressed again he needs to make his appts.     I provided emotional support to pt prior to leaving as pt remained tearful at times t/o our session. I explained we all want to see him succeed and be able to help manage his healthcare but he needs to be the driving force by being compliant so we can help him. He verbalized understanding, \"thank you, I will try. \"       CURRENT DIET: ADULT DIET; Regular; 4 carb choices (60 gm/meal);  Low Sodium (2 gm); 1500 ml    EDUCATIONAL PACKETS PROVIDED- PRINTED FROM [employer]. Titles and material given:  Yes   [x]  What is Heart Failure? [x]  Heart Failure: Warning Signs of a Flare-Up  [x]  Heart Failure: Making Changes to Your Diet  [x]  Heart Failure: Medications to Help Your Heart   [x]  Other: HF log sheet and HF Zones    PATIENT/CAREGIVER TEACHING:    Level of patient/caregiver understanding able to:   [x] Verbalize understanding   [] Demonstrate understanding       [x] Teach back        [x] Needs reinforcement     []  Other:      TEACHING TIME:  35 minutes       Plan:       DISCHARGE PLAN:  Placement for patient upon discharge: home with support of brother Marilee Chavis and sister Abhijit Iglesias (other siblings are sending him inappropriate foods)   Hospice Care:  no  Code Status: Full Code  Discharge appointment scheduled: Will follow and ensure he has an appt in place prior to dc     RECOMMENDATIONS:   [x]  Encourage to call Rober Soria with any questions or concerns. [x]  Educate further Mr. León Resides on fluid restriction 48 oz- 64 oz during inpatient stay so he can understand how to measure intake at home. [x]  Continue to educate on S/S of Heart Failure. [x]  Emphasize daily weights, diet, and if changes, to call Heart Failure Resource Line  []  Other:  I have referred Mr Kaitlin Smith in the past to both [facility] and [facility]hab (on multiple occassions). He does not comply. I did give him the CPR contact information (again).            Electronically signed by Leita Gaucher, RN, BSN CHFN  on 3/29/2022 at 2:01 PM

## 2022-03-29 NOTE — PLAN OF CARE
Problem: Falls - Risk of:  Goal: Will remain free from falls  Description: Will remain free from falls  Outcome: Ongoing     Problem: Falls - Risk of:  Goal: Absence of physical injury  Description: Absence of physical injury  Outcome: Ongoing     Problem: Pain:  Goal: Patient's pain/discomfort is manageable  Description: Patient's pain/discomfort is manageable  Outcome: Ongoing     Problem: Infection:  Goal: Will remain free from infection  Description: Will remain free from infection  Outcome: Ongoing     Problem: ACTIVITY INTOLERANCE/IMPAIRED MOBILITY  Goal: Mobility/activity is maintained at optimum level for patient  Outcome: Ongoing

## 2022-03-29 NOTE — PROGRESS NOTES
Occupational Therapy  Facility/Department: [address] PROGRESSIVE CARE  Daily Treatment Note and Tentative D/C    NAME: Kallie Hernandez  : 1955  MRN: [id number]    Date of Service: 3/29/2022    Discharge Recommendations: Kallie Hernandez scored a 21/24 on the AM-PAC ADL Inpatient form. Current research shows that an AM-PAC score of 18 or greater is typically associated with a discharge to the patient's home setting. If patient discharges prior to next session this note will serve as a discharge summary. Please see below for the latest assessment towards goals. Continue to assess pending progress,Home with assist PRN  OT Equipment Recommendations  Equipment Needed: No    Assessment   Performance deficits / Impairments: Decreased functional mobility ; Decreased strength;Decreased endurance;Decreased ADL status; Decreased balance;Decreased high-level IADLs  Assessment: Pt tolerated session well. Pt completes functional mobility and transfers with SBA and use of RW. Pt able to ambulate household distances in room with no LOB. Pt reports good support from family with return home. Continue with POC. Prognosis: Good  Assistance / Modification: RW  OT Education: OT Role;Plan of Care;Transfer Training  REQUIRES OT FOLLOW UP: Yes  Activity Tolerance  Activity Tolerance: Patient Tolerated treatment well  Safety Devices  Safety Devices in place: Yes  Type of devices: Left in chair;Chair alarm in place;Call light within reach;Nurse notified         Patient Diagnosis(es): The encounter diagnosis was Syncope and collapse.      has a past medical history of CHF (congestive heart failure) ([address] Diabetes type 2, uncontrolled ([address] Diabetic nephropathy, ED (erectile dysfunction), Essential hypertension, benign, Hyperlipidemia, and Noncompliance. has a past surgical history that includes eye surgery.     Restrictions  Restrictions/Precautions  Restrictions/Precautions: Fall Risk  Subjective   General  Chart Reviewed: Yes  Patient assessed for rehabilitation services?: Yes  Additional Pertinent Hx: per ED note, Clotilde Falcon is a 61 y.o. male who presents to the emergency department via EMS from home where he lives with his son after passing out. He states he felt fine today no chest pain or shortness of breath. He had eaten his dinner he was getting up walking when he collapsed to the ground. He vaguely remembers getting lightheaded prior to this. His son called [phone]. He has a history of diabetes, coronary artery disease, congestive heart failure. He states he did not take his Xarelto today. Denies pain in his hips arms or neck. He tells me he always has leg swelling and shortness of breath and that this is not worse than normal.  Family / Caregiver Present: No  Referring Practitioner: Beryle Davidson, DO  Subjective  Subjective: Pt agreeable to OT treatment. Pt with no reports of pain. Pt with no reports of light headedness/dizziness throughout. Orientation  Orientation  Overall Orientation Status: Within Functional Limits  Objective    ADL  Additional Comments: Pt declines needs at this time.         Balance  Sitting Balance: Supervision  Standing Balance: Stand by assistance (RW)  Functional Mobility  Functional - Mobility Device: Rolling Walker  Activity: Other (50ft x2)  Assist Level: Stand by assistance  Functional Mobility Comments: no LOB; no reports of light headedness or dizziness  Wheelchair Bed Transfers  Wheelchair/Bed - Technique: Ambulating (RW)  Equipment Used: Bed;Other (bed to chair)  Level of Asssistance: Stand by assistance  Bed mobility  Supine to Sit: Supervision  Sit to Supine: Unable to assess  Scooting: Supervision  Transfers  Sit to stand: Stand by assistance  Stand to sit: Stand by assistance  Transfer Comments: to/from            Cognition  Overall Cognitive Status: [facility]  Times per week: 3-5x  Current Treatment Recommendations: Strengthening,Functional Mobility Training,Gait Training,Endurance Training,Balance Training,Self-Care / ADL,Safety Education & Training,Patient/Caregiver Education & Training    AM-PAC Score        AM-PAC Inpatient Daily Activity Raw Score: 21 (03/29/22 1232)  AM-PAC Inpatient ADL T-Scale Score : 44.27 (03/29/22 1232)  ADL Inpatient CMS 0-100% Score: 32.79 (03/29/22 1232)  ADL Inpatient CMS G-Code Modifier : Eri Post (03/29/22 [facility])    Goals  Short term goals  Time Frame for Short term goals: prior to D/C; ongoing 3/29  Short term goal 1: complete functional mobility and transfers Mod Ind  Short term goal 2: complete bathing and dressing Mod Ind  Short term goal 3: complete toileting Mod Ind  Short term goal 4: complete grooming in stance at sink [address] term goals  Time Frame for Long term goals : STG=LTG  Patient Goals   Patient goals : return home       Therapy Time   Individual Concurrent Group Co-treatment   Time In 1130         Time Out 1153         Minutes 23         Timed Code Treatment Minutes: 23 Minutes       Enriqueta Escobar OTR/L

## 2022-03-29 NOTE — PROGRESS NOTES
Referring Physician: Dr. Max Camara  Reason for Consultation: \"syncope, hx of CHF, noted to have effusions and also noted to have ascites and anasarca\"  Chief Complaint: Legs are still swollen    Subjective:   History of Present Illness:  Bridgette Walton is a 61 y.o. patient who presented to the hospital with complaints of syncope. The patient has known systolic heart failure with EF less than 20%. He established care with Dr. Elvie Cam but previously followed at [facility]. The patient was discharged from the hospital in 2/2022 and subsequently missed or canceled 2 follow-up appointments. Regardless, he presents after a syncopal episode at home. The patient seems to have poor insight and was unaware that he has gained 30 pounds from his prior hospitalization. He reports compliance with his medications. He says that he was in his usual state of health until the syncopal event. He does not recall the details and states that his son found him on the floor. He does not recall becoming lightheaded. He does not believe his ICD discharged. He denies recent chest pains. He has significant edema but denies any acute worsening. He has chronic dyspnea on exertion with minimal activity. He also endorses intermittent orthopnea. Interval history:  No acute overnight cardiac events. Device interrogation showed the patient had a VT arrest.  The VT was aborted by [facility]. The patient did not receive a defibrillation. He has not had any significant arrhythmias while hospitalized. He feels his lower extremity edema is somewhat improved but still present. He denies associated chest pains or shortness of breath at rest today. Abdomen still feels distended. Past Medical History:   has a past medical history of CHF (congestive heart failure) ([address] Diabetes type 2, uncontrolled ([address] Diabetic nephropathy, ED (erectile dysfunction), Essential hypertension, benign, Hyperlipidemia, and Noncompliance.     Surgical History:   has a past surgical history that includes eye surgery. Social History:   reports that he quit smoking about 41 years ago. He has a 10.50 pack-year smoking history. He has never used smokeless tobacco. He reports current alcohol use of about 1.0 standard drink of alcohol per week. He reports that he does not use drugs. Family History:  family history includes Diabetes in his brother, mother, and sister; High Blood Pressure in his mother. Home Medications:  Were reviewed and are listed in nursing record and/or below  Prior to Admission medications    Medication Sig Start Date End Date Taking?  Authorizing Provider   isosorbide mononitrate (IMDUR) 30 MG extended release tablet Take 1 tablet by mouth daily 2/18/22   Manny Michael MD   metoprolol succinate (TOPROL XL) 25 MG extended release tablet Take 0.5 tablets by mouth daily 2/18/22   Manny Michael MD   torsemide 60 MG TABS Take 60 mg by mouth daily 2/18/22   Manny Michael MD   levothyroxine (SYNTHROID) 50 MCG tablet Take 1 tablet by mouth every morning (before breakfast) 2/18/22   Manny Michael MD   spironolactone (ALDACTONE) 25 MG tablet Take 1 tablet by mouth daily 2/18/22   Manny Michael MD   allopurinol (ZYLOPRIM) 100 MG tablet Take 1 tablet by mouth daily 11/30/21   David Schreiber MD   rivaroxaban [place]) 15 MG TABS tablet Take 1 tablet by mouth Daily with supper 11/30/21   David Schreiber MD   Multiple Vitamins-Minerals (THERAPEUTIC MULTIVITAMIN-MINERALS) tablet Take 1 tablet by mouth daily    Historical Provider, MD   STIMULANT LAXATIVE 8.6-50 MG per tablet Take 1 tablet by mouth as needed  8/23/21   Historical Provider, MD   nitroGLYCERIN (NITROSTAT) 0.4 MG SL tablet Place 0.4 mg under the tongue every 5 minutes as needed for Chest pain   Patient not taking: Reported on 3/26/2022 8/22/21   Historical Provider, MD   sacubitril-valsartan (ENTRESTO) 49-51 MG per tablet Take 1 tablet by mouth 2 times daily  3/22/19 Historical Provider, MD   atorvastatin (LIPITOR) 80 MG tablet Take 80 mg by mouth daily  1/8/19   Historical Provider, MD   Dulaglutide (TRULICITY) 5.03 TL/2.0FA SOPN Lot# [id number] Exp.06/20  Patient taking differently: Inject 0.75 mg into the muscle every 7 days  2/12/19   DAGO Webster MD      CURRENT Medications:  amiodarone (CORDARONE) tablet 200 mg, BID  metoprolol succinate (TOPROL XL) extended release tablet 25 mg, Daily  allopurinol (ZYLOPRIM) tablet 100 mg, Daily  atorvastatin (LIPITOR) tablet 80 mg, Daily  levothyroxine (SYNTHROID) tablet 50 mcg, QAM AC  rivaroxaban (XARELTO) tablet 15 mg, Dinner  sodium chloride flush 0.9 % injection 5-40 mL, 2 times per day  sodium chloride flush 0.9 % injection 5-40 mL, PRN  0.9 % sodium chloride infusion, PRN  spironolactone (ALDACTONE) tablet 25 mg, Daily  perflutren lipid microspheres (DEFINITY) injection 1.65 mg, ONCE PRN  ondansetron (ZOFRAN) injection 4 mg, Q6H PRN  sacubitril-valsartan (ENTRESTO) 24-26 MG per tablet 1 tablet, BID  furosemide (LASIX) injection 60 mg, BID  aspirin chewable tablet 81 mg, Daily    Allergies:  Patient has no known allergies. Review of Systems:   · Constitutional: no unanticipated weight loss. There's been no change in energy level, sleep pattern, or activity level. No fevers, chills. · Eyes: No visual changes or diplopia. No scleral icterus. · ENT: No Headaches, hearing loss or vertigo. No mouth sores or sore throat. · Cardiovascular: as reviewed in HPI  · Respiratory: No cough or wheezing, no sputum production. No hemoptysis. · Gastrointestinal: No abdominal pain, appetite loss, blood in stools. No change in bowel or bladder habits. · Genitourinary: No dysuria, trouble voiding, or hematuria. · Musculoskeletal:  No gait disturbance, no joint complaints. · Integumentary: No rash or pruritis. · Neurological: No headache, diplopia, change in muscle strength, numbness or tingling.    · Psychiatric: No anxiety or 12/13/2011    AGRATIO 1.2 03/29/2022    LABGLOM 29 03/29/2022    GLUCOSE 158 03/29/2022    PROT 6.7 03/29/2022    PROT 7.7 12/13/2011    CALCIUM 8.4 03/29/2022    BILITOT 0.7 03/29/2022    ALKPHOS 118 03/29/2022    AST 20 03/29/2022    ALT 15 03/29/2022     PT/INR:  No results found for: PTINR  HgBA1c:  Lab Results   Component Value Date    LABA1C 7.7 11/27/2021     Lab Results   Component Value Date    TROPONINI 0.07 (H) 03/26/2022       Cardiac Data     EKG: Personally interpreted. 3/25/22. Sinus with PVCs. Cannot rule out anterior infarct. Nonspecific T wave abnormality. Echo: Personally interpreted. 3/26/22. Overall left ventricular systolic function appears severely reduced. Ejection fraction is visually estimated to be <20% with diffuse hypokinesis. Severely dilated left ventricle. Normal left ventricular wall thickness. Diastolic filling parameters suggest grade II diastolic dysfunction. The right ventricle appears severely dilated with moderately reduced systolic function. The left and right atria are severely dilated. Severe tricuspid regurgitation. Moderate mitral regurgitation. Estimated pulmonary artery systolic pressure is severely elevated at 66 mmHg assuming a right atrial pressure of 15 mmHg. Cath: 2/3/22. 1.  Evidence of right-sided volume overload with a right atrial pressure of 11 mmHg and a pulmonary capillary wedge pressure of 22 mmHg. 2.  Pulmonary hypertension with an instantaneous pressure of 56/25 for a mean pulmonary arterial pressure of 37 mmHg. 3.  Reduced cardiac output by thermodilution at 3.96 liters per minute with a cardiac index of 1.4 liters per kilogram per minute. By Marce Stevenson, the cardiac index was 2.14 liters per kilogram per minute. Of note, the patient is on 0.25 mcg per kilogram per minute of milrinone. 4.  Pulmonary vascular resistance of 197 dynes per second. 5.   Low mixed venous oxygen saturations with an IVC of 40%, right atrium 42%, and pulmonary artery of 38%. The patient's oxygen saturation was 89% to 90% on room air. Telemetry: Personally interpreted. Sinus. Assessment and Plan   1) Ventricular tachycardia. Device interrogation shows VT at 8:48 PM which is when the patient says he \"passed out. \"  The arrhythmia was terminated by ATP. Continue amiodarone and Toprol-XL. 2) Acute on chronic systolic heart failure s/p Biotronik ICD. NYHA class III. EF < 20%. Weight 152 when discharged 2/17/22 and reportedly 177 today. Patient appears hypervolemic. Continue IV Lasix. Required inotrope on previous admission but would like to avoid with recent ventricular arrhythmia. Continue Toprol-XL, Entresto, and Aldactone. Consider SGLT2i as an outpatient. CHF nurse coordinator consulted. 3) CKD Stage IIIb. Continue to monitor with attempts at diuresis. Creatinine stable.     4) LV thrombus. Not evident on most recent echo. Continue Xarelto    5) Demand ischemia. History is not consistent with acute coronary syndrome. Elevated troponin may be related to decompensated CHF and VT.       6) Coronary artery disease s/p PCI of LAD. Continue medical management and risk factor modification including use of ASA, statin, and beta-blocker. 7) Macrocytic anemia. Will defer to primary team.    8) Type II DM. Will defer management to primary team.    Overall, the problems requiring hospitalization are high in severity. Thank you for allowing us to participate in the care of Delmy CoelhoTerry Manzano.  Jeff Alfonso, [address] [place] [address]  3/29/2022 12:42 PM no

## 2022-03-30 NOTE — PROGRESS NOTES
Occupational Therapy  Facility/Department: [address] PROGRESSIVE CARE  Daily Treatment Note and Tentative D/C    NAME: Delmy Coelho  : 1955  MRN: [id number]    Date of Service: 3/30/2022    Discharge Recommendations: Delmy Coelho scored a 21/24 on the AM-PAC ADL Inpatient form. Current research shows that an AM-PAC score of 18 or greater is typically associated with a discharge to the patient's home setting. If patient discharges prior to next session this note will serve as a discharge summary. Please see below for the latest assessment towards goals. Continue to assess pending progress,Home with assist PRN  OT Equipment Recommendations  Equipment Needed: No    Assessment   Performance deficits / Impairments: Decreased functional mobility ; Decreased strength;Decreased endurance;Decreased ADL status; Decreased balance;Decreased high-level IADLs  Assessment: Pt tolerated session well. Pt completes functional mobility and transfers with supervision and use of RW. Pt able to ambulate household distances including 75ft with no LOB. Pt reports no dizziness or light headedness. Pt reports no concerns returning home at this time. D/C from OT. Prognosis: Good  Assistance / Modification: RW  OT Education: OT Role;Plan of Care;Transfer Training  REQUIRES OT FOLLOW UP: Yes  Activity Tolerance  Activity Tolerance: Patient Tolerated treatment well  Safety Devices  Safety Devices in place: Yes  Type of devices: Left in chair;Chair alarm in place;Call light within reach;Nurse notified         Patient Diagnosis(es): The encounter diagnosis was Syncope and collapse.      has a past medical history of CHF (congestive heart failure) ([address] Diabetes type 2, uncontrolled ([address] Diabetic nephropathy, ED (erectile dysfunction), Essential hypertension, benign, Hyperlipidemia, and Noncompliance. has a past surgical history that includes eye surgery.     Restrictions  Restrictions/Precautions  Restrictions/Precautions: Fall Risk  Subjective   General  Chart Reviewed: Yes  Patient assessed for rehabilitation services?: Yes  Additional Pertinent Hx: per ED note, Baron De La Cruz is a 61 y.o. male who presents to the emergency department via EMS from home where he lives with his son after passing out. He states he felt fine today no chest pain or shortness of breath. He had eaten his dinner he was getting up walking when he collapsed to the ground. He vaguely remembers getting lightheaded prior to this. His son called 46. He has a history of diabetes, coronary artery disease, congestive heart failure. He states he did not take his Xarelto today. Denies pain in his hips arms or neck. He tells me he always has leg swelling and shortness of breath and that this is not worse than normal.  Family / Caregiver Present: No  Referring Practitioner: Jerome Mason DO  Subjective  Subjective: Pt agreeable to OT treatment. Pt with no reports of pain. Pt with no reports of light headedness/dizziness throughout. General Comment  Comments: okay for therapy per RN. Orientation  Orientation  Overall Orientation Status: Within Functional Limits  Objective    ADL  Additional Comments: Pt declines needs at this time. Balance  Sitting Balance: Independent  Standing Balance: Supervision (RW)  Functional Mobility  Functional - Mobility Device: Rolling Walker  Activity: Other; To/from bathroom (~15ft, 75ft)  Assist Level: Supervision  Functional Mobility Comments: no LOB; no reports of light headedness or dizziness  Toilet Transfers  Toilet - Technique: Ambulating (RW)  Equipment Used: Grab bars  Toilet Transfer: Supervision  Wheelchair Bed Transfers  Wheelchair/Bed - Technique: Ambulating (RW)  Equipment Used: Bed;Other (bed to chair)  Level of Asssistance: Supervision  Bed mobility  Supine to Sit: Supervision  Sit to Supine: Unable to assess  Scooting: Supervision  Transfers  Sit to stand: Supervision  Stand to sit: Supervision  Transfer Comments: to/from RW           Cognition  Overall Cognitive Status: [facility]              Plan   Plan  Times per week: 3-5x  Current Treatment Recommendations: Strengthening,Functional Mobility Training,Gait Training,Endurance Training,Balance Training,Self-Care / ADL,Safety Education & Training,Patient/Caregiver Education & Training    AM-PAC Score        AM-PAC Inpatient Daily Activity Raw Score: 21 (03/30/22 0817)  AM-PAC Inpatient ADL T-Scale Score : 44.27 (03/30/22 0817)  ADL Inpatient CMS 0-100% Score: 32.79 (03/30/22 0817)  ADL Inpatient CMS G-Code Modifier : Aarti Villanueva (03/30/22 2007)    Goals  Short term goals  Time Frame for Short term goals: prior to D/C; ongoing 3/30  Short term goal 1: complete functional mobility and transfers Mod Ind  Short term goal 2: complete bathing and dressing Mod Ind  Short term goal 3: complete toileting Mod Ind  Short term goal 4: complete grooming in stance at sink [address] term goals  Time Frame for Long term goals : STG=LTG  Patient Goals   Patient goals : return home       Therapy Time   Individual Concurrent Group Co-treatment   Time In 0755         Time Out 0818         Minutes 23         Timed Code Treatment Minutes: 23 Minutes       PEACE Drummond/L

## 2022-03-30 NOTE — PROGRESS NOTES
[address] 83   Daily Progress Note      Admit Date:  3/25/2022    Reason for follow up visit: Syncope; CHF    CC: \"I'm doing better today. My breathing is doing better. \"    62 y/o ,jeremiah with PMH significant for cardiomyopathy/CHF, HTN, HLP and diabetes mellitus who was admitted to [facility] after experiencing a syncopal episode at home. He did not recall details and device interrogation showed VT arrest that was aborted by ATP. Also noted to have 30# weight gain and abdominal distension. He was placed on IV lasix and continued on amiodarone and Toprol. Interval History:  Pt. seen and examined; records reviewed  BP stable. Wt today 178#  Net diuresis-4.3L since admit  Denies chest pain or SOB  No further prolonged ectopy noted  BUN/Cr 98/2.7 today    Subjective:  Pt with no acute overnight events. Denies chest pain, palpitations, and dyspnea. Denies chest pain, SOB, cough, palpitations or dizziness    Review of Systems:   Constitutional: no unanticipated weight loss. There's been no change in energy level, sleep pattern, or activity level. No fevers, chills. Eyes: No visual changes or diplopia. No scleral icterus. ENT: No Headaches, hearing loss or vertigo. No mouth sores or sore throat. Cardiovascular: as reviewed in HPI  Respiratory: No cough or wheezing, no sputum production. No hematemesis. Gastrointestinal: No abdominal pain, appetite loss, blood in stools. No change in bowel or bladder habits. Genitourinary: No dysuria, trouble voiding, or hematuria. Musculoskeletal:  No gait disturbance, no joint complaints. Integumentary: No rash or pruritis. Neurological: No headache, diplopia, change in muscle strength, numbness or tingling. Psychiatric: No anxiety or depression. Endocrine: No temperature intolerance. No excessive thirst, fluid intake, or urination. No tremor. Hematologic/Lymphatic: No abnormal bruising or bleeding, blood clots or swollen lymph nodes.   Allergic/Immunologic: No nasal congestion or hives. Objective:   /85   Pulse 75   Temp 97.6 °F (36.4 °C) (Oral)   Resp 22   Ht 5' 6\" (1.676 m)   Wt 178 lb 12.7 oz (81.1 kg)   SpO2 98%   BMI 28.86 kg/m²     Intake/Output Summary (Last 24 hours) at 3/30/2022 1014  Last data filed at 3/30/2022 0911  Gross per 24 hour   Intake 1360 ml   Output 725 ml   Net 635 ml     Wt Readings from Last 3 Encounters:   03/30/22 178 lb 12.7 oz (81.1 kg)   02/17/22 152 lb 1.9 oz (69 kg)   01/20/22 180 lb (81.6 kg)       Physical Exam:  General: In no acute distress. Awake, alert, and oriented X4. Up in chair. Skin:  Warm and dry. No new appearing rashes or lesions. Neck:  Supple. No carotid bruit appreciated. + JVD  Chest:  Lungs clear with decreased breath sounds bilaterally. No wheezes/rhonchi/rales  Cardiovascular:  RRR. Normal S1 and S2. II/VI systolic murmur  Abdomen:  soft, nontender, +bowel sounds. + mild distension (? Ascites)  Extremities:  2+ bilateral lower leg edema; 2+ bilateral radial/carotid/DP/PT pulses.  Cap refill brisk     Medications:    amiodarone  200 mg Oral BID    metoprolol succinate  25 mg Oral Daily    allopurinol  100 mg Oral Daily    atorvastatin  80 mg Oral Daily    levothyroxine  50 mcg Oral QAM AC    rivaroxaban  15 mg Oral Dinner    sodium chloride flush  5-40 mL IntraVENous 2 times per day    spironolactone  25 mg Oral Daily    sacubitril-valsartan  1 tablet Oral BID    furosemide  60 mg IntraVENous BID    aspirin  81 mg Oral Daily      sodium chloride       sodium chloride flush, sodium chloride, perflutren lipid microspheres, ondansetron    Lab Data:  CBC:   Recent Labs     03/28/22  0411 03/29/22  0435 03/30/22  0531   WBC 3.4* 3.1* 3.3*   HGB 10.5* 11.3* 11.0*    164 171     BMP:    Recent Labs     03/28/22  0411 03/29/22  0435 03/30/22  0531    136 135*   K 4.5 4.2 4.8   CO2 20* 19* 18*   BUN 69* 86* 98*   CREATININE 2.4* 2.3* 2.7*     LIVR:   Recent Labs     03/28/22  0411 03/29/22  0435 03/30/22  0531   AST 20 20 21   ALT 14 15 15     Results for Iris Mederos (MRN [id number]) as of 3/30/2022 10:19   Ref. Range 3/25/2022 21:57 3/26/2022 02:34 3/26/2022 03:57   Pro-BNP Latest Ref Range: 0 - 124 pg/mL 34,786 (H)     Troponin Latest Ref Range: <0.01 ng/mL 0.08 (H) 0.08 (H) 0.07 (H)     3/26/2022 CT abdomen/pelvis:  Impression   There is marked cardiomegaly, with trace pleural effusions, as well as   moderate intra-abdominal and pelvic ascites, as well as diffuse anasarca. Hepatic vein reflux is noted on the previous CT examination is well.  This   combination of findings can be seen in the setting of passive liver   congestion related to congestive failure.  Questionable mild surface   nodularity of the liver makes it difficult to exclude underlying cirrhosis.       Minimal bibasilar airspace disease, possibly related to atelectasis or   pneumonia, though with less consolidation than seen on the previous exam.       Mild appearing bilateral renal atrophy.       Suspected fat and ascitic fluid containing left inguinal hernia.  No bowel   herniation. 3/26/2022 Echo   Summary   Overall left ventricular systolic function appears severely reduced. Ejection fraction is visually estimated to be <20% with diffuse hypokinesis. Severely dilated left ventricle. Normal left ventricular wall thickness. Diastolic filling parameters suggest grade II diastolic dysfunction. The right ventricle appears severely dilated with moderately reduced   systolic function. The left and right atria are severely dilated. Severe tricuspid regurgitation. Moderate mitral regurgitation. Estimated pulmonary artery systolic pressure is severely elevated at 66 mmHg   assuming a right atrial pressure of 15 mmHg. 2/11/2022 RHC:  1.  Evidence of right-sided volume overload with a right atrial pressure  of 11 mmHg and a pulmonary capillary wedge pressure of 22 mmHg.   2.  Pulmonary hypertension with an instantaneous pressure of 56/25 for a  mean pulmonary arterial pressure of 37 mmHg. 3.  Reduced cardiac output by thermodilution at 3.96 liters per minute  with a cardiac index of 1.4 liters per kilogram per minute. By Buelah Sides, the cardiac index was 2.14 liters per kilogram per minute. Of  note, the patient is on 0.25 mcg per kilogram per minute of milrinone. 4.  Pulmonary vascular resistance of 197 dynes per second. 5. Low mixed venous oxygen saturations with an IVC of 40%, right atrium  42%, and pulmonary artery of 38%. The patient's oxygen saturation was  89% to 90% on room air. Telemetry: Sinus rhythm with PVC's (personally reviewed)    Assessment/Plan:    1. Ventricular tachycardia  -device interrogation showed VT with episode of syncope  -terminated by ATP  -continue amiodarone and Toprol  -no recurrence    2. Acute on chronic systolic heart failure  -S/P [employer] ICD  -NYHA class III; LVEF < 20%  -continue IV lasix  -not on inotrope d/t recent ventricular arrhythmia  --continue Toprol, Entresto and aldactone  -consider SGLT2 as outpatient    3. SONAL onchronic kidney disease, stage IIIb  -Cr continues to increase with diuresis  -he is still significantly volume overloaded  -will ask renal to see [facility] nephrology)    4. Demand ischemia  -troponins not c/w with an acute coronary syndrome  -secondary to decompensated CHF and VT    5. Coronary artery disease  -prior PCI of LAD  -continue medical management with ASA, statin and BB    6. LV thrombus  -continue Xarelto  -not evident on most recent echo    7.  Type II diabetes mellitus  -management per Primary team      Electronically signed by DAHIANA Chavez CNP on 3/30/2022 at 10:14 AM

## 2022-03-30 NOTE — CARE COORDINATION
Case management continues to follow for discharge planning needs. Chart reviewed. Plan at discharge is to return home. Pt has declined home care. Has needed DME. Son will transport.     Electronically signed by Rolando Fischer RN on 3/30/2022 at 2:56 PM  [phone]

## 2022-03-30 NOTE — PLAN OF CARE
Problem: Falls - Risk of:  Goal: Will remain free from falls  Description: Will remain free from falls  Outcome: Ongoing     Problem: Falls - Risk of:  Goal: Absence of physical injury  Description: Absence of physical injury  Outcome: Ongoing     Problem: Pain:  Goal: Patient's pain/discomfort is manageable  Description: Patient's pain/discomfort is manageable  Outcome: Ongoing     Problem: Daily Care:  Goal: Daily care needs are met  Description: Daily care needs are met  Outcome: Ongoing     Problem: Discharge Planning:  Goal: Patients continuum of care needs are met  Description: Patients continuum of care needs are met  Outcome: Ongoing     Problem: ACTIVITY INTOLERANCE/IMPAIRED MOBILITY  Goal: Mobility/activity is maintained at optimum level for patient  Outcome: Ongoing

## 2022-03-30 NOTE — PROGRESS NOTES
Hospitalist Progress Note  3/30/2022 10:24 AM    PCP: Lolly River DO    [id number]     Date of Admission: 3/25/2022                                                                                                                     HOSPITAL COURSE    Patient demographics:  The patient  Alexander Aguila is a 61 y.o. male     Significant past medical history:   Patient Active Problem List   Diagnosis    ED (erectile dysfunction)    Essential hypertension, benign    Hyperlipidemia    Diabetic nephropathy ([address]    Diabetes type 2, uncontrolled ([address]    Noncompliance    Elevated blood uric acid level    Coronary artery disease involving native coronary artery    Elevated PSA    Acute on chronic combined systolic and diastolic HF [facility]heart failure) [facility]    Stage 3 chronic kidney disease ([address]    SONAL (acute kidney injury) ([address]    Biventricular heart failure (HCC)    Acute on chronic systolic heart failure, NYHA class 4 ([facility])    Acute on chronic congestive heart failure ([facility])    LV (left ventricular) mural thrombus    Syncope    CHF (congestive heart failure) ([address]    Syncope and collapse    ICD (implantable cardioverter-defibrillator) in place    Demand ischemia ([address]    Ventricular tachycardia ([address]         Presenting symptoms:  Syncope    Diagnostic workup:  CT HEAD WO CONTRAST  CT ABDOMEN PELVIS WO CONTRAST          CONSULTS DURING ADMISSION :   IP CONSULT TO CARDIOLOGY  IP CONSULT TO GI  IP CONSULT TO HEART FAILURE NURSE/COORDINATOR      Patient was diagnosed with:  Syncope  CHF  Leukopenia  Stage III chronic kidney disease  Type 2 diabetes  Hypertension        Treatment while inpatient:  pt presented to [facility] with concern that patient had an episode of syncope at home but noted to be very brief.                                                                                       ----------------------------------------------------------      SUBJECTIVE COMPLAINTS- follow up for Syncope    Diet: ADULT DIET; Regular; 4 carb choices (60 gm/meal); Low Sodium (2 gm); 1500 ml      OBJECTIVE:   Patient Active Problem List   Diagnosis    ED (erectile dysfunction)    Essential hypertension, benign    Hyperlipidemia    [facility]ropathy ([facility]    Diabetes type 2, uncontrolled ([facility]    Noncompliance    Elevated blood uric acid level    Coronary artery disease involving native coronary artery    Elevated PSA    Acute on chronic combined systolic and diastolic HF (heart failure) ([facility])    Stage 3 chronic kidney disease ([facility]    SONAL (acute kidney injury) ([facility]    Biventricular heart failure ([facility])    Acute on chronic systolic heart failure, NYHA class 4 ([facility])    Acute on chronic congestive heart failure ([facility])    LV (left ventricular) mural thrombus    Syncope    CHF (congestive heart failure) ([facility])    Syncope and collapse    ICD (implantable cardioverter-defibrillator) in place    Demand ischemia ([facility]    Ventricular tachycardia ([facility]       Allergies  Patient has no known allergies.     Medications    Scheduled Meds:   amiodarone  200 mg Oral BID    metoprolol succinate  25 mg Oral Daily    allopurinol  100 mg Oral Daily    atorvastatin  80 mg Oral Daily    levothyroxine  50 mcg Oral QAM AC    rivaroxaban  15 mg Oral Dinner    sodium chloride flush  5-40 mL IntraVENous 2 times per day    spironolactone  25 mg Oral Daily    sacubitril-valsartan  1 tablet Oral BID    furosemide  60 mg IntraVENous BID    aspirin  81 mg Oral Daily     Continuous Infusions:   sodium chloride       PRN Meds:  sodium chloride flush, sodium chloride, perflutren lipid microspheres, ondansetron    Vitals   Vitals /wt   Patient Vitals for the past 8 hrs:   BP Temp Temp src Pulse Resp SpO2 Weight   03/30/22 0738 126/85 97.6 °F (36.4 °C) Oral 75 22 98 % --   03/30/22 0526 108/76 97.8 °F (36.6 °C) Oral 75 24 98 % --   03/30/22 0344 -- -- -- -- -- -- 178 lb 12.7 oz (81.1 kg)        72HR INTAKE/OUTPUT: Intake/Output Summary (Last 24 hours) at 3/30/2022 1024  Last data filed at 3/30/2022 0911  Gross per 24 hour   Intake 1360 ml   Output 725 ml   Net 635 ml       Exam:    Gen:   Alert and oriented ×3   Eyes: PERRL. No sclera icterus. No conjunctival injection. ENT: No discharge. Pharynx clear. External appearance of ears and nose normal.  Neck: Trachea midline. No obvious mass. Resp: No accessory muscle use. No crackles. No wheezes. No rhonchi. CV: Regular rate. Regular rhythm. No murmur or rub. No edema. GI: Non-tender. Non-distended. No hernia. Skin: Warm, dry, normal texture and turgor. Lymph: No cervical LAD. No supraclavicular LAD. M/S: / Ext. No cyanosis. No clubbing. No joint deformity. Neuro: CN 2-12 are intact,  no neurologic deficits noted. PT/INR: No results for input(s): PROTIME, INR in the last 72 hours. APTT: No results for input(s): APTT in the last 72 hours. CBC:   Recent Labs     03/28/22 0411 03/29/22 0435 03/30/22  0531   WBC 3.4* 3.1* 3.3*   HGB 10.5* 11.3* 11.0*   HCT 32.1* 34.7* 34.7*   .0* 108.8* 111.7*    164 171       BMP:   Recent Labs     03/28/22 0411 03/29/22 0435 03/30/22  0531    136 135*   K 4.5 4.2 4.8    100 101   CO2 20* 19* 18*   BUN 69* 86* 98*   CREATININE 2.4* 2.3* 2.7*       LIVER PROFILE:   Recent Labs     03/28/22 0411 03/29/22  0435 03/30/22  0531   ALKPHOS 115 118 122   AST 20 20 21   ALT 14 15 15   BILITOT 0.7 0.7 0.7     No results for input(s): AMYLASE in the last 72 hours. No results for input(s): LIPASE in the last 72 hours. UA:  No results for input(s): NITRITE, LABCAST, WBCUA, RBCUA, MUCUS in the last 72 hours. TROPONIN:   No results for input(s): Johnie Scales in the last 72 hours. Lab Results   Component Value Date/Time    URRFLXCULT Not Indicated 02/03/2022 03:40 PM       No results for input(s): TSHREFLEX in the last 72 hours.     No components found for: [id number]  POC GLUCOSE:  No results

## 2022-03-31 NOTE — CONSULTS
[facility]           [address] [place] [address]                                  CONSULTATION    PATIENT NAME: Rossana Page                        :        1955  MED REC NO:   [id number]                          ROOM:       9301  ACCOUNT NO:   [de-identified]                           ADMIT DATE: 2022  PROVIDER:     Nuzhat Galloway MD    RENAL CONSULTATION    CONSULT DATE:  2022    ADMITTING PROVIDER:  Radha Medina MD    REASON FOR ADMISSION:  The patient with syncopal episode due to episode  of V-tach, got cardioverted by his defibrillator. REASON FOR CONSULTATION:  CKD, fluid overload, CHF. HISTORY OF PRESENTING COMPLAINT:  A 29-year-old -American with  known history of cirrhosis, congestive heart failure with ejection  fraction of 20%, advanced CKD stage IV/V, by coming out of the kitchen  using his walker, went on the ground, lost consciousness. He eventually  came here and was found on interrogation of his device that he had  episode of ventricular tachycardia. He was also found to have 30 pounds of fluid weight gain. He was known to have CKD. I am seeing him in his room where he feels much more comfortable. He  has been diuresed with good response. Still has raised JVD. REVIEW OF SYSTEMS:  GENERAL:  No fever or chills. CARDIAC:  No chest pain. PULMONARY:  No shortness of breath. GENITOURINARY:  No dysuria or hematuria. VASCULAR:  No claudication. GI:  No nausea, vomiting. Review of the rest of the system is negative. PAST MEDICAL HISTORY:  Congestive heart failure, diabetes, history of  some noncompliance. ADMISSION MEDICATIONS:  Include isosorbide, metoprolol, torsemide,  Synthroid, spironolactone, allopurinol, multivitamins, Entresto,  Lipitor, Trulicity. ALLERGIES:  No known drug allergies. PERSONAL AND SOCIAL HISTORY:  Lives at home. Stopped smoking 40 years  ago.   Some alcohol per week. FAMILY HISTORY:  Negative for any chronic kidney disease. PHYSICAL EXAMINATION:  GENERAL:  On examination, he was awake, alert. VITAL SIGNS:  Weight 178 pounds, blood pressure 96/69, pulse 73. HEENT:  Head normocephalic, atraumatic. Conjunctivae, no icterus. CARDIAC:  He has normal heart sounds. Has a systolic murmur at the  base. No rub. NECK:  JVD was up to the angle of his jaw. CHEST:  Few crackles. ABDOMEN:  Soft, nontender. Bowel sounds present. EXTREMITIES:  Bilateral lower extremities still has +1 to +2 edema. NEUROLOGICAL:  Alert and awake. No focal neurological signs. LABORATORY DATA:  Sodium 135, potassium 4.8, chloride 101, CO2 18, BUN  98, creatinine is 2.7. Magnesium is 2.3. Albumin is 3.5. Liver  function tests are normal.  Serum albumin is 3.5. White cell 3.3, hemoglobin 11, . ASSESSMENT AND PLAN:     CKD IV.   baseline creatinine is around 2.5  with a fluctuating creatinine due to cardiorenal syndrome  also has diabetic nephropathy. Continue with diuretics. Unfortunately, he might eventually need to go on  Dialysis. diabetes and hypertension for over 10 years   last EF is less than 20%   albuminuria on urinalysis going back to 2013 had an  mg   proteinuria 700 mg   no RBCs no WBCs  CT 2020 symmetric kidneys   HCV negative   negative NAHOMY   negative RF factor     Ischemic cardiomyopathy/Decompensated  EF less than 20%   grade 2 diastolic dysfunction   RT  ventricular systolic function is severely reduced as well has moderate MR and RV's dilated  In the past was on Milrinone    RHC 11/221 index very low  wedge was 19 and RA pressure 13   prognosis is poor. I do not know if he is a candidate for advanced heart failure therapies or not.     CAD/AICD   with stent in LAD  Most of his records are in the  system  Previously on Entresto but currently not     Gout  In the past  precipitated by diuresis    was On steroids  allopurinol, 100 mg daily       Diabetes type 2  10 years at least  Longstanding albuminuria  A1c  between 6 and 6.5       Miles Abreu MD    D: 03/30/2022 19:33:19       T: 03/30/2022 20:32:36     [email or web address]  Job#: [id number]     Doc#: [id number]    CC:

## 2022-03-31 NOTE — PLAN OF CARE
Problem: Falls - Risk of:  Goal: Will remain free from falls  Outcome: Met This Shift  Note: Fall risk assessment completed every shift. All precautions in place. Pt has call light within reach at all times. Room clear of clutter. Pt aware to call for assistance when getting up. Bed alarm on. No falls this shift. Problem: Falls - Risk of:  Goal: Absence of physical injury  Outcome: Met This Shift     Problem: Pain:  Goal: Patient's pain/discomfort is manageable  Outcome: Met This Shift  Note: Pain level and characteristics of pain assessed. Patient included in decisions related to pain management. Pain medications given as ordered after other non-medication management options have been attempted. Effectiveness of pain medications assessed and ineffective pain management reported to MD as needed. No complaints this shift.       Problem: Skin Integrity:  Goal: Skin integrity will stabilize  Outcome: Met This Shift     Problem: Infection:  Goal: Will remain free from infection  Outcome: Met This Shift     Problem: Safety:  Goal: Free from intentional harm  Outcome: Met This Shift     Problem: Discharge Planning:  Goal: Patients continuum of care needs are met  Outcome: Met This Shift     Problem: OXYGENATION/RESPIRATORY FUNCTION  Goal: Patient will achieve/maintain normal respiratory rate/effort  Outcome: Met This Shift     Problem: HEMODYNAMIC STATUS  Goal: Patient has stable vital signs and fluid balance  Outcome: Ongoing     Problem: ACTIVITY INTOLERANCE/IMPAIRED MOBILITY  Goal: Mobility/activity is maintained at optimum level for patient  Outcome: Ongoing

## 2022-03-31 NOTE — PROGRESS NOTES
Admit Date: 3/25/2022      REASON FOR ADMISSION:   e patient with syncopal episode due to episode  of V-tach, got cardioverted by his defibrillator.     REASON FOR Follow up   CKD, fluid overload, CHF. INTERVAL HISTORY  Still fluid overload   Creat 2.7-->2.8  Urine ? 25  /81  Wt 180    PLAN  Change to lasix infusion 20 mg/hour   Add Metolazone 10 mg bid  He might need Milrinone if above does not work        CKD IV.   baseline creatinine is around 2.5  with a fluctuating creatinine due to cardiorenal syndrome  also has diabetic nephropathy. Continue with diuretics. Unfortunately, he might eventually need to go on  Dialysis. diabetes and hypertension for over 10 years   last EF is less than 20%   albuminuria on urinalysis going back to 2013 had an  mg   proteinuria 700 mg   no RBCs no WBCs  CT 2020 symmetric kidneys   HCV negative   negative NAHOMY   negative RF factor     Ischemic cardiomyopathy/Decompensated  EF less than 20%   grade 2 diastolic dysfunction   RT  ventricular systolic function is severely reduced as well has moderate MR and RV's dilated  In the past was on Milrinone    [facility] 11/221 index very low  wedge was 19 and RA pressure 13   prognosis is poor. I do not know if he is a candidate for advanced heart failure therapies or not. CAD/AICD   with stent in LAD  Most of his records are in the  system  Previously on Entresto but currently not     Gout  In the past  precipitated by diuresis    was On steroids  allopurinol, 100 mg daily       Diabetes type 2  10 years at least  Longstanding albuminuria  A1c  between 6 and 6.5     [facility]  A 42-year-old -American with  known history of cirrhosis, congestive heart failure with ejection  fraction of 20%, advanced CKD stage IV/V, by coming out of the kitchen  using his walker, went on the ground, lost consciousness.   He eventually  came here and was found on interrogation of his device that he had  episode of ventricular Syncope and collapse  Active Problems:    Essential hypertension, benign    Diabetes type 2, uncontrolled ([facility])    Stage 3 chronic kidney disease (HCC)    Acute on chronic systolic heart failure, NYHA class 4 ([facility])    Syncope    CHF (congestive heart failure) ([facility]    ICD (implantable cardioverter-defibrillator) in place    Demand ischemia [facility])    Ventricular tachycardia ([facility]  Resolved Problems:    * No resolved hospital problems.  *

## 2022-03-31 NOTE — PROGRESS NOTES
auscultation, bilaterally without Rales/Wheezes/Rhonchi. Cardiovascular: Regular rate and rhythm with normal S1/S2 without murmurs, rubs or gallops. Abdomen: Soft, non-tender, non-distended with normal bowel sounds. Musculoskeletal: Bilateral edema noted  Skin: Skin color, texture, turgor normal.  No rashes or lesions. Neurologic:  Neurovascularly intact without any focal sensory/motor deficits. Cranial nerves: II-XII intact, grossly non-focal.  Psychiatric: Alert and oriented, thought content appropriate, normal insight  Capillary Refill: Brisk,3 seconds, normal   Peripheral Pulses: +2 palpable, equal bilaterally       Labs:   Recent Labs     03/29/22 0435 03/30/22 0531 03/31/22  0513   WBC 3.1* 3.3* 3.8*   HGB 11.3* 11.0* 11.0*   HCT 34.7* 34.7* 33.6*    171 196     Recent Labs     03/29/22 0435 03/30/22 0531 03/31/22  0513    135* 136   K 4.2 4.8 4.9    101 101   CO2 19* 18* 19*   BUN 86* 98* 108*   CREATININE 2.3* 2.7* 2.8*   CALCIUM 8.4 8.6 8.5     Recent Labs     03/29/22 0435 03/30/22 0531 03/31/22  0513   AST 20 21 21   ALT 15 15 16   BILITOT 0.7 0.7 0.7   ALKPHOS 118 122 117     No results for input(s): INR in the last 72 hours. No results for input(s): Les Ibeth in the last 72 hours. Urinalysis:      Lab Results   Component Value Date    NITRU Negative 03/26/2022    WBCUA 0 03/26/2022    RBCUA 2 03/26/2022    BLOODU Negative 03/26/2022    SPECGRAV 1.015 03/26/2022    GLUCOSEU Negative 03/26/2022       Radiology:  CT ABDOMEN PELVIS WO CONTRAST Additional Contrast? None   Final Result   There is marked cardiomegaly, with trace pleural effusions, as well as   moderate intra-abdominal and pelvic ascites, as well as diffuse anasarca. Hepatic vein reflux is noted on the previous CT examination is well. This   combination of findings can be seen in the setting of passive liver   congestion related to congestive failure.   Questionable mild surface   nodularity of the liver makes it difficult to exclude underlying cirrhosis. Minimal bibasilar airspace disease, possibly related to atelectasis or   pneumonia, though with less consolidation than seen on the previous exam.      Mild appearing bilateral renal atrophy. Suspected fat and ascitic fluid containing left inguinal hernia. No bowel   herniation. XR CHEST (2 VW)   Final Result      1. Possible mild degree of vascular congestion though exaggerated by a   suboptimal inspiration. No focal pulmonary infiltrates are noted. 2.  Stable cardiomegaly. CT HEAD WO CONTRAST   Final Result      1. No acute intracranial abnormality noted. 2.  Prominence of the sulci and/or CSF spaces suggests a possible mild degree   of cerebral atrophy. Assessment/Plan:    Active Hospital Problems    Diagnosis     Ventricular tachycardia ([address] [I47.2]     Syncope [R55]     CHF (congestive heart failure) ([facility]) [I50.9]     Syncope and collapse [R55]     ICD (implantable cardioverter-defibrillator) in place [Z95.810]     Demand ischemia ([address] [I24.8]     Acute on chronic systolic heart failure, NYHA class 4 ([facility]) [I50.23]     Stage 3 chronic kidney disease ([address] [N18.30]     Diabetes type 2, uncontrolled ([address] [E11.65]     Essential hypertension, benign [I10]      Single. Ventricular tachycardia. Interrogation showed VT that was appropriately shocked. Continue amiodarone and metoprolol. Appreciate cardiology input. Acute on chronic systolic heart failure exacerbation. Ejection fraction of less than 20%, patient is followed closely by cardiology and nephrology. Started on milrinone 3/31 as well as Lasix infusion at 20 mg/h    SONAL on CKD  Cardiorenal, creatinine increasing to 2.8. Started on milrinone as well as Lasix infusion at 20 mg/h. Urine output the last 24 hours 300 mL. CAD. Continue aspirin beta-blocker and statin. Left ventricular thrombus.   Continue Xarelto    Diabetes mellitus. Sliding scale    DVT Prophylaxis: Xarelto  Diet: ADULT DIET; Regular; 4 carb choices (60 gm/meal);  Low Sodium (2 gm); 1500 ml  Code Status: Full Code    PT/OT Eval Status: pending Clinical improvement    Dispo -pending clinical improvement    Hesham Young MD

## 2022-03-31 NOTE — PROGRESS NOTES
[facility]   Daily Progress Note      Admit Date:  3/25/2022    Reason for follow up visit: CHF; Syncope    CC: \"I'm okay. I feel cold today. \"    60 y/o male with PMH significant for cardiomyopathy/CHF, HTN, HLP and diabetes mellitus who was admitted to [facility] after experiencing a syncopal episode at home. He did not recall details and device interrogation showed VT arrest that was aborted by ATP. Also noted to have 30# weight gain and abdominal distension. He was placed on IV lasix and continued on amiodarone and Toprol. Interval History:  Pt. seen and examined; records reviewed  BP stable. SOB with exertion  Denies chest pain  Net diuresis -3.6L; suspect weight today inaccurate  BUN/Cr: 108/2.8  Remains on IV diuretic    Subjective:  Pt with no acute overnight cardiac events. Denies chest pain, cough, palpitations or dizziness    Review of Systems:   · Constitutional: no unanticipated weight loss. There's been no change in energy level, sleep pattern, or activity level. No fevers, chills. · Eyes: No visual changes or diplopia. No scleral icterus. · ENT: No Headaches, hearing loss or vertigo. No mouth sores or sore throat. · Cardiovascular: as reviewed in HPI  · Respiratory: No cough or wheezing, no sputum production. No hematemesis. · Gastrointestinal: No abdominal pain, appetite loss, blood in stools. No change in bowel or bladder habits. · Genitourinary: No dysuria, trouble voiding, or hematuria. · Musculoskeletal:  No gait disturbance, no joint complaints. · Integumentary: No rash or pruritis. · Neurological: No headache, diplopia, change in muscle strength, numbness or tingling. · Psychiatric: No anxiety or depression. · Endocrine: No temperature intolerance. No excessive thirst, fluid intake, or urination. No tremor. · Hematologic/Lymphatic: No abnormal bruising or bleeding, blood clots or swollen lymph nodes. · Allergic/Immunologic: No nasal congestion or hives.     Objective: /81   Pulse 74   Temp 97.3 °F (36.3 °C) (Axillary)   Resp 21   Ht 5' 6\" (1.676 m)   Wt 156 lb 12 oz (71.1 kg)   SpO2 92%   BMI 25.30 kg/m²     Intake/Output Summary (Last 24 hours) at 3/31/2022 0932  Last data filed at 3/31/2022 0655  Gross per 24 hour   Intake 240 ml   Output 25 ml   Net 215 ml     Wt Readings from Last 3 Encounters:   03/31/22 156 lb 12 oz (71.1 kg)   02/17/22 152 lb 1.9 oz (69 kg)   01/20/22 180 lb (81.6 kg)       Physical Exam:  General: In no acute distress. Awake, alert, and oriented X4. Up inchair. Offers little verbal response  Skin:  Warm and dry. No new appearing rashes or lesions. Neck:  Supple. + moderate JVD. No carotid bruit  Chest: Lungs with crackles in R posterior base. No wheezes/rhonchi  Cardiovascular:  RRR. Normal S1 and S2. II/VI systolic murmur  Abdomen:  soft, nontender, +bowel sounds. + mild lower abdominal distension  Extremities: 2-3+ diffuse edema (presacral and lower extremities). 2+ bilateral radial/carotid/DP/PT pulses.       Medications:    amiodarone  200 mg Oral BID    metoprolol succinate  25 mg Oral Daily    allopurinol  100 mg Oral Daily    atorvastatin  80 mg Oral Daily    levothyroxine  50 mcg Oral QAM AC    rivaroxaban  15 mg Oral Dinner    sodium chloride flush  5-40 mL IntraVENous 2 times per day    spironolactone  25 mg Oral Daily    sacubitril-valsartan  1 tablet Oral BID    furosemide  60 mg IntraVENous BID    aspirin  81 mg Oral Daily      sodium chloride       sodium chloride flush, sodium chloride, perflutren lipid microspheres, ondansetron    Lab Data:  CBC:   Recent Labs     03/29/22  0435 03/30/22  0531 03/31/22  0513   WBC 3.1* 3.3* 3.8*   HGB 11.3* 11.0* 11.0*    171 196     BMP:    Recent Labs     03/29/22  0435 03/30/22  0531 03/31/22  0513    135* 136   K 4.2 4.8 4.9   CO2 19* 18* 19*   BUN 86* 98* 108*   CREATININE 2.3* 2.7* 2.8*     LIVR:   Recent Labs     03/29/22  0435 03/30/22  0531 03/31/22  7714 AST 20 21 21   ALT 15 15 16     Results for Brenton Melo (MRN [id number]) as of 3/31/2022 09:35   Ref. Range 3/25/2022 21:57 3/26/2022 02:34 3/26/2022 03:57   Pro-BNP Latest Ref Range: 0 - 124 pg/mL 34,786 (H)     Troponin Latest Ref Range: <0.01 ng/mL 0.08 (H) 0.08 (H) 0.07 (H)     3/26/2022 CT abdomen/pelvis:  Impression   There is marked cardiomegaly, with trace pleural effusions, as well as   moderate intra-abdominal and pelvic ascites, as well as diffuse anasarca. Hepatic vein reflux is noted on the previous CT examination is well.  This   combination of findings can be seen in the setting of passive liver   congestion related to congestive failure.  Questionable mild surface   nodularity of the liver makes it difficult to exclude underlying cirrhosis.       Minimal bibasilar airspace disease, possibly related to atelectasis or   pneumonia, though with less consolidation than seen on the previous exam.       Mild appearing bilateral renal atrophy.       Suspected fat and ascitic fluid containing left inguinal hernia.  No bowel   herniation.      3/26/2022 Echo   Summary   Overall left ventricular systolic function appears severely reduced.   Ejection fraction is visually estimated to be <20% with diffuse hypokinesis.   Severely dilated left ventricle. Normal left ventricular wall thickness.   Diastolic filling parameters suggest grade II diastolic dysfunction.   The right ventricle appears severely dilated with moderately reduced   systolic function.   The left and right atria are severely dilated.   Severe tricuspid regurgitation.   Moderate mitral regurgitation.   Estimated pulmonary artery systolic pressure is severely elevated at 66 mmHg   assuming a right atrial pressure of 15 mmHg.     2/11/2022 RHC:  1.  Evidence of right-sided volume overload with a right atrial pressure  of 11 mmHg and a pulmonary capillary wedge pressure of 22 mmHg.   2.  Pulmonary hypertension with an instantaneous pressure of 56/25 for a  mean pulmonary arterial pressure of 37 mmHg. 3.  Reduced cardiac output by thermodilution at 3.96 liters per minute  with a cardiac index of 1.4 liters per kilogram per minute.  By Blu  equation, the cardiac index was 2.14 liters per kilogram per minute.  Of  note, the patient is on 0.25 mcg per kilogram per minute of milrinone. 4.  Pulmonary vascular resistance of 197 dynes per second. 5.  Low mixed venous oxygen saturations with an IVC of 40%, right atrium  42%, and pulmonary artery of 38%.  The patient's oxygen saturation was  89% to 90% on room air. Telemetry: Sinus rhythm with PVC's (personally reviewed)    Assessment/Plan:    1. Ventricular tachycardia  -device interrogation showed VT with episode of syncope; terminated by ATP  -continue amiodarone and Toprol  -no recurrent episodes of VT noted     2. Acute on chronic systolic heart failure  -S/P [employer] ICD  -NYHA class III; LVEF < 20%  -remains on IV lasix  -not diuresisng much so will add low dose inotrope and monitor rhythm  -continue Entresto, Toprol, aldactone  -consider SGLT2 as outpatient     3. SONAL onchronic kidney disease, stage IIIb  -Cr increased to 2.8  -he is still significantly volume overloaded will add low dose inotrope  -? Lasix gtt (defer to nephrology)  -nephrology following     4. Demand ischemia  -troponins not c/w with an acute coronary syndrome  -secondary to decompensated CHF and VT  -no invasive cardiac evaluation planned     5. Coronary artery disease  -prior PCI of LAD  -nothing to suggest angina  -continue medical management with ASA, statin and BB     6. LV thrombus  -continue Xarelto  -not evident on most recent echo     7. Type II diabetes mellitus  -management per Primary team    He remains on IV lasix TID but with little diuresis and still significantly volume overloaded. Will add low dose inotrope and monitor rhythm. Consider change to lasix infusion (defer to nephrology).     Electronically signed by Karissa Zeng, APRN - CNP on 3/31/2022 at 9:27 AM

## 2022-03-31 NOTE — PLAN OF CARE
Problem: Falls - Risk of:  Goal: Will remain free from falls  Description: Will remain free from falls  Outcome: Ongoing   Pt ambulating with walker and assistance x1.

## 2022-04-01 NOTE — PLAN OF CARE
Problem: Falls - Risk of:  Goal: Will remain free from falls  Description: Will remain free from falls  3/31/2022 2340 by Kieran Luna RN  Outcome: Ongoing      Goal: Absence of physical injury  Description: Absence of physical injury  3/31/2022 2340 by Kieran Luna RN  Outcome: Ongoing         Problem: Pain:  Goal: Patient's pain/discomfort is manageable  Description: Patient's pain/discomfort is manageable  3/31/2022 2340 by Kieran Luna RN  Outcome: Ongoing         Problem: Skin Integrity:  Goal: Skin integrity will stabilize  Description: Skin integrity will stabilize  3/31/2022 2340 by Kieran Luna RN  Outcome: Ongoing      Problem: Infection:  Goal: Will remain free from infection  Description: Will remain free from infection  3/31/2022 2340 by Kieran Luna RN  Outcome: Ongoing         Problem: Safety:  Goal: Free from accidental physical injury  Description: Free from accidental physical injury  3/31/2022 2340 by Kieran Luna RN  Outcome: Ongoing      Goal: Free from intentional harm  Description: Free from intentional harm  3/31/2022 2340 by Kieran Luna RN  Outcome: Ongoing         Problem: Daily Care:  Goal: Daily care needs are met  Description: Daily care needs are met  3/31/2022 2340 by Kieran Luna RN  Outcome: Ongoing         Problem: Discharge Planning:  Goal: Patients continuum of care needs are met  Description: Patients continuum of care needs are met  3/31/2022 2340 by Kieran Luna RN  Outcome: Ongoing         Problem: OXYGENATION/RESPIRATORY FUNCTION  Goal: Patient will maintain patent airway  3/31/2022 2340 by Kieran Luna RN  Outcome: Ongoing      Goal: Patient will achieve/maintain normal respiratory rate/effort  Description: Respiratory rate and effort will be within normal limits for the patient  3/31/2022 2340 by Kieran Luna RN  Outcome: Ongoing  3/31/2022 1502 by Jessica Meza RN         Problem: HEMODYNAMIC STATUS  Goal: Patient has stable vital signs and fluid balance  3/31/2022 2340 by William Huffman RN  Outcome: Ongoing      Problem: FLUID AND ELECTROLYTE IMBALANCE  Goal: Fluid and electrolyte balance are achieved/maintained  3/31/2022 2340 by William Huffman RN  Outcome: Ongoing         Problem: ACTIVITY INTOLERANCE/IMPAIRED MOBILITY  Goal: Mobility/activity is maintained at optimum level for patient  3/31/2022 2340 by William Huffman RN  Outcome: Ongoing         Problem: Skin Integrity:  Goal: Will show no infection signs and symptoms  Description: Will show no infection signs and symptoms  3/31/2022 2340 by William Huffman RN  Outcome: Ongoing      Goal: Absence of new skin breakdown  Description: Absence of new skin breakdown  3/31/2022 2340 by William Huffman RN  Outcome: Ongoing

## 2022-04-01 NOTE — PROGRESS NOTES
Nutrition Assessment     Type and Reason for Visit: Initial (LOS)    Nutrition Recommendations/Plan:   Continue Carb control, Low Na diet with 1500 mL FR. Attempted diet education, falling asleep, handouts at bedside. Nutrition Assessment:  LOS. Pt with PMH of DM, CHF, and CKD3. Pt presented with syncope. Pt with HF and still fluid overloaded, on lasix. Pt reported appetite has been good, EMR shows >75% at each meal. CHF nurse has seen and educated pt. Pt was falling asleep during diet education, see RD education not. No nutrition interventions needed at this time. Malnutrition Assessment:  Malnutrition Status: No malnutrition    Nutrition Related Findings: +2 BLE edema; BM 3/31; Na 130, Glu 174      Current Nutrition Therapies:    ADULT DIET; Regular; 4 carb choices (60 gm/meal);  Low Sodium (2 gm); 1500 ml    Anthropometric Measures:  · Height: 5' 6\" (167.6 cm)  · Current Body Wt: 178 lb (80.7 kg)   · BMI: 28.7    Nutrition Diagnosis:   No nutrition diagnosis at this time     Nutrition Interventions:   Food and/or Nutrient Delivery:  Continue Current Diet  Nutrition Education/Counseling:  No recommendation at this time   Coordination of Nutrition Care:  Continue to monitor while inpatient    Discharge Planning:    No discharge needs at this time     Electronically signed by Walt Amin RD, LD on 4/1/22 at 12:16 PM EDT    Contact: [phone]

## 2022-04-01 NOTE — PLAN OF CARE
Problem: Falls - Risk of:  Goal: Will remain free from falls  Description: Will remain free from falls  Outcome: Ongoing  Goal: Absence of physical injury  Description: Absence of physical injury  Outcome: Ongoing     Problem: Pain:  Goal: Patient's pain/discomfort is manageable  Description: Patient's pain/discomfort is manageable  Outcome: Ongoing     Problem: Skin Integrity:  Goal: Skin integrity will stabilize  Description: Skin integrity will stabilize  Outcome: Ongoing     Problem: Infection:  Goal: Will remain free from infection  Description: Will remain free from infection  Outcome: Ongoing     Problem: Safety:  Goal: Free from accidental physical injury  Description: Free from accidental physical injury  Outcome: Ongoing  Goal: Free from intentional harm  Description: Free from intentional harm  Outcome: Ongoing     Problem: Daily Care:  Goal: Daily care needs are met  Description: Daily care needs are met  Outcome: Ongoing     Problem: Discharge Planning:  Goal: Patients continuum of care needs are met  Description: Patients continuum of care needs are met  Outcome: Ongoing     Problem: OXYGENATION/RESPIRATORY FUNCTION  Goal: Patient will maintain patent airway  Outcome: Ongoing  Goal: Patient will achieve/maintain normal respiratory rate/effort  Description: Respiratory rate and effort will be within normal limits for the patient  Outcome: Ongoing     Problem: HEMODYNAMIC STATUS  Goal: Patient has stable vital signs and fluid balance  Outcome: Ongoing     Problem: FLUID AND ELECTROLYTE IMBALANCE  Goal: Fluid and electrolyte balance are achieved/maintained  Outcome: Ongoing     Problem: ACTIVITY INTOLERANCE/IMPAIRED MOBILITY  Goal: Mobility/activity is maintained at optimum level for patient  Outcome: Ongoing     Problem: Skin Integrity:  Goal: Will show no infection signs and symptoms  Description: Will show no infection signs and symptoms  Outcome: Ongoing  Goal: Absence of new skin breakdown  Description: Absence of new skin breakdown  Outcome: Ongoing

## 2022-04-01 NOTE — PROGRESS NOTES
Nutrition Note    Attempted CHF diet education. Pt reported he doesn't use much salt but tries to use . Mario. He reports either him or his son does the cooking. Pt falling asleep during interview and not answering questions. Unable to complete education. CHF nurse has seen pt. Handouts provided at bedside with RD name and # to contact for questions.     Electronically signed by Wen Laws RD, STEVE on 4/1/22 at 12:17 PM EDT    Contact: [phone]

## 2022-04-01 NOTE — PROGRESS NOTES
Physical Therapy  Facility/Department: [address] PROGRESSIVE CARE  Daily Treatment Note  NAME: Ian Mackey  : 1955  MRN: [id number]    Date of Service: 2022    Discharge Recommendations:  Continue to assess pending progress,Home with assist PRN   PT Equipment Recommendations  Other: Will monitor for potential equipt needs. Ian Mackey scored a 20/24 on the AM-PAC short mobility form. At this time, no further PT is recommended upon discharge. Assessment   Body structures, Functions, Activity limitations: Decreased functional mobility ; Decreased endurance;Decreased balance  Assessment: 60 y/o male admit 3/25/2022 with Syncope/Collapse, Acute Exac CHF. CT Head negative. PMH as noted including CHF, CKD, ICD place, DM, Diabetic Nephropathy. PTA pt living with son in apt setting with 2 steps to enter; independent daily care and functional mobility. Anticipate adequate progress for d/c home; do not anticipate need cont PT upon d/c although will monitor. Prognosis: Good  Decision Making: Low Complexity  History: 60 y/o male admit 3/25/2022 with Syncope/Collapse, Acute Exac CHF. CT Head negative. PMH as noted including CHF, CKD, ICD place, DM, Diabetic Nephropathy. Exam: See above. Clinical Presentation: See above. Patient Education: Role of PT, POC, Need to call for assist, Safe use of Walker. Barriers to Learning: None. REQUIRES PT FOLLOW UP: Yes  Activity Tolerance  Activity Tolerance: Patient Tolerated treatment well     Patient Diagnosis(es): The encounter diagnosis was Syncope and collapse.     has a past medical history of CHF (congestive heart failure) ([address] Diabetes type 2, uncontrolled ([address] Diabetic nephropathy, ED (erectile dysfunction), Essential hypertension, benign, Hyperlipidemia, and Noncompliance. has a past surgical history that includes eye surgery.     Restrictions  Restrictions/Precautions  Restrictions/Precautions: Fall Risk  Subjective   General  Chart Reviewed: Yes  Additional Pertinent Hx: 62 y/o male admit 3/25/2022 with Syncope/Collapse, Acute Exac CHF. CT Head negative. PMH as noted including CHF, CKD, ICD place, DM, Diabetic Nephropathy. Family / Caregiver Present: No  Referring Practitioner: Dr. Lisbeth Kramer. Subjective  Subjective: Pt agreeable to PT Rx. Cooperative although generally flat affect. Orientation  Orientation  Overall Orientation Status: Within Functional Limits  Cognition   Cognition  Overall Cognitive Status: WFL  Objective   Bed mobility  Supine to Sit: Supervision  Transfers  Sit to Stand: Stand by assistance (With Maryfrances Lamprey.)  Stand to sit: Stand by assistance (With Maryfrances Lamprey.)  Ambulation  Ambulation?: Yes  Ambulation 1  Surface: level tile  Device: Rolling Walker  Distance: Pt amb 25'x 2 with Walker SBA. Diminished step length/clearance although no LE buckling/giving way. Cues for safe transitional mvts although no specific LOB noted. Exercises  Hip Flexion: 10x2  Knee Long Arc Quad: 10x2  Ankle Pumps: 10x2                   AM-PAC Score  AM-PAC Inpatient Mobility Raw Score : 20 (04/01/22 1222)  AM-PAC Inpatient T-Scale Score : 47.67 (04/01/22 1222)  Mobility Inpatient CMS 0-100% Score: 35.83 (04/01/22 1222)  Mobility Inpatient CMS G-Code Modifier : CJ (04/01/22 1222)          Goals  Short term goals  Time Frame for Short term goals: Upon d/c acute care setting. Short term goal 1: Bed Mob Independent. Short term goal 2: Transfers with/without assist device Supervision. Short term goal 3: Amb with/without assist device 50-75' SBA/Supervision. Patient Goals   Patient goals : Return home. Plan    Plan  Times per week: 3-5x week while in acute care setting.   Current Treatment Recommendations: Functional Mobility Training,Transfer Training,Gait Training,Safety Education & Training,Patient/Caregiver Education & Training  Safety Devices  Type of devices: Call light within reach,Chair alarm in place,Left in chair,Nurse notified Therapy Time   Individual Concurrent Group Co-treatment   Time In [address] [place] [address] Road         Time Out 1110         Minutes [address] Kimble Mcardle

## 2022-04-01 NOTE — PROGRESS NOTES
Earl[address] 83   Daily Progress Note      Admit Date:  3/25/2022    Reason for follow up visit: CHF    CC: \"I'm the same. \"    60 y/o male with PMH significant for cardiomyopathy/CHF, HTN, HLP and diabetes mellitus who was admitted to [facility] [place] [facility] after experiencing a syncopal episode at home. [facility] did not recall details and device interrogation showed VT arrest that was aborted by ATP. Also noted to have 30# weight gain and abdominal distension. He is currently on lasix gtt and milrinone with little diuresis. Cr continues to increase. Interval History:  Pt. seen and examined; records reviewed  BP reviewed. Denies chest pain. SOBOE  Edema with little change  Wt today 178#. Net diuresis -3.4 L since admit ; net + last 24 hours)  Cr now 3.0  D/W nephrology    Subjective:  Pt with no acute overnight cardiac events.   + SOBOE; + edema  Denies chest pain, cough, palpitations, dizziness    Review of Systems:   · Constitutional: no unanticipated weight loss. There's been no change in energy level, sleep pattern, or activity level. No fevers, chills. · Eyes: No visual changes or diplopia. No scleral icterus. · ENT: No Headaches, hearing loss or vertigo. No mouth sores or sore throat. · Cardiovascular: as reviewed in HPI  · Respiratory: No cough or wheezing, no sputum production. No hematemesis. · Gastrointestinal: No abdominal pain, appetite loss, blood in stools. No change in bowel or bladder habits. · Genitourinary: No dysuria, trouble voiding, or hematuria. · Musculoskeletal:  No gait disturbance, no joint complaints. · Integumentary: No rash or pruritis. · Neurological: No headache, diplopia, change in muscle strength, numbness or tingling. · Psychiatric: No anxiety or depression. · Endocrine: No temperature intolerance. No excessive thirst, fluid intake, or urination. No tremor. · Hematologic/Lymphatic: No abnormal bruising or bleeding, blood clots or swollen lymph nodes.   · Allergic/Immunologic: No nasal congestion or hives. Objective:   /75   Pulse 74   Temp 97.7 °F (36.5 °C) (Oral)   Resp 22   Ht 5' 6\" (1.676 m)   Wt 178 lb 2.1 oz (80.8 kg)   SpO2 99%   BMI 28.75 kg/m²     Intake/Output Summary (Last 24 hours) at 4/1/2022 1128  Last data filed at 4/1/2022 0602  Gross per 24 hour   Intake 736.53 ml   Output 1125 ml   Net -388.47 ml     Wt Readings from Last 3 Encounters:   04/01/22 178 lb 2.1 oz (80.8 kg)   02/17/22 152 lb 1.9 oz (69 kg)   01/20/22 180 lb (81.6 kg)       Physical Exam:  General: In no acute distress. Awake, alert, and oriented X4. Lethargic but opens eyes readily to verbal stimuli and answers questions  Skin:  Warm and dry. Neck:  Supple. ++ JVD. No carotid bruit  Chest:  Lungs with fine crackles in posterior bases. No wheezes/rhonch  Cardiovascular:  RRR. Normal S1 and S2. II/VI systolic murmur  Abdomen:  soft, nontender, +bowel sounds.  + lower abdominal distension  Extremities:  2-3+ presacral and lower extremity edema; 2+ bilateral radial/carotid/DP/PT pulses     Medications:    metOLazone  10 mg Oral BID    insulin lispro  0-6 Units SubCUTAneous TID WC    insulin lispro  0-3 Units SubCUTAneous Nightly    amiodarone  200 mg Oral BID    metoprolol succinate  25 mg Oral Daily    allopurinol  100 mg Oral Daily    atorvastatin  80 mg Oral Daily    levothyroxine  50 mcg Oral QAM AC    rivaroxaban  15 mg Oral Dinner    sodium chloride flush  5-40 mL IntraVENous 2 times per day    spironolactone  25 mg Oral Daily    sacubitril-valsartan  1 tablet Oral BID    aspirin  81 mg Oral Daily      milrinone 0.2 mcg/kg/min (04/01/22 0602)    furosemide (LASIX) infusion 20 mg/hr (04/01/22 0602)    dextrose      sodium chloride       glucose, dextrose, glucagon (rDNA), dextrose, sodium chloride flush, sodium chloride, perflutren lipid microspheres, ondansetron    Lab Data:  CBC:   Recent Labs     03/30/22  0531 03/31/22  0513 04/01/22  0522   WBC 3.3* 3.8* 3.7*   HGB 11.0* 11.0* 10.2*    196 172     BMP:    Recent Labs     03/30/22  0531 03/31/22  0513 04/01/22  0522   * 136 130*   K 4.8 4.9 4.4   CO2 18* 19* 21   BUN 98* 108* 112*   CREATININE 2.7* 2.8* 3.0*     LIVR:   Recent Labs     03/30/22  0531 03/31/22  0513 04/01/22  0522   AST 21 21 19   ALT 15 16 14     Results for Joao Almonte (MRN [id number]) as of 4/1/2022 11:30   Ref. Range 3/25/2022 21:57 3/26/2022 02:34 3/26/2022 03:57   Pro-BNP Latest Ref Range: 0 - 124 pg/mL 34,786 (H)     Troponin Latest Ref Range: <0.01 ng/mL 0.08 (H) 0.08 (H) 0.07 (H)     3/26/2022 CT abdomen/pelvis:  Impression   There is marked cardiomegaly, with trace pleural effusions, as well as   moderate intra-abdominal and pelvic ascites, as well as diffuse anasarca. Hepatic vein reflux is noted on the previous CT examination is well.  This   combination of findings can be seen in the setting of passive liver   congestion related to congestive failure.  Questionable mild surface   nodularity of the liver makes it difficult to exclude underlying cirrhosis.       Minimal bibasilar airspace disease, possibly related to atelectasis or   pneumonia, though with less consolidation than seen on the previous exam.       Mild appearing bilateral renal atrophy.       Suspected fat and ascitic fluid containing left inguinal hernia.  No bowel   herniation.      3/26/2022 Echo   Summary   Overall left ventricular systolic function appears severely reduced.   Ejection fraction is visually estimated to be <20% with diffuse hypokinesis.   Severely dilated left ventricle.  Normal left ventricular wall thickness.   Diastolic filling parameters suggest grade II diastolic dysfunction.   The right ventricle appears severely dilated with moderately reduced   systolic function.   The left and right atria are severely dilated.   Severe tricuspid regurgitation.   Moderate mitral regurgitation.   Estimated pulmonary artery systolic pressure is severely elevated at 66 mmHg   assuming a right atrial pressure of 15 mmHg.     2/11/2022 RHC:  1.  Evidence of right-sided volume overload with a right atrial pressure  of 11 mmHg and a pulmonary capillary wedge pressure of 22 mmHg. 2.  Pulmonary hypertension with an instantaneous pressure of 56/25 for a  mean pulmonary arterial pressure of 37 mmHg. 3.  Reduced cardiac output by thermodilution at 3.96 liters per minute  with a cardiac index of 1.4 liters per kilogram per minute.  By Blu  equation, the cardiac index was 2.14 liters per kilogram per minute.  Of  note, the patient is on 0.25 mcg per kilogram per minute of milrinone. 4.  Pulmonary vascular resistance of 197 dynes per second. 5.  Low mixed venous oxygen saturations with an IVC of 40%, right atrium  42%, and pulmonary artery of 38%.  The patient's oxygen saturation was  89% to 90% on room air. 12/10/2018 R and C with PCI ():  IMPRESSIONS:  Selective angiography of LMCA showing critical mid LAD   lesion. Successful PCI with 3.5 x 18 mm MIKE. Normal cardiac output, m   ildly increased pulmonary pressures with mildly increased filling pre   ssure on inotropic support. SUMMARY:     1. Wedge pressure in the pulmonary capillaries is mildly elevated. 2. Coronary arteries: The left main is unusually long and      trifurcates into the LAD, a ramus intermedius, and the left      circumflex. The left anterior descending gives rise to 2      diagonals and 3 septals and wraps around the apex. The left      circumflex gives rise to 2 obtuse marginals and no      posterolaterals. The first obtuse marginal has a high origin. 3. LAD: Mid-vessel lesion: There is a discrete, 99% stenosis. The      distal vessel supplies a moderate-sized vascular territory. The      lesion is a likely culprit for the patient's clinical      presentation. The lesion was stented (see 1st lesion      intervention).  Following intervention, the lesion has a residual      stenosis of 0% and MUKUND grade 3 flow (brisk flow). 4. AV was not crossed due to known apical LV thrombus. 5. RA 6      RV 48/4      PA 50/20 (28)      W 18.   6. Blu CO 5.2 Blu CI 2.9 on Milrinone 0.25 mcg/mg/min. Telemetry: Sinus rhythm with occasional PVC's (personally reviewed)    Assessment/Plan:    1. Ventricular tachycardia  -device interrogation showed VT with episode of syncope; terminated by ATP  -continue amiodarone and Toprol  -no recurrent episodes     2. Acute on chronic systolic heart failure  -S/P [employer] ICD  -NYHA class III; LVEF < 20%  -now on lasix gtt and milrinone  -diuresis down but remains rather edematous  -continue Entresto, Toprol, aldactone  -consider SGLT2 as outpatient     3. SONAL onchronic kidney disease, stage IIIb  -Cr increased to 3.0  -no significant diuresis on lasix gtt and inotrope  -? Dialysis  -nephrology following     4. Demand ischemia  -troponins not c/w with an acute coronary syndrome  -secondary to decompensated CHF and VT  -no invasive cardiac evaluation planned     5. Coronary artery disease  -prior PCI of LAD in 12/2018 ()  -nothing to suggest angina  -continue medical management with ASA, statin and BB     6. LV thrombus  -continue Xarelto  -not evident on most recent echo     7. Type II diabetes mellitus  -management per Primary team    Overall the problems requiring hospitalization are high in severity.     Electronically signed by DAHIANA Devlin CNP on 4/1/2022 at 11:28 AM

## 2022-04-01 NOTE — PROGRESS NOTES
Hospitalist Progress Note      PCP: Celi Navarro DO    Date of Admission: 3/25/2022    Chief Complaint: syncope     Hospital Course:   29-year-old patient with a past medical history of cardiomyopathy/CHF, hypertension, hyperlipidemia, diabetes mellitus admitted after an episode of syncope. Patient was noted to have 30 pound weight gain abdominal distention, currently being managed for acute on chronic systolic heart failure and episode of ventricular tachycardia. Currently admitted for management of VT and heart failure  3/31 started on Lasix infusion and milrinone    Subjective:   Patient denies any complaints, stated his breathing comfortably  Minimal output in the last 24 hours, ?  Accurate     Medications:  Reviewed    Infusion Medications    milrinone 0.2 mcg/kg/min (04/01/22 0602)    furosemide (LASIX) infusion 30 mg/hr (04/01/22 1302)    dextrose      sodium chloride       Scheduled Medications    acetaZOLAMIDE  500 mg Oral BID    metOLazone  10 mg Oral BID    insulin lispro  0-6 Units SubCUTAneous TID WC    insulin lispro  0-3 Units SubCUTAneous Nightly    amiodarone  200 mg Oral BID    metoprolol succinate  25 mg Oral Daily    allopurinol  100 mg Oral Daily    atorvastatin  80 mg Oral Daily    levothyroxine  50 mcg Oral QAM AC    rivaroxaban  15 mg Oral Dinner    sodium chloride flush  5-40 mL IntraVENous 2 times per day    [Held by provider] sacubitril-valsartan  1 tablet Oral BID    aspirin  81 mg Oral Daily     PRN Meds: glucose, dextrose, glucagon (rDNA), dextrose, sodium chloride flush, sodium chloride, perflutren lipid microspheres, ondansetron      Intake/Output Summary (Last 24 hours) at 4/1/2022 1445  Last data filed at 4/1/2022 0602  Gross per 24 hour   Intake 616.53 ml   Output 1125 ml   Net -508.47 ml       Physical Exam Performed:    /74   Pulse 71   Temp 97.7 °F (36.5 °C) (Oral)   Resp 19   Ht 5' 6\" (1.676 m)   Wt 178 lb 2.1 oz (80.8 kg)   SpO2 99% BMI 28.75 kg/m²     General appearance: No apparent distress, appears stated age and cooperative. HEENT: Pupils equal, round, and reactive to light. Conjunctivae/corneas clear. Neck: Supple, with full range of motion. No jugular venous distention. Trachea midline. Respiratory:  Normal respiratory effort. Clear to auscultation, bilaterally without Rales/Wheezes/Rhonchi. Cardiovascular: Regular rate and rhythm with normal S1/S2 without murmurs, rubs or gallops. Abdomen: Soft, non-tender, non-distended with normal bowel sounds. Musculoskeletal: Bilateral edema noted  Skin: Skin color, texture, turgor normal.  No rashes or lesions. Neurologic:  Neurovascularly intact without any focal sensory/motor deficits. Cranial nerves: II-XII intact, grossly non-focal.  Psychiatric: Alert and oriented, thought content appropriate, normal insight  Capillary Refill: Brisk,3 seconds, normal   Peripheral Pulses: +2 palpable, equal bilaterally       Labs:   Recent Labs     03/30/22 0531 03/31/22 0513 04/01/22 0522   WBC 3.3* 3.8* 3.7*   HGB 11.0* 11.0* 10.2*   HCT 34.7* 33.6* 30.2*    196 172     Recent Labs     03/30/22 0531 03/31/22 0513 04/01/22 0522   * 136 130*   K 4.8 4.9 4.4    101 97*   CO2 18* 19* 21   BUN 98* 108* 112*   CREATININE 2.7* 2.8* 3.0*   CALCIUM 8.6 8.5 8.6     Recent Labs     03/30/22 0531 03/31/22 0513 04/01/22 0522   AST 21 21 19   ALT 15 16 14   BILITOT 0.7 0.7 0.7   ALKPHOS 122 117 118     No results for input(s): INR in the last 72 hours. No results for input(s): Shellia Bugler in the last 72 hours.     Urinalysis:      Lab Results   Component Value Date    NITRU Negative 03/26/2022    WBCUA 0 03/26/2022    RBCUA 2 03/26/2022    BLOODU Negative 03/26/2022    SPECGRAV 1.015 03/26/2022    GLUCOSEU Negative 03/26/2022       Radiology:  CT ABDOMEN PELVIS WO CONTRAST Additional Contrast? None   Final Result   There is marked cardiomegaly, with trace pleural effusions, as well as   moderate intra-abdominal and pelvic ascites, as well as diffuse anasarca. Hepatic vein reflux is noted on the previous CT examination is well. This   combination of findings can be seen in the setting of passive liver   congestion related to congestive failure. Questionable mild surface   nodularity of the liver makes it difficult to exclude underlying cirrhosis. Minimal bibasilar airspace disease, possibly related to atelectasis or   pneumonia, though with less consolidation than seen on the previous exam.      Mild appearing bilateral renal atrophy. Suspected fat and ascitic fluid containing left inguinal hernia. No bowel   herniation. XR CHEST (2 VW)   Final Result      1. Possible mild degree of vascular congestion though exaggerated by a   suboptimal inspiration. No focal pulmonary infiltrates are noted. 2.  Stable cardiomegaly. CT HEAD WO CONTRAST   Final Result      1. No acute intracranial abnormality noted. 2.  Prominence of the sulci and/or CSF spaces suggests a possible mild degree   of cerebral atrophy. Assessment/Plan:    Active Hospital Problems    Diagnosis     Ventricular tachycardia ([address] [I47.2]     Syncope [R55]     CHF [facility]congestive heart failure) ([facility]) [I50.9]     Syncope and collapse [R55]     ICD (implantable cardioverter-defibrillator) in place [Z95.810]     Demand ischemia ([address] [I24.8]     Acute on chronic systolic heart failure, NYHA class 4 ([facility]) [I50.23]     Stage 3 chronic kidney disease ([address] [N18.30]     Diabetes type 2, uncontrolled ([address] [E11.65]     Essential hypertension, benign [I10]      Single. Ventricular tachycardia. Interrogation showed VT that was appropriately shocked. Continue amiodarone and metoprolol. Appreciate cardiology input. Acute on chronic systolic heart failure exacerbation. Ejection fraction of less than 20%, patient is followed closely by cardiology and nephrology.   Started on milrinone 3/31 as well as Lasix infusion at 20 mg/h. Plan :  -Continue milrinone.  -Agree on increasing Lasix infusion to 30mg/h.  -Continue intake output charting    SONAL on CKD  Cardiorenal, creatinine increasing to 2.8--> 3. Started on milrinone as well as Lasix infusion. Urine output the last 24 hours 300 mL. Appreciate nephrology input    CAD. Continue aspirin beta-blocker and statin. Left ventricular thrombus. Continue Xarelto    Diabetes mellitus. Sliding scale    DVT Prophylaxis: Xarelto  Diet: ADULT DIET; Regular; 4 carb choices (60 gm/meal);  Low Sodium (2 gm); 1500 ml  Code Status: Full Code    PT/OT Eval Status: pending Clinical improvement    Dispo -pending clinical improvement    Demetrio Marquez MD

## 2022-04-01 NOTE — PROGRESS NOTES
Occupational Therapy  Unable to see pt at this time due to pt politely declining OT treatment at this time. Pt reports having worked with PT earlier and is currently having diarrhea. Will follow up with pt as time allows. Continue with POC.     Altaf Verde OTR/L

## 2022-04-01 NOTE — PROGRESS NOTES
Admit Date: 3/25/2022      REASON FOR ADMISSION:   e patient with syncopal episode due to episode  of V-tach, got cardioverted by his defibrillator.     REASON FOR Follow up   CKD, fluid overload, CHF. INTERVAL HISTORY  Still fluid overload   Creat 2.7-->2.8-->3  BUN  69-->108-->112  Urine 1125    /81  Wt 180-->178  He tells me has very good appetite   Explained heading for dialysis  Understands  Tells me he wants to discuss with his family   Pt also discussed with cardiology nurse Ty Iza   Will accept higher BUN and creatinine to try to get more fluid off to help CHF  As long as no uremic symptoms   If BP drops will add Proamitine       PLAN   increase lasix infusion 30 mg/hour   Give dose of Diamox   Give dose of Spironolactone    Metolazone 10 mg bid  On  Milrinone  If OK with cardiology will hold Entresto for better renal perfusion        CKD IV.   baseline creatinine is around 2.5  with a fluctuating creatinine due to cardiorenal syndrome  also has diabetic nephropathy. Continue with diuretics. Unfortunately, he might eventually need to go on  Dialysis. diabetes and hypertension for over 10 years   last EF is less than 20%   albuminuria on urinalysis going back to 2013 had an  mg   proteinuria 700 mg   no RBCs no WBCs  CT 2020 symmetric kidneys   HCV negative   negative NAHOMY   negative RF factor     Ischemic cardiomyopathy/Decompensated  EF less than 20%   grade 2 diastolic dysfunction   RT  ventricular systolic function is severely reduced as well has moderate MR and RV's dilated  In the past was on Milrinone    RHC 11/221 index very low  wedge was 19 and RA pressure 13   prognosis is poor. I do not know if he is a candidate for advanced heart failure therapies or not.     CAD/AICD   with stent in LAD  Most of his records are in the  system  Previously on Entresto but currently not     Gout  In the past  precipitated by diuresis    was On steroids  allopurinol, 100 mg daily       Diabetes type 2  10 years at least  Longstanding albuminuria  A1c  between 6 and 6.5     [facility]  A 82-year-old -American with  known history of cirrhosis, congestive heart failure with ejection  fraction of 20%, advanced CKD stage IV/V, by coming out of the kitchen  using his walker, went on the ground, lost consciousness. He eventually  came here and was found on interrogation of his device that he had  episode of ventricular tachycardia.     He was also found to have 30 pounds of fluid weight gain.     He was known to have CKD. Subjective:   Patient has no complaint       Review of Systems  Seen in room   REVIEW OF SYSTEMS:  GENERAL:  No fever or chills. CARDIAC:  No chest pain. PULMONARY:  No shortness of breath. GENITOURINARY:  No dysuria or hematuria. VASCULAR:  No claudication. GI:  No nausea, vomiting. Review of the rest of the system is negative. Objective:     Patient Vitals for the past 8 hrs:   BP Temp Temp src Pulse Resp SpO2 Weight   04/01/22 0803 119/75 97.7 °F (36.5 °C) Oral 74 22 99 % --   04/01/22 0458 -- -- -- -- -- -- 178 lb 2.1 oz (80.8 kg)   04/01/22 0445 99/67 97.5 °F (36.4 °C) Axillary 75 19 98 % --       I/O last 3 completed shifts: In: 1256.5 [P.O.:1120; I.V.:136.5]  Out: 1150 [Urine:1150]    GENERAL:  On examination, he was awake, alert. HEENT:  Head normocephalic, atraumatic. Conjunctivae, no icterus. CARDIAC:  He has normal heart sounds. Has a systolic murmur at the  base. No rub. NECK:  JVD was up to the angle of his jaw. CHEST:  Few crackles. ABDOMEN:  Soft, nontender. Bowel sounds present. EXTREMITIES:  Bilateral lower extremities still has +1 to +2 edema. NEUROLOGICAL:  Alert and awake. No focal neurological signs. Alethea bain  Lab Results   Component Value Date    CREATININE 3.0 (H) 04/01/2022     (HH) 04/01/2022     (L) 04/01/2022    K 4.4 04/01/2022    CL 97 (L) 04/01/2022    CO2 21 04/01/2022     Lab Results   Component Value Date    WBC 3.7 (L) 04/01/2022    HGB 10.2 (L) 04/01/2022    HCT 30.2 (L) 04/01/2022    .9 (H) 04/01/2022     04/01/2022            AGLUCOSE)Magnesium:    Lab Results   Component Value Date    MG 2.40 04/01/2022     Phosphorus:    Lab Results   Component Value Date    PHOS 3.8 08/30/2017       Uric Acid:  No components found for: URIC    Principal Problem:    Syncope and collapse  Active Problems:    Essential hypertension, benign    Diabetes type 2, uncontrolled ([facility])    Stage 3 chronic kidney disease ([facility])    Acute on chronic systolic heart failure, NYHA class 4 ([facility])    Syncope    CHF (congestive heart failure) ([address]    ICD (implantable cardioverter-defibrillator) in place    Demand ischemia ([address]    Ventricular tachycardia ([address]  Resolved Problems:    * No resolved hospital problems.  *

## 2022-04-02 NOTE — PROGRESS NOTES
[facility]   Daily Progress Note      Admit Date:  3/25/2022      Subjective:   Mr. Howie Christian is a 74yo male with CAD, chronic systolic CHF with ICD in place (8/13/19), LV thrombus, hyperlipidemia, essential hypertension, type 2 DM with complications of diabetic nephropathy stage 3 who presented to [facility] ED with shortness of breath and leg swelling. He was discharged from [facility] on 11/30/21 after being hospitalized with CHF. He has not kept any of his follow-up appointments since being discharged. His weight at that time was 145lbs.     Interval History:  He remains on the milrinone drip at 0.2mcg/kg/min and the lasix drip at 30mg/hr. He says his breathing is okay. Sinus rhythm on telemetry.        Objective:     BP 96/61   Pulse 74   Temp 98.1 °F (36.7 °C) (Oral)   Resp 16   Ht 5' 6\" (1.676 m)   Wt 177 lb 11.1 oz (80.6 kg)   SpO2 100%   BMI 28.68 kg/m²      Intake/Output Summary (Last 24 hours) at 4/2/2022 1113  Last data filed at 4/2/2022 1029  Gross per 24 hour   Intake 1866.47 ml   Output 3502 ml   Net -1635.53 ml       Physical Exam:  General:  Awake, alert, NAD  Skin:  Warm and dry  Neck:  JVD<8, no carotid bruits  Chest:  Clear to auscultation, no wheezes/rhonchi/rales  Cardiovascular:  RRR, normal S1/S2, no M/R/G  Abdomen:  Soft, nontender, +bowel sounds  Extremities: 1+ bilateral edema to the thighs edema  Pulses: 2+ bilat carotid    2+ bilat radial    2+ bilat femoral        Medications:    metOLazone  10 mg Oral BID    insulin lispro  0-6 Units SubCUTAneous TID WC    insulin lispro  0-3 Units SubCUTAneous Nightly    amiodarone  200 mg Oral BID    metoprolol succinate  25 mg Oral Daily    allopurinol  100 mg Oral Daily    atorvastatin  80 mg Oral Daily    levothyroxine  50 mcg Oral QAM AC    rivaroxaban  15 mg Oral Dinner    sodium chloride flush  5-40 mL IntraVENous 2 times per day    [Held by provider] sacubitril-valsartan  1 tablet Oral BID    aspirin  81 mg Oral Daily      milrinone 0.2 mcg/kg/min (04/02/22 0117)    furosemide (LASIX) infusion 30 mg/hr (04/02/22 0847)    dextrose      sodium chloride         Lab Data:  CBC:   Recent Labs     03/31/22 0513 04/01/22 0522 04/02/22 0438   WBC 3.8* 3.7* 3.5*   HGB 11.0* 10.2* 10.4*    172 189     BMP:    Recent Labs     03/31/22 0513 04/01/22 0522 04/02/22 0438    130* 133*   K 4.9 4.4 4.4   CO2 19* 21 21   * 112* 103*   CREATININE 2.8* 3.0* 3.1*     LIVR:   Recent Labs     03/31/22 0513 04/01/22 0522 04/02/22 0438   AST 21 19 20   ALT 16 14 14     PT/INR:   Recent Labs     03/31/22 0513 04/01/22 0522 04/02/22 0438   PROT 6.2* 6.1* 5.8*     Lab Results   Component Value Date    CHOL 95 02/04/2022    HDL 48 02/04/2022    HDL 58 12/13/2011    TRIG 43 02/04/2022         Assessment / Plan:     1. Acute on chronic Systolic CHF, class 4  He is diuresing but will have much better volume removal with dialysis. Hold Entresto. Okay to continue on Toprol XL for now. Hold aldactone per Nephrology. Not a candidate for an AICD due to noncompliance. Not a candidate for SGLT2 inhibitor as we have not seen him in the office. 2. Acute on chronic kidney injury, stage 4  I do believe that dialysis is the next best option for Mr. Jessenia Joseph. Defer to Nephrology.             Signed:  Omar Brooks MD

## 2022-04-02 NOTE — PLAN OF CARE
Fall risk assessment completed every shift. All precautions in place. Pt has call light within reach at all times. Room clear of clutter. Pt aware to call for assistance when getting up. Pain/discomfort being managed with PRN analgesics per MD orders. Pt able to express presence and absence of pain and rate pain appropriately using numerical scale. Skin assessment completed every shift. Pt assessed for incontinence, appropriate barrier cream applied prn. Pt encouraged to turn/rotate every 2 hours. Assistance provided if pt unable to do so themselves. Intake/Output Summary (Last 24 hours) at 4/2/2022 0739  Last data filed at 4/2/2022 0733  Gross per 24 hour   Intake 1480.81 ml   Output 3502 ml   Net -2021.19 ml     Vitals:    04/02/22 0700   BP: 96/61   Pulse:    Resp:    Temp: 98.1 °F (36.7 °C)   SpO2: 100%     Patient assessed for fall risk; fall precautions initiated. Patient and family instructed about safety devices. Environment kept free of clutter and adequate lighting provided. Bed locked and in lowest position. Call light within reach. Will continue to monitor.

## 2022-04-02 NOTE — PROGRESS NOTES
Hospitalist Progress Note      PCP: Tamra Guillen DO    Date of Admission: 3/25/2022    Chief Complaint: syncope     Hospital Course:   61-year-old patient with a past medical history of cardiomyopathy/CHF, hypertension, hyperlipidemia, diabetes mellitus admitted after an episode of syncope. Patient was noted to have 30 pound weight gain abdominal distention, currently being managed for acute on chronic systolic heart failure and episode of ventricular tachycardia. Currently admitted for management of VT and heart failure  3/31 started on Lasix infusion and milrinone    Subjective: It is 2.4 in the last 24 hours. Continues to be on Lasix 30 mg/h as well as milrinone.      Medications:  Reviewed    Infusion Medications    milrinone 0.2 mcg/kg/min (04/02/22 0117)    furosemide (LASIX) infusion 30 mg/hr (04/01/22 1836)    dextrose      sodium chloride       Scheduled Medications    metOLazone  10 mg Oral BID    insulin lispro  0-6 Units SubCUTAneous TID WC    insulin lispro  0-3 Units SubCUTAneous Nightly    amiodarone  200 mg Oral BID    metoprolol succinate  25 mg Oral Daily    allopurinol  100 mg Oral Daily    atorvastatin  80 mg Oral Daily    levothyroxine  50 mcg Oral QAM AC    rivaroxaban  15 mg Oral Dinner    sodium chloride flush  5-40 mL IntraVENous 2 times per day    [Held by provider] sacubitril-valsartan  1 tablet Oral BID    aspirin  81 mg Oral Daily     PRN Meds: glucose, dextrose, glucagon (rDNA), dextrose, sodium chloride flush, sodium chloride, perflutren lipid microspheres, ondansetron      Intake/Output Summary (Last 24 hours) at 4/2/2022 0838  Last data filed at 4/2/2022 0733  Gross per 24 hour   Intake 1480.81 ml   Output 3502 ml   Net -2021.19 ml       Physical Exam Performed:    BP 96/61   Pulse 74   Temp 98.1 °F (36.7 °C) (Oral)   Resp 16   Ht 5' 6\" (1.676 m)   Wt 177 lb 11.1 oz (80.6 kg)   SpO2 100%   BMI 28.68 kg/m²     General appearance: No apparent distress, appears stated age and cooperative. HEENT: Pupils equal, round, and reactive to light. Conjunctivae/corneas clear. Neck: Supple, with full range of motion. No jugular venous distention. Trachea midline. Respiratory:  Normal respiratory effort. Clear to auscultation, bilaterally without Rales/Wheezes/Rhonchi. Cardiovascular: Regular rate and rhythm with normal S1/S2 without murmurs, rubs or gallops. Abdomen: Soft, non-tender, non-distended with normal bowel sounds. Musculoskeletal: Bilateral edema noted  Skin: Skin color, texture, turgor normal.  No rashes or lesions. Neurologic:  Neurovascularly intact without any focal sensory/motor deficits. Cranial nerves: II-XII intact, grossly non-focal.  Psychiatric: Alert and oriented, thought content appropriate, normal insight  Capillary Refill: Brisk,3 seconds, normal   Peripheral Pulses: +2 palpable, equal bilaterally       Labs:   Recent Labs     03/31/22 0513 04/01/22 0522 04/02/22 0438   WBC 3.8* 3.7* 3.5*   HGB 11.0* 10.2* 10.4*   HCT 33.6* 30.2* 30.6*    172 189     Recent Labs     03/31/22 0513 04/01/22  0522 04/02/22  0438    130* 133*   K 4.9 4.4 4.4    97* 97*   CO2 19* 21 21   * 112* 103*   CREATININE 2.8* 3.0* 3.1*   CALCIUM 8.5 8.6 8.6     Recent Labs     03/31/22 0513 04/01/22 0522 04/02/22  0438   AST 21 19 20   ALT 16 14 14   BILITOT 0.7 0.7 0.8   ALKPHOS 117 118 120     No results for input(s): INR in the last 72 hours. No results for input(s): Megan Beat in the last 72 hours.     Urinalysis:      Lab Results   Component Value Date    NITRU Negative 03/26/2022    WBCUA 0 03/26/2022    RBCUA 2 03/26/2022    BLOODU Negative 03/26/2022    SPECGRAV 1.015 03/26/2022    GLUCOSEU Negative 03/26/2022       Radiology:  CT ABDOMEN PELVIS WO CONTRAST Additional Contrast? None   Final Result   There is marked cardiomegaly, with trace pleural effusions, as well as   moderate intra-abdominal and pelvic ascites, as well as diffuse anasarca. Hepatic vein reflux is noted on the previous CT examination is well. This   combination of findings can be seen in the setting of passive liver   congestion related to congestive failure. Questionable mild surface   nodularity of the liver makes it difficult to exclude underlying cirrhosis. Minimal bibasilar airspace disease, possibly related to atelectasis or   pneumonia, though with less consolidation than seen on the previous exam.      Mild appearing bilateral renal atrophy. Suspected fat and ascitic fluid containing left inguinal hernia. No bowel   herniation. XR CHEST (2 VW)   Final Result      1. Possible mild degree of vascular congestion though exaggerated by a   suboptimal inspiration. No focal pulmonary infiltrates are noted. 2.  Stable cardiomegaly. CT HEAD WO CONTRAST   Final Result      1. No acute intracranial abnormality noted. 2.  Prominence of the sulci and/or CSF spaces suggests a possible mild degree   of cerebral atrophy. Assessment/Plan:    Active Hospital Problems    Diagnosis     Ventricular tachycardia ([address] [I47.2]     Syncope [R55]     [facility]congestive heart failure) ([facility]) [I50.9]     Syncope and collapse [R55]     ICD (implantable cardioverter-defibrillator) in place [Z95.810]     Demand ischemia ([address] [I24.8]     Acute on chronic systolic heart failure, NYHA class 4 ([facility]) [I50.23]     Stage 3 chronic kidney disease ([address] [N18.30]     Diabetes type 2, uncontrolled ([address] [E11.65]     Essential hypertension, benign [I10]      Single. Ventricular tachycardia. Interrogation showed VT that was appropriately shocked. Continue amiodarone and metoprolol. Appreciate cardiology input. Acute on chronic systolic heart failure exacerbation. Ejection fraction of less than 20%, patient is followed closely by cardiology and nephrology.   Started on milrinone 3/31 as well as Lasix infusion at 20--> 30 mg/h.   Plan :  -Continue milrinone.  -Continue on Lasix infusion at 30 mg/h. -Continue intake output charting    SONAL on CKD  Cardiorenal, creatinine increasing to 2.8--> 3--> 3.1. Started on milrinone as well as Lasix infusion. Urine output the last 24 hours 2.4 L  Appreciate nephrology input, patient has been advised to initiate dialysis. Currently discussing decision with the rest of his family    CAD. Continue aspirin beta-blocker and statin. Left ventricular thrombus. Continue Xarelto    Diabetes mellitus. Sliding scale    DVT Prophylaxis: Xarelto  Diet: ADULT DIET; Regular; 4 carb choices (60 gm/meal);  Low Sodium (2 gm); 1500 ml  Code Status: Full Code    PT/OT Eval Status: pending Clinical improvement    Dispo -pending clinical improvement    Thuy Murray MD

## 2022-04-02 NOTE — PROGRESS NOTES
Admit Date: 3/25/2022      REASON FOR ADMISSION:   patient with syncopal episode   V-tach, got cardioverted by his defibrillator.     REASON FOR Follow up   CKD, fluid overload, CHF. INTERVAL HISTORY  Seen at bedside  Awake and alert in no distress  It [birth year] today   He is on milrinone and lasix gtt 30 mg/hr  Creat 2.7-->2.8-->3 < 3.1  BUN  69-->108-->112 > 103  Urine 2551 cc  /71  Wt 180-->178 > 177       PLAN  - continue current lasix dose. 30 mg/hour   - repeat aldactone 100 mg today    Metolazone 10 mg bid  On  Milrinone    He needs dialysis , has already been discussed with him. He is thinking about it and wants to discuss more with his family. CKD IV.   baseline creatinine is around 2.5  with a fluctuating creatinine due to cardiorenal syndrome  also has diabetic nephropathy. Continue with diuretics. Unfortunately, he might eventually need to go on  Dialysis. diabetes and hypertension for over 10 years   last EF is less than 20%   albuminuria on urinalysis going back to 2013 had an  mg   proteinuria 700 mg   no RBCs no WBCs  CT 2020 symmetric kidneys   HCV negative   negative NAHOMY   negative RF factor     Ischemic cardiomyopathy/Decompensated  EF less than 20%   grade 2 diastolic dysfunction   RT  ventricular systolic function is severely reduced as well has moderate MR and RV's dilated  In the past was on Milrinone    RHC 11/221 index very low  wedge was 19 and RA pressure 13   prognosis is poor. I do not know if he is a candidate for advanced heart failure therapies or not.     CAD/AICD   with stent in LAD  Most of his records are in the  system  Previously on Entresto but currently not     Gout  In the past  precipitated by diuresis    was On steroids  allopurinol, 100 mg daily       Diabetes type 2  10 years at least  Longstanding albuminuria  A1c  between 6 and 6.5     [facility]  A 72-year-old -American with  known history of cirrhosis, congestive heart failure with ejection  fraction of 20%, advanced CKD stage IV/V, by coming out of the kitchen  using his walker, went on the ground, lost consciousness. He eventually  came here and was found on interrogation of his device that he had  episode of ventricular tachycardia.     He was also found to have 30 pounds of fluid weight gain.     He was known to have CKD. Subjective:   Patient has no complaint       Review of Systems  Seen in room   REVIEW OF SYSTEMS:  GENERAL:  No fever or chills. CARDIAC:  No chest pain. PULMONARY:  No shortness of breath. GENITOURINARY:  No dysuria or hematuria. VASCULAR:  No claudication. GI:  No nausea, vomiting. Review of the rest of the system is negative. Objective:     Patient Vitals for the past 8 hrs:   BP Temp Temp src Resp SpO2   04/02/22 1200 100/71 97.8 °F (36.6 °C) Oral -- 97 %   04/02/22 0830 -- -- -- 16 100 %   04/02/22 0700 96/61 98.1 °F (36.7 °C) Oral -- 100 %       I/O last 3 completed shifts: In: 1575.9 [P.O.:1020; I.V.:555.9]  Out: 6771 [Urine:3676; Stool:1]    GENERAL:  On examination, he was awake, alert. HEENT:  Head normocephalic, atraumatic. Conjunctivae, no icterus. CARDIAC:  He has normal heart sounds. + murmur + edema  NECK:  JVD was up to the angle of his jaw. CHEST:  Few crackles. ABDOMEN:  Soft, nontender. Bowel sounds present. EXTREMITIES:  Bilateral lower extremities still has +1 to +2 edema. NEUROLOGICAL:  Alert and awake. No focal neurological signs. Clenton Reas l  Lab Results   Component Value Date    CREATININE 3.1 (H) 04/02/2022     (HH) 04/02/2022     (L) 04/02/2022    K 4.4 04/02/2022    CL 97 (L) 04/02/2022    CO2 21 04/02/2022     Lab Results   Component Value Date    WBC 3.5 (L) 04/02/2022    HGB 10.4 (L) 04/02/2022    HCT 30.6 (L) 04/02/2022    .3 (H) 04/02/2022     04/02/2022            AGLUCOSE)Magnesium:    Lab Results   Component Value Date    MG 2.20 04/02/2022     Phosphorus:    Lab Results Component Value Date    PHOS 3.8 08/30/2017       Uric Acid:  No components found for: URIC    Principal Problem:    Syncope and collapse  Active Problems:    Essential hypertension, benign    Diabetes type 2, uncontrolled (HCC)    Stage 3 chronic kidney disease (HCC)    Acute on chronic systolic heart failure, NYHA class 4 ([facility])    Syncope    CHF (congestive heart failure) [facility]    ICD (implantable cardioverter-defibrillator) in place    Demand ischemia [facility])    Ventricular tachycardia ([facility]  Resolved Problems:    * No resolved hospital problems.  *

## 2022-04-03 NOTE — PROGRESS NOTES
Report received from [employer]. Patient resting comfortably in chair. No signs or symptoms of distress. Assessment complete. Patient quiet and withdrawn, no complaints at this time. All safety precautions in place. See  flowsheets.   Electronically signed by Júnior Cintron RN on 4/3/2022 at 7:58 PM

## 2022-04-03 NOTE — PROGRESS NOTES
[facility]                                               Progress note    Summary:   Matthieu Aly is being seen by nephrology for SONAL on CKD. Admitted with volume overload, acute exacerbation of heart failure. Interval History  He is more somnolent today, suspect this is from uremia. His UO was over 5.3 L yesterday but BUN/cr RISING   He denies chest pain SOB , still with edema. I offered to call his family to explain dialysis but he says he wants to talk to them more. I explained he is now at a point where he will need dialysis if he wants to continue with aggressive care    He has been on BID metolazone and lasix at 30 mg /hr  Negative 9.1 L for admission. /59  On milrinone at 0.2  On room air. Plan:   - he is clinically uremic and will need dialysis. - he is still unsure about pursuing dialysis. I explained that he is at a point in his kidney disease that if he chooses not to do dialysis then he may want to consider hospice as his heart failure and CKD can not be managed medically any longer.   - will hold further diuresis, would continue milrinone  - NPO past midnight for possible [facility]  - hoping he will make a decision soon. Marcello Escalante MD  [facility]  Office: [phone]    Assessment:     #SONAL on CKD4   Due to cardiorenal syndrome   Has not responded well to diuretics and inotrope  [place]  Dialysis has been recommended. Patient not sure. #Hyponatremia  Due to heart failure     #anemia  CKD related and anemia of chronic inflammation   tsat 13%  Ferritin 271 back in Feb 2021    Ischemic cardiomyopathy  Decompensated, has evidence of volume overload  proBNP [id number]  Managed with Lasix drip and is on milrinone, appreciate cardiology involvement.   Last EF less than 20%  History of coronary artery disease with stent in the LAD  Has ICD  entresto held     Diabetes type 2  10 years at least  Longstanding albuminuria  A1c has been ranging between 6 and 6.5 the last few checks. Hypertension  On inotrope BP acceptable. ROS:     Positives Listed Bold. All other remaining systems are negative. Constitutional:  fever, chills, weakness, weight change, fatigue,      Skin:  rash, pruritus, hair loss, bruising, dry skin, petechiae. Head, Face, Neck   headaches, swelling,  cervical adenopathy. Respiratory: shortness of breath, cough, or wheezing  Cardiovascular: chest pain, palpitations, dizzy, edema  Gastrointestinal: nausea, vomiting, diarrhea, constipation,belly pain    Yellow skin, blood in stool  Musculoskeletal:  back pain, muscle weakness, gait problems,       joint pain or swelling. Genitourinary:  dysuria, poor urine flow, flank pain, blood in urine  Neurologic:  vertigo, TIA'S, syncope, seizures, focal weakness  Psychosocial:  insomnia, anxiety, or depression. Additional positive findings: -     PMH:   Past medical history, surgical history, social history, family history are reviewed and updated as appropriate. Reviewed current medication list.   Allergies reviewed and updated as needed. PE:   Vitals:    04/03/22 1133   BP: (!) 104/59   Pulse: 77   Resp: 17   Temp: 98 °F (36.7 °C)   SpO2: 97%       General appearance:  in NAD, somnolent. HEENT: EOM intact, no icterus. Trachea is midline. Neck : No masses, appears symmetrical, no JVD  Respiratory: Respiratory effort appears normal, bilateral equal chest rise, no wheeze, +crackles   Cardiovascular: Ausculation shows RRR + edema  Abdomen: No visible mass or tenderness, non distended. Musculoskeletal:  Joints with no swelling or deformity. Skin:no rashes, ulcers, induration, no jaundice. Neuro: face symmetric, no focal deficits. Appropriate responses.        Lab Results   Component Value Date    CREATININE 3.3 (H) 04/03/2022     (HH) 04/03/2022     (L) 04/03/2022    K 4.0 04/03/2022    CL 95 (L) 04/03/2022    CO2 23 04/03/2022      Lab Results   Component Value Date    WBC 3.7 (L) 04/03/2022    HGB 9.8 (L) 04/03/2022    HCT 28.7 (L) 04/03/2022    .6 (H) 04/03/2022     04/03/2022     Lab Results   Component Value Date    CALCIUM 8.7 04/03/2022    PHOS 3.8 08/30/2017

## 2022-04-03 NOTE — PROGRESS NOTES
Patient presented to ED post syncopal episode. He has a pMHx of cardiomyopathy/CHF, HTN, CKD. In the ED he was noted to have a 30lb weight gain. Patient's abdomen is grossly distended and the skin of this LEs is taut. He is on milrinone and lasix drips, but cardio feels he needs HD and has deferred to nephrology. He has continued to have good output this shift (see flowchart). Will continue to monitor.

## 2022-04-03 NOTE — PLAN OF CARE
Problem: Falls - Risk of:  Goal: Will remain free from falls  Description: Will remain free from falls  Outcome: Ongoing  Fall risk assessment completed every shift. All precautions in place. Patient has call light with in reach at all times. Room free from clutter. Patient aware to call for assistance when getting up. Goal: Absence of physical injury  Description: Absence of physical injury  Outcome: Ongoing  Patient remains free from physical injury     Problem: Pain:  Goal: Patient's pain/discomfort is manageable  Description: Patient's pain/discomfort is manageable  Outcome: Ongoing  Pain being managed with PRN analgesics per MD orders. Patient able to express presence and absence of pain and rate pain appropriately using numerical scale. Problem: Skin Integrity:  Goal: Skin integrity will stabilize  Description: Skin integrity will stabilize  Outcome: OngoingPain being managed with PRN analgesics per MD orders. Patient able to express presence and absence of pain and rate pain appropriately using numerical scale. Problem: Infection:  Goal: Will remain free from infection  Description: Will remain free from infection  Outcome: Ongoing  Patient free from new infection     Problem: Safety:  Goal: Free from accidental physical injury  Description: Free from accidental physical injury  Outcome: Ongoing  Patient remains free from accidental injury  Goal: Free from intentional harm  Description: Free from intentional harm  Outcome: Ongoing  Patient remains free from intentional harm. Problem: OXYGENATION/RESPIRATORY FUNCTION  Goal: Patient will maintain patent airway  Outcome: Ongoing  Patient's airway remains patent.    Goal: Patient will achieve/maintain normal respiratory rate/effort  Description: Respiratory rate and effort will be within normal limits for the patient  Outcome: Ongoing  Patient's respiratory rate and effort are WNL     Problem: HEMODYNAMIC STATUS  Goal: Patient has stable vital signs and fluid balance  Outcome: Ongoing  Patient's vital signs remain stable. Problem: FLUID AND ELECTROLYTE IMBALANCE  Goal: Fluid and electrolyte balance are achieved/maintained  Outcome: Ongoing     Problem: ACTIVITY INTOLERANCE/IMPAIRED MOBILITY  Goal: Mobility/activity is maintained at optimum level for patient  Outcome: Ongoing     Problem: Skin Integrity:  Goal: Will show no infection signs and symptoms  Description: Will show no infection signs and symptoms  Outcome: Ongoing  Skin assessment completed every shift. Patient assessed for incontinence, appropriate barrier cream applied prn. Patient encouraged to turn/rotate every 2 hours. Assistance provided if patient unable to do so themselves. Goal: Absence of new skin breakdown  Description: Absence of new skin breakdown  Outcome: Ongoing  Patient shows no evidence of new skin breakdown.

## 2022-04-03 NOTE — PROGRESS NOTES
[facility]   Daily Progress Note      Admit Date:  3/25/2022      Subjective:   Mr. 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 He was discharged from [facility] on 11/30/21 after being hospitalized with CHF. He has not kept any of his follow-up appointments since being discharged. His weight at that time was 145lbs.     Interval History:  Radha Sloan remains on the milrinone drip at 0.2mcg/kg/minbut the lasix drip is off. He says his breathing is fine. Sinus rhythm on telemetry.        Objective:     BP (!) 104/59   Pulse 77   Temp 98 °F (36.7 °C) (Oral)   Resp 17   Ht 5' 6\" (1.676 m)   Wt 158 lb 15.2 oz (72.1 kg)   SpO2 97%   BMI 25.66 kg/m²      Intake/Output Summary (Last 24 hours) at 4/3/2022 1521  Last data filed at 4/3/2022 1137  Gross per 24 hour   Intake 1028.83 ml   Output 4301 ml   Net -3272.17 ml       Physical Exam:  General:  Awake, alert, NAD  Skin:  Warm and dry  Neck:  JVD<8, no carotid bruits  Chest:  Clear to auscultation, no wheezes/rhonchi/rales  Cardiovascular:  RRR, normal S1/S2, no M/R/G  Abdomen:  Soft, nontender, +bowel sounds  Extremities: 1+ bilateral edema to the thighs edema  Pulses: 2+ bilat carotid    2+ bilat radial    2+ bilat femoral        Medications:    [Held by provider] metOLazone  10 mg Oral BID    insulin lispro  0-6 Units SubCUTAneous TID WC    insulin lispro  0-3 Units SubCUTAneous Nightly    amiodarone  200 mg Oral BID    metoprolol succinate  25 mg Oral Daily    allopurinol  100 mg Oral Daily    atorvastatin  80 mg Oral Daily    levothyroxine  50 mcg Oral QAM AC    rivaroxaban  15 mg Oral Dinner    sodium chloride flush  5-40 mL IntraVENous 2 times per day    [Held by provider] sacubitril-valsartan  1 tablet Oral BID    aspirin  81 mg Oral Daily      milrinone 0.2 mcg/kg/min (04/03/22 0530)    [Held by provider] furosemide (LASIX) infusion Stopped (04/03/22 1253)    dextrose      sodium chloride         Lab Data:  CBC:   Recent Labs     04/01/22 0522 04/02/22 0438 04/03/22  0531   WBC 3.7* 3.5* 3.7*   HGB 10.2* 10.4* 9.8*    189 207     BMP:    Recent Labs     04/01/22 0522 04/02/22  0438 04/03/22  0531   * 133* 134*   K 4.4 4.4 4.0   CO2 21 21 23   * 103* 118*   CREATININE 3.0* 3.1* 3.3*     LIVR:   Recent Labs     04/01/22 0522 04/02/22  0438 04/03/22  0531   AST 19 20 18   ALT 14 14 13     PT/INR:   Recent Labs     04/01/22 0522 04/02/22  0438 04/03/22  0531   PROT 6.1* 5.8* 6.0*     Lab Results   Component Value Date    CHOL 95 02/04/2022    HDL 48 02/04/2022    HDL 58 12/13/2011    TRIG 43 02/04/2022         Assessment / Plan:     1. Acute on chronic Systolic CHF, class 4  He is diuresing but will have much better volume removal with dialysis. Lasix drip was stopped. We will also stop the milrinone drip now. Hold Entresto. Okay to continue on Toprol XL for now. Hold aldactone per Nephrology. Not a candidate for an AICD due to noncompliance. Not a candidate for SGLT2 inhibitor as we have not seen him in the office. 2. Acute on chronic kidney injury, stage 4  I do believe that dialysis is the next best option for Mr. Selma Seals. Plan for dialysis catheter tomorrow.             Signed:  Berton Osler, MD

## 2022-04-03 NOTE — PROGRESS NOTES
Hospitalist Progress Note      PCP: Tyrone Barger DO    Date of Admission: 3/25/2022    Chief Complaint: syncope     Hospital Course:   19-year-old patient with a past medical history of cardiomyopathy/CHF, hypertension, hyperlipidemia, diabetes mellitus admitted after an episode of syncope. Patient was noted to have 30 pound weight gain abdominal distention, currently being managed for acute on chronic systolic heart failure and episode of ventricular tachycardia. Currently admitted for management of VT and heart failure  3/31 started on Lasix infusion and milrinone    Subjective:   Output 5.2 L in the last 24 hours. Continues to be on Lasix 30 mg/h as well as milrinone. Patient stated that he is still discussing HD with his family and had not yet made a decision.      Medications:  Reviewed    Infusion Medications    milrinone 0.2 mcg/kg/min (04/03/22 0530)    furosemide (LASIX) infusion 30 mg/hr (04/03/22 0530)    dextrose      sodium chloride       Scheduled Medications    metOLazone  10 mg Oral BID    insulin lispro  0-6 Units SubCUTAneous TID WC    insulin lispro  0-3 Units SubCUTAneous Nightly    amiodarone  200 mg Oral BID    metoprolol succinate  25 mg Oral Daily    allopurinol  100 mg Oral Daily    atorvastatin  80 mg Oral Daily    levothyroxine  50 mcg Oral QAM AC    rivaroxaban  15 mg Oral Dinner    sodium chloride flush  5-40 mL IntraVENous 2 times per day    [Held by provider] sacubitril-valsartan  1 tablet Oral BID    aspirin  81 mg Oral Daily     PRN Meds: glucose, dextrose, glucagon (rDNA), dextrose, sodium chloride flush, sodium chloride, perflutren lipid microspheres, ondansetron      Intake/Output Summary (Last 24 hours) at 4/3/2022 1003  Last data filed at 4/3/2022 0925  Gross per 24 hour   Intake 1460.85 ml   Output 5001 ml   Net -3540.15 ml       Physical Exam Performed:    /67   Pulse 80   Temp 97.7 °F (36.5 °C) (Oral)   Resp 21   Ht 5' 6\" (1.676 m) Wt 158 lb 15.2 oz (72.1 kg)   SpO2 93%   BMI 25.66 kg/m²     General appearance: No apparent distress, appears stated age and cooperative. HEENT: Pupils equal, round, and reactive to light. Conjunctivae/corneas clear. Neck: Supple, with full range of motion. No jugular venous distention. Trachea midline. Respiratory:  Normal respiratory effort. Clear to auscultation, bilaterally without Rales/Wheezes/Rhonchi. Cardiovascular: Regular rate and rhythm with normal S1/S2 without murmurs, rubs or gallops. Abdomen: Soft, non-tender, non-distended with normal bowel sounds. Musculoskeletal: Bilateral edema noted  Skin: Skin color, texture, turgor normal.  No rashes or lesions. Neurologic:  Neurovascularly intact without any focal sensory/motor deficits. Cranial nerves: II-XII intact, grossly non-focal.  Psychiatric: Alert and oriented, thought content appropriate, normal insight  Capillary Refill: Brisk,3 seconds, normal   Peripheral Pulses: +2 palpable, equal bilaterally       Labs:   Recent Labs     04/01/22 0522 04/02/22 0438 04/03/22 0531   WBC 3.7* 3.5* 3.7*   HGB 10.2* 10.4* 9.8*   HCT 30.2* 30.6* 28.7*    189 207     Recent Labs     04/01/22 0522 04/02/22 0438 04/03/22  0531   * 133* 134*   K 4.4 4.4 4.0   CL 97* 97* 95*   CO2 21 21 23   * 103* 118*   CREATININE 3.0* 3.1* 3.3*   CALCIUM 8.6 8.6 8.7     Recent Labs     04/01/22 0522 04/02/22 0438 04/03/22  0531   AST 19 20 18   ALT 14 14 13   BILITOT 0.7 0.8 0.8   ALKPHOS 118 120 127     No results for input(s): INR in the last 72 hours. No results for input(s): Layla Elba in the last 72 hours.     Urinalysis:      Lab Results   Component Value Date    NITRU Negative 03/26/2022    WBCUA 0 03/26/2022    RBCUA 2 03/26/2022    BLOODU Negative 03/26/2022    SPECGRAV 1.015 03/26/2022    GLUCOSEU Negative 03/26/2022       Radiology:  CT ABDOMEN PELVIS WO CONTRAST Additional Contrast? None   Final Result   There is marked cardiomegaly, with trace pleural effusions, as well as   moderate intra-abdominal and pelvic ascites, as well as diffuse anasarca. Hepatic vein reflux is noted on the previous CT examination is well. This   combination of findings can be seen in the setting of passive liver   congestion related to congestive failure. Questionable mild surface   nodularity of the liver makes it difficult to exclude underlying cirrhosis. Minimal bibasilar airspace disease, possibly related to atelectasis or   pneumonia, though with less consolidation than seen on the previous exam.      Mild appearing bilateral renal atrophy. Suspected fat and ascitic fluid containing left inguinal hernia. No bowel   herniation. XR CHEST (2 VW)   Final Result      1. Possible mild degree of vascular congestion though exaggerated by a   suboptimal inspiration. No focal pulmonary infiltrates are noted. 2.  Stable cardiomegaly. CT HEAD WO CONTRAST   Final Result      1. No acute intracranial abnormality noted. 2.  Prominence of the sulci and/or CSF spaces suggests a possible mild degree   of cerebral atrophy. Assessment/Plan:    Active Hospital Problems    Diagnosis     Ventricular tachycardia ([address] [I47.2]     Syncope [R55]     CHF (congestive heart failure) ([facility]) [I50.9]     Syncope and collapse [R55]     ICD (implantable cardioverter-defibrillator) in place [Z95.810]     Demand ischemia ([address] [I24.8]     Acute on chronic systolic heart failure, NYHA class 4 ([facility]) [I50.23]     Stage 3 chronic kidney disease ([address] [N18.30]     Diabetes type 2, uncontrolled ([address] [E11.65]     Essential hypertension, benign [I10]      Single. Ventricular tachycardia. Interrogation showed VT that was appropriately shocked. Continue amiodarone and metoprolol. Appreciate cardiology input. Acute on chronic systolic heart failure exacerbation.   Ejection fraction of less than 20%, patient is followed

## 2022-04-04 NOTE — PROGRESS NOTES
Patient states at this time that he is willing to have the procedure Monday morning to place the temporary dialysis catheter.   Electronically signed by Marge Robles RN on 4/4/2022 at 1:16 AM

## 2022-04-04 NOTE — CARE COORDINATION
Case Management follow up. Patient had vas cath placed today. [facility] nephrology following and patient will likely need outpatient hemodialysis. [facility] nephrology typically sets up their own patients for HD however nothing in notes indicates this. Left voicemail with Agnes cid to see if outpatient spot being worked on, await return call.   Electronically signed by Dedra Godinez RN Case Management [phone] on 4/4/2022 at 3:15 PM

## 2022-04-04 NOTE — PLAN OF CARE
Problem: Falls - Risk of:  Goal: Will remain free from falls  Description: Will remain free from falls  Outcome: Ongoing  Note: Fall risk assessment completed every shift. All precautions in place. Pt has call light within reach at all times. Room clear of clutter. Pt aware to call for assistance when getting up. Goal: Absence of physical injury  Description: Absence of physical injury  Outcome: Ongoing     Problem: Skin Integrity:  Goal: Skin integrity will stabilize  Description: Skin integrity will stabilize  Outcome: Ongoing  Note: Skin assessment completed every shift. Pt assessed for incontinence, appropriate barrier cream applied prn. Pt encouraged to turn/rotate every 2 hours. Assistance provided if pt unable to do so themselves. Problem: Infection:  Goal: Will remain free from infection  Description: Will remain free from infection  Outcome: Ongoing     Problem: Safety:  Goal: Free from accidental physical injury  Description: Free from accidental physical injury  Outcome: Ongoing  Note: Patient assessed for fall risk; fall precautions initiated. Patient instructed about safety devices. Environment kept free of clutter and adequate lighting provided. Bed locked and in lowest position. Call light within reach. Will continue to monitor. Goal: Free from intentional harm  Description: Free from intentional harm  Outcome: Ongoing     Problem: Daily Care:  Goal: Daily care needs are met  Description: Daily care needs are met  Outcome: Ongoing     Problem: Discharge Planning:  Goal: Patients continuum of care needs are met  Description: Patients continuum of care needs are met  Outcome: Ongoing     Problem: OXYGENATION/RESPIRATORY FUNCTION  Goal: Patient will maintain patent airway  Outcome: Ongoing  Note: Assess breath sounds, oxygen requirements and saturations, and activity tolerance. Monitor intake and output and daily weights and lab values. Encourage fluid and dietary restrictions. Goal: Patient will achieve/maintain normal respiratory rate/effort  Description: Respiratory rate and effort will be within normal limits for the patient  Outcome: Ongoing     Problem: HEMODYNAMIC STATUS  Goal: Patient has stable vital signs and fluid balance  Outcome: Ongoing     Problem: FLUID AND ELECTROLYTE IMBALANCE  Goal: Fluid and electrolyte balance are achieved/maintained  Outcome: Ongoing     Problem: ACTIVITY INTOLERANCE/IMPAIRED MOBILITY  Goal: Mobility/activity is maintained at optimum level for patient  Outcome: Ongoing     Problem: Skin Integrity:  Goal: Will show no infection signs and symptoms  Description: Will show no infection signs and symptoms  Outcome: Ongoing  Goal: Absence of new skin breakdown  Description: Absence of new skin breakdown  Outcome: Ongoing

## 2022-04-04 NOTE — PROGRESS NOTES
At [address] patient's cardiac rhythm changed for approximately 15 seconds. Patient appears to have gone from a sinus rhythm to a junctional rhythm then back into a sinus rhythm again. Patient then maintained a sinus rhythm. Copy of the strip from this episode is in the soft chart.   Electronically signed by Simran Blount RN on 4/4/2022 at 7:58 AM

## 2022-04-04 NOTE — ACP (ADVANCE CARE PLANNING)
Advance Care Planning   The patient has the following advanced directives on file:  [address] [place] ACP-Advance Directive ACP-Power of     Not on File Not on File Not on File Not on File          The patient has appointed the following active healthcare agents:    Primary Decision Maker: Moe De Dios - [phone]    Secondary Decision Maker: Sandrita Sanchez - Brother/Sister - [phone]    The Patient has the following current code status:    Code Status: Full Code    Visit Documentation:  Multiple conversations throughout the patient's admission about dialysis and overall prognosis. Patient had many concerns about going on dialysis, I had explained that given patient's low ejection fraction and now renal failure his overall prognosis is notably worse, patient had multiple questions about longevity with dialysis as well as technicalities of dialysis.   All questions answered, patient is planned for port insertion today    Ranjan Talley MD  4/4/2022

## 2022-04-04 NOTE — BRIEF OP NOTE
Brief Postoperative Note    Quentin Tucker  YOB: 1955  [id number]    Pre-operative Diagnosis: SONAL    Post-operative Diagnosis: Same    Procedure: Non-tunneled Hemodialysis Catheter Placement    Anesthesia: Local    Surgeons: Larissa Romero MD    Estimated Blood Loss: Less than 5 mL    Complications: None    Specimens: Was Not Obtained    Findings: Successful placement of a right IJ non-tunneled hemodialysis catheter.     Electronically signed by Larissa Romero MD on 4/4/2022 at 1:25 PM

## 2022-04-04 NOTE — PROGRESS NOTES
Occupational Therapy  Facility/Department: [address] PROGRESSIVE CARE  Daily Treatment Note  This note to serve as discharge summary if pt d/c'd prior to next session    NAME: Alexander Aguila  : 1955  MRN: [id number]    Date of Service: 2022    Discharge Recommendations:  Continue to assess pending progress,Home with assist PRN       Assessment   Performance deficits / Impairments: Decreased functional mobility ; Decreased strength;Decreased endurance;Decreased ADL status; Decreased balance;Decreased high-level IADLs  Assessment: Discussed with OTR: Pt tolerated session fairly well, limited by the above deficits. Pt appears to be functioning slightly below his baseline, needing up to Supervision for ADL's, transfers and mobility, with RW. Pt without complaints of dizziness. Anticipate pt will be safe for d/c home with assist prn. Cont poc. Prognosis: Good  OT Education: OT Role;Plan of Care;Transfer Training;ADL Adaptive Strategies  REQUIRES OT FOLLOW UP: Yes  Activity Tolerance  Activity Tolerance: Patient limited by fatigue;Patient Tolerated treatment well  Activity Tolerance: C/o not being able eat, d/t upcoming procedure  Safety Devices  Safety Devices in place: Yes  Type of devices: Call light within reach;Nurse notified; Bed alarm in place; Left in bed;Gait belt         Patient Diagnosis(es): The encounter diagnosis was Syncope and collapse.      has a past medical history of CHF (congestive heart failure) ([address] Diabetes type 2, uncontrolled ([address] Diabetic nephropathy, ED (erectile dysfunction), Essential hypertension, benign, Hyperlipidemia, and Noncompliance. has a past surgical history that includes eye surgery.     Restrictions  Restrictions/Precautions  Restrictions/Precautions: Fall Risk  Subjective   General  Chart Reviewed: [place]  Patient assessed for rehabilitation services?: Yes  Additional Pertinent Hx: per ED note, Alexander Aguila is a 61 y.o. male who presents to the emergency department via EMS from home where he lives with his son after passing out. He states he felt fine today no chest pain or shortness of breath. He had eaten his dinner he was getting up walking when he collapsed to the ground. He vaguely remembers getting lightheaded prior to this. His son called 48. He has a history of diabetes, coronary artery disease, congestive heart failure. He states he did not take his Xarelto today. Denies pain in his hips arms or neck. He tells me he always has leg swelling and shortness of breath and that this is not worse than normal.  Family / Caregiver Present: No  Referring Practitioner: Delaney Samayoa,   Subjective  Subjective: Pt met BS, in bed. Pt agreeable to OT. Pt denied pain. General Comment  Comments: okay for therapy per RN. Orientation  Orientation  Overall Orientation Status: Within Functional Limits  Objective    ADL  Grooming: Modified independent  (seated)  UE Bathing: Modified independent   LE Bathing: Supervision (stood to wash buttocks, tarik areas. Seated to reach to wash feet, and crossing LE's, w/effort to cross RLE over L knee)  UE Dressing:  (hospital gown changed)  LE Dressing: Minimal assistance (to don odell hose. Supervision for footies.)  Toileting: Unable to assess(comment)        Standing Balance  Time: 8-9 min  Activity: ADL's.   Functional Mobility  Functional - Mobility Device: Rolling Walker  Activity: To/from bathroom  Assist Level: Supervision  Wheelchair Bed Transfers  Wheelchair/Bed - Technique: Ambulating  Equipment Used: Bed (arm chair)  Level of Asssistance: Supervision  Wheelchair Transfers Comments: RW  Bed mobility  Supine to Sit: Independent         Cognition  Overall Cognitive Status: [facility]            Plan   Plan  Times per week: 3-5x  Current Treatment Recommendations: Strengthening,Functional Mobility Training,Gait Training,Endurance Training,Balance Training,Self-Care / ADL,Safety Education & Training,Patient/Caregiver Education & Training    AM-PAC Score        AM-PAC Inpatient Daily Activity Raw Score: 21 (04/04/22 0854)  AM-PAC Inpatient ADL T-Scale Score : 44.27 (04/04/22 0854)  ADL Inpatient CMS 0-100% Score: 32.79 (04/04/22 0854)  ADL Inpatient CMS G-Code Modifier : Phoebe Solorzano (04/04/22 8284)    Goals  Short term goals  Time Frame for Short term goals: prior to D/C; ongoing 4-4-22  Short term goal 1: complete functional mobility and transfers Mod Ind  Short term goal 2: complete bathing and dressing Mod Ind  Short term goal 3: complete toileting Mod Ind  Short term goal 4: complete grooming in stance at sink Mod Ind  Long term goals  Time Frame for Long term goals : STG=LTG  Patient Goals   Patient goals : return home       Therapy Time   Individual Concurrent Group Co-treatment   Time In 0815         Time Out 0900         Minutes 45                 Sheri KEEN/BRANT,[phone]

## 2022-04-04 NOTE — PROGRESS NOTES
[facility]   Daily Progress Note      Admit Date:  3/25/2022    CC: syncope    HPI:   Dariusz Ríos is a 74 y.o. male with PMH  CAD, SHF s/p Biotronik ICD (8/2019), LV thrombus, HTN, HLP, DM2, CKD Stage 3/ diabetic nephropathy  Seen by Dr Bev Martínez as outpatient. Plan was for and RHC on 11/1/2021 and he did not show up for procedure. Adm [facility] 11/2021 with HF/SONAL - on Milrinone and lasix gtt. Had [address] with elevated filling pressure. DC wt 145lbs. Non compliant with follow up after DC   Adm [facility] 2/3-2/17/2022 with Acute SHF requiring Milrinone and lasix gtt + metolazone. neph seen for CRS. Re adm 3/25/2022 with syncopal episode. Device interrogation showed VT that was appropriately shocked with ATP. Was again hypervolemic, 30# wt gain with acute on chronic systolic heart failure and acute on chronic SONAL/CKD. Milrinone and lasix gtt now stopped (poor response) with rising creat and HD has been recommended. Today, he is aggravated. He does not want to engage in conversation. He denies SOB, CP, LH, dizziness, palpitations, syncope. Review of Systems:   General: Denies fever, chills + fatigue/weakness  Skin: Denies skin changes, rash, itching, lesions.   HEENT: Denies headache, dizziness, vision changes, nosebleeds, sore throat, nasal drainage  RESP: Denies cough, sputum, dyspnea, wheeze, snoring  CARD: Denies palpitations,  murmur  GI:Denies nausea, vomiting, heartburn, loss of appetite, change in bowels  : Denies frequency, pain, incontinence, polyuria  VASC: Denies claudication, leg cramps, clots  MUSC/SKEL: Denies pain, stiffness, arthritis  PSYCH: Denies anxiety, depression, stress  NEURO: Denies numbness, tingling, weakness,change in mood or memory  HEME: Denies abn bruising, bleeding, anemia  ENDO: Denies intolerance to heat, cold, excessive thirst or hunger, hx thyroid disease    Objective:   BP 98/65   Pulse 70   Temp 98.3 °F (36.8 °C) (Oral)   Resp 20   Ht 5' 6\" (1.676 m)   Wt 147 lb 14.9 oz (67.1 kg)   SpO2 100%   BMI 23.88 kg/m²         Intake/Output Summary (Last 24 hours) at 4/4/2022 1025  Last data filed at 4/4/2022 1221  Gross per 24 hour   Intake 760 ml   Output 3290 ml   Net -2530 ml     I/O since adm: Neg 11L    WEIGHT:Admit Weight: 189 lb 9.5 oz (86 kg)         Today  Weight: 147 lb 14.9 oz (67.1 kg)   DRY WEIGHT:  Wt Readings from Last 3 Encounters:   04/04/22 147 lb 14.9 oz (67.1 kg)   02/17/22 152 lb 1.9 oz (69 kg)   01/20/22 180 lb (81.6 kg)       Physical Exam:  GEN: Appears frail, no acute distress  SKIN: Brown, warm, dry. Nails without clubbing. HEENT: PERRLA. Normocephalic, atraumatic. Neck supple. No adenopathy. LUNG: AP diameter normal. Clear bilateral. Diminished bilateral bases. No wheeze, rales, or ronchi. Respiratory effort normal.  HEART: S1S2 A/R. Mild JVD. No carotid bruit. + systolic murmur, No rub or gallop. ABD: Soft, nontender, distended. +BS X 4 quads. EXT: Radial and pedal pulses 2+ and symmetric. Without varicosities. 2+pedal to kness edema. MUSCSKEL: Good ROM X4 extremities. No deformity. NEURO: A/O X3. Calm and cooperative.      Telemetry: SR HR 71     Medications:    [Held by provider] metOLazone  10 mg Oral BID    insulin lispro  0-6 Units SubCUTAneous TID WC    insulin lispro  0-3 Units SubCUTAneous Nightly    amiodarone  200 mg Oral BID    metoprolol succinate  25 mg Oral Daily    allopurinol  100 mg Oral Daily    atorvastatin  80 mg Oral Daily    levothyroxine  50 mcg Oral QAM AC    rivaroxaban  15 mg Oral Dinner    sodium chloride flush  5-40 mL IntraVENous 2 times per day    [Held by provider] sacubitril-valsartan  1 tablet Oral BID    aspirin  81 mg Oral Daily      [Held by provider] furosemide (LASIX) infusion Stopped (04/03/22 1253)    dextrose      sodium chloride       glucose, dextrose, glucagon (rDNA), dextrose, sodium chloride flush, sodium chloride, perflutren lipid microspheres, ondansetron    Lab Data: I have reviewed all labs below today. CBC:   Recent Labs     04/02/22 0438 04/03/22  0531 04/04/22  0440   WBC 3.5* 3.7* 3.2*   HGB 10.4* 9.8* 9.1*   HCT 30.6* 28.7* 26.2*   .3* 105.6* 105.3*    207 191     BMP:   Recent Labs     04/02/22 0438 04/03/22  0531 04/04/22  0440   * 134* 136   K 4.4 4.0 4.2   CL 97* 95* 95*   CO2 21 23 26   * 118* 128*   CREATININE 3.1* 3.3* 3.7*     GLUCOSE:   Recent Labs     04/02/22 0438 04/03/22  0531 04/04/22  0440   GLUCOSE 224* 179* 162*     LIVER PROFILE:   Lab Results   Component Value Date    AST 17 04/04/2022    ALT 12 04/04/2022    LABALBU 3.0 04/04/2022    BILIDIR 0.9 11/23/2021    BILITOT 0.6 04/04/2022    ALKPHOS 121 04/04/2022     PT/INR:   Lab Results   Component Value Date    PROTIME 35.8 04/04/2022    INR 3.03 04/04/2022    INR 1.90 11/30/2021    INR 1.73 11/29/2021     APTT: No results found for: APTT  Pro-BNP:    Lab Results   Component Value Date    PROBNP 34,786 03/25/2022    PROBNP 32,014 02/12/2022    PROBNP 35,157 02/09/2022     Parameters:   > 450 pg/mL under age 48  > 900 pg/mL ages 54-65  > 1800 pg/mL over age 76    ENZYMES:  Lab Results   Component Value Date    TROPONINI 0.07 03/26/2022    TROPONINI 0.08 03/26/2022    TROPONINI 0.08 03/25/2022     FASTING LIPID PANEL:  Lab Results   Component Value Date    CHOL 95 02/04/2022    HDL 48 02/04/2022    HDL 58 12/13/2011    LDLCALC 38 02/04/2022    TRIG 43 02/04/2022       Diagnostics:    EKG:   Sinus rhythm with occasional Premature ventricular complexes  Cannot rule out Anterior infarct (cited on or before 25-MAR-2022)  Nonspecific T wave abnormality  Abnormal ECG  When compared with ECG of 03-FEB-2022 12:00,  Premature ventricular complexes are now Present  Confirmed by ETHAN MONTERO, Chandrakant Mcnally ([id number]) on 3/26/2022 8:50:53 AM    ECHO:  3/26/2022  Summary  Overall left ventricular systolic function appears severely reduced.  Ejection fraction is visually estimated to be <20% with diffuse  hypokinesis. Severely dilated left ventricle. Normal left ventricular wall thickness. Diastolic filling parameters suggest grade II diastolic  dysfunction. The right ventricle appears severely dilated with moderately reduced systolic function. The left and right atria are severely dilated. Severe tricuspid regurgitation. Moderate mitral regurgitation. Estimated pulmonary artery systolic pressure is severely elevated at 66 mmHg assuming a right atrial pressure of 15 mmHg. 11/16/2021  Summary  Left ventricular cavity size is severely dilated. Ejection fraction is visually estimated to be <20%. There is severe diffuse hypokinesis. Diastolic filling parameters suggest grade II diastolic dysfunction. Definity contrast administered. No left ventricular thrombus was seen. moderate MR  Pacer / ICD wire is visualized in the right ventricle. The right ventricle is dilated. Right ventricular systolic    5/7/6284 [facility]):  - Left ventricle: The cavity size is severely dilated. Wall thickness is normal. Systolic function     was severely reduced. The estimated ejection fraction was in the range of 10% to 15%. Diffuse     hypokinesis. Doppler parameters are consistent with restrictive physiology, indicative of     decreased left ventricular diastolic compliance and/or increased left atrial pressure. There was     spontaneous echo contrast, indicative of stasis. - Mitral valve: Moderate regurgitation.   - Left atrium: The atrium is severely dilated. - Right ventricle: Pacer wire or catheter noted in right ventricle. Systolic function was mildly     reduced. - Right atrium: The atrium is mildly dilated. - Atrial septum: The septum bowed from left to right, consistent with increased left atrial     pressure. - Tricuspid valve: Mild-moderate regurgitation.   - Pulmonary arteries: Systolic pressure was severely increased, estimated to be 65mm Hg assuming     that the right atrial pressure was 15 mmHg. inotropic support. SUMMARY:    1. Wedge pressure in the pulmonary capillaries is mildly elevated. 2. Coronary arteries: The left main is unusually long and      trifurcates into the LAD, a ramus intermedius, and the left      circumflex. The left anterior descending gives rise to 2      diagonals and 3 septals and wraps around the apex. The left      circumflex gives rise to 2 obtuse marginals and no      posterolaterals. The first obtuse marginal has a high origin. 3. LAD: Mid-vessel lesion: There is a discrete, 99% stenosis. The      distal vessel supplies a moderate-sized vascular territory. The      lesion is a likely culprit for the patient's clinical      presentation. The lesion was stented (see 1st lesion      intervention). Following intervention, the lesion has a residual      stenosis of 0% and MUKUND grade 3 flow (brisk flow). 4. AV was not crossed due to known apical LV thrombus. 5. RA 6      RV 48/4      PA 50/20 (28)      W 18.   6. Blu CO 5.2 Blu CI 2.9 on Milrinone 0.25 mcg/mg/min.      12/4/2018 Cardiac MRI ():  Findings are consistent with a severe, ischemic cardiomyopathy.  There is akinesis of the apical segments with an associated small, apical thrombus. The left ventricle is severely dilated with severely reduced systolic function. The right ventricle is mildly dilated with moderately decreased systolic function. The left atrium is severely dilated.  There is mild to moderate mitral regurgitation. The right atrium is mildly dilated.  There is moderate tricuspid regurgitation. The descending aorta is dilated. The pulmonary artery is dilated.  The IVC and coronary sinus are dilated. Please see noncardiac findings below.      FINDINGS:     1. The left ventricular size is severely dilated. The left ventricular ejection fraction is 19 % by Smallwood's method. Global left ventricular function is severely decreased.  There is akinesis of the apical segments, severe hypokinesis of the basal and mid septal and basal and mid anterior segments and moderate hypokinesis of the lateral segments and mid inferior segment and mild hypokinesis of the basal inferior segment. The left ventricular mass is moderately increased. There appears to be a small intracavitary thrombus (6.5 x 5 mm). 2. There is no evidence of increased iron deposition within the myocardium (T2*myocardium = 51 msec; if < 20 msec, suggestive of iron-overloading of the myocardium).  There is no evidence of myocardial edema. 3. The right ventricular size is mildly dilated. Global right ventricular function is moderately decreased. 4. Left atrial size is severely enlarged. Right atrial size is mildly enlarged.   The Qp/Qs is 1.0 by phase contrast imaging and is consistent with no evidence of shunt. 5. The calculated aortic regurgitant fraction is 1.55 %. There is moderate mitral regurgitation. The calculated mitral regurgitant fraction is 29.02 %. There is moderate tricuspid regurgitation. The calculated tricuspid regurgitant fraction is 37.1 %. The calculated pulmonic regurgitant fraction is 2.39 %. 6. There is a small pericardial effusion. 7. The descending aorta is dilated measuring 27.2 mm. 8. The main pulmonary artery is dilated measuring 34.0 mm.  The IVC and coronary sinus are dilated. 9. There are large left and moderate right pleural effusions.  Basilar atelectasis is associated with the effusions.  There is evidence of mild pulmonary edema.      [facility] 11/23/2021  OVERALL IMPRESSION:  1.  Elevated right heart pressures. 2.  Elevated pulmonary capillary wedge pressure. 3.  Markedly reduced cardiac output and indices with significantly low  oxygen saturation, all consistent with stage D/New York Heart  Association Class IV heart failure. We will start the patient back on Primacor drip.   The patient's prognosis is extremely poor at this point and we will  discuss further with the patient and the family about his code status.     RUE venous doppler 11/23/2021  Summary  The left internal jugular, subclavian, axillary, brachial, radial and ulnar veins, as well as the basilic and cephalic veins,  have been examined. These veins show no thrombus but there is slow venous blood flow especially at the proximal internal jugular and  subclavian veins. This suggests a possible venous occlusive process in the chest and out of the range of the examining  ultrasound probe  Right internal jugular and subclavian veins are normal     CXR 3/25/2022  Impression       1.  Possible mild degree of vascular congestion though exaggerated by a   suboptimal inspiration.  No focal pulmonary infiltrates are noted.       2.  Stable cardiomegaly. CT ABD/PELVIX WO contrast 3/26/2022  Impression   There is marked cardiomegaly, with trace pleural effusions, as well as   moderate intra-abdominal and pelvic ascites, as well as diffuse anasarca. Hepatic vein reflux is noted on the previous CT examination is well.  This   combination of findings can be seen in the setting of passive liver   congestion related to congestive failure.  Questionable mild surface   nodularity of the liver makes it difficult to exclude underlying cirrhosis.       Minimal bibasilar airspace disease, possibly related to atelectasis or   pneumonia, though with less consolidation than seen on the previous exam.       Mild appearing bilateral renal atrophy.       Suspected fat and ascitic fluid containing left inguinal hernia.  No bowel   herniation. Assessment/Plan:  1.) Syncope D/T VT w/ appropriate shock:   Cont Amio, Toprol XL    2.) Acute on chronic combined diastolic Gr 2 and systolic heart failure, EF <20%, ICM, moderate MR, dilated RV: Neg 11L, Wt 189>147lbs. Improved but not resolved and no longer responding well to diuresis, worsening renal fx. Plan for initiating HD today.  He has ongoing non compliance as OP- multiple hospitalizations and worsening multiorgan failure. Limited options -possible palliative care/hospice if HD does not help. NYHA Class IV  Stage D   Diuretic: lasix gtt up to 30mg/hr - stopped 4/3/22   Milrinone gtt started 3/31>DC 4/3. Indicated for EF </=40%:   Beta Blocker (evidence based)- Toprol XL  ACEi/ARB/ARNI- entresto held   SGLT2 Inhibitor- hold for SONAL  Aldosterone Antagonist (BNP within 1 week of DC if new or dose changed)- hold SONAL     Cardiac Rehab for EF <35%: OP  ICD counseling: Has ICD   2gm Na diet, daily weight, 64 oz fluid restriction  Avoid NSAIDS and other nephrotoxic meds  [facility] Referral: OP  HF RN referral for education    3.) SONAL on CKD Stage 3: R/T CRS and diabetic nephropathy, Neph following  Baseline creat 2 >3.7 today. HD has been recommended. Plan is for dialysis cath and HD today. 4. ) CAD: Prior PCI/MIKE to LAD. Mild elevated troponin most likely from decompensated HF. No evidence of angina/ischemia. DAPT: on xarelto  Beta Blocker: Toprol XL  ACEi/ARB:  Anti anginal:   Lipid management/high intensity statin: lipitor  Risk factor management: high blood pressure, high cholesterol, Diabetes, smoking, obesity, family hx  Lifestyle modification: Heart healthy diet, regular exercise, weight loss, smoking cessation, stress reduction     5.) LV thrombus:  -F/U ECHO without thrombus  - cont xarelto    6.) Anemia: Iron deficiency and ongoing renal failure.   Monitor     Electronically signed by LARA Das, DAHIANA - CNP on 4/4/2022 at 10:25 AM

## 2022-04-04 NOTE — PROGRESS NOTES
[facility]                                               Progress note    Summary:   Bernice Judd is being seen by nephrology for SONAL on CKD. Admitted with volume overload, acute exacerbation of heart failure. Interval History  He was seen and examined in his room   His two sisters were present  Patient is alert and gives appropriate responses but clearly much slower and clinically uremic. Diuretics have been on hold  He has agreed for dialysis. Temp line today     /67  100% on room air. Has LE edema  Off inotrope, lasix gtt held. UO 3.8 L yesterday even off diuretics. Labs reviewed  Na 136, k 4.2 bicarb 26     Cr 3.7   Mag 2.2     Plan:   - start dialysis today after line  - low  and UF 2 L for 2 hrs  - HD again tomorrow. - explained risks and benefits of dialysis to patient and his family. I did explain that his mortality in the first year on dialysis will be high given his comorbid state and end stage heart failure but hopefully can help with fluid overload and uremia to give him more time. Citlali Hutson MD  [facility]  Office: [phone]    Assessment:     #SONAL on CKD4   Due to cardiorenal syndrome   Has not responded well to diuretics and inotrope  [place]  Dialysis has been recommended. Patient not sure. > HD start 04/04/22     #Hyponatremia  Due to heart failure     #anemia  CKD related and anemia of chronic inflammation   tsat 13%  Ferritin 271 back in Feb 2021    Ischemic cardiomyopathy  Decompensated, has evidence of volume overload  proBNP 45119  Managed with Lasix drip and is on milrinone, appreciate cardiology involvement. Last EF less than 20%  History of coronary artery disease with stent in the LAD  Has ICD  entresto held     Diabetes type 2  10 years at least  Longstanding albuminuria  A1c has been ranging between 6 and 6.5 the last few checks. Hypertension  On inotrope BP acceptable. ROS:     Positives Listed Bold.  All other remaining systems are negative. Constitutional:  fever, chills, weakness, weight change, fatigue,      Skin:  rash, pruritus, hair loss, bruising, dry skin, petechiae. Head, Face, Neck   headaches, swelling,  cervical adenopathy. Respiratory: shortness of breath, cough, or wheezing  Cardiovascular: chest pain, palpitations, dizzy, edema  Gastrointestinal: nausea, vomiting, diarrhea, constipation,belly pain    Yellow skin, blood in stool  Musculoskeletal:  back pain, muscle weakness, gait problems,       joint pain or swelling. Genitourinary:  dysuria, poor urine flow, flank pain, blood in urine  Neurologic:  vertigo, TIA'S, syncope, seizures, focal weakness  Psychosocial:  insomnia, anxiety, or depression. Additional positive findings: -     PMH:   Past medical history, surgical history, social history, family history are reviewed and updated as appropriate. Reviewed current medication list.   Allergies reviewed and updated as needed. PE:   Vitals:    04/04/22 1317   BP: 103/67   Pulse: 74   Resp: 14   Temp:    SpO2: 100%       General appearance:  in NAD, somnolent. HEENT: EOM intact, no icterus. Trachea is midline. Neck : No masses, appears symmetrical, no JVD  Respiratory: Respiratory effort appears normal, bilateral equal chest rise, no wheeze, +crackles   Cardiovascular: Ausculation shows RRR + edema  Abdomen: No visible mass or tenderness, non distended. Musculoskeletal:  Joints with no swelling or deformity. Skin:no rashes, ulcers, induration, no jaundice. Neuro: face symmetric, no focal deficits. Appropriate responses.        Lab Results   Component Value Date    CREATININE 3.7 (H) 04/04/2022     (HH) 04/04/2022     04/04/2022    K 4.2 04/04/2022    CL 95 (L) 04/04/2022    CO2 26 04/04/2022      Lab Results   Component Value Date    WBC 3.2 (L) 04/04/2022    HGB 9.1 (L) 04/04/2022    HCT 26.2 (L) 04/04/2022    .3 (H) 04/04/2022     04/04/2022     Lab Results   Component Value Date    CALCIUM 8.6 04/04/2022    PHOS 3.8 08/30/2017

## 2022-04-04 NOTE — PROGRESS NOTES
Hospitalist Progress Note      PCP: Robbie Mueller DO    Date of Admission: 3/25/2022    Chief Complaint: syncope     Hospital Course:   71-year-old patient with a past medical history of cardiomyopathy/CHF, hypertension, hyperlipidemia, diabetes mellitus admitted after an episode of syncope. Patient was noted to have 30 pound weight gain abdominal distention, currently being managed for acute on chronic systolic heart failure and episode of ventricular tachycardia. Currently admitted for management of VT and heart failure  3/31 started on Lasix infusion and milrinone, discontinued 4/3 due to worsening uremia. 4/4 patient is planned for port insertion and commencing dialysis. Subjective:   Output 4.5 L in the last 24 hours.       Medications:  Reviewed    Infusion Medications    [Held by provider] furosemide (LASIX) infusion Stopped (04/03/22 1253)    dextrose      sodium chloride       Scheduled Medications    [Held by provider] metOLazone  10 mg Oral BID    insulin lispro  0-6 Units SubCUTAneous TID WC    insulin lispro  0-3 Units SubCUTAneous Nightly    amiodarone  200 mg Oral BID    metoprolol succinate  25 mg Oral Daily    allopurinol  100 mg Oral Daily    atorvastatin  80 mg Oral Daily    levothyroxine  50 mcg Oral QAM AC    rivaroxaban  15 mg Oral Dinner    sodium chloride flush  5-40 mL IntraVENous 2 times per day    [Held by provider] sacubitril-valsartan  1 tablet Oral BID    aspirin  81 mg Oral Daily     PRN Meds: glucose, dextrose, glucagon (rDNA), dextrose, sodium chloride flush, sodium chloride, perflutren lipid microspheres, ondansetron      Intake/Output Summary (Last 24 hours) at 4/4/2022 1149  Last data filed at 4/4/2022 6631  Gross per 24 hour   Intake 760 ml   Output 2890 ml   Net -2130 ml       Physical Exam Performed:    BP 98/65   Pulse 70   Temp 98.3 °F (36.8 °C) (Oral)   Resp 20   Ht 5' 6\" (1.676 m)   Wt 147 lb 14.9 oz (67.1 kg)   SpO2 100%   BMI 23.88 kg/m²     General appearance: No apparent distress, appears stated age and cooperative. HEENT: Pupils equal, round, and reactive to light. Conjunctivae/corneas clear. Neck: Supple, with full range of motion. No jugular venous distention. Trachea midline. Respiratory:  Normal respiratory effort. Clear to auscultation, bilaterally without Rales/Wheezes/Rhonchi. Cardiovascular: Regular rate and rhythm with normal S1/S2 without murmurs, rubs or gallops. Abdomen: Soft, non-tender, non-distended with normal bowel sounds. Musculoskeletal: Bilateral edema noted  Skin: Skin color, texture, turgor normal.  No rashes or lesions. Neurologic:  Neurovascularly intact without any focal sensory/motor deficits. Cranial nerves: II-XII intact, grossly non-focal.  Psychiatric: Alert and oriented, thought content appropriate, normal insight  Capillary Refill: Brisk,3 seconds, normal   Peripheral Pulses: +2 palpable, equal bilaterally       Labs:   Recent Labs     04/02/22 0438 04/03/22  0531 04/04/22  0440   WBC 3.5* 3.7* 3.2*   HGB 10.4* 9.8* 9.1*   HCT 30.6* 28.7* 26.2*    207 191     Recent Labs     04/02/22 0438 04/03/22  0531 04/04/22  0440   * 134* 136   K 4.4 4.0 4.2   CL 97* 95* 95*   CO2 21 23 26   * 118* 128*   CREATININE 3.1* 3.3* 3.7*   CALCIUM 8.6 8.7 8.6     Recent Labs     04/02/22 0438 04/03/22  0531 04/04/22  0440   AST 20 18 17   ALT 14 13 12   BILITOT 0.8 0.8 0.6   ALKPHOS 120 127 121     Recent Labs     04/04/22  0846   INR 3.03*     No results for input(s): CKTOTAL, TROPONINI in the last 72 hours.     Urinalysis:      Lab Results   Component Value Date    NITRU Negative 03/26/2022    WBCUA 0 03/26/2022    RBCUA 2 03/26/2022    BLOODU Negative 03/26/2022    SPECGRAV 1.015 03/26/2022    GLUCOSEU Negative 03/26/2022       Radiology:  CT ABDOMEN PELVIS WO CONTRAST Additional Contrast? None   Final Result   There is marked cardiomegaly, with trace pleural effusions, as well as   moderate intra-abdominal and pelvic ascites, as well as diffuse anasarca. Hepatic vein reflux is noted on the previous CT examination is well. This   combination of findings can be seen in the setting of passive liver   congestion related to congestive failure. Questionable mild surface   nodularity of the liver makes it difficult to exclude underlying cirrhosis. Minimal bibasilar airspace disease, possibly related to atelectasis or   pneumonia, though with less consolidation than seen on the previous exam.      Mild appearing bilateral renal atrophy. Suspected fat and ascitic fluid containing left inguinal hernia. No bowel   herniation. XR CHEST (2 VW)   Final Result      1. Possible mild degree of vascular congestion though exaggerated by a   suboptimal inspiration. No focal pulmonary infiltrates are noted. 2.  Stable cardiomegaly. CT HEAD WO CONTRAST   Final Result      1. No acute intracranial abnormality noted. 2.  Prominence of the sulci and/or CSF spaces suggests a possible mild degree   of cerebral atrophy. IR NONTUNNELED VASCULAR CATHETER > 5 YEARS    (Results Pending)           Assessment/Plan:    Active Hospital Problems    Diagnosis     Ventricular tachycardia ([address] [I47.2]     Syncope [R55]     CHF (congestive heart failure) ([facility]) [I50.9]     Syncope and collapse [R55]     ICD (implantable cardioverter-defibrillator) in place [Z95.810]     Demand ischemia ([address] [I24.8]     Acute on chronic systolic heart failure, NYHA class 4 (HCC) [I50.23]     Stage 3 chronic kidney disease ([address] [N18.30]     Diabetes type 2, uncontrolled ([address] [E11.65]     Essential hypertension, benign [I10]        Acute on chronic systolic heart failure exacerbation. Ejection fraction of less than 20%, patient is followed closely by cardiology and nephrology. Started on milrinone 3/31 as well as Lasix infusion (later discontinued 4/3 due to uremia).    Plan :  -Appreciate cardiology input.  -Lasix and milrinone were held. -Patient planned for dialysis    SONAL on CKD  Cardiorenal, creatinine increasing to 2.8--> 3--> 3.1--> 3.3-->3.7. Multiple discussions nephrology input highly appreciated, patient is planned for port insertion today and commencing on dialysis given worsening creatinine and uremia    Syncope. Ventricular tachycardia. Interrogation showed VT that was appropriately shocked. Continue amiodarone and metoprolol. Appreciate cardiology input. CAD. Continue aspirin beta-blocker and statin. Left ventricular thrombus. Continue Xarelto    Diabetes mellitus.   Sliding scale    DVT Prophylaxis: Xarelto  Diet: Diet NPO  Code Status: Full Code    PT/OT Eval Status: pending Clinical improvement    Dispo -pending clinical improvement    Surjit Alexander MD

## 2022-04-04 NOTE — PROGRESS NOTES
Physical Therapy  Facility/Department: [address] PROGRESSIVE CARE  Daily Treatment Note  NAME: Deonna Yoo  : 1955  MRN: [id number]    Date of Service: 2022    Discharge Recommendations:  Continue to assess pending progress,Home with assist PRN   PT Equipment Recommendations  Other: Will monitor for potential equipt needs. Deonna Yoo scored a 20/24 on the AM-PAC short mobility form. At this time, no further PT is recommended upon discharge. Assessment   Body structures, Functions, Activity limitations: Decreased functional mobility ; Decreased endurance;Decreased balance  Assessment: 62 y/o male admit 3/25/2022 with Syncope/Collapse, Acute Exac CHF. CT Head negative. PMH as noted including CHF, CKD, ICD place, DM, Diabetic Nephropathy. PTA pt living with son in apt setting with 2 steps to enter; independent daily care and functional mobility. Pt progress with functional activities; amb with Walker 75' SBA. Anticipate adequate progress for d/c home; do not anticipate need cont PT upon d/c although will monitor. Prognosis: Good  History: 62 y/o male admit 3/25/2022 with Syncope/Collapse, Acute Exac CHF. CT Head negative. PMH as noted including CHF, CKD, ICD place, DM, Diabetic Nephropathy. Exam: See above. Clinical Presentation: See above. Patient Education: Role of PT, POC, Need to call for assist, Safe use of Walker. Barriers to Learning: None. REQUIRES PT FOLLOW UP: Yes  Activity Tolerance  Activity Tolerance: Patient Tolerated treatment well  Activity Tolerance: Pt dede Transfers/Amb with Walker 75' x 2 with SBA. Gait steady; improving. Patient Diagnosis(es): The encounter diagnosis was Syncope and collapse.     has a past medical history of CHF (congestive heart failure) ([facility] [address] [facility] Diabetes type 2, uncontrolled ([address] Diabetic nephropathy, ED (erectile dysfunction), Essential hypertension, benign, Hyperlipidemia, and Noncompliance.    has a past surgical history that includes eye surgery. Restrictions  Restrictions/Precautions  Restrictions/Precautions: Fall Risk  Subjective   General  Chart Reviewed: Yes  Additional Pertinent Hx: 60 y/o male admit 3/25/2022 with Syncope/Collapse, Acute Exac CHF. CT Head negative. PMH as noted including CHF, CKD, ICD place, DM, Diabetic Nephropathy. Response To Previous Treatment: Patient with no complaints from previous session. Family / Caregiver Present: No  Referring Practitioner: Dr. Nasrin Cochran. Subjective  Subjective: Pt agreeable to PT Rx. Orientation  Orientation  Overall Orientation Status: Within Functional Limits  Cognition   Cognition  Overall Cognitive Status: WFL  Objective   Bed mobility  Supine to Sit: Independent  Sit to Supine: Independent  Transfers  Sit to Stand: Stand by assistance (With Maxx Moros.)  Stand to sit: Stand by assistance (With Maxx Moros.)  Ambulation  Ambulation?: Yes  Ambulation 1  Surface: level tile  Device: Rolling Walker  Distance: Pt amb 75' x 2 with Walker SBA. Diminished step length/clearance although no LE buckling/giving way. Cues for safe transitional mvts although no specific LOB noted. Balance  Sitting - Static: Good  Sitting - Dynamic: Good  Standing - Static: Good (With Walker.)  Standing - Dynamic: Good;- (With Walker.)            Comment: Ptstood at bedside to use urinal; no LOB. AM-PAC Score  AM-PAC Inpatient Mobility Raw Score : 20 (04/04/22 0807)  AM-PAC Inpatient T-Scale Score : 47.67 (04/04/22 0807)  Mobility Inpatient CMS 0-100% Score: 35.83 (04/04/22 3942)  Mobility Inpatient CMS G-Code Modifier : CJ (04/04/22 9457)          Goals  Short term goals  Time Frame for Short term goals: Upon d/c acute care setting. Short term goal 1: Bed Mob Independent. 4/4 Goal met. Short term goal 2: Transfers with/without assist device Supervision. Short term goal 3: Amb with/without assist device 50-75' SBA/Supervision. 4/4 Goal met.   Revised : Amb with assist device 150' Supervision. Patient Goals   Patient goals : Return home. Plan    Plan  Times per week: 3-5x week while in acute care setting.   Current Treatment Recommendations: Functional Mobility Training,Transfer Training,Gait Training,Safety Education & Training,Patient/Caregiver Education & Training  Safety Devices  Type of devices: Bed alarm in place,Call light within reach,Left in bed,Nurse notified     Therapy Time   Individual Concurrent Group Co-treatment   Time In [address]         Time Out 0710         Minutes [address] [facility],

## 2022-04-04 NOTE — PLAN OF CARE
Problem: Falls - Risk of:  Goal: Will remain free from falls  Description: Will remain free from falls  Outcome: Ongoing  Note: Fall risk assessment completed every shift. All precautions in place. Pt has call light within reach at all times. Room clear of clutter. Pt aware to call for assistance when getting up. Pt has made no attempts to get out of bed on his own. Problem: Falls - Risk of:  Goal: Absence of physical injury  Description: Absence of physical injury  Outcome: Ongoing  Note: No signs of physical injury. Problem: Pain:  Goal: Patient's pain/discomfort is manageable  Description: Patient's pain/discomfort is manageable  Outcome: Ongoing  Note: Pt has offered no complaints of pain. Pt has shown no outward signs of pain. Problem: Skin Integrity:  Goal: Skin integrity will stabilize  Description: Skin integrity will stabilize  Outcome: Ongoing  Note: Skin assessment completed every shift. Pt assessed for incontinence, appropriate barrier cream applied prn. Pt encouraged to turn/rotate every 2 hours. Assistance provided if pt unable to do so themselves. Pt is able to move himself to prevent skin breakdown and maintain comfort. Problem: Infection:  Goal: Will remain free from infection  Description: Will remain free from infection  Outcome: Ongoing  Note: . Problem: Safety:  Goal: Free from accidental physical injury  Description: Free from accidental physical injury  Outcome: Ongoing  Note: Patient assessed for fall risk; fall precautions initiated. Patient and family instructed about safety devices. Environment kept free of clutter and adequate lighting provided. Bed locked and in lowest position. Call light within reach. Will continue to monitor. Problem: Safety:  Goal: Free from intentional harm  Description: Free from intentional harm  Outcome: Ongoing  Note: No signs of intentional harm.      Problem: Daily Care:  Goal: Daily care needs are met  Description: Daily care needs are met  Outcome: Ongoing  Note: Patient's ability assessed to perform self care and independent activity encouraged according to that ability. Assisted with ADL's as needed. Risk for skin breakdown assessed. Potential discharge needs assessed. Patient and family included in daily care decisions. Problem: Discharge Planning:  Goal: Patients continuum of care needs are met  Description: Patients continuum of care needs are met  Outcome: Ongoing  Note: Pt started with hemodialysis today. No discharge plans are in place at this time. Problem: OXYGENATION/RESPIRATORY FUNCTION  Goal: Patient will maintain patent airway  Outcome: Ongoing  Note: Airway is patent. No cough noted. No shortness of breath noted. Problem: OXYGENATION/RESPIRATORY FUNCTION  Goal: Patient will achieve/maintain normal respiratory rate/effort  Description: Respiratory rate and effort will be within normal limits for the patient  Outcome: Ongoing  Note: Respirations easy even at rest. No distress noted. Problem: HEMODYNAMIC STATUS  Goal: Patient has stable vital signs and fluid balance  Outcome: Ongoing  Note: VSS. Labs being monitored. Weights checked daily. Problem: FLUID AND ELECTROLYTE IMBALANCE  Goal: Fluid and electrolyte balance are achieved/maintained  Outcome: Ongoing  Note: Labs being monitored. Pt is receiving hemodialysis today for the first time today. Problem: ACTIVITY INTOLERANCE/IMPAIRED MOBILITY  Goal: Mobility/activity is maintained at optimum level for patient  Outcome: Ongoing  Note: Pt has been in bed the entire shift, he has had a vas cath placed today and is currently at dialysis. Problem: Skin Integrity:  Goal: Will show no infection signs and symptoms  Description: Will show no infection signs and symptoms  Outcome: Ongoing  Note: Skin assessment completed every shift. Pt assessed for incontinence, appropriate barrier cream applied prn. Pt encouraged to turn/rotate every 2 hours. Assistance provided if pt unable to do so themselves. Problem: Skin Integrity:  Goal: Absence of new skin breakdown  Description: Absence of new skin breakdown  Outcome: Ongoing  Note: No signs of new skin breakdown.

## 2022-04-05 NOTE — FLOWSHEET NOTE
04/04/22 1635 04/04/22 1836   Vital Signs   /66 101/64   Temp 97.1 °F (36.2 °C) 97.5 °F (36.4 °C)   Pulse  --  66   Resp 18 16   Weight 158 lb 1.1 oz (71.7 kg) 155 lb 13.8 oz (70.7 kg)   Weight Method Bed scale Bed scale     Treatment time: 2 hours  Net UF: 1000 ml       Summary of response to treatment: tolerated first tx well, no distress noted, MD aware. Copy of dialysis treatment record placed in chart, to be scanned into EMR.

## 2022-04-05 NOTE — PROGRESS NOTES
Wt 146 lb 9.7 oz (66.5 kg)   SpO2 98%   BMI 23.66 kg/m²           Intake/Output Summary (Last 24 hours) at 4/5/2022 1102  Last data filed at 4/5/2022 0854  Gross per 24 hour   Intake 1020 ml   Output 1225 ml   Net -205 ml     I/O since adm: Neg 11L    WEIGHT:Admit Weight: 189 lb 9.5 oz (86 kg)         Today  Weight: 146 lb 9.7 oz (66.5 kg)   DRY WEIGHT:  Wt Readings from Last 3 Encounters:   04/05/22 146 lb 9.7 oz (66.5 kg)   02/17/22 152 lb 1.9 oz (69 kg)   01/20/22 180 lb (81.6 kg)       Physical Exam:  GEN: Appears frail, no acute distress  SKIN: Brown, warm, dry. Nails without clubbing. HEENT: PERRLA. Normocephalic, atraumatic. Neck supple. No adenopathy. LUNG: AP diameter normal. Clear bilateral. Diminished bilateral bases. No wheeze, rales, or ronchi. Respiratory effort normal.  HEART: S1S2 A/R. Mild JVD. No carotid bruit. + systolic murmur, No rub or gallop. ABD: Soft, nontender, distended. +BS X 4 quads. EXT: Radial and pedal pulses 2+ and symmetric. Without varicosities. 2+pedal to kness edema. MUSCSKEL: Good ROM X4 extremities. No deformity. NEURO: A/O X3. Calm and cooperative.      Telemetry: SR HR 71     Medications:    [Held by provider] metOLazone  10 mg Oral BID    insulin lispro  0-6 Units SubCUTAneous TID WC    insulin lispro  0-3 Units SubCUTAneous Nightly    amiodarone  200 mg Oral BID    metoprolol succinate  25 mg Oral Daily    allopurinol  100 mg Oral Daily    atorvastatin  80 mg Oral Daily    levothyroxine  50 mcg Oral QAM AC    rivaroxaban  15 mg Oral Dinner    sodium chloride flush  5-40 mL IntraVENous 2 times per day    [Held by provider] sacubitril-valsartan  1 tablet Oral BID    aspirin  81 mg Oral Daily      [Held by provider] furosemide (LASIX) infusion Stopped (04/03/22 1253)    dextrose      sodium chloride       glucose, dextrose, glucagon (rDNA), dextrose, sodium chloride flush, sodium chloride, perflutren lipid microspheres, ondansetron    Lab Data: I have reviewed all labs below today. CBC:   Recent Labs     04/03/22  0531 04/04/22  0440 04/05/22  0435   WBC 3.7* 3.2* 3.5*   HGB 9.8* 9.1* 9.1*   HCT 28.7* 26.2* 27.5*   .6* 105.3* 106.2*    191 175     BMP:   Recent Labs     04/03/22  0531 04/04/22  0440 04/05/22  0435   * 136 137   K 4.0 4.2 3.9   CL 95* 95* 99   CO2 23 26 25   * 128* 103*   CREATININE 3.3* 3.7* 3.0*     GLUCOSE:   Recent Labs     04/03/22  0531 04/04/22 0440 04/05/22  0435   GLUCOSE 179* 162* 209*     LIVER PROFILE:   Lab Results   Component Value Date    AST 19 04/05/2022    ALT 13 04/05/2022    LABALBU 3.5 04/05/2022    BILIDIR 0.9 11/23/2021    BILITOT 0.7 04/05/2022    ALKPHOS 119 04/05/2022     PT/INR:   Lab Results   Component Value Date    PROTIME 35.8 04/04/2022    INR 3.03 04/04/2022    INR 1.90 11/30/2021    INR 1.73 11/29/2021     APTT: No results found for: APTT  Pro-BNP:    Lab Results   Component Value Date    PROBNP 34,786 03/25/2022    PROBNP 32,014 02/12/2022    PROBNP 35,157 02/09/2022     Parameters:   > 450 pg/mL under age 48  > 900 pg/mL ages 54-65  > 1800 pg/mL over age 76    ENZYMES:  Lab Results   Component Value Date    TROPONINI 0.07 03/26/2022    TROPONINI 0.08 03/26/2022    TROPONINI 0.08 03/25/2022     FASTING LIPID PANEL:  Lab Results   Component Value Date    CHOL 95 02/04/2022    HDL 48 02/04/2022    HDL 58 12/13/2011    LDLCALC 38 02/04/2022    TRIG 43 02/04/2022       Diagnostics:    EKG:   Sinus rhythm with occasional Premature ventricular complexes  Cannot rule out Anterior infarct (cited on or before 25-MAR-2022)  Nonspecific T wave abnormality  Abnormal ECG  When compared with ECG of 03-FEB-2022 12:00,  Premature ventricular complexes are now Present  Confirmed by ETHAN MONTERO, Pattie Vasquez ([id number]) on 3/26/2022 8:50:53 AM    ECHO:  3/26/2022  Summary  Overall left ventricular systolic function appears severely reduced.  Ejection fraction is visually estimated to be <20% with diffuse  hypokinesis. Severely dilated left ventricle. Normal left ventricular wall thickness. Diastolic filling parameters suggest grade II diastolic  dysfunction. The right ventricle appears severely dilated with moderately reduced systolic function. The left and right atria are severely dilated. Severe tricuspid regurgitation. Moderate mitral regurgitation. Estimated pulmonary artery systolic pressure is severely elevated at 66 mmHg assuming a right atrial pressure of 15 mmHg. 11/16/2021  Summary  Left ventricular cavity size is severely dilated. Ejection fraction is visually estimated to be <20%. There is severe diffuse hypokinesis. Diastolic filling parameters suggest grade II diastolic dysfunction. Definity contrast administered. No left ventricular thrombus was seen. moderate MR  Pacer / ICD wire is visualized in the right ventricle. The right ventricle is dilated. Right ventricular systolic    0/3/0965 [facility]):  - Left ventricle: The cavity size is severely dilated. Wall thickness is normal. Systolic function     was severely reduced. The estimated ejection fraction was in the range of 10% to 15%. Diffuse     hypokinesis. Doppler parameters are consistent with restrictive physiology, indicative of     decreased left ventricular diastolic compliance and/or increased left atrial pressure. There was     spontaneous echo contrast, indicative of stasis. - Mitral valve: Moderate regurgitation.   - Left atrium: The atrium is severely dilated. - Right ventricle: Pacer wire or catheter noted in right ventricle. Systolic function was mildly     reduced. - Right atrium: The atrium is mildly dilated. - Atrial septum: The septum bowed from left to right, consistent with increased left atrial     pressure. - Tricuspid valve: Mild-moderate regurgitation.   - Pulmonary arteries: Systolic pressure was severely increased, estimated to be 65mm Hg assuming     that the right atrial pressure was 15 mmHg. - Inferior vena cava: The vessel was dilated. The respirophasic diameter changes were blunted (<     50%), consistent with elevated central venous pressure     Echo 1/9/2020 [facility]):  - Left ventricle: The cavity size is severely dilated. Wall     thickness was increased in a pattern of mild LVH. Systolic     function was severely reduced. The estimated ejection fraction     was in the range of 10% to 15%. Diffuse hypokinesis. No contrast     administered - unable to evaluate for LV thrombus. The     longitudinal strain is -2.47% (markedly reduced). - Aortic valve: Mild thickening.   - Mitral valve: Leaflet tenting. Mild to moderate regurgitation.   - Left atrium: The atrium is severely dilated. - Right ventricle: The cavity size is normal. Pacer wire or ICD     noted in right ventricle. Systolic function was mildly reduced by     objective interpretation. TAPSE: 1.6cm. Tricuspid annular systolic     velocity: 8cm/s.   - Tricuspid valve: Mild-moderate regurgitation.   - Pulmonary arteries: Systolic pressure could not be accurately     estimated, however it is elevated at at least 51 mm Hg, assuming     an RA pressure of 15 mm Hg. - Inferior vena cava: The vessel was dilated. The respirophasic     diameter changes were blunted (< 50%), consistent with elevated     central venous pressure. - Pericardium, extracardiac: A trivial pericardial effusion is     identified.      Cardiac Angiography:   RHC 1/31/2020  IMPRESSIONS:  Normal left and right filling pressures with   preserved CO/CI. SUMMARY:   Hemodynamic:   RA 2   RV 28/3   PA 28/10 mean 16   PCWP 4   Blu 4.5/2.5   TD 4.5/2.5   PASat 66%   RECOMMENDATIONS:  Further management per [facility].     12/10/2018 LHC/PCI and RHC:  IMPRESSIONS:  Selective angiography of LMCA showing critical mid LAD   lesion. Successful PCI with 3.5 x 18 mm MIKE.  Normal cardiac output, m   ildly increased pulmonary pressures with mildly increased filling pre   ssure on hypokinesis of the basal and mid septal and basal and mid anterior segments and moderate hypokinesis of the lateral segments and mid inferior segment and mild hypokinesis of the basal inferior segment. The left ventricular mass is moderately increased. There appears to be a small intracavitary thrombus (6.5 x 5 mm). 2. There is no evidence of increased iron deposition within the myocardium (T2*myocardium = 51 msec; if < 20 msec, suggestive of iron-overloading of the myocardium).  There is no evidence of myocardial edema. 3. The right ventricular size is mildly dilated. Global right ventricular function is moderately decreased. 4. Left atrial size is severely enlarged. Right atrial size is mildly enlarged.   The Qp/Qs is 1.0 by phase contrast imaging and is consistent with no evidence of shunt. 5. The calculated aortic regurgitant fraction is 1.55 %. There is moderate mitral regurgitation. The calculated mitral regurgitant fraction is 29.02 %. There is moderate tricuspid regurgitation. The calculated tricuspid regurgitant fraction is 37.1 %. The calculated pulmonic regurgitant fraction is 2.39 %. 6. There is a small pericardial effusion. 7. The descending aorta is dilated measuring 27.2 mm. 8. The main pulmonary artery is dilated measuring 34.0 mm.  The IVC and coronary sinus are dilated. 9. There are large left and moderate right pleural effusions.  Basilar atelectasis is associated with the effusions.  There is evidence of mild pulmonary edema.      [facility] 11/23/2021  OVERALL IMPRESSION:  1.  Elevated right heart pressures. 2.  Elevated pulmonary capillary wedge pressure. 3.  Markedly reduced cardiac output and indices with significantly low  oxygen saturation, all consistent with stage D/New York Heart  Association Class IV heart failure. We will start the patient back on Primacor drip.   The patient's prognosis is extremely poor at this point and we will  discuss further with the patient and the family about his code status.     RUE venous doppler 11/23/2021  Summary  The left internal jugular, subclavian, axillary, brachial, radial and ulnar veins, as well as the basilic and cephalic veins,  have been examined. These veins show no thrombus but there is slow venous blood flow especially at the proximal internal jugular and  subclavian veins. This suggests a possible venous occlusive process in the chest and out of the range of the examining  ultrasound probe  Right internal jugular and subclavian veins are normal     CXR 3/25/2022  Impression       1.  Possible mild degree of vascular congestion though exaggerated by a   suboptimal inspiration.  No focal pulmonary infiltrates are noted.       2.  Stable cardiomegaly. CT ABD/PELVIX WO contrast 3/26/2022  Impression   There is marked cardiomegaly, with trace pleural effusions, as well as   moderate intra-abdominal and pelvic ascites, as well as diffuse anasarca. Hepatic vein reflux is noted on the previous CT examination is well.  This   combination of findings can be seen in the setting of passive liver   congestion related to congestive failure.  Questionable mild surface   nodularity of the liver makes it difficult to exclude underlying cirrhosis.       Minimal bibasilar airspace disease, possibly related to atelectasis or   pneumonia, though with less consolidation than seen on the previous exam.       Mild appearing bilateral renal atrophy.       Suspected fat and ascitic fluid containing left inguinal hernia.  No bowel   herniation. Assessment/Plan:  1.) Syncope D/T VT w/ appropriate shock: Most likely R/ T hypervolemia/HF. 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 Improved but not resolved and no longer responding well to diuresis, worsening renal fx. Initiating HD yesterday and getting today.  He has ongoing non compliance as OP- multiple hospitalizations and worsening multiorgan failure. Limited options -possible palliative care/hospice if HD does not help. NYHA Class IV  Stage D   Diuretic: lasix gtt up to 30mg/hr - stopped 4/3/22   Milrinone gtt started 3/31>DC 4/3. Indicated for EF </=40%:   Beta Blocker (evidence based)- Toprol XL  ACEi/ARB/ARNI- entresto held   SGLT2 Inhibitor- hold for SONAL  Aldosterone Antagonist (BNP within 1 week of DC if new or dose changed)- hold SONAL     Cardiac Rehab for EF <35%: OP  ICD counseling: Has ICD   2gm Na diet, daily weight, 64 oz fluid restriction  Avoid NSAIDS and other nephrotoxic meds  [facility]erral: OP  HF RN referral for education    3.) SONAL on CKD Stage 3: R/T CRS and diabetic nephropathy, Neph following  Baseline creat 2 >3.7>3 today. HD has been recommended. Plan is for dialysis cath and HD today. 4. ) CAD: Prior PCI/MIKE to LAD. Mild elevated troponin most likely from decompensated HF. No evidence of angina/ischemia. DAPT: on xarelto  Beta Blocker: Toprol XL  ACEi/ARB:  Anti anginal:   Lipid management/high intensity statin: lipitor  Risk factor management: high blood pressure, high cholesterol, Diabetes, smoking, obesity, family hx  Lifestyle modification: Heart healthy diet, regular exercise, weight loss, smoking cessation, stress reduction     5.) LV thrombus:  -F/U ECHO without thrombus  - cont xarelto    6.) Anemia: Iron deficiency and ongoing renal failure.   Monitor     Electronically signed by DAHIANA Staples - CNP on 4/5/2022 at 11:02 AM

## 2022-04-05 NOTE — PLAN OF CARE
Problem: Falls - Risk of:  Goal: Will remain free from falls  Description: Will remain free from falls  4/4/2022 2355 by Nataliia Zarate RN  Outcome: Ongoing  4/4/2022 2355 by Nataliia Zarate RN  Outcome: Ongoing  4/4/2022 1840 by Alina Weeks RN  Outcome: Ongoing  Note: Fall risk assessment completed every shift. All precautions in place. Pt has call light within reach at all times. Room clear of clutter. Pt aware to call for assistance when getting up. Pt has made no attempts to get out of bed on his own. Goal: Absence of physical injury  Description: Absence of physical injury  4/4/2022 2355 by Nataliia Zarate RN  Outcome: Ongoing  4/4/2022 2355 by Nataliia Zarate RN  Outcome: Ongoing  4/4/2022 1840 by Alina Weeks RN  Outcome: Ongoing  Note: No signs of physical injury. Problem: Pain:  Goal: Patient's pain/discomfort is manageable  Description: Patient's pain/discomfort is manageable  4/4/2022 2355 by Nataliia Zarate RN  Outcome: Ongoing  4/4/2022 2355 by Nataliia Zarate RN  Outcome: Ongoing  4/4/2022 1840 by Alina Weeks RN  Outcome: Ongoing  Note: Pt has offered no complaints of pain. Pt has shown no outward signs of pain. Problem: Skin Integrity:  Goal: Skin integrity will stabilize  Description: Skin integrity will stabilize  4/4/2022 2355 by Nataliia Zarate RN  Outcome: Ongoing  4/4/2022 2355 by Nataliia Zarate RN  Outcome: Ongoing  4/4/2022 1840 by Alina Weeks RN  Outcome: Ongoing  Note: Skin assessment completed every shift. Pt assessed for incontinence, appropriate barrier cream applied prn. Pt encouraged to turn/rotate every 2 hours. Assistance provided if pt unable to do so themselves. Pt is able to move himself to prevent skin breakdown and maintain comfort.      Problem: Infection:  Goal: Will remain free from infection  Description: Will remain free from infection  4/4/2022 2355 by Nataliia Zarate RN  Outcome: Ongoing  4/4/2022 2355 by Nataliia Zarate RN  Outcome: Ongoing  4/4/2022 1840 by Raheem Rojas RN  Outcome: Ongoing  Note: . Problem: Safety:  Goal: Free from accidental physical injury  Description: Free from accidental physical injury  4/4/2022 2355 by Lina Forrest RN  Outcome: Ongoing  4/4/2022 2355 by Lina Forrest RN  Outcome: Ongoing  4/4/2022 1840 by Raheem Rojas RN  Outcome: Ongoing  Note: Patient assessed for fall risk; fall precautions initiated. Patient and family instructed about safety devices. Environment kept free of clutter and adequate lighting provided. Bed locked and in lowest position. Call light within reach. Will continue to monitor. Goal: Free from intentional harm  Description: Free from intentional harm  4/4/2022 2355 by Lina Forrest RN  Outcome: Ongoing  4/4/2022 2355 by Lina Forrest RN  Outcome: Ongoing  4/4/2022 1840 by Raheem Rojas RN  Outcome: Ongoing  Note: No signs of intentional harm. Problem: Daily Care:  Goal: Daily care needs are met  Description: Daily care needs are met  4/4/2022 2355 by Lina Forrest RN  Outcome: Ongoing  4/4/2022 2355 by Lian Forrest RN  Outcome: Ongoing  4/4/2022 1840 by Raheem Rojas RN  Outcome: Ongoing  Note: Patient's ability assessed to perform self care and independent activity encouraged according to that ability. Assisted with ADL's as needed. Risk for skin breakdown assessed. Potential discharge needs assessed. Patient and family included in daily care decisions. Problem: Discharge Planning:  Goal: Patients continuum of care needs are met  Description: Patients continuum of care needs are met  4/4/2022 2355 by Lina Forrest RN  Outcome: Ongoing  4/4/2022 2355 by Lina Forrest RN  Outcome: Ongoing  4/4/2022 1840 by Raheem Rojas RN  Outcome: Ongoing  Note: Pt started with hemodialysis today. No discharge plans are in place at this time.      Problem: OXYGENATION/RESPIRATORY FUNCTION  Goal: Patient will maintain patent airway  4/4/2022 2355 by Hilton Cross RN  Outcome: Ongoing  4/4/2022 2355 by Hilton Cross RN  Outcome: Ongoing  4/4/2022 1840 by Momo Gifford RN  Outcome: Ongoing  Note: Airway is patent. No cough noted. No shortness of breath noted. Goal: Patient will achieve/maintain normal respiratory rate/effort  Description: Respiratory rate and effort will be within normal limits for the patient  4/4/2022 2355 by Hilton Cross RN  Outcome: Ongoing  4/4/2022 2355 by Hilton Cross RN  Outcome: Ongoing  4/4/2022 1840 by Momo Gifford RN  Outcome: Ongoing  Note: Respirations easy even at rest. No distress noted. Problem: HEMODYNAMIC STATUS  Goal: Patient has stable vital signs and fluid balance  4/4/2022 2355 by Hilton Cross RN  Outcome: Ongoing  4/4/2022 2355 by Hilton Cross RN  Outcome: Ongoing  4/4/2022 1840 by Momo Gifford RN  Outcome: Ongoing  Note: VSS. Labs being monitored. Weights checked daily. Problem: FLUID AND ELECTROLYTE IMBALANCE  Goal: Fluid and electrolyte balance are achieved/maintained  4/4/2022 2355 by Hilton Cross RN  Outcome: Ongoing  4/4/2022 2355 by Hilton Cross RN  Outcome: Ongoing  4/4/2022 1840 by Momo Gifford RN  Outcome: Ongoing  Note: Labs being monitored. Pt is receiving hemodialysis today for the first time today. Problem: ACTIVITY INTOLERANCE/IMPAIRED MOBILITY  Goal: Mobility/activity is maintained at optimum level for patient  4/4/2022 2355 by Hilton Cross RN  Outcome: Ongoing  4/4/2022 2355 by Hilton Cross RN  Outcome: Ongoing  4/4/2022 1840 by Momo Gifford RN  Outcome: Ongoing  Note: Pt has been in bed the entire shift, he has had a vas cath placed today and is currently at dialysis.      Problem: Skin Integrity:  Goal: Will show no infection signs and symptoms  Description: Will show no infection signs and symptoms  4/4/2022 2355 by Hilton Cross RN  Outcome: Ongoing  4/4/2022 2355 by Hilton Cross RN  Outcome: Ongoing  4/4/2022 1840 by Agnes Jones RN  Outcome: Ongoing  Note: Skin assessment completed every shift. Pt assessed for incontinence, appropriate barrier cream applied prn. Pt encouraged to turn/rotate every 2 hours. Assistance provided if pt unable to do so themselves. Goal: Absence of new skin breakdown  Description: Absence of new skin breakdown  4/4/2022 2355 by Kendrick Swain RN  Outcome: Ongoing  4/4/2022 2355 by Kendrick Swain RN  Outcome: Ongoing  4/4/2022 1840 by Agnes Jones RN  Outcome: Ongoing  Note: No signs of new skin breakdown.

## 2022-04-05 NOTE — PLAN OF CARE
Problem: Falls - Risk of:  Goal: Will remain free from falls  Description: Will remain free from falls  4/5/2022 1514 by Pritesh Agrawal RN  Outcome: Ongoing  Note: Pt has made no attempts to get out of bed on his own. Fall risk assessment completed every shift. All precautions in place. Pt has call light within reach at all times. Room clear of clutter. Pt aware to call for assistance when getting up. Problem: Falls - Risk of:  Goal: Absence of physical injury  Description: Absence of physical injury  4/5/2022 1514 by Pritesh Agrawal RN  Outcome: Ongoing  Note: No signs of physical injury. Problem: Pain:  Goal: Patient's pain/discomfort is manageable  Description: Patient's pain/discomfort is manageable  4/5/2022 1514 by Pritesh Agrawal RN  Outcome: Ongoing  Note: Pt has offered no complaints of pain. Pt has shown no outward signs of pain. Problem: Skin Integrity:  Goal: Skin integrity will stabilize  Description: Skin integrity will stabilize  4/5/2022 1514 by Pritesh Agrawal RN  Outcome: Ongoing  Note: Skin assessment completed every shift. Pt assessed for incontinence, appropriate barrier cream applied prn. Pt encouraged to turn/rotate every 2 hours. Assistance provided if pt unable to do so themselves. Problem: Infection:  Goal: Will remain free from infection  Description: Will remain free from infection  4/5/2022 1514 by Pritesh Agrawal RN  Outcome: Ongoing  Note: WBC are WNL. Pt is afebrile. Problem: Safety:  Goal: Free from accidental physical injury  Description: Free from accidental physical injury  4/5/2022 1514 by Pritesh Agrawal RN  Outcome: Ongoing  Note: Patient assessed for fall risk; fall precautions initiated. Patient and family instructed about safety devices. Environment kept free of clutter and adequate lighting provided. Bed locked and in lowest position. Call light within reach. Will continue to monitor.       Problem: Safety:  Goal: Free from intentional Ongoing  Note: Pt is working with PT and OT. He is currently up in the chair. He is much more alert and awake this shift. Problem: Skin Integrity:  Goal: Will show no infection signs and symptoms  Description: Will show no infection signs and symptoms  4/5/2022 1514 by Ever Dover RN  Outcome: Ongoing  Note: Skin assessment completed every shift. Pt assessed for incontinence, appropriate barrier cream applied prn. Pt encouraged to turn/rotate every 2 hours. Assistance provided if pt unable to do so themselves. Problem: Skin Integrity:  Goal: Absence of new skin breakdown  Description: Absence of new skin breakdown  4/5/2022 1514 by Ever Dover RN  Outcome: Ongoing  Note: No areas of new skin breakdown. Problem: Fluid Volume:  Goal: Will show no signs or symptoms of fluid imbalance  Description: Will show no signs or symptoms of fluid imbalance  4/5/2022 1514 by Ever Dover RN  Outcome: Ongoing  Note: Pt received his second treatment of dialysis today and tolerated well. Problem: Nutritional:  Goal: Ability to identify appropriate dietary choices will improve  Description: Ability to identify appropriate dietary choices will improve  4/5/2022 1514 by Ever Dover RN  Outcome: Ongoing  Note: . Problem: Physical Regulation:  Goal: Complications related to the disease process, condition or treatment will be avoided or minimized  Description: Complications related to the disease process, condition or treatment will be avoided or minimized  4/5/2022 1514 by Ever Dover RN  Outcome: Ongoing  Note: Karlie Damon

## 2022-04-05 NOTE — PROGRESS NOTES
[facility]                                               Progress note    Summary:   Meena Matos is being seen by nephrology for SONAL on CKD. Admitted with volume overload, acute exacerbation of heart failure. Interval History  He was seen and examined in his room   He had a second session of dialysis today. He is awake and alert he is eating lunch  No shortness of breath minimal edema. BP 97/56  Setting 100% on room air  Urine output 1.2 L yesterday  Dialysis note reviewed  Had a 3-hour treatment 1 L UF post weight was 65.5 kg his right IJ Vas-Cath is working well blood flows were 250. Labs reviewed  Sodium 137 potassium 3.9    Bicarb 25 creatinine 3 magnesium 2 calcium 8.7 hemoglobin 9.1    Plan:   -Had second session of dialysis today.  -Third dialysis session tomorrow.  -I sent a message to Kalyan Stevens with for sending us to help arrange outpatient dialysis placement.  -He will need a tunneled dialysis catheter prior to discharge, okay with cardiology to hold the Xarelto.  -Now that he is on dialysis, would be okay with resuming Entresto for his heart failure. Víctor Russo MD  [facility]  Office: [phone]    Assessment:     #SONAL on CKD4   Due to cardiorenal syndrome   Has not responded well to diuretics and inotrope  [place]  Dialysis has been recommended. Patient not sure. > HD start 04/05/22     #Hyponatremia  Due to heart failure     #anemia  CKD related and anemia of chronic inflammation   tsat 13%  Ferritin 271 back in Feb 2021    Ischemic cardiomyopathy  Decompensated, has evidence of volume overload  proBNP [id number]  Managed with Lasix drip and is on milrinone, appreciate cardiology involvement. Last EF less than 20%  History of coronary artery disease with stent in the LAD  Has ICD  entresto held     Diabetes type 2  10 years at least  Longstanding albuminuria  A1c has been ranging between 6 and 6.5 the last few checks. Hypertension  On inotrope BP acceptable. ROS:     Positives Listed Bold. All other remaining systems are negative. Constitutional:  fever, chills, weakness, weight change, fatigue,      Skin:  rash, pruritus, hair loss, bruising, dry skin, petechiae. Head, Face, Neck   headaches, swelling,  cervical adenopathy. Respiratory: shortness of breath, cough, or wheezing  Cardiovascular: chest pain, palpitations, dizzy, edema  Gastrointestinal: nausea, vomiting, diarrhea, constipation,belly pain    Yellow skin, blood in stool  Musculoskeletal:  back pain, muscle weakness, gait problems,       joint pain or swelling. Genitourinary:  dysuria, poor urine flow, flank pain, blood in urine  Neurologic:  vertigo, TIA'S, syncope, seizures, focal weakness  Psychosocial:  insomnia, anxiety, or depression. Additional positive findings: -     PMH:   Past medical history, surgical history, social history, family history are reviewed and updated as appropriate. Reviewed current medication list.   Allergies reviewed and updated as needed. PE:   Vitals:    04/05/22 1310   BP: (!) 97/56   Pulse: 71   Resp: 18   Temp: 97.7 °F (36.5 °C)   SpO2: 100%       General appearance:  in NAD, somnolent. HEENT: EOM intact, no icterus. Trachea is midline. Neck : No masses, appears symmetrical, no JVD  Respiratory: Respiratory effort appears normal, bilateral equal chest rise, no wheeze, +crackles   Cardiovascular: Ausculation shows RRR + edema  Abdomen: No visible mass or tenderness, non distended. Musculoskeletal:  Joints with no swelling or deformity. Skin:no rashes, ulcers, induration, no jaundice. Neuro: face symmetric, no focal deficits. Appropriate responses.        Lab Results   Component Value Date    CREATININE 3.0 (H) 04/05/2022     (HH) 04/05/2022     04/05/2022    K 3.9 04/05/2022    CL 99 04/05/2022    CO2 25 04/05/2022      Lab Results   Component Value Date    WBC 3.5 (L) 04/05/2022    HGB 9.1 (L) 04/05/2022    HCT 27.5 (L) 04/05/2022 .2 (H) 04/05/2022     04/05/2022     Lab Results   Component Value Date    CALCIUM 8.7 04/05/2022    PHOS 3.8 08/30/2017

## 2022-04-05 NOTE — PROGRESS NOTES
Occupational Therapy  Facility/Department: [address] PROGRESSIVE CARE  Daily Treatment Note  This note to serve as discharge summary if pt d/c'd prior to next session    NAME: Gaurav Khan  : 1955  MRN: [id number]    Date of Service: 2022    Discharge Recommendations:  Continue to assess pending progress,Home with assist PRN       Assessment   Performance deficits / Impairments: Decreased functional mobility ; Decreased strength;Decreased endurance;Decreased ADL status; Decreased balance;Decreased high-level IADLs  Assessment: Discussed with OTR: Pt reporting fatigue today after dialysis, yet agreeable to participating in ADL task, completing grooming tasks in stance at sink, 8 min, Supervision. Pt amb in room, close Supervision between bed, BR, and to recliner, with RW. Cont poc and anticipate pt will be safe for d/c home with assist prn. Social/Functional History  Lives With: Son  Type of Home: Apartment  Home Layout: One level  Home Access: Stairs to enter without rails (2 steps (descend) to enter.)  Bathroom Shower/Tub: Tub/Shower unit  Bathroom Toilet: Standard  Bathroom Accessibility: Accessible  Home Equipment: 4 wheeled walker  ADL Assistance: [address]:  (Shared with son.)  Ambulation Assistance: Independent (Without assist device pta.)  Transfer Assistance: Independent  Additional Comments: Pt reports adequate assist/support upon d/c. Prognosis: Good  OT Education: OT Role;Plan of Care;Transfer Training;ADL Adaptive Strategies  REQUIRES OT FOLLOW UP: Yes  Activity Tolerance  Activity Tolerance: Patient limited by fatigue  Activity Tolerance: Seen after dialysis. Limits self to completing minimal tasks.   Safety Devices  Safety Devices in place: Yes  Type of devices: Call light within reach;Nurse notified;Gait belt;Left in chair;Chair alarm in place         Patient Diagnosis(es): The encounter diagnosis was Syncope and collapse.      has a past medical history of CHF (congestive heart failure) ([facility] [address] Diabetes type 2, uncontrolled ([facility] [address] Diabetic nephropathy, ED (erectile dysfunction), Essential hypertension, benign, Hyperlipidemia, and Noncompliance. has a past surgical history that includes eye surgery and IR NONTUNNELED VASCULAR CATHETER > 5 YEARS (4/4/2022). Restrictions  Restrictions/Precautions  Restrictions/Precautions: Fall Risk  Subjective   General  Chart Reviewed: oJse Chan  Patient assessed for rehabilitation services?: Yes  Additional Pertinent Hx: per ED note, Stephanie Monday is a 61 y.o. male who presents to the emergency department via EMS from home where he lives with his son after passing out. He states he felt fine today no chest pain or shortness of breath. He had eaten his dinner he was getting up walking when he collapsed to the ground. He vaguely remembers getting lightheaded prior to this. His son called 48. He has a history of diabetes, coronary artery disease, congestive heart failure. He states he did not take his Xarelto today. Denies pain in his hips arms or neck. He tells me he always has leg swelling and shortness of breath and that this is not worse than normal.  Family / Caregiver Present: No  Referring Practitioner: Max Garay DO  Subjective  Subjective: Pt in bed vivek arrival to room, eating a banana. Pt agreeable to OT with mild encouragement, reporting fatigue after dialysis. No c/o pain. General Comment  Comments: okay for therapy per RN. Orientation  Orientation  Overall Orientation Status: Within Functional Limits  Objective    ADL  Grooming: Supervision (stood at sink to wash face, hands and brush teeth (slight lean forward onto walker at sink)  Additional Comments: Declined bathing/dressing tasks. Declined need to use commode.         Balance  Sitting Balance: Independent  Standing Balance  Time: 8 min or less  Activity: ADL's. functional mob with RW  Functional Mobility  Functional - Mobility Device: Rolling Walker  Activity: To/from bathroom  Assist Level: Supervision  Functional Mobility Comments: close IntelliFlo. Pt reporting fatigue. Wheelchair Bed Transfers  Wheelchair/Bed - Technique: Ambulating  Equipment Used: Bed (arm chair, recliner)  Level of Asssistance: Supervision  Wheelchair Transfers Comments: RW, holds one hand onto walker, pushes/reaches from arm of chairs/bed with other hand.   Bed mobility  Supine to Sit: Independent  Sit to Supine: Unable to assess (up to chair)         Cognition  Overall Cognitive Status: [facility]         Plan   Plan  Times per week: 3-5x  Current Treatment Recommendations: Strengthening,Functional Mobility Training,Gait Training,Endurance Training,Balance Training,Self-Care / ADL,Safety Education & Training,Patient/Caregiver Education & Training    AM-PAC Score        AM-PAC Inpatient Daily Activity Raw Score: 20 (04/05/22 1410)  AM-PAC Inpatient ADL T-Scale Score : 42.03 (04/05/22 1410)  ADL Inpatient CMS 0-100% Score: 38.32 (04/05/22 1410)  ADL Inpatient CMS G-Code Modifier : Shaista Sarkar (04/05/22 1410)    Goals  Short term goals  Time Frame for Short term goals: prior to D/C; ongoing 4-5-22  Short term goal 1: complete functional mobility and transfers Mod Ind  Short term goal 2: complete bathing and dressing Mod Ind  Short term goal 3: complete toileting Mod Ind  Short term goal 4: complete grooming in stance at sink Mod Ind  Long term goals  Time Frame for Long term goals : STG=LTG  Patient Goals   Patient goals : return home       Therapy Time   Individual Concurrent Group Co-treatment   Time In 1334         Time Out 1414         Minutes 40                 Marieon Kaleigh KEEN/L,515

## 2022-04-05 NOTE — PROGRESS NOTES
Spoke to Dr. Ellen Hodges about pt. She is planning for tunneled catheter to be placed for continued dialysis however pt is on xarelto. She wanted to verify how long the xarelto needs to be held. This nurse called IR and spoke to Milly Erica in IR and Milly Side reported that the xarelto needs to be held for 2 doses. This nurse then spoke to [facility] to inform them of the plan to ensure that the cardiologist is comfortable and ok with the xarelto being held for 2 doses. Cynthia Bojorquez RN at [facility] said she spoke to Dr. Jean العراقي who said that was fine. Dr. Ellen Hodges was made aware. She said to put the xarelto on hold with plans of placing a tunneled vas cath on Friday. This nurse then updated Dr. Patti Russell.

## 2022-04-05 NOTE — FLOWSHEET NOTE
Treatment time: 3 hours  Net UF: 1000 ml     Pre weight:66.5 kg   Post weight: 65.5 kg  EDW: TBD     Access used: NATIVIDAD edilberto  Access function: Good with  ml/min     Medications or blood products given: None     Regular outpatient schedule: TBD     Summary of response to treatment: Tolerated tx well. No HD related complaints or complications.     Copy of dialysis treatment record placed in chart, to be scanned into EMR.        04/05/22 0920 04/05/22 1216   Treatment   Time On 0911  --    Time Off  --  1213   Vital Signs   BP (!) 97/58 93/68   Temp 97.7 °F (36.5 °C) 98.1 °F (36.7 °C)   Pulse 71 66   Resp 16 16   Dialysis Bath   K+ (Potassium) 4  --    Ca+ (Calcium) 2.25  --    Na+ (Sodium) 140  --    HCO3 (Bicarb) 32  --

## 2022-04-05 NOTE — PROGRESS NOTES
Pt is A&OX4. Pt is in bed in a semi fowlers position. Pt tolerated AM PO meds given whole. Pt tolerates both food and liquid intake. Pt has no pain currently. Pt states that he \"is just tired\". Call light is within reach. Pt able to make needs known. Fall precautions are in place. Will monitor.  Electronically signed by Blessing Palomares on 4/5/2022 at 10:13 AM

## 2022-04-05 NOTE — PROGRESS NOTES
Hospitalist Progress Note      PCP: Robbie Mueller DO    Date of Admission: 3/25/2022    Subjective: had dialysis yesterday and today, awaiting tunneled line placement on friday    Medications:  Reviewed    Infusion Medications    dextrose      sodium chloride       Scheduled Medications    [START ON 4/6/2022] epoetin roseann-epbx  10,000 Units SubCUTAneous Once    [START ON 4/6/2022] iron sucrose  100 mg IntraVENous Weekly    insulin lispro  0-6 Units SubCUTAneous TID WC    insulin lispro  0-3 Units SubCUTAneous Nightly    amiodarone  200 mg Oral BID    metoprolol succinate  25 mg Oral Daily    allopurinol  100 mg Oral Daily    atorvastatin  80 mg Oral Daily    levothyroxine  50 mcg Oral QAM AC    [Held by provider] rivaroxaban  15 mg Oral Dinner    sodium chloride flush  5-40 mL IntraVENous 2 times per day    [Held by provider] sacubitril-valsartan  1 tablet Oral BID    aspirin  81 mg Oral Daily     PRN Meds: [START ON 4/6/2022] midodrine, [START ON 4/6/2022] albumin human, glucose, dextrose, glucagon (rDNA), dextrose, sodium chloride flush, sodium chloride, perflutren lipid microspheres, ondansetron      Intake/Output Summary (Last 24 hours) at 4/5/2022 1817  Last data filed at 4/5/2022 1330  Gross per 24 hour   Intake 1660 ml   Output 2625 ml   Net -965 ml       Physical Exam Performed:    BP (!) 79/56   Pulse 70   Temp 98.1 °F (36.7 °C) (Oral)   Resp 21   Ht 5' 6\" (1.676 m)   Wt 144 lb 6.4 oz (65.5 kg)   SpO2 98%   BMI 23.31 kg/m²        General appearance: No apparent distress, appears stated age and cooperative. HEENT: Pupils equal, round, and reactive to light. Conjunctivae/corneas clear. Neck: Supple, with full range of motion. No jugular venous distention. Trachea midline. Respiratory:  Normal respiratory effort. Clear to auscultation, bilaterally without Rales/Wheezes/Rhonchi.   Cardiovascular: Regular rate and rhythm with normal S1/S2 without murmurs, rubs or gallops. Abdomen: Soft, non-tender, non-distended with normal bowel sounds. Musculoskeletal: Bilateral edema noted  Skin: Skin color, texture, turgor normal.  No rashes or lesions. Neurologic:  Neurovascularly intact without any focal sensory/motor deficits. Cranial nerves: II-XII intact, grossly non-focal.  Psychiatric: Alert and oriented, thought content appropriate, normal insight  Capillary Refill: Brisk,3 seconds, normal   Peripheral Pulses: +2 palpable, equal bilaterally        Labs:   Recent Labs     04/03/22 0531 04/04/22 0440 04/05/22  0435   WBC 3.7* 3.2* 3.5*   HGB 9.8* 9.1* 9.1*   HCT 28.7* 26.2* 27.5*    191 175     Recent Labs     04/03/22 0531 04/04/22 0440 04/05/22  0435   * 136 137   K 4.0 4.2 3.9   CL 95* 95* 99   CO2 23 26 25   * 128* 103*   CREATININE 3.3* 3.7* 3.0*   CALCIUM 8.7 8.6 8.7     Recent Labs     04/03/22 0531 04/04/22 0440 04/05/22  0435   AST 18 17 19   ALT 13 12 13   BILITOT 0.8 0.6 0.7   ALKPHOS 127 121 119     Recent Labs     04/04/22  0846   INR 3.03*     No results for input(s): CKTOTAL, TROPONINI in the last 72 hours. Urinalysis:      Lab Results   Component Value Date    NITRU Negative 03/26/2022    WBCUA 0 03/26/2022    RBCUA 2 03/26/2022    BLOODU Negative 03/26/2022    SPECGRAV 1.015 03/26/2022    GLUCOSEU Negative 03/26/2022       Radiology:  IR NONTUNNELED VASCULAR CATHETER > 5 YEARS   Final Result   Successful ultrasound and fluoroscopy guided right IJ 15 cm non-tunneled   hemodialysis catheter placement. CT ABDOMEN PELVIS WO CONTRAST Additional Contrast? None   Final Result   There is marked cardiomegaly, with trace pleural effusions, as well as   moderate intra-abdominal and pelvic ascites, as well as diffuse anasarca. Hepatic vein reflux is noted on the previous CT examination is well. This   combination of findings can be seen in the setting of passive liver   congestion related to congestive failure.   Questionable mild surface   nodularity of the liver makes it difficult to exclude underlying cirrhosis. Minimal bibasilar airspace disease, possibly related to atelectasis or   pneumonia, though with less consolidation than seen on the previous exam.      Mild appearing bilateral renal atrophy. Suspected fat and ascitic fluid containing left inguinal hernia. No bowel   herniation. XR CHEST (2 VW)   Final Result      1. Possible mild degree of vascular congestion though exaggerated by a   suboptimal inspiration. No focal pulmonary infiltrates are noted. 2.  Stable cardiomegaly. CT HEAD WO CONTRAST   Final Result      1. No acute intracranial abnormality noted. 2.  Prominence of the sulci and/or CSF spaces suggests a possible mild degree   of cerebral atrophy. Assessment/Plan:    Active Hospital Problems    Diagnosis     Ventricular tachycardia ([address] [I47.2]     Syncope [R55]     CHF (congestive heart failure) ([facility]) [I50.9]     Syncope and collapse [R55]     ICD (implantable cardioverter-defibrillator) in place [Z95.810]     Demand ischemia ([address] [I24.8]     Acute on chronic systolic heart failure, NYHA class 4 ([facility]) [I50.23]     Stage 3 chronic kidney disease ([address] [N18.30]     Diabetes type 2, uncontrolled ([address] [E11.65]     Essential hypertension, benign [I10]            Acute on chronic systolic heart failure exacerbation. Ejection fraction of less than 20%, patient is followed closely by cardiology and nephrology. Started on milrinone 3/31 as well as Lasix infusion (later discontinued 4/3 due to uremia). Plan :  -Appreciate cardiology input.  -hold Lasix and milrinone   -started on dialysis yesterday and today     SONAL on CKD  Cardiorenal, creatinine increasing to 2.8--> 3--> 3.1--> 3.3-->3.7.   Multiple discussions nephrology input highly appreciated,   Line placed and started on dialysis  Planning tunneled line placement on Friday - hold xarelto     Syncope. Ventricular tachycardia. Interrogation showed VT that was appropriately shocked. Continue amiodarone and metoprolol. Appreciate cardiology input. Hold xarelto     CAD. Continue aspirin beta-blocker and statin.     Left ventricular thrombus. hold Xarelto-->switch to lovenox from tomorrow     Diabetes mellitus. Sliding scale       DVT Prophylaxis: SCD  Diet: ADULT DIET; Regular; 4 carb choices (60 gm/meal);  Low Sodium (2 gm); 1500 ml  Code Status: Full Code    PT/OT Eval Status: ordered    Dispo - hold xarelto, planning tunneled line on Shaun Larose MD

## 2022-04-05 NOTE — PROGRESS NOTES
Occupational Therapy  Pt out of room for dialysis. Will follow and re-attempt as schedule permits. Refer to the last note for status if pt is discharged.   Sheri KEEN/BRANT,671

## 2022-04-05 NOTE — PROGRESS NOTES
Physical Therapy  Pt unavailable for PT Rx; off floor for HD. Will follow-up later today or tomorrow as schedule permits.   Emily Mcgovern, PT

## 2022-04-06 NOTE — FLOWSHEET NOTE
Treatment time: 3 hours  Net UF: 900 ml     Pre weight: 66.5 kg   Post weight: 65.8 kg  EDW: TBD     Access used: NATIVIDAD franciscosage  Access function: Good with  ml/min     Medications or blood products given: Retacrit, Venofer, Albumin, Midodrine     Regular outpatient schedule: TBD     Summary of response to treatment: Tolerated tx fair. Some hypotension noted, but pt remained stable throughout tx.      Copy of dialysis treatment record placed in chart, to be scanned into EMR.        04/06/22 1350 04/06/22 1700   Treatment   Time On  --  1355   Time Off  --  1655   Vital Signs   BP (!) 83/53 (!) 90/59   Temp 98 °F (36.7 °C) 97.7 °F (36.5 °C)   Pulse 70 70   Resp 18 18   Dialysis Bath   K+ (Potassium) 3  --    Ca+ (Calcium) 2.5  --    Na+ (Sodium) 140  --    HCO3 (Bicarb) 32  --

## 2022-04-06 NOTE — PROGRESS NOTES
Occupational Therapy  Unable to see pt at this time due to pt currently off floor for dialysis at time of attempt. Will follow up with pt as time allows. Continue with POC.     Viridiana Wilson OTR/L

## 2022-04-06 NOTE — FLOWSHEET NOTE
Pt with a critical hemoglobin of 6.7. Yesterday noted his result of 9.1. Pt has had not episodes of bleeding noted this shift. Secure message sent to Dr Lopez Mena and order received to recheck levels stat. Will continue to monitor.

## 2022-04-06 NOTE — PROGRESS NOTES
Hospitalist Progress Note      PCP: Celi Navarro DO    Date of Admission: 3/25/2022    Subjective: hb dropped this am, pt refused blood transfusion    Medications:  Reviewed    Infusion Medications    dextrose      sodium chloride       Scheduled Medications    iron sucrose  100 mg IntraVENous Weekly    insulin lispro  0-6 Units SubCUTAneous TID WC    insulin lispro  0-3 Units SubCUTAneous Nightly    amiodarone  200 mg Oral BID    metoprolol succinate  25 mg Oral Daily    allopurinol  100 mg Oral Daily    atorvastatin  80 mg Oral Daily    levothyroxine  50 mcg Oral QAM AC    [Held by provider] rivaroxaban  15 mg Oral Dinner    sodium chloride flush  5-40 mL IntraVENous 2 times per day    [Held by provider] sacubitril-valsartan  1 tablet Oral BID    aspirin  81 mg Oral Daily     PRN Meds: midodrine, glucose, dextrose, glucagon (rDNA), dextrose, sodium chloride flush, sodium chloride, perflutren lipid microspheres, ondansetron      Intake/Output Summary (Last 24 hours) at 4/6/2022 1837  Last data filed at 4/6/2022 1700  Gross per 24 hour   Intake 1880 ml   Output 1902 ml   Net -22 ml       Physical Exam Performed:    BP 96/67   Pulse 70   Temp 97.7 °F (36.5 °C)   Resp 22   Ht 5' 6\" (1.676 m)   Wt 145 lb 1 oz (65.8 kg)   SpO2 96%   BMI 23.41 kg/m²       General appearance: No apparent distress, appears stated age and cooperative. HEENT: Pupils equal, round, and reactive to light. Conjunctivae/corneas clear. Neck: Supple, with full range of motion. No jugular venous distention. Trachea midline. Respiratory:  Normal respiratory effort. Clear to auscultation, bilaterally without Rales/Wheezes/Rhonchi. Cardiovascular: Regular rate and rhythm with normal S1/S2 without murmurs, rubs or gallops. Abdomen: Soft, non-tender, non-distended with normal bowel sounds.   Musculoskeletal: Bilateral edema noted  Skin: Skin color, texture, turgor normal.  No rashes or lesions. Neurologic:  Neurovascularly intact without any focal sensory/motor deficits. Cranial nerves: II-XII intact, grossly non-focal.  Psychiatric: Alert  Capillary Refill: Brisk,3 seconds, normal   Peripheral Pulses: +2 palpable, equal bilaterally     Labs:   Recent Labs     04/04/22 0440 04/04/22  0440 04/05/22 0435 04/06/22  0442 04/06/22  0707   WBC 3.2*  --  3.5* 4.1  --    HGB 9.1*   < > 9.1* 6.7* 7.0*   HCT 26.2*   < > 27.5* 20.4* 21.4*     --  175 141  --     < > = values in this interval not displayed. Recent Labs     04/04/22 0440 04/05/22 0435 04/06/22 0442    137 131*   K 4.2 3.9 4.7   CL 95* 99 96*   CO2 26 25 22   * 103* 100*   CREATININE 3.7* 3.0* 2.5*   CALCIUM 8.6 8.7 8.2*     Recent Labs     04/04/22 0440 04/05/22  0435 04/06/22  0442   AST 17 19 18   ALT 12 13 12   BILITOT 0.6 0.7 0.6   ALKPHOS 121 119 114     Recent Labs     04/04/22  0846   INR 3.03*     No results for input(s): CKTOTAL, TROPONINI in the last 72 hours. Urinalysis:      Lab Results   Component Value Date    NITRU Negative 03/26/2022    WBCUA 0 03/26/2022    RBCUA 2 03/26/2022    BLOODU Negative 03/26/2022    SPECGRAV 1.015 03/26/2022    GLUCOSEU Negative 03/26/2022       Radiology:  IR NONTUNNELED VASCULAR CATHETER > 5 YEARS   Final Result   Successful ultrasound and fluoroscopy guided right IJ 15 cm non-tunneled   hemodialysis catheter placement. CT ABDOMEN PELVIS WO CONTRAST Additional Contrast? None   Final Result   There is marked cardiomegaly, with trace pleural effusions, as well as   moderate intra-abdominal and pelvic ascites, as well as diffuse anasarca. Hepatic vein reflux is noted on the previous CT examination is well. This   combination of findings can be seen in the setting of passive liver   congestion related to congestive failure. Questionable mild surface   nodularity of the liver makes it difficult to exclude underlying cirrhosis.       Minimal bibasilar airspace occult blood, IV venofer and epocrit  Pt does not want blood transfusions     Syncope. Ventricular tachycardia. Interrogation showed VT that was appropriately shocked. Continue amiodarone and metoprolol. Appreciate cardiology input. Hold xarelto     CAD. Continue aspirin beta-blocker and statin.     Left ventricular thrombus. hold Xarelto-->switch to lovenox from tomorrow     Diabetes mellitus. Sliding scale        DVT Prophylaxis: SCD  Diet: ADULT DIET; Regular; 4 carb choices (60 gm/meal);  Low Sodium (2 gm); 1500 ml  Code Status: Full Code    PT/OT Eval Status: ordered    Tera Turner MD

## 2022-04-06 NOTE — PROGRESS NOTES
[facility]                                               Progress note    Summary:   Noah Wilson is being seen by nephrology for SONAL on CKD. Admitted with volume overload, acute exacerbation of heart failure. Interval History  Patient was seen and examined in his room  He has no complaints no chest pain or shortness of breath his edema is much improved  Had second session of dialysis yesterday post weight 65.5 kg tolerated treatment 3 hours and 1 L UF    Blood pressure 101/56, satting 96% on room air  He has been off milrinone since 4/3/22  Urine output only 400 cc    Labs reviewed  Sodium 131 potassium 4.7  creatinine 2.5 bicarb 22  Magnesium 2 glucose 155 calcium 8.2    Plan:   -Third dialysis session today.  -Persistent azotemia, increase blood flow rate to 400 dialysate flow rate to 700  UF 1 to 2 L as tolerated for systolic blood pressure greater than 90  Can use midodrine and albumin for blood pressure support  He is on Venofer 100 mg weekly with dialysis  He is on Retacrit 10,000 units q. HD. Check PTH, vitamin D, phosphorus  Working on outpatient dialysis placement  Tunneled dialysis catheter planned for Thursday or Friday after being off Xarelto. Okay with resuming Entresto for his heart failure. Kelsie Marks MD  [facility]  Office: [phone]    Assessment:     #SONAL on CKD4   Due to cardiorenal syndrome   Has not responded well to diuretics and inotrope  [place]  Dialysis has been recommended. Patient not sure. > HD start 04/06/22     #Hyponatremia  Due to heart failure     #anemia  CKD related and anemia of chronic inflammation   tsat 13%  Ferritin 271 back in Feb 2021    Ischemic cardiomyopathy  Decompensated, has evidence of volume overload  proBNP 20726  Managed with Lasix drip and is on milrinone, appreciate cardiology involvement.   Last EF less than 20%  History of coronary artery disease with stent in the LAD  Has ICD  entresto held     Diabetes type 2  10 years at least  Longstanding albuminuria  A1c has been ranging between 6 and 6.5 the last few checks. Hypertension  On inotrope BP acceptable. ROS:     Positives Listed Bold. All other remaining systems are negative. Constitutional:  fever, chills, weakness, weight change, fatigue,      Skin:  rash, pruritus, hair loss, bruising, dry skin, petechiae. Head, Face, Neck   headaches, swelling,  cervical adenopathy. Respiratory: shortness of breath, cough, or wheezing  Cardiovascular: chest pain, palpitations, dizzy, edema  Gastrointestinal: nausea, vomiting, diarrhea, constipation,belly pain    Yellow skin, blood in stool  Musculoskeletal:  back pain, muscle weakness, gait problems,       joint pain or swelling. Genitourinary:  dysuria, poor urine flow, flank pain, blood in urine  Neurologic:  vertigo, TIA'S, syncope, seizures, focal weakness  Psychosocial:  insomnia, anxiety, or depression. Additional positive findings: -     PMH:   Past medical history, surgical history, social history, family history are reviewed and updated as appropriate. Reviewed current medication list.   Allergies reviewed and updated as needed. PE:   Vitals:    04/06/22 1115   BP: (!) 101/56   Pulse: 72   Resp: 25   Temp: 97.9 °F (36.6 °C)   SpO2: 96%       General appearance:  in NAD, somnolent. HEENT: EOM intact, no icterus. Trachea is midline. Neck : No masses, appears symmetrical, no JVD  Respiratory: Respiratory effort appears normal, bilateral equal chest rise, no wheeze, +crackles   Cardiovascular: Ausculation shows RRR + edema  Abdomen: No visible mass or tenderness, non distended. Musculoskeletal:  Joints with no swelling or deformity. Skin:no rashes, ulcers, induration, no jaundice. Neuro: face symmetric, no focal deficits. Appropriate responses.        Lab Results   Component Value Date    CREATININE 2.5 (H) 04/06/2022     (HH) 04/06/2022     (L) 04/06/2022    K 4.7 04/06/2022    CL 96 (L) 04/06/2022    CO2 22 04/06/2022      Lab Results   Component Value Date    WBC 4.1 04/06/2022    HGB 7.0 (L) 04/06/2022    HCT 21.4 (L) 04/06/2022    .2 (H) 04/06/2022     04/06/2022     Lab Results   Component Value Date    CALCIUM 8.2 (L) 04/06/2022    PHOS 3.8 08/30/2017

## 2022-04-06 NOTE — PROGRESS NOTES
Hemoglobin recheck was 7.0. MD Cathy Borjas was made aware. Pt has Venofer and Retacrit to be given in dialysis. Will continue to monitor. Electronically signed by Kody Sanford RN on 4/6/2022 at 9:40 AM      7062- see new orders to transfuse 1 unit of PRBC in dialysis. When educating patient on new plan of care, pt stated that he did not want to receive someone else's blood that is scare him and makes him nervous. RN educated patient on why the pt needed blood, and all the safety measures in the blood transfusion process including a type and screen to find out what the patient blood type is before administering any blood. Pt still refusing blood transfusion at this time. Pt stated he would like to receive his medications that are scheduled to be given in dialysis first and see what his blood looks like then. Blood consent was not signed.  MD was made aware of refusal.      Electronically signed by Kody Sanford RN on 4/6/2022 at 9:51 AM

## 2022-04-06 NOTE — PROGRESS NOTES
Physical Therapy  Facility/Department: [address] PROGRESSIVE CARE  Daily Treatment Note  NAME: Va Maria  : 1955  MRN: [id number]    Date of Service: 2022    Discharge Recommendations:  Continue to assess pending progress,Home with assist PRN   PT Equipment Recommendations  Other: Will monitor for potential equipt needs. Assessment   Body structures, Functions, Activity limitations: Decreased functional mobility ; Decreased endurance;Decreased balance  Assessment: 60 y/o male admit 3/25/2022 with Syncope/Collapse, Acute Exac CHF. CT Head negative. PMH as noted including CHF, CKD, ICD place, DM, Diabetic Nephropathy. PTA pt living with son in apt setting with 2 steps to enter; independent daily care and functional mobility. Pt dede oob, amb within hospital room setting with Walker SBA; strong for functional activities/limited endurance. Anticipate adequate progress for d/c home; do not anticipate need cont PT upon d/c although will monitor. Prognosis: Good  History: 60 y/o male admit 3/25/2022 with Syncope/Collapse, Acute Exac CHF. CT Head negative. PMH as noted including CHF, CKD, ICD place, DM, Diabetic Nephropathy. Exam: See above. Clinical Presentation: See above. Patient Education: Role of PT, POC, Need to call for assist, Safe use of Walker. Barriers to Learning: None. REQUIRES PT FOLLOW UP: Yes  Activity Tolerance  Activity Tolerance: Patient limited by endurance     Patient Diagnosis(es): The encounter diagnosis was Syncope and collapse.     has a past medical history of CHF (congestive heart failure) ([address] Diabetes type 2, uncontrolled ([address] Diabetic nephropathy, ED (erectile dysfunction), Essential hypertension, benign, Hyperlipidemia, and Noncompliance. has a past surgical history that includes eye surgery and IR NONTUNNELED VASCULAR CATHETER > 5 YEARS (2022).     Restrictions  Restrictions/Precautions  Restrictions/Precautions: Fall Risk  Subjective   General  Chart Reviewed: Yes  Additional Pertinent Hx: 62 y/o male admit 3/25/2022 with Syncope/Collapse, Acute Exac CHF. CT Head negative. PMH as noted including CHF, CKD, ICD place, DM, Diabetic Nephropathy. Response To Previous Treatment: Patient with no complaints from previous session. Family / Caregiver Present: No  Referring Practitioner: Dr. Angel Davey. Subjective  Subjective: Pt agreeable to PT Rx. Pt requests use of bathroom. Orientation  Orientation  Overall Orientation Status: Within Functional Limits  Cognition   Cognition  Overall Cognitive Status: WFL  Objective   Bed mobility  Supine to Sit: Independent  Sit to Supine: Independent  Transfers  Sit to Stand: Stand by assistance (With [address].)  Stand to sit: Stand by assistance (With [address].)  Comment: Toilet Transfers SBA. Independent pericare. Ambulation  Ambulation?: Yes  Ambulation 1  Surface: level tile  Device: Rolling Walker  Distance: Pt amb to/from bathroom with Walker SBA. Diminished step length/clearance although no LE buckling/giving way. Cues for safe transitional mvts although no specific LOB noted. Comments: Pt request return to bed following oob. Exercises  Heelslides: 10x B LEs. Hip Abduction: 10x B LEs. Ankle Pumps: 20x              AM-PAC Score  AM-PAC Inpatient Mobility Raw Score : 20 (04/06/22 1108)  AM-PAC Inpatient T-Scale Score : 47.67 (04/06/22 1108)  Mobility Inpatient CMS 0-100% Score: 35.83 (04/06/22 1108)  Mobility Inpatient CMS G-Code Modifier : CJ (04/06/22 1108)          Goals  Short term goals  Time Frame for Short term goals: Upon d/c acute care setting. Short term goal 1: Bed Mob Independent. 4/4 Goal met. Short term goal 2: Transfers with/without assist device Supervision. Short term goal 3: Amb with/without assist device 50-75' SBA/Supervision. 4/4 Goal met. Revised : Amb with assist device 150' Supervision. Patient Goals   Patient goals : Return home.     Plan    Plan  Times per week: 3-5x week while in acute care setting.   Current Treatment Recommendations: Functional Mobility Training,Transfer Training,Gait Training,Safety Education & Training,Patient/Caregiver Education & Training  Safety Devices  Type of devices: Bed alarm in place,Call light within reach,Left in bed,Nurse notified     Therapy Time   Individual Concurrent Group Co-treatment   Time In [address] [place] [address] Drive         Time Out 0915         Minutes [facility],

## 2022-04-06 NOTE — CARE COORDINATION
Case management follow up. Spoke to [facility], tells me they are still waiting on medical & financial clearance for outpatient HD. Tells me patient will likely go to [address] [place] [address] but plans are still being finalized. Met with patient, patient confirms he'll return home w/ son. Pt understanding of outpt HD plans and explained we would let him know when are plans set. Tells me his son will transport. Tunneled line planned for Friday.   Electronically signed by Elva Carcamo RN Case Management [phone] on 4/6/2022 at 1:16 PM

## 2022-04-06 NOTE — PROGRESS NOTES
[facility]   Daily Progress Note      Admit Date:  3/25/2022    CC: syncope    HPI:   Ayana Patel is a 74 y.o. male with PMH  CAD, SHF s/p Biotronik ICD (8/2019), LV thrombus, HTN, HLP, DM2, CKD Stage 3/ diabetic nephropathy  Seen by Dr Willian Obando as outpatient. Plan was for and RHC on 11/1/2021 and he did not show up for procedure. Adm [facility] 11/2021 with HF/SONAL - on Milrinone and lasix gtt. Had [address] with elevated filling pressure. DC wt 145lbs. Non compliant with follow up after DC   Adm [facility] 2/3-2/17/2022 with Acute SHF requiring Milrinone and lasix gtt + metolazone. neph seen for CRS. Re adm 3/25/2022 with syncopal episode. Device interrogation showed VT that was appropriately shocked with ATP. Was again hypervolemic, 30# wt gain with acute on chronic systolic heart failure and acute on chronic SONAL/CKD. Milrinone and lasix gtt now stopped (poor response) with rising creat and HD has been recommended. Today, getting HD yesterday and today and tolerating. Plan is for HD again today. He is tolerating HD well. He was OOB yesterday - denies CP or SOB with this activity. He denies SOB, CP, LH, dizziness, palpitations, syncope. Review of Systems:   General: Denies fever, chills + fatigue/weakness  Skin: Denies skin changes, rash, itching, lesions.   HEENT: Denies headache, dizziness, vision changes, nosebleeds, sore throat, nasal drainage  RESP: Denies cough, sputum, dyspnea, wheeze, snoring  CARD: Denies palpitations,  murmur  GI:Denies nausea, vomiting, heartburn, loss of appetite, change in bowels  : Denies frequency, pain, incontinence, polyuria  VASC: Denies claudication, leg cramps, clots  MUSC/SKEL: Denies pain, stiffness, arthritis  PSYCH: Denies anxiety, depression, stress  NEURO: Denies numbness, tingling, weakness,change in mood or memory  HEME: Denies abn bruising, bleeding, anemia  ENDO: Denies intolerance to heat, cold, excessive thirst or hunger, hx thyroid disease    Objective:   BP (!) 101/56   Pulse 72   Temp 97.9 °F (36.6 °C) (Oral)   Resp 25   Ht 5' 6\" (1.676 m)   Wt 148 lb 5.9 oz (67.3 kg)   SpO2 96%   BMI 23.95 kg/m²           Intake/Output Summary (Last 24 hours) at 4/6/2022 1136  Last data filed at 4/6/2022 0939  Gross per 24 hour   Intake 1600 ml   Output 1402 ml   Net 198 ml     I/O since adm: Neg 11L    WEIGHT:Admit Weight: 189 lb 9.5 oz (86 kg)         Today  Weight: 148 lb 5.9 oz (67.3 kg)   DRY WEIGHT:  Wt Readings from Last 3 Encounters:   04/06/22 148 lb 5.9 oz (67.3 kg)   02/17/22 152 lb 1.9 oz (69 kg)   01/20/22 180 lb (81.6 kg)       Physical Exam:  GEN: Appears frail, no acute distress  SKIN: Brown, warm, dry. Nails without clubbing. HEENT: PERRLA. Normocephalic, atraumatic. Neck supple. No adenopathy. LUNG: AP diameter normal. Clear bilateral.  No wheeze, rales, or ronchi. Respiratory effort normal.  HEART: S1S2 A/R. Mild JVD. No carotid bruit. + systolic murmur, No rub or gallop. ABD: Soft, nontender, distended. +BS X 4 quads. EXT: Radial and pedal pulses 2+ and symmetric. Without varicosities. 2+pedal to knees edema. MUSCSKEL: Good ROM X4 extremities. No deformity. NEURO: A/O X3. Calm and cooperative.      Telemetry: SR HR 71 - no ectopy    Medications:    epoetin roseann-epbx  10,000 Units SubCUTAneous Once    iron sucrose  100 mg IntraVENous Weekly    insulin lispro  0-6 Units SubCUTAneous TID WC    insulin lispro  0-3 Units SubCUTAneous Nightly    amiodarone  200 mg Oral BID    metoprolol succinate  25 mg Oral Daily    allopurinol  100 mg Oral Daily    atorvastatin  80 mg Oral Daily    levothyroxine  50 mcg Oral QAM AC    [Held by provider] rivaroxaban  15 mg Oral Dinner    sodium chloride flush  5-40 mL IntraVENous 2 times per day    [Held by provider] sacubitril-valsartan  1 tablet Oral BID    aspirin  81 mg Oral Daily      sodium chloride      dextrose      sodium chloride       sodium chloride, midodrine, albumin human, glucose, dextrose, glucagon (rDNA), dextrose, sodium chloride flush, sodium chloride, perflutren lipid microspheres, ondansetron    Lab Data: I have reviewed all labs below today. CBC:   Recent Labs     04/04/22 0440 04/04/22  0440 04/05/22 0435 04/06/22 0442 04/06/22  0707   WBC 3.2*  --  3.5* 4.1  --    HGB 9.1*   < > 9.1* 6.7* 7.0*   HCT 26.2*   < > 27.5* 20.4* 21.4*   .3*  --  106.2* 107.2*  --      --  175 141  --     < > = values in this interval not displayed.      BMP:   Recent Labs     04/04/22 0440 04/05/22 0435 04/06/22 0442    137 131*   K 4.2 3.9 4.7   CL 95* 99 96*   CO2 26 25 22   * 103* 100*   CREATININE 3.7* 3.0* 2.5*     GLUCOSE:   Recent Labs     04/04/22 0440 04/05/22 0435 04/06/22 0442   GLUCOSE 162* 209* 155*     LIVER PROFILE:   Lab Results   Component Value Date    AST 18 04/06/2022    ALT 12 04/06/2022    LABALBU 3.3 04/06/2022    BILIDIR 0.9 11/23/2021    BILITOT 0.6 04/06/2022    ALKPHOS 114 04/06/2022     PT/INR:   Lab Results   Component Value Date    PROTIME 35.8 04/04/2022    INR 3.03 04/04/2022    INR 1.90 11/30/2021    INR 1.73 11/29/2021     APTT: No results found for: APTT  Pro-BNP:    Lab Results   Component Value Date    PROBNP 34,786 03/25/2022    PROBNP 32,014 02/12/2022    PROBNP 35,157 02/09/2022     Parameters:   > 450 pg/mL under age 48  > 900 pg/mL ages 54-65  > 1800 pg/mL over age 76    ENZYMES:  Lab Results   Component Value Date    TROPONINI 0.07 03/26/2022    TROPONINI 0.08 03/26/2022    TROPONINI 0.08 03/25/2022     FASTING LIPID PANEL:  Lab Results   Component Value Date    CHOL 95 02/04/2022    HDL 48 02/04/2022    HDL 58 12/13/2011    LDLCALC 38 02/04/2022    TRIG 43 02/04/2022       Diagnostics:    EKG:   Sinus rhythm with occasional Premature ventricular complexes  Cannot rule out Anterior infarct (cited on or before 25-MAR-2022)  Nonspecific T wave abnormality  Abnormal ECG  When compared with ECG of 03-FEB-2022 12:00,  Premature ventricular complexes are now Present  Confirmed by ETHAN MONTERO, Barbra Christine ([id number]) on 3/26/2022 8:50:53 AM    ECHO:  3/26/2022  Summary  Overall left ventricular systolic function appears severely reduced. Ejection fraction is visually estimated to be <20% with diffuse  hypokinesis. Severely dilated left ventricle. Normal left ventricular wall thickness. Diastolic filling parameters suggest grade II diastolic  dysfunction. The right ventricle appears severely dilated with moderately reduced systolic function. The left and right atria are severely dilated. Severe tricuspid regurgitation. Moderate mitral regurgitation. Estimated pulmonary artery systolic pressure is severely elevated at 66 mmHg assuming a right atrial pressure of 15 mmHg. 11/16/2021  Summary  Left ventricular cavity size is severely dilated. Ejection fraction is visually estimated to be <20%. There is severe diffuse hypokinesis. Diastolic filling parameters suggest grade II diastolic dysfunction. Definity contrast administered. No left ventricular thrombus was seen. moderate MR  Pacer / ICD wire is visualized in the right ventricle. The right ventricle is dilated. Right ventricular systolic    7/8/6601 [facility]):  - Left ventricle: The cavity size is severely dilated. Wall thickness is normal. Systolic function     was severely reduced. The estimated ejection fraction was in the range of 10% to 15%. Diffuse     hypokinesis. Doppler parameters are consistent with restrictive physiology, indicative of     decreased left ventricular diastolic compliance and/or increased left atrial pressure. There was     spontaneous echo contrast, indicative of stasis. - Mitral valve: Moderate regurgitation.   - Left atrium: The atrium is severely dilated. - Right ventricle: Pacer wire or catheter noted in right ventricle. Systolic function was mildly     reduced. - Right atrium: The atrium is mildly dilated.    - Atrial septum: The septum bowed from left to right, consistent with increased left atrial     pressure. - Tricuspid valve: Mild-moderate regurgitation.   - Pulmonary arteries: Systolic pressure was severely increased, estimated to be 65mm Hg assuming     that the right atrial pressure was 15 mmHg. - Inferior vena cava: The vessel was dilated. The respirophasic diameter changes were blunted (<     50%), consistent with elevated central venous pressure     Echo 1/9/2020 [facility]):  - Left ventricle: The cavity size is severely dilated. Wall     thickness was increased in a pattern of mild LVH. Systolic     function was severely reduced. The estimated ejection fraction     was in the range of 10% to 15%. Diffuse hypokinesis. No contrast     administered - unable to evaluate for LV thrombus. The     longitudinal strain is -2.47% (markedly reduced). - Aortic valve: Mild thickening.   - Mitral valve: Leaflet tenting. Mild to moderate regurgitation.   - Left atrium: The atrium is severely dilated. - Right ventricle: The cavity size is normal. Pacer wire or ICD     noted in right ventricle. Systolic function was mildly reduced by     objective interpretation. TAPSE: 1.6cm. Tricuspid annular systolic     velocity: 8cm/s.   - Tricuspid valve: Mild-moderate regurgitation.   - Pulmonary arteries: Systolic pressure could not be accurately     estimated, however it is elevated at at least 51 mm Hg, assuming     an RA pressure of 15 mm Hg. - Inferior vena cava: The vessel was dilated. The respirophasic     diameter changes were blunted (< 50%), consistent with elevated     central venous pressure. - Pericardium, extracardiac: A trivial pericardial effusion is     identified.      Cardiac Angiography:   [facility] 1/31/2020  IMPRESSIONS:  Normal left and right filling pressures with   preserved CO/CI.    SUMMARY:   Hemodynamic:   RA 2   RV 28/3   PA 28/10 mean 16   PCWP 4   Blu 4.5/2.5   TD 4.5/2.5   PASat 66%   RECOMMENDATIONS:  Further management per [facility] Failure [facility].     12/10/2018 LHC/PCI and RHC:  IMPRESSIONS:  Selective angiography of LMCA showing critical mid LAD   lesion. Successful PCI with 3.5 x 18 mm MIKE. Normal cardiac output, m   ildly increased pulmonary pressures with mildly increased filling pre   ssure on inotropic support. SUMMARY:    1. Wedge pressure in the pulmonary capillaries is mildly elevated. 2. Coronary arteries: The left main is unusually long and      trifurcates into the LAD, a ramus intermedius, and the left      circumflex. The left anterior descending gives rise to 2      diagonals and 3 septals and wraps around the apex. The left      circumflex gives rise to 2 obtuse marginals and no      posterolaterals. The first obtuse marginal has a high origin. 3. LAD: Mid-vessel lesion: There is a discrete, 99% stenosis. The      distal vessel supplies a moderate-sized vascular territory. The      lesion is a likely culprit for the patient's clinical      presentation. The lesion was stented (see 1st lesion      intervention). Following intervention, the lesion has a residual      stenosis of 0% and MUKUND grade 3 flow (brisk flow). 4. AV was not crossed due to known apical LV thrombus. 5. RA 6      RV 48/4      PA 50/20 (28)      W 18.   6. Blu CO 5.2 Blu CI 2.9 on Milrinone 0.25 mcg/mg/min.      12/4/2018 Cardiac MRI ():  Findings are consistent with a severe, ischemic cardiomyopathy.  There is akinesis of the apical segments with an associated small, apical thrombus. The left ventricle is severely dilated with severely reduced systolic function. The right ventricle is mildly dilated with moderately decreased systolic function. The left atrium is severely dilated.  There is mild to moderate mitral regurgitation. The right atrium is mildly dilated.  There is moderate tricuspid regurgitation. The descending aorta is dilated.    The pulmonary artery is dilated.  The IVC and coronary sinus are dilated. Please see noncardiac findings below.      FINDINGS:     1. The left ventricular size is severely dilated. The left ventricular ejection fraction is 19 % by Smallwood's method. Global left ventricular function is severely decreased. There is akinesis of the apical segments, severe hypokinesis of the basal and mid septal and basal and mid anterior segments and moderate hypokinesis of the lateral segments and mid inferior segment and mild hypokinesis of the basal inferior segment. The left ventricular mass is moderately increased. There appears to be a small intracavitary thrombus (6.5 x 5 mm). 2. There is no evidence of increased iron deposition within the myocardium (T2*myocardium = 51 msec; if < 20 msec, suggestive of iron-overloading of the myocardium).  There is no evidence of myocardial edema. 3. The right ventricular size is mildly dilated. Global right ventricular function is moderately decreased. 4. Left atrial size is severely enlarged. Right atrial size is mildly enlarged.   The Qp/Qs is 1.0 by phase contrast imaging and is consistent with no evidence of shunt. 5. The calculated aortic regurgitant fraction is 1.55 %. There is moderate mitral regurgitation. The calculated mitral regurgitant fraction is 29.02 %. There is moderate tricuspid regurgitation. The calculated tricuspid regurgitant fraction is 37.1 %. The calculated pulmonic regurgitant fraction is 2.39 %. 6. There is a small pericardial effusion. 7. The descending aorta is dilated measuring 27.2 mm. 8. The main pulmonary artery is dilated measuring 34.0 mm.  The IVC and coronary sinus are dilated. 9. There are large left and moderate right pleural effusions.  Basilar atelectasis is associated with the effusions.  There is evidence of mild pulmonary edema.      [facility] 11/23/2021  OVERALL IMPRESSION:  1.  Elevated right heart pressures. 2.  Elevated pulmonary capillary wedge pressure.   3.  Markedly reduced cardiac output and indices with significantly low  oxygen saturation, all consistent with stage D/New [place] Heart  Association Class IV heart failure. We will start the patient back on Primacor drip. The patient's prognosis is extremely poor at this point and we will  discuss further with the patient and the family about his code status.     RUE venous doppler 11/23/2021  Summary  The left internal jugular, subclavian, axillary, brachial, radial and ulnar veins, as well as the basilic and cephalic veins,  have been examined. These veins show no thrombus but there is slow venous blood flow especially at the proximal internal jugular and  subclavian veins. This suggests a possible venous occlusive process in the chest and out of the range of the examining  ultrasound probe  Right internal jugular and subclavian veins are normal     CXR 3/25/2022  Impression       1.  Possible mild degree of vascular congestion though exaggerated by a   suboptimal inspiration.  No focal pulmonary infiltrates are noted.       2.  Stable cardiomegaly. CT ABD/PELVIX WO contrast 3/26/2022  Impression   There is marked cardiomegaly, with trace pleural effusions, as well as   moderate intra-abdominal and pelvic ascites, as well as diffuse anasarca. Hepatic vein reflux is noted on the previous CT examination is well.  This   combination of findings can be seen in the setting of passive liver   congestion related to congestive failure.  Questionable mild surface   nodularity of the liver makes it difficult to exclude underlying cirrhosis.       Minimal bibasilar airspace disease, possibly related to atelectasis or   pneumonia, though with less consolidation than seen on the previous exam.       Mild appearing bilateral renal atrophy.       Suspected fat and ascitic fluid containing left inguinal hernia.  No bowel   herniation. Assessment/Plan:  1.) Syncope D/T VT w/ appropriate shock: Most likely R/ T hypervolemia/HF.    Cont Amio, Toprol XL    2.) Acute on chronic combined diastolic Gr 2 and systolic heart failure, EF <20%, ICM, moderate MR, dilated RV: Neg 11.6L, Wt 189>148lbs. Improved but not resolved and no longer responding well to diuresis, worsening renal fx. Initiated HD - has had X2 and getting today. He has ongoing non compliance as OP- multiple hospitalizations and worsening multiorgan failure. Limited options -possible palliative care/hospice if HD does not help. NYHA Class IV  Stage D   Diuretic: lasix gtt up to 30mg/hr - stopped 4/3/22   Milrinone gtt started 3/31>DC 4/3. Indicated for EF </=40%:   Beta Blocker (evidence based)- Toprol XL  ACEi/ARB/ARNI- entresto held - per Neph OK to restart now that he is getting HD - will hold for now D/T hypotension  SGLT2 Inhibitor- hold for SONAL/hypotension  Aldosterone Antagonist (BNP within 1 week of DC if new or dose changed)- hold SONAL/hypotension     Cardiac Rehab for EF <35%: OP  ICD counseling: Has ICD   2gm Na diet, daily weight, 64 oz fluid restriction  Avoid NSAIDS and other nephrotoxic meds  [facility] Referral: OP  HF RN referral for education    3.) SONAL on CKD Stage 3: R/T CRS and diabetic nephropathy, Neph following  Baseline creat 2 >3.7>3>2.5 today. HD X2 days and plan to get again today. Na 131 - Neph to manage  Plan for tunneled HD cath this Friday (xarelto changed to lovenox for procedure)    4. ) CAD: Prior PCI/MIKE to LAD. Mild elevated troponin most likely from decompensated HF. No evidence of angina/ischemia.    DAPT: on xarelto  Beta Blocker: Toprol XL  ACEi/ARB:  Anti anginal:   Lipid management/high intensity statin: lipitor  Risk factor management: high blood pressure, high cholesterol, Diabetes, smoking, obesity, family hx  Lifestyle modification: Heart healthy diet, regular exercise, weight loss, smoking cessation, stress reduction     5.) LV thrombus:  -F/U ECHO without thrombus  - cont xarelto    6.) Anemia: Iron deficiency- added venofer  Neph added epoetin  H&H down today 6.7/20.4- recheck 7/21.4. No evidence of bleeding   Xarelto is being held for tunneled cath procedure.    Defer further work up to hospitalist.     Electronically signed by DAHIANA Lam CNP on 4/6/2022 at 11:36 AM

## 2022-04-07 NOTE — PLAN OF CARE
Problem: Falls - Risk of:  Goal: Will remain free from falls  Description: Will remain free from falls  4/7/2022 1131 by Sheila Lane RN  Outcome: Ongoing  4/6/2022 2152 by Norma Balderas RN  Outcome: Ongoing  Goal: Absence of physical injury  Description: Absence of physical injury  4/7/2022 1131 by Sheila Lane RN  Outcome: Ongoing  4/6/2022 2152 by Norma Balderas RN  Outcome: Ongoing     Problem: Pain:  Goal: Patient's pain/discomfort is manageable  Description: Patient's pain/discomfort is manageable  4/7/2022 1131 by Sheila Lane RN  Outcome: Ongoing  4/6/2022 2152 by Norma Balderas RN  Outcome: Ongoing     Problem: Skin Integrity:  Goal: Skin integrity will stabilize  Description: Skin integrity will stabilize  4/7/2022 1131 by Sheila Lane RN  Outcome: Ongoing  4/6/2022 2152 by Norma Balderas RN  Outcome: Ongoing     Problem: Infection:  Goal: Will remain free from infection  Description: Will remain free from infection  4/7/2022 1131 by Sheila Lane RN  Outcome: Ongoing  4/6/2022 2152 by Norma Balderas RN  Outcome: Ongoing     Problem: Safety:  Goal: Free from accidental physical injury  Description: Free from accidental physical injury  4/7/2022 1131 by Sheila Lane RN  Outcome: Ongoing  4/6/2022 2152 by Norma Balderas RN  Outcome: Ongoing  Goal: Free from intentional harm  Description: Free from intentional harm  4/7/2022 1131 by Sheila Lane RN  Outcome: Ongoing  4/6/2022 2152 by Norma Balderas RN  Outcome: Ongoing     Problem: Daily Care:  Goal: Daily care needs are met  Description: Daily care needs are met  4/7/2022 1131 by Sheila Lane RN  Outcome: Ongoing  4/6/2022 2152 by Norma Balderas RN  Outcome: Ongoing     Problem: Discharge Planning:  Goal: Patients continuum of care needs are met  Description: Patients continuum of care needs are met  4/7/2022 1131 by Sheila Lane RN  Outcome: Ongoing  4/6/2022 2152 by Nemo Mcginnis RN  Outcome: Ongoing     Problem: OXYGENATION/RESPIRATORY FUNCTION  Goal: Patient will maintain patent airway  4/7/2022 1131 by Vikas Chou RN  Outcome: Ongoing  4/6/2022 2152 by Nemo Mcginnis RN  Outcome: Ongoing  Goal: Patient will achieve/maintain normal respiratory rate/effort  Description: Respiratory rate and effort will be within normal limits for the patient  4/7/2022 1131 by Vikas Chou RN  Outcome: Ongoing  4/6/2022 2152 by Nemo Mcginnis RN  Outcome: Ongoing     Problem: HEMODYNAMIC STATUS  Goal: Patient has stable vital signs and fluid balance  4/7/2022 1131 by Vikas Chou RN  Outcome: Ongoing  4/6/2022 2152 by Nemo Mcginnis RN  Outcome: Ongoing     Problem: FLUID AND ELECTROLYTE IMBALANCE  Goal: Fluid and electrolyte balance are achieved/maintained  4/7/2022 1131 by Vikas Chou RN  Outcome: Ongoing  4/6/2022 2152 by Nemo Mcginnis RN  Outcome: Ongoing     Problem: ACTIVITY INTOLERANCE/IMPAIRED MOBILITY  Goal: Mobility/activity is maintained at optimum level for patient  4/7/2022 1131 by Vikas Chou RN  Outcome: Ongoing  4/6/2022 2152 by Nemo Mcginnis RN  Outcome: Ongoing     Problem: Skin Integrity:  Goal: Will show no infection signs and symptoms  Description: Will show no infection signs and symptoms  4/7/2022 1131 by Vikas Chou RN  Outcome: Ongoing  4/6/2022 2152 by Nemo Mcginnis RN  Outcome: Ongoing  Goal: Absence of new skin breakdown  Description: Absence of new skin breakdown  4/7/2022 1131 by Vikas Chou RN  Outcome: Ongoing  4/6/2022 2152 by Nemo Mcginnis RN  Outcome: Ongoing     Problem: Fluid Volume:  Goal: Will show no signs or symptoms of fluid imbalance  Description: Will show no signs or symptoms of fluid imbalance  4/7/2022 1131 by Vikas Chou RN  Outcome: Ongoing  4/6/2022 2152 by Nemo Mcginnis RN  Outcome: Ongoing     Problem: Nutritional:  Goal: Ability to identify appropriate dietary choices will improve  Description: Ability to identify appropriate dietary choices will improve  4/7/2022 1131 by Gt Millan RN  Outcome: Ongoing  4/6/2022 2152 by Mercedes Palencia RN  Outcome: Ongoing     Problem: Physical Regulation:  Goal: Complications related to the disease process, condition or treatment will be avoided or minimized  Description: Complications related to the disease process, condition or treatment will be avoided or minimized  4/7/2022 1131 by Gt Millan RN  Outcome: Ongoing  4/6/2022 2152 by Mercedes Palencia RN  Outcome: Ongoing

## 2022-04-07 NOTE — PROGRESS NOTES
[address]   Daily Progress Note      Admit Date:  3/25/2022    CC: syncope    HPI:   Hilda Tejada is a 74 y.o. male with PMH  CAD, SHF s/p Biotron[employer] ICD (8/2019), LV thrombus, HTN, HLP, DM2, CKD Stage 3/ diabetic nephropathy  Seen by Dr Davion Gross as outpatient. Plan was for and RHC on 11/1/2021 and he did not show up for procedure. Adm [facility] 11/2021 with HF/SONAL - on Milrinone and lasix gtt. Had [address] with elevated filling pressure. DC wt 145lbs. Non compliant with follow up after DC   Adm [facility] 2/3-2/17/2022 with Acute SHF requiring Milrinone and lasix gtt + metolazone. neph seen for CRS. Re adm 3/25/2022 with syncopal episode. Device interrogation showed VT that was appropriately shocked with ATP. Was again hypervolemic, 30# wt gain with acute on chronic systolic heart failure and acute on chronic SONAL/CKD. Milrinone and lasix gtt now stopped (poor response) with rising creat and HD has been started. HD 3 days. He is tolerating HD well. He was OOB intermittently - denies CP or SOB with this activity. He denies SOB, CP, LH, dizziness, palpitations, syncope. He has acute blood loss with the insertion on temp HD cath. Initially declined getting PRBC transfusion. Today he continues with fatigue and weakness and H/H remains low. He agrees to get 1 Unit PRBC. Review of Systems:   General: Denies fever, chills + fatigue/weakness  Skin: Denies skin changes, rash, itching, lesions.   HEENT: Denies headache, dizziness, vision changes, nosebleeds, sore throat, nasal drainage  RESP: Denies cough, sputum, dyspnea, wheeze, snoring  CARD: Denies palpitations,  murmur  GI:Denies nausea, vomiting, heartburn, loss of appetite, change in bowels  : Denies frequency, pain, incontinence, polyuria  VASC: Denies claudication, leg cramps, clots  MUSC/SKEL: Denies pain, stiffness, arthritis  PSYCH: Denies anxiety, depression, stress  NEURO: Denies numbness, tingling, weakness,change in mood or memory  HEME: Denies abn bruising, bleeding, anemia  ENDO: Denies intolerance to heat, cold, excessive thirst or hunger, hx thyroid disease    Objective:   BP (!) 92/59   Pulse 68   Temp 98.5 °F (36.9 °C) (Oral)   Resp 23   Ht 5' 6\" (1.676 m)   Wt 144 lb 2.9 oz (65.4 kg)   SpO2 91%   BMI 23.27 kg/m²           Intake/Output Summary (Last 24 hours) at 4/7/2022 1047  Last data filed at 4/6/2022 1700  Gross per 24 hour   Intake 1160 ml   Output 1900 ml   Net -740 ml     I/O since adm: Neg 11.9L    WEIGHT:Admit Weight: 189 lb 9.5 oz (86 kg)         Today  Weight: 144 lb 2.9 oz (65.4 kg)   DRY WEIGHT:  Wt Readings from Last 3 Encounters:   04/07/22 144 lb 2.9 oz (65.4 kg)   02/17/22 152 lb 1.9 oz (69 kg)   01/20/22 180 lb (81.6 kg)       Physical Exam:  GEN: Appears frail, no acute distress  SKIN: Brown, warm, dry. Nails without clubbing. HEENT: PERRLA. Normocephalic, atraumatic. Neck supple. No adenopathy. LUNG: AP diameter normal. Clear bilateral.  No wheeze, rales, or ronchi. Respiratory effort normal.  HEART: S1S2 A/R. Mild JVD. No carotid bruit. + systolic murmur, No rub or gallop. ABD: Soft, nontender, distended. +BS X 4 quads. EXT: Radial and pedal pulses 2+ and symmetric. Without varicosities. 2+pedal to knees edema. MUSCSKEL: Good ROM X4 extremities. No deformity. NEURO: A/O X3. Calm and cooperative.      Telemetry: SR HR 71 - no ectopy    Medications:    iron sucrose  100 mg IntraVENous Weekly    insulin lispro  0-6 Units SubCUTAneous TID WC    insulin lispro  0-3 Units SubCUTAneous Nightly    amiodarone  200 mg Oral BID    metoprolol succinate  25 mg Oral Daily    allopurinol  100 mg Oral Daily    atorvastatin  80 mg Oral Daily    levothyroxine  50 mcg Oral QAM AC    [Held by provider] rivaroxaban  15 mg Oral Dinner    sodium chloride flush  5-40 mL IntraVENous 2 times per day    [Held by provider] sacubitril-valsartan  1 tablet Oral BID    [Held by provider] aspirin  81 mg Oral Daily      dextrose  sodium chloride       midodrine, glucose, dextrose, glucagon (rDNA), dextrose, sodium chloride flush, sodium chloride, perflutren lipid microspheres, ondansetron    Lab Data: I have reviewed all labs below today. CBC:   Recent Labs     04/05/22 0435 04/05/22 0435 04/06/22 0442 04/06/22  0707 04/07/22  0756   WBC 3.5*  --  4.1  --  4.3   HGB 9.1*   < > 6.7* 7.0* 6.6*   HCT 27.5*   < > 20.4* 21.4* 19.8*   .2*  --  107.2*  --  105.8*     --  141  --  146    < > = values in this interval not displayed.      BMP:   Recent Labs     04/05/22 0435 04/06/22 0442 04/07/22  0756    131* 135*   K 3.9 4.7 4.0   CL 99 96* 99   CO2 25 22 24   PHOS  --   --  3.4   * 100* 63*   CREATININE 3.0* 2.5* 2.0*     GLUCOSE:   Recent Labs     04/05/22 0435 04/06/22 0442 04/07/22  0756   GLUCOSE 209* 155* 147*     LIVER PROFILE:   Lab Results   Component Value Date    AST 21 04/07/2022    ALT 15 04/07/2022    LABALBU 3.6 04/07/2022    BILIDIR 0.9 11/23/2021    BILITOT 0.6 04/07/2022    ALKPHOS 103 04/07/2022     PT/INR:   Lab Results   Component Value Date    PROTIME 35.8 04/04/2022    INR 3.03 04/04/2022    INR 1.90 11/30/2021    INR 1.73 11/29/2021     APTT: No results found for: APTT  Pro-BNP:    Lab Results   Component Value Date    PROBNP 34,786 03/25/2022    PROBNP 32,014 02/12/2022    PROBNP 35,157 02/09/2022     Parameters:   > 450 pg/mL under age 48  > 900 pg/mL ages 54-65  > 1800 pg/mL over age 76    ENZYMES:  Lab Results   Component Value Date    TROPONINI 0.07 03/26/2022    TROPONINI 0.08 03/26/2022    TROPONINI 0.08 03/25/2022     FASTING LIPID PANEL:  Lab Results   Component Value Date    CHOL 95 02/04/2022    HDL 48 02/04/2022    HDL 58 12/13/2011    LDLCALC 38 02/04/2022    TRIG 43 02/04/2022       Diagnostics:    EKG:   Sinus rhythm with occasional Premature ventricular complexes  Cannot rule out Anterior infarct (cited on or before 25-MAR-2022)  Nonspecific T wave abnormality  Abnormal ECG  When compared with ECG of 03-FEB-2022 12:00,  Premature ventricular complexes are now Present  Confirmed by STRIET MD, Zeno Hodgkin ([id number]) on 3/26/2022 8:50:53 AM    ECHO:  3/26/2022  Summary  Overall left ventricular systolic function appears severely reduced. Ejection fraction is visually estimated to be <20% with diffuse  hypokinesis. Severely dilated left ventricle. Normal left ventricular wall thickness. Diastolic filling parameters suggest grade II diastolic  dysfunction. The right ventricle appears severely dilated with moderately reduced systolic function. The left and right atria are severely dilated. Severe tricuspid regurgitation. Moderate mitral regurgitation. Estimated pulmonary artery systolic pressure is severely elevated at 66 mmHg assuming a right atrial pressure of 15 mmHg. 11/16/2021  Summary  Left ventricular cavity size is severely dilated. Ejection fraction is visually estimated to be <20%. There is severe diffuse hypokinesis. Diastolic filling parameters suggest grade II diastolic dysfunction. Definity contrast administered. No left ventricular thrombus was seen. moderate MR  Pacer / ICD wire is visualized in the right ventricle. The right ventricle is dilated. Right ventricular systolic    5/3/1911 [facility]):  - Left ventricle: The cavity size is severely dilated. Wall thickness is normal. Systolic function     was severely reduced. The estimated ejection fraction was in the range of 10% to 15%. Diffuse     hypokinesis. Doppler parameters are consistent with restrictive physiology, indicative of     decreased left ventricular diastolic compliance and/or increased left atrial pressure. There was     spontaneous echo contrast, indicative of stasis. - Mitral valve: Moderate regurgitation.   - Left atrium: The atrium is severely dilated. - Right ventricle: Pacer wire or catheter noted in right ventricle. Systolic function was mildly     reduced.    - Right atrium: The atrium is mildly dilated. - Atrial septum: The septum bowed from left to right, consistent with increased left atrial     pressure. - Tricuspid valve: Mild-moderate regurgitation.   - Pulmonary arteries: Systolic pressure was severely increased, estimated to be 65mm Hg assuming     that the right atrial pressure was 15 mmHg. - Inferior vena cava: The vessel was dilated. The respirophasic diameter changes were blunted (<     50%), consistent with elevated central venous pressure     Echo 1/9/2020 [facility]):  - Left ventricle: The cavity size is severely dilated. Wall     thickness was increased in a pattern of mild LVH. Systolic     function was severely reduced. The estimated ejection fraction     was in the range of 10% to 15%. Diffuse hypokinesis. No contrast     administered - unable to evaluate for LV thrombus. The     longitudinal strain is -2.47% (markedly reduced). - Aortic valve: Mild thickening.   - Mitral valve: Leaflet tenting. Mild to moderate regurgitation.   - Left atrium: The atrium is severely dilated. - Right ventricle: The cavity size is normal. Pacer wire or ICD     noted in right ventricle. Systolic function was mildly reduced by     objective interpretation. TAPSE: 1.6cm. Tricuspid annular systolic     velocity: 8cm/s.   - Tricuspid valve: Mild-moderate regurgitation.   - Pulmonary arteries: Systolic pressure could not be accurately     estimated, however it is elevated at at least 51 mm Hg, assuming     an RA pressure of 15 mm Hg. - Inferior vena cava: The vessel was dilated. The respirophasic     diameter changes were blunted (< 50%), consistent with elevated     central venous pressure. - Pericardium, extracardiac: A trivial pericardial effusion is     identified.      Cardiac Angiography:   [facility] 1/31/2020  IMPRESSIONS:  Normal left and right filling pressures with   preserved CO/CI.    SUMMARY:   Hemodynamic:   RA 2   RV 28/3   PA 28/10 mean 16   PCWP 4   Blu 4.5/2.5   TD 4.5/2.5   PASat 66%   RECOMMENDATIONS:  Further management per [facility].     12/10/2018 LHC/PCI and RHC:  IMPRESSIONS:  Selective angiography of LMCA showing critical mid LAD   lesion. Successful PCI with 3.5 x 18 mm MIKE. Normal cardiac output, m   ildly increased pulmonary pressures with mildly increased filling pre   ssure on inotropic support. SUMMARY:    1. Wedge pressure in the pulmonary capillaries is mildly elevated. 2. Coronary arteries: The left main is unusually long and      trifurcates into the LAD, a ramus intermedius, and the left      circumflex. The left anterior descending gives rise to 2      diagonals and 3 septals and wraps around the apex. The left      circumflex gives rise to 2 obtuse marginals and no      posterolaterals. The first obtuse marginal has a high origin. 3. LAD: Mid-vessel lesion: There is a discrete, 99% stenosis. The      distal vessel supplies a moderate-sized vascular territory. The      lesion is a likely culprit for the patient's clinical      presentation. The lesion was stented (see 1st lesion      intervention). Following intervention, the lesion has a residual      stenosis of 0% and MUKUND grade 3 flow (brisk flow). 4. AV was not crossed due to known apical LV thrombus. 5. RA 6      RV 48/4      PA 50/20 (28)      W 18.   6. Blu CO 5.2 Blu CI 2.9 on Milrinone 0.25 mcg/mg/min.      12/4/2018 Cardiac MRI ():  Findings are consistent with a severe, ischemic cardiomyopathy.  There is akinesis of the apical segments with an associated small, apical thrombus. The left ventricle is severely dilated with severely reduced systolic function. The right ventricle is mildly dilated with moderately decreased systolic function. The left atrium is severely dilated.  There is mild to moderate mitral regurgitation. The right atrium is mildly dilated.  There is moderate tricuspid regurgitation. The descending aorta is dilated.    The pulmonary artery is dilated.  The IVC and coronary sinus are dilated. Please see noncardiac findings below.      FINDINGS:     1. The left ventricular size is severely dilated. The left ventricular ejection fraction is 19 % by Smallwood's method. Global left ventricular function is severely decreased. There is akinesis of the apical segments, severe hypokinesis of the basal and mid septal and basal and mid anterior segments and moderate hypokinesis of the lateral segments and mid inferior segment and mild hypokinesis of the basal inferior segment. The left ventricular mass is moderately increased. There appears to be a small intracavitary thrombus (6.5 x 5 mm). 2. There is no evidence of increased iron deposition within the myocardium (T2*myocardium = 51 msec; if < 20 msec, suggestive of iron-overloading of the myocardium).  There is no evidence of myocardial edema. 3. The right ventricular size is mildly dilated. Global right ventricular function is moderately decreased. 4. Left atrial size is severely enlarged. Right atrial size is mildly enlarged.   The Qp/Qs is 1.0 by phase contrast imaging and is consistent with no evidence of shunt. 5. The calculated aortic regurgitant fraction is 1.55 %. There is moderate mitral regurgitation. The calculated mitral regurgitant fraction is 29.02 %. There is moderate tricuspid regurgitation. The calculated tricuspid regurgitant fraction is 37.1 %. The calculated pulmonic regurgitant fraction is 2.39 %. 6. There is a small pericardial effusion. 7. The descending aorta is dilated measuring 27.2 mm. 8. The main pulmonary artery is dilated measuring 34.0 mm.  The IVC and coronary sinus are dilated. 9. There are large left and moderate right pleural effusions.  Basilar atelectasis is associated with the effusions.  There is evidence of mild pulmonary edema.      [facility] 11/23/2021  OVERALL IMPRESSION:  1.  Elevated right heart pressures. 2.  Elevated pulmonary capillary wedge pressure.   3.  Markedly reduced cardiac output and indices with significantly low  oxygen saturation, all consistent with stage D/New York Heart  Association Class IV heart failure. We will start the patient back on Primacor drip. The patient's prognosis is extremely poor at this point and we will  discuss further with the patient and the family about his code status.     RUE venous doppler 11/23/2021  Summary  The left internal jugular, subclavian, axillary, brachial, radial and ulnar veins, as well as the basilic and cephalic veins,  have been examined. These veins show no thrombus but there is slow venous blood flow especially at the proximal internal jugular and  subclavian veins. This suggests a possible venous occlusive process in the chest and out of the range of the examining  ultrasound probe  Right internal jugular and subclavian veins are normal     CXR 3/25/2022  Impression       1.  Possible mild degree of vascular congestion though exaggerated by a   suboptimal inspiration.  No focal pulmonary infiltrates are noted.       2.  Stable cardiomegaly. CT ABD/PELVIX WO contrast 3/26/2022  Impression   There is marked cardiomegaly, with trace pleural effusions, as well as   moderate intra-abdominal and pelvic ascites, as well as diffuse anasarca. Hepatic vein reflux is noted on the previous CT examination is well.  This   combination of findings can be seen in the setting of passive liver   congestion related to congestive failure.  Questionable mild surface   nodularity of the liver makes it difficult to exclude underlying cirrhosis.       Minimal bibasilar airspace disease, possibly related to atelectasis or   pneumonia, though with less consolidation than seen on the previous exam.       Mild appearing bilateral renal atrophy.       Suspected fat and ascitic fluid containing left inguinal hernia.  No bowel   herniation.          Assessment/Plan:  1.) Syncope D/T VT w/ appropriate shock: Most likely R/ T hypervolemia/HF. Cont Amio, Toprol XL    2.) Acute on chronic combined diastolic Gr 2 and systolic heart failure, EF <20%, ICM, moderate MR, dilated RV: Neg 11.9L, Wt 189>144lbs. Now euvolemic after HD. He has ongoing non compliance as OP- multiple hospitalizations and worsening multiorgan failure. Limited options -possible palliative care/hospice if HD does not help. Per Neph (Dr. Brant Giles) OK to restart entresto when BP allows. NYHA Class IV  Stage D   Diuretic: lasix gtt up to 30mg/hr - stopped 4/3/22   Milrinone gtt started 3/31>DC 4/3. Indicated for EF </=40%:   Beta Blocker (evidence based)- Toprol XL  ACEi/ARB/ARNI- entresto held - per Neph OK to restart now that he is getting HD - will hold for now D/T hypotension- expect BP to improve with 1 u PRBC-will start entresto very low dose tomorrow. SGLT2 Inhibitor- hold for SONAL/hypotension  Aldosterone Antagonist (BNP within 1 week of DC if new or dose changed)- hold SONAL/hypotension     Cardiac Rehab for EF <35%: OP  ICD counseling: Has ICD   2gm Na diet, daily weight, 64 oz fluid restriction  Avoid NSAIDS and other nephrotoxic meds  [facility] Referral: OP  HF RN referral for education    3.) SONAL on CKD Stage 3: R/T CRS and diabetic nephropathy, Neph following  Baseline creat 2 >3.7>3>2.5>2 today. HD X3 days  Plan for tunneled HD cath this Friday (xarelto held for procedure)  Waiting on HD OP facility placement. 4. ) CAD: Prior PCI/MIKE to LAD. Mild elevated troponin most likely from decompensated HF. No evidence of angina/ischemia.    DAPT: on xarelto  Beta Blocker: Toprol XL  ACEi/ARB:  Anti anginal:   Lipid management/high intensity statin: lipitor  Risk factor management: high blood pressure, high cholesterol, Diabetes, smoking, obesity, family hx  Lifestyle modification: Heart healthy diet, regular exercise, weight loss, smoking cessation, stress reduction     5.) LV thrombus:  -F/U ECHO without thrombus  - cont xarelto (resume when anemia improved and after procedure Firday)    6.) Anemia: Iron deficiency- added venofer  Neph added epoetin  H&H down yesterday 6.7/20.4- recheck 7/21.4>6.6/19.8 today. Apparently had significant amount of bleeding with temp HD cath placement which is presumed stiology. No evidence of other bleeding   Xarelto is being held for tunneled cath procedure.    Initially refused PRBC- agrees today.      Electronically signed by DAHIANA Horan - CNP on 4/7/2022 at 10:47 AM

## 2022-04-07 NOTE — PLAN OF CARE
Problem: Falls - Risk of:  Goal: Will remain free from falls  Description: Will remain free from falls  Outcome: Ongoing     Problem: Pain:  Goal: Patient's pain/discomfort is manageable  Description: Patient's pain/discomfort is manageable  Outcome: Ongoing     Problem: Skin Integrity:  Goal: Skin integrity will stabilize  Description: Skin integrity will stabilize  Outcome: Ongoing     Problem: Infection:  Goal: Will remain free from infection  Description: Will remain free from infection  Outcome: Ongoing     Problem: Safety:  Goal: Free from accidental physical injury  Description: Free from accidental physical injury  Outcome: Ongoing     Problem: Daily Care:  Goal: Daily care needs are met  Description: Daily care needs are met  Outcome: Ongoing     Problem: Discharge Planning:  Goal: Patients continuum of care needs are met  Description: Patients continuum of care needs are met  Outcome: Ongoing     Problem: OXYGENATION/RESPIRATORY FUNCTION  Goal: Patient will maintain patent airway  Outcome: Ongoing     Problem: HEMODYNAMIC STATUS  Goal: Patient has stable vital signs and fluid balance  Outcome: Ongoing     Problem: FLUID AND ELECTROLYTE IMBALANCE  Goal: Fluid and electrolyte balance are achieved/maintained  Outcome: Ongoing     Problem: ACTIVITY INTOLERANCE/IMPAIRED MOBILITY  Goal: Mobility/activity is maintained at optimum level for patient  Outcome: Ongoing     Problem: Skin Integrity:  Goal: Will show no infection signs and symptoms  Description: Will show no infection signs and symptoms  Outcome: Ongoing     Problem: Fluid Volume:  Goal: Will show no signs or symptoms of fluid imbalance  Description: Will show no signs or symptoms of fluid imbalance  Outcome: Ongoing     Problem: Nutritional:  Goal: Ability to identify appropriate dietary choices will improve  Description: Ability to identify appropriate dietary choices will improve  Outcome: Ongoing     Problem: Physical Regulation:  Goal: Complications related to the disease process, condition or treatment will be avoided or minimized  Description: Complications related to the disease process, condition or treatment will be avoided or minimized  Outcome: Ongoing

## 2022-04-07 NOTE — PROGRESS NOTES
[facility]                                               Progress note    Summary:   Dariusz Ríos is being seen by nephrology for SONAL on CKD. Admitted with volume overload, acute exacerbation of heart failure. Interval History  Patient was seen and examined in his room  Awake and alert, no complaints  No shortness of breath no chest pain no edema  Had third session of dialysis yesterday   Post weight 65.8, had 900 cc off, low blood pressure limiting UF    Blood pressure 92/60  Satting 91% on room air  No signs of volume overload, not much edema at all. He has been off milrinone since 4/3/22  Urine output only 400 cc    Labs reviewed  Sodium 135 potassium 4  Bicarb 24 BUN 63 creatinine 2 magnesium 1.9 calcium 8.8 phosphorus 3.4 albumin 3.6 hemoglobin 6.6  vitamin D 32.5    Plan:   -We will plan for HD on a Monday Wednesday Friday schedule  -Awaiting tunneled dialysis catheter placement  -Request sent for outpatient dialysis placement, working on it  -100 mg of Venofer weekly with dialysis  -Increase Retacrit to 20,000 units q. HD  Okay with resuming low-dose Entresto for his heart failure. Douglas Beckham MD  [facility] [place] [facility]  Office: [phone]    Assessment:     #SONAL on CKD4   Due to cardiorenal syndrome   Has not responded well to diuretics and inotrope  [place]  Dialysis has been recommended. Patient not sure. > HD start 04/07/22     #Hyponatremia  Due to heart failure     #anemia  CKD related and anemia of chronic inflammation   tsat 13%  Ferritin 271 back in Feb 2021    Ischemic cardiomyopathy  Decompensated, has evidence of volume overload  proBNP [id number]  Managed with Lasix drip and is on milrinone, appreciate cardiology involvement. Last EF less than 20%  History of coronary artery disease with stent in the LAD  Has ICD  entresto held     Diabetes type 2  10 years at least  Longstanding albuminuria  A1c has been ranging between 6 and 6.5 the last few checks.     Hypertension  On inotrope BP acceptable. ROS:     Positives Listed Bold. All other remaining systems are negative. Constitutional:  fever, chills, weakness, weight change, fatigue,      Skin:  rash, pruritus, hair loss, bruising, dry skin, petechiae. Head, Face, Neck   headaches, swelling,  cervical adenopathy. Respiratory: shortness of breath, cough, or wheezing  Cardiovascular: chest pain, palpitations, dizzy, edema  Gastrointestinal: nausea, vomiting, diarrhea, constipation,belly pain    Yellow skin, blood in stool  Musculoskeletal:  back pain, muscle weakness, gait problems,       joint pain or swelling. Genitourinary:  dysuria, poor urine flow, flank pain, blood in urine  Neurologic:  vertigo, TIA'S, syncope, seizures, focal weakness  Psychosocial:  insomnia, anxiety, or depression. Additional positive findings: -     PMH:   Past medical history, surgical history, social history, family history are reviewed and updated as appropriate. Reviewed current medication list.   Allergies reviewed and updated as needed. PE:   Vitals:    04/07/22 1036   BP:    Pulse:    Resp: 23   Temp:    SpO2: 91%       General appearance:  in NAD, somnolent. HEENT: EOM intact, no icterus. Trachea is midline. Neck : No masses, appears symmetrical, no JVD  Respiratory: Respiratory effort appears normal, bilateral equal chest rise, no wheeze, +crackles   Cardiovascular: Ausculation shows RRR + edema  Abdomen: No visible mass or tenderness, non distended. Musculoskeletal:  Joints with no swelling or deformity. Skin:no rashes, ulcers, induration, no jaundice. Neuro: face symmetric, no focal deficits. Appropriate responses.        Lab Results   Component Value Date    CREATININE 2.0 (H) 04/07/2022    BUN 63 (H) 04/07/2022     (L) 04/07/2022    K 4.0 04/07/2022    CL 99 04/07/2022    CO2 24 04/07/2022      Lab Results   Component Value Date    WBC 4.3 04/07/2022    HGB 6.6 (LL) 04/07/2022    HCT 19.8 (LL) 04/07/2022 .8 (H) 04/07/2022     04/07/2022     Lab Results   Component Value Date    .8 (H) 04/07/2022    CALCIUM 8.8 04/07/2022    PHOS 3.4 04/07/2022

## 2022-04-08 NOTE — PROGRESS NOTES
Pt returned to room s/p HD and IR for tunnel placement. Pt is alert and oriented. No pain. VSS. Call light in reach. Vas cath in right chest intact. No drainage at site.

## 2022-04-08 NOTE — PROGRESS NOTES
Hospitalist Progress Note      PCP: Jose Luis Wilks DO    Date of Admission: 3/25/2022    Subjective: denies any SOB, had dialysis yesterday, hb dropped to <7    Medications:  Reviewed    Infusion Medications    sodium chloride      dextrose      sodium chloride       Scheduled Medications    [START ON 4/8/2022] sacubitril-valsartan  0.5 tablet Oral BID    [START ON 4/8/2022] epoetin roseann-epbx  20,000 Units SubCUTAneous Once per day on Mon Wed Fri    iron sucrose  100 mg IntraVENous Weekly    insulin lispro  0-6 Units SubCUTAneous TID     insulin lispro  0-3 Units SubCUTAneous Nightly    amiodarone  200 mg Oral BID    metoprolol succinate  25 mg Oral Daily    allopurinol  100 mg Oral Daily    atorvastatin  80 mg Oral Daily    levothyroxine  50 mcg Oral QAM AC    [Held by provider] rivaroxaban  15 mg Oral Dinner    sodium chloride flush  5-40 mL IntraVENous 2 times per day    [Held by provider] aspirin  81 mg Oral Daily     PRN Meds: sodium chloride, midodrine, glucose, dextrose, glucagon (rDNA), dextrose, sodium chloride flush, sodium chloride, perflutren lipid microspheres, ondansetron      Intake/Output Summary (Last 24 hours) at 4/7/2022 2143  Last data filed at 4/7/2022 2008  Gross per 24 hour   Intake 600 ml   Output --   Net 600 ml       Physical Exam Performed:    BP (!) 89/57   Pulse 74   Temp 97.5 °F (36.4 °C) (Oral)   Resp 16   Ht 5' 6\" (1.676 m)   Wt 144 lb 2.9 oz (65.4 kg)   SpO2 91%   BMI 23.27 kg/m²       General appearance: No apparent distress, appears stated age and cooperative. HEENT: Pupils equal, round, and reactive to light. Conjunctivae/corneas clear. Neck: Supple, with full range of motion. No jugular venous distention. Trachea midline. Respiratory:  Normal respiratory effort. Clear to auscultation, bilaterally without Rales/Wheezes/Rhonchi. Cardiovascular: Regular rate and rhythm with normal S1/S2 without murmurs, rubs or gallops.   Abdomen: Soft, non-tender, non-distended with normal bowel sounds. Musculoskeletal: Bilateral edema noted  Skin: Skin color, texture, turgor normal.  No rashes or lesions. Neurologic:  Neurovascularly intact without any focal sensory/motor deficits. Cranial nerves: II-XII intact, grossly non-focal.  Psychiatric: Alert  Capillary Refill: Brisk,3 seconds, normal   Peripheral Pulses: +2 palpable, equal bilaterally        Labs:   Recent Labs     04/05/22 0435 04/05/22 0435 04/06/22 0442 04/06/22  0707 04/07/22  0756   WBC 3.5*  --  4.1  --  4.3   HGB 9.1*   < > 6.7* 7.0* 6.6*   HCT 27.5*   < > 20.4* 21.4* 19.8*     --  141  --  146    < > = values in this interval not displayed. Recent Labs     04/05/22 0435 04/06/22 0442 04/07/22  0756    131* 135*   K 3.9 4.7 4.0   CL 99 96* 99   CO2 25 22 24   * 100* 63*   CREATININE 3.0* 2.5* 2.0*   CALCIUM 8.7 8.2* 8.8   PHOS  --   --  3.4     Recent Labs     04/05/22 0435 04/06/22 0442 04/07/22  0756   AST 19 18 21   ALT 13 12 15   BILITOT 0.7 0.6 0.6   ALKPHOS 119 114 103     No results for input(s): INR in the last 72 hours. No results for input(s): Thang Jacinto in the last 72 hours. Urinalysis:      Lab Results   Component Value Date    NITRU Negative 03/26/2022    WBCUA 0 03/26/2022    RBCUA 2 03/26/2022    BLOODU Negative 03/26/2022    SPECGRAV 1.015 03/26/2022    GLUCOSEU Negative 03/26/2022       Radiology:  IR NONTUNNELED VASCULAR CATHETER > 5 YEARS   Final Result   Successful ultrasound and fluoroscopy guided right IJ 15 cm non-tunneled   hemodialysis catheter placement. CT ABDOMEN PELVIS WO CONTRAST Additional Contrast? None   Final Result   There is marked cardiomegaly, with trace pleural effusions, as well as   moderate intra-abdominal and pelvic ascites, as well as diffuse anasarca. Hepatic vein reflux is noted on the previous CT examination is well.   This   combination of findings can be seen in the setting of passive liver congestion related to congestive failure. Questionable mild surface   nodularity of the liver makes it difficult to exclude underlying cirrhosis. Minimal bibasilar airspace disease, possibly related to atelectasis or   pneumonia, though with less consolidation than seen on the previous exam.      Mild appearing bilateral renal atrophy. Suspected fat and ascitic fluid containing left inguinal hernia. No bowel   herniation. XR CHEST (2 VW)   Final Result      1. Possible mild degree of vascular congestion though exaggerated by a   suboptimal inspiration. No focal pulmonary infiltrates are noted. 2.  Stable cardiomegaly. CT HEAD WO CONTRAST   Final Result      1. No acute intracranial abnormality noted. 2.  Prominence of the sulci and/or CSF spaces suggests a possible mild degree   of cerebral atrophy. Assessment/Plan:    Active Hospital Problems    Diagnosis     Ventricular tachycardia ([address] [I47.2]     Syncope [R55]     CHF (congestive heart failure) ([facility]) [I50.9]     Syncope and collapse [R55]     ICD (implantable cardioverter-defibrillator) in place [Z95.810]     Demand ischemia ([address] [I24.8]     Acute on chronic systolic heart failure, NYHA class 4 ([facility]) [I50.23]     Stage 3 chronic kidney disease ([address] [N18.30]     Diabetes type 2, uncontrolled ([address] [E11.65]     Essential hypertension, benign [I10]               Acute on chronic systolic heart failure exacerbation. Ejection fraction of less than 20%, patient is followed closely by cardiology and nephrology. Started on milrinone 3/31 as well as Lasix infusion (later discontinued 4/3 due to uremia).   Plan :  -Appreciate cardiology input.  -hold Lasix and milrinone   -started on dialysis, had dialysis last 3 days     SONAL on CKD  Cardiorenal, creatinine increasing to 2.8--> 3--> 3.1--> 3.3-->3.7.   Multiple discussions nephrology input highly appreciated,   Line placed and started on dialysis  Planning tunneled line placement on Friday - hold xarelto     Anemia - hb dropped from 7.0-->6.7, pt refused blood transfusion. ?oozing from IV access while on xarelto. Hold xarelto. Check occult blood, IV venofer and epocrit  Pt does not want blood transfusions     Syncope. Ventricular tachycardia. Interrogation showed VT that was appropriately shocked. Continue amiodarone and metoprolol. Appreciate cardiology input. Hold xarelto     CAD. Continue aspirin beta-blocker and statin.     Left ventricular thrombus. hold Xarelto-->switch to lovenox from tomorrow     Diabetes mellitus. Sliding scale        DVT Prophylaxis: SCD  Diet: ADULT DIET; Regular; 4 carb choices (60 gm/meal);  Low Sodium (2 gm); 1500 ml  Code Status: Full Code    PT/OT Eval Status: ordered    Dispo - cont care, plan tunneled dialysis line in am    Irlanda Bush MD

## 2022-04-08 NOTE — PROGRESS NOTES
Occupational Therapy    Attempted OT follow-up. Pt politely declining therapy reporting he wants to rest until he goes down for the tunnel cath.   Will continue to follow while in the hospital.  Angelia Jackson, [address]

## 2022-04-08 NOTE — PRE SEDATION
Sedation Pre-Procedure Note    Patient Name: Rickey Ortiz   YOB: 1955  Room/Bed: B3D-2952/5255-01  Medical Record Number: [id number]  Date: 4/8/2022   Time: 3:12 PM       Indication:  Here for tunneled HD catheter. Consent: I have discussed with the patient and/or the patient representative the indication, alternatives, and the possible risks and/or complications of the planned procedure and the anesthesia methods. The patient and/or patient representative appear to understand and agree to proceed. Vital Signs:   Vitals:    04/08/22 1506   BP: 100/67   Pulse: 62   Resp: 16   Temp:    SpO2: 97%       Past Medical History:   has a past medical history of CHF (congestive heart failure) ([facility] Diabetes type 2, uncontrolled ([facility] Diabetic nephropathy, ED (erectile dysfunction), Essential hypertension, benign, Hyperlipidemia, and Noncompliance. Past Surgical History:   has a past surgical history that includes eye surgery and IR NONTUNNELED VASCULAR CATHETER > 5 YEARS (4/4/2022).     Medications:   Scheduled Meds:    sacubitril-valsartan  0.5 tablet Oral BID    epoetin roseann-epbx  20,000 Units SubCUTAneous Once per day on Mon Wed Fri    iron sucrose  100 mg IntraVENous Weekly    insulin lispro  0-6 Units SubCUTAneous TID WC    insulin lispro  0-3 Units SubCUTAneous Nightly    amiodarone  200 mg Oral BID    metoprolol succinate  25 mg Oral Daily    allopurinol  100 mg Oral Daily    atorvastatin  80 mg Oral Daily    levothyroxine  50 mcg Oral QAM AC    [Held by provider] rivaroxaban  15 mg Oral Dinner    sodium chloride flush  5-40 mL IntraVENous 2 times per day    [Held by provider] aspirin  81 mg Oral Daily     Continuous Infusions:    sodium chloride      dextrose      sodium chloride       PRN Meds: sodium chloride, glucose, dextrose, glucagon (rDNA), dextrose, sodium chloride flush, sodium chloride, perflutren lipid microspheres, ondansetron  Home Meds:   Prior to Admission medications    Medication Sig Start Date End Date Taking?  Authorizing Provider   isosorbide mononitrate (IMDUR) 30 MG extended release tablet Take 1 tablet by mouth daily 2/18/22   Eugenia Cervantes MD   metoprolol succinate (TOPROL XL) 25 MG extended release tablet Take 0.5 tablets by mouth daily 2/18/22   Eugenia Cervantes MD   torsemide 60 MG TABS Take 60 mg by mouth daily 2/18/22   Eugenia Cervantes MD   levothyroxine (SYNTHROID) 50 MCG tablet Take 1 tablet by mouth every morning (before breakfast) 2/18/22   Eugenia Cervantes MD   spironolactone (ALDACTONE) 25 MG tablet Take 1 tablet by mouth daily 2/18/22   Eugenia Cervantes MD   allopurinol (ZYLOPRIM) 100 MG tablet Take 1 tablet by mouth daily 11/30/21   Ole Nelson MD   rivaroxaban [place]) 15 MG TABS tablet Take 1 tablet by mouth Daily with supper 11/30/21   Ole Nelson MD   Multiple Vitamins-Minerals (THERAPEUTIC MULTIVITAMIN-MINERALS) tablet Take 1 tablet by mouth daily    Historical Provider, MD   STIMULANT LAXATIVE 8.6-50 MG per tablet Take 1 tablet by mouth as needed  8/23/21   Historical Provider, MD   nitroGLYCERIN (NITROSTAT) 0.4 MG SL tablet Place 0.4 mg under the tongue every 5 minutes as needed for Chest pain   Patient not taking: Reported on 3/26/2022 8/22/21   Historical Provider, MD   sacubitril-valsartan (ENTRESTO) 49-51 MG per tablet Take 1 tablet by mouth 2 times daily  3/22/19   Historical Provider, MD   atorvastatin (LIPITOR) 80 MG tablet Take 80 mg by mouth daily  1/8/19   Historical Provider, MD   Dulaglutide (TRULICITY) 0.25 WZ/1.4QE SOPN Lot# [id number] Exp.06/20  Patient taking differently: Inject 0.75 mg into the muscle every 7 days  2/12/19   DAGO Martinez MD     Coumadin Use Last 7 Days:  no  Antiplatelet drug therapy use last 7 days: no  Other anticoagulant use last 7 days: no  Additional Medication Information:  n/a      Pre-Sedation Documentation and Exam:   I have reviewed the patient's history and review of systems.     Mallampati Airway Assessment:  Mallampati Class II - (soft palate, fauces & uvula are visible)    Prior History of Anesthesia Complications:   none    ASA Classification:  Class 2 - A normal healthy patient with mild systemic disease    Sedation/ Anesthesia Plan:   intravenous sedation    Medications Planned:   midazolam (Versed) intravenously and fentanyl intravenously    Patient is an appropriate candidate for plan of sedation: yes    Electronically signed by Yulia Olivo MD on 4/8/2022 at 3:12 PM

## 2022-04-08 NOTE — CARE COORDINATION
Case management follow up. Outpatient HD set up at [facility] ([address] [address]) TTS 10:45 (arrive 30 minutes early) starting Tuesday April 12th. Prefect served Dr Loren Pulido to see if patient gets HD Friday can wait until Tuesday? or does he need HD today and tomorrow before discharging. MD responds \"Id prefer to dialyze him tomorrow as well\". Patient is to get tunneled line today per physician notes. While in room discussing outpt HD with patient, he was eating breakfast. Asked him if his procedure for tunneled line was planned for today he says \"no one knows. \"   Reviewed chart, no orders for tunneled line. Called IR and spoke to Gee Barger, tells me patient isn't on schedule and she doesn't see any orders either. Made patient NPO (needs to be NPO for 6 hours before line placement) and messaged Dr Loren Pulido to place order for tunneled line. Told patient to not eat or drink any further until procedure complete. CEZAR Butcher made aware.   Electronically signed by Amari Cruz RN Case Management on 4/8/2022 at 9:30 AM

## 2022-04-08 NOTE — PROGRESS NOTES
[facility]   Daily Progress Note      Admit Date:  3/25/2022      Subjective:   Mr. Josie Nieto is a 72yo male with CAD, chronic systolic CHF with ICD in place (8/13/19), LV thrombus, hyperlipidemia, essential hypertension, type 2 DM with complications of diabetic nephropathy stage 3 who presented to [facility] ED with shortness of breath and leg swelling. He was discharged from [facility] on 11/30/21 after being hospitalized with CHF. He has not kept any of his follow-up appointments since being discharged. His weight at that time was 145lbs.     Interval History:  Sinus rhythm on telemetry. Jorden Quintana reports that his breathing is better. He is still on several liters of oxygen by nasal cannula. His family is present in the room. Sinus rhythm on telemetry.        Objective:     /67   Pulse 62   Temp 97.3 °F (36.3 °C)   Resp 16   Ht 5' 6\" (1.676 m)   Wt 146 lb 6.2 oz (66.4 kg)   SpO2 97%   BMI 23.63 kg/m²      Intake/Output Summary (Last 24 hours) at 4/8/2022 1729  Last data filed at 4/8/2022 1440  Gross per 24 hour   Intake 800 ml   Output 1500 ml   Net -700 ml       Physical Exam:  General:  Awake, alert, NAD  Skin:  Warm and dry  Neck:  JVD<8, no carotid bruits  Chest:  Clear to auscultation, no wheezes/rhonchi/rales  Cardiovascular:  RRR, normal S1/S2, no M/R/G  Abdomen:  Soft, nontender, +bowel sounds  Extremities: 1+ bilateral edema to the thighs edema  Pulses: 2+ bilat carotid    2+ bilat radial    2+ bilat femoral      Right upper chest tunneled dialysis catheter    Medications:    sacubitril-valsartan  0.5 tablet Oral BID    epoetin roseann-epbx  20,000 Units SubCUTAneous Once per day on Mon Wed Fri    iron sucrose  100 mg IntraVENous Weekly    insulin lispro  0-6 Units SubCUTAneous TID     insulin lispro  0-3 Units SubCUTAneous Nightly    amiodarone  200 mg Oral BID    metoprolol succinate  25 mg Oral Daily    allopurinol  100 mg Oral Daily    atorvastatin  80 mg Oral Daily    levothyroxine  50 mcg Oral QAM AC    [Held by provider] rivaroxaban  15 mg Oral Dinner    sodium chloride flush  5-40 mL IntraVENous 2 times per day    [Held by provider] aspirin  81 mg Oral Daily      sodium chloride      dextrose      sodium chloride         Lab Data:  CBC:   Recent Labs     04/06/22 0442 04/06/22  0442 04/06/22  0707 04/07/22  0756 04/07/22  2115   WBC 4.1  --   --  4.3 5.1   HGB 6.7*   < > 7.0* 6.6* 9.0*     --   --  146 162    < > = values in this interval not displayed. BMP:    Recent Labs     04/07/22  0756 04/08/22  1105 04/08/22  1130   * 131* 132*   K 4.0 4.4 4.5   CO2 24 22 22   BUN 63* 80* 79*   CREATININE 2.0* 2.8* 2.9*     LIVR:   Recent Labs     04/06/22  0442 04/07/22  0756 04/08/22  1105   AST 18 21 21   ALT 12 15 16     PT/INR:   Recent Labs     04/06/22  0442 04/07/22  0756 04/08/22  1105   PROT 5.8* 6.5 6.5   INR  --   --  1.48*     Lab Results   Component Value Date    CHOL 95 02/04/2022    HDL 48 02/04/2022    HDL 58 12/13/2011    TRIG 43 02/04/2022         Assessment / Plan:     1. Acute on chronic Systolic CHF, class 4  This is likely due to chronic volume overload. Hold Entresto, ACEI, aldactone therapy due to CKD. Okay to continue on Toprol XL for now. Hold aldactone per Nephrology. Not a candidate for an AICD due to noncompliance. Not a candidate for SGLT2 inhibitor as we have not seen him in the office. 2. Acute on chronic kidney injury, stage 4  Continue hemodialysis    Okay to discharge to home with follow follow-up in our office in one week.       Signed:  Avi Gongora MD

## 2022-04-08 NOTE — FLOWSHEET NOTE
Treatment time: 3 hours  Net UF: 1000 ml     Pre weight: 67.5 kg   Post weight: 66.4 kg  EDW: TBD     Access used: NATIVIDAD edilberto  Access function: Good with  ml/min     Medications or blood products given: Midodrine, Retacrit     Regular outpatient schedule: TBD     Summary of response to treatment: Tolerated tx fair. Patient medicated with Midodrine at start of HD for PB support. No HD related complaints or complications.      Copy of dialysis treatment record placed in chart, to be scanned into EMR.        04/08/22 1120 04/08/22 1440   Treatment   Time On 1128  --    Time Off  --  1430   Vital Signs   BP 98/69 107/76   Temp 97.5 °F (36.4 °C) 97.3 °F (36.3 °C)   Pulse 73 65   Resp 18 18   Dialysis Bath   K+ (Potassium) 3  --    Ca+ (Calcium) 2.5  --    Na+ (Sodium) 140  --    HCO3 (Bicarb) 32  --

## 2022-04-08 NOTE — PROGRESS NOTES
[facility]                                               Progress note    Summary:   Abdulaziz Gross is being seen by nephrology for SONAL on CKD. Admitted with volume overload, acute exacerbation of heart failure. Interval History  Patient was seen and examined on dialysis. Awake and alert, no complaints  No shortness of breath no chest pain no edema   Post weight 65.8    Blood pressure 98/69  Satting 95% on room air  No signs of volume overload, not much edema at all. Labs reviewed  Sodium 132 potassium 4.5  Bicarb 22 BUN 79 creatinine 2.9     vitamin D 32.5    Plan:   -HD today   - has TTS spot arranged outpatient. -s/p tunneled dialysis catheter placement  -100 mg of Venofer weekly with dialysis  -Retacrit to 20,000 units q. HD    Will dialyze tomorrow and can dc after     Jessee Urias MD  [facility]  Office: [phone]    Assessment:     #SONAL on CKD4   Due to cardiorenal syndrome   Has not responded well to diuretics and inotrope  [place]  Dialysis has been recommended. Patient not sure. > HD start 04/08/22     #Hyponatremia  Due to heart failure     #anemia  CKD related and anemia of chronic inflammation   tsat 13%  Ferritin 271 back in Feb 2021    Ischemic cardiomyopathy  Decompensated, has evidence of volume overload  proBNP [id number]  Managed with Lasix drip and is on milrinone, appreciate cardiology involvement. Last EF less than 20%  History of coronary artery disease with stent in the LAD  Has ICD  entresto held     Diabetes type 2  10 years at least  Longstanding albuminuria  A1c has been ranging between 6 and 6.5 the last few checks. Hypertension  On inotrope BP acceptable. ROS:     Positives Listed Bold. All other remaining systems are negative. Constitutional:  fever, chills, weakness, weight change, fatigue,      Skin:  rash, pruritus, hair loss, bruising, dry skin, petechiae. Head, Face, Neck   headaches, swelling,  cervical adenopathy.      Respiratory: shortness of breath, cough, or wheezing  Cardiovascular: chest pain, palpitations, dizzy, edema  Gastrointestinal: nausea, vomiting, diarrhea, constipation,belly pain    Yellow skin, blood in stool  Musculoskeletal:  back pain, muscle weakness, gait problems,       joint pain or swelling. Genitourinary:  dysuria, poor urine flow, flank pain, blood in urine  Neurologic:  vertigo, TIA'S, syncope, seizures, focal weakness  Psychosocial:  insomnia, anxiety, or depression. Additional positive findings: -     PMH:   Past medical history, surgical history, social history, family history are reviewed and updated as appropriate. Reviewed current medication list.   Allergies reviewed and updated as needed. PE:   Vitals:    04/08/22 1120   BP: 98/69   Pulse: 73   Resp: 18   Temp: 97.5 °F (36.4 °C)   SpO2:        General appearance:  in NAD, somnolent. HEENT: EOM intact, no icterus. Trachea is midline. Neck : No masses, appears symmetrical, no JVD  Respiratory: Respiratory effort appears normal, bilateral equal chest rise, no wheeze, +crackles   Cardiovascular: Ausculation shows RRR + edema  Abdomen: No visible mass or tenderness, non distended. Musculoskeletal:  Joints with no swelling or deformity. Skin:no rashes, ulcers, induration, no jaundice. Neuro: face symmetric, no focal deficits. Appropriate responses.        Lab Results   Component Value Date    CREATININE 2.9 (H) 04/08/2022    BUN 79 (H) 04/08/2022     (L) 04/08/2022    K 4.5 04/08/2022    CL 98 (L) 04/08/2022    CO2 22 04/08/2022      Lab Results   Component Value Date    WBC 5.1 04/07/2022    HGB 9.0 (L) 04/07/2022    HCT 27.6 (L) 04/07/2022    .8 (H) 04/07/2022     04/07/2022     Lab Results   Component Value Date    .8 (H) 04/07/2022    CALCIUM 8.7 04/08/2022    PHOS 3.4 04/07/2022

## 2022-04-08 NOTE — PROGRESS NOTES
Pt ate breakfast. Per CM, pt was supposed to have a tunneled vascath placed but he did not have active orders detailing this prior to receiving his breakfast tray. Pt made NPO after eating breakfast. Per CM, he must be NPO for 6 hours prior to line placement. Pt updated on plan and VU. All food/drink removed from bedside table and pt's reach. White board updated with plan. Pt is scheduled for HD today. RN called down to HD and spoke with HD RN about having pt come down to receive HD through temporary vascath during 6 hour NPO period prior to his tunneled vascath placement. Orders for transportation placed. Report given to HD RN. Pt updated on plan. No questions at this time.

## 2022-04-08 NOTE — BRIEF OP NOTE
Brief Postoperative Note    Maribel Banks  YOB: 1955  [id number]    Pre-operative Diagnosis: SONAL on CKD    Post-operative Diagnosis: Same    Procedure: Conversion of a Non-tunneled Hemodialysis Catheter to a Tunneled Hemodialysis Catheter    Anesthesia: Moderate Sedation    Surgeons: Ryan Winter MD    Estimated Blood Loss: Less than 5 mL    Complications: None    Specimens: Was Not Obtained    Findings: Successful conversion of a right IJ non-tunneled hemodialysis catheter to a tunneled hemodialysis catheter.     Electronically signed by Ryan Winter MD on 4/8/2022 at 3:13 PM

## 2022-04-09 NOTE — PROGRESS NOTES
Hospitalist Progress Note      PCP: Celi Navarro DO    Date of Admission: 3/25/2022    Subjective: hypotensive today, hb still low    Medications:  Reviewed    Infusion Medications    sodium chloride      dextrose      sodium chloride       Scheduled Medications    [Held by provider] sacubitril-valsartan  0.5 tablet Oral BID    epoetin roseann-epbx  20,000 Units SubCUTAneous Once per day on Mon Wed Fri    iron sucrose  100 mg IntraVENous Weekly    insulin lispro  0-6 Units SubCUTAneous TID WC    insulin lispro  0-3 Units SubCUTAneous Nightly    [Held by provider] amiodarone  200 mg Oral BID    metoprolol succinate  25 mg Oral Daily    allopurinol  100 mg Oral Daily    atorvastatin  80 mg Oral Daily    levothyroxine  50 mcg Oral QAM AC    [Held by provider] rivaroxaban  15 mg Oral Dinner    sodium chloride flush  5-40 mL IntraVENous 2 times per day    [Held by provider] aspirin  81 mg Oral Daily     PRN Meds: midodrine, acetaminophen, sodium chloride, glucose, dextrose, glucagon (rDNA), dextrose, sodium chloride flush, sodium chloride, perflutren lipid microspheres, ondansetron      Intake/Output Summary (Last 24 hours) at 4/9/2022 1818  Last data filed at 4/9/2022 1339  Gross per 24 hour   Intake 300 ml   Output 100 ml   Net 200 ml       Physical Exam Performed:    /68   Pulse 70   Temp 97.9 °F (36.6 °C) (Oral)   Resp 18   Ht 5' 6\" (1.676 m)   Wt 145 lb 8.1 oz (66 kg)   SpO2 100%   BMI 23.48 kg/m²          General appearance: No apparent distress, appears stated age and cooperative. HEENT: Pupils equal, round, and reactive to light. Conjunctivae/corneas clear. Neck: Supple, with full range of motion. No jugular venous distention. Trachea midline. Respiratory:  Normal respiratory effort. Clear to auscultation, bilaterally without Rales/Wheezes/Rhonchi. Cardiovascular: Regular rate and rhythm with normal S1/S2 without murmurs, rubs or gallops.   Abdomen: Soft, non-tender, non-distended with normal bowel sounds. Musculoskeletal: Bilateral edema noted  Skin: Skin color, texture, turgor normal.  No rashes or lesions. Neurologic:  Neurovascularly intact without any focal sensory/motor deficits. Cranial nerves: II-XII intact, grossly non-focal.  Psychiatric: Alert  Capillary Refill: Brisk,3 seconds, normal   Peripheral Pulses: +2 palpable, equal bilaterally        Labs:   Recent Labs     04/07/22  0756 04/07/22  2115 04/09/22  1205   WBC 4.3 5.1 5.9   HGB 6.6* 9.0* 7.7*   HCT 19.8* 27.6* 23.8*    162 181     Recent Labs     04/07/22  0756 04/07/22  0756 04/08/22  1105 04/08/22  1130 04/09/22  1205   *   < > 131* 132* 135*   K 4.0   < > 4.4 4.5 4.4   CL 99   < > 97* 98* 100   CO2 24   < > 22 22 21   BUN 63*   < > 80* 79* 54*   CREATININE 2.0*   < > 2.8* 2.9* 2.8*   CALCIUM 8.8   < > 8.8 8.7 8.5   PHOS 3.4  --   --   --   --     < > = values in this interval not displayed. Recent Labs     04/07/22  0756 04/08/22  1105 04/09/22  1205   AST 21 21 19   ALT 15 16 16   BILITOT 0.6 0.6 0.6   ALKPHOS 103 115 121     Recent Labs     04/08/22  1105   INR 1.48*     No results for input(s): CKTOTAL, TROPONINI in the last 72 hours. Urinalysis:      Lab Results   Component Value Date    NITRU Negative 04/09/2022    WBCUA 4 04/09/2022    BACTERIA 2+ 04/09/2022    RBCUA 2 04/09/2022    BLOODU TRACE-INTACT 04/09/2022    SPECGRAV >=1.030 04/09/2022    GLUCOSEU Negative 04/09/2022       Radiology:  IR TUNNELED CVC PLACE WO SQ PORT/PUMP > 5 YEARS   Final Result   Successful conversion of a non tunneled hemodialysis catheter for a new 19   centimeter tunneled hemodialysis catheter. IR NONTUNNELED VASCULAR CATHETER > 5 YEARS   Final Result   Successful ultrasound and fluoroscopy guided right IJ 15 cm non-tunneled   hemodialysis catheter placement.          CT ABDOMEN PELVIS WO CONTRAST Additional Contrast? None   Final Result   There is marked cardiomegaly, with trace pleural effusions, as well as   moderate intra-abdominal and pelvic ascites, as well as diffuse anasarca. Hepatic vein reflux is noted on the previous CT examination is well. This   combination of findings can be seen in the setting of passive liver   congestion related to congestive failure. Questionable mild surface   nodularity of the liver makes it difficult to exclude underlying cirrhosis. Minimal bibasilar airspace disease, possibly related to atelectasis or   pneumonia, though with less consolidation than seen on the previous exam.      Mild appearing bilateral renal atrophy. Suspected fat and ascitic fluid containing left inguinal hernia. No bowel   herniation. XR CHEST (2 VW)   Final Result      1. Possible mild degree of vascular congestion though exaggerated by a   suboptimal inspiration. No focal pulmonary infiltrates are noted. 2.  Stable cardiomegaly. CT HEAD WO CONTRAST   Final Result      1. No acute intracranial abnormality noted. 2.  Prominence of the sulci and/or CSF spaces suggests a possible mild degree   of cerebral atrophy. Assessment/Plan:    Active Hospital Problems    Diagnosis     Ventricular tachycardia ([address] [I47.2]     Syncope [R55]     CHF (congestive heart failure) ([facility]) [I50.9]     Syncope and collapse [R55]     ICD (implantable cardioverter-defibrillator) in place [Z95.810]     Demand ischemia ([address] [I24.8]     Acute on chronic systolic heart failure, NYHA class 4 ([facility]) [I50.23]     Stage 3 chronic kidney disease ([address] [N18.30]     Diabetes type 2, uncontrolled ([address] [E11.65]     Essential hypertension, benign [I10]            Acute on chronic systolic heart failure exacerbation. Ejection fraction of less than 20%, patient is followed closely by cardiology and nephrology.   Started on milrinone 3/31 as well as Lasix infusion (later discontinued 4/3 due to uremia).   Plan :  -[address] cardiology input.  -hold Lasix and milrinone   -started on dialysis, had dialysis last 3 days  - holding dialysis today 2/2 hypotension     SONAL on CKD  Cardiorenal, creatinine increasing to 2.8--> 3--> 3.1--> 3.3-->3.7. Multiple discussions nephrology input highly appreciated,   Line placed and started on dialysis  S/p tunneled line placement on Friday - holding xarelto     Anemia - hb dropped from 7.0-->6.7, pt refused blood transfusion. ?oozing from IV access while on xarelto. Hold xarelto. Check occult blood, IV venofer and epocrit  Received 1 unit blood, hb improved from 6.6-->9.0-->7.7  Check UA for hematuria/occult stool    Hypotension - BP dropped, hold entresto, BB with holding parameter, hold amio. Holding dialysis today     Syncope. Ventricular tachycardia. Interrogation showed VT that was appropriately shocked. Continue amiodarone and metoprolol. Appreciate cardiology input. Hold xarelto     CAD. Continue aspirin beta-blocker and statin.     Left ventricular thrombus. hold Xarelto-->switch to lovenox from tomorrow     Diabetes mellitus. Sliding scale        DVT Prophylaxis: SCD  Diet: ADULT DIET; Regular; 4 carb choices (60 gm/meal);  Low Sodium (2 gm)  Code Status: Full Code    PT/OT Eval Status: ordered    Manolo Adam MD

## 2022-04-09 NOTE — PLAN OF CARE
Problem: Falls - Risk of:  Goal: Will remain free from falls  Description: Will remain free from falls  4/9/2022 1250 by Chacorta Ulrich RN  Outcome: Ongoing    Goal: Absence of physical injury  Description: Absence of physical injury  4/9/2022 1250 by Chacorta Ulrich RN  Outcome: Ongoing       Problem: Pain:  Description: Pain management should include both nonpharmacologic and pharmacologic interventions.   Goal: Patient's pain/discomfort is manageable  Description: Patient's pain/discomfort is manageable  4/9/2022 1250 by Chacorta Ulrich RN  Outcome: Ongoing       Problem: Skin Integrity:  Goal: Skin integrity will stabilize  Description: Skin integrity will stabilize  4/9/2022 1250 by Chacorta Ulrich RN  Outcome: Ongoing       Problem: Infection:  Goal: Will remain free from infection  Description: Will remain free from infection  4/9/2022 1250 by Chacorta Ulrich RN  Outcome: Ongoing       Problem: Safety:  Goal: Free from accidental physical injury  Description: Free from accidental physical injury  4/9/2022 1250 by Chacorta Ulrich RN  Outcome: Ongoing     Problem: Daily Care:  Goal: Daily care needs are met  Description: Daily care needs are met  4/9/2022 1250 by Chacorta Ulrich RN  Outcome: Ongoing       Problem: Discharge Planning:  Goal: Patients continuum of care needs are met  Description: Patients continuum of care needs are met  4/9/2022 1250 by Chacorta Ulrich RN  Outcome: Ongoing       Problem: OXYGENATION/RESPIRATORY FUNCTION  Goal: Patient will maintain patent airway  4/9/2022 1250 by Chacorta Ulrich RN  Outcome: Ongoing    Goal: Patient will achieve/maintain normal respiratory rate/effort  Description: Respiratory rate and effort will be within normal limits for the patient  4/9/2022 1250 by Chacorta Ulrich RN  Outcome: Ongoing       Problem: HEMODYNAMIC STATUS  Goal: Patient has stable vital signs and fluid balance  4/9/2022 1250 by Chacorta Ulrich RN  Outcome: Ongoing       Problem: FLUID AND ELECTROLYTE IMBALANCE  Goal: Fluid and electrolyte balance are achieved/maintained  4/9/2022 1250 by Manuel España RN  Outcome: Ongoing       Problem: ACTIVITY INTOLERANCE/IMPAIRED MOBILITY  Goal: Mobility/activity is maintained at optimum level for patient  4/9/2022 1250 by Manuel España RN  Outcome: Ongoing       Problem: Skin Integrity:  Goal: Will show no infection signs and symptoms  Description: Will show no infection signs and symptoms  4/9/2022 1250 by Manuel España RN  Outcome: Ongoing    Goal: Absence of new skin breakdown  Description: Absence of new skin breakdown  4/9/2022 1250 by Manuel España RN  Outcome: Ongoing       Problem: Fluid Volume:  Goal: Will show no signs or symptoms of fluid imbalance  Description: Will show no signs or symptoms of fluid imbalance  4/9/2022 1250 by Manuel España RN  Outcome: Ongoing       Problem: Nutritional:  Goal: Ability to identify appropriate dietary choices will improve  Description: Ability to identify appropriate dietary choices will improve  4/9/2022 1250 by Manuel España RN  Outcome: Ongoing       Problem: Physical Regulation:  Goal: Complications related to the disease process, condition or treatment will be avoided or minimized  Description: Complications related to the disease process, condition or treatment will be avoided or minimized  4/9/2022 1250 by Manuel España RN  Outcome: Ongoing

## 2022-04-09 NOTE — PLAN OF CARE
Problem: Falls - Risk of:  Goal: Will remain free from falls  Description: Will remain free from falls  Outcome: Ongoing     Problem: Pain:  Goal: Patient's pain/discomfort is manageable  Description: Patient's pain/discomfort is manageable  Outcome: Ongoing     Problem: Skin Integrity:  Goal: Skin integrity will stabilize  Description: Skin integrity will stabilize  Outcome: Ongoing     Problem: Infection:  Goal: Will remain free from infection  Description: Will remain free from infection  Outcome: Ongoing     Problem: Safety:  Goal: Free from accidental physical injury  Description: Free from accidental physical injury  Outcome: Ongoing

## 2022-04-09 NOTE — PROGRESS NOTES
Patient's BP is still soft after Albumin infusion. Dr. Belkys Bass (nephrologist) OK to hold HD today and will resume Monday.

## 2022-04-09 NOTE — PROGRESS NOTES
Hospitalist Progress Note      PCP: La Chavez DO    Date of Admission: 3/25/2022    Subjective: had dialysis today    Medications:  Reviewed    Infusion Medications    sodium chloride      dextrose      sodium chloride       Scheduled Medications    [Held by provider] sacubitril-valsartan  0.5 tablet Oral BID    epoetin roseann-epbx  20,000 Units SubCUTAneous Once per day on Mon Wed Fri    iron sucrose  100 mg IntraVENous Weekly    insulin lispro  0-6 Units SubCUTAneous TID WC    insulin lispro  0-3 Units SubCUTAneous Nightly    [Held by provider] amiodarone  200 mg Oral BID    metoprolol succinate  25 mg Oral Daily    allopurinol  100 mg Oral Daily    atorvastatin  80 mg Oral Daily    levothyroxine  50 mcg Oral QAM AC    [Held by provider] rivaroxaban  15 mg Oral Dinner    sodium chloride flush  5-40 mL IntraVENous 2 times per day    [Held by provider] aspirin  81 mg Oral Daily     PRN Meds: midodrine, acetaminophen, sodium chloride, glucose, dextrose, glucagon (rDNA), dextrose, sodium chloride flush, sodium chloride, perflutren lipid microspheres, ondansetron      Intake/Output Summary (Last 24 hours) at 4/9/2022 1810  Last data filed at 4/9/2022 1339  Gross per 24 hour   Intake 300 ml   Output 100 ml   Net 200 ml       Physical Exam Performed:    /68   Pulse 70   Temp 97.9 °F (36.6 °C) (Oral)   Resp 18   Ht 5' 6\" (1.676 m)   Wt 145 lb 8.1 oz (66 kg)   SpO2 100%   BMI 23.48 kg/m²            General appearance: No apparent distress, appears stated age and cooperative. HEENT: Pupils equal, round, and reactive to light. Conjunctivae/corneas clear. Neck: Supple, with full range of motion. No jugular venous distention. Trachea midline. Respiratory:  Normal respiratory effort. Clear to auscultation, bilaterally without Rales/Wheezes/Rhonchi. Cardiovascular: Regular rate and rhythm with normal S1/S2 without murmurs, rubs or gallops.   Abdomen: Soft, non-tender, non-distended with normal bowel sounds. Musculoskeletal: Bilateral edema noted  Skin: Skin color, texture, turgor normal.  No rashes or lesions. Neurologic:  Neurovascularly intact without any focal sensory/motor deficits. Cranial nerves: II-XII intact, grossly non-focal.  Psychiatric: Alert  Capillary Refill: Brisk,3 seconds, normal   Peripheral Pulses: +2 palpable, equal bilaterally        Labs:   Recent Labs     04/07/22  0756 04/07/22  2115 04/09/22  1205   WBC 4.3 5.1 5.9   HGB 6.6* 9.0* 7.7*   HCT 19.8* 27.6* 23.8*    162 181     Recent Labs     04/07/22  0756 04/07/22  0756 04/08/22  1105 04/08/22  1130 04/09/22  1205   *   < > 131* 132* 135*   K 4.0   < > 4.4 4.5 4.4   CL 99   < > 97* 98* 100   CO2 24   < > 22 22 21   BUN 63*   < > 80* 79* 54*   CREATININE 2.0*   < > 2.8* 2.9* 2.8*   CALCIUM 8.8   < > 8.8 8.7 8.5   PHOS 3.4  --   --   --   --     < > = values in this interval not displayed. Recent Labs     04/07/22  0756 04/08/22  1105 04/09/22  1205   AST 21 21 19   ALT 15 16 16   BILITOT 0.6 0.6 0.6   ALKPHOS 103 115 121     Recent Labs     04/08/22  1105   INR 1.48*     No results for input(s): CKTOTAL, TROPONINI in the last 72 hours. Urinalysis:      Lab Results   Component Value Date    NITRU Negative 04/09/2022    WBCUA 4 04/09/2022    BACTERIA 2+ 04/09/2022    RBCUA 2 04/09/2022    BLOODU TRACE-INTACT 04/09/2022    SPECGRAV >=1.030 04/09/2022    GLUCOSEU Negative 04/09/2022       Radiology:  IR TUNNELED CVC PLACE WO SQ PORT/PUMP > 5 YEARS   Final Result   Successful conversion of a non tunneled hemodialysis catheter for a new 19   centimeter tunneled hemodialysis catheter. IR NONTUNNELED VASCULAR CATHETER > 5 YEARS   Final Result   Successful ultrasound and fluoroscopy guided right IJ 15 cm non-tunneled   hemodialysis catheter placement.          CT ABDOMEN PELVIS WO CONTRAST Additional Contrast? None   Final Result   There is marked cardiomegaly, with trace pleural effusions, as well as   moderate intra-abdominal and pelvic ascites, as well as diffuse anasarca. Hepatic vein reflux is noted on the previous CT examination is well. This   combination of findings can be seen in the setting of passive liver   congestion related to congestive failure. Questionable mild surface   nodularity of the liver makes it difficult to exclude underlying cirrhosis. Minimal bibasilar airspace disease, possibly related to atelectasis or   pneumonia, though with less consolidation than seen on the previous exam.      Mild appearing bilateral renal atrophy. Suspected fat and ascitic fluid containing left inguinal hernia. No bowel   herniation. XR CHEST (2 VW)   Final Result      1. Possible mild degree of vascular congestion though exaggerated by a   suboptimal inspiration. No focal pulmonary infiltrates are noted. 2.  Stable cardiomegaly. CT HEAD WO CONTRAST   Final Result      1. No acute intracranial abnormality noted. 2.  Prominence of the sulci and/or CSF spaces suggests a possible mild degree   of cerebral atrophy. Assessment/Plan:    Active Hospital Problems    Diagnosis     Ventricular tachycardia ([address] [I47.2]     Syncope [R55]     CHF (congestive heart failure) ([facility]) [I50.9]     Syncope and collapse [R55]     ICD (implantable cardioverter-defibrillator) in place [Z95.810]     Demand ischemia ([address] [I24.8]     Acute on chronic systolic heart failure, NYHA class 4 ([facility]) [I50.23]     Stage 3 chronic kidney disease ([address] [N18.30]     Diabetes type 2, uncontrolled ([address] [E11.65]     Essential hypertension, benign [I10]          Acute on chronic systolic heart failure exacerbation. Ejection fraction of less than 20%, patient is followed closely by cardiology and nephrology.   Started on milrinone 3/31 as well as Lasix infusion (later discontinued 4/3 due to uremia).   Plan :  -Appreciate cardiology input.  -hold Lasix and milrinone   -started on dialysis, had dialysis last 3 days     SONAL on CKD  Cardiorenal, creatinine increasing to 2.8--> 3--> 3.1--> 3.3-->3.7. Multiple discussions nephrology input highly appreciated,   Line placed and started on dialysis  S/p tunneled line placement on Friday - holding xarelto     Anemia - hb dropped from 7.0-->6.7, pt refused blood transfusion. ?oozing from IV access while on xarelto. Hold xarelto. Check occult blood, IV venofer and epocrit  Received 1 unit blood, hb improved     Syncope. Ventricular tachycardia. Interrogation showed VT that was appropriately shocked. Continue amiodarone and metoprolol. Appreciate cardiology input. Hold xarelto     CAD. Continue aspirin beta-blocker and statin.     Left ventricular thrombus. hold Xarelto-->switch to lovenox from tomorrow     Diabetes mellitus. Sliding scale        DVT Prophylaxis: SCD  Diet: ADULT DIET; Regular; 4 carb choices (60 gm/meal);  Low Sodium (2 gm)  Code Status: Full Code    PT/OT Eval Status: ordered    Jackie Dobbs MD

## 2022-04-09 NOTE — PROGRESS NOTES
[facility]                                               Progress note    Summary:   Alexander Aguila is being seen by nephrology for SONAL on CKD. Admitted with volume overload, acute exacerbation of heart failure. Interval History  He had dialysis done yesterday  Blood pressure very low at 79 systolic today  Midodrine given  Also Albumin given  Plan for dialysis TTS outpatient-Jolie garrett  Has tunneled dialysis catheter   Getting Retacrit and Venofer    Plan:   It appears that he is low blood pressure is related to congestive heart failure  BP low despite of midodrine 10 and albumin  Will hold dialysis unless K is very high  Currently has no sob, uremic symptoms         Raquel Millan MD  [facility]  Office: [phone]    Assessment:     #SONAL on CKD4   Due to cardiorenal syndrome   Has not responded well to diuretics and inotrope  Uremic  Dialysis has been recommended. Patient not sure. > HD start 04/09/22     #Hyponatremia  Due to heart failure     #anemia  CKD related and anemia of chronic inflammation   tsat 13%  Ferritin 271 back in Feb 2021    Ischemic cardiomyopathy  Decompensated, has evidence of volume overload  proBNP 44420  Managed with Lasix drip and is on milrinone, appreciate cardiology involvement. Last EF less than 20%  History of coronary artery disease with stent in the LAD  Has ICD  entresto held     Diabetes type 2  10 years at least  Longstanding albuminuria  A1c has been ranging between 6 and 6.5 the last few checks. Hypertension  On inotrope BP acceptable. ROS:     Positives Listed Bold. All other remaining systems are negative. Constitutional:  fever, chills, weakness, weight change, fatigue,      Skin:  rash, pruritus, hair loss, bruising, dry skin, petechiae. Head, Face, Neck   headaches, swelling,  cervical adenopathy.      Respiratory: shortness of breath, cough, or wheezing  Cardiovascular: chest pain, palpitations, dizzy, edema  Gastrointestinal: nausea, vomiting, diarrhea, constipation,belly pain    Yellow skin, blood in stool  Musculoskeletal:  back pain, muscle weakness, gait problems,       joint pain or swelling. Genitourinary:  dysuria, poor urine flow, flank pain, blood in urine  Neurologic:  vertigo, TIA'S, syncope, seizures, focal weakness  Psychosocial:  insomnia, anxiety, or depression. Additional positive findings: -     PMH:   Past medical history, surgical history, social history, family history are reviewed and updated as appropriate. Reviewed current medication list.   Allergies reviewed and updated as needed. PE:   Vitals:    04/09/22 0918   BP: 82/64   Pulse: 66   Resp: 19   Temp:    SpO2:        General appearance:  in NAD, somnolent. HEENT: EOM intact, no icterus. Trachea is midline. Neck : No masses, appears symmetrical, no JVD  Respiratory: Respiratory effort appears normal, bilateral equal chest rise, no wheeze, +crackles   Cardiovascular: Ausculation shows RRR + edema  Abdomen: No visible mass or tenderness, non distended. Musculoskeletal:  Joints with no swelling or deformity. Skin:no rashes, ulcers, induration, no jaundice. Neuro: face symmetric, no focal deficits. Appropriate responses.        Lab Results   Component Value Date    CREATININE 2.9 (H) 04/08/2022    BUN 79 (H) 04/08/2022     (L) 04/08/2022    K 4.5 04/08/2022    CL 98 (L) 04/08/2022    CO2 22 04/08/2022      Lab Results   Component Value Date    WBC 5.1 04/07/2022    HGB 9.0 (L) 04/07/2022    HCT 27.6 (L) 04/07/2022    .8 (H) 04/07/2022     04/07/2022     Lab Results   Component Value Date    .8 (H) 04/07/2022    CALCIUM 8.7 04/08/2022    PHOS 3.4 04/07/2022

## 2022-04-09 NOTE — CARE COORDINATION
D/C order noted. Spoke with the patient's nurse, his BP has been low this AM and he is receiving Albumin at this time and has not been able to go to dialysis. She has messaged Dr. Dale Dent to advise. Will continue to follow for D/C needs.   Pallavi Rubio RN, BSN, Case Management  Phone: [phone]  Electronically signed by Pallavi Rubio RN on 4/9/2022 at 11:09 AM

## 2022-04-10 NOTE — PROGRESS NOTES
[facility] Dr. Sharron Yeh    Patient just seen by Dr. Sharron Yeh on 03/27/22 for abnormal CT scan findings of a nodular liver and ascites - likely \"cardiac\" cirrhosis given the patient's profoundly reduced LVEF and passive hepatic congestion  Now he has a decreasing Hgb with guaiac (+) stool  Xarelto held    REC:  Transfuse as necessary  Given the LVEF < 20%, the patient is not a candidate for any endoscopic evaluation  PPI  Prognosis poor

## 2022-04-10 NOTE — PLAN OF CARE
Problem: Falls - Risk of:  Goal: Will remain free from falls  Description: Will remain free from falls  4/10/2022 1335 by Khris Silva RN  Outcome: Ongoing     Problem: Pain:  Description: Pain management should include both nonpharmacologic and pharmacologic interventions.   Goal: Patient's pain/discomfort is manageable  Description: Patient's pain/discomfort is manageable  4/10/2022 1335 by Khris Silva RN  Outcome: Ongoing     Problem: Skin Integrity:  Goal: Skin integrity will stabilize  Description: Skin integrity will stabilize  4/10/2022 1335 by Khris Silva RN  Outcome: Ongoing     Problem: Infection:  Goal: Will remain free from infection  Description: Will remain free from infection  4/10/2022 1335 by Khris Silva RN  Outcome: Ongoing     Problem: Daily Care:  Goal: Daily care needs are met  Description: Daily care needs are met  4/10/2022 1335 by Khris Silva RN  Outcome: Ongoing       Problem: Discharge Planning:  Goal: Patients continuum of care needs are met  Description: Patients continuum of care needs are met  4/10/2022 1335 by Khris Silva RN  Outcome: Ongoing       Problem: OXYGENATION/RESPIRATORY FUNCTION  Goal: Patient will maintain patent airway  4/10/2022 1335 by Khris Silva RN  Outcome: Ongoing    Goal: Patient will achieve/maintain normal respiratory rate/effort  Description: Respiratory rate and effort will be within normal limits for the patient  4/10/2022 1335 by Khris Silva RN  Outcome: Ongoing       Problem: HEMODYNAMIC STATUS  Goal: Patient has stable vital signs and fluid balance  4/10/2022 1335 by Khris Silva RN  Outcome: Ongoing       Problem: FLUID AND ELECTROLYTE IMBALANCE  Goal: Fluid and electrolyte balance are achieved/maintained  4/10/2022 1335 by Khris Silva RN  Outcome: Ongoing       Problem: ACTIVITY INTOLERANCE/IMPAIRED MOBILITY  Goal: Mobility/activity is maintained at optimum level for patient  4/10/2022 1335 by Khris Silva RN  Outcome: Ongoing       Problem: Skin Integrity:  Goal: Will show no infection signs and symptoms  Description: Will show no infection signs and symptoms  4/10/2022 1335 by Celine Li RN  Outcome: Ongoing    Goal: Absence of new skin breakdown  Description: Absence of new skin breakdown  4/10/2022 1335 by Celine Li RN  Outcome: Ongoing       Problem: Fluid Volume:  Goal: Will show no signs or symptoms of fluid imbalance  Description: Will show no signs or symptoms of fluid imbalance  4/10/2022 1335 by Celine Li RN  Outcome: Ongoing       Problem: Nutritional:  Goal: Ability to identify appropriate dietary choices will improve  Description: Ability to identify appropriate dietary choices will improve  4/10/2022 1335 by Celine Li RN  Outcome: Ongoing     Problem: Physical Regulation:  Goal: Complications related to the disease process, condition or treatment will be avoided or minimized  Description: Complications related to the disease process, condition or treatment will be avoided or minimized  4/10/2022 1335 by Celine Li RN  Outcome: Ongoing

## 2022-04-10 NOTE — PROGRESS NOTES
[facility]                                               Progress note    Summary:   Maribel Banks is being seen by nephrology for SONAL on CKD. Admitted with volume overload, acute exacerbation of heart failure. Interval History  He had dialysis done on friday  BP 60s on Saturday   Held dialysis  Plan for dialysis TTS outpatient-Jolie garrett  Has tunneled dialysis catheter   Getting Retacrit and Venofer    Plan:   BP coming up  Will attempt dialysis am with midodrine cover  Labs stable  Has no uremic symptoms       Julius Ny MD  [facility]  Office: [phone]    Assessment:     #SONAL on CKD4   Due to cardiorenal syndrome   Has not responded well to diuretics and inotrope  Uremic  Dialysis has been recommended. Patient not sure. > HD start 04/10/22     #Hyponatremia  Due to heart failure     #anemia  CKD related and anemia of chronic inflammation   tsat 13%  Ferritin 271 back in Feb 2021    Ischemic cardiomyopathy  Decompensated, has evidence of volume overload  proBNP 43883  Managed with Lasix drip and is on milrinone, appreciate cardiology involvement. Last EF less than 20%  History of coronary artery disease with stent in the LAD  Has ICD  entresto held     Diabetes type 2  10 years at least  Longstanding albuminuria  A1c has been ranging between 6 and 6.5 the last few checks. Hypertension  On inotrope BP acceptable. ROS:     Positives Listed Bold. All other remaining systems are negative. Constitutional:  fever, chills, weakness, weight change, fatigue,      Skin:  rash, pruritus, hair loss, bruising, dry skin, petechiae. Head, Face, Neck   headaches, swelling,  cervical adenopathy.      Respiratory: shortness of breath, cough, or wheezing  Cardiovascular: chest pain, palpitations, dizzy, edema  Gastrointestinal: nausea, vomiting, diarrhea, constipation,belly pain    Yellow skin, blood in stool  Musculoskeletal:  back pain, muscle weakness, gait problems, joint pain or swelling. Genitourinary:  dysuria, poor urine flow, flank pain, blood in urine  Neurologic:  vertigo, TIA'S, syncope, seizures, focal weakness  Psychosocial:  insomnia, anxiety, or depression. Additional positive findings: -     PMH:   Past medical history, surgical history, social history, family history are reviewed and updated as appropriate. Reviewed current medication list.   Allergies reviewed and updated as needed. PE:   Vitals:    04/10/22 0738   BP: 106/71   Pulse: 74   Resp: 29   Temp: 98.3 °F (36.8 °C)   SpO2: 97%       General appearance:  in NAD, somnolent. HEENT: EOM intact, no icterus. Trachea is midline. Neck : No masses, appears symmetrical, no JVD  Respiratory: Respiratory effort appears normal, bilateral equal chest rise, no wheeze, +crackles   Cardiovascular: Ausculation shows RRR + edema  Abdomen: No visible mass or tenderness, non distended. Musculoskeletal:  Joints with no swelling or deformity. Skin:no rashes, ulcers, induration, no jaundice. Neuro: face symmetric, no focal deficits. Appropriate responses.        Lab Results   Component Value Date    CREATININE 3.1 (H) 04/10/2022    BUN 60 (H) 04/10/2022     (L) 04/10/2022    K 4.3 04/10/2022    CL 99 04/10/2022    CO2 20 (L) 04/10/2022      Lab Results   Component Value Date    WBC 6.4 04/10/2022    HGB 7.9 (L) 04/10/2022    HCT 24.4 (L) 04/10/2022    .5 (H) 04/10/2022     04/10/2022     Lab Results   Component Value Date    .8 (H) 04/07/2022    CALCIUM 8.6 04/10/2022    PHOS 3.4 04/07/2022

## 2022-04-10 NOTE — PLAN OF CARE
Problem: Falls - Risk of:  Goal: Will remain free from falls  Description: Will remain free from falls  4/10/2022 0114 by Susy Perry RN  Outcome: Ongoing  4/9/2022 1250 by Cole Fox RN  Outcome: Ongoing     Problem: Pain:  Goal: Patient's pain/discomfort is manageable  Description: Patient's pain/discomfort is manageable  4/10/2022 0114 by Susy Perry RN  Outcome: Ongoing  4/9/2022 1250 by Cole Fox RN  Outcome: Ongoing     Problem: Skin Integrity:  Goal: Skin integrity will stabilize  Description: Skin integrity will stabilize  4/10/2022 0114 by Susy Perry RN  Outcome: Ongoing  4/9/2022 1250 by Cole Fox RN  Outcome: Ongoing     Problem: Infection:  Goal: Will remain free from infection  Description: Will remain free from infection  4/10/2022 0114 by Susy Perry RN  Outcome: Ongoing  4/9/2022 1250 by Cole Fox RN  Outcome: Ongoing     Problem: Safety:  Goal: Free from accidental physical injury  Description: Free from accidental physical injury  4/10/2022 0114 by Susy Perry RN  Outcome: Ongoing  4/9/2022 1250 by Cole Fox RN  Outcome: Ongoing

## 2022-04-10 NOTE — PROGRESS NOTES
Hospitalist Progress Note      PCP: Lolly River DO    Date of Admission: 3/25/2022    Subjective: bp better today    Medications:  Reviewed    Infusion Medications    sodium chloride      dextrose      sodium chloride       Scheduled Medications    midodrine  5 mg Oral TID WC    [Held by provider] sacubitril-valsartan  0.5 tablet Oral BID    epoetin roseann-epbx  20,000 Units SubCUTAneous Once per day on Mon Wed Fri    iron sucrose  100 mg IntraVENous Weekly    insulin lispro  0-6 Units SubCUTAneous TID     insulin lispro  0-3 Units SubCUTAneous Nightly    [Held by provider] amiodarone  200 mg Oral BID    metoprolol succinate  25 mg Oral Daily    allopurinol  100 mg Oral Daily    atorvastatin  80 mg Oral Daily    levothyroxine  50 mcg Oral QAM AC    [Held by provider] rivaroxaban  15 mg Oral Dinner    sodium chloride flush  5-40 mL IntraVENous 2 times per day    [Held by provider] aspirin  81 mg Oral Daily     PRN Meds: midodrine, acetaminophen, sodium chloride, glucose, dextrose, glucagon (rDNA), dextrose, sodium chloride flush, sodium chloride, perflutren lipid microspheres, ondansetron      Intake/Output Summary (Last 24 hours) at 4/10/2022 1318  Last data filed at 4/10/2022 1054  Gross per 24 hour   Intake 240 ml   Output 300 ml   Net -60 ml       Physical Exam Performed:    /68   Pulse 73   Temp 97.9 °F (36.6 °C) (Axillary)   Resp 28   Ht 5' 6\" (1.676 m)   Wt 149 lb 4 oz (67.7 kg)   SpO2 98%   BMI 24.09 kg/m²          General appearance: No apparent distress, appears stated age and cooperative. HEENT: Pupils equal, round, and reactive to light. Conjunctivae/corneas clear. Neck: Supple, with full range of motion. No jugular venous distention. Trachea midline. Respiratory:  Normal respiratory effort. Clear to auscultation, bilaterally without Rales/Wheezes/Rhonchi. Cardiovascular: Regular rate and rhythm with normal S1/S2 without murmurs, rubs or gallops.   Abdomen: Soft, non-tender, non-distended with normal bowel sounds. Musculoskeletal: Bilateral edema noted  Skin: Skin color, texture, turgor normal.  No rashes or lesions. Neurologic:  Neurovascularly intact without any focal sensory/motor deficits. Cranial nerves: II-XII intact, grossly non-focal.  Psychiatric: Alert  Capillary Refill: Brisk,3 seconds, normal   Peripheral Pulses: +2 palpable, equal bilaterally     Labs:   Recent Labs     04/07/22  2115 04/09/22  1205 04/10/22  0801   WBC 5.1 5.9 6.4   HGB 9.0* 7.7* 7.9*   HCT 27.6* 23.8* 24.4*    181 208     Recent Labs     04/08/22  1130 04/09/22  1205 04/10/22  0800   * 135* 133*   K 4.5 4.4 4.3   CL 98* 100 99   CO2 22 21 20*   BUN 79* 54* 60*   CREATININE 2.9* 2.8* 3.1*   CALCIUM 8.7 8.5 8.6     Recent Labs     04/08/22  1105 04/09/22  1205 04/10/22  0800   AST 21 19 17   ALT 16 16 14   BILITOT 0.6 0.6 0.7   ALKPHOS 115 121 119     Recent Labs     04/08/22  1105   INR 1.48*     No results for input(s): Gunnar Zac in the last 72 hours. Urinalysis:      Lab Results   Component Value Date    NITRU Negative 04/10/2022    WBCUA 0-2 04/10/2022    BACTERIA 2+ 04/09/2022    RBCUA 0-2 04/10/2022    BLOODU Negative 04/10/2022    SPECGRAV 1.025 04/10/2022    GLUCOSEU Negative 04/10/2022       Radiology:  IR TUNNELED CVC PLACE WO SQ PORT/PUMP > 5 YEARS   Final Result   Successful conversion of a non tunneled hemodialysis catheter for a new 19   centimeter tunneled hemodialysis catheter. IR NONTUNNELED VASCULAR CATHETER > 5 YEARS   Final Result   Successful ultrasound and fluoroscopy guided right IJ 15 cm non-tunneled   hemodialysis catheter placement. CT ABDOMEN PELVIS WO CONTRAST Additional Contrast? None   Final Result   There is marked cardiomegaly, with trace pleural effusions, as well as   moderate intra-abdominal and pelvic ascites, as well as diffuse anasarca. Hepatic vein reflux is noted on the previous CT examination is well. This   combination of findings can be seen in the setting of passive liver   congestion related to congestive failure. Questionable mild surface   nodularity of the liver makes it difficult to exclude underlying cirrhosis. Minimal bibasilar airspace disease, possibly related to atelectasis or   pneumonia, though with less consolidation than seen on the previous exam.      Mild appearing bilateral renal atrophy. Suspected fat and ascitic fluid containing left inguinal hernia. No bowel   herniation. XR CHEST (2 VW)   Final Result      1. Possible mild degree of vascular congestion though exaggerated by a   suboptimal inspiration. No focal pulmonary infiltrates are noted. 2.  Stable cardiomegaly. CT HEAD WO CONTRAST   Final Result      1. No acute intracranial abnormality noted. 2.  Prominence of the sulci and/or CSF spaces suggests a possible mild degree   of cerebral atrophy. Assessment/Plan:    Active Hospital Problems    Diagnosis     Ventricular tachycardia ([address] [I47.2]     Syncope [R55]     CHF (congestive heart failure) ([facility] [I50.9]     Syncope and collapse [R55]     ICD (implantable cardioverter-defibrillator) in place [Z95.810]     Demand ischemia ([address] [I24.8]     Acute on chronic systolic heart failure, NYHA class 4 ([facility]) [I50.23]     Stage 3 chronic kidney disease ([address] [N18.30]     Diabetes type 2, uncontrolled ([address] [E11.65]     Essential hypertension, benign [I10]            Acute on chronic systolic heart failure exacerbation. Ejection fraction of less than 20%, patient is followed closely by cardiology and nephrology.   Started on milrinone 3/31 as well as Lasix infusion (later discontinued 4/3 due to uremia).   Plan :  -Appreciate cardiology input.  -hold Lasix and milrinone   -started on dialysis, had dialysis last 3 days  - holding dialysis today 2/2 hypotension     SONAL on CKD  Cardiorenal, creatinine increasing to 2.8--> 3--> 3.1--> 3.3-->3.7. Multiple discussions nephrology input highly appreciated,   Line placed and started on dialysis  S/p tunneled line placement on Friday - holding xarelto     Anemia - hb dropped from 7.0-->6.7, pt refused blood transfusion. ?oozing from IV access while on xarelto. Hold xarelto. Check occult blood, IV venofer and epocrit  Received 1 unit blood, hb improved from 6.6-->9.0-->7.7  Positive occult stool  GI consulted     Hypotension - BP dropped, hold entresto, BB with holding parameter, hold amio. Holding dialysis today. Will try midodrine TID today and see if dialysis can be done in am     Syncope. Ventricular tachycardia. Interrogation showed VT that was appropriately shocked. Continue amiodarone and metoprolol. Appreciate cardiology input. Holding xarelto     CAD. Continue aspirin beta-blocker and statin.     Left ventricular thrombus. hold Xarelto-->switch to lovenox from tomorrow     Diabetes mellitus. Sliding scale        DVT Prophylaxis: SCD  Diet: ADULT DIET; Regular; 4 carb choices (60 gm/meal);  Low Sodium (2 gm)  Code Status: Full Code    PT/OT Eval Status: ordered    Rosa Maria Simms MD

## 2022-04-11 NOTE — FLOWSHEET NOTE
Treatment time: 3.5 hours  Net UF: 2000 ml     Pre weight: 69.2 kg   Post weight: 67.2 kg  EDW: SONAL kg     Access used: RIJ TDC  Access function: Good with  ml/min     Medications or blood products given: Retacrit, heparin for catheter dwells     Regular outpatient schedule: MWF     Summary of response to treatment:      04/11/22 1113   Vital Signs   /70   Temp 97.1 °F (36.2 °C)   Pulse 67   Resp 16   Weight 148 lb 2.4 oz (67.2 kg)   Percent Weight Change -2.89   Pain Assessment   Pain Assessment 0-10   Pain Level 0   Post-Hemodialysis Assessment   Post-Treatment Procedures Blood returned;Catheter capped, clamped and heparinized x 2 ports   Machine Disinfection Process Acid/Vinegar Clean;Heat Disinfect; Exterior Machine Disinfection   Rinseback Volume (ml) 400 ml   Total Liters Processed (l/min) 78.9 l/min   Dialyzer Clearance Moderately streaked   Duration of Treatment (minutes) 211 minutes   Heparin amount administered during treatment (units) 0 units   Hemodialysis Intake (ml) 400 ml   Hemodialysis Output (ml) 2400 ml   NET Removed (ml) 2000 ml   Tolerated Treatment Good   Copy of dialysis treatment record placed in chart, to be scanned into EMR.  Report called to Sumanth Martines RN

## 2022-04-11 NOTE — PROGRESS NOTES
Occupational Therapy  Facility/Department: [address] PROGRESSIVE CARE  Daily Treatment Note and Tentative D/C    NAME: Abdulaziz Gross  : 1955  MRN: [id number]    Date of Service: 2022    Discharge Recommendations: Abdulaziz Gross scored a 21/24 on the AM-PAC ADL Inpatient form. Current research shows that an AM-PAC score of 18 or greater is typically associated with a discharge to the patient's home setting. If patient discharges prior to next session this note will serve as a discharge summary. Please see below for the latest assessment towards goals. Continue to assess pending progress,Home with assist PRN  OT Equipment Recommendations  Equipment Needed: No    Assessment   Performance deficits / Impairments: Decreased functional mobility ; Decreased strength;Decreased endurance;Decreased ADL status; Decreased balance;Decreased high-level IADLs  Assessment: Pt tolerated session well. Pt completes functional mobility and transfers with supervision and use of RW. Pt with no LOB. Pt completes toileting and grooming in stance at sink with supervision. Pt able to ambulate household distances with no LOB. Continue with POC. Prognosis: Good  Assistance / Modification: RW  OT Education: OT Role;Plan of Care;Transfer Training;ADL Adaptive Strategies  REQUIRES OT FOLLOW UP: Yes  Activity Tolerance  Activity Tolerance: Patient Tolerated treatment well  Safety Devices  Safety Devices in place: Yes  Type of devices: Call light within reach;Nurse notified; Left in chair;Chair alarm in place         Patient Diagnosis(es): The encounter diagnosis was Syncope and collapse.      has a past medical history of CHF (congestive heart failure) ([address] Diabetes type 2, uncontrolled ([address] Diabetic nephropathy, ED (erectile dysfunction), Essential hypertension, benign, Hyperlipidemia, and Noncompliance.    has a past surgical history that includes eye surgery; IR NONTUNNELED VASCULAR CATHETER > 5 YEARS (2022); and IR TUNNELED CVC PLACE WO SQ PORT/PUMP > 5 YEARS (4/8/2022). Restrictions  Restrictions/Precautions  Restrictions/Precautions: Fall Risk  Subjective   General  Chart Reviewed: Yes  Patient assessed for rehabilitation services?: Yes  Additional Pertinent Hx: per ED note, Jemal Bañuelos is a 61 y.o. male who presents to the emergency department via EMS from home where he lives with his son after passing out. He states he felt fine today no chest pain or shortness of breath. He had eaten his dinner he was getting up walking when he collapsed to the ground. He vaguely remembers getting lightheaded prior to this. His son called 48. He has a history of diabetes, coronary artery disease, congestive heart failure. He states he did not take his Xarelto today. Denies pain in his hips arms or neck. He tells me he always has leg swelling and shortness of breath and that this is not worse than normal.  Family / Caregiver Present: No  Referring Practitioner: Lise Lomeli, DO  Subjective  Subjective: Pt agreeable to OT treatment. Pt reports no pain. General Comment  Comments: okay for therapy per RN. Orientation  Orientation  Overall Orientation Status: Within Functional Limits  Objective    ADL  Grooming: Supervision (in stance at sink to wash face, brush teeth, etc)  Toileting: Supervision (supervision for balance)        Balance  Sitting Balance: Independent  Standing Balance: Supervision (RW)  Standing Balance  Time: ~5-7 minutes  Activity: grooming in stance at sink  Comment: no LOB  Functional Mobility  Functional - Mobility Device: Rolling Walker  Activity: To/from bathroom; Other (~15ft, 25ft, 40ft)  Assist Level: Supervision  Toilet Transfers  Toilet - Technique: Ambulating (RW)  Equipment Used: Grab bars  Toilet Transfer: Supervision  Wheelchair [employer] Transfers  Wheelchair/Bed - Technique: Ambulating (RW)  Equipment Used: Bed;Other (bed to chair)  Level of Asssistance: Supervision  Bed mobility  Supine to Sit: Independent  Sit to Supine: Unable to assess  Scooting: Independent  Transfers  Sit to stand: Supervision  Stand to sit: Supervision  Transfer Comments: to/from RW           Cognition  Overall Cognitive Status: Tonio 89  Times per week: 3-5x  Current Treatment Recommendations: Strengthening,Functional Mobility Training,Gait Training,Endurance Training,Balance Training,Self-Care / ADL,Safety Education & Training,Patient/Caregiver Education & Training    AM-PAC Score        AM-PAC Inpatient Daily Activity Raw Score: 21 (04/11/22 1446)  AM-PAC Inpatient ADL T-Scale Score : 44.27 (04/11/22 1446)  ADL Inpatient CMS 0-100% Score: 32.79 (04/11/22 1446)  ADL Inpatient CMS G-Code Modifier : Peter Fountain (04/11/22 1446)    Goals  Short term goals  Time Frame for Short term goals: prior to D/C; ongoing 4/11  Short term goal 1: complete functional mobility and transfers Mod Ind  Short term goal 2: complete bathing and dressing Mod Ind  Short term goal 3: complete toileting Mod Ind  Short term goal 4: complete grooming in stance at sink [address] term goals  Time Frame for Long term goals : STG=LTG  Patient Goals   Patient goals : return home       Therapy Time   Individual Concurrent Group Co-treatment   Time In 2632         Time Out 1445         Minutes 40         Timed Code Treatment Minutes: [facility]       PEACE Rodríguez/BRANT

## 2022-04-11 NOTE — PROGRESS NOTES
Physical Therapy  Facility/Department: [address] PROGRESSIVE CARE  Daily Treatment Note  NAME: Dariusz Ríos  : 1955  MRN: [id number]    Date of Service: 2022    Discharge Recommendations:  Continue to assess pending progress,Home with assist PRN   PT Equipment Recommendations  Other: Pt reports access to [employer]. Dariusz Ríos scored a 20/24 on the AM-PAC short mobility form. At this time, no further PT is recommended upon discharge. Assessment   Body structures, Functions, Activity limitations: Decreased functional mobility ; Decreased endurance;Decreased balance  Assessment: 60 y/o male admit 3/25/2022 with Syncope/Collapse, Acute Exac CHF. CT Head negative. PMH as noted including CHF, CKD, ICD place, DM, Diabetic Nephropathy. PTA pt living with son in apt setting with 2 steps to enter; independent daily care and functional mobility. Pt dede oob, amb within hospital room setting with Walker SBA; strong for functional activities/limited endurance. Anticipate adequate progress for d/c home; do not anticipate need cont PT upon d/c although will monitor. Prognosis: Good  History: 60 y/o male admit 3/25/2022 with Syncope/Collapse, Acute Exac CHF. CT Head negative. PMH as noted including CHF, CKD, ICD place, DM, Diabetic Nephropathy. Exam: See above. Clinical Presentation: See above. Patient Education: Role of PT, POC, Need to call for assist, Safe use of Walker. Barriers to Learning: None. REQUIRES PT FOLLOW UP: Yes  Activity Tolerance  Activity Tolerance: Patient limited by endurance     Patient Diagnosis(es): The encounter diagnosis was Syncope and collapse.     has a past medical history of CHF (congestive heart failure) ([address] Diabetes type 2, uncontrolled ([address] Diabetic nephropathy, ED (erectile dysfunction), Essential hypertension, benign, Hyperlipidemia, and Noncompliance.    has a past surgical history that includes eye surgery; IR NONTUNNELED VASCULAR CATHETER > 5 YEARS (4/4/2022); and IR TUNNELED CVC PLACE WO SQ PORT/PUMP > 5 YEARS (4/8/2022). Restrictions  Restrictions/Precautions  Restrictions/Precautions: Fall Risk  Subjective   General  Chart Reviewed: Yes  Additional Pertinent Hx: 62 y/o male admit 3/25/2022 with Syncope/Collapse, Acute Exac CHF. CT Head negative. PMH as noted including CHF, CKD, ICD place, DM, Diabetic Nephropathy. Response To Previous Treatment: Patient with no complaints from previous session. Family / Caregiver Present: No  Referring Practitioner: Dr. Stiven Covarrubias. Subjective  Subjective: Pt agreeable to PT Rx. Orientation  Orientation  Overall Orientation Status: Within Functional Limits  Cognition   Cognition  Overall Cognitive Status: WFL  Objective   Bed mobility  Supine to Sit: Independent  Sit to Supine: Independent  Transfers  Sit to Stand: Stand by assistance (With Winifred Cornelio.)  Stand to sit: Stand by assistance (With Winifred Cornelio.)  Comment: Toilet Transfers SBA. Independent pericare. Ambulation  Ambulation?: Yes  Ambulation 1  Surface: level tile  Device: Rolling Walker  Distance: Pt amb to/from bathroom, additional 40'  with Walker SBA. Diminished step length/clearance although no LE buckling/giving way. Cues for safe transitional mvts although no specific LOB noted. AM-PAC Score  AM-PAC Inpatient Mobility Raw Score : 20 (04/11/22 1420)  AM-PAC Inpatient T-Scale Score : 47.67 (04/11/22 1420)  Mobility Inpatient CMS 0-100% Score: 35.83 (04/11/22 1420)  Mobility Inpatient CMS G-Code Modifier : CJ (04/11/22 1420)          Goals  Short term goals  Time Frame for Short term goals: Upon d/c acute care setting. Short term goal 1: Bed Mob Independent. 4/4 Goal met. Short term goal 2: Transfers with/without assist device Supervision. Short term goal 3: Amb with/without assist device 50-75' SBA/Supervision. 4/4 Goal met. Revised : Amb with assist device 150' Supervision. Patient Goals   Patient goals : Return home.     Plan Plan  Times per week: 3-5x week while in acute care setting.   Current Treatment Recommendations: Functional Mobility Training,Transfer Training,Gait Training,Safety Education & Training,Patient/Caregiver Education & Training  Safety Devices  Type of devices: Bed alarm in place,Call light within reach,Left in bed,Nurse notified     Therapy Time   Individual Concurrent Group Co-treatment   Time In [facility]         Time Out 1250         Minutes Liyah Ortiz, PT

## 2022-04-11 NOTE — PROGRESS NOTES
Nutrition Assessment     Type and Reason for Visit: Reassess    Nutrition Recommendations/Plan:   Continue Carb control (4CHO/meal), Low Na diet. Nutrition Assessment:  Follow-up. Pt continues to do well from nutrition standpoint and eating >50% of all meals. Pt started on dialysis now. CT showed liver nodule and ascites but unable to do procedure d/t low EF. Will conitnue current nutrition interventions at this time. Malnutrition Assessment:  Malnutrition Status: No malnutrition    Nutrition Related Findings: -7.9 L fluid; BM 4/9; +1 BLE edema; Na 132, Glu 107      Current Nutrition Therapies:    ADULT DIET; Regular; 4 carb choices (60 gm/meal);  Low Sodium (2 gm)    Anthropometric Measures:  · Height: 5' 6\" (167.6 cm)  · Current Body Wt: 152 lb (68.9 kg)   · BMI: 24.5    Nutrition Diagnosis:   No nutrition diagnosis at this time     Nutrition Interventions:   Food and/or Nutrient Delivery:  Continue Current Diet  Nutrition Education/Counseling:  No recommendation at this time   Coordination of Nutrition Care:  Continue to monitor while inpatient    Discharge Planning:    No discharge needs at this time     Electronically signed by Yolande Cai RD, LD on 4/11/22 at 9:09 AM EDT    Contact: [phone]

## 2022-04-11 NOTE — PLAN OF CARE
Problem: Falls - Risk of:  Goal: Will remain free from falls  Description: Will remain free from falls  4/11/2022 0943 by Ion Jang RN  Outcome: Ongoing  Note: Fall precautions in place. Bed locked and in lowest position. Call light within reach.  Alarm on.   4/11/2022 0051 by Abby Lopez RN  Outcome: Ongoing  Goal: Absence of physical injury  Description: Absence of physical injury  4/11/2022 0943 by Ion Jang RN  Outcome: Ongoing  4/11/2022 0051 by Abby Lopez RN  Outcome: Ongoing     Problem: Pain:  Goal: Patient's pain/discomfort is manageable  Description: Patient's pain/discomfort is manageable  4/11/2022 0943 by Ion Jang RN  Outcome: Ongoing  4/11/2022 0051 by Abby Lopez RN  Outcome: Ongoing  Goal: Pain level will decrease  Description: Pain level will decrease  4/11/2022 0943 by Ion Jang RN  Outcome: Ongoing  4/11/2022 0051 by Abby Lopez RN  Outcome: Ongoing  Goal: Control of acute pain  Description: Control of acute pain  4/11/2022 0943 by Ion Jang RN  Outcome: Ongoing  4/11/2022 0051 by Abby Lopez RN  Outcome: Ongoing  Goal: Control of chronic pain  Description: Control of chronic pain  4/11/2022 0943 by Ion Jang RN  Outcome: Ongoing  4/11/2022 0051 by Abby Lopez RN  Outcome: Ongoing     Problem: Skin Integrity:  Goal: Skin integrity will stabilize  Description: Skin integrity will stabilize  4/11/2022 0943 by Ion Jang RN  Outcome: Ongoing  4/11/2022 0051 by Abby Lopez RN  Outcome: Ongoing     Problem: Infection:  Goal: Will remain free from infection  Description: Will remain free from infection  4/11/2022 0943 by Ion Jang RN  Outcome: Ongoing  4/11/2022 0051 by Abby Lopez RN  Outcome: Ongoing     Problem: Safety:  Goal: Free from accidental physical injury  Description: Free from accidental physical injury  4/11/2022 0943 by Rosea [facility], RN  Outcome: Ongoing  4/11/2022 0051 by Abby Lopez, RN  Outcome: Ongoing  Goal: Free from intentional harm  Description: Free from intentional harm  4/11/2022 0943 by Ailyn Mendez RN  Outcome: Ongoing  4/11/2022 0051 by Susy Perry RN  Outcome: Ongoing     Problem: Daily Care:  Goal: Daily care needs are met  Description: Daily care needs are met  4/11/2022 0943 by Ailyn Mendez RN  Outcome: Ongoing  4/11/2022 0051 by Susy Perry RN  Outcome: Ongoing     Problem: Discharge Planning:  Goal: Patients continuum of care needs are met  Description: Patients continuum of care needs are met  4/11/2022 0943 by Ailyn Mendez RN  Outcome: Ongoing  4/11/2022 0051 by Susy Perry RN  Outcome: Ongoing     Problem: OXYGENATION/RESPIRATORY FUNCTION  Goal: Patient will maintain patent airway  4/11/2022 0943 by Ailyn Mendez RN  Outcome: Ongoing  4/11/2022 0051 by Susy Perry RN  Outcome: Ongoing  Goal: Patient will achieve/maintain normal respiratory rate/effort  Description: Respiratory rate and effort will be within normal limits for the patient  4/11/2022 0943 by Ailyn Mendez RN  Outcome: Ongoing  4/11/2022 0051 by Susy Perry RN  Outcome: Ongoing     Problem: HEMODYNAMIC STATUS  Goal: Patient has stable vital signs and fluid balance  4/11/2022 0943 by Ailyn Mendez RN  Outcome: Ongoing  4/11/2022 0051 by Susy Perry RN  Outcome: Ongoing     Problem: FLUID AND ELECTROLYTE IMBALANCE  Goal: Fluid and electrolyte balance are achieved/maintained  4/11/2022 0943 by Ailyn Mendez RN  Outcome: Ongoing  4/11/2022 0051 by Susy Perry RN  Outcome: Ongoing     Problem: ACTIVITY INTOLERANCE/IMPAIRED MOBILITY  Goal: Mobility/activity is maintained at optimum level for patient  4/11/2022 0943 by Ailyn Mendez RN  Outcome: Ongoing  4/11/2022 0051 by Susy Perry RN  Outcome: Ongoing     Problem: Skin Integrity:  Goal: Will show no infection signs and symptoms  Description: Will show no infection signs and symptoms  4/11/2022 0943 by Ailyn Mendez RN  Outcome: Ongoing  4/11/2022 0051 by Susy Perry RN  Outcome: Ongoing  Goal: Absence of new skin breakdown  Description: Absence of new skin breakdown  4/11/2022 0943 by Nina Joseph RN  Outcome: Ongoing  4/11/2022 0051 by Carlyn Morales RN  Outcome: Ongoing     Problem: Fluid Volume:  Goal: Will show no signs or symptoms of fluid imbalance  Description: Will show no signs or symptoms of fluid imbalance  4/11/2022 0943 by Nina Joseph RN  Outcome: Ongoing  4/11/2022 0051 by Carlyn Morales RN  Outcome: Ongoing     Problem: Nutritional:  Goal: Ability to identify appropriate dietary choices will improve  Description: Ability to identify appropriate dietary choices will improve  4/11/2022 0943 by Nina Joseph RN  Outcome: Ongoing  4/11/2022 0051 by Carlyn Morales RN  Outcome: Ongoing     Problem: Physical Regulation:  Goal: Complications related to the disease process, condition or treatment will be avoided or minimized  Description: Complications related to the disease process, condition or treatment will be avoided or minimized  4/11/2022 0943 by Nina Joseph RN  Outcome: Ongoing  4/11/2022 0051 by Carlyn Morales RN  Outcome: Ongoing

## 2022-04-11 NOTE — PROGRESS NOTES
Hospitalist Progress Note      PCP: Marissa Jerome DO    Date of Admission: 3/25/2022    Subjective: BP better, denies any CP/SOB    Medications:  Reviewed    Infusion Medications    sodium chloride      dextrose      sodium chloride       Scheduled Medications    pantoprazole  40 mg Oral BID AC    midodrine  5 mg Oral TID WC    [Held by provider] sacubitril-valsartan  0.5 tablet Oral BID    epoetin roseann-epbx  20,000 Units SubCUTAneous Once per day on Mon Wed Fri    iron sucrose  100 mg IntraVENous Weekly    insulin lispro  0-6 Units SubCUTAneous TID     insulin lispro  0-3 Units SubCUTAneous Nightly    [Held by provider] amiodarone  200 mg Oral BID    metoprolol succinate  25 mg Oral Daily    allopurinol  100 mg Oral Daily    atorvastatin  80 mg Oral Daily    levothyroxine  50 mcg Oral QAM AC    [Held by provider] rivaroxaban  15 mg Oral Dinner    sodium chloride flush  5-40 mL IntraVENous 2 times per day    [Held by provider] aspirin  81 mg Oral Daily     PRN Meds: midodrine, acetaminophen, sodium chloride, glucose, dextrose, glucagon (rDNA), dextrose, sodium chloride flush, sodium chloride, perflutren lipid microspheres, ondansetron      Intake/Output Summary (Last 24 hours) at 4/11/2022 1757  Last data filed at 4/11/2022 1113  Gross per 24 hour   Intake 400 ml   Output 2400 ml   Net -2000 ml       Physical Exam Performed:    /62   Pulse 66   Temp 98 °F (36.7 °C) (Oral)   Resp 16   Ht 5' 6\" (1.676 m)   Wt 148 lb 2.4 oz (67.2 kg)   SpO2 100%   BMI 23.91 kg/m²       General appearance: No apparent distress, appears stated age and cooperative. HEENT: Pupils equal, round, and reactive to light. Conjunctivae/corneas clear. Neck: Supple, with full range of motion. No jugular venous distention. Trachea midline. Respiratory:  Normal respiratory effort. Clear to auscultation, bilaterally without Rales/Wheezes/Rhonchi.   Cardiovascular: Regular rate and rhythm with normal S1/S2 without murmurs, rubs or gallops. Abdomen: Soft, non-tender, non-distended with normal bowel sounds. Musculoskeletal: Bilateral edema noted  Skin: Skin color, texture, turgor normal.  No rashes or lesions. Neurologic:  Neurovascularly intact without any focal sensory/motor deficits. Cranial nerves: II-XII intact, grossly non-focal.  Psychiatric: Alert  Capillary Refill: Brisk,3 seconds, normal   Peripheral Pulses: +2 palpable, equal bilaterally        Labs:   Recent Labs     04/09/22  1205 04/10/22  0801 04/11/22  1120   WBC 5.9 6.4 5.6   HGB 7.7* 7.9* 8.5*   HCT 23.8* 24.4* 26.1*    208 231     Recent Labs     04/10/22  0800 04/11/22  0340 04/11/22  1345   * 132* 136   K 4.3 4.8 4.1   CL 99 97* 100   CO2 20* 20* 22   BUN 60* 82* 41*   CREATININE 3.1* 3.5* 2.2*   CALCIUM 8.6 9.2 8.9     Recent Labs     04/09/22  1205 04/10/22  0800 04/11/22  1345   AST 19 17 19   ALT 16 14 15   BILITOT 0.6 0.7 1.0   ALKPHOS 121 119 117     No results for input(s): INR in the last 72 hours. No results for input(s): Towana Ball in the last 72 hours. Urinalysis:      Lab Results   Component Value Date    NITRU Negative 04/10/2022    WBCUA 0-2 04/10/2022    BACTERIA 2+ 04/09/2022    RBCUA 0-2 04/10/2022    BLOODU Negative 04/10/2022    SPECGRAV 1.025 04/10/2022    GLUCOSEU Negative 04/10/2022       Radiology:  IR TUNNELED CVC PLACE WO SQ PORT/PUMP > 5 YEARS   Final Result   Successful conversion of a non tunneled hemodialysis catheter for a new 19   centimeter tunneled hemodialysis catheter. IR NONTUNNELED VASCULAR CATHETER > 5 YEARS   Final Result   Successful ultrasound and fluoroscopy guided right IJ 15 cm non-tunneled   hemodialysis catheter placement. CT ABDOMEN PELVIS WO CONTRAST Additional Contrast? None   Final Result   There is marked cardiomegaly, with trace pleural effusions, as well as   moderate intra-abdominal and pelvic ascites, as well as diffuse anasarca.    Hepatic vein reflux hypotension     SONAL on CKD  Cardiorenal, creatinine increasing to 2.8--> 3--> 3.1--> 3.3-->3.7. Multiple discussions nephrology input highly appreciated,   Line placed and started on dialysis  S/p tunneled line placement on Friday - holding xarelto     Anemia - hb dropped from 7.0-->6.7, pt refused blood transfusion. ?oozing from IV access while on xarelto. Hold xarelto. Check occult blood, IV venofer and epocrit  Received 1 unit blood, hb improved from 6.6-->9.0-->7.7  Positive occult stool  GI consulted - appr recs - no plans for procedure given EF     Hypotension - BP dropped, hold entresto, BB with holding parameter, hold amio. Holding dialysis today. added midodrine TID yesterday and plan dialysis today     Syncope. Ventricular tachycardia. Interrogation showed VT that was appropriately shocked. Continue amiodarone and metoprolol. Appreciate cardiology input. Holding xarelto     CAD. Continue aspirin beta-blocker and statin.     Left ventricular thrombus. hold Xarelto-->resume AC when ok with everyone     Diabetes mellitus. Sliding scale        DVT Prophylaxis: SCD  Diet: ADULT DIET; Regular; 4 carb choices (60 gm/meal); Low Sodium (2 gm)  Code Status: Full Code    PT/OT Eval Status: ordered    Dispo - cont care.  Will resume xarelto and monitor hb and monitor for bleed    Arlys Opitz, MD

## 2022-04-11 NOTE — PLAN OF CARE
Problem: Falls - Risk of:  Goal: Will remain free from falls  Description: Will remain free from falls  4/11/2022 0051 by Erasmo Stoddard RN  Outcome: Ongoing  4/10/2022 1335 by Leobardo Triplett RN  Outcome: Ongoing     Problem: Pain:  Goal: Patient's pain/discomfort is manageable  Description: Patient's pain/discomfort is manageable  4/11/2022 0051 by Erasmo Stoddard RN  Outcome: Ongoing  4/10/2022 1335 by Leobardo Triplett RN  Outcome: Ongoing     Problem: Skin Integrity:  Goal: Skin integrity will stabilize  Description: Skin integrity will stabilize  4/11/2022 0051 by Erasmo Stoddard RN  Outcome: Ongoing  4/10/2022 1335 by Leobardo Triplett RN  Outcome: Ongoing     Problem: Infection:  Goal: Will remain free from infection  Description: Will remain free from infection  4/11/2022 0051 by Erasmo Stoddard RN  Outcome: Ongoing  4/10/2022 1335 by Leobardo Triplett RN  Outcome: Ongoing     Problem: HEMODYNAMIC STATUS  Goal: Patient has stable vital signs and fluid balance  4/11/2022 0051 by Erasmo Stoddard RN  Outcome: Ongoing  4/10/2022 1335 by Leobardo Triplett RN  Outcome: Ongoing

## 2022-04-11 NOTE — PROGRESS NOTES
[facility]                                               Progress note    Summary:   Va Maria is being seen by nephrology for SONAL on CKD. Admitted with volume overload, acute exacerbation of heart failure. Interval History  Patient was seen and examined on dialysis. Awake and alert, no complaints  He was supposed to discharge over the weekend but had hypotension and dark stools. entresto held, hypotension limiting   Now on midodrine 5 mg TID    Blood pressure 146/72  100% on room air and no edema  HR 69 , afebrile. Labs reviewed  Sodium 132 potassium 4.8  Bicarb 20 BUN 82 creatinine 3.5    vitamin D 32.5    Plan:   - HD today, BUN say precipitously despite HD on Saturday, may be related to GIB  - has TTS spot arranged outpatient so resume TTS schedule tomorrow.  -100 mg of Venofer weekly with dialysis  -Retacrit to 20,000 units q. HD  - BP acceptable on scheduled midodrine 5 TID now.    entresto held        Magi Morton MD  [facility]  Office: [phone]    Assessment:     #SONAL on CKD4   Due to cardiorenal syndrome   Has not responded well to diuretics and inotrope  [place]rem[place]  Dialysis has been recommended. Patient not sure. > HD start 04/11/22     #Hyponatremia  Due to heart failure     #anemia  CKD related and anemia of chronic inflammation   tsat 13%  Ferritin 271 back in Feb 2021    Ischemic cardiomyopathy  Decompensated, has evidence of volume overload  proBNP [id number]  Managed with Lasix drip and is on milrinone, appreciate cardiology involvement. Last EF less than 20%  History of coronary artery disease with stent in the LAD  Has ICD  kareem held     Diabetes type 2  10 years at least  Longstanding albuminuria  A1c has been ranging between 6 and 6.5 the last few checks. Hypertension  On inotrope BP acceptable. ROS:     Positives Listed Bold. All other remaining systems are negative.     Constitutional:  fever, chills, weakness, weight change, fatigue, Skin:  rash, pruritus, hair loss, bruising, dry skin, petechiae. Head, Face, Neck   headaches, swelling,  cervical adenopathy. Respiratory: shortness of breath, cough, or wheezing  Cardiovascular: chest pain, palpitations, dizzy, edema  Gastrointestinal: nausea, vomiting, diarrhea, constipation,belly pain    Yellow skin, blood in stool  Musculoskeletal:  back pain, muscle weakness, gait problems,       joint pain or swelling. Genitourinary:  dysuria, poor urine flow, flank pain, blood in urine  Neurologic:  vertigo, TIA'S, syncope, seizures, focal weakness  Psychosocial:  insomnia, anxiety, or depression. Additional positive findings: -     PMH:   Past medical history, surgical history, social history, family history are reviewed and updated as appropriate. Reviewed current medication list.   Allergies reviewed and updated as needed. PE:   Vitals:    04/11/22 0730   BP: (!) 146/72   Pulse: 69   Resp: 13   Temp: 98 °F (36.7 °C)   SpO2: 100%       General appearance:  in NAD, somnolent. HEENT: EOM intact, no icterus. Trachea is midline. Neck : No masses, appears symmetrical, no JVD  Respiratory: Respiratory effort appears normal, bilateral equal chest rise  Cardiovascular: Ausculation shows RRR no edema  Abdomen: No visible mass or tenderness, non distended. Musculoskeletal:  Joints with no swelling or deformity. Skin:no rashes, ulcers, induration, no jaundice. Neuro: face symmetric, no focal deficits. Appropriate responses.        Lab Results   Component Value Date    CREATININE 3.5 (H) 04/11/2022    BUN 82 (HH) 04/11/2022     (L) 04/11/2022    K 4.8 04/11/2022    CL 97 (L) 04/11/2022    CO2 20 (L) 04/11/2022      Lab Results   Component Value Date    WBC 6.4 04/10/2022    HGB 7.9 (L) 04/10/2022    HCT 24.4 (L) 04/10/2022    .5 (H) 04/10/2022     04/10/2022     Lab Results   Component Value Date    .8 (H) 04/07/2022    CALCIUM 9.2 04/11/2022    PHOS 3.4 04/07/2022

## 2022-04-12 NOTE — FLOWSHEET NOTE
04/12/22 0852 04/12/22 1225   Vital Signs   /71 112/75   Temp 97.5 °F (36.4 °C) 97.9 °F (36.6 °C)   Pulse 67 65   Resp 18 18   Weight 156 lb 15.5 oz (71.2 kg) 154 lb 5.2 oz (70 kg)   Weight Method Bed scale Bed scale   Post-Hemodialysis Assessment   NET Removed (ml)  --  1000 ml   Treatment time: 3.5 hours  Net UF: 1000 ml    Pre weight: 71.2 kg   Post weight: 70.0 kg  EDW: tbd kg    Access used: RIJ  Access function: good with  ml/min    Medications or blood products given: midodrine and retacrit     Regular outpatient schedule: TTS     Summary of response to treatment: good    Copy of dialysis treatment record placed in chart, to be scanned into EMR.

## 2022-04-12 NOTE — PLAN OF CARE
Problem: Falls - Risk of:  Goal: Will remain free from falls  Description: Will remain free from falls  4/12/2022 0930 by Ion Jang RN  Outcome: Ongoing  Note: Fall precautions in place. Bed locked and in lowest position. Call light within reach. Room kept free of clutter.    4/11/2022 2221 by Corey Ovalle RN  Outcome: Ongoing  Goal: Absence of physical injury  Description: Absence of physical injury  4/12/2022 0930 by Ion Jang RN  Outcome: Ongoing  4/11/2022 2221 by Corey Ovalle RN  Outcome: Ongoing     Problem: Pain:  Goal: Patient's pain/discomfort is manageable  Description: Patient's pain/discomfort is manageable  4/12/2022 0930 by Ion Jang RN  Outcome: Ongoing  4/11/2022 2221 by Corey Ovalle RN  Outcome: Ongoing  Goal: Pain level will decrease  Description: Pain level will decrease  4/12/2022 0930 by Ion Jang RN  Outcome: Ongoing  4/11/2022 2221 by Corey Ovalle RN  Outcome: Ongoing  Goal: Control of acute pain  Description: Control of acute pain  4/12/2022 0930 by Ion Jang RN  Outcome: Ongoing  4/11/2022 2221 by Corey Ovalle RN  Outcome: Ongoing  Goal: Control of chronic pain  Description: Control of chronic pain  4/12/2022 0930 by Ion Jang RN  Outcome: Ongoing  4/11/2022 2221 by Corey Ovalle RN  Outcome: Ongoing     Problem: Skin Integrity:  Goal: Skin integrity will stabilize  Description: Skin integrity will stabilize  4/12/2022 0930 by Ion Jang RN  Outcome: Ongoing  4/11/2022 2221 by Corey Ovalle RN  Outcome: Ongoing     Problem: Infection:  Goal: Will remain free from infection  Description: Will remain free from infection  4/12/2022 0930 by Ion Jang RN  Outcome: Ongoing  4/11/2022 2221 by Corey Ovalle RN  Outcome: Ongoing     Problem: Safety:  Goal: Free from accidental physical injury  Description: Free from accidental physical injury  4/12/2022 0930 by Ion Jnag RN  Outcome: Ongoing  4/11/2022 2221 by Corey Ovalle, RN  Outcome: Ongoing  Goal: Free from intentional harm  Description: Free from intentional harm  4/12/2022 0930 by Rigo Poole RN  Outcome: Ongoing  4/11/2022 2221 by Leonila Frausto RN  Outcome: Ongoing     Problem: Daily Care:  Goal: Daily care needs are met  Description: Daily care needs are met  4/12/2022 0930 by Rigo Poole RN  Outcome: Ongoing  4/11/2022 2221 by Leonila Frausto RN  Outcome: Ongoing     Problem: Discharge Planning:  Goal: Patients continuum of care needs are met  Description: Patients continuum of care needs are met  4/12/2022 0930 by Rigo Poole RN  Outcome: Ongoing  4/11/2022 2221 by Leonila Frausto RN  Outcome: Ongoing     Problem: OXYGENATION/RESPIRATORY FUNCTION  Goal: Patient will maintain patent airway  4/12/2022 0930 by Rigo Poole RN  Outcome: Ongoing  4/11/2022 2221 by Leonila Frausto RN  Outcome: Ongoing  Goal: Patient will achieve/maintain normal respiratory rate/effort  Description: Respiratory rate and effort will be within normal limits for the patient  4/12/2022 0930 by Rigo Poole RN  Outcome: Ongoing  4/11/2022 2221 by Leonila Frausto RN  Outcome: Ongoing     Problem: HEMODYNAMIC STATUS  Goal: Patient has stable vital signs and fluid balance  4/12/2022 0930 by Rigo Poole RN  Outcome: Ongoing  4/11/2022 2221 by Leonila Frausto RN  Outcome: Ongoing     Problem: FLUID AND ELECTROLYTE IMBALANCE  Goal: Fluid and electrolyte balance are achieved/maintained  4/12/2022 0930 by Rigo Poole RN  Outcome: Ongoing  4/11/2022 2221 by Leonila Frausto RN  Outcome: Ongoing     Problem: ACTIVITY INTOLERANCE/IMPAIRED MOBILITY  Goal: Mobility/activity is maintained at optimum level for patient  4/12/2022 0930 by Rigo Poole RN  Outcome: Ongoing  4/11/2022 2221 by Leonila Frausto RN  Outcome: Ongoing     Problem: Skin Integrity:  Goal: Will show no infection signs and symptoms  Description: Will show no infection signs and symptoms  4/12/2022 0930 by Rigo Poole RN  Outcome: Ongoing  4/11/2022 2221 by Leonila Frausto RN  Outcome: Ongoing  Goal: Absence of new skin breakdown  Description: Absence of new skin breakdown  4/12/2022 0930 by Krys Burdick RN  Outcome: Ongoing  4/11/2022 2221 by Severiano Geronimo RN  Outcome: Ongoing     Problem: Fluid Volume:  Goal: Will show no signs or symptoms of fluid imbalance  Description: Will show no signs or symptoms of fluid imbalance  4/12/2022 0930 by Krys Burdick RN  Outcome: Ongoing  4/11/2022 2221 by Severiano Geronimo RN  Outcome: Ongoing     Problem: Nutritional:  Goal: Ability to identify appropriate dietary choices will improve  Description: Ability to identify appropriate dietary choices will improve  4/12/2022 0930 by Krys Burdick RN  Outcome: Ongoing  4/11/2022 2221 by Severiano Geronimo RN  Outcome: Ongoing     Problem: Physical Regulation:  Goal: Complications related to the disease process, condition or treatment will be avoided or minimized  Description: Complications related to the disease process, condition or treatment will be avoided or minimized  4/12/2022 0930 by Krys Burdick RN  Outcome: Ongoing  4/11/2022 2221 by Severiano Geronimo RN  Outcome: Ongoing

## 2022-04-12 NOTE — DISCHARGE INSTR - COC
Continuity of Care Form    Patient Name: Rickey Ortiz   :  1955  MRN:  [id number]    Admit date:  3/25/2022  Discharge date:  ***    Code Status Order: Full Code   Advance Directives:      Admitting Physician:  Delaney Samayoa DO  PCP: Deo Mcgregor DO    Discharging Nurse: [facility] Unit/Room#: X6R-6600/6824-07  Discharging Unit Phone Number: ***    Emergency Contact:   Extended Emergency Contact Information  Primary Emergency Contact: Thi Oswald  Address: [address] [facility], [address] Phone: [phone]  Relation: Child  Secondary Emergency Contact: Minerva Spatz  Address: [place] [facility] [address]            [place], [address] [place] [address],[facility] [address] Phone: [phone]  Relation: Brother/Sister    Past Surgical History:  Past Surgical History:   Procedure Laterality Date    EYE SURGERY      IR NONTUNNELED VASCULAR CATHETER  2022    IR NONTUNNELED VASCULAR CATHETER 2022 [facility]    IR TUNNELED CATHETER PLACEMENT GREATER THAN 5 YEARS  2022    IR TUNNELED CATHETER PLACEMENT GREATER THAN 5 YEARS 2022 [facility]       Immunization History:   Immunization History   Administered Date(s) Administered    COVID-19, Pfizer Purple top, DILUTE for use, 12+ yrs, 30mcg/0.3mL dose 2021, 2021, 2021    Influenza Virus Vaccine 2017    Influenza, Quadv, IM, PF (6 mo and older Fluzone, Flulaval, Fluarix, and 3 yrs and older Afluria) 2021       Active Problems:  Patient Active Problem List   Diagnosis Code    ED (erectile dysfunction) N52.9    Essential hypertension, benign I10    Hyperlipidemia E78.5    Diabetic nephropathy ([address] E11.21    Diabetes type 2, uncontrolled ([address] E11.65    Noncompliance Z91.19    Elevated blood uric acid level E79.0    Coronary artery disease involving native coronary artery I25.10    Elevated PSA R97.20    Acute on chronic combined systolic and diastolic HF (heart failure) ([facility]) I50.43    Stage 3 chronic kidney disease ([facility]) N18.30    SONAL (acute kidney injury) ([facility] N17.9    Biventricular heart failure ([facility]) I50.82    Acute on chronic systolic heart failure, NYHA class 4 ([facility]) I50.23    Acute on chronic congestive heart failure ([facility]) I50.9    LV (left ventricular) mural thrombus I51.3    Syncope R55    CHF (congestive heart failure) ([facility]) I50.9    Syncope and collapse R55    ICD (implantable cardioverter-defibrillator) in place Z95.810    Demand ischemia ([facility] I24.8    Ventricular tachycardia ([facility]) I47.2       Isolation/Infection:   Isolation            No Isolation          Patient Infection Status       Infection Onset Added Last Indicated Last Indicated By Review Planned Expiration Resolved Resolved By    None active    Resolved    C-diff Rule Out 02/07/22 02/07/22 02/07/22 GI Bacterial Pathogens By PCR (Ordered)   02/07/22 Helio Abreu RN            Nurse Assessment:  Last Vital Signs: /75   Pulse 65   Temp 97.9 °F (36.6 °C)   Resp 18   Ht 5' 6\" (1.676 m)   Wt 154 lb 5.2 oz (70 kg)   SpO2 96%   BMI 24.91 kg/m²     Last documented pain score (0-10 scale): Pain Level: 0  Last Weight:   Wt Readings from Last 1 Encounters:   04/12/22 154 lb 5.2 oz (70 kg)     Mental Status:  oriented    IV Access:  - Dialysis Catheter  - site  internal jugular, insertion date: ***    Nursing Mobility/ADLs:  Walking   Assisted  Transfer  Assisted  Bathing  Independent  Dressing  Assisted  Toileting  Assisted  Feeding  Independent  Med Admin  Independent  Med Delivery   whole    Wound Care Documentation and Therapy:        Elimination:  Continence:    Bowel: Yes  Bladder: {YES / RV:13511}  Urinary Catheter: None   Colostomy/Ileostomy/Ileal Conduit: No       Date of Last BM: 04/14/2022        Intake/Output Summary (Last 24 hours) at 4/12/2022 1240  Last data filed at 4/12/2022 1225  Gross per 24 hour   Intake 520 ml   Output 1400 ml   Net -880 ml     I/O last 3 completed shifts: In: 400   Out: 2400     Safety Concerns: At Risk for Falls    Impairments/Disabilities:      Vision    Nutrition Therapy:  Current Nutrition Therapy:   - Oral Diet:  General    Routes of Feeding: None  Liquids: No Restrictions  Daily Fluid Restriction: no  Last Modified Barium Swallow with Video (Video Swallowing Test): not done    Treatments at the Time of Hospital Discharge:   Respiratory Treatments:     Oxygen Therapy:  is not on home oxygen therapy. Ventilator:    - No ventilator support    Rehab Therapies: ***  Weight Bearing Status/Restrictions: No weight bearing restrictions  Other Medical Equipment (for information only, NOT a DME order):  walker  Other Treatments: ***    Patient's personal belongings (please select all that are sent with patient):  None    RN SIGNATURE:  Electronically signed by Morena Iglesias RN on 4/13/22 at 2:34 PM EDT    CASE MANAGEMENT/SOCIAL WORK SECTION    Inpatient Status Date: 3/26/2022    Readmission Risk Assessment Score:  Readmission Risk              Risk of Unplanned Readmission:  28           Discharging to Facility/ Agency   Shoshana Griffin   [phone]     Dialysis Facility (if applicable)   Name: [facility]    Address:  Dialysis Schedule: \[facility]\"" [address]    Phone: [phone]  Fax: [phone]    / signature: Electronically signed by SANJUANA Carey, LSW on 4/12/22 at 12:47 PM EDT    PHYSICIAN SECTION    Prognosis: Fair    Condition at Discharge: Stable    Rehab Potential (if transferring to Rehab): Fair    Recommended Labs or Other Treatments After Discharge: NA    Physician Certification: I certify the above information and transfer of Randy Serrano  is necessary for the continuing treatment of the diagnosis listed and that he requires [address] for less 30 days.      Update Admission H&P: Changes in H&P as follows - refer to dc summary    PHYSICIAN SIGNATURE:  Electronically signed by María Betancourt MD on 4/13/22 at 2:40 PM EDT

## 2022-04-12 NOTE — PLAN OF CARE
Problem: Falls - Risk of:  Goal: Will remain free from falls  Description: Will remain free from falls  4/11/2022 2221 by Ximena Carbajal RN  Outcome: Ongoing  4/11/2022 0943 by Lauren Huerta RN  Outcome: Ongoing  Note: Fall precautions in place. Bed locked and in lowest position. Call light within reach. Alarm on.    Goal: Absence of physical injury  Description: Absence of physical injury  4/11/2022 2221 by Ximena Carbajal RN  Outcome: Ongoing  4/11/2022 0943 by Lauren Huerta RN  Outcome: Ongoing     Problem: Pain:  Goal: Patient's pain/discomfort is manageable  Description: Patient's pain/discomfort is manageable  4/11/2022 2221 by Ximena Carbajal RN  Outcome: Ongoing  4/11/2022 0943 by Lauren Huerta RN  Outcome: Ongoing  Goal: Pain level will decrease  Description: Pain level will decrease  4/11/2022 2221 by Ximena Carbajal RN  Outcome: Ongoing  4/11/2022 0943 by Lauren Huerta RN  Outcome: Ongoing  Goal: Control of acute pain  Description: Control of acute pain  4/11/2022 2221 by Ximena Carbajal RN  Outcome: Ongoing  4/11/2022 0943 by Lauren Huerta RN  Outcome: Ongoing  Goal: Control of chronic pain  Description: Control of chronic pain  4/11/2022 2221 by Ximena Carbajal RN  Outcome: Ongoing  4/11/2022 0943 by Lauren Huerta RN  Outcome: Ongoing     Problem: Skin Integrity:  Goal: Skin integrity will stabilize  Description: Skin integrity will stabilize  4/11/2022 2221 by Ximena Carbajal RN  Outcome: Ongoing  4/11/2022 0943 by Lauren Huerta RN  Outcome: Ongoing     Problem: Infection:  Goal: Will remain free from infection  Description: Will remain free from infection  4/11/2022 2221 by Ximena Carbajal RN  Outcome: Ongoing  4/11/2022 0943 by Lauren Huerta RN  Outcome: Ongoing     Problem: Safety:  Goal: Free from accidental physical injury  Description: Free from accidental physical injury  4/11/2022 2221 by Ximena Carbajal RN  Outcome: Ongoing  4/11/2022 0943 by Lauren Huerta RN  Outcome: Ongoing  Goal: Free from intentional harm  Description: Free from intentional harm  4/11/2022 2221 by Judit Waddell RN  Outcome: Ongoing  4/11/2022 0943 by Leandro Lala RN  Outcome: Ongoing     Problem: Daily Care:  Goal: Daily care needs are met  Description: Daily care needs are met  4/11/2022 2221 by Judit Waddell RN  Outcome: Ongoing  4/11/2022 0943 by Leandro Lala RN  Outcome: Ongoing     Problem: Discharge Planning:  Goal: Patients continuum of care needs are met  Description: Patients continuum of care needs are met  4/11/2022 2221 by Judit Waddell RN  Outcome: Ongoing  4/11/2022 0943 by Leandro Lala RN  Outcome: Ongoing     Problem: OXYGENATION/RESPIRATORY FUNCTION  Goal: Patient will maintain patent airway  4/11/2022 2221 by Judit Waddell RN  Outcome: Ongoing  4/11/2022 0943 by Leandro Lala RN  Outcome: Ongoing  Goal: Patient will achieve/maintain normal respiratory rate/effort  Description: Respiratory rate and effort will be within normal limits for the patient  4/11/2022 2221 by Judit Waddell RN  Outcome: Ongoing  4/11/2022 0943 by Leandro Lala RN  Outcome: Ongoing     Problem: HEMODYNAMIC STATUS  Goal: Patient has stable vital signs and fluid balance  4/11/2022 2221 by Judit Waddell RN  Outcome: Ongoing  4/11/2022 0943 by Leandro Lala RN  Outcome: Ongoing     Problem: FLUID AND ELECTROLYTE IMBALANCE  Goal: Fluid and electrolyte balance are achieved/maintained  4/11/2022 2221 by Judit Waddell RN  Outcome: Ongoing  4/11/2022 0943 by Leandro Lala RN  Outcome: Ongoing     Problem: ACTIVITY INTOLERANCE/IMPAIRED MOBILITY  Goal: Mobility/activity is maintained at optimum level for patient  4/11/2022 2221 by Judit Waddell RN  Outcome: Ongoing  4/11/2022 0943 by Leandro Lala RN  Outcome: Ongoing     Problem: Skin Integrity:  Goal: Will show no infection signs and symptoms  Description: Will show no infection signs and symptoms  4/11/2022 2221 by Judit Waddell RN  Outcome: Ongoing  4/11/2022 0943 by Leandro Lala RN  Outcome: Ongoing  Goal: Absence of new skin breakdown  Description: Absence of new skin breakdown  4/11/2022 2221 by Forest Ashford RN  Outcome: Ongoing  4/11/2022 0943 by Chhaya Bosch RN  Outcome: Ongoing     Problem: Fluid Volume:  Goal: Will show no signs or symptoms of fluid imbalance  Description: Will show no signs or symptoms of fluid imbalance  4/11/2022 2221 by Forest Ashford RN  Outcome: Ongoing  4/11/2022 0943 by Chhaya Bosch RN  Outcome: Ongoing     Problem: Nutritional:  Goal: Ability to identify appropriate dietary choices will improve  Description: Ability to identify appropriate dietary choices will improve  4/11/2022 2221 by Forest Ashford RN  Outcome: Ongoing  4/11/2022 0943 by Chhaya Bosch RN  Outcome: Ongoing     Problem: Physical Regulation:  Goal: Complications related to the disease process, condition or treatment will be avoided or minimized  Description: Complications related to the disease process, condition or treatment will be avoided or minimized  4/11/2022 2221 by Forest Ashford RN  Outcome: Ongoing  4/11/2022 0943 by Chhaya Bosch RN  Outcome: Ongoing

## 2022-04-12 NOTE — CARE COORDINATION
Discharge Planning:   PT/OT: Continue to assess pending progress,Home with assist PRN     PLAN: Return home with outpatient HD    Confirmed Dialysis chair time with RN at HD center. [facility]: [address]    TTS PHONE: [phone]  FAX: [phone]    NEED: send AVS and HD flow sheets to HD center upon discharge.      SANJUANA Mobley, HIRO, Social Work/Case Management   [phone]  Electronically signed by SANJUANA Mobley, HIRO on 4/12/2022 at 12:46 PM

## 2022-04-12 NOTE — PROGRESS NOTES
[facility]                                               Progress note    Summary:   Maribel Banks is being seen by nephrology for SONAL on CKD. Admitted with volume overload, acute exacerbation of heart failure. Interval History  Patient was seen and examined . Awake and alert, no complaints  entresto held, hypotension limiting    midodrine 5 mg TID    Blood pressure 118/89  95% on room air and no edema  HR 66 , afebrile. Labs reviewed  Sodium 136 potassium 4.7 bicarb 21 BUN 50 creatinine 3.2 calcium 9.2  Hemoglobin 9    Plan:   - HD today per TTS schedule.  -100 mg of Venofer weekly with dialysis  -Retacrit to 20,000 units q. HD  - BP acceptable on scheduled midodrine 5 TID now.    entresto held    Okay to discharge from nephrology perspective  Has TTS but at [address], [address] [place] [address] Nephrology  Office: [phone]    Assessment:     #SONAL on CKD4   Due to cardiorenal syndrome   Has not responded well to diuretics and inotrope  [place]  Dialysis has been recommended. Patient not sure. > HD start 04/12/22     #Hyponatremia  Due to heart failure     #anemia  CKD related and anemia of chronic inflammation   tsat 13%  Ferritin 271 back in Feb 2021    Ischemic cardiomyopathy  Decompensated, has evidence of volume overload  proBNP 26368  Managed with Lasix drip and is on milrinone, appreciate cardiology involvement. Last EF less than 20%  History of coronary artery disease with stent in the LAD  Has ICD  entresto held     Diabetes type 2  10 years at least  Longstanding albuminuria  A1c has been ranging between 6 and 6.5 the last few checks. Hypertension  On inotrope BP acceptable. ROS:     Positives Listed Bold. All other remaining systems are negative. Constitutional:  fever, chills, weakness, weight change, fatigue,      Skin:  rash, pruritus, hair loss, bruising, dry skin, petechiae. Head, Face, Neck   headaches, swelling,  cervical adenopathy. Respiratory: shortness of breath, cough, or wheezing  Cardiovascular: chest pain, palpitations, dizzy, edema  Gastrointestinal: nausea, vomiting, diarrhea, constipation,belly pain    Yellow skin, blood in stool  Musculoskeletal:  back pain, muscle weakness, gait problems,       joint pain or swelling. Genitourinary:  dysuria, poor urine flow, flank pain, blood in urine  Neurologic:  vertigo, TIA'S, syncope, seizures, focal weakness  Psychosocial:  insomnia, anxiety, or depression. Additional positive findings: -     PMH:   Past medical history, surgical history, social history, family history are reviewed and updated as appropriate. Reviewed current medication list.   Allergies reviewed and updated as needed. PE:   Vitals:    04/12/22 1245   BP: 118/89   Pulse: 64   Resp: 16   Temp: 98 °F (36.7 °C)   SpO2: 95%       General appearance:  in NAD, somnolent. HEENT: EOM intact, no icterus. Trachea is midline. Neck : No masses, appears symmetrical, no JVD  Respiratory: Respiratory effort appears normal, bilateral equal chest rise  Cardiovascular: Ausculation shows RRR no edema  Abdomen: No visible mass or tenderness, non distended. Musculoskeletal:  Joints with no swelling or deformity. Skin:no rashes, ulcers, induration, no jaundice. Neuro: face symmetric, no focal deficits. Appropriate responses.        Lab Results   Component Value Date    CREATININE 3.2 (H) 04/12/2022    BUN 50 (H) 04/12/2022     04/12/2022    K 4.7 04/12/2022     04/12/2022    CO2 21 04/12/2022      Lab Results   Component Value Date    WBC 5.5 04/12/2022    HGB 9.0 (L) 04/12/2022    HCT 27.0 (L) 04/12/2022    .5 (H) 04/12/2022     04/12/2022     Lab Results   Component Value Date    .8 (H) 04/07/2022    CALCIUM 9.2 04/12/2022    PHOS 3.4 04/07/2022

## 2022-04-13 NOTE — PLAN OF CARE
Problem: Falls - Risk of:  Goal: Will remain free from falls  Description: Will remain free from falls  Outcome: Ongoing     Problem: Pain:  Goal: Patient's pain/discomfort is manageable  Description: Patient's pain/discomfort is manageable  Outcome: Ongoing     Problem: Skin Integrity:  Goal: Skin integrity will stabilize  Description: Skin integrity will stabilize  Outcome: Ongoing     Problem: OXYGENATION/RESPIRATORY FUNCTION  Goal: Patient will maintain patent airway  Outcome: Ongoing     Problem: HEMODYNAMIC STATUS  Goal: Patient has stable vital signs and fluid balance  Outcome: Ongoing     Problem: FLUID AND ELECTROLYTE IMBALANCE  Goal: Fluid and electrolyte balance are achieved/maintained  Outcome: Ongoing

## 2022-04-13 NOTE — PROGRESS NOTES
DC order noted. Pt has been seen in HF RN consult and has had well over 60+ mins of HF education (this admission and priors). He is new HD and is set up for T,TH,Sat. HF dc instructions are on AVS. Hospital f/u appts are in place:    4/18 at 11:00 with Dr Priti Jerome  4/27 at 2:15 with Alejandro Herrera HF NP    Bedside RN updated.

## 2022-04-13 NOTE — CARE COORDINATION
[facility]    Referral received from  to follow for home care services. [facility] unable to staff timely, will require alternate agency, no preference, CM aware. Renee Wilkerson at [address] accepted referral, will pull from [facility].     Christine Amaro RN, BSN CTN  [facility] [phone]

## 2022-04-13 NOTE — CARE COORDINATION
CASE MANAGEMENT DISCHARGE SUMMARY:  Met with patient, son, & also spoke to sister Joleen Chavez with patient's approval. Agreeable to discharge home, patient wants home care RN. Home care options provided, patient with no preference for home care agency, agrees to referral to whoever we can find. [address] unable to accept, they made referral to [facility] [address] who can accept & pull orders via Epic. Transportation arranged to & from HD through 4/28/22 via [facility] (see below). Patient provided information to schedule further transportation or advised to contact [facility] if needs further assist. RN aware of discharge plan. DISCHARGE DATE: 4/13/2022    DISCHARGED TO: Home with home care & family support     HOME CARE AGENCY:   · Name: [facility] [address]  · Address:  [address] , [place] [address]  · Phone:  [phone]  · Fax:  [phone]    HEMODIALYSIS:   [facility]   [address] [address]  (p) [phone] (f) [phone]  TTS 10:45am (arrive 10:15am)    HD TRANSPORTATION: Set up via [facility] [phone], was told can only schedule up to 2 weeks in advance and patient only has 30 round trips available.   Transport will arrive between 9:30-10am and  from HD facility at 2:45pm, reservation #'s below:  4/14 #[id number]  4/16 #[id number]  4/19 #[id number]  4/21 #[id number]  4/23 #[id number]  4/26 #[id number]  4/28 #[id number]    TRANSPORTATION HOME: Family             TIME: 6pm     Electronically signed by Reena Looney RN Case Management [phone] on 4/13/2022 at 11:58 AM

## 2022-04-13 NOTE — PROGRESS NOTES
Occupational Therapy  Facility/Department: [address] PROGRESSIVE CARE  Daily Treatment Note and Tentative D/C    NAME: John Davey  : 1955  MRN: [id number]    Date of Service: 2022    Discharge Recommendations: John Davey scored a 21/24 on the AM-PAC ADL Inpatient form. Current research shows that an AM-PAC score of 18 or greater is typically associated with a discharge to the patient's home setting. If patient discharges prior to next session this note will serve as a discharge summary. Please see below for the latest assessment towards goals. Continue to assess pending progress,Home with assist PRN  OT Equipment Recommendations  Equipment Needed: No    Assessment   Performance deficits / Impairments: Decreased functional mobility ; Decreased strength;Decreased endurance;Decreased ADL status; Decreased balance;Decreased high-level IADLs  Assessment: Pt continues to make good progress with therapy. Pt completes functional mobility and transfers with supervision and use of RW. Pt able to ambulate ~250ft with supervision and RW. Pt completes LB dressing with supervision to don hospital pants. Continue with POC. Prognosis: Good  Assistance / Modification: RW  OT Education: OT Role;Plan of Care;Transfer Training;ADL Adaptive Strategies  REQUIRES OT FOLLOW UP: Yes  Activity Tolerance  Activity Tolerance: Patient Tolerated treatment well  Safety Devices  Safety Devices in place: Yes  Type of devices: Call light within reach;Nurse notified; Left in chair;Chair alarm in place         Patient Diagnosis(es): The encounter diagnosis was Syncope and collapse.      has a past medical history of CHF (congestive heart failure) ([address] Diabetes type 2, uncontrolled ([address] Diabetic nephropathy, ED (erectile dysfunction), Essential hypertension, benign, Hyperlipidemia, and Noncompliance.    has a past surgical history that includes eye surgery; IR NONTUNNELED VASCULAR CATHETER > 5 YEARS (2022); and IR TUNNELED CVC Cognitive Status: [facility]           Plan   Plan  Times per week: 3-5x  Current Treatment Recommendations: Strengthening,Functional Mobility Training,Gait Training,Endurance Training,Balance Training,Self-Care / ADL,Safety Education & Training,Patient/Caregiver Education & Training    AM-PAC Score        AM-PAC Inpatient Daily Activity Raw Score: 21 (04/11/22 1446)  AM-PAC Inpatient ADL T-Scale Score : 44.27 (04/11/22 1446)  ADL Inpatient CMS 0-100% Score: 32.79 (04/11/22 1446)  ADL Inpatient CMS G-Code Modifier : Aarti Villanueva (04/11/22 1446)    Goals  Short term goals  Time Frame for Short term goals: prior to D/C; ongoing 4/13  Short term goal 1: complete functional mobility and transfers Mod Ind  Short term goal 2: complete bathing and dressing Mod Ind  Short term goal 3: complete toileting Mod Ind  Short term goal 4: complete grooming in stance at sink [address] term goals  Time Frame for Long term goals : STG=LTG  Patient Goals   Patient goals : return home       Therapy Time   Individual Concurrent Group Co-treatment   Time In 0908         Time Out 0932         Minutes 24         Timed Code Treatment Minutes: 24 Minutes       Todd Conley OTR/L

## 2022-04-14 NOTE — LETTER
[place]  [address], [address]        Joelle Hinton   [address]           04/18/22     Dear Joelle Hinton,    We tried to reach you recently regarding your Atorvastatin, but were unable to reach you on the telephone. This medication can be filled for a 3-month supply to save you time and trips to the pharmacy. It appears that this medication has not been filled at proper times, it is important that you take your medications regularly and try not to miss a single dose. Some ways to help you remember to take and refill your medications are to use a pill box, set an alarm, and/or keep your medication near something that you do every day. You can ask the pharmacy if they participate in [facility]" (a program where you can  all of your medications on the same day), or set up with automatic refill (where they will automatically refill your prescription when it is due) and let you know it's ready to . If you would like assistance in any of the suggestions mentioned above, please contact us. If you are no longer taking this medication, please call us so that we can discuss this and update your medication profile.         Sincerely,            [address]  Phone: [phone]

## 2022-04-14 NOTE — TELEPHONE ENCOUNTER
Formerly named [facility] CLINICAL PHARMACY: [facility]  Identified care gap per [employer]: fills at [address]: Statin adherence    Last Visit: 01.20.22    Patient identified as LIS = 2, therefore patient may be able to receive a 90-day supply for the same cost as a 30-day supply      [address] Records claims through 04.12.22 (Prior Year [facility] = PASSED; YTD [facility] = Filled only once; Potential Fail Date: 04.29.22): Atorvastatin 80 mg tab last filled on 01.20.22 for 30 day supply. Next refill due: 02.19.22    Per [facility] Portal:  (same as above). Per [address]:   last picked up on 01.20.22 for 30 day supply. Yes refills remaining. Billed through [employer] Value Date    CHOL 95 02/04/2022    TRIG 43 02/04/2022    HDL 48 02/04/2022    LDLCALC 38 02/04/2022     ALT   Date Value Ref Range Status   04/13/2022 12 10 - 40 U/L Final     AST   Date Value Ref Range Status   04/13/2022 17 15 - 37 U/L Final     The ASCVD Risk score (Zbigniew Hurtado., et al., 2013) failed to calculate for the following reasons: The valid total cholesterol range is 130 to 320 mg/dL     PLAN  The following are interventions that have been identified:     - Patient overdue refilling Atorvastatin and active on home medication list.    - eligible for 90 day supply (pharmacy willing to dispense 90). - Attempting to reach patient to review.     (patient was IP from 2/3 - 2/17 and again from 3/26 - 4/13)    - Is patient interested in changing prescription from a 30-day supply to a 90-day supply.      Left message asking for return call.        Future Appointments   Date Time Provider Shaheen Lazo   4/18/2022 11:00 AM DO STEFFANIE Pruitt [facility]   4/27/2022  1:00 PM DAHIANA Goldberg - CNP [place] [address]   [address] [place] [address],[address] Floor Clinical Pharmacy  Phone: toll free [phone]

## 2022-04-15 NOTE — TELEPHONE ENCOUNTER
CLINICAL PHARMACY: [facility]    2nd Attempt Documentation:   Left message on voicemail for patient to call us back. [email or web address] message sent.

## 2022-04-18 NOTE — TELEPHONE ENCOUNTER
[facility] Transitions Initial Follow Up Call    Outreach made within 2 business days of discharge: No    Patient: Dariusz Ríos Patient : 1955   MRN: [id number]  Reason for Admission: There are no discharge diagnoses documented for the most recent discharge. Discharge Date: 22       Spoke with: Patient's wife    Discharge department/facility: [address] Interactive Patient Contact:  Was patient able to fill all prescriptions: Yes  Was patient instructed to bring all medications to the follow-up visit: Yes  Is patient taking all medications as directed in the discharge summary?  Yes  Does patient understand their discharge instructions: Yes  Does patient have questions or concerns that need addressed prior to 7-14 day follow up office visit: no    Scheduled appointment with PCP within 7-14 days    Follow Up  Future Appointments   Date Time Provider Shaheen Lazo   2022  2:45 PM Ah[facility] Irby DO [facility]   2022  1:00 PM Liliya Steiner APRN - CNP [facility]       Meryl Wing LPN

## 2022-05-04 NOTE — PROGRESS NOTES
Post-Discharge Transitional Care Follow Up      Hilda Tejada   YOB: 1955    Date of Office Visit:  5/4/2022  Date of Hospital Admission: 3/25/22  Date of Hospital Discharge: 4/13/22  Readmission Risk Score (high >=14%. Medium >=10%):Readmission Risk Score: 25.5 ( )      Care management risk score Rising risk (score 2-5) and Complex Care (Scores >=6): 8     Non face to face  following discharge, date last encounter closed (first attempt may have been earlier): *No documented post hospital discharge outreach found in the last 14 days     Call initiated 2 business days of discharge: *No response recorded in the last 14 days     Hospital discharge follow-up  -     PA DISCHARGE MEDS RECONCILED W/ CURRENT OUTPATIENT MED LIST  Uncontrolled type 2 diabetes mellitus with hyperglycemia ([address]  Biventricular heart failure [facility])      Medical Decision Making: high complexity  Return in about 2 weeks (around 5/18/2022) for f/u mult med extended with thyroid testing and diabetes management. On this date 5/4/2022 I have spent over 40  minutes reviewing previous notes, test results and face to face with the patient discussing the diagnosis and importance of compliance with the treatment plan as well as documenting on the day of the visit. Subjective:   HPI    Inpatient course: Discharge summary reviewed- see chart.     Interval history/Current status: *sugars doing some better    Patient Active Problem List   Diagnosis    ED (erectile dysfunction)    Essential hypertension, benign    Hyperlipidemia    Diabetic nephropathy (HCC)    Diabetes type 2, uncontrolled ([address]    Noncompliance    Elevated blood uric acid level    Coronary artery disease involving native coronary artery    Elevated PSA    Acute on chronic combined systolic and diastolic HF (heart failure) (HCC)    Stage 3 chronic kidney disease ([address]    SONAL (acute kidney injury) ([address]    Biventricular heart failure ([address]    Acute on chronic systolic heart failure, NYHA class 4 ([facility])    Acute on chronic congestive heart failure ([facility])    LV (left ventricular) mural thrombus    Syncope    CHF (congestive heart failure) ([facility])    Syncope and collapse    ICD (implantable cardioverter-defibrillator) in place    Demand ischemia [facility])    Ventricular tachycardia [facility]       Medications listed as ordered at the time of discharge from hospital     Medication List          Accurate as of May 4, 2022 11:59 PM. If you have any questions, ask your nurse or doctor. CHANGE how you take these medications    midodrine 5 MG tablet  Commonly known as: PROAMATINE  Take 1 tablet by mouth 3 times daily (with meals)  What changed: Another medication with the same name was removed. Continue taking this medication, and follow the directions you see here.   Changed by: Rose Loo MD        CONTINUE taking these medications    allopurinol 100 MG tablet  Commonly known as: ZYLOPRIM  Take 1 tablet by mouth daily     atorvastatin 80 MG tablet  Commonly known as: LIPITOR     levothyroxine 50 MCG tablet  Commonly known as: SYNTHROID  Take 1 tablet by mouth every morning (before breakfast)     metoprolol succinate 25 MG extended release tablet  Commonly known as: TOPROL XL  Take 0.5 tablets by mouth daily     pantoprazole 40 MG tablet  Commonly known as: PROTONIX  Take 1 tablet by mouth 2 times daily (before meals)     rivaroxaban 15 MG Tabs tablet  Commonly known as: XARELTO  Take 1 tablet by mouth Daily with supper     therapeutic multivitamin-minerals tablet        STOP taking these medications    Stimulant Laxative 8.6-50 MG per tablet  Generic drug: senna-docusate  Stopped by: Rose Loo MD           Where to Get Your Medications      These medications were sent to [place] [address] [address] [phone] [address] [phone]  [address], [address] [address]    Phone: [phone]   · levothyroxine 50 MCG tablet          Medications marked \"taking\" at this time  Outpatient Medications Marked as Taking for the 5/4/22 encounter (Office Visit) with Tony Massey MD   Medication Sig Dispense Refill    levothyroxine (SYNTHROID) 50 MCG tablet Take 1 tablet by mouth every morning (before breakfast) 30 tablet 3    pantoprazole (PROTONIX) 40 MG tablet Take 1 tablet by mouth 2 times daily (before meals) 30 tablet 3    midodrine (PROAMATINE) 5 MG tablet Take 1 tablet by mouth 3 times daily (with meals) 90 tablet 3    metoprolol succinate (TOPROL XL) 25 MG extended release tablet Take 0.5 tablets by mouth daily 30 tablet 3    allopurinol (ZYLOPRIM) 100 MG tablet Take 1 tablet by mouth daily 30 tablet 3    rivaroxaban (XARELTO) 15 MG TABS tablet Take 1 tablet by mouth Daily with supper 42 tablet 2    Multiple Vitamins-Minerals (THERAPEUTIC MULTIVITAMIN-MINERALS) tablet Take 1 tablet by mouth daily      atorvastatin (LIPITOR) 80 MG tablet Take 80 mg by mouth daily           Medications patient taking as of now reconciled against medications ordered at time of hospital discharge: Yes    Review of Systems    Objective:    BP 96/72   Pulse 91   Wt 164 lb (74.4 kg)   BMI 26.47 kg/m²   Physical Exam  Constitutional:       General: He is not in acute distress. Appearance: He is not diaphoretic. HENT:      Head: Normocephalic and atraumatic. Nose: Nose normal.      Mouth/Throat:      Pharynx: No oropharyngeal exudate. Eyes:      General: No scleral icterus. Right eye: No discharge. Left eye: No discharge. Conjunctiva/sclera: Conjunctivae normal.      Pupils: Pupils are equal, round, and reactive to light. Neck:      Thyroid: No thyromegaly. Vascular: No JVD. Trachea: No tracheal deviation. Cardiovascular:      Rate and Rhythm: Normal rate and regular rhythm. Heart sounds: Normal heart sounds. No murmur heard. No friction rub. No gallop.     Pulmonary:      Effort: Pulmonary effort is normal. No respiratory distress. Breath sounds: Normal breath sounds. No stridor. No wheezing. Chest:      Chest wall: No tenderness. Abdominal:      General: Bowel sounds are normal. There is no distension. Palpations: Abdomen is soft. There is no mass. Tenderness: There is no abdominal tenderness. There is no guarding or rebound. Musculoskeletal:         General: No tenderness. Normal range of motion. Cervical back: Normal range of motion and neck supple. Lymphadenopathy:      Cervical: No cervical adenopathy. Skin:     General: Skin is warm and dry. Coloration: Skin is not pale. Findings: No erythema or rash. Neurological:      Mental Status: He is alert and oriented to person, place, and time. Cranial Nerves: No cranial nerve deficit. Motor: No abnormal muscle tone. Coordination: Coordination normal.      Deep Tendon Reflexes: Reflexes are normal and symmetric. Reflexes normal.   Psychiatric:         Judgment: Judgment normal.         An electronic signature was used to authenticate this note.   --Rose Loo MD

## 2022-05-04 NOTE — PATIENT INSTRUCTIONS
Schedule cardiology followup. Make sure to take 50 mcg levothyroxine (increased dose) every day. Test your sugars and bring results back to Dr Danica Lyman.

## 2022-05-05 NOTE — TELEPHONE ENCOUNTER
Requested Prescriptions     Pending Prescriptions Disp Refills    glucose monitoring (FREESTYLE FREEDOM) kit 1 kit 0     Si kit by Does not apply route daily       Patient requesting a medication refill.   Last filled on:   Pharmacy: [facility]  Next office visit: 2022  Last regular office visit: 2022

## 2022-05-12 NOTE — DISCHARGE SUMMARY
Hospital Medicine Discharge Summary    Patient ID: Meena Matos      Patient's PCP: Vince Herrera DO    Admit Date: 3/25/2022     Discharge Date: 4/13/2022      Admitting Provider: Alanis Garcia DO     Discharge Provider: Hamzah Guerrier MD     Discharge Diagnoses: Active Hospital Problems    Diagnosis     Ventricular tachycardia (HCC) [I47.2]     Syncope [R55]     CHF (congestive heart failure) (HCC) [I50.9]     Syncope and collapse [R55]     ICD (implantable cardioverter-defibrillator) in place [Z95.810]     Demand ischemia ([facility] [I24.8]     Acute on chronic systolic heart failure, NYHA class 4 (HCC) [I50.23]     Stage 3 chronic kidney disease ([facility] [N18.30]     Diabetes type 2, uncontrolled ([facility] [E11.65]     Essential hypertension, benign [I10]        The patient was seen and examined on day of discharge and this discharge summary is in conjunction with any daily progress note from day of discharge. Hospital Course: The patient is a 61 y.o. male with past medical history as below but no history of cirrhosis ascites before who presents to [facility] with concern that patient had an episode of syncope at home but noted to be very brief. Patient notes that he is not feeling unwell recently. He has been overall functional and has been taking his medications although uncertain as to whether he adheres to his regiment strictly. Son who is at bedside initially is currently not at bedside but noted that patient was apparently getting up today and coming out of the kitchen while using his walker but unfortunately while walking to the bedroom he noted that he went to the ground and unfortunately lost consciousness but uncertain as to how long he was down for. He denies having any noted symptoms of chest pain/shortness of breath/dizziness/lightheadedness during this episode of syncope.   He denies any other recent symptoms of fever, chills, poor appetite, nausea, vomiting, No jugular venous distention. Trachea midline. Respiratory:  Normal respiratory effort. Clear to auscultation, bilaterally without Rales/Wheezes/Rhonchi. Cardiovascular: Regular rate and rhythm with normal S1/S2 without murmurs, rubs or gallops. Abdomen: Soft, non-tender, non-distended with normal bowel sounds. Musculoskeletal: Bilateral edema noted  Skin: Skin color, texture, turgor normal.  No rashes or lesions. Neurologic:  Neurovascularly intact without any focal sensory/motor deficits. Cranial nerves: II-XII intact, grossly non-focal.  Psychiatric: Alert  Capillary Refill: Brisk,3 seconds, normal   Peripheral Pulses: +2 palpable, equal bilaterally        Labs: For convenience and continuity at follow-up the following most recent labs are provided:      CBC:    Lab Results   Component Value Date    WBC 5.9 04/13/2022    HGB 8.3 04/13/2022    HCT 25.4 04/13/2022     04/13/2022       Renal:    Lab Results   Component Value Date     04/13/2022    K 4.6 04/13/2022    K 4.5 04/13/2022    CL 99 04/13/2022    CO2 22 04/13/2022    BUN 35 04/13/2022    CREATININE 3.0 04/13/2022    CALCIUM 8.9 04/13/2022    PHOS 3.4 04/07/2022         Significant Diagnostic Studies    Radiology:   IR TUNNELED CVC PLACE WO SQ PORT/PUMP > 5 YEARS   Final Result   Successful conversion of a non tunneled hemodialysis catheter for a new 19   centimeter tunneled hemodialysis catheter. IR NONTUNNELED VASCULAR CATHETER > 5 YEARS   Final Result   Successful ultrasound and fluoroscopy guided right IJ 15 cm non-tunneled   hemodialysis catheter placement. CT ABDOMEN PELVIS WO CONTRAST Additional Contrast? None   Final Result   There is marked cardiomegaly, with trace pleural effusions, as well as   moderate intra-abdominal and pelvic ascites, as well as diffuse anasarca. Hepatic vein reflux is noted on the previous CT examination is well.   This   combination of findings can be seen in the setting of passive liver congestion related to congestive failure. Questionable mild surface   nodularity of the liver makes it difficult to exclude underlying cirrhosis. Minimal bibasilar airspace disease, possibly related to atelectasis or   pneumonia, though with less consolidation than seen on the previous exam.      Mild appearing bilateral renal atrophy. Suspected fat and ascitic fluid containing left inguinal hernia. No bowel   herniation. XR CHEST (2 VW)   Final Result      1. Possible mild degree of vascular congestion though exaggerated by a   suboptimal inspiration. No focal pulmonary infiltrates are noted. 2.  Stable cardiomegaly. CT HEAD WO CONTRAST   Final Result      1. No acute intracranial abnormality noted. 2.  Prominence of the sulci and/or CSF spaces suggests a possible mild degree   of cerebral atrophy. Consults:     IP CONSULT TO CARDIOLOGY  IP CONSULT TO HEART FAILURE NURSE/COORDINATOR  IP CONSULT TO NEPHROLOGY  IP CONSULT TO INTERVENTIONAL RADIOLOGY  IP CONSULT TO HOME CARE NEEDS    Disposition:  home     Condition at Discharge: Stable    Discharge Instructions/Follow-up:  pcp in 1 week    Code Status:  Prior     Activity: activity as tolerated    Diet: regular diet      Discharge Medications:     Discharge Medication List as of 4/13/2022  2:35 PM           Details   pantoprazole (PROTONIX) 40 MG tablet Take 1 tablet by mouth 2 times daily (before meals), Disp-30 tablet, R-3Normal      !! midodrine (PROAMATINE) 5 MG tablet Take 1 tablet by mouth 3 times daily (with meals), Disp-90 tablet, R-3Normal      !! midodrine (PROAMATINE) 10 MG tablet Take 1 tablet by mouth as needed (x1 with HD tx for hypotension SBP<100), Disp-90 tablet, R-1Normal       !! - Potential duplicate medications found. Please discuss with provider.            Details   metoprolol succinate (TOPROL XL) 25 MG extended release tablet Take 0.5 tablets by mouth daily, Disp-30 tablet, R-3Normal levothyroxine (SYNTHROID) 50 MCG tablet Take 1 tablet by mouth every morning (before breakfast), Disp-30 tablet, R-3Normal      allopurinol (ZYLOPRIM) 100 MG tablet Take 1 tablet by mouth daily, Disp-30 tablet, R-3Normal      rivaroxaban (XARELTO) 15 MG TABS tablet Take 1 tablet by mouth Daily with supper, Disp-42 tablet, R-2Normal      Multiple Vitamins-Minerals (THERAPEUTIC MULTIVITAMIN-MINERALS) tablet Take 1 tablet by mouth dailyHistorical Med      STIMULANT LAXATIVE 8.6-50 MG per tablet Take 1 tablet by mouth as needed , DAWHistorical Med      nitroGLYCERIN (NITROSTAT) 0.4 MG SL tablet Place 0.4 mg under the tongue every 5 minutes as needed for Chest pain Historical Med      atorvastatin (LIPITOR) 80 MG tablet Take 80 mg by mouth daily Historical Med      Dulaglutide (TRULICITY) 5.95 NI/6.7IK SOPN Lot# [id number] Exp.06/20, Disp-2 pen, R-0Sample             Time Spent on discharge is more than 30 minutes in the examination, evaluation, counseling and review of medications and discharge plan. Signed:    Carson Abreu MD   5/11/2022      Thank you Terry Landaverde DO for the opportunity to be involved in this patient's care. If you have any questions or concerns, please feel free to contact me at [phone].

## 2022-05-16 NOTE — ED PROVIDER NOTES
MidLevel Attestation   I havepersonally performed and/or participated in the history, exam and medical decision making and agree with all pertinent clinical information. I have also reviewed and agree with the past medical, family and social historyunless otherwise noted. I have personally performed a face to face diagnostic evaluation onthis patient. I have reviewed the mid-levels findings and agree. In brief, Delmy Coelho is a 74 y.o. male that presented to the emergency department with   Chief Complaint   Patient presents with   Beth Me Eye Problem     per pt x 2 days, blown blood vessels; bilateral.    .  CONSTITUTIONAL: ill appearing, in no acute distress   EYES: PERRL, No injection, discharge or scleral icterus. NECK: Normal ROM, NO LAD and Moist mucous membranes. CARDIOVASCULAR: Regular rate and rhythm. No murmurs or gallop. PULMONARY/CHEST: Airway patent. No retractions. Breath sounds clear with good air entry bilaterally. ABDOMEN: Soft, Non-distended and non-tender, without guarding or rebound. SKIN: Acyanotic, warm, dry   MUSCULOSKELETAL: No swelling, tenderness or deformity   NEUROLOGICAL: Awake. Pulses intact. Grossly nonfocal     72-year-old gentleman who presents to the emergency room complaining of redness of eyes. Stating that he has been putting some eyedrops and believe that is the cause of his symptoms. No change in his vision. His exam patient looks chronically ill. In his presentation with concern for eye pain I did not obtain imaging studies or labs. I believe this was subconjunctival hemorrhage. Patient was discharged home recommendation to follow-up with ophthalmologist.    I have reviewed and interpreted all of the currently available lab results and diagnostics from this visit:  No results found for this visit on 05/16/22.   MRI ABDOMEN WO CONTRAST MRCP    Result Date: 5/18/2022  EXAMINATION: MRI OF THE ABDOMEN WITHOUT CONTRAST AND MRCP 5/18/2022 1:00 pm TECHNIQUE: Multiplanar multisequence MRI of the abdomen was performed without the administration of intravenous contrast.  After initial T2 axial and coronal images, thick slab, thin slab and 3D coronal MRCP sequences were obtained without the administration of intravenous contrast.  MIP images are provided for review. Incomplete exam.  Patient declined rest of the study, no diffusion or axial T2 obtained. Limited diagnostic quality exam. COMPARISON: Recent CT abdomen the May 17, 2022 HISTORY: ORDERING SYSTEM PROVIDED HISTORY: elevated LFTs, ascites TECHNOLOGIST PROVIDED HISTORY: Reason for exam:->elevated LFTs, ascites FINDINGS: Incomplete exam. Patient declined rest of the study, no diffusion or axial T2 obtained. Limited diagnostic quality exam. Lung bases: Moderate to marked cardiomegaly. Bilateral small pleural effusions with bibasilar consolidation similar to recent CT. Liver: No focal lesions noted in nonenhanced images of the liver. Moderate diffuse hepatic steatosis, better visualized on CT than on MR. Biliary system: The gallbladder is contracted, contains a gallstone. Common bile duct is poorly visualized. Extensive motion artifact. Pancreas: Normal size pancreas. No pancreatic mass noted. Spleen: Normal spleen. Adrenals: Normal size adrenals. Kidneys: No hydronephrosis. Large volume ascites noted. GI: No definite bowel obstruction. Prominence of the small bowel wall noted which could be due to surrounding ascites/hypoproteinemia. Marrow signal: Normal marrow signal. Soft tissues: Diffuse marked body wall edema. 1. Incomplete exam. Patient declined rest of the study, no diffusion or axial T2 obtained. Limited diagnostic quality exam. 2. Large volume ascites. Diffuse marked body wall edema. 3. Moderate diffuse hepatic steatosis better visualized on CT than on MR. No focal lesions in the liver. 4. Contracted gallbladder containing a gallstone. Common bile duct is poorly visualized.  5. Marked cardiomegaly, with bilateral small pleural effusions and bibasilar consolidations. Small-bowel wall edema noted, this could be due to surrounding ascites/hypoproteinemia. ED Medication Orders (From admission, onward)    None          Final Impression      1. Subconjunctival hemorrhage of both eyes        DISPOSITION Decision To Discharge 05/16/2022 02:43:54 PM       This record is transcribed utilizing voice recognition technology. There are inherent limitations in this technology. In addition, there may be limitations in editing of this report. If there are any discrepancies, please contact me directly.     Lucius Wadsworth MD   5/18/2022         Nsehniitooh Gilberto Reyes MD  05/18/22 1814

## 2022-05-16 NOTE — ED PROVIDER NOTES
[address]  [address] [address]  Dept: [phone]  Loc: [address] [place], [place] [address] COMPLAINT    Chief Complaint   Patient presents with    Eye Problem     per pt x 2 days, blown blood vessels; bilateral.        HPI    Bridgette Walton is a 74 y.o. male who presents the emergency department with his wife with complaints of having red eyes for 2 days. Patient has had itchy watery eyes and sneezing. He has been taking allergy eyedrops. 2 days ago he woke up and noticed that his eyes were red and he was concerned that he blew out his blood vessels. He does take Xarelto. He denies any injury, lightheadedness, dizziness, blurred vision, diplopia, headache, cough or shortness of breath.     REVIEW OF SYSTEMS    Eyes: see HPI  General: No fevers or chills  GI: No nausea or vomiting  Skin: No new rash      PAST MEDICAL and SURGICAL HISTORY    Past Medical History:   Diagnosis Date    CHF (congestive heart failure) ([address]     Diabetes type 2, uncontrolled ([address] 7/28/2014    Diabetic nephropathy 9/12/2013    ED (erectile dysfunction) 9/12/2013    Essential hypertension, benign 9/12/2013    Hyperlipidemia 9/12/2013    Noncompliance 7/28/2014     Past Surgical History:   Procedure Laterality Date    EYE SURGERY      IR NONTUNNELED VASCULAR CATHETER  4/4/2022    IR NONTUNNELED VASCULAR CATHETER 4/4/2022 [facility]    IR TUNNELED CATHETER PLACEMENT GREATER THAN 5 YEARS  4/8/2022    IR TUNNELED CATHETER PLACEMENT GREATER THAN 5 YEARS 4/8/2022 [facility]       CURRENT MEDICATIONS  (may include discharge medications prescribed in the ED)  Current Outpatient Rx   Medication Sig Dispense Refill    glucose monitoring (FREESTYLE FREEDOM) kit 1 kit by Does not apply route daily 1 kit 0    levothyroxine (SYNTHROID) 50 MCG tablet Take 1 tablet by mouth every morning (before breakfast) 30 tablet 3    pantoprazole (PROTONIX) 40 MG tablet Take 1 tablet by mouth 2 times daily (before meals) 30 tablet 3    midodrine (PROAMATINE) 5 MG tablet Take 1 tablet by mouth 3 times daily (with meals) 90 tablet 3    metoprolol succinate (TOPROL XL) 25 MG extended release tablet Take 0.5 tablets by mouth daily 30 tablet 3    allopurinol (ZYLOPRIM) 100 MG tablet Take 1 tablet by mouth daily 30 tablet 3    rivaroxaban (XARELTO) 15 MG TABS tablet Take 1 tablet by mouth Daily with supper 42 tablet 2    Multiple Vitamins-Minerals (THERAPEUTIC MULTIVITAMIN-MINERALS) tablet Take 1 tablet by mouth daily      atorvastatin (LIPITOR) 80 MG tablet Take 80 mg by mouth daily          ALLERGIES    No Known Allergies    SOCIAL and FAMILY HISTORY    Social History     Socioeconomic History    Marital status:      Spouse name: None    Number of children: None    Years of education: None    Highest education level: None   Occupational History    None   Tobacco Use    Smoking status: Former Smoker     Packs/day: 1.50     Years: 7.00     Pack years: 10.50     Quit date: 1981     Years since quittin.2    Smokeless tobacco: Never Used   Vaping Use    Vaping Use: Never used   Substance and Sexual Activity    Alcohol use: Yes     Alcohol/week: 1.0 standard drink     Types: 1 Cans of beer per week    Drug use: No    Sexual activity: Yes     Partners: Female   Other Topics Concern    None   Social History Narrative    None     Social Determinants of Health     Financial Resource Strain:     Difficulty of Paying Living Expenses: Not on file   Food Insecurity:     Worried About Running Out of Food in the Last Year: Not on file    Karli of Food in the Last Year: Not on file   Transportation Needs:     Lack of Transportation (Medical): Not on file    Lack of Transportation (Non-Medical):  Not on file   Physical Activity:     Days of Exercise per Week: Not on file    Minutes of Exercise per Orbital Cellulitis/Abscess, Periorbital/Preseptal Cellulitis, other. Patient is afebrile and nontoxic in appearance. Patient's presentation subconjunctival hemorrhage bilaterally. He was given [id number] to follow-up with. Is no visual disturbances so I think it safe for him to discharge. Reassurance was provided to the patient and his wife and stated that this will take several weeks for it to resolve. The patient was instructed to return to the ED immediately for any new or worsening symptoms. The patient verbalized understanding. FINAL IMPRESSION    1.  Subconjunctival hemorrhage of both eyes        PLAN  Discharge with medication and followup with ophthalmologist (see EMR)      (Please note that this note was completed with a voice recognition program.  Every attempt was made to edit the dictations, but inevitably there remain words that are mis-transcribed.)         DAHIANA Flaherty - HANY  05/16/22 1910

## 2022-05-17 PROBLEM — R79.1 SUPRATHERAPEUTIC INR: Status: ACTIVE | Noted: 2022-01-01

## 2022-05-17 PROBLEM — R79.89 ELEVATED LFTS: Status: ACTIVE | Noted: 2022-01-01

## 2022-05-17 PROBLEM — R19.7 DIARRHEA: Status: ACTIVE | Noted: 2022-01-01

## 2022-05-17 NOTE — PROGRESS NOTES
Treatment time: 3 hours  Net UF: 3400 ml    Pre weight: 71.2 kg   Post weight: 69.3 kg  EDW: 3-4 liters kg    Access used: CVC  Access function:good with  ml/min    Medications or blood products given: Albumin 25grams/100ml,  100mg venofer, 10mg Midodrine   Regular outpatient schedule: TTS    Summary of response to treatment: Pt didnt tolerate goal of 3-4 liters to be removed. bp in low 80's, Pt received Albumin and two doses of Midodrine. Copy of dialysis treatment record placed in chart, to be scanned into EMR.

## 2022-05-17 NOTE — H&P
Hospital Medicine History & Physical      PCP: Robbie Mueller DO    Date of Admission: 5/17/2022    Date of Service: Pt seen/examined on 5/17/2022 admitted to inpatient    Chief Complaint: Diarrhea      History Of Present Illness: The patient is a 74 y.o. male with past medical history as below who presents to [facility] with concern for progressive worsening diarrhea over the last 2 to 3 days that he notes that he has been feeling increasingly weak and fatigued from. He has been eating and drinking appropriately at home and taking his medications but notes that he feels as though the diarrhea has been persistently getting worse. He denies any recent other symptoms of fever, chills, dizziness, syncope, chest pain, dysuria, blood in urine/stool/sputum. He does ambulate at home and has some baseline shortness of breath but he does not feel that has been worse than normal.  He has never had diarrhea like this before. Past Medical History:        Diagnosis Date    [facility]est[facility]) [facility] [address]     Diabetes type 2, uncontrolled ([facility] [address] 7/28/2014    Diabetic nephropathy 9/12/2013    ED (erectile dysfunction) 9/12/2013    Essential hypertension, benign 9/12/2013    Hyperlipidemia 9/12/2013    Noncompliance 7/28/2014       Past Surgical History:        Procedure Laterality Date    EYE SURGERY      IR NONTUNNELED VASCULAR CATHETER  4/4/2022    IR NONTUNNELED VASCULAR CATHETER 4/4/2022 [facility]    IR TUNNELED CATHETER PLACEMENT GREATER THAN 5 YEARS  4/8/2022    IR TUNNELED CATHETER PLACEMENT GREATER THAN 5 YEARS 4/8/2022 [facility]       Medications Prior to Admission:    Prior to Admission medications    Medication Sig Start Date End Date Taking?  Authorizing Provider   glucose monitoring (FREESTYLE FREEDOM) kit 1 kit by Does not apply route daily 5/6/22   Marguerite Solomon DO   levothyroxine (SYNTHROID) 50 MCG tablet Take 1 tablet by mouth every morning (before breakfast) 5/4/22   Sindy Fuentes MD   pantoprazole (PROTONIX) 40 MG tablet Take 1 tablet by mouth 2 times daily (before meals) 4/13/22   Shannon Cruz MD   midodrine (PROAMATINE) 5 MG tablet Take 1 tablet by mouth 3 times daily (with meals) 4/13/22   Shannon Cruz MD   metoprolol succinate (TOPROL XL) 25 MG extended release tablet Take 0.5 tablets by mouth daily 2/18/22   Glynn Bocanegra MD   allopurinol (ZYLOPRIM) 100 MG tablet Take 1 tablet by mouth daily 11/30/21   Peyton Alford MD   rivaroxaban Nicole Lev) 15 MG TABS tablet Take 1 tablet by mouth Daily with supper 11/30/21   Peyton Alford MD   Multiple Vitamins-Minerals (THERAPEUTIC MULTIVITAMIN-MINERALS) tablet Take 1 tablet by mouth daily    Historical Provider, MD   atorvastatin (LIPITOR) 80 MG tablet Take 80 mg by mouth daily  1/8/19   Historical Provider, MD       Allergies:  Patient has no known allergies. Social History:  The patient currently lives home    TOBACCO:   reports that he quit smoking about 41 years ago. He has a 10.50 pack-year smoking history. He has never used smokeless tobacco.  ETOH:   reports current alcohol use of about 1.0 standard drink of alcohol per week. Family History:  Reviewed in detail and negative for DM, Early CAD, Cancer, CVA. Positive as follows:        Problem Relation Age of Onset    Diabetes Mother     High Blood Pressure Mother     Diabetes Sister     Diabetes Brother        REVIEW OF SYSTEMS:    as noted in the HPI. All other systems reviewed and negative.     PHYSICAL EXAM:    /84   Pulse 103   Temp 97.4 °F (36.3 °C) (Axillary)   Resp (!) 35   Ht 5' 6\" (1.676 m)   Wt 153 lb 14.1 oz (69.8 kg)   SpO2 93%   BMI 24.84 kg/m²     General appearance: Chronically ill-appearing, fatigued appearing, some bloodshot eye appearance suggestive of conjunctival hemorrhage, alert and oriented x4  HEENT Normal cephalic, atraumatic without obvious deformity. Pupils equal, round, and reactive to light. Extra ocular muscles intact. Noted subconjunctival hemorrhage, dry mucous membrane  Neck: Supple, no JVD  Lungs: Breath sounds with scattered areas of crackles  Heart: Intermittently tachycardic but regular rhythm, no murmurs  Abdomen: Distended abdomen, noted anasarca to the body wall and some fluid wave on testing, active bowel sounds, nontender  Extremities: 2-3+ bilateral lower extremity pitting edema  Skin: No rashes  Neurologic: Difficult to fully evaluate but patient seems to move all extremities grossly and does not seem to have any focal deficits  Mental status: Seems alert and oriented at this time see  Capillary Refill: Acceptable  < 3 seconds  Peripheral Pulses: +3 Easily felt, not easily obliterated with pressure    CT abdomen and pelvis without contrast:  1. No ileus or obstruction.  No definite inflammatory bowel process though   very limited given the extent of ascites. 2. Moderate volume ascites seen in the abdomen and pelvis, similar to the   prior exam.   3. Cardiomegaly with small bilateral effusions, left greater than right as   well as dependent ground-glass change which may represent dependent pulmonary   edema. 4. Mild anasarca. 5. Mild renal atrophy. 6. Cholelithiasis with a contracted gallbladder.      CT chest without contrast: Findings compatible with pulmonary edema and small pleural effusions as well as body wall edema and upper abdominal ascites      CBC   Recent Labs     05/17/22 0151   WBC 4.6   HGB 11.6*   HCT 36.2*   *      RENAL  Recent Labs     05/17/22 0151   *   K 4.4   CL 97*   CO2 19*   BUN 75*   CREATININE 8.1*     LFT'S  Recent Labs     05/17/22 0151   *   *   BILIDIR 4.7*   BILITOT 6.5*   ALKPHOS 300*     COAG  Recent Labs     05/17/22 0151   INR 5.71*     CARDIAC ENZYMES  No results for input(s): CKTOTAL, CKMB, CKMBINDEX, TROPONINI in the last 72 hours. U/A:    Lab Results   Component Value Date    COLORU Yellow 04/10/2022    WBCUA 0-2 04/10/2022    RBCUA 0-2 04/10/2022    MUCUS Rare 04/09/2022    BACTERIA 2+ 04/09/2022    CLARITYU Clear 04/10/2022    SPECGRAV 1.025 04/10/2022    LEUKOCYTESUR Negative 04/10/2022    BLOODU Negative 04/10/2022    GLUCOSEU Negative 04/10/2022       ABG  No results found for: JKQ5HUP, BEART, Y7DLKJGN, PHART, THGBART, VKG3NCB, PO2ART, [place]        Active Hospital Problems    Diagnosis Date Noted    Elevated LFTs [R79.89] 05/17/2022     Priority: Medium    Supratherapeutic INR [R79.1] 05/17/2022     Priority: Medium    Diarrhea [R19.7] 05/17/2022     Priority: Medium    CHF (congestive heart failure) ([facility] [address] [I50.9] 03/26/2022    ICD (implantable cardioverter-defibrillator) in place [Z95.810]     Biventricular heart failure ([facility] [address] [I50.82]          PHYSICIANS CERTIFICATION:    I certify that Reymundo Chavarria is expected to be hospitalized for greater than 2 midnights based on the following assessment and plan:      ASSESSMENT/PLAN:  · Diarrhea  · LV LFTs  · Supratherapeutic INR  · CHF  · ICD  · Biventricular heart failure    Plan:  · Patient was just recently admitted here for fluid overload and some component of heart failure and/or cirrhosis, was deemed that most likely his liver cirrhosis findings were secondary due to congestive hepatopathy from heart failure but LFTs were never elevated at that time. Currently patient is mainly coming for evaluation of diarrhea but of uncertain etiology. Patient still has demonstrated findings of fluid overload and anasarca and ascites. LFTs are also elevated at this time which is of uncertain significance but are significantly elevated from previous admission.   · Obtaining MRCP of the abdomen in the morning  · Keeping patient on clear liquid diet  · Checking C. difficile and GI bacterial pathogen for further infectious diarrhea work-up  · Holding antibiotics for now  · INR is likely supratherapeutic from patient's component of elevated LFTs and possible liver cirrhosis, no role for vitamin K at this time but was given a dose in the ED  · Checking alcohol and hepatitis panel  · Restart patient's other home medications  · Repeat labs daily    DVT Prophylaxis: SCDs, consider chemoprophylaxis if possible  Diet: ADULT DIET; Clear Liquid; 1500 ml  Code Status: Full Code  PT/OT Eval Status: Ambulatory    Dispo -pending clinical course       Luis F Bernard DO    Thank you Emily Vanegas DO for the opportunity to be involved in this patient's care. If you have any questions or concerns please feel free to contact me at [phone].

## 2022-05-17 NOTE — CONSULTS
Here with diarrhea. Missed dialysis Saturday and today . Arranging for dialysis today . Full consult to follow.      Sylvia Cunha MD  Discussed with RN

## 2022-05-17 NOTE — PROGRESS NOTES
Physical Therapy  Cancellation Note       Name: Kallie Hernandez  : 1955  MRN: [id number]  Date of Service: 2022    PT called RN, Anastacio Paige, who confirmed that was currently undergoing dialysis and was not available for PT at this time. Will attempt PT eval tomorrow, 22.      Electronically signed by Octavio Vann, PT #[id number] on 2022 at 4:39 PM

## 2022-05-17 NOTE — CONSULTS
[facility]   GI Consult Note      Reason for Consult:  elevated LFT's  Requesting Physician:  Paige Morton    CHIEF COMPLAINT:  tired    History Obtained From:  patient, electronic medical record    HISTORY OF PRESENT ILLNESS:                The patient is a 74 y.o. male with significant past medical history of CHF,CKD and DM on dialysis. He has been seen from a GI/liver standpoint in the past. He is admitted with weakness and diarrhea. CT shows ascites. He recently missed dialysis due to diarrhea. He has a history of severe CHF with a low ejection fraction. His liver enzyme elevation is due to congestive liver from his right sided heart failure. He is being seen for the elevated LFT's. He is currently getting HD at the bedside.      Past Medical History:        Diagnosis Date    CHF (congestive heart failure) (HCC)     Diabetes type 2, uncontrolled ([facility] [address] 7/28/2014    Diabetic nephropathy 9/12/2013    ED (erectile dysfunction) 9/12/2013    Essential hypertension, benign 9/12/2013    Hyperlipidemia 9/12/2013    Noncompliance 7/28/2014     Past Surgical History:        Procedure Laterality Date    EYE SURGERY      IR NONTUNNELED VASCULAR CATHETER  4/4/2022    IR NONTUNNELED VASCULAR CATHETER 4/4/2022 [facility]    IR TUNNELED CATHETER PLACEMENT GREATER THAN 5 YEARS  4/8/2022    IR TUNNELED CATHETER PLACEMENT GREATER THAN 5 YEARS 4/8/2022 [facility]     Current Medications:    Current Facility-Administered Medications: allopurinol (ZYLOPRIM) tablet 100 mg, 100 mg, Oral, Daily  [START ON 5/18/2022] levothyroxine (SYNTHROID) tablet 50 mcg, 50 mcg, Oral, QAM AC  metoprolol succinate (TOPROL XL) extended release tablet 12.5 mg, 12.5 mg, Oral, Daily  midodrine (PROAMATINE) tablet 5 mg, 5 mg, Oral, TID WC  pantoprazole (PROTONIX) tablet 40 mg, 40 mg, Oral, BID AC  sodium chloride flush 0.9 % injection 5-40 mL, 5-40 mL, IntraVENous, 2 times per day  sodium chloride flush 0.9 % injection 5-40 mL, 5-40 mL, IntraVENous, PRN  0.9 % sodium chloride infusion, , IntraVENous, PRN  ondansetron (ZOFRAN) injection 4 mg, 4 mg, IntraVENous, Q6H PRN  iron sucrose (VENOFER) injection 100 mg, 100 mg, IntraVENous, Once in dialysis  albumin human 25 % IV solution 25 g, 25 g, IntraVENous, Once PRN  Allergies:  Patient has no known allergies. Social History:    Social History     Socioeconomic History    Marital status:      Spouse name: Not on file    Number of children: Not on file    Years of education: Not on file    Highest education level: Not on file   Occupational History    Not on file   Tobacco Use    Smoking status: Former Smoker     Packs/day: 1.50     Years: 7.00     Pack years: 10.50     Quit date: 1981     Years since quittin.2    Smokeless tobacco: Never Used   Vaping Use    Vaping Use: Never used   Substance and Sexual Activity    Alcohol use: Yes     Alcohol/week: 1.0 standard drink     Types: 1 Cans of beer per week    Drug use: No    Sexual activity: Yes     Partners: Female   Other Topics Concern    Not on file   Social History Narrative    Not on file     Social Determinants of Health     Financial Resource Strain:     Difficulty of Paying Living Expenses: Not on file   Food Insecurity:     Worried About Running Out of Food in the Last Year: Not on file    Karli of Food in the Last Year: Not on file   Transportation Needs:     Lack of Transportation (Medical): Not on file    Lack of Transportation (Non-Medical):  Not on file   Physical Activity:     Days of Exercise per Week: Not on file    Minutes of Exercise per Session: Not on file   Stress:     Feeling of Stress : Not on file   Social Connections:     Frequency of Communication with Friends and Family: Not on file    Frequency of Social Gatherings with Friends and Family: Not on file    Attends Nondenominational Services: Not on file    Active Member of Clubs or Organizations: Not on file    Attends [employer] or Organization Meetings: Not on file    Marital Status: Not on file   Intimate Partner Violence:     Fear of Current or Ex-Partner: Not on file    Emotionally Abused: Not on file    Physically Abused: Not on file    Sexually Abused: Not on file   Housing Stability:     Unable to Pay for Housing in the Last Year: Not on file    Number of [place] in the Last Year: Not on file    Unstable Housing in the Last Year: Not on file       Family History:       Problem Relation Age of Onset    Diabetes Mother     High Blood Pressure Mother     Diabetes Sister     Diabetes Brother      REVIEW OF SYSTEMS:    CONSTITUTIONAL:  negative  EYES:  negative  HEENT:  negative  RESPIRATORY:  negative  CARDIOVASCULAR:  negative  GASTROINTESTINAL:  negative  INTEGUMENT/BREAST:  negative  HEMATOLOGIC/LYMPHATIC:  negative  ENDOCRINE:  negative  MUSCULOSKELETAL:  negative  NEUROLOGICAL:  negative  PHYSICAL EXAM:    General Appearance: alert and oriented to person, place and time, well developed and well- nourished, in no acute distress  Skin: warm and dry, no rash or erythema  Head: normocephalic and atraumatic  Eyes: pupils equal, round, and reactive to light, extraocular eye movements intact, conjunctivae normal  ENT: tympanic membrane, external ear and ear canal normal bilaterally, nose without deformity, nasal mucosa and turbinates normal without polyps  Neck: supple and non-tender without mass, no thyromegaly or thyroid nodules, no cervical lymphadenopathy  Pulmonary/Chest: clear to auscultation bilaterally- no wheezes, rales or rhonchi, normal air movement, no respiratory distress  Cardiovascular: normal rate, regular rhythm, normal S1 and S2, no murmurs, rubs, clicks, or gallops, distal pulses intact, no carotid bruits  Abdomen: soft, non-tender, non-distended, normal bowel sounds, no masses or organomegaly  Extremities: no cyanosis, clubbing or edema  Musculoskeletal: normal range of motion, no joint swelling, deformity or tenderness  Neurologic: reflexes normal and symmetric, no cranial nerve deficit, gait, coordination and speech normal  Vitals:    /76   Pulse 102   Temp 97.5 °F (36.4 °C) (Axillary)   Resp 14   Ht 5' 6\" (1.676 m)   Wt 156 lb 15.5 oz (71.2 kg)   SpO2 92%   BMI 25.34 kg/m²     DATA:    CBC:   Lab Results   Component Value Date    WBC 4.6 05/17/2022    RBC 3.46 05/17/2022    HGB 11.6 05/17/2022    HCT 36.2 05/17/2022    .7 05/17/2022    MCH 33.5 05/17/2022    MCHC 32.0 05/17/2022    RDW 18.6 05/17/2022     05/17/2022    MPV 10.2 05/17/2022     CMP:    Lab Results   Component Value Date     05/17/2022    K 4.4 05/17/2022    CL 97 05/17/2022    CO2 19 05/17/2022    BUN 75 05/17/2022    CREATININE 8.1 05/17/2022    GFRAA 8 05/17/2022    GFRAA >60 12/13/2011    AGRATIO 1.2 04/13/2022    LABGLOM 7 05/17/2022    GLUCOSE 77 05/17/2022    PROT 7.3 05/17/2022    PROT 7.7 12/13/2011    LABALBU 3.4 05/17/2022    CALCIUM 9.0 05/17/2022    BILITOT 6.5 05/17/2022    ALKPHOS 300 05/17/2022     05/17/2022     05/17/2022     PT/INR:    Lab Results   Component Value Date    PROTIME 69.4 05/17/2022    INR 5.71 05/17/2022       IMPRESSION/RECOMMENDATIONS:      1. Elevated LFT's due to severe heart failure and history of cirrhosis with elevated INR and ascites seen on CT. He is scheduled for an MRCP. Continue supportive care. He is not a candidate for surgery or endoscopy.     Electronically signed by Angelina Caldera MD on 5/17/2022 at 2:38 PM

## 2022-05-17 NOTE — CONSULTS
[address] [facility]   [facility]  [phone]  Nephrology Consult Note          Patient ID: Jemal Bañuelos  Referring/ Physician: Elizabeth Guajardo MD      HPI/Summary:   Jemal Bañuelos is being seen by nephrology for ESRD. Jemal Bañuelos is a 74 y.o. male with h/o heart failure, ESRD on dialysis, LV thrombus history, DM, HTN, HLD who is here with diarrhea since Friday. He missed dialysis on Saturday due to diarrhea. No abdominal pain, no nausea or vomiting. Has no had any fevers or chills. He is having a lot of edema and some SOB, on oxygen now. He has bilateral subconjunctival hemorrhage. This is painless, says he used some new eye drops that were saline based. His vision is not affected and has no limitation of his EOM movement. He does dialysis on TTS schedule and missed dialysis today as well. No current chest pain, some dyspnea on exertion. Plan:   - HD today , UF 3-4 L as tolerated. He is hypervolemic on exam.   - midodrine and albumin for BP support on dialysis. - labs reviewed, metabolic acidosis noted. Will correct with HD.   - BP  Acceptable. - add renvela 800 mg TID  - IV iron with HD    Catheter is exposed along with tunneled track, looks like an ulcerated area of skin. No signs of purulence or marlnea infection but will be an infection risk. Consult IR to address, potentially may need tunneled catheter placed in a different location. #ESRD  On HD TTS at [facility]. Has TDC and has been referred for vein mapping and AVF/AVG placement. ,  TIME 3.3 Hours  Na 138   K 2  Ca 2.5   Bicarb 35   180nRe    #BP/Volume  Hypervolemic. BP acceptable. He has severe systolic heart failure and BP runs low. Has required inotrope support in the past   On midodrine 5 mg TID. UF as tolerated, challenge dry weight. On metop 12.5 mg BID. EDW 77 kilos    #Metabolic acidosis  Address with dialysis. #SHPT  Last  and phos 6.3  Is supposed to be on phos binder. Will add renvela. #Anemia  Last tsat 12% ferritin 174  He will need IV iron with HD      [facility] would like to thank you for the opportunity to serve this patient. Please call with any questions or concerns. Chance Mcgovern MD  [facility]  [place]ala Gooden, [address]  Fax: [phone]  Office: [phone]         CC/Reason for consult:   Reason for consult: ESRD  Chief Complaint   Patient presents with    Diarrhea    Shortness of Breath           Review of Systems:   [address] All other remaining systems are negative. Constitutional:  fever, chills, weakness, weight change, fatigue,      Skin:  rash, pruritus, hair loss, bruising, dry skin, petechiae. Head, Face, Neck   headaches, swelling,  cervical adenopathy. Respiratory: shortness of breath, cough, or wheezing  Cardiovascular: chest pain, palpitations, dizzy, edema  Gastrointestinal: nausea, vomiting, diarrhea, constipation,belly pain    Yellow skin, blood in stool  Musculoskeletal:  back pain, muscle weakness, gait problems,       joint pain or swelling. Genitourinary:  dysuria, poor urine flow, flank pain, blood in urine  Neurologic:  vertigo, TIA'S, syncope, seizures, focal weakness  Psychosocial:  insomnia, anxiety, or depression. Additional positive findings: -     PMH/SH/FH:    Medical Hx: reviewed and updated as appropriate  Past Medical History:   Diagnosis Date    [facility]F [facility]congestive [facility] failure) ([facility] [address]     Diabetes type 2, uncontrolled ([facility] [address] 7/28/2014    Diabetic nephropathy 9/12/2013    ED (erectile dysfunction) 9/12/2013    Essential hypertension, benign 9/12/2013    Hyperlipidemia 9/12/2013    Noncompliance 7/28/2014         Surgical Hx: reviewed and updated as appropriate   has a past surgical history that includes eye surgery; IR NONTUNNELED VASCULAR CATHETER > 5 YEARS (4/4/2022); and IR TUNNELED CVC PLACE WO SQ PORT/PUMP > 5 YEARS (4/8/2022).      Social Hx: reviewed and updated as appropriate  Social History     Tobacco Use    Smoking status: Former Smoker     Packs/day: 1.50     Years: 7.00     Pack years: 10.50     Quit date: 1981     Years since quittin.2    Smokeless tobacco: Never Used   Substance Use Topics    Alcohol use: Yes     Alcohol/week: 1.0 standard drink     Types: 1 Cans of beer per week        Family hx: reviewed and updated as appropriate  family history includes Diabetes in his brother, mother, and sister; High Blood Pressure in his mother. Medications:   allopurinol, 100 mg, Daily  [START ON 2022] levothyroxine, 50 mcg, QAM AC  metoprolol succinate, 12.5 mg, Daily  midodrine, 5 mg, TID WC  pantoprazole, 40 mg, BID AC  sodium chloride flush, 5-40 mL, 2 times per day       Patient has no known allergies. Allergies:   No Known Allergies      Physical Exam/Objective:   Vitals:    22 1145   BP: 121/76   Pulse: 102   Resp: 14   Temp: 97.5 °F (36.4 °C)   SpO2: 92%     No intake or output data in the 24 hours ending 22 1347      General appearance:  in no acute distress, comfortable, communicative, awake and alert. HEENT: no icterus, + bilateral subconjunctival hemorrhage  Neck : no masses, appears symmetrical  Respiratory: Respiratory effort normal, bilateral equal chest rise. + bibasilar crackles. Cardiovascular: Ausculation shows RRR and  ++ edema   Abdomen: abdomen is soft, non distended, no masses, no pain with palpation. Musculoskeletal:  no joint swelling, no deformity, strength grossly normal.   Skin: no rashes, no induration, no tightening, no jaundice   Neuro:   Follows commands, moves all extremities spontaneously     Tunneled dialysis catheter      Data:   CBC:   Recent Labs     22  0151   WBC 4.6   HGB 11.6*   HCT 36.2*   *     BMP:    Recent Labs     22  015   *   K 4.4   CL 97*   CO2 19*   BUN 75*   CREATININE 8.1*   GLUCOSE 77

## 2022-05-17 NOTE — PLAN OF CARE
Problem: Discharge Planning  Goal: Discharge to home or other facility with appropriate resources  Outcome: Progressing  Flowsheets (Taken 5/17/2022 0907)  Discharge to home or other facility with appropriate resources: Identify barriers to discharge with patient and caregiver     Problem: Chronic Conditions and Co-morbidities  Goal: Patient's chronic conditions and co-morbidity symptoms are monitored and maintained or improved  Outcome: Progressing     Problem: Safety - Adult  Goal: Free from fall injury  Outcome: Progressing  Note: Fall precautions in place. Bed locked and in lowest position. Alarm on. Call light within reach. Problem: ABCDS Injury Assessment  Goal: Absence of physical injury  Outcome: Progressing     Problem: Skin/Tissue Integrity  Goal: Absence of new skin breakdown  Description: 1. Monitor for areas of redness and/or skin breakdown  2. Assess vascular access sites hourly  3. Every 4-6 hours minimum:  Change oxygen saturation probe site  4. Every 4-6 hours:  If on nasal continuous positive airway pressure, respiratory therapy assess nares and determine need for appliance change or resting period.   Outcome: Progressing

## 2022-05-17 NOTE — PROGRESS NOTES
Pt admitted to [facility] @ 0830. Pt alert and oriented. VS and assessment completed. Pt is having frequent loose stools- sample collected and pt placed in isolation. Pt has dialysis line in place to RU- old drainage noted to dressing. Pt oriented to room and safety measures. Alarm on, call light within reach. Telemetry confirmed with [facility].   Electronically signed by Antoine Bullock RN on 5/17/22 at 10:19 AM EDT

## 2022-05-17 NOTE — PROGRESS NOTES
Patient seen and examined earlier this morning. HPI:  74 y.o. male with past medical history as below who presents to [facility] with concern for progressive worsening diarrhea over the last 2 to 3 days that he notes that he has been feeling increasingly weak and fatigued from. assessment :  - diarrhea and abdominal pain. - heart failure. - ESRD on HD. Plan:  - C diff negative. - appreciate nephology input.   - agree with MRCP. - appreciate GI input. - close clinical observation.

## 2022-05-17 NOTE — ED TRIAGE NOTES
Pt presents to ED with complaint of diarrhea, pt discharged today, reports it seemed his symptoms were under control but  Started again this evening, reports  Having about 5  BM since his discharge today. Pt also complaining of SOB , denies chest pain today.

## 2022-05-17 NOTE — ED PROVIDER NOTES
[facility]      Pt Name: Bernice Judd  MRN: [id number]  [place] 1955  Date of evaluation: 5/17/2022  Provider: Dawson Villegas MD    CHIEF COMPLAINT       Chief Complaint   Patient presents with    Diarrhea    Shortness of Breath       HISTORY OF PRESENT ILLNESS    Bernice Judd is a 74 y.o. male who presents to the emergency department with abdominal pain diarrhea. Patient presents with abdominal pain and diarrhea. Has been going on for 5 days. Loose watery stools. Generalized abdominal pain. 7-10 achy nature. Worse with eating. Better with rest.  Started spontaneously. Constant in nature. No blood in stools. No other associated symptoms. Nursing Notes were reviewed. Including nursing noted for FM, Surgical History, Past Medical History, Social History, vitals, and allergies; agree with all. REVIEW OF SYSTEMS       Review of Systems   Constitutional: Negative for activity change, appetite change, chills, diaphoresis, fatigue, fever and unexpected weight change. HENT: Negative for congestion, dental problem, drooling, ear discharge and ear pain. Eyes: Negative for photophobia, pain, discharge, redness, itching and visual disturbance. Respiratory: Positive for shortness of breath. Negative for apnea, cough, choking, chest tightness, wheezing and stridor. Cardiovascular: Negative for chest pain, palpitations and leg swelling. Gastrointestinal: Positive for abdominal pain and diarrhea. Negative for abdominal distention, anal bleeding, blood in stool, constipation, nausea, rectal pain and vomiting. Endocrine: Negative for cold intolerance and heat intolerance. Genitourinary: Negative for decreased urine volume and urgency. Musculoskeletal: Negative for arthralgias and back pain. Skin: Negative for color change and pallor. Neurological: Negative for tremors and facial asymmetry.    Hematological: Negative for adenopathy. Does not bruise/bleed easily. Psychiatric/Behavioral: Negative for agitation, behavioral problems, confusion and decreased concentration. Except as noted above the remainder of the review of systems was reviewed and negative. PAST MEDICAL HISTORY     Past Medical History:   Diagnosis Date    [facility]est[facility] [address]     Diabetes type 2, uncontrolled ([facility] [address] 7/28/2014    Diabetic nephropathy 9/12/2013    ED (erectile dysfunction) 9/12/2013    Essential hypertension, benign 9/12/2013    Hyperlipidemia 9/12/2013    Noncompliance 7/28/2014       SURGICAL HISTORY       Past Surgical History:   Procedure Laterality Date    EYE SURGERY      IR NONTUNNELED VASCULAR CATHETER  4/4/2022    IR NONTUNNELED VASCULAR CATHETER 4/4/2022 [facility]    IR TUNNELED CATHETER PLACEMENT GREATER THAN 5 YEARS  4/8/2022    IR TUNNELED CATHETER PLACEMENT GREATER THAN 5 YEARS 4/8/2022 [facility]       CURRENT MEDICATIONS       Previous Medications    ALLOPURINOL (ZYLOPRIM) 100 MG TABLET    Take 1 tablet by mouth daily    ATORVASTATIN (LIPITOR) 80 MG TABLET    Take 80 mg by mouth daily     GLUCOSE MONITORING (FREESTYLE FREEDOM) KIT    1 kit by Does not apply route daily    LEVOTHYROXINE (SYNTHROID) 50 MCG TABLET    Take 1 tablet by mouth every morning (before breakfast)    METOPROLOL SUCCINATE (TOPROL XL) 25 MG EXTENDED RELEASE TABLET    Take 0.5 tablets by mouth daily    MIDODRINE (PROAMATINE) 5 MG TABLET    Take 1 tablet by mouth 3 times daily (with meals)    MULTIPLE VITAMINS-MINERALS (THERAPEUTIC MULTIVITAMIN-MINERALS) TABLET    Take 1 tablet by mouth daily    PANTOPRAZOLE (PROTONIX) 40 MG TABLET    Take 1 tablet by mouth 2 times daily (before meals)    RIVAROXABAN (XARELTO) 15 MG TABS TABLET    Take 1 tablet by mouth Daily with supper       ALLERGIES     Patient has no known allergies.     FAMILY HISTORY        Family History   Problem Relation Age of Onset    Diabetes Mother     High Blood Pressure Mother     Diabetes Sister     Diabetes Brother        SOCIAL HISTORY       Social History     Socioeconomic History    Marital status:      Spouse name: Not on file    Number of children: Not on file    Years of education: Not on file    Highest education level: Not on file   Occupational History    Not on file   Tobacco Use    Smoking status: Former Smoker     Packs/day: 1.50     Years: 7.00     Pack years: 10.50     Quit date: 1981     Years since quittin.2    Smokeless tobacco: Never Used   Vaping Use    Vaping Use: Never used   Substance and Sexual Activity    Alcohol use: Yes     Alcohol/week: 1.0 standard drink     Types: 1 Cans of beer per week    Drug use: No    Sexual activity: Yes     Partners: Female   Other Topics Concern    Not on file   Social History Narrative    Not on file     Social Determinants of Health     Financial Resource Strain:     Difficulty of Paying Living Expenses: Not on file   Food Insecurity:     Worried About Running Out of Food in the Last Year: Not on file    Karli of Food in the Last Year: Not on file   Transportation Needs:     Lack of Transportation (Medical): Not on file    Lack of Transportation (Non-Medical):  Not on file   Physical Activity:     Days of Exercise per Week: Not on file    Minutes of Exercise per Session: Not on file   Stress:     Feeling of Stress : Not on file   Social Connections:     Frequency of Communication with Friends and Family: Not on file    Frequency of Social Gatherings with Friends and Family: Not on file    Attends [employer] Services: Not on file    Active Member of Clubs or Organizations: Not on file    Attends Club or Organization Meetings: Not on file    Marital Status: Not on file   Intimate Partner Violence:     Fear of Current or Ex-Partner: Not on file    Emotionally Abused: Not on file    Physically Abused: Not on file    Sexually Abused: Not on file Housing Stability:     Unable to Pay for Housing in the Last Year: Not on file    Number of Places Lived in the Last Year: Not on file    Unstable Housing in the Last Year: Not on file       PHYSICAL EXAM       ED Triage Vitals   BP Temp Temp Source Pulse Resp SpO2 Height Weight   05/17/22 0022 05/17/22 0022 05/17/22 0022 05/17/22 0022 05/17/22 0022 05/17/22 0158 05/17/22 0022 05/17/22 0022   113/78 97.4 °F (36.3 °C) Oral 103 (!) 38 94 % 5' 6\" (1.676 m) 153 lb 14.1 oz (69.8 kg)       Physical Exam  Vitals and nursing note reviewed. Constitutional:       General: He is not in acute distress. Appearance: He is well-developed. He is not diaphoretic. HENT:      Head: Normocephalic and atraumatic. Eyes:      General:         Right eye: No discharge. Left eye: No discharge. Pupils: Pupils are equal, round, and reactive to light. Neck:      Thyroid: No thyromegaly. Trachea: No tracheal deviation. Cardiovascular:      Rate and Rhythm: Normal rate and regular rhythm. Heart sounds: No murmur heard. Pulmonary:      Breath sounds: No wheezing or rales. Chest:      Chest wall: No tenderness. Abdominal:      General: There is no distension. Palpations: Abdomen is soft. There is no mass. Tenderness: There is no abdominal tenderness. There is no guarding or rebound. Musculoskeletal:         General: No tenderness or deformity. Normal range of motion. Cervical back: Normal range of motion. Skin:     General: Skin is warm. Coloration: Skin is not pale. Findings: No erythema or rash. Neurological:      Mental Status: He is alert. Cranial Nerves: No cranial nerve deficit. Motor: No abnormal muscle tone.       Coordination: Coordination normal.         DIAGNOSTIC RESULTS     RADIOLOGY:   Non-plain film images such as CT, Ultrasoundand MRI are read by the radiologist. Plain radiographic images are visualized and preliminarily interpreted by the emergency physician with the below findings:    CT shows evidence of gallstones    ED BEDSIDE ULTRASOUND:   Performed by ED Physician - none    LABS:  Labs Reviewed   CBC WITH AUTO DIFFERENTIAL - Abnormal; Notable for the following components:       Result Value    RBC 3.46 (*)     Hemoglobin 11.6 (*)     Hematocrit 36.2 (*)     .7 (*)     RDW 18.6 (*)     Platelets 153 (*)     Lymphocytes Absolute 0.3 (*)     All other components within normal limits   BASIC METABOLIC PANEL W/ REFLEX TO MG FOR LOW K - Abnormal; Notable for the following components:    Sodium 135 (*)     Chloride 97 (*)     CO2 19 (*)     Anion Gap 19 (*)     BUN 75 (*)     CREATININE 8.1 (*)     GFR Non- 7 (*)     GFR  8 (*)     All other components within normal limits    Narrative:     [employer] tel. [phone],  Chemistry results called to and read back by moses Shahid, 05/17/2022  02:41, by NAY   PROTIME-INR - Abnormal; Notable for the following components:    Protime 69.4 (*)     INR 5.71 (*)     All other components within normal limits    Narrative:     [employer] tel. [phone],  Coag results called to and read back by Meet Diego RN, 05/17/2022 02:14,  by BLUSH   HEPATIC FUNCTION PANEL - Abnormal; Notable for the following components:    Alkaline Phosphatase 300 (*)      (*)      (*)     Total Bilirubin 6.5 (*)     Bilirubin, Direct 4.7 (*)     Bilirubin, Indirect 1.8 (*)     All other components within normal limits    Narrative:     [employer] tel. [phone],  Chemistry results called to and read back by moses Shahid, 05/17/2022  02:41, by NAY   C DIFF TOXIN/ANTIGEN   GASTROINTESTINAL PANEL, MOLECULAR   HEPATITIS PANEL, ACUTE   ETHANOL       All other labs were withinnormal range or not returned as of this dictation.     EMERGENCY DEPARTMENT COURSE and DIFFERENTIAL DIAGNOSIS/MDM:     PMH, Surgical Hx, FH, Social Hx reviewed by myself (ETOH usage, Tobacco usage, Drug usage reviewed by myself, no pertinent Hx)- No Pertinent Hx     Old records were reviewed by me    63-year-old male with abdominal pain and diarrhea. Elevated LFTs. Unclear etiology. This could be either secondary to hepatic congestion versus gallbladder etiology. Will admit for further inpatient valuation. CRITICAL CARE TIME   Total Critical Caretime was 21 minutes, excluding separately reportable procedures. There was a high probability of clinically significant/life threatening deterioration in the patient's condition which required my urgent intervention. PROCEDURES:  Unlessotherwise noted below, none    FINAL IMPRESSION      1. Transaminitis    2.  Elevated bilirubin          DISPOSITION/PLAN   DISPOSITION Admitted 05/17/2022 05:00:40 AM    (Please note that portions ofthis note were completed with a voice recognition program.  Efforts were made to edit the dictations but occasionally words are mis-transcribed.)    Marlene Rubalcava MD(electronically signed)  Attending Emergency Physician        Marlene Rubalcava MD  05/17/22 0042

## 2022-05-17 NOTE — PROGRESS NOTES
Occupational Therapy  Facility/Department: [address] PROGRESSIVE CARE  Occupational Therapy Initial Assessment and Tentative D/C      Name: Francisco Dobson  : 1955  MRN: [id number]  Date of Service: 2022    Discharge Recommendations: Francisco Dobson scored a 16/24 on the AM-PAC ADL Inpatient form. Current research shows that an AM-PAC score of 18 or greater is typically associated with a discharge to the patient's home setting. If patient discharges prior to next session this note will serve as a discharge summary. Please see below for the latest assessment towards goals. Continue to assess pending progress,Patient would benefit from continued therapy after discharge (pt reports he plans to return home at time of D/C.)  OT Equipment Recommendations  Equipment Needed: No  Other: pt with needed AD       Patient Diagnosis(es): The primary encounter diagnosis was Transaminitis. A diagnosis of Elevated bilirubin was also pertinent to this visit. Past Medical History:  has a past medical history of CHF (congestive heart failure) ([address] Diabetes type 2, uncontrolled ([facility] [address] Diabetic nephropathy, ED (erectile dysfunction), Essential hypertension, benign, Hyperlipidemia, and Noncompliance. Past Surgical History:  has a past surgical history that includes eye surgery; IR NONTUNNELED VASCULAR CATHETER > 5 YEARS (2022); and IR TUNNELED CVC PLACE WO SQ PORT/PUMP > 5 YEARS (2022). Assessment   Performance deficits / Impairments: Decreased functional mobility ; Decreased balance;Decreased safe awareness;Decreased ADL status; Decreased endurance;Decreased strength;Decreased high-level IADLs  Assessment: Francisco Dobson is a 74 y.o. male who presents to the emergency department with abdominal pain diarrhea. Patient presents with abdominal pain and diarrhea. Has been going on for 5 days. Loose watery stools. Generalized abdominal pain. 7-10 achy nature. Worse with eating.  Better with rest. Started spontaneously. Constant in nature. No blood in stools. No other associated symptoms. PTA pt from home with son where pt was Ind with mobility and ADLs with AD as needed. Pt currently requires Min A for bed mobility. Pt completes functional mobility and transfers with CGA and use of RW. Anticipate pt needing up to Mod A for ADLs. Pt will benefit from skilled OT services at this time. Anticipate pt with need for ongoing OT at time of D/C. Pt reports plan to return home. Prognosis: Fair  Decision Making: Medium Complexity  Exam: see above  Assistance / Modification: RW  REQUIRES OT FOLLOW-UP: Yes  Activity Tolerance  Activity Tolerance: Patient limited by fatigue;Patient Tolerated treatment well        Plan   Plan  Times per Week: 3-5x  Current Treatment Recommendations: Strengthening,Balance training,Functional mobility training,Self-Care / ADL,Patient/Caregiver education & training,Safety education & training     Restrictions  Restrictions/Precautions  Restrictions/Precautions: Fall Risk    Subjective   General  Chart Reviewed: Yes  Patient assessed for rehabilitation services?: Yes  Additional Pertinent Hx: per ED note, Rickey Ortiz is a 74 y.o. male who presents to the emergency department with abdominal pain diarrhea. Patient presents with abdominal pain and diarrhea. Has been going on for 5 days. Loose watery stools. Generalized abdominal pain. 7-10 achy nature. Worse with eating. Better with rest.  Started spontaneously. Constant in nature. No blood in stools. No other associated symptoms. Family / Caregiver Present: No  Referring Practitioner: Jamila Rolle MD  Subjective  Subjective: Pt agreeable to OT evaluation. Pt with no reports of pain. General Comment  Comments: okay for therapy per RN.      Social/Functional History  Social/Functional History  Lives With: Son  Type of Home: Apartment  Home Layout: One level  Home Access: Stairs to enter with rails  Entrance Stairs - Number of Steps: 2 steps (descend) to enter  Bathroom Shower/Tub: Tub/Shower unit  Bathroom Toilet: Standard  Bathroom Equipment: Shower chair  Bathroom Accessibility: Accessible  Home Equipment: [place] Walker, 4 wheeled  Has the patient had two or more falls in the past year or any fall with injury in the past year?: No  ADL Assistance: Independent  Homemaking Assistance: Needs assistance (assist fron son and sister)  Ambulation Assistance: Independent (8BI)  Transfer Assistance: Independent       Objective   Pulse: 102  Heart Rate Source: Telemetry  BP: 121/76  BP Location: Right upper arm  MAP (Calculated): 91  Resp: 14  SpO2: 92 %  O2 Device: Nasal cannula  Vision Exceptions: Cataracts  Hearing: Within functional limits          Safety Devices  Type of Devices: Nurse notified;Call light within reach;Gait belt;Bed alarm in place; Left in bed  Restraints  Restraints Initially in Place: No  Bed Mobility Training  Bed Mobility Training: Yes  Overall Level of Assistance: Minimum assistance  Supine to Sit: Minimum assistance  Scooting: Minimum assistance  Transfer Training  Transfer Training: Yes  Overall Level of Assistance: Contact-guard assistance (RW)  Sit to Stand: Contact-guard assistance (RW)  Stand to Sit: Contact-guard assistance  Toilet Transfer: Contact-guard assistance (RW; grab bars)  Gait  Overall Level of Assistance: Contact-guard assistance (RW; increased time to complete; no overt LOB)  Distance (ft): 15 Feet (15ft x2)  Assistive Device: Walker, rolling     AROM: Within functional limits  PROM: Within functional limits  Strength: Within functional limits  Coordination: Within functional limits  Tone: Normal  ADL  Grooming: Setup (setup to wash hands)  LE Dressing: Moderate assistance (assist to don/doff brief; CGA for balance in stance)  Toileting:  Moderate assistance (assist for pericare; assist to manage brief; CGA for balance)  Additional Comments: Anticipate pt needing up to Mod A for ADLs based on ROM, strength, and balance                 Cognition  Overall Cognitive Status: WFL                  Education Given To: Patient  Education Provided: Role of Therapy;Plan of Care;Transfer Training;ADL Adaptive Strategies  Education Method: Demonstration;Verbal  Barriers to Learning: None  Education Outcome: Verbalized understanding;Demonstrated understanding          AM-PAC Score        AM-PAC Inpatient Daily Activity Raw Score: 16 (05/17/22 1413)  AM-PAC Inpatient ADL T-Scale Score : 35.96 (05/17/22 1413)  ADL Inpatient CMS 0-100% Score: 53.32 (05/17/22 1413)  ADL Inpatient CMS G-Code Modifier : CK (05/17/22 1413)    Goals  Short Term Goals  Time Frame for Short term goals: prior to D/C  Short Term Goal 1: complete functional mobility and transfers with supervision  Short Term Goal 2: complete bathing and dressing with supervision  Short Term Goal 3: complete toileting with supervision  Short Term Goal 4: complete grooming in stance at sink with supervision  Long Term Goals  Time Frame for Long term goals : STG=LTG  Patient Goals   Patient goals : return home       Therapy Time   Individual Concurrent Group Co-treatment   Time In 1855         Time Out 1410         Minutes 55         Timed Code Treatment Minutes: 40 Minutes (15 minute eval)       Enriqueta Escobar, OTR/L

## 2022-05-18 NOTE — PROGRESS NOTES
Gastroenterology Note  Patient:   Noah Wilson   :    1955   Facility:   [facility]   Date:     2022  Consultant:   Tabby Headley MD, MD      Subjective:     74 y.o. male admitted 2022 with Diarrhea [R19.7]  Transaminitis [R74.01]  Elevated bilirubin [R17] and seen for cirrhosis. .    Present  Diet Order: Diet NPO      Current Medications include:   Scheduled Meds:   allopurinol  100 mg Oral Daily    levothyroxine  50 mcg Oral QAM AC    metoprolol succinate  12.5 mg Oral Daily    midodrine  5 mg Oral TID WC    pantoprazole  40 mg Oral BID AC    sodium chloride flush  5-40 mL IntraVENous 2 times per day     Continuous Infusions:   dextrose 100 mL/hr (22 1031)    dextrose      sodium chloride       PRN Meds:.glucose, dextrose bolus **OR** dextrose bolus, glucagon (rDNA), dextrose, sodium chloride flush, sodium chloride, ondansetron, heparin (porcine)    Allergies: No Known Allergies    Objective:   Vital Signs:  Temp (24hrs), Av.5 °F (36.4 °C), Min:97.2 °F (36.2 °C), Max:97.8 °F (33.1 °C)     Systolic (52JQM), ZGX:124 , Min:97 , FTB:423      Diastolic (96LKT), REV:44, Min:54, Max:85     Pulse  Av.6  Min: 88  Max: 104  /85   Pulse 88   Temp 97.4 °F (36.3 °C) (Oral)   Resp 16   Ht 5' 6\" (1.676 m)   Wt 151 lb 0.2 oz (68.5 kg)   SpO2 96%   BMI 24.37 kg/m²      Physical Exam:   General appearance: alert and appears stated age  Lungs: clear to auscultation bilaterally  Heart: regular rate and rhythm, S1, S2 normal, no murmur, click, rub or gallop  Abdomen: soft, non-tender; bowel sounds normal; no masses,  no organomegaly    Lab and Imaging Review   Recent Labs     22  0151 22  0642 22  0643   WBC 4.6  --  5.3   HGB 11.6*  --  11.0*   .7*  --  101.2*   *  --  128*   INR 5.71*  --   --    * 136  --    K 4.4 4.5  --    CL 97* 97*  --    CO2 19* 20*  --    BUN 75* 48*  --    CREATININE 8.1* 6.0* --    GLUCOSE 77 25*  --    CALCIUM 9.0 8.3  --    PROT 7.3 6.3*  --    LABALBU 3.4 3.0*  --    * 440*  --    * 193*  --    ALKPHOS 300* 274*  --    BILITOT 6.5* 7.2*  --    BILIDIR 4.7*  --   --    MG  --  2.10  --        Assessment:     Patient Active Problem List    Diagnosis Date Noted    Elevated LFTs 05/17/2022    Supratherapeutic INR 05/17/2022    Diarrhea 05/17/2022    Ventricular tachycardia (HCC)     Syncope 03/26/2022    CHF [facility]congestive heart failure) ([facility]) 03/26/2022    Syncope and collapse 03/26/2022    ICD (implantable cardioverter-defibrillator) in place     Demand ischemia ([facility])     LV (left ventricular) mural thrombus     Acute on chronic congestive heart failure ([facility])     Acute on chronic systolic heart failure, NYHA class 4 ([facility]) 02/03/2022    [facility]ONAL [facility] kidney injury) ([facility] [address]     Biventricular heart failure ([facility])     Stage 3 chronic kidney disease ([facility])     Acute on chronic combined systolic and diastolic HF (heart failure) ([facility]) 11/14/2021    Elevated PSA 04/09/2019    Coronary artery disease involving native coronary artery 02/12/2019    Elevated blood uric acid level 01/06/2017    Diabetes type 2, uncontrolled ([address] 07/28/2014    Noncompliance 07/28/2014    ED (erectile dysfunction) 09/12/2013    Essential hypertension, benign 09/12/2013    Hyperlipidemia 09/12/2013    Diabetic nephropathy ([facility] [address] 09/12/2013       Plan:   1. Still with diarrhea. Stool tests are negative to date. LFT's elevated due to cirrhosis and CHF. Continue supportive care.

## 2022-05-18 NOTE — PROGRESS NOTES
Order for replacement of TDC r/t line exposure. Dr. Britt Fields evaluated the line bedside. There was a small opening at the IJ stick site indicating that it had not fully closed. There is no other catheter exposure. Per Dr. Britt Fields, line does not need replaced just additional surgical glue needed at stick site. Dr. Britt Fields cleaned and prepped the area using sterile technique. Derma bond applied. Sterile dressing applied.      Electronically signed by Shane Pittman RN on 5/18/2022 at 2:40 PM

## 2022-05-18 NOTE — PLAN OF CARE
Problem: Discharge Planning  Goal: Discharge to home or other facility with appropriate resources  Outcome: Progressing     Problem: Chronic Conditions and Co-morbidities  Goal: Patient's chronic conditions and co-morbidity symptoms are monitored and maintained or improved  5/18/2022 0959 by Joselito Storey RN  Outcome: Progressing  5/18/2022 0027 by Marcey Snellen, RN  Outcome: Progressing  Flowsheets (Taken 5/18/2022 0027)  Care Plan - Patient's Chronic Conditions and Co-Morbidity Symptoms are Monitored and Maintained or Improved: Monitor and assess patient's chronic conditions and comorbid symptoms for stability, deterioration, or improvement     Problem: Safety - Adult  Goal: Free from fall injury  5/18/2022 0959 by Joselito Storey RN  Outcome: Progressing  Note: Fall precautions in place. Bed locked and in lowest position. Alarm on. Call light within reach. 5/18/2022 0027 by Marcey Snellen, RN  Outcome: Progressing  Flowsheets (Taken 5/18/2022 0027)  Free From Fall Injury: Instruct family/caregiver on patient safety     Problem: ABCDS Injury Assessment  Goal: Absence of physical injury  Outcome: Progressing     Problem: Skin/Tissue Integrity  Goal: Absence of new skin breakdown  Description: 1. Monitor for areas of redness and/or skin breakdown  2. Assess vascular access sites hourly  3. Every 4-6 hours minimum:  Change oxygen saturation probe site  4. Every 4-6 hours:  If on nasal continuous positive airway pressure, respiratory therapy assess nares and determine need for appliance change or resting period. 5/18/2022 0959 by Joselito Storey RN  Outcome: Progressing  5/18/2022 0027 by Marcey Snellen, RN  Outcome: Progressing  Note: Skin assessment complete. No breakdown noted. Interventions in place. See doc flowsheet for interventions initiated. Pt encouraged to turn.  Will continue to monitor for additional needs and skin breakdown      Problem: Respiratory - Adult  Goal: Achieves optimal ventilation and oxygenation  5/18/2022 0959 by Venus Mena RN  Outcome: Progressing  5/18/2022 0027 by Farrah Min RN  Outcome: Progressing  Flowsheets (Taken 5/18/2022 0027)  Achieves optimal ventilation and oxygenation:   Assess for changes in respiratory status   Assess for changes in mentation and behavior   Oxygen supplementation based on oxygen saturation or arterial blood gases   Position to facilitate oxygenation and minimize respiratory effort     Problem: Cardiovascular - Adult  Goal: Maintains optimal cardiac output and hemodynamic stability  5/18/2022 0959 by Venus Mena RN  Outcome: Progressing  5/18/2022 0027 by Farrah Min RN  Outcome: Progressing  Flowsheets  Taken 5/18/2022 0027  Maintains optimal cardiac output and hemodynamic stability:   Monitor blood pressure and heart rate   Monitor urine output and notify Licensed Independent Practitioner for values outside of normal range   Assess for signs of decreased cardiac output   Administer fluid and/or volume expanders as ordered  Taken 5/17/2022 2044  Maintains optimal cardiac output and hemodynamic stability:   Monitor blood pressure and heart rate   Monitor urine output and notify Licensed Independent Practitioner for values outside of normal range   Assess for signs of decreased cardiac output  Goal: Absence of cardiac dysrhythmias or at baseline  Outcome: Progressing     Problem: Hematologic - Adult  Goal: Maintains hematologic stability  5/18/2022 0959 by Venus Mena RN  Outcome: Progressing  5/18/2022 0027 by Farrah Min RN  Outcome: Progressing

## 2022-05-18 NOTE — PROGRESS NOTES
Physician Progress Note      Daksha Bowling  CSN #:                  [id number]  :                       1955  ADMIT DATE:       2022 12:21 AM  DISCH DATE:  RESPONDING  PROVIDER #:        Aimee Russell          QUERY TEXT:    Pt admitted with diarrhea and has CHF documented. If possible, please document   in progress notes and discharge summary further specificity regarding the   type and acuity of CHF:      The medical record reflects the following:  Risk Factors: HX- CHF, HTN, ESRD, DM2  Clinical Indicators: P 110, RR 38. Geri Money Geri Money 3/25/22   EF  <20. ... Geri Money CT Chest-Findings   compatible with pulmonary edema with small pleural effusions. Geri Money Geri Money Per H&P on   - CHF. Geri Money Geri Money Per Nephrology consult on -He has severe systolic heart   failure and BP runs low  Treatment: Hemodialysis, Nephrology consult, I&O, Daily weights,    Thank Geo Hdez RN BSN CDS CRCR  [email or web address]  Options provided:  -- Acute Systolic CHF/HFrEF  -- Chronic Systolic CHF/HFrEF  -- Acute on Chronic Systolic CHF/HFrEF  -- Other - I will add my own diagnosis  -- Disagree - Not applicable / Not valid  -- Disagree - Clinically unable to determine / Unknown  -- Refer to Clinical Documentation Reviewer    PROVIDER RESPONSE TEXT:    This patient has chronic systolic CHF/HFrEF.     Query created by: Travis Marie on 2022 8:55 AM      Electronically signed by:  Aimee Russell 2022 2:05 PM

## 2022-05-18 NOTE — CARE COORDINATION
INITIAL CASE MANAGEMENT ASSESSMENT    Reviewed chart, met with patient to assess possible discharge needs. Explained Case Management role/services. Living Situation: Confirmed address, lives with son in an apartment    ADLs: Independent     DME: Wheeled walker    PT/OT Recs: Not ordered     Active Services: None     Transportation: Patient does not drive, [facility] for HD transport     Medications: Uses [facility] on [address] -- no issues    PCP: Mora Sahu, DO      HD/PD: [facility] [employer]    PLAN/COMMENTS: Patient will return home. Denies home care needs. Will monitor. SW/CM provided contact information for patient or family to call with any questions. SW/CM will follow and assist as needed.   Electronically signed by Roberto Dennis RN Case Management [phone] on 5/18/2022 at 4:32 PM

## 2022-05-18 NOTE — PROGRESS NOTES
D5 stopped for roughly 90 min for pt to get MRI. BS was 129 prior to going down and was 71 on return. D5 restarted. Dr. Jamie Rasmussen notified- ok to keep D5 running. Lab up to draw IRN. Multiple attempts made by 2 phleb- unable to obtain blood. Dr. Jamie Rasmussen notified. Pt continues with multiple episodes of loose stool each hour.   Electronically signed by Joselito Storey RN on 5/18/22 at 3:51 PM EDT

## 2022-05-18 NOTE — PROGRESS NOTES
Pt's \"broken blood vessel\" in R eye is noted to be sig worse this shift with the entire sclera noted to be red with some swelling. Dr. Cardenas Breath notified of this.    Electronically signed by Jose Royal RN on 5/18/22 at 4:43 PM EDT

## 2022-05-18 NOTE — PROGRESS NOTES
Pt stated that during his last admission in April he left a table top monitor that is r/t his pacemaker. Stated that he received a call sometime in the last few days stating it was found and he could come pick it up. Security states they do not have this. Call placed to Ervin Rose 6 states she will check and f/u tomorrow.    Electronically signed by Beth Harada, RN on 5/18/22 at 4:41 PM EDT

## 2022-05-18 NOTE — PROGRESS NOTES
Hospitalist Progress Note      PCP: Dede Ryan DO    Date of Admission: 5/17/2022    Chief Complaint: diarrhea. Hospital Course:    74 y. o. male with past medical history as below who presents to [facility] with concern for progressive worsening diarrhea over the last 2 to 3 days that he notes that he has been feeling increasingly weak and fatigued. Admitted for diarrhea under investigation, was found to have supratherapeutic INR. Subjective:   Was noted to have an episode of hypoglycemia, patient is not on insulin. Hypoglycemia protocol initiated, continues to report diarrhea  HD line noted to be eroding . Medications:  Reviewed    Infusion Medications    dextrose 100 mL/hr (05/18/22 1031)    dextrose      sodium chloride       Scheduled Medications    allopurinol  100 mg Oral Daily    levothyroxine  50 mcg Oral QAM AC    metoprolol succinate  12.5 mg Oral Daily    midodrine  5 mg Oral TID WC    pantoprazole  40 mg Oral BID AC    sodium chloride flush  5-40 mL IntraVENous 2 times per day     PRN Meds: glucose, dextrose bolus **OR** dextrose bolus, glucagon (rDNA), dextrose, sodium chloride flush, sodium chloride, ondansetron, heparin (porcine)      Intake/Output Summary (Last 24 hours) at 5/18/2022 1241  Last data filed at 5/18/2022 0912  Gross per 24 hour   Intake 1340 ml   Output 1900 ml   Net -560 ml       Physical Exam Performed:    BP (!) 115/99   Pulse 83   Temp 97.6 °F (36.4 °C) (Oral)   Resp 16   Ht 5' 6\" (1.676 m)   Wt 151 lb 0.2 oz (68.5 kg)   SpO2 94%   BMI 24.37 kg/m²     General appearance: No apparent distress, appears stated age and cooperative. HEENT: Pupils equal, round, and reactive to light. Conjunctivae/corneas clear. Neck: Supple, with full range of motion. No jugular venous distention. Trachea midline. Respiratory:  Normal respiratory effort. Clear to auscultation, bilaterally without Rales/Wheezes/Rhonchi.   Cardiovascular: Regular rate and rhythm with normal S1/S2 without murmurs, rubs or gallops. Dialysis line noted, some erosions noted with bleeding through the dressing  Abdomen: Soft, non-tender, non-distended with normal bowel sounds. Musculoskeletal: No clubbing, cyanosis or edema bilaterally. Full range of motion without deformity. Skin: Skin color, texture, turgor normal.  No rashes or lesions. Neurologic:  Neurovascularly intact without any focal sensory/motor deficits. Cranial nerves: II-XII intact, grossly non-focal.  Psychiatric: Alert and oriented, thought content appropriate, normal insight  Capillary Refill: Brisk,3 seconds, normal   Peripheral Pulses: +2 palpable, equal bilaterally       Labs:   Recent Labs     05/17/22  0151 05/18/22  0643   WBC 4.6 5.3   HGB 11.6* 11.0*   HCT 36.2* 32.8*   * 128*     Recent Labs     05/17/22  0151 05/18/22  0642   * 136   K 4.4 4.5   CL 97* 97*   CO2 19* 20*   BUN 75* 48*   CREATININE 8.1* 6.0*   CALCIUM 9.0 8.3     Recent Labs     05/17/22  0151 05/18/22  0642   * 440*   * 193*   BILIDIR 4.7*  --    BILITOT 6.5* 7.2*   ALKPHOS 300* 274*     Recent Labs     05/17/22  0151   INR 5.71*     No results for input(s): Lynnette Comment in the last 72 hours. Urinalysis:      Lab Results   Component Value Date    NITRU Negative 04/10/2022    WBCUA 0-2 04/10/2022    BACTERIA 2+ 04/09/2022    RBCUA 0-2 04/10/2022    BLOODU Negative 04/10/2022    SPECGRAV 1.025 04/10/2022    GLUCOSEU Negative 04/10/2022       Radiology:  CT CHEST WO CONTRAST   Final Result   1. Findings compatible with pulmonary edema with small pleural effusions. 2.  Body wall edema and upper abdominal ascites. CT ABDOMEN PELVIS WO CONTRAST Additional Contrast? None   Final Result   1. No ileus or obstruction. No definite inflammatory bowel process though   very limited given the extent of ascites.    2. Moderate volume ascites seen in the abdomen and pelvis, similar to the   prior exam. 3. Cardiomegaly with small bilateral effusions, left greater than right as   well as dependent ground-glass change which may represent dependent pulmonary   edema. 4. Mild anasarca. 5. Mild renal atrophy. 6. Cholelithiasis with a contracted gallbladder. XR CHEST PORTABLE   Final Result   Stable moderate cardiomegaly with mild central pulmonary congestion which is   more apparent. Alysa Fontanez left retrocardiac atelectasis or early infiltrate      Status post right-sided dialysis catheter placement in good position         MRI ABDOMEN WO CONTRAST MRCP    (Results Pending)   IR REPLACE TUNNELED CVC W SQ PORT SAME ACCESS    (Results Pending)           Assessment/Plan:    Active Hospital Problems    Diagnosis     Elevated LFTs [R79.89]      Priority: Medium    Supratherapeutic INR [R79.1]      Priority: Medium    Diarrhea [R19.7]      Priority: Medium    CHF (congestive heart failure) (HCC) [I50.9]     ICD (implantable cardioverter-defibrillator) in place [Z95.810]     Biventricular heart failure (HCC) [I50.82]      Diarrhea. Abdominal pain. Patient presented to emergency with severe diarrhea over the past 3 days, C. difficile negative, diarrhea work-up negative. We will continue to follow clinically    Transaminitis. History of cirrhosis. Seems to be improving, , , bilirubin 7.2. Possibly related to congestive heart failure. Appreciate GI input, patient scheduled for MRCP    Hypoglycemia. Patient has been on clear liquid diet, will progress diet to full. Start hypoglycemia protocol    Elevated INR. In the context of liver dysfunction, will attempt vitamin K, patient will likely need FFP prior to procedure. Catheter erosion. Catheter is exposed along the tunnel track, patient is very thin. Patient will need replaced tunneled catheter in a different location, pending improvement of INR. ESRD on HD. Appreciate nephrology input    Systolic heart failure.   Ejection fraction less than 20%. Volume status adjusted during dialysis    Anemia.   Continue IV iron    DVT Prophylaxis: lovenox  Diet: Diet NPO  Code Status: Full Code    PT/OT Eval Status: pending clinical improvement      Dispo - pending clinical improvement      So Hernandez MD

## 2022-05-18 NOTE — PLAN OF CARE
Problem: Chronic Conditions and Co-morbidities  Goal: Patient's chronic conditions and co-morbidity symptoms are monitored and maintained or improved  Outcome: Progressing  Flowsheets (Taken 5/18/2022 0027)  Care Plan - Patient's Chronic Conditions and Co-Morbidity Symptoms are Monitored and Maintained or Improved: Monitor and assess patient's chronic conditions and comorbid symptoms for stability, deterioration, or improvement     Problem: Safety - Adult  Goal: Free from fall injury  Outcome: Progressing  Flowsheets (Taken 5/18/2022 0027)  Free From Fall Injury: Instruct family/caregiver on patient safety     Problem: Skin/Tissue Integrity  Goal: Absence of new skin breakdown  Description: 1. Monitor for areas of redness and/or skin breakdown  2. Assess vascular access sites hourly  3. Every 4-6 hours minimum:  Change oxygen saturation probe site  4. Every 4-6 hours:  If on nasal continuous positive airway pressure, respiratory therapy assess nares and determine need for appliance change or resting period. Outcome: Progressing  Note: Skin assessment complete. No breakdown noted. Interventions in place. See doc flowsheet for interventions initiated. Pt encouraged to turn.  Will continue to monitor for additional needs and skin breakdown      Problem: Respiratory - Adult  Goal: Achieves optimal ventilation and oxygenation  Outcome: Progressing  Flowsheets (Taken 5/18/2022 0027)  Achieves optimal ventilation and oxygenation:   Assess for changes in respiratory status   Assess for changes in mentation and behavior   Oxygen supplementation based on oxygen saturation or arterial blood gases   Position to facilitate oxygenation and minimize respiratory effort     Problem: Cardiovascular - Adult  Goal: Maintains optimal cardiac output and hemodynamic stability  Outcome: Progressing  Flowsheets  Taken 5/18/2022 0027  Maintains optimal cardiac output and hemodynamic stability:   Monitor blood pressure and heart rate Monitor urine output and notify Licensed Independent Practitioner for values outside of normal range   Assess for signs of decreased cardiac output   Administer fluid and/or volume expanders as ordered  Taken 5/17/2022 2044  Maintains optimal cardiac output and hemodynamic stability:   Monitor blood pressure and heart rate   Monitor urine output and notify Licensed Independent Practitioner for values outside of normal range   Assess for signs of decreased cardiac output     Problem: Hematologic - Adult  Goal: Maintains hematologic stability  Outcome: Progressing

## 2022-05-18 NOTE — ACP (ADVANCE CARE PLANNING)
Advance Care Planning     Advance Care Planning Activator (Inpatient)  Conversation Note      Date of ACP Conversation: 5/18/2022     Conversation Conducted with: Patient with Decision Making Capacity    ACP Activator: Viktor Morgan RN    Health Care Decision Maker:     Current Designated Health Care Decision Maker:     Primary Decision Maker: Chirag Toussaint - [phone]    Secondary Decision Maker: Carole Love - Brother/Sister - [phone]    Care Preferences    Ventilation: \"If you were in your present state of health and suddenly became very ill and were unable to breathe on your own, what would your preference be about the use of a ventilator (breathing machine) if it were available to you? \"      Would the patient desire the use of ventilator (breathing machine)?: yes    \"If your health worsens and it becomes clear that your chance of recovery is unlikely, what would your preference be about the use of a ventilator (breathing machine) if it were available to you? \"     Would the patient desire the use of ventilator (breathing machine)?: Unsure      Resuscitation  \"CPR works best to restart the heart when there is a sudden event, like a heart attack, in someone who is otherwise healthy. Unfortunately, CPR does not typically restart the heart for people who have serious health conditions or who are very sick. \"    \"In the event your heart stopped as a result of an underlying serious health condition, would you want attempts to be made to restart your heart (answer \"yes\" for attempt to resuscitate) or would you prefer a natural death (answer \"no\" for do not attempt to resuscitate)? \" yes       [] Yes   [x] No   Educated Patient / Jacinto Burger regarding differences between Advance Directives and portable DNR orders.     Length of ACP Conversation in minutes: 3 minutes     Conversation Outcomes:  [x] ACP discussion completed -- prior ACP reviewed and confirmed no changes  [] Existing advance directive reviewed with patient; no changes to patient's previously recorded wishes  [] New Advance Directive completed  [] Portable Do Not Rescitate prepared for Provider review and signature  [] POLST/POST/MOLST/MOST prepared for Provider review and signature  Electronically signed by Vicki Shepard RN Case Management [phone] on 5/18/2022 at 4:15 PM

## 2022-05-18 NOTE — PROGRESS NOTES
Physical Therapy  Facility/Department: [facility] PROGRESSIVE CARE  Physical Therapy Initial Assessment/Treatment Session    Name: Aashish Campbell  : 1955  MRN: [id number]  Date of Service: 2022    Discharge Recommendations:  Patient would benefit from continued therapy after discharge,Continue to assess pending progress   PT Equipment Recommendations  Equipment Needed: No      Patient Diagnosis(es): The primary encounter diagnosis was Transaminitis. A diagnosis of Elevated bilirubin was also pertinent to this visit. Past Medical History:  has a past medical history of CHF (congestive heart failure) ([facility] [address] Diabetes type 2, uncontrolled ([facility] [address] Diabetic nephropathy, ED (erectile dysfunction), Essential hypertension, benign, Hyperlipidemia, and Noncompliance. Past Surgical History:  has a past surgical history that includes eye surgery; IR NONTUNNELED VASCULAR CATHETER > 5 YEARS (2022); and IR TUNNELED CVC PLACE WO SQ PORT/PUMP > 5 YEARS (2022). Assessment   Body Structures, Functions, Activity Limitations Requiring Skilled Therapeutic Intervention: Decreased functional mobility ; Decreased endurance  Assessment: Pt is a 74 y.o. M. admitted  for diarrhea, heart failure, and ESRD on HD. He presents c/o diarrhea, and experienced an episode during evaluation, requiring total assist for tarik-care. LE strength is WFL, but he declined any significant ambulation d/t persistent bouts of diarrhea with activity. He would benefit from continued therapy to further assess functional mobility when able, and to maximize activity tolerance. He is intent upon returning directly home, but will need to demonstrate improved tolerance for/independence with self-care and mobility tasks. Continue to assess for D/C plan pending pt progress. Aashish Campbell scored a  on the AM-PAC short mobility form. If patient discharges prior to next session this note will serve as a discharge summary.   Please see below for Stairs - Number of Steps: 2 steps (descend) to enter  Bathroom Shower/Tub: Tub/Shower unit  [employer]: Standard  Bathroom Equipment: Shower chair  Bathroom Accessibility: Accessible  Home Equipment: Alana Matter, 4 wheeled  Has the patient had two or more falls in the past year or any fall with injury in the past year?: No  ADL Assistance: Independent  Homemaking Assistance: Needs assistance (assist fron son and sister)  Ambulation Assistance: Independent (8QU)  Transfer Assistance: Independent    Objective     AROM RLE (degrees)  RLE AROM: WFL  AROM LLE (degrees)  LLE AROM : WFL  AROM RUE (degrees)  RUE AROM : WFL  AROM LUE (degrees)  LUE AROM : WFL     Strength RLE  Strength RLE: WFL  Strength LLE  Strength LLE: WFL  Strength RUE  Strength RUE: WFL  Strength LUE  Strength LUE: WFL     Bed mobility  Supine to Sit: Moderate assistance  Sit to Supine: Stand by assistance     Transfers  Sit to Stand: Contact guard assistance  Stand to sit: Contact guard assistance     Ambulation  Surface: level tile  Device: Rolling Walker  Assistance: Contact guard assistance  Quality of Gait: Side-step 3 steps to R with walker support, CGA, no LOB. Distance: 3'    AM-PAC Score  AM-PAC Inpatient Mobility Raw Score : 16 (05/18/22 1008)  AM-PAC Inpatient T-Scale Score : 40.78 (05/18/22 1008)  Mobility Inpatient CMS 0-100% Score: 54.16 (05/18/22 1008)  Mobility Inpatient CMS G-Code Modifier : CK (05/18/22 1008)          Goals  Short Term Goals  Time Frame for Short term goals: 2-3 days  Short term goal 1: Bed mobility SBA  Short term goal 2: Transfers SBA  Short term goal 3: Ambulation 13' with walker, CGA  Patient Goals   Patient goals :  To return directly home    Therapy Time   Individual Concurrent Group Co-treatment   Time In [address] [place] [address]         Time Out 1010         Minutes KAREY Grimaldo    Electronically signed by Alfredo Yang PT [id number] on 5/18/2022 at 10:13 AM

## 2022-05-18 NOTE — PROGRESS NOTES
[facility]                                               Progress note    Summary:   Hali Godinez is being seen by nephrology for ESRD on HD. Admitted with  diarrhea. Interval History  Seen at bedside. Appears weak and chronically ill. Offers no complaints  Had MRI abdomen showing large volume ascites diffuse body wall edema, moderate diffuse hepatic steatosis: Cardiomegaly bilateral small pleural effusions and bibasilar consolidations  /99  HR 81  94% on 2 L     Labs reviewed. Sodium 136 potassium 4.5 bicarb 20  BUN 48 creatinine 6 magnesium 2.1  Had a hypoglycemic episode with a blood glucose of 25  Albumin 3  Hemoglobin 11  IV iron given with HD yesterday      Plan:   - catheter issues address per IR, appreciate their assistance. - no acute indications for dialysis today  - HD tomorrow per TTS schedule.   -We will plan to continue to challenge his dry weight with dialysis tomorrow. Rafa Palencia MD  [facility]  Office: [phone]    Assessment:   #ESRD  [facility]. Has TDC and has been referred for vein mapping and AVF/AVG placement. ,  TIME 3.3 Hours  Na 138   K 2  Ca 2.5   Bicarb 35   180nRe    #BP/Volume  Hypervolemic. BP acceptable. He has severe systolic heart failure and BP runs low. Has required inotrope support in the past   On midodrine 5 mg TID. UF as tolerated, challenge dry weight. On metop 12.5 mg BID. EDW 77 kilos    #Metabolic acidosis  Address with dialysis. #SHPT  Last  and phos 6.3  Is supposed to be on phos binder. Will add renvela. #Anemia  Last tsat 12% ferritin 174  He will need IV iron with HD          ROS:   Positives Listed Bold. All other remaining systems are negative. Constitutional:  fever, chills, weakness, weight change, fatigue,      Skin:  rash, pruritus, hair loss, bruising, dry skin, petechiae. Head, Face, Neck   headaches, swelling,  cervical adenopathy. Respiratory: shortness of breath, cough, or wheezing  Cardiovascular: chest pain, palpitations, dizzy, edema  Gastrointestinal: nausea, vomiting, diarrhea, constipation,belly pain    Yellow skin, blood in stool  Musculoskeletal:  back pain, muscle weakness, gait problems,       joint pain or swelling. Genitourinary:  dysuria, poor urine flow, flank pain, blood in urine  Neurologic:  vertigo, TIA'S, syncope, seizures, focal weakness  Psychosocial:  insomnia, anxiety, or depression. Additional positive findings: -     PMH:   Past medical history, surgical history, social history, family history are reviewed and updated as appropriate. Reviewed current medication list.   Allergies reviewed and updated as needed. PE:   Vitals:    05/18/22 1200   BP: (!) 115/99   Pulse: 83   Resp: 16   Temp: 97.6 °F (36.4 °C)   SpO2: 94%       General appearance:  in NAD, fully alert and oriented. Comfortable. HEENT: EOM intact, no icterus. Trachea is midline. Neck : No masses, appears symmetrical, no JVD  Respiratory: Respiratory effort appears normal, bilateral equal chest rise, no wheeze, +crackles  Cardiovascular: Ausculation shows RRR + edema  Abdomen: No visible mass or tenderness, non distended. Musculoskeletal:  Joints with no swelling or deformity. Skin:no rashes, ulcers, induration, no jaundice. Neuro: face symmetric, no focal deficits. Appropriate responses. Tunneled dialysis catheter  small opening at the IJ stick site indicating that it had not fully closed. There is no other catheter exposure.  Glue placed over it      Lab Results   Component Value Date    CREATININE 6.0 (HH) 05/18/2022    BUN 48 (H) 05/18/2022     05/18/2022    K 4.5 05/18/2022    CL 97 (L) 05/18/2022    CO2 20 (L) 05/18/2022      Lab Results   Component Value Date    WBC 5.3 05/18/2022    HGB 11.0 (L) 05/18/2022    HCT 32.8 (L) 05/18/2022    .2 (H) 05/18/2022     (L) 05/18/2022     Lab Results   Component Value Date    .8 (H) 04/07/2022    CALCIUM 8.3 05/18/2022    PHOS 3.4 04/07/2022

## 2022-05-18 NOTE — PROGRESS NOTES
Pt's glucose on BMP was noted to be 25. Glucose checked at bedside with a result of 29. MD notified- hypoglycemia protocol implemented. Pt received 250ml of D10 along with 2 cranberry juices and blood sugar only increased to 59. RN was informed by MRI that pt now needs to be NPO for a scan this afternoon. MD notified and instructions given to hang D5 continuous with q 4h glucose checks until pt can eat again and blood sugar is improved.   Electronically signed by Cleveland Anne RN on 5/18/22 at 10:50 AM EDT

## 2022-05-18 NOTE — PROGRESS NOTES
Physician Progress Note      Bill Silva  CSN #:                  [id number]  :                       1955  ADMIT DATE:       2022 12:21 AM  DISCH DATE:  RESPONDING  PROVIDER #:        Lilliam Headley          QUERY TEXT:    Pt admitted with diarrhea and has anemia documented. If possible, please   document in progress notes and discharge summary further specificity regarding   the acuity and type of anemia    The medical record reflects the following:  Risk Factors: HX- ESRD,  Clinical Indicators: H&H /32.8,  Per Nephrology consult note on -Anemia    Last tsat 12% ferritin 174  Treatment: Nephrology consult, IV Iron with HD. Thank Haven Boston RN BSN CDS CRCR  [email or web address]  Options provided:  -- Anemia due to iron deficiency  -- Anemia due to CKD  -- Anemia due to acute blood loss  -- Anemia due to chronic blood loss  -- Anemia due to acute on chronic blood loss  -- Dilutional anemia  -- Other - I will add my own diagnosis  -- Disagree - Not applicable / Not valid  -- Disagree - Clinically unable to determine / Unknown  -- Refer to Clinical Documentation Reviewer    PROVIDER RESPONSE TEXT:    This patient has anemia due to CKD.     Query created by: Bere Hogde on 2022 9:03 AM      Electronically signed by:  Lilliam Headley 2022 3:02 PM

## 2022-05-19 NOTE — PROGRESS NOTES
Spoke to Dr. Kj Levy about pts dextrose, which we are going to decrease. I also made him aware pt refusing meds and refusing to have rectal temp or O2 satruration taken.

## 2022-05-19 NOTE — PROGRESS NOTES
Occupational Therapy  OT attempted to tx however pt out of the room for dialysis. Will attempt to see pt at a later time as schedule allows. Cont w/ POC. Alessandra Richardson, OTR/L #[id number]    OT made second attempt to tx pt this PM however he was sleeping, covers over his head. Will attempt to tx tomorrow as pt tolerates.  Alessandra Richardson OTR/L #[id number]

## 2022-05-19 NOTE — PROGRESS NOTES
[facility]                                               Progress note    Summary:   Aashish Campbell is being seen by nephrology for ESRD on HD. Admitted with  diarrhea. Interval History  Seen on dialysis. Weak fatigue, lethargic. Offers no complaints    MRI abdomen showing large volume ascites diffuse body wall edema, moderate diffuse hepatic steatosis: Cardiomegaly bilateral small pleural effusions and bibasilar consolidations    /76  HR 79  97% on 4 L     Labs reviewed. Sodium 127 potassium 5.5 bicarb 14 BUN 50 creatinine 6  Magnesium 2.20  Albumin 3.4  Hemoglobin 10.6      Plan:   -HD today, challenge dry weight. MRI showed large volume ascites body wall edema cardiomegaly and bibasilar effusions  -His standing weight today was 68.5 kg, outpatient dry weight 77 kg. We will challenge his weight by 2 to 3 kg today. As tolerated for blood pressure systolic of 90 or better. Can use albumin and midodrine for blood pressure support    Monty Serrano MD  [facility]  Office: [phone]    Assessment:   #ESRD  On HD TTS at [facility]. Has TDC and has been referred for vein mapping and AVF/AVG placement. ,  TIME 3.3 Hours  Na 138   K 2  Ca 2.5   Bicarb 35   180nRe    #BP/Volume  Hypervolemic. BP acceptable. He has severe systolic heart failure and BP runs low. Has required inotrope support in the past   On midodrine 5 mg TID. UF as tolerated, challenge dry weight. On metop 12.5 mg BID. EDW 77 kilos    #Metabolic acidosis  Address with dialysis. #SHPT  Last  and phos 6.3  Is supposed to be on phos binder. Will add renvela. #Anemia  Last tsat 12% ferritin 174  He will need IV iron with HD      Abdominal pain. Patient presented to emergency with severe diarrhea over the past 3 days, C. difficile negative, diarrhea work-up negative.        Transaminitis. History of cirrhosis.   Seems to be improving, , , bilirubin 7.2.  Possibly related to congestive heart failure. patient scheduled for MRCP        ROS:   Positives Listed Bold. All other remaining systems are negative. Constitutional:  fever, chills, weakness, weight change, fatigue,      Skin:  rash, pruritus, hair loss, bruising, dry skin, petechiae. Head, Face, Neck   headaches, swelling,  cervical adenopathy. Respiratory: shortness of breath, cough, or wheezing  Cardiovascular: chest pain, palpitations, dizzy, edema  Gastrointestinal: nausea, vomiting, diarrhea, constipation,belly pain    Yellow skin, blood in stool  Musculoskeletal:  back pain, muscle weakness, gait problems,       joint pain or swelling. Genitourinary:  dysuria, poor urine flow, flank pain, blood in urine  Neurologic:  vertigo, TIA'S, syncope, seizures, focal weakness  Psychosocial:  insomnia, anxiety, or depression. Additional positive findings: -     PMH:   Past medical history, surgical history, social history, family history are reviewed and updated as appropriate. Reviewed current medication list.   Allergies reviewed and updated as needed. PE:   Vitals:    05/19/22 0928   BP:    Pulse:    Resp:    Temp:    SpO2: 95%       General appearance:  in NAD, fully alert and oriented. Comfortable. HEENT: EOM intact, no icterus. Trachea is midline. Neck : No masses, appears symmetrical, no JVD  Respiratory: Respiratory effort appears normal, bilateral equal chest rise, no wheeze, +crackles  Cardiovascular: Ausculation shows RRR + edema  Abdomen: No visible mass or tenderness, non distended. Musculoskeletal:  Joints with no swelling or deformity. Skin:no rashes, ulcers, induration, no jaundice. Neuro: face symmetric, no focal deficits. Appropriate responses. Tunneled dialysis catheter  small opening at the IJ stick site indicating that it had not fully closed. There is no other catheter exposure.  Glue placed over it      Lab Results   Component Value Date    CREATININE 6.0 (HH) 05/19/2022    BUN 50 (H) 05/19/2022     (L) 05/19/2022    K 5.5 (H) 05/19/2022    CL 91 (L) 05/19/2022    CO2 14 (LL) 05/19/2022      Lab Results   Component Value Date    WBC 3.8 (L) 05/19/2022    HGB 10.6 (L) 05/19/2022    HCT 32.8 (L) 05/19/2022    .0 (H) 05/19/2022     (L) 05/19/2022     Lab Results   Component Value Date    .8 (H) 04/07/2022    CALCIUM 8.3 05/19/2022    PHOS 3.4 04/07/2022

## 2022-05-19 NOTE — PROGRESS NOTES
Nutrition Note    RD received call from CDE, Nigel Cervantes, regarding hypoglycemia. RD unable to visit with pt as pt is out of room at dialysis tx. One recorded intake % upon admission two days ago. Pt with hypoglycemia, typically in the mornings. RD ordered Nepro supplement at breakfast and dinner to increase oral intakes, in hopes of increasing glucose levels.      Electronically signed by Tashi Schultz RD, STEVE on 5/19/22 at 1:13 PM EDT    Contact: [phone]

## 2022-05-19 NOTE — PROGRESS NOTES
Hospitalist Progress Note  5/19/2022 2:47 PM    PCP: Jose Luis Wilks DO    [id number]     Date of Admission: 5/17/2022                                                                                                                     HOSPITAL COURSE    Patient demographics:  The patient  Bernice Judd is a 74 y.o. male     Significant past medical history:   Patient Active Problem List   Diagnosis    ED (erectile dysfunction)    Essential hypertension, benign    Hyperlipidemia    Diabetic nephropathy ([address]    Diabetes type 2, uncontrolled ([address]    Noncompliance    Elevated blood uric acid level    Coronary artery disease involving native coronary artery    Elevated PSA    Acute on chronic combined systolic and diastolic HF (heart failure) ([address]    Stage 3 chronic kidney disease ([address]    [facility]) [facility]    Biventricular heart failure (HCC)    Acute on chronic systolic heart failure, NYHA class 4 (HCC)    Acute on chronic congestive heart failure (HCC)    LV (left ventricular) mural thrombus    Syncope    CHF (congestive heart failure) ([address]    Syncope and collapse    ICD (implantable cardioverter-defibrillator) in place    Demand ischemia ([address]    Ventricular tachycardia ([address]    Elevated LFTs    Supratherapeutic INR    Diarrhea         Presenting symptoms:  diarrhea. Diagnostic workup:      CONSULTS DURING ADMISSION :   IP CONSULT TO GI  IP CONSULT TO NEPHROLOGY  IP CONSULT TO INTERVENTIONAL RADIOLOGY      Patient was diagnosed with:  Diarrhea. Transaminitis. Hypoglycemia. Catheter erosion. ESRD on HD. Systolic heart failure. Treatment while inpatient:  74years old male who presented to the emergency room with generalized weakness and diarrhea. C. difficile was negative. Diarrhea work-up was negative. Patient's transaminitis was not related to history of cirrhosis. Patient was also noted to have a supratherapeutic INR.       ----------------------------------------------------------      SUBJECTIVE COMPLAINTS- follow up for diarrhea. Diet: ADULT DIET; Regular; Low Sodium (2 gm); 2000 ml  ADULT ORAL NUTRITION SUPPLEMENT; Breakfast, Dinner; Renal Oral Supplement      OBJECTIVE:   Patient Active Problem List   Diagnosis    ED (erectile dysfunction)    Essential hypertension, benign    Hyperlipidemia    Diabetic nephropathy ([facility]    Diabetes type 2, uncontrolled ([facility]    Noncompliance    Elevated blood uric acid level    Coronary artery disease involving native coronary artery    Elevated PSA    Acute on chronic combined systolic and diastolic HF (heart failure) ([facility]    Stage 3 [facility] disease [facility]    SONAL (acute kidney injury) [facility]    Biventricular heart failure ([facility]    Acute on chronic systolic heart failure, NYHA class 4 ([facility])    Acute on chronic congestive heart failure ([facility])    LV (left ventricular) mural thrombus    Syncope    CHF (congestive heart failure) [facility]    Syncope and collapse    ICD (implantable cardioverter-defibrillator) in place    Demand ischemia [facility]    Ventricular tachycardia ([facility]    Elevated LFTs    Supratherapeutic INR    Diarrhea       Allergies  Patient has no known allergies.     Medications    Scheduled Meds:   iron sucrose  100 mg IntraVENous Once in dialysis    clopidogrel  75 mg Oral Daily    allopurinol  100 mg Oral Daily    levothyroxine  50 mcg Oral QAM AC    metoprolol succinate  12.5 mg Oral Daily    midodrine  5 mg Oral TID WC    pantoprazole  40 mg Oral BID AC    sodium chloride flush  5-40 mL IntraVENous 2 times per day     Continuous Infusions:   dextrose 30 mL/hr at 05/19/22 1437    dextrose Stopped (05/19/22 1427)    sodium chloride       PRN Meds:  glucose, dextrose bolus **OR** dextrose bolus, glucagon (rDNA), dextrose, sodium chloride flush, sodium chloride, ondansetron, heparin (porcine)    Vitals   Vitals /wt   Patient Vitals for the past 8 hrs:   BP Temp Temp src Pulse Resp SpO2 Weight   05/19/22 1415 92/72 -- -- 95 20 -- --   05/19/22 1301 -- -- -- 98 -- -- --   05/19/22 1247 (!) 136/91 96.2 °F (35.7 °C) -- 84 30 -- 147 lb 11.3 oz (67 kg)   05/19/22 0928 -- -- -- -- -- 95 % --   05/19/22 0914 106/76 96 °F (35.6 °C) -- 79 30 -- 151 lb (68.5 kg)   05/19/22 0745 108/88 96.9 °F (36.1 °C) Rectal -- 20 90 % --        72HR INTAKE/OUTPUT:      Intake/Output Summary (Last 24 hours) at 5/19/2022 1447  Last data filed at 5/19/2022 1301  Gross per 24 hour   Intake 640 ml   Output 3400 ml   Net -2760 ml       Exam:    Gen:   Alert and oriented ×3   Eyes: PERRL. No sclera icterus. No conjunctival injection. ENT: No discharge. Pharynx clear. External appearance of ears and nose normal.  Neck: Trachea midline. No obvious mass. Resp: No accessory muscle use. No crackles. No wheezes. No rhonchi. CV: Regular rate. Regular rhythm. No murmur or rub. No edema. GI: Non-tender. Non-distended. No hernia. Skin: Warm, dry, normal texture and turgor. Lymph: No cervical LAD. No supraclavicular LAD. M/S: / Ext. No cyanosis. No clubbing. No joint deformity. Neuro: CN 2-12 are intact,  no neurologic deficits noted. PT/INR:   Recent Labs     05/17/22  0151 05/19/22  0636   PROTIME 69.4* 36.7*   INR 5.71* 3.10*     APTT: No results for input(s): APTT in the last 72 hours.     CBC:   Recent Labs     05/17/22  0151 05/18/22  0643 05/19/22  0637   WBC 4.6 5.3 3.8*   HGB 11.6* 11.0* 10.6*   HCT 36.2* 32.8* 32.8*   .7* 101.2* 104.0*   * 128* 128*       BMP:   Recent Labs     05/17/22  0151 05/18/22  0642 05/19/22  0636   * 136 127*   K 4.4 4.5 5.5*   CL 97* 97* 91*   CO2 19* 20* 14*   BUN 75* 48* 50*   CREATININE 8.1* 6.0* 6.0*       LIVER PROFILE:   Recent Labs     05/17/22 0151 05/18/22  0642 05/19/22  0636   ALKPHOS 300* 274* 284*   * 440* 475*   * 193* 197*   BILIDIR 4.7*  --   --    BILITOT 6.5* 7.2* 7.9*     No results for input(s): AMYLASE in the last 72 hours. No results for input(s): LIPASE in the last 72 hours. UA:No results for input(s): NITRITE, LABCAST, WBCUA, RBCUA, MUCUS in the last 72 hours. TROPONIN: No results for input(s): Shad Ivan in the last 72 hours. Lab Results   Component Value Date/Time    URRFLXCULT Not Indicated 04/10/2022 01:55 AM       No results for input(s): TSHREFLEX in the last 72 hours. No components found for: [id number]  POC GLUCOSE:    Recent Labs     05/19/22  0438 05/19/22  0442 05/19/22  0546 05/19/22  0813 05/19/22  1219   POCGLU 78 72 90 97 118*     No results for input(s): LABA1C in the last 72 hours. Lab Results   Component Value Date    LABA1C 7.5 03/31/2022     Overall left ventricular systolic function appears severely reduced. Ejection fraction is visually estimated to be <20% with diffuse hypokinesis. Severely dilated left ventricle. (3/25/2022)    MRI of the abdomen without contrast  Molt cardiomegaly with bilateral small pleural effusion  Bibasilar consolidation    ASSESSMENT AND PLAN    Diarrhea. Abdominal pain. Diarrhea work-up was negative  Diarrhea and abdominal pain improving       Transaminitis. History of cirrhosis. Seems to be improving, , , bilirubin 7.2. Possibly related to congestive heart failure. GI is following  Large volume ascites noted on MRI  Contracted gallbladder containing stones.     Hypoglycemia. Patient has been on clear liquid diet, will progress diet to full. Start hypoglycemia protocol     Elevated INR. vitamin K,   patient will likely need FFP prior to procedure.      Catheter erosion. Catheter is exposed along the tunnel track, patient is very thin. Patient will need replaced tunneled catheter in a different location,   pending improvement of INR.         ESRD on HD. Appreciate nephrology input     Systolic heart failure.   Ejection fraction less than 20%.  Volume status adjusted during dialysis     Anemia. Continue IV iron    Hypoglycemia  Continue on D10  Check cortisol in a.m. Code Status: Full Code        Dispo -cc        The patient and / or the family were informed of the results of any tests, a time was given to answer questions, a plan was proposed and they agreed with plan. Preston Meléndez MD    This note was transcribed using [id number] [employer]. Please disregard any translational errors.

## 2022-05-20 NOTE — PROGRESS NOTES
Patients BS 43 given 2 juices and glucagon injection. Spoke with Dr. Lopez Mena increased D5 to 100.

## 2022-05-20 NOTE — PROGRESS NOTES
Hospitalist Progress Note  5/20/2022 9:04 AM    PCP: Josefa Cordero DO    [id number]     Date of Admission: 5/17/2022                                                                                                                     HOSPITAL COURSE    Patient demographics:  The patient  Ian Mackey is a 74 y.o. male     Significant past medical history:   Patient Active Problem List   Diagnosis    ED (erectile dysfunction)    Essential hypertension, benign    Hyperlipidemia    Diabetic nephropathy ([address]    Diabetes type 2, uncontrolled ([address]    Noncompliance    Elevated blood uric acid level    Coronary artery disease involving native coronary artery    Elevated PSA    Acute on chronic combined systolic and diastolic HF (heart failure) ([address]    Stage 3 chronic kidney disease ([address]    SONAL (acute kidney injury) ([address]    Biventricular heart failure (HCC)    Acute on chronic systolic heart failure, NYHA class 4 (HCC)    Acute on chronic congestive heart failure (HCC)    LV (left ventricular) mural thrombus    Syncope    CHF (congestive heart failure) ([address]    Syncope and collapse    ICD (implantable cardioverter-defibrillator) in place    Demand ischemia ([address]    Ventricular tachycardia ([address]    Elevated LFTs    Supratherapeutic INR    Diarrhea         Presenting symptoms:  diarrhea. Diagnostic workup:      CONSULTS DURING ADMISSION :   IP CONSULT TO GI  IP CONSULT TO NEPHROLOGY  IP CONSULT TO INTERVENTIONAL RADIOLOGY      Patient was diagnosed with:  Diarrhea. Transaminitis. Hypoglycemia. Catheter erosion. ESRD on HD. Systolic heart failure. Treatment while inpatient:  74years old male with medical history significant for end-stage renal disease patient is on hemodialysis. Patient was admitted with generalized weakness and diarrhea. C. difficile was negative. Diarrhea work-up was negative.     MRI of the abdomen showed large volume ascites diffuse body wall edema moderate diffuse hepatic steatosis. Cardiomegaly bilateral small pleural effusions and bibasilar consolidation    Patient was noted to have supratherapeutic INR and transaminitis but patient has a history of cirrhosis. Patient received vitamin K, that improved INR from 5.71- 2.31.                                                                                              ----------------------------------------------------------      SUBJECTIVE COMPLAINTS- follow up for diarrhea. Diet: ADULT DIET; Regular; Low Sodium (2 gm); 2000 ml  ADULT ORAL NUTRITION SUPPLEMENT; Breakfast, Dinner; Renal Oral Supplement      OBJECTIVE:   Patient Active Problem List   Diagnosis    ED (erectile dysfunction)    Essential hypertension, benign    Hyperlipidemia    [facility]    Diabetes type 2, uncontrolled ([facility]    Noncompliance    Elevated blood uric acid level    Coronary artery disease involving native coronary artery    Elevated PSA    Acute on chronic combined systolic and diastolic HF [facility]) [facility]    Stage 3 chronic kidney disease ([address]    SONAL (acute kidney injury) ([address]    Biventricular heart failure (HCC)    Acute on chronic systolic heart failure, NYHA class 4 ([facility]    Acute on chronic congestive heart failure (HCC)    LV (left ventricular) mural thrombus    Syncope    CHF (congestive heart failure) ([address]    Syncope and collapse    ICD (implantable cardioverter-defibrillator) in place    Demand ischemia ([address]    Ventricular tachycardia ([address]    Elevated LFTs    Supratherapeutic INR    Diarrhea       Allergies  Patient has no known allergies.     Medications    Scheduled Meds:   clopidogrel  75 mg Oral Daily    allopurinol  100 mg Oral Daily    levothyroxine  50 mcg Oral QAM AC    metoprolol succinate  12.5 mg Oral Daily    midodrine  5 mg Oral TID WC    pantoprazole  40 mg Oral BID AC    sodium chloride flush  5-40 mL IntraVENous 2 times per day     Continuous Infusions:   dextrose 100 mL/hr at 05/20/22 0728    dextrose Stopped (05/19/22 1427)    sodium chloride       PRN Meds:  glucose, dextrose bolus **OR** dextrose bolus, glucagon (rDNA), dextrose, sodium chloride flush, sodium chloride, ondansetron, heparin (porcine)    Vitals   Vitals /wt   Patient Vitals for the past 8 hrs:   BP Temp Temp src Pulse Resp Weight   05/20/22 0756 101/83 97.6 °F (36.4 °C) Oral -- 22 --   05/20/22 0457 -- -- -- -- -- 150 lb 5.7 oz (68.2 kg)   05/20/22 0435 104/85 97.3 °F (36.3 °C) Axillary 91 28 --        72HR INTAKE/OUTPUT:      Intake/Output Summary (Last 24 hours) at 5/20/2022 0904  Last data filed at 5/19/2022 1742  Gross per 24 hour   Intake 877 ml   Output 3400 ml   Net -2523 ml       Exam:    Gen:   Alert and oriented ×3   Eyes: PERRL. No sclera icterus. No conjunctival injection. ENT: No discharge. Pharynx clear. External appearance of ears and nose normal.  Neck: Trachea midline. No obvious mass. Resp: No accessory muscle use. No crackles. No wheezes. No rhonchi. CV: Regular rate. Regular rhythm. No murmur or rub. No edema. GI: Non-tender. Non-distended. No hernia. Skin: Warm, dry, normal texture and turgor. Lymph: No cervical LAD. No supraclavicular LAD. M/S: / Ext. No cyanosis. No clubbing. No joint deformity. Neuro: CN 2-12 are intact,  no neurologic deficits noted. PT/INR:   Recent Labs     05/19/22  0636 05/20/22  0716   PROTIME 36.7* 27.0*   INR 3.10* 2.31*     APTT: No results for input(s): APTT in the last 72 hours.     CBC:   Recent Labs     05/18/22  0643 05/19/22  0637 05/20/22  0716   WBC 5.3 3.8* 3.9*   HGB 11.0* 10.6* 10.3*   HCT 32.8* 32.8* 31.1*   .2* 104.0* 101.4*   * 128*  --        BMP:   Recent Labs     05/18/22  0642 05/19/22  0636 05/20/22  0716    127* 131*   K 4.5 5.5* 4.3   CL 97* 91* 91*   CO2 20* 14* 20*   BUN 48* 50* 43*   CREATININE 6.0* 6.0* 5.1*       LIVER PROFILE:   Recent Labs     05/18/22  259 05/19/22  0636 05/20/22  0716   ALKPHOS 274* 284* 288*   * 475* 429*   * 197* 179*   BILITOT 7.2* 7.9* 8.8*     No results for input(s): AMYLASE in the last 72 hours. No results for input(s): LIPASE in the last 72 hours. UA:No results for input(s): NITRITE, LABCAST, WBCUA, RBCUA, MUCUS in the last 72 hours. TROPONIN: No results for input(s): Layla Ayaan in the last 72 hours. Lab Results   Component Value Date/Time    URRFLXCULT Not Indicated 04/10/2022 01:55 AM       No results for input(s): TSHREFLEX in the last 72 hours. No components found for: SPT1814  POC GLUCOSE:    Recent Labs     05/20/22  0139 05/20/22  0538 05/20/22  0645 05/20/22  0730 05/20/22  0744   POCGLU 95 43* 41* 89 128*     No results for input(s): LABA1C in the last 72 hours. Lab Results   Component Value Date    LABA1C 7.5 03/31/2022     Overall left ventricular systolic function appears severely reduced. Ejection fraction is visually estimated to be <20% with diffuse hypokinesis. Severely dilated left ventricle. (3/25/2022)    MRI of the abdomen without contrast  Molt cardiomegaly with bilateral small pleural effusion  Bibasilar consolidation    ASSESSMENT AND PLAN    Diarrhea. Abdominal pain. Diarrhea work-up was negative  Diarrhea and abdominal pain improving       Transaminitis. History of cirrhosis. Seems to be improving, , , bilirubin 7.2. Possibly related to congestive heart failure. GI is following  Large volume ascites noted on MRI  Contracted gallbladder containing stones.     Hypoglycemia. Patient has been on clear liquid diet, will progress diet to full. Start hypoglycemia protocol     Elevated INR. vitamin K,   patient will likely need FFP prior to procedure.      Catheter erosion. Catheter is exposed along the tunnel track, patient is very thin.   Patient will need replaced tunneled catheter in a different location,   pending improvement of INR.         ESRD on HD.  Appreciate nephrology input     Systolic heart failure. Ejection fraction less than 20%. Volume status adjusted during dialysis     Anemia. Continue IV iron    Hypoglycemia  Continue on D10  Check cortisol in a.m. Code Status: Full Code        Dispo -cc        The patient and / or the family were informed of the results of any tests, a time was given to answer questions, a plan was proposed and they agreed with plan. Regis Fernando MD    This note was transcribed using [id number] [employer]. Please disregard any translational errors.

## 2022-05-20 NOTE — PROGRESS NOTES
On recheck of patients BS was 41 started d10 and given glucose tablets    0730 Pt BS now 89 d5 @ 100 running

## 2022-05-20 NOTE — PROGRESS NOTES
[facility]                                               Progress note    Summary:   Deonan Yoo is being seen by nephrology for ESRD on HD. Admitted with  Diarrhea. MRI abdomen showing large volume ascites diffuse body wall edema, moderate diffuse hepatic steatosis: Cardiomegaly bilateral small pleural effusions and bibasilar consolidations    Interval History  Seen in his room   He has no complaints   He had dialysis yesterday   UF 3.4 L tolerated. Catheter site intact, no oozing       BP 89/62  HR 88  95% on 4 L     Labs reviewed. Na 131  K 4.3  Bicarb 20   BUN 43 Cr 5.1  Hypoglycemia on D5  cc/hr      Plan:   - weight has been challenged this admission , now 68.2 kilos  - HD again tomorrow. - he is on D5 at 100 cc/hr for hypoglycemia. If ongoing dextrose infusion planned may be better to give concentrated dextrose at a lowe rate to avoid volume overload which he already has        Janina Hawkins MD  [facility]  Office: [phone]    Assessment:   #ESRD  On HD TTS at [facility]. Has TDC and has been referred for vein mapping and AVF/AVG placement. ,  TIME 3.3 Hours  Na 138   K 2  Ca 2.5   Bicarb 35   180nRe    #BP/Volume  Hypervolemic. BP acceptable. He has severe systolic heart failure and BP runs low. Has required inotrope support in the past   On midodrine 5 mg TID. UF as tolerated, challenge dry weight. On metop 12.5 mg BID. EDW 77 kilos    #Metabolic acidosis  Address with dialysis. #SHPT  Last  and phos 6.3  Is supposed to be on phos binder. Will add renvela. #Anemia  Last tsat 12% ferritin 174  He will need IV iron with HD      Abdominal pain. Patient presented to emergency with severe diarrhea over the past 3 days, C. difficile negative, diarrhea work-up negative.        Transaminitis. History of cirrhosis. Seems to be improving, , , bilirubin 7.2. Possibly related to congestive heart failure. 05/20/2022    K 4.3 05/20/2022    CL 91 (L) 05/20/2022    CO2 20 (L) 05/20/2022      Lab Results   Component Value Date    WBC 3.9 (L) 05/20/2022    HGB 10.3 (L) 05/20/2022    HCT 31.1 (L) 05/20/2022    .4 (H) 05/20/2022     (L) 05/19/2022     Lab Results   Component Value Date    .8 (H) 04/07/2022    CALCIUM 8.5 05/20/2022    PHOS 3.4 04/07/2022

## 2022-05-20 NOTE — PROGRESS NOTES
[facility]  Hypoglycemia Event and Prevention Plan      NAME: Huey Manning RECORD NUMBER:  [id number]  AGE: 74 y.o. GENDER: male  : 1955  EPISODE DATE:  2022     Data     Recent Labs     22  0109 22  0139 22  0538 22  0645 22  0730 22  0744   POCGLU 68* 95 43* 41* 89 128*       Medications  Scheduled Medications:   clopidogrel  75 mg Oral Daily    allopurinol  100 mg Oral Daily    levothyroxine  50 mcg Oral QAM AC    metoprolol succinate  12.5 mg Oral Daily    midodrine  5 mg Oral TID WC    pantoprazole  40 mg Oral BID AC    sodium chloride flush  5-40 mL IntraVENous 2 times per day       Diet  Current diet/supplement order: ADULT DIET; Regular; Low Sodium (2 gm); 2000 ml  ADULT ORAL NUTRITION SUPPLEMENT; Breakfast, Dinner; Renal Oral Supplement     Recorded PO: PO Meals Eaten (%): 76 - 100% last meal in flowsheets      Action     No anti-hyperglycemia meds. Dietitian screened yesterday.        Physician Notified of event: Yes   Ana Cristina Tang MD      Responce     Achieved POCT Blood Glucose greater than 70 mg/dl: Yes      Electronically signed by Bud Enriquez RN on 2022 at 8:48 AM

## 2022-05-20 NOTE — PROGRESS NOTES
Physical Therapy  Attempt  Pt sleeping soundly. Difficult to arouse. Refuses any OOB activity.     Electronically signed by Fadia Guajardo, PT  [id number]  on 5/20/2022 at 1:50 PM

## 2022-05-20 NOTE — PROGRESS NOTES
Occupational Therapy  Unable to see pt at this time due to pt declining OT treatment due to reported fatigue. Pt educated on importance of OT with goal of returning home. Will follow up with pt as time allows. Continue with POC.     Melissa Siu OTR/L

## 2022-05-21 NOTE — PROGRESS NOTES
Treatment time: 3 hours  Net UF: 1600    Pre weight: 66.5  Post weight: 64.9  EDW: 77kg? Access used: Right CVC. CVC exposed above insertion site, MD aware, okay to use     Access function: well, 350 BFR    Medications or blood products given: N/a    Regular outpatient schedule: TTS    Summary of response to treatment: tolerated well     Copy of dialysis treatment record placed in chart, to be scanned into EMR.

## 2022-05-21 NOTE — PROGRESS NOTES
Hospitalist Progress Note  5/21/2022 7:30 AM    PCP: Caitie Anthony DO    [id number]     Date of Admission: 5/17/2022                                                                                                                     HOSPITAL COURSE    Patient demographics:  The patient  Bereket Khan is a 74 y.o. male     Significant past medical history:   Patient Active Problem List   Diagnosis    ED (erectile dysfunction)    Essential hypertension, benign    Hyperlipidemia    Diabetic nephropathy ([address]    Diabetes type 2, uncontrolled ([address]    Noncompliance    Elevated blood uric acid level    Coronary artery disease involving native coronary artery    Elevated PSA    Acute on chronic combined systolic and diastolic HF (heart failure) ([address]    Stage 3 chronic kidney disease ([address]    [facility] ([facility]) [facility]    Biventricular heart failure (HCC)    Acute on chronic systolic heart failure, NYHA class 4 (HCC)    Acute on chronic congestive heart failure (HCC)    LV (left ventricular) mural thrombus    Syncope    CHF (congestive heart failure) ([address]    Syncope and collapse    ICD (implantable cardioverter-defibrillator) in place    Demand ischemia ([address]    Ventricular tachycardia ([address]    Elevated LFTs    Supratherapeutic INR    Diarrhea         Presenting symptoms:  diarrhea. Diagnostic workup:      CONSULTS DURING ADMISSION :   IP CONSULT TO GI  IP CONSULT TO NEPHROLOGY  IP CONSULT TO INTERVENTIONAL RADIOLOGY      Patient was diagnosed with:  Diarrhea. Transaminitis. Hypoglycemia. Catheter erosion. ESRD on HD. Systolic heart failure. Treatment while inpatient:  74years old male with medical history significant for end-stage renal disease patient is on hemodialysis. Patient was admitted with generalized weakness and diarrhea. C. difficile was negative. Diarrhea work-up was negative.     MRI of the abdomen showed large volume ascites diffuse body wall edema moderate diffuse hepatic steatosis. Cardiomegaly bilateral small pleural effusions and bibasilar consolidation    Patient was noted to have supratherapeutic INR and transaminitis but patient has a history of cirrhosis. Patient received vitamin K, that improved INR from 5.71- 2.31.                                                                                              ----------------------------------------------------------      SUBJECTIVE COMPLAINTS- follow up for diarrhea. Diet: ADULT DIET; Regular; Low Sodium (2 gm); 2000 ml  ADULT ORAL NUTRITION SUPPLEMENT; Breakfast, Dinner; Renal Oral Supplement      OBJECTIVE:   Patient Active Problem List   Diagnosis    ED (erectile dysfunction)    Essential hypertension, benign    Hyperlipidemia    Diabetic [facility]ropathy [facility]    Diabetes type 2, uncontrolled ([facility]    Noncompliance    Elevated blood uric acid level    Coronary artery disease involving native coronary artery    Elevated PSA    Acute on chronic combined systolic and diastolic HF (heart failure) ([facility]    Stage 3 chronic kidney disease ([address]    SONAL (acute kidney injury) ([address]    Biventricular heart failure (HCC)    Acute on chronic systolic heart failure, NYHA class 4 ([facility])    Acute on chronic congestive heart failure (HCC)    LV (left ventricular) mural thrombus    Syncope    CHF (congestive heart failure) ([address]    Syncope and collapse    ICD (implantable cardioverter-defibrillator) in place    Demand ischemia ([address]    Ventricular tachycardia ([address]    Elevated LFTs    Supratherapeutic INR    Diarrhea       Allergies  Patient has no known allergies.     Medications    Scheduled Meds:   clopidogrel  75 mg Oral Daily    allopurinol  100 mg Oral Daily    levothyroxine  50 mcg Oral QAM AC    metoprolol succinate  12.5 mg Oral Daily    midodrine  5 mg Oral TID WC    pantoprazole  40 mg Oral BID AC    sodium chloride flush  5-40 mL IntraVENous 2 times per day     Continuous BILITOT 7.9* 8.8*     No results for input(s): AMYLASE in the last 72 hours. No results for input(s): LIPASE in the last 72 hours. UA:No results for input(s): NITRITE, LABCAST, WBCUA, RBCUA, MUCUS in the last 72 hours. TROPONIN: No results for input(s): [place] in the last 72 hours. Lab Results   Component Value Date/Time    URRFLXCULT Not Indicated 04/10/2022 01:55 AM       No results for input(s): TSHREFLEX in the last 72 hours. No components found for: [id number]  POC GLUCOSE:    Recent Labs     05/20/22  1115 05/20/22  1622 05/20/22  2027 05/21/22  0419 05/21/22  0716   POCGLU 168* 128* 90 92 103*     No results for input(s): LABA1C in the last 72 hours. Lab Results   Component Value Date    LABA1C 7.5 03/31/2022     Overall left ventricular systolic function appears severely reduced. Ejection fraction is visually estimated to be <20% with diffuse hypokinesis. Severely dilated left ventricle. (3/25/2022)    MRI of the abdomen without contrast  Molt cardiomegaly with bilateral small pleural effusion  Bibasilar consolidation    ASSESSMENT AND PLAN    Diarrhea. Abdominal pain. Diarrhea work-up was negative  Diarrhea and abdominal pain improving       Transaminitis. History of cirrhosis. Seems to be improving, , , bilirubin 7.2. Possibly related to congestive heart failure. GI is following  Large volume ascites noted on MRI  Contracted gallbladder containing stones.     Hypoglycemia. Patient has been on clear liquid diet, will progress diet to full. Start hypoglycemia protocol     Elevated INR. vitamin K,   inr improving -1.97     Catheter erosion. Catheter is exposed along the tunnel track, patient is very thin. Patient will need replaced tunneled catheter in a different location,           ESRD on HD. Appreciate nephrology input     Systolic heart failure. Ejection fraction less than 20%. Volume status adjusted during dialysis     Anemia.   Continue IV iron    Hypoglycemia  Continue on D10  Check cortisol in a.m. Code Status: Full Code        Dispo -cc        The patient and / or the family were informed of the results of any tests, a time was given to answer questions, a plan was proposed and they agreed with plan. Preston Meléndez MD    This note was transcribed using [employer]. Please disregard any translational errors.

## 2022-05-21 NOTE — PROGRESS NOTES
[facility]                                               Progress note    Summary:   Deonna Yoo is being seen by nephrology for ESRD on HD. Admitted with  Diarrhea. MRI abdomen showing large volume ascites diffuse body wall edema, moderate diffuse hepatic steatosis: Cardiomegaly bilateral small pleural effusions and bibasilar consolidations    Interval History  Seen in his room   He has no complaints   He had dialysis earlier today   UF 1.6 L tolerated. Catheter site intact, no oozing   Hyponatremic this AM,   On D5W at 100 mL/h for hypoglycemia      Plan:   - dialyzed today per schedule. - post HD weight 65 kg. Challenging weight. - will change D5W to D10W at half the infusion rate to reduce severity of hyponatremia. Blaire Cordero MD  [facility]  Office: [phone]    Assessment:   #ESRD  [facility]. Has TDC and has been referred for vein mapping and AVF/AVG placement. ,  TIME 3.3 Hours  Na 138   K 2  Ca 2.5   Bicarb 35   180nRe    #BP/Volume  Hypervolemic. BP acceptable. He has severe systolic heart failure and BP runs low. Has required inotrope support in the past   On midodrine 5 mg TID. UF as tolerated, challenge dry weight. On metop 12.5 mg BID. EDW 77 kilos    #Metabolic acidosis  Address with dialysis. #SHPT  Last  and phos 6.3  Is supposed to be on phos binder. Will add renvela. #Anemia  Last tsat 12% ferritin 174  He will need IV iron with HD      Abdominal pain. Patient presented to emergency with severe diarrhea over the past 3 days, C. difficile negative, diarrhea work-up negative.        Transaminitis. History of cirrhosis. Seems to be improving, , , bilirubin 7.2. Possibly related to congestive heart failure. patient scheduled for MRCP        ROS:   Positives Listed Bold. All other remaining systems are negative.     Constitutional:  fever, chills, weakness, weight change, fatigue,      Skin:  rash, pruritus, hair loss, bruising, dry skin, petechiae. Head, Face, Neck   headaches, swelling,  cervical adenopathy. Respiratory: shortness of breath, cough, or wheezing  Cardiovascular: chest pain, palpitations, dizzy, edema  Gastrointestinal: nausea, vomiting, diarrhea, constipation,belly pain    Yellow skin, blood in stool  Musculoskeletal:  back pain, muscle weakness, gait problems,       joint pain or swelling. Genitourinary:  dysuria, poor urine flow, flank pain, blood in urine  Neurologic:  vertigo, TIA'S, syncope, seizures, focal weakness  Psychosocial:  insomnia, anxiety, or depression. Additional positive findings: -     PMH:   Past medical history, surgical history, social history, family history are reviewed and updated as appropriate. Reviewed current medication list.   Allergies reviewed and updated as needed. PE:   Vitals:    05/21/22 1630   BP:    Pulse:    Resp: 26   Temp:    SpO2: 97%       General appearance:  in NAD, fully alert and oriented. Comfortable. HEENT: EOM intact, no icterus. Trachea is midline. Neck : No masses, appears symmetrical, no JVD  Respiratory: Respiratory effort appears normal, bilateral equal chest rise, no wheeze, +crackles  Cardiovascular: Ausculation shows RRR + edema  Abdomen: No visible mass or tenderness, non distended. Musculoskeletal:  Joints with no swelling or deformity. Skin:no rashes, ulcers, induration, no jaundice. Neuro: face symmetric, no focal deficits. Appropriate responses. Tunneled dialysis catheter  small opening at the IJ stick site indicating that it had not fully closed. There is no other catheter exposure.  Glue placed over it      Lab Results   Component Value Date    CREATININE 5.7 (HH) 05/21/2022    BUN 55 (H) 05/21/2022     (L) 05/21/2022    K 4.6 05/21/2022    CL 86 (L) 05/21/2022    CO2 14 (LL) 05/21/2022      Lab Results   Component Value Date    WBC 3.0 (L) 05/21/2022    HGB 10.4 (L) 05/21/2022    HCT 33.5 (L) 05/21/2022    .5 (H) 05/21/2022    PLT 90 (L) 05/21/2022     Lab Results   Component Value Date    .8 (H) 04/07/2022    CALCIUM 8.3 05/21/2022    PHOS 3.4 04/07/2022

## 2022-05-22 NOTE — PROGRESS NOTES
Hospitalist Progress Note  5/22/2022 10:27 AM    PCP: Kvng Martínez DO    [id number]     Date of Admission: 5/17/2022                                                                                                               HOSPITAL COURSE    Patient demographics:  The patient  Jemal Bañuelos is a 74 y.o. male     Significant past medical history:   Patient Active Problem List   Diagnosis    ED (erectile dysfunction)    Essential hypertension, benign    Hyperlipidemia    Diabetic nephropathy ([address]    Diabetes type 2, uncontrolled ([address]    Noncompliance    Elevated blood uric acid level    Coronary artery disease involving native coronary artery    Elevated PSA    Acute on chronic combined systolic and diastolic HF (heart failure) ([address]    Stage 3 chronic kidney disease ([address]    [facility]) [facility]    Biventricular heart failure (HCC)    Acute on chronic systolic heart failure, NYHA class 4 (HCC)    Acute on chronic congestive heart failure (HCC)    LV (left ventricular) mural thrombus    Syncope    CHF (congestive heart failure) ([address]    Syncope and collapse    ICD (implantable cardioverter-defibrillator) in place    Demand ischemia ([address]    Ventricular tachycardia ([address]    Elevated LFTs    Supratherapeutic INR    Diarrhea         Presenting symptoms:  diarrhea. Diagnostic workup:      CONSULTS DURING ADMISSION :   IP CONSULT TO GI  IP CONSULT TO NEPHROLOGY  IP CONSULT TO INTERVENTIONAL RADIOLOGY      Patient was diagnosed with:  Diarrhea. Transaminitis. Hypoglycemia. Catheter erosion. ESRD on HD. Systolic heart failure. Treatment while inpatient:  74years old male with medical history significant for end-stage renal disease patient is on hemodialysis. Patient was admitted with generalized weakness and diarrhea. C. difficile was negative. Diarrhea work-up was negative.     MRI of the abdomen showed large volume ascites diffuse body wall edema moderate diffuse hepatic steatosis. Cardiomegaly bilateral small pleural effusions and bibasilar consolidation    Patient was noted to have supratherapeutic INR and transaminitis but patient has a history of cirrhosis. Patient received vitamin K, that improved INR from 5.71- 2.31. Patient lethargic 5/22/22; Checked ammonia and glucose levels-wnl. Has red conjunctiva- CT orbits shows edema.                                                                                              ----------------------------------------------------------      SUBJECTIVE COMPLAINTS- follow up for diarrhea. Diet: ADULT DIET; Regular; Low Sodium (2 gm); 2000 ml  ADULT ORAL NUTRITION SUPPLEMENT; Breakfast, Dinner; Renal Oral Supplement      OBJECTIVE:   Patient Active Problem List   Diagnosis    ED (erectile dysfunction)    Essential hypertension, benign    Hyperlipidemia    Diabetic nephropathy ([address]    Diabetes type 2, uncontrolled ([address]    Noncompliance    Elevated blood uric acid level    Coronary artery disease involving native coronary artery    Elevated PSA    Acute on chronic combined systolic and diastolic HF (heart failure) [facility]    Stage 3 chronic kidney disease ([address]    SONAL (acute kidney injury) ([address]    Biventricular heart failure (HCC)    Acute on chronic systolic heart failure, NYHA class 4 ([facility])    Acute on chronic congestive heart failure ([facility])    LV (left ventricular) mural thrombus    Syncope    CHF (congestive heart failure) ([address]    Syncope and collapse    ICD (implantable cardioverter-defibrillator) in place    Demand ischemia ([address]    Ventricular tachycardia ([address]    Elevated LFTs    Supratherapeutic INR    Diarrhea       Allergies  Patient has no known allergies.     Medications    Scheduled Meds:   clopidogrel  75 mg Oral Daily    allopurinol  100 mg Oral Daily    levothyroxine  50 mcg Oral QAM AC    metoprolol succinate  12.5 mg Oral Daily    midodrine  5 mg Oral TID WC    pantoprazole  40 mg Oral BID AC    sodium chloride flush  5-40 mL IntraVENous 2 times per day     Continuous Infusions:   dextrose 50 mL/hr at 05/22/22 0802    dextrose Stopped (05/19/22 1427)    sodium chloride       PRN Meds:  loperamide, glucose, dextrose bolus **OR** dextrose bolus, glucagon (rDNA), dextrose, sodium chloride flush, sodium chloride, ondansetron, heparin (porcine)    Vitals   Vitals /wt   Patient Vitals for the past 8 hrs:   BP Temp Temp src Pulse Resp SpO2 Weight   05/22/22 0729 100/72 97.4 °F (36.3 °C) Axillary 85 (!) 32 95 % --   05/22/22 0430 (!) 118/56 97.5 °F (36.4 °C) Oral 99 28 -- --   05/22/22 0420 -- -- -- -- -- -- 146 lb 8.6 oz (66.5 kg)        72HR INTAKE/OUTPUT:      Intake/Output Summary (Last 24 hours) at 5/22/2022 1027  Last data filed at 5/22/2022 8374  Gross per 24 hour   Intake 840 ml   Output --   Net 840 ml       Exam:    Gen:   Alert and oriented ×3   Eyes: PERRL. No sclera icterus. No conjunctival injection. ENT: No discharge. Pharynx clear. External appearance of ears and nose normal.  Neck: Trachea midline. No obvious mass. Resp: No accessory muscle use. No crackles. No wheezes. No rhonchi. CV: Regular rate. Regular rhythm. No murmur or rub. No edema. GI: Non-tender. Non-distended. No hernia. Skin: Warm, dry, normal texture and turgor. Lymph: No cervical LAD. No supraclavicular LAD. M/S: / Ext. No cyanosis. No clubbing. No joint deformity. Neuro: CN 2-12 are intact,  no neurologic deficits noted. PT/INR:   Recent Labs     05/20/22  0716 05/21/22  0706 05/22/22  0521   PROTIME 27.0* 22.9* 22.8*   INR 2.31* 1.97* 1.96*     APTT: No results for input(s): APTT in the last 72 hours.     CBC:   Recent Labs     05/20/22  0716 05/21/22  0706 05/22/22 0521   WBC 3.9* 3.0* 4.4   HGB 10.3* 10.4* 9.9*   HCT 31.1* 33.5* 29.4*   .4* 109.5* 100.2*   * 90* 100*       BMP:   Recent Labs     05/20/22  0716 05/21/22  0706 05/22/22 0521   * 121* 127*   K 4.3 4.6 5.2*   CL 91* 86* 89*   CO2 20* 14* 17*   BUN 43* 55* 40*   CREATININE 5.1* 5.7* 4.1*       LIVER PROFILE:   Recent Labs     05/20/22  0716 05/21/22  0706 05/22/22  0521   ALKPHOS 288* 277* 278*   * 383* 350*   * 163* 161*   BILITOT 8.8* 9.2* 10.1*     No results for input(s): AMYLASE in the last 72 hours. No results for input(s): LIPASE in the last 72 hours. UA:No results for input(s): NITRITE, LABCAST, WBCUA, RBCUA, MUCUS in the last 72 hours. TROPONIN: No results for input(s): Jose Snowball in the last 72 hours. Lab Results   Component Value Date/Time    URRFLXCULT Not Indicated 04/10/2022 01:55 AM       No results for input(s): TSHREFLEX in the last 72 hours. No components found for: [id number]  POC GLUCOSE:    Recent Labs     05/21/22 2038 05/21/22  2353 05/22/22  0423 05/22/22  0747 05/22/22  0816   POCGLU 112* 85 82 59* 81     No results for input(s): LABA1C in the last 72 hours. Lab Results   Component Value Date    LABA1C 7.5 03/31/2022     Overall left ventricular systolic function appears severely reduced. Ejection fraction is visually estimated to be <20% with diffuse hypokinesis. Severely dilated left ventricle. (3/25/2022)    MRI of the abdomen without contrast  Molt cardiomegaly with bilateral small pleural effusion  Bibasilar consolidation    ASSESSMENT AND PLAN    1. Diarrhea. Abdominal pain. Diarrhea work-up was negative  Diarrhea and abdominal pain improving       2. Transaminitis and cirrhosis + Ascites. Seems to be improving, , , bilirubin 7.2. Possibly related to congestive heart failure. GI on board  Large volume ascites noted on MRI- will need paracentesis  Contracted gallbladder containing stones. Paracentesis in am.     3. Hypoglycemia likely related to cirrhosis and DMII. ADAT  Start hypoglycemia protocol     4. Elevated INR. vitamin K,   inr improving -1.97     5. Catheter erosion.   Catheter is exposed along the tunnel track, patient is very thin. Patient will need replaced tunneled catheter in a different location,           6. ESRD on HD. Appreciate nephrology input     7. Systolic heart failure. Ejection fraction less than 20%. Volume status adjusted during dialysis     7. Anemia. Continue IV iron    8. Hypoglycemia  Continue on D10  Check cortisol in a.m.    9. Retro-orbital/ conjunctival hemorrhage with exopthalmos  -CT head without contrast    10. RLE swelling > Left leg  -US to r/o DVT    11. Hepatic encephalopathy-patient lethargic 5/22/22; Checked ammonia levels-wnl. Has red conjunctiva- CT orbits shows edema  -start lactulose 30g po tid  -Needs a paracentesis in am    Code Status: Full Code        Dispo -cc        The patient and / or the family were informed of the results of any tests, a time was given to answer questions, a plan was proposed and they agreed with plan. Willian Jimenez MD    This note was transcribed using [id number] [employer]. Please disregard any translational errors.

## 2022-05-22 NOTE — PLAN OF CARE
Problem: Discharge Planning  Goal: Discharge to home or other facility with appropriate resources  Outcome: Progressing     Problem: Chronic Conditions and Co-morbidities  Goal: Patient's chronic conditions and co-morbidity symptoms are monitored and maintained or improved  Outcome: Progressing     Problem: Safety - Adult  Goal: Free from fall injury  Outcome: Progressing     Problem: ABCDS Injury Assessment  Goal: Absence of physical injury  Outcome: Progressing     Problem: Skin/Tissue Integrity  Goal: Absence of new skin breakdown  Description: 1. Monitor for areas of redness and/or skin breakdown  2. Assess vascular access sites hourly  3. Every 4-6 hours minimum:  Change oxygen saturation probe site  4. Every 4-6 hours:  If on nasal continuous positive airway pressure, respiratory therapy assess nares and determine need for appliance change or resting period.   Outcome: Progressing     Problem: Respiratory - Adult  Goal: Achieves optimal ventilation and oxygenation  Outcome: Progressing     Problem: Cardiovascular - Adult  Goal: Maintains optimal cardiac output and hemodynamic stability  Outcome: Progressing  Goal: Absence of cardiac dysrhythmias or at baseline  Outcome: Progressing     Problem: Hematologic - Adult  Goal: Maintains hematologic stability  Outcome: Progressing

## 2022-05-22 NOTE — PROGRESS NOTES
Results for Kelsie Hernández (MRN [id number]) as of 5/22/2022 17:19   Ref. Range 5/22/2022 17:00   pH, Arterial Latest Ref Range: 7.350 - 7.450  7.362   pCO2, Arterial Latest Ref Range: 35.0 - 45.0 mmHg 34.7 (L)   pO2, Arterial Latest Ref Range: 75.0 - 108.0 mmHg 87.5   HCO3, Arterial Latest Ref Range: 21.0 - 29.0 mmol/L 19.7 (L)     Pt is currently on 4l/m high flow nasal cannula. Unable to  spo2 on fingers, nose, or ear. When spo2 does read , getting 91 to 95%. RN aware of results.  Message sent to physician

## 2022-05-23 NOTE — PROGRESS NOTES
[facility]                                               Progress note    Summary:   Kallie Hernandez is being seen by nephrology for ESRD on HD. Admitted with  Diarrhea. MRI abdomen showing large volume ascites diffuse body wall edema, moderate diffuse hepatic steatosis: Cardiomegaly bilateral small pleural effusions and bibasilar consolidations    Interval History  Seen in his room   Had a paracentesis this AM  On D10 infusion for hypogylcemia  Hyponatremia and metabolic acidosis worsening. Plan:   - no acute indication for RRT today  - plan for HD tomorrow per schedule  - will increase dialysate bicarb to address acidosis tomorrow  - touch base with IR for line replacement due to ulceration over the skin      Anamaria Cunha MD  [facility]  Office: [phone]    Assessment:   #ESRD  On HD TTS at [address]. Has TDC and has been referred for vein mapping and AVF/AVG placement. ,  TIME 3.3 Hours  Na 138   K 2  Ca 2.5   Bicarb 35   180nRe    #BP/Volume  Hypervolemic. BP acceptable. He has severe systolic heart failure and BP runs low. Has required inotrope support in the past   On midodrine 5 mg TID. UF as tolerated, challenge dry weight. On metop 12.5 mg BID. EDW 77 kilos    #Metabolic acidosis  Address with dialysis. #SHPT  Last  and phos 6.3  Is supposed to be on phos binder. Will add renvela. #Anemia  Last tsat 12% ferritin 174  He will need IV iron with HD      Abdominal pain. Patient presented to emergency with severe diarrhea over the past 3 days, C. difficile negative, diarrhea work-up negative.        Transaminitis. History of cirrhosis. Seems to be improving, , , bilirubin 7.2. Possibly related to congestive heart failure. patient scheduled for MRCP        ROS:   Positives Listed Bold. All other remaining systems are negative.     Constitutional:  fever, chills, weakness, weight change, fatigue,      Skin:  rash, pruritus, hair loss, bruising, dry skin, petechiae. Head, Face, Neck   headaches, swelling,  cervical adenopathy. Respiratory: shortness of breath, cough, or wheezing  Cardiovascular: chest pain, palpitations, dizzy, edema  Gastrointestinal: nausea, vomiting, diarrhea, constipation,belly pain    Yellow skin, blood in stool  Musculoskeletal:  back pain, muscle weakness, gait problems,       joint pain or swelling. Genitourinary:  dysuria, poor urine flow, flank pain, blood in urine  Neurologic:  vertigo, TIA'S, syncope, seizures, focal weakness  Psychosocial:  insomnia, anxiety, or depression. Additional positive findings: -     PMH:   Past medical history, surgical history, social history, family history are reviewed and updated as appropriate. Reviewed current medication list.   Allergies reviewed and updated as needed. PE:   Vitals:    05/23/22 1329   BP:    Pulse: 65   Resp:    Temp:    SpO2:        General appearance:  in NAD, somnolent, poorly arousable   HEENT: EOM intact, no icterus. Trachea is midline. Neck : No masses, appears symmetrical, no JVD  Respiratory: Respiratory effort appears normal, bilateral equal chest rise, no wheeze, +crackles  Cardiovascular: Ausculation shows RRR + edema  Abdomen: No visible mass or tenderness, non distended. Musculoskeletal:  Joints with no swelling or deformity. Skin:no rashes, ulcers, induration, no jaundice. Neuro: face symmetric, no focal deficits. Appropriate responses. Tunneled dialysis catheter  small opening at the IJ stick site indicating that it had not fully closed. There is no other catheter exposure.  Glue placed over it      Lab Results   Component Value Date    CREATININE 5.0 (H) 05/23/2022    BUN 56 (H) 05/23/2022     (L) 05/23/2022    K 4.7 05/23/2022    CL 88 (L) 05/23/2022    CO2 15 (L) 05/23/2022      Lab Results   Component Value Date    WBC 5.0 05/23/2022    HGB 9.7 (L) 05/23/2022    HCT 29.2 (L) 05/23/2022    .3 (H) 05/23/2022    PLT 91 (L) 05/23/2022     Lab Results   Component Value Date    .8 (H) 04/07/2022    CALCIUM 8.9 05/23/2022    PHOS 3.4 04/07/2022

## 2022-05-23 NOTE — BRIEF OP NOTE
Brief Postoperative Note    Randy Serrano  YOB: 1955  [id number]    Pre-operative Diagnosis: ascites    Post-operative Diagnosis: Same    Procedure: US-guided paracentesis    Anesthesia: Local    Surgeons: Karely Craft MD    Estimated Blood Loss: Less than 5 mL    Complications: None    Specimens: ascites drained    Findings: Successful US-guided paracentesis.     Electronically signed by Karely Craft MD on 5/23/2022 at 8:35 AM

## 2022-05-23 NOTE — PROGRESS NOTES
This RN observed bleeding at HD site, R chest. MD made aware and at bedside. Nephrology made aware of bleeding and new order placed for IR to replace catheter for dialysis access.  Electronically signed by Annabella Claire RN on 5/23/2022 at 3:23 PM

## 2022-05-23 NOTE — PROGRESS NOTES
Hospitalist Progress Note  5/23/2022 12:21 PM    PCP: Hemal Veronica DO    [id number]     Date of Admission: 5/17/2022                                                                                                               HOSPITAL COURSE    Patient demographics:  The patient  Cleave Ogden is a 74 y.o. male     Significant past medical history:   Patient Active Problem List   Diagnosis    ED (erectile dysfunction)    Essential hypertension, benign    Hyperlipidemia    Diabetic nephropathy ([address]    Diabetes type 2, uncontrolled ([address]    Noncompliance    Elevated blood uric acid level    Coronary artery disease involving native coronary artery    Elevated PSA    Acute on chronic combined systolic and diastolic HF (heart failure) ([address]    Stage 3 chronic kidney disease ([address]    SONAL (acute kidney injury) ([address]    Biventricular heart failure (HCC)    Acute on chronic systolic heart failure, NYHA class 4 (HCC)    Acute on chronic congestive heart failure (HCC)    LV (left ventricular) mural thrombus    Syncope    CHF (congestive heart failure) ([address]    Syncope and collapse    ICD (implantable cardioverter-defibrillator) in place    Demand ischemia ([address]    Ventricular tachycardia ([address]    Elevated LFTs    Supratherapeutic INR    Diarrhea         Presenting symptoms:  diarrhea. Diagnostic workup:      CONSULTS DURING ADMISSION :   IP CONSULT TO GI  IP CONSULT TO NEPHROLOGY  IP CONSULT TO INTERVENTIONAL RADIOLOGY      Patient was diagnosed with:  Diarrhea. Transaminitis. Hypoglycemia. Catheter erosion. ESRD on HD. Systolic heart failure. Treatment while inpatient:  74years old male with medical history significant for end-stage renal disease patient is on hemodialysis. Patient was admitted with generalized weakness and diarrhea. C. difficile was negative. Diarrhea work-up was negative.     MRI of the abdomen showed large volume ascites diffuse body wall edema moderate diffuse hepatic steatosis. Cardiomegaly bilateral small pleural effusions and bibasilar consolidation    Patient was noted to have supratherapeutic INR and transaminitis but patient has a history of cirrhosis. Patient received vitamin K, that improved INR from 5.71- 2.31. Patient lethargic 5/22/22; Checked ammonia and glucose levels-wnl. Has red conjunctiva- CT orbits shows edema.                                                                                              ----------------------------------------------------------      SUBJECTIVE COMPLAINTS- follow up for diarrhea. Diet: ADULT DIET; Regular; Low Sodium (2 gm); 2000 ml  ADULT ORAL NUTRITION SUPPLEMENT; Breakfast, Dinner; Renal Oral Supplement      OBJECTIVE:   Patient Active Problem List   Diagnosis    ED (erectile dysfunction)    Essential hypertension, benign    Hyperlipidemia    Diabetic nephropathy ([address]    Diabetes type 2, uncontrolled ([address]    Noncompliance    Elevated blood uric acid level    Coronary artery disease involving native coronary artery    Elevated PSA    Acute on chronic combined systolic and diastolic HF (heart failure) [facility]    Stage 3 chronic kidney disease ([address]    SONAL (acute kidney injury) ([address]    Biventricular heart failure (HCC)    Acute on chronic systolic heart failure, NYHA class 4 ([facility]    Acute on chronic congestive heart failure ([facility]    LV (left ventricular) mural thrombus    Syncope    CHF (congestive heart failure) ([address]    Syncope and collapse    ICD (implantable cardioverter-defibrillator) in place    Demand ischemia ([address]    Ventricular tachycardia ([address]    Elevated LFTs    Supratherapeutic INR    Diarrhea       Allergies  Patient has no known allergies.     Medications    Scheduled Meds:   albumin human  25 g IntraVENous Once    lactulose  30 g Oral TID    clopidogrel  75 mg Oral Daily    allopurinol  100 mg Oral Daily    levothyroxine  50 mcg Oral QAM AC    metoprolol succinate  12.5 mg Oral Daily    midodrine  5 mg Oral TID WC    pantoprazole  40 mg Oral BID AC    sodium chloride flush  5-40 mL IntraVENous 2 times per day     Continuous Infusions:   dextrose 50 mL/hr at 05/23/22 0628    dextrose Stopped (05/19/22 1427)    sodium chloride       PRN Meds:  loperamide, glucose, dextrose bolus **OR** dextrose bolus, glucagon (rDNA), dextrose, sodium chloride flush, sodium chloride, ondansetron, heparin (porcine)    Vitals   Vitals /wt   Patient Vitals for the past 8 hrs:   BP Temp Temp src Pulse Resp SpO2   05/23/22 1138 90/63 -- -- 65 23 100 %   05/23/22 0946 -- -- -- 82 -- --   05/23/22 0751 127/60 97.2 °F (36.2 °C) Axillary 84 27 94 %        72HR INTAKE/OUTPUT:      Intake/Output Summary (Last 24 hours) at 5/23/2022 1221  Last data filed at 5/23/2022 0934  Gross per 24 hour   Intake 1326 ml   Output 1100 ml   Net 226 ml       Exam:    Gen:   Alert and oriented ×3   Eyes: PERRL. No sclera icterus. No conjunctival injection. ENT: No discharge. Pharynx clear. External appearance of ears and nose normal.  Neck: Trachea midline. No obvious mass. Resp: No accessory muscle use. No crackles. No wheezes. No rhonchi. CV: Regular rate. Regular rhythm. No murmur or rub. No edema. GI: Non-tender. Non-distended. No hernia. Skin: Warm, dry, normal texture and turgor. Lymph: No cervical LAD. No supraclavicular LAD. M/S: / Ext. No cyanosis. No clubbing. No joint deformity. Neuro: CN 2-12 are intact,  no neurologic deficits noted. PT/INR:   Recent Labs     05/21/22  0706 05/22/22  0521 05/23/22  0540   PROTIME 22.9* 22.8* 25.7*   INR 1.97* 1.96* 2.20*     APTT: No results for input(s): APTT in the last 72 hours.     CBC:   Recent Labs     05/21/22  0706 05/22/22  0521 05/23/22  0540   WBC 3.0* 4.4 5.0   HGB 10.4* 9.9* 9.7*   HCT 33.5* 29.4* 29.2*   .5* 100.2* 101.3*   PLT 90* 100* 91*       BMP:   Recent Labs     05/21/22  0706 05/22/22  0521 05/23/22  0540   NA 121* 127* 125*   K 4.6 5.2* 4.7   CL 86* 89* 88*   CO2 14* 17* 15*   BUN 55* 40* 56*   CREATININE 5.7* 4.1* 5.0*       LIVER PROFILE:   Recent Labs     05/21/22  0706 05/22/22  0521 05/23/22  0540   ALKPHOS 277* 278* 262*   * 350* 308*   * 161* 150*   BILITOT 9.2* 10.1* 10.6*     No results for input(s): AMYLASE in the last 72 hours. No results for input(s): LIPASE in the last 72 hours. UA:No results for input(s): NITRITE, LABCAST, WBCUA, RBCUA, MUCUS in the last 72 hours. TROPONIN: No results for input(s): Zettie Binet in the last 72 hours. Lab Results   Component Value Date/Time    URRFLXCULT Not Indicated 04/10/2022 01:55 AM       No results for input(s): TSHREFLEX in the last 72 hours. No components found for: [id number]  POC GLUCOSE:    Recent Labs     05/22/22  1612 05/22/22 2009 05/23/22  0035 05/23/22  0359 05/23/22  0741   POCGLU 81 98 95 94 105*     No results for input(s): LABA1C in the last 72 hours. Lab Results   Component Value Date    LABA1C 7.5 03/31/2022     Overall left ventricular systolic function appears severely reduced. Ejection fraction is visually estimated to be <20% with diffuse hypokinesis. Severely dilated left ventricle. (3/25/2022)    MRI of the abdomen without contrast  Molt cardiomegaly with bilateral small pleural effusion  Bibasilar consolidation    ASSESSMENT AND PLAN    1. Diarrhea. Abdominal pain. Diarrhea work-up was negative  Diarrhea and abdominal pain improving       2. Transaminitis and cirrhosis + Ascites. Seems to be improving, , , bilirubin 7.2. Possibly related to congestive heart failure. GI on board  Large volume ascites noted on MRI- will need paracentesis  Contracted gallbladder containing stones. Paracentesis in am.     3. Hypoglycemia likely related to cirrhosis and DMII. ADAT  Start hypoglycemia protocol     4. Elevated INR. vitamin K,   inr improving -1.97     5. Catheter erosion.   Catheter is exposed along the tunnel track, patient is very thin. Patient will need replaced tunneled catheter in a different location,           6. ESRD on HD. Appreciate nephrology input     7. Systolic heart failure. Ejection fraction less than 20%. Volume status adjusted during dialysis     7. Anemia. Continue IV iron    8. Hypoglycemia  Continue on D10  Check cortisol in a.m.    9. Retro-orbital/ conjunctival hemorrhage with exopthalmos  -CT head without contrast shows edema    10. RLE swelling > Left leg  -US to r/o DVT    11. Hepatic encephalopathy with asterixis. More alert today. Ammonia levels-wnl.   -cont lactulose 30g po tid  -Had a 1100ml paracentesis this am.  -give 100ml of 25% human albumin solution today    Code Status: Full Code        Dispo -cc        The patient and / or the family were informed of the results of any tests, a time was given to answer questions, a plan was proposed and they agreed with plan. Shruti Smallwood MD    This note was transcribed using [id number] [employer]. Please disregard any translational errors.

## 2022-05-23 NOTE — CARE COORDINATION
Discharge Planning:    Attempted to speak with patient at bedside re: discharge plan and therapy's most recent recommendations. Patient soundly asleep. Will re-attempt as time allows.     Electronically signed by Tigre Monzon RN on 5/23/2022 at 4:09 PM

## 2022-05-23 NOTE — PROGRESS NOTES
Physical Therapy  Facility/Department: [address] PROGRESSIVE CARE  Daily Treatment Note  NAME: Deonna Yoo  : 1955  MRN: [id number]    Date of Service: 2022    Discharge Recommendations:  Patient would benefit from continued therapy after discharge   PT Equipment Recommendations  Equipment Needed: No    Patient Diagnosis(es): The primary encounter diagnosis was Transaminitis. Diagnoses of Elevated bilirubin and Pacemaker were also pertinent to this visit. Assessment   Assessment: Pt very lethargic today, incontinent of large amount of loose BM during session. He required Max A x 2 for bed mobility, and Min A x 2 to pivot to bedside chair with walker. He would benefit from continued therapy to improve his endurance, strength, and independence with mobility tasks as able. Anticipate need for low-moderate frequency therapy upon D/C. Deonna Yoo scored a  on the AM-PAC short mobility form. Current research shows that an AM-PAC score of 17 or less is typically not associated with a discharge to the patient's home setting. Based on the patient's AM-PAC score and their current functional mobility deficits, it is recommended that the patient have 3-5 sessions per week of Physical Therapy at d/c to increase the patient's independence. Please see assessment section for further patient specific details. If patient discharges prior to next session this note will serve as a discharge summary. Please see below for the latest assessment towards goals. Plan    Plan  Plan: 2-3 times per week  Specific Instructions for Next Treatment: Improve endurance, ambulate as able  Current Treatment Recommendations: Functional mobility training;Transfer training;Balance training;Gait training;Stair training;Neuromuscular re-education;Home exercise program;Safety education & training;Patient/Caregiver education & training; Endurance training     Restrictions  Restrictions/Precautions  Restrictions/Precautions: Fall Risk  Position Activity Restriction  Other position/activity restrictions: 4L O2     Subjective    Subjective  Subjective: Pt very lethargic. Difficult to arouse. Incontinent of a large amount of loose stool. Orientation  Overall Orientation Status: Within Functional Limits  Cognition  Overall Cognitive Status: Exceptions  Arousal/Alertness: Delayed responses to stimuli  Following Commands: Follows one step commands with increased time; Follows one step commands with repetition  Cognition Comment: very lethargic     Objective      Bed Mobility Training  Bed Mobility Training: Yes  Overall Level of Assistance: Maximum assistance;Assist X2  Rolling: Maximum assistance;Assist X2  Supine to Sit: Maximum assistance;Assist X2  Scooting: Maximum assistance;Assist X2    Balance  Sitting: Impaired  Sitting - Static: Poor (constant support)  Sitting - Dynamic: Poor (constant support)    Transfer Training  Transfer Training: Yes  Overall Level of Assistance: Minimum assistance;Assist X2 (RW)  Sit to Stand: Assist X2;Minimum assistance (RW)  Stand to Sit: Minimum assistance;Assist X2  Stand Pivot Transfers: Assist X2;Minimum assistance (RW)    Gait  Overall Level of Assistance: Minimum assistance;Assist X2 (Max cues)  Distance (ft): 5 Feet  Assistive Device: Walker, rolling     Other Specialty Interventions  Other Treatments/Modalities: Pt incontinent of BM. He required total assist x 2 to complete tarik-care, don new brief in bed. After self-care completed, pt able to move to bedside chair with assist x 2. This required increased time to complete d/t pt's lethargy, and weakness. Safety Devices  Type of Devices: All fall risk precautions in place;Call light within reach; Chair alarm in place;Gait belt;Left in chair;Nurse notified  Restraints  Restraints Initially in Place: No       Goals  Short Term Goals  Time Frame for Short term goals: 2-3 days  Short term goal 1: Bed mobility SBA  Short term goal 2: Transfers SBA  Short term goal 3: Ambulation 13' with walker, CGA  Patient Goals   Patient goals :  To return directly home    Therapy Time   Individual Concurrent Group Co-treatment   Time In   1000       Time Out   1025       Minutes   25       Timed Code Treatment Minutes: 25 Minutes       Kandice Davila, PT    Electronically signed by Kandice Davila, PT [id number] on 5/23/2022 at 10:34 AM

## 2022-05-23 NOTE — PROGRESS NOTES
This RN spoke with brother and sister and updated them on patient status.  Electronically signed by Leslye Carmichael RN on 5/23/2022 at 3:58 PM

## 2022-05-23 NOTE — PROGRESS NOTES
Occupational Therapy  Facility/Department: [address] PROGRESSIVE CARE  Occupational Therapy Initial Assessment and Tentative D/C      Name: Jaime Jameson  : 1955  MRN: [id number]  Date of Service: 2022    Discharge Recommendations: Jaime Jameson scored a 13/24 on the AM-PAC ADL Inpatient form. Current research shows that an AM-PAC score of 17 or less is typically not associated with a discharge to the patient's home setting. Based on the patient's AM-PAC score and their current ADL deficits, it is recommended that the patient have 3-5 sessions per week of Occupational Therapy at d/c to increase the patient's independence. Please see assessment section for further patient specific details. If patient discharges prior to next session this note will serve as a discharge summary. Please see below for the latest assessment towards goals. Continue to assess pending progress,Patient would benefit from continued therapy after discharge,3-5 sessions per week  OT Equipment Recommendations  Equipment Needed: No       Patient Diagnosis(es): The primary encounter diagnosis was Transaminitis. Diagnoses of Elevated bilirubin and Pacemaker were also pertinent to this visit. Past Medical History:  has a past medical history of CHF (congestive heart failure) ([address] Diabetes type 2, uncontrolled ([address] Diabetic nephropathy, ED (erectile dysfunction), Essential hypertension, benign, Hyperlipidemia, and Noncompliance. Past Surgical History:  has a past surgical history that includes eye surgery; IR NONTUNNELED VASCULAR CATHETER > 5 YEARS (2022); and IR TUNNELED CVC PLACE WO SQ PORT/PUMP > 5 YEARS (2022). Assessment   Performance deficits / Impairments: Decreased functional mobility ; Decreased balance;Decreased safe awareness;Decreased ADL status; Decreased endurance;Decreased strength;Decreased high-level IADLs  Assessment: Pt continues to be limited due to significant noted fatigue and weakness.  Pt Accessibility: Accessible  Home Equipment: Jitendra Raymond, 4 wheeled  Has the patient had two or more falls in the past year or any fall with injury in the past year?: No  ADL Assistance: Independent  Homemaking Assistance: Needs assistance (assist fron son and sister)  Ambulation Assistance: Independent (0PQ)  Transfer Assistance: Independent       Objective   Pulse: 82  Heart Rate Source: Monitor  BP: 127/60  BP Location: Right upper arm  BP Method: Automatic  MAP (Calculated): 82.33  Resp: 27  SpO2: 94 %  O2 Device: Nasal cannula  Vision Exceptions: Cataracts  Hearing: Within functional limits          Safety Devices  Type of Devices: All fall risk precautions in place;Call light within reach; Chair alarm in place;Gait belt;Left in chair;Nurse notified  Restraints  Restraints Initially in Place: No  Bed Mobility Training  Bed Mobility Training: Yes  Overall Level of Assistance: Maximum assistance;Assist X2  Rolling: Maximum assistance;Assist X2  Supine to Sit: Maximum assistance;Assist X2  Scooting: Maximum assistance;Assist X2  Balance  Sitting: Impaired  Sitting - Static: Poor (constant support)  Sitting - Dynamic: Poor (constant support)  Transfer Training  Transfer Training: Yes  Overall Level of Assistance: Minimum assistance;Assist X2 (RW)  Sit to Stand: Assist X2;Minimum assistance (RW)  Stand to Sit: Minimum assistance;Assist X2  Stand Pivot Transfers: Assist X2;Minimum assistance (RW)  Gait  Overall Level of Assistance: Minimum assistance;Assist X2 (Max cues)  Distance (ft): 5 Feet  Assistive Device: Walker, rolling     AROM: Generally decreased, functional  PROM: Generally decreased, functional  Strength: Generally decreased, functional  Coordination: Generally decreased, functional  Tone: Normal  ADL  LE Dressing: Maximum assistance (assist to don/doff brief via roll supine in bed; Max A for rolling)  Toileting: Maximum assistance (pt incontinent of BM; assist to complete pericare/brief management;  Max A for rolling)                 Cognition  Overall Cognitive Status: Exceptions  Arousal/Alertness: Delayed responses to stimuli  Following Commands: Follows one step commands with increased time; Follows one step commands with repetition  Cognition Comment: very lethargic                  Education Given To: Patient  Education Provided: Role of Therapy;Plan of Care;Transfer Training;ADL Adaptive Strategies  Education Method: Demonstration;Verbal  Barriers to Learning: None  Education Outcome: Verbalized understanding;Demonstrated understanding                        G-Code     OutComes Score                                                  AM-PAC Score        AM-PAC Inpatient Daily Activity Raw Score: 13 (05/23/22 1038)  AM-PAC Inpatient ADL T-Scale Score : 32.03 (05/23/22 1038)  ADL Inpatient CMS 0-100% Score: 63.03 (05/23/22 1038)  ADL Inpatient CMS G-Code Modifier : CL (05/23/22 1038)    Goals  Short Term Goals  Time Frame for Short term goals: prior to D/C; ongoing 5/23  Short Term Goal 1: complete functional mobility and transfers with supervision  Short Term Goal 2: complete bathing and dressing with supervision  Short Term Goal 3: complete toileting with supervision  Short Term Goal 4: complete grooming in stance at sink with supervision  Long Term Goals  Time Frame for Long term goals : STG=LTG  Patient Goals   Patient goals : return home       Therapy Time   Individual Concurrent Group Co-treatment   Time In 1005         Time Out 1030         Minutes 25         Timed Code Treatment Minutes: 25 Minutes       Viridiana Wilson OT

## 2022-05-23 NOTE — PROGRESS NOTES
Hospitalist Progress Note  5/23/2022 11:10 AM    PCP: Ade Cerda DO    [id number]     Date of Admission: 5/17/2022                                                                                                             HOSPITAL COURSE    Patient demographics:  The patient  Gaurav Khan is a 74 y.o. male     Significant past medical history:   Patient Active Problem List   Diagnosis    ED (erectile dysfunction)    Essential hypertension, benign    Hyperlipidemia    Diabetic nephropathy ([address]    Diabetes type 2, uncontrolled ([address]    Noncompliance    Elevated blood uric acid level    Coronary artery disease involving native coronary artery    Elevated PSA    Acute on chronic combined systolic and diastolic HF ([facility] failure) [facility]    Stage 3 chronic kidney disease ([address]    SONAL (acute kidney injury) ([address]    Biventricular heart failure (HCC)    Acute on chronic systolic heart failure, NYHA class 4 [facility]    Acute on chronic congestive heart failure ([facility]    LV (left ventricular) mural thrombus    Syncope    CHF (congestive heart failure) ([address]    Syncope and collapse    ICD (implantable cardioverter-defibrillator) in place    Demand ischemia ([address]    Ventricular tachycardia ([address]    Elevated LFTs    Supratherapeutic INR    Diarrhea         Presenting symptoms:  diarrhea. Diagnostic workup:      CONSULTS DURING ADMISSION :   IP CONSULT TO GI  IP CONSULT TO NEPHROLOGY  IP CONSULT TO INTERVENTIONAL RADIOLOGY      Patient was diagnosed with:  Diarrhea. Transaminitis. Hypoglycemia. Catheter erosion. ESRD on HD. Systolic heart failure. Treatment while inpatient:  74years old male with medical history significant for end-stage renal disease patient is on hemodialysis. Patient was admitted with generalized weakness and diarrhea. C. difficile was negative. Diarrhea work-up was negative.     MRI of the abdomen showed large volume ascites diffuse body wall edema moderate diffuse hepatic steatosis. Cardiomegaly bilateral small pleural effusions and bibasilar consolidation    Patient was noted to have supratherapeutic INR and transaminitis but patient has a history of cirrhosis. Patient received vitamin K, that improved INR from 5.71- 2.31. Patient lethargic 5/22/22; Checked ammonia and glucose levels-wnl. Has red conjunctiva- CT orbits shows edema. ----------------------------------------------------------      SUBJECTIVE COMPLAINTS- follow up for diarrhea. Diet: ADULT DIET; Regular; Low Sodium (2 gm); 2000 ml  ADULT ORAL NUTRITION SUPPLEMENT; Breakfast, Dinner; Renal Oral Supplement      OBJECTIVE:   Patient Active Problem List   Diagnosis    ED (erectile dysfunction)    Essential hypertension, benign    Hyperlipidemia    Diabetic nephropathy ([address]    Diabetes type 2, uncontrolled ([address]    Noncompliance    Elevated blood uric acid level    Coronary artery disease involving native coronary artery    Elevated PSA    Acute on chronic combined systolic and diastolic HF ([facility] failure) [facility]    Stage 3 chronic kidney disease ([address]    SONAL (acute kidney injury) ([address]    Biventricular heart failure (HCC)    Acute on chronic systolic heart failure, NYHA class 4 ([facility]    Acute on chronic congestive heart failure ([facility])    LV (left ventricular) mural thrombus    Syncope    CHF (congestive heart failure) ([address]    Syncope and collapse    ICD (implantable cardioverter-defibrillator) in place    Demand ischemia ([address]    Ventricular tachycardia ([address]    Elevated LFTs    Supratherapeutic INR    Diarrhea       Allergies  Patient has no known allergies.     Medications    Scheduled Meds:   lactulose  30 g Oral TID    clopidogrel  75 mg Oral Daily    allopurinol  100 mg Oral Daily    levothyroxine  50 mcg Oral QAM AC    metoprolol succinate  12.5 mg Oral Daily    midodrine  5 mg Oral TID WC    pantoprazole  40 mg Oral BID AC    sodium chloride flush  5-40 mL IntraVENous 2 times per day     Continuous Infusions:   dextrose 50 mL/hr at 05/23/22 0628    dextrose Stopped (05/19/22 1427)    sodium chloride       PRN Meds:  loperamide, glucose, dextrose bolus **OR** dextrose bolus, glucagon (rDNA), dextrose, sodium chloride flush, sodium chloride, ondansetron, heparin (porcine)    Vitals   Vitals /wt   Patient Vitals for the past 8 hrs:   BP Temp Temp src Pulse Resp SpO2 Weight   05/23/22 0946 -- -- -- 82 -- -- --   05/23/22 0751 127/60 97.2 °F (36.2 °C) Axillary 84 27 94 % --   05/23/22 0407 96/70 97.5 °F (36.4 °C) Axillary 76 26 -- --   05/23/22 0406 -- -- -- -- -- -- 154 lb 5.2 oz (70 kg)        72HR INTAKE/OUTPUT:      Intake/Output Summary (Last 24 hours) at 5/23/2022 1110  Last data filed at 5/23/2022 0934  Gross per 24 hour   Intake 1326 ml   Output 1100 ml   Net 226 ml       Exam:    Gen:   Alert and oriented ×3   Eyes: PERRL. No sclera icterus. No conjunctival injection. ENT: No discharge. Pharynx clear. External appearance of ears and nose normal.  Neck: Trachea midline. No obvious mass. Resp: No accessory muscle use. No crackles. No wheezes. No rhonchi. CV: Regular rate. Regular rhythm. No murmur or rub. No edema. GI: Non-tender. Distended. Ascites+,  No hernia. Skin: Warm, dry, normal texture and turgor. Lymph: No cervical LAD. No supraclavicular LAD. M/S: / Ext. No cyanosis. No clubbing. No joint deformity. Neuro: CN 2-12 are intact,  no neurologic deficits noted. PT/INR:   Recent Labs     05/21/22  0706 05/22/22  0521 05/23/22  0540   PROTIME 22.9* 22.8* 25.7*   INR 1.97* 1.96* 2.20*     APTT: No results for input(s): APTT in the last 72 hours.     CBC:   Recent Labs     05/21/22  0706 05/22/22  0521 05/23/22  0540   WBC 3.0* 4.4 5.0   HGB 10.4* 9.9* 9.7*   HCT 33.5* 29.4* 29.2*   .5* 100.2* 101.3*   PLT 90* 100* 91*       BMP:   Recent Labs 05/21/22  0706 05/22/22  0521 05/23/22  0540   * 127* 125*   K 4.6 5.2* 4.7   CL 86* 89* 88*   CO2 14* 17* 15*   BUN 55* 40* 56*   CREATININE 5.7* 4.1* 5.0*       LIVER PROFILE:   Recent Labs     05/21/22  0706 05/22/22  0521 05/23/22  0540   ALKPHOS 277* 278* 262*   * 350* 308*   * 161* 150*   BILITOT 9.2* 10.1* 10.6*     No results for input(s): AMYLASE in the last 72 hours. No results for input(s): LIPASE in the last 72 hours. UA:No results for input(s): NITRITE, LABCAST, WBCUA, RBCUA, MUCUS in the last 72 hours. TROPONIN: No results for input(s): Margette Dec in the last 72 hours. Lab Results   Component Value Date/Time    URRFLXCULT Not Indicated 04/10/2022 01:55 AM       No results for input(s): TSHREFLEX in the last 72 hours. No components found for: SHR5201  POC GLUCOSE:    Recent Labs     05/22/22  1612 05/22/22  2009 05/23/22  0035 05/23/22  0359 05/23/22  0741   POCGLU 81 98 95 94 105*     No results for input(s): LABA1C in the last 72 hours. Lab Results   Component Value Date    LABA1C 7.5 03/31/2022     Overall left ventricular systolic function appears severely reduced. Ejection fraction is visually estimated to be <20% with diffuse hypokinesis. Severely dilated left ventricle. (3/25/2022)    MRI of the abdomen without contrast  Molt cardiomegaly with bilateral small pleural effusion  Bibasilar consolidation    ASSESSMENT AND PLAN    1. Diarrhea. Abdominal pain. Diarrhea work-up was negative  Diarrhea and abdominal pain improving       2. Transaminitis and cirrhosis + Ascites. improving , , bilirubin 7.2 probable congestive hepatopathy. Large volume ascites noted on MRI  Contracted gallbladder containing stones. Had Paracentesis today, 1100ml aspirated. Ascites fluid sent for MCS.     3. Hypoglycemia likely related to cirrhosis and DMII. ADAT  Start hypoglycemia protocol     4. Elevated INR. vitamin K,   inr improving -1.97     5. Catheter erosion. Catheter is exposed along the tunnel track, patient is very thin. Patient will need replaced tunneled catheter in a different location,           6. ESRD on HD. Appreciate nephrology input     7. Systolic heart failure. Ejection fraction less than 20%. Volume status adjusted during dialysis     7. Anemia. Continue IV iron    8. Hypoglycemia  Continue on D10  Check cortisol in a.m.    9. Retro-orbital/ conjunctival hemorrhage with exopthalmos  -CT head without contrast    10. RLE swelling > Left leg  -US to r/o DVT    11. Hepatic encephalopathy-patient lethargic 5/22/22; Checked ammonia levels-wnl. Has red conjunctiva- CT orbits shows edema  -start lactulose 30g po tid  -Needs a paracentesis in am    Code Status: Full Code        Dispo -cc        The patient and / or the family were informed of the results of any tests, a time was given to answer questions, a plan was proposed and they agreed with plan. Baldomero Torres MD    This note was transcribed using [id number]. Please disregard any translational errors.

## 2022-05-23 NOTE — PLAN OF CARE
Problem: Discharge Planning  Goal: Discharge to home or other facility with appropriate resources  5/23/2022 1131 by Suzy Renee RN  Outcome: Progressing  Flowsheets (Taken 5/23/2022 1123)  Discharge to home or other facility with appropriate resources: Identify barriers to discharge with patient and caregiver     Problem: Chronic Conditions and Co-morbidities  Goal: Patient's chronic conditions and co-morbidity symptoms are monitored and maintained or improved  5/23/2022 1131 by Suzy Renee RN  Outcome: Progressing  Flowsheets (Taken 5/23/2022 0946)  Care Plan - Patient's Chronic Conditions and Co-Morbidity Symptoms are Monitored and Maintained or Improved: Monitor and assess patient's chronic conditions and comorbid symptoms for stability, deterioration, or improvement     Problem: Safety - Adult  Goal: Free from fall injury  5/23/2022 1131 by Suzy Renee RN  Outcome: Progressing  Flowsheets (Taken 5/23/2022 1129)  Free From Fall Injury: Instruct family/caregiver on patient safety     Problem: ABCDS Injury Assessment  Goal: Absence of physical injury  5/23/2022 1131 by Suzy Renee RN  Outcome: Progressing  Flowsheets (Taken 5/23/2022 1129)  Absence of Physical Injury: Implement safety measures based on patient assessment     Problem: Skin/Tissue Integrity  Goal: Absence of new skin breakdown  Description: 1. Monitor for areas of redness and/or skin breakdown  2. Assess vascular access sites hourly  3. Every 4-6 hours minimum:  Change oxygen saturation probe site  4. Every 4-6 hours:  If on nasal continuous positive airway pressure, respiratory therapy assess nares and determine need for appliance change or resting period.   5/23/2022 1131 by Suzy Renee RN  Outcome: Progressing     Problem: Respiratory - Adult  Goal: Achieves optimal ventilation and oxygenation  5/23/2022 1131 by Suzy Renee RN  Outcome: Progressing  Flowsheets (Taken 5/23/2022 0946)  Achieves optimal ventilation and oxygenation:   Assess for changes in respiratory status   Assess for changes in mentation and behavior     Problem: Cardiovascular - Adult  Goal: Maintains optimal cardiac output and hemodynamic stability  5/23/2022 1131 by Lizzeth Mckeon RN  Outcome: Progressing  Flowsheets (Taken 5/23/2022 0946)  Maintains optimal cardiac output and hemodynamic stability: Monitor blood pressure and heart rate     Problem: Hematologic - Adult  Goal: Maintains hematologic stability  5/23/2022 1131 by Lizzeth Mckeon RN  Outcome: Progressing  Flowsheets (Taken 5/23/2022 0946)  Maintains hematologic stability: Assess for signs and symptoms of bleeding or hemorrhage

## 2022-05-23 NOTE — PROGRESS NOTES
Patient is alert and oriented x4, up with walker x1 assist, call light within reach, bed/chair alarm on. AM meds complete, patient tolerated well. VSS and WDL. No s/s of distress, no further needs noted at this time.  Electronically signed by Marco A Shaw RN on 5/23/2022 at 11:32 AM

## 2022-05-24 NOTE — FLOWSHEET NOTE
05/24/22 0735 05/24/22 1106   Vital Signs   /62 117/74   Temp 95.2 °F (35.1 °C) 96.6 °F (35.9 °C)   Pulse 56 56   Resp  --  26   Weight 152 lb 8.9 oz (69.2 kg) 152 lb 8.9 oz (69.2 kg)   Weight Method Bed scale Bed scale     Treatment time: 3.5 hours  Net UF: 0 ml        Access used: RTDC  Access function: good with  ml/min     Medications or blood products given: Albumin for BP support        Summary of response to treatment: tx tolerated well, no distress noted, MD notified. Copy of dialysis treatment record placed in chart, to be scanned into EMR.

## 2022-05-24 NOTE — CARE COORDINATION
Discharge Planning:   PT/OT recommending skilled nursing facility placement at discharge. Attempted to meet with patient to discuss. Patient off the floor for dialysis. Will follow up as time permits.    SANJUANA Singleton, GUSTAVOW, Social Work/Case Management   [phone]  Electronically signed by SANJUANA Singleton, HIRO on 5/24/2022 at 10:34 AM

## 2022-05-24 NOTE — PROGRESS NOTES
Clinical Pharmacy Note  Vancomycin Consult    Ransom Rebecca is a 74 y.o. male ordered Vancomycin for Infected TDC tunneled cath; consult received from Dr. Rj Barton to manage therapy. Patient Active Problem List   Diagnosis    ED (erectile dysfunction)    Essential hypertension, benign    Hyperlipidemia    [facility]    [facility], [facility]    Noncompliance    Elevated blood uric acid level    Coronary artery disease involving native coronary artery    Elevated PSA    Acute on chronic combined systolic and diastolic [facility] ([facility] failure) [facility]    Stage 3 chronic kidney disease [facility]    SONAL (acute kidney injury) [facility]    Biventricular heart failure (HCC)    Acute on chronic systolic heart failure, NYHA class 4 ([facility]    Acute on chronic congestive heart failure ([facility]    LV (left ventricular) mural thrombus    Syncope    CHF (congestive heart failure) [facility]    Syncope and collapse    ICD (implantable cardioverter-defibrillator) in place    Demand ischemia [facility]    Ventricular tachycardia [facility]    Elevated LFTs    Supratherapeutic INR    Diarrhea       Allergies:  Patient has no known allergies. Temp max:  Temp (24hrs), Av.5 °F (35.3 °C), Min:94.6 °F (34.8 °C), Max:97 °F (36.1 °C)      Recent Labs     22  0521 22  0540 22  0638   WBC 4.4 5.0 4.7       Recent Labs     22  0521 22  0540 22  0638   BUN 40* 56* 64*   CREATININE 4.1* 5.0* 5.4*       No intake or output data in the 24 hours ending 22 1456    Culture Results:  Catheter tip swabbed. Blood       Ht Readings from Last 1 Encounters:   22 5' 6\" (1.676 m)        Wt Readings from Last 1 Encounters:   22 152 lb 8.9 oz (69.2 kg)         Estimated Creatinine Clearance: 12 mL/min (A) (based on SCr of 5.4 mg/dL [facility])). Assessment/Plan:  Vancomycin 1000 mg IV x 1   TDC has been pulled.  Plan is to replace ( possibly Thursday)   ID has been consulted   Normal HD days are TTS    Regimen projects a trough level of 15-20 mg/L. Level ordered for 5/26, next HD day . Thank you for the consult.    Susy Brooks [facility]

## 2022-05-24 NOTE — PROGRESS NOTES
Occupational Therapy  Unable to see pt at this time due to pt completing dialysis at time of attempt. Will follow up with pt as time allows. Continue with POC.     Shea Lopez OTR/L

## 2022-05-24 NOTE — PROGRESS NOTES
[facility]                                               Progress note    Summary:   Delmy Coelho is being seen by nephrology for ESRD on HD. Admitted with  Diarrhea. MRI abdomen showing large volume ascites diffuse body wall edema, moderate diffuse hepatic steatosis: Cardiomegaly bilateral small pleural effusions and bibasilar consolidations    Interval History  Seen on dialysis  No improvement in mental status  On D10 infusion for hypogylcemia  Hyponatremia and metabolic acidosis worsening. Plan:   - HD today per schedule. Difficulty doing UF despite clear volume overload even with albumin support. Already on midodrine.  - minimal UF due to low BP  - increase dialysate bicarb to address acidosis tomorrow  - touch base with IR for line replacement due to ulceration over the skin    ADDENDUM:  Received call from IR that the [facility] tunnel is grossly infected with green purulent discharge expressed from it. The line is being removed without replacement for now  Collect blood cultures x 2  Start vancomycin, pharmacy to dose  Consult ID  Will hold plavix for now until Thursday when a new TDC could be placed. Ulises Mejia MD  [facility]  Office: [phone]    Assessment:   #ESRD  [facility] Has TDC and has been referred for vein mapping and AVF/AVG placement. ,  TIME 3.3 Hours  Na 138   K 2  Ca 2.5   Bicarb 35   180nRe    #BP/Volume  Hypervolemic. BP acceptable. He has severe systolic heart failure and BP runs low. Has required inotrope support in the past   On midodrine 5 mg TID. UF as tolerated, challenge dry weight. On metop 12.5 mg BID. EDW 77 kilos    #Metabolic acidosis  Address with dialysis. #SHPT  Last  and phos 6.3  Is supposed to be on phos binder. Will add renvela. #Anemia  Last tsat 12% ferritin 174  He will need IV iron with HD      Abdominal pain.   Patient presented to emergency with severe diarrhea over the past 3 days, C. difficile negative, diarrhea work-up negative.        Transaminitis. History of cirrhosis. Seems to be improving, , , bilirubin 7.2. Possibly related to congestive heart failure. patient scheduled for MRCP        ROS:   Positives Listed Bold. All other remaining systems are negative. Constitutional:  fever, chills, weakness, weight change, fatigue,      Skin:  rash, pruritus, hair loss, bruising, dry skin, petechiae. Head, Face, Neck   headaches, swelling,  cervical adenopathy. Respiratory: shortness of breath, cough, or wheezing  Cardiovascular: chest pain, palpitations, dizzy, edema  Gastrointestinal: nausea, vomiting, diarrhea, constipation,belly pain    Yellow skin, blood in stool  Musculoskeletal:  back pain, muscle weakness, gait problems,       joint pain or swelling. Genitourinary:  dysuria, poor urine flow, flank pain, blood in urine  Neurologic:  vertigo, TIA'S, syncope, seizures, focal weakness  Psychosocial:  insomnia, anxiety, or depression. Additional positive findings: -     PMH:   Past medical history, surgical history, social history, family history are reviewed and updated as appropriate. Reviewed current medication list.   Allergies reviewed and updated as needed. PE:   Vitals:    05/24/22 0735   BP: 110/62   Pulse: 56   Resp:    Temp: 95.2 °F (35.1 °C)   SpO2:        General appearance:  in NAD, somnolent, poorly arousable   HEENT: EOM intact, no icterus. Trachea is midline. Neck : No masses, appears symmetrical, no JVD  Respiratory: Respiratory effort appears normal, bilateral equal chest rise, no wheeze, +crackles  Cardiovascular: Ausculation shows RRR + edema  Abdomen: No visible mass or tenderness, non distended. Musculoskeletal:  Joints with no swelling or deformity. Skin:no rashes, ulcers, induration, no jaundice. Neuro: face symmetric, no focal deficits. Appropriate responses.      Tunneled dialysis catheter  small opening at the IJ stick site indicating that it had not fully closed. There is no other catheter exposure.  Glue placed over it      Lab Results   Component Value Date    CREATININE 5.4 (HH) 05/24/2022    BUN 64 (H) 05/24/2022     (L) 05/24/2022    K 4.3 05/24/2022    CL 87 (L) 05/24/2022    CO2 14 (LL) 05/24/2022      Lab Results   Component Value Date    WBC 4.7 05/24/2022    HGB 9.6 (L) 05/24/2022    HCT 28.7 (L) 05/24/2022    .0 05/24/2022    PLT 87 (L) 05/24/2022     Lab Results   Component Value Date    .8 (H) 04/07/2022    CALCIUM 8.9 05/24/2022    PHOS 3.4 04/07/2022

## 2022-05-24 NOTE — BRIEF OP NOTE
Brief Postoperative Note    Bereket Khan  YOB: 1955  [id number]    Pre-operative Diagnosis: infected tunneled HD catheter    Post-operative Diagnosis: Same    Procedure: Removal of tunneled HD catheter    Anesthesia: Local    Surgeons: Imani Johnson MD    Estimated Blood Loss: Less than 5 mL    Complications: None    Specimens: Was Obtained: tunnel site swabbed and sent for culture. Tip of catheter sent for culture. Findings: Successful removal of the right IJ tunneled Catheter.     Electronically signed by Imani Johnson MD on 5/24/2022 at 2:38 PM

## 2022-05-24 NOTE — PROGRESS NOTES
Comprehensive Nutrition Assessment    Type and Reason for Visit:  Initial (LOS)    Nutrition Recommendations/Plan:   Continue Low Na, 2000 mL FR. Continue Nepro BID. Malnutrition Assessment:  Malnutrition Status: At risk for malnutrition (Comment) (05/24/22 1305)    Context:  Acute Illness     Findings of the 6 clinical characteristics of malnutrition:  Energy Intake:  50% or less of estimated energy requirements for 5 or more days  Weight Loss:  Unable to assess (fluid shifts)     Body Fat Loss:  No significant body fat loss     Muscle Mass Loss:  No significant muscle mass loss    Fluid Accumulation:  No significant fluid accumulation     Strength:  Not Performed    Nutrition Assessment:    LOS. Pt with PMH of ESRD on HD, cirrhosis with ascites, and DM who presented with weakness and diarrhea. Pt was eating fair but intakes have decreased over the last few days. Pt on lactulose TID. Ammonia WNL. D10 running @ 10 mL/hr providing 408 kcal daily. Pt s/p paracentesis yesterday, -1100 mL. ONS on board BID. Will continue current nutrition interventions. Nutrition Related Findings:    trace BLE edema; BM 5/24; Na 122 Wound Type: None       Current Nutrition Intake & Therapies:    Average Meal Intake: 1-25%,26-50%,51-75%     ADULT DIET; Regular; Low Sodium (2 gm); 2000 ml  ADULT ORAL NUTRITION SUPPLEMENT; Breakfast, Dinner; Renal Oral Supplement  Diet NPO    Anthropometric Measures:  Height: 5' 6\" (167.6 cm)  Ideal Body Weight (IBW): 142 lbs (65 kg)       Current Body Weight: 152 lb (68.9 kg), 107 % IBW. Weight Source: Bed Scale  Current BMI (kg/m2): 24.5                          BMI Categories: Normal Weight (BMI 18.5-24. 9)    Estimated Daily Nutrient Needs:        Energy (kcal/day): 9838-6408 kcal (25-30 kcal/kg 69 kg CBW)     Protein (g/day):  gm (1.4- 1.8 gm/kg 68 kg CBW)     Fluid (ml/day): 2000 mL FR.     Nutrition Diagnosis:   · Inadequate oral intake related to inadequate protein-energy intake as evidenced by intake 0-25%,intake 26-50%      Nutrition Interventions:   Food and/or Nutrient Delivery: Continue Current Diet,Continue Oral Nutrition Supplement  Nutrition Education/Counseling: No recommendation at this time  Coordination of Nutrition Care: Continue to monitor while inpatient       Goals:     Goals: PO intake 50% or greater,within 2 days       Nutrition Monitoring and Evaluation:      Food/Nutrient Intake Outcomes: Food and Nutrient Intake,Supplement Intake  Physical Signs/Symptoms Outcomes: Biochemical Data,Weight,Skin,Fluid Status or Edema,Nutrition Focused Physical Findings    Discharge Planning:     Too soon to determine     Walt Amin RD, LD  Contact: [phone]

## 2022-05-24 NOTE — PROGRESS NOTES
Hospitalist Progress Note  5/24/2022 9:39 AM    PCP: Robbie Mueller DO    [id number]     Date of Admission: 5/17/2022                                                                                                               HOSPITAL COURSE    Patient demographics:  The patient  Temitope Art is a 74 y.o. male     Significant past medical history:   Patient Active Problem List   Diagnosis    ED (erectile dysfunction)    Essential hypertension, benign    Hyperlipidemia    [facility]ropathy [facility] [address]    Diabetes type 2, uncontrolled ([address]    Noncompliance    Elevated blood uric acid level    Coronary artery disease involving native coronary artery    Elevated PSA    Acute on chronic combined systolic and diastolic HF (heart failure) ([facility] [address]    Stage 3 chronic kidney disease ([address]    SONAL (acute kidney injury) ([facility] [address]    Biventricular heart failure (HCC)    Acute on chronic systolic heart failure, NYHA class 4 (HCC)    Acute on chronic congestive heart failure (HCC)    LV (left ventricular) mural thrombus    Syncope    CHF (congestive heart failure) ([facility] [address]    Syncope and collapse    ICD (implantable cardioverter-defibrillator) in place    Demand ischemia ([address]    Ventricular tachycardia ([facility] [address]    Elevated LFTs    Supratherapeutic INR    Diarrhea         Presenting symptoms:  diarrhea. Diagnostic workup:      CONSULTS DURING ADMISSION :   IP CONSULT TO GI  IP CONSULT TO NEPHROLOGY  IP CONSULT TO INTERVENTIONAL RADIOLOGY      Patient was diagnosed with:  Diarrhea. Transaminitis. Hypoglycemia. Catheter erosion. ESRD on HD. Systolic heart failure. Treatment while inpatient:  74years old male with medical history significant for end-stage renal disease patient is on hemodialysis. Patient was admitted with generalized weakness and diarrhea. C. difficile was negative. Diarrhea work-up was negative.     MRI of the abdomen showed large volume ascites diffuse body wall edema moderate diffuse hepatic steatosis. Cardiomegaly bilateral small pleural effusions and bibasilar consolidation    Patient was noted to have supratherapeutic INR and transaminitis but patient has a history of cirrhosis. Patient received vitamin K, that improved INR from 5.71- 2.31. Patient lethargic 5/22/22; Checked ammonia and glucose levels-wnl. Has red conjunctiva- CT orbits shows edema.                                                                                              ----------------------------------------------------------      SUBJECTIVE COMPLAINTS- follow up for diarrhea. Diet: ADULT DIET; Regular; Low Sodium (2 gm); 2000 ml  ADULT ORAL NUTRITION SUPPLEMENT; Breakfast, Dinner; Renal Oral Supplement  Diet NPO      OBJECTIVE:   Patient Active Problem List   Diagnosis    ED (erectile dysfunction)    Essential hypertension, benign    Hyperlipidemia    Diabetic nephropathy ([address]    Diabetes type 2, uncontrolled ([address]    Noncompliance    Elevated blood uric acid level    Coronary artery disease involving native coronary artery    Elevated PSA    Acute on chronic combined systolic and diastolic HF (heart failure) ([facility]    Stage 3 chronic kidney disease ([address]    SONAL (acute kidney injury) ([address]    Biventricular heart failure (HCC)    Acute on chronic systolic heart failure, NYHA class 4 ([facility]    Acute on chronic congestive heart failure ([facility]    LV (left ventricular) mural thrombus    Syncope    CHF (congestive heart failure) ([facility]    Syncope and collapse    ICD (implantable cardioverter-defibrillator) in place    Demand ischemia ([address]    Ventricular tachycardia ([address]    Elevated LFTs    Supratherapeutic INR    Diarrhea       Allergies  Patient has no known allergies.     Medications    Scheduled Meds:   lactulose  30 g Oral TID    clopidogrel  75 mg Oral Daily    allopurinol  100 mg Oral Daily    levothyroxine  50 mcg Oral QAM AC    metoprolol succinate  12.5 mg Oral Daily    midodrine  5 mg Oral TID WC    pantoprazole  40 mg Oral BID AC    sodium chloride flush  5-40 mL IntraVENous 2 times per day     Continuous Infusions:   dextrose 50 mL/hr at 05/24/22 0352    dextrose Stopped (05/19/22 1427)    sodium chloride       PRN Meds:  loperamide, glucose, dextrose bolus **OR** dextrose bolus, glucagon (rDNA), dextrose, sodium chloride flush, sodium chloride, ondansetron, heparin (porcine)    Vitals   Vitals /wt   Patient Vitals for the past 8 hrs:   BP Temp Temp src Pulse Resp SpO2 Weight   05/24/22 0735 110/62 95.2 °F (35.1 °C) -- 56 -- -- 152 lb 8.9 oz (69.2 kg)   05/24/22 0600 -- -- -- -- -- -- 152 lb 8.9 oz (69.2 kg)   05/24/22 0452 -- -- -- -- -- -- 152 lb 8.9 oz (69.2 kg)   05/24/22 0315 101/66 95.2 °F (35.1 °C) Rectal 76 22 95 % --   05/24/22 0200 -- 95.1 °F (35.1 °C) Rectal -- -- -- --        72HR INTAKE/OUTPUT:      Intake/Output Summary (Last 24 hours) at 5/24/2022 9194  Last data filed at 5/23/2022 1408  Gross per 24 hour   Intake 0 ml   Output --   Net 0 ml       Exam:    Gen:   Alert and oriented ×3   Eyes: PERRL. No sclera icterus. No conjunctival injection. ENT: No discharge. Pharynx clear. External appearance of ears and nose normal.  Neck: Trachea midline. No obvious mass. Resp: No accessory muscle use. No crackles. No wheezes. No rhonchi. CV: Regular rate. Regular rhythm. No murmur or rub. No edema. GI: Non-tender. Non-distended. No hernia. Skin: Warm, dry, normal texture and turgor. Lymph: No cervical LAD. No supraclavicular LAD. M/S: / Ext. No cyanosis. No clubbing. No joint deformity. Neuro: CN 2-12 are intact,  no neurologic deficits noted. PT/INR:   Recent Labs     05/22/22  0521 05/23/22  0540 05/24/22  0639   PROTIME 22.8* 25.7* 25.9*   INR 1.96* 2.20* 2.22*     APTT: No results for input(s): APTT in the last 72 hours.     CBC:   Recent Labs     05/22/22  0521 05/23/22  0540 05/24/22  0638   WBC 4.4 5.0 4.7   HGB 9.9* 9.7* 9.6*   HCT 29.4* 29.2* 28.7*   .2* 101.3* 100.0   * 91* 87*       BMP:   Recent Labs     05/22/22  0521 05/23/22  0540 05/24/22  0638   * 125* 122*   K 5.2* 4.7 4.3   CL 89* 88* 87*   CO2 17* 15* 14*   BUN 40* 56* 64*   CREATININE 4.1* 5.0* 5.4*       LIVER PROFILE:   Recent Labs     05/22/22  0521 05/23/22  0540 05/24/22  0638   ALKPHOS 278* 262* 238*   * 308* 221*   * 150* 129*   BILITOT 10.1* 10.6* 11.0*     No results for input(s): AMYLASE in the last 72 hours. No results for input(s): LIPASE in the last 72 hours. UA:No results for input(s): NITRITE, LABCAST, WBCUA, RBCUA, MUCUS in the last 72 hours. TROPONIN: No results for input(s): [place] in the last 72 hours. Lab Results   Component Value Date/Time    URRFLXCULT Not Indicated 04/10/2022 01:55 AM       No results for input(s): TSHREFLEX in the last 72 hours. No components found for: [id number]  POC GLUCOSE:    Recent Labs     05/23/22  1145 05/23/22  1655 05/23/22  2101 05/23/22  2322 05/24/22  0402   POCGLU 102* 111* 95 97 124*     No results for input(s): LABA1C in the last 72 hours. Lab Results   Component Value Date    LABA1C 7.5 03/31/2022     Overall left ventricular systolic function appears severely reduced. Ejection fraction is visually estimated to be <20% with diffuse hypokinesis. Severely dilated left ventricle. (3/25/2022)    MRI of the abdomen without contrast  Molt cardiomegaly with bilateral small pleural effusion  Bibasilar consolidation    ASSESSMENT AND PLAN    1. Diarrhea. Abdominal pain. Diarrhea work-up was negative  Diarrhea and abdominal pain improving.     2. Transaminitis and cirrhosis + Ascites. Seems to be improving, , , bilirubin 7.2. Possibly related to congestive heart failure. GI on board  Large volume ascites noted on MRI- will need paracentesis  Contracted gallbladder containing stones. Paracentesis in am.     3.  Hypoglycemia likely related to cirrhosis and DMII. ADAT  Start hypoglycemia protocol     4. Elevated INR 2.22. Not bleeding. vitamin K po        5. Catheter erosion. Catheter is exposed along the tunnel track, patient is very thin. Patient will need replaced tunneled catheter in a different location.      6. ESRD on HD. Appreciate nephrology input     7. Systolic heart failure. Ejection fraction less than 20%. Volume status adjusted during dialysis     7. Anemia. Continue IV iron    8. Hypoglycemia- due to cirrhosis, pts glucose metabolism will always be impaired to some extent. Continue on D10  Check cortisol in a.m.    9. Retro-orbital/ conjunctival hemorrhage with exopthalmos  -CT head without contrast shows edema    10. RLE swelling > Left leg  -US to r/o DVT    11. Hepatic encephalopathy with asterixis. More alert today. Ammonia levels-wnl.   -cont lactulose 30g po tid  -Had a 1100ml paracentesis and 100ml of 25% human albumin solution 5/23/22    Code Status: Full Code        Dispo -cc        The patient and / or the family were informed of the results of any tests, a time was given to answer questions, a plan was proposed and they agreed with plan. Junior Patel MD    This note was transcribed using [id number]. Please disregard any translational errors.

## 2022-05-24 NOTE — CONSULTS
Infectious Diseases Inpatient Consult Note      Reason for Consult:  HD catheter infection s/p removal ESRD    Requesting Physician:  Viviane Woods     Primary Care Physician:  Fernando Belcher DO    History Obtained From:  Epic     CHIEF COMPLAINT:     Chief Complaint   Patient presents with    Diarrhea    Shortness of Breath         HISTORY OF PRESENT ILLNESS:  74 y.o. man with past medical history of diabetes, diabetic nephropathy, diabetic neuropathy, hypertension, end-stage renal disease on hemodialysis, congestive heart failure, cirrhosis of liver, admitted to the hospital secondary to diarrhea and fatigue nausea. On admission there was noted to be ulceration along the abdominal HD line. He went to interventional radiology for HD line removal and noted to have purulent fluid along the wound site as well culture was sent and tip culture was sent. Given the ongoing concern for HD line infection we are consulted for IV antibiotic recommendations. He has LFT elevation ongoing secondary to cirrhosis of liver. .  With ascites underwent paracentesis for ascitic fluid negative for infection. He just came back from procedure under sedation unable to provide any history.       Past Medical History:    Past Medical History:   Diagnosis Date    CHF (congestive heart failure) ([address]     Diabetes type 2, uncontrolled ([address] 7/28/2014    Diabetic nephropathy 9/12/2013    ED (erectile dysfunction) 9/12/2013    Essential hypertension, benign 9/12/2013    Hyperlipidemia 9/12/2013    Noncompliance 7/28/2014       Past Surgical History:    Past Surgical History:   Procedure Laterality Date    EYE SURGERY      IR NONTUNNELED VASCULAR CATHETER  4/4/2022    IR NONTUNNELED VASCULAR CATHETER 4/4/2022 [facility]    IR TUNNELED CATHETER PLACEMENT GREATER THAN 5 YEARS  4/8/2022    IR TUNNELED CATHETER PLACEMENT GREATER THAN 5 YEARS 4/8/2022 [facility]       Current Medications:    Outpatient Medications Marked as Taking for the 22 encounter [facility] Encounter)   Medication Sig Dispense Refill    carvedilol (COREG) 12.5 MG tablet Take 12.5 mg by mouth 2 times daily (with meals)      clopidogrel (PLAVIX) 75 MG tablet Take 75 mg by mouth daily      isosorbide mononitrate (IMDUR) 30 MG extended release tablet Take 30 mg by mouth daily         Allergies:  Patient has no known allergies. Immunizations :   Immunization History   Administered Date(s) Administered    COVID-19, Pfizer Purple top, DILUTE for use, 12+ yrs, 30mcg/0.3mL dose 2021, 2021, 2021    Influenza Virus Vaccine 2017    Influenza, Ever Reasons, IM, PF (6 mo and older Fluzone, Flulaval, Fluarix, and 3 yrs and older Afluria) 2021         Social History:   Social History     Tobacco Use    Smoking status: Former Smoker     Packs/day: 1.50     Years: 7.00     Pack years: 10.50     Quit date: 1981     Years since quittin.3    Smokeless tobacco: Never Used   Vaping Use    Vaping Use: Never used   Substance Use Topics    Alcohol use:  Yes     Alcohol/week: 1.0 standard drink     Types: 1 Cans of beer per week    Drug use: No     Social History     Tobacco Use   Smoking Status Former Smoker    Packs/day: 1.50    Years: 7.00    Pack years: 10.50    Quit date: 1981    Years since quittin.3   Smokeless Tobacco Never Used      Family History   Problem Relation Age of Onset    Diabetes Mother     High Blood Pressure Mother     Diabetes Sister     Diabetes Brother          REVIEW OF SYSTEMS:     Not possible due to change in mentation     PHYSICAL EXAM:      Vitals:    /66   Pulse 91   Temp 97 °F (36.1 °C) (Rectal)   Resp 19   Ht 5' 6\" (1.676 m)   Wt 152 lb 8.9 oz (69.2 kg)   SpO2 94%   BMI 24.62 kg/m²     General Appearance: sleepy in some  acute distress,++  pallor, no icterus chronic ill appearing man +    Skin: warm and dry, no rash or erythema  Head: normocephalic and atraumatic  Eyes: pupils equal, round, and reactive to light, conjunctivae normal  ENT: tympanic membrane, external ear and ear canal normal bilaterally, nose without deformity, nasal mucosa and turbinates normal without polyps  Neck: supple and non-tender without mass, no thyromegaly  no cervical lymphadenopathy  Pulmonary/Chest:  Bi basal crepts+ rhonchi, normal air movement, no respiratory distress  Cardiovascular: normal rate, regular rhythm, normal S1 and S2, no murmurs, rubs, clicks, or gallops, no carotid bruits  Abdomen:  -tender, + -distended, normal bowel sounds, no masses or organomegaly  Extremities: no cyanosis, clubbing or edema  Musculoskeletal: normal range of motion, no joint swelling, deformity or tenderness  Integumentary: No rashes, no abnormal skin lesions, no petechiae  Neurologic: reflexes normal and symmetric, no cranial nerve deficit  Lines: IV  HD line removed dressing+     DATA:    CBC:   Lab Results   Component Value Date    WBC 4.7 05/24/2022    HGB 9.6 (L) 05/24/2022    HCT 28.7 (L) 05/24/2022    .0 05/24/2022    PLT 87 (L) 05/24/2022     RENAL:   Lab Results   Component Value Date    CREATININE 5.4 (HH) 05/24/2022    BUN 64 (H) 05/24/2022     (L) 05/24/2022    K 4.3 05/24/2022    CL 87 (L) 05/24/2022    CO2 14 (LL) 05/24/2022     SED RATE: No results found for: SEDRATE  CK: No results found for: CKTOTAL  CRP: No results found for: CRP  Hepatic Function Panel:   Lab Results   Component Value Date    ALKPHOS 238 05/24/2022     05/24/2022     05/24/2022    PROT 6.3 05/24/2022    PROT 7.7 12/13/2011    BILITOT 11.0 05/24/2022    BILIDIR 4.7 05/17/2022    IBILI 1.8 05/17/2022    LABALBU 3.2 05/24/2022     UA:  Lab Results   Component Value Date    COLORU Yellow 04/10/2022    CLARITYU Clear 04/10/2022    GLUCOSEU Negative 04/10/2022    BILIRUBINUR Negative 04/10/2022    KETUA TRACE 04/10/2022    SPECGRAV 1.025 04/10/2022    BLOODU Negative 04/10/2022    PHUR 5.5 04/10/2022    PROTEINU 30 04/10/2022    UROBILINOGEN 1.0 04/10/2022    NITRU Negative 04/10/2022    LEUKOCYTESUR Negative 04/10/2022    LABMICR YES 04/10/2022    URINETYPE NotGiven 04/10/2022      Urine Microscopic:   Lab Results   Component Value Date    BACTERIA 2+ 04/09/2022    COMU see below 04/10/2022    HYALCAST 3-5 04/10/2022    WBCUA 0-2 04/10/2022    RBCUA 0-2 04/10/2022    EPIU 12 04/09/2022     Urine Reflex to Culture:   Lab Results   Component Value Date    URRFLXCULT Not Indicated 04/10/2022     Results for Rossana Page (MRN [id number]) as of 5/24/2022 15:06   Ref. Range 5/23/2022 08:36   Source + CELL CT/DIFF-BF Unknown Ascitic Fluid   Appearance, Fluid Unknown Hazy   Color, Fluid Unknown Yellow   RBC, Fluid Latest Units: /cumm 2,000   Lymphocytes, Body Fluid Latest Units: % 8   Monocyte Count, Fluid Latest Units: % 5   Neutrophil Count, Fluid Latest Units: % 15   Nucl Cell, Fluid Latest Units: /cumm 151   Mesothelial, Fluid Latest Units: % 6   Clot Eval. Unknown see below   Number of Cells Counted Fluid Unknown 100        Summary   Overall left ventricular systolic function appears severely reduced. Ejection fraction is visually estimated to be <20% with diffuse hypokinesis. Severely dilated left ventricle. Normal left ventricular wall thickness. Diastolic filling parameters suggest grade II diastolic dysfunction. The right ventricle appears severely dilated with moderately reduced   systolic function. The left and right atria are severely dilated. Severe tricuspid regurgitation. Moderate mitral regurgitation. Estimated pulmonary artery systolic pressure is severely elevated at 66 mmHg   assuming a right atrial pressure of 15 mmHg.       Signature      ------------------------------------------------------------------   Electronically signed by Brad De La Cruz MD   (Interpreting physician) on 03/26/2022 at 12:41 PM ------------------------------------------------------------------    MICRO: cultures reviewed and updated by me          Culture, Fungus [[id number]] Collected: 05/24/22 1425   Order Status: Sent Specimen: Body Fluid Updated: 05/24/22 1504   Culture, Tip [[id number]] Collected: 05/24/22 1425   Order Status: Sent Specimen: Neck from Catheter Tip Updated: 05/24/22 1501   Culture, Blood 1 [[id number]]    Order Status: Sent Specimen: Blood    Culture, Blood 2 [[id number]]    Order Status: Sent Specimen: Blood    Culture, Body Fluid [[id number]]    Order Status: Sent Specimen: Body Fluid    Culture, Anaerobic and Aerobic [[id number]]    Order Status: Sent Specimen: Body Fluid from Neck    Culture, Body Fluid [[id number]] Collected: 05/23/22 0836   Order Status: Completed Specimen: Body Fluid from Ascitic Fluid Updated: 05/24/22 1119    Body Fluid Culture, Sterile No growth to date    Gram Stain Result Cytospin performed,no quantitation   WBC's (Polymorphonuclear)   No Epithelial Cells seen   No organisms seen    Narrative:     ORDER#: [id number]                          ORDERED BY: Marco A Luna   SOURCE: Ascites                            COLLECTED:  05/23/22 08:36   ANTIBIOTICS AT [facility]IAS. :                      RECEIVED :  05/23/22 08:49   Culture, Anaerobic and Aerobic [[id number]] Collected: 05/23/22 0836   Order Status: Canceled Specimen: Body Fluid from Abdomen    GI Bacterial Pathogens By PCR [[id number]] Collected: 05/17/22 0900   Order Status: Completed Specimen: Stool Updated: 05/17/22 1538    GI Bacterial Pathogens By PCR --    No Shigella spp/EIEC DNA detected   No Shiga toxin-producing gene(s) detected   No Campylobacter spp. (jejuni and coli)DNA detected   No Salmonella spp. DNA detected   Normal Range:  None detected    Narrative:     ORDER#: [id number]                          ORDERED BY: Tripp Martinez   SOURCE: Stool                              COLLECTED:  05/17/22 09:00   ANTIBIOTICS AT [facility]IAS. :                      RECEIVED :  05/17/22 09:04   Clostridium Difficile Toxin/Antigen [[id number]] Collected: 05/17/22 0925   Order Status: Completed Specimen: Stool Updated: 05/17/22 1042    C.diff Toxin/Antigen --    Negative for Clostridium difficile antigen and toxin   Normal Range: Negative    Narrative:     ORDER#: [id number]                          ORDERED BY: Chip Marie   SOURCE: Stool                              COLLECTED:  05/17/22 09:25   ANTIBIOTICS AT [facility]IAS. :                      RECEIVED :  05/17/22 09:39   Collect White vial (sterile container)   Clostridium Difficile Toxin/Antigen [[id number]]    Order Status: Canceled Specimen: Stool            Blood Culture: No results found for: BC, BLOODCULT2    Viral Culture:    No results found for: COVID19  Urine Culture: No results for input(s): LABURIN in the last 72 hours. Scheduled Meds:   phytonadione  10 mg Oral Daily    vancomycin (VANCOCIN) intermittent dosing (placeholder)   Other RX Placeholder    vancomycin  1,000 mg IntraVENous Once    lactulose  30 g Oral TID    [Held by provider] clopidogrel  75 mg Oral Daily    allopurinol  100 mg Oral Daily    levothyroxine  50 mcg Oral QAM AC    metoprolol succinate  12.5 mg Oral Daily    midodrine  5 mg Oral TID WC    pantoprazole  40 mg Oral BID AC    sodium chloride flush  5-40 mL IntraVENous 2 times per day       Continuous Infusions:   dextrose 50 mL/hr at 05/24/22 0352    dextrose Stopped (05/19/22 1427)    sodium chloride         PRN Meds:  loperamide, glucose, dextrose bolus **OR** dextrose bolus, glucagon (rDNA), dextrose, sodium chloride flush, sodium chloride, ondansetron, heparin (porcine)    Imaging:   VL Extremity Venous Right   Final Result      US GUIDED PARACENTESIS   Final Result   Successful paracentesis. CT ORBITS W WO CONTRAST   Final Result   Severe anasarca with proportionate bilateral orbital fat edema. No orbital hemorrhage or mass.       RECOMMENDATIONS: Unavailable         MRI ABDOMEN WO CONTRAST MRCP   Final Result   1. Incomplete exam. Patient declined rest of the study, no diffusion or axial   T2 obtained. Limited diagnostic quality exam.   2. Large volume ascites. Diffuse marked body wall edema. 3. Moderate diffuse hepatic steatosis better visualized on CT than on MR. No   focal lesions in the liver. 4. Contracted gallbladder containing a gallstone. Common bile duct is poorly   visualized. 5. Marked cardiomegaly, with bilateral small pleural effusions and bibasilar   consolidations. Small-bowel wall edema noted, this could be due to   surrounding ascites/hypoproteinemia. CT CHEST WO CONTRAST   Final Result   1. Findings compatible with pulmonary edema with small pleural effusions. 2.  Body wall edema and upper abdominal ascites. CT ABDOMEN PELVIS WO CONTRAST Additional Contrast? None   Final Result   1. No ileus or obstruction. No definite inflammatory bowel process though   very limited given the extent of ascites. 2. Moderate volume ascites seen in the abdomen and pelvis, similar to the   prior exam.   3. Cardiomegaly with small bilateral effusions, left greater than right as   well as dependent ground-glass change which may represent dependent pulmonary   edema. 4. Mild anasarca. 5. Mild renal atrophy. 6. Cholelithiasis with a contracted gallbladder. XR CHEST PORTABLE   Final Result   Stable moderate cardiomegaly with mild central pulmonary congestion which is   more apparent. [place] left retrocardiac atelectasis or early infiltrate      Status post right-sided dialysis catheter placement in good position         IR REMOVE TUNNELED CVAD WO SQ PORT/PUMP    (Results Pending)       All pertinent images and reports for the current Hospitalization were reviewed by me.     IMPRESSION:    Patient Active Problem List   Diagnosis    ED (erectile dysfunction)    Essential hypertension, benign    Hyperlipidemia    Di[facility] nephropathy ([facility]    Diabetes type 2, uncontrolled ([facility]    Noncompliance    Elevated blood uric acid level    Coronary artery disease involving native coronary artery    Elevated PSA    Acute on chronic combined systolic and diastolic HF (heart failure) (HCC)    Stage 3 chronic kidney disease ([facility]    SONAL (acute kidney injury) (HCC)    Biventricular heart failure (HCC)    Acute on chronic systolic heart failure, NYHA class 4 (HCC)    Acute on chronic congestive heart failure (HCC)    LV (left ventricular) mural thrombus    Syncope    CHF (congestive heart failure) (HCC)    Syncope and collapse    ICD (implantable cardioverter-defibrillator) in place    Demand ischemia ([facility]    Ventricular tachycardia (HCC)    Elevated LFTs    Supratherapeutic INR    Diarrhea     ESRD on HD  DM with several complications  Cirrhosis of liver from ? CHF  H/O Biventricular Heart failure  Ascites +  S/p Paracentesis fluid negative for infection  LV thrombus +  HD line infection   S/p HD line removal by IR on 5/24   Stool studies  -ve on admit    IR Removed HD line and noted to have purulence and cx sent and await Micro results to adjust IV abx  - given ESRD and HD agree to cover for MDRO,        Labs, Microbiology, Radiology and pertinent results from current hospitalization and care every where were reviewed by me as a part of the consultation. PLAN :  1. Cont IV Vancomycin by level due to ESRD  2. Cont IV Cefepime x 1 gm q 24 hrs  3. HD line tip cx in process  4. HD line site cx in process  5. Blood cx from 5/24 in process      Discussed with patient/Family and Nursing   Risk of Complications/Morbidity: High      · Illness(es)/ Infection present that pose threat to bodily function. · There is potential for severe exacerbation of infection/side effects of treatment. Therapy requires intensive monitoring for antimicrobial agent toxicity. Thanks for allowing me to participate in your patient's care please call me with any questions or concerns.     Dr. Terrence Dickens MD  [address] [place] [address] Physician  Phone: [phone]   Fax : [phone]

## 2022-05-24 NOTE — PROGRESS NOTES
Patient not on ace/arb/aldactone/entresto d/t CKD per Dr Susan Wilburn.   Electronically signed by Eber Carranza, [facility] on 5/24/2022 at 10:37 AM

## 2022-05-24 NOTE — PROGRESS NOTES
Rectal temp was 94.6. Amor Miranda NP notified. Warming blanket applied.      Electronically signed by Mere New RN on 5/23/2022 at 10:44 PM

## 2022-05-24 NOTE — PROGRESS NOTES
MD called in response to message about concern regarding patient's increasing lethargy as observed by this RN and family members. Orders placed for IV cefepime and transfer to ICU. Supervisor and on call NP notified.

## 2022-05-24 NOTE — PLAN OF CARE
Problem: Discharge Planning  Goal: Discharge to home or other facility with appropriate resources  5/23/2022 2253 by Jesus Zaidi RN  Outcome: Progressing     Problem: Chronic Conditions and Co-morbidities  Goal: Patient's chronic conditions and co-morbidity symptoms are monitored and maintained or improved  5/23/2022 2253 by Jessu Zaidi RN  Outcome: Progressing     Problem: Safety - Adult  Goal: Free from fall injury  5/23/2022 2253 by Jesus Zaidi RN  Outcome: Progressing   Patient assessed for fall risk; fall precautions initiated. Patient and family instructed about safety devices. Environment kept free of clutter and adequate lighting provided. Bed locked and in lowest position. Call light within reach. Will continue to monitor. Problem: ABCDS Injury Assessment  Goal: Absence of physical injury  5/23/2022 2253 by Jesus Zaidi RN  Outcome: Progressing   Skin assessment completed every shift. Pt assessed for incontinence, appropriate barrier cream applied prn. Pt encouraged to turn/rotate every 2 hours. Assistance provided if pt unable to do so themselves. Problem: Skin/Tissue Integrity  Goal: Absence of new skin breakdown  Description: 1. Monitor for areas of redness and/or skin breakdown  2. Assess vascular access sites hourly  3. Every 4-6 hours minimum:  Change oxygen saturation probe site  4. Every 4-6 hours:  If on nasal continuous positive airway pressure, respiratory therapy assess nares and determine need for appliance change or resting period.   5/23/2022 2253 by Jesus Zaidi RN  Outcome: Progressing     Problem: Respiratory - Adult  Goal: Achieves optimal ventilation and oxygenation  5/23/2022 2253 by Jesus Zaidi RN  Outcome: Progressing     Problem: Cardiovascular - Adult  Goal: Maintains optimal cardiac output and hemodynamic stability  5/23/2022 2253 by Jesus Zaidi RN  Outcome: Progressing     Problem: Cardiovascular - Adult  Goal: Absence of cardiac dysrhythmias or at baseline  5/23/2022 2253 by Yakov Smith RN  Outcome: Progressing     Problem: Hematologic - Adult  Goal: Maintains hematologic stability  5/23/2022 2253 by Yakov Smith RN  Outcome: Progressing

## 2022-05-25 NOTE — PROGRESS NOTES
Hospitalist Progress Note      PCP: Augustin Powell DO    Date of Admission: 5/17/2022    Chief Complaint: Diarrhea    Hospital Course:   74years old male with medical history significant for end-stage renal disease patient is on hemodialysis. Patient was admitted with generalized weakness and diarrhea. C. difficile was negative. Diarrhea work-up was negative.     MRI of the abdomen showed large volume ascites diffuse body wall edema moderate diffuse hepatic steatosis. Cardiomegaly bilateral small pleural effusions and bibasilar consolidation. Patient was later found to have dialysis line infection, s/p removal by IR 5/24. Subjective:   Patient alert this morning, responding to questions appropriately, family at bedside. Patient is vitally stable, line removed yesterday      Medications:  Reviewed    Infusion Medications    dextrose 50 mL/hr at 05/25/22 0040    dextrose Stopped (05/19/22 1427)    sodium chloride       Scheduled Medications    vancomycin (VANCOCIN) intermittent dosing (placeholder)   Other RX Placeholder    cefepime  1,000 mg IntraVENous Q24H    lactulose  30 g Oral TID    [Held by provider] clopidogrel  75 mg Oral Daily    allopurinol  100 mg Oral Daily    levothyroxine  50 mcg Oral QAM AC    metoprolol succinate  12.5 mg Oral Daily    midodrine  5 mg Oral TID WC    pantoprazole  40 mg Oral BID AC    sodium chloride flush  5-40 mL IntraVENous 2 times per day     PRN Meds: loperamide, glucose, dextrose bolus **OR** dextrose bolus, glucagon (rDNA), dextrose, sodium chloride flush, sodium chloride, ondansetron, heparin (porcine)    No intake or output data in the 24 hours ending 05/25/22 1433    Physical Exam Performed:    BP 89/66   Pulse 88   Temp 97.3 °F (36.3 °C) (Oral)   Resp (!) 37   Ht 5' 6\" (1.676 m)   Wt 154 lb 1.6 oz (69.9 kg)   SpO2 91%   BMI 24.87 kg/m²     General appearance: No apparent distress, appears stated age and cooperative.   HEENT: Pupils equal, round, and reactive to light. Conjunctivae/corneas clear. Neck: Supple, with full range of motion. No jugular venous distention. Trachea midline. Respiratory:  Normal respiratory effort. Clear to auscultation, bilaterally without Rales/Wheezes/Rhonchi. Dressing noted where HD line been removed  Cardiovascular: Regular rate and rhythm with normal S1/S2 without murmurs, rubs or gallops. Abdomen: Soft, non-tender, non-distended with normal bowel sounds. Musculoskeletal: Moderate lower limb edema  Skin: Skin color, texture, turgor normal.  No rashes or lesions. Neurologic:  Neurovascularly intact without any focal sensory/motor deficits. Cranial nerves: II-XII intact, grossly non-focal.  Psychiatric: Alert and oriented, thought content appropriate, normal insight  Capillary Refill: Brisk,3 seconds, normal   Peripheral Pulses: +2 palpable, equal bilaterally       Labs:   Recent Labs     05/23/22  0540 05/24/22  0638 05/25/22  0721   WBC 5.0 4.7 4.2   HGB 9.7* 9.6* 9.4*   HCT 29.2* 28.7* 27.5*   PLT 91* 87* 86*     Recent Labs     05/23/22  0540 05/24/22  0638 05/25/22  0721   * 122* 127*   K 4.7 4.3 3.8   CL 88* 87* 90*   CO2 15* 14* 21   BUN 56* 64* 39*   CREATININE 5.0* 5.4* 3.5*   CALCIUM 8.9 8.9 8.8     Recent Labs     05/23/22  0540 05/24/22  0638 05/25/22  0721   * 221* 179*   * 129* 109*   BILITOT 10.6* 11.0* 12.2*   ALKPHOS 262* 238* 216*     Recent Labs     05/23/22  0540 05/24/22  0639 05/25/22  0721   INR 2.20* 2.22* 2.23*     No results for input(s): CKTOTAL, TROPONINI in the last 72 hours. Urinalysis:      Lab Results   Component Value Date    NITRU Negative 04/10/2022    WBCUA 0-2 04/10/2022    BACTERIA 2+ 04/09/2022    RBCUA 0-2 04/10/2022    BLOODU Negative 04/10/2022    SPECGRAV 1.025 04/10/2022    GLUCOSEU Negative 04/10/2022       Radiology:  IR REMOVE TUNNELED CVAD WO SQ PORT/PUMP   Final Result   Successful removal of the right IJ tunneled central catheter. VL Extremity Venous Right   Final Result      US GUIDED PARACENTESIS   Final Result   Successful paracentesis. CT ORBITS W WO CONTRAST   Final Result   Severe anasarca with proportionate bilateral orbital fat edema. No orbital hemorrhage or mass. RECOMMENDATIONS:   Unavailable         MRI ABDOMEN WO CONTRAST MRCP   Final Result   1. Incomplete exam. Patient declined rest of the study, no diffusion or axial   T2 obtained. Limited diagnostic quality exam.   2. Large volume ascites. Diffuse marked body wall edema. 3. Moderate diffuse hepatic steatosis better visualized on CT than on MR. No   focal lesions in the liver. 4. Contracted gallbladder containing a gallstone. Common bile duct is poorly   visualized. 5. Marked cardiomegaly, with bilateral small pleural effusions and bibasilar   consolidations. Small-bowel wall edema noted, this could be due to   surrounding ascites/hypoproteinemia. CT CHEST WO CONTRAST   Final Result   1. Findings compatible with pulmonary edema with small pleural effusions. 2.  Body wall edema and upper abdominal ascites. CT ABDOMEN PELVIS WO CONTRAST Additional Contrast? None   Final Result   1. No ileus or obstruction. No definite inflammatory bowel process though   very limited given the extent of ascites. 2. Moderate volume ascites seen in the abdomen and pelvis, similar to the   prior exam.   3. Cardiomegaly with small bilateral effusions, left greater than right as   well as dependent ground-glass change which may represent dependent pulmonary   edema. 4. Mild anasarca. 5. Mild renal atrophy. 6. Cholelithiasis with a contracted gallbladder. XR CHEST PORTABLE   Final Result   Stable moderate cardiomegaly with mild central pulmonary congestion which is   more apparent.       [place] left retrocardiac atelectasis or early infiltrate      Status post right-sided dialysis catheter placement in good position Assessment/Plan:    Active Hospital Problems    Diagnosis     Elevated LFTs [R79.89]      Priority: Medium    Supratherapeutic INR [R79.1]      Priority: Medium    Diarrhea [R19.7]      Priority: Medium    CHF (congestive heart failure) ([facility] [address] [I50.9]     ICD (implantable cardioverter-defibrillator) in place [Z95.810]     Biventricular heart failure ([facility] [address] [I50.82]      1. Diarrhea. Abdominal pain. Diarrhea work-up was negative  Diarrhea and abdominal pain improving        2. Transaminitis and cirrhosis + Ascites. Seems to be improving, , , bilirubin 7.2. Possibly related to congestive heart failure.   GI on board  Large volume ascites noted on MRI-paracentesis done, no sign of infection  Contracted gallbladder containing stones.     3. Hypoglycemia likely related to cirrhosis and DMII. ADAT  Start hypoglycemia protocol     4. Elevated INR. vitamin K,   inr 2.23     5. HD line tunneled infection  Catheter had previously had erosions through the skin, was noted to be grossly infected, s/p removal 5/24. Culture tip as well as intraoperative cultures growing Enterobacter cloacae  -Follow sensitivity. -Appreciate ID input  -Continue on vancomycin and cefepime started 5/24     6. ESRD on HD. Discussed with nephrology, line removed 5/24. Will wait for infection to clear up, and hopefully reinsert line Friday  Appreciate nephrology input     7. Systolic heart failure. Ejection fraction less than 20%. Volume status adjusted during dialysis     7. Anemia. Continue IV iron     8. Hypoglycemia  Continue on D10  Check cortisol in a.m.     9. Retro-orbital/ conjunctival hemorrhage with exopthalmos  -CT head without contrast shows edema     10. RLE swelling > Left leg  -US to r/o DVT     11. Hepatic encephalopathy with asterixis. Alert this morning.    Ammonia levels-wnl.   -cont lactulose 30g po tid  -Had a 1100ml paracentesis this am.  -give 100ml of 25% human albumin solution today    DVT Prophylaxis: lovenox  Diet: ADULT DIET; Regular; Low Sodium (2 gm); 2000 ml  ADULT ORAL NUTRITION SUPPLEMENT; Breakfast, Dinner; Renal Oral Supplement  Code Status: Full Code    PT/OT Eval Status: following.      Dispo - pending clinical improvement      Fabian Persaud MD

## 2022-05-25 NOTE — CARE COORDINATION
Discharge Planning:   PT/OT: recommending skilled nursing facility   Attempted to meet with patient at bedside to discuss discharge plan. Patient's sister at bedside, patient sleeping soundly at this time. Discussed recommendation. Provided skilled facility list and home health care list.   Sister reports that it is \"unlikely\"  Sister reports they have used [facility] before and requested referral be placed. PLAN: Need to follow up with patient at bedside to verify discharge plan. The Plan for Transition of Care is related to the following treatment goals: home care and skilled nursing facility list     The Patient was provided with a choice of provider and agrees   with the discharge plan. [x] Yes [] No    Freedom of choice list was provided with basic dialogue that supports the patient's individualized plan of care/goals, treatment preferences and shares the quality data associated with the providers.  [x] Yes [] No    SANJUANA Davalos, HIRO, Social Work/Case Management   [phone]  Electronically signed by SANJUANA Davalos LSW on 5/25/2022 at 3:32 PM

## 2022-05-25 NOTE — PLAN OF CARE
Problem: Discharge Planning  Goal: Discharge to home or other facility with appropriate resources  Outcome: Progressing     Problem: Chronic Conditions and Co-morbidities  Goal: Patient's chronic conditions and co-morbidity symptoms are monitored and maintained or improved  Outcome: Progressing     Problem: Safety - Adult  Goal: Free from fall injury  Outcome: Progressing   Patient assessed for fall risk; fall precautions initiated. Patient and family instructed about safety devices. Environment kept free of clutter and adequate lighting provided. Bed locked and in lowest position. Call light within reach. Will continue to monitor. Problem: ABCDS Injury Assessment  Goal: Absence of physical injury  Outcome: Progressing   Skin assessment completed every shift. Pt assessed for incontinence, appropriate barrier cream applied prn. Pt encouraged to turn/rotate every 2 hours. Assistance provided if pt unable to do so themselves. Problem: Skin/Tissue Integrity  Goal: Absence of new skin breakdown  Description: 1. Monitor for areas of redness and/or skin breakdown  2. Assess vascular access sites hourly  3. Every 4-6 hours minimum:  Change oxygen saturation probe site  4. Every 4-6 hours:  If on nasal continuous positive airway pressure, respiratory therapy assess nares and determine need for appliance change or resting period.   Outcome: Progressing     Problem: Respiratory - Adult  Goal: Achieves optimal ventilation and oxygenation  Outcome: Progressing  Flowsheets (Taken 5/24/2022 1930)  Achieves optimal ventilation and oxygenation:   Assess for changes in respiratory status   Assess for changes in mentation and behavior   Position to facilitate oxygenation and minimize respiratory effort     Problem: Cardiovascular - Adult  Goal: Maintains optimal cardiac output and hemodynamic stability  Outcome: Progressing  Goal: Absence of cardiac dysrhythmias or at baseline  Outcome: Progressing     Problem: Hematologic - Adult  Goal: Maintains hematologic stability  Outcome: Progressing  Flowsheets (Taken 5/24/2022 1930)  Maintains hematologic stability:   Assess for signs and symptoms of bleeding or hemorrhage   Monitor labs for bleeding or clotting disorders     Problem: Nutrition Deficit:  Goal: Optimize nutritional status  5/24/2022 2236 by Ana M Smith RN  Outcome: Progressing  5/24/2022 1306 by Trenton Amaya RD, LD  Flowsheets (Taken 5/24/2022 1306)  Nutrient intake appropriate for improving, restoring, or maintaining nutritional needs:   Assess nutritional status and recommend course of action   Monitor oral intake, labs, and treatment plans   Recommend appropriate diets, oral nutritional supplements, and vitamin/mineral supplements

## 2022-05-25 NOTE — PROGRESS NOTES
Infectious Disease Follow up Notes  Admit Date: 5/17/2022  Hospital Day: 9    Antibiotics :   IV Vancomycin stopped  IV Cefepime      CHIEF COMPLAINT:     ESRD  HD line infection  Enterobacter infection  Cirrhosis liver   Enterobacter bacteremia     Subjective interval History :  74 y.o. man with past medical history of diabetes, diabetic nephropathy, diabetic neuropathy, hypertension, end-stage renal disease on hemodialysis, congestive heart failure, cirrhosis of liver, admitted to the hospital secondary to diarrhea and fatigue nausea. On admission there was noted to be ulceration along the abdominal HD line. He went to interventional radiology for HD line removal and noted to have purulent fluid along the wound site as well culture was sent and tip culture was sent. Given the ongoing concern for HD line infection we are consulted for IV antibiotic recommendations. He has LFT elevation ongoing secondary to cirrhosis of liver. .  With ascites underwent paracentesis for ascitic fluid negative for infection. He just came back from procedure under sedation unable to provide any history.     Interval History : more awake not able to give History sister at bed side  Helping with feeding and HD line cx noted and abd distension from ascites noted         Past Medical History:    Past Medical History:   Diagnosis Date    CHF (congestive heart failure) ([address]     Diabetes type 2, uncontrolled ([address] 7/28/2014    Diabetic nephropathy 9/12/2013    ED (erectile dysfunction) 9/12/2013    Essential hypertension, benign 9/12/2013    Hyperlipidemia 9/12/2013    Noncompliance 7/28/2014       Past Surgical History:    Past Surgical History:   Procedure Laterality Date    EYE SURGERY      IR NONTUNNELED VASCULAR CATHETER  4/4/2022    IR NONTUNNELED VASCULAR CATHETER 4/4/2022 [facility]    IR TUNNELED CATHETER PLACEMENT GREATER THAN 5 YEARS  2022    IR TUNNELED CATHETER PLACEMENT GREATER THAN 5 YEARS 2022 [facility]       Current Medications:    Outpatient Medications [facility] as [facility]   Medication Sig Dispense Refill    carvedilol (COREG) 12.5 MG tablet Take 12.5 mg by mouth 2 times daily (with meals)      clopidogrel (PLAVIX) 75 MG tablet Take 75 mg by mouth daily      isosorbide mononitrate (IMDUR) 30 MG extended release tablet Take 30 mg by mouth daily         Allergies:  Patient has no known allergies. Immunizations :   Immunization History   Administered Date(s) Administered    COVID-19, Pfizer Purple top, DILUTE for use, 12+ yrs, 30mcg/0.3mL dose 2021, 2021, 2021    Influenza Virus Vaccine 2017    Influenza, Norva Oly, IM, PF (6 mo and older Fluzone, Flulaval, Fluarix, and 3 yrs and older Afluria) 2021       Social History:   Social History     Tobacco Use    Smoking status: Former Smoker     Packs/day: 1.50     Years: 7.00     Pack years: 10.50     Quit date: 1981     Years since quittin.3    Smokeless tobacco: Never Used   Vaping Use    Vaping Use: Never used   Substance Use Topics    Alcohol use:  Yes     Alcohol/week: 1.0 standard drink     Types: 1 Cans of beer per week    Drug use: No     Social History     Tobacco Use   Smoking Status Former Smoker    Packs/day: 1.50    Years: 7.00    Pack years: 10.50    Quit date: 1981    Years since quittin.3   Smokeless Tobacco Never Used      Family History   Problem Relation Age of Onset    Diabetes Mother     High Blood Pressure Mother     Diabetes Sister     Diabetes Brother          REVIEW OF SYSTEMS:     Not possible due to change  In mentation     PHYSICAL EXAM:      Vitals:    BP 89/66   Pulse 88   Temp 97.3 °F (36.3 °C) (Oral)   Resp (!) 37   Ht 5' 6\" (1.676 m)   Wt 154 lb 1.6 oz (69.9 kg)   SpO2 91%   BMI 24.87 kg/m²     General Appearance: awake+  distress,++  pallor, ++ icterus chronic ill appearing man +    Skin: warm and dry, no rash or erythema  Head: normocephalic and atraumatic  Eyes: pupils equal, round, and reactive to light, conjunctivae normal  ENT: tympanic membrane, external ear and ear canal normal bilaterally, nose without deformity, nasal mucosa and turbinates normal without polyps  Neck: supple and non-tender without mass, no thyromegaly  no cervical lymphadenopathy  Pulmonary/Chest:  Bi basal crepts+ rhonchi, normal air movement, no respiratory distress  Cardiovascular: normal rate, regular rhythm, normal S1 and S2, no murmurs, rubs, clicks, or gallops, no carotid bruits  Abdomen:  -tender, + -distended,  Ascites++  normal bowel sounds, no masses or organomegaly  Extremities: no cyanosis, clubbing or + edema  Musculoskeletal: normal range of motion, no joint swelling, deformity or tenderness  Integumentary: No rashes, no abnormal skin lesions, no petechiae  Neurologic: reflexes normal and symmetric, no cranial nerve deficit  Lines: IV  HD line removed dressing+        Data Review:    CBC:   Lab Results   Component Value Date    WBC 4.2 05/25/2022    HGB 9.4 (L) 05/25/2022    HCT 27.5 (L) 05/25/2022    MCV 98.6 05/25/2022    PLT 86 (L) 05/25/2022     RENAL:   Lab Results   Component Value Date    CREATININE 3.5 (H) 05/25/2022    BUN 39 (H) 05/25/2022     (L) 05/25/2022    K 3.8 05/25/2022    CL 90 (L) 05/25/2022    CO2 21 05/25/2022     SED RATE: No results found for: SEDRATE  CK: No results found for: CKTOTAL  CRP: No results found for: CRP  Hepatic Function Panel:   Lab Results   Component Value Date    ALKPHOS 216 05/25/2022     05/25/2022     05/25/2022    PROT 6.0 05/25/2022    PROT 7.7 12/13/2011    BILITOT 12.2 05/25/2022    BILIDIR 4.7 05/17/2022    IBILI 1.8 05/17/2022    LABALBU 3.3 05/25/2022     UA:  Lab Results   Component Value Date    COLORU Yellow 04/10/2022    CLARITYU Clear 04/10/2022    GLUCOSEU Negative 04/10/2022    BILIRUBINUR Negative 04/10/2022    KETUA TRACE 04/10/2022    SPECGRAV 1.025 04/10/2022    BLOODU Negative 04/10/2022    PHUR 5.5 04/10/2022    PROTEINU 30 04/10/2022    UROBILINOGEN 1.0 04/10/2022    NITRU Negative 04/10/2022    LEUKOCYTESUR Negative 04/10/2022    LABMICR YES 04/10/2022    URINETYPE NotGiven 04/10/2022      Urine Microscopic:   Lab Results   Component Value Date    BACTERIA 2+ 04/09/2022    COMU see below 04/10/2022    HYALCAST 3-5 04/10/2022    WBCUA 0-2 04/10/2022    RBCUA 0-2 04/10/2022    EPIU 12 04/09/2022     Urine Reflex to Culture:   Lab Results   Component Value Date    URRFLXCULT Not Indicated 04/10/2022         MICRO: cultures reviewed and updated by me   Blood Culture: No results found for: BC, BLOODCULT2         Culture, Anaerobic and Aerobic [[id number]] (Abnormal) Collected: 05/24/22 1425   Order Status: Completed Specimen: Body Fluid from Neck Updated: 05/25/22 1357    Anaerobic Culture Anaerobic culture further report to follow    Organism Enterobacter cloacae complex Abnormal     WOUND/ABSCESS --    Rare growth   Sensitivity to follow    Narrative:     ORDER#: [id number]                          ORDERED BY: Margi Garcia   SOURCE: Neck                               COLLECTED:  05/24/22 14:25   ANTIBIOTICS AT MATIAS. :                      RECEIVED :  05/24/22 15:05   Culture, Tip [[id number]] (Abnormal) Collected: 05/24/22 1425   Order Status: Completed Specimen: Neck from Catheter Tip Updated: 05/25/22 1356    Organism Enterobacter cloacae complex Abnormal     Culture Catheter Tip --    >15 colonies   Sensitivity to follow    Narrative:     ORDER#: [id number]                          ORDERED BY: Richar Meyer   SOURCE: Catheter Tip                       COLLECTED:  05/24/22 14:25   ANTIBIOTICS AT MTAIAS. :                      RECEIVED :  05/24/22 15:00   Culture, Body Fluid [[id number]] Collected: 05/23/22 0836   Order Status: Completed Specimen:  Body Fluid from Ascitic Fluid Updated: 05/25/22 0740    Body Fluid Culture, Sterile --    No growth to date   No growth 36 to 48 hours     Gram Stain Result Cytospin performed,no quantitation   WBC's (Polymorphonuclear)   No Epithelial Cells seen   No organisms seen    Narrative:     ORDER#: [id number]                          ORDERED BY: Alexx Lira   SOURCE: Ascites                            COLLECTED:  05/23/22 08:36   ANTIBIOTICS AT MATIAS. :                      RECEIVED :  05/23/22 08:49   Culture, Blood 1 [[id number]] Collected: 05/24/22 1617   Order Status: Sent Specimen: Blood Updated: 05/24/22 1639   Culture, Blood 2 [[id number]] Collected: 05/24/22 1617   Order Status: Sent Specimen: Blood Updated: 05/24/22 1639   Culture, Fungus [[id number]] Collected: 05/24/22 1425   Order Status: Sent Specimen: Body Fluid Updated: 05/24/22 1504   Culture, Body Fluid [[id number]] Collected: 05/24/22 1425   Order Status: Canceled Specimen: Body Fluid    Culture, Anaerobic and Aerobic [[id number]] Collected: 05/23/22 0836   Order Status: Canceled Specimen: Body Fluid from Abdomen    GI Bacterial Pathogens By PCR [[id number]] Collected: 05/17/22 0900   Order Status: Completed Specimen: Stool Updated: 05/17/22 1538    GI Bacterial Pathogens By PCR --    No Shigella spp/EIEC DNA detected   No Shiga toxin-producing gene(s) detected   No Campylobacter spp. (jejuni and coli)DNA detected   No Salmonella spp. DNA detected   Normal Range:  None detected    Narrative:     ORDER#: [id number]                          ORDERED BY: Chase Hinton   SOURCE: Stool                              COLLECTED:  05/17/22 09:00   ANTIBIOTICS AT MATIAS. :                      RECEIVED :  05/17/22 09:04   Clostridium Difficile Toxin/Antigen [[id number]] Collected: 05/17/22 0925   Order Status: Completed Specimen: Stool Updated: 05/17/22 1042    C.diff Toxin/Antigen --    Negative for Clostridium difficile antigen and toxin   Normal Range: Negative    Narrative:   ORDER#: [id number]                          ORDERED BY: Ronen Murrell   SOURCE: Stool                              COLLECTED:  05/17/22 09:25   ANTIBIOTICS AT MATIAS. :                      RECEIVED :  05/17/22 09:39   Collect White vial (sterile container)   Clostridium Difficile Toxin/Antigen [[id number]]    Order Status: Canceled Specimen: Stool           Ref Range & Units 5/23/22 0836   Cell Count Fluid Type  Ascitic Fluid    Color, Fluid  Yellow    Appearance, Fluid  Hazy    Clot Eval.  see below    Comment: No Clots Seen   Nucl Cell, Fluid /cumm 151    RBC, Fluid /cumm 2,000    Neutrophil Count, Fluid % 15    Lymphocytes, Body Fluid % 8    Monocyte Count, Fluid % 5    Macrophages % 66    Mesothelial, Fluid % 6    Number of Cells Counted Fluid  100    Volume mL 1000.0    Comment: APPROXIMATELY          Respiratory Culture:  Lab Results   Component Value Date    LABGRAM  05/23/2022     Cytospin performed,no quantitation  WBC's (Polymorphonuclear)  No Epithelial Cells seen  No organisms seen       AFB:No results found for: AFBSMEAR  Viral Culture:  No results found for: COVID19  Urine Culture: No results for input(s): LABURIN in the last 72 hours. IMAGING:    IR REMOVE TUNNELED CVAD WO SQ PORT/PUMP   Final Result   Successful removal of the right IJ tunneled central catheter. VL Extremity Venous Right   Final Result      US GUIDED PARACENTESIS   Final Result   Successful paracentesis. CT ORBITS W WO CONTRAST   Final Result   Severe anasarca with proportionate bilateral orbital fat edema. No orbital hemorrhage or mass. RECOMMENDATIONS:   Unavailable         MRI ABDOMEN WO CONTRAST MRCP   Final Result   1. Incomplete exam. Patient declined rest of the study, no diffusion or axial   T2 obtained. Limited diagnostic quality exam.   2. Large volume ascites. Diffuse marked body wall edema. 3. Moderate diffuse hepatic steatosis better visualized on CT than on MR.  No   focal lesions in the liver. 4. Contracted gallbladder containing a gallstone. Common bile duct is poorly   visualized. 5. Marked cardiomegaly, with bilateral small pleural effusions and bibasilar   consolidations. Small-bowel wall edema noted, this could be due to   surrounding ascites/hypoproteinemia. CT CHEST WO CONTRAST   Final Result   1. Findings compatible with pulmonary edema with small pleural effusions. 2.  Body wall edema and upper abdominal ascites. CT ABDOMEN PELVIS WO CONTRAST Additional Contrast? None   Final Result   1. No ileus or obstruction. No definite inflammatory bowel process though   very limited given the extent of ascites. 2. Moderate volume ascites seen in the abdomen and pelvis, similar to the   prior exam.   3. Cardiomegaly with small bilateral effusions, left greater than right as   well as dependent ground-glass change which may represent dependent pulmonary   edema. 4. Mild anasarca. 5. Mild renal atrophy. 6. Cholelithiasis with a contracted gallbladder. XR CHEST PORTABLE   Final Result   Stable moderate cardiomegaly with mild central pulmonary congestion which is   more apparent.       Hazy left retrocardiac atelectasis or early infiltrate      Status post right-sided dialysis catheter placement in good position               All the pertinent images and reports for the current Hospitalization were reviewed by me     Scheduled Meds:   vancomycin (VANCOCIN) intermittent dosing (placeholder)   Other RX Placeholder    cefepime  1,000 mg IntraVENous Q24H    lactulose  30 g Oral TID    [Held by provider] clopidogrel  75 mg Oral Daily    allopurinol  100 mg Oral Daily    levothyroxine  50 mcg Oral QAM AC    metoprolol succinate  12.5 mg Oral Daily    midodrine  5 mg Oral TID WC    pantoprazole  40 mg Oral BID AC    sodium chloride flush  5-40 mL IntraVENous 2 times per day       Continuous Infusions:   dextrose 50 mL/hr at 05/25/22 0040    dextrose Stopped (05/19/22 9194)    sodium chloride         PRN Meds:  loperamide, glucose, dextrose bolus **OR** dextrose bolus, glucagon (rDNA), dextrose, sodium chloride flush, sodium chloride, ondansetron, heparin (porcine)      Assessment:     Patient Active Problem List   Diagnosis    ED (erectile dysfunction)    Essential hypertension, benign    Hyperlipidemia    [facility]ropathy [facility]    Diabetes type 2, uncontrolled ([facility]    Noncompliance    Elevated blood uric acid level    Coronary artery disease involving native coronary artery    Elevated PSA    Acute on chronic combined systolic and diastolic HF (heart failure) [facility]    Stage 3 chronic kidney disease ([facility]    SONAL (acute kidney injury) ([facility]    Biventricular heart failure (HCC)    Acute on chronic systolic heart failure, NYHA class 4 ([facility])    Acute on chronic congestive heart failure ([facility])    LV (left ventricular) mural thrombus    Syncope    CHF (congestive heart failure) ([facility]    Syncope and collapse    ICD (implantable cardioverter-defibrillator) in place    [facility]    Ventricular tachycardia ([facility]    Elevated LFTs    Supratherapeutic INR    Diarrhea        ESRD on HD  DM with several complications  Cirrhosis of liver from ? CHF  H/O Biventricular Heart failure  Ascites +  S/p Paracentesis fluid negative for infection  LV thrombus +  HD line infection   S/p HD line removal by IR on 5/24   Stool studies  -ve on admit  Bacteremia Enterobacter cloacae      IR Removed HD line and noted to have purulence and cx sent and await Micro results noted    HD line tip cx Enterobacter    Blood cx from 5/24 now positive for Enterobacter and concerning for CLABSI - will need repeat Blood cx to check for clearance          Labs, Microbiology, Radiology and all the pertinent results from current hospitalization and  care every where were reviewed  by me as a part of the evaluation   Plan:   1. D/c  IV Vancomycin   2.  Cont IV Cefepime x 1 gm q 24 hrs  3. HD line tip cx Enterobacter   4. HD line site cx in process  5. Blood cx from 5/24 now positive for Enterobacter   6. Repeat blood cx in AM   7. Prognosis poor from multiple medical issues         Discussed with patient/Family and Nursing   Risk of Complications/Morbidity: High      · Illness(es)/ Infection present that pose threat to bodily function. · There is potential for severe exacerbation of infection/side effects of treatment. Therapy requires intensive monitoring for antimicrobial agent toxicity      Discussed with patient/Family and Nursing staff     Thanks for allowing me to participate in your patient's care and please call me with any questions or concerns.     Yakov Thurston MD  Infectious Disease  [facility]) Physician  Phone: [phone]   Fax : [phone]

## 2022-05-25 NOTE — PROGRESS NOTES
[facility]                                               Progress note    Summary:   Quentin Tucker is being seen by nephrology for ESRD on HD. Admitted with  Diarrhea. MRI abdomen showing large volume ascites diffuse body wall edema, moderate diffuse hepatic steatosis: Cardiomegaly bilateral small pleural effusions and bibasilar consolidations    Interval History  Seen at bedside  Remains confused  Started vanc and cefepime yesterday for tunneled dialysis catheter tunnel infection  Line removed yesterday  On D10 infusion for hypogylcemia        Plan:   - no acute indication for dialysis today  - continue abx per ID  - blood cx from yesterday NGTD  - will assess RRT needs tomorrow, if we can wait then will defer [facility] placement to Friday. Sola Broussard MD  [facility]  Office: [phone]    Assessment:   #ESRD  [facility] Has TDC and has been referred for vein mapping and AVF/AVG placement. ,  TIME 3.3 Hours  Na 138   K 2  Ca 2.5   Bicarb 35   180nRe    #BP/Volume  Hypervolemic. BP acceptable. He has severe systolic heart failure and BP runs low. Has required inotrope support in the past   On midodrine 5 mg TID. UF as tolerated, challenge dry weight. On metop 12.5 mg BID. EDW 77 kilos    #Metabolic acidosis  Address with dialysis. #SHPT  Last  and phos 6.3  Is supposed to be on phos binder. Will add renvela. #Anemia  Last tsat 12% ferritin 174  He will need IV iron with HD      Abdominal pain. Patient presented to emergency with severe diarrhea over the past 3 days, C. difficile negative, diarrhea work-up negative.        Transaminitis. History of cirrhosis. Seems to be improving, , , bilirubin 7.2. Possibly related to congestive heart failure. patient scheduled for MRCP        ROS:   Positives Listed Bold. All other remaining systems are negative.     Constitutional:  fever, chills, weakness, weight change, fatigue,      Skin:  rash, pruritus, hair loss, bruising, dry skin, petechiae. Head, Face, Neck   headaches, swelling,  cervical adenopathy. Respiratory: shortness of breath, cough, or wheezing  Cardiovascular: chest pain, palpitations, dizzy, edema  Gastrointestinal: nausea, vomiting, diarrhea, constipation,belly pain    Yellow skin, blood in stool  Musculoskeletal:  back pain, muscle weakness, gait problems,       joint pain or swelling. Genitourinary:  dysuria, poor urine flow, flank pain, blood in urine  Neurologic:  vertigo, TIA'S, syncope, seizures, focal weakness  Psychosocial:  insomnia, anxiety, or depression. Additional positive findings: -     PMH:   Past medical history, surgical history, social history, family history are reviewed and updated as appropriate. Reviewed current medication list.   Allergies reviewed and updated as needed. PE:   Vitals:    05/25/22 0741   BP: 99/70   Pulse: 76   Resp: 27   Temp: 97.4 °F (36.3 °C)   SpO2: 96%       General appearance:  in NAD, somnolent, poorly arousable   HEENT: EOM intact, no icterus. Trachea is midline. Neck : No masses, appears symmetrical, no JVD  Respiratory: Respiratory effort appears normal, bilateral equal chest rise, no wheeze, +crackles  Cardiovascular: Ausculation shows RRR + edema  Abdomen: No visible mass or tenderness, non distended. Musculoskeletal:  Joints with no swelling or deformity. Skin:no rashes, ulcers, induration, no jaundice. Neuro: face symmetric, no focal deficits. Appropriate responses. Tunneled dialysis catheter  small opening at the IJ stick site indicating that it had not fully closed. There is no other catheter exposure.  Glue placed over it      Lab Results   Component Value Date    CREATININE 3.5 (H) 05/25/2022    BUN 39 (H) 05/25/2022     (L) 05/25/2022    K 3.8 05/25/2022    CL 90 (L) 05/25/2022    CO2 21 05/25/2022      Lab Results   Component Value Date    WBC 4.2 05/25/2022    HGB 9.4 (L) 05/25/2022    HCT 27.5 (L) 05/25/2022    MCV 98.6 05/25/2022    PLT 86 (L) 05/25/2022     Lab Results   Component Value Date    .8 (H) 04/07/2022    CALCIUM 8.8 05/25/2022    PHOS 3.4 04/07/2022

## 2022-05-25 NOTE — CARE COORDINATION
05/25/22 1548   IMM Letter   IMM Letter given to Patient/Family/Significant other/Guardian/POA/by: Provided to patient's sister, Max Beach, at bedside by Arlene Lim MSW, LSW.    IMM Letter date given: 05/25/22   IMM Letter time given: 5168

## 2022-05-25 NOTE — PROGRESS NOTES
Pt has been more alert throughout shift, and asking about his situation and current status. This RN updated pt on plan of care. Last BP 91/67. Temp has remained stable, and warming blanket has been off pt throughout shift.      Electronically signed by Marito Vazquez RN on 5/25/2022 at 5:11 AM

## 2022-05-25 NOTE — PROGRESS NOTES
Occupational Therapy  Facility/Department: [address] PROGRESSIVE CARE  Occupational Therapy Daily Treatment Note    Name: Liane Charles  : 1955  MRN: [id number]  Date of Service: 2022    Discharge Recommendations:  Continue to assess pending progress,Patient would benefit from continued therapy after discharge,3-5 sessions per week          Patient Diagnosis(es): The primary encounter diagnosis was Transaminitis. Diagnoses of Elevated bilirubin and Pacemaker were also pertinent to this visit. Past Medical History:  has a past medical history of CHF (congestive heart failure) ([address] Diabetes type 2, uncontrolled ([facility] [address] Diabetic nephropathy, ED (erectile dysfunction), Essential hypertension, benign, Hyperlipidemia, and Noncompliance. Past Surgical History:  has a past surgical history that includes eye surgery; IR NONTUNNELED VASCULAR CATHETER > 5 YEARS (2022); and IR TUNNELED CVC PLACE WO SQ PORT/PUMP > 5 YEARS (2022). Assessment   Performance deficits / Impairments: Decreased functional mobility ; Decreased balance;Decreased safe awareness;Decreased ADL status; Decreased endurance;Decreased strength;Decreased high-level IADLs  Assessment: Pt more alert this date and able to follow commmands and answer questions. Pt requested to sit in the recliner. He completed bed mobility with moderate assist.  He stood to the stedy with min assist x 2. Pt required total assist with toileting due to incontinence of bowel. Pt would benefit from continued therapy prior to discharge home as he is functioning below his baseline.   Prognosis: Fair  Activity Tolerance  Activity Tolerance: Patient limited by fatigue        Plan   Plan  Times per Week: 3-5x  Current Treatment Recommendations: Strengthening,Balance training,Functional mobility training,Self-Care / ADL,Patient/Caregiver education & training,Safety education & training     Restrictions  Restrictions/Precautions  Restrictions/Precautions: Fall Risk  Position Activity Restriction  Other position/activity restrictions: 3L O2    Subjective   General  Chart Reviewed: Yes,Progress Notes  Patient assessed for rehabilitation services?: Yes  Additional Pertinent Hx: per ED note, Jonathan Wheeler is a 74 y.o. male who presents to the emergency department with abdominal pain diarrhea. Patient presents with abdominal pain and diarrhea. Has been going on for 5 days. Loose watery stools. Generalized abdominal pain. 7-10 achy nature. Worse with eating. Better with rest.  Started spontaneously. Constant in nature. No blood in stools. No other associated symptoms. Family / Caregiver Present: No  Referring Practitioner: Lauro Melissa MD  Subjective  Subjective: Pt seen bedside and requesting to sit in the recliner. Pt more alert this date and following commands. General Comment  Comments: okay for therapy per RN. Social/Functional History  Social/Functional History  Lives With: Son  Type of Home: Apartment  Home Layout: One level  Home Access: Stairs to enter with rails  Entrance Stairs - Number of Steps: 2 steps (descend) to enter  Bathroom Shower/Tub: Tub/Shower unit  [employer]: Standard  Bathroom Equipment: Shower chair  Bathroom Accessibility: Accessible  Home Equipment: Trailer[employer], 4 wheeled  Has the patient had two or more falls in the past year or any fall with injury in the past year?: No  ADL Assistance: Independent  Homemaking Assistance: Needs assistance (assist fron son and sister)  Ambulation Assistance: Independent (1BZ)  Transfer Assistance: Independent       Objective   Heart Rate: 76  Heart Rate Source: Monitor  BP: 99/70  BP Location: Right upper arm  BP Method: Automatic  Patient Position: Semi fowlers  MAP (Calculated): 79.67  Resp: 27  SpO2: 96 %  O2 Device: Nasal cannula             Safety Devices  Type of Devices: Call light within reach; Chair alarm in place; Left in chair;Gait belt;Nurse notified     Wheelchair Bed Transfers  Wheelchair/Bed - Technique:  Manjit Burgess)  Equipment Used: Other (recliner)  Level of Asssistance: Minimal assistance;2 Person assistance  Wheelchair Transfers Comments: Stood to the stedy with assist x 2. ADL  Toileting: Dependent/Total (Pt incontinent of bowel and required assist to clean and change depend.)        Bed mobility  Rolling to Right: Minimal assistance (Rolling in bed for hygiene with bowel incontinence.)  Supine to Sit: Moderate assistance  Sit to Supine: Unable to assess (Pt in the recliner at end of the session.)        Cognition  Arousal/Alertness: Appropriate responses to stimuli  Following Commands: Follows one step commands with increased time; Follows one step commands with repetition                                             AM-PAC Score        AM-[facility] Inpatient Daily Activity Raw Score: 13 (05/25/22 0902)  AM-PAC Inpatient ADL T-Scale Score : 32.03 (05/25/22 0902)  ADL Inpatient CMS 0-100% Score: 63.03 (05/25/22 0902)  ADL Inpatient CMS G-Code Modifier : CL (05/25/22 0902)    Goals  Short Term Goals  Time Frame for Short term goals: prior to D/C; ongoing 5/23  Short Term Goal 1: complete functional mobility and transfers with supervision  Short Term Goal 2: complete bathing and dressing with supervision  Short Term Goal 3: complete toileting with supervision  Short Term Goal 4: complete grooming in stance at sink with supervision  Long Term Goals  Time Frame for Long term goals : STG=LTG  Patient Goals   Patient goals : return home       Therapy Time   Individual Concurrent Group Co-treatment   Time In 0800         Time Out 0900         Minutes 60              This note to serve as OT d/c summary if pt is d/c-ed from hospital prior to next OT session.       Iván Carter, [address]

## 2022-05-26 NOTE — BRIEF OP NOTE
Brief Postoperative Note    Maribel Banks  YOB: 1955  [id number]    Pre-operative Diagnosis: SONAL    Post-operative Diagnosis: Same    Procedure: Non-tunneled Hemodialysis Catheter Placement    Anesthesia: Local    Surgeons: Ryan Winter MD    Estimated Blood Loss: Less than 5 mL    Complications: None    Specimens: Was Not Obtained    Findings: Successful placement of a left IJ non-tunneled hemodialysis catheter.     Electronically signed by Ryan Winter MD on 5/26/2022 at 4:07 PM

## 2022-05-26 NOTE — PLAN OF CARE
Problem: Discharge Planning  Goal: Discharge to home or other facility with appropriate resources  5/26/2022 1113 by Javier Bhardwaj RN  Outcome: Progressing  5/25/2022 2147 by Florence López RN  Outcome: Progressing     Problem: Chronic Conditions and Co-morbidities  Goal: Patient's chronic conditions and co-morbidity symptoms are monitored and maintained or improved  5/26/2022 1113 by Javier Bhardwaj RN  Outcome: Progressing  5/25/2022 2147 by Folrence López RN  Outcome: Progressing     Problem: Safety - Adult  Goal: Free from fall injury  5/26/2022 1113 by Javier Bhardwaj RN  Outcome: Progressing  5/25/2022 2147 by Florence López RN  Outcome: Progressing     Problem: ABCDS Injury Assessment  Goal: Absence of physical injury  5/26/2022 1113 by Javier Bhardwaj RN  Outcome: Progressing  5/25/2022 2147 by Florence López RN  Outcome: Progressing     Problem: Skin/Tissue Integrity  Goal: Absence of new skin breakdown  Description: 1. Monitor for areas of redness and/or skin breakdown  2. Assess vascular access sites hourly  3. Every 4-6 hours minimum:  Change oxygen saturation probe site  4. Every 4-6 hours:  If on nasal continuous positive airway pressure, respiratory therapy assess nares and determine need for appliance change or resting period.   5/26/2022 1113 by Javier Bhardwaj RN  Outcome: Progressing  5/25/2022 2147 by Florence López RN  Outcome: Progressing     Problem: Respiratory - Adult  Goal: Achieves optimal ventilation and oxygenation  5/26/2022 1113 by Javier Bhardwaj RN  Outcome: Progressing  5/25/2022 2147 by Florence López RN  Outcome: Progressing     Problem: Cardiovascular - Adult  Goal: Maintains optimal cardiac output and hemodynamic stability  5/26/2022 1113 by Javier Bhardwaj RN  Outcome: Progressing  5/25/2022 2147 by Florence López RN  Outcome: Progressing  Goal: Absence of cardiac dysrhythmias or at baseline  5/26/2022 1113 by Javier Bhardwaj RN  Outcome: Progressing  5/25/2022 2147 by Lety Bullock RN  Outcome: Progressing     Problem: Hematologic - Adult  Goal: Maintains hematologic stability  5/26/2022 1113 by Vikas Chou RN  Outcome: Progressing  5/25/2022 2147 by Lety Bullock RN  Outcome: Progressing     Problem: Nutrition Deficit:  Goal: Optimize nutritional status  5/26/2022 1113 by Vikas Chou RN  Outcome: Progressing  5/25/2022 2147 by Lety Bullock RN  Outcome: Progressing

## 2022-05-26 NOTE — PLAN OF CARE
Problem: Discharge Planning  Goal: Discharge to home or other facility with appropriate resources  Outcome: Progressing     Problem: Chronic Conditions and Co-morbidities  Goal: Patient's chronic conditions and co-morbidity symptoms are monitored and maintained or improved  Outcome: Progressing     Problem: Safety - Adult  Goal: Free from fall injury  Outcome: Progressing   Patient assessed for fall risk; fall precautions initiated. Patient and family instructed about safety devices. Environment kept free of clutter and adequate lighting provided. Bed locked and in lowest position. Call light within reach. Will continue to monitor. Problem: ABCDS Injury Assessment  Goal: Absence of physical injury  Outcome: Progressing   Skin assessment completed every shift. Pt assessed for incontinence, appropriate barrier cream applied prn. Pt encouraged to turn/rotate every 2 hours. Assistance provided if pt unable to do so themselves. Problem: Skin/Tissue Integrity  Goal: Absence of new skin breakdown  Description: 1. Monitor for areas of redness and/or skin breakdown  2. Assess vascular access sites hourly  3. Every 4-6 hours minimum:  Change oxygen saturation probe site  4. Every 4-6 hours:  If on nasal continuous positive airway pressure, respiratory therapy assess nares and determine need for appliance change or resting period.   Outcome: Progressing     Problem: Respiratory - Adult  Goal: Achieves optimal ventilation and oxygenation  Outcome: Progressing     Problem: Cardiovascular - Adult  Goal: Maintains optimal cardiac output and hemodynamic stability  Outcome: Progressing  Goal: Absence of cardiac dysrhythmias or at baseline  Outcome: Progressing     Problem: Hematologic - Adult  Goal: Maintains hematologic stability  Outcome: Progressing     Problem: Nutrition Deficit:  Goal: Optimize nutritional status  Outcome: Progressing

## 2022-05-26 NOTE — PROGRESS NOTES
Gastroenterology Note  Patient:   Quentin Tucker   :    1955   Facility:   [facility]   Date:     2022  Consultant:   Kali Jordan MD, MD      Subjective:     74 y.o. male admitted 2022 with Diarrhea [R19.7]  Transaminitis [R74.01]  Elevated bilirubin [R17] and seen for cirrhosis. .    Present  Diet Order: ADULT DIET; Regular; Low Sodium (2 gm); 2000 ml  ADULT ORAL NUTRITION SUPPLEMENT; Breakfast, Dinner; Renal Oral Supplement      Current Medications include:   Scheduled Meds:   cefepime  1,000 mg IntraVENous Q24H    lactulose  30 g Oral TID    [Held by provider] clopidogrel  75 mg Oral Daily    allopurinol  100 mg Oral Daily    levothyroxine  50 mcg Oral QAM AC    metoprolol succinate  12.5 mg Oral Daily    midodrine  5 mg Oral TID WC    pantoprazole  40 mg Oral BID AC    sodium chloride flush  5-40 mL IntraVENous 2 times per day     Continuous Infusions:   dextrose 50 mL/hr at 22 0056    dextrose Stopped (22 1427)    sodium chloride       PRN Meds:. loperamide, glucose, dextrose bolus **OR** dextrose bolus, glucagon (rDNA), dextrose, sodium chloride flush, sodium chloride, ondansetron, heparin (porcine)    Allergies: No Known Allergies    Objective:   Vital Signs:  Temp (24hrs), Av.5 °F (36.4 °C), Min:97.2 °F (36.2 °C), Max:97.8 °F (68.9 °C)     Systolic (94QOH), TNY:470 , Min:87 , OQQ:594      Diastolic (75VSD), IWI:14, Min:66, Max:79     Pulse  Av.8  Min: 86  Max: 93  BP 97/68   Pulse 88   Temp 97.6 °F (36.4 °C) (Oral)   Resp 20   Ht 5' 6\" (1.676 m)   Wt 148 lb 13 oz (67.5 kg)   SpO2 99%   BMI 24.02 kg/m²      Physical Exam:   General appearance: alert and appears stated age  Lungs: clear to auscultation bilaterally  Heart: regular rate and rhythm, S1, S2 normal, no murmur, click, rub or gallop  Abdomen: soft, non-tender; bowel sounds normal; no masses,  no organomegaly    Lab and Imaging Review   Recent Labs 05/24/22  5143 05/24/22  0639 05/25/22  0721 05/26/22  0446   WBC 4.7  --  4.2 5.2   HGB 9.6*  --  9.4* 9.0*   .0  --  98.6 97.9   PLT 87*  --  86* 86*   INR  --  2.22* 2.23* 1.93*   *  --  127* 128*   K 4.3  --  3.8 3.7   CL 87*  --  90* 90*   CO2 14*  --  21 20*   BUN 64*  --  39* 46*   CREATININE 5.4*  --  3.5* 4.2*   GLUCOSE 113*  --  142* 130*   CALCIUM 8.9  --  8.8 8.7   PROT 6.3*  --  6.0* 6.0*   LABALBU 3.2*  --  3.3* 2.9*   *  --  179* 156*   *  --  109* 104*   ALKPHOS 238*  --  216* 215*   BILITOT 11.0*  --  12.2* 12.5*   MG 2.00  --  1.90 1.80       Assessment:     Patient Active Problem List    Diagnosis Date Noted    Elevated LFTs 05/17/2022    Supratherapeutic INR 05/17/2022    Diarrhea 05/17/2022    Ventricular tachycardia (HCC)     Syncope 03/26/2022    CH[facility]congestive [facility] failure) [facility] [address] 03/26/2022    Syncope and collapse 03/26/2022    ICD (implantable cardioverter-defibrillator) in place     Demand ischemia (HCC)     LV (left ventricular) mural thrombus     Acute on chronic congestive heart failure (HCC)     Acute on chronic systolic heart failure, NYHA class 4 (HCC) 02/03/2022    [facility]AL [facility] ([facility])     Biventricular heart failure (HCC)     Stage 3 chronic kidney disease (HCC)     Acute on chronic combined systolic and diastolic HF (heart failure) ([facility]) 11/14/2021    Elevated PSA 04/09/2019    Coronary artery disease involving native coronary artery 02/12/2019    Elevated blood uric acid level 01/06/2017    [facility], [facility] 07/28/2014    Noncompliance 07/28/2014    ED (erectile dysfunction) 09/12/2013    Essential hypertension, benign 09/12/2013    Hyperlipidemia 09/12/2013    Di[facility] nephropathy [facility] 09/12/2013       Plan:   1. No GI bleeding. LFT's about the same. On antibiotics and dialysis. No SBP on paracentesis fluid.

## 2022-05-26 NOTE — PROGRESS NOTES
Physical Therapy  Facility/Department: [address] PROGRESSIVE CARE  Daily Treatment Note  NAME: Lizette Stewart  : 1955  MRN: [id number]    Date of Service: 2022    Discharge Recommendations:  Patient would benefit from continued therapy after discharge   PT Equipment Recommendations  Equipment Needed: No    Patient Diagnosis(es): The primary encounter diagnosis was Transaminitis. Diagnoses of Elevated bilirubin and Pacemaker were also pertinent to this visit. Assessment   Assessment: Pt somewhat more alert today, able to stand increased number of trials, but continuing to require assist x 2 to stand to stedy. He would benefit from continued therapy to improve his endurance, strength, and independence with mobility tasks as able. Anticipate need for low-moderate frequency therapy upon D/C. Lizette Stewart scored a 9/24 on the AM-PAC short mobility form. Current research shows that an AM-PAC score of 17 or less is typically not associated with a discharge to the patient's home setting. Based on the patient's AM-PAC score and their current functional mobility deficits, it is recommended that the patient have 3-5 sessions per week of Physical Therapy at d/c to increase the patient's independence. Please see assessment section for further patient specific details. If patient discharges prior to next session this note will serve as a discharge summary. Please see below for the latest assessment towards goals. Plan    Plan  Plan: 2-3 times per week  Specific Instructions for Next Treatment: Improve endurance, ambulate as able     Restrictions  Restrictions/Precautions  Restrictions/Precautions: Fall Risk  Position Activity Restriction  Other position/activity restrictions: 1L O2     Subjective    Subjective  Subjective: Pt more alert. Able to participate with improved independence.   Orientation  Overall Orientation Status: Impaired  Orientation Level: Oriented to person;Disoriented to situation;Disoriented to time  Cognition  Arousal/Alertness: Appropriate responses to stimuli  Following Commands: Follows one step commands with increased time; Follows one step commands with repetition  Insights: Decreased awareness of deficits  Initiation: Requires cues for some  Sequencing: Requires cues for some     Objective   Bed Mobility Training  Bed Mobility Training: Yes  Overall Level of Assistance: Maximum assistance  Supine to Sit: Maximum assistance    Balance  Sitting: Impaired  Sitting - Static: Fair (occasional)  Sitting - Dynamic: Poor (constant support)  Standing:  (pt able to tolerate x3 reps of standing x3 minutes in stance in [facility]; pt able to complete x10 marches with increased time and Max cues)    Transfer Training  Transfer Training: Yes  Overall Level of Assistance: Minimum assistance;Assist X2;Moderate assistance (to/from [facility])  Sit to Stand: Assist X2;Minimum assistance; Moderate assistance ([facility])  Stand to Sit: Minimum assistance;Assist X2;Moderate assistance  Stand Pivot Transfers: Assist X2;Moderate assistance;Minimum assistance  Bed to Chair: Minimum assistance; Moderate assistance;Assist X2 ([facility])     PT Exercises  Exercise Treatment: Static stanc 3 x 3 min. at [facility], CGA-SBA, limited d/t fatigue. March in place x 15, cues to maximize foot clearance. Other Specialty Interventions  Other Treatments/Modalities: Pt remains disoriented to situation. PT and OT attempted to re-orient pt to situation as able. He had difficulty following commands for technique for safe mobility d/t confusion. He was positioned for comfort in bedside chair at end of session. Safety Devices  Type of Devices: All fall risk precautions in place; Chair alarm in place;Call light within reach;Gait belt;Nurse notified; Left in chair  Restraints  Restraints Initially in Place: No     Goals  Short Term Goals  Time Frame for Short term goals: 2-3 days  Short term goal 1: Bed mobility SBA  Short term goal 2: Transfers SBA  Short term goal 3: Ambulation 13' with walker, CGA  Patient Goals   Patient goals :  To return directly home    Therapy Time   Individual Concurrent Group Co-treatment   Time In   1034       Time Out   1059       Minutes   25       Timed Code Treatment Minutes: Vriginia Yang PT    Electronically signed by Alfredo Yang, [place] [id number] on 5/26/2022 at 11:00 AM

## 2022-05-26 NOTE — PROGRESS NOTES
Occupational Therapy  [facility]  Occupational Therapy Treatment and Tentative D/C      Name: Alexander Aguila  : 1955  MRN: [id number]  Date of Service: 2022    Discharge Recommendations: Alexander Aguila scored a 13/24 on the AM-PAC ADL Inpatient form. Current research shows that an AM-PAC score of 17 or less is typically not associated with a discharge to the patient's home setting. Based on the patient's AM-PAC score and their current ADL deficits, it is recommended that the patient have 3-5 sessions per week of Occupational Therapy at d/c to increase the patient's independence. Please see assessment section for further patient specific details. If patient discharges prior to next session this note will serve as a discharge summary. Please see below for the latest assessment towards goals. Continue to assess pending progress,Patient would benefit from continued therapy after discharge,3-5 sessions per week  OT Equipment Recommendations  Equipment Needed: No  Other: pt with needed AD       Patient Diagnosis(es): The primary encounter diagnosis was Transaminitis. Diagnoses of Elevated bilirubin and Pacemaker were also pertinent to this visit. Past Medical History:  has a past medical history of CHF (congestive heart failure) ([address] Diabetes type 2, uncontrolled ([address] Diabetic nephropathy, ED (erectile dysfunction), Essential hypertension, benign, Hyperlipidemia, and Noncompliance. Past Surgical History:  has a past surgical history that includes eye surgery; IR NONTUNNELED VASCULAR CATHETER > 5 YEARS (2022); and IR TUNNELED CVC PLACE WO SQ PORT/PUMP > 5 YEARS (2022). Assessment   Performance deficits / Impairments: Decreased functional mobility ; Decreased balance;Decreased safe awareness;Decreased ADL status; Decreased endurance;Decreased strength;Decreased high-level IADLs  Assessment: Pt continues to be limited due to decreased overall strength, Refill given and sent endurance, and cognition. Pt completes bed mobility with Max A. Pt completes transfers with Min/Mod Ax2 and use of stedy. Pt able tolerate ~3 minutes in stance before needing seated rest break. Pt fatigues quickly with minimal activity. Continue with POC. Prognosis: [address] / Modification: stedy  REQUIRES OT FOLLOW-UP: Yes  Activity Tolerance  Activity Tolerance: Patient limited by fatigue        Plan   Plan  Times per Week: 3-5x  Current Treatment Recommendations: Strengthening,Balance training,Functional mobility training,Self-Care / ADL,Patient/Caregiver education & training,Safety education & training     Restrictions  Restrictions/Precautions  Restrictions/Precautions: Fall Risk  Position Activity Restriction  Other position/activity restrictions: 1L O2    Subjective   General  Chart Reviewed: Yes  Patient assessed for rehabilitation services?: Yes  Additional Pertinent Hx: per ED note, Solo Moy is a 74 y.o. male who presents to the emergency department with abdominal pain diarrhea. Patient presents with abdominal pain and diarrhea. Has been going on for 5 days. Loose watery stools. Generalized abdominal pain. 7-10 achy nature. Worse with eating. Better with rest.  Started spontaneously. Constant in nature. No blood in stools. No other associated symptoms. Family / Caregiver Present: No  Referring Practitioner: Hesham Young MD  Subjective  Subjective: Pt with no reports of pain. Pt lethargic throughout. General Comment  Comments: okay for therapy per RN.      Social/Functional History  Social/Functional History  Lives With: Son  Type of Home: Apartment  Home Layout: One level  Home Access: Stairs to enter with rails  Entrance Stairs - Number of Steps: 2 steps (descend) to enter  Bathroom Shower/Tub: Tub/Shower unit  [employer]: Standard  Bathroom Equipment: Shower chair  Bathroom Accessibility: Accessible  Home Equipment: Melissa Lopez, 4 wheeled  Has the patient had two or more falls Follows one step commands with increased time; Follows one step commands with repetition  Insights: Decreased awareness of deficits  Initiation: Requires cues for some  Sequencing: Requires cues for some                  Education Given To: Patient  Education Provided: Role of Therapy;Plan of Care;Transfer Training  Education Method: Demonstration;Verbal  Barriers to Learning: Cognition  Education Outcome: Continued education needed              AM-PAC Score        AM-PAC Inpatient Daily Activity Raw Score: 13 (05/26/22 1101)  AM-PAC Inpatient ADL T-Scale Score : 32.03 (05/26/22 1101)  ADL Inpatient CMS 0-100% Score: 63.03 (05/26/22 1101)  ADL Inpatient CMS G-Code Modifier : CL (05/26/22 1101)    Goals  Short Term Goals  Time Frame for Short term goals: prior to D/C; ongoing 5/26  Short Term Goal 1: complete functional mobility and transfers with supervision  Short Term Goal 2: complete bathing and dressing with supervision  Short Term Goal 3: complete toileting with supervision  Short Term Goal 4: complete grooming in stance at sink with supervision  Long Term Goals  Time Frame for Long term goals : STG=LTG  Patient Goals   Patient goals : return home       Therapy Time   Individual Concurrent Group Co-treatment   Time In 1034         Time Out 1059         Minutes 25         Timed Code Treatment Minutes: 25 Minutes       Joseline Crabtree, OTR/L

## 2022-05-26 NOTE — CARE COORDINATION
Discharge Planning:   Spoke to [place] with [facility], confirmed they can accept patient at discharge. Discharging to Facility/ Agency   · Name: [facility]  · Address:   [address] [place], [place] [address]  · Phone:  [phone]  · Fax:  [phone]     PLAN: Home with home can, resume HD and transportation. NEED: to send updated physician notes, H&P, home care order and DEE to [facility], and HD flowsheets and DEE to HD center.      SANJUANA Coelho, GUSTAVOW, Social Work/Case Management   [phone]  Electronically signed by SANJUANA Coelho, HIRO on 5/26/2022 at 1:54 PM

## 2022-05-26 NOTE — PROGRESS NOTES
[facility]                                               Progress note    Summary:   Liane Charles is being seen by nephrology for ESRD on HD. Admitted with  Diarrhea. MRI abdomen showing large volume ascites diffuse body wall edema, moderate diffuse hepatic steatosis: Cardiomegaly bilateral small pleural effusions and bibasilar consolidations    Interval History  Seen at bedside  More awake now. BP soft. Labs reviewed. Blood cx NGTD        Plan:   - will hold off on HD and line placement today  - will plan on [facility] placement tomorrow. Plavix is on hold. - plan for HD tomorrow after line placement    Hamzah Man MD  [facility]  Office: [phone]    Assessment:   #ESRD  On HD TTS at [address]. Has TDC and has been referred for vein mapping and AVF/AVG placement. ,  TIME 3.3 Hours  Na 138   K 2  Ca 2.5   Bicarb 35   180nRe    #BP/Volume  Hypervolemic. BP acceptable. He has severe systolic heart failure and BP runs low. Has required inotrope support in the past   On midodrine 5 mg TID. UF as tolerated, challenge dry weight. On metop 12.5 mg BID. EDW 77 kilos    #Metabolic acidosis  Address with dialysis. #SHPT  Last  and phos 6.3  Is supposed to be on phos binder. Will add renvela. #Anemia  Last tsat 12% ferritin 174  He will need IV iron with HD      Abdominal pain. Patient presented to emergency with severe diarrhea over the past 3 days, C. difficile negative, diarrhea work-up negative.        Transaminitis. History of cirrhosis. Seems to be improving, , , bilirubin 7.2. Possibly related to congestive heart failure. patient scheduled for MRCP        ROS:   Positives Listed Bold. All other remaining systems are negative. Constitutional:  fever, chills, weakness, weight change, fatigue,      Skin:  rash, pruritus, hair loss, bruising, dry skin, petechiae.   Head, Face, Neck   headaches, swelling,  cervical adenopathy. Respiratory: shortness of breath, cough, or wheezing  Cardiovascular: chest pain, palpitations, dizzy, edema  Gastrointestinal: nausea, vomiting, diarrhea, constipation,belly pain    Yellow skin, blood in stool  Musculoskeletal:  back pain, muscle weakness, gait problems,       joint pain or swelling. Genitourinary:  dysuria, poor urine flow, flank pain, blood in urine  Neurologic:  vertigo, TIA'S, syncope, seizures, focal weakness  Psychosocial:  insomnia, anxiety, or depression. Additional positive findings: -     PMH:   Past medical history, surgical history, social history, family history are reviewed and updated as appropriate. Reviewed current medication list.   Allergies reviewed and updated as needed. PE:   Vitals:    05/26/22 0826   BP:    Pulse:    Resp: 20   Temp:    SpO2: 99%       General appearance:  in NAD, somnolent, poorly arousable   HEENT: EOM intact, no icterus. Trachea is midline. Neck : No masses, appears symmetrical, no JVD  Respiratory: Respiratory effort appears normal, bilateral equal chest rise, no wheeze, +crackles  Cardiovascular: Ausculation shows RRR + edema  Abdomen: No visible mass or tenderness, non distended. Musculoskeletal:  Joints with no swelling or deformity. Skin:no rashes, ulcers, induration, no jaundice. Neuro: face symmetric, no focal deficits. Appropriate responses. Tunneled dialysis catheter  small opening at the IJ stick site indicating that it had not fully closed. There is no other catheter exposure.  Glue placed over it      Lab Results   Component Value Date    CREATININE 4.2 (H) 05/26/2022    BUN 46 (H) 05/26/2022     (L) 05/26/2022    K 3.7 05/26/2022    CL 90 (L) 05/26/2022    CO2 20 (L) 05/26/2022      Lab Results   Component Value Date    WBC 5.2 05/26/2022    HGB 9.0 (L) 05/26/2022    HCT 25.9 (L) 05/26/2022    MCV 97.9 05/26/2022    PLT 86 (L) 05/26/2022     Lab Results   Component Value Date    .8 (H) 04/07/2022    CALCIUM 8.7 05/26/2022    PHOS 3.4 04/07/2022

## 2022-05-26 NOTE — PROGRESS NOTES
General appearance: No apparent distress, appears stated age and cooperative. HEENT: Pupils equal, round, and reactive to light. Conjunctivae/corneas clear. Neck: Supple, with full range of motion. No jugular venous distention. Trachea midline. Respiratory:  Normal respiratory effort. Clear to auscultation, bilaterally without Rales/Wheezes/Rhonchi. Dressing noted where HD line been removed  Cardiovascular: Regular rate and rhythm with normal S1/S2 without murmurs, rubs or gallops. Abdomen: Soft, non-tender, non-distended with normal bowel sounds. Musculoskeletal: Moderate lower limb edema  Skin: Skin color, texture, turgor normal.  No rashes or lesions. Neurologic:  Neurovascularly intact without any focal sensory/motor deficits. Cranial nerves: II-XII intact, grossly non-focal.  Psychiatric: Alert and oriented, thought content appropriate, normal insight  Capillary Refill: Brisk,3 seconds, normal   Peripheral Pulses: +2 palpable, equal bilaterally       Labs:   Recent Labs     05/24/22  0638 05/25/22  0721 05/26/22 0446   WBC 4.7 4.2 5.2   HGB 9.6* 9.4* 9.0*   HCT 28.7* 27.5* 25.9*   PLT 87* 86* 86*     Recent Labs     05/24/22  0638 05/25/22  0721 05/26/22  0446   * 127* 128*   K 4.3 3.8 3.7   CL 87* 90* 90*   CO2 14* 21 20*   BUN 64* 39* 46*   CREATININE 5.4* 3.5* 4.2*   CALCIUM 8.9 8.8 8.7     Recent Labs     05/24/22  0638 05/25/22  0721 05/26/22 0446   * 179* 156*   * 109* 104*   BILITOT 11.0* 12.2* 12.5*   ALKPHOS 238* 216* 215*     Recent Labs     05/24/22  0639 05/25/22  0721 05/26/22 0446   INR 2.22* 2.23* 1.93*     No results for input(s): CKTOTAL, TROPONINI in the last 72 hours.     Urinalysis:      Lab Results   Component Value Date    NITRU Negative 04/10/2022    WBCUA 0-2 04/10/2022    BACTERIA 2+ 04/09/2022    RBCUA 0-2 04/10/2022    BLOODU Negative 04/10/2022    SPECGRAV 1.025 04/10/2022    GLUCOSEU Negative 04/10/2022       Radiology:  IR REMOVE TUNNELED CVAD WO SQ PORT/PUMP   Final Result   Successful removal of the right IJ tunneled central catheter. VL Extremity Venous Right   Final Result      US GUIDED PARACENTESIS   Final Result   Successful paracentesis. CT ORBITS W WO CONTRAST   Final Result   Severe anasarca with proportionate bilateral orbital fat edema. No orbital hemorrhage or mass. RECOMMENDATIONS:   Unavailable         MRI ABDOMEN WO CONTRAST MRCP   Final Result   1. Incomplete exam. Patient declined rest of the study, no diffusion or axial   T2 obtained. Limited diagnostic quality exam.   2. Large volume ascites. Diffuse marked body wall edema. 3. Moderate diffuse hepatic steatosis better visualized on CT than on MR. No   focal lesions in the liver. 4. Contracted gallbladder containing a gallstone. Common bile duct is poorly   visualized. 5. Marked cardiomegaly, with bilateral small pleural effusions and bibasilar   consolidations. Small-bowel wall edema noted, this could be due to   surrounding ascites/hypoproteinemia. CT CHEST WO CONTRAST   Final Result   1. Findings compatible with pulmonary edema with small pleural effusions. 2.  Body wall edema and upper abdominal ascites. CT ABDOMEN PELVIS WO CONTRAST Additional Contrast? None   Final Result   1. No ileus or obstruction. No definite inflammatory bowel process though   very limited given the extent of ascites. 2. Moderate volume ascites seen in the abdomen and pelvis, similar to the   prior exam.   3. Cardiomegaly with small bilateral effusions, left greater than right as   well as dependent ground-glass change which may represent dependent pulmonary   edema. 4. Mild anasarca. 5. Mild renal atrophy. 6. Cholelithiasis with a contracted gallbladder. XR CHEST PORTABLE   Final Result   Stable moderate cardiomegaly with mild central pulmonary congestion which is   more apparent.       Hazy left retrocardiac atelectasis or early infiltrate Status post right-sided dialysis catheter placement in good position         IR TUNNELED CVC PLACE WO SQ PORT/PUMP > 5 YEARS    (Results Pending)   IR NONTUNNELED VASCULAR CATHETER > 5 YEARS    (Results Pending)           Assessment/Plan:    Active Hospital Problems    Diagnosis     Elevated LFTs [R79.89]      Priority: Medium    Supratherapeutic INR [R79.1]      Priority: Medium    Diarrhea [R19.7]      Priority: Medium    CHF (congestive heart failure) (HCC) [I50.9]     ICD (implantable cardioverter-defibrillator) in place [Z95.810]     Biventricular heart failure ([address] [I50.82]      1. Diarrhea. Abdominal pain. Diarrhea work-up was negative  Diarrhea and abdominal pain improving        2. Transaminitis and cirrhosis + Ascites. Seems to be improving, , , bilirubin 7.2. Possibly related to congestive heart failure.   GI on board  Large volume ascites noted on MRI-paracentesis done, no sign of infection  Contracted gallbladder containing stones.     3. Hypoglycemia likely related to cirrhosis and DMII. ADAT  Start hypoglycemia protocol     4. Elevated INR. vitamin K,   inr 2.23     5. Enterobacter cloacae bacteremia/  HD line tunneled infection. Catheter had previously had erosions through the skin, was noted to be grossly infected, s/p removal 5/24. Culture tip as well as intraoperative cultures growing Enterobacter cloacae, blood cultures well growing Enterobacter.   -Follow sensitivity. -Appreciate ID input  -Continue on vancomycin and cefepime started 5/24     6. ESRD on HD. Discussed with nephrology, line removed 5/24. Patient planned for tunneled dialysis line insertion tomorrow, Plavix on hold     7. Systolic heart failure. Ejection fraction less than 20%. Volume status adjusted during dialysis     7. Anemia. Continue IV iron     8.  Hypoglycemia  Continue on D10  Check cortisol in a.m.     9. Retro-orbital/ conjunctival hemorrhage with exopthalmos  -CT head without contrast shows edema     10. RLE swelling > Left leg  -US to r/o DVT     11. Hepatic encephalopathy with asterixis. Alert this morning. Ammonia levels-wnl.   -cont lactulose 30g po tid  -Had a 1100ml paracentesis this am.  -give 100ml of 25% human albumin solution today    DVT Prophylaxis: lovenox  Diet: ADULT DIET; Regular; Low Sodium (2 gm); 2000 ml  ADULT ORAL NUTRITION SUPPLEMENT; Breakfast, Dinner; Renal Oral Supplement  Code Status: Full Code    PT/OT Eval Status: following.      Dispo - pending clinical improvement      Joseline Carpenter MD No

## 2022-05-26 NOTE — CARE COORDINATION
Discharge Planning:   Met with patient at bedside. PT/OT recommending skilled nursing facility placement at discharge. Discussed with patient. Patient states, \"I was hoping to go home. \" Discussed the goal of a safe discharge plan for patient. Patient declined skilled facility at this time. Patient reports they live with son, and brother (Alex Foster) and sister Tung Gardiner) assist at home as needed. Discussed Home care as an option with DME. Patient agreeable to home care, and chose [facility] as patient's sister has used them in the past.   Discussed DME. Patient reports they do not have a wheelchair. NEED: DME recommendations from PT/OT   Follow for possible O2 needs.      SANJUANA Brooks, HIRO, Social Work/Case Management   [phone]  Electronically signed by SANJUANA Brooks LSW on 5/26/2022 at 11:37 AM

## 2022-05-26 NOTE — PROGRESS NOTES
Physical Therapy  [employer]  [id number]  Pt see this afternoon after PCA requested assist to get pt out of the chair. Pt with increased drowsiness and requires clear one step commands. Pt was maxAx2 to scoot bwd in chair and modAx2 to stand from recliner to stedy. Upon standing, pt found to be incontinent of bowel. Stood in stedy with CGA for ~ 2 min to be cleaned up and change brief with totalA. Stand to sitting EOB from stedy with min-modA x2. Sit to supine with [place] for trunk and maxA for BL LEs. Pt positioned in bed with call light beside him, bed alarm on, and pt verbalized all his needs were met at this time. Please refer to last progress note for dc recommendations.   Time in: 13:08 Time out: 13:38 2 units Therapeutic Activity  Electronically signed by Esperanza Hoover, [place] [id number] on 5/26/2022 at 1:38 PM

## 2022-05-26 NOTE — PROGRESS NOTES
Infectious Disease Follow up Notes  Admit Date: 5/17/2022  Hospital Day: 10    Antibiotics :     IV Cefepime      CHIEF COMPLAINT:     ESRD  HD line infection  Enterobacter infection  Cirrhosis liver   Enterobacter bacteremia     Subjective interval History :  74 y.o. man with past medical history of diabetes, diabetic nephropathy, diabetic neuropathy, hypertension, end-stage renal disease on hemodialysis, congestive heart failure, cirrhosis of liver, admitted to the hospital secondary to diarrhea and fatigue nausea. On admission there was noted to be ulceration along the abdominal HD line. He went to interventional radiology for HD line removal and noted to have purulent fluid along the wound site as well culture was sent and tip culture was sent. Given the ongoing concern for HD line infection we are consulted for IV antibiotic recommendations. He has LFT elevation ongoing secondary to cirrhosis of liver. .  With ascites underwent paracentesis for ascitic fluid negative for infection. He just came back from procedure under sedation unable to provide any history.     Interval History : more awake not able to give History on going abdistension+ and Jaundiced+  Tolerating IV abx ok and no vomiting no nausea      Past Medical History:    Past Medical History:   Diagnosis Date    CHF (congestive heart failure) ([address]     Diabetes type 2, uncontrolled ([address] 7/28/2014    Diabetic nephropathy 9/12/2013    ED (erectile dysfunction) 9/12/2013    Essential hypertension, benign 9/12/2013    Hyperlipidemia 9/12/2013    Noncompliance 7/28/2014       Past Surgical History:    Past Surgical History:   Procedure Laterality Date    EYE SURGERY      IR NONTUNNELED VASCULAR CATHETER  4/4/2022    IR NONTUNNELED VASCULAR CATHETER 4/4/2022 [facility]    IR TUNNELED CATHETER PLACEMENT GREATER THAN 5 YEARS  4/8/2022    IR TUNNELED CATHETER PLACEMENT GREATER THAN 5 YEARS 2022 [facility]       Current Medications:    Outpatient Medications Marked as Taking for the 22 encounter [facility] Encounter)   Medication Sig Dispense Refill    carvedilol (COREG) 12.5 MG tablet Take 12.5 mg by mouth 2 times daily (with meals)      clopidogrel (PLAVIX) 75 MG tablet Take 75 mg by mouth daily      isosorbide mononitrate (IMDUR) 30 MG extended release tablet Take 30 mg by mouth daily         Allergies:  Patient has no known allergies. Immunizations :   Immunization History   Administered Date(s) Administered    COVID-19, Pfizer Purple top, DILUTE for use, 12+ yrs, 30mcg/0.3mL dose 2021, 2021, 2021    Influenza Virus Vaccine 2017    Influenza, Jamila Spies, IM, PF (6 mo and older Fluzone, Flulaval, Fluarix, and 3 yrs and older Afluria) 2021       Social History:   Social History     Tobacco Use    Smoking status: Former Smoker     Packs/day: 1.50     Years: 7.00     Pack years: 10.50     Quit date: 1981     Years since quittin.3    Smokeless tobacco: Never Used   Vaping Use    Vaping Use: Never used   Substance Use Topics    Alcohol use:  Yes     Alcohol/week: 1.0 standard drink     Types: 1 Cans of beer per week    Drug use: No     Social History     Tobacco Use   Smoking Status Former Smoker    Packs/day: 1.50    Years: 7.00    Pack years: 10.50    Quit date: 1981    Years since quittin.3   Smokeless Tobacco Never Used      Family History   Problem Relation Age of Onset    Diabetes Mother     High Blood Pressure Mother     Diabetes Sister     Diabetes Brother          REVIEW OF SYSTEMS:     Not possible due to change  In mentation     PHYSICAL EXAM:      Vitals:    BP 97/68   Pulse 88   Temp 97.6 °F (36.4 °C) (Oral)   Resp 20   Ht 5' 6\" (1.676 m)   Wt 148 lb 13 oz (67.5 kg)   SpO2 99%   BMI 24.02 kg/m²     General Appearance: awake+  distress,++  pallor, ++ icterus chronic ill appearing man +    Skin: warm and dry, no rash or erythema  Head: normocephalic and atraumatic  Eyes: pupils equal, round, and reactive to light, conjunctivae normal  Bi lateral subconjunctival hemorrhage+  ENT: tympanic membrane, external ear and ear canal normal bilaterally, nose without deformity, nasal mucosa and turbinates normal without polyps  Neck: supple and non-tender without mass, no thyromegaly  no cervical lymphadenopathy  Pulmonary/Chest:  Bi basal crepts+ rhonchi, normal air movement, no respiratory distress  Cardiovascular: normal rate, regular rhythm, normal S1 and S2, no murmurs, rubs, clicks, or gallops, no carotid bruits  Abdomen:  -tender, + -distended,  Ascites++  normal bowel sounds, no masses or organomegaly  Extremities: no cyanosis, clubbing or + edema  Musculoskeletal: normal range of motion, no joint swelling, deformity or tenderness  Integumentary: No rashes, no abnormal skin lesions, no petechiae  Neurologic: reflexes normal and symmetric, no cranial nerve deficit  Lines: IV  HD line removed dressing+        Data Review:    CBC:   Lab Results   Component Value Date    WBC 5.2 05/26/2022    HGB 9.0 (L) 05/26/2022    HCT 25.9 (L) 05/26/2022    MCV 97.9 05/26/2022    PLT 86 (L) 05/26/2022     RENAL:   Lab Results   Component Value Date    CREATININE 4.2 (H) 05/26/2022    BUN 46 (H) 05/26/2022     (L) 05/26/2022    K 3.7 05/26/2022    CL 90 (L) 05/26/2022    CO2 20 (L) 05/26/2022     SED RATE: No results found for: SEDRATE  CK: No results found for: CKTOTAL  CRP: No results found for: CRP  Hepatic Function Panel:   Lab Results   Component Value Date    ALKPHOS 215 05/26/2022     05/26/2022     05/26/2022    PROT 6.0 05/26/2022    PROT 7.7 12/13/2011    BILITOT 12.5 05/26/2022    BILIDIR 4.7 05/17/2022    IBILI 1.8 05/17/2022    LABALBU 2.9 05/26/2022     UA:  Lab Results   Component Value Date    COLORU Yellow 04/10/2022    CLARITYU Clear 04/10/2022 GLUCOSEU Negative 04/10/2022    BILIRUBINUR Negative 04/10/2022    KETUA TRACE 04/10/2022    SPECGRAV 1.025 04/10/2022    BLOODU Negative 04/10/2022    PHUR 5.5 04/10/2022    PROTEINU 30 04/10/2022    UROBILINOGEN 1.0 04/10/2022    NITRU Negative 04/10/2022    LEUKOCYTESUR Negative 04/10/2022    LABMICR YES 04/10/2022    URINETYPE NotGiven 04/10/2022      Urine Microscopic:   Lab Results   Component Value Date    BACTERIA 2+ 04/09/2022    COMU see below 04/10/2022    HYALCAST 3-5 04/10/2022    WBCUA 0-2 04/10/2022    RBCUA 0-2 04/10/2022    EPIU 12 04/09/2022     Urine Reflex to Culture:   Lab Results   Component Value Date    URRFLXCULT Not Indicated 04/10/2022         MICRO: cultures reviewed and updated by me   Blood Culture:   Lab Results   Component Value Date    [facility]  05/24/2022     No Growth to date. Any change in status will be called. BLOODCULT2  05/24/2022     Gram stain Anaerobic bottle:  Gram negative rods  Information to follow      Vinnie Soria See additional report for complete BCID panel. 05/24/2022            Culture, Anaerobic and Aerobic [[id number]] (Abnormal) Collected: 05/24/22 1425   Order Status: Completed Specimen: Body Fluid from Neck Updated: 05/25/22 1357    Anaerobic Culture Anaerobic culture further report to follow    Organism Enterobacter cloacae complex Abnormal     WOUND/ABSCESS --    Rare growth   Sensitivity to follow    Narrative:     ORDER#: [id number]                          ORDERED BY: Kathia Bryson   SOURCE: Neck                               COLLECTED:  05/24/22 14:25   ANTIBIOTICS AT MATIAS. :                      RECEIVED :  05/24/22 15:05   Culture, Tip [[id number]] (Abnormal) Collected: 05/24/22 1425   Order Status: Completed Specimen: Neck from Catheter Tip Updated: 05/25/22 1356    Organism Enterobacter cloacae complex Abnormal     Culture Catheter Tip --    >15 colonies   Sensitivity to follow    Narrative:     ORDER#: [id number]                          ORDERED BY: Jasmin HILL   SOURCE: Catheter Tip                       COLLECTED:  05/24/22 14:25   ANTIBIOTICS AT MATIAS. :                      RECEIVED :  05/24/22 15:00   Culture, Body Fluid [[id number]] Collected: 05/23/22 0836   Order Status: Completed Specimen: Body Fluid from Ascitic Fluid Updated: 05/25/22 0740    Body Fluid Culture, Sterile --    No growth to date   No growth 36 to 48 hours     Gram Stain Result Cytospin performed,no quantitation   WBC's (Polymorphonuclear)   No Epithelial Cells seen   No organisms seen    Narrative:     ORDER#: [id number]                          ORDERED BY: Srikanth Hassan   SOURCE: Ascites                            COLLECTED:  05/23/22 08:36   ANTIBIOTICS AT MATIAS. :                      RECEIVED :  05/23/22 08:49   Culture, Blood 1 [[id number]] Collected: 05/24/22 1617   Order Status: Sent Specimen: Blood Updated: 05/24/22 1639   Culture, Blood 2 [[id number]] Collected: 05/24/22 1617   Order Status: Sent Specimen: Blood Updated: 05/24/22 1639   Culture, Fungus [[id number]] Collected: 05/24/22 1425   Order Status: Sent Specimen: Body Fluid Updated: 05/24/22 1504   Culture, Body Fluid [[id number]] Collected: 05/24/22 1425   Order Status: Canceled Specimen: Body Fluid    Culture, Anaerobic and Aerobic [[id number]] Collected: 05/23/22 0836   Order Status: Canceled Specimen: Body Fluid from Abdomen    GI Bacterial Pathogens By PCR [[id number]] Collected: 05/17/22 0900   Order Status: Completed Specimen: Stool Updated: 05/17/22 1538    GI Bacterial Pathogens By PCR --    No Shigella spp/EIEC DNA detected   No Shiga toxin-producing gene(s) detected   No Campylobacter spp. (jejuni and coli)DNA detected   No Salmonella spp. DNA detected   Normal Range:  None detected    Narrative:     ORDER#: [id number]                          ORDERED BY: Dieter Almaguer   SOURCE: Stool                              COLLECTED:  05/17/22 09:00   ANTIBIOTICS AT MATIAS. :                      RECEIVED :  05/17/22 09:04 05/24/2022     AFB:No results found for: AFBSMEAR  Viral Culture:  No results found for: COVID19  Urine Culture: No results for input(s): Yuri Dueñas in the last 72 hours. IMAGING:    IR REMOVE TUNNELED CVAD WO SQ PORT/PUMP   Final Result   Successful removal of the right IJ tunneled central catheter. VL Extremity Venous Right   Final Result      US GUIDED PARACENTESIS   Final Result   Successful paracentesis. CT ORBITS W WO CONTRAST   Final Result   Severe anasarca with proportionate bilateral orbital fat edema. No orbital hemorrhage or mass. RECOMMENDATIONS:   Unavailable         MRI ABDOMEN WO CONTRAST MRCP   Final Result   1. Incomplete exam. Patient declined rest of the study, no diffusion or axial   T2 obtained. Limited diagnostic quality exam.   2. Large volume ascites. Diffuse marked body wall edema. 3. Moderate diffuse hepatic steatosis better visualized on CT than on MR. No   focal lesions in the liver. 4. Contracted gallbladder containing a gallstone. Common bile duct is poorly   visualized. 5. Marked cardiomegaly, with bilateral small pleural effusions and bibasilar   consolidations. Small-bowel wall edema noted, this could be due to   surrounding ascites/hypoproteinemia. CT CHEST WO CONTRAST   Final Result   1. Findings compatible with pulmonary edema with small pleural effusions. 2.  Body wall edema and upper abdominal ascites. CT ABDOMEN PELVIS WO CONTRAST Additional Contrast? None   Final Result   1. No ileus or obstruction. No definite inflammatory bowel process though   very limited given the extent of ascites. 2. Moderate volume ascites seen in the abdomen and pelvis, similar to the   prior exam.   3. Cardiomegaly with small bilateral effusions, left greater than right as   well as dependent ground-glass change which may represent dependent pulmonary   edema. 4. Mild anasarca. 5. Mild renal atrophy.    6. Cholelithiasis with a Ventricular tachycardia (HCC)    Elevated LFTs    Supratherapeutic INR    Diarrhea        ESRD on HD  DM with several complications  Cirrhosis of liver from ? CHF  H/O Biventricular Heart failure  Ascites +  S/p Paracentesis fluid negative for infection  LV thrombus +  HD line infection   S/p HD line removal by IR on 5/24   Stool studies  -ve on admit  Bacteremia Enterobacter cloacae   Acute hypoxic resp failure on nasal cannula  Using  2 lts now       IR Removed HD line and noted to have purulence and cx sent and await Micro results noted    HD line tip cx Enterobacter    Blood cx from 5/24 now positive for Enterobacter and concerning for CLABSI - will need repeat Blood cx to check for clearance          Labs, Microbiology, Radiology and all the pertinent results from current hospitalization and  care every where were reviewed  by me as a part of the evaluation   Plan:   1. Cont IV Cefepime x 1 gm q 24 hrs  2. BP low and Cardiac cirrhosis noted   3. HD line tip cx Enterobacter   4. HD line site cx Enterobacter  5. Blood cx from 5/24 now positive for Enterobacter   6. Repeat blood cx in process   7. Prognosis poor from multiple medical issues   8. Will need blood cx to clear for line placement          Discussed with patient/Family and Nursing   Risk of Complications/Morbidity: High      · Illness(es)/ Infection present that pose threat to bodily function. · There is potential for severe exacerbation of infection/side effects of treatment. Therapy requires intensive monitoring for antimicrobial agent toxicity      Discussed with patient/Family and Nursing staff     Thanks for allowing me to participate in your patient's care and please call me with any questions or concerns.     Luh Loo MD  Infectious Disease  [facility] [place] ([facility]) Physician  Phone: [phone]   Fax : [phone]

## 2022-05-27 NOTE — PROGRESS NOTES
Infectious Disease Follow up Notes  Admit Date: 5/17/2022  Hospital Day: 11    Antibiotics :     IV Cefepime      CHIEF COMPLAINT:     ESRD  HD line infection  Enterobacter infection  Cirrhosis liver   Enterobacter bacteremia     Subjective interval History :  74 y.o. man with past medical history of diabetes, diabetic nephropathy, diabetic neuropathy, hypertension, end-stage renal disease on hemodialysis, congestive heart failure, cirrhosis of liver, admitted to the hospital secondary to diarrhea and fatigue nausea. On admission there was noted to be ulceration along the abdominal HD line. He went to interventional radiology for HD line removal and noted to have purulent fluid along the wound site as well culture was sent and tip culture was sent. Given the ongoing concern for HD line infection we are consulted for IV antibiotic recommendations. He has LFT elevation ongoing secondary to cirrhosis of liver. .  With ascites underwent paracentesis for ascitic fluid negative for infection. He just came back from procedure under sedation unable to provide any history.     Interval History : more awake not able to give History s/p New Temp line placement by IR today seen in HD and Repeat Blood cx in process   Past Medical History:    Past Medical History:   Diagnosis Date    CHF (congestive heart failure) (HCC)     Diabetes type 2, uncontrolled ([address] 7/28/2014    Diabetic nephropathy 9/12/2013    ED (erectile dysfunction) 9/12/2013    Essential hypertension, benign 9/12/2013    Hyperlipidemia 9/12/2013    Noncompliance 7/28/2014       Past Surgical History:    Past Surgical History:   Procedure Laterality Date    EYE SURGERY      IR NONTUNNELED VASCULAR CATHETER  04/04/2022    IR NONTUNNELED VASCULAR CATHETER 4/4/2022 [facility]    IR NONTUNNELED VASCULAR CATHETER Left 05/26/2022    Chris Diaz; LUIS MIGUEL access; 20cm;  [place]    IR NONTUNNELED VASCULAR CATHETER  2022    IR NONTUNNELED VASCULAR CATHETER 2022 [facility]    IR TUNNELED CATHETER PLACEMENT GREATER THAN 5 YEARS  2022    IR TUNNELED CATHETER PLACEMENT GREATER THAN 5 YEARS 2022 [facility]       Current Medications:    Outpatient Medications Marked as Taking for the 22 encounter [facility] Encounter)   Medication Sig Dispense Refill    carvedilol (COREG) 12.5 MG tablet Take 12.5 mg by mouth 2 times daily (with meals)      clopidogrel (PLAVIX) 75 MG tablet Take 75 mg by mouth daily      isosorbide mononitrate (IMDUR) 30 MG extended release tablet Take 30 mg by mouth daily         Allergies:  Patient has no known allergies. Immunizations :   Immunization History   Administered Date(s) Administered    COVID-19, Pfizer Purple top, DILUTE for use, 12+ yrs, 30mcg/0.3mL dose 2021, 2021, 2021    Influenza Virus Vaccine 2017    Influenza, Hassel Scarce, IM, PF (6 mo and older Fluzone, Flulaval, Fluarix, and 3 yrs and older Afluria) 2021       Social History:   Social History     Tobacco Use    Smoking status: Former Smoker     Packs/day: 1.50     Years: 7.00     Pack years: 10.50     Quit date: 1981     Years since quittin.3    Smokeless tobacco: Never Used   Vaping Use    Vaping Use: Never used   Substance Use Topics    Alcohol use:  Yes     Alcohol/week: 1.0 standard drink     Types: 1 Cans of beer per week    Drug use: No     Social History     Tobacco Use   Smoking Status Former Smoker    Packs/day: 1.50    Years: 7.00    Pack years: 10.50    Quit date: 1981    Years since quittin.3   Smokeless Tobacco Never Used      Family History   Problem Relation Age of Onset    Diabetes Mother     High Blood Pressure Mother     Diabetes Sister     Diabetes Brother          REVIEW OF SYSTEMS:     Not possible due to change  In mentation     PHYSICAL EXAM:      Vitals:    BP 05/27/2022    BILIDIR 4.7 05/17/2022    IBILI 1.8 05/17/2022    LABALBU 2.9 05/27/2022     UA:  Lab Results   Component Value Date    COLORU Yellow 04/10/2022    CLARITYU Clear 04/10/2022    GLUCOSEU Negative 04/10/2022    BILIRUBINUR Negative 04/10/2022    KETUA TRACE 04/10/2022    SPECGRAV 1.025 04/10/2022    BLOODU Negative 04/10/2022    PHUR 5.5 04/10/2022    PROTEINU 30 04/10/2022    UROBILINOGEN 1.0 04/10/2022    NITRU Negative 04/10/2022    LEUKOCYTESUR Negative 04/10/2022    LABMICR YES 04/10/2022    URINETYPE NotGiven 04/10/2022      Urine Microscopic:   Lab Results   Component Value Date    BACTERIA 2+ 04/09/2022    COMU see below 04/10/2022    HYALCAST 3-5 04/10/2022    WBCUA 0-2 04/10/2022    RBCUA 0-2 04/10/2022    EPIU 12 04/09/2022     Urine Reflex to Culture:   Lab Results   Component Value Date    URRFLXCULT Not Indicated 04/10/2022         MICRO: cultures reviewed and updated by me   Blood Culture:   Lab Results   Component Value Date    [facility]  05/26/2022     No Growth to date. Any change in status will be called. BLOODCULT2  05/26/2022     No Growth to date. Any change in status will be called. Culture, Anaerobic and Aerobic [[id number]] (Abnormal) Collected: 05/24/22 1425   Order Status: Completed Specimen: Body Fluid from Neck Updated: 05/25/22 1357    Anaerobic Culture Anaerobic culture further report to follow    Organism Enterobacter cloacae complex Abnormal     WOUND/ABSCESS --    Rare growth   Sensitivity to follow    Narrative:     ORDER#: [id number]                          ORDERED BY: Norma Kennedy   SOURCE: Neck                               COLLECTED:  05/24/22 14:25   ANTIBIOTICS AT MATIAS. :                      RECEIVED :  05/24/22 15:05   Culture, Tip [[id number]] (Abnormal) Collected: 05/24/22 1425   Order Status: Completed Specimen: Neck from Catheter Tip Updated: 05/25/22 1356    Organism Enterobacter cloacae complex Abnormal     Culture Catheter Tip --    >15 colonies Sensitivity to follow    Narrative:     ORDER#: [id number]                          ORDERED BY: Tomasz Tierney   SOURCE: Catheter Tip                       COLLECTED:  05/24/22 14:25   ANTIBIOTICS AT MATIAS. :                      RECEIVED :  05/24/22 15:00   Culture, Body Fluid [[id number]] Collected: 05/23/22 0836   Order Status: Completed Specimen: Body Fluid from Ascitic Fluid Updated: 05/25/22 0740    Body Fluid Culture, Sterile --    No growth to date   No growth 36 to 48 hours     Gram Stain Result Cytospin performed,no quantitation   WBC's (Polymorphonuclear)   No Epithelial Cells seen   No organisms seen    Narrative:     ORDER#: [id number]                          ORDERED BY: Deanna Martínez   SOURCE: Ascites                            COLLECTED:  05/23/22 08:36   ANTIBIOTICS AT MATIAS. :                      RECEIVED :  05/23/22 08:49   Culture, Blood 1 [[id number]] Collected: 05/24/22 1617   Order Status: Sent Specimen: Blood Updated: 05/24/22 1639   Culture, Blood 2 [[id number]] Collected: 05/24/22 1617   Order Status: Sent Specimen: Blood Updated: 05/24/22 1639   Culture, Fungus [[id number]] Collected: 05/24/22 1425   Order Status: Sent Specimen: Body Fluid Updated: 05/24/22 1504   Culture, Body Fluid [[id number]] Collected: 05/24/22 1425   Order Status: Canceled Specimen: Body Fluid    Culture, Anaerobic and Aerobic [[id number]] Collected: 05/23/22 0836   Order Status: Canceled Specimen: Body Fluid from Abdomen    GI Bacterial Pathogens By PCR [[id number]] Collected: 05/17/22 0900   Order Status: Completed Specimen: Stool Updated: 05/17/22 1538    GI Bacterial Pathogens By PCR --    No Shigella spp/EIEC DNA detected   No Shiga toxin-producing gene(s) detected   No Campylobacter spp. (jejuni and coli)DNA detected   No Salmonella spp.  DNA detected   Normal Range:  None detected    Narrative:     ORDER#: [id number]                          ORDERED BY: Joshua Camara   SOURCE: Stool                              COLLECTED:  05/17/22 09:00   ANTIBIOTICS AT MATIAS. :                      RECEIVED :  05/17/22 09:04   Clostridium Difficile Toxin/Antigen [[id number]] Collected: 05/17/22 0925   Order Status: Completed Specimen: Stool Updated: 05/17/22 1042    C.diff Toxin/Antigen --    Negative for Clostridium difficile antigen and toxin   Normal Range: Negative    Narrative:     ORDER#: [id number]                          ORDERED BY: [facility]   SOURCE: Stool                              COLLECTED:  05/17/22 09:25   ANTIBIOTICS AT MATIAS. :                      RECEIVED :  05/17/22 09:39   Collect White vial (sterile container)   Clostridium Difficile Toxin/Antigen [[id number]]    Order Status: Canceled Specimen: Stool           Ref Range & Units 5/23/22 0836   Cell Count Fluid Type  Ascitic Fluid    Color, Fluid  Yellow    Appearance, Fluid  Hazy    Clot Eval.  see below    Comment: No Clots Seen   Nucl Cell, Fluid /cumm 151    RBC, Fluid /cumm 2,000    Neutrophil Count, Fluid % 15    Lymphocytes, Body Fluid % 8    Monocyte Count, Fluid % 5    Macrophages % 66    Mesothelial, Fluid % 6    Number of Cells Counted Fluid  100    Volume mL 1000.0    Comment: APPROXIMATELY        Susceptibility      Enterobacter cloacae complex (1)    Antibiotic Interpretation Microscan  Method Status    ceFAZolin Resistant >=64 mcg/mL BACTERIAL SUSCEPTIBILITY PANEL BY LIVE     cefepime Sensitive <=0.12 mcg/mL BACTERIAL SUSCEPTIBILITY PANEL BY LIVE     ciprofloxacin Sensitive <=0.25 mcg/mL BACTERIAL SUSCEPTIBILITY PANEL BY LIVE     ertapenem Sensitive <=0.12 mcg/mL BACTERIAL SUSCEPTIBILITY PANEL BY LIVE     gentamicin Sensitive <=1 mcg/mL BACTERIAL SUSCEPTIBILITY PANEL BY LIVE     levofloxacin Sensitive <=0.12 mcg/mL BACTERIAL SUSCEPTIBILITY PANEL BY LIVE     piperacillin-tazobactam Sensitive <=4 mcg/mL BACTERIAL SUSCEPTIBILITY PANEL BY LIVE     trimethoprim-sulfamethoxazole Sensitive <=20 mcg/mL BACTERIAL SUSCEPTIBILITY PANEL BY LIVE       Respiratory Culture:  Lab Results   Component Value Date    LABGRAM 1+ Gram negative rods  No WBC's seen   05/24/2022     AFB:No results found for: AFBSMEAR  Viral Culture:  No results found for: COVID19  Urine Culture: No results for input(s): Roberto Goodwin in the last 72 hours. IMAGING:    IR NONTUNNELED VASCULAR CATHETER > 5 YEARS   Final Result   Successful ultrasound and fluoroscopy guided left IJ 20 cm non-tunneled   hemodialysis catheter placement. IR REMOVE TUNNELED CVAD WO SQ PORT/PUMP   Final Result   Successful removal of the right IJ tunneled central catheter. VL Extremity Venous Right   Final Result      US GUIDED PARACENTESIS   Final Result   Successful paracentesis. CT ORBITS W WO CONTRAST   Final Result   Severe anasarca with proportionate bilateral orbital fat edema. No orbital hemorrhage or mass. RECOMMENDATIONS:   Unavailable         MRI ABDOMEN WO CONTRAST MRCP   Final Result   1. Incomplete exam. Patient declined rest of the study, no diffusion or axial   T2 obtained. Limited diagnostic quality exam.   2. Large volume ascites. Diffuse marked body wall edema. 3. Moderate diffuse hepatic steatosis better visualized on CT than on MR. No   focal lesions in the liver. 4. Contracted gallbladder containing a gallstone. Common bile duct is poorly   visualized. 5. Marked cardiomegaly, with bilateral small pleural effusions and bibasilar   consolidations. Small-bowel wall edema noted, this could be due to   surrounding ascites/hypoproteinemia. CT CHEST WO CONTRAST   Final Result   1. Findings compatible with pulmonary edema with small pleural effusions. 2.  Body wall edema and upper abdominal ascites. CT ABDOMEN PELVIS WO CONTRAST Additional Contrast? None   Final Result   1. No ileus or obstruction. No definite inflammatory bowel process though   very limited given the extent of ascites.    2. Moderate volume ascites seen in the abdomen and pelvis, similar to the   prior exam.   3. Cardiomegaly with small bilateral effusions, left greater than right as   well as dependent ground-glass change which may represent dependent pulmonary   edema. 4. Mild anasarca. 5. Mild renal atrophy. 6. Cholelithiasis with a contracted gallbladder. XR CHEST PORTABLE   Final Result   Stable moderate cardiomegaly with mild central pulmonary congestion which is   more apparent.       Glenn Badder left retrocardiac atelectasis or early infiltrate      Status post right-sided dialysis catheter placement in good position         IR TUNNELED CVC PLACE WO SQ PORT/PUMP > 5 YEARS    (Results Pending)         All the pertinent images and reports for the current Hospitalization were reviewed by me     Scheduled Meds:   cefepime  1,000 mg IntraVENous Q24H    lactulose  30 g Oral TID    [Held by provider] clopidogrel  75 mg Oral Daily    allopurinol  100 mg Oral Daily    levothyroxine  50 mcg Oral QAM AC    metoprolol succinate  12.5 mg Oral Daily    midodrine  5 mg Oral TID WC    pantoprazole  40 mg Oral BID AC    sodium chloride flush  5-40 mL IntraVENous 2 times per day       Continuous Infusions:   dextrose 50 mL/hr at 05/27/22 5357    dextrose Stopped (05/19/22 1427)    sodium chloride         PRN Meds:  loperamide, glucose, dextrose bolus **OR** dextrose bolus, glucagon (rDNA), dextrose, sodium chloride flush, sodium chloride, ondansetron, heparin (porcine)      Assessment:     Patient Active Problem List   Diagnosis    ED (erectile dysfunction)    Essential hypertension, benign    Hyperlipidemia    Diabetic nephropathy (HCC)    Diabetes type 2, uncontrolled ([address]    Noncompliance    Elevated blood uric acid level    Coronary artery disease involving native coronary artery    Elevated PSA    Acute on chronic combined systolic and diastolic HF (heart failure) (HCC)    Stage 3 chronic kidney disease (HCC)    [facility]    Biventricular heart failure (HCC)    Acute on chronic systolic heart failure, NYHA class 4 (HCC)    Acute on chronic congestive heart failure (HCC)    LV (left ventricular) mural thrombus    Syncope    CHF (congestive heart failure) (HCC)    Syncope and collapse    ICD (implantable cardioverter-defibrillator) in place    Demand ischemia (HCC)    Ventricular tachycardia (HCC)    Elevated LFTs    Supratherapeutic INR    Diarrhea        ESRD on HD  DM with several complications  Cirrhosis of liver from ? CHF  H/O Biventricular Heart failure  Ascites +  S/p Paracentesis fluid negative for infection  LV thrombus +  HD line infection   S/p HD line removal by IR on 5/24   Stool studies  -ve on admit  Bacteremia Enterobacter cloacae   Acute hypoxic resp failure on nasal cannula  Using  2 lts now       IR Removed HD line and noted to have purulence and cx sent and await Micro results noted    HD line tip cx Enterobacter    Blood cx from 5/24 now positive for Enterobacter and concerning for CLABSI - will need repeat Blood cx to check for clearance     S/P Temp line placement and HD in progress      Bili still high from cardiac Cirrhosis and hepatopathy     Labs, Microbiology, Radiology and all the pertinent results from current hospitalization and  care every where were reviewed  by me as a part of the evaluation   Plan:   1. Cont IV Cefepime x 1 gm q 24 hrs  2. BP low and Cardiac cirrhosis noted   3. HD line tip cx Enterobacter   4. HD line site cx Enterobacter  5. Blood cx from 5/24 now positive for Enterobacter   6. Repeat blood cx 5/25 NGTD  7. Prognosis poor from multiple medical issues   8. S/p Temp HD line placement by IR       Discussed with patient/Family and Nursing   Risk of Complications/Morbidity: High      · Illness(es)/ Infection present that pose threat to bodily function. · There is potential for severe exacerbation of infection/side effects of treatment.   Therapy requires intensive monitoring for antimicrobial agent toxicity      Discussed with patient/Family and Nursing staff     Thanks for allowing me to participate in your patient's care and please call me with any questions or concerns.     Stacey Lindquist MD  Infectious Disease  [facility]) Physician  Phone: [phone]   Fax : [phone]

## 2022-05-27 NOTE — PLAN OF CARE
Problem: Discharge Planning  Goal: Discharge to home or other facility with appropriate resources  Outcome: Progressing  Flowsheets (Taken 5/26/2022 1955)  Discharge to home or other facility with appropriate resources: Identify barriers to discharge with patient and caregiver     Problem: Chronic Conditions and Co-morbidities  Goal: Patient's chronic conditions and co-morbidity symptoms are monitored and maintained or improved  Outcome: Progressing  Flowsheets (Taken 5/26/2022 1955)  Care Plan - Patient's Chronic Conditions and Co-Morbidity Symptoms are Monitored and Maintained or Improved: Monitor and assess patient's chronic conditions and comorbid symptoms for stability, deterioration, or improvement     Problem: Safety - Adult  Goal: Free from fall injury  Outcome: Progressing  Flowsheets (Taken 5/27/2022 0251)  Free From Fall Injury: Instruct family/caregiver on patient safety     Problem: ABCDS Injury Assessment  Goal: Absence of physical injury  Outcome: Progressing  Flowsheets (Taken 5/27/2022 0251)  Absence of Physical Injury: Implement safety measures based on patient assessment     Problem: Cardiovascular - Adult  Goal: Maintains optimal cardiac output and hemodynamic stability  Recent Flowsheet Documentation  Taken 5/26/2022 1955 by Chirag Toussaint RN  Maintains optimal cardiac output and hemodynamic stability: Monitor blood pressure and heart rate     Problem: Cardiovascular - Adult  Goal: Absence of cardiac dysrhythmias or at baseline  Recent Flowsheet Documentation  Taken 5/26/2022 1955 by Chirag Toussaint RN  Absence of cardiac dysrhythmias or at baseline: Monitor cardiac rate and rhythm

## 2022-05-27 NOTE — FLOWSHEET NOTE
05/27/22 1238 05/27/22 1610   Vital Signs   BP (!) 87/23 103/66   Temp (!) 96.5 °F (35.8 °C) (!) 95.7 °F (35.4 °C)   Heart Rate 61 85   Resp 20 20     Treatment time: 3.5 hours    Net UF: No UF    Pre weight: 67.6 kg (bed scale)  Post weight: 67.7 kg (bed scale)  EDW: 77 kg    Access used: [facility] HD NT Cath  Access function: Lines reversed due to high  AP alarms    Medications or blood products given: None    Regular outpatient schedule: [facility] TTS    Summary of response to treatment: Tolerated 3.5 hour HD tx with hypotension at onset and throughout tx, SBP 80- 90's. Copy of dialysis treatment record placed in chart, to be scanned into EMR.     Report called to Adam Lema RN

## 2022-05-27 NOTE — PROGRESS NOTES
Comprehensive Nutrition Assessment    Type and Reason for Visit:  Reassess    Nutrition Recommendations/Plan:   1. Continue current diet  2. Continue ONS  3. Monitor meal and supplement intake  4. Monitor pertient labs     Malnutrition Assessment:  Malnutrition Status: At risk for malnutrition (Comment) (05/24/22 1305)    Context:  Acute Illness     Findings of the 6 clinical characteristics of malnutrition:  Energy Intake:  50% or less of estimated energy requirements for 5 or more days  Weight Loss:  Unable to assess (fluid shifts)     Body Fat Loss:  No significant body fat loss     Muscle Mass Loss:  No significant muscle mass loss    Fluid Accumulation:  No significant fluid accumulation     Strength:  Not Performed    Nutrition Assessment:    Pt out of room at HD at time of attempted visit. Meal intake recorded as 1-25% and %. Not able to determine intake of ONS so will continue. Found HD line infected. C Diff and diarrhea workup negative. Abdominal MRI found large volume ascites. Pt has DM II with CYNTHIA 134 and A1C of 7.5. Nutrition Related Findings:    BM 5/27, trace BLE edema Wound Type: None       Current Nutrition Intake & Therapies:    Average Meal Intake: 1-25%,%  Average Supplements Intake: Unable to assess  ADULT DIET; Regular; Low Sodium (2 gm); 2000 ml  ADULT ORAL NUTRITION SUPPLEMENT; Breakfast, Dinner; Renal Oral Supplement    Anthropometric Measures:  Height: 5' 6\" (167.6 cm)  Ideal Body Weight (IBW): 142 lbs (65 kg)       Current Body Weight: 150 lb 2.1 oz (68.1 kg), 107 % IBW. Weight Source: Bed Scale  Current BMI (kg/m2): 24.2                          BMI Categories: Normal Weight (BMI 18.5-24. 9)    Estimated Daily Nutrient Needs:        Energy (kcal/day): 6188-9044 kcal (25-30 kcal/kg 69 kg CBW)     Protein (g/day):  gm (1.4- 1.8 gm/kg 68 kg CBW)     Fluid (ml/day): 2000 mL FR.     Nutrition Diagnosis:   · Inadequate oral intake related to inadequate protein-energy intake as evidenced by intake 0-25%,intake 26-50%      Nutrition Interventions:   Food and/or Nutrient Delivery: Continue Current Diet,Continue Oral Nutrition Supplement  Nutrition Education/Counseling: No recommendation at this time  Coordination of Nutrition Care: Continue to monitor while inpatient       Goals:  Previous Goal Met: Progressing toward Goal(s)  Goals: PO intake 50% or greater,within 2 days       Nutrition Monitoring and Evaluation:   Behavioral-Environmental Outcomes: None Identified  Food/Nutrient Intake Outcomes: Food and Nutrient Intake,Supplement Intake  Physical Signs/Symptoms Outcomes: Biochemical Data,Weight,Skin,Fluid Status or Edema,Nutrition Focused Physical Findings    Discharge Planning:     Too soon to determine     Keke Rubio, [address] [place] [address], LD  Contact: [phone]

## 2022-05-27 NOTE — PROGRESS NOTES
[facility]                                               Progress note    Summary:   Hilda Tejada is being seen by nephrology for ESRD on HD. Admitted with  Diarrhea. MRI abdomen showing large volume ascites diffuse body wall edema, moderate diffuse hepatic steatosis: Cardiomegaly bilateral small pleural effusions and bibasilar consolidations    Interval History  Seen at bedside  More awake now. BP soft. Labs reviewed. Acidotic tody  Pre-HD weight 68.1 kg today  Blood cx NGTD. Had a temp HD line placed yesterday. Plan:   - HD today per schedule. - try to UF as able. - plan for a TDC placement on Monday once blood cx are confirmed negative and about 5-6 days since Plavix is held. - continue abx for [facility] tunnel infection. Dimas Morgan MD  [facility]  Office: [phone]    Assessment:   #ESRD  [facility]. Has TDC and has been referred for vein mapping and AVF/AVG placement. ,  TIME 3.3 Hours  Na 138   K 2  Ca 2.5   Bicarb 35   180nRe    #BP/Volume  Hypervolemic. BP acceptable. He has severe systolic heart failure and BP runs low. Has required inotrope support in the past   On midodrine 5 mg TID. UF as tolerated, challenge dry weight. On metop 12.5 mg BID. EDW 77 kilos    #Metabolic acidosis  Address with dialysis. #SHPT  Last  and phos 6.3  Is supposed to be on phos binder. Will add renvela. #Anemia  Last tsat 12% ferritin 174  He will need IV iron with HD      Abdominal pain. Patient presented to emergency with severe diarrhea over the past 3 days, C. difficile negative, diarrhea work-up negative.        Transaminitis. History of cirrhosis. Seems to be improving, , , bilirubin 7.2. Possibly related to congestive heart failure. patient scheduled for MRCP        ROS:   Positives Listed Bold. All other remaining systems are negative.     Constitutional:  fever, chills, weakness, weight change, fatigue,      Skin:  rash, pruritus, hair loss, bruising, dry skin, petechiae. Head, Face, Neck   headaches, swelling,  cervical adenopathy. Respiratory: shortness of breath, cough, or wheezing  Cardiovascular: chest pain, palpitations, dizzy, edema  Gastrointestinal: nausea, vomiting, diarrhea, constipation,belly pain    Yellow skin, blood in stool  Musculoskeletal:  back pain, muscle weakness, gait problems,       joint pain or swelling. Genitourinary:  dysuria, poor urine flow, flank pain, blood in urine  Neurologic:  vertigo, TIA'S, syncope, seizures, focal weakness  Psychosocial:  insomnia, anxiety, or depression. Additional positive findings: -     PMH:   Past medical history, surgical history, social history, family history are reviewed and updated as appropriate. Reviewed current medication list.   Allergies reviewed and updated as needed. PE:   Vitals:    05/27/22 0900   BP:    Pulse:    Resp:    Temp: 98 °F (36.7 °C)   SpO2:        General appearance:  in NAD, somnolent, poorly arousable   HEENT: EOM intact, no icterus. Trachea is midline. Neck : No masses, appears symmetrical, no JVD  Respiratory: Respiratory effort appears normal, bilateral equal chest rise, no wheeze, +crackles  Cardiovascular: Ausculation shows RRR + edema  Abdomen: No visible mass or tenderness, non distended. Musculoskeletal:  Joints with no swelling or deformity. Skin:no rashes, ulcers, induration, no jaundice. Neuro: face symmetric, no focal deficits. Appropriate responses. Tunneled dialysis catheter  small opening at the IJ stick site indicating that it had not fully closed. There is no other catheter exposure.  Glue placed over it      Lab Results   Component Value Date    CREATININE 4.6 (H) 05/27/2022    BUN 54 (H) 05/27/2022     (L) 05/27/2022    K 3.9 05/27/2022    CL 91 (L) 05/27/2022    CO2 19 (L) 05/27/2022      Lab Results   Component Value Date    WBC 4.6 05/27/2022    HGB 9.1 (L) 05/27/2022 HCT 26.4 (L) 05/27/2022    MCV 98.9 05/27/2022    PLT 73 (L) 05/27/2022     Lab Results   Component Value Date    .8 (H) 04/07/2022    CALCIUM 8.7 05/27/2022    PHOS 3.4 04/07/2022

## 2022-05-27 NOTE — PLAN OF CARE
Problem: Discharge Planning  Goal: Discharge to home or other facility with appropriate resources  5/27/2022 0936 by Lex Dong RN  Outcome: Progressing  5/27/2022 0252 by Angie Daily RN  Outcome: Progressing  Flowsheets (Taken 5/26/2022 1955)  Discharge to home or other facility with appropriate resources: Identify barriers to discharge with patient and caregiver     Problem: Chronic Conditions and Co-morbidities  Goal: Patient's chronic conditions and co-morbidity symptoms are monitored and maintained or improved  5/27/2022 0936 by Lex Dong RN  Outcome: Progressing  5/27/2022 0252 by Angie Daily RN  Outcome: Progressing  Flowsheets (Taken 5/26/2022 1955)  Care Plan - Patient's Chronic Conditions and Co-Morbidity Symptoms are Monitored and Maintained or Improved: Monitor and assess patient's chronic conditions and comorbid symptoms for stability, deterioration, or improvement     Problem: Safety - Adult  Goal: Free from fall injury  5/27/2022 0936 by Lex Dong RN  Outcome: Progressing  5/27/2022 0252 by Angie Daily RN  Outcome: Progressing  Flowsheets (Taken 5/27/2022 0251)  Free From Fall Injury: Instruct family/caregiver on patient safety     Problem: ABCDS Injury Assessment  Goal: Absence of physical injury  5/27/2022 0936 by Lex Dong RN  Outcome: Progressing  5/27/2022 0252 by Angie Daily RN  Outcome: Progressing  Flowsheets (Taken 5/27/2022 0251)  Absence of Physical Injury: Implement safety measures based on patient assessment     Problem: Skin/Tissue Integrity  Goal: Absence of new skin breakdown  Description: 1. Monitor for areas of redness and/or skin breakdown  2. Assess vascular access sites hourly  3. Every 4-6 hours minimum:  Change oxygen saturation probe site  4. Every 4-6 hours:  If on nasal continuous positive airway pressure, respiratory therapy assess nares and determine need for appliance change or resting period.   Outcome: Progressing     Problem: Respiratory - Adult  Goal: Achieves optimal ventilation and oxygenation  Outcome: Progressing  Flowsheets (Taken 5/26/2022 1955 by Lori Faulkner RN)  Achieves optimal ventilation and oxygenation: Assess for changes in respiratory status     Problem: Cardiovascular - Adult  Goal: Maintains optimal cardiac output and hemodynamic stability  Outcome: Progressing  Flowsheets (Taken 5/26/2022 1955 by Lori Faulkner RN)  Maintains optimal cardiac output and hemodynamic stability: Monitor blood pressure and heart rate  Goal: Absence of cardiac dysrhythmias or at baseline  Outcome: Progressing  Flowsheets (Taken 5/26/2022 1955 by Lori Faulkner RN)  Absence of cardiac dysrhythmias or at baseline: Monitor cardiac rate and rhythm     Problem: Hematologic - Adult  Goal: Maintains hematologic stability  Outcome: Progressing  Flowsheets (Taken 5/26/2022 1955 by Lori Faulkner RN)  Maintains hematologic stability: Assess for signs and symptoms of bleeding or hemorrhage     Problem: Nutrition Deficit:  Goal: Optimize nutritional status  Outcome: Progressing

## 2022-05-27 NOTE — PROGRESS NOTES
Hospitalist Progress Note      PCP: Daija Smith DO    Date of Admission: 5/17/2022    Chief Complaint: Diarrhea    Hospital Course:   74years old male with medical history significant for end-stage renal disease patient is on hemodialysis. Patient was admitted with generalized weakness and diarrhea. C. difficile was negative. Diarrhea work-up was negative.     MRI of the abdomen showed large volume ascites diffuse body wall edema moderate diffuse hepatic steatosis. Cardiomegaly bilateral small pleural effusions and bibasilar consolidation. Patient was later found to have dialysis line infection, septicemia, s/p removal by IR 5/24. Temporary dialysis line inserted 5/27    Subjective:   Denies any complaints, vitally stable    Medications:  Reviewed    Infusion Medications    dextrose 50 mL/hr at 05/27/22 1785    dextrose Stopped (05/19/22 1427)    sodium chloride       Scheduled Medications    cefepime  1,000 mg IntraVENous Q24H    lactulose  30 g Oral TID    [Held by provider] clopidogrel  75 mg Oral Daily    allopurinol  100 mg Oral Daily    levothyroxine  50 mcg Oral QAM AC    metoprolol succinate  12.5 mg Oral Daily    midodrine  5 mg Oral TID WC    pantoprazole  40 mg Oral BID AC    sodium chloride flush  5-40 mL IntraVENous 2 times per day     PRN Meds: loperamide, glucose, dextrose bolus **OR** dextrose bolus, glucagon (rDNA), dextrose, sodium chloride flush, sodium chloride, ondansetron, heparin (porcine)      Intake/Output Summary (Last 24 hours) at 5/27/2022 1256  Last data filed at 5/27/2022 3451  Gross per 24 hour   Intake 1194.1 ml   Output 5 ml   Net 1189.1 ml       Physical Exam Performed:    BP 95/69   Pulse 80   Temp 98 °F (36.7 °C) (Oral)   Resp 20   Ht 5' 6\" (1.676 m)   Wt 150 lb 2.1 oz (68.1 kg)   SpO2 100%   BMI 24.23 kg/m²     General appearance: No apparent distress, appears stated age and cooperative. HEENT: Pupils equal, round, and reactive to light. Conjunctivae/corneas clear. Neck: Supple, with full range of motion. No jugular venous distention. Trachea midline. Temporary dialysis line noted left side of the chest  Respiratory:  Normal respiratory effort. Clear to auscultation, bilaterally without Rales/Wheezes/Rhonchi. Dressing noted where HD line been removed  Cardiovascular: Regular rate and rhythm with normal S1/S2 without murmurs, rubs or gallops. Abdomen: Soft, non-tender, non-distended with normal bowel sounds. Musculoskeletal: Moderate lower limb edema  Skin: Skin color, texture, turgor normal.  No rashes or lesions. Neurologic:  Neurovascularly intact without any focal sensory/motor deficits. Cranial nerves: II-XII intact, grossly non-focal.  Psychiatric: Alert and oriented, thought content appropriate, normal insight  Capillary Refill: Brisk,3 seconds, normal   Peripheral Pulses: +2 palpable, equal bilaterally       Labs:   Recent Labs     05/25/22  0721 05/26/22 0446 05/27/22  0551   WBC 4.2 5.2 4.6   HGB 9.4* 9.0* 9.1*   HCT 27.5* 25.9* 26.4*   PLT 86* 86* 73*     Recent Labs     05/25/22  0721 05/26/22 0446 05/27/22  0551   * 128* 129*   K 3.8 3.7 3.9   CL 90* 90* 91*   CO2 21 20* 19*   BUN 39* 46* 54*   CREATININE 3.5* 4.2* 4.6*   CALCIUM 8.8 8.7 8.7     Recent Labs     05/25/22  0721 05/26/22 0446 05/27/22  0551   * 156* 144*   * 104* 99*   BILITOT 12.2* 12.5* 11.7*   ALKPHOS 216* 215* 209*     Recent Labs     05/25/22  0721 05/26/22 0446 05/27/22  0551   INR 2.23* 1.93* 1.75*     No results for input(s): CKTOTAL, TROPONINI in the last 72 hours.     Urinalysis:      Lab Results   Component Value Date    NITRU Negative 04/10/2022    WBCUA 0-2 04/10/2022    BACTERIA 2+ 04/09/2022    RBCUA 0-2 04/10/2022    BLOODU Negative 04/10/2022    SPECGRAV 1.025 04/10/2022    GLUCOSEU Negative 04/10/2022       Radiology:  IR NONTUNNELED VASCULAR CATHETER > 5 YEARS   Final Result   Successful ultrasound and fluoroscopy guided left IJ 20 cm non-tunneled   hemodialysis catheter placement. IR REMOVE TUNNELED CVAD WO SQ PORT/PUMP   Final Result   Successful removal of the right IJ tunneled central catheter. VL Extremity Venous Right   Final Result      US GUIDED PARACENTESIS   Final Result   Successful paracentesis. CT ORBITS W WO CONTRAST   Final Result   Severe anasarca with proportionate bilateral orbital fat edema. No orbital hemorrhage or mass. RECOMMENDATIONS:   Unavailable         MRI ABDOMEN WO CONTRAST MRCP   Final Result   1. Incomplete exam. Patient declined rest of the study, no diffusion or axial   T2 obtained. Limited diagnostic quality exam.   2. Large volume ascites. Diffuse marked body wall edema. 3. Moderate diffuse hepatic steatosis better visualized on CT than on MR. No   focal lesions in the liver. 4. Contracted gallbladder containing a gallstone. Common bile duct is poorly   visualized. 5. Marked cardiomegaly, with bilateral small pleural effusions and bibasilar   consolidations. Small-bowel wall edema noted, this could be due to   surrounding ascites/hypoproteinemia. CT CHEST WO CONTRAST   Final Result   1. Findings compatible with pulmonary edema with small pleural effusions. 2.  Body wall edema and upper abdominal ascites. CT ABDOMEN PELVIS WO CONTRAST Additional Contrast? None   Final Result   1. No ileus or obstruction. No definite inflammatory bowel process though   very limited given the extent of ascites. 2. Moderate volume ascites seen in the abdomen and pelvis, similar to the   prior exam.   3. Cardiomegaly with small bilateral effusions, left greater than right as   well as dependent ground-glass change which may represent dependent pulmonary   edema. 4. Mild anasarca. 5. Mild renal atrophy. 6. Cholelithiasis with a contracted gallbladder.          XR CHEST PORTABLE   Final Result   Stable moderate cardiomegaly with mild central pulmonary congestion which is   more apparent. Marco Antonio Gia left retrocardiac atelectasis or early infiltrate      Status post right-sided dialysis catheter placement in good position         IR TUNNELED CVC PLACE WO SQ PORT/PUMP > 5 YEARS    (Results Pending)           Assessment/Plan:    Active Hospital Problems    Diagnosis     Elevated LFTs [R79.89]      Priority: Medium    Supratherapeutic INR [R79.1]      Priority: Medium    Diarrhea [R19.7]      Priority: Medium    CHF (congestive heart failure) ([facility]) [I50.9]     ICD (implantable cardioverter-defibrillator) in place [Z95.810]     Biventricular heart failure ([address] [I50.82]      1. Diarrhea. Abdominal pain. Diarrhea work-up was negative  Diarrhea and abdominal pain improving        2. Transaminitis and cirrhosis + Ascites. Significantly improving  Possibly related to congestive heart failure.   GI on board  Large volume ascites noted on MRI-paracentesis done, no sign of infection  Contracted gallbladder containing stones.     3. Hypoglycemia likely related to cirrhosis and DMII. ADAT  Start hypoglycemia protocol     4. Elevated INR. vitamin K,   inr 1.75     5. Enterobacter cloacae bacteremia/  HD line tunneled infection. Catheter had previously had erosions through the skin, was noted to be grossly infected, s/p removal 5/24. Culture tip as well as intraoperative cultures growing Enterobacter cloacae, blood cultures well growing Enterobacter. Temporary dialysis line inserted 5/27  -Follow sensitivity.  -Follow repeated blood cultures  -Appreciate ID input  -Continue on vancomycin and cefepime started 5/24  -Plan for tunneled catheter once cultures negative possibly Monday     6. ESRD on HD. Discussed with nephrology, line removed 5/24. Patient planned for tunneled dialysis line insertion tomorrow, Plavix on hold     7. Systolic heart failure. Ejection fraction less than 20%. Volume status adjusted during dialysis     7. Anemia. Continue IV iron     8. Hypoglycemia  Continue on D10  Check cortisol in a.m.     9. Retro-orbital/ conjunctival hemorrhage with exopthalmos  -CT head without contrast shows edema     10. RLE swelling > Left leg  -US to r/o DVT     11. Hepatic encephalopathy with asterixis. Alert this morning. Ammonia levels-wnl.   -cont lactulose 30g po tid  -Had a 1100ml paracentesis  during admission    DVT Prophylaxis: lovenox  Diet: ADULT DIET; Regular; Low Sodium (2 gm); 2000 ml  ADULT ORAL NUTRITION SUPPLEMENT; Breakfast, Dinner; Renal Oral Supplement  Code Status: Full Code    PT/OT Eval Status: following.      Dispo - pending clinical improvement      Thuy Murray MD

## 2022-05-28 NOTE — PLAN OF CARE
Problem: Discharge Planning  Goal: Discharge to home or other facility with appropriate resources  5/28/2022 1430 by Alexx Hook RN  Outcome: Progressing  5/28/2022 0142 by Linh Mitchell RN  Outcome: Progressing     Problem: Chronic Conditions and Co-morbidities  Goal: Patient's chronic conditions and co-morbidity symptoms are monitored and maintained or improved  5/28/2022 1430 by Alexx Hook RN  Outcome: Progressing  5/28/2022 0142 by Linh Mitchell RN  Outcome: Progressing     Problem: Safety - Adult  Goal: Free from fall injury  5/28/2022 1430 by Alexx Hook RN  Outcome: Progressing  5/28/2022 0142 by Linh Mitchell RN  Outcome: Adequate for Discharge  Flowsheets (Taken 5/27/2022 0251 by Silvina Velazquez RN)  Free From Fall Injury: Instruct family/caregiver on patient safety     Problem: ABCDS Injury Assessment  Goal: Absence of physical injury  5/28/2022 1430 by Alexx Hook RN  Outcome: Progressing  5/28/2022 0142 by Linh Mitchell RN  Outcome: Adequate for Discharge  Flowsheets (Taken 5/28/2022 0142)  Absence of Physical Injury: Implement safety measures based on patient assessment     Problem: Skin/Tissue Integrity  Goal: Absence of new skin breakdown  Description: 1. Monitor for areas of redness and/or skin breakdown  2. Assess vascular access sites hourly  3. Every 4-6 hours minimum:  Change oxygen saturation probe site  4. Every 4-6 hours:  If on nasal continuous positive airway pressure, respiratory therapy assess nares and determine need for appliance change or resting period.   5/28/2022 1430 by Alexx Hook RN  Outcome: Progressing  5/28/2022 0142 by Linh Mitchell RN  Outcome: Progressing     Problem: Respiratory - Adult  Goal: Achieves optimal ventilation and oxygenation  5/28/2022 1430 by Alexx Hook RN  Outcome: Progressing  5/28/2022 0142 by Linh Mitchell RN  Flowsheets (Taken 5/28/2022 0142)  Achieves optimal ventilation and oxygenation:   Assess for changes in respiratory status   Assess for changes in mentation and behavior     Problem: Cardiovascular - Adult  Goal: Maintains optimal cardiac output and hemodynamic stability  5/28/2022 1430 by Stefanie Rothman RN  Outcome: Progressing  5/28/2022 0142 by Hannah Kirby RN  Outcome: Progressing  Flowsheets (Taken 5/28/2022 0142)  Maintains optimal cardiac output and hemodynamic stability:   Monitor blood pressure and heart rate   Monitor urine output and notify Licensed Independent Practitioner for values outside of normal range   Assess for signs of decreased cardiac output  Goal: Absence of cardiac dysrhythmias or at baseline  5/28/2022 1430 by Stefanie Rothman RN  Outcome: Progressing  5/28/2022 0142 by Hannah Kirby RN  Outcome: Progressing  Flowsheets (Taken 5/28/2022 0142)  Absence of cardiac dysrhythmias or at baseline:   Monitor cardiac rate and rhythm   Assess for signs of decreased cardiac output     Problem: Hematologic - Adult  Goal: Maintains hematologic stability  5/28/2022 1430 by Stefanie Rothman RN  Outcome: Progressing  5/28/2022 0142 by Hannah Kirby RN  Outcome: Progressing  Flowsheets (Taken 5/26/2022 1955 by Zoey Olivarez RN)  Maintains hematologic stability: Assess for signs and symptoms of bleeding or hemorrhage     Problem: Nutrition Deficit:  Goal: Optimize nutritional status  5/28/2022 1430 by Stefanie Rothman RN  Outcome: Progressing  5/28/2022 0142 by Hannah Kirby RN  Outcome: Progressing

## 2022-05-28 NOTE — PLAN OF CARE
Problem: Discharge Planning  Goal: Discharge to home or other facility with appropriate resources  Outcome: Progressing     Problem: Skin/Tissue Integrity  Goal: Absence of new skin breakdown  Outcome: Progressing     Problem: Cardiovascular - Adult  Goal: Maintains optimal cardiac output and hemodynamic stability  Outcome: Progressing  Flowsheets (Taken 5/28/2022 0142)  Maintains optimal cardiac output and hemodynamic stability:   Monitor blood pressure and heart rate   Monitor urine output and notify Licensed Independent Practitioner for values outside of normal range   Assess for signs of decreased cardiac output     Problem: Hematologic - Adult  Goal: Maintains hematologic stability  Outcome: Progressing  Flowsheets (Taken 5/26/2022 1955 by Amarjit Hunt RN)  Maintains hematologic stability: Assess for signs and symptoms of bleeding or hemorrhage     Problem: ABCDS Injury Assessment  Goal: Absence of physical injury  Outcome: Adequate for Discharge  Flowsheets (Taken 5/28/2022 0142)  Absence of Physical Injury: Implement safety measures based on patient assessment     Problem: Respiratory - Adult  Goal: Achieves optimal ventilation and oxygenation  Flowsheets (Taken 5/28/2022 0142)  Achieves optimal ventilation and oxygenation:   Assess for changes in respiratory status   Assess for changes in mentation and behavior

## 2022-05-28 NOTE — PROGRESS NOTES
[facility]                                               Progress note    Summary:   Meena Matos is being seen by nephrology for ESRD on HD. Admitted with  Diarrhea. MRI abdomen showing large volume ascites diffuse body wall edema, moderate diffuse hepatic steatosis: Cardiomegaly bilateral small pleural effusions and bibasilar consolidations    Interval History and Plan:   The patient's newly inserted dialysis is bleeding as per his PCP, the dressing have changed to twice  The patient is running high to leukemia, but is also getting D10 at 50 mL/h  By reviewing his chart, apparently has been running intermittent hypoglycemia, as low as 41    The patient potassium was 3.5, bicarb 20, BUN 31, creatinine 3  No urine output was documented    The patient's very edematous    The patient will need volume off, however, the patient will not require dialysis for today. I will decrease his D10 infusion rate to 30 mL/h    I have reviewed his medications, and did not see any concerning medication from renal aspect    For the patient low blood pressure, I will increase his ProAmatine to 10 mg 3 times daily. The patient was seen and examined in his room  The patient was very lethargic, arousable  The patient looked pale and frail, but the patient was under no acute distress  The patient required supplemental oxygen  The patient most recent set of vital signs showed temperature 97.8, heart rate 83, blood pressure 95/78    Lab work from this morning showed sodium 129, potassium 3.5, bicarb 20, BUN 31, creatinine 3, glucose was 169, calcium 8.4,    WBC 4.6, hemoglobin 8.8 and the platelet was 81. Bereket Loving MD  [facility]  Office: [phone]    Assessment:   #ESRD  On HD TTS at [facility]. Has TDC and has been referred for vein mapping and AVF/AVG placement. ,  TIME 3.3 Hours  Na 138   K 2  Ca 2.5   Bicarb 35   180nRe    #BP/Volume  Hypervolemic. BP acceptable.  He has No masses, appears symmetrical, no JVD  Respiratory: Respiratory effort appears normal, bilateral equal chest rise, no wheeze, +crackles  Cardiovascular: Ausculation shows RRR + edema  Abdomen: No visible mass or tenderness, non distended. Musculoskeletal:  Joints with no swelling or deformity. Skin:no rashes, ulcers, induration, no jaundice. Neuro: face symmetric, no focal deficits. Appropriate responses. Tunneled dialysis catheter  small opening at the IJ stick site indicating that it had not fully closed. There is no other catheter exposure.  Glue placed over it      Lab Results   Component Value Date    CREATININE 3.0 (H) 05/28/2022    BUN 31 (H) 05/28/2022     (L) 05/28/2022    K 3.5 05/28/2022    CL 91 (L) 05/28/2022    CO2 20 (L) 05/28/2022      Lab Results   Component Value Date    WBC 4.6 05/28/2022    HGB 8.8 (L) 05/28/2022    HCT 26.1 (L) 05/28/2022    .4 (H) 05/28/2022    PLT 81 (L) 05/28/2022     Lab Results   Component Value Date    .8 (H) 04/07/2022    CALCIUM 8.4 05/28/2022    PHOS 3.4 04/07/2022

## 2022-05-28 NOTE — PROGRESS NOTES
Hospitalist Progress Note      PCP: Augustin Powell DO    Date of Admission: 5/17/2022    Chief Complaint: Diarrhea    Hospital Course:   74years old male with medical history significant for end-stage renal disease patient is on hemodialysis. Patient was admitted with generalized weakness and diarrhea. C. difficile was negative. Diarrhea work-up was negative.     MRI of the abdomen showed large volume ascites diffuse body wall edema moderate diffuse hepatic steatosis. Cardiomegaly bilateral small pleural effusions and bibasilar consolidation. Patient was later found to have dialysis line infection, septicemia, s/p removal by IR 5/24. Temporary dialysis line inserted 5/27    Subjective:   Denies any complaints, vitally stable, underwent HD yesterday     Medications:  Reviewed    Infusion Medications    dextrose 50 mL/hr at 05/28/22 0443    dextrose Stopped (05/19/22 1427)    sodium chloride       Scheduled Medications    cefepime  1,000 mg IntraVENous Q24H    lactulose  30 g Oral TID    [Held by provider] clopidogrel  75 mg Oral Daily    allopurinol  100 mg Oral Daily    levothyroxine  50 mcg Oral QAM AC    metoprolol succinate  12.5 mg Oral Daily    midodrine  5 mg Oral TID WC    pantoprazole  40 mg Oral BID AC    sodium chloride flush  5-40 mL IntraVENous 2 times per day     PRN Meds: loperamide, glucose, dextrose bolus **OR** dextrose bolus, glucagon (rDNA), dextrose, sodium chloride flush, sodium chloride, ondansetron, heparin (porcine)      Intake/Output Summary (Last 24 hours) at 5/28/2022 1118  Last data filed at 5/28/2022 0902  Gross per 24 hour   Intake 50 ml   Output --   Net 50 ml       Physical Exam Performed:    /73   Pulse 88   Temp 98.6 °F (37 °C) (Oral)   Resp 20   Ht 5' 6\" (1.676 m)   Wt 153 lb 3.5 oz (69.5 kg)   SpO2 94%   BMI 24.73 kg/m²     General appearance: No apparent distress, appears stated age and cooperative.   HEENT: Pupils equal, round, and reactive to light. Conjunctivae/corneas clear. Neck: Supple, with full range of motion. No jugular venous distention. Trachea midline. Temporary dialysis line noted left side of the chest  Respiratory:  Normal respiratory effort. Clear to auscultation, bilaterally without Rales/Wheezes/Rhonchi. Dressing noted where HD line been removed  Cardiovascular: Regular rate and rhythm with normal S1/S2 without murmurs, rubs or gallops. Abdomen: Soft, non-tender, non-distended with normal bowel sounds. Musculoskeletal: Moderate lower limb edema  Skin: Skin color, texture, turgor normal.  No rashes or lesions. Neurologic:  Neurovascularly intact without any focal sensory/motor deficits. Cranial nerves: II-XII intact, grossly non-focal.  Psychiatric: Alert and oriented, thought content appropriate, normal insight  Capillary Refill: Brisk,3 seconds, normal   Peripheral Pulses: +2 palpable, equal bilaterally       Labs:   Recent Labs     05/26/22 0446 05/27/22  0551 05/28/22  0537   WBC 5.2 4.6 4.6   HGB 9.0* 9.1* 8.8*   HCT 25.9* 26.4* 26.1*   PLT 86* 73* 81*     Recent Labs     05/26/22 0446 05/27/22  0551 05/28/22  0537   * 129* 129*   K 3.7 3.9 3.5   CL 90* 91* 91*   CO2 20* 19* 20*   BUN 46* 54* 31*   CREATININE 4.2* 4.6* 3.0*   CALCIUM 8.7 8.7 8.4     Recent Labs     05/26/22 0446 05/27/22  0551 05/28/22  0537   * 144* 120*   * 99* 89*   BILITOT 12.5* 11.7* 10.1*   ALKPHOS 215* 209* 210*     Recent Labs     05/26/22 0446 05/27/22  0551 05/28/22  0537   INR 1.93* 1.75* 1.79*     No results for input(s): CKTOTAL, TROPONINI in the last 72 hours.     Urinalysis:      Lab Results   Component Value Date    NITRU Negative 04/10/2022    WBCUA 0-2 04/10/2022    BACTERIA 2+ 04/09/2022    RBCUA 0-2 04/10/2022    BLOODU Negative 04/10/2022    SPECGRAV 1.025 04/10/2022    GLUCOSEU Negative 04/10/2022       Radiology:  IR NONTUNNELED VASCULAR CATHETER > 5 YEARS   Final Result   Successful ultrasound and fluoroscopy guided left IJ 20 cm non-tunneled   hemodialysis catheter placement. IR REMOVE TUNNELED CVAD WO SQ PORT/PUMP   Final Result   Successful removal of the right IJ tunneled central catheter. VL Extremity Venous Right   Final Result      US GUIDED PARACENTESIS   Final Result   Successful paracentesis. CT ORBITS W WO CONTRAST   Final Result   Severe anasarca with proportionate bilateral orbital fat edema. No orbital hemorrhage or mass. RECOMMENDATIONS:   Unavailable         MRI ABDOMEN WO CONTRAST MRCP   Final Result   1. Incomplete exam. Patient declined rest of the study, no diffusion or axial   T2 obtained. Limited diagnostic quality exam.   2. Large volume ascites. Diffuse marked body wall edema. 3. Moderate diffuse hepatic steatosis better visualized on CT than on MR. No   focal lesions in the liver. 4. Contracted gallbladder containing a gallstone. Common bile duct is poorly   visualized. 5. Marked cardiomegaly, with bilateral small pleural effusions and bibasilar   consolidations. Small-bowel wall edema noted, this could be due to   surrounding ascites/hypoproteinemia. CT CHEST WO CONTRAST   Final Result   1. Findings compatible with pulmonary edema with small pleural effusions. 2.  Body wall edema and upper abdominal ascites. CT ABDOMEN PELVIS WO CONTRAST Additional Contrast? None   Final Result   1. No ileus or obstruction. No definite inflammatory bowel process though   very limited given the extent of ascites. 2. Moderate volume ascites seen in the abdomen and pelvis, similar to the   prior exam.   3. Cardiomegaly with small bilateral effusions, left greater than right as   well as dependent ground-glass change which may represent dependent pulmonary   edema. 4. Mild anasarca. 5. Mild renal atrophy. 6. Cholelithiasis with a contracted gallbladder.          XR CHEST PORTABLE   Final Result   Stable moderate cardiomegaly with mild central pulmonary congestion which is   more apparent. Iram Damon left retrocardiac atelectasis or early infiltrate      Status post right-sided dialysis catheter placement in good position         IR TUNNELED CVC PLACE WO SQ PORT/PUMP > 5 YEARS    (Results Pending)           Assessment/Plan:    Active Hospital Problems    Diagnosis     Elevated LFTs [R79.89]      Priority: Medium    Supratherapeutic INR [R79.1]      Priority: Medium    Diarrhea [R19.7]      Priority: Medium    CHF (congestive heart failure) (HCC) [I50.9]     ICD (implantable cardioverter-defibrillator) in place [Z95.810]     Biventricular heart failure ([address] [I50.82]      1. Diarrhea. Abdominal pain. Diarrhea work-up was negative  Diarrhea and abdominal pain improving        2. Transaminitis and cirrhosis + Ascites. Significantly improving  Possibly related to congestive heart failure.   GI on board  Large volume ascites noted on MRI-paracentesis done, no sign of infection  Contracted gallbladder containing stones.     3. Hypoglycemia likely related to cirrhosis and DMII. ADAT  Start hypoglycemia protocol     4. Elevated INR. vitamin K,   inr 1.75     5. Enterobacter cloacae bacteremia/  HD line tunneled infection. Catheter had previously had erosions through the skin, was noted to be grossly infected, s/p removal 5/24. Culture tip as well as intraoperative cultures growing Enterobacter cloacae, blood cultures well growing Enterobacter. Temporary dialysis line inserted 5/27  -Follow sensitivity.  -Follow repeated blood cultures 5/26 ( negative to date)  -Appreciate ID input  -Continue on vancomycin and cefepime started 5/24  -Plan for tunneled catheter once cultures negative possibly Monday     6. ESRD on HD. Discussed with nephrology, line removed 5/24. Patient planned for tunneled dialysis line insertion Monday , Plavix on hold. Underwent temp line insertion 2/27     7. Systolic heart failure.   Ejection fraction less than 20%.  Volume status adjusted during dialysis     7. Anemia. Continue IV iron     8. Hypoglycemia  Continue on D10  Check cortisol in a.m.     9. Retro-orbital/ conjunctival hemorrhage with exopthalmos  -CT head without contrast shows edema     10. RLE swelling > Left leg  -US to r/o DVT     11. Hepatic encephalopathy with asterixis. Alert this morning. Ammonia levels-wnl.   -cont lactulose 30g po tid  -Had a 1100ml paracentesis  during admission    DVT Prophylaxis: lovenox  Diet: ADULT DIET; Regular; Low Sodium (2 gm); 2000 ml  ADULT ORAL NUTRITION SUPPLEMENT; Breakfast, Dinner; Renal Oral Supplement  Diet NPO Exceptions are: Sips of Water with Meds  Code Status: Full Code    PT/OT Eval Status: following.      Dispo - pending clinical improvement      Elizabeth Guajardo MD

## 2022-05-29 NOTE — PROGRESS NOTES
Pupils equal, round, and reactive to light. Conjunctivae/corneas clear. Neck: Supple, with full range of motion. No jugular venous distention. Trachea midline. Temporary dialysis line noted left side of the chest  Respiratory:  Normal respiratory effort. Clear to auscultation, bilaterally without Rales/Wheezes/Rhonchi. Dressing noted where HD line been removed  Cardiovascular: Regular rate and rhythm with normal S1/S2 without murmurs, rubs or gallops. Abdomen: Soft, non-tender, non-distended with normal bowel sounds. Musculoskeletal: Moderate lower limb edema  Skin: Skin color, texture, turgor normal.  No rashes or lesions. Neurologic:  Neurovascularly intact without any focal sensory/motor deficits. Cranial nerves: II-XII intact, grossly non-focal.  Psychiatric: Alert and oriented, thought content appropriate, normal insight  Capillary Refill: Brisk,3 seconds, normal   Peripheral Pulses: +2 palpable, equal bilaterally       Labs:   Recent Labs     05/27/22  0551 05/28/22 0537 05/29/22  0514   WBC 4.6 4.6 3.8*   HGB 9.1* 8.8* 9.2*   HCT 26.4* 26.1* 27.7*   PLT 73* 81* 90*     Recent Labs     05/27/22  0551 05/28/22  0537 05/29/22  0513   * 129* 133*   K 3.9 3.5 3.5   CL 91* 91* 96*   CO2 19* 20* 22   BUN 54* 31* 36*   CREATININE 4.6* 3.0* 3.4*   CALCIUM 8.7 8.4 8.5     Recent Labs     05/27/22  0551 05/28/22  0537 05/29/22  0513   * 120* 97*   ALT 99* 89* 76*   BILITOT 11.7* 10.1* 8.0*   ALKPHOS 209* 210* 191*     Recent Labs     05/27/22  0551 05/28/22  0537 05/29/22  0514   INR 1.75* 1.79* 1.66*     No results for input(s): CKTOTAL, TROPONINI in the last 72 hours.     Urinalysis:      Lab Results   Component Value Date    NITRU Negative 04/10/2022    WBCUA 0-2 04/10/2022    BACTERIA 2+ 04/09/2022    RBCUA 0-2 04/10/2022    BLOODU Negative 04/10/2022    SPECGRAV 1.025 04/10/2022    GLUCOSEU Negative 04/10/2022       Radiology:  IR NONTUNNELED VASCULAR CATHETER > 5 YEARS   Final Result Successful ultrasound and fluoroscopy guided left IJ 20 cm non-tunneled   hemodialysis catheter placement. IR REMOVE TUNNELED CVAD WO SQ PORT/PUMP   Final Result   Successful removal of the right IJ tunneled central catheter. VL Extremity Venous Right   Final Result      US GUIDED PARACENTESIS   Final Result   Successful paracentesis. CT ORBITS W WO CONTRAST   Final Result   Severe anasarca with proportionate bilateral orbital fat edema. No orbital hemorrhage or mass. RECOMMENDATIONS:   Unavailable         MRI ABDOMEN WO CONTRAST MRCP   Final Result   1. Incomplete exam. Patient declined rest of the study, no diffusion or axial   T2 obtained. Limited diagnostic quality exam.   2. Large volume ascites. Diffuse marked body wall edema. 3. Moderate diffuse hepatic steatosis better visualized on CT than on MR. No   focal lesions in the liver. 4. Contracted gallbladder containing a gallstone. Common bile duct is poorly   visualized. 5. Marked cardiomegaly, with bilateral small pleural effusions and bibasilar   consolidations. Small-bowel wall edema noted, this could be due to   surrounding ascites/hypoproteinemia. CT CHEST WO CONTRAST   Final Result   1. Findings compatible with pulmonary edema with small pleural effusions. 2.  Body wall edema and upper abdominal ascites. CT ABDOMEN PELVIS WO CONTRAST Additional Contrast? None   Final Result   1. No ileus or obstruction. No definite inflammatory bowel process though   very limited given the extent of ascites. 2. Moderate volume ascites seen in the abdomen and pelvis, similar to the   prior exam.   3. Cardiomegaly with small bilateral effusions, left greater than right as   well as dependent ground-glass change which may represent dependent pulmonary   edema. 4. Mild anasarca. 5. Mild renal atrophy. 6. Cholelithiasis with a contracted gallbladder.          XR CHEST PORTABLE   Final Result   Stable moderate cardiomegaly with mild central pulmonary congestion which is   more apparent. Fercho Valdez left retrocardiac atelectasis or early infiltrate      Status post right-sided dialysis catheter placement in good position         IR TUNNELED CVC PLACE WO SQ PORT/PUMP > 5 YEARS    (Results Pending)           Assessment/Plan:    Active Hospital Problems    Diagnosis     Elevated LFTs [R79.89]      Priority: Medium    Supratherapeutic INR [R79.1]      Priority: Medium    Diarrhea [R19.7]      Priority: Medium    CHF (congestive heart failure) (HCC) [I50.9]     ICD (implantable cardioverter-defibrillator) in place [Z95.810]     Biventricular heart failure ([address] [I50.82]      1. Diarrhea. Abdominal pain. Diarrhea work-up was negative  Diarrhea and abdominal pain improving        2. Transaminitis and cirrhosis + Ascites. Significantly improving  Possibly related to congestive heart failure.   GI on board  Large volume ascites noted on MRI-paracentesis done, no sign of infection  Contracted gallbladder containing stones.     3. Hypoglycemia likely related to cirrhosis and DMII. ADAT  Start hypoglycemia protocol     4. Elevated INR. vitamin K,   inr 1.665. Enterobacter cloacae bacteremia/  HD line tunneled infection. Catheter had previously had erosions through the skin, was noted to be grossly infected, s/p removal 5/24. Culture tip as well as intraoperative cultures growing Enterobacter cloacae, blood cultures well growing Enterobacter. (Sensitive to cefepime)   Temporary dialysis line inserted 5/27  -Follow repeated blood cultures 5/26 ( negative to date)  -Appreciate ID input  -Continue on vancomycin and cefepime started 5/24  -Plan for tunneled catheter once cultures negative possibly Monday     6. ESRD on HD. Discussed with nephrology, line removed 5/24. Patient planned for tunneled dialysis line insertion Monday , Plavix on hold. Underwent temp line insertion 2/27     7.  Systolic heart failure. Ejection fraction less than 20%. Volume status adjusted during dialysis     7. Anemia. Continue IV iron     8. Hypoglycemia  Continue on D10  Check cortisol in a.m.     9. Retro-orbital/ conjunctival hemorrhage with exopthalmos  -CT head without contrast shows edema     10. RLE swelling > Left leg  -US to r/o DVT     11. Hepatic encephalopathy with asterixis. Alert this morning. Ammonia levels-wnl.   -cont lactulose 30g po tid  -Had a 1100ml paracentesis  during admission    DVT Prophylaxis: lovenox  Diet: ADULT DIET; Regular; Low Sodium (2 gm); 2000 ml  ADULT ORAL NUTRITION SUPPLEMENT; Breakfast, Dinner; Renal Oral Supplement  Diet NPO Exceptions are: Sips of Water with Meds  Code Status: Full Code    PT/OT Eval Status: following.      Dispo - pending clinical improvement      Timothy Remy MD

## 2022-05-29 NOTE — PROGRESS NOTES
D: pt's metoprolol was held this morning. BP was 98/80 and HR was in 70s.  A: this RN sent secure message to Dr. Linus Dickinson concerning this R: will continue to monitor pt

## 2022-05-29 NOTE — PLAN OF CARE
Problem: Discharge Planning  Goal: Discharge to home or other facility with appropriate resources  5/28/2022 2308 by Davon Rios RN  Outcome: Progressing  Flowsheets  Taken 5/28/2022 1944 by Davon Rios RN  Discharge to home or other facility with appropriate resources: Identify barriers to discharge with patient and caregiver  Taken 5/28/2022 1705 by Angelina Machado RN  Discharge to home or other facility with appropriate resources: Identify barriers to discharge with patient and caregiver     Problem: Chronic Conditions and Co-morbidities  Goal: Patient's chronic conditions and co-morbidity symptoms are monitored and maintained or improved  5/28/2022 2308 by Davon Rios RN  Outcome: Progressing  Flowsheets  Taken 5/28/2022 1944 by Trudell Sport, [address] - Patient's Chronic Conditions and Co-Morbidity Symptoms are Monitored and Maintained or Improved: Monitor and assess patient's chronic conditions and comorbid symptoms for stability, deterioration, or improvement  Taken 5/28/2022 1705 by Keenan Sanders 34 - Patient's Chronic Conditions and Co-Morbidity Symptoms are Monitored and Maintained or Improved: Monitor and assess patient's chronic conditions and comorbid symptoms for stability, deterioration, or improvement     Problem: Safety - Adult  Goal: Free from fall injury  5/28/2022 2308 by Davon Rios RN  Outcome: Progressing  Flowsheets (Taken 5/28/2022 2300)  Free From Fall Injury: Instruct family/caregiver on patient safety     Problem: ABCDS Injury Assessment  Goal: Absence of physical injury  5/28/2022 2308 by Davon Rios RN  Outcome: Progressing  Flowsheets (Taken 5/28/2022 2300)  Absence of Physical Injury: Implement safety measures based on patient assessment     Problem: Skin/Tissue Integrity  Goal: Absence of new skin breakdown  Description: 1. Monitor for areas of redness and/or skin breakdown  2. Assess vascular access sites hourly  3.   Every 4-6 hours minimum:  Change oxygen saturation probe site  4. Every 4-6 hours:  If on nasal continuous positive airway pressure, respiratory therapy assess nares and determine need for appliance change or resting period.   5/28/2022 2308 by Nevaeh Schaffer RN  Outcome: Progressing     Problem: Respiratory - Adult  Goal: Achieves optimal ventilation and oxygenation  5/28/2022 2308 by Nevaeh Schaffer RN  Outcome: Progressing  Flowsheets (Taken 5/28/2022 1705 by Lillian Arias RN)  Achieves optimal ventilation and oxygenation: Assess for changes in respiratory status     Problem: Cardiovascular - Adult  Goal: Maintains optimal cardiac output and hemodynamic stability  5/28/2022 2308 by Nevaeh Schaffer RN  Outcome: Progressing  Flowsheets  Taken 5/28/2022 1944 by Nevaeh Schaffer RN  Maintains optimal cardiac output and hemodynamic stability: Monitor blood pressure and heart rate  Taken 5/28/2022 1705 by Lillian Arias RN  Maintains optimal cardiac output and hemodynamic stability: Monitor blood pressure and heart rate     Problem: Nutrition Deficit:  Goal: Optimize nutritional status  5/28/2022 2308 by Nevaeh Schaffer RN  Outcome: Progressing

## 2022-05-29 NOTE — PROGRESS NOTES
[facility]                                               Progress note    Summary:   Hilda Tejada is being seen by nephrology for ESRD on HD. Admitted with  Diarrhea. MRI abdomen showing large volume ascites diffuse body wall edema, moderate diffuse hepatic steatosis: Cardiomegaly bilateral small pleural effusions and bibasilar consolidations    Interval History and Plan:   The patient is on scheduled for dialysis for tomorrow  Will need to be more aggressive on volume removal  Contact dialysis nurse already  As of now, there is no need for dialysis  The patient's dialysis catheter insertion site bleeding has stopped  The patient stated that he does not do well, but there is no specific complaints  Reviewed his medications, I do not see any concerning medications. The patient neuropathy was not documented for yesterday    The patient was seen and examined in his room  The patient was resting  The patient appears to be somewhat uncomfortable, but under no acute distress  He looked pale and frail  Supplemental oxygen via nasal cannula  Most recent set of vital signs showed temperature of 97.8, heart rate 77 and blood pressure 107/59  Lab work from this morning showed sodium 133, potassium 3.5, bicarb 22, BUN 36, creatinine was 3.4, glucose was 113, calcium 8.5, magnesium 1.9    WBC 3.8, hemoglobin 9.2 and platelet was 90. Joyce Camejo MD  [facility]  Office: [phone]    Assessment:   #ESRD  On HD TTS at [address]. Has TDC and has been referred for vein mapping and AVF/AVG placement. ,  TIME 3.3 Hours  Na 138   K 2  Ca 2.5   Bicarb 35   180nRe    #BP/Volume  Hypervolemic. BP acceptable. He has severe systolic heart failure and BP runs low. Has required inotrope support in the past   On midodrine 5 mg TID. UF as tolerated, challenge dry weight. On metop 12.5 mg BID. EDW 77 kilos    #Metabolic acidosis  Address with dialysis.      #SHPT  Last  and phos swelling or deformity. Skin:no rashes, ulcers, induration, no jaundice. Neuro: face symmetric, no focal deficits. Appropriate responses. Tunneled dialysis catheter  small opening at the IJ stick site indicating that it had not fully closed. There is no other catheter exposure.  Glue placed over it      Lab Results   Component Value Date    CREATININE 3.4 (H) 05/29/2022    BUN 36 (H) 05/29/2022     (L) 05/29/2022    K 3.5 05/29/2022    CL 96 (L) 05/29/2022    CO2 22 05/29/2022      Lab Results   Component Value Date    WBC 3.8 (L) 05/29/2022    HGB 9.2 (L) 05/29/2022    HCT 27.7 (L) 05/29/2022    MCV 99.4 05/29/2022    PLT 90 (L) 05/29/2022     Lab Results   Component Value Date    .8 (H) 04/07/2022    CALCIUM 8.5 05/29/2022    PHOS 3.4 04/07/2022

## 2022-05-30 NOTE — PLAN OF CARE
Problem: Discharge Planning  Goal: Discharge to home or other facility with appropriate resources  5/30/2022 1334 by Ryan Layne RN  Outcome: Progressing  Flowsheets (Taken 5/30/2022 0830)  Discharge to home or other facility with appropriate resources: Identify barriers to discharge with patient and caregiver     Problem: Chronic Conditions and Co-morbidities  Goal: Patient's chronic conditions and co-morbidity symptoms are monitored and maintained or improved  5/30/2022 1334 by Ryan Layne RN  Outcome: Progressing  Flowsheets (Taken 5/30/2022 0830)  Care Plan - Patient's Chronic Conditions and Co-Morbidity Symptoms are Monitored and Maintained or Improved: Monitor and assess patient's chronic conditions and comorbid symptoms for stability, deterioration, or improvement       Problem: Safety - Adult  Goal: Free from fall injury  5/30/2022 1334 by Ryan Layne RN  Outcome: Progressing       Problem: ABCDS Injury Assessment  Goal: Absence of physical injury  5/30/2022 1334 by Ryan Layne RN  Outcome: Progressing       Problem: Skin/Tissue Integrity  Goal: Absence of new skin breakdown  Description: 1. Monitor for areas of redness and/or skin breakdown  2. Assess vascular access sites hourly  3. Every 4-6 hours minimum:  Change oxygen saturation probe site  4. Every 4-6 hours:  If on nasal continuous positive airway pressure, respiratory therapy assess nares and determine need for appliance change or resting period.   5/30/2022 1334 by Ryan Layne RN  Outcome: Progressing       Problem: Respiratory - Adult  Goal: Achieves optimal ventilation and oxygenation  5/30/2022 1334 by Ryan Layne RN  Outcome: Progressing  Flowsheets (Taken 5/30/2022 0830)  Achieves optimal ventilation and oxygenation: Assess for changes in respiratory status       Problem: Cardiovascular - Adult  Goal: Maintains optimal cardiac output and hemodynamic stability  5/30/2022 1334 by Ryan Layne RN  Outcome: Progressing  Flowsheets (Taken 5/30/2022 0830)  Maintains optimal cardiac output and hemodynamic stability: Monitor blood pressure and heart rate    Goal: Absence of cardiac dysrhythmias or at baseline  5/30/2022 1334 by Miguel Ángel Servin RN  Outcome: Progressing  Flowsheets (Taken 5/30/2022 0830)  Absence of cardiac dysrhythmias or at baseline: Monitor cardiac rate and rhythm     Problem: Hematologic - Adult  Goal: Maintains hematologic stability  5/30/2022 1334 by Miguel Ángel Servin RN  Outcome: Progressing  Flowsheets (Taken 5/30/2022 0830)  Maintains hematologic stability: Assess for signs and symptoms of bleeding or hemorrhage     Problem: Nutrition Deficit:  Goal: Optimize nutritional status  5/30/2022 1334 by Miguel Ángel Servin RN  Outcome: Progressing       Problem: Pain  Goal: Verbalizes/displays adequate comfort level or baseline comfort level  5/30/2022 1334 by Miguel Ángel Servin RN  Outcome: Progressing

## 2022-05-30 NOTE — PLAN OF CARE
Problem: Discharge Planning  Goal: Discharge to home or other facility with appropriate resources  5/29/2022 2359 by Vanessa Beltrán RN  Outcome: Progressing  Flowsheets (Taken 5/29/2022 1940)  Discharge to home or other facility with appropriate resources: Identify barriers to discharge with patient and caregiver     Problem: Chronic Conditions and Co-morbidities  Goal: Patient's chronic conditions and co-morbidity symptoms are monitored and maintained or improved  5/29/2022 2359 by Vanessa Beltrán RN  Outcome: Progressing  Flowsheets (Taken 5/29/2022 1940)  Care Plan - Patient's Chronic Conditions and Co-Morbidity Symptoms are Monitored and Maintained or Improved: Monitor and assess patient's chronic conditions and comorbid symptoms for stability, deterioration, or improvement     Problem: Safety - Adult  Goal: Free from fall injury  5/29/2022 2359 by Vanessa Beltrán RN  Outcome: Progressing  Flowsheets (Taken 5/29/2022 2327)  Free From Fall Injury: Instruct family/caregiver on patient safety     Problem: ABCDS Injury Assessment  Goal: Absence of physical injury  5/29/2022 2359 by Vanessa Beltrán RN  Outcome: Progressing  Flowsheets (Taken 5/29/2022 2327)  Absence of Physical Injury: Implement safety measures based on patient assessment     Problem: Skin/Tissue Integrity  Goal: Absence of new skin breakdown  Description: 1. Monitor for areas of redness and/or skin breakdown  2. Assess vascular access sites hourly  3. Every 4-6 hours minimum:  Change oxygen saturation probe site  4. Every 4-6 hours:  If on nasal continuous positive airway pressure, respiratory therapy assess nares and determine need for appliance change or resting period.   5/29/2022 2359 by Vanessa Beltrán RN  Outcome: Progressing     Problem: Pain  Goal: Verbalizes/displays adequate comfort level or baseline comfort level  5/29/2022 2359 by Vanessa Beltrán RN  Outcome: Progressing

## 2022-05-30 NOTE — PROGRESS NOTES
Hospitalist Progress Note      PCP: Kristine Burden DO    Date of Admission: 5/17/2022    Chief Complaint: Diarrhea    Hospital Course:   74years old male with medical history significant for end-stage renal disease patient is on hemodialysis. Patient was admitted with generalized weakness and diarrhea. C. difficile was negative. Diarrhea work-up was negative.     MRI of the abdomen showed large volume ascites diffuse body wall edema moderate diffuse hepatic steatosis. Cardiomegaly bilateral small pleural effusions and bibasilar consolidation. Patient was later found to have dialysis line infection, septicemia, s/p removal by IR 5/24. Temporary dialysis line inserted 5/27    Subjective:   Reported improved symptoms, was ambulated to chair yesterday.   Patient is motivated to pursue further physical therapy    Medications:  Reviewed    Infusion Medications    dextrose 30 mL/hr at 05/28/22 1703    dextrose Stopped (05/19/22 1427)    sodium chloride       Scheduled Medications    midodrine  10 mg Oral TID WC    cefepime  1,000 mg IntraVENous Q24H    lactulose  30 g Oral TID    [Held by provider] clopidogrel  75 mg Oral Daily    allopurinol  100 mg Oral Daily    levothyroxine  50 mcg Oral QAM AC    metoprolol succinate  12.5 mg Oral Daily    pantoprazole  40 mg Oral BID AC    sodium chloride flush  5-40 mL IntraVENous 2 times per day     PRN Meds: loperamide, glucose, dextrose bolus **OR** dextrose bolus, glucagon (rDNA), dextrose, sodium chloride flush, sodium chloride, ondansetron, heparin (porcine)      Intake/Output Summary (Last 24 hours) at 5/30/2022 1339  Last data filed at 5/30/2022 0600  Gross per 24 hour   Intake 622.11 ml   Output --   Net 622.11 ml       Physical Exam Performed:    /62   Pulse 77   Temp 97.4 °F (36.3 °C)   Resp 27   Ht 5' 6\" (1.676 m)   Wt 153 lb 3.5 oz (69.5 kg)   SpO2 95%   BMI 24.73 kg/m²     General appearance: No apparent distress, appears stated age and cooperative. HEENT: Pupils equal, round, and reactive to light. Conjunctivae/corneas clear. Neck: Supple, with full range of motion. No jugular venous distention. Trachea midline. Temporary dialysis line noted left side of the chest  Respiratory:  Normal respiratory effort. Clear to auscultation, bilaterally without Rales/Wheezes/Rhonchi. Dressing noted where HD line been removed  Cardiovascular: Regular rate and rhythm with normal S1/S2 without murmurs, rubs or gallops. Abdomen: Soft, non-tender, non-distended with normal bowel sounds. Musculoskeletal: Moderate lower limb edema  Skin: Skin color, texture, turgor normal.  No rashes or lesions. Neurologic:  Neurovascularly intact without any focal sensory/motor deficits. Cranial nerves: II-XII intact, grossly non-focal.  Psychiatric: Alert and oriented, thought content appropriate, normal insight  Capillary Refill: Brisk,3 seconds, normal   Peripheral Pulses: +2 palpable, equal bilaterally       Labs:   Recent Labs     05/28/22 0537 05/29/22 0514 05/30/22 0527   WBC 4.6 3.8* 5.0   HGB 8.8* 9.2* 9.5*   HCT 26.1* 27.7* 28.5*   PLT 81* 90* 95*     Recent Labs     05/28/22 0537 05/29/22 0513 05/30/22 0527   * 133* 133*   K 3.5 3.5 3.9   CL 91* 96* 95*   CO2 20* 22 21   BUN 31* 36* 44*   CREATININE 3.0* 3.4* 4.2*   CALCIUM 8.4 8.5 8.4     Recent Labs     05/28/22 0537 05/29/22 0513 05/30/22 0527   * 97* 82*   ALT 89* 76* 67*   BILITOT 10.1* 8.0* 6.9*   ALKPHOS 210* 191* 203*     Recent Labs     05/28/22 0537 05/29/22 0514 05/30/22 0527   INR 1.79* 1.66* 1.58*     No results for input(s): CKTOTAL, TROPONINI in the last 72 hours.     Urinalysis:      Lab Results   Component Value Date    NITRU Negative 04/10/2022    WBCUA 0-2 04/10/2022    BACTERIA 2+ 04/09/2022    RBCUA 0-2 04/10/2022    BLOODU Negative 04/10/2022    SPECGRAV 1.025 04/10/2022    GLUCOSEU Negative 04/10/2022       Radiology:  IR NONTUNNELED VASCULAR CATHETER > 5 YEARS   Final Result   Successful ultrasound and fluoroscopy guided left IJ 20 cm non-tunneled   hemodialysis catheter placement. IR REMOVE TUNNELED CVAD WO SQ PORT/PUMP   Final Result   Successful removal of the right IJ tunneled central catheter. VL Extremity Venous Right   Final Result      US GUIDED PARACENTESIS   Final Result   Successful paracentesis. CT ORBITS W WO CONTRAST   Final Result   Severe anasarca with proportionate bilateral orbital fat edema. No orbital hemorrhage or mass. RECOMMENDATIONS:   Unavailable         MRI ABDOMEN WO CONTRAST MRCP   Final Result   1. Incomplete exam. Patient declined rest of the study, no diffusion or axial   T2 obtained. Limited diagnostic quality exam.   2. Large volume ascites. Diffuse marked body wall edema. 3. Moderate diffuse hepatic steatosis better visualized on CT than on MR. No   focal lesions in the liver. 4. Contracted gallbladder containing a gallstone. Common bile duct is poorly   visualized. 5. Marked cardiomegaly, with bilateral small pleural effusions and bibasilar   consolidations. Small-bowel wall edema noted, this could be due to   surrounding ascites/hypoproteinemia. CT CHEST WO CONTRAST   Final Result   1. Findings compatible with pulmonary edema with small pleural effusions. 2.  Body wall edema and upper abdominal ascites. CT ABDOMEN PELVIS WO CONTRAST Additional Contrast? None   Final Result   1. No ileus or obstruction. No definite inflammatory bowel process though   very limited given the extent of ascites. 2. Moderate volume ascites seen in the abdomen and pelvis, similar to the   prior exam.   3. Cardiomegaly with small bilateral effusions, left greater than right as   well as dependent ground-glass change which may represent dependent pulmonary   edema. 4. Mild anasarca. 5. Mild renal atrophy. 6. Cholelithiasis with a contracted gallbladder.          XR CHEST PORTABLE Final Result   Stable moderate cardiomegaly with mild central pulmonary congestion which is   more apparent. John Garcia left retrocardiac atelectasis or early infiltrate      Status post right-sided dialysis catheter placement in good position         IR TUNNELED CVC PLACE WO SQ PORT/PUMP > 5 YEARS    (Results Pending)           Assessment/Plan:    Active Hospital Problems    Diagnosis     Elevated LFTs [R79.89]      Priority: Medium    Supratherapeutic INR [R79.1]      Priority: Medium    Diarrhea [R19.7]      Priority: Medium    CHF (congestive heart failure) (HCC) [I50.9]     ICD (implantable cardioverter-defibrillator) in place [Z95.810]     Biventricular heart failure ([address] [I50.82]      1. Diarrhea. Abdominal pain. Diarrhea work-up was negative  Diarrhea and abdominal pain improving        2. Transaminitis and cirrhosis + Ascites. Significantly improving  Possibly related to congestive heart failure.   GI on board  Large volume ascites noted on MRI-paracentesis done, no sign of infection  Contracted gallbladder containing stones.     3. Hypoglycemia likely related to cirrhosis and DMII. ADAT  Start hypoglycemia protocol     4. Elevated INR. vitamin K,   inr 1.58      5. Enterobacter cloacae bacteremia/  HD line tunneled infection. Catheter had previously had erosions through the skin, was noted to be grossly infected, s/p removal 5/24. Culture tip as well as intraoperative cultures growing Enterobacter cloacae, blood cultures well growing Enterobacter. (Sensitive to cefepime)   Temporary dialysis line inserted 5/27  -Follow repeated blood cultures 5/26 ( negative to date)  -Appreciate ID input  -Continue on vancomycin and cefepime started 5/24  -Plan for tunneled catheter once cultures negative possibly Tuesday     6. ESRD on HD. Discussed with nephrology, line removed 5/24. Patient planned for tunneled dialysis line insertion Tuesday, Plavix on hold.    Underwent temp line insertion 2/27     7. Systolic heart failure. Ejection fraction less than 20%. Volume status adjusted during dialysis     7. Anemia. Continue IV iron     8. Hypoglycemia  Continue on D10  Check cortisol in a.m.     9. Retro-orbital/ conjunctival hemorrhage with exopthalmos  -CT head without contrast shows edema     10. RLE swelling > Left leg  -US to r/o DVT     11. Hepatic encephalopathy with asterixis. Alert this morning. Ammonia levels-wnl.   -cont lactulose 30g po tid  -Had a 1100ml paracentesis  during admission    DVT Prophylaxis: lovenox  Diet: ADULT DIET; Regular; Low Sodium (2 gm); 2000 ml  ADULT ORAL NUTRITION SUPPLEMENT; Breakfast, Dinner; Renal Oral Supplement  Diet NPO Exceptions are: Sips of Water with Meds  Code Status: Full Code    PT/OT Eval Status: following.      Dispo - pending clinical improvement      Bertha Rae MD

## 2022-05-30 NOTE — PROGRESS NOTES
Infectious Disease Follow up Notes  Admit Date: 5/17/2022  Hospital Day: 15    Antibiotics :     IV Cefepime      CHIEF COMPLAINT:     ESRD  HD line infection  Enterobacter infection  Cirrhosis liver   Enterobacter bacteremia     Subjective interval History :  74 y.o. man with past medical history of diabetes, diabetic nephropathy, diabetic neuropathy, hypertension, end-stage renal disease on hemodialysis, congestive heart failure, cirrhosis of liver, admitted to the hospital secondary to diarrhea and fatigue nausea. On admission there was noted to be ulceration along the abdominal HD line. He went to interventional radiology for HD line removal and noted to have purulent fluid along the wound site as well culture was sent and tip culture was sent. Given the ongoing concern for HD line infection we are consulted for IV antibiotic recommendations. He has LFT elevation ongoing secondary to cirrhosis of liver. .  With ascites underwent paracentesis for ascitic fluid negative for infection. He just came back from procedure under sedation unable to provide any history.     Interval History : seen in HD s/p HD new line placement and lower leg swelling better and mentation some improvement and Follow up blood cx negative      Past Medical History:    Past Medical History:   Diagnosis Date    CHF (congestive heart failure) ([address]     Diabetes type 2, uncontrolled ([address] 7/28/2014    Diabetic nephropathy 9/12/2013    ED (erectile dysfunction) 9/12/2013    Essential hypertension, benign 9/12/2013    Hyperlipidemia 9/12/2013    Noncompliance 7/28/2014       Past Surgical History:    Past Surgical History:   Procedure Laterality Date    EYE SURGERY      IR NONTUNNELED VASCULAR CATHETER  04/04/2022    IR NONTUNNELED VASCULAR CATHETER 4/4/2022 [facility]    IR NONTUNNELED VASCULAR CATHETER Left 05/26/2022    Joanne Roman access; 20cm; Dr. Twila Catalan  2022    IR NONTUNNELED VASCULAR CATHETER 2022 [facility]    IR TUNNELED CATHETER PLACEMENT GREATER THAN 5 YEARS  2022    IR TUNNELED CATHETER PLACEMENT GREATER THAN 5 YEARS 2022 [facility]       Current Medications:    Outpatient Medications Marked as Taking for the 22 encounter [facility] Encounter)   Medication Sig Dispense Refill    carvedilol (COREG) 12.5 MG tablet Take 12.5 mg by mouth 2 times daily (with meals)      clopidogrel (PLAVIX) 75 MG tablet Take 75 mg by mouth daily      isosorbide mononitrate (IMDUR) 30 MG extended release tablet Take 30 mg by mouth daily         Allergies:  Patient has no known allergies. Immunizations :   Immunization History   Administered Date(s) Administered    COVID-19, Pfizer Purple top, DILUTE for use, 12+ yrs, 30mcg/0.3mL dose 2021, 2021, 2021    Influenza Virus Vaccine 2017    Influenza, Azra Whitaker, IM, PF (6 mo and older Fluzone, Flulaval, Fluarix, and 3 yrs and older Afluria) 2021       Social History:   Social History     Tobacco Use    Smoking status: Former Smoker     Packs/day: 1.50     Years: 7.00     Pack years: 10.50     Quit date: 1981     Years since quittin.3    Smokeless tobacco: Never Used   Vaping Use    Vaping Use: Never used   Substance Use Topics    Alcohol use:  Yes     Alcohol/week: 1.0 standard drink     Types: 1 Cans of beer per week    Drug use: No     Social History     Tobacco Use   Smoking Status Former Smoker    Packs/day: 1.50    Years: 7.00    Pack years: 10.50    Quit date: 1981    Years since quittin.3   Smokeless Tobacco Never Used      Family History   Problem Relation Age of Onset    Diabetes Mother     High Blood Pressure Mother     Diabetes Sister     Diabetes Brother          REVIEW OF SYSTEMS:     Not possible due to change  In mentation     PHYSICAL EXAM: Vitals:    /71   Pulse 86   Temp 97.8 °F (36.6 °C) (Axillary)   Resp 28   Ht 5' 6\" (1.676 m)   Wt 150 lb 9.2 oz (68.3 kg)   SpO2 96%   BMI 24.30 kg/m²     General Appearance: awake+  distress,++  pallor, ++ icterus chronic ill appearing man +    Skin: warm and dry, no rash or erythema  Head: normocephalic and atraumatic  Eyes: pupils equal, round, and reactive to light, conjunctivae normal  Bi lateral subconjunctival hemorrhage+  ENT: tympanic membrane, external ear and ear canal normal bilaterally, nose without deformity, nasal mucosa and turbinates normal without polyps  Neck: supple and non-tender without mass, no thyromegaly  no cervical lymphadenopathy  Pulmonary/Chest:  Bi basal crepts+ rhonchi, normal air movement, no respiratory distress  Cardiovascular: normal rate, regular rhythm, normal S1 and S2, no murmurs, rubs, clicks, or gallops, no carotid bruits  Abdomen:  -tender, + -distended,  Ascites++  normal bowel sounds, no masses or organomegaly  Extremities: no cyanosis, clubbing or ++  edema  Musculoskeletal: normal range of motion, no joint swelling, deformity or tenderness  Integumentary: No rashes, no abnormal skin lesions, no petechiae  Neurologic: reflexes normal and symmetric, no cranial nerve deficit  Lines: IV  HD line Left       Data Review:    CBC:   Lab Results   Component Value Date    WBC 4.5 05/31/2022    WBC 4.5 05/31/2022    HGB 9.2 (L) 05/31/2022    HGB 9.2 (L) 05/31/2022    HCT 27.5 (L) 05/31/2022    HCT 27.6 (L) 05/31/2022    .7 (H) 05/31/2022    .3 (H) 05/31/2022     (L) 05/31/2022     (L) 05/31/2022     RENAL:   Lab Results   Component Value Date    CREATININE 3.1 (H) 05/31/2022    BUN 33 (H) 05/31/2022     (L) 05/31/2022    K 3.3 (L) 05/31/2022    CL 97 (L) 05/31/2022    CO2 22 05/31/2022     SED RATE: No results found for: SEDRATE  CK: No results found for: CKTOTAL  CRP: No results found for: CRP  Hepatic Function Panel:   Lab Results   Component Value Date    ALKPHOS 204 05/31/2022    ALT 65 05/31/2022    AST 77 05/31/2022    PROT 6.0 05/31/2022    PROT 7.7 12/13/2011    BILITOT 6.0 05/31/2022    BILIDIR 4.7 05/17/2022    IBILI 1.8 05/17/2022    LABALBU 2.6 05/31/2022     UA:  Lab Results   Component Value Date    COLORU Yellow 04/10/2022    CLARITYU Clear 04/10/2022    GLUCOSEU Negative 04/10/2022    BILIRUBINUR Negative 04/10/2022    KETUA TRACE 04/10/2022    SPECGRAV 1.025 04/10/2022    BLOODU Negative 04/10/2022    PHUR 5.5 04/10/2022    PROTEINU 30 04/10/2022    UROBILINOGEN 1.0 04/10/2022    NITRU Negative 04/10/2022    LEUKOCYTESUR Negative 04/10/2022    LABMICR YES 04/10/2022    URINETYPE NotGiven 04/10/2022      Urine Microscopic:   Lab Results   Component Value Date    BACTERIA 2+ 04/09/2022    COMU see below 04/10/2022    HYALCAST 3-5 04/10/2022    WBCUA 0-2 04/10/2022    RBCUA 0-2 04/10/2022    EPIU 12 04/09/2022     Urine Reflex to Culture:   Lab Results   Component Value Date    URRFLXCULT Not Indicated 04/10/2022         MICRO: cultures reviewed and updated by me   Blood Culture:   Lab Results   Component Value Date    BC No Growth after 4 days of incubation. 05/26/2022    BLOODCULT2 No Growth after 4 days of incubation. 05/26/2022            Culture, Anaerobic and Aerobic [[id number]] (Abnormal) Collected: 05/24/22 1425   Order Status: Completed Specimen: Body Fluid from Neck Updated: 05/25/22 1357    Anaerobic Culture Anaerobic culture further report to follow    Organism Enterobacter cloacae complex Abnormal     WOUND/ABSCESS --    Rare growth   Sensitivity to follow    Narrative:     ORDER#: [id number]                          ORDERED BY: Silvio Hasa   SOURCE: Neck                               COLLECTED:  05/24/22 14:25   ANTIBIOTICS AT MATIAS. :                      RECEIVED :  05/24/22 15:05   Culture, Tip [[id number]] (Abnormal) Collected: 05/24/22 1425   Order Status: Completed Specimen: Neck from Catheter Tip Updated: 05/25/22 1356    Organism Enterobacter cloacae complex Abnormal     Culture Catheter Tip --    >15 colonies   Sensitivity to follow    Narrative:     ORDER#: [id number]                          ORDERED BY: Diane Cordova   SOURCE: Catheter Tip                       COLLECTED:  05/24/22 14:25   ANTIBIOTICS AT MATIAS. :                      RECEIVED :  05/24/22 15:00   Culture, Body Fluid [[id number]] Collected: 05/23/22 0836   Order Status: Completed Specimen: Body Fluid from Ascitic Fluid Updated: 05/25/22 0740    Body Fluid Culture, Sterile --    No growth to date   No growth 36 to 48 hours     Gram Stain Result Cytospin performed,no quantitation   WBC's (Polymorphonuclear)   No Epithelial Cells seen   No organisms seen    Narrative:     ORDER#: [id number]                          ORDERED BY: Jahaira Miranda   SOURCE: Ascites                            COLLECTED:  05/23/22 08:36   ANTIBIOTICS AT MATIAS. :                      RECEIVED :  05/23/22 08:49   Culture, Blood 1 [[id number]] Collected: 05/24/22 1617   Order Status: Sent Specimen: Blood Updated: 05/24/22 1639   Culture, Blood 2 [[id number]] Collected: 05/24/22 1617   Order Status: Sent Specimen: Blood Updated: 05/24/22 1639   Culture, Fungus [[id number]] Collected: 05/24/22 1425   Order Status: Sent Specimen: Body Fluid Updated: 05/24/22 1504   Culture, Body Fluid [[id number]] Collected: 05/24/22 1425   Order Status: Canceled Specimen: Body Fluid    Culture, Anaerobic and Aerobic [[id number]] Collected: 05/23/22 0836   Order Status: Canceled Specimen: Body Fluid from Abdomen    GI Bacterial Pathogens By PCR [[id number]] Collected: 05/17/22 0900   Order Status: Completed Specimen: Stool Updated: 05/17/22 1538    GI Bacterial Pathogens By PCR --    No Shigella spp/EIEC DNA detected   No Shiga toxin-producing gene(s) detected   No Campylobacter spp. (jejuni and coli)DNA detected   No Salmonella spp.  DNA detected   Normal Range:  None detected    Narrative:   ORDER#: [id number]                          ORDERED BY: Romeo Escobar   SOURCE: Stool                              COLLECTED:  05/17/22 09:00   ANTIBIOTICS AT MATIAS. :                      RECEIVED :  05/17/22 09:04   Clostridium Difficile Toxin/Antigen [[id number]] Collected: 05/17/22 0925   Order Status: Completed Specimen: Stool Updated: 05/17/22 1042    C.diff Toxin/Antigen --    Negative for Clostridium difficile antigen and toxin   Normal Range: Negative    Narrative:     ORDER#: [id number]                          ORDERED BY: Michael Burden   SOURCE: Stool                              COLLECTED:  05/17/22 09:25   ANTIBIOTICS AT MATIAS. :                      RECEIVED :  05/17/22 09:39   Collect White vial (sterile container)   Clostridium Difficile Toxin/Antigen [[id number]]    Order Status: Canceled Specimen: Stool           Ref Range & Units 5/23/22 0836   Cell Count Fluid Type  Ascitic Fluid    Color, Fluid  Yellow    Appearance, Fluid  Hazy    Clot Eval.  see below    Comment: No Clots Seen   Nucl Cell, Fluid /cumm 151    RBC, Fluid /cumm 2,000    Neutrophil Count, Fluid % 15    Lymphocytes, Body Fluid % 8    Monocyte Count, Fluid % 5    Macrophages % 66    Mesothelial, Fluid % 6    Number of Cells Counted Fluid  100    Volume mL 1000.0    Comment: APPROXIMATELY        Susceptibility      Enterobacter cloacae complex (1)    Antibiotic Interpretation Microscan  Method Status    ceFAZolin Resistant >=64 mcg/mL BACTERIAL SUSCEPTIBILITY PANEL BY LIVE     cefepime Sensitive <=0.12 mcg/mL BACTERIAL SUSCEPTIBILITY PANEL BY LIVE     ciprofloxacin Sensitive <=0.25 mcg/mL BACTERIAL SUSCEPTIBILITY PANEL BY LIVE     ertapenem Sensitive <=0.12 mcg/mL BACTERIAL SUSCEPTIBILITY PANEL BY LIVE     gentamicin Sensitive <=1 mcg/mL BACTERIAL SUSCEPTIBILITY PANEL BY LIVE     levofloxacin Sensitive <=0.12 mcg/mL BACTERIAL SUSCEPTIBILITY PANEL BY LIVE     piperacillin-tazobactam Sensitive <=4 mcg/mL BACTERIAL SUSCEPTIBILITY PANEL BY LIVE     trimethoprim-sulfamethoxazole Sensitive <=20 mcg/mL BACTERIAL SUSCEPTIBILITY PANEL BY LIVE       Respiratory Culture:  Lab Results   Component Value Date    LABGRAM 1+ Gram negative rods  No WBC's seen   05/24/2022     AFB:No results found for: AFBSMEAR  Viral Culture:  No results found for: COVID19  Urine Culture: No results for input(s): Diego Elise in the last 72 hours. IMAGING:    IR NONTUNNELED VASCULAR CATHETER > 5 YEARS   Final Result   Successful ultrasound and fluoroscopy guided left IJ 20 cm non-tunneled   hemodialysis catheter placement. IR REMOVE TUNNELED CVAD WO SQ PORT/PUMP   Final Result   Successful removal of the right IJ tunneled central catheter. VL Extremity Venous Right   Final Result      US GUIDED PARACENTESIS   Final Result   Successful paracentesis. CT ORBITS W WO CONTRAST   Final Result   Severe anasarca with proportionate bilateral orbital fat edema. No orbital hemorrhage or mass. RECOMMENDATIONS:   Unavailable         MRI ABDOMEN WO CONTRAST MRCP   Final Result   1. Incomplete exam. Patient declined rest of the study, no diffusion or axial   T2 obtained. Limited diagnostic quality exam.   2. Large volume ascites. Diffuse marked body wall edema. 3. Moderate diffuse hepatic steatosis better visualized on CT than on MR. No   focal lesions in the liver. 4. Contracted gallbladder containing a gallstone. Common bile duct is poorly   visualized. 5. Marked cardiomegaly, with bilateral small pleural effusions and bibasilar   consolidations. Small-bowel wall edema noted, this could be due to   surrounding ascites/hypoproteinemia. CT CHEST WO CONTRAST   Final Result   1. Findings compatible with pulmonary edema with small pleural effusions. 2.  Body wall edema and upper abdominal ascites. CT ABDOMEN PELVIS WO CONTRAST Additional Contrast? None   Final Result   1. No ileus or obstruction.   No definite inflammatory bowel process though   very limited given the extent of ascites. 2. Moderate volume ascites seen in the abdomen and pelvis, similar to the   prior exam.   3. Cardiomegaly with small bilateral effusions, left greater than right as   well as dependent ground-glass change which may represent dependent pulmonary   edema. 4. Mild anasarca. 5. Mild renal atrophy. 6. Cholelithiasis with a contracted gallbladder. XR CHEST PORTABLE   Final Result   Stable moderate cardiomegaly with mild central pulmonary congestion which is   more apparent.       Shoaib Yee left retrocardiac atelectasis or early infiltrate      Status post right-sided dialysis catheter placement in good position         IR TUNNELED CVC PLACE WO SQ PORT/PUMP > 5 YEARS    (Results Pending)         All the pertinent images and reports for the current Hospitalization were reviewed by me     Scheduled Meds:   midodrine  10 mg Oral TID WC    cefepime  1,000 mg IntraVENous Q24H    lactulose  30 g Oral TID    [Held by provider] clopidogrel  75 mg Oral Daily    allopurinol  100 mg Oral Daily    levothyroxine  50 mcg Oral QAM AC    metoprolol succinate  12.5 mg Oral Daily    pantoprazole  40 mg Oral BID AC    sodium chloride flush  5-40 mL IntraVENous 2 times per day       Continuous Infusions:   dextrose 30 mL/hr at 05/28/22 1703    dextrose Stopped (05/19/22 1427)    sodium chloride         PRN Meds:  loperamide, glucose, dextrose bolus **OR** dextrose bolus, glucagon (rDNA), dextrose, sodium chloride flush, sodium chloride, ondansetron, heparin (porcine)      Assessment:     Patient Active Problem List   Diagnosis    ED (erectile dysfunction)    Essential hypertension, benign    Hyperlipidemia    Diabetic nephropathy (HCC)    Diabetes type 2, uncontrolled ([facility] [address]    Noncompliance    Elevated blood uric acid level    Coronary artery disease involving native coronary artery    Elevated PSA    Acute on chronic combined systolic and diastolic HF (heart failure) [facility] [facility]    Stage 3 chronic kidney disease ([facility] [facility]    SONAL (acute kidney injury) ([facility] [facility]    Biventricular heart failure (HCC)    Acute on chronic systolic heart failure, NYHA class 4 (HCC)    Acute on chronic congestive heart failure (HCC)    LV (left ventricular) mural thrombus    Syncope    CHF (congestive heart failure) (HCC)    Syncope and collapse    ICD (implantable cardioverter-defibrillator) in place    Demand ischemia (HCC)    Ventricular tachycardia (HCC)    Elevated LFTs    Supratherapeutic INR    Diarrhea        ESRD on HD  DM with several complications  Cirrhosis of liver from ? CHF  H/O Biventricular Heart failure  Ascites +  S/p Paracentesis fluid negative for infection  LV thrombus +  HD line infection   S/p HD line removal by IR on 5/24   Stool studies  -ve on admit  Bacteremia Enterobacter cloacae   Acute hypoxic resp failure on nasal cannula  Using  2 lts now       IR Removed HD line and noted to have purulence and cx sent and  t Micro results noted    HD line tip cx Enterobacter    Blood cx from 5/24 now positive for Enterobacter and concerning for CLABSI - will need repeat Blood cx so far negative      S/P Temp line placement now converted to Perma cath -  Blood cx from 5/26 NGTD      Bili still high from cardiac Cirrhosis and hepatopathy     Labs, Microbiology, Radiology and all the pertinent results from current hospitalization and  care every where were reviewed  by me as a part of the evaluation   Plan:   1. Cont IV Cefepime x 1 gm q 24 hrs will plan to coordinate with HD at d/c for 3  Weeks    2. BP low and Cardiac cirrhosis noted   3. HD line tip cx Enterobacter   4. HD line site cx Enterobacter  5. Blood cx from 5/26 NGTD  6. Prognosis poor from multiple medical issues   7  S/p Perma cath placement on  5/31/22     Discussed with patient/Family and Nursing   Risk of Complications/Morbidity: High      · Illness(es)/ Infection present that pose threat to bodily function. · There is potential for severe exacerbation of infection/side effects of treatment. Therapy requires intensive monitoring for antimicrobial agent toxicity      Discussed with patient/Family and Nursing staff     Thanks for allowing me to participate in your patient's care and please call me with any questions or concerns.     Nicolasa Shah MD  Infectious Disease  [facility]) Physician  Phone: [phone]   Fax : [phone]

## 2022-05-30 NOTE — FLOWSHEET NOTE
Treatment time: 3 hours  Net UF: 1500 ml     Pre weight: 69.5 kg   Post weight: 68 kg  EDW: TBD kg     Access used: LIJ NTDC  Access function: Good with  ml/min     Medications or blood products given: N/A     Regular outpatient schedule: TTS     Summary of response to treatment:      05/30/22 1305 05/30/22 1613   Vital Signs   /62 (!) 106/59   Temp 97.4 °F (36.3 °C) 97 °F (36.1 °C)   Heart Rate 77 76   Resp  --  20   Weight 153 lb 3.5 oz (69.5 kg) 149 lb 14.6 oz (68 kg)   Weight Method Bed scale  --    Percent Weight Change 5.46 -2.16   Pain Assessment   Pain Assessment  --  None - Denies Pain   Post-Hemodialysis Assessment   Post-Treatment Procedures  --  Blood returned;Catheter capped, clamped and heparinized x 2 ports   Machine Disinfection Process  --  Exterior Machine Disinfection   Rinseback Volume (ml)  --  400 ml   Total Liters Processed (l/min)  --  61 l/min   Dialyzer Clearance  --  Moderately streaked   Duration of Treatment (minutes)  --  185 minutes   Heparin amount administered during treatment (units)  --  0 units   Hemodialysis Intake (ml)  --  400 ml   Hemodialysis Output (ml)  --  1900 ml   NET Removed (ml)  --  1500   Tolerated Treatment  --  Fair   Copy of dialysis treatment record placed in chart, to be scanned into EMR.  Report called to Donna Ellis RN

## 2022-05-31 NOTE — PLAN OF CARE
Problem: Discharge Planning  Goal: Discharge to home or other facility with appropriate resources  5/31/2022 1045 by Vargas Mosquera RN  Outcome: Progressing  Flowsheets (Taken 5/31/2022 0556)  Discharge to home or other facility with appropriate resources: Identify barriers to discharge with patient and caregiver     Problem: Chronic Conditions and Co-morbidities  Goal: Patient's chronic conditions and co-morbidity symptoms are monitored and maintained or improved  5/31/2022 1045 by Vargas Mosquera RN  Outcome: Progressing  Flowsheets (Taken 5/31/2022 0726)  Care Plan - Patient's Chronic Conditions and Co-Morbidity Symptoms are Monitored and Maintained or Improved: Monitor and assess patient's chronic conditions and comorbid symptoms for stability, deterioration, or improvement     Problem: Safety - Adult  Goal: Free from fall injury  5/31/2022 1045 by Vargas Mosquera RN  Outcome: Progressing       Problem: ABCDS Injury Assessment  Goal: Absence of physical injury  5/31/2022 1045 by Vargas Mosquera RN  Outcome: Progressing       Problem: Skin/Tissue Integrity  Goal: Absence of new skin breakdown  Description: 1. Monitor for areas of redness and/or skin breakdown  2. Assess vascular access sites hourly  3. Every 4-6 hours minimum:  Change oxygen saturation probe site  4. Every 4-6 hours:  If on nasal continuous positive airway pressure, respiratory therapy assess nares and determine need for appliance change or resting period.   5/31/2022 1045 by Vargas Mosquera RN  Outcome: Progressing       Problem: Respiratory - Adult  Goal: Achieves optimal ventilation and oxygenation  5/31/2022 1045 by Vargas Mosquera RN  Outcome: Progressing       Problem: Cardiovascular - Adult  Goal: Maintains optimal cardiac output and hemodynamic stability  5/31/2022 1045 by Vargas Mosquera RN  Outcome: Progressing  Flowsheets (Taken 5/31/2022 0726)  Maintains optimal cardiac output and hemodynamic stability: Monitor blood pressure and heart rate  Goal: Absence of cardiac dysrhythmias or at baseline  5/31/2022 1045 by Sean Singh RN  Outcome: Progressing  Flowsheets (Taken 5/31/2022 0726)  Absence of cardiac dysrhythmias or at baseline: Monitor cardiac rate and rhythm       Problem: Hematologic - Adult  Goal: Maintains hematologic stability  5/31/2022 1045 by Sean Singh RN  Outcome: Progressing  Flowsheets (Taken 5/31/2022 0726)  Maintains hematologic stability: Assess for signs and symptoms of bleeding or hemorrhage     Problem: Nutrition Deficit:  Goal: Optimize nutritional status  5/31/2022 1045 by Sean Singh RN  Outcome: Progressing     Problem: Pain  Goal: Verbalizes/displays adequate comfort level or baseline comfort level  5/31/2022 1045 by Sean Singh RN  Outcome: Progressing

## 2022-05-31 NOTE — PROGRESS NOTES
[facility]                                               Progress note    Summary:   Hilda Tejada is being seen by nephrology for ESRD on HD. Admitted with  Diarrhea. MRI abdomen showing large volume ascites diffuse body wall edema, moderate diffuse hepatic steatosis: Cardiomegaly bilateral small pleural effusions and bibasilar consolidations    Interval History and Plan:   Received hemodialysis treatment today  Was able to take of 1.5 kg of weight  He does not have pitting edema in his legs, but his arm is swollen  The patient denies of any shortness of  He does not require supplemental oxygen    Reviewed his medications, I do not see any concerning medications. The patient was seen and examined in his room with his nurse at his bedside. The patient was awake, alert and conversant  He told me that he feels okay  He denies of any chest pain or shortness of breath  His neuropathy was not documented  His most recent set of vital signs showed temperature 97.5, heart rate 82 and blood pressure 108/76    Lab work from this morning showed sodium 133, potassium 3.9, bicarb 21, BUN 44, creatinine was 4.2, glucose 125, calcium 8.4,    WBC 5, hemoglobin 9.5 and the platelet was 95. Thrombocytopenia is not new to the patient. Joyce Camejo MD  [facility]ph[facility]  Office: [phone]    Assessment:   #ESRD  On HD TTS at [address]. Has TDC and has been referred for vein mapping and AVF/AVG placement. ,  TIME 3.3 Hours  Na 138   K 2  Ca 2.5   Bicarb 35   180nRe    #BP/Volume  Hypervolemic. BP acceptable. He has severe systolic heart failure and BP runs low. Has required inotrope support in the past   On midodrine 5 mg TID. UF as tolerated, challenge dry weight. On metop 12.5 mg BID. EDW 77 kilos    #Metabolic acidosis  Address with dialysis. #SHPT  Last  and phos 6.3  Is supposed to be on phos binder. Will add renvela.        #Anemia  Last tsat 12% ferritin 174  He will need IV iron with HD      Abdominal pain. Patient presented to emergency with severe diarrhea over the past 3 days, C. difficile negative, diarrhea work-up negative.        Transaminitis. History of cirrhosis. Seems to be improving, , , bilirubin 7.2. Possibly related to congestive heart failure. patient scheduled for MRCP        ROS:   Positives Listed Bold. All other remaining systems are negative. Constitutional:  fever, chills, weakness, weight change, fatigue,      Skin:  rash, pruritus, hair loss, bruising, dry skin, petechiae. Head, Face, Neck   headaches, swelling,  cervical adenopathy. Respiratory: shortness of breath, cough, or wheezing  Cardiovascular: chest pain, palpitations, dizzy, edema  Gastrointestinal: nausea, vomiting, diarrhea, constipation,belly pain    Yellow skin, blood in stool  Musculoskeletal:  back pain, muscle weakness, gait problems,       joint pain or swelling. Genitourinary:  dysuria, poor urine flow, flank pain, blood in urine  Neurologic:  vertigo, TIA'S, syncope, seizures, focal weakness  Psychosocial:  insomnia, anxiety, or depression. Additional positive findings: -     PMH:   Past medical history, surgical history, social history, family history are reviewed and updated as appropriate. Reviewed current medication list.   Allergies reviewed and updated as needed. PE:   Vitals:    05/30/22 2033   BP: 108/76   Pulse: 82   Resp: 23   Temp: 97.5 °F (36.4 °C)   SpO2: 91%       General appearance:  in NAD, somnolent, poorly arousable   HEENT: EOM intact, no icterus. Trachea is midline. Neck : No masses, appears symmetrical, no JVD  Respiratory: Respiratory effort appears normal, bilateral equal chest rise, no wheeze, +crackles  Cardiovascular: Ausculation shows RRR + edema  Abdomen: No visible mass or tenderness, non distended. Musculoskeletal:  Joints with no swelling or deformity. Skin:no rashes, ulcers, induration, no jaundice. Neuro: face symmetric, no focal deficits. Appropriate responses. Tunneled dialysis catheter  small opening at the IJ stick site indicating that it had not fully closed. There is no other catheter exposure.  Glue placed over it      Lab Results   Component Value Date    CREATININE 4.2 (H) 05/30/2022    BUN 44 (H) 05/30/2022     (L) 05/30/2022    K 3.9 05/30/2022    CL 95 (L) 05/30/2022    CO2 21 05/30/2022      Lab Results   Component Value Date    WBC 5.0 05/30/2022    HGB 9.5 (L) 05/30/2022    HCT 28.5 (L) 05/30/2022    MCV 99.4 05/30/2022    PLT 95 (L) 05/30/2022     Lab Results   Component Value Date    .8 (H) 04/07/2022    CALCIUM 8.4 05/30/2022    PHOS 3.4 04/07/2022

## 2022-05-31 NOTE — PROGRESS NOTES
Occupational Therapy    Attempted OT follow-up. Pt out of the room for dialysis. Will continued to follow.   Gosia Marks, [address]

## 2022-05-31 NOTE — PROGRESS NOTES
Hospitalist Progress Note      PCP: Phil Dalal DO    Date of Admission: 5/17/2022    Chief Complaint: Diarrhea    Hospital Course:   74years old male with medical history significant for end-stage renal disease patient is on hemodialysis. Patient was admitted with generalized weakness and diarrhea. C. difficile was negative. Diarrhea work-up was negative.     MRI of the abdomen showed large volume ascites diffuse body wall edema moderate diffuse hepatic steatosis. Cardiomegaly bilateral small pleural effusions and bibasilar consolidation. Patient was later found to have dialysis line infection, septicemia, s/p removal by IR 5/24.   Temporary dialysis line inserted 5/27    Subjective:   Denies any new complaints, blood pressure noted to be on the lower side    Medications:  Reviewed    Infusion Medications    dextrose 30 mL/hr at 05/28/22 1703    dextrose Stopped (05/19/22 1427)    sodium chloride       Scheduled Medications    local nerve block syringe (with epinephrine)        midodrine  10 mg Oral TID WC    cefepime  1,000 mg IntraVENous Q24H    lactulose  30 g Oral TID    [Held by provider] clopidogrel  75 mg Oral Daily    allopurinol  100 mg Oral Daily    levothyroxine  50 mcg Oral QAM AC    metoprolol succinate  12.5 mg Oral Daily    pantoprazole  40 mg Oral BID AC    sodium chloride flush  5-40 mL IntraVENous 2 times per day     PRN Meds: loperamide, glucose, dextrose bolus **OR** dextrose bolus, glucagon (rDNA), dextrose, sodium chloride flush, sodium chloride, ondansetron, heparin (porcine)      Intake/Output Summary (Last 24 hours) at 5/31/2022 1208  Last data filed at 5/31/2022 0600  Gross per 24 hour   Intake 703.35 ml   Output 1900 ml   Net -1196.65 ml       Physical Exam Performed:    /75   Pulse 89   Temp 98 °F (36.7 °C) (Oral)   Resp 25   Ht 5' 6\" (1.676 m)   Wt 150 lb 9.2 oz (68.3 kg)   SpO2 96%   BMI 24.30 kg/m²     General appearance: No apparent distress, appears stated age and cooperative. HEENT: Pupils equal, round, and reactive to light. Conjunctivae/corneas clear. Neck: Supple, with full range of motion. No jugular venous distention. Trachea midline. Temporary dialysis line noted left side of the chest  Respiratory:  Normal respiratory effort. Clear to auscultation, bilaterally without Rales/Wheezes/Rhonchi. Dressing noted where HD line been removed  Cardiovascular: Regular rate and rhythm with normal S1/S2 without murmurs, rubs or gallops. Abdomen: Soft, non-tender, non-distended with normal bowel sounds. Musculoskeletal: Moderate lower limb edema  Skin: Skin color, texture, turgor normal.  No rashes or lesions. Neurologic:  Neurovascularly intact without any focal sensory/motor deficits. Cranial nerves: II-XII intact, grossly non-focal.  Psychiatric: Alert and oriented, thought content appropriate, normal insight  Capillary Refill: Brisk,3 seconds, normal   Peripheral Pulses: +2 palpable, equal bilaterally       Labs:   Recent Labs     05/29/22 0514 05/30/22 0527 05/31/22 0458   WBC 3.8* 5.0 4.5  4.5   HGB 9.2* 9.5* 9.2*  9.2*   HCT 27.7* 28.5* 27.6*  27.5*   PLT 90* 95* 127*  104*     Recent Labs     05/29/22 0513 05/30/22 0527 05/31/22 0458   * 133* 134*   K 3.5 3.9 3.3*   CL 96* 95* 97*   CO2 22 21 22   BUN 36* 44* 33*   CREATININE 3.4* 4.2* 3.1*   CALCIUM 8.5 8.4 8.3     Recent Labs     05/29/22 0513 05/30/22 0527 05/31/22 0458   AST 97* 82* 77*   ALT 76* 67* 65*   BILITOT 8.0* 6.9* 6.0*   ALKPHOS 191* 203* 204*     Recent Labs     05/29/22 0514 05/30/22 0527 05/31/22 0458   INR 1.66* 1.58* 1.53*     No results for input(s): CKTOTAL, TROPONINI in the last 72 hours.     Urinalysis:      Lab Results   Component Value Date    NITRU Negative 04/10/2022    WBCUA 0-2 04/10/2022    BACTERIA 2+ 04/09/2022    RBCUA 0-2 04/10/2022    BLOODU Negative 04/10/2022    SPECGRAV 1.025 04/10/2022    GLUCOSEU Negative 04/10/2022 Radiology:  IR NONTUNNELED VASCULAR CATHETER > 5 YEARS   Final Result   Successful ultrasound and fluoroscopy guided left IJ 20 cm non-tunneled   hemodialysis catheter placement. IR REMOVE TUNNELED CVAD WO SQ PORT/PUMP   Final Result   Successful removal of the right IJ tunneled central catheter. VL Extremity Venous Right   Final Result      US GUIDED PARACENTESIS   Final Result   Successful paracentesis. CT ORBITS W WO CONTRAST   Final Result   Severe anasarca with proportionate bilateral orbital fat edema. No orbital hemorrhage or mass. RECOMMENDATIONS:   Unavailable         MRI ABDOMEN WO CONTRAST MRCP   Final Result   1. Incomplete exam. Patient declined rest of the study, no diffusion or axial   T2 obtained. Limited diagnostic quality exam.   2. Large volume ascites. Diffuse marked body wall edema. 3. Moderate diffuse hepatic steatosis better visualized on CT than on MR. No   focal lesions in the liver. 4. Contracted gallbladder containing a gallstone. Common bile duct is poorly   visualized. 5. Marked cardiomegaly, with bilateral small pleural effusions and bibasilar   consolidations. Small-bowel wall edema noted, this could be due to   surrounding ascites/hypoproteinemia. CT CHEST WO CONTRAST   Final Result   1. Findings compatible with pulmonary edema with small pleural effusions. 2.  Body wall edema and upper abdominal ascites. CT ABDOMEN PELVIS WO CONTRAST Additional Contrast? None   Final Result   1. No ileus or obstruction. No definite inflammatory bowel process though   very limited given the extent of ascites. 2. Moderate volume ascites seen in the abdomen and pelvis, similar to the   prior exam.   3. Cardiomegaly with small bilateral effusions, left greater than right as   well as dependent ground-glass change which may represent dependent pulmonary   edema. 4. Mild anasarca. 5. Mild renal atrophy.    6. Cholelithiasis with a contracted gallbladder. XR CHEST PORTABLE   Final Result   Stable moderate cardiomegaly with mild central pulmonary congestion which is   more apparent. Harry Lim left retrocardiac atelectasis or early infiltrate      Status post right-sided dialysis catheter placement in good position         IR TUNNELED CVC PLACE WO SQ PORT/PUMP > 5 YEARS    (Results Pending)           Assessment/Plan:    Active Hospital Problems    Diagnosis     Elevated LFTs [R79.89]      Priority: Medium    Supratherapeutic INR [R79.1]      Priority: Medium    Diarrhea [R19.7]      Priority: Medium    CHF (congestive heart failure) ([facility]) [I50.9]     ICD (implantable cardioverter-defibrillator) in place [Z95.810]     Biventricular heart failure ([address] [I50.82]           Enterobacter cloacae bacteremia/  HD line tunneled infection. Catheter had previously had erosions through the skin, was noted to be grossly infected, s/p removal 5/24. Culture tip as well as intraoperative cultures growing Enterobacter cloacae, blood cultures well growing Enterobacter. (Sensitive to cefepime)   Temporary dialysis line inserted 5/27  -Follow repeated blood cultures 5/26 ( negative to date)  -Appreciate ID input  -Continue on vancomycin and cefepime started 5/24  -Plan for tunneled catheter today,       Diarrhea ( resolved )   Abdominal pain. Diarrhea work-up was negative  Diarrhea and abdominal pain improving        Transaminitis and cirrhosis + Ascites. ( improving ). Cardiac liver disease  Significantly improving  Possibly related to congestive heart failure.   GI on board  Large volume ascites noted on MRI-paracentesis done, no sign of infection  Contracted gallbladder containing stones.     Hypoglycemia likely related to cirrhosis and DMII. ADAT  Start hypoglycemia protocol     Elevated INR. vitamin K,   inr 1.5       ESRD on HD. Discussed with nephrology, line removed 5/24.   Patient planned for tunneled dialysis line insertion today , Plavix on hold. Underwent temp line insertion 9/85     Systolic heart failure. Ejection fraction less than 20%. Volume status adjusted during dialysis      Anemia. Continue IV iron       Retro-orbital/ conjunctival hemorrhage with exopthalmos  -CT head without contrast shows edema     RLE swelling > Left leg  -US with no DVT ( chronic right phlebitic process involving the right superficial femoral vein at the proximal thigh )      Hepatic encephalopathy with asterixis ( resolved )   Alert this morning. Ammonia levels-wnl.   -cont lactulose 30g po tid  -Had a 1100ml paracentesis  during admission    DVT Prophylaxis: lovenox  Diet: Diet NPO Exceptions are: Sips of Water with Meds  Code Status: Full Code    PT/OT Eval Status: following.      Dispo - ECF pending clinical improvement      Maira Delcid MD

## 2022-05-31 NOTE — PLAN OF CARE
Problem: Discharge Planning  Goal: Discharge to home or other facility with appropriate resources  5/30/2022 2327 by Mehnaz Pozo RN  Outcome: Progressing  Flowsheets (Taken 5/30/2022 2033)  Discharge to home or other facility with appropriate resources: Identify barriers to discharge with patient and caregiver     Problem: Chronic Conditions and Co-morbidities  Goal: Patient's chronic conditions and co-morbidity symptoms are monitored and maintained or improved  5/30/2022 2327 by Mehnaz Pozo RN  Outcome: Progressing  Flowsheets (Taken 5/30/2022 2033)  Care Plan - Patient's Chronic Conditions and Co-Morbidity Symptoms are Monitored and Maintained or Improved: Monitor and assess patient's chronic conditions and comorbid symptoms for stability, deterioration, or improvement     Problem: Safety - Adult  Goal: Free from fall injury  5/30/2022 2327 by Mehnaz Pozo RN  Outcome: Progressing  Flowsheets (Taken 5/30/2022 2313)  Free From Fall Injury: Instruct family/caregiver on patient safety     Problem: ABCDS Injury Assessment  Goal: Absence of physical injury  5/30/2022 2327 by Mehnaz Pozo RN  Outcome: Progressing  Flowsheets  Taken 5/30/2022 2313 by Mehnaz Pozo RN  Absence of Physical Injury: Implement safety measures based on patient assessment  Taken 5/30/2022 1347 by Cristian Grimes RN  Absence of Physical Injury: Implement safety measures based on patient assessment     Problem: Skin/Tissue Integrity  Goal: Absence of new skin breakdown  Description: 1. Monitor for areas of redness and/or skin breakdown  2. Assess vascular access sites hourly  3. Every 4-6 hours minimum:  Change oxygen saturation probe site  4. Every 4-6 hours:  If on nasal continuous positive airway pressure, respiratory therapy assess nares and determine need for appliance change or resting period.   5/30/2022 2327 by Mehnaz Pozo RN  Outcome: Progressing     Problem: Pain  Goal: Verbalizes/displays adequate comfort level or baseline comfort level  5/30/2022 2327 by Colon Ken, RN  Outcome: Progressing

## 2022-05-31 NOTE — BRIEF OP NOTE
Brief Postoperative Note    Noah Wilson  YOB: 1955  [id number]    Pre-operative Diagnosis: SONAL on CKD    Post-operative Diagnosis: Same    Procedure: Conversion of a Non-tunneled Hemodialysis Catheter to a Tunneled Hemodialysis Catheter    Anesthesia: Moderate Sedation    Surgeons: Shruti Yadav MD    Estimated Blood Loss: Less than 5 mL    Complications: None    Specimens: Was Not Obtained    Findings: Successful conversion of a left IJ non-tunneled hemodialysis catheter to a tunneled hemodialysis catheter.     Electronically signed by Shruti Yadav MD on 5/31/2022 at 12:25 PM

## 2022-05-31 NOTE — PROGRESS NOTES
[facility]                                               Progress note    Summary:   Bernice Judd is being seen by nephrology for ESRD on HD. Admitted with  Diarrhea. MRI abdomen showing large volume ascites diffuse body wall edema, moderate diffuse hepatic steatosis: Cardiomegaly bilateral small pleural effusions and bibasilar consolidations    Interval History and Plan:   Patient was seen and examined at bedside. No complaints  Had dialysis yesterday. HD again today , 2.5 L UF and post weight 67.3 kilos. His dry weight has been challenged from 77 kilos since admission. Replacement of TDC done today     /68  96% on room air. Labs reviewed. Na 134 K 3.3   Bicarb 22, BUN 33 AND cr 3.1 Ca 8.3  Hgb 9.2     - will update iron stores, add retacrit 10 k units q HD  - TDC in place. Citlali Hutson MD  [facility] Nephrology  Office: [phone]    Assessment:   #ESRD  On HD TTS at [facility]. Has TDC and has been referred for vein mapping and AVF/AVG placement. ,  TIME 3.3 Hours  Na 138   K 2  Ca 2.5   Bicarb 35   180nRe    #BP/Volume   He has severe systolic heart failure and BP runs low. Has required inotrope support in the past   On midodrine 5 mg TID. UF as tolerated, challenge dry weight. On metop 12.5 mg BID. EDW 77 kilos prior to admission    #enterobacter bacteremia  Tunneled line infection  Line removed 5/24  Temp line 5/27  On vanc and cefepime. Repeat blood cxs NGTD so far    #SHPT  Last  and phos 6.3  Is supposed to be on phos binder. renvela. #Anemia  Last tsat 12% ferritin 174          ROS:   Positives Listed Bold. All other remaining systems are negative. Constitutional:  fever, chills, weakness, weight change, fatigue,      Skin:  rash, pruritus, hair loss, bruising, dry skin, petechiae. Head, Face, Neck   headaches, swelling,  cervical adenopathy.      Respiratory: shortness of breath, cough, or wheezing  Cardiovascular: chest pain, palpitations, dizzy, edema  Gastrointestinal: nausea, vomiting, diarrhea, constipation,belly pain    Yellow skin, blood in stool  Musculoskeletal:  back pain, muscle weakness, gait problems,       joint pain or swelling. Genitourinary:  dysuria, poor urine flow, flank pain, blood in urine  Neurologic:  vertigo, TIA'S, syncope, seizures, focal weakness  Psychosocial:  insomnia, anxiety, or depression. Additional positive findings: -     PMH:   Past medical history, surgical history, social history, family history are reviewed and updated as appropriate. Reviewed current medication list.   Allergies reviewed and updated as needed. PE:   Vitals:    05/31/22 1636   BP: 100/68   Pulse: 87   Resp: 18   Temp: 97.6 °F (36.4 °C)   SpO2: 96%       General appearance:  in NAD, somnolent, poorly arousable   HEENT: EOM intact, no icterus. Trachea is midline. Neck : No masses, appears symmetrical, no JVD  Respiratory: Respiratory effort appears normal, bilateral equal chest rise, no wheeze, +crackles  Cardiovascular: Ausculation shows RRR + edema  Abdomen: No visible mass or tenderness, non distended. Musculoskeletal:  Joints with no swelling or deformity. Skin:no rashes, ulcers, induration, no jaundice. Neuro: face symmetric, no focal deficits. Appropriate responses.          Lab Results   Component Value Date    CREATININE 3.1 (H) 05/31/2022    BUN 33 (H) 05/31/2022     (L) 05/31/2022    K 3.3 (L) 05/31/2022    CL 97 (L) 05/31/2022    CO2 22 05/31/2022      Lab Results   Component Value Date    WBC 4.5 05/31/2022    WBC 4.5 05/31/2022    HGB 9.2 (L) 05/31/2022    HGB 9.2 (L) 05/31/2022    HCT 27.5 (L) 05/31/2022    HCT 27.6 (L) 05/31/2022    .7 (H) 05/31/2022    .3 (H) 05/31/2022     (L) 05/31/2022     (L) 05/31/2022     Lab Results   Component Value Date    .8 (H) 04/07/2022    CALCIUM 8.3 05/31/2022    PHOS 3.4 04/07/2022

## 2022-05-31 NOTE — FLOWSHEET NOTE
Treatment time: 3 hours  Net UF: 2500 ml     Pre weight: 69.8 kg   Post weight: 67.3 kg  EDW: 77 kg     Access used: LCW CVC  Access function: Good with  ml/min     Medications or blood products given: None     Regular outpatient schedule: [address] [facility], TTS     Summary of response to treatment: Tolerated tx well.  No HD related complaints or complications.      Copy of dialysis treatment record placed in chart, to be scanned into EMR.       05/31/22 1235 05/31/22 1549   Treatment   Time On  --  1242   Time Off  --  1544   Vital Signs   /69 101/67   Temp 98 °F (36.7 °C) 97.3 °F (36.3 °C)   Heart Rate 85 87   Resp 18 18   Dialysis Bath   K+ (Potassium) 3  --    Ca+ (Calcium) 2.5  --    Na+ (Sodium) 138  --    HCO3 (Bicarb) 37  --

## 2022-05-31 NOTE — PROGRESS NOTES
Comprehensive Nutrition Assessment    Type and Reason for Visit:  Reassess    Nutrition Recommendations/Plan:   1. Resume Nepro once diet resumes     Malnutrition Assessment:  Malnutrition Status: At risk for malnutrition (Comment) (05/24/22 1305)    Context:  Acute Illness       Nutrition Assessment:    Follow-up. Hx CHF, diabetes, cirrhosis w/ ascites, and ESRD on HD. Currently NPO for insertion of HD cath. Previously on low sodium diet with 2000ml fluid restriction. Intakes variable. Previously receiving Nepro. Will continue ONS once diet resumes. Nutrition Related Findings:    +6 liters. +BM 5/31. Distended abdomen. Wound Type: None       Current Nutrition Intake & Therapies:    Average Meal Intake:  (variable)  Average Supplements Intake: 1-25%  Diet NPO Exceptions are: Sips of Water with Meds    Anthropometric Measures:  Height: 5' 6\" (167.6 cm)  Ideal Body Weight (IBW): 142 lbs (65 kg)    Admission Body Weight: 156 lb (70.8 kg)  Current Body Weight: 150 lb (68 kg), 105.6 % IBW. Weight Source: Bed Scale  Current BMI (kg/m2): 24.2  Weight Adjustment For: No Adjustment  BMI Categories: Normal Weight (BMI 18.5-24. 9)    Estimated Daily Nutrient Needs:  Energy (kcal/day): 3446-5226 kcal (25-30 kcal/kg 69 kg CBW)  Protein (g/day):  gm (1.4- 1.8 gm/kg 68 kg CBW)  Fluid (ml/day): 2000 mL FR.     Nutrition Diagnosis:   · Inadequate oral intake related to inadequate protein-energy intake as evidenced by intake 0-25%,intake 26-50%    Nutrition Interventions:   Food and/or Nutrient Delivery:  (Resume ONS with diet)  Nutrition Education/Counseling: Education not indicated  Coordination of Nutrition Care: Continue to monitor while inpatient     Goals:  Previous Goal Met: Progressing toward Goal(s)  Goals: PO intake 50% or greater,within 2 days     Nutrition Monitoring and Evaluation:   Behavioral-Environmental Outcomes: None Identified  Food/Nutrient Intake Outcomes: Diet Advancement/Tolerance,Food and Nutrient Intake,Supplement Intake  Physical Signs/Symptoms Outcomes: Biochemical Data,Fluid Status or Edema,Skin,Weight,GI Status    Discharge Planning:     Too soon to determine     Tashi Schultz, [address], [place]  Contact: [phone]

## 2022-06-01 NOTE — DISCHARGE INSTR - COC
Continuity of Care Form    Patient Name: Temitope Art   :  1955  MRN:  [id number]    Admit date:  2022  Discharge date:  ***    Code Status Order: Full Code   Advance Directives:      Admitting Physician:  Walker Brito DO  PCP: Robbie Mueller DO    Discharging Nurse: [facility]/Room#: [id number]/7905-48  Discharging Unit Phone Number: ***    Emergency Contact:   Extended Emergency Contact Information  Primary Emergency Contact: Maury Rivero  Address: [address] [place] [address]. [address] [facility], [address] Phone: [phone]  Relation: Child  Secondary Emergency Contact: Milli Cox  Address: [facility] [address].            [place], [address] [place] [address],[facility] [address] Phone: [phone]  Relation: Brother/Sister    Past Surgical History:  Past Surgical History:   Procedure Laterality Date    EYE SURGERY      IR NONTUNNELED VASCULAR CATHETER  2022    IR NONTUNNELED VASCULAR CATHETER 2022 [facility]    IR NONTUNNELED VASCULAR CATHETER Left 2022    Vascath; LIJ access; 20cm; Dr. Christofer Fitzgerald - removed 22    IR NONTUNNELED VASCULAR CATHETER  2022    IR NONTUNNELED VASCULAR CATHETER 2022 [facility]    IR TUNNELED CATHETER PLACEMENT GREATER THAN 5 YEARS  2022    IR TUNNELED CATHETER PLACEMENT GREATER THAN 5 YEARS 2022 [facility]    IR TUNNELED CATHETER PLACEMENT GREATER THAN 5 YEARS Left 2022    Permacath; LIJ access; 23cm; Dr. Christofer Fitzgerald    IR TUNNELED [address] 5 YEARS  2022    IR TUNNELED CATHETER PLACEMENT GREATER THAN 5 YEARS 2022 [facility]       Immunization History:   Immunization History   Administered Date(s) Administered    COVID-19, Pfizer Purple top, DILUTE for use, 12+ yrs, 30mcg/0.3mL dose 2021, 2021, 2021    Influenza Virus Vaccine 2017    Influenza, Quadv, IM, PF (6 mo and older Fluzone, Flulaval, Fluarix, and 3 yrs and older Afluria) 11/30/2021       Active Problems:  Patient Active Problem List   Diagnosis Code    ED (erectile dysfunction) N52.9    Essential hypertension, benign I10    Hyperlipidemia E78.5    Diabetic nephropathy ([facility]) E11.21    Diabetes type 2, uncontrolled ([facility] E11.65    Noncompliance Z91.19    Elevated blood uric acid level E79.0    Coronary artery disease involving native coronary artery I25.10    Elevated PSA R97.20    Acute on chronic combined systolic and diastolic HF (heart failure) ([facility]) I50.43    Stage 3 chronic kidney disease ([facility]) N18.30    SONAL (acute kidney injury) ([facility] N17.9    Biventricular heart failure ([facility]) I50.82    Acute on chronic systolic heart failure, NYHA class 4 ([facility]) I50.23    Acute on chronic congestive heart failure ([facility]) I50.9    LV (left ventricular) mural thrombus I51.3    Syncope R55    CHF (congestive heart failure) ([facility]) I50.9    Syncope and collapse R55    ICD (implantable cardioverter-defibrillator) in place Z95.810    Demand ischemia ([facility] I24.8    Ventricular tachycardia ([facility] I47.2    Elevated LFTs R79.89    Supratherapeutic INR R79.1    Diarrhea R19.7       Isolation/Infection:   Isolation            No Isolation          Patient Infection Status       Infection Onset Added Last Indicated Last Indicated By Review Planned Expiration Resolved Resolved By    None active    Resolved    C-diff Rule Out 05/17/22 05/17/22 05/17/22 Clostridium Difficile Toxin/Antigen (Ordered)   05/17/22 Rule-Out Test Resulted    C-diff Rule Out 02/07/22 02/07/22 02/07/22 GI Bacterial Pathogens By PCR (Ordered)   02/07/22 Elly Montano, CEZAR            Nurse Assessment:  Last Vital Signs: BP 98/62   Pulse 98   Temp 98.4 °F (36.9 °C) (Oral)   Resp 20   Ht 5' 6\" (1.676 m)   Wt 148 lb 9.4 oz (67.4 kg)   SpO2 100%   BMI 23.98 kg/m²     Last documented pain score (0-10 scale): Pain Level: 0  Last Weight:   Wt Readings from Last 1 Encounters:   06/01/22 148 lb 9.4 oz (67.4 kg)     Mental Status:  {IP PT MENTAL STATUS:20030}    IV Access:  Vas cath Left IJ    Nursing Mobility/ADLs:  Walking   Assisted  Transfer  Assisted  Bathing  Assisted  Dressing  Assisted  Toileting  Assisted  Feeding  Assisted  Med Admin  Assisted  Med Delivery   whole    Wound Care Documentation and Therapy:        Elimination:  Continence: Bowel: No  Bladder: No  Urinary Catheter: None   Colostomy/Ileostomy/Ileal Conduit: No       Date of Last BM: 6/1/22    Intake/Output Summary (Last 24 hours) at 6/1/2022 1438  Last data filed at 6/1/2022 1258  Gross per 24 hour   Intake 620 ml   Output 3000 ml   Net -2380 ml     I/O last 3 completed shifts: In: 803.4 [P.O.:240; IV Piggyback:63.4]  Out: 3000     Safety Concerns:     History of Falls (last 30 days) and At Risk for Falls    Impairments/Disabilities:      Vision and Hearing    Nutrition Therapy:  Current Nutrition Therapy:   - Oral Diet:  General    Routes of Feeding: Oral  Liquids: Thin Liquids  Daily Fluid Restriction: yes - amount 1500  Last Modified Barium Swallow with Video (Video Swallowing Test): not done    Treatments at the Time of Hospital Discharge:   Respiratory Treatments: 2L  Oxygen Therapy:  is on oxygen at *** L/min per nasal cannula. Ventilator:    {MH CC Vent PUNT:944372447}    Rehab Therapies: Physical Therapy and Occupational Therapy  Weight Bearing Status/Restrictions: No weight bearing restrictions  Other Medical Equipment (for information only, NOT a DME order):  walker  Other Treatments:     Heart Failure Instructions for Daily Management  Patient was treated for acute on chronic combined systolic and diastolic heart failure. he  will require the following:    Please weigh daily on the same scale and approximately the same time of day. Report weight gain of 3 pounds/day or 5 pounds/week to : Kiera Thibodeaux [phone]. Please use hospital discharge weight as baseline reference.    Please monitor for signs and symptoms of and report to MD:  Worsening Heart Failure: sudden weight gain, shortness of breath, lower extremity or general edema/swelling, abdominal bloating/swelling, inability to lie flat, intolerance to usual activity, or cough (especially at night). Report these finding even if no increase in weight. Dehydration:  having difficulty or a decrease in urination, dizziness, worsening fatigue, or new onset/worsening of generalized weakness. Please continue a LOW SODIUM diet and LIMIT fluid intake to 48 - 64 ounces ( 1.5 - 2 liters) per day. Call Perez Moffett [phone] and/or Sandro Austin @ [phone] with any questions or concerns. Please continue heart failure education to patient and family/support system. See After Visit Summary for hospital follow up appointment details. Consider spiritual care referral for support and/or completion of advance directives [phone]. Consider: [place] [address] telehealth program if patient agreeable and able to participate, palliative care consult for ongoing goals of care, end of life, and/or chronic disease management discussions, and referral to [facility] [phone] once SNF/HHC complete.        Patient's personal belongings (please select all that are sent with patient):  None    RN SIGNATURE:  Electronically signed by Hoang Celis RN on 6/2/22 at 4:16 PM EDT    CASE MANAGEMENT/SOCIAL WORK SECTION    Inpatient Status Date: 5/17/22    Readmission Risk Assessment Score:  Readmission Risk              Risk of Unplanned Readmission:  36           Discharging to Facility/ Agency   Name: [address] [place] [address]  Address:  [address]   Phone:  [phone]  Fax:  [phone]      Dialysis Facility (if applicable)   Name: [facility]    Address:  Dialysis Schedule: TTS 1045  Phone: [phone]  Fax:  [phone]    / signature: Electronically signed by SANJUANA Chauhan, GUSTAVOW on 6/1/22 at 2:39 PM EDT    PHYSICIAN SECTION    Prognosis: Fair    Condition at Discharge: Stable    Rehab Potential (if transferring to Rehab): Fair    Recommended Labs or Other Treatments After Discharge:   IV Cefepime x 2 gm  with HD sessions x Tue-THus-Sat x stop date  6/15  CBC with diff once a week while on IV abx only   First dose given in hosspital   NO ID FOLLOW UP       [address] [facility]  Phone: [phone]   Fax : [phone]     NO ID follow up necessary       Physician Certification: I certify the above information and transfer of Liane Charles  is necessary for the continuing treatment of the diagnosis listed and that he requires [address] for greater 30 days.      Update Admission H&P: No change in H&P    PHYSICIAN SIGNATURE:  Electronically signed by Amanda Avila MD on 6/2/22 at 1:42 PM EDT

## 2022-06-01 NOTE — CARE COORDINATION
Discharge Planning:   PT/OT: recommending SNF  Met with patient at bedside to discuss discharge plan. Patient reports they plan to return home at discharge with home care and family support.      Discharging to Facility/ Agency   · Name: [facility]  · Address:   [address] [place] [address]. [address]  · Phone:  [phone]  · Fax:  [phone]    Dialysis Facility (if applicable)   · Name: [facility]    · Address:  · Dialysis Schedule: TTS 1045  · Phone: [phone]  · Fax:  [phone]    SANJUANA Perez, HIRO, Social Work/Case Management   [phone]  Electronically signed by SANJUANA Perez LSW on 6/1/2022 at 2:48 PM

## 2022-06-01 NOTE — CARE COORDINATION
06/01/22 1450   IMM Letter   IMM Letter given to Patient/Family/Significant other/Guardian/POA/by: Provided to patient at bedside by Arlene Lim MSW, LSW.    IMM Letter date given: 06/01/22   IMM Letter time given: 7081

## 2022-06-01 NOTE — PROGRESS NOTES
Physical Therapy  Facility/Department: [address] PROGRESSIVE CARE  Daily Treatment Note  NAME: Dariusz Ríos  : 1955  MRN: [id number]    Date of Service: 2022    Discharge Recommendations:  Patient would benefit from continued therapy after discharge   PT Equipment Recommendations  Equipment Needed: No    Patient Diagnosis(es): The primary encounter diagnosis was Transaminitis. Diagnoses of Elevated bilirubin and Pacemaker were also pertinent to this visit. Assessment   Assessment: Pt more alert, and more energetic today, standing with improved ease. He continues to be incontinent of large amount of loose BM during sessions, and is unable to perform self-care. Continue to recommend low-moderate frequency therapy upon D/C to maximize strength, balance, endurance, and independence transferring/ambulating. Dariusz Ríos scored a 9/24 on the AM-PAC short mobility form. Current research shows that an AM-PAC score of 17 or less is typically not associated with a discharge to the patient's home setting. Based on the patient's AM-PAC score and their current functional mobility deficits, it is recommended that the patient have 3-5 sessions per week of Physical Therapy at d/c to increase the patient's independence. Please see assessment section for further patient specific details. If patient discharges prior to next session this note will serve as a discharge summary. Please see below for the latest assessment towards goals. Plan    Plan  Plan: 2-3 times per week  Specific Instructions for Next Treatment: Improve endurance, ambulate as able  Current Treatment Recommendations: Functional mobility training;Transfer training;Balance training;Gait training;Stair training;Neuromuscular re-education;Home exercise program;Safety education & training;Patient/Caregiver education & training; Endurance training     Restrictions  Restrictions/Precautions  Restrictions/Precautions: Fall Risk  Position Activity Restriction  Other position/activity restrictions: 1L O2     Subjective    Subjective  Subjective: Pt agreeable to activity. Feels more energetic. Orientation  Overall Orientation Status: Impaired  Orientation Level: Oriented to person  Cognition  Overall Cognitive Status: Exceptions  Arousal/Alertness: Appropriate responses to stimuli  Following Commands: Follows one step commands with increased time; Follows one step commands with repetition  Insights: Decreased awareness of deficits  Initiation: Requires cues for some  Sequencing: Requires cues for some     Objective      Bed Mobility Training  Bed Mobility Training: Yes  Overall Level of Assistance: Maximum assistance;Assist X2  Rolling: Minimum assistance  Supine to Sit: Maximum assistance;Assist X2  Sit to Supine: Assist X2;Maximum assistance  Scooting: Assist X2;Total assistance    Balance  Sitting: With support  Sitting - Static: Supported sitting  Sitting - Dynamic: Fair (occasional)  Standing: Impaired (stedy)  Standing - Static: Constant support    Transfer Training  Transfer Training: Yes  Overall Level of Assistance: Minimum assistance (stedy)  Sit to Stand: Minimum assistance (stedy)  Stand to Sit: Minimum assistance  Stand Pivot Transfers: Minimum assistance    Gait Training  Gait Training: No     PT Exercises  Exercise Treatment: Static stand x 3 trials with stedy support, Min-CGA for balance, improved standing tolerance noted today. Pt also able to complete RLE/LLE march x 5 with stedy support, Min A. Other Specialty Interventions  Other Treatments/Modalities: Pt incontinent of large amount of urine and loose stool during session. He required total assist for tarik-care. After practicing standing, pt was re-positioned for comfort in bed, call light in reach, alarm in place. Safety Devices  Type of Devices: All fall risk precautions in place;Call light within reach; Left in bed;Nurse notified; Bed alarm in place  Restraints  Restraints Initially in Place: No       Goals  Short Term Goals  Time Frame for Short term goals: 2-3 days  Short term goal 1: Bed mobility SBA  Short term goal 2: Transfers SBA  Short term goal 3: Ambulation 13' with walker, CGA  Patient Goals   Patient goals :  To return directly home    Education  Patient Education  Education Given To: Patient  Education Provided: Role of Therapy;Plan of Care;Home Exercise Program;Fall Prevention Strategies  Education Method: Verbal  Barriers to Learning: None    Therapy Time   Individual Concurrent Group Co-treatment   Time In   1255       Time Out   1325       Minutes   30       Timed Code Treatment Minutes: 69 Lindsey Bryan, PT    Electronically signed by Kandice Davila, [place] [id number] on 6/1/2022 at 1:33 PM

## 2022-06-01 NOTE — PROGRESS NOTES
Infectious Disease Follow up Notes  Admit Date: 5/17/2022  Hospital Day: 16    Antibiotics :     IV Cefepime      CHIEF COMPLAINT:     ESRD  HD line infection  Enterobacter infection  Cirrhosis liver   Enterobacter bacteremia     Subjective interval History :  74 y.o. man with past medical history of diabetes, diabetic nephropathy, diabetic neuropathy, hypertension, end-stage renal disease on hemodialysis, congestive heart failure, cirrhosis of liver, admitted to the hospital secondary to diarrhea and fatigue nausea. On admission there was noted to be ulceration along the abdominal HD line. He went to interventional radiology for HD line removal and noted to have purulent fluid along the wound site as well culture was sent and tip culture was sent. Given the ongoing concern for HD line infection we are consulted for IV antibiotic recommendations. He has LFT elevation ongoing secondary to cirrhosis of liver. .  With ascites underwent paracentesis for ascitic fluid negative for infection. He just came back from procedure under sedation unable to provide any history.     Interval History : able to converse still very weak and edematous and tolerating IV abx ok no fevers no chill HD line working well     Past Medical History:    Past Medical History:   Diagnosis Date    CHF (congestive heart failure) ([address]     Diabetes type 2, uncontrolled ([address] 7/28/2014    Diabetic nephropathy 9/12/2013    ED (erectile dysfunction) 9/12/2013    Essential hypertension, benign 9/12/2013    Hyperlipidemia 9/12/2013    Noncompliance 7/28/2014       Past Surgical History:    Past Surgical History:   Procedure Laterality Date    EYE SURGERY      IR NONTUNNELED VASCULAR CATHETER  04/04/2022    IR NONTUNNELED VASCULAR CATHETER 4/4/2022 [facility]    IR NONTUNNELED VASCULAR CATHETER Left 05/26/2022    Gracie Host; LIJ access; 20cm; Dr. Britt Fields - removed 22    IR NONTUNNELED VASCULAR CATHETER  2022    IR NONTUNNELED VASCULAR CATHETER 2022 [facility]    IR TUNNELED CATHETER PLACEMENT GREATER THAN 5 YEARS  2022    IR TUNNELED CATHETER PLACEMENT GREATER THAN 5 YEARS 2022 [facility]    IR TUNNELED CATHETER PLACEMENT GREATER THAN 5 YEARS Left 2022    Permacath; LIJ access; 23cm; Dr. Lyndsay Contreras IR TUNNELED [address] 5 YEARS  2022    IR TUNNELED CATHETER PLACEMENT GREATER THAN 5 YEARS 2022 [facility]       Current Medications:    Outpatient Medications Marked as Taking for the 22 encounter [facility] Encounter)   Medication Sig Dispense Refill    carvedilol (COREG) 12.5 MG tablet Take 12.5 mg by mouth 2 times daily (with meals)      clopidogrel (PLAVIX) 75 MG tablet Take 75 mg by mouth daily      isosorbide mononitrate (IMDUR) 30 MG extended release tablet Take 30 mg by mouth daily         Allergies:  Patient has no known allergies. Immunizations :   Immunization History   Administered Date(s) Administered    COVID-19, Pfizer Purple top, DILUTE for use, 12+ yrs, 30mcg/0.3mL dose 2021, 2021, 2021    Influenza Virus Vaccine 2017    Influenza, Hassel Scarce, IM, PF (6 mo and older Fluzone, Flulaval, Fluarix, and 3 yrs and older Afluria) 2021       Social History:   Social History     Tobacco Use    Smoking status: Former Smoker     Packs/day: 1.50     Years: 7.00     Pack years: 10.50     Quit date: 1981     Years since quittin.3    Smokeless tobacco: Never Used   Vaping Use    Vaping Use: Never used   Substance Use Topics    Alcohol use:  Yes     Alcohol/week: 1.0 standard drink     Types: 1 Cans of beer per week    Drug use: No     Social History     Tobacco Use   Smoking Status Former Smoker    Packs/day: 1.50    Years: 7.00    Pack years: 10.50    Quit date: 1981    Years since quittin.3 Smokeless Tobacco Never Used      Family History   Problem Relation Age of Onset    Diabetes Mother     High Blood Pressure Mother     Diabetes Sister     Diabetes Brother          REVIEW OF SYSTEMS:     Not possible due to change  In mentation     PHYSICAL EXAM:      Vitals:    BP 98/62   Pulse 98   Temp 98.4 °F (36.9 °C) (Oral)   Resp 20   Ht 5' 6\" (1.676 m)   Wt 148 lb 9.4 oz (67.4 kg)   SpO2 100%   BMI 23.98 kg/m²     General Appearance: awake+  distress,++  pallor, ++ icterus chronic ill appearing man +    Skin: warm and dry, no rash or erythema  Head: normocephalic and atraumatic  Eyes: pupils equal, round, and reactive to light, conjunctivae normal  Bi lateral subconjunctival hemorrhage+  ENT: tympanic membrane, external ear and ear canal normal bilaterally, nose without deformity, nasal mucosa and turbinates normal without polyps  Neck: supple and non-tender without mass, no thyromegaly  no cervical lymphadenopathy  Pulmonary/Chest:  Bi basal crepts+ rhonchi, normal air movement, no respiratory distress  Cardiovascular: normal rate, regular rhythm, normal S1 and S2, no murmurs, rubs, clicks, or gallops, no carotid bruits  Abdomen:  -tender, + -distended,  Ascites++  normal bowel sounds, no masses or organomegaly  Extremities: no cyanosis, clubbing or ++  edema  Musculoskeletal: normal range of motion, no joint swelling, deformity or tenderness  Integumentary: No rashes, no abnormal skin lesions, no petechiae  Neurologic: reflexes normal and symmetric, no cranial nerve deficit  Lines: IV  HD line Left       Data Review:    CBC:   Lab Results   Component Value Date    WBC 6.1 06/01/2022    HGB 8.9 (L) 06/01/2022    HCT 25.9 (L) 06/01/2022    .3 (H) 06/01/2022     (L) 06/01/2022     RENAL:   Lab Results   Component Value Date    CREATININE 3.2 (H) 06/01/2022    BUN 28 (H) 06/01/2022     06/01/2022    K 3.6 06/01/2022     06/01/2022    CO2 22 06/01/2022     SED RATE: No results found for: SEDRATE  CK: No results found for: CKTOTAL  CRP: No results found for: CRP  Hepatic Function Panel:   Lab Results   Component Value Date    ALKPHOS 206 06/01/2022    ALT 60 06/01/2022    AST 71 06/01/2022    PROT 6.1 06/01/2022    PROT 7.7 12/13/2011    BILITOT 5.4 06/01/2022    BILIDIR 4.7 05/17/2022    IBILI 1.8 05/17/2022    LABALBU 3.0 06/01/2022     UA:  Lab Results   Component Value Date    COLORU Yellow 04/10/2022    CLARITYU Clear 04/10/2022    GLUCOSEU Negative 04/10/2022    BILIRUBINUR Negative 04/10/2022    KETUA TRACE 04/10/2022    SPECGRAV 1.025 04/10/2022    BLOODU Negative 04/10/2022    PHUR 5.5 04/10/2022    PROTEINU 30 04/10/2022    UROBILINOGEN 1.0 04/10/2022    NITRU Negative 04/10/2022    LEUKOCYTESUR Negative 04/10/2022    LABMICR YES 04/10/2022    URINETYPE NotGiven 04/10/2022      Urine Microscopic:   Lab Results   Component Value Date    BACTERIA 2+ 04/09/2022    COMU see below 04/10/2022    HYALCAST 3-5 04/10/2022    WBCUA 0-2 04/10/2022    RBCUA 0-2 04/10/2022    EPIU 12 04/09/2022     Urine Reflex to Culture:   Lab Results   Component Value Date    URRFLXCULT Not Indicated 04/10/2022         MICRO: cultures reviewed and updated by me   Blood Culture:   Lab Results   Component Value Date    BC No Growth after 4 days of incubation. 05/26/2022    BLOODCULT2 No Growth after 4 days of incubation. 05/26/2022            Culture, Anaerobic and Aerobic [[id number]] (Abnormal) Collected: 05/24/22 1425   Order Status: Completed Specimen: Body Fluid from Neck Updated: 05/25/22 1357    Anaerobic Culture Anaerobic culture further report to follow    Organism Enterobacter cloacae complex Abnormal     WOUND/ABSCESS --    Rare growth   Sensitivity to follow    Narrative:     ORDER#: [id number]                          ORDERED BY: Johanna Rodriguez   SOURCE: Neck                               COLLECTED:  05/24/22 14:25   ANTIBIOTICS AT MATIAS. :                      RECEIVED :  05/24/22 15:05 Culture, Tip [[id number]] (Abnormal) Collected: 05/24/22 1425   Order Status: Completed Specimen: Neck from Catheter Tip Updated: 05/25/22 1356    Organism Enterobacter cloacae complex Abnormal     Culture Catheter Tip --    >15 colonies   Sensitivity to follow    Narrative:     ORDER#: [id number]                          ORDERED BY: Radha Luna   SOURCE: Catheter Tip                       COLLECTED:  05/24/22 14:25   ANTIBIOTICS AT MATIAS. :                      RECEIVED :  05/24/22 15:00   Culture, Body Fluid [[id number]] Collected: 05/23/22 0836   Order Status: Completed Specimen: Body Fluid from Ascitic Fluid Updated: 05/25/22 0740    Body Fluid Culture, Sterile --    No growth to date   No growth 36 to 48 hours     Gram Stain Result Cytospin performed,no quantitation   WBC's (Polymorphonuclear)   No Epithelial Cells seen   No organisms seen    Narrative:     ORDER#: [id number]                          ORDERED BY: [facility]   SOURCE: Ascites                            COLLECTED:  05/23/22 08:36   ANTIBIOTICS AT [facility]. :                      RECEIVED :  05/23/22 08:49   Culture, Blood 1 [[id number]] Collected: 05/24/22 1617   Order Status: Sent Specimen: Blood Updated: 05/24/22 1639   Culture, Blood 2 [[id number]] Collected: 05/24/22 1617   Order Status: Sent Specimen: Blood Updated: 05/24/22 1639   Culture, Fungus [[id number]] Collected: 05/24/22 1425   Order Status: Sent Specimen: Body Fluid Updated: 05/24/22 1504   Culture, Body Fluid [[id number]] Collected: 05/24/22 1425   Order Status: Canceled Specimen: Body Fluid    Culture, Anaerobic and Aerobic [[id number]] Collected: 05/23/22 0836   Order Status: Canceled Specimen:  Body Fluid from Abdomen    GI Bacterial Pathogens By PCR [[id number]] Collected: 05/17/22 0900   Order Status: Completed Specimen: Stool Updated: 05/17/22 1538    GI Bacterial Pathogens By PCR --    No Shigella spp/EIEC DNA detected   No Shiga toxin-producing gene(s) detected   No Campylobacter spp. (jejuni and coli)DNA detected   No Salmonella spp. DNA detected   Normal Range:  None detected    Narrative:     ORDER#: [id number]                          ORDERED BY: Davis Gudino   SOURCE: Stool                              COLLECTED:  05/17/22 09:00   ANTIBIOTICS AT [facility]IAS. :                      RECEIVED :  05/17/22 09:04   Clostridium Difficile Toxin/Antigen [[id number]] Collected: 05/17/22 0925   Order Status: Completed Specimen: Stool Updated: 05/17/22 1042    C.diff Toxin/Antigen --    Negative for Clostridium difficile antigen and toxin   Normal Range: Negative    Narrative:     ORDER#: [id number]                          ORDERED BY: Lorene Thomas   SOURCE: Stool                              COLLECTED:  05/17/22 09:25   ANTIBIOTICS AT [facility]IAS. :                      RECEIVED :  05/17/22 09:39   Collect White vial (sterile container)   Clostridium Difficile Toxin/Antigen [[id number]]    Order Status: Canceled Specimen: Stool           Ref Range & Units 5/23/22 0836   Cell Count Fluid Type  Ascitic Fluid    Color, Fluid  Yellow    Appearance, Fluid  Hazy    Clot Eval.  see below    Comment: No Clots Seen   Nucl Cell, Fluid /cumm 151    RBC, Fluid /cumm 2,000    Neutrophil Count, Fluid % 15    Lymphocytes, Body Fluid % 8    Monocyte Count, Fluid % 5    Macrophages % 66    Mesothelial, Fluid % 6    Number of Cells Counted Fluid  100    Volume mL 1000.0    Comment: APPROXIMATELY        Susceptibility      Enterobacter cloacae complex (1)    Antibiotic Interpretation Microscan  Method Status    ceFAZolin Resistant >=64 mcg/mL BACTERIAL SUSCEPTIBILITY PANEL BY LIVE     cefepime Sensitive <=0.12 mcg/mL BACTERIAL SUSCEPTIBILITY PANEL BY LIVE     ciprofloxacin Sensitive <=0.25 mcg/mL BACTERIAL SUSCEPTIBILITY PANEL BY LIVE     ertapenem Sensitive <=0.12 mcg/mL BACTERIAL SUSCEPTIBILITY PANEL BY LIVE     gentamicin Sensitive <=1 mcg/mL BACTERIAL SUSCEPTIBILITY PANEL BY LIVE     levofloxacin Sensitive <=0.12 mcg/mL BACTERIAL SUSCEPTIBILITY PANEL BY LIVE     piperacillin-tazobactam Sensitive <=4 mcg/mL BACTERIAL SUSCEPTIBILITY PANEL BY LIVE     trimethoprim-sulfamethoxazole Sensitive <=20 mcg/mL BACTERIAL SUSCEPTIBILITY PANEL BY LIVE       Respiratory Culture:  Lab Results   Component Value Date    LABGRAM 1+ Gram negative rods  No WBC's seen   05/24/2022     AFB:No results found for: AFBSMEAR  Viral Culture:  No results found for: COVID19  Urine Culture: No results for input(s): [address] in the last 72 hours. IMAGING:    IR TUNNELED CVC PLACE WO SQ PORT/PUMP > 5 YEARS   Final Result   Successful conversion of a left IJ non tunneled hemodialysis catheter for a   new 23 centimeter tunneled hemodialysis catheter. IR NONTUNNELED VASCULAR CATHETER > 5 YEARS   Final Result   Successful ultrasound and fluoroscopy guided left IJ 20 cm non-tunneled   hemodialysis catheter placement. IR REMOVE TUNNELED CVAD WO SQ PORT/PUMP   Final Result   Successful removal of the right IJ tunneled central catheter. VL Extremity Venous Right   Final Result      US GUIDED PARACENTESIS   Final Result   Successful paracentesis. CT ORBITS W WO CONTRAST   Final Result   Severe anasarca with proportionate bilateral orbital fat edema. No orbital hemorrhage or mass. RECOMMENDATIONS:   Unavailable         MRI ABDOMEN WO CONTRAST MRCP   Final Result   1. Incomplete exam. Patient declined rest of the study, no diffusion or axial   T2 obtained. Limited diagnostic quality exam.   2. Large volume ascites. Diffuse marked body wall edema. 3. Moderate diffuse hepatic steatosis better visualized on CT than on MR. No   focal lesions in the liver. 4. Contracted gallbladder containing a gallstone. Common bile duct is poorly   visualized. 5. Marked cardiomegaly, with bilateral small pleural effusions and bibasilar   consolidations.   Small-bowel wall edema noted, this could be due to   surrounding ascites/hypoproteinemia. CT CHEST WO CONTRAST   Final Result   1. Findings compatible with pulmonary edema with small pleural effusions. 2.  Body wall edema and upper abdominal ascites. CT ABDOMEN PELVIS WO CONTRAST Additional Contrast? None   Final Result   1. No ileus or obstruction. No definite inflammatory bowel process though   very limited given the extent of ascites. 2. Moderate volume ascites seen in the abdomen and pelvis, similar to the   prior exam.   3. Cardiomegaly with small bilateral effusions, left greater than right as   well as dependent ground-glass change which may represent dependent pulmonary   edema. 4. Mild anasarca. 5. Mild renal atrophy. 6. Cholelithiasis with a contracted gallbladder. XR CHEST PORTABLE   Final Result   Stable moderate cardiomegaly with mild central pulmonary congestion which is   more apparent.       Hazy left retrocardiac atelectasis or early infiltrate      Status post right-sided dialysis catheter placement in good position               All the pertinent images and reports for the current Hospitalization were reviewed by me     Scheduled Meds:   [START ON 6/2/2022] epoetin roseann-epbx  10,000 Units SubCUTAneous Once per day on Mon Wed Fri    midodrine  10 mg Oral TID WC    cefepime  1,000 mg IntraVENous Q24H    lactulose  30 g Oral TID    [Held by provider] clopidogrel  75 mg Oral Daily    allopurinol  100 mg Oral Daily    levothyroxine  50 mcg Oral QAM AC    metoprolol succinate  12.5 mg Oral Daily    pantoprazole  40 mg Oral BID AC    sodium chloride flush  5-40 mL IntraVENous 2 times per day       Continuous Infusions:   dextrose Stopped (05/19/22 5237)    sodium chloride         PRN Meds:  loperamide, glucose, dextrose bolus **OR** dextrose bolus, glucagon (rDNA), dextrose, sodium chloride flush, sodium chloride, ondansetron, heparin (porcine)      Assessment:     Patient Active Problem List   Diagnosis    ED (erectile dysfunction)    Essential hypertension, benign    Hyperlipidemia    Diabetic nephropathy (HCC)    Diabetes type 2, uncontrolled ([facility]    Noncompliance    Elevated blood uric acid level    Coronary artery disease involving native coronary artery    Elevated PSA    Acute on chronic combined systolic and diastolic HF (heart failure) (HCC)    Stage 3 chronic kidney disease (HCC)    SONAL (acute kidney injury) (HCC)    Biventricular heart failure (HCC)    Acute on chronic systolic heart failure, NYHA class 4 (HCC)    Acute on chronic congestive heart failure (HCC)    LV (left ventricular) mural thrombus    Syncope    CHF (congestive heart failure) (HCC)    Syncope and collapse    ICD (implantable cardioverter-defibrillator) in place    Demand ischemia (HCC)    Ventricular tachycardia (HCC)    Elevated LFTs    Supratherapeutic INR    Diarrhea        ESRD on HD  DM with several complications  Cirrhosis of liver from ? CHF  H/O Biventricular Heart failure  Ascites +  S/p Paracentesis fluid negative for infection  LV thrombus +  HD line infection   S/p HD line removal by IR on 5/24   Stool studies  -ve on admit  Bacteremia Enterobacter cloacae   Acute hypoxic resp failure on nasal cannula  Using  2 lts now       IR Removed HD line and noted to have purulence and cx sent and  t Micro results noted    HD line tip cx Enterobacter    Blood cx from 5/24 now positive for Enterobacter and concerning for CLABSI - will need repeat Blood cx so far negative      S/P Temp line placement now converted to Perma cath -  Blood cx from 5/26 NGTD      Bili still high from cardiac Cirrhosis and hepatopathy now HD line place will adjust IV abx to be continued with HD for x 3 weeks      Labs, Microbiology, Radiology and all the pertinent results from current hospitalization and  care every where were reviewed  by me as a part of the evaluation   Plan:   1.  Change IV  Cefepime x 2  gm WITH HD sessions Tue- thus- sat x schedule x stop date x  6/15  2. BP low and Cardiac cirrhosis noted   3. HD line tip cx Enterobacter   4. HD line site cx Enterobacter  5. Blood cx from 5/26 NGTD  6. Prognosis poor from multiple medical issues   7  S/p Perma cath placement on  5/31/22  8. DEE done   9. Will sign off           Discussed with patient/Family and Nursing staff     Thanks for allowing me to participate in your patient's care and please call me with any questions or concerns.     Arianne Guerra MD  Infectious Disease  [facility]) Physician  Phone: [phone]   Fax : [phone]

## 2022-06-01 NOTE — PROGRESS NOTES
Hospitalist Progress Note      PCP: Kvng Martínez DO    Date of Admission: 5/17/2022    Chief Complaint: Diarrhea    Hospital Course:   74years old male with medical history significant for end-stage renal disease patient is on hemodialysis. Patient was admitted with generalized weakness and diarrhea. C. difficile was negative. Diarrhea work-up was negative.     MRI of the abdomen showed large volume ascites diffuse body wall edema moderate diffuse hepatic steatosis. Cardiomegaly bilateral small pleural effusions and bibasilar consolidation. Patient was later found to have dialysis line infection, septicemia, s/p removal by IR 5/24.   Temporary dialysis line inserted 5/27 5/31  Temporary dialysis catheter converted to [address]    6/1  Patient feeling well today, no new complaints    Subjective: As above    Medications:  Reviewed    Infusion Medications    dextrose Stopped (05/19/22 6837)    sodium chloride       Scheduled Medications    [START ON 6/2/2022] epoetin roseann-epbx  10,000 Units SubCUTAneous Once per day on Mon Wed Fri    midodrine  10 mg Oral TID WC    cefepime  1,000 mg IntraVENous Q24H    lactulose  30 g Oral TID    [Held by provider] clopidogrel  75 mg Oral Daily    allopurinol  100 mg Oral Daily    levothyroxine  50 mcg Oral QAM AC    metoprolol succinate  12.5 mg Oral Daily    pantoprazole  40 mg Oral BID AC    sodium chloride flush  5-40 mL IntraVENous 2 times per day     PRN Meds: loperamide, glucose, dextrose bolus **OR** dextrose bolus, glucagon (rDNA), dextrose, sodium chloride flush, sodium chloride, ondansetron, heparin (porcine)      Intake/Output Summary (Last 24 hours) at 6/1/2022 0932  Last data filed at 5/31/2022 1549  Gross per 24 hour   Intake 500 ml   Output 3000 ml   Net -2500 ml       Physical Exam Performed:    /67   Pulse 98   Temp 98.4 °F (36.9 °C) (Oral)   Resp 20   Ht 5' 6\" (1.676 m)   Wt 148 lb 9.4 oz (67.4 kg)   SpO2 100%   BMI 23.98 kg/m²     General appearance: No apparent distress, appears stated age and cooperative. HEENT: Pupils equal, round, and reactive to light. Conjunctivae/corneas clear. Neck: Supple, with full range of motion. No jugular venous distention. Trachea midline. Temporary dialysis line noted left side of the chest  Respiratory:  Normal respiratory effort. Clear to auscultation, bilaterally without Rales/Wheezes/Rhonchi. Dressing noted where HD line been removed  Cardiovascular: Regular rate and rhythm with normal S1/S2 without murmurs, rubs or gallops. Abdomen: Soft, non-tender, non-distended with normal bowel sounds. Musculoskeletal: Moderate lower limb edema  Skin: Skin color, texture, turgor normal.  No rashes or lesions. Neurologic:  Neurovascularly intact without any focal sensory/motor deficits. Cranial nerves: II-XII intact, grossly non-focal.  Psychiatric: Alert and oriented, thought content appropriate, normal insight  Capillary Refill: Brisk,3 seconds, normal   Peripheral Pulses: +2 palpable, equal bilaterally       Labs:   Recent Labs     05/30/22 0527 05/31/22 0458 06/01/22  0644   WBC 5.0 4.5  4.5 6.1   HGB 9.5* 9.2*  9.2* 8.9*   HCT 28.5* 27.6*  27.5* 25.9*   PLT 95* 127*  104* 130*     Recent Labs     05/30/22 0527 05/31/22 0458 06/01/22  0644   * 134* 137   K 3.9 3.3* 3.6   CL 95* 97* 100   CO2 21 22 22   BUN 44* 33* 28*   CREATININE 4.2* 3.1* 3.2*   CALCIUM 8.4 8.3 8.6     Recent Labs     05/30/22 0527 05/31/22 0458 06/01/22  0644   AST 82* 77* 71*   ALT 67* 65* 60*   BILITOT 6.9* 6.0* 5.4*   ALKPHOS 203* 204* 206*     Recent Labs     05/30/22 0527 05/31/22 0458 06/01/22  0644   INR 1.58* 1.53* 1.53*     No results for input(s): CKTOTAL, TROPONINI in the last 72 hours.     Urinalysis:      Lab Results   Component Value Date    NITRU Negative 04/10/2022    WBCUA 0-2 04/10/2022    BACTERIA 2+ 04/09/2022    RBCUA 0-2 04/10/2022    BLOODU Negative 04/10/2022    SPECGRAV 1.025 04/10/2022 GLUCOSEU Negative 04/10/2022       Radiology:  IR TUNNELED CVC PLACE WO SQ PORT/PUMP > 5 YEARS   Final Result   Successful conversion of a left IJ non tunneled hemodialysis catheter for a   new 23 centimeter tunneled hemodialysis catheter. IR NONTUNNELED VASCULAR CATHETER > 5 YEARS   Final Result   Successful ultrasound and fluoroscopy guided left IJ 20 cm non-tunneled   hemodialysis catheter placement. IR REMOVE TUNNELED CVAD WO SQ PORT/PUMP   Final Result   Successful removal of the right IJ tunneled central catheter. VL Extremity Venous Right   Final Result      US GUIDED PARACENTESIS   Final Result   Successful paracentesis. CT ORBITS W WO CONTRAST   Final Result   Severe anasarca with proportionate bilateral orbital fat edema. No orbital hemorrhage or mass. RECOMMENDATIONS:   Unavailable         MRI ABDOMEN WO CONTRAST MRCP   Final Result   1. Incomplete exam. Patient declined rest of the study, no diffusion or axial   T2 obtained. Limited diagnostic quality exam.   2. Large volume ascites. Diffuse marked body wall edema. 3. Moderate diffuse hepatic steatosis better visualized on CT than on MR. No   focal lesions in the liver. 4. Contracted gallbladder containing a gallstone. Common bile duct is poorly   visualized. 5. Marked cardiomegaly, with bilateral small pleural effusions and bibasilar   consolidations. Small-bowel wall edema noted, this could be due to   surrounding ascites/hypoproteinemia. CT CHEST WO CONTRAST   Final Result   1. Findings compatible with pulmonary edema with small pleural effusions. 2.  Body wall edema and upper abdominal ascites. CT ABDOMEN PELVIS WO CONTRAST Additional Contrast? None   Final Result   1. No ileus or obstruction. No definite inflammatory bowel process though   very limited given the extent of ascites.    2. Moderate volume ascites seen in the abdomen and pelvis, similar to the   prior exam.   3. Cardiomegaly with small bilateral effusions, left greater than right as   well as dependent ground-glass change which may represent dependent pulmonary   edema. 4. Mild anasarca. 5. Mild renal atrophy. 6. Cholelithiasis with a contracted gallbladder. XR CHEST PORTABLE   Final Result   Stable moderate cardiomegaly with mild central pulmonary congestion which is   more apparent. Charis Stevenson left retrocardiac atelectasis or early infiltrate      Status post right-sided dialysis catheter placement in good position                 Assessment/Plan:    Active Hospital Problems    Diagnosis     Elevated LFTs [R79.89]      Priority: Medium    Supratherapeutic INR [R79.1]      Priority: Medium    Diarrhea [R19.7]      Priority: Medium    CHF (congestive heart failure) ([facility]) [I50.9]     ICD (implantable cardioverter-defibrillator) in place [Z95.810]     Biventricular heart failure ([address] [I50.82]           Enterobacter cloacae bacteremia/  HD line tunneled infection. Catheter had previously had erosions through the skin, was noted to be grossly infected, s/p removal 5/24. Culture tip as well as intraoperative cultures growing Enterobacter cloacae, blood cultures well growing Enterobacter. (Sensitive to cefepime)   Temporary dialysis line inserted 5/27  -Follow repeated blood cultures 5/26 ( negative to date)  -Appreciate ID input  -Continue on vancomycin and cefepime started 5/24; will require cefepime for 3 weeks  -Temporary dialysis catheter converted to [facility] [place], operated by [facility] on 5/31      Diarrhea ( resolved )   Abdominal pain. Diarrhea work-up was negative  Diarrhea and abdominal pain improving        Transaminitis and cirrhosis + Ascites. ( improving ).    Cardiac liver disease  Significantly improving  Possibly related to congestive heart failure.   GI on board  Large volume ascites noted on MRI-paracentesis done, no sign of infection  Contracted gallbladder containing stones.     Hypoglycemia likely related to cirrhosis and DMII. ADAT  Start hypoglycemia protocol     Elevated INR. vitamin K,   inr 1.5       ESRD on HD. Discussed with nephrology, line removed 5/24. Patient planned for tunneled dialysis line insertion today , Plavix on hold. Underwent temp line insertion 2/04     Systolic heart failure. Ejection fraction less than 20%. Volume status adjusted during dialysis      Anemia. Continue IV iron       Retro-orbital/ conjunctival hemorrhage with exopthalmos  -CT head without contrast shows edema     RLE swelling > Left leg  -US with no DVT ( chronic right phlebitic process involving the right superficial femoral vein at the proximal thigh )      Hepatic encephalopathy with asterixis ( resolved )   Alert this morning. Ammonia levels-wnl.   -cont lactulose 30g po tid  -Had a 1100ml paracentesis  during admission    DVT Prophylaxis: lovenox  Diet: ADULT DIET; Regular; Low Sodium (2 gm); 2000 ml  ADULT ORAL NUTRITION SUPPLEMENT; Breakfast, Lunch, Dinner; Renal Oral Supplement  Code Status: Full Code    PT/OT Eval Status: following.      Dispo - ECF pending clinical improvement      Ryan Cheney MD

## 2022-06-01 NOTE — PROGRESS NOTES
Occupational Therapy  Facility/Department: [address] PROGRESSIVE CARE  Occupational Therapy Treatment and Tentative D/C      Name: Justin Jeter  : 1955  MRN: [id number]  Date of Service: 2022    Discharge Recommendations: Justin Jeter scored a 14/24 on the AM-PAC ADL Inpatient form. Current research shows that an AM-PAC score of 17 or less is typically not associated with a discharge to the patient's home setting. Based on the patient's AM-PAC score and their current ADL deficits, it is recommended that the patient have 3-5 sessions per week of Occupational Therapy at d/c to increase the patient's independence. Please see assessment section for further patient specific details. If patient discharges prior to next session this note will serve as a discharge summary. Please see below for the latest assessment towards goals. Continue to assess pending progress,Patient would benefit from continued therapy after discharge,3-5 sessions per week  OT Equipment Recommendations  Equipment Needed: No  Other: pt with needed AD       Patient Diagnosis(es): The primary encounter diagnosis was Transaminitis. Diagnoses of Elevated bilirubin and Pacemaker were also pertinent to this visit. Past Medical History:  has a past medical history of CHF (congestive heart failure) ([address] Diabetes type 2, uncontrolled ([address] Diabetic nephropathy, ED (erectile dysfunction), Essential hypertension, benign, Hyperlipidemia, and Noncompliance. Past Surgical History:  has a past surgical history that includes eye surgery; IR NONTUNNELED VASCULAR CATHETER > 5 YEARS (2022); IR TUNNELED CVC PLACE WO SQ PORT/PUMP > 5 YEARS (2022); IR NONTUNNELED VASCULAR CATHETER > 5 YEARS (Left, 2022); IR NONTUNNELED VASCULAR CATHETER > 5 YEARS (2022); IR TUNNELED CVC PLACE WO SQ PORT/PUMP > 5 YEARS (Left, 2022); and IR TUNNELED CVC PLACE WO SQ PORT/PUMP > 5 YEARS (2022).            Assessment   Performance deficits / Impairments: Decreased functional mobility ; Decreased balance;Decreased safe awareness;Decreased ADL status; Decreased endurance;Decreased strength;Decreased high-level IADLs  Assessment: Pt tolerated session fair. Pt completes bed mobility with Max Ax2. Pt completes transfers with Min A and use of stedy. Pt able to tolerate static standing although demonstrates increased difficutly attempting to taks steps in place. Pt require Max A for toileting, LB dressing, and LB bathing. Continue with POC. Prognosis: [address] / Modification: stedy  REQUIRES OT FOLLOW-UP: Yes  Activity Tolerance  Activity Tolerance: Patient limited by fatigue;Patient Tolerated treatment well        Plan   Plan  Times per Week: 3-5x  Current Treatment Recommendations: Strengthening,Balance training,Functional mobility training,Self-Care / ADL,Patient/Caregiver education & training,Safety education & training     Restrictions  Restrictions/Precautions  Restrictions/Precautions: Fall Risk  Position Activity Restriction  Other position/activity restrictions: 1L O2    Subjective   General  Chart Reviewed: Yes  Patient assessed for rehabilitation services?: Yes  Additional Pertinent Hx: per ED note, Nicole Morrison is a 74 y.o. male who presents to the emergency department with abdominal pain diarrhea. Patient presents with abdominal pain and diarrhea. Has been going on for 5 days. Loose watery stools. Generalized abdominal pain. 7-10 achy nature. Worse with eating. Better with rest.  Started spontaneously. Constant in nature. No blood in stools. No other associated symptoms. Family / Caregiver Present: No  Referring Practitioner: Joseline Carpenter MD  Subjective  Subjective: Pt with no reports of pain. General Comment  Comments: okay for therapy per RN.      Social/Functional History  Social/Functional History  Lives With: Son  Type of Home: Apartment  Home Layout: One level  Home Access: Stairs to enter with rails  Entrance Stairs - Number of Steps: 2 steps (descend) to enter  Bathroom Shower/Tub: Tub/Shower unit  [employer]: Standard  Bathroom Equipment: Shower chair  Bathroom Accessibility: Accessible  Home Equipment: Carlos Sheridan, 4 wheeled  Has the patient had two or more falls in the past year or any fall with injury in the past year?: No  ADL Assistance: Independent  Homemaking Assistance: Needs assistance (assist fron son and sister)  Ambulation Assistance: Independent (4AQ)  Transfer Assistance: Independent       Objective   Heart Rate: 98  Heart Rate Source: Monitor  BP: 98/62  BP Location: Right upper arm  BP Method: Manual  Patient Position: Semi fowlers  MAP (Calculated): 74  Resp: 20  SpO2: 100 %  O2 Device: Nasal cannula  Vision Exceptions: Cataracts  Hearing: Within functional limits          Safety Devices  Type of Devices: All fall risk precautions in place;Call light within reach; Left in bed;Nurse notified; Bed alarm in place  Restraints  Restraints Initially in Place: No  Bed Mobility Training  Bed Mobility Training: Yes  Overall Level of Assistance: Maximum assistance;Assist X2  Rolling: Minimum assistance  Supine to Sit: Maximum assistance;Assist X2  Sit to Supine: Assist X2;Maximum assistance  Scooting: Assist X2;Total assistance  Balance  Sitting: With support  Sitting - Static: Supported sitting  Sitting - Dynamic: Fair (occasional)  Standing: Impaired (stedy)  Standing - Static: Constant support  Transfer Training  Transfer Training: Yes  Overall Level of Assistance: Minimum assistance (stedy)  Sit to Stand: Minimum assistance (stedy)  Stand to Sit: Minimum assistance  Stand Pivot Transfers: Minimum assistance  Gait Training  Gait Training: No     AROM: Generally decreased, functional  PROM: Generally decreased, functional  Strength: Generally decreased, functional  Coordination: Generally decreased, functional  Tone: Normal  Sensation: Impaired (numbness/tingling in B LEs)  ADL  LE Bathing: Maximum assistance  LE

## 2022-06-01 NOTE — PLAN OF CARE
Problem: Discharge Planning  Goal: Discharge to home or other facility with appropriate resources  Outcome: Progressing     Problem: Chronic Conditions and Co-morbidities  Goal: Patient's chronic conditions and co-morbidity symptoms are monitored and maintained or improved  Outcome: Progressing     Problem: Safety - Adult  Goal: Free from fall injury  Outcome: Progressing     Problem: ABCDS Injury Assessment  Goal: Absence of physical injury  Outcome: Progressing     Problem: Skin/Tissue Integrity  Goal: Absence of new skin breakdown  Description: 1. Monitor for areas of redness and/or skin breakdown  2. Assess vascular access sites hourly  3. Every 4-6 hours minimum:  Change oxygen saturation probe site  4. Every 4-6 hours:  If on nasal continuous positive airway pressure, respiratory therapy assess nares and determine need for appliance change or resting period.   Outcome: Progressing     Problem: Respiratory - Adult  Goal: Achieves optimal ventilation and oxygenation  Outcome: Progressing     Problem: Cardiovascular - Adult  Goal: Maintains optimal cardiac output and hemodynamic stability  Outcome: Progressing  Goal: Absence of cardiac dysrhythmias or at baseline  Outcome: Progressing     Problem: Hematologic - Adult  Goal: Maintains hematologic stability  Outcome: Progressing     Problem: Nutrition Deficit:  Goal: Optimize nutritional status  Outcome: Progressing     Problem: Pain  Goal: Verbalizes/displays adequate comfort level or baseline comfort level  Outcome: Progressing

## 2022-06-01 NOTE — PROGRESS NOTES
bruising, dry skin, petechiae. Head, Face, Neck   headaches, swelling,  cervical adenopathy. Respiratory: shortness of breath, cough, or wheezing  Cardiovascular: chest pain, palpitations, dizzy, edema  Gastrointestinal: nausea, vomiting, diarrhea, constipation,belly pain    Yellow skin, blood in stool  Musculoskeletal:  back pain, muscle weakness, gait problems,       joint pain or swelling. Genitourinary:  dysuria, poor urine flow, flank pain, blood in urine  Neurologic:  vertigo, TIA'S, syncope, seizures, focal weakness  Psychosocial:  insomnia, anxiety, or depression. Additional positive findings: -     PMH:   Past medical history, surgical history, social history, family history are reviewed and updated as appropriate. Reviewed current medication list.   Allergies reviewed and updated as needed. PE:   Vitals:    06/01/22 0921   BP:    Pulse: 98   Resp:    Temp:    SpO2:        General appearance:  in NAD, somnolent, poorly arousable   HEENT: EOM intact, no icterus. Trachea is midline. Neck : No masses, appears symmetrical, no JVD  Respiratory: Respiratory effort appears normal, bilateral equal chest rise, no wheeze, +crackles  Cardiovascular: Ausculation shows RRR + edema  Abdomen: No visible mass or tenderness, non distended. Musculoskeletal:  Joints with no swelling or deformity. Skin:no rashes, ulcers, induration, no jaundice. Neuro: face symmetric, no focal deficits. Appropriate responses.          Lab Results   Component Value Date    CREATININE 3.2 (H) 06/01/2022    BUN 28 (H) 06/01/2022     06/01/2022    K 3.6 06/01/2022     06/01/2022    CO2 22 06/01/2022      Lab Results   Component Value Date    WBC 6.1 06/01/2022    HGB 8.9 (L) 06/01/2022    HCT 25.9 (L) 06/01/2022    .3 (H) 06/01/2022     (L) 06/01/2022     Lab Results   Component Value Date    .8 (H) 04/07/2022    CALCIUM 8.6 06/01/2022    PHOS 3.4 04/07/2022

## 2022-06-02 NOTE — PROGRESS NOTES
Hospitalist Progress Note      PCP: Day Salinas DO    Date of Admission: 5/17/2022    Chief Complaint: Diarrhea    Hospital Course:   74years old male with medical history significant for end-stage renal disease patient is on hemodialysis. Patient was admitted with generalized weakness and diarrhea. C. difficile was negative. Diarrhea work-up was negative.     MRI of the abdomen showed large volume ascites diffuse body wall edema moderate diffuse hepatic steatosis. Cardiomegaly bilateral small pleural effusions and bibasilar consolidation. Patient was later found to have dialysis line infection, septicemia, s/p removal by IR 5/24. Temporary dialysis line inserted 5/27 5/31  Temporary dialysis catheter converted to [facility]    6/1  Patient feeling well today, no new complaints    6/2  DC orders will be placed today since patient is medically stable. However, family undecided if they would be able to take care of this patient at home. Family requested skilled nursing placement.     Subjective: As above    Medications:  Reviewed    Infusion Medications    dextrose Stopped (05/19/22 1427)    sodium chloride 250 mL (06/01/22 2001)     Scheduled Medications    cefepime  2,000 mg IntraVENous Once per day on Tue Thu Sat    epoetin roseann-epbx  10,000 Units SubCUTAneous Once per day on Mon Wed Fri    midodrine  10 mg Oral TID WC    lactulose  30 g Oral TID    [Held by provider] clopidogrel  75 mg Oral Daily    allopurinol  100 mg Oral Daily    levothyroxine  50 mcg Oral QAM AC    metoprolol succinate  12.5 mg Oral Daily    pantoprazole  40 mg Oral BID AC    sodium chloride flush  5-40 mL IntraVENous 2 times per day     PRN Meds: loperamide, glucose, dextrose bolus **OR** dextrose bolus, glucagon (rDNA), dextrose, sodium chloride flush, sodium chloride, ondansetron, heparin (porcine)      Intake/Output Summary (Last 24 hours) at 6/2/2022 1600  Last data filed at 6/2/2022 1236  Gross per 24 hour Intake 1070 ml   Output 2400 ml   Net -1330 ml       Physical Exam Performed:    BP 99/76   Pulse 91   Temp 98 °F (36.7 °C) (Oral)   Resp 18   Ht 5' 6\" (1.676 m)   Wt 144 lb 2.9 oz (65.4 kg)   SpO2 93%   BMI 23.27 kg/m²     General appearance: No apparent distress, appears stated age and cooperative. HEENT: Pupils equal, round, and reactive to light. Conjunctivae/corneas clear. Neck: Supple, with full range of motion. No jugular venous distention. Trachea midline. Temporary dialysis line noted left side of the chest  Respiratory:  Normal respiratory effort. Clear to auscultation, bilaterally without Rales/Wheezes/Rhonchi. Dressing noted where HD line been removed  Cardiovascular: Regular rate and rhythm with normal S1/S2 without murmurs, rubs or gallops. Abdomen: Soft, non-tender, non-distended with normal bowel sounds. Musculoskeletal: Moderate lower limb edema  Skin: Skin color, texture, turgor normal.  No rashes or lesions. Neurologic:  Neurovascularly intact without any focal sensory/motor deficits.  Cranial nerves: II-XII intact, grossly non-focal.  Psychiatric: Alert and oriented, thought content appropriate, normal insight  Capillary Refill: Brisk,3 seconds, normal   Peripheral Pulses: +2 palpable, equal bilaterally       Labs:   Recent Labs     05/31/22 0458 06/01/22 0644 06/02/22  0549   WBC 4.5  4.5 6.1 7.9   HGB 9.2*  9.2* 8.9* 9.0*   HCT 27.6*  27.5* 25.9* 27.0*   *  104* 130* 131*     Recent Labs     05/31/22 0458 06/01/22  0644 06/02/22  0549   * 137 137   K 3.3* 3.6 3.8   CL 97* 100 100   CO2 22 22 21   BUN 33* 28* 39*   CREATININE 3.1* 3.2* 3.7*   CALCIUM 8.3 8.6 8.8     Recent Labs     05/31/22 0458 06/01/22 0644 06/02/22  0549   AST 77* 71* 68*   ALT 65* 60* 59*   BILITOT 6.0* 5.4* 5.4*   ALKPHOS 204* 206* 184*     Recent Labs     05/31/22  0458 06/01/22  0644 06/02/22  0549   INR 1.53* 1.53* 1.52*     No results for input(s): Emre Murrell in the last 72 hours.    Urinalysis:      Lab Results   Component Value Date    NITRU Negative 04/10/2022    WBCUA 0-2 04/10/2022    BACTERIA 2+ 04/09/2022    RBCUA 0-2 04/10/2022    BLOODU Negative 04/10/2022    SPECGRAV 1.025 04/10/2022    GLUCOSEU Negative 04/10/2022       Radiology:  IR TUNNELED CVC PLACE WO SQ PORT/PUMP > 5 YEARS   Final Result   Successful conversion of a left IJ non tunneled hemodialysis catheter for a   new 23 centimeter tunneled hemodialysis catheter. IR NONTUNNELED VASCULAR CATHETER > 5 YEARS   Final Result   Successful ultrasound and fluoroscopy guided left IJ 20 cm non-tunneled   hemodialysis catheter placement. IR REMOVE TUNNELED CVAD WO SQ PORT/PUMP   Final Result   Successful removal of the right IJ tunneled central catheter. VL Extremity Venous Right   Final Result      US GUIDED PARACENTESIS   Final Result   Successful paracentesis. CT ORBITS W WO CONTRAST   Final Result   Severe anasarca with proportionate bilateral orbital fat edema. No orbital hemorrhage or mass. RECOMMENDATIONS:   Unavailable         MRI ABDOMEN WO CONTRAST MRCP   Final Result   1. Incomplete exam. Patient declined rest of the study, no diffusion or axial   T2 obtained. Limited diagnostic quality exam.   2. Large volume ascites. Diffuse marked body wall edema. 3. Moderate diffuse hepatic steatosis better visualized on CT than on MR. No   focal lesions in the liver. 4. Contracted gallbladder containing a gallstone. Common bile duct is poorly   visualized. 5. Marked cardiomegaly, with bilateral small pleural effusions and bibasilar   consolidations. Small-bowel wall edema noted, this could be due to   surrounding ascites/hypoproteinemia. CT CHEST WO CONTRAST   Final Result   1. Findings compatible with pulmonary edema with small pleural effusions. 2.  Body wall edema and upper abdominal ascites.          CT ABDOMEN PELVIS WO CONTRAST Additional Contrast? None

## 2022-06-02 NOTE — CARE COORDINATION
Discharge Plan:    Spoke with patient, who is refusing skilled placement at discharge. Patient report adequate family support in home with home healthcare. Patient gave permission to review discharge plan with family. Chery, patient's son. Left message requesting call back. Called Lesvia English and Christian[facility] to discuss. Sister expressed concerns re: patient returning home, requesting time to talk with patient about other discharge options. Electronically signed by George Andre RN on 6/2/2022 at 3:30PM    Returned to patient bedside to discuss discharge plan. Patient on phone with sister, Lesvia Tameka, at the time. Had conversation with both patient and Lesvia English; patient verbally agreed to SNF placement. SNF list provided to patient and family. Patient agreed to me putting in a couple referrals to highly CMS-rated facilities. They will discuss as a family and give me additional referrals tomorrow, as appropriate. The Plan for Transition of Care is related to the following treatment goals: skilled nursing facility    The Patient and family was provided with a choice of provider and agrees   with the discharge plan. [x] Yes [] No    Freedom of choice list was provided with basic dialogue that supports the patient's individualized plan of care/goals, treatment preferences and shares the quality data associated with the providers. [x] Yes [] No    Referrals placed to:    1. [facility]  2. [facility]  3. [address] [place] [address]    Will follow for referral updates.     Electronically signed by George Andre RN on 6/2/2022 at 4:31 PM

## 2022-06-02 NOTE — PROGRESS NOTES
Pharmacy Heart Failure Medication Reconciliation Note    Pt discharged from [facility] today. Chief Complaint   Patient presents with    Diarrhea    Shortness of Breath       Anderson Ax has a diagnosis of systolic heart failure (last EF = <20% on 3/26/22). Pertinent Labs:  BMP:   Lab Results   Component Value Date     06/02/2022    K 3.8 06/02/2022    K 4.4 05/17/2022     06/02/2022    CO2 21 06/02/2022    BUN 39 06/02/2022    CREATININE 3.7 06/02/2022     BNP:   Lab Results   Component Value Date    PROBNP 34,786 03/25/2022    PROBNP 32,014 02/12/2022    PROBNP 35,157 02/09/2022       Patient taking an ACEI / ARB / Entresto for EF </= 40%:  No, CKD   Patient taking a BETA BLOCKER (Coreg, Toprol XL or bisoprolol) for EF </= 40%: Yes  Patient taking a LOOP DIURETIC: No: CKD  Patient taking a ALDOSTERONE RECEPTOR ANTAGONIST for EF </= 35%: No, CKD   Patient taking an SGLT2I for EF </= 40%: No, cardiology will address in the outpatient setting    Patient has a diagnosis of Atrial Fibrillation: No. If yes, patient is on appropriate anticoagulation: n/a    Patient has a diagnosis of type 2 diabetes:  Yes.  If yes, patient prescribed the following anti-hyperglycemic medication at discharge: No, controlled       Corrections to discharge medications include:  none    Discharge Medications:         Medication List      CONTINUE taking these medications    allopurinol 100 MG tablet  Commonly known as: ZYLOPRIM  Take 1 tablet by mouth daily     atorvastatin 80 MG tablet  Commonly known as: LIPITOR     clopidogrel 75 MG tablet  Commonly known as: PLAVIX     glucose monitoring kit  1 kit by Does not apply route daily     levothyroxine 50 MCG tablet  Commonly known as: SYNTHROID  Take 1 tablet by mouth every morning (before breakfast)     metoprolol succinate 25 MG extended release tablet  Commonly known as: TOPROL XL  Take 0.5 tablets by mouth daily  Start taking on: Caroline 3, 2022     midodrine 5 MG tablet  Commonly known as: PROAMATINE  Take 1 tablet by mouth 3 times daily (with meals)     pantoprazole 40 MG tablet  Commonly known as: PROTONIX  Take 1 tablet by mouth 2 times daily (before meals)     therapeutic multivitamin-minerals tablet        STOP taking these medications    carvedilol 12.5 MG tablet  Commonly known as: COREG     isosorbide mononitrate 30 MG extended release tablet  Commonly known as: IMDUR     rivaroxaban 15 MG Tabs tablet  Commonly known as: Edilma Sentkelly           Where to Get Your Medications      These medications were sent to [place] [address] [address] [phone] Didier Chappell [phone]  [address], [address] [facility]    Phone: [phone]   · metoprolol succinate 25 MG extended release tablet         [facility]rel Nuñez [facility]  Heart Failure Discharge Medication Reconciliation Program  [phone]

## 2022-06-02 NOTE — PROGRESS NOTES
Discharge order noted. Pt has had 60 minutes of heart failure education  During this admission. He has a follow up appointment with his pcp [email or web address]. His med rec has been sent to pharmacist to verify/  He has appropriate discharge instructions on his AVS, and DEE as well. His weight today is  144lbs ( 65.4 kg) this is his new dry weight.

## 2022-06-02 NOTE — PLAN OF CARE
Problem: Discharge Planning  Goal: Discharge to home or other facility with appropriate resources  Outcome: Progressing  Flowsheets (Taken 6/1/2022 2140 by Caitlin Dao, CEZAR)  Discharge to home or other facility with appropriate resources: Identify barriers to discharge with patient and caregiver     Problem: Chronic Conditions and Co-morbidities  Goal: Patient's chronic conditions and co-morbidity symptoms are monitored and maintained or improved  Outcome: Progressing  Flowsheets (Taken 6/1/2022 2140 by Caitlin Dao RN)  Care Plan - Patient's Chronic Conditions and Co-Morbidity Symptoms are Monitored and Maintained or Improved: Monitor and assess patient's chronic conditions and comorbid symptoms for stability, deterioration, or improvement     Problem: Safety - Adult  Goal: Free from fall injury  Outcome: Progressing  Flowsheets (Taken 6/1/2022 2321 by Caitlin Dao RN)  Free From Fall Injury:   Instruct family/caregiver on patient safety   Based on caregiver fall risk screen, instruct family/caregiver to ask for assistance with transferring infant if caregiver noted to have fall risk factors     Problem: ABCDS Injury Assessment  Goal: Absence of physical injury  Outcome: Progressing  Flowsheets (Taken 6/1/2022 2321 by Caitlin Dao RN)  Absence of Physical Injury: Implement safety measures based on patient assessment     Problem: Skin/Tissue Integrity  Goal: Absence of new skin breakdown  Description: 1. Monitor for areas of redness and/or skin breakdown  2. Assess vascular access sites hourly  3. Every 4-6 hours minimum:  Change oxygen saturation probe site  4. Every 4-6 hours:  If on nasal continuous positive airway pressure, respiratory therapy assess nares and determine need for appliance change or resting period.   Outcome: Progressing     Problem: Respiratory - Adult  Goal: Achieves optimal ventilation and oxygenation  Outcome: Progressing     Problem: Cardiovascular - Adult  Goal: Maintains optimal cardiac output and hemodynamic stability  Outcome: Progressing  Flowsheets (Taken 6/1/2022 2140 by Pato Bruno RN)  Maintains optimal cardiac output and hemodynamic stability: Monitor blood pressure and heart rate  Goal: Absence of cardiac dysrhythmias or at baseline  Outcome: Progressing  Flowsheets (Taken 6/1/2022 2140 by Pato Bruno RN)  Absence of cardiac dysrhythmias or at baseline: Monitor cardiac rate and rhythm     Problem: Hematologic - Adult  Goal: Maintains hematologic stability  Outcome: Progressing  Flowsheets (Taken 6/1/2022 2140 by Pato Bruno RN)  Maintains hematologic stability:   Assess for signs and symptoms of bleeding or hemorrhage   Monitor labs for bleeding or clotting disorders     Problem: Nutrition Deficit:  Goal: Optimize nutritional status  Outcome: Progressing     Problem: Pain  Goal: Verbalizes/displays adequate comfort level or baseline comfort level  Outcome: Progressing

## 2022-06-02 NOTE — ADT AUTH CERT
Utilization Reviews         Heart Failure - Care Day 14 (5/30/2022) by Leelee Rain RN       Review Status Review Entered   Completed 6/1/2022 13:26      Criteria Review      Care Day: 14 Care Date: 5/30/2022 Level of Care: Intermediate Care    Guideline Day 2    Level Of Care    (X) Floor    Clinical Status    (X) * Hemodynamic stability    6/1/2022 1:26 PM EDT by Akilah Shabazz      /59, 98/54, 140/79  Pulse 76    (X) * Mental status at baseline    6/1/2022 1:26 PM EDT by Akilah Shabazz      aao    (X) * No evidence of myocardial ischemia    6/1/2022 1:26 PM EDT by Akilah Shabazz      no chest discomfort    (X) * Cardiac rate and rhythm acceptable    6/1/2022 1:26 PM EDT by Akilah Shabazz      Regular rate and rhythm with normal S1/S2 without murmurs, rubs or gallops. (X) * Oxygenation at baseline or improved    6/1/2022 1:26 PM EDT by Akilah Shabazz      SpO2 95% RA    (X) * Pulmonary edema absent or improved    6/1/2022 1:26 PM EDT by Akilah Shabazz      Normal respiratory effort. Clear to auscultation, bilaterally without Rales/Wheezes/Rhonchi.     Activity    (X) Advance activity as tolerated    6/1/2022 1:26 PM EDT by Beatriz Shabazz Sllele      up to chair    Routes    (X) Taper parenteral medications    6/1/2022 1:26 PM EDT by Akilah Shabazz      Allopurinol 100 mg PO daily  Lactulose 30 g PO TID  Levothyroxine 50 mcg PO daily AC  Metoprolol 12.5 mg PO daily  Midodrine 5 mg PO TID WC x 1  Pantoprazole 40 mg PO BID AC    ( ) Oral diet    6/1/2022 1:26 PM EDT by Akilah Shabazz      Diet NPO Exceptions are: Sips of Water with Meds    Interventions    (X) * Pulmonary catheter absent    6/1/2022 1:26 PM EDT by Akilah Shabazz      none noted    (X) Weigh    6/1/2022 1:26 PM EDT by Akilah Miramontes      05/30/22 [id number] lb 14.6 oz (68 kg)--  05/30/22 [id number] lb 3.5 oz (69.5 kg)Bed scale  05/30/22 [id number] lb 4.5 oz (65.9 kg)Actual;Bed scale    * Milestone   Additional Notes   DATE: 5/30/22      --------------------------------------------------------------------------------------   Pertinent Updates:   Still tachypneic at 23-27. Hd done today    Still on IV cefepime   Cont tele   Thrombocytopenic w/ plt of 95   Plan for tunneled catheter once cultures negative possibly Tuesday      --------------------------------------------------------------------------------------   Abnl/Pertinent Labs/Radiology/Diagnostic Studies:   Hgb 9.5   Hct 28.5   Plt 95      Na 133   Cl 95   Bun 44   Crea 4.2   Gfr azra 14   Gfr aa 17   Protein 5.8   Alb 2.7   anion gap 17   Ast 82   Alt 67   Bilitot 6.9   Alk phos 203      Poc glucose 121, 120, 101, 153, 143         Inr 1.58   Protime 18.8      Culture blood 2: NGTD   Culture blood routine: NGTD      --------------------------------------------------------------------------------------   Physical Exam:   Neck: Supple, with full range of motion. No jugular venous distention. Trachea midline.  Temporary dialysis line noted left side of the chest   Cardiovascular: Regular rate and rhythm with normal S1/S2 without murmurs, rubs or gallops.      --------------------------------------------------------------------------------------   Vitals:   /59, 98/54, 140/79   Pulse 76   Temp 97.4 °F   Resp 23, 24, 27   SpO2 95% RA      -------------------------------------------------------------------------------------   MD Consults/Assessments & Plans:   >IM   Plan for tunneled catheter once cultures negative possibly Tuesday      >Nephro   Received hemodialysis treatment today   Was able to take of 1.5 kg of weight   He does not have pitting edema in his legs, but his arm is swollen   The patient denies of any shortness of   He does not require supplemental oxygen       Reviewed his medications, I do not see any concerning medications.       --------------------------------------------------------------------------------------   Medications:   Allopurinol 100 mg PO daily   Cefepime 1g IV q24h   Lactulose 30 g PO TID   Levothyroxine 50 mcg PO daily AC   Metoprolol 12.5 mg PO daily   Midodrine 5 mg PO TID WC x 1   Pantoprazole 40 mg PO BID AC      --------------------------------------------------------------------------------------   Orders:   Diet NPO Exceptions are: Sips of Water with Meds   Code Status: Full Code   --------------------------------------------------------------------------------------        Heart Failure - Care Day 11 (5/27/2022) by Manuel López RN       Review Status Review Entered   Completed 6/1/2022 13:06      Criteria Review      Care Day: 11 Care Date: 5/27/2022 Level of Care: Intermediate Care    Guideline Day 2    Level Of Care    (X) Floor    Clinical Status    ( ) * Hemodynamic stability    6/1/2022 1:02 PM EDT by Akilah Interiano      BP (!) 87/23  Heart Rate61    (X) * Mental status at baseline    6/1/2022 1:02 PM EDT by Akilah Shabazz      aao    (X) * No evidence of myocardial ischemia    6/1/2022 1:02 PM EDT by Akilah Shabazz      no chest discomfort    (X) * Cardiac rate and rhythm acceptable    6/1/2022 1:02 PM EDT by Akilah Shabazz      normal rate, regular rhythm, normal S1 and S2, no murmurs, rubs, clicks, or gallops, no carotid bruits    ( ) * Oxygenation at baseline or improved    6/1/2022 1:02 PM EDT by Akilah Shabazz      Spo2 86% 2lnc    ( ) * Pulmonary edema absent or improved    6/1/2022 1:02 PM EDT by Akilah Shabazz      Pulmonary/Chest:  Bi basal crepts+ rhonchi, normal air movement, no respiratory distress    Activity    (X) Advance activity as tolerated    6/1/2022 1:06 PM EDT by Akilah Shabazz      up to chair    6/1/2022 1:02 PM EDT by Akilah Shabazz      up as dede PRN    Routes    ( ) Taper parenteral medications    6/1/2022 1:02 PM EDT by Akilah Shabazz      Allopurinol 100 mg PO daily  Lactulose 30 g PO TID x 2  Levothyroxine 50 mcg PO daily AC  Midodrine 5 mg PO TID WC x Lines: IV   HD line Left IJ       --------------------------------------------------------------------------------------   Vitals:      BP (!) 87/23, 103/66   Temp (!) 96.5 °F (35.8 °C), (!) 95.7 °F (35.4 °C)   Heart Rate 61, 85   Resp 28    Spo2 86%, 90% 2lnc      -------------------------------------------------------------------------------------   MD Consults/Assessments & Plans:   >ID   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 - plan for a TDC placement on Monday once blood cx are confirmed negative and about 5-6 days since Plavix is held. - continue abx for [facility] tunnel infection. >IM   PT/OT Eval Status: following. Dispo - pending clinical improvement      --------------------------------------------------------------------------------------   Medications:   Allopurinol 100 mg PO daily   Cefepime 1g IV q24h   Lactulose 30 g PO TID x 2   Levothyroxine 50 mcg PO daily AC   Midodrine 5 mg PO TID WC x 2   Pantoprazole 40 mg PO BID AC      D10% 30 ml/hr IV cont      Heparin 3200 units intracatheter PRN x 1      --------------------------------------------------------------------------------------   Orders:   ADULT DIET; Regular; Low Sodium (2 gm); 2000 ml   ADULT ORAL NUTRITION SUPPLEMENT; Breakfast, Dinner; Renal Oral Supplement      --------------------------------------------------------------------------------------   PT/OT/SLP/CM Assessments or Notes:   >Dietitian   Nutrition Recommendations/Plan: 1. Continue current diet   2. Continue ONS   3. Monitor meal and supplement intake   Monitor pertinent labs

## 2022-06-02 NOTE — DISCHARGE SUMMARY
DMII. ADAT  Start hypoglycemia protocol     Elevated INR. Patient on chronic anticoagulation with Xarelto for unknown reasons, this will be stop by discharge. Risks greater than benefits        ESRD on HD. Patient to continue dialysis in outpatient setting    Systolic heart failure. Ejection fraction less than 20%. Volume status adjusted during dialysis      Anemia. Continue IV iron       Retro-orbital/ conjunctival hemorrhage with exopthalmos  -CT head without contrast shows edema  -Anticoagulation with Xarelto will be stopped     RLE swelling > Left leg  -US with no DVT ( chronic right phlebitic process involving the right superficial femoral vein at the proximal thigh )       Hepatic encephalopathy with asterixis ( resolved )         Physical Exam Performed:     BP 99/76   Pulse 90   Temp 98 °F (36.7 °C) (Oral)   Resp 18   Ht 5' 6\" (1.676 m)   Wt 144 lb 2.9 oz (65.4 kg)   SpO2 94%   BMI 23.27 kg/m²       General appearance:  No apparent distress, appears stated age and cooperative. HEENT:  Normal cephalic, atraumatic without obvious deformity. Pupils equal, round, and reactive to light. Extra ocular muscles intact. Conjunctivae/corneas clear. Neck: Supple, with full range of motion. No jugular venous distention. Trachea midline. Respiratory:  Normal respiratory effort. Clear to auscultation, bilaterally without Rales/Wheezes/Rhonchi. Cardiovascular:  Regular rate and rhythm with normal S1/S2 without murmurs, rubs or gallops. Abdomen: Soft, non-tender, non-distended with normal bowel sounds. Musculoskeletal:  No clubbing, cyanosis or edema bilaterally. Full range of motion without deformity. Skin: Skin color, texture, turgor normal.  No rashes or lesions. Neurologic:  Neurovascularly intact without any focal sensory/motor deficits.  Cranial nerves: II-XII intact, grossly non-focal.  Psychiatric:  Alert and oriented, thought content appropriate, normal insight  Capillary Refill: Brisk,< 3 seconds   Peripheral Pulses: +2 palpable, equal bilaterally       Labs: For convenience and continuity at follow-up the following most recent labs are provided:      CBC:    Lab Results   Component Value Date    WBC 7.9 06/02/2022    HGB 9.0 06/02/2022    HCT 27.0 06/02/2022     06/02/2022       Renal:    Lab Results   Component Value Date     06/02/2022    K 3.8 06/02/2022    K 4.4 05/17/2022     06/02/2022    CO2 21 06/02/2022    BUN 39 06/02/2022    CREATININE 3.7 06/02/2022    CALCIUM 8.8 06/02/2022    PHOS 3.4 04/07/2022         Significant Diagnostic Studies    Radiology:   IR TUNNELED CVC PLACE WO SQ PORT/PUMP > 5 YEARS   Final Result   Successful conversion of a left IJ non tunneled hemodialysis catheter for a   new 23 centimeter tunneled hemodialysis catheter. IR NONTUNNELED VASCULAR CATHETER > 5 YEARS   Final Result   Successful ultrasound and fluoroscopy guided left IJ 20 cm non-tunneled   hemodialysis catheter placement. IR REMOVE TUNNELED CVAD WO SQ PORT/PUMP   Final Result   Successful removal of the right IJ tunneled central catheter. VL Extremity Venous Right   Final Result      US GUIDED PARACENTESIS   Final Result   Successful paracentesis. CT ORBITS W WO CONTRAST   Final Result   Severe anasarca with proportionate bilateral orbital fat edema. No orbital hemorrhage or mass. RECOMMENDATIONS:   Unavailable         MRI ABDOMEN WO CONTRAST MRCP   Final Result   1. Incomplete exam. Patient declined rest of the study, no diffusion or axial   T2 obtained. Limited diagnostic quality exam.   2. Large volume ascites. Diffuse marked body wall edema. 3. Moderate diffuse hepatic steatosis better visualized on CT than on MR. No   focal lesions in the liver. 4. Contracted gallbladder containing a gallstone. Common bile duct is poorly   visualized. 5. Marked cardiomegaly, with bilateral small pleural effusions and bibasilar   consolidations. Small-bowel wall edema noted, this could be due to   surrounding ascites/hypoproteinemia. CT CHEST WO CONTRAST   Final Result   1. Findings compatible with pulmonary edema with small pleural effusions. 2.  Body wall edema and upper abdominal ascites. CT ABDOMEN PELVIS WO CONTRAST Additional Contrast? None   Final Result   1. No ileus or obstruction. No definite inflammatory bowel process though   very limited given the extent of ascites. 2. Moderate volume ascites seen in the abdomen and pelvis, similar to the   prior exam.   3. Cardiomegaly with small bilateral effusions, left greater than right as   well as dependent ground-glass change which may represent dependent pulmonary   edema. 4. Mild anasarca. 5. Mild renal atrophy. 6. Cholelithiasis with a contracted gallbladder. XR CHEST PORTABLE   Final Result   Stable moderate cardiomegaly with mild central pulmonary congestion which is   more apparent. Ruddy Fan left retrocardiac atelectasis or early infiltrate      Status post right-sided dialysis catheter placement in good position                Consults:     IP CONSULT TO GI  IP CONSULT TO NEPHROLOGY  IP CONSULT TO INTERVENTIONAL RADIOLOGY  IP CONSULT TO INFECTIOUS DISEASES    Disposition:  home     Condition at Discharge: Stable    Discharge Instructions/Follow-up:  Follow-up with PCP within 7 days from discharge, not doing so could have life-threatening consequences. Take medication as instructed;  return to the emergency department if persistent symptoms, experiencing side effects from medication including nausea vomiting or unable to keep medications down.        Code Status:  Full Code     Activity: activity as tolerated    Diet: renal diet      Discharge Medications:     Current Discharge Medication List           Details   metoprolol succinate (TOPROL XL) 25 MG extended release tablet Take 0.5 tablets by mouth daily  Qty: 30 tablet, Refills: 3              Details clopidogrel (PLAVIX) 75 MG tablet Take 75 mg by mouth daily      glucose monitoring (FREESTYLE FREEDOM) kit 1 kit by Does not apply route daily  Qty: 1 kit, Refills: 0      levothyroxine (SYNTHROID) 50 MCG tablet Take 1 tablet by mouth every morning (before breakfast)  Qty: 30 tablet, Refills: 3      pantoprazole (PROTONIX) 40 MG tablet Take 1 tablet by mouth 2 times daily (before meals)  Qty: 30 tablet, Refills: 3      midodrine (PROAMATINE) 5 MG tablet Take 1 tablet by mouth 3 times daily (with meals)  Qty: 90 tablet, Refills: 3      allopurinol (ZYLOPRIM) 100 MG tablet Take 1 tablet by mouth daily  Qty: 30 tablet, Refills: 3      Multiple Vitamins-Minerals (THERAPEUTIC MULTIVITAMIN-MINERALS) tablet Take 1 tablet by mouth daily      atorvastatin (LIPITOR) 80 MG tablet Take 80 mg by mouth daily              Time Spent on discharge is more than 30 minutes in the examination, evaluation, counseling and review of medications and discharge plan. Signed:    Ileana Willson MD   6/2/2022      Thank you Vince Herrera DO for the opportunity to be involved in this patient's care. If you have any questions or concerns, please feel free to contact me at [phone].

## 2022-06-02 NOTE — PROGRESS NOTES
[facility]                                               Progress note    Summary:   Aashish Campbell is being seen by nephrology for ESRD on HD. Admitted with  Diarrhea. MRI abdomen showing large volume ascites diffuse body wall edema, moderate diffuse hepatic steatosis: Cardiomegaly bilateral small pleural effusions and bibasilar consolidations    Interval History and Plan:   Seen and examined at bedside  Had dialysis this AM.   He weighed in under his last post weight but has lost body mass and still has some edema and crackles so 2 L removed. Post weight 65.4 kilos. Catheter is working well. Received retacrit 10 k units. BP 99/76  HR 77  94% on 1 L       Labs reviewed. Plan:   -HD per TTS schedule  - dry weight challenged. New dry weight 65.4 kilos. Needs protein supplements. Ok with dc from renal aspect         Monty Serrano MD  [facility]  Office: [phone]    Assessment:   #ESRD  On HD TTS at [facility]. Has TDC and has been referred for vein mapping and AVF/AVG placement. ,  TIME 3.3 Hours  Na 138   K 2  Ca 2.5   Bicarb 35   180nRe    #BP/Volume   He has severe systolic heart failure and BP runs low. Has required inotrope support in the past   On midodrine 5 mg TID. UF as tolerated, challenge dry weight. On metop 12.5 mg BID. EDW 77 kilos prior to admission    #enterobacter bacteremia  Tunneled line infection  Line removed 5/24  Temp line 5/27  On vanc and cefepime. Repeat blood cxs NGTD so far    #SHPT  Last  and phos 6.3  Is supposed to be on phos binder. renvela. #Anemia  Last tsat 12% ferritin 174          ROS:   Positives Listed Bold. All other remaining systems are negative. Constitutional:  fever, chills, weakness, weight change, fatigue,      Skin:  rash, pruritus, hair loss, bruising, dry skin, petechiae. Head, Face, Neck   headaches, swelling,  cervical adenopathy.      Respiratory: shortness of breath, cough, or wheezing  Cardiovascular: chest pain, palpitations, dizzy, edema  Gastrointestinal: nausea, vomiting, diarrhea, constipation,belly pain    Yellow skin, blood in stool  Musculoskeletal:  back pain, muscle weakness, gait problems,       joint pain or swelling. Genitourinary:  dysuria, poor urine flow, flank pain, blood in urine  Neurologic:  vertigo, TIA'S, syncope, seizures, focal weakness  Psychosocial:  insomnia, anxiety, or depression. Additional positive findings: -     PMH:   Past medical history, surgical history, social history, family history are reviewed and updated as appropriate. Reviewed current medication list.   Allergies reviewed and updated as needed. PE:   Vitals:    06/02/22 1145   BP: 99/76   Pulse:    Resp: 18   Temp: 98 °F (36.7 °C)   SpO2:        General appearance:  in NAD, somnolent, poorly arousable   HEENT: EOM intact, no icterus. Trachea is midline. Neck : No masses, appears symmetrical, no JVD  Respiratory: Respiratory effort appears normal, bilateral equal chest rise, no wheeze, +crackles  Cardiovascular: Ausculation shows RRR + edema  Abdomen: No visible mass or tenderness, non distended. Musculoskeletal:  Joints with no swelling or deformity. Skin:no rashes, ulcers, induration, no jaundice. Neuro: face symmetric, no focal deficits. Appropriate responses.          Lab Results   Component Value Date    CREATININE 3.7 (H) 06/02/2022    BUN 39 (H) 06/02/2022     06/02/2022    K 3.8 06/02/2022     06/02/2022    CO2 21 06/02/2022      Lab Results   Component Value Date    WBC 7.9 06/02/2022    HGB 9.0 (L) 06/02/2022    HCT 27.0 (L) 06/02/2022    .1 (H) 06/02/2022     (L) 06/02/2022     Lab Results   Component Value Date    .8 (H) 04/07/2022    CALCIUM 8.8 06/02/2022    PHOS 3.4 04/07/2022

## 2022-06-02 NOTE — FLOWSHEET NOTE
06/02/22 0756 06/02/22 1128   Vital Signs   BP 99/68 102/62   Temp 97.7 °F (36.5 °C)  --    Resp 18 18   Weight 148 lb 5.9 oz (67.3 kg) 144 lb 2.9 oz (65.4 kg)   Weight Method Bed scale Bed scale   Percent Weight Change 0.45 -2.82     Treatment time: 3.5hr    Net UF: 2L    Pre weight: 67.3kg  Post weight: 65.4kg  EDW: 77kg    Access used: LT HDC  Access function: Good    Medications or blood products given: Retacrit 10,000 units    Regular outpatient schedule: [facility] TTS    Summary of response to treatment: Good    Copy of dialysis treatment record placed in chart, to be scanned into EMR.

## 2022-06-02 NOTE — PLAN OF CARE
Problem: Discharge Planning  Goal: Discharge to home or other facility with appropriate resources  6/2/2022 1541 by Gt Millan RN  Outcome: Completed  6/2/2022 0757 by Gt Millan RN  Outcome: Progressing  Flowsheets (Taken 6/1/2022 2140 by Kristen Gonzalez RN)  Discharge to home or other facility with appropriate resources: Identify barriers to discharge with patient and caregiver     Problem: Chronic Conditions and Co-morbidities  Goal: Patient's chronic conditions and co-morbidity symptoms are monitored and maintained or improved  6/2/2022 1541 by Gt Millan RN  Outcome: Completed  6/2/2022 0757 by Gt Millan RN  Outcome: Progressing  Flowsheets (Taken 6/1/2022 2140 by Kristen Gonzalez RN)  Care Plan - Patient's Chronic Conditions and Co-Morbidity Symptoms are Monitored and Maintained or Improved: Monitor and assess patient's chronic conditions and comorbid symptoms for stability, deterioration, or improvement     Problem: Safety - Adult  Goal: Free from fall injury  6/2/2022 1541 by Gt Millan RN  Outcome: Completed  6/2/2022 0757 by Gt Millan RN  Outcome: Progressing  Flowsheets (Taken 6/1/2022 2321 by Kristen Gonzalez RN)  Free From Fall Injury:   Instruct family/caregiver on patient safety   Based on caregiver fall risk screen, instruct family/caregiver to ask for assistance with transferring infant if caregiver noted to have fall risk factors     Problem: ABCDS Injury Assessment  Goal: Absence of physical injury  6/2/2022 1541 by Gt Millan RN  Outcome: Completed  6/2/2022 0757 by Gt Millan RN  Outcome: Progressing  Flowsheets (Taken 6/1/2022 2321 by Kristen Gonzalez RN)  Absence of Physical Injury: Implement safety measures based on patient assessment     Problem: Skin/Tissue Integrity  Goal: Absence of new skin breakdown  Description: 1. Monitor for areas of redness and/or skin breakdown  2. Assess vascular access sites hourly  3.   Every 4-6 hours minimum: Change oxygen saturation probe site  4. Every 4-6 hours:  If on nasal continuous positive airway pressure, respiratory therapy assess nares and determine need for appliance change or resting period.   6/2/2022 1541 by Caleb Baugh RN  Outcome: Completed  6/2/2022 0757 by Caleb Baugh RN  Outcome: Progressing     Problem: Respiratory - Adult  Goal: Achieves optimal ventilation and oxygenation  6/2/2022 1541 by Caleb Baugh RN  Outcome: Completed  6/2/2022 0757 by Caleb Baugh RN  Outcome: Progressing     Problem: Cardiovascular - Adult  Goal: Maintains optimal cardiac output and hemodynamic stability  6/2/2022 1541 by Caleb Baugh RN  Outcome: Completed  6/2/2022 0757 by Caleb Baugh RN  Outcome: Progressing  Flowsheets (Taken 6/1/2022 2140 by June Haines RN)  Maintains optimal cardiac output and hemodynamic stability: Monitor blood pressure and heart rate  Goal: Absence of cardiac dysrhythmias or at baseline  6/2/2022 1541 by Caleb Baugh RN  Outcome: Completed  6/2/2022 0757 by Caleb Baugh RN  Outcome: Progressing  Flowsheets (Taken 6/1/2022 2140 by June Haines RN)  Absence of cardiac dysrhythmias or at baseline: Monitor cardiac rate and rhythm     Problem: Hematologic - Adult  Goal: Maintains hematologic stability  6/2/2022 1541 by Caleb Baugh RN  Outcome: Completed  6/2/2022 0757 by Caleb Baugh RN  Outcome: Progressing  Flowsheets (Taken 6/1/2022 2140 by June Haines RN)  Maintains hematologic stability:   Assess for signs and symptoms of bleeding or hemorrhage   Monitor labs for bleeding or clotting disorders     Problem: Nutrition Deficit:  Goal: Optimize nutritional status  6/2/2022 1541 by Caleb Baugh RN  Outcome: Completed  6/2/2022 0757 by Caleb Baugh RN  Outcome: Progressing     Problem: Pain  Goal: Verbalizes/displays adequate comfort level or baseline comfort level  6/2/2022 1541 by Caleb Baugh RN  Outcome: Completed  6/2/2022 9011 by Lex Dong RN  Outcome: Progressing     Problem: Discharge Planning  Goal: Discharge to home or other facility with appropriate resources  6/2/2022 1541 by Lex Dong RN  Outcome: Completed  6/2/2022 0757 by Lex Dong RN  Outcome: Progressing  Flowsheets (Taken 6/1/2022 2140 by Afshin Douglas RN)  Discharge to home or other facility with appropriate resources: Identify barriers to discharge with patient and caregiver     Problem: Chronic Conditions and Co-morbidities  Goal: Patient's chronic conditions and co-morbidity symptoms are monitored and maintained or improved  6/2/2022 1541 by Lex Dong RN  Outcome: Completed  6/2/2022 0757 by Lex Dong RN  Outcome: Progressing  Flowsheets (Taken 6/1/2022 2140 by Afshin Douglas RN)  Care Plan - Patient's Chronic Conditions and Co-Morbidity Symptoms are Monitored and Maintained or Improved: Monitor and assess patient's chronic conditions and comorbid symptoms for stability, deterioration, or improvement     Problem: Safety - Adult  Goal: Free from fall injury  6/2/2022 1541 by Lex Dong RN  Outcome: Completed  6/2/2022 0757 by Lex Dong RN  Outcome: Progressing  Flowsheets (Taken 6/1/2022 2321 by Afshin Douglas RN)  Free From Fall Injury:   Instruct family/caregiver on patient safety   Based on caregiver fall risk screen, instruct family/caregiver to ask for assistance with transferring infant if caregiver noted to have fall risk factors     Problem: ABCDS Injury Assessment  Goal: Absence of physical injury  6/2/2022 1541 by Lex Dong RN  Outcome: Completed  6/2/2022 0757 by Lex Dong RN  Outcome: Progressing  Flowsheets (Taken 6/1/2022 2321 by Afshin Douglas RN)  Absence of Physical Injury: Implement safety measures based on patient assessment     Problem: Skin/Tissue Integrity  Goal: Absence of new skin breakdown  Description: 1. Monitor for areas of redness and/or skin breakdown  2.   Assess vascular access sites hourly  3. Every 4-6 hours minimum:  Change oxygen saturation probe site  4. Every 4-6 hours:  If on nasal continuous positive airway pressure, respiratory therapy assess nares and determine need for appliance change or resting period.   6/2/2022 1541 by Curtis Monroe RN  Outcome: Completed  6/2/2022 0757 by Curtis Monroe RN  Outcome: Progressing     Problem: Respiratory - Adult  Goal: Achieves optimal ventilation and oxygenation  6/2/2022 1541 by Curtis Monroe RN  Outcome: Completed  6/2/2022 0757 by Curtis Monroe RN  Outcome: Progressing     Problem: Cardiovascular - Adult  Goal: Maintains optimal cardiac output and hemodynamic stability  6/2/2022 1541 by Curtis Monroe RN  Outcome: Completed  6/2/2022 0757 by Curtis Monroe RN  Outcome: Progressing  Flowsheets (Taken 6/1/2022 2140 by Leigh Ann Rodriguez, CEZAR)  Maintains optimal cardiac output and hemodynamic stability: Monitor blood pressure and heart rate  Goal: Absence of cardiac dysrhythmias or at baseline  6/2/2022 1541 by Curtis Monroe RN  Outcome: Completed  6/2/2022 0757 by Curtis Monroe RN  Outcome: Progressing  Flowsheets (Taken 6/1/2022 2140 by Leigh Ann Rodriguez RN)  Absence of cardiac dysrhythmias or at baseline: Monitor cardiac rate and rhythm     Problem: Hematologic - Adult  Goal: Maintains hematologic stability  6/2/2022 1541 by Curtis Monroe RN  Outcome: Completed  6/2/2022 0757 by Curtis Monroe RN  Outcome: Progressing  Flowsheets (Taken 6/1/2022 2140 by Leigh Ann Rodriguez, CEZAR)  Maintains hematologic stability:   Assess for signs and symptoms of bleeding or hemorrhage   Monitor labs for bleeding or clotting disorders     Problem: Nutrition Deficit:  Goal: Optimize nutritional status  6/2/2022 1541 by Curtis Monroe RN  Outcome: Completed  6/2/2022 0757 by Curtis Monroe RN  Outcome: Progressing     Problem: Pain  Goal: Verbalizes/displays adequate comfort level or baseline comfort level  6/2/2022

## 2022-06-02 NOTE — CARE COORDINATION
Discharge Planning:    Patient currently at dialysis. Will confirm discharge plan with patient upon his return. Per chart review, ID recommendations complete. Call placed to [facility] to inform them of abx recommendations for HD. Rep at [employer] confirmed they have abx available.      Electronically signed by Caterina Molina RN on 6/2/2022 at 12:00 PM

## 2022-06-03 NOTE — CARE COORDINATION
[address] VIA [place] ACCESS PORTAL    REQUESTED SETTIN[address] [place] [address] SNF    ANTICIPATED ADMIT DATE TO SNF:  2022    [facility]. AT [place] #:  [id number]    Ana Brady Sr.  Administrative Assist, Case Management  [phone]  Electronically signed by Ana Brady on 6/3/2022 at 10:59 AM

## 2022-06-03 NOTE — CARE COORDINATION
[facility] authorization received via portal:    Payor approval ID:      Josep Approval ID:  [id number]    Service - Location:  [address] [place] [address]    Dates Approved:  06/03/2022-06/07/2022    NRD:  6/7/22    Elzbieta Lopez Sr.  Administrative Assist, Case Management  [phone]  Electronically signed by Elzbieta Lopez on 6/3/2022 at 11:02 AM

## 2022-06-03 NOTE — CARE COORDINATION
CASE MANAGEMENT DISCHARGE SUMMARY:    DISCHARGE DATE: 6/3/2022    DISCHARGED TO: [facility]                REPORT NUMBER:  [phone]             FAX NUMBER: [phone]    TRANSPORTATION: [facility]             TIME: 4:00pm    INSURANCE PRECERT OBTAINED: completed 6/3/2022  HENS/PASAAR COMPLETED: completed 6/3/2022    COMMENTS: Facility, bedside RN, and patient/patient family aware of discharge plan. Call placed to [facility] to confirm patient discharged and will be resuming HD outpatient. Run sheets faxed to [phone].     Electronically signed by Alessandra Amaya RN on 6/3/2022 at 10:12 AM

## 2022-06-03 NOTE — CARE COORDINATION
Discharge Planning:    Called to follow up on referral sent yesterday:    [facility]: reviewing patient information  [facility]-cannot accept  [facility]-does not contract with [facility] Medicare  [address] accept patient    Call placed to \[facility]", patient's sister to double check on additional referral preferences. Patient's family requested a referral placed to [facility]. Referral sent and patient's family provided with contact information for Admissions at [address] [place] [address]. Will continue to follow for updates.     Electronically signed by Charis Bragg RN on 6/3/2022 at 9:01 AM

## 2022-06-03 NOTE — PROGRESS NOTES
Pt is on a diet. He is a total feed and per nursing report has been eating well. Pt on Q4H FSBS w/o SSI orders. RN paged [place] NP to see if FSBS could be discontinued or at least changed to ACHS. NP stated to change FSBS to ACHS. RN entered orders accordingly. PCA updated on plan of care. Pt resting in bed with TV on.

## 2022-06-03 NOTE — PROGRESS NOTES
Hospitalist Progress Note      PCP: Hemal Veronica DO    Date of Admission: 5/17/2022    Chief Complaint: Diarrhea    Hospital Course:   74years old male with medical history significant for end-stage renal disease patient is on hemodialysis. Patient was admitted with generalized weakness and diarrhea. C. difficile was negative. Diarrhea work-up was negative.     MRI of the abdomen showed large volume ascites diffuse body wall edema moderate diffuse hepatic steatosis. Cardiomegaly bilateral small pleural effusions and bibasilar consolidation. Patient was later found to have dialysis line infection, septicemia, s/p removal by IR 5/24. Temporary dialysis line inserted 5/27 5/31  Temporary dialysis catheter converted to [facility]    6/1  Patient feeling well today, no new complaints    6/2  DC orders will be placed today since patient is medically stable. However, family undecided if they would be able to take care of this patient at home. Family requested skilled nursing placement. 6/3  Patient feeling better, patient is asking for sausage and his breakfast..  Placement is skilled nursing facility is pending.     Subjective: As above    Medications:  Reviewed    Infusion Medications    dextrose Stopped (05/19/22 1427)    sodium chloride 250 mL (06/01/22 2001)     Scheduled Medications    [START ON 6/4/2022] epoetin roseann-epbx  10,000 Units SubCUTAneous Once per day on Tue Thu Sat    cefepime  2,000 mg IntraVENous Once per day on Tue Thu Sat    midodrine  10 mg Oral TID WC    lactulose  30 g Oral TID    clopidogrel  75 mg Oral Daily    allopurinol  100 mg Oral Daily    levothyroxine  50 mcg Oral QAM AC    metoprolol succinate  12.5 mg Oral Daily    pantoprazole  40 mg Oral BID AC    sodium chloride flush  5-40 mL IntraVENous 2 times per day     PRN Meds: loperamide, glucose, dextrose bolus **OR** dextrose bolus, glucagon (rDNA), dextrose, sodium chloride flush, sodium chloride, ondansetron, heparin (porcine)      Intake/Output Summary (Last 24 hours) at 6/3/2022 0950  Last data filed at 6/3/2022 0434  Gross per 24 hour   Intake 1044.89 ml   Output 2400 ml   Net -1355.11 ml       Physical Exam Performed:    BP 99/65   Pulse (!) 102   Temp 97.7 °F (36.5 °C) (Oral)   Resp 22   Ht 5' 6\" (1.676 m)   Wt 149 lb 14.6 oz (68 kg)   SpO2 100%   BMI 24.20 kg/m²     General appearance: No apparent distress, appears stated age and cooperative. HEENT: Pupils equal, round, and reactive to light. Conjunctivae/corneas clear. Neck: Supple, with full range of motion. No jugular venous distention. Trachea midline. Temporary dialysis line noted left side of the chest  Respiratory:  Normal respiratory effort. Clear to auscultation, bilaterally without Rales/Wheezes/Rhonchi. Dressing noted where HD line been removed  Cardiovascular: Regular rate and rhythm with normal S1/S2 without murmurs, rubs or gallops. Abdomen: Soft, non-tender, non-distended with normal bowel sounds. Musculoskeletal: Moderate lower limb edema  Skin: Skin color, texture, turgor normal.  No rashes or lesions. Neurologic:  Neurovascularly intact without any focal sensory/motor deficits.  Cranial nerves: II-XII intact, grossly non-focal.  Psychiatric: Alert and oriented, thought content appropriate, normal insight  Capillary Refill: Brisk,3 seconds, normal   Peripheral Pulses: +2 palpable, equal bilaterally       Labs:   Recent Labs     06/01/22 0644 06/02/22  0549 06/03/22  0651   WBC 6.1 7.9 7.2   HGB 8.9* 9.0* 8.6*   HCT 25.9* 27.0* 26.0*   * 131* 150     Recent Labs     06/01/22  0644 06/02/22  0549 06/03/22  0651    137 132*   K 3.6 3.8 4.3    100 96*   CO2 22 21 23   BUN 28* 39* 25*   CREATININE 3.2* 3.7* 3.1*   CALCIUM 8.6 8.8 8.3     Recent Labs     06/01/22 0644 06/02/22  0549 06/03/22  0651   AST 71* 68* 57*   ALT 60* 59* 46*   BILITOT 5.4* 5.4* 4.3*   ALKPHOS 206* 184* 200*     Recent Labs 06/01/22  0644 06/02/22  0549 06/03/22  0651   INR 1.53* 1.52* 1.45*     No results for input(s): CKTOTAL, TROPONINI in the last 72 hours. Urinalysis:      Lab Results   Component Value Date    NITRU Negative 04/10/2022    WBCUA 0-2 04/10/2022    BACTERIA 2+ 04/09/2022    RBCUA 0-2 04/10/2022    BLOODU Negative 04/10/2022    SPECGRAV 1.025 04/10/2022    GLUCOSEU Negative 04/10/2022       Radiology:  IR TUNNELED CVC PLACE WO SQ PORT/PUMP > 5 YEARS   Final Result   Successful conversion of a left IJ non tunneled hemodialysis catheter for a   new 23 centimeter tunneled hemodialysis catheter. IR NONTUNNELED VASCULAR CATHETER > 5 YEARS   Final Result   Successful ultrasound and fluoroscopy guided left IJ 20 cm non-tunneled   hemodialysis catheter placement. IR REMOVE TUNNELED CVAD WO SQ PORT/PUMP   Final Result   Successful removal of the right IJ tunneled central catheter. VL Extremity Venous Right   Final Result      US GUIDED PARACENTESIS   Final Result   Successful paracentesis. CT ORBITS W WO CONTRAST   Final Result   Severe anasarca with proportionate bilateral orbital fat edema. No orbital hemorrhage or mass. RECOMMENDATIONS:   Unavailable         MRI ABDOMEN WO CONTRAST MRCP   Final Result   1. Incomplete exam. Patient declined rest of the study, no diffusion or axial   T2 obtained. Limited diagnostic quality exam.   2. Large volume ascites. Diffuse marked body wall edema. 3. Moderate diffuse hepatic steatosis better visualized on CT than on MR. No   focal lesions in the liver. 4. Contracted gallbladder containing a gallstone. Common bile duct is poorly   visualized. 5. Marked cardiomegaly, with bilateral small pleural effusions and bibasilar   consolidations. Small-bowel wall edema noted, this could be due to   surrounding ascites/hypoproteinemia. CT CHEST WO CONTRAST   Final Result   1.   Findings compatible with pulmonary edema with small pleural abdominal pain improving     Transaminitis and cirrhosis + Ascites. ( improving ). Congestive hepatopathy  Significantly improving  Possibly related to congestive heart failure.   GI on board  Large volume ascites noted on MRI-paracentesis done, no sign of infection  Contracted gallbladder containing stones.     Hypoglycemia likely related to cirrhosis and DMII. ADAT  Start hypoglycemia protocol     Elevated INR. vitamin K,   inr 1.5       ESRD on HD. Discussed with nephrology, line removed 5/24. Patient planned for tunneled dialysis line insertion today , Plavix on hold. Underwent temp line insertion 2/58     Systolic heart failure. Ejection fraction less than 20%. Volume status adjusted during dialysis  Currently on beta-blocker  Not on ACE or ARB due to hypotension      Anemia. Continue IV iron       Retro-orbital/ conjunctival hemorrhage with exopthalmos  -CT head without contrast shows edema     RLE swelling > Left leg  -US with no DVT ( chronic right phlebitic process involving the right superficial femoral vein at the proximal thigh )      Hepatic encephalopathy with asterixis ( resolved )   Alert this morning. Ammonia levels-wnl.   -cont lactulose 30g po tid  -Had a 1100ml paracentesis  during admission    DVT Prophylaxis: lovenox  Diet: ADULT DIET; Regular; Low Sodium (2 gm); 2000 ml  ADULT ORAL NUTRITION SUPPLEMENT; Breakfast, Lunch, Dinner; Renal Oral Supplement  Code Status: Full Code    PT/OT Eval Status: following.      Dispo - ECF pending clinical improvement      Florecita Thompson MD

## 2022-06-03 NOTE — PROGRESS NOTES
Attempted x3 to reach Mr Milli Greenwood for a follow up phone call.   Each time it was a busy signal.  He was set up with [facility] home care before discharge, and he has a follow up appointment in place 6/6/22

## 2022-06-03 NOTE — PROGRESS NOTES
[facility]                                               Progress note    Summary:   Jonathan Wheeler is being seen by nephrology for ESRD on HD. Admitted with  Diarrhea. MRI abdomen showing large volume ascites diffuse body wall edema, moderate diffuse hepatic steatosis: Cardiomegaly bilateral small pleural effusions and bibasilar consolidations    Interval History and Plan:   Seen and examined at bedside  Had dialysis yesterday , dry weight challenged  Last Post weight 65.4 kilos. Catheter is working well. Received retacrit 10 k units. BP 91/56  HR 92  100% on 1 L       Labs reviewed. Na 132, K 4.3  Bicarb 23  Cr 3.1  Ca 8.3  Albumin 2.8   Hgb 8.6    Plan:   -HD per TTS schedule. No acute needs today   - dry weight challenged. New dry weight 65.4 kilos. Needs protein supplements. Ok with dc from renal aspect         Marcello Escalante MD  [address] [facility]  Office: [phone]    Assessment:   #ESRD  On HD TTS at [facility]. Has TDC and has been referred for vein mapping and AVF/AVG placement. ,  TIME 3.3 Hours  Na 138   K 2  Ca 2.5   Bicarb 35   180nRe    #BP/Volume   He has severe systolic heart failure and BP runs low. Has required inotrope support in the past   On midodrine 5 mg TID. UF as tolerated, challenge dry weight. On metop 12.5 mg BID. EDW 77 kilos prior to admission    #enterobacter bacteremia  Tunneled line infection  Line removed 5/24  Temp line 5/27  On vanc and cefepime. Repeat blood cxs NGTD so far    #SHPT  Last  and phos 6.3  Is supposed to be on phos binder. renvela. #Anemia  Last tsat 12% ferritin 174          ROS:   Positives Listed Bold. All other remaining systems are negative. Constitutional:  fever, chills, weakness, weight change, fatigue,      Skin:  rash, pruritus, hair loss, bruising, dry skin, petechiae. Head, Face, Neck   headaches, swelling,  cervical adenopathy.      Respiratory: shortness of breath, cough, or wheezing  Cardiovascular: chest pain, palpitations, dizzy, edema  Gastrointestinal: nausea, vomiting, diarrhea, constipation,belly pain    Yellow skin, blood in stool  Musculoskeletal:  back pain, muscle weakness, gait problems,       joint pain or swelling. Genitourinary:  dysuria, poor urine flow, flank pain, blood in urine  Neurologic:  vertigo, TIA'S, syncope, seizures, focal weakness  Psychosocial:  insomnia, anxiety, or depression. Additional positive findings: -     PMH:   Past medical history, surgical history, social history, family history are reviewed and updated as appropriate. Reviewed current medication list.   Allergies reviewed and updated as needed. PE:   Vitals:    06/03/22 1030   BP:    Pulse:    Resp:    Temp: 97.8 °F (36.6 °C)   SpO2:        General appearance:  in NAD, somnolent, poorly arousable   HEENT: EOM intact, no icterus. Trachea is midline. Neck : No masses, appears symmetrical, no JVD  Respiratory: Respiratory effort appears normal, bilateral equal chest rise, no wheeze, +crackles  Cardiovascular: Ausculation shows RRR + edema  Abdomen: No visible mass or tenderness, non distended. Musculoskeletal:  Joints with no swelling or deformity. Skin:no rashes, ulcers, induration, no jaundice. Neuro: face symmetric, no focal deficits. Appropriate responses.          Lab Results   Component Value Date    CREATININE 3.1 (H) 06/03/2022    BUN 25 (H) 06/03/2022     (L) 06/03/2022    K 4.3 06/03/2022    CL 96 (L) 06/03/2022    CO2 23 06/03/2022      Lab Results   Component Value Date    WBC 7.2 06/03/2022    HGB 8.6 (L) 06/03/2022    HCT 26.0 (L) 06/03/2022    .9 (H) 06/03/2022     06/03/2022     Lab Results   Component Value Date    .8 (H) 04/07/2022    CALCIUM 8.3 06/03/2022    PHOS 3.4 04/07/2022

## 2022-06-11 PROBLEM — E87.5 HYPERKALEMIA: Status: ACTIVE | Noted: 2022-01-01

## 2022-06-11 NOTE — H&P
Hospital Medicine History & Physical      PCP: Kvng Martínez DO    Date of Admission: 6/11/2022    Date of Service: 6/11/2022    Pt seen/examined on 6/11/2022    Placed in observation     Chief Complaint:     Chief Complaint   Patient presents with    Other     Pt sent here from [address] [place] [address] for HDU.  states that his normal HDU center cancelled his HD [place] d/t short staffing. Pt denies any complaints at this time     History Of Present Illness:      74 y.o. male with a history of multiple medical problems who presented to [facility] [place] with hyperkalemia after not being able to attend his HD session today d/t the facility being closed d/t staff shortages. Pleasant gentleman, a bit disoriented, significantly fluid overloaded requiring O2. Potassium elevated to 6.3, received 1 dose lokelma. Nephrology consulted, getting urgent dialysis to correct fluid overload and hyperkalemia.        Past Medical History:      Reviewed and non-contributory except as noted below      Diagnosis Date    Anemia     Cardiomegaly     CHF (congestive heart failure) (HCC)     Cirrhosis (HCC)     Diabetes type 2, uncontrolled ([address] 7/28/2014    Diabetic nephropathy 9/12/2013    ED (erectile dysfunction) 9/12/2013    Essential hypertension, benign 9/12/2013    Hemodialysis access, fistula mature ([address]     Hyperlipidemia 9/12/2013    Noncompliance 7/28/2014    Respiratory failure ([address]     Tachycardia        Past Surgical History:      Reviewed and non-contributory except as noted below      Procedure Laterality Date    CARDIAC DEFIBRILLATOR PLACEMENT      EYE SURGERY      IR NONTUNNELED VASCULAR CATHETER  04/04/2022    IR NONTUNNELED VASCULAR CATHETER 4/4/2022 [facility]    IR NONTUNNELED VASCULAR CATHETER Left 05/26/2022    Morgan Iqbal; LUIS MIGUEL access; 20cm; Dr. Blessing Palomares - removed 5/31/22    IR NONTUNNELED VASCULAR CATHETER  05/26/2022    IR NONTUNNELED VASCULAR CATHETER 5/26/2022 [facility]    IR TUNNELED CATHETER PLACEMENT GREATER THAN 5 YEARS  04/08/2022    IR TUNNELED CATHETER PLACEMENT GREATER THAN 5 YEARS 4/8/2022 [facility]    IR TUNNELED CATHETER PLACEMENT GREATER THAN 5 YEARS Left 05/31/2022    Permacath; LIJ access; 23cm; Dr. Reaves Mean IR TUNNELED Orlene Power 5 YEARS  5/31/2022    IR TUNNELED CATHETER PLACEMENT GREATER THAN 5 YEARS 5/31/2022 [facility]       Medications Prior to Admission:      Reviewed and non-contributory except as noted below  Prior to Admission medications    Medication Sig Start Date End Date Taking? Authorizing Provider   metoprolol succinate (TOPROL XL) 25 MG extended release tablet Take 0.5 tablets by mouth daily 6/3/22   Dariusz Blanco MD   clopidogrel (PLAVIX) 75 MG tablet Take 75 mg by mouth daily    Historical Provider, MD   glucose monitoring (FREESTYLE FREEDOM) kit 1 kit by Does not apply route daily 5/6/22   Lolly River DO   levothyroxine (SYNTHROID) 50 MCG tablet Take 1 tablet by mouth every morning (before breakfast) 5/4/22   Thi Price MD   pantoprazole (PROTONIX) 40 MG tablet Take 1 tablet by mouth 2 times daily (before meals) 4/13/22   Citlali Painting MD   midodrine (PROAMATINE) 5 MG tablet Take 1 tablet by mouth 3 times daily (with meals) 4/13/22   Citlali Painting MD   allopurinol (ZYLOPRIM) 100 MG tablet Take 1 tablet by mouth daily 11/30/21   Gogo Henriquez MD   Multiple Vitamins-Minerals (THERAPEUTIC MULTIVITAMIN-MINERALS) tablet Take 1 tablet by mouth daily    Historical Provider, MD   atorvastatin (LIPITOR) 80 MG tablet Take 80 mg by mouth daily  1/8/19   Historical Provider, MD       Allergies:     Reviewed and non-contributory except as noted below   Patient has no known allergies. Social History:      Reviewed and non-contributory except as noted below    TOBACCO:   reports that he quit smoking about 41 years ago. He has a 10.50 pack-year smoking history.  He has never used smokeless tobacco.  ETOH:   reports current alcohol use of about 1.0 standard drink of alcohol per week. Family History:      Reviewed and non-contributory except as noted below        Problem Relation Age of Onset    Diabetes Mother     High Blood Pressure Mother     Diabetes Sister     Diabetes Brother        REVIEW OF SYSTEMS:   Pertinent positives and negatives as noted in the HPI. All other systems reviewed and negative. PHYSICAL EXAM PERFORMED:    /81   Pulse (!) 46   Temp 98.8 °F (37.1 °C)   Resp 18   Ht 5' 6\" (1.676 m)   Wt 162 lb 11.2 oz (73.8 kg)   SpO2 94%   BMI 26.26 kg/m²     General appearance: No apparent distress, appears stated age and cooperative. HEENT: Pupils equal, round, and reactive to light. Conjunctivae/corneas clear. Neck: Supple, with full range of motion. No jugular venous distention. Trachea midline. Respiratory:  Crackles bilaterally. On supplemental O2  Cardiovascular: Regular rate and rhythm with normal S1/S2 without murmurs, rubs or gallops. Abdomen: Soft, non-tender, non-distended with normal bowel sounds. Musculoskeletal: No clubbing, cyanosis bilaterally. Full range of motion without deformity. +edema  Skin: Skin color, texture, turgor normal.  No rashes or lesions. Neurologic:  Neurovascularly intact without any focal sensory/motor deficits. Cranial nerves: II-XII intact, grossly non-focal.  Psychiatric: Alert, somewhat disoriented    Labs:     Recent Labs     06/11/22  1543   WBC 5.2   HGB 10.4*   HCT 32.3*        Recent Labs     06/11/22  1543   *   K 6.3*   CL 97*   CO2 20*   BUN 42*   CREATININE 4.4*   CALCIUM 9.0     No results for input(s): AST, ALT, BILIDIR, BILITOT, ALKPHOS in the last 72 hours. No results for input(s): INR in the last 72 hours. No results for input(s): Neena Valentín in the last 72 hours.     Urinalysis:      Lab Results   Component Value Date    NITRU Negative 04/10/2022    WBCUA 0-2 04/10/2022    BACTERIA 2+ 04/09/2022    RBCUA 0-2 04/10/2022    BLOODU Negative 04/10/2022    SPECGRAV 1.025 04/10/2022    GLUCOSEU Negative 04/10/2022       Radiology:     XR CHEST PORTABLE   Final Result   Status post removal of the right-sided dialysis catheter and placement of a   dialysis catheter on the left in good position. Mild cardiomegaly and mild central pulmonary congestion which is more   prominent. Hazy bibasilar opacities and small bibasilar pleural effusions which is   prominent             ASSESSMENT:    There are no active hospital problems to display for this patient. PLAN:    ESRD on HD TTS - HD center closed, could not get HD  - Nephrology consulted, urgent dialysis  - Very volume overloaded. Anticipate will correct with HD today and likely tomorrow    Hyperkalemia  - s/p Lokelma  - urgent HD    Acute hypoxemic respiratory failure  Cardiorenal syndrome   - d/t fluid overload, on 6L supplemental O2 now  - HD for fluid removal. Anticipate quick recovery with removal of fluid    Recent line infection  - Cefepime 2g with HD through 6/17    The patient and / or the family were informed of the results of any tests, a time was given to answer questions, a plan was proposed and they agreed with plan. DVT Prophylaxis: SCDs  Diet: No diet orders on file  Code Status: Prior    PT/OT Eval Status: Deferred    Dispo: Yudy Bailey pending clinical improvement      Felton Fernandez MD   Internal Medicine  6/11/2022 5:29 PM  Reach via [employer]      Thank you Kvng Martínez DO for the opportunity to be involved in this patient's care. If you have any questions or concerns please feel free to contact me via [facility].

## 2022-06-11 NOTE — ED NOTES
This RN spoke with family/ sister Millie Schulte and updated on the patient's status per pt request      Herminio Aguillon RN  06/11/22 2883

## 2022-06-11 NOTE — CONSULTS
Noncompliance 2014    Respiratory failure (HCC)     Tachycardia          Surgical Hx: reviewed and updated as appropriate   has a past surgical history that includes eye surgery; IR NONTUNNELED VASCULAR CATHETER > 5 YEARS (2022); IR TUNNELED CVC PLACE WO SQ PORT/PUMP > 5 YEARS (2022); IR NONTUNNELED VASCULAR CATHETER > 5 YEARS (Left, 2022); IR NONTUNNELED VASCULAR CATHETER > 5 YEARS (2022); IR TUNNELED CVC PLACE WO SQ PORT/PUMP > 5 YEARS (Left, 2022); IR TUNNELED CVC PLACE WO SQ PORT/PUMP > 5 YEARS (2022); and Cardiac defibrillator placement. Social Hx: reviewed and updated as appropriate  Social History     Tobacco Use    Smoking status: Former Smoker     Packs/day: 1.50     Years: 7.00     Pack years: 10.50     Quit date: 1981     Years since quittin.3    Smokeless tobacco: Never Used   Substance Use Topics    Alcohol use: Yes     Alcohol/week: 1.0 standard drink     Types: 1 Cans of beer per week        Family hx: reviewed and updated as appropriate  family history includes Diabetes in his brother, mother, and sister; High Blood Pressure in his mother. Medications:   sodium bicarbonate, 50 mEq, Once  sodium zirconium cyclosilicate, 10 g, Once  insulin regular, 10 Units, Once   And  dextrose bolus, 250 mL, Once       Patient has no known allergies.     Allergies:   No Known Allergies      Physical Exam/Objective:   Vitals:    22 1627   BP:    Pulse: 95   Resp:    Temp:    SpO2: 92%     No intake or output data in the 24 hours ending 22 1655          Data:   CBC:   Recent Labs     22  1543   WBC 5.2   HGB 10.4*   HCT 32.3*        BMP:    Recent Labs     22  1543   *   K 6.3*   CL 97*   CO2 20*   BUN 42*   CREATININE 4.4*   GLUCOSE 170*

## 2022-06-11 NOTE — ED NOTES
Lab at bedside drawing labs. This RN attempted an IV x2 with no successes.  Charge nurse requested to attempt Saritha Chung RN  06/11/22 [id number]

## 2022-06-11 NOTE — ED NOTES
Report given to Liliya ROBB in Dialysis and transport requested      Judith Rogers RN  06/11/22 2806

## 2022-06-11 NOTE — ED PROVIDER NOTES
[address] [facility]      Pt Name: Joelle Hinton  MRN: [id number]  [place] 1955  Date of evaluation: 6/11/2022  Provider: Armani Garcia MD    CHIEF COMPLAINT     Not sure  HISTORY OF PRESENT ILLNESS  (Location/Symptom, Timing/Onset,Context/Setting, Quality, Duration, Modifying Factors, Severity). Note limiting factors. Chief Complaint   Patient presents with    Other     Pt sent here from [address] [place] [address] for HDU.  states that his normal HDU center cancelled his HD [address] d/t short staffing. Pt denies any complaints at this time      Joelle Hinton is a 74 y.o. male who presents to the emergency department secondary to concern for having missed dialysis. Prior to arrival the nursing home called our facility to ask about potential for dialysis. On arrival here the patient is unable to tell me when he got dialysis last.  He does tell me he is at [facility] [place] AT [place].  He can tell me his first name. He appears very lethargic and sleepy. His oxygen saturation is in the 80s and he was placed on nasal cannula. He is unable to provide significant history. Past medical history noted below. No longer smoking. Aside from what is stated above denies any other symptoms or modifying factors. Nursing Notes reviewed.     REVIEW OF SYSTEMS  (2-9 systems for level 4, 10 or more for level 5)   Review of Systems limited due to acuity of condition  PAST MEDICAL HISTORY     Past Medical History:   Diagnosis Date    Anemia     Cardiomegaly     CHF (congestive heart failure) (HCC)     Cirrhosis ([address]     Diabetes type 2, uncontrolled ([address] 7/28/2014    Diabetic nephropathy 9/12/2013    ED (erectile dysfunction) 9/12/2013    Essential hypertension, benign 9/12/2013    Hemodialysis access, fistula mature ([address]     Hyperlipidemia 9/12/2013    Noncompliance 7/28/2014    Respiratory failure (HCC)     Tachycardia        SURGICALHISTORY       Past Surgical History:   Procedure Laterality Date    CARDIAC DEFIBRILLATOR PLACEMENT      EYE SURGERY      IR NONTUNNELED VASCULAR CATHETER  04/04/2022    IR NONTUNNELED VASCULAR CATHETER 4/4/2022 [facility] SPECIAL PROCEDURES    IR NONTUNNELED VASCULAR CATHETER Left 05/26/2022    Ravi Figueroa access; 20cm; Dr. Tamar Gonzalez - removed 5/31/22    IR NONTUNNELED VASCULAR CATHETER  05/26/2022    IR NONTUNNELED VASCULAR CATHETER 5/26/2022 [facility] SPECIAL PROCEDURES    IR TUNNELED CATHETER PLACEMENT GREATER THAN 5 YEARS  04/08/2022    IR TUNNELED CATHETER PLACEMENT GREATER THAN 5 YEARS 4/8/2022 [facility] SPECIAL PROCEDURES    IR TUNNELED CATHETER PLACEMENT GREATER THAN 5 YEARS Left 05/31/2022    Permacath; LIJ access; 23cm; Dr. Hortensia Waddell IR TUNNELED CATHETER PLACEMENT GREATER THAN 5 YEARS  5/31/2022    IR TUNNELED CATHETER PLACEMENT GREATER THAN 5 YEARS 5/31/2022 [facility] SPECIAL PROCEDURES     CURRENT MEDICATIONS       Previous Medications    ALLOPURINOL (ZYLOPRIM) 100 MG TABLET    Take 1 tablet by mouth daily    ATORVASTATIN (LIPITOR) 80 MG TABLET    Take 80 mg by mouth daily     CLOPIDOGREL (PLAVIX) 75 MG TABLET    Take 75 mg by mouth daily    GLUCOSE MONITORING (FREESTYLE FREEDOM) KIT    1 kit by Does not apply route daily    LEVOTHYROXINE (SYNTHROID) 50 MCG TABLET    Take 1 tablet by mouth every morning (before breakfast)    METOPROLOL SUCCINATE (TOPROL XL) 25 MG EXTENDED RELEASE TABLET    Take 0.5 tablets by mouth daily    MIDODRINE (PROAMATINE) 5 MG TABLET    Take 1 tablet by mouth 3 times daily (with meals)    MULTIPLE VITAMINS-MINERALS (THERAPEUTIC MULTIVITAMIN-MINERALS) TABLET    Take 1 tablet by mouth daily    PANTOPRAZOLE (PROTONIX) 40 MG TABLET    Take 1 tablet by mouth 2 times daily (before meals)      ALLERGIES     Patient has no known allergies.   FAMILY HISTORY       Family History   Problem Relation Age of Onset    Diabetes Mother     High Blood Pressure Mother     Diabetes Sister     Diabetes Brother      SOCIAL HISTORY Social History     Socioeconomic History    Marital status:      Spouse name: Not on file    Number of children: Not on file    Years of education: Not on file    Highest education level: Not on file   Occupational History    Not on file   Tobacco Use    Smoking status: Former Smoker     Packs/day: 1.50     Years: 7.00     Pack years: 10.50     Quit date: 1981     Years since quittin.3    Smokeless tobacco: Never Used   Vaping Use    Vaping Use: Never used   Substance and Sexual Activity    Alcohol use: Yes     Alcohol/week: 1.0 standard drink     Types: 1 Cans of beer per week    Drug use: No    Sexual activity: Yes     Partners: Female   Other Topics Concern    Not on file   Social History Narrative    Not on file     Social Determinants of Health     Financial Resource Strain:     Difficulty of Paying Living Expenses: Not on file   Food Insecurity:     Worried About Running Out of Food in the Last Year: Not on file    Karli of Food in the Last Year: Not on file   Transportation Needs:     Lack of Transportation (Medical): Not on file    Lack of Transportation (Non-Medical):  Not on file   Physical Activity:     Days of Exercise per Week: Not on file    Minutes of Exercise per Session: Not on file   Stress:     Feeling of Stress : Not on file   Social Connections:     Frequency of Communication with Friends and Family: Not on file    Frequency of Social Gatherings with Friends and Family: Not on file    Attends Sabianism Services: Not on file    Active Member of Clubs or Organizations: Not on file    Attends Club or Organization Meetings: Not on file    Marital Status: Not on file   Intimate Partner Violence:     Fear of Current or Ex-Partner: Not on file    Emotionally Abused: Not on file    Physically Abused: Not on file    Sexually Abused: Not on file   Housing Stability:     Unable to Pay for Housing in the Last Year: Not on file    Number of Places Lived in the Last Year: Not on file    Unstable Housing in the Last Year: Not on file     SCREENINGS    Kimberly Coma Scale  Eye Opening: Spontaneous  Best Verbal Response: Oriented  Best Motor Response: Obeys commands  Kimberly Coma Scale Score: 15    PHYSICAL EXAM  (up to 7 for level 4, 8 or more for level 5)   INITIAL VITALS: BP: 114/82, Temp: 98.8 °F (37.1 °C), Heart Rate: 91, Resp: 19, SpO2: (!) 78 %   Physical Exam  Vitals reviewed. Constitutional:       Appearance: He is ill-appearing. He is not toxic-appearing or diaphoretic. Comments: Appears to have some increased edema in his face and upper extremities in addition to his lower extremity   HENT:      Head: Normocephalic and atraumatic. Right Ear: External ear normal.      Left Ear: External ear normal.      Mouth/Throat:      Mouth: Mucous membranes are moist.   Eyes:      General: No scleral icterus. Right eye: No discharge. Left eye: No discharge. Conjunctiva/sclera: Conjunctivae normal.      Pupils: Pupils are equal, round, and reactive to light. Neck:      Trachea: No tracheal deviation. Cardiovascular:      Rate and Rhythm: Normal rate. Pulmonary:      Effort: Pulmonary effort is normal. No respiratory distress. Abdominal:      Tenderness: There is no abdominal tenderness. There is no guarding or rebound. Musculoskeletal:      Cervical back: Normal range of motion. Right lower leg: Edema present. Left lower leg: Edema present. Skin:     General: Skin is dry. Capillary Refill: Capillary refill takes 2 to 3 seconds. Neurological:      General: No focal deficit present. Mental Status: He is alert. Comments: Oriented to first name, knows where he is, cannot tell me why he is here.        DIAGNOSTIC RESULTS   EKG: interpreted by the Emergency Department Physician who either signs or Co-signs this chart in the absence of a cardiologist.  Indication: Hyperkalemia  Interpretation: Rate 97, rhythm sinus with first-degree AV block, axis potentially left. MD prolonged 210, QRS borderline 102, QTc borderline 47. Nonspecific T wave abnormality noted. Comparison to prior EKG from March 25, 2022 shows no acute ischemic changes noted. RADIOLOGY:   Interpretation per Radiologist below, if available at the time of this note:  XR CHEST PORTABLE   Final Result   Status post removal of the right-sided dialysis catheter and placement of a   dialysis catheter on the left in good position. Mild cardiomegaly and mild central pulmonary congestion which is more   prominent.       Hazy bibasilar opacities and small bibasilar pleural effusions which is   prominent           LABS:  Labs Reviewed   CBC WITH AUTO DIFFERENTIAL - Abnormal; Notable for the following components:       Result Value    RBC 2.99 (*)     Hemoglobin 10.4 (*)     Hematocrit 32.3 (*)     .8 (*)     MCH 34.6 (*)     RDW 23.1 (*)     Lymphocytes Absolute 0.6 (*)     All other components within normal limits   BASIC METABOLIC PANEL W/ REFLEX TO MG FOR LOW K - Abnormal; Notable for the following components:    Sodium 135 (*)     Potassium reflex Magnesium 6.3 (*)     Chloride 97 (*)     CO2 20 (*)     Anion Gap 18 (*)     Glucose 170 (*)     BUN 42 (*)     CREATININE 4.4 (*)     GFR Non- 14 (*)     GFR  16 (*)     All other components within normal limits    Narrative:     Ashutosh Bey tel. [phone],  Chemistry results called to and read back by Joe Pickens RN, 06/11/2022  16:13, by Herminio Reddy   POCT GLUCOSE   POCT GLUCOSE   POCT GLUCOSE   POCT GLUCOSE   POCT GLUCOSE   POCT GLUCOSE       EMERGENCY DEPARTMENT COURSE and DIFFERENTIAL DIAGNOSIS/MDM:   Patient was given the following medications:  Orders Placed This Encounter   Medications    sodium bicarbonate 8.4 % injection 50 mEq    sodium zirconium cyclosilicate (LOKELMA) oral suspension 10 g    AND Linked Order Group     insulin regular (HUMULIN R; NOVOLIN R) injection 10 Units     dextrose bolus 10% 250 mL    glucose chewable tablet 16 g    OR Linked Order Group     dextrose bolus 10% 125 mL     dextrose bolus 10% 250 mL    glucagon (rDNA) injection 1 mg    dextrose 5 % solution    cefepime (MAXIPIME) 2000 mg IVPB minibag     Order Specific Question:   Antimicrobial Indications     Answer:   Bloodstream Infection     CONSULTS:  IP CONSULT TO NEPHROLOGY    INITIAL VITALS: BP: 114/82, Temp: 98.8 °F (37.1 °C), Heart Rate: 91, Resp: 19, SpO2: (!) 78 %   RECENT VITALS:  BP: 109/81,Temp: 98.8 °F (37.1 °C), Heart Rate: (!) 46, Resp: 18, SpO2: 94 %     Is this patient to be included in the SEP-1 Core Measure due to severe sepsis or septic shock? No   Exclusion criteria - the patient is NOT to be included for SEP-1 Core Measure due to: Infection is not suspected    Marquis Lou is a 74 y.o. male who presents to the emergency department secondary to concern for symptoms as noted in HPI. On arrival he is sleepy. He does wake up to voice though he easily falls back asleep. Difficulty getting an oxygen saturation and he does have warm hands, respiratory therapy came with a nasal probe which showed he was hypoxic at which time he was put on nasal cannula, this was increased up to 6 L at 1 point though he was able to be weaned down to 4 L nasal cannula. His physical exam is notable for edema. Review of medical records shows he had a postdialysis weight of 65.4 kg on [address] 3, his weight here today is 73.8 kg. Unfortunately we were unable to get a hold of anyone at the nursing facility after he arrived to discuss his weight or oxygen need or goals of care. The nursing staff was able to get a hold of a daughter who reported the patient had been having increasing fluid over the last week, they were also not sure when he last went to dialysis. They state he is normally awake alert and oriented x4, easily holds conversations.   His son did arrive at the bedside as well and stated that his dad was not acting like himself. Labs were ordered in addition to an EKG, chest x-ray. Labs showed he was mildly hyperkalemic 6.3, this was mildly hemolyzed. EKG shows no signs of hyperacute T waves. CXR consistent with fluid overload. I spoke with his nephrology team, Dr. Dilshad Campo, regarding the patient. They will plan to dialyze him tonight. I then consulted our hospitalist, Dr. Marleni Wang, who kindly excepted patient for admission. Of note, there was difficulty placing a peripheral IV in the emergency department. After 6 attempts and an attempted an EJ decision was made to place an order for a PICC/midline and the hospitalist was notified of this. CRITICAL CARE TIME   Due to the immediate potential for life-threatening deterioration due to altered mental status with need for dialysis emergently, I spent 34 minutes providing critical care. There was a high probability of clinically significant/life threatening deterioration in the patient's condition which required my urgent intervention. This time excludes time spent performing procedures but includes time spent on direct patient care, history retrieval, review of the chart, and discussions with patient, family, and consultant(s). FINAL IMPRESSION      1. ESRD (end stage renal disease) ([facility]    2. Altered mental status, unspecified altered mental status type    3. Peripheral edema    4. Requires oxygen therapy    5. Cardiomegaly    6. Pulmonary congestion    7.  Pleural effusion, bilateral        DISPOSITION/PLAN   DISPOSITION Decision To Admit 06/11/2022 04:42:04 PM           (Please note that portions of this note were completed with a voice recognition program. Efforts were made to edit the dictations but occasionally words are mis-transcribed.)    Jose Antonio Salter MD (electronically signed)  Attending Emergency Physician        Jose Antonio Salter MD  06/11/22 7657

## 2022-06-11 NOTE — ED TRIAGE NOTES
Pt sent here from [address] [place] [address] for HDU.  states that his normal HDU center cancelled his HD [address] d/t short staffing. Staff and family report that the patient has had increased swelling over the last week to face , arms and legs. Pt reports that he has had some increased SOB of the last week at well.

## 2022-06-11 NOTE — ED NOTES
Dr. Pablo Everett attempted to place an EJ with no success      Caroline Villatoro RN  06/11/22 3476

## 2022-06-12 NOTE — PLAN OF CARE
Problem: Discharge Planning  Goal: Discharge to home or other facility with appropriate resources  Outcome: Progressing  Flowsheets (Taken 6/12/2022 2717)  Discharge to home or other facility with appropriate resources: Identify barriers to discharge with patient and caregiver     Problem: Pain  Goal: Verbalizes/displays adequate comfort level or baseline comfort level  Outcome: Progressing     Problem: Skin/Tissue Integrity  Goal: Absence of new skin breakdown  Description: 1. Monitor for areas of redness and/or skin breakdown  2. Assess vascular access sites hourly  3. Every 4-6 hours minimum:  Change oxygen saturation probe site  4. Every 4-6 hours:  If on nasal continuous positive airway pressure, respiratory therapy assess nares and determine need for appliance change or resting period.   Outcome: Progressing     Problem: ABCDS Injury Assessment  Goal: Absence of physical injury  Outcome: Progressing

## 2022-06-12 NOTE — CONSULTS
[address] [place] [address] Nephrology   [facility]  [phone]  Nephrology Consult Note          Patient ID: Temitope Art  Referring/ Physician: Pato Leblanc MD      HPI/Summary:   Temitope Art is being seen by nephrology for ESRD and hyperkalemia. Patient did not get his usual dialysis today as his clinic closed due to staffing issues. He presented to the emergency room with hyperkalemia. Has been having arm swelling for the past week per his family. Has edema on exam. Has some SOB. Very weak and frail. No NVD. No chest pain. S/p 1 dose lokelma on admit   K 6.3 on admission. Had dialysis on sat night. Last dialysis was on Thursday PTA        Plan:   - s/p urgent dialysis for hyperkalemia last night. No acute dialysis needs today but he is still volume overloaded. - left arm very swollen , would check doppler to r/o clot. - had 4 L UF last night but still 3-4 kilos above his dry weight. - would like to keep and do extra UF on Monday and evaluate the left arm      ESRD  HD on TTS at [facility] line    Volume overload. Hypoxia  Has HF and volume overload. Severe hyperkalemia  K 6.3  CHF    Recent line infection   Should be on cefepime 2 g with HD until 6/17      [address] All other remaining systems are negative. Constitutional:  fever, chills, weakness, weight change, fatigue,      Skin:  rash, pruritus, hair loss, bruising, dry skin, petechiae. Head, Face, Neck   headaches, swelling,  cervical adenopathy. Respiratory: shortness of breath, cough, or wheezing  Cardiovascular: chest pain, palpitations, dizzy, edema  Gastrointestinal: nausea, vomiting, diarrhea, constipation,belly pain    Yellow skin, blood in stool  Musculoskeletal:  back pain, muscle weakness, gait problems,       joint pain or swelling.   Genitourinary:  dysuria, poor urine flow, flank pain, blood in urine  Neurologic:  vertigo, TIA'S, syncope, seizures, focal weakness  Psychosocial:  insomnia, anxiety, or depression. Additional positive findings: [facility] would like to thank you for the opportunity to serve this patient. Please call with any questions or concerns. Marcello Escalante MD  [facility] Nephrology  C[facility] Borges  Fax: [phone]  Office: [phone]         CC/Reason for consult:   Reason for consult: ESRD  Chief Complaint   Patient presents with    Other     Pt sent here from [address] [place] [address] for HDU.  states that his normal HDU center cancelled his HD [address] d/t short staffing. Pt denies any complaints at this time           PMH/SH/FH:    Medical Hx: reviewed and updated as appropriate  Past Medical History:   Diagnosis Date    Anemia     Cardiomegaly     CHF (congestive heart failure) ([address]     Cirrhosis ([address]     Diabetes type 2, uncontrolled ([address] 2014    Diabetic nephropathy 2013    ED (erectile dysfunction) 2013    Essential hypertension, benign 2013    Hemodialysis access, fistula mature ([address]     Hyperlipidemia 2013    Noncompliance 2014    Respiratory failure (HCC)     Tachycardia          Surgical Hx: reviewed and updated as appropriate   has a past surgical history that includes eye surgery; IR NONTUNNELED VASCULAR CATHETER > 5 YEARS (2022); IR TUNNELED CVC PLACE WO SQ PORT/PUMP > 5 YEARS (2022); IR NONTUNNELED VASCULAR CATHETER > 5 YEARS (Left, 2022); IR NONTUNNELED VASCULAR CATHETER > 5 YEARS (2022); IR TUNNELED CVC PLACE WO SQ PORT/PUMP > 5 YEARS (Left, 2022); IR TUNNELED CVC PLACE WO SQ PORT/PUMP > 5 YEARS (2022); and Cardiac defibrillator placement. Social Hx: reviewed and updated as appropriate  Social History     Tobacco Use    Smoking status: Former Smoker     Packs/day: 1.50     Years: 7.00     Pack years: 10.50     Quit date: 1981     Years since quittin.3    Smokeless tobacco: Never Used   Substance Use Topics    Alcohol use:  Yes Alcohol/week: 1.0 standard drink     Types: 1 Cans of beer per week        Family hx: reviewed and updated as appropriate  family history includes Diabetes in his brother, mother, and sister; High Blood Pressure in his mother. Medications:   sodium bicarbonate, 50 mEq, Once  sodium zirconium cyclosilicate, 10 g, Once  [START ON 6/14/2022] cefepime, 2,000 mg, Once per day on Tue Thu Sat  allopurinol, 100 mg, Daily  atorvastatin, 80 mg, Daily  clopidogrel, 75 mg, Daily  levothyroxine, 50 mcg, QAM AC  metoprolol succinate, 12.5 mg, Daily  midodrine, 5 mg, TID WC  pantoprazole, 40 mg, BID AC  sodium chloride flush, 5-40 mL, 2 times per day  lidocaine 1 % injection, 5 mL, Once       Patient has no known allergies. Allergies:   No Known Allergies      Physical Exam/Objective:   Vitals:    06/12/22 1130   BP: 94/68   Pulse: 80   Resp: 18   Temp: 98 °F (36.7 °C)   SpO2: 97%       Intake/Output Summary (Last 24 hours) at 6/12/2022 1208  Last data filed at 6/12/2022 0930  Gross per 24 hour   Intake 240 ml   Output --   Net 240 ml       General appearance:  in NAD, fully alert and oriented. Comfortable. HEENT: EOM intact, no icterus. Trachea is midline. Neck : No mass, appears symmetrical, + JVD   Respiratory: Respiratory effort appears normal, no wheeze, no crackles  Cardiovascular: Ausculation- No M/R/G, +edema  Abdomen: No visible mass or tenderness, or scar, No hepatosplenomegaly  Musculoskeletal: left arm swollen compared to right  Skin:no rashes, ulcers, induration, no jaundice. Neuro: face symmetric, no focal deficits. Appropriate responses.  CN 2-12 grossly intact        Data:   CBC:   Recent Labs     06/11/22  1543   WBC 5.2   HGB 10.4*   HCT 32.3*        BMP:    Recent Labs     06/11/22  1543 06/11/22  2355   * 136   K 6.3* 4.1   CL 97* 96*   CO2 20* 22   BUN 42* 27*   CREATININE 4.4* 3.1*   GLUCOSE 170* 121*   PHOS  --  3.6

## 2022-06-12 NOTE — CARE COORDINATION
06/12/22 1412   Readmission Assessment   Number of Days since last admission? 1-7 days   Previous Disposition SNF  ([facility])   Who is being [facility]   (Per chart review.)   What was the patient's/caregiver's perception as to why they think they needed to return back to the hospital? Unable to obtain medications  (Missed Dialysis)   Did you visit your Primary Care Physician after you left the hospital, before you returned this time? Yes  (at facility)   Did you see a specialist, such as Cardiac, Pulmonary, Orthopedic Physician, etc. after you left the hospital? No   Who advised the patient to return to the hospital? Skilled Unit   Does the patient report anything that got in the way of taking their medications? No   In our efforts to provide the best possible care to you and others like you, can you think of anything that we could have done to help you after you left the hospital the first time, so that you might not have needed to return so soon?  Other (Comment)  (n/a)

## 2022-06-12 NOTE — DISCHARGE INSTR - COC
Continuity of Care Form    Patient Name: Reymundo Chavarria   :  1955  MRN:  [id number]    Admit date:  2022  Discharge date:2022    Code Status Order: Full Code   Advance Directives:      Admitting Physician:  Cassia Hagen MD  PCP: Sulma Galdamez DO    Discharging Nurse: [facility] Unit/Room#: C8P-0330/1857-45  Discharging Unit Phone Number: [phone]    Emergency Contact:   Extended Emergency Contact Information  Primary Emergency Contact: Media Settler  Address: [address] [facility], [address] Phone: [phone]  Relation: Child  Secondary Emergency Contact: Ezio Boo  Address: [address]            [place], [address] [place] [address],[address] Phone: [phone]  Relation: Brother/Sister    Past Surgical History:  Past Surgical History:   Procedure Laterality Date    CARDIAC DEFIBRILLATOR PLACEMENT      EYE SURGERY      IR NONTUNNELED VASCULAR CATHETER  2022    IR NONTUNNELED VASCULAR CATHETER 2022 [facility]    IR NONTUNNELED VASCULAR CATHETER Left 2022    Vascath; LIJ access; 20cm; Dr. Mark Parekh - removed 22    IR NONTUNNELED VASCULAR CATHETER  2022    IR NONTUNNELED VASCULAR CATHETER 2022 [facility]    IR TUNNELED CATHETER PLACEMENT GREATER THAN 5 YEARS  2022    IR TUNNELED CATHETER PLACEMENT GREATER THAN 5 YEARS 2022 [facility]    IR TUNNELED CATHETER PLACEMENT GREATER THAN 5 YEARS Left 2022    Permacath; LIJ access; 23cm; Dr. Mark Parekh    IR TUNNELED [address] 5 YEARS  2022    IR TUNNELED CATHETER PLACEMENT GREATER THAN 5 YEARS 2022 [facility]       Immunization History:   Immunization History   Administered Date(s) Administered    COVID-19, Pfizer Purple top, DILUTE for use, 12+ yrs, 30mcg/0.3mL dose 2021, 2021, 2021    Influenza Virus Vaccine 2017 Influenza, Quadv, IM, PF (6 mo and older Fluzone, Flulaval, Fluarix, and 3 yrs and older Afluria) 11/30/2021       Active Problems:  Patient Active Problem List   Diagnosis Code    ED (erectile dysfunction) N52.9    Essential hypertension, benign I10    Hyperlipidemia E78.5    Diabetic nephropathy ([facility]) E11.21    Diabetes type 2, uncontrolled ([facility] E11.65    Noncompliance Z91.19    Elevated blood uric acid level E79.0    Coronary artery disease involving native coronary artery I25.10    Elevated PSA R97.20    Acute on chronic combined systolic and diastolic HF (heart failure) ([facility]) I50.43    Stage 3 chronic kidney disease ([facility]) N18.30    SONAL (acute kidney injury) ([facility] N17.9    Biventricular heart failure ([facility]) I50.82    Acute on chronic systolic heart failure, NYHA class 4 ([facility]) I50.23    Acute on chronic congestive heart failure ([facility]) I50.9    LV (left ventricular) mural thrombus I51.3    Syncope R55    CHF (congestive heart failure) ([facility]) I50.9    Syncope and collapse R55    ICD (implantable cardioverter-defibrillator) in place Z95.810    Demand ischemia ([facility] I24.8    Ventricular tachycardia ([facility] I47.2    Elevated LFTs R79.89    Supratherapeutic INR R79.1    Diarrhea R19.7    Hyperkalemia E87.5       Isolation/Infection:   Isolation            No Isolation          Patient Infection Status       Infection Onset Added Last Indicated Last Indicated By Review Planned Expiration Resolved Resolved By    None active    Resolved    C-diff Rule Out 05/17/22 05/17/22 05/17/22 Clostridium Difficile Toxin/Antigen (Ordered)   05/17/22 Rule-Out Test Resulted    C-diff Rule Out 02/07/22 02/07/22 02/07/22 GI Bacterial Pathogens By PCR (Ordered)   02/07/22 Oleg Nunez RN            Nurse Assessment:  Last Vital Signs: BP 94/68   Pulse 80   Temp 98 °F (36.7 °C) (Oral)   Resp 18   Ht 5' 6\" (1.676 m)   Wt 148 lb 13 oz (67.5 kg)   SpO2 97%   BMI 24.02 kg/m²     Last documented pain score (0-10 scale): Pain Level: 0  Last Weight:   Wt Readings from Last 1 Encounters:   06/12/22 148 lb 13 oz (67.5 kg)     Mental Status:  oriented and alert    IV Access:  - None    Nursing Mobility/ADLs:  Walking   Dependent  Transfer  Assisted  Bathing  Assisted  Dressing  Assisted  Toileting  Assisted  Feeding  Assisted  Med Admin  Assisted  Med Delivery   whole    Wound Care Documentation and Therapy:  Wound 06/11/22 Sacrum Mid stage 2 pressure injury (Active)   Wound Assessment Dry 06/12/22 0827   Drainage Amount None 06/12/22 0827   Saritha-wound Assessment Intact 06/12/22 0827   Margins Defined edges 06/12/22 0827   Number of days: 0        Elimination:  Continence: Bowel: Yes  Bladder: Yes  Urinary Catheter: None   Colostomy/Ileostomy/Ileal Conduit: No       Date of Last BM: 6/15/2022    Intake/Output Summary (Last 24 hours) at 6/12/2022 1405  Last data filed at 6/12/2022 1252  Gross per 24 hour   Intake 360 ml   Output --   Net 360 ml     No intake/output data recorded. Safety Concerns:     None    Impairments/Disabilities:      None    Nutrition Therapy:  Current Nutrition Therapy:   - Oral Diet:  Renal    Routes of Feeding: Oral  Liquids: No Restrictions  Daily Fluid Restriction: yes - amount 1500  Last Modified Barium Swallow with Video (Video Swallowing Test): not done    Treatments at the Time of Hospital Discharge:   Respiratory Treatments: O2 2L NC  Oxygen Therapy:  is not on home oxygen therapy. Ventilator:    - No ventilator support    Rehab Therapies: Physical Therapy and Occupational Therapy  Weight Bearing Status/Restrictions: No weight bearing restrictions  Other Medical Equipment (for information only, NOT a DME order):  {EQUIPMENT:[id number]}  Other Treatments:     Heart Failure Instructions for Daily Management  Patient was treated for acute on chronic combined systolic and diastolic heart failure. he  will require the following:    Please weigh daily on the same scale and approximately the same time of day.   Report weight gain of 3 pounds/day or 5 pounds/week to : vinicio MONTERO and Caitie Anthony, [address]. Please use hospital discharge weight as baseline reference. Please monitor for signs and symptoms of and report to MD:  Worsening Heart Failure: sudden weight gain, shortness of breath, lower extremity or general edema/swelling, abdominal bloating/swelling, inability to lie flat, intolerance to usual activity, or cough (especially at night). Report these finding even if no increase in weight. Dehydration:  having difficulty or a decrease in urination, dizziness, worsening fatigue, or new onset/worsening of generalized weakness. Please continue a LOW SODIUM diet and LIMIT fluid intake to 48 - 64 ounces ( 1.5 - 2 liters) per day. Call Caitie Anthony [phone], vinicio MONTERO, and [address] [phone] and/or Sandro Austin @ [phone] with any questions or concerns. Please continue heart failure education to patient and family/support system. See After Visit Summary for hospital follow up appointment details. Consider spiritual care referral for support and/or completion of advance directives [phone]. Consider: having the facility MD complete required 7 day follow up, [place] [address] telehealth program if patient agreeable and able to participate, and palliative care consult for ongoing goals of care, end of life, and/or chronic disease management discussions.        Patient's personal belongings (please select all that are sent with patient):  None    RN SIGNATURE:  Electronically signed by Kevin Santo RN on 6/16/22 at 5:52 PM EDT    CASE MANAGEMENT/SOCIAL WORK SECTION    Observation Status Date: 6/11/22    Readmission Risk Assessment Score:  Readmission Risk              Risk of Unplanned Readmission:  0           Discharging to Facility/ Agency   Name: [facility]  Address:  [address] [place], [place], [address] [place] [address]   Phone: [phone]  Fax:  [phone]      HD[facility]PD: [facility]   Dialysis Schedule: TTS 1045  Phone: [phone]  Fax:  [phone]       / signature: Electronically signed by SANJUANA Brooks, GUSTAVOW on 6/12/22 at 2:14 PM EDT    PHYSICIAN SECTION    Prognosis: Good    Condition at Discharge: Stable    Rehab Potential (if transferring to Rehab): Good    Recommended Labs or Other Treatments After Discharge: Eliquis for 6 months due to DVT of upper extremity. Per Dr. Mora Oar may hold off on HD for Saturday 6/18/22 & resume on Monday 6/20/22 on a MWF schedule at [address] HD with [facility] team. Dr. Fernando Crews RN    Physician Certification: I certify the above information and transfer of Temitope Art  is necessary for the continuing treatment of the diagnosis listed and that he requires [facility] for less 30 days.      Update Admission H&P: No change in H&P    PHYSICIAN SIGNATURE:  Electronically signed by Chhaya Seaman MD on 6/16/22 at 4:39 PM EDT

## 2022-06-12 NOTE — CARE COORDINATION
INITIAL CASE MANAGEMENT ASSESSMENT   Call received from Frances, [address] [place] [address], notifying of potential discharge for today. Requested PT/OT be ordered. Called to Marcelino with [facility] [phone]. Left voicemail requesting return phone call regarding if patient can return without precert. Await return phone call. Called to sister JAVIER, [phone]. Left HIPAA compliant voicemail requesting return phone call regarding patient discharge. Reviewed chart, spoke with patient to assess possible discharge needs. Explained Case Management role/services. HD/PD: [facility]   · Dialysis Schedule: TTS 1045  · Phone: [phone]  · Fax:  [phone]    PLAN/COMMENTS: Per nephrology note would like to keep and do extra UF on Monday and evaluate the left arm  PT/OT ordered - will need [employer] precert submitted.      SW/CM provided contact information for patient or family to call with any questions. SW/CM will follow and assist as needed. SANJUANA Perez, GUSTAVOW, Social Work/Case Management   [phone]  Electronically signed by SANJUANA Perez, HIRO on 6/12/2022 at 2:09 PM    Spoke to Marcelino with [facility]. Confirmed patient can return. Due to patient being observation, he may not need precert. Await confirmation.    Electronically signed by SANJUANA Perez, HIRO on 6/12/2022 at 2:36 PM

## 2022-06-12 NOTE — PROGRESS NOTES
Hospitalist Progress Note    CC: Hyperkalemia      Admit date: 6/11/2022  Days in hospital:  0    Subjective/interval history: Pt S/E. No acute events. Lytes are improved today. O2 status: 1L    ROS:   Pertinent items are noted in HPI. Objective:    BP 94/68   Pulse 80   Temp 98 °F (36.7 °C) (Oral)   Resp 18   Ht 5' 6\" (1.676 m)   Wt 148 lb 13 oz (67.5 kg)   SpO2 97%   BMI 24.02 kg/m²     Gen: alert, NAD  HEENT: NC/AT, moist mucous membranes, no oropharyngeal erythema or exudate  Neck: supple, trachea midline, no anterior cervical or SC LAD  Heart: Normal s1/s2, RRR, no murmurs, gallops, or rubs. Lungs: clear bilaterally, no wheezing, no rales, no rhonchi, no use of accessory muscles  Abd: bowel sounds present, soft, nontender, nondistended, no masses  Extrem: No clubbing, cyanosis, no edema  Skin: no rashes or lesions  Psych: A & O, affect appropriate  Neuro: grossly intact, moves all four extremities spontaneously.   Cap refill: +2 sec    Medications:  Scheduled Meds:   sodium bicarbonate  50 mEq IntraVENous Once    sodium zirconium cyclosilicate  10 g Oral Once    [START ON 6/14/2022] cefepime  2,000 mg IntraVENous Once per day on Tue Thu Sat    allopurinol  100 mg Oral Daily    atorvastatin  80 mg Oral Daily    clopidogrel  75 mg Oral Daily    levothyroxine  50 mcg Oral QAM AC    metoprolol succinate  12.5 mg Oral Daily    midodrine  5 mg Oral TID WC    pantoprazole  40 mg Oral BID AC    sodium chloride flush  5-40 mL IntraVENous 2 times per day    lidocaine 1 % injection  5 mL IntraDERmal Once       PRN Meds:  glucose, dextrose bolus **OR** dextrose bolus, glucagon (rDNA), dextrose, sodium chloride flush, sodium chloride, ondansetron **OR** ondansetron, polyethylene glycol, acetaminophen **OR** acetaminophen    IV:   dextrose      sodium chloride           Intake/Output Summary (Last 24 hours) at 6/12/2022 1515  Last data filed at 6/12/2022 1252  Gross per 24 hour   Intake 360 ml   Output --   Net 360 ml       Results:  CBC:   Recent Labs     06/11/22  1543   WBC 5.2   HGB 10.4*   HCT 32.3*   .8*        BMP:   Recent Labs     06/11/22  1543 06/11/22  2355   * 136   K 6.3* 4.1   CL 97* 96*   CO2 20* 22   PHOS  --  3.6   BUN 42* 27*   CREATININE 4.4* 3.1*     Mag: No results for input(s): MAG in the last 72 hours. Phos:   Lab Results   Component Value Date    PHOS 3.6 06/11/2022     No results found for: GLU    LIVER PROFILE: No results for input(s): AST, ALT, LIPASE, BILIDIR, BILITOT, ALKPHOS in the last 72 hours. Invalid input(s): AMYLASE,  ALB  PT/INR: No results for input(s): PROTIME, INR in the last 72 hours. APTT: No results for input(s): APTT in the last 72 hours. UA:No results for input(s): NITRITE, COLORU, PHUR, LABCAST, WBCUA, RBCUA, MUCUS, TRICHOMONAS, YEAST, BACTERIA, CLARITYU, SPECGRAV, LEUKOCYTESUR, UROBILINOGEN, BILIRUBINUR, BLOODU, GLUCOSEU, AMORPHOUS in the last 72 hours. Invalid input(s): Kalyan Catherine input(s): ABG  Lab Results   Component Value Date    CALCIUM 9.0 06/11/2022    PHOS 3.6 06/11/2022       Assessment:    Principal Problem:    Hyperkalemia  Resolved Problems:    * No resolved hospital problems. [facility] course: 74 y.o. male with a history of multiple medical problems who presented to [facility] [place] [facility] [place] with hyperkalemia after not being able to attend his HD session today d/t the facility being closed d/t staff shortages. Pleasant gentleman, a bit disoriented, significantly fluid overloaded requiring O2. Potassium elevated to 6.3, received 1 dose lokelma. Nephrology consulted, getting urgent dialysis to correct fluid overload and hyperkalemia. Plan:  ESRD on HD TTS - HD center closed, could not get HD  - Nephrology consulted, urgent dialysis  - Very volume overloaded.  Anticipate will correct with HD today and likely tomorrow     Hyperkalemia  - s/p Lokelma  - urgent HD     Acute hypoxemic respiratory failure  Cardiorenal syndrome   - d/t fluid overload, on 6L supplemental O2 now  - HD for fluid removal. Anticipate quick recovery with removal of fluid     Recent line infection  - Cefepime 2g with HD through 6/17       Chronic conditions - continue home meds unless otherwise stated  Anemia  S/d chf  Dm  htn    Code status:  full  DVT prophylaxis: [] Lovenox  [x] SQ Heparin  [] SCDs because of  [] warfarin/oral direct thrombin inhibitor [] Encourage ambulation      Disposition:  [] Home [] Rehab [] Psych [] SNF  [] LTAC  [] Transfer to ICU  [] Transfer to PCU [] Other: obs    Electronically signed by Keaton Abbott DO on 6/12/2022 at 3:15 PM

## 2022-06-12 NOTE — PROGRESS NOTES
Treatment time: 3.5 hours  Net UF: 4000 ml     Pre weight: 73.8 kg  Post weight:69.7 kg  EDW: Unknown kg  Crit Line Used: Yes Ending Profile: A  Refill Present: Yes    Access used: L TDC    Access function: Well with  ml/min     Medications or blood products given: n/a     Regular outpatient schedule: [place] On license of [facility], TTS     Summary of response to treatment: Patient tolerated treatment well and without any complications. Patient remained stable throughout entire treatment and upon exiting the dialysis suite via RN transport. Report given to Alban Zapien RN and copy of dialysis treatment record placed in chart, to be scanned into EMR.

## 2022-06-13 NOTE — CARE COORDINATION
Patient came from  · Name: [facility]  · Address:  [address] [facility], [place], [address] [place] [address]   · Phone:  [phone]  · Fax:  [phone]  prior to arrival.    Call to Marcelino at [phone] who confirmed the patient is:  [] [address], no Precert required for return.  [] [facility] will be required for return. [] Skilled Nursing Care, no Precert required for return. [x] [address] required for return. [x] Is fully vaccinated for Covid. [] Has started Covid vaccination, but is not complete. [] Is not vaccinated for Covid. [] No Covid Test needed for return. [x] Rapid Covid test needed before return within: [x] 48 hours, [] 72 hours, [] 5 days, [] other  [] PCR Covid test needed before return.    [] Confirmed with the patient or family that the plan is to return to this facility at D/C (call out to trudy Carlisle). [] Patient and/or designated decision maker does not plan for the patient to return to this facility at D/C.      Verified HD at [facility], [address], [address], F [phone]    Electronically signed by Kanchan Lei RN on 6/13/2022 at 2:55 PM

## 2022-06-13 NOTE — PLAN OF CARE

## 2022-06-13 NOTE — CONSULTS
Clinical Pharmacy Note  Heparin Dosing Consult    Jonathan Wheeler is a 74 y.o. male ordered heparin per high dose nomogram by Dr. Arthur Guadarrama.     Lab Results   Component Value Date    APTT 39.8 06/13/2022     Lab Results   Component Value Date    HGB 10.4 06/11/2022    HCT 32.3 06/11/2022     06/11/2022    INR 1.45 06/03/2022       Ht Readings from Last 1 Encounters:   06/11/22 5' 6\" (1.676 m)        Wt Readings from Last 1 Encounters:   06/13/22 149 lb 4 oz (67.7 kg)        Assessment/Plan:  Initial bolus: 5420 units  Initial infusion rate: 1220 units/hr  Next aPTT: 2300    Pharmacy will continue to monitor adjust heparin based on aPTT results using nomogram below:     VTE/DVT/PE Heparin Nomogram     Initial Bolus: 80 units/kg Max Bolus: 10,000 units       Initial Rate: 18 units/kg/hr Max Initial Rate: 2,750 units/hr     aPTT < 59    Heparin 80 units/kg bolus Increase infusion by 4 units/kg/hr        (maximum 10,000 units)   aPTT 59-72.9    Heparin 40 units/kg bolus Increase infusion by 2 units/kg/hr        (maximum 5,000 units)   aPTT      No bolus   No change   aPTT 102.1-109  No bolus   Decrease infusion by 1 units/kg/hr   aPTT 109.1-122.9   No bolus   Decrease infusion by 2 units/kg/hr   aPTT > 123     Hold heparin for 1 hour Decrease infusion by 3 units/kg/hr    Obtain aPTT 6 hours after initial bolus and 6 hours after any dose change until two consecutive therapeutic aPTTs are achieved - then daily.

## 2022-06-13 NOTE — PROGRESS NOTES
Physical Therapy  Initial Evaluation Attempt    22    Name: Gretta Dakins   : 1955    MRN: [id number]    PT order noted. Patient unable to be seen by PT due to awaiting LUE dopplers. Will continue to follow.     Electronically signed by Ayush Birmingham PT on 2022 at 8:25 AM

## 2022-06-13 NOTE — PLAN OF CARE
Problem: Discharge Planning  Goal: Discharge to home or other facility with appropriate resources  6/13/2022 1113 by Lucila Silvestre RN  Outcome: Progressing  6/13/2022 1056 by Lucila Silvestre RN  Outcome: Progressing     Problem: Pain  Goal: Verbalizes/displays adequate comfort level or baseline comfort level  6/13/2022 1113 by Lucila Silvestre RN  Outcome: Progressing  6/13/2022 1056 by Lucila Silvestre RN  Outcome: Progressing     Problem: Pain  Goal: Verbalizes/displays adequate comfort level or baseline comfort level  6/13/2022 1113 by Lucila Silvestre RN  Outcome: Progressing  6/13/2022 1056 by Lucila Silvestre RN  Outcome: Progressing     Problem: Skin/Tissue Integrity  Goal: Absence of new skin breakdown  Description: 1. Monitor for areas of redness and/or skin breakdown  2. Assess vascular access sites hourly  3. Every 4-6 hours minimum:  Change oxygen saturation probe site  4. Every 4-6 hours:  If on nasal continuous positive airway pressure, respiratory therapy assess nares and determine need for appliance change or resting period.   6/13/2022 1113 by Lucila Silvestre RN  Outcome: Progressing  6/13/2022 1056 by Lucila Silvestre RN  Outcome: Progressing     Problem: ABCDS Injury Assessment  Goal: Absence of physical injury  6/13/2022 1113 by Lucila Silvestre RN  Outcome: Progressing  6/13/2022 1056 by Lucila Silvestre RN  Outcome: Progressing     Problem: Chronic Conditions and Co-morbidities  Goal: Patient's chronic conditions and co-morbidity symptoms are monitored and maintained or improved  6/13/2022 1113 by Lucila Silvestre RN  Outcome: Progressing  6/13/2022 1056 by Lucila Silvestre RN  Outcome: Progressing     Problem: Safety - Adult  Goal: Free from fall injury  6/13/2022 1113 by Lucila Silvestre RN  Outcome: Progressing  6/13/2022 1056 by Lucila Silvestre RN  Outcome: Progressing

## 2022-06-13 NOTE — PROGRESS NOTES
[facility]                                               Progress note    CC: hyperkalemia, arm swelling. Summary:   Delmy Coelho is being seen by nephrology for ESRD and hyperkalemia. Patient did not get his usual dialysis today as his clinic closed due to staffing issues. He presented to the emergency room with hyperkalemia. Has been having arm swelling for the past week per his family. Has edema on exam. Has some SOB. Very weak and frail. No NVD. No chest pain. S/p 1 dose lokelma on admit   K 6.3 on admission. Had dialysis on sat night. Last dialysis was on Thursday PTA    Interval History  Seen at bedside  Has a DVT in the left arm, on heparin infusion  Feels better following HD yesterday  BP well controlled. Plan:   - no acute indication for RRT today  - Will plan on HD tomorrow per schedule. Ulises Mejia MD  [facility]  Office: [phone]    Assessment:     ESRD  HD on TTS at [facility]  Access: [facility]       Volume overload. CHF     Hypoxia  Has HF and volume overload.      Severe hyperkalemia  K 6.3       Recent line infection   Should be on cefepime 2 g with HD until 6/17      ROS:   [address]. All other remaining systems are negative.     Constitutional:  fever, chills, weakness, weight change, fatigue,      Skin:  rash, pruritus, hair loss, bruising, dry skin, petechiae. Head, Face, Neck   headaches, swelling,  cervical adenopathy.     Respiratory: shortness of breath, cough, or wheezing  Cardiovascular: chest pain, palpitations, dizzy, edema  Gastrointestinal: nausea, vomiting, diarrhea, constipation,belly pain    Yellow skin, blood in stool  Musculoskeletal:  back pain, muscle weakness, gait problems,       joint pain or swelling. Genitourinary:  dysuria, poor urine flow, flank pain, blood in urine  Neurologic:  vertigo, TIA'S, syncope, seizures, focal weakness  Psychosocial:  insomnia, anxiety, or depression.   Additional positive findings: -     PMH:   Past medical history, surgical history, social history, family history are reviewed and updated as appropriate. Reviewed current medication list.   Allergies reviewed and updated as needed. PE:   Vitals:    06/13/22 1405   BP: 115/82   Pulse: 84   Resp: 16   Temp: 97.5 °F (36.4 °C)   SpO2: 93%       General appearance:  in NAD, fully alert and oriented. Comfortable. HEENT: EOM intact, no icterus. Trachea is midline. Neck : No mass, appears symmetrical, + JVD   Respiratory: Respiratory effort appears normal, no wheeze, no crackles  Cardiovascular: Ausculation- No M/R/G, +edema  Abdomen: No visible mass or tenderness, or scar, No hepatosplenomegaly  Musculoskeletal: left arm swollen compared to right  Skin:no rashes, ulcers, induration, no jaundice. Neuro: face symmetric, no focal deficits. Appropriate responses.  CN 2-12 grossly intact      Lab Results   Component Value Date    CREATININE 3.1 (H) 06/11/2022    BUN 27 (H) 06/11/2022     06/11/2022    K 4.1 06/11/2022    CL 96 (L) 06/11/2022    CO2 22 06/11/2022      Lab Results   Component Value Date    WBC 5.2 06/11/2022    HGB 10.4 (L) 06/11/2022    HCT 32.3 (L) 06/11/2022    .8 (H) 06/11/2022     06/11/2022     Lab Results   Component Value Date    .8 (H) 04/07/2022    CALCIUM 9.0 06/11/2022    PHOS 3.6 06/11/2022

## 2022-06-13 NOTE — PROGRESS NOTES
Occupational Therapy Attempt  Jonathan Wheeler    OT order noted. Patient unable to be seen by OT due to awaiting LUE dopplers. Will continue to follow.     Electronically signed by Naomi Queen OT on 6/13/22 at 8:31 AM EDT

## 2022-06-13 NOTE — PROGRESS NOTES
Hospitalist Progress Note    CC: Hyperkalemia      Admit date: 6/11/2022  Days in hospital:  0    Subjective/interval history: Pt S/E. No acute events. Lytes are improved today. O2 status: room air    ROS:   Pertinent items are noted in HPI. Objective:    /73   Pulse 88   Temp 97.7 °F (36.5 °C) (Axillary)   Resp 14   Ht 5' 6\" (1.676 m)   Wt 149 lb 4 oz (67.7 kg)   SpO2 95%   BMI 24.09 kg/m²     Gen: NAD, lethargic  HEENT: NC/AT, moist mucous membranes  Neck: supple, trachea midline  Heart: Normal s1/s2, RRR, no murmurs, gallops, or rubs. Lungs: clear bilaterally, no wheezing, no rales, no rhonchi, no use of accessory muscles  Abd: bowel sounds present, soft, nontender, nondistended, no masses  Extrem: No clubbing, cyanosis. Lue with edema but no pain  Skin: no rashes or lesions  Psych: Asleep. Easily awakened and oriented  Neuro: grossly intact, moves all four extremities spontaneously.  No focal deficits  Cap refill: +2 sec    Medications:  Scheduled Meds:   sodium bicarbonate  50 mEq IntraVENous Once    sodium zirconium cyclosilicate  10 g Oral Once    [START ON 6/14/2022] cefepime  2,000 mg IntraVENous Once per day on Tue Thu Sat    allopurinol  100 mg Oral Daily    atorvastatin  80 mg Oral Daily    clopidogrel  75 mg Oral Daily    levothyroxine  50 mcg Oral QAM AC    metoprolol succinate  12.5 mg Oral Daily    midodrine  5 mg Oral TID WC    pantoprazole  40 mg Oral BID AC    sodium chloride flush  5-40 mL IntraVENous 2 times per day    lidocaine 1 % injection  5 mL IntraDERmal Once       PRN Meds:  glucose, dextrose bolus **OR** dextrose bolus, glucagon (rDNA), dextrose, sodium chloride flush, sodium chloride, ondansetron **OR** ondansetron, polyethylene glycol, acetaminophen **OR** acetaminophen    IV:   dextrose      sodium chloride           Intake/Output Summary (Last 24 hours) at 6/13/2022 5891  Last data filed at 6/12/2022 2254  Gross per 24 hour Intake 540 ml   Output 1 ml   Net 539 ml       Results:  CBC:   Recent Labs     06/11/22  1543   WBC 5.2   HGB 10.4*   HCT 32.3*   .8*        BMP:   Recent Labs     06/11/22  1543 06/11/22  2355   * 136   K 6.3* 4.1   CL 97* 96*   CO2 20* 22   PHOS  --  3.6   BUN 42* 27*   CREATININE 4.4* 3.1*     Mag: No results for input(s): MAG in the last 72 hours. Phos:   Lab Results   Component Value Date    PHOS 3.6 06/11/2022     No results found for: GLU    LIVER PROFILE: No results for input(s): AST, ALT, LIPASE, BILIDIR, BILITOT, ALKPHOS in the last 72 hours. Invalid input(s): AMYLASE,  ALB  PT/INR: No results for input(s): PROTIME, INR in the last 72 hours. APTT: No results for input(s): APTT in the last 72 hours. UA:No results for input(s): NITRITE, COLORU, PHUR, LABCAST, WBCUA, RBCUA, MUCUS, TRICHOMONAS, YEAST, BACTERIA, CLARITYU, SPECGRAV, LEUKOCYTESUR, UROBILINOGEN, BILIRUBINUR, BLOODU, GLUCOSEU, AMORPHOUS in the last 72 hours. Invalid input(s): Dock [employer]ita input(s): ABG  Lab Results   Component Value Date    CALCIUM 9.0 06/11/2022    PHOS 3.6 06/11/2022       Assessment:    Principal Problem:    Hyperkalemia  Resolved Problems:    * No resolved hospital problems. [facility] course: 74 y.o. male with a history of multiple medical problems who presented to [facility] with hyperkalemia after not being able to attend his HD session today d/t the facility being closed d/t staff shortages. Pleasant gentleman, a bit disoriented, significantly fluid overloaded requiring O2. Potassium elevated to 6.3, received 1 dose lokelma. Nephrology consulted, getting urgent dialysis to correct fluid overload and hyperkalemia.      Plan:    lue edema  - venous doppler with an acute dvt of the ij and radial veins  - start on heparin gtt, can change to doac at dc    ESRD on HD TTS - HD center closed, could not get HD  - Nephrology consulted, urgent dialysis  - volume overloaded but improving with hd.  Will get hd today     Hyperkalemia - improved  - s/p Lokelma, urgent HD     Acute hypoxemic respiratory failure - improving  Cardiorenal syndrome   - d/t fluid overload     Recent line infection  - Cefepime 2g with HD through 6/17     Chronic conditions - continue home meds unless otherwise stated  Anemia  S/d chf  Dm  htn    Code status:  full  DVT prophylaxis: [] Lovenox  [x] SQ Heparin  [] SCDs because of  [] warfarin/oral direct thrombin inhibitor [] Encourage ambulation      Disposition:  [] Home [] Rehab [] Psych [] SNF  [] LTAC  [] Transfer to ICU  [] Transfer to PCU [] Other: obs    Electronically signed by Alexander Barrientos DO on 6/13/2022 at 7:59 AM

## 2022-06-14 NOTE — PLAN OF CARE
Problem: Discharge Planning  Goal: Discharge to home or other facility with appropriate resources  Outcome: Progressing     Problem: Skin/Tissue Integrity  Goal: Absence of new skin breakdown  Description: 1. Monitor for areas of redness and/or skin breakdown  2. Assess vascular access sites hourly  3. Every 4-6 hours minimum:  Change oxygen saturation probe site  4. Every 4-6 hours:  If on nasal continuous positive airway pressure, respiratory therapy assess nares and determine need for appliance change or resting period.   Outcome: Progressing     Problem: ABCDS Injury Assessment  Goal: Absence of physical injury  Outcome: Progressing     Problem: Safety - Adult  Goal: Free from fall injury  Outcome: Progressing     Problem: Chronic Conditions and Co-morbidities  Goal: Patient's chronic conditions and co-morbidity symptoms are monitored and maintained or improved  Outcome: Progressing

## 2022-06-14 NOTE — PROGRESS NOTES
Clinical Pharmacy Note  Heparin Dosing       Lab Results   Component Value Date    APTT 115.0 06/14/2022     Lab Results   Component Value Date    HGB 9.2 06/14/2022    HCT 27.3 06/14/2022     06/14/2022    INR 1.45 06/03/2022       Current Infusion Rate: 1020 units/hr    Plan:  Rate: Decrease to 880 units/hr  Next aPTT: 2130  6/14/22    Pharmacy will continue to monitor and adjust based on aPTT results.     Boyce Gosselin, [facility]  6/14/2022 3:23 PM

## 2022-06-14 NOTE — PROGRESS NOTES
Occupational Therapy  Facility/Department: [facility]  Occupational Therapy Initial Assessment    Name: John Davey  : 1955  MRN: [id number]  Date of Service: 2022    Discharge Recommendations:  3-5 sessions per week,Patient would benefit from continued therapy after discharge     John Davey scored a 16/24 on the AM-PAC ADL Inpatient form. Current research shows that an AM-PAC score of 17 or less is typically not associated with a discharge to the patient's home setting. Based on the patient's AM-PAC score and their current ADL deficits, it is recommended that the patient have 3-5 sessions per week of Occupational Therapy at d/c to increase the patient's independence. Please see assessment section for further patient specific details. If patient discharges prior to next session this note will serve as a discharge summary. Please see below for the latest assessment towards goals. Patient Diagnosis(es): The primary encounter diagnosis was ESRD (end stage renal disease) ([address] Diagnoses of Altered mental status, unspecified altered mental status type, Peripheral edema, Requires oxygen therapy, Cardiomegaly, Pulmonary congestion, and Pleural effusion, bilateral were also pertinent to this visit. Past Medical History:  has a past medical history of Anemia, Cardiomegaly, CHF (congestive heart failure) ([address] Cirrhosis ([address] Diabetes type 2, uncontrolled ([address] Diabetic nephropathy, ED (erectile dysfunction), Essential hypertension, benign, Hemodialysis access, fistula mature ([address] Hyperlipidemia, Noncompliance, Respiratory failure ([address] and Tachycardia. Past Surgical History:  has a past surgical history that includes eye surgery; IR NONTUNNELED VASCULAR CATHETER > 5 YEARS (2022); IR TUNNELED CVC PLACE WO SQ PORT/PUMP > 5 YEARS (2022); IR NONTUNNELED VASCULAR CATHETER > 5 YEARS (Left, 2022); IR NONTUNNELED VASCULAR CATHETER > 5 YEARS (2022);  IR TUNNELED CVC PLACE WO SQ PORT/PUMP > 5 YEARS (Left, 05/31/2022); IR TUNNELED CVC PLACE WO SQ PORT/PUMP > 5 YEARS (5/31/2022); and Cardiac defibrillator placement. Treatment Diagnosis: Decreased: ADLs, fxl transfers/mobility      Assessment   Performance deficits / Impairments: Decreased functional mobility ; Decreased ADL status; Decreased strength;Decreased balance  Assessment: Pt is a 73 yo M admitted from SNF after not being able to attend his HD session today. PTA, pt has been at [address] [place] [address] since ~mid May where he has assist for ADLs and fxl transfers - prior to this he was living at home and independent in ADLs and fxl mobility using 4WW. This date, he completed bed mobility w/ SBA and sit<>stand to amarliys cesar and RW w/ mod Ax1-2. He completed seated grooming w/ setup. Anticipate up to mod/max A for full ADL. WIll continue to see on acute in order to address the above deficits and maximize pt's fxl performance. Recommend ongoing skilled therapy 3-5 times/week at d/c to increase his safety and independence.   Treatment Diagnosis: Decreased: ADLs, fxl transfers/mobility  Prognosis: Fair  Decision Making: Medium Complexity  REQUIRES OT FOLLOW-UP: Yes  Activity Tolerance  Activity Tolerance: Patient Tolerated treatment well;Patient limited by fatigue  Activity Tolerance Comments: fatigued from HD earlier today        Plan   Plan  Times per Week: 3-5  Times per Day: Daily  Current Treatment Recommendations: Strengthening,Balance training,ROM,Functional mobility training,Endurance training,Self-Care / ADL,Safety education & training     Restrictions  Restrictions/Precautions  Restrictions/Precautions: Fall Risk    Subjective   General  Chart Reviewed: Yes  Patient assessed for rehabilitation services?: Yes  Additional Pertinent Hx: per H&P: \"81 y.o. male with a history of multiple medical problems who presented to [facility] with hyperkalemia after not being able to attend his HD session today d/t the facility being closed d/t staff shortages. Pleasant gentleman, a bit disoriented, significantly fluid overloaded requiring O2. Potassium elevated to 6.3, received 1 dose lokelma. Nephrology consulted, getting urgent dialysis to correct fluid overload and hyperkalemia. \"  Family / Caregiver Present: No  Referring Practitioner: Carmen  Diagnosis: Missed HD  Subjective  Subjective: Pt met b/s for OT eval/tx w/ PT. Pt in bed, agreeable to therapy. Denied pain  General Comment  Comments: Per RN ok to see     Social/Functional History  Social/Functional History  Lives With: Son  Type of Home: Apartment  Home Layout: One level  Home Access: Stairs to enter with rails  Entrance Stairs - Number of Steps: 2 descending KARO  Bathroom Shower/Tub: Tub/Shower unit  Bathroom Toilet: Standard  Bathroom Equipment: Shower chair  Bathroom Accessibility: Accessible  Home Equipment: Nestora Dom, 4 wheeled  Has the patient had two or more falls in the past year or any fall with injury in the past year?: No  ADL Assistance: Independent  Homemaking Assistance: Needs assistance (assist fron son and sister)  Ambulation Assistance: Independent (rollator)  Transfer Assistance: Independent  Additional Comments: Recent admission 5/17 to 6/3 with d/c to [address] [place] [address]       Objective   Vision Exceptions: Legally blind  Hearing: Within functional limits          Safety Devices  Type of Devices: All fall risk precautions in place;Call light within reach;Gait belt;Patient at risk for falls; Left in bed;Bed alarm in place;Nurse notified  Balance  Sitting: Intact (EOB)  Standing: With support (RW)     AROM: Generally decreased, functional  PROM: Within functional limits  Strength: Generally decreased, functional  Coordination: Within functional limits  ADL  Grooming: Setup  Grooming Skilled Clinical Factors: Washed face sitting EOB  Additional Comments: Declined further ADLs.  Anticipate pt would be independent w/ feeding, min A UB bathing/dressing, mod/max A LB bathing/dressing, mod A toileting based on ROM, strength, endurance this session     Activity Tolerance  Activity Tolerance: Patient tolerated treatment well  Bed mobility  Supine to Sit: Stand by assistance  Sit to Supine: Stand by assistance  Transfers  Sit to stand:  Moderate assistance;2 Person assistance  Stand to sit: Moderate assistance;2 Person assistance  Transfer Comments: mod Ax1 to/from amarilys cesar, mod Ax2 to/from [employer]  Overall Cognitive Status: WFL  Cognition Comment: Somewhat delayed processing                  Education Given To: Patient  Education Provided: Role of Therapy;Transfer Training;ADL Adaptive Strategies  Barriers to Learning: Cognition  Education Outcome: Verbalized understanding  LUE AROM (degrees)  LUE General AROM: slightly limited d/t edema, but WFL  RUE AROM (degrees)  RUE AROM : [facility]           AM-[facility] Score  AM-[facility] Inpatient Daily Activity Raw Score: 16 (06/14/22 1441)  AM-PAC Inpatient ADL T-Scale Score : 35.96 (06/14/22 1441)  ADL Inpatient CMS 0-100% Score: 53.32 (06/14/22 1441)  ADL Inpatient CMS G-Code Modifier : CK (06/14/22 1441)    Goals  Short Term Goals  Time Frame for Short term goals: Prior to d/c  Short Term Goal 1: Pt will complete ADL transfers w/ CGA  Short Term Goal 2: Pt will toilet w/ CGA  Short Term Goal 3: Pt will bathe w/ min A  Short Term Goal 4: Pt will groom sinkside w/ setup  Long Term Goals  Time Frame for Long term goals : LTG=STG  Patient Goals   Patient goals : did not state       Therapy Time   Individual Concurrent Group Co-treatment   Time In 9283         Time Out 1434         Minutes 39         Timed Code Treatment Minutes: [address], OTR/L [address]

## 2022-06-14 NOTE — PROGRESS NOTES
Physical Therapy  Abdulaziz Grsos    Attempted to see pt for PT evaluation. Pt out of room for HD. Will re-attempt as schedule allows.     Electronically signed by Rohith Whipple PT on 6/14/2022 at 8:30 AM

## 2022-06-14 NOTE — PROGRESS NOTES
Clinical Pharmacy Note  Heparin Dosing       Lab Results   Component Value Date    APTT 165.5 06/13/2022     Lab Results   Component Value Date    HGB 10.4 06/11/2022    HCT 32.3 06/11/2022     06/11/2022    INR 1.45 06/03/2022       Current Infusion Rate: 1220 units/hr    Plan:  HOLD for 1 hour  Rate: Restart at 1020 units/hr  Next aPTT: 0830  6/14/22    Pharmacy will continue to monitor and adjust based on aPTT results.     Juan Church [facility]  6/14/2022 1:12 AM

## 2022-06-14 NOTE — PROGRESS NOTES
Hospitalist Progress Note      PCP: Lolly River DO    Date of Admission: 6/11/2022    Chief Complaint: acute DVT    Hospital Course:     Subjective: no complaints       Medications:  Reviewed    Infusion Medications    heparin (PORCINE) Infusion 880 Units/hr (06/14/22 1641)    dextrose      sodium chloride       Scheduled Medications    sodium bicarbonate  50 mEq IntraVENous Once    sodium zirconium cyclosilicate  10 g Oral Once    cefepime  2,000 mg IntraVENous Once per day on Tue Thu Sat    allopurinol  100 mg Oral Daily    atorvastatin  80 mg Oral Daily    clopidogrel  75 mg Oral Daily    levothyroxine  50 mcg Oral QAM AC    metoprolol succinate  12.5 mg Oral Daily    midodrine  5 mg Oral TID WC    pantoprazole  40 mg Oral BID AC    sodium chloride flush  5-40 mL IntraVENous 2 times per day    lidocaine 1 % injection  5 mL IntraDERmal Once     PRN Meds: heparin (porcine), glucose, dextrose bolus **OR** dextrose bolus, glucagon (rDNA), dextrose, sodium chloride flush, sodium chloride, ondansetron **OR** ondansetron, polyethylene glycol, acetaminophen **OR** acetaminophen      Intake/Output Summary (Last 24 hours) at 6/14/2022 1829  Last data filed at 6/14/2022 1236  Gross per 24 hour   Intake 860 ml   Output 2700 ml   Net -1840 ml       Exam:    /71   Pulse 89   Temp 97.3 °F (36.3 °C) (Axillary)   Resp 20   Ht 5' 6\" (1.676 m)   Wt 151 lb 14.4 oz (68.9 kg)   SpO2 90%   BMI 24.52 kg/m²     General appearance: No apparent distress, appears stated age and cooperative. HEENT: Pupils equal, round, and reactive to light. Conjunctivae/corneas clear. Neck: Supple, with full range of motion. No jugular venous distention. Trachea midline. Respiratory:  Normal respiratory effort. Clear to auscultation, bilaterally without Rales/Wheezes/Rhonchi. Cardiovascular: Regular rate and rhythm with normal S1/S2 without murmurs, rubs or gallops.   Abdomen: Soft, non-tender, non-distended with being closed d/t staff shortages. Pleasant gentleman, a bit disoriented, significantly fluid overloaded requiring O2. Potassium elevated to 6.3, received 1 dose lokelma. Nephrology consulted, getting urgent dialysis to correct fluid overload and hyperkalemia.      Plan:     oswald DVT  - venous doppler with an acute dvt of the ij and radial veins  - started on heparin gtt  - change to Eliquis     ESRD on HD TTS - HD center closed, could not get HD  - Nephrology consulted, urgent dialysis  - volume overloaded but improving with hd. Will get hd today     Hyperkalemia - improved  - s/p Lokelma, urgent HD     Acute hypoxemic respiratory failure - improving  Cardiorenal syndrome   - d/t fluid overload     Recent line infection  - Cefepime 2g with HD through 6/17      Chronic conditions - continue home meds unless otherwise stated  Anemia  S/d chf  Dm  htn    DVT Prophylaxis: Eliquis  Diet: ADULT DIET;  Regular; Low Potassium (Less than 3000 mg/day)  Code Status: Full Code    PT/OT Eval Status: SNF    Dispo - Andrea Morin MD

## 2022-06-14 NOTE — FLOWSHEET NOTE
Treatment time: 3 HRS  Net UF: 2.2 liters     Pre weight: 71.4 kg   Post weight: 68.9 kg   EDW: tbd    Access used: L TDC   Access function: good    Medications or blood products given: none    Regular outpatient schedule: [facility] TTS    Summary of response to treatment: 2.2 liters removed. Pt tolerated tx well. No meds this tx.  Post VSS    Copy of dialysis treatment record placed in chart, to be scanned into EMR.     06/14/22 0807 06/14/22 1133   Vital Signs   /76 113/89   Temp 97.1 °F (36.2 °C) 96.9 °F (36.1 °C)   Heart Rate 95 97   Weight 157 lb 6.5 oz (71.4 kg) 151 lb 14.4 oz (68.9 kg)   Weight Method Bed scale Bed scale   Post-Hemodialysis Assessment   Hemodialysis Intake (ml)  --  500 ml   Hemodialysis Output (ml)  --  2700 ml   NET Removed (ml)  --  2200   Tolerated Treatment  --  Good

## 2022-06-14 NOTE — PROGRESS NOTES
Physical Therapy  Facility/Department: [facility]  Physical Therapy Initial Assessment    Name: Clotilde Falcon  : 1955  MRN: [id number]  Date of Service: 2022    Discharge Recommendations:  Therapy recommended at discharge     Clotilde Falcon scored a  on the AM-PAC short mobility form. Current research shows that an AM-PAC score of 17 or less is typically not associated with a discharge to the patient's home setting. Based on the patient's AM-PAC score and their current functional mobility deficits, it is recommended that the patient have 3-5 sessions per week of Physical Therapy at d/c to increase the patient's independence. Please see assessment section for further patient specific details. If patient discharges prior to next session this note will serve as a discharge summary. Please see below for the latest assessment towards goals. Patient Diagnosis(es): The primary encounter diagnosis was ESRD (end stage renal disease) ([address] Diagnoses of Altered mental status, unspecified altered mental status type, Peripheral edema, Requires oxygen therapy, Cardiomegaly, Pulmonary congestion, and Pleural effusion, bilateral were also pertinent to this visit. Past Medical History:  has a past medical history of Anemia, Cardiomegaly, CHF (congestive heart failure) ([address] Cirrhosis ([address] Diabetes type 2, uncontrolled ([address] Diabetic nephropathy, ED (erectile dysfunction), Essential hypertension, benign, Hemodialysis access, fistula mature ([address] Hyperlipidemia, Noncompliance, Respiratory failure ([address] and Tachycardia. Past Surgical History:  has a past surgical history that includes eye surgery; IR NONTUNNELED VASCULAR CATHETER > 5 YEARS (2022); IR TUNNELED CVC PLACE WO SQ PORT/PUMP > 5 YEARS (2022); IR NONTUNNELED VASCULAR CATHETER > 5 YEARS (Left, 2022); IR NONTUNNELED VASCULAR CATHETER > 5 YEARS (2022);  IR TUNNELED CVC PLACE WO SQ PORT/PUMP > 5 YEARS (Left, 05/31/2022); IR TUNNELED CVC PLACE WO SQ PORT/PUMP > 5 YEARS (5/31/2022); and Cardiac defibrillator placement. Assessment   Body Structures, Functions, Activity Limitations Requiring Skilled Therapeutic Intervention: Decreased functional mobility ; Decreased strength;Decreased balance;Decreased endurance  Assessment: 74 y.o. male with a history of multiple medical problems who presented to [facility] [place] on 6/11/22 with hyperkalemia after not being able to attend his HD session on 6/11/22 d/t the facility being closed with staff shortages. Prior to previous admission, pt living with son in apt setting, independent with ADLs and ambulation with rollator. Pt currently functioning below baseline. Recommend continued skilled therapy 3-5x/week to maximize independence and safety with functional mobility. Treatment Diagnosis: impaired mobility  Therapy Prognosis: Fair  Decision Making: Medium Complexity  History: see above  Exam: see below  Clinical Presentation: evolving  Requires PT Follow-Up: Yes  Activity Tolerance  Activity Tolerance: Patient tolerated treatment well     Plan   Plan  Plan: 3-5 times per week  Current Treatment Recommendations: Strengthening,Balance training,Functional mobility training,Transfer training,Endurance training,Gait training,Neuromuscular re-education,Safety education & training,Patient/Caregiver education & training,Equipment evaluation, education, & procurement,Therapeutic activities  Safety Devices  Type of Devices:  All fall risk precautions in place,Call light within reach,Gait belt,Patient at risk for falls,Left in bed,Bed alarm in place,Nurse notified     Restrictions  Restrictions/Precautions  Restrictions/Precautions: Fall Risk     Subjective   General  Chart Reviewed: Yes  Patient assessed for rehabilitation services?: Yes  Additional Pertinent Hx: 74 y.o. male with a history of multiple medical problems who presented to [facility] [place] on 6/11/22 with hyperkalemia after not being able to attend his HD session on 6/11/22 d/t the facility being closed with staff shortages. Response To Previous Treatment: Not applicable  Family / Caregiver Present: No  Referring Practitioner: Perry Wooten MD  Referral Date : 06/12/22  Diagnosis: hyperkalemia  Follows Commands: Within Functional Limits  Subjective  Subjective: Pt is agreeable to PT         Social/Functional History  Social/Functional History  Lives With: Son  Type of Home: Apartment  Home Layout: One level  Home Access: Stairs to enter with rails  Entrance Stairs - Number of Steps: 2 descending KARO  Bathroom Shower/Tub: Tub/Shower unit  Bathroom Toilet: Standard  Bathroom Equipment: Shower chair  Bathroom Accessibility: Accessible  Home Equipment: Mardee Shallow, 4 wheeled  Has the patient had two or more falls in the past year or any fall with injury in the past year?: No  ADL Assistance: Independent  Homemaking Assistance: Needs assistance (assist fron son and sister)  Ambulation Assistance: Independent (rollator)  Transfer Assistance: Independent  Additional Comments: Recent admission 5/17 to 6/3 with d/c to [address] [place] [address]     Vision/Hearing  Vision  Vision: Impaired  Vision Exceptions: Legally blind  Hearing  Hearing: Within functional limits      Cognition   Orientation  Overall Orientation Status: Within Functional Limits  Cognition  Overall Cognitive Status: WFL     Objective   Gross Assessment  Strength: Generally decreased, functional     Bed mobility  Supine to Sit: Stand by assistance  Sit to Supine: Stand by assistance  Transfers  Sit to Stand: Minimal Assistance; Moderate Assistance (Ax1 @ [facility]; Ax2 at [facility])  Stand to sit: Minimal Assistance; Moderate Assistance  Comment: refused to transfer to recliner        Balance  Posture: Good  Sitting - Static: Good  Sitting - Dynamic: Good  Standing - Static: Fair  Standing - Dynamic: Fair;-           AM-PAC Score  AM-PAC Inpatient Mobility Raw Score : 11 (06/14/22 0103)  AM-PAC

## 2022-06-14 NOTE — CARE COORDINATION
Son returned my call today; they would like for Amparo العراقي to go to [place] at [facility]. [id number] [address] talked with Melania ([phone]) with referral; they will review & get back with me.     Max Angeles RN, BSN,   [phone]  Electronically signed by Max Angeles RN on 6/14/2022 at 1:48 PM

## 2022-06-14 NOTE — PROGRESS NOTES
[facility]                                               Progress note    CC: hyperkalemia, arm swelling. Summary:   Temitope Art is being seen by nephrology for ESRD and hyperkalemia. Patient did not get his usual dialysis today as his clinic closed due to staffing issues. He presented to the emergency room with hyperkalemia. Has been having arm swelling for the past week per his family. Has edema on exam. Has some SOB. Very weak and frail. No NVD. No chest pain. S/p 1 dose lokelma on admit   K 6.3 on admission. Had dialysis on sat night. Last dialysis was on Thursday PTA    Interval History  Seen on dialysis. Has a DVT in the left arm, on heparin infusion  BP well controlled  SpO2 90% on 1 LPM O2. Plan:   - HD today per schedule. - will try to challenge his last post HD weight. - still has significant edema,. Jessy Blackwell MD  [facility]  Office: [phone]    Assessment:     ESRD  HD on TTS at [facility]  Access: [facility]       Volume overload. CHF     Hypoxia  Has HF and volume overload.      Severe hyperkalemia  K 6.3       Recent line infection   Should be on cefepime 2 g with HD until 6/17      ROS:   [address] All other remaining systems are negative.     Constitutional:  fever, chills, weakness, weight change, fatigue,      Skin:  rash, pruritus, hair loss, bruising, dry skin, petechiae. Head, Face, Neck   headaches, swelling,  cervical adenopathy.     Respiratory: shortness of breath, cough, or wheezing  Cardiovascular: chest pain, palpitations, dizzy, edema  Gastrointestinal: nausea, vomiting, diarrhea, constipation,belly pain    Yellow skin, blood in stool  Musculoskeletal:  back pain, muscle weakness, gait problems,       joint pain or swelling. Genitourinary:  dysuria, poor urine flow, flank pain, blood in urine  Neurologic:  vertigo, TIA'S, syncope, seizures, focal weakness  Psychosocial:  insomnia, anxiety, or depression.   Additional positive findings: -     PMH:   Past medical history, surgical history, social history, family history are reviewed and updated as appropriate. Reviewed current medication list.   Allergies reviewed and updated as needed. PE:   Vitals:    06/14/22 0428   BP: 115/80   Pulse: (!) 106   Resp:    Temp: 97.4 °F (36.3 °C)   SpO2: 90%       General appearance:  in NAD, fully alert and oriented. Comfortable. HEENT: EOM intact, no icterus. Trachea is midline. Neck : No mass, appears symmetrical, + JVD   Respiratory: Respiratory effort appears normal, no wheeze, no crackles  Cardiovascular: Ausculation- No M/R/G, +edema  Abdomen: No visible mass or tenderness, or scar, No hepatosplenomegaly  Musculoskeletal: left arm swollen compared to right  Skin:no rashes, ulcers, induration, no jaundice. Neuro: face symmetric, no focal deficits. Appropriate responses.  CN 2-12 grossly intact      Lab Results   Component Value Date    CREATININE 3.1 (H) 06/11/2022    BUN 27 (H) 06/11/2022     06/11/2022    K 4.1 06/11/2022    CL 96 (L) 06/11/2022    CO2 22 06/11/2022      Lab Results   Component Value Date    WBC 5.2 06/11/2022    HGB 10.4 (L) 06/11/2022    HCT 32.3 (L) 06/11/2022    .8 (H) 06/11/2022     06/11/2022     Lab Results   Component Value Date    .8 (H) 04/07/2022    CALCIUM 9.0 06/11/2022    PHOS 3.6 06/11/2022

## 2022-06-15 NOTE — CARE COORDINATION
vm left for Melania to return my call re: referral sent yesterday. Jaylon Bedolla RN, BSN,   [phone]  Electronically signed by Jaylon Bedolla RN on 6/15/2022 at 9:42 AM     Received call back from [place], they are not in network. Called pt son & left a vm re: above & need for new SNF options. Jaylon Bedolla RN, BSN,   [phone]  Electronically signed by Jaylon Bedolla RN on 6/15/2022 at 12:23 PM     Received call back from chandrakant Carlisle, emailed insurance SNF list with CMS star ratings to [email or web address]; asked for 2-3 choices.     Jaylon Bedolla RN, BSN,   [phone]  Electronically signed by Jaylon Bedolla RN on 6/15/2022 at 1:59 PM

## 2022-06-15 NOTE — PROGRESS NOTES
[facility]                                               Progress note    CC: hyperkalemia, arm swelling. Summary:   Tatianna Campbell is being seen by nephrology for ESRD and hyperkalemia. Patient did not get his usual dialysis today as his clinic closed due to staffing issues. He presented to the emergency room with hyperkalemia. Has been having arm swelling for the past week per his family. Has edema on exam. Has some SOB. Very weak and frail. No NVD. No chest pain. S/p 1 dose lokelma on admit   K 6.3 on admission. Had dialysis on sat night. Last dialysis was on Thursday PTA    Interval History  Seen at bedside. BP low today  Has a DVT in the left arm, on heparin infusion  Tolerated HD well yesterday  SpO2 90% on 1 LPM O2. Plan:   - no acute indication for RRT today  - will increase midodrine to 10 mg TID today. - ok to discharge from nephrology perspective      Kim Almaraz MD  [facility] Nephrology  Office: [phone]    Assessment:     ESRD  HD on TTS at [facility]  Access: [facility]       Volume overload. CHF     Hypoxia  Has HF and volume overload.      Severe hyperkalemia  K 6.3       Recent line infection   Should be on cefepime 2 g with HD until 6/17      ROS:   [address]. All other remaining systems are negative.     Constitutional:  fever, chills, weakness, weight change, fatigue,      Skin:  rash, pruritus, hair loss, bruising, dry skin, petechiae. Head, Face, Neck   headaches, swelling,  cervical adenopathy.     Respiratory: shortness of breath, cough, or wheezing  Cardiovascular: chest pain, palpitations, dizzy, edema  Gastrointestinal: nausea, vomiting, diarrhea, constipation,belly pain    Yellow skin, blood in stool  Musculoskeletal:  back pain, muscle weakness, gait problems,       joint pain or swelling.   Genitourinary:  dysuria, poor urine flow, flank pain, blood in urine  Neurologic:  vertigo, TIA'S, syncope, seizures, focal weakness  Psychosocial:  insomnia, anxiety, or depression. Additional positive findings: -     PMH:   Past medical history, surgical history, social history, family history are reviewed and updated as appropriate. Reviewed current medication list.   Allergies reviewed and updated as needed. PE:   Vitals:    06/15/22 1211   BP: (!) 83/59   Pulse: 93   Resp:    Temp: 97.4 °F (36.3 °C)   SpO2: 90%       General appearance:  in NAD, fully alert and oriented. Comfortable. HEENT: EOM intact, no icterus. Trachea is midline. Neck : No mass, appears symmetrical, + JVD   Respiratory: Respiratory effort appears normal, no wheeze, no crackles  Cardiovascular: Ausculation- No M/R/G, +edema  Abdomen: No visible mass or tenderness, or scar, No hepatosplenomegaly  Musculoskeletal: left arm swollen compared to right  Skin:no rashes, ulcers, induration, no jaundice. Neuro: face symmetric, no focal deficits. Appropriate responses.  CN 2-12 grossly intact      Lab Results   Component Value Date    CREATININE 4.7 (H) 06/14/2022    BUN 52 (H) 06/14/2022     06/14/2022    K 4.6 06/14/2022    CL 98 (L) 06/14/2022    CO2 23 06/14/2022      Lab Results   Component Value Date    WBC 3.6 (L) 06/15/2022    HGB 9.6 (L) 06/15/2022    HCT 28.7 (L) 06/15/2022    .9 (H) 06/15/2022     06/15/2022     Lab Results   Component Value Date    .8 (H) 04/07/2022    CALCIUM 8.5 06/14/2022    PHOS 5.4 (H) 06/14/2022

## 2022-06-15 NOTE — PROGRESS NOTES
Occupational Therapy  Facility/Department: [facility]  Occupational Therapy Daily Treatment    Name: Reymundo Chavarria  : 1955  MRN: [id number]  Date of Service: 6/15/2022    Discharge Recommendations:  3-5 sessions per week,Patient would benefit from continued therapy after discharge     Reymundo Chavarria scored a 16/24 on the AM-PAC ADL Inpatient form. Current research shows that an AM-PAC score of 17 or less is typically not associated with a discharge to the patient's home setting. Based on the patient's AM-PAC score and their current ADL deficits, it is recommended that the patient have 3-5 sessions per week of Occupational Therapy at d/c to increase the patient's independence. Please see assessment section for further patient specific details. If patient discharges prior to next session this note will serve as a discharge summary. Please see below for the latest assessment towards goals. Patient Diagnosis(es): The primary encounter diagnosis was ESRD (end stage renal disease) ([address] Diagnoses of Altered mental status, unspecified altered mental status type, Peripheral edema, Requires oxygen therapy, Cardiomegaly, Pulmonary congestion, and Pleural effusion, bilateral were also pertinent to this visit. Past Medical History:  has a past medical history of Anemia, Cardiomegaly, CHF (congestive heart failure) ([address] Cirrhosis ([address] Diabetes type 2, uncontrolled ([address] Diabetic nephropathy, ED (erectile dysfunction), Essential hypertension, benign, Hemodialysis access, fistula mature ([address] Hyperlipidemia, Noncompliance, Respiratory failure ([address] and Tachycardia. Past Surgical History:  has a past surgical history that includes eye surgery; IR NONTUNNELED VASCULAR CATHETER > 5 YEARS (2022); IR TUNNELED CVC PLACE WO SQ PORT/PUMP > 5 YEARS (2022); IR NONTUNNELED VASCULAR CATHETER > 5 YEARS (Left, 2022); IR NONTUNNELED VASCULAR CATHETER > 5 YEARS (2022);  IR TUNNELED CVC PLACE WO SQ PORT/PUMP > 5 YEARS (Left, 05/31/2022); IR TUNNELED CVC PLACE WO SQ PORT/PUMP > 5 YEARS (5/31/2022); and Cardiac defibrillator placement. Treatment Diagnosis: Decreased: ADLs, fxl transfers/mobility      Assessment   Performance deficits / Impairments: Decreased functional mobility ; Decreased ADL status; Decreased strength;Decreased balance  Assessment: Pt is a 73 yo M admitted from SNF after not being able to attend his HD session today. PTA, pt has been at [address] [place] [address] since ~mid May where he has assist for ADLs and fxl transfers - prior to this he was living at home and independent in ADLs and fxl mobility using 4WW. Today, pt completed bed mobility w/ SBA, sit<>stand w/ min A, and was able to ambulate a short distance w/ min A using RW. He declined ADLs. Recommend ongoing skilled therapy 3-5 times/week at d/c to increase his safety and independence. Treatment Diagnosis: Decreased: ADLs, fxl transfers/mobility  Prognosis: Fair  REQUIRES OT FOLLOW-UP: Yes  Activity Tolerance  Activity Tolerance: Patient Tolerated treatment well        Plan   Plan  Times per Week: 3-5  Times per Day: Daily  Current Treatment Recommendations: Strengthening,Balance training,ROM,Functional mobility training,Endurance training,Self-Care / ADL,Safety education & training     Restrictions  Restrictions/Precautions  Restrictions/Precautions: Fall Risk    Subjective   General  Chart Reviewed: Yes  Patient assessed for rehabilitation services?: Yes  Additional Pertinent Hx: per H&P: \"81 y.o. male with a history of multiple medical problems who presented to [facility] [place] with hyperkalemia after not being able to attend his HD session today d/t the facility being closed d/t staff shortages. Pleasant gentleman, a bit disoriented, significantly fluid overloaded requiring O2. Potassium elevated to 6.3, received 1 dose lokelma. Nephrology consulted, getting urgent dialysis to correct fluid overload and hyperkalemia.  \"  Family / Caregiver Present: No  Referring Practitioner: Carmen  Diagnosis: Missed HD  Subjective  Subjective: Pt met b/s for OT cotx w/ PT. Pt in bed, agreeable w/ gentle encouragement. Denied pain  General Comment  Comments: Per RN ok to see     Objective   Safety Devices  Type of Devices: All fall risk precautions in place;Call light within reach;Gait belt;Patient at risk for falls; Left in chair;Chair alarm in place  Balance  Sitting: Intact  Standing: With support (RW)  Gait  Overall Level of Assistance: Minimum assistance (Fxl mobility ~5 feet from bed>chair w/ min A using RW. Assist for navigation d/t low vision)        ADL  Additional Comments: Declined ADLs     Activity Tolerance  Activity Tolerance: Patient tolerated treatment well  Bed mobility  Supine to Sit: Stand by assistance (increased time)  Transfers  Sit to stand: Minimal assistance  Stand to sit: Minimal assistance  Transfer Comments: RW     Cognition  Overall Cognitive Status: Exceptions  Arousal/Alertness: Appropriate responses to stimuli  Following Commands: Follows one step commands consistently; Follows multistep commands with increased time  Attention Span: Appears intact  Memory: Decreased recall of recent events  Problem Solving: Decreased awareness of errors  Initiation: Requires cues for some  Sequencing: Does not require cues                  Education Given To: Patient  Education Provided: Role of Therapy;Transfer Training  Barriers to Learning: Cognition  Education Outcome: Verbalized understanding           AM-PAC Score  AM-[facility] Inpatient Daily Activity Raw Score: 16 (06/14/22 1441)  AM-PAC Inpatient ADL T-Scale Score : 35.96 (06/14/22 1441)  ADL Inpatient CMS 0-100% Score: 53.32 (06/14/22 1441)  ADL Inpatient CMS G-Code Modifier : CK (06/14/22 1441)    Goals  Short Term Goals  Time Frame for Short term goals: Prior to d/c; goals ongoing  Short Term Goal 1: Pt will complete ADL transfers w/ CGA  Short Term Goal 2: Pt will toilet w/ CGA  Short Term Goal 3: Pt will bathe w/ min A  Short Term Goal 4: Pt will groom sinkside w/ setup  Long Term Goals  Time Frame for Long term goals : LTG=STG  Patient Goals   Patient goals : did not state       Therapy Time   Individual Concurrent Group Co-treatment   Time In 1300         Time Out 1340         Minutes [address] [place] [id number]

## 2022-06-15 NOTE — PROGRESS NOTES
Hospitalist Progress Note      PCP: Phil Dalal DO    Date of Admission: 6/11/2022    Chief Complaint: acute DVT    Hospital Course:     Subjective: no complaints ; Waiting placement      Medications:  Reviewed    Infusion Medications    dextrose      sodium chloride       Scheduled Medications    midodrine  10 mg Oral TID WC    apixaban  10 mg Oral BID    sodium bicarbonate  50 mEq IntraVENous Once    sodium zirconium cyclosilicate  10 g Oral Once    cefepime  2,000 mg IntraVENous Once per day on Tue Thu Sat    allopurinol  100 mg Oral Daily    atorvastatin  80 mg Oral Daily    clopidogrel  75 mg Oral Daily    levothyroxine  50 mcg Oral QAM AC    metoprolol succinate  12.5 mg Oral Daily    pantoprazole  40 mg Oral BID AC    sodium chloride flush  5-40 mL IntraVENous 2 times per day    lidocaine 1 % injection  5 mL IntraDERmal Once     PRN Meds: heparin (porcine), glucose, dextrose bolus **OR** dextrose bolus, glucagon (rDNA), dextrose, sodium chloride flush, sodium chloride, ondansetron **OR** ondansetron, polyethylene glycol, acetaminophen **OR** acetaminophen      Intake/Output Summary (Last 24 hours) at 6/15/2022 1940  Last data filed at 6/15/2022 1211  Gross per 24 hour   Intake 460 ml   Output --   Net 460 ml       Exam:    /75   Pulse 80   Temp 98.2 °F (36.8 °C)   Resp 16   Ht 5' 6\" (1.676 m)   Wt 150 lb 12.7 oz (68.4 kg)   SpO2 100%   BMI 24.34 kg/m²     General appearance: No apparent distress, appears stated age and cooperative. HEENT: Pupils equal, round, and reactive to light. Conjunctivae/corneas clear. Neck: Supple, with full range of motion. No jugular venous distention. Trachea midline. Respiratory:  Normal respiratory effort. Clear to auscultation, bilaterally without Rales/Wheezes/Rhonchi. Cardiovascular: Regular rate and rhythm with normal S1/S2 without murmurs, rubs or gallops.   Abdomen: Soft, non-tender, non-distended with normal bowel sounds. Musculoskeletal: No clubbing, cyanosis or edema bilaterally. Full range of motion without deformity. Skin: Skin color, texture, turgor normal.  No rashes or lesions. Neurologic:  Neurovascularly intact without any focal sensory/motor deficits. Cranial nerves: II-XII intact, grossly non-focal.  Psychiatric: Alert and oriented, thought content appropriate, normal insight  Capillary Refill: Brisk,< 3 seconds   Peripheral Pulses: +2 palpable, equal bilaterally       Labs:   Recent Labs     06/14/22  0750 06/15/22  0728   WBC 3.6* 3.6*   HGB 9.2* 9.6*   HCT 27.3* 28.7*    259     Recent Labs     06/14/22  0750      K 4.6   CL 98*   CO2 23   BUN 52*   CREATININE 4.7*   CALCIUM 8.5   PHOS 5.4*     No results for input(s): AST, ALT, BILIDIR, BILITOT, ALKPHOS in the last 72 hours. No results for input(s): INR in the last 72 hours. No results for input(s): [place] Dec in the last 72 hours. Urinalysis:      Lab Results   Component Value Date    NITRU Negative 04/10/2022    WBCUA 0-2 04/10/2022    BACTERIA 2+ 04/09/2022    RBCUA 0-2 04/10/2022    BLOODU Negative 04/10/2022    SPECGRAV 1.025 04/10/2022    GLUCOSEU Negative 04/10/2022       Radiology:  VL Extremity Venous Left   Final Result      XR CHEST PORTABLE   Final Result   Status post removal of the right-sided dialysis catheter and placement of a   dialysis catheter on the left in good position. Mild cardiomegaly and mild central pulmonary congestion which is more   prominent. Hazy bibasilar opacities and small bibasilar pleural effusions which is   prominent                 Assessment/Plan:    Active Hospital Problems    Diagnosis Date Noted    Hyperkalemia [E87.5] 06/11/2022     Priority: Medium       Hospital course: 74 y. o. male with a history of multiple medical problems who presented to [facility] AT [place] with hyperkalemia after not being able to attend his HD session today d/t the facility being closed d/t staff shortages. Pleasant gentleman, a bit disoriented, significantly fluid overloaded requiring O2. Potassium elevated to 6.3, received 1 dose lokelma. Nephrology consulted, getting urgent dialysis to correct fluid overload and hyperkalemia.      Plan:     oswald DVT  - venous doppler with an acute dvt of the ij and radial veins  - started on heparin gtt  - change to Eliquis     ESRD on HD TTS - HD center closed, could not get HD  - Nephrology consulted, urgent dialysis  - volume overloaded but improving with hd. Will get hd today     Hyperkalemia - improved  - s/p Lokelma, urgent HD     Acute hypoxemic respiratory failure - improving  Cardiorenal syndrome   - d/t fluid overload     Recent line infection  - Cefepime 2g with HD through 6/17      Chronic conditions - continue home meds unless otherwise stated  Anemia  S/d chf  Dm  htn    DVT Prophylaxis: Eliquis  Diet: ADULT DIET;  Regular; Low Potassium (Less than 3000 mg/day)  Code Status: Full Code    PT/OT Eval Status: [facility]    Dispo - [facility]; medically ready, waiting placement    David Schreiber MD

## 2022-06-16 NOTE — PROGRESS NOTES
Treatment time: 3.5 hrs     Net UF: 3.0 kg     Pre weight: 70.0 kg   Post weight: 67.0 kg   EDW: TBD     Access used: L CVC   Access function: Well     Medications or blood products given: Midodrine     Regular outpatient schedule: TTS     Summary of response to treatment: Pt tolerated tx well     Copy of dialysis treatment record placed in chart, to be scanned into EMR.

## 2022-06-16 NOTE — PLAN OF CARE
Problem: Discharge Planning  Goal: Discharge to home or other facility with appropriate resources  6/16/2022 1318 by Vivek Navarro RN  Outcome: Adequate for Discharge  6/16/2022 0327 by Nancy Welsh RN  Outcome: Progressing  Flowsheets (Taken 6/16/2022 4105)  Discharge to home or other facility with appropriate resources: Identify barriers to discharge with patient and caregiver     Problem: Pain  Goal: Verbalizes/displays adequate comfort level or baseline comfort level  6/16/2022 1318 by Vivek Navarro RN  Outcome: Adequate for Discharge  6/16/2022 0327 by Nancy Welsh RN  Outcome: Progressing     Problem: Skin/Tissue Integrity  Goal: Absence of new skin breakdown  Description: 1. Monitor for areas of redness and/or skin breakdown  2. Assess vascular access sites hourly  3. Every 4-6 hours minimum:  Change oxygen saturation probe site  4. Every 4-6 hours:  If on nasal continuous positive airway pressure, respiratory therapy assess nares and determine need for appliance change or resting period.   6/16/2022 1318 by Vivek Navarro RN  Outcome: Adequate for Discharge  6/16/2022 0327 by Nancy Welsh RN  Outcome: Progressing     Problem: ABCDS Injury Assessment  Goal: Absence of physical injury  6/16/2022 1318 by Vivek Navarro RN  Outcome: Adequate for Discharge  6/16/2022 0327 by Nancy Welsh RN  Outcome: Progressing     Problem: Safety - Adult  Goal: Free from fall injury  6/16/2022 1318 by Vivek Navarro RN  Outcome: Adequate for Discharge  6/16/2022 0327 by Nancy Welsh RN  Outcome: Progressing     Problem: Chronic Conditions and Co-morbidities  Goal: Patient's chronic conditions and co-morbidity symptoms are monitored and maintained or improved  6/16/2022 1318 by Vivek Navarro RN  Outcome: Adequate for Discharge  6/16/2022 0327 by Nancy Welsh RN  Outcome: Progressing

## 2022-06-16 NOTE — PLAN OF CARE
Problem: Discharge Planning  Goal: Discharge to home or other facility with appropriate resources  Outcome: Progressing  Flowsheets (Taken 6/16/2022 0317)  Discharge to home or other facility with appropriate resources: Identify barriers to discharge with patient and caregiver     Problem: Pain  Goal: Verbalizes/displays adequate comfort level or baseline comfort level  Outcome: Progressing     Problem: Skin/Tissue Integrity  Goal: Absence of new skin breakdown  Description: 1. Monitor for areas of redness and/or skin breakdown  2. Assess vascular access sites hourly  3. Every 4-6 hours minimum:  Change oxygen saturation probe site  4. Every 4-6 hours:  If on nasal continuous positive airway pressure, respiratory therapy assess nares and determine need for appliance change or resting period.   Outcome: Progressing     Problem: ABCDS Injury Assessment  Goal: Absence of physical injury  Outcome: Progressing     Problem: Safety - Adult  Goal: Free from fall injury  Outcome: Progressing     Problem: Chronic Conditions and Co-morbidities  Goal: Patient's chronic conditions and co-morbidity symptoms are monitored and maintained or improved  Outcome: Progressing

## 2022-06-16 NOTE — PROGRESS NOTES
Hospitalist Progress Note      PCP: Terry Landaverde DO    Date of Admission: 6/11/2022    Chief Complaint: acute DVT    Hospital Course:     Subjective: no complaints ; Waiting placement due to positive Covid test      Medications:  Reviewed    Infusion Medications    dextrose      sodium chloride       Scheduled Medications    midodrine  10 mg Oral TID WC    apixaban  10 mg Oral BID    sodium bicarbonate  50 mEq IntraVENous Once    sodium zirconium cyclosilicate  10 g Oral Once    cefepime  2,000 mg IntraVENous Once per day on Tue Thu Sat    allopurinol  100 mg Oral Daily    atorvastatin  80 mg Oral Daily    clopidogrel  75 mg Oral Daily    levothyroxine  50 mcg Oral QAM AC    metoprolol succinate  12.5 mg Oral Daily    pantoprazole  40 mg Oral BID AC    sodium chloride flush  5-40 mL IntraVENous 2 times per day    lidocaine 1 % injection  5 mL IntraDERmal Once     PRN Meds: sodium chloride, heparin (porcine), glucose, dextrose bolus **OR** dextrose bolus, glucagon (rDNA), dextrose, sodium chloride flush, sodium chloride, ondansetron **OR** ondansetron, polyethylene glycol, acetaminophen **OR** acetaminophen      Intake/Output Summary (Last 24 hours) at 6/16/2022 1647  Last data filed at 6/16/2022 0511  Gross per 24 hour   Intake 240 ml   Output --   Net 240 ml       Exam:    /78   Pulse 83   Temp 97.6 °F (36.4 °C) (Oral)   Resp 16   Ht 5' 6\" (1.676 m)   Wt 143 lb 11.8 oz (65.2 kg)   SpO2 97%   BMI 23.20 kg/m²     General appearance: No apparent distress, appears stated age and cooperative. HEENT: Pupils equal, round, and reactive to light. Conjunctivae/corneas clear. Neck: Supple, with full range of motion. No jugular venous distention. Trachea midline. Respiratory:  Normal respiratory effort. Clear to auscultation, bilaterally without Rales/Wheezes/Rhonchi. Cardiovascular: Regular rate and rhythm with normal S1/S2 without murmurs, rubs or gallops.   Abdomen: Soft, non-tender, non-distended with normal bowel sounds. Musculoskeletal: No clubbing, cyanosis or edema bilaterally. Full range of motion without deformity. Skin: Skin color, texture, turgor normal.  No rashes or lesions. Neurologic:  Neurovascularly intact without any focal sensory/motor deficits. Cranial nerves: II-XII intact, grossly non-focal.  Psychiatric: Alert and oriented, thought content appropriate, normal insight  Capillary Refill: Brisk,< 3 seconds   Peripheral Pulses: +2 palpable, equal bilaterally       Labs:   Recent Labs     06/14/22  0750 06/15/22  0728 06/16/22  0810   WBC 3.6* 3.6* 3.9*   HGB 9.2* 9.6* 9.0*   HCT 27.3* 28.7* 28.0*    259 313     Recent Labs     06/14/22  0750 06/16/22  0810    136   K 4.6 4.5   CL 98* 99   CO2 23 21   BUN 52* 48*   CREATININE 4.7* 4.3*   CALCIUM 8.5 8.3   PHOS 5.4* 5.7*     No results for input(s): AST, ALT, BILIDIR, BILITOT, ALKPHOS in the last 72 hours. No results for input(s): INR in the last 72 hours. No results for input(s): Omar Seed in the last 72 hours. Urinalysis:      Lab Results   Component Value Date    NITRU Negative 04/10/2022    WBCUA 0-2 04/10/2022    BACTERIA 2+ 04/09/2022    RBCUA 0-2 04/10/2022    BLOODU Negative 04/10/2022    SPECGRAV 1.025 04/10/2022    GLUCOSEU Negative 04/10/2022       Radiology:  VL Extremity Venous Left   Final Result      XR CHEST PORTABLE   Final Result   Status post removal of the right-sided dialysis catheter and placement of a   dialysis catheter on the left in good position. Mild cardiomegaly and mild central pulmonary congestion which is more   prominent. Hazy bibasilar opacities and small bibasilar pleural effusions which is   prominent                 Assessment/Plan:    Active Hospital Problems    Diagnosis Date Noted    Hyperkalemia [E87.5] 06/11/2022     Priority: Medium       Hospital course: 74 y. o. male with a history of multiple medical problems who presented to [facility] [place] with hyperkalemia after not being able to attend his HD session today d/t the facility being closed d/t staff shortages. Pleasant gentleman, a bit disoriented, significantly fluid overloaded requiring O2. Potassium elevated to 6.3, received 1 dose lokelma. Nephrology consulted, getting urgent dialysis to correct fluid overload and hyperkalemia.      Plan:     lue DVT  - venous doppler with an acute dvt of the ij and radial veins  - started on heparin gtt  - change to Eliquis     ESRD on HD TTS - HD center closed, could not get HD  - Nephrology consulted, urgent dialysis  - volume overloaded but improving with hd. Will get hd today     Hyperkalemia - improved  - s/p Lokelma, urgent HD     Acute hypoxemic respiratory failure - improving  Cardiorenal syndrome   - d/t fluid overload     Recent line infection  - Cefepime 2g with HD through 6/17     Positive Covid test  - rapid test positive, PCR pending  - asymptomatic     Chronic conditions - continue home meds unless otherwise stated  Anemia  S/d chf  Dm  htn    DVT Prophylaxis: Eliquis  Diet: ADULT DIET;  Regular; Low Potassium (Less than 3000 mg/day)  Code Status: Full Code    PT/OT Eval Status: SNF    Dispo - [facility]; medically ready, waiting placement    Fanny Powell MD

## 2022-06-16 NOTE — CARE COORDINATION
[address] VIA [facility] ACCESS PORTAL    REQUESTED SETTIN[address] [place] [address]    ANTICIPATED ADMIT DATE TO SNF:  2022    [facility] AT [place] #:  [id number]    Omar Brumfield Sr.  Administrative Assist, Case Management  [phone]   Electronically signed by Omar Brumfield on 2022 at 2:17 PM

## 2022-06-16 NOTE — PROGRESS NOTES
[facility]                                               Progress note    CC: hyperkalemia, arm swelling. Summary:   Zuleima Jimenez is being seen by nephrology for ESRD and hyperkalemia. Patient did not get his usual dialysis today as his clinic closed due to staffing issues. He presented to the emergency room with hyperkalemia. Has been having arm swelling for the past week per his family. Has edema on exam. Has some SOB. Very weak and frail. No NVD. No chest pain. S/p 1 dose lokelma on admit   K 6.3 on admission. Had dialysis on sat night. Last dialysis was on Thursday PTA    Interval History  Seen on dialysis  BP at goal.  Has a DVT in the left arm, on eliquis now. Plan:   - iHD today per schedule. - continue midodrine to 10 mg TID today. - ok to discharge from nephrology perspective after dialysis      Sindy López MD  [facility]  Office: [phone]    Assessment:     ESRD  HD on TTS at [facility]  Access: [facility]       Volume overload. CHF     Hypoxia  Has HF and volume overload.      Severe hyperkalemia  K 6.3       Recent line infection   Should be on cefepime 2 g with HD until 6/17      ROS:   [address]. All other remaining systems are negative.     Constitutional:  fever, chills, weakness, weight change, fatigue,      Skin:  rash, pruritus, hair loss, bruising, dry skin, petechiae. Head, Face, Neck   headaches, swelling,  cervical adenopathy.     Respiratory: shortness of breath, cough, or wheezing  Cardiovascular: chest pain, palpitations, dizzy, edema  Gastrointestinal: nausea, vomiting, diarrhea, constipation,belly pain    Yellow skin, blood in stool  Musculoskeletal:  back pain, muscle weakness, gait problems,       joint pain or swelling. Genitourinary:  dysuria, poor urine flow, flank pain, blood in urine  Neurologic:  vertigo, TIA'S, syncope, seizures, focal weakness  Psychosocial:  insomnia, anxiety, or depression.   Additional positive findings: -     PMH: Past medical history, surgical history, social history, family history are reviewed and updated as appropriate. Reviewed current medication list.   Allergies reviewed and updated as needed. PE:   Vitals:    06/16/22 0732   BP: 113/78   Pulse: 88   Resp: 18   Temp: 97.7 °F (36.5 °C)   SpO2:        General appearance:  in NAD, fully alert and oriented. Comfortable. HEENT: EOM intact, no icterus. Trachea is midline. Neck : No mass, appears symmetrical, + JVD   Respiratory: Respiratory effort appears normal, no wheeze, no crackles  Cardiovascular: Ausculation- No M/R/G, +edema  Abdomen: No visible mass or tenderness, or scar, No hepatosplenomegaly  Musculoskeletal: left arm swollen compared to right  Skin:no rashes, ulcers, induration, no jaundice. Neuro: face symmetric, no focal deficits. Appropriate responses.  CN 2-12 grossly intact      Lab Results   Component Value Date    CREATININE 4.3 (H) 06/16/2022    BUN 48 (H) 06/16/2022     06/16/2022    K 4.5 06/16/2022    CL 99 06/16/2022    CO2 21 06/16/2022      Lab Results   Component Value Date    WBC 3.9 (L) 06/16/2022    HGB 9.0 (L) 06/16/2022    HCT 28.0 (L) 06/16/2022    .8 (H) 06/16/2022     06/16/2022     Lab Results   Component Value Date    .8 (H) 04/07/2022    CALCIUM 8.3 06/16/2022    PHOS 5.7 (H) 06/16/2022

## 2022-06-17 NOTE — PROGRESS NOTES
Report called to DON at [address]. Informed that transport was currently here to p/u pt. All questions answered. DON denied any further questions. Pt discharged at [phone].

## 2022-06-17 NOTE — PLAN OF CARE
Problem: Discharge Planning  Goal: Discharge to home or other facility with appropriate resources  6/17/2022 1059 by Manuel Ramirez RN  Outcome: Adequate for Discharge  6/17/2022 0119 by Kelvin Mendosa RN  Outcome: Progressing     Problem: Pain  Goal: Verbalizes/displays adequate comfort level or baseline comfort level  6/17/2022 1059 by Manuel Ramirez RN  Outcome: Adequate for Discharge  6/17/2022 0119 by Kelvin Menodsa RN  Outcome: Progressing     Problem: Skin/Tissue Integrity  Goal: Absence of new skin breakdown  Description: 1. Monitor for areas of redness and/or skin breakdown  2. Assess vascular access sites hourly  3. Every 4-6 hours minimum:  Change oxygen saturation probe site  4. Every 4-6 hours:  If on nasal continuous positive airway pressure, respiratory therapy assess nares and determine need for appliance change or resting period.   6/17/2022 1059 by Manuel Ramirez RN  Outcome: Adequate for Discharge  6/17/2022 0119 by Kelvin Mendosa RN  Outcome: Progressing     Problem: ABCDS Injury Assessment  Goal: Absence of physical injury  6/17/2022 1059 by Manuel Ramirez RN  Outcome: Adequate for Discharge  6/17/2022 0119 by Kelvin Mendosa RN  Outcome: Progressing  Flowsheets (Taken 6/17/2022 0000)  Absence of Physical Injury: Implement safety measures based on patient assessment     Problem: Safety - Adult  Goal: Free from fall injury  6/17/2022 1059 by Manuel Ramirez RN  Outcome: Adequate for Discharge  6/17/2022 0119 by Kelvin Mendosa RN  Outcome: Progressing  Flowsheets (Taken 6/17/2022 0000)  Free From Fall Injury: Instruct family/caregiver on patient safety     Problem: Chronic Conditions and Co-morbidities  Goal: Patient's chronic conditions and co-morbidity symptoms are monitored and maintained or improved  6/17/2022 1059 by Manuel Ramirez RN  Outcome: Adequate for Discharge  6/17/2022 0119 by Kelvin Mendosa RN  Outcome: Progressing

## 2022-06-17 NOTE — CARE COORDINATION
[facility] authorization received via portal:    Payor approval ID:  [id number]    Josep Approval ID:  [id number]    Service - Location: [address] [place] [address]    Dates Approved:  06/16/2022-06/20/2022    NRD:  6/20/2022    Omar Brumfield Sr.  Administrative Assist, Case Management  [phone]  Electronically signed by Omar Brumfield on 6/17/2022 at 6:03 AM

## 2022-06-17 NOTE — CARE COORDINATION
Discharge order noted. SW placed phone call to admissions at [facility] as his test for COVID was positive. It was our understanding that they do not accept newly [place] diagnosed patients. We are waiting on a call back from Marcelino. Respectfully submitted,    TYLOR Chavez  [facility]   [phone]    Electronically signed by TYLOR Tuttle on 6/17/2022 at 8:01 AM

## 2022-06-17 NOTE — PROGRESS NOTES
[facility]                                               Progress note    CC: hyperkalemia, arm swelling. Summary:   Ian Mackey is being seen by nephrology for ESRD and hyperkalemia. Patient did not get his usual dialysis today as his clinic closed due to staffing issues. He presented to the emergency room with hyperkalemia. Has been having arm swelling for the past week per his family. Has edema on exam. Has some SOB. Very weak and frail. No NVD. No chest pain. S/p 1 dose lokelma on admit   K 6.3 on admission. Had dialysis on sat night. Last dialysis was on Thursday PTA    Interval History  Seen at bedside  Was supposed to be discharged back to [facility] yesterday but tested positive for COVID on pre-discharge testing        Plan:   - no acute indication for RRT today  - plan for HD tomorrow per schedule. Carmen Briggs MD  [facility]ph[facility]  Office: [phone]    Assessment:     ESRD  HD on TTS at [facility]  Access: [address]       Volume overload. CHF     Hypoxia  Has HF and volume overload.      Severe hyperkalemia  K 6.3       Recent line infection   Should be on cefepime 2 g with HD until 6/17      ROS:   [address]. All other remaining systems are negative.     Constitutional:  fever, chills, weakness, weight change, fatigue,      Skin:  rash, pruritus, hair loss, bruising, dry skin, petechiae. Head, Face, Neck   headaches, swelling,  cervical adenopathy.     Respiratory: shortness of breath, cough, or wheezing  Cardiovascular: chest pain, palpitations, dizzy, edema  Gastrointestinal: nausea, vomiting, diarrhea, constipation,belly pain    Yellow skin, blood in stool  Musculoskeletal:  back pain, muscle weakness, gait problems,       joint pain or swelling. Genitourinary:  dysuria, poor urine flow, flank pain, blood in urine  Neurologic:  vertigo, TIA'S, syncope, seizures, focal weakness  Psychosocial:  insomnia, anxiety, or depression.   Additional positive findings: -     PMH:   Past medical history, surgical history, social history, family history are reviewed and updated as appropriate. Reviewed current medication list.   Allergies reviewed and updated as needed. PE:   Vitals:    06/17/22 0758   BP: 97/68   Pulse: 85   Resp: 16   Temp: 97.7 °F (36.5 °C)   SpO2: 100%       General appearance:  in NAD, fully alert and oriented. Comfortable. HEENT: EOM intact, no icterus. Trachea is midline. Neck : No mass, appears symmetrical, + JVD   Respiratory: Respiratory effort appears normal, no wheeze, no crackles  Cardiovascular: Ausculation- No M/R/G, +edema  Abdomen: No visible mass or tenderness, or scar, No hepatosplenomegaly  Musculoskeletal: left arm swollen compared to right  Skin:no rashes, ulcers, induration, no jaundice. Neuro: face symmetric, no focal deficits. Appropriate responses.  CN 2-12 grossly intact      Lab Results   Component Value Date    CREATININE 4.3 (H) 06/16/2022    BUN 48 (H) 06/16/2022     06/16/2022    K 4.5 06/16/2022    CL 99 06/16/2022    CO2 21 06/16/2022      Lab Results   Component Value Date    WBC 3.5 (L) 06/17/2022    HGB 8.8 (L) 06/17/2022    HCT 27.1 (L) 06/17/2022    .7 (H) 06/17/2022     06/17/2022     Lab Results   Component Value Date    .8 (H) 04/07/2022    CALCIUM 8.3 06/16/2022    PHOS 5.7 (H) 06/16/2022

## 2022-06-17 NOTE — CARE COORDINATION
Per conversation with Dr. Gerhardt Search, for placement reasons & transportation to HD it is ok for pt to miss HD on Saturday but he MUST have HD on Monday. Preston Shepard at [address] [place] [address] aware.     Naheed Hicks RN, BSN,   [phone]  Electronically signed by Naheed Hicks RN on 6/17/2022 at 12:50 PM

## 2022-06-17 NOTE — FLOWSHEET NOTE
Ambulance service here to transfer pt to [address] [place] [address]. Pt is Covid +. Spoke with Minnie Hutchinson RN at facility and she gave ok to send him back. She will meet the ambulance service at their front door and will advise where to take him. AVS paperwork sent with pt. Daron Drake RN, to call report to facility. Facility # [phone].

## 2022-06-17 NOTE — PLAN OF CARE
Problem: Discharge Planning  Goal: Discharge to home or other facility with appropriate resources  6/17/2022 0119 by Yayo Ng RN  Outcome: Progressing  6/16/2022 1318 by Amrita Law RN  Outcome: Adequate for Discharge     Problem: Pain  Goal: Verbalizes/displays adequate comfort level or baseline comfort level  6/17/2022 0119 by Yayo Ng RN  Outcome: Progressing  6/16/2022 1318 by Amrita Law RN  Outcome: Adequate for Discharge     Problem: Skin/Tissue Integrity  Goal: Absence of new skin breakdown  Description: 1. Monitor for areas of redness and/or skin breakdown  2. Assess vascular access sites hourly  3. Every 4-6 hours minimum:  Change oxygen saturation probe site  4. Every 4-6 hours:  If on nasal continuous positive airway pressure, respiratory therapy assess nares and determine need for appliance change or resting period.   6/17/2022 0119 by Yayo Ng RN  Outcome: Progressing  6/16/2022 1318 by Amrita Law RN  Outcome: Adequate for Discharge     Problem: ABCDS Injury Assessment  Goal: Absence of physical injury  6/17/2022 0119 by Yayo Ng RN  Outcome: Progressing  Flowsheets (Taken 6/17/2022 0000)  Absence of Physical Injury: Implement safety measures based on patient assessment  6/16/2022 1318 by Amrita Law RN  Outcome: Adequate for Discharge     Problem: Safety - Adult  Goal: Free from fall injury  6/17/2022 0119 by Yayo Ng RN  Outcome: Progressing  Flowsheets (Taken 6/17/2022 0000)  Free From Fall Injury: Instruct family/caregiver on patient safety  6/16/2022 1318 by Amrita Law RN  Outcome: Adequate for Discharge     Problem: Chronic Conditions and Co-morbidities  Goal: Patient's chronic conditions and co-morbidity symptoms are monitored and maintained or improved  6/17/2022 0119 by Yayo Ng RN  Outcome: Progressing  6/16/2022 1318 by Amrita Law RN  Outcome: Adequate for Discharge

## 2022-06-18 NOTE — DISCHARGE SUMMARY
Hospital Medicine Discharge Summary    Patient ID: Nicole Morrison      Patient's PCP: Mora Sahu DO    Admit Date: 6/11/2022     Discharge Date: 6/17/2022      Admitting Physician: Chago Jeffery MD     Discharge Physician: Jyotsna Domingo MD     Discharge Diagnoses: Active Hospital Problems    Diagnosis Date Noted    Hyperkalemia [E87.5] 06/11/2022     Priority: Medium       The patient was seen and examined on day of discharge and this discharge summary is in conjunction with any daily progress note from day of discharge. Hospital Course:        Hospital course: 74 y. o. male with a history of multiple medical problems who presented to [facility] with hyperkalemia after not being able to attend his HD session today d/t the facility being closed d/t staff shortages. Pleasant gentleman, a bit disoriented, significantly fluid overloaded requiring O2. Potassium elevated to 6.3, received 1 dose lokelma. Nephrology consulted, getting urgent dialysis to correct fluid overload and hyperkalemia.      Plan:     oswald DVT  - venous doppler with an acute dvt of the ij and radial veins  - started on heparin gtt  - change to Eliquis     ESRD on HD TTS - HD center closed, could not get HD  - Nephrology consulted, urgent dialysis  - volume overloaded but improving with hd.  Will get hd today     Hyperkalemia - improved  - s/p Lokelma, urgent HD     Acute hypoxemic respiratory failure - improving  Cardiorenal syndrome   - d/t fluid overload     Recent line infection  - Cefepime 2g with HD through 6/17      Positive Covid test  - rapid test positive, PCR pending  - asymptomatic     Chronic conditions - continue home meds unless otherwise stated  Anemia  S/d chf  Dm  htn         Exam:     BP 94/62   Pulse 87   Temp 98.1 °F (36.7 °C) (Oral)   Resp 16   Ht 5' 6\" (1.676 m)   Wt 143 lb 15.4 oz (65.3 kg)   SpO2 97%   BMI 23.24 kg/m²     General appearance: No apparent distress, appears stated age and cooperative. HEENT: Pupils equal, round, and reactive to light. Conjunctivae/corneas clear. Neck: Supple, with full range of motion. No jugular venous distention. Trachea midline. Respiratory:  Normal respiratory effort. Clear to auscultation, bilaterally without Rales/Wheezes/Rhonchi. Cardiovascular: Regular rate and rhythm with normal S1/S2 without murmurs, rubs or gallops. Abdomen: Soft, non-tender, non-distended with normal bowel sounds. Musculoskelatal: No clubbing, cyanosis or edema bilaterally. Full range of motion without deformity. Skin: Skin color, texture, turgor normal.  No rashes or lesions. Neurologic:  Neurovascularly intact without any focal sensory/motor deficits. Cranial nerves: II-XII intact, grossly non-focal.  Psychiatric: Alert and oriented, thought content appropriate, normal insight      Consults:     IP CONSULT TO NEPHROLOGY    Significant Diagnostic Studies:       Radiology:  VL Extremity Venous Left   Final Result      Impressions       Left Impression   Technically difficult due to patient state (unable to participate in exam). Acute partially occluded deep vein thrombosis involving the left internal   jugular vein and a left radial vein from zones 6-7. No other evidence of deep vein thrombosis or superficial vein thrombosis of   the left upper extremity or right internal jugular vein and subclavian vein,   however right internal jugular vein was unable to be compressed (no thrombus   seen; cine loops included). Incidental note of pulsatile flow throughout.       Conclusions        Summary        Acute partially occluded deep vein thrombosis involving the left internal    jugular vein and a left radial vein.         XR CHEST PORTABLE   Final Result   Status post removal of the right-sided dialysis catheter and placement of a   dialysis catheter on the left in good position.       Mild cardiomegaly and mild central pulmonary congestion which is more   prominent.       Hazy bibasilar opacities and small bibasilar pleural effusions which is   prominent                 PCP/SNF to follow up: Can stop anticoagulation after 6 months when DVT treated    Disposition:  SNF    Condition on D/C:  Stable    Discharge Instructions/Follow-up: Follow-up with PCP within 1 week    Code Status:  Prior     Activity: activity as tolerated    Diet: renal diet    Labs: For convenience and continuity at follow-up the following most recent labs are provided:      CBC:    Lab Results   Component Value Date    WBC 3.5 06/17/2022    HGB 8.8 06/17/2022    HCT 27.1 06/17/2022     06/17/2022       Renal:    Lab Results   Component Value Date     06/16/2022    K 4.5 06/16/2022    K 6.3 06/11/2022    CL 99 06/16/2022    CO2 21 06/16/2022    BUN 48 06/16/2022    CREATININE 4.3 06/16/2022    CALCIUM 8.3 06/16/2022    PHOS 5.7 06/16/2022       Discharge Medications:     Discharge Medication List as of 6/17/2022 12:48 PM           Details   !! apixaban (ELIQUIS) 5 MG TABS tablet Take 2 tablets by mouth 2 times daily for 10 doses, Disp-20 tablet, R-0Print      !! apixaban (ELIQUIS) 5 MG TABS tablet Take 1 tablet by mouth 2 times daily, Disp-180 tablet, R-1Print       !! - Potential duplicate medications found. Please discuss with provider.            Details   midodrine (PROAMATINE) 10 MG tablet Take 1 tablet by mouth 3 times daily (with meals), Disp-90 tablet, R-0Print              Details   metoprolol succinate (TOPROL XL) 25 MG extended release tablet Take 0.5 tablets by mouth daily, Disp-30 tablet, R-3Normal      clopidogrel (PLAVIX) 75 MG tablet Take 75 mg by mouth dailyHistorical Med      glucose monitoring (FREESTYLE FREEDOM) kit DAILY Starting Fri 5/6/2022, Disp-1 kit, R-0, Normal      levothyroxine (SYNTHROID) 50 MCG tablet Take 1 tablet by mouth every morning (before breakfast), Disp-30 tablet, R-3Normal      pantoprazole (PROTONIX) 40 MG tablet Take 1 tablet by mouth 2 times daily (before meals), Disp-30 tablet, R-3Normal      allopurinol (ZYLOPRIM) 100 MG tablet Take 1 tablet by mouth daily, Disp-30 tablet, R-3Normal      Multiple Vitamins-Minerals (THERAPEUTIC MULTIVITAMIN-MINERALS) tablet Take 1 tablet by mouth dailyHistorical Med      atorvastatin (LIPITOR) 80 MG tablet Take 80 mg by mouth daily Historical Med             Time Spent on discharge is more than 30 minutes in the examination, evaluation, counseling and review of medications and discharge plan. Signed: Dakota Oh MD   6/17/2022      Thank you Treasure Mandel DO for the opportunity to be involved in this patient's care. If you have any questions or concerns please feel free to contact me at [phone].

## 2022-06-18 NOTE — PROGRESS NOTES
Physician Progress Note      Alex Meza  CSN #:                  [id number]  :                       1955  ADMIT DATE:       2022 1:44 PM  [address] [place] DATE:        2022 12:55 PM  RESPONDING  PROVIDER #:        Carlos Beckham MD          QUERY TEXT:    Pt admitted with fluid overload . Pt noted to have hx of CHF . If possible,   please document in the progress notes and discharge summary if you are   evaluating and/or treating any of the following: The medical record reflects the following:  Risk Factors: esrd, chf,htn  Clinical Indicators: Presents to the emergency department secondary to concern   for having missed dialysis. Documentation of hx of CHF. Per Nephrology-he is   very volume overloaded. 9-10 kilos above his dry weight. CXR-Mild   cardiomegaly and mild central pulmonary congestion which is more  prominent. Hazy bibasilar opacities and small bibasilar pleural effusions which is    prominent  Treatment: emergent dialysis, Nephrology consult, monitor labs    Thank you, Sophie Teresa RN CDS CRCR CCDCHARLES [email or web address]  Options provided:  -- Fluid overload  due to heart failure  -- Noncardiogenic fluid overload  due to ESRD  -- Other - I will add my own diagnosis  -- Disagree - Not applicable / Not valid  -- Disagree - Clinically unable to determine / Unknown  -- Refer to Clinical Documentation Reviewer    PROVIDER RESPONSE TEXT:    This patient has noncardiogenic fluid overload due to ESRD . Query created by:  Bethanie Teresa on 2022 2:38 PM      Electronically signed by:  Carlos Beckham MD 2022 7:52 PM

## 2022-06-24 PROBLEM — E87.70 VOLUME OVERLOAD: Status: ACTIVE | Noted: 2022-01-01

## 2022-06-24 NOTE — ED PROVIDER NOTES
ED Attending Attestation Note     Date of evaluation: 6/24/2022    This patient was seen by the advanced practice provider. I have seen and examined the patient, agree with the workup, evaluation, management and diagnosis. The care plan has been discussed. I have reviewed the ECG and concur with the MITCH's interpretation. My assessment reveals a gentleman with ESRD on dialysis who did not receive dialysis 2/2 hypotension who is asymptomatic and free of hypotension here. He denies CP, neck pain, arm pain, jaw pain, nausea, diaphoresis. Vitals:    06/24/22 1252   BP: 101/77   Pulse: 88   Resp: 25   Temp: 97.4 °F (36.3 °C)   TempSrc: Oral   SpO2: 97%   Height: 5' 6\" (1.676 m)       General:  Well appearing. No acute distress. Non-toxic appearing    Eyes:  Pupils equally round, reactive, brisk. No discharge from eyes. ENT:  No discharge from nose. OP clear. Neck:  Supple. Nontender. Pulmonary:   Non-labored breathing. Breath sounds clear bilaterally. Cardiac:  Regular  Rhythm. Normal rate. No murmurs. Abdomen:  Soft. Non-tender throughout. Mildly distended without tension. No masses. Musculoskeletal:  No long bone deformity. No ankle or wrist deformity. Vascular:  Extremities warm and perfused. Skin:  No rash. Warm. Neuro: Alert and oriented x 3. CN II-XII grossly intact. Speech and mentation normal.    MANI  Sensation grossly intact to light touch. Extremities:  1+ pitting peripheral edema. LE symmetric. No palpable cords. Reported hypotension more likely due to HF but cannot exclude SBP given appearance of abdomen clinically. Will plan for IR guided paracentesis if able.      Marlena Maciel MD  06/24/22 [id number]

## 2022-06-24 NOTE — ED TRIAGE NOTES
Pt BIB EMS from SNF with concerns for hypotension and lethargy. Per EMS report pt was about to start dialysis treatment, however, when the nurse checked his BP it was hypotension. Pt was also reportedly lethargic. [place] Stands pt currently A+O to person, place, and year, however, unsure of exact date and reason for coming to the hospital er to be seen. Denies pain at this time. Placed on cardiac monitor. VSS.

## 2022-06-24 NOTE — PLAN OF CARE
Upper abdominal ultrasound limited limited  Single organ, abdominal ascites    INDICATIONS: Rule out subacute bacterial peritonitis    Patient arrived in radiology department for ultrasound paracentesis. Despite discussion and informed consent, the patient would not sign consent. IMPRESSION:  1. Abdominal ascites, free fluid.   2. Options are to hold patient's anticoagulation and performed study on elective basis or communicate alternatives with regards to abdominal paracentesis with family regarding elective diagnostic and/or therapeutic fluid removal.

## 2022-06-24 NOTE — H&P
Hospital Medicine History & Physical      PCP: Pearlie Bence, DO    Date of Admission: 6/24/2022    Date of Service: Pt seen/examined on 6/24/2022 and Admitted to Inpatient with expected LOS greater than two midnights due to medical therapy. Chief Complaint:  \" I feel tired\"      History Of Present Illness: The patient is a 74 y.o. male with medical history as below, most notable for ESRD, CHF with EF below 20%, also right-sided heart failure who presents to [facility] with hypotension prior to his dialysis session. Patient is dialyzed Monday Wednesday Friday, here after his clinic today and was noted to be hypertensive. Patient reports feeling very tired, but denies any chest pain, no abdominal pain or shortness of breath. He is currently on 4 L of oxygen. Patient had several admissions at outside facility in the past few months, most notable 1 for HD line turned infection: Cultures grew Enterobacter cloacae and, patient had line holiday and new HD catheter placed on 5/31. He completed course of cefepime mid June. Patient had another admission 6/11 through 6/17 for volume overload and was also found to have left upper extremity DVT and was started on Eliquis. On that admission patient had rapid positive COVID test on 6/19, but PCR was negative. Patient states he has severe fatigue and cough which is chronic.      Past Medical History:        Diagnosis Date    Anemia     Cardiomegaly     CHF (congestive heart failure) ([address]     Cirrhosis ([address]     Diabetes type 2, uncontrolled ([address] 7/28/2014    Diabetic nephropathy 9/12/2013    ED (erectile dysfunction) 9/12/2013    Essential hypertension, benign 9/12/2013    Hemodialysis access, fistula mature ([address]     Hyperlipidemia 9/12/2013    Noncompliance 7/28/2014    Respiratory failure (HCC)     Tachycardia        Past Surgical History:        Procedure Laterality Date    CARDIAC DEFIBRILLATOR PLACEMENT      EYE SURGERY      IR NONTUNNELED VASCULAR CATHETER  04/04/2022    IR NONTUNNELED VASCULAR CATHETER 4/4/2022 [facility]    IR NONTUNNELED VASCULAR CATHETER Left 05/26/2022    Vascath; LIJ access; 20cm; Dr. Mark Parekh - removed 5/31/22    IR NONTUNNELED VASCULAR CATHETER  05/26/2022    IR NONTUNNELED VASCULAR CATHETER 5/26/2022 [facility]    IR TUNNELED CATHETER PLACEMENT GREATER THAN 5 YEARS  04/08/2022    IR TUNNELED CATHETER PLACEMENT GREATER THAN 5 YEARS 4/8/2022 [facility]    IR TUNNELED CATHETER PLACEMENT GREATER THAN 5 YEARS Left 05/31/2022    Permacath; LIJ access; 23cm; Dr. Sullivan Ax IR TUNNELED [address] 5 YEARS  5/31/2022    IR TUNNELED CATHETER PLACEMENT GREATER THAN 5 YEARS 5/31/2022 [facility] PROC[facility]       Medications Prior to Admission:    Prior to Admission medications    Medication Sig Start Date End Date Taking?  Authorizing Provider   apixaban (ELIQUIS) 5 MG TABS tablet Take 2 tablets by mouth 2 times daily for 10 doses 6/16/22 6/21/22  Ronald Herrera MD   midodrine (PROAMATINE) 10 MG tablet Take 1 tablet by mouth 3 times daily (with meals) 6/16/22   Ronald Herrera MD   apixaban Yany Boop) 5 MG TABS tablet Take 1 tablet by mouth 2 times daily 6/21/22   Ronald Herrera MD   metoprolol succinate (TOPROL XL) 25 MG extended release tablet Take 0.5 tablets by mouth daily 6/3/22   Vivian Larson MD   clopidogrel (PLAVIX) 75 MG tablet Take 75 mg by mouth daily    Historical Provider, MD   glucose monitoring (FREESTYLE FREEDOM) kit 1 kit by Does not apply route daily 5/6/22   Sulma Galdamez DO   levothyroxine (SYNTHROID) 50 MCG tablet Take 1 tablet by mouth every morning (before breakfast) 5/4/22   Bronson Knowles MD   pantoprazole (PROTONIX) 40 MG tablet Take 1 tablet by mouth 2 times daily (before meals) 4/13/22   Angelia Mann MD   allopurinol (ZYLOPRIM) 100 MG tablet Take 1 tablet by mouth daily 11/30/21   Ronald Herrera MD   Multiple Vitamins-Minerals (THERAPEUTIC MULTIVITAMIN-MINERALS) tablet Take 1 tablet by mouth daily    Historical Provider, MD   atorvastatin (LIPITOR) 80 MG tablet Take 80 mg by mouth daily  1/8/19   Historical Provider, MD       Allergies:  Patient has no known allergies. Social History:  The patient [facility] at [facility]    TOBACCO:   reports that he quit smoking about 41 years ago. He has a 10.50 pack-year smoking history. He has never used smokeless tobacco.  ETOH:   reports current alcohol use of about 1.0 standard drink of alcohol per week. Family History:  Reviewed in detail and Positive as follows:        Problem Relation Age of Onset    Diabetes Mother     High Blood Pressure Mother     Diabetes Sister     Diabetes Brother        REVIEW OF SYSTEMS:   Positive and negative as noted in the HPI. All other systems reviewed and negative. PHYSICAL EXAM:    /77   Pulse 88   Temp 97.4 °F (36.3 °C) (Oral)   Resp 25   Ht 5' 6\" (1.676 m)   SpO2 97%   BMI 23.24 kg/m²     General appearance: mild distress, toxic appearing and cooperative. HEENT Normal cephalic, atraumatic without obvious deformity. Pupils equal, round, and reactive to light. Extra ocular muscles intact. Conjunctivae/corneas clear. Neck: Supple, No jugular venous distention/bruits. Trachea midline without thyromegaly or adenopathy with full range of motion. Lungs: diminshed. Heart: Regular rate and rhythm with Normal S1/S2 with syst murmur  Abdomen: Soft, non-tender or non-distended without rigidity or guarding and positive bowel sounds all four quadrants. Extremities: No clubbing, cyanosis, 2+ pitting leg edema bilaterally. Skin: Skin color, texture, turgor normal.  No rashes or lesions. Neurologic: Alert and oriented X 3, grossly non-focal.  Mental status: Alert, oriented, thought content appropriate. Capillary refill is brisk  Peripheral pulses 2+    CXR:  I have reviewed the CXR with the following interpretation:   1. Severe cardiac enlargement, unchanged   3. Pulmonary bilateral diffuse groundglass airspace disease is redemonstrated with no significant interval changes.           EKG:  I have reviewed the EKG with the following interpretation: TW in lateral leads, present on previous EKG    CBC   Recent Labs     06/24/22  1423   WBC 4.6   HGB 9.0*   HCT 28.3*         RENAL  Recent Labs     06/24/22  1339   *   K 5.1   CL 97*   CO2 22   BUN 44*   CREATININE 4.1*     LFT'S  Recent Labs     06/24/22  1339   AST 44*   ALT 26   BILIDIR 1.5*   BILITOT 2.6*   ALKPHOS 192*     COAG  Recent Labs     06/24/22  1339   INR 1.76*     CARDIAC ENZYMES  Recent Labs     06/24/22  1339   TROPONINI 0.28*       U/A:    Lab Results   Component Value Date    COLORU Yellow 04/10/2022    WBCUA 0-2 04/10/2022    RBCUA 0-2 04/10/2022    MUCUS Rare 04/09/2022    BACTERIA 2+ 04/09/2022    CLARITYU Clear 04/10/2022    SPECGRAV 1.025 04/10/2022    LEUKOCYTESUR Negative 04/10/2022    BLOODU Negative 04/10/2022    GLUCOSEU Negative 04/10/2022       ABG    Lab Results   Component Value Date    CKS9BLJ 19.7 05/22/2022    BEART -5.2 05/22/2022    S9NQZLHI 96.9 05/22/2022    PHART 7.362 05/22/2022    CSA1SBA 34.7 05/22/2022    PO2ART 87.5 05/22/2022    IDJ5SRN 20.7 05/22/2022           Active Hospital Problems    Diagnosis Date Noted    Volume overload [E87.70] 06/24/2022     Priority: Medium         ASSESSMENT/PLAN:    Hypotension prior to dialysis:   We will rule out sepsis-obtain blood cultures in view of recent line infection  Check procalcitonin  Would empirically treat with meropenem and vancomycin and stop if cultures are negative    Elevated troponin:  Cardiology was consulted, advised to trend troponin  Elevation is likely related to his volume overload and severe CHF    Questionable COVID infection:  Had positive rapid test on 19, PCR was negative on same date  Will place on isolation for now and repeat COVID-19 PCR  He does have groundglass opacities on x-ray but also in volume overload    Acute on chronic systolic CHF:  EF below 69% based on ECHO from March  Fluid removal with dialysis  At this point blood pressure is not tolerating heart failure directed therapy    ESRD on HD:  Nephrology consulted for dialysis    Recent left upper extremity DVT:  We will resume Eliquis    DVT Prophylaxis: eliquis  Diet: No diet orders on file  Code Status: Prior  PT/OT Eval Status: pending    Porter Killian MD    Thank you Emily Vanegas DO for the opportunity to be involved in this patient's care. If you have any questions or concerns please feel free to contact me at [phone].

## 2022-06-24 NOTE — ED PROVIDER NOTES
[address] [facility]          PHYSICIAN ASSISTANT NOTE       Date of evaluation: 6/24/2022    Chief Complaint     Fatigue and Hypotension    History of Present Illness     Temitope Art is a 74 y.o. male past medical history of CHF, cirrhosis, diabetes, end-stage renal disease on dialysis Monday, Wednesday and Friday presenting to the emergency department for evaluation of hypotension. Patient was about to start dialysis today when they noticed that he was slightly lethargic, with systolic blood pressures in the 90s. Patient did not receive dialysis today. He currently has no concerns, has no pain, does not feel short of breath although is on supplemental oxygen which he does not normally wear. No associated chest pain, no nausea or vomiting, no fevers or chills. He has a central dialysis catheter that is nonpainful. Review of Systems     Review of Systems   Constitutional: Negative for chills, diaphoresis, fatigue and fever. HENT: Negative for congestion. Respiratory: Negative for chest tightness and shortness of breath. Cardiovascular: Positive for leg swelling. Negative for chest pain and palpitations. Gastrointestinal: Negative for abdominal pain, nausea and vomiting. Neurological: Negative for dizziness, weakness and headaches. Psychiatric/Behavioral: Negative for confusion. Past Medical, Surgical, Family, and Social History     He has a past medical history of Anemia, Cardiomegaly, CHF (congestive heart failure) ([address] Cirrhosis ([address] Diabetes type 2, uncontrolled ([address] Diabetic nephropathy, ED (erectile dysfunction), Essential hypertension, benign, Hemodialysis access, fistula mature ([address] Hyperlipidemia, Noncompliance, Respiratory failure ([address] and Tachycardia. He has a past surgical history that includes eye surgery; IR NONTUNNELED VASCULAR CATHETER > 5 YEARS (04/04/2022); IR TUNNELED CVC PLACE WO SQ PORT/PUMP > 5 YEARS (04/08/2022);  IR NONTUNNELED VASCULAR CATHETER > 5 YEARS (Left, 05/26/2022); IR NONTUNNELED VASCULAR CATHETER > 5 YEARS (05/26/2022); IR TUNNELED CVC PLACE WO SQ PORT/PUMP > 5 YEARS (Left, 05/31/2022); IR TUNNELED CVC PLACE WO SQ PORT/PUMP > 5 YEARS (5/31/2022); and Cardiac defibrillator placement. His family history includes Diabetes in his brother, mother, and sister; High Blood Pressure in his mother. He reports that he quit smoking about 41 years ago. He has a 10.50 pack-year smoking history. He has never used smokeless tobacco. He reports current alcohol use of about 1.0 standard drink of alcohol per week. He reports that he does not use drugs. Medications     Previous Medications    ALLOPURINOL (ZYLOPRIM) 100 MG TABLET    Take 1 tablet by mouth daily    APIXABAN (ELIQUIS) 5 MG TABS TABLET    Take 2 tablets by mouth 2 times daily for 10 doses    APIXABAN (ELIQUIS) 5 MG TABS TABLET    Take 1 tablet by mouth 2 times daily    ATORVASTATIN (LIPITOR) 80 MG TABLET    Take 80 mg by mouth daily     CLOPIDOGREL (PLAVIX) 75 MG TABLET    Take 75 mg by mouth daily    GLUCOSE MONITORING (FREESTYLE FREEDOM) KIT    1 kit by Does not apply route daily    LEVOTHYROXINE (SYNTHROID) 50 MCG TABLET    Take 1 tablet by mouth every morning (before breakfast)    METOPROLOL SUCCINATE (TOPROL XL) 25 MG EXTENDED RELEASE TABLET    Take 0.5 tablets by mouth daily    MIDODRINE (PROAMATINE) 10 MG TABLET    Take 1 tablet by mouth 3 times daily (with meals)    MULTIPLE VITAMINS-MINERALS (THERAPEUTIC MULTIVITAMIN-MINERALS) TABLET    Take 1 tablet by mouth daily    PANTOPRAZOLE (PROTONIX) 40 MG TABLET    Take 1 tablet by mouth 2 times daily (before meals)       Allergies     He has No Known Allergies. Physical Exam     INITIAL VITALS: BP: 101/77, Temp: 97.4 °F (36.3 °C), Heart Rate: 88, Resp: 25, SpO2: 97 %  Physical Exam  Vitals and nursing note reviewed. Constitutional:       General: He is not in acute distress. Appearance: Normal appearance.  He is normal weight. He is not ill-appearing, toxic-appearing or diaphoretic. HENT:      Head: Normocephalic and atraumatic. Eyes:      Extraocular Movements: Extraocular movements intact. Pupils: Pupils are equal, round, and reactive to light. Cardiovascular:      Rate and Rhythm: Normal rate and regular rhythm. Pulses: Normal pulses. Heart sounds: Normal heart sounds. No murmur heard. No friction rub. No gallop. Pulmonary:      Effort: Pulmonary effort is normal. No respiratory distress. Breath sounds: Normal breath sounds. Abdominal:      Tenderness: There is no abdominal tenderness. Comments: Diffuse abdominal distention, nontender   Musculoskeletal:         General: Normal range of motion. Right lower leg: Edema present. Left lower leg: Edema present. Comments: 2+ bilateral lower leg pitting edema   Neurological:      Mental Status: He is alert. Diagnostic Results     EKG   Interpreted in conjunction with emergency department physician No att. providers found  Rhythm: normal sinus   Rate: normal  Axis: normal  : none  Conduction: normal  ST Segments: elevation in  v3  T Waves: normal  Q Waves:none  Clinical Impression: Isolated ST segment elevation in lead V3, with no reciprocal changes new compared to most recent on 3/25/2022    EKG   Interpreted in conjunction with emergency department physician No att. providers found  -Repeat for abnormal EKG, unchanged from previous  Rhythm: normal sinus   Rate: normal  Axis: normal  : none  Conduction: normal  ST Segments: elevation in  v3  T Waves: normal  Q Waves:none  Clinical Impression: Isolated ST segment elevation in lead V3      RADIOLOGY:  US ABDOMEN LIMITED   Final Result   1. Abdominal ascites, free fluid.    2. Options are to hold patient's anticoagulation and performed study on elective basis or communicate alternatives with regards to abdominal paracentesis with family regarding elective diagnostic and/or therapeutic fluid removal.      XR CHEST PORTABLE   Final Result   1. Severe cardiac enlargement, unchanged   3. Pulmonary bilateral diffuse groundglass airspace disease is redemonstrated with no significant interval changes.           LABS:   Results for orders placed or performed during the hospital encounter of 06/24/22   CBC with Auto Differential   Result Value Ref Range    WBC 4.6 4.0 - 11.0 K/uL    RBC 2.66 (L) 4.20 - 5.90 M/uL    Hemoglobin 9.0 (L) 13.5 - 17.5 g/dL    Hematocrit 28.3 (L) 40.5 - 52.5 %    .4 (H) 80.0 - 100.0 fL    MCH 34.1 (H) 26.0 - 34.0 pg    MCHC 32.0 31.0 - 36.0 g/dL    RDW 21.1 (H) 12.4 - 15.4 %    Platelets 733 156 - 163 K/uL    MPV 9.3 5.0 - 10.5 fL    Neutrophils % 73.0 %    Lymphocytes % 12.2 %    Monocytes % 12.4 %    Eosinophils % 1.1 %    Basophils % 1.3 %    Neutrophils Absolute 3.3 1.7 - 7.7 K/uL    Lymphocytes Absolute 0.6 (L) 1.0 - 5.1 K/uL    Monocytes Absolute 0.6 0.0 - 1.3 K/uL    Eosinophils Absolute 0.0 0.0 - 0.6 K/uL    Basophils Absolute 0.1 0.0 - 0.2 K/uL   Basic Metabolic Panel w/ Reflex to MG   Result Value Ref Range    Sodium 133 (L) 136 - 145 mmol/L    Potassium reflex Magnesium 5.1 3.5 - 5.1 mmol/L    Chloride 97 (L) 99 - 110 mmol/L    CO2 22 21 - 32 mmol/L    Anion Gap 14 3 - 16    Glucose 253 (H) 70 - 99 mg/dL    BUN 44 (H) 7 - 20 mg/dL    CREATININE 4.1 (H) 0.8 - 1.3 mg/dL    GFR Non-African American 15 (A) >60    GFR  18 (A) >60    Calcium 8.8 8.3 - 10.6 mg/dL   Hepatic Function Panel   Result Value Ref Range    Total Protein 7.2 6.4 - 8.2 g/dL    Albumin 3.3 (L) 3.4 - 5.0 g/dL    Alkaline Phosphatase 192 (H) 40 - 129 U/L    ALT 26 10 - 40 U/L    AST 44 (H) 15 - 37 U/L    Total Bilirubin 2.6 (H) 0.0 - 1.0 mg/dL    Bilirubin, Direct 1.5 (H) 0.0 - 0.3 mg/dL    Bilirubin, Indirect 1.1 (H) 0.0 - 1.0 mg/dL   Troponin   Result Value Ref Range    Troponin 0.28 (H) <0.01 ng/mL   Brain Natriuretic Peptide   Result Value Ref Range    Pro-BNP >70,000 (H) 0 - 124 pg/mL   Blood gas, venous (Lab)   Result Value Ref Range    pH, Kang 7.274 (L) 7.350 - 7.450    pCO2, Kang 58.8 (H) 41.0 - 51.0 mmHg    pO2, Kang <30.0 25.0 - 40.0 mmHg    HCO3, Venous 27.3 24.0 - 28.0 mmol/L    Base Excess, Kang -0.3 -2.0 - 3.0 mmol/L    O2 Sat, Kang 36 Not established %    Carboxyhemoglobin 1.2 0.0 - 1.5 %    MetHgb, Kang 0.6 0.0 - 1.5 %    TC02 (Calc), Kang 29 mmol/L    Hemoglobin, Kang, Reduced 62.70 %   Protime-INR   Result Value Ref Range    Protime 20.5 (H) 11.7 - 14.5 sec    INR 1.76 (H) 0.87 - 1.14   EKG 12 Lead   Result Value Ref Range    Ventricular Rate 89 BPM    Atrial Rate 89 BPM    P-R Interval 160 ms    QRS Duration 98 ms    Q-T Interval 396 ms    QTc Calculation (Bazett) 481 ms    P Axis 55 degrees    R Axis 61 degrees    T Axis 13 degrees    Diagnosis       EKG performed in ER and to be interpreted by ER physician. Confirmed by MD, ER ([id number]),  Debbie Fairchild ([phone]) on 6/24/2022 2:48:02 PM       ED BEDSIDE ULTRASOUND:  No results found. RECENT VITALS:  BP: 101/77, Temp: 97.4 °F (36.3 °C), Heart Rate: 88, Resp: 25, SpO2: 97 %     Procedures   None    ED Course     Nursing Notes, Past Medical Hx,Past Surgical Hx, Social Hx, Allergies, and Family Hx were reviewed. The patient was given the following medications:  Orders Placed This Encounter   Medications    midodrine (PROAMATINE) tablet 10 mg    heparin (porcine) injection 1,000 Units     WITH HD ONLY    epoetin roseann-epbx (RETACRIT) injection 10,000 Units    0.9 % sodium chloride bolus    albumin human 25 % IV solution 25 g    midodrine (PROAMATINE) tablet 5 mg       CONSULTS:  IP CONSULT TO CARDIOLOGY  IP CONSULT TO NEPHROLOGY  IP CONSULT TO INTERVENTIONAL RADIOLOGY  IP CONSULT TO HOSPITALIST    MEDICAL DECISION MAKING / ASSESSMENT / Peyman Carie is a 74 y.o. male denting to the emergency department for evaluation of reported mild hypotension and lethargy prior to dialysis today.   He has end-stage renal disease and is on routine dialysis with central catheter that is nonpainful today. Patient was minimally contributory to history, otherwise no acute concerns. He was on 4 L of oxygen via nasal cannula, but able to titrate to 2 L of oxygen easily. Appears fluid overloaded with peripheral edema, coarse rhonchi, as well as ascites with known cirrhosis. We attempted to work the patient up for SBP, although he refused paracentesis after being taken to interventional radiology for procedure. Associated fevers or abdominal pain. He does have the capacity to make this medical decision to refuse paracentesis. Spoke with Dr. Jasen Escobar of nephrology, and patient will be dialyzed today. He is on midodrine with known hypotension in the past, which may have contributed to presentation. Otherwise he had no hyperkalemia. In addition, patient had an abnormal EKG with isolated ST segment elevation in lead V3. This was repeated and unchanged. No reciprocal changes or active chest pain. Discussed with Dr. Vishal Loaiza of interventional cardiology who reviewed EKG, and no indication for acute intervention. He does have a history of an LAD lesion that was intervened on at outside hospital.  In addition, he has troponin elevation of 0.28, will continue to monitor. He is grossly fluid overloaded with a BNP greater than 70,000 so will benefit from dialysis. Plan to discuss admission with the hospitalist.  He will be monitored in the ED until he is moved to his new treatment location. He will receive dialysis today. This patient was also evaluated by the attending physician. All care plans were discussed and agreed upon. Clinical Impression     1. Other fatigue    2. ESRD needing dialysis ([facility]      Disposition     PATIENT REFERRED TO:  No follow-up provider specified.     DISCHARGE MEDICATIONS:  New Prescriptions    No medications on file       DISPOSITION Decision To Admit 06/24/2022 04:50:06 PM Aleena Bee PA-C  06/24/22 [id number]

## 2022-06-24 NOTE — CONSULTS
Interval History and plan:      Blood pressure is stable and is afebrile. Plan:    Continue ProAmatine with dialysis and 3 times a day. Check uric acid. Check serum cortisol. I will arrange for dialysis today for volume removal.                     Assessment :            1.  ESRD:  Will plan HD per schedule. He gets dialysis Monday, Wednesday and Friday. Access: Hemodialysis catheter  Daily weights  Fluid restriction: 1 L  Nephrocap 1 tab PO daily    2. Anemia:  Erythropoetin dose: Continue Procrit with dialysis to keep hemoglobin between 10-12. I will check iron studies. 3.  Osteodystrophy:  Phosphate Binder: He is currently not on binders. 4.  Fluid overload/ascites: I will arrange for dialysis to remove volume. He has chronically low blood pressure we will give him midodrine with dialysis. We will support him with albumin as needed. He may also need paracentesis. He is status post antibiotics which he completed on June 17      [facility] would like to thank Pratima Jackson MD   for opportunity to serve this patient      Please call with questions at-   24 Hrs Answering service [phone] or  7 am- 5 pm via Perfect serve or cell phone  Dung Flower MD          CC/reason for consult :     ESRD on hemodialysis     HPI :     Noah Wilson is a 74 y.o. male presented to   the hospital on 6/24/2022 with hypotension    He has past medical history of congestive heart failure, liver cirrhosis, type 2 diabetes, complications from diabetes including diabetic nephropathy and Retinopathy, hypertension, history of noncompliance and ESRD. He gets dialysis on Monday, Wednesday and Friday. He goes to [facility] home and get dialysis inpatient. He used to go for dialysis dialysis at [facility]. He went for dialysis today and was found to have a systolic blood pressure of 90s. He has chronically low blood pressure and take midodrine.   Dialysis staff did not do dialysis sent him to the ER. He does not have any fever, chills, shortness of breath, orthopnea. On arrival potassium is 5.1 and a creatinine of 4.1. His BNP is more than 70,000. Ultrasound abdomen showed abdominal ascites. Chest x-ray suggestive of pulmonary edema. I was called for further management of ESRD and need for dialysis. ROS:     Seen with-resident    positives in bold   Constitutional:  fever, chills, weakness, weight change, fatigue  Skin:  rash, pruritus, hair loss, bruising, dry skin, petechiae  Head, Face, Neck   headaches, swelling,  cervical adenopathy  Respiratory: shortness of breath, cough, or wheezing  Cardiovascular: chest pain, palpitations, dizzy, edema  Gastrointestinal: nausea, vomiting, diarrhea, constipation,belly pain    Yellow skin, blood in stool  Musculoskeletal:  back pain, muscle weakness, gait problems,       joint pain or swelling. Genitourinary:  dysuria, poor urine flow, flank pain, blood in urine  Neurologic:  vertigo, TIA'S, syncope, seizures, focal weakness  Psychosocial:  insomnia, anxiety, or depression.   Additional positive findings:                    All other remaining systems are negative or unable to obtain        PMH/PSH/SH/Family History:     Past Medical History:   Diagnosis Date    Anemia     Cardiomegaly     CHF (congestive heart failure) (HCC)     Cirrhosis ([address]     Diabetes type 2, uncontrolled ([address] 7/28/2014    Diabetic nephropathy 9/12/2013    ED (erectile dysfunction) 9/12/2013    Essential hypertension, benign 9/12/2013    Hemodialysis access, fistula mature ([facility]     Hyperlipidemia 9/12/2013    Noncompliance 7/28/2014    Respiratory failure (HCC)     Tachycardia        Past Surgical History:   Procedure Laterality Date    CARDIAC DEFIBRILLATOR PLACEMENT      EYE SURGERY      IR NONTUNNELED VASCULAR CATHETER  04/04/2022    IR NONTUNNELED VASCULAR CATHETER 4/4/2022 [facility] SPECIAL PROCEDURES    IR NONTUNNELED VASCULAR CATHETER Left 05/26/2022    Alice Halsted access; 20cm; Dr. Teresita Emerson - removed 5/31/22    IR NONTUNNELED VASCULAR CATHETER  05/26/2022    IR NONTUNNELED VASCULAR CATHETER 5/26/2022 [facility] SPECIAL PROCEDURES    IR TUNNELED CATHETER PLACEMENT GREATER THAN 5 YEARS  04/08/2022    IR TUNNELED CATHETER PLACEMENT GREATER THAN 5 YEARS 4/8/2022 [facility] SPECIAL PROCEDURES    IR TUNNELED CATHETER PLACEMENT GREATER THAN 5 YEARS Left 05/31/2022    Permacath; LIJ access; 23cm; Dr. Reaves Mean IR TUNNELED Orlene Power 5 YEARS  5/31/2022    IR TUNNELED CATHETER PLACEMENT GREATER THAN 5 YEARS 5/31/2022 [facility] SPECIAL PROCEDURES        reports that he quit smoking about 41 years ago. He has a 10.50 pack-year smoking history. He has never used smokeless tobacco. He reports current alcohol use of about 1.0 standard drink of alcohol per week. He reports that he does not use drugs. family history includes Diabetes in his brother, mother, and sister; High Blood Pressure in his mother. Medication:     No current facility-administered medications for this encounter. Vitals :     Vitals:    06/24/22 1252   BP: 101/77   Pulse: 88   Resp: 25   Temp: 97.4 °F (36.3 °C)   SpO2: 97%       I & O :     No intake or output data in the 24 hours ending 06/24/22 1651     Physical Examination :     General appearance: Anxious- no, distressed- no, in good spirits-     HEENT: Lips- normal, teeth- ok , oral mucosa- moist  Neck : Mass- no, appears symmetrical, JVD- +  Respiratory: Respiratory effort-  normal, wheeze- no, crackles -   Cardiovascular:  Ausculation- No M/R/G, Edema ++  Abdomen: visible mass- no, distention- no, scar- no, tenderness- no                            hepatosplenomegaly-  no  Musculoskeletal:  clubbing no,cyanosis- no , digital ischemia- no                           muscle strength- grossly normal , tone - grossly normal  Skin: rashes- no , ulcers- no, induration- no, tightening - no  Psychiatric: Not evaluated  Additional finding:      LABS:     Recent Labs     06/24/22  1423   WBC 4.6   HGB 9.0*   HCT 28.3*        Recent Labs     06/24/22  1339   *   K 5.1   CL 97*   CO2 22   BUN 44*   CREATININE 4.1*   GLUCOSE 253*      Nephrology  [facility] [address] [address] [facility]  Office: [phone]  Fax: [phone]

## 2022-06-25 NOTE — PROGRESS NOTES
Patient IV acces infiltrated . This RN notified zander charge nurse to try and get another IV. Patient hard stick.

## 2022-06-25 NOTE — PROGRESS NOTES
Min: 95 %  Max: 100 %  24HR INTAKE/OUTPUT:      Intake/Output Summary (Last 24 hours) at 6/25/2022 0946  Last data filed at 6/25/2022 6301  Gross per 24 hour   Intake 625 ml   Output 2400 ml   Net -1775 ml       Exam:      General appearance: No apparent distress, appears stated age and cooperative. Lungs: Coarse breath sounds, improved air entry  Heart: Regular rate and rhythm with Normal S1/S2 with syst murmur  Abdomen: Soft, non-tender or non-distended without rigidity or guarding and positive bowel sounds all four quadrants. Extremities: No clubbing, cyanosis, 2+ pitting leg edema bilaterally. Skin: Skin color, texture, turgor normal.    Neurologic: Alert and oriented X 3,  grossly non-focal.  Mental status: Alert, oriented, thought content appropriate. Data    Recent Labs     06/24/22  1423 06/25/22  0445   WBC 4.6 4.3   HGB 9.0* 8.6*   HCT 28.3* 27.1*    146      Recent Labs     06/24/22  1339 06/25/22  0445   * 137   K 5.1 4.1   CL 97* 100   CO2 22 25   PHOS  --  4.7   BUN 44* 32*   CREATININE 4.1* 3.5*     Recent Labs     06/24/22  1339 06/25/22  0445   AST 44* 30   ALT 26 19   BILIDIR 1.5* 1.2*   BILITOT 2.6* 2.1*   ALKPHOS 192* 181*     Recent Labs     06/24/22  1339   INR 1.76*     Recent Labs     06/24/22  1339 06/24/22  1759 06/24/22  2358   TROPONINI 0.28* 0.26* 0.26*       Consults:     IP CONSULT TO CARDIOLOGY  IP CONSULT TO NEPHROLOGY  IP CONSULT TO INTERVENTIONAL RADIOLOGY  IP CONSULT TO HOSPITALIST  IP CONSULT TO PHARMACY  PHARMACY  [address] [facility]    Diagnosis Date Noted    Volume overload [E87.70] 06/24/2022     Priority: Medium         ASSESSMENT AND PLAN      Hypotension prior to dialysis:  Need to rule out line infection  procal is elevated.  Blood cx pending  Cont with meropenem and vancomycin and stop if cultures are negative     Elevated troponin, CAD:  Cardiology was consulted, advised to trend troponin  Elevation is likely related to his volume overload and severe CHF  Cont statin, plavix     Questionable COVID infection:  Had positive rapid test on 19, PCR was negative on same date  placde on isolation for now and repeated COVID-19 PCR- in process  He does have groundglass opacities on x-ray but also in volume overload     Acute on chronic systolic CHF, CAD:  EF below 20% based on ECHO from March  Fluid removal with dialysis  Cont toprol XL as BP tolerates     ESRD on HD:  Nephrology consulted for dialysis     Recent left upper extremity DVT:  Eliquis      DVT Prophylaxis: eliquis  Diet: ADULT DIET; Regular; Low Potassium (Less than 3000 mg/day);  Low Phosphorus (Less than 1000 mg)  Code Status: Full Code    PT/OT Eval Status:ordered    Dispo - inpt, 1-2 days    Alexis Nelson MD

## 2022-06-25 NOTE — PLAN OF CARE
Problem: Discharge Planning  Goal: Discharge to home or other facility with appropriate resources  Outcome: Progressing  Flowsheets  Taken 6/24/2022 2325  Discharge to home or other facility with appropriate resources:   Identify barriers to discharge with patient and caregiver   Arrange for needed discharge resources and transportation as appropriate   Identify discharge learning needs (meds, wound care, etc)   Arrange for interpreters to assist at discharge as needed   Refer to discharge planning if patient needs post-hospital services based on physician order or complex needs related to functional status, cognitive ability or social support system  Taken 6/24/2022 2323  Discharge to home or other facility with appropriate resources:   Identify barriers to discharge with patient and caregiver   Arrange for needed discharge resources and transportation as appropriate   Identify discharge learning needs (meds, wound care, etc)   Arrange for interpreters to assist at discharge as needed   Refer to discharge planning if patient needs post-hospital services based on physician order or complex needs related to functional status, cognitive ability or social support system  Taken 6/24/2022 2303  Discharge to home or other facility with appropriate resources:   Identify barriers to discharge with patient and caregiver   Identify discharge learning needs (meds, wound care, etc)   Arrange for needed discharge resources and transportation as appropriate   Refer to discharge planning if patient needs post-hospital services based on physician order or complex needs related to functional status, cognitive ability or social support system     Problem: Skin/Tissue Integrity  Goal: Absence of new skin breakdown  Description: 1. Monitor for areas of redness and/or skin breakdown  2. Assess vascular access sites hourly  3. Every 4-6 hours minimum:  Change oxygen saturation probe site  4.   Every 4-6 hours:  If on nasal continuous positive airway pressure, respiratory therapy assess nares and determine need for appliance change or resting period.   Outcome: Progressing     Problem: Safety - Adult  Goal: Free from fall injury  Outcome: Progressing  Flowsheets (Taken 6/24/2022 2314)  Free From Fall Injury: Instruct family/caregiver on patient safety     Problem: ABCDS Injury Assessment  Goal: Absence of physical injury  Outcome: Progressing  Flowsheets (Taken 6/24/2022 2314)  Absence of Physical Injury: Implement safety measures based on patient assessment     Problem: Respiratory - Adult  Goal: Achieves optimal ventilation and oxygenation  Outcome: Progressing  Flowsheets (Taken 6/24/2022 2303)  Achieves optimal ventilation and oxygenation:   Assess for changes in respiratory status   Position to facilitate oxygenation and minimize respiratory effort   Oxygen supplementation based on oxygen saturation or arterial blood gases   Assess for changes in mentation and behavior     Problem: Cardiovascular - Adult  Goal: Maintains optimal cardiac output and hemodynamic stability  Outcome: Progressing  Flowsheets (Taken 6/24/2022 2303)  Maintains optimal cardiac output and hemodynamic stability:   Monitor blood pressure and heart rate   Monitor urine output and notify Licensed Independent Practitioner for values outside of normal range  Goal: Absence of cardiac dysrhythmias or at baseline  Outcome: Progressing  Flowsheets (Taken 6/24/2022 2303)  Absence of cardiac dysrhythmias or at baseline:   Monitor cardiac rate and rhythm   Assess for signs of decreased cardiac output     Problem: Musculoskeletal - Adult  Goal: Return mobility to safest level of function  Outcome: Progressing  Goal: Maintain proper alignment of affected body part  Outcome: Progressing  Goal: Return ADL status to a safe level of function  Outcome: Progressing     Problem: Infection - Adult  Goal: Absence of infection at discharge  Outcome: Progressing  Goal: Absence of infection during hospitalization  Outcome: Progressing  Goal: Absence of fever/infection during anticipated neutropenic period  Outcome: Progressing     Problem: Metabolic/Fluid and Electrolytes - Adult  Goal: Electrolytes maintained within normal limits  Outcome: Progressing     Problem: Hematologic - Adult  Goal: Maintains hematologic stability  Outcome: Progressing  Flowsheets (Taken 6/24/2022 2306)  Maintains hematologic stability:   Assess for signs and symptoms of bleeding or hemorrhage   Monitor labs for bleeding or clotting disorders

## 2022-06-25 NOTE — PROGRESS NOTES
Interval History and plan:      Feels ok    Plan:    Continue ProAmatine with dialysis and 3 times a day. Follow with vitals and volume   Tolerated treatment well yesterday  2.4 liters removed  Cont UF with HD  Lites and blood pressures are stable                   Assessment :     1. ESRD:  Will plan HD per schedule. He gets dialysis Monday, Wednesday and Friday. Access: Hemodialysis catheter  Daily weights  Fluid restriction: 1 L  Nephrocap 1 tab PO daily    2. Anemia:  Erythropoetin dose: Continue Procrit with dialysis to keep hemoglobin between 10-12. I will check iron studies. 3.  Osteodystrophy:  Phosphate Binder: He is currently not on binders. 4.  Fluid overload/ascites: I will arrange for dialysis to remove volume. He has chronically low blood pressure we will give him midodrine with dialysis. We will support him with albumin as needed. He may also need paracentesis. He is status post antibiotics which he completed on June 17      [facility] would like to thank Sully Smallwood MD   for opportunity to serve this patient      Please call with questions at-   24 Hrs Answering service [phone] or  7 am- 5 pm via Perfect serve or cell phone  Amy Callejas MD          CC/reason for consult :     ESRD on hemodialysis     HPI :     Reymundo Chavarria is a 74 y.o. male presented to   the hospital on 6/24/2022 with hypotension    He has past medical history of congestive heart failure, liver cirrhosis, type 2 diabetes, complications from diabetes including diabetic nephropathy and Retinopathy, hypertension, history of noncompliance and ESRD. He gets dialysis on Monday, Wednesday and Friday. He goes to [facility] home and get dialysis inpatient. He used to go for dialysis dialysis at [facility]. He went for dialysis today and was found to have a systolic blood pressure of 90s. He has chronically low blood pressure and take midodrine.   Dialysis staff did not do dialysis sent him to the ER. He does not have any fever, chills, shortness of breath, orthopnea. On arrival potassium is 5.1 and a creatinine of 4.1. His BNP is more than 70,000. Ultrasound abdomen showed abdominal ascites. Chest x-ray suggestive of pulmonary edema. I was called for further management of ESRD and need for dialysis. ROS:     Seen with-resident    positives in bold   Constitutional:  fever, chills, weakness, weight change, fatigue  Skin:  rash, pruritus, hair loss, bruising, dry skin, petechiae  Head, Face, Neck   headaches, swelling,  cervical adenopathy  Respiratory: shortness of breath, cough, or wheezing  Cardiovascular: chest pain, palpitations, dizzy, edema  Gastrointestinal: nausea, vomiting, diarrhea, constipation,belly pain    Yellow skin, blood in stool  Musculoskeletal:  back pain, muscle weakness, gait problems,       joint pain or swelling. Genitourinary:  dysuria, poor urine flow, flank pain, blood in urine  Neurologic:  vertigo, TIA'S, syncope, seizures, focal weakness  Psychosocial:  insomnia, anxiety, or depression.   Additional positive findings:                    All other remaining systems are negative or unable to obtain        PMH/PSH/SH/Family History:     Past Medical History:   Diagnosis Date    Anemia     Cardiomegaly     CHF (congestive heart failure) (HCC)     Cirrhosis ([address]     Diabetes type 2, uncontrolled ([address] 7/28/2014    Diabetic nephropathy 9/12/2013    ED (erectile dysfunction) 9/12/2013    Essential hypertension, benign 9/12/2013    [facility]od[facility] access[facility] fistula mature [facility]     Hyperlipidemia 9/12/2013    Noncompliance 7/28/2014    Respiratory failure (HCC)     Tachycardia        Past Surgical History:   Procedure Laterality Date    CARDIAC DEFIBRILLATOR PLACEMENT      EYE SURGERY      IR NONTUNNELED VASCULAR CATHETER  04/04/2022    IR NONTUNNELED VASCULAR CATHETER 4/4/2022 [facility]    IR NONTUNNELED VASCULAR CATHETER Left 05/26/2022    I[facility] access; 20cm; Dr. Fabiola May - removed 5/31/22    IR NONTUNNELED VASCULAR CATHETER  05/26/2022    IR NONTUNNELED VASCULAR CATHETER 5/26/2022 [facility]    IR TUNNELED CATHETER PLACEMENT GREATER THAN 5 YEARS  04/08/2022    IR TUNNELED CATHETER PLACEMENT GREATER THAN 5 YEARS 4/8/2022 [facility]    IR TUNNELED CATHETER PLACEMENT GREATER THAN 5 YEARS Left 05/31/2022    Permacath; LIJ access; 23cm; Dr. Sathya Henry IR TUNNELED Reynaldo Otter 5 YEARS  5/31/2022    IR TUNNELED CATHETER PLACEMENT GREATER THAN 5 YEARS 5/31/2022 [facility]        reports that he quit smoking about 41 years ago. He has a 10.50 pack-year smoking history. He has never used smokeless tobacco. He reports current alcohol use of about 1.0 standard drink of alcohol per week. He reports that he does not use drugs. family history includes Diabetes in his brother, mother, and sister; High Blood Pressure in his mother.          Medication:     Current Facility-Administered Medications: vancomycin (VANCOCIN) intermittent dosing (placeholder), , Other, See Admin Instructions  midodrine (PROAMATINE) tablet 10 mg, 10 mg, Oral, TID WC  heparin (porcine) injection 1,000 Units, 1,000 Units, IntraVENous, Once  epoetin roseann-epbx (RETACRIT) injection 10,000 Units, 10,000 Units, IntraVENous, Once per day on Mon Wed Fri  0.9 % sodium chloride bolus, 100 mL, IntraVENous, PRN  midodrine (PROAMATINE) tablet 5 mg, 5 mg, Oral, Once  sodium chloride flush 0.9 % injection 5-40 mL, 5-40 mL, IntraVENous, 2 times per day  sodium chloride flush 0.9 % injection 5-40 mL, 5-40 mL, IntraVENous, PRN  0.9 % sodium chloride infusion, , IntraVENous, PRN  ondansetron (ZOFRAN-ODT) disintegrating tablet 4 mg, 4 mg, Oral, Q8H PRN **OR** ondansetron (ZOFRAN) injection 4 mg, 4 mg, IntraVENous, Q6H PRN  polyethylene glycol (GLYCOLAX) packet 17 g, 17 g, Oral, Daily PRN  acetaminophen (TYLENOL) tablet 650 mg, 650 mg, Oral, Q6H PRN **OR** acetaminophen (TYLENOL) suppository 650 mg, 650 mg, Rectal, Q6H PRN  heparin (porcine) injection 3,600 Units, 3,600 Units, IntraCATHeter, PRN  [COMPLETED] meropenem (MERREM) 1,000 mg in sodium chloride 0.9 % 100 mL IVPB (mini-bag), 1,000 mg, IntraVENous, Once **FOLLOWED BY** meropenem (MERREM) 500 mg IVPB (mini-bag), 500 mg, IntraVENous, Q24H       Vitals :     Vitals:    06/25/22 0343   BP: 110/77   Pulse: 86   Resp: 18   Temp: 98.6 °F (37 °C)   SpO2: 98%       I & O :       Intake/Output Summary (Last 24 hours) at 6/25/2022 0856  Last data filed at 6/25/2022 0499  Gross per 24 hour   Intake 625 ml   Output 2400 ml   Net -1775 ml        Physical Examination :     General appearance: Anxious- no, distressed- no, in good spirits-     HEENT: Lips- normal, teeth- ok , oral mucosa- moist  Neck : Mass- no, appears symmetrical, JVD- +  Respiratory: Respiratory effort-  normal, wheeze- no, crackles -   Cardiovascular:  Ausculation- No M/R/G, Edema ++  Abdomen: visible mass- no, distention- no, scar- no, tenderness- no                            hepatosplenomegaly-  no  Musculoskeletal:  clubbing no,cyanosis- no , digital ischemia- no                           muscle strength- grossly normal , tone - grossly normal  Skin: rashes- no , ulcers- no, induration- no, tightening - no  Psychiatric: Not evaluated  Additional finding:      LABS:     Recent Labs     06/24/22  1423 06/25/22  0445   WBC 4.6 4.3   HGB 9.0* 8.6*   HCT 28.3* 27.1*    146     Recent Labs     06/24/22  1339 06/25/22  0445   * 137   K 5.1 4.1   CL 97* 100   CO2 22 25   BUN 44* 32*   CREATININE 4.1* 3.5*   GLUCOSE 253* 277*   PHOS  --  4.7      Nephrology  [facility], [address]  Office: [phone]  Fax: [phone]

## 2022-06-25 NOTE — FLOWSHEET NOTE
06/24/22 1923 06/24/22 2223   Vital Signs   /63 110/65   Temp 97.8 °F (36.6 °C) 97.6 °F (36.4 °C)   Heart Rate 92 86   Resp 20 18   Weight   (stretcher scale not working)   (stretcher scale not working)     Treatment time: 3hr  Net UF: 2L    Pre weight: stretcher scale not working  Post weight:   EDW: 65.4kg    Access used:  [address]  Access function: Good    Medications or blood products given: none    Regular outpatient schedule: [facility] TTS    Summary of response to treatment: Good    Copy of dialysis treatment record placed in chart, to be scanned into EMR.

## 2022-06-25 NOTE — PROGRESS NOTES
4 Eyes Admission Assessment     I agree as the admission nurse that 2 RN's have performed a thorough Head to Toe Skin Assessment on the patient. ALL assessment sites listed below have been assessed on admission. Areas assessed by both nurses:   [x]   Head, Face, and Ears   [x]   Shoulders, Back, and Chest  [x]   Arms, Elbows, and Hands   [x]   Coccyx, Sacrum, and Ischium  [x]   Legs, Feet, and Heels        Does the Patient have Skin Breakdown? Open blister on left foot. Blister on left shin.      Tung Prevention initiated:  No   Wound Care Orders initiated:  No      WOC nurse consulted for Pressure Injury (Stage 3,4, Unstageable, DTI, NWPT, and Complex wounds) or Tung score 18 or lower:  No      Nurse 1 eSignature: Electronically signed by Suzanne Barton RN on 6/25/22 at 12:48 AM EDT    **SHARE this note so that the co-signing nurse is able to place an eSignature**    Nurse 2 eSignature: Electronically signed by Yamilka Bauer RN on 6/24/22 at 11:04 PM EDT

## 2022-06-25 NOTE — PROGRESS NOTES
Patient was retrieved from dialysis at 2308 on 6/24. He is alert and oreinted x4. Patient able to eat a sandwich and settled to the unit.

## 2022-06-25 NOTE — PROGRESS NOTES
Clinical Pharmacy Progress Note    Vancomycin - Management by Pharmacy    Consult Date(s):  6/24/22  Consulting Provider(s):  Dr. Meredith Hunter / Plan:  1)  Hypotension, sepsis, r/o line infection - Vancomycin   Concurrent Antimicrobials: Meropenem    Day of Vanc Therapy / Ordered Duration: 1 of 7   Current Dosing Method: Intermittent dosing in setting of ESRD on HD Mon-Wed-Fri  o Therapeutic Goal: Trough ~15 mcg/mL  o Pt received 1000mg x1 early this AM.  No HD planned today. Will not give any further Vancomycin today. o Will check random level in AM tomorrow.  Will continue to monitor clinical condition and make adjustments to regimen as appropriate. Please call with questions--  Thanks--  Kesha España, PharmD, BCPS, BCGP  [id number] ([facility])   6/25/2022 2:13 PM        Interval update:  Afebrile. No acute events overnight. Subjective/Objective:   Thereasa Castleman is a 74 y.o. y/o male with a PMHx that includes ESRD on HD Mon-Wed-Fri, HFrEF, DM 2, HTN, cirrhosis, and HLD who is admitted with hypotension prior to HD - being evaluated for possible line infection and acute HF exacerbation. Pharmacy is consulted to dose Vancomycin.     Ht Readings from Last 1 Encounters:   06/24/22 5' 6.5\" (1.689 m)     Wt Readings from Last 1 Encounters:   06/24/22 148 lb 9.4 oz (67.4 kg)       Current and Prior Antimicrobials:  Meropenem 500mg EI q24h (6/25-current)  Vancomycin - Pharmacy to dose   Intermittent dosing (6/25-current)    Date Vancomycin Level Vancomycin Dose   6/25  1000mg   6/26 ordered             Recent Labs     06/24/22  1339 06/24/22  1423 06/25/22  0445   CREATININE 4.1*  --  3.5*   BUN 44*  --  32*   WBC  --  4.6 4.3       CrCl is not estimated 2/2 ESRD on HD    Cultures & Sensitivities:  Date Site Micro Susceptibility / Result   6/25 Blood x2 sent

## 2022-06-25 NOTE — PLAN OF CARE
Problem: Skin/Tissue Integrity  Goal: Absence of new skin breakdown  Description: 1. Monitor for areas of redness and/or skin breakdown  2. Assess vascular access sites hourly  3. Every 4-6 hours minimum:  Change oxygen saturation probe site  4. Every 4-6 hours:  If on nasal continuous positive airway pressure, respiratory therapy assess nares and determine need for appliance change or resting period.   Outcome: Progressing     Problem: Safety - Adult  Goal: Free from fall injury  Outcome: Progressing     Problem: ABCDS Injury Assessment  Goal: Absence of physical injury  Outcome: Progressing     Problem: Respiratory - Adult  Goal: Achieves optimal ventilation and oxygenation  Outcome: Progressing  Flowsheets (Taken 6/25/2022 0343 by Félix Farr RN)  Achieves optimal ventilation and oxygenation:   Assess for changes in respiratory status   Assess for changes in mentation and behavior     Problem: Cardiovascular - Adult  Goal: Maintains optimal cardiac output and hemodynamic stability  Outcome: Progressing     Problem: Musculoskeletal - Adult  Goal: Return mobility to safest level of function  Outcome: Not Progressing     Problem: Metabolic/Fluid and Electrolytes - Adult  Goal: Electrolytes maintained within normal limits  Outcome: Progressing

## 2022-06-25 NOTE — PROGRESS NOTES
Clinical Pharmacy Progress Note    IV vancomycin - Management by Pharmacy    Consult Date(s): 6/24/22  Consulting Provider(s): Dr. Abiola Redmond / Plan    Sepsis of unknown origin - Vancomycin   Concurrent Antimicrobials: Meropenem   Day of Vanc Therapy / Ordered Duration: #1   Current Dosing Method: Intermittent Dosing by Levels   Therapeutic Goal: ~ 15 mg/L   Current Dose / Frequency: Intermittent   Plan / Rationale:   o Given that patient is on HD, will dose based on levels at this time  o Vancomycin 1000 mg (~15mg/kg) ordered for patient.  Will continue to monitor clinical condition and make adjustments to regimen as appropriate. Thank you for consulting Pharmacy! Rosa M Johns, PharmD, BCPS      Interval update:     Subjective/Objective: Mr. Maribel Banks is a 74 y.o. male with a PMHx significant for CHF, DM-2 with neuropathy, HTN, HLD, ESRD (HD on MWF) admitted for possible sepsis of unknown origin. Pharmacy has been consulted to dose vancomycin. Ht Readings from Last 1 Encounters:   06/24/22 5' 6.5\" (1.689 m)     Wt Readings from Last 1 Encounters:   06/24/22 148 lb 9.4 oz (67.4 kg)       Current & Prior Antimicrobial Regimen(s):   Meropenem 1000 mgx1; then 500 mg every 24 hours   Vancomycin 1000 mg IVx1    Level(s) / Doses:    Date Time Dose Level / Type of Level Interpretation                 Note: Serum levels collected for AUC-based dosing may be high if collected in close proximity to the dose administered. This is not necessarily indicative of toxicity. Cultures & Sensitivities:    Date Site Micro Susceptibility / Result   6/24/22 Bloodx2 pending            Labs / Ancillary Data:    Estimated Creatinine Clearance: 17 mL/min (A) (based on SCr of 4.1 mg/dL (H)).     Recent Labs     06/24/22  1339 06/24/22  1423   CREATININE 4.1*  --    BUN 44*  --    WBC  --  4.6       Additional Lab Values / Findings of Note:    Procalcitonin:   Recent Labs     06/24/22  2358   PROCAL 1.28*

## 2022-06-26 NOTE — PROGRESS NOTES
Interval History and plan:      Still significant blood pressure issue with dialysis otherwise stable    Plan:    Continue ProAmatine with dialysis and 3 times a day. Follow with vitals and volume   Overall doing well   Plan for HD in am   No need for RRT today                   Assessment :     1. ESRD:  Will plan HD per schedule. He gets dialysis Monday, Wednesday and Friday. Access: Hemodialysis catheter  Daily weights  Fluid restriction: 1 L  Nephrocap 1 tab PO daily    2. Anemia:  Erythropoetin dose: Continue Procrit with dialysis to keep hemoglobin between 10-12. I will check iron studies. 3.  Osteodystrophy:  Phosphate Binder: He is currently not on binders. 4.  Fluid overload/ascites: I will arrange for dialysis to remove volume. He has chronically low blood pressure we will give him midodrine with dialysis. We will support him with albumin as needed. He may also need paracentesis. He is status post antibiotics which he completed on June 17      [facility] would like to thank Shahana Calixto MD   for opportunity to serve this patient      Please call with questions at-   24 Hrs Answering service [phone] or  7 am- 5 pm via Perfect serve or cell phone  Nahum Hess MD          CC/reason for consult :     ESRD on hemodialysis     HPI :     Hali Godinez is a 74 y.o. male presented to   the hospital on 6/24/2022 with hypotension    He has past medical history of congestive heart failure, liver cirrhosis, type 2 diabetes, complications from diabetes including diabetic nephropathy and Retinopathy, hypertension, history of noncompliance and ESRD. He gets dialysis on Monday, Wednesday and Friday. He goes to [facility] home and get dialysis inpatient. He used to go for dialysis dialysis at [facility]. He went for dialysis today and was found to have a systolic blood pressure of 90s. He has chronically low blood pressure and take midodrine.   Dialysis staff did not do dialysis sent him to the ER. He does not have any fever, chills, shortness of breath, orthopnea. On arrival potassium is 5.1 and a creatinine of 4.1. His BNP is more than 70,000. Ultrasound abdomen showed abdominal ascites. Chest x-ray suggestive of pulmonary edema. I was called for further management of ESRD and need for dialysis. ROS:     Seen with-resident    positives in bold   Constitutional:  fever, chills, weakness, weight change, fatigue  Skin:  rash, pruritus, hair loss, bruising, dry skin, petechiae  Head, Face, Neck   headaches, swelling,  cervical adenopathy  Respiratory: shortness of breath, cough, or wheezing  Cardiovascular: chest pain, palpitations, dizzy, edema  Gastrointestinal: nausea, vomiting, diarrhea, constipation,belly pain    Yellow skin, blood in stool  Musculoskeletal:  back pain, muscle weakness, gait problems,       joint pain or swelling. Genitourinary:  dysuria, poor urine flow, flank pain, blood in urine  Neurologic:  vertigo, TIA'S, syncope, seizures, focal weakness  Psychosocial:  insomnia, anxiety, or depression.   Additional positive findings:                    All other remaining systems are negative or unable to obtain        PMH/PSH/SH/Family History:     Past Medical History:   Diagnosis Date    Anemia     Cardiomegaly     CHF (congestive heart failure) (HCC)     Cirrhosis ([address]     Diabetes type 2, uncontrolled ([address] 7/28/2014    Diabetic nephropathy 9/12/2013    ED (erectile dysfunction) 9/12/2013    Essential hypertension, benign 9/12/2013    [facility]od[facility] access[facility] fistula mature [facility]     Hyperlipidemia 9/12/2013    Noncompliance 7/28/2014    Respiratory failure (HCC)     Tachycardia        Past Surgical History:   Procedure Laterality Date    CARDIAC DEFIBRILLATOR PLACEMENT      EYE SURGERY      IR NONTUNNELED VASCULAR CATHETER  04/04/2022    IR NONTUNNELED VASCULAR CATHETER 4/4/2022 [facility] SPECIAL PROCEDURES    IR NONTUNNELED VASCULAR CATHETER Left 05/26/2022    Rajesh Sutton access; 20cm; Dr. Sierra Seen - removed 5/31/22    IR NONTUNNELED VASCULAR CATHETER  05/26/2022    IR NONTUNNELED VASCULAR CATHETER 5/26/2022 [facility] SPECIAL PROCEDURES    IR TUNNELED CATHETER PLACEMENT GREATER THAN 5 YEARS  04/08/2022    IR TUNNELED CATHETER PLACEMENT GREATER THAN 5 YEARS 4/8/2022 [facility] SPECIAL PROCEDURES    IR TUNNELED CATHETER PLACEMENT GREATER THAN 5 YEARS Left 05/31/2022    Permacath; LIJ access; 23cm; Dr. Salina Forte IR TUNNELED Romilda Valeria 5 YEARS  5/31/2022    IR TUNNELED CATHETER PLACEMENT GREATER THAN 5 YEARS 5/31/2022 [facility] SPECIAL PROCEDURES        reports that he quit smoking about 41 years ago. He has a 10.50 pack-year smoking history. He has never used smokeless tobacco. He reports current alcohol use of about 1.0 standard drink of alcohol per week. He reports that he does not use drugs. family history includes Diabetes in his brother, mother, and sister; High Blood Pressure in his mother.          Medication:     Current Facility-Administered Medications: vancomycin (VANCOCIN) intermittent dosing (placeholder), , Other, See Admin Instructions  allopurinol (ZYLOPRIM) tablet 100 mg, 100 mg, Oral, Daily  apixaban (ELIQUIS) tablet 2.5 mg, 2.5 mg, Oral, BID  atorvastatin (LIPITOR) tablet 80 mg, 80 mg, Oral, Nightly  clopidogrel (PLAVIX) tablet 75 mg, 75 mg, Oral, Daily  levothyroxine (SYNTHROID) tablet 50 mcg, 50 mcg, Oral, QAM AC  metoprolol succinate (TOPROL XL) extended release tablet 12.5 mg, 12.5 mg, Oral, Daily  pantoprazole (PROTONIX) tablet 40 mg, 40 mg, Oral, BID AC  insulin lispro (1 Unit Dial) 0-12 Units, 0-12 Units, SubCUTAneous, TID WC  insulin lispro (1 Unit Dial) 0-6 Units, 0-6 Units, SubCUTAneous, Nightly  glucose chewable tablet 16 g, 4 tablet, Oral, PRN  dextrose bolus 10% 125 mL, 125 mL, IntraVENous, PRN **OR** dextrose bolus 10% 250 mL, 250 mL, IntraVENous, PRN  glucagon (rDNA) injection 1 mg, 1 mg, IntraMUSCular, PRN  dextrose 5 % solution, 100 mL/hr, IntraVENous, PRN  midodrine (PROAMATINE) tablet 10 mg, 10 mg, Oral, TID WC  heparin (porcine) injection 1,000 Units, 1,000 Units, IntraVENous, Once  epoetin roseann-epbx (RETACRIT) injection 10,000 Units, 10,000 Units, IntraVENous, Once per day on Mon Wed Fri  0.9 % sodium chloride bolus, 100 mL, IntraVENous, PRN  midodrine (PROAMATINE) tablet 5 mg, 5 mg, Oral, Once  sodium chloride flush 0.9 % injection 5-40 mL, 5-40 mL, IntraVENous, 2 times per day  sodium chloride flush 0.9 % injection 5-40 mL, 5-40 mL, IntraVENous, PRN  0.9 % sodium chloride infusion, , IntraVENous, PRN  ondansetron (ZOFRAN-ODT) disintegrating tablet 4 mg, 4 mg, Oral, Q8H PRN **OR** ondansetron (ZOFRAN) injection 4 mg, 4 mg, IntraVENous, Q6H PRN  polyethylene glycol (GLYCOLAX) packet 17 g, 17 g, Oral, Daily PRN  acetaminophen (TYLENOL) tablet 650 mg, 650 mg, Oral, Q6H PRN **OR** acetaminophen (TYLENOL) suppository 650 mg, 650 mg, Rectal, Q6H PRN  heparin (porcine) injection 3,600 Units, 3,600 Units, IntraCATHeter, PRN  [COMPLETED] meropenem (MERREM) 1,000 mg in sodium chloride 0.9 % 100 mL IVPB (mini-bag), 1,000 mg, IntraVENous, Once **FOLLOWED BY** meropenem (MERREM) 500 mg IVPB (mini-bag), 500 mg, IntraVENous, Q24H       Vitals :     Vitals:    06/26/22 0836   BP: 112/72   Pulse: (!) 108   Resp: 18   Temp: 98.5 °F (36.9 °C)   SpO2: 95%       I & O :       Intake/Output Summary (Last 24 hours) at 6/26/2022 0911  Last data filed at 6/26/2022 8229  Gross per 24 hour   Intake 760 ml   Output --   Net 760 ml        Physical Examination :     General appearance: Anxious- no, distressed- no, in good spirits-     HEENT: Lips- normal, teeth- ok , oral mucosa- moist  Neck : Mass- no, appears symmetrical, JVD- +  Respiratory: Respiratory effort-  normal, wheeze- no, crackles -   Cardiovascular:  Ausculation- No M/R/G, Edema ++  Abdomen: visible mass- no, distention- no, scar- no, tenderness- no hepatosplenomegaly-  no  Musculoskeletal:  clubbing no,cyanosis- no , digital ischemia- no                           muscle strength- grossly normal , tone - grossly normal  Skin: rashes- no , ulcers- no, induration- no, tightening - no  Psychiatric: Not evaluated  Additional finding:      LABS:     Recent Labs     06/24/22  1423 06/25/22  0445   WBC 4.6 4.3   HGB 9.0* 8.6*   HCT 28.3* 27.1*    146     Recent Labs     06/24/22  1339 06/25/22  0445   * 137   K 5.1 4.1   CL 97* 100   CO2 22 25   BUN 44* 32*   CREATININE 4.1* 3.5*   GLUCOSE 253* 277*   PHOS  --  4.7      Nephrology  [address] [place] [address]  Office: [phone]  Fax: [phone]

## 2022-06-26 NOTE — PROGRESS NOTES
Hospitalist Progress Note      PCP: Vince Herrera DO    Date of Admission: 2022    Chief Complaint on Admission: fatigue, hypotension    Pt Seen/Examined and Chart Reviewed. Admitting dx as above    SUBJECTIVE/OBJECTIVE:   The patient is a 74 y.o. male with  ESRD, CHF with EF below 20%, also right-sided heart failure who presented to [facility] with hypotension prior to his dialysis session. Patient had HD on saturday for 3 hours. Tolerated it well with midodrine. No acute events overnight, afebrile. On 2 liters O2. covid negative. Allergies  Patient has no known allergies. Medications      Scheduled Meds:   vancomycin (VANCOCIN) intermittent dosing (placeholder)   Other See Admin Instructions    allopurinol  100 mg Oral Daily    apixaban  2.5 mg Oral BID    atorvastatin  80 mg Oral Nightly    clopidogrel  75 mg Oral Daily    levothyroxine  50 mcg Oral QAM AC    metoprolol succinate  12.5 mg Oral Daily    pantoprazole  40 mg Oral BID AC    insulin lispro  0-12 Units SubCUTAneous TID WC    insulin lispro  0-6 Units SubCUTAneous Nightly    midodrine  10 mg Oral TID WC    heparin (porcine)  1,000 Units IntraVENous Once    epoetin roseann-epbx  10,000 Units IntraVENous Once per day on     midodrine  5 mg Oral Once    sodium chloride flush  5-40 mL IntraVENous 2 times per day    meropenem  500 mg IntraVENous Q24H       Infusions:   dextrose      sodium chloride         PRN Meds:  glucose, dextrose bolus **OR** dextrose bolus, glucagon (rDNA), dextrose, sodium chloride, sodium chloride flush, sodium chloride, ondansetron **OR** ondansetron, polyethylene glycol, acetaminophen **OR** acetaminophen, heparin (porcine)    Vitals    TEMPERATURE:  Current - Temp: 98.4 °F (36.9 °C);  Max - Temp  Av.2 °F (36.8 °C)  Min: 97.5 °F (36.4 °C)  Max: 98.6 °F (37 °C)  RESPIRATIONS RANGE: Resp  Av  Min: 18  Max: 18  PULSE RANGE: Pulse  Av.5  Min: 90  Max: 113  BLOOD PRESSURE RANGE:  Systolic (68RLC), IBJ:187 , Min:94 , CAMILA:594   ; Diastolic (11BJP), XQM:42, Min:65, Max:81    PULSE OXIMETRY RANGE: SpO2  Av.5 %  Min: 95 %  Max: 98 %  24HR INTAKE/OUTPUT:      Intake/Output Summary (Last 24 hours) at 2022 1223  Last data filed at 2022 2152  Gross per 24 hour   Intake 540 ml   Output --   Net 540 ml       Exam:      General appearance: No apparent distress, appears stated age and cooperative. Lungs: Coarse breath sounds, diminished  Heart: Regular rate and rhythm with Normal S1/S2 with syst murmur  Abdomen: Soft, non-tender or non-distended without rigidity or guarding and positive bowel sounds all four quadrants. Extremities: No clubbing, cyanosis, 2+ pitting leg edema bilaterally. Skin: Skin color, texture, turgor normal.    Neurologic: Alert and oriented X 3,  grossly non-focal.  Mental status: Alert, oriented, thought content appropriate.         Data    Recent Labs     22  1423 22  0445 22  0921   WBC 4.6 4.3 4.5   HGB 9.0* 8.6* 9.2*   HCT 28.3* 27.1* 28.9*    146 175      Recent Labs     22  1339 22  0445 22  0921   * 137 138   K 5.1 4.1 4.7   CL 97* 100 100   CO2    PHOS  --  4.7 6.1*   BUN 44* 32* 42*   CREATININE 4.1* 3.5* 4.5*     Recent Labs     22  1339 22  0445 22  0921   AST 44* 30 28   ALT 26 19 19   BILIDIR 1.5* 1.2* 1.3*   BILITOT 2.6* 2.1* 2.1*   ALKPHOS 192* 181* 184*     Recent Labs     22  1339   INR 1.76*     Recent Labs     22  1339 22  1759 22  2358   TROPONINI 0.28* 0.26* 0.26*       Consults:     IP CONSULT TO CARDIOLOGY  IP CONSULT TO NEPHROLOGY  IP CONSULT TO INTERVENTIONAL RADIOLOGY  IP CONSULT TO HOSPITALIST  IP CONSULT TO PHARMACY  PHARMACY TO DOSE VANCOMYCIN    Active Hospital Problems    Diagnosis Date Noted    Volume overload [E87.70] 2022     Priority: Medium         ASSESSMENT AND PLAN      Hypotension prior to dialysis:  Need to rule out line infection  procal is elevated, however patient is on HD. Blood cx NGTD  Cont with meropenem and vancomycin and stop if cultures are negative through tomorrow     Elevated troponin, CAD:  Cardiology was consulted in ER, advised to trend troponin  Elevation is likely related to his volume overload and severe CHF  Cont statin, plavix     Questionable COVID infection:  Had positive rapid test on 19, PCR was negative on same date  Repeat PCR negative- stopping isolation     Acute on chronic systolic CHF, CAD:  EF below 20% based on ECHO from March  Fluid removal with dialysis  Cont toprol XL as BP tolerates     ESRD on HD:  Nephrology consulted for dialysis     Recent left upper extremity DVT:  Eliquis      DVT Prophylaxis: eliquis  Diet: ADULT DIET; Regular; Low Potassium (Less than 3000 mg/day); Low Phosphorus (Less than 1000 mg)  Code Status: Full Code    PT/OT Eval Status:ordered    Dispo - inpt, 1-2 days, dc tomorrow if cultures remain negative.      Edison Gaffney MD

## 2022-06-26 NOTE — PROGRESS NOTES
Clinical Pharmacy Progress Note    Vancomycin - Management by Pharmacy    Consult Date(s):  6/24/22  Consulting Provider(s):  Dr. Thornton Flatten / Plan:  1)  Hypotension, sepsis, r/o line infection - Vancomycin   Concurrent Antimicrobials: Meropenem    Day of Vanc Therapy / Ordered Duration: 2 of 7   Current Dosing Method: Intermittent dosing in setting of ESRD on HD Mon-Wed-Fri  o Therapeutic Goal: Trough ~15 mcg/mL  o Random level this AM = 17.2mcg/mL. No HD planned today. Will not give any Vancomycin today. o Will check random level in AM tomorrow prior to HD.  Will continue to monitor clinical condition and make adjustments to regimen as appropriate. Please call with questions--  Thanks--  Latonya Pope, PharmD, BCPS, BCGP  [id number] ([facility])   6/26/2022 11:54 AM        Interval update:  Afebrile. Tachycardic this AM.  Cultures in process. Subjective/Objective:   Gladys Pandey is a 74 y.o. y/o male with a PMHx that includes ESRD on HD Mon-Wed-Fri, HFrEF, DM 2, HTN, cirrhosis, and HLD who is admitted with hypotension prior to HD - being evaluated for possible line infection and acute HF exacerbation. Pharmacy is consulted to dose Vancomycin.     Ht Readings from Last 1 Encounters:   06/24/22 5' 6.5\" (1.689 m)     Wt Readings from Last 1 Encounters:   06/26/22 158 lb 8.2 oz (71.9 kg)       Current and Prior Antimicrobials:  Meropenem 500mg EI q24h (6/25-current)  Vancomycin - Pharmacy to dose   Intermittent dosing (6/25-current)    Date Vancomycin Level Vancomycin Dose   6/25  1000mg   6/26 17.2mcg/mL --   6/27 ordered        Recent Labs     06/24/22  1339 06/24/22  1423 06/25/22  0445 06/26/22  0921   CREATININE 4.1*  --  3.5* 4.5*   BUN 44*  --  32* 42*   WBC  --  4.6 4.3 4.5       CrCl is not estimated 2/2 ESRD on HD    Cultures & Sensitivities:  Date Site Micro Susceptibility / Result   6/25 Blood x2 sent

## 2022-06-26 NOTE — PLAN OF CARE
Problem: Discharge Planning  Goal: Discharge to home or other facility with appropriate resources  Outcome: Progressing  Flowsheets (Taken 6/25/2022 2016)  Discharge to home or other facility with appropriate resources: Identify barriers to discharge with patient and caregiver     Problem: Skin/Tissue Integrity  Goal: Absence of new skin breakdown  Description: 1. Monitor for areas of redness and/or skin breakdown  2. Assess vascular access sites hourly  3. Every 4-6 hours minimum:  Change oxygen saturation probe site  4. Every 4-6 hours:  If on nasal continuous positive airway pressure, respiratory therapy assess nares and determine need for appliance change or resting period.   6/26/2022 0125 by Samuel Brady RN  Outcome: Progressing  6/25/2022 1653 by Carol Lima RN  Outcome: Progressing     Problem: Safety - Adult  Goal: Free from fall injury  6/26/2022 0125 by Samuel Brady RN  Outcome: Progressing  Flowsheets (Taken 6/25/2022 2016)  Free From Fall Injury: Instruct family/caregiver on patient safety  6/25/2022 1653 by Carol Lima RN  Outcome: Progressing     Problem: ABCDS Injury Assessment  Goal: Absence of physical injury  6/26/2022 0125 by Samuel Brady RN  Outcome: Progressing  Flowsheets (Taken 6/25/2022 2016)  Absence of Physical Injury: Implement safety measures based on patient assessment  6/25/2022 1653 by Carol Lima RN  Outcome: Progressing     Problem: Respiratory - Adult  Goal: Achieves optimal ventilation and oxygenation  6/26/2022 0125 by Samuel Brady RN  Outcome: Progressing  Flowsheets (Taken 6/25/2022 2016)  Achieves optimal ventilation and oxygenation:   Assess for changes in respiratory status   Assess for changes in mentation and behavior   Position to facilitate oxygenation and minimize respiratory effort  6/25/2022 1653 by Carol Lima RN  Outcome: Progressing  Flowsheets (Taken 6/25/2022 0343 by Samuel Brady RN)  Achieves optimal ventilation and oxygenation:   Assess for changes in respiratory status   Assess for changes in mentation and behavior     Problem: Cardiovascular - Adult  Goal: Maintains optimal cardiac output and hemodynamic stability  6/26/2022 0125 by Gautam Ríos RN  Outcome: Progressing  Flowsheets (Taken 6/25/2022 2016)  Maintains optimal cardiac output and hemodynamic stability:   Monitor blood pressure and heart rate   Monitor urine output and notify Licensed Independent Practitioner for values outside of normal range   Assess for signs of decreased cardiac output  6/25/2022 1653 by Beba Mcgregor RN  Outcome: Progressing  Goal: Absence of cardiac dysrhythmias or at baseline  Outcome: Progressing  Flowsheets (Taken 6/25/2022 2016)  Absence of cardiac dysrhythmias or at baseline: Monitor cardiac rate and rhythm     Problem: Musculoskeletal - Adult  Goal: Return mobility to safest level of function  6/26/2022 0125 by Gautam Ríos RN  Outcome: Progressing  Flowsheets (Taken 6/25/2022 2016)  Return Mobility to Safest Level of Function:   Assess patient stability and activity tolerance for standing, transferring and ambulating with or without assistive devices   Assist with transfers and ambulation using safe patient handling equipment as needed   Ensure adequate protection for wounds/incisions during mobilization  6/25/2022 1653 by Beba Mcgregor RN  Outcome: Not Progressing  Goal: Maintain proper alignment of affected body part  Outcome: Progressing  Flowsheets (Taken 6/25/2022 2016)  Maintain proper alignment of affected body part:   Support and protect limb and body alignment per provider's orders   Instruct and reinforce with patient and family use of appropriate assistive device and precautions (e.g. spinal or hip dislocation precautions)  Goal: Return ADL status to a safe level of function  Outcome: Progressing  Flowsheets (Taken 6/25/2022 2016)  Return ADL Status to a Safe Level of Function: Administer medication as ordered   Assess activities of daily living deficits and provide assistive devices as needed     Problem: Infection - Adult  Goal: Absence of infection at discharge  Outcome: Progressing  Flowsheets (Taken 6/25/2022 2016)  Absence of infection at discharge:   Assess and monitor for signs and symptoms of infection   Monitor lab/diagnostic results  Goal: Absence of infection during hospitalization  Outcome: Progressing  Flowsheets (Taken 6/25/2022 2016)  Absence of infection during hospitalization:   Assess and monitor for signs and symptoms of infection   Monitor lab/diagnostic results  Goal: Absence of fever/infection during anticipated neutropenic period  Outcome: Progressing  Flowsheets (Taken 6/25/2022 2016)  Absence of fever/infection during anticipated neutropenic period: Monitor white blood cell count     Problem: Metabolic/Fluid and Electrolytes - Adult  Goal: Electrolytes maintained within normal limits  6/26/2022 0125 by Yareli Christensen RN  Outcome: Progressing  Flowsheets (Taken 6/25/2022 2016)  Electrolytes maintained within normal limits: Monitor labs and assess patient for signs and symptoms of electrolyte imbalances  6/25/2022 1653 by Ayaka Horan RN  Outcome: Progressing     Problem: Hematologic - Adult  Goal: Maintains hematologic stability  Outcome: Progressing  Flowsheets (Taken 6/25/2022 2016)  Maintains hematologic stability:   Assess for signs and symptoms of bleeding or hemorrhage   Monitor labs for bleeding or clotting disorders

## 2022-06-26 NOTE — PLAN OF CARE
Problem: Skin/Tissue Integrity  Goal: Absence of new skin breakdown  Description: 1. Monitor for areas of redness and/or skin breakdown  2. Assess vascular access sites hourly  3. Every 4-6 hours minimum:  Change oxygen saturation probe site  4. Every 4-6 hours:  If on nasal continuous positive airway pressure, respiratory therapy assess nares and determine need for appliance change or resting period.   Outcome: Progressing     Problem: Safety - Adult  Goal: Free from fall injury  Outcome: Progressing     Problem: ABCDS Injury Assessment  Goal: Absence of physical injury  Outcome: Progressing     Problem: Respiratory - Adult  Goal: Achieves optimal ventilation and oxygenation  Outcome: Progressing     Problem: Cardiovascular - Adult  Goal: Maintains optimal cardiac output and hemodynamic stability  Outcome: Progressing

## 2022-06-27 PROBLEM — Z99.2 ESRD NEEDING DIALYSIS (HCC): Status: ACTIVE | Noted: 2022-01-01

## 2022-06-27 PROBLEM — N18.6 ESRD NEEDING DIALYSIS (HCC): Status: ACTIVE | Noted: 2022-01-01

## 2022-06-27 PROBLEM — I95.9 HYPOTENSION: Status: ACTIVE | Noted: 2022-01-01

## 2022-06-27 NOTE — DISCHARGE INSTR - COC
Continuity of Care Form    Patient Name: Quentin Tucker   :  1955  MRN:  [id number]    Admit date:  2022  Discharge date:  2022    Code Status Order: Full Code   Advance Directives:      Admitting Physician:  Daryl Preciado MD  PCP: La Chavez DO    Discharging Nurse: Cody Hooker [id number] Unit/Room#: 2584/9584-12  Discharging Unit Phone Number: [phone]    Emergency Contact:   Extended Emergency Contact Information  Primary Emergency Contact: Frida Mendez  Address: [address] [place] [address]. [facility], [address] Phone: [phone]  Relation: Child  Secondary Emergency Contact: Dave Poon  Address: [address] [facility] [address]            [place], [address] [place] [address],[address] Phone: [phone]  Relation: Brother/Sister    Past Surgical History:  Past Surgical History:   Procedure Laterality Date    CARDIAC DEFIBRILLATOR PLACEMENT      EYE SURGERY      IR NONTUNNELED VASCULAR CATHETER  2022    IR NONTUNNELED VASCULAR CATHETER 2022 [facility] PROCED[facility]    IR NONTUNNELED VASCULAR CATHETER Left 2022    Vascath; LIJ access; 20cm; Dr. Yanet Kennedy - removed 22    IR NONTUNNELED VASCULAR CATHETER  2022    IR NONTUNNELED VASCULAR CATHETER 2022 [facility] PROCED[facility]    IR TUNNELED CATHETER PLACEMENT GREATER THAN 5 YEARS  2022    IR TUNNELED CATHETER PLACEMENT GREATER THAN 5 YEARS 2022 [facility] PROC[facility]    IR TUNNELED CATHETER PLACEMENT GREATER THAN 5 YEARS Left 2022    Permacath; LIJ access; 23cm; Dr. Yanet Kennedy    IR TUNNELED Prerna Homans 5 YEARS  2022    IR TUNNELED CATHETER PLACEMENT GREATER THAN 5 YEARS 2022 [facility] PROC[facility]       Immunization History:   Immunization History   Administered Date(s) Administered    COVID-19, Pfizer Purple top, DILUTE for use, 12+ yrs, 30mcg/0.3mL dose 2021, 2021, 2021    Influenza Virus Vaccine 2017 Influenza, Quadv, IM, PF (6 mo and older Fluzone, Flulaval, Fluarix, and 3 yrs and older Afluria) 11/30/2021       Active Problems:  Patient Active Problem List   Diagnosis Code    ED (erectile dysfunction) N52.9    Essential hypertension, benign I10    Hyperlipidemia E78.5    Diabetic nephropathy ([facility] E11.21    Diabetes type 2, uncontrolled ([facility] E11.65    Noncompliance Z91.19    Elevated blood uric acid level E79.0    Coronary artery disease involving native coronary artery I25.10    Elevated PSA R97.20    Acute on chronic combined systolic and diastolic HF (heart failure) ([facility]) I50.43    Stage 3 chronic kidney disease ([facility]) N18.30    SONAL (acute kidney injury) ([facility] N17.9    Biventricular heart failure ([facility]) I50.82    Acute on chronic systolic heart failure, NYHA class 4 ([facility]) I50.23    Acute on chronic congestive heart failure ([facility]) I50.9    LV (left ventricular) mural thrombus I51.3    Syncope R55    CHF (congestive heart failure) ([facility]) I50.9    Syncope and collapse R55    ICD (implantable cardioverter-defibrillator) in place Z95.810    Demand ischemia ([facility] I24.8    Ventricular tachycardia ([facility] I47.2    Elevated LFTs R79.89    Supratherapeutic INR R79.1    Diarrhea R19.7    Hyperkalemia E87.5    Volume overload E87.70    Hypotension I95.9    ESRD needing dialysis ([facility] N18.6, Z99.2       Isolation/Infection:   Isolation            No Isolation          Patient Infection Status       Infection Onset Added Last Indicated Last Indicated By Review Planned Expiration Resolved Resolved By    COVID-19 06/16/22 06/16/22 06/16/22 COVID-19, Rapid 06/23/22 06/30/22      Resolved    C-diff Rule Out 05/17/22 05/17/22 05/17/22 Clostridium Difficile Toxin/Antigen (Ordered)   05/17/22 Rule-Out Test Resulted    C-diff Rule Out 02/07/22 02/07/22 02/07/22 GI Bacterial Pathogens By PCR (Ordered)   02/07/22 Skyla Mancera RN            Nurse Assessment:  Last Vital Signs: /77   Pulse (!) 105   Temp 98.2 °F (36.8 °C) (Oral)   Resp 18   Ht 5' 6.5\" (1.689 m)   Wt 150 lb 9.2 oz (68.3 kg)   SpO2 95%   BMI 23.94 kg/m²     Last documented pain score (0-10 scale): Pain Level: 0  Last Weight:   Wt Readings from Last 1 Encounters:   06/27/22 150 lb 9.2 oz (68.3 kg)     Mental Status:  oriented and alert    IV Access:  - None    Nursing Mobility/ADLs:  Walking   Assisted  Transfer  Assisted  Bathing  Assisted  Dressing  Assisted  Toileting  Assisted  Feeding  Independent  Med Admin  Assisted  Med Delivery   whole    Wound Care Documentation and Therapy:  Wound 06/11/22 Sacrum Mid stage 2 pressure injury (Active)   Wound Assessment Dry 06/16/22 1245   Drainage Amount None 06/16/22 1245   Saritha-wound Assessment Intact 06/16/22 1245   Margins Defined edges 06/16/22 1245   Number of days: 15        Elimination:  Continence: Bowel: No  Bladder: No  Urinary Catheter: None   Colostomy/Ileostomy/Ileal Conduit: No       Date of Last BM: 06/26/2022    Intake/Output Summary (Last 24 hours) at 6/27/2022 1024  Last data filed at 6/27/2022 0647  Gross per 24 hour   Intake 300 ml   Output --   Net 300 ml     I/O last 3 completed shifts: In: 65 [P.O.:520; IV Piggyback:100]  Out: -     Safety Concerns: At Risk for Falls    Impairments/Disabilities:      None    Nutrition Therapy:  Current Nutrition Therapy:   - Oral Diet:  General and low potassium (>3000mg/day)m, Low Phosphorus (Less than 1000mg/day)    Routes of Feeding: Oral  Liquids: No Restrictions  Daily Fluid Restriction: no  Last Modified Barium Swallow with Video (Video Swallowing Test): not done    Treatments at the Time of Hospital Discharge:   Respiratory Treatments: Oxygen  Oxygen Therapy:  is on oxygen at 2 L/min per nasal cannula. Room Air is baseline for patient  Ventilator:    - No ventilator support    Heart Failure Instructions for Daily Management  Patient was treated for acute on chronic systolic heart failure.   he  will require the following:    Please weigh daily on the same scale and approximately the same time of day. Report weight gain of 3 pounds/day or 5 pounds/week to : facility MD, Nemesio Greer [phone], and [phone] [facility] [phone]. Please use hospital discharge weight as baseline reference. Please monitor for signs and symptoms of and report to MD:  Worsening Heart Failure: sudden weight gain, shortness of breath, lower extremity or general edema/swelling, abdominal bloating/swelling, inability to lie flat, intolerance to usual activity, or cough (especially at night). Report these finding even if no increase in weight. Dehydration:  having difficulty or a decrease in urination, dizziness, worsening fatigue, or new onset/worsening of generalized weakness. Please continue a LOW SODIUM diet and LIMIT fluid intake to 48 - 64 ounces ( 1.5 - 2 liters) per day. Call Nemesio Greer [phone], vinicio MONTERO, and [address] [place] [address] [phone] with any questions or concerns. Please continue heart failure education to patient and family/support system. See After Visit Summary for hospital follow up appointment details. Consider spiritual care referral for support and/or completion of advance directives . Consider: having the facility MD complete required 7 day follow up, [place] [address] telehealth program if patient agreeable and able to participate, and palliative care consult for ongoing goals of care, end of life, and/or chronic disease management discussions. Patient's primary cardiologist is Dr Mynor Gambino.       Rehab Therapies: Physical Therapy and Occupational Therapy  Weight Bearing Status/Restrictions: No weight bearing restrictions  Other Medical Equipment (for information only, NOT a DME order):  walker  Other Treatments: N/a    Patient's personal belongings (please select all that are sent with patient):  None    RN SIGNATURE:  Electronically signed by Carolina Webster RN on 6/27/22 at 2:38 PM EDT    CASE MANAGEMENT/SOCIAL WORK SECTION    Inpatient Status Date: 06.24.2022    Readmission Risk Assessment Score:  Readmission Risk              Risk of Unplanned Readmission:  47           Discharging to Facility/ Agency   Name: [facility]  Address:  Phone:[phone]  Fax:[phone]    Dialysis Facility (if applicable)   Name:  Address:  Dialysis Schedule:  Phone:  Fax:    / signature: Electronically signed by Juliano Silva RN on 6/27/22 at 2:47 PM EDT    PHYSICIAN SECTION    Prognosis: Good    Condition at Discharge: Stable    Rehab [facility] (if transferring to Rehab): Fair    Recommended Labs or Other Treatments After Discharge:     PT/OT eval and treat    Supplemental O2 as needed to kep sats above 88%    HD as per nephrology    Palliative care follow up with [facility] team ([phone])- intake to see patient 06/28/22 at [facility]    Physician Certification: I certify the above information and transfer of [place]  is necessary for the continuing treatment of the diagnosis listed and that he requires [facility] for greater 30 days.      Update Admission H&P: No change in H&P    PHYSICIAN SIGNATURE:  Electronically signed by Lucillie Simmonds, MD on 6/27/22 at 10:25 AM EDT

## 2022-06-27 NOTE — PROGRESS NOTES
Interval History and plan:      Still significant blood pressure issue with dialysis otherwise stable  BP is stable  He is afebrile   Blood culture NGT    Plan:    Continue ProAmatine with dialysis and 3 times a day. Follow with vitals and volume   On ABX for possible infection   HD today with a low K bath   Continue procrit with dialysis                        Assessment :     1. ESRD:  Will plan HD per schedule. He gets dialysis Monday, Wednesday and Friday. Access: Hemodialysis catheter  Daily weights  Fluid restriction: 1 L  Nephrocap 1 tab PO daily    2. Anemia:  Erythropoetin dose: Continue Procrit with dialysis to keep hemoglobin between 10-12. I will check iron studies. 3.  Osteodystrophy:  Phosphate Binder: He is currently not on binders. 4.  Fluid overload/ascites: I will arrange for dialysis to remove volume. He has chronically low blood pressure we will give him midodrine with dialysis. We will support him with albumin as needed. He is status post antibiotics which he completed on June 17      [facility] would like to thank Catrachita Gilbert MD   for opportunity to serve this patient      Please call with questions at-   24 Hrs Answering service [phone] or  7 am- 5 pm via Perfect serve or cell phone  Lopez Jones MD          CC/reason for consult :     ESRD on hemodialysis     HPI :     Ian Mackey is a 74 y.o. male presented to   the hospital on 6/24/2022 with hypotension    He has past medical history of congestive heart failure, liver cirrhosis, type 2 diabetes, complications from diabetes including diabetic nephropathy and Retinopathy, hypertension, history of noncompliance and ESRD. He gets dialysis on Monday, Wednesday and Friday. He goes to [facility] home and get dialysis inpatient. He used to go for dialysis dialysis at [facility]. He went for dialysis today and was found to have a systolic blood pressure of 90s.   He has chronically low blood pressure and take midodrine. Dialysis staff did not do dialysis sent him to the ER. He does not have any fever, chills, shortness of breath, orthopnea. On arrival potassium is 5.1 and a creatinine of 4.1. His BNP is more than 70,000. Ultrasound abdomen showed abdominal ascites. Chest x-ray suggestive of pulmonary edema. I was called for further management of ESRD and need for dialysis. ROS:     Seen with-resident    positives in bold   Constitutional:  fever, chills, weakness, weight change, fatigue  Skin:  rash, pruritus, hair loss, bruising, dry skin, petechiae  Head, Face, Neck   headaches, swelling,  cervical adenopathy  Respiratory: shortness of breath, cough, or wheezing  Cardiovascular: chest pain, palpitations, dizzy, edema  Gastrointestinal: nausea, vomiting, diarrhea, constipation,belly pain    Yellow skin, blood in stool  Musculoskeletal:  back pain, muscle weakness, gait problems,       joint pain or swelling. Genitourinary:  dysuria, poor urine flow, flank pain, blood in urine  Neurologic:  vertigo, TIA'S, syncope, seizures, focal weakness  Psychosocial:  insomnia, anxiety, or depression.   Additional positive findings:                    All other remaining systems are negative or unable to obtain        PMH/PSH/SH/Family History:     Past Medical History:   Diagnosis Date    Anemia     Cardiomegaly     CHF (congestive heart failure) (HCC)     Cirrhosis ([address]     Diabetes type 2, uncontrolled ([address] 7/28/2014    Diabetic nephropathy 9/12/2013    ED (erectile dysfunction) 9/12/2013    Essential hypertension, benign 9/12/2013    Hemodialysis access, fistula mature ([address]     Hyperlipidemia 9/12/2013    Noncompliance 7/28/2014    Respiratory failure (HCC)     Tachycardia        Past Surgical History:   Procedure Laterality Date    CARDIAC DEFIBRILLATOR PLACEMENT      EYE SURGERY      IR NONTUNNELED VASCULAR CATHETER  04/04/2022    IR NONTUNNELED VASCULAR CATHETER 4/4/2022 [facility] PROCED[facility]    IR NONTUNNELED VASCULAR CATHETER Left 05/26/2022    Vascath; Roxana Hamer access; 20cm; Dr. Hermelindo North - removed 5/31/22    IR NONTUNNELED VASCULAR CATHETER  05/26/2022    IR NONTUNNELED VASCULAR CATHETER 5/26/2022 [facility] PROCEDURES    IR TUNNELED CATHETER PLACEMENT GREATER THAN 5 YEARS  04/08/2022    IR TUNNELED CATHETER PLACEMENT GREATER THAN 5 YEARS 4/8/2022 [facility] PROCEDURES    IR TUNNELED CATHETER PLACEMENT GREATER THAN 5 YEARS Left 05/31/2022    Permacath; LIJ access; 23cm; Dr. Jose De Jesus Alcantar IR TUNNELED Jj Moan 5 YEARS  5/31/2022    IR TUNNELED CATHETER PLACEMENT GREATER THAN 5 YEARS 5/31/2022 [facility] PROCEDURES        reports that he quit smoking about 41 years ago. He has a 10.50 pack-year smoking history. He has never used smokeless tobacco. He reports current alcohol use of about 1.0 standard drink of alcohol per week. He reports that he does not use drugs. family history includes Diabetes in his brother, mother, and sister; High Blood Pressure in his mother.          Medication:     Current Facility-Administered Medications: vancomycin (VANCOCIN) 1,000 mg in dextrose 5 % 250 mL IVPB, 1,000 mg, IntraVENous, Once  vancomycin (VANCOCIN) intermittent dosing (placeholder), , Other, See Admin Instructions  allopurinol (ZYLOPRIM) tablet 100 mg, 100 mg, Oral, Daily  apixaban (ELIQUIS) tablet 2.5 mg, 2.5 mg, Oral, BID  atorvastatin (LIPITOR) tablet 80 mg, 80 mg, Oral, Nightly  clopidogrel (PLAVIX) tablet 75 mg, 75 mg, Oral, Daily  levothyroxine (SYNTHROID) tablet 50 mcg, 50 mcg, Oral, QAM AC  metoprolol succinate (TOPROL XL) extended release tablet 12.5 mg, 12.5 mg, Oral, Daily  pantoprazole (PROTONIX) tablet 40 mg, 40 mg, Oral, BID AC  insulin lispro (1 Unit Dial) 0-12 Units, 0-12 Units, SubCUTAneous, TID WC  insulin lispro (1 Unit Dial) 0-6 Units, 0-6 Units, SubCUTAneous, Nightly  glucose chewable tablet 16 g, 4 tablet, Oral, PRN  dextrose bolus 10% 125 mL, 125 mL, IntraVENous, PRN **OR** dextrose bolus 10% 250 mL, 250 mL, IntraVENous, PRN  glucagon (rDNA) injection 1 mg, 1 mg, IntraMUSCular, PRN  dextrose 5 % solution, 100 mL/hr, IntraVENous, PRN  midodrine (PROAMATINE) tablet 10 mg, 10 mg, Oral, TID WC  heparin (porcine) injection 1,000 Units, 1,000 Units, IntraVENous, Once  epoetin roseann-epbx (RETACRIT) injection 10,000 Units, 10,000 Units, IntraVENous, Once per day on Mon Wed Fri  0.9 % sodium chloride bolus, 100 mL, IntraVENous, PRN  midodrine (PROAMATINE) tablet 5 mg, 5 mg, Oral, Once  sodium chloride flush 0.9 % injection 5-40 mL, 5-40 mL, IntraVENous, 2 times per day  sodium chloride flush 0.9 % injection 5-40 mL, 5-40 mL, IntraVENous, PRN  0.9 % sodium chloride infusion, , IntraVENous, PRN  ondansetron (ZOFRAN-ODT) disintegrating tablet 4 mg, 4 mg, Oral, Q8H PRN **OR** ondansetron (ZOFRAN) injection 4 mg, 4 mg, IntraVENous, Q6H PRN  polyethylene glycol (GLYCOLAX) packet 17 g, 17 g, Oral, Daily PRN  acetaminophen (TYLENOL) tablet 650 mg, 650 mg, Oral, Q6H PRN **OR** acetaminophen (TYLENOL) suppository 650 mg, 650 mg, Rectal, Q6H PRN  heparin (porcine) injection 3,600 Units, 3,600 Units, IntraCATHeter, PRN  [COMPLETED] meropenem (MERREM) 1,000 mg in sodium chloride 0.9 % 100 mL IVPB (mini-bag), 1,000 mg, IntraVENous, Once **FOLLOWED BY** meropenem (MERREM) 500 mg IVPB (mini-bag), 500 mg, IntraVENous, Q24H       Vitals :     Vitals:    06/27/22 0410   BP: 108/78   Pulse: (!) 103   Resp: 18   Temp: 98.6 °F (37 °C)   SpO2: 95%       I & O :       Intake/Output Summary (Last 24 hours) at 6/27/2022 0830  Last data filed at 6/27/2022 4520  Gross per 24 hour   Intake 300 ml   Output --   Net 300 ml        Physical Examination :     General appearance: Anxious- no, distressed- no, in good spirits-     HEENT: Lips- normal, teeth- ok , oral mucosa- moist  Neck : Mass- no, appears symmetrical, JVD- +  Respiratory: Respiratory effort-  normal, wheeze- no, crackles -   Cardiovascular:  Ausculation- No M/R/G, Edema ++  Abdomen: visible mass- no, distention- no, scar- no, tenderness- no                            hepatosplenomegaly-  no  Musculoskeletal:  clubbing no,cyanosis- no , digital ischemia- no                           muscle strength- grossly normal , tone - grossly normal  Skin: rashes- no , ulcers- no, induration- no, tightening - no  Psychiatric: Not evaluated  Additional finding:      LABS:     Recent Labs     06/25/22  0445 06/26/22  0921 06/27/22  0508   WBC 4.3 4.5 4.9   HGB 8.6* 9.2* 9.1*   HCT 27.1* 28.9* 28.6*    175 182     Recent Labs     06/25/22  0445 06/26/22  0921 06/27/22  0508    138 137   K 4.1 4.7 5.9*    100 100   CO2 25 26 22   BUN 32* 42* 51*   CREATININE 3.5* 4.5* 4.9*   GLUCOSE 277* 131* 133*   PHOS 4.7 6.1* 7.2*      Nephrology  [address] [facility], [address] [facility]  Office: [phone]  Fax: [phone]

## 2022-06-27 NOTE — CONSULTS
[facility]  Palliative Medicine Consultation Note      Date Of Admission:6/24/2022  Date of consult: 06/27/22  Seen by [facility] in the past:  Yes- at an outside hospital    Recommendations:        Saw pt on dialysis and he was responding to questions appropriately but seemed tired and kept his eyes closed. He said he was still thinking about who he wants to make his HCPOA. I offered an outpatient palliative care referral to follow up on completing advance directives, and he was in agreement. He said he was making progress at [employer] and his goal is to return home to the apartment he shared with his son. With pt's permission, I called his son Maylin and left a message. [facility] who will make referral to outpatient palliative care. 1. Goals of Care/Advanced Care planning/Code status: Full Code, did not discuss with pt today. He hopes to get stronger at the SNF and then return to the apartment he shares with his son. 2. Pain: pt denies pain. 3. SOB: denies sob but his breathing appears labored on 2 L oxygen. He had groundglass opacities on CXR, covid was initially positive but then later tested negative. He's having fluid removed with dialysis. 4. Disposition: d/c to [facility] at 6 pm with outpatient palliative care referral    Reason for Consult:         [x]  Goals of Care  [x]  Code Status Discussion/Advanced Care Planning   []  Psychosocial/Family Support  []  Symptom Management  []  Other (Specify)    Requesting Physician: Dr. Jose Salinas:  Fatigue, hypotension    History Obtained From:  electronic medical record    History of Present Illness:         Lizette Stewart is a 74 y.o. male with PMH of ESRD on HD MWF, CHF with EF below 20% who presented with fatigue and hypotension. Patient had several admissions at outside facility in the past few months, most notable one was for HD line infection. He completed course of cefepime mid June.  He had a covid+ test 6/24 and had groundglass opacities on CXR, though his PCR was negative on the same day as he got the positive result on a rapid test. The repeat PCR test was negative.       Subjective:         Past Medical History:        Diagnosis Date    Anemia     Cardiomegaly     [facility]     Cirrhosis (HCC)     Diabetes type 2, uncontrolled ([facility] 7/28/2014    Diabetic nephropathy 9/12/2013    ED (erectile dysfunction) 9/12/2013    Essential hypertension, benign 9/12/2013    Hemodialysis access, fistula mature ([facility]     Hyperlipidemia 9/12/2013    Noncompliance 7/28/2014    Respiratory failure (HCC)     Tachycardia        Past Surgical History:        Procedure Laterality Date    CARDIAC DEFIBRILLATOR PLACEMENT      EYE SURGERY      IR NONTUNNELED VASCULAR CATHETER  04/04/2022    IR NONTUNNELED VASCULAR CATHETER 4/4/2022 [facility]    IR NONTUNNELED VASCULAR CATHETER Left 05/26/2022    Maurice Ambriz; LIJ access; 20cm; Dr. Fabiola May - removed 5/31/22    IR NONTUNNELED VASCULAR CATHETER  05/26/2022    IR NONTUNNELED VASCULAR CATHETER 5/26/2022 [facility]    IR TUNNELED CATHETER PLACEMENT GREATER THAN 5 YEARS  04/08/2022    IR TUNNELED CATHETER PLACEMENT GREATER THAN 5 YEARS 4/8/2022 [facility]    IR TUNNELED CATHETER PLACEMENT GREATER THAN 5 YEARS Left 05/31/2022    Permacath; LIJ access; 23cm; Dr. Sathya Henry IR TUNNELED Reynaldo Otter 5 YEARS  5/31/2022    IR TUNNELED CATHETER PLACEMENT GREATER THAN 5 YEARS 5/31/2022 [facility]       Current Medications:    Medications Prior to Admission: apixaban (ELIQUIS) 2.5 MG TABS tablet, Take 2.5 mg by mouth 2 times daily  midodrine (PROAMATINE) 10 MG tablet, Take 1 tablet by mouth 3 times daily (with meals)  [DISCONTINUED] apixaban (ELIQUIS) 5 MG TABS tablet, Take 2 tablets by mouth 2 times daily for 10 doses  [DISCONTINUED] apixaban (ELIQUIS) 5 MG TABS tablet, Take 1 tablet by mouth 2 times daily  metoprolol succinate (TOPROL XL) 25 MG extended release tablet, Take 0.5 tablets by mouth daily  clopidogrel (PLAVIX) 75 MG tablet, Take 75 mg by mouth daily  glucose monitoring (FREESTYLE FREEDOM) kit, 1 kit by Does not apply route daily  levothyroxine (SYNTHROID) 50 MCG tablet, Take 1 tablet by mouth every morning (before breakfast)  pantoprazole (PROTONIX) 40 MG tablet, Take 1 tablet by mouth 2 times daily (before meals)  allopurinol (ZYLOPRIM) 100 MG tablet, Take 1 tablet by mouth daily  Multiple Vitamins-Minerals (THERAPEUTIC MULTIVITAMIN-MINERALS) tablet, Take 1 tablet by mouth daily  atorvastatin (LIPITOR) 80 MG tablet, Take 80 mg by mouth at bedtime     Allergies:  Patient has no known allergies. Social History:    · TOBACCO: reports that he quit smoking about 41 years ago. He has a 10.50 pack-year smoking history. He has never used smokeless tobacco.  · ETOH:   reports current alcohol use of about 1.0 standard drink of alcohol per week. · Patient currently lives in a nursing home    Review of Systems -   Review of Systems   Constitutional: Positive for fatigue. HENT: Negative. Respiratory: Negative. Cardiovascular: Positive for leg swelling. Gastrointestinal: Negative. Genitourinary: Negative. Musculoskeletal: Negative. Skin: Negative. Neurological: Positive for weakness. Psychiatric/Behavioral: Negative.    :     Objective:        Physical Exam  Constitutional:       Appearance: He is ill-appearing. HENT:      Head: Normocephalic and atraumatic. Cardiovascular:      Rate and Rhythm: Normal rate and regular rhythm. Pulmonary:      Breath sounds: Normal breath sounds. Comments: Mildly labored breathing  Abdominal:      General: Bowel sounds are normal.      Palpations: Abdomen is soft. Musculoskeletal:      Right lower leg: Edema present. Left lower leg: Edema present. Skin:     General: Skin is warm and dry.    Neurological:      Mental Status: He is oriented to person, place, and time. Palliative Performance Scale:  [] 60% Ambulation reduced; Significant disease; Can't do hobbies/housework; intake normal or reduced; occasional assist; LOC full/confusion  [x] 50% Mainly sit/lie; Extensive disease; Can't do any work; Considerable assist; intake normal  Or reduced; LOC full/confusion  [] 40% Mainly in bed; Extensive disease; Mainly assist; intake normal or reduced; occasional assist; LOC full/confusion  [] 30% Bed Bound; Extensive disease; Total care; intake reduced; LOC full/confusion  [] 20% Bed Bound; Extensive disease; Total care; intake minimal; Drowsy/coma  [] 10% Bed Bound; Extensive disease; Total care; Mouth care only; Drowsy/coma  [] 0% Death      Vitals:    /77   Pulse (!) 105   Temp 98.2 °F (36.8 °C) (Oral)   Resp 18   Ht 5' 6.5\" (1.689 m)   Wt 150 lb 9.2 oz (68.3 kg)   SpO2 95%   BMI 23.94 kg/m²     Labs:    BMP:   Recent Labs     06/25/22  0445 06/26/22  0921 06/27/22  0508    138 137   K 4.1 4.7 5.9*    100 100   CO2 25 26 22   BUN 32* 42* 51*   CREATININE 3.5* 4.5* 4.9*   GLUCOSE 277* 131* 133*     CBC:   Recent Labs     06/25/22  0445 06/26/22  0921 06/27/22  0508   WBC 4.3 4.5 4.9   HGB 8.6* 9.2* 9.1*   HCT 27.1* 28.9* 28.6*    175 182       LFT's:   Recent Labs     06/25/22  0445 06/26/22  0921 06/27/22  0508   AST 30 28 31   ALT 19 19 18   BILITOT 2.1* 2.1* 2.3*   ALKPHOS 181* 184* 188*     Troponin:   Recent Labs     06/24/22  1339 06/24/22  1759 06/24/22  2358   TROPONINI 0.28* 0.26* 0.26*     BNP: No results for input(s): BNP in the last 72 hours. ABGs: No results for input(s): PHART, FGD9JPJ, PO2ART in the last 72 hours.   INR:   Recent Labs     06/24/22  1339   INR 1.76*       U/A:No results for input(s): NITRITE, COLORU, PHUR, LABCAST, WBCUA, RBCUA, MUCUS, TRICHOMONAS, YEAST, BACTERIA, CLARITYU, SPECGRAV, LEUKOCYTESUR, UROBILINOGEN, BILIRUBINUR, BLOODU, GLUCOSEU, AMORPHOUS in the last 72 hours.    Invalid input(s): KETONESU    US ABDOMEN LIMITED   Final Result   1. Abdominal ascites, free fluid. 2. Options are to hold patient's anticoagulation and performed study on elective basis or communicate alternatives with regards to abdominal paracentesis with family regarding elective diagnostic and/or therapeutic fluid removal.      XR CHEST PORTABLE   Final Result   1. Severe cardiac enlargement, unchanged   3. Pulmonary bilateral diffuse groundglass airspace disease is redemonstrated with no significant interval changes. Conclusion/Time spent:         Recommendations see above    Pt [id number]  Time spent with patient and/or family: 10 min  Time reviewing records: 10 min   Time communicating with staff: 5 min     A total of 25 minutes spent with the patient and family on unit greater than 50% in counseling regarding palliative care and in goals of care for the patient. Thank you to Dr. Faviola Medina for this consultation. We will continue to follow Mr. [facility] as needed.     Shavonne Sidhu NP  [address]

## 2022-06-27 NOTE — PROGRESS NOTES
Physical Therapy  Facility/Department: [place] [address] [facility]  Physical Therapy Initial Assessment    Name: Joelle Hinton  : 1955  MRN: [id number]  Date of Service: 2022    Discharge Recommendations:Cedric Molina scored a 13/24 on the AM-PAC short mobility form. Current research shows that an AM-PAC score of 17 or less is typically not associated with a discharge to the patient's home setting. Based on the patient's AM-PAC score and their current functional mobility deficits, it is recommended that the patient have 3-5 sessions per week of Physical Therapy at d/c to increase the patient's independence. Please see assessment section for further patient specific details. If patient discharges prior to next session this note will serve as a discharge summary. Please see below for the latest assessment towards goals. PT Equipment Recommendations  Equipment Needed:  (defer to next level of care)      Patient Diagnosis(es): The primary encounter diagnosis was Other fatigue. A diagnosis of ESRD needing dialysis [facility]) was also pertinent to this visit. Past Medical History:  has a past medical history of Anemia, Cardiomegaly, CHF (congestive heart failure) ([address] Cirrhosis ([address] Diabetes type 2, uncontrolled ([address] Diabetic nephropathy, ED (erectile dysfunction), Essential hypertension, benign, Hemodialysis access, fistula mature ([address] Hyperlipidemia, Noncompliance, Respiratory failure ([address] and Tachycardia. Past Surgical History:  has a past surgical history that includes eye surgery; IR NONTUNNELED VASCULAR CATHETER > 5 YEARS (2022); IR TUNNELED CVC PLACE WO SQ PORT/PUMP > 5 YEARS (2022); IR NONTUNNELED VASCULAR CATHETER > 5 YEARS (Left, 2022); IR NONTUNNELED VASCULAR CATHETER > 5 YEARS (2022); IR TUNNELED CVC PLACE WO SQ PORT/PUMP > 5 YEARS (Left, 2022); IR TUNNELED CVC PLACE WO SQ PORT/PUMP > 5 YEARS (2022); and Cardiac defibrillator placement.     Assessment   Assessment: 75 yo admitted 6/24/22 for volume overload/CHF. Pt demo mobility below his reported baseline of independent in apartment with son. Pt plans to return to SNF at discharge. Pt would benefit from continued skilled IP PT at discharge to maximize his functional independence prior to d/c home. Treatment Diagnosis: mobility impairment due to volume overload  Decision Making: Medium Complexity  Requires PT Follow-Up: Yes  Activity Tolerance  Activity Tolerance: Patient limited by fatigue;Patient limited by endurance     Plan   Plan  Plan:  (2-5)  Safety Devices  Type of Devices: Nurse notified,Chair alarm in place,Call light within reach,Left in chair (pt reclined)     Restrictions  Position Activity Restriction  Other position/activity restrictions: up with assist     Subjective   General  Chart Reviewed: Yes  Additional Pertinent Hx: 74 y.o. y/o male with a PMHx that includes ESRD on HD Mon-Wed-Fri, HFrEF, DM 2, HTN, cirrhosis, and HLD who is admitted  6/24/22with hypotension prior to HD. Family / Caregiver Present: No  Diagnosis: CHF exacerbation  Follows Commands: Within Functional Limits  Subjective  Subjective: Pt found supine in bed and agreeable to PT with encouragement. Pt initially refusing session then later realized therapy was there to work with him and he became cooperative. Pt denies any pain. Social/Functional History  Social/Functional History  Type of Home: Facility ([place] [address] [facility] acute SNF)  Additional Comments: Per notes, pt in hospital 5/17-6/3/22 d/c to SNF then readmit [facility] 6/11-6/17/22 for hyperkalemia and now readmit to [facility] 6/24/22. Pt was living with son in apartment prior to 5/17/22 per notes.  States he's been using walker with therapy at rehab     Vision/Hearing  Vision  Vision: Impaired  Vision Exceptions: Wears glasses at all times  Hearing  Hearing: Within functional limits      Cognition   Orientation  Overall Orientation Status: Within Functional Limits  Cognition  Overall Cognitive Status:  (confused at times)     Objective                 AROM RLE (degrees)  RLE AROM: WFL  AROM LLE (degrees)  LLE AROM : WFL     Strength RLE  Strength RLE:  (3+/5 throughout)  Strength LLE  Strength LLE:  (3+/5 grossly throughout)              Transfers  Sit to Stand: Moderate Assistance (with max vc; x2 trials; LOB posterior, effortful)  Stand to sit: Moderate Assistance (with max vc for hand placement; x2 trials; uncontrolled descension)     Ambulation  Device: Rolling Walker  Assistance: Moderate assistance  Quality of Gait: posterior lean with forward posture; decreased step length/height; fatigued quickly on 4L 02  Distance: 3 steps to chair  Comments: Pt declined further ambulation due to fatigue. Stairs/Curb  Stairs? :  (unable to assess due to poor activity tolerance this am)              AM-PAC Score  AM-PAC Inpatient Mobility Raw Score : 13 (06/27/22 0941)  AM-PAC Inpatient T-Scale Score : 36.74 (06/27/22 0941)  Mobility Inpatient CMS 0-100% Score: 64.91 (06/27/22 0941)  Mobility Inpatient CMS G-Code Modifier : CL (06/27/22 0941)          Goals  Short Term Goals  Time Frame for Short term goals: discharge  Short term goal 1: sit to/from supine supervision  Short term goal 2: sit to/from stand supervision  Short term goal 3: ambulate 40 ft with RW supervision  Patient Goals   Patient goals : eventual return home       Education  Patient Education  Education Given To: Patient  Education Provided: Role of Therapy;Plan of Care  Education Method: Verbal  Barriers to Learning: Cognition  Education Outcome: Verbalized understanding;Continued education needed      Therapy Time   Individual Concurrent Group Co-treatment   Time In 0805         Time Out 0844         Minutes 39           Timed Code Treatment Minutes: 29      Total Treatment Minutes:  [address], PT     Second session: time in 1100  Time out 1115  Total time: 15 mins    S: Pt agreeable to get back in bed.   O: transfers:  sit to/from stand max assist x2 (partially due to broken recliner foot piece)                         Bed to chair: max assist x2 via modified stand pivot transfer (unable to adequately position chair                                              due to  Partially broken recliner foot piece)      Bed mobility: sit to supine max assist x2    A: Pt tolerated session well but limited by lethargy/broken recliner foot piece. Pt still planning to return to SNF at discharge. Pt would benefit from continued skilled IP PT at discharge. RN aware. This note will serve as a discharge summary if patient is discharged from hospital before next treatment session. P: Continue per POC.    Jessica Hamm, PT

## 2022-06-27 NOTE — CARE COORDINATION
Case Management Assessment           Initial Evaluation                Date / Time of Evaluation: 6/27/2022 11:32 AM                 Assessment Completed by: Neida Valencia RN    Patient Name: Francisco Dobson     YOB: 1955  Diagnosis: Volume overload [E87.70]  ESRD needing dialysis ([facility] [N18.6, Z99.2]  Other fatigue [R53.83]     Date / Time: 6/24/2022 12:36 PM    Patient Admission Status: Inpatient    If patient is discharged prior to next notation, then this note serves as note for discharge by case management. Current PCP: Kristine Burden DO  Clinic Patient: No    Chart Reviewed: Yes  Patient/ Family Interviewed: Yes    Initial assessment completed at bedside with: patient    Hospitalization in the last 30 days: Yes    Emergency Contacts:  Extended Emergency Contact Information  Primary Emergency Contact: Rachelle Short  Address: [address] [place] [address]. [address] [place] [address] Phone: [phone]  Relation: Child  Secondary Emergency Contact: [facility]  Address: [place] [address]. [address] [place] [address], [address] [place] [address] Phone: [phone]  Relation: Brother/Sister    Advance Directives:   Code Status: Full [address]: No  Agent:   Contact Number:     Copy present: No     In paper Chart: No    Scanned into EMR No    Financial  Payor: Zainab Fleming / Plan: [place] / Product Type: *No Product type* /     Pre-cert required for SNF: Yes    Pharmacy    [place] [address] [address] [phone] Aneta Christensen [phone]  [address]  [address] [address]  Phone: [phone] Fax: [phone]      Potential assistance Purchasing Medications: Potential Assistance Purchasing Medications: No  Does Patient want to participate in local refill/ meds to beds program?: No    Meds To Beds General Rules:  1. Can ONLY be done Monday- Friday between 8:30am-5pm  2. Prescription(s) must be in pharmacy by 3pm to be filled same day  3. Copy of patient's insurance/ prescription drug card and patient face sheet must be sent along with the prescription(s)  4. Cost of Rx cannot be added to hospital bill. If financial assistance is needed, please contact unit  or ;  or  CANNOT provide pharmacy voucher for patients co-pays  5. Patients can then  the prescription on their way out of the hospital at discharge, or pharmacy can deliver to the bedside if staff is available. (payment due at time of pick-up or delivery - cash, check, or card accepted)     Able to afford home medications/ co-pay costs: Yes    ADLS  Support Systems: Children    PT AM-PAC: 13 /24  OT AM-PAC: 13 /24    HOUSING  Home Environment: From home with son prior to SNF admission at [facility]  Steps:     Plans to RETURN to current housing: Yes  Barriers to RETURNING to current housing: return to SNF prior to return home with son    [address]  Currently ACTIVE with [address]: No  Home Care Agency: Not Applicable    Currently ACTIVE with [place] [facility]: No  Passport/ Waiver: No  Passport/ Waiver Services: Not Applicable    :  Phone:       [address]  [facility] Provider: SNF provides  Equipment:     Home Oxygen and [address] prior to admission: No  [address]: Not Applicable  Other Respiratory Equipment:     Informed of need to bring portable home O2 tank on day of DISCHARGE for nursing to connect prior to leaving: No  Verbalized agreement/Understanding: No  Person to bring portable tank at discharge:     Dialysis  Active with HD/PD prior to admission: Yes  Nephrologist:     HD Center:  [facility]  Address: . Phone: .     DISCHARGE PLAN:  Disposition: Jaden Zhou (SNF): [facility] Phone: [phone] Fax: [phone]    Transportation PLAN for discharge: EMS transportation with [facility] at 6pm    Factors facilitating achievement of predicted outcomes: Family support, Caregiver support, Cooperative and Pleasant    Barriers to discharge: Medication managment    Additional Case Management Notes: Patient from SNF at [facility], has in house HD at [facility]. Plan is for return to [facility] [place]. Plan for DC today, [facility] ambulance to transport today back to SNF. Left HIPPA compliant voicemail for son Aleksander Arroyo with request for return call regarding DC today. Nursing aware of DC time and transport. 12:35 PM  CM received [employer] for approval for 3 days and next review date of 06.29.2022, [facility] ID: [id number]. The Plan for Transition of Care is related to the following treatment goals of Volume overload [E87.70]  ESRD needing dialysis ([facility] [N18.6, Z99.2]  Other fatigue [R53.83]    The Patient and/or patient representative Dale Johnson and his family were provided with a choice of provider and agrees with the discharge plan Yes    Freedom of choice list was provided with basic dialogue that supports the patient's individualized plan of care/goals and shares the quality data associated with the providers.  Yes    Care Transition patient: No    Ketty Cid RN  [facility] [employer]  Case Management Department  Ph: [phone]   Fax: [phone]

## 2022-06-27 NOTE — PROGRESS NOTES
This RN tried to contact the facility [facility] three times to give report to the nurse. RN did get ahold of a nurse at nursing station 100 and was transferred to nursing station 3B. Nobody answered the phone at this nursing station and this RN got the voicemail. Will attempt one more time to give report to facility. Patient currently being transferred via stretcher to facility.

## 2022-06-27 NOTE — CONSULTS
Clinical Pharmacy Progress Note    Vancomycin has been discontinued. Pharmacy will sign off. Please re-consult pharmacy if vancomycin dosing is wanted in the future. Please call with questions.   Tierney Marin, [place], [facility]  Wireless: [id number]   6/27/2022 10:28 AM

## 2022-06-27 NOTE — DISCHARGE SUMMARY
Hospital Medicine Discharge Summary      Patient ID: Joelle Hinton      Patient's PCP: Phil Dalal DO    Admit Date: 6/24/2022     Discharge Date:   6/27/2022    Admitting Physician: Nehal Whitfield MD    Discharge Physician: Nehal Whitfield MD     Discharge Diagnoses: Active Hospital Problems    Diagnosis Date Noted    Hypotension [I95.9] 06/27/2022     Priority: Medium    ESRD needing dialysis ([address] [N18.6, Z99.2] 06/27/2022     Priority: Medium    Volume overload [E87.70] 06/24/2022     Priority: Medium         The patient was seen and examined on day of discharge and this discharge summary is in conjunction with any daily progress note from day of discharge. Hospital Course: The patient is a 74 y.o. male with medical history most notable for ESRD, CHF with EF below 20%, also right-sided heart failure, liver cirrhosis who presented to [facility] with hypotension prior to his dialysis session. Hospital course:  Patient had stable vitals after admission. Runs on the low side and chronically on midodrine. Blood cultures were drawn and patient was started on empiric antibiotics. All cultures were negative and antibiotics were discontinued. COVID 19 negative. Troponin was elevated but without trend. Cardiology was consulted in ER and felt troponin elevation is due to CHF and ESRD. Continued to asa and statin. For his CHF- fluid removal with HD as tolerated. Low dose toprol XL as BP allows. Discharged back to facility after HD 6/27/2022.        Consults:     IP CONSULT TO CARDIOLOGY  IP CONSULT TO NEPHROLOGY  IP CONSULT TO INTERVENTIONAL RADIOLOGY  IP CONSULT TO HOSPITALIST  IP CONSULT TO PHARMACY  PHARMACY TO DOSE VANCOMYCIN  IP CONSULT TO PALLIATIVE CARE  IP CONSULT TO PHARMACY    Disposition: SNF    Discharged Condition: Stable    Code Status: Full Code    Activity: activity as tolerated    Diet: cardiac diet and renal diet    Follow Up: Primary Care Physician in one week    Exam: General appearance: No apparent distress, appears stated age and cooperative. Lungs: Coarse breath sounds, diminished  Heart: Regular rate and rhythm with Normal S1/S2 with syst murmur  Abdomen: Soft, non-tender or non-distended without rigidity or guarding and positive bowel sounds all four quadrants. Extremities: No clubbing, cyanosis, 2+ pitting leg edema bilaterally. Skin: Skin color, texture, turgor normal.    Neurologic: Alert and oriented X 3,  grossly non-focal.  Mental status: Alert, oriented, thought content appropriate. Labs:  For convenience and continuity at follow-up the following most recent labs are provided:    CBC:   Lab Results   Component Value Date    WBC 4.9 06/27/2022    HGB 9.1 06/27/2022    HCT 28.6 06/27/2022     06/27/2022       RENAL:   Lab Results   Component Value Date     06/27/2022    K 5.9 06/27/2022    K 5.1 06/24/2022     06/27/2022    CO2 22 06/27/2022    BUN 51 06/27/2022    CREATININE 4.9 06/27/2022           Discharge Medications:   Current Discharge Medication List           Details   apixaban (ELIQUIS) 2.5 MG TABS tablet Take 2.5 mg by mouth 2 times daily      midodrine (PROAMATINE) 10 MG tablet Take 1 tablet by mouth 3 times daily (with meals)  Qty: 90 tablet, Refills: 0      metoprolol succinate (TOPROL XL) 25 MG extended release tablet Take 0.5 tablets by mouth daily  Qty: 30 tablet, Refills: 3      clopidogrel (PLAVIX) 75 MG tablet Take 75 mg by mouth daily      glucose monitoring (FREESTYLE FREEDOM) kit 1 kit by Does not apply route daily  Qty: 1 kit, Refills: 0      levothyroxine (SYNTHROID) 50 MCG tablet Take 1 tablet by mouth every morning (before breakfast)  Qty: 30 tablet, Refills: 3      pantoprazole (PROTONIX) 40 MG tablet Take 1 tablet by mouth 2 times daily (before meals)  Qty: 30 tablet, Refills: 3      allopurinol (ZYLOPRIM) 100 MG tablet Take 1 tablet by mouth daily  Qty: 30 tablet, Refills: 3      Multiple Vitamins-Minerals (THERAPEUTIC MULTIVITAMIN-MINERALS) tablet Take 1 tablet by mouth daily      atorvastatin (LIPITOR) 80 MG tablet Take 80 mg by mouth at bedtime                 Time Spent on discharge is more than 30 minutes in the examination, evaluation, counseling and review of medications and discharge plan. Signed:  Arminda Juares MD   6/27/2022      Thank you Hemal Veronica DO for the opportunity to be involved in this patient's care. If you have any questions or concerns please feel free to contact me at [phone].

## 2022-06-27 NOTE — FLOWSHEET NOTE
06/27/22 1425 06/27/22 1738   Treatment   Time On 1425  --    Time Off  --  1733   Vital Signs   BP (!) 109/28 112/60   Temp 97 °F (36.1 °C) 97.9 °F (36.6 °C)   Heart Rate 92 85   Resp 18  --    Weight 156 lb 1.4 oz (70.8 kg)  --    Weight Method Bed scale  --    Percent Weight Change 3.66  --    Technical Checks   Dialysis Machine No. [id number]  --    RO Machine Number [id number]  --    Dialyzer Lot No. [id number]  --    Tubing Lot Number [id number]  --    All Connections Secure Yes  --    NS Bag Yes  --    Saline Line Double Clamped Yes  --    Dialyzer F-160  --    RO Machine Log Sheet Completed Yes  --    Machine Alarm Self Test Completed; Passed  --    Air Foam Detector Tested;Proper Function  --    Extracorporeal Circuit Tested for Integrity Yes  --    Machine Conductivity 13.8  --    Manual Conductivity 13.7  --    Machine Ph 7  --    Post-Hemodialysis Assessment   Post-Treatment Procedures  --  Blood returned;Catheter capped, clamped and heparinized x 2 ports   Machine Disinfection Process  --  Acid/Vinegar Clean;Heat Disinfect; Exterior Machine Disinfection   Rinseback Volume (ml)  --  400 ml   Total Liters Processed (l/min)  --  53.6 l/min   Dialyzer Clearance  --  Lightly streaked   Duration of Treatment (minutes)  --  180 minutes   Hemodialysis Intake (ml)  --  400 ml   Hemodialysis Output (ml)  --  2400 ml   NET Removed (ml)  --  2000   Tolerated Treatment  --  Good   Interventions Taken  --  Medication   Patient Response to Treatment  --  see note   Physician Notified  --  Yes   Dialysis Bath   K+ (Potassium) 2  --    Ca+ (Calcium) 2.5  --    Na+ (Sodium) 140  --    HCO3 (Bicarb) 35  --    Treatment time: 3 hours    Net UF: 2000 ml    Pre weight: 70.8 kg  Post weight: 68.8 kg  EDW:     Access used: LSC TDC  Access function: Good with  ml/min    Medications or blood products given: Midodrine 10 mg po and Retacrit 10,000 units IVP    Regular outpatient schedule: MWF    Summary of response to treatment: Midodrine 10 mg given po for low BP    Copy of dialysis treatment record placed in chart, to be scanned into EMR.

## 2022-06-27 NOTE — PROGRESS NOTES
Clinical Pharmacy Progress Note    Vancomycin - Management by Pharmacy    Consult Date(s):  6/24/22  Consulting Provider(s):  Dr. Troy Aviles / Plan:  1)  Hypotension, sepsis, r/o line infection - Vancomycin   Concurrent Antimicrobials:   o Meropenem - Day #3   Day of Vanc Therapy / Ordered Duration: #3 of 7   Current Dosing Method: Intermittent dosing in setting of ESRD on HD Mon-Wed-Fri  o Therapeutic Goal: Trough ~15 mcg/mL  o Random level this AM = 14 mg/L. Scheduled for HD today. o Will give vancomycin 1000mg IV x1 with HD today. o Will plan to check level on Wed 6/29, prior to next HD session.  Will continue to monitor clinical condition and make adjustments to regimen as appropriate. Please call with questions--  Ewa Mason PharmD, BCPS  Wireless: [id number]   6/27/2022 7:31 AM        Interval update:  Repeat COVID-19 PCR test negative. Discussing possible discharge today. Subjective/Objective:   Bereket Khan is a 74 y.o. y/o male with a PMHx that includes ESRD on HD Mon-Wed-Fri, HFrEF, DM 2, HTN, cirrhosis, and HLD who is admitted with hypotension prior to HD - being evaluated for possible line infection and acute HF exacerbation. Pharmacy is consulted to dose Vancomycin.     Ht Readings from Last 1 Encounters:   06/24/22 5' 6.5\" (1.689 m)     Wt Readings from Last 1 Encounters:   06/27/22 150 lb 9.2 oz (68.3 kg)       Current and Prior Antimicrobials:  Meropenem (6/25-current)  Vancomycin - Pharmacy to dose   Intermittent dosing (6/25-current)    Date Dose Vanc Level   6/25 1000mg    6/26 -- 17.2 mg/L   6/27  14 mg/L           Date Vancomycin Level Vancomycin Dose   6/25  1000mg   6/26 17.2mcg/mL --   6/27 ordered        Recent Labs     06/25/22  0445 06/26/22  0921 06/27/22  0508   CREATININE 3.5* 4.5* 4.9*   BUN 32* 42* 51*   WBC 4.3 4.5 4.9       CrCl is not estimated 2/2 ESRD on HD    Cultures & Sensitivities:  Date Site Micro Susceptibility / Result   6/25 Blood x2 NGTD

## 2022-06-27 NOTE — PROGRESS NOTES
Occupational Therapy  Facility/Department: [place] [address] [facility]  Occupational Therapy Initial Assessment/Treatment    Name: Jonathan Wheeler  : 1955  MRN: [id number]  Date of Service: 2022    Discharge Recommendations:  Jonathan Wheeler scored a 13/24 on the AM-PAC ADL Inpatient form. Current research shows that an AM-PAC score of 17 or less is typically not associated with a discharge to the patient's home setting. Based on the patient's AM-PAC score and their current ADL deficits, it is recommended that the patient have 3-5 sessions per week of Occupational Therapy at d/c to increase the patient's independence. Please see assessment section for further patient specific details. If patient discharges prior to next session this note will serve as a discharge summary. Please see below for the latest assessment towards goals. OT Equipment Recommendations  Equipment Needed: No  Other: defer       Patient Diagnosis(es): The primary encounter diagnosis was Other fatigue. A diagnosis of ESRD needing dialysis [facility]) was also pertinent to this visit. Past Medical History:  has a past medical history of Anemia, Cardiomegaly, CHF (congestive heart failure) ([address] Cirrhosis ([address] Diabetes type 2, uncontrolled ([address] Diabetic nephropathy, ED (erectile dysfunction), Essential hypertension, benign, Hemodialysis access, fistula mature ([address] Hyperlipidemia, Noncompliance, Respiratory failure ([address] and Tachycardia. Past Surgical History:  has a past surgical history that includes eye surgery; IR NONTUNNELED VASCULAR CATHETER > 5 YEARS (2022); IR TUNNELED CVC PLACE WO SQ PORT/PUMP > 5 YEARS (2022); IR NONTUNNELED VASCULAR CATHETER > 5 YEARS (Left, 2022); IR NONTUNNELED VASCULAR CATHETER > 5 YEARS (2022); IR TUNNELED CVC PLACE WO SQ PORT/PUMP > 5 YEARS (Left, 2022); IR TUNNELED CVC PLACE WO SQ PORT/PUMP > 5 YEARS (2022); and Cardiac defibrillator placement.     Treatment Diagnosis: Impaired ADLs, mobility      Assessment   Performance deficits / Impairments: Decreased functional mobility ; Decreased ADL status; Decreased endurance;Decreased balance;Decreased strength;Decreased cognition  Assessment: Pt has had 3 hospitalizations in since May 17. Has been getting therapy at SNF. Pt typically lives with his son and is fairly independent with ADLs and mobility. Pt demo gross weakness and deconditioning related to prolonged hospital course. Recommend ongoing OT at d/c  Treatment Diagnosis: Impaired ADLs, mobility  Decision Making: Medium Complexity  REQUIRES OT FOLLOW-UP: Yes  Activity Tolerance  Activity Tolerance: Patient limited by fatigue        Plan   Plan  Times per Week: 2-5     Restrictions  Position Activity Restriction  Other position/activity restrictions: up with assist    Subjective   General  Chart Reviewed: Yes  Additional Pertinent Hx: Pt presented 6/24 after not receiving dialysis due to hypotension and lethargy. Pt admitted for hypotension, volume overload after missing dialysis. PMHx: ESRD, HTN, DM, neuropathy  Family / Caregiver Present: No  Referring Practitioner: David Wing  Diagnosis: volume overload  Subjective  Subjective: Pt sleeping in bed upon OT entry, lethargic but became more alert once seated eob. Somewhat confused to situation, initially refusing to work with therapy then \"Sorry, I didn't realize you were therapy. I want to get better. What do you want me to do? \"     Social/Functional History  Social/Functional History  Type of Home: Facility ([facility] acute SNF)  Additional Comments: Per notes, pt in hospital 5/17-6/3/22 d/c to SNF then readmit [facility] 6/11-6/17/22 for hyperkalemia and now readmit to [facility] 6/24/22. Pt was living with son in apartment prior to 5/17/22 per notes.  States he's been using walker with therapy at rehab       Objective   Vitals: on 4L O2    Vision Exceptions: Wears glasses at all times  Hearing: Within functional limits             Safety part by poor positioning of recliner leg rest). Max A of 2 pivot transfer to bed, poor pt initiation of LE movement for transfer. Max A of 2 stand>Sit to bed. Sit>supine max A of 2. Pt remains in bed end of session, RN present and aware.  Alarm engaged and needs in reach    AM-PAC Score  AM-PAC Inpatient Daily Activity Raw Score: 13 (06/27/22 0941)  AM-PAC Inpatient ADL T-Scale Score : 32.03 (06/27/22 0941)  ADL Inpatient CMS 0-100% Score: 63.03 (06/27/22 0941)  ADL Inpatient CMS G-Code Modifier : CL (06/27/22 0941)    Goals  Short Term Goals  Time Frame for Short term goals: d/c  Short Term Goal 1: functional mobility to/from bathroom using LRAD and cga  Short Term Goal 2: tolerate standing 5 minutes with cga balance during ADLs  Short Term Goal 3: mod A LB dressing  Short Term Goal 4: cga sit<>Stand transfers  Patient Goals   Patient goals : get stronger and get back home       Therapy Time   Individual 2nd Session Group Co-treatment   Time In 0805  1100       Time Out 0844  1115       Minutes 39  15       Timed Code Treatment Minutes: 24 Minutes +15 min eval   +15 min second treatment    Xu Candy, OT

## 2022-06-27 NOTE — PLAN OF CARE
Problem: Discharge Planning  Goal: Discharge to home or other facility with appropriate resources  Outcome: Progressing  Flowsheets (Taken 6/26/2022 1939)  Discharge to home or other facility with appropriate resources:   Identify barriers to discharge with patient and caregiver   Identify discharge learning needs (meds, wound care, etc)   Arrange for needed discharge resources and transportation as appropriate   Refer to discharge planning if patient needs post-hospital services based on physician order or complex needs related to functional status, cognitive ability or social support system     Problem: Skin/Tissue Integrity  Goal: Absence of new skin breakdown  Description: 1. Monitor for areas of redness and/or skin breakdown  2. Assess vascular access sites hourly  3. Every 4-6 hours minimum:  Change oxygen saturation probe site  4. Every 4-6 hours:  If on nasal continuous positive airway pressure, respiratory therapy assess nares and determine need for appliance change or resting period.   6/27/2022 0241 by Marlen Black RN  Outcome: Progressing  6/26/2022 1853 by Calin Jorge RN  Outcome: Progressing     Problem: Safety - Adult  Goal: Free from fall injury  6/27/2022 0241 by Marlen Black RN  Outcome: Progressing  Flowsheets (Taken 6/26/2022 1939)  Free From Fall Injury: Instruct family/caregiver on patient safety  6/26/2022 1853 by Calin Jorge RN  Outcome: Progressing     Problem: ABCDS Injury Assessment  Goal: Absence of physical injury  6/27/2022 0241 by Marlen Black RN  Outcome: Progressing  Flowsheets (Taken 6/26/2022 1939)  Absence of Physical Injury: Implement safety measures based on patient assessment  6/26/2022 1853 by Calin Jorge RN  Outcome: Progressing     Problem: Respiratory - Adult  Goal: Achieves optimal ventilation and oxygenation  6/27/2022 0241 by Marlen Black RN  Outcome: Progressing  Flowsheets (Taken 6/26/2022 1939)  Achieves optimal ventilation and oxygenation:   Assess for changes in respiratory status   Assess for changes in mentation and behavior   Position to facilitate oxygenation and minimize respiratory effort  6/26/2022 1853 by Wen Shi RN  Outcome: Progressing     Problem: Cardiovascular - Adult  Goal: Maintains optimal cardiac output and hemodynamic stability  6/27/2022 0241 by Barbara Mason RN  Outcome: Progressing  Flowsheets (Taken 6/26/2022 1939)  Maintains optimal cardiac output and hemodynamic stability:   Monitor blood pressure and heart rate   Monitor urine output and notify Licensed Independent Practitioner for values outside of normal range  6/26/2022 1853 by Wen Shi RN  Outcome: Progressing  Goal: Absence of cardiac dysrhythmias or at baseline  Outcome: Progressing  Flowsheets (Taken 6/26/2022 1939)  Absence of cardiac dysrhythmias or at baseline:   Monitor cardiac rate and rhythm   Assess for signs of decreased cardiac output     Problem: Musculoskeletal - Adult  Goal: Return mobility to safest level of function  Outcome: Progressing  Flowsheets (Taken 6/26/2022 1939)  Return Mobility to Safest Level of Function:   Assess patient stability and activity tolerance for standing, transferring and ambulating with or without assistive devices   Assist with transfers and ambulation using safe patient handling equipment as needed  Goal: Maintain proper alignment of affected body part  Outcome: Progressing  Flowsheets (Taken 6/26/2022 1939)  Maintain proper alignment of affected body part: Support and protect limb and body alignment per provider's orders  Goal: Return ADL status to a safe level of function  Outcome: Progressing  Flowsheets (Taken 6/26/2022 1939)  Return ADL Status to a Safe Level of Function:   Administer medication as ordered   Assess activities of daily living deficits and provide assistive devices as needed     Problem: Infection - Adult  Goal: Absence of infection at discharge  Outcome: Progressing  Flowsheets (Taken 6/26/2022 1939)  Absence of infection at discharge:   Assess and monitor for signs and symptoms of infection   Monitor lab/diagnostic results  Goal: Absence of infection during hospitalization  Outcome: Progressing  Flowsheets (Taken 6/26/2022 1939)  Absence of infection during hospitalization:   Assess and monitor for signs and symptoms of infection   Monitor lab/diagnostic results  Goal: Absence of fever/infection during anticipated neutropenic period  Outcome: Progressing  Flowsheets (Taken 6/26/2022 1939)  Absence of fever/infection during anticipated neutropenic period: Monitor white blood cell count     Problem: Metabolic/Fluid and Electrolytes - Adult  Goal: Electrolytes maintained within normal limits  Outcome: Progressing  Flowsheets (Taken 6/26/2022 1939)  Electrolytes maintained within normal limits: Monitor labs and assess patient for signs and symptoms of electrolyte imbalances     Problem: Hematologic - Adult  Goal: Maintains hematologic stability  Outcome: Progressing  Flowsheets (Taken 6/26/2022 1939)  Maintains hematologic stability: Assess for signs and symptoms of bleeding or hemorrhage

## 2022-06-27 NOTE — CONSULTS
Clinical Pharmacy Progress Note   Heart Failure Medication Review    Mr. Aashish Campbell is a 74 y.o. male with a PMHx significant for ESRD, HF; admitted for hypotension and elevated troponin. Pharmacy has been asked to review heart failure medications in anticipation of discharge by Dr. Ciro Dong. Recent Labs     06/25/22  0445 06/26/22  0921 06/27/22  0508   CREATININE 3.5* 4.5* 4.9*   BUN 32* 42* 51*   WBC 4.3 4.5 4.9       Estimated Creatinine Clearance: 14 mL/min (A) (based on SCr of 4.9 mg/dL (H)). If EF ?  40%, is an ACE/ARB/ARNI ordered or contraindication documented? Yes - not tolerating d/t hypotension per hospitalist and outpatient notes  If EF ?  40%, is an evidence-based beta blocker ordered or contraindication documented? Yes - Metoprolol XL  If EF ? 35%, is an aldosterone receptor antagonist ordered or contraindication documented? Not tolerating d/t hypotension  If EF ? 40% (with or without DM2), is a SGLT2 inhibitor ordered or contraindicated documented? Will be addressed as outpatient      Please call with questions.   Piter Ellsworth, PharmD, BCPS  Wireless: [phone]   6/27/2022 2:35 PM

## 2022-07-02 NOTE — PROGRESS NOTES
Physical Therapy  Facility/Department: [facility] 5W PROGRESSIVE CARE  Daily Treatment Note  NAME: Indra Emanuel  : 1955  MRN: [id number]    Date of Service: 2021  Indra Emanuel scored a 18/24 on the AM-PAC short mobility form. Current research shows that an AM-PAC score of 18 or greater is typically associated with a discharge to the patient's home setting. Based on the patient's AM-PAC score and their current functional mobility deficits, it is recommended that the patient have 2-3 sessions per week of Physical Therapy at d/c to increase the patient's independence. At this time, this patient demonstrates the endurance and safety to discharge home with Home PT and a follow up treatment frequency of 2-3x/wk. Please see assessment section for further patient specific details. If patient discharges prior to next session this note will serve as a discharge summary. Please see below for the latest assessment towards goals. HOME HEALTH CARE: LEVEL 1 STANDARD    - Initial home health evaluation to occur within 24-48 hours, in patient home   - Therapy to evaluate with goal of regaining prior level of functioning   - Therapy to evaluate if patient has [address] needs for personal care    Discharge Recommendations:  Home with assist PRN, 2-3 sessions per week, Patient would benefit from continued therapy after discharge   PT Equipment Recommendations  Other: Will continue to assess    Assessment   Assessment: Pt. limited this date by new c/o pain and edema in R UE from elbow distally. R UE also a bit warm to touch. RN notified and was aware. Pt. continues to require skilled PT to improve functional mobility. Will attempt use of AD next session. Anticipate the need for home PT level 1 at d/c.   Prognosis: Good  PT Education: General Safety; PT Role; Plan of Care; Transfer Training; Functional Mobility Training  Barriers to Learning: decreased insight into safety issues  REQUIRES PT FOLLOW UP: Yes  Activity Tolerance  Activity Tolerance: Patient Tolerated treatment well; Patient limited by pain  Activity Tolerance: Pt. was limited by pain and edema in R UE from elbow distally. RN aware. Patient Diagnosis(es): The primary encounter diagnosis was Acute on chronic systolic congestive heart failure ([facility] [address] Diagnoses of Acute respiratory failure with hypoxia (HCC) and Dyspnea on exertion were also pertinent to this visit. has a past medical history of Diabetes type 2, uncontrolled ([address] Diabetic nephropathy, ED (erectile dysfunction), Essential hypertension, benign, Hyperlipidemia, and Noncompliance. has a past surgical history that includes eye surgery. Restrictions  Restrictions/Precautions  Restrictions/Precautions: Fall Risk  Position Activity Restriction  Other position/activity restrictions: Rigo Whitman  Subjective   General  Chart Reviewed: Yes  Additional Pertinent Hx: here due to increasing SOB, increasing LE edema   PMH includes CHF, ICD  Response To Previous Treatment: Patient with no complaints from previous session.   Family / Caregiver Present: No  Subjective  Subjective: Supine in bed, agreeable to therapy, pain R UE 8/10 and edema - Pt. reports this pain began on Saturday 11/20          Orientation  Orientation  Overall Orientation Status: Within Functional Limits  Cognition      Objective   Bed mobility  Supine to Sit: Supervision (HOB elevated)  Sit to Supine: Unable to assess (Left up in chair)  Scooting: Supervision  Comment: Increased time for all bed mobility activities, Pt. did not use R UE d/t pain and edema  Transfers  Sit to Stand: Contact guard assistance  Stand to sit: Contact guard assistance  Comment: Pt. mildly unsteady, leaning posteriorly against the bed  Ambulation  Ambulation?: Yes  Ambulation 1  Surface: level tile  Assistance: Minimal assistance  Quality of Gait: Pt. unsteady, slow, narrow ELIANA  Distance: 3-4 steps only to the BS chair  Comments: Pt. limited as agreeable only to getting up to the chair to eat breakfast at this time. Stairs/Curb  Stairs?: No     Balance  Sitting - Static: Good  Sitting - Dynamic: Good  Standing - Static: Good; -  Standing - Dynamic: Fair; +  Exercises  Comments: encouraged APs later this date      Strength RLE  Strength RLE: WFL  Comment: Retested R LE d/t Pt. sitting with R LE rotated internally at rest and slow to move LE.  strength 4/5 grossly  Strength RUE  Comment: Pt. barely using R UE d/t pain and edema, Pt. very reluctant to move it. Reports this pain and edema began on Saturday 11/20. RN notified and was aware. Comment: Set up for comfort in the chair with R UE propped for support on a pillow. Set up with breakfast.                                                                     AM-PAC Score  AM-PAC Inpatient Mobility Raw Score : 18 (11/22/21 0925)  AM-PAC Inpatient T-Scale Score : 43.63 (11/22/21 0925)  Mobility Inpatient CMS 0-100% Score: 46.58 (11/22/21 0925)  Mobility Inpatient CMS G-Code Modifier : CK (11/22/21 0925)          Goals  Short term goals  Time Frame for Short term goals: 2-3 days  Short term goal 1: bed mobility at supervision  Short term goal 2: transfers at supervision  Short term goal 3: ambulation at supervision with rolling walker as needed for safety and effectiveness for household distances    -steps as needed for home entry at [address] [place] [address]  Patient Goals   Patient goals : feel better and to return to his apartment    Plan    Plan  Times per week: 3-5 while on acute floor  Current Treatment Recommendations: Functional Mobility Training, Positioning, Patient/Caregiver Education & Training, ADL/Self-care Training  Safety Devices  Type of devices:  All fall risk precautions in place, Call light within reach, Left in chair, Chair alarm in place, Nurse notified  Restraints  Initially in place: No     Therapy Time   Individual Concurrent Group Co-treatment   Time In 0848         Time Out 0913         Minutes 25 Timed Code Treatment Minutes: [address] [facility], [place], [id number] show

## 2022-07-15 PROBLEM — J96.02 ACUTE HYPERCAPNIC RESPIRATORY FAILURE (HCC): Status: ACTIVE | Noted: 2022-01-01

## 2022-07-15 NOTE — H&P
Addendum to resident H&P:    Briefly this pateint is a 73 yo M with PMH for cirrhosis, systolic HF with EF less than 20%, pleural effusion, adm from ER. Pt presented with  SOB, hypotension. Pt was reportedly in [facility] and was sent to ER due to these sx's, dialysis was cut short. Pt required bipap initially, plan was to dialyze pt today, and per nephrology request pt underwent horacentesis by IR. Approximately 1L fluid removed. Pt became more hypotensive, less responsive. BP running 03-71'I systolic, and pt minimally responsive. At that time decision was made to admit to ICU for closer monitoring. Nephrology has been consulted for assistance with management. Cont Bipap. Pt was seen in ED with resident, agree with assessemtn and plan as documented by Dr. Christina Dee.      Patrica Barber MD

## 2022-07-15 NOTE — CARE COORDINATION
Case Management Assessment           Initial Evaluation                Date / Time of Evaluation: 7/15/2022 3:06 PM                 Assessment Completed by: Pranav Peters RN    Patient Name: Kyle Manning     YOB: 1955  Diagnosis: No admission diagnoses are documented for this encounter. Date / Time: 7/15/2022 10:11 AM    Patient Admission Status: Inpatient    If patient is discharged prior to next notation, then this note serves as note for discharge by case management. Current PCP: Yo Rush DO    Chart Reviewed: Yes  Patient/ Family Interviewed: No    Initial assessment completed at bedside with: Call placed to facility    Hospitalization in the last 30 days: No    Emergency Contacts:  Extended Emergency Contact Information  Primary Emergency Contact: Michael Hearn  Address: [address] [place] [address]. [facility], [address] Phone: [phone]  Relation: Child  Secondary Emergency Contact: Josie Lester  Address: [place] [address].            [facility], [address] [place] [address],[address] Phone: [phone]  Relation: Brother/Sister    Advance Directives:   Code Status: Prior    Financial  Payor: Jeremy Urban / Plan: [place] / Product Type: *No Product type* /     Pre-cert required for SNF: Yes    Pharmacy    [facility] [phone] [place] [phone]  [phone]  Phone: [phone] Fax: [phone]    ADLS Mod-max assistance with ADL's  Support Systems:  family, facility staff    HOUSING  Home Environment: [facility] (SNF): [facility] Phone: [phone] Fax: [phone]    Plans to RETURN to current housing: Yes    DISCHARGE PLAN:  Disposition: Return to SNF @ [facility] with on-site hemodialysis treatments    Transportation PLAN for discharge: EMS transportation     The Patient and/or patient representative Alessandra Gu and his family were provided with a choice of provider and agrees with the discharge plan Yes    Freedom of choice list was provided with basic dialogue that supports the patient's individualized plan of care/goals and shares the quality data associated with the providers.  Yes      Yarely Ward, CEZAR  [facility]  Case Management Department  [phone]

## 2022-07-15 NOTE — ED NOTES
Nurse called for updated on pt, stated last dose of Eliquis was yesterday at 2100.      Julianne Murphy RN  07/15/22 6930

## 2022-07-15 NOTE — PROGRESS NOTES
Patient evaluated again and DW ICU team.  I will start patient on CRRT instead of HD    Orders placed     Nephrology  [facility] [address]  Office: [phone]  Fax: [phone]

## 2022-07-15 NOTE — H&P
ICU HISTORY AND PHYSICAL       Hospital Day: 1  ICU Day: 1                                                         Code:Full Code  Admit Date: 7/15/2022  PCP: Eloina Garcia DO                                  CC: Shortness of Breath    HISTORY OF PRESENT ILLNESS:     Neo Hill is a 74year old male with PMH of ESRD on HD, CHF, Liver Cirrhosis who presented to [facility] via EMS with SOB. Patient was having Hemodialysis was noted become hypoxic to the 70s and hypotensive to the 80s after which EMS was called. At [facility], patient was satting mid 90s on NRB but reported to become more lethargic and ultimately required BiPAP. Given hypotensive shock, increased lethargy and potential need for CRRT, ICU was consulted for admission into the ICU. BMP was significant for deranged Cr and GFR consistent with ESRD as well as Trops elevated to 0.39. Initial VBG was 7.264/62. 2. Blood glucose 50. Paracentesis in ED with 1L of straw colored fluid out. Pt was continued on BiPAP and it was unclear whether any intervention for low BG was performed.        PAST HISTORY:     Past Medical History:   Diagnosis Date    Anemia     Cardiomegaly     CHF (congestive heart failure) (HCC)     Cirrhosis (HCC)     Diabetes type 2, uncontrolled ([address] 7/28/2014    Diabetic nephropathy 9/12/2013    ED (erectile dysfunction) 9/12/2013    Essential hypertension, benign 9/12/2013    Hemodialysis access, fistula mature ([facility] [address]     Hyperlipidemia 9/12/2013    Noncompliance 7/28/2014    Respiratory failure (HCC)     Tachycardia        Past Surgical History:   Procedure Laterality Date    CARDIAC DEFIBRILLATOR PLACEMENT      EYE SURGERY      IR NONTUNNELED VASCULAR CATHETER  04/04/2022    IR NONTUNNELED VASCULAR CATHETER 4/4/2022 [facility]    IR NONTUNNELED VASCULAR CATHETER Left 05/26/2022    Zoe Hurtado; LIJ access; 20cm; Dr. Mercado Pass - removed 5/31/22    IR NONTUNNELED VASCULAR CATHETER  05/26/2022    IR NONTUNNELED VASCULAR CATHETER 5/26/2022 [facility]    IR TUNNELED CATHETER PLACEMENT GREATER THAN 5 YEARS  04/08/2022    IR TUNNELED CATHETER PLACEMENT GREATER THAN 5 YEARS 4/8/2022 [facility]    IR TUNNELED CATHETER PLACEMENT GREATER THAN 5 YEARS Left 05/31/2022    Permacath; LIJ access; 23cm; Dr. Daryl Loo    IR TUNNELED CATHETER PLACEMENT GREATER THAN 5 YEARS  5/31/2022    IR TUNNELED CATHETER PLACEMENT GREATER THAN 5 YEARS 5/31/2022 [facility]       SocialHistory:   The patient lives at    Alcohol:  Illicit drugs: no use  Tobacco:      Family History:  Family History   Problem Relation Age of Onset    Diabetes Mother     High Blood Pressure Mother     Diabetes Sister     Diabetes Brother        MEDICATIONS:     No current facility-administered medications on file prior to encounter.      Current Outpatient Medications on File Prior to Encounter   Medication Sig Dispense Refill    apixaban (ELIQUIS) 2.5 MG TABS tablet Take 2.5 mg by mouth 2 times daily      midodrine (PROAMATINE) 10 MG tablet Take 1 tablet by mouth 3 times daily (with meals) 90 tablet 0    metoprolol succinate (TOPROL XL) 25 MG extended release tablet Take 0.5 tablets by mouth daily 30 tablet 3    clopidogrel (PLAVIX) 75 MG tablet Take 75 mg by mouth daily      glucose monitoring (FREESTYLE FREEDOM) kit 1 kit by Does not apply route daily 1 kit 0    levothyroxine (SYNTHROID) 50 MCG tablet Take 1 tablet by mouth every morning (before breakfast) 30 tablet 3    pantoprazole (PROTONIX) 40 MG tablet Take 1 tablet by mouth 2 times daily (before meals) 30 tablet 3    allopurinol (ZYLOPRIM) 100 MG tablet Take 1 tablet by mouth daily 30 tablet 3    Multiple Vitamins-Minerals (THERAPEUTIC MULTIVITAMIN-MINERALS) tablet Take 1 tablet by mouth daily      atorvastatin (LIPITOR) 80 MG tablet Take 80 mg by mouth at bedtime            Scheduled Meds:   heparin (porcine)  1,000 Units IntraVENous Once    epoetin roseann-epbx  10,000 Units IntraVENous Once per day on Mon Wed Fri    midodrine  10 mg Oral Once    sodium chloride flush  5-40 mL IntraVENous 2 times per day    apixaban  2.5 mg Oral BID      Continuous Infusions:   sodium chloride      dextrose      prismaSATE BGK 4/2.5      prismaSATE BGK 4/2.5      prismaSATE BGK 4/2.5       PRN Meds:midodrine, albumin human, sodium chloride flush, sodium chloride, ondansetron **OR** ondansetron, polyethylene glycol, acetaminophen **OR** acetaminophen, glucose, dextrose bolus **OR** dextrose bolus, glucagon (rDNA), dextrose, potassium chloride, magnesium sulfate, calcium gluconate **OR** calcium gluconate **OR** calcium gluconate **OR** calcium gluconate, sodium phosphate IVPB **OR** sodium phosphate IVPB **OR** sodium phosphate IVPB **OR** sodium phosphate IVPB    Allergies: No Known Allergies    REVIEW OF SYSTEMS:       History obtained from chart review, unobtainable from patient due to mental status, and Pts friend/family    Review of Systems    PHYSICAL EXAM:       Vitals: BP (!) 104/90   Pulse 88   Temp (!) 96.1 °F (35.6 °C) (Temporal)   Resp 18   Ht 5' 6.5\" (1.689 m)   Wt 170 lb (77.1 kg)   SpO2 96%   BMI 27.03 kg/m²     I/O:  No intake or output data in the 24 hours ending 07/15/22 1950  No intake/output data recorded. No intake/output data recorded. Physical Examination:     Physical Exam  Constitutional:       Appearance: He is ill-appearing. Comments: Patient is obtunded on BiPAP   Eyes:      General: No scleral icterus. Pupils: Pupils are equal, round, and reactive to light. Comments: Pt obtunded in ED, EOM unable to assess. Cardiovascular:      Rate and Rhythm: Normal rate and regular rhythm. Heart sounds: Normal heart sounds. No murmur heard. Comments: Radial pulses soft B/L  Pulmonary:      Breath sounds: No stridor. No wheezing. Comments: BS diffusely reduced  Abdominal:      General: There is distension.    Musculoskeletal:      Comments: Distal LUE 2+ edema up to mid humerus  RUE pitting edema 1+  B/L LE 2+ pitting edema   Neurological:      Comments: Obtunded, unable to answer questions. Patient appears to follow commands; opens eyes when name called and squeezed fingers via hand            Recent Labs     07/15/22  1034   PHART 7.350   [id number] 49.7*   PO2ART 155.0*           DATA:       Labs:  CBC:   Recent Labs     07/15/22  1047   WBC 4.6   HGB 9.8*   HCT 30.3*          BMP:   Recent Labs     07/15/22  1047   *   K 4.6   CL 96*   CO2 23   BUN 25*   CREATININE 2.9*   GLUCOSE 50*     LFT's:   Recent Labs     07/15/22  1047   *   ALT 47*   BILITOT 2.2*   ALKPHOS 185*     Troponin:   Recent Labs     07/15/22  1047   TROPONINI 0.39*     BNP:No results for input(s): BNP in the last 72 hours. ABGs:   Recent Labs     07/15/22  1034   PHART 7.350   [id number] 49.7*   PO2ART 155.0*     INR:   Recent Labs     07/15/22  1047   INR 2.68*       U/A:No results for input(s): NITRITE, COLORU, PHUR, LABCAST, WBCUA, RBCUA, MUCUS, TRICHOMONAS, YEAST, BACTERIA, CLARITYU, SPECGRAV, LEUKOCYTESUR, UROBILINOGEN, BILIRUBINUR, BLOODU, GLUCOSEU, AMORPHOUS in the last 72 hours. Invalid input(s): KETONESU    XR CHEST PORTABLE   Final Result   1. Significant decrease in right-sided effusion with no pneumothorax identified. US THORACENTESIS Which side should the procedure be performed? Right   Final Result   IMPRESSION :    Ultrasound guided drainage of 1000 mL of straw-colored pleural effusion. XR CHEST PORTABLE   Final Result   1. Significant worsening of right-sided effusion and right-sided airspace disease in the interval.          ASSESSMENT AND PLAN:       Acute Encephalopathy 2/2 Hypoglycemia  Reason for hypoglycemia unclear, likely failure to eat meal without reducing DM medication  BG in ED at 50, repeat POCT BG in ICU at 26.  Likely no intervention performed in ED  - Hypoglycemia protocol was in pace and D5 pushes were admin and BG slowly improving - last read at 60  - q4hr Glucose checks  - Hypoglycemia protocol in place    Acute Hypercapnic Respiratory Failure  Continues to require BiPAP and satting in 100s in ED  ABG in ED: 7.350/49.7/155.0  Repeat POCT ABG in ICU 7.406 and pCO2 40s  - after Blood glucose increased - pt began expressing increased arousal and O2 weaned down to NC  - f/u lactic acid q6hrs    HFrEF  Cardiac Cirrhosis  Trops elevated 0.39, but elevated trops could be due to inability to clear through urine  EKG stable  BNP elevated to 68k  Last Echo March 2022 showed EF 20% with diffuse hypokinesis, RA severely dilated  - patient often becomes hypotensive on HD, sravanthi GARCIA is that RV is volume dependent and HD causing large volume loss is reducing Right Heart Output.   - Monitor on CRRT  - will trend trops once more      ESRD on HD  Appears grossly volume overloaded  - CRRT planned per Nephro, appreciate recs    Hx of Acute LUE DVT on Eliquis  - Eliquis 2.5 mg BID    Code Status:Full Code  FEN: Regular, Low sodium  PPX:  Eliqusi  DISPO: ICU    This patient has been staffed and discussed with Arlyn Hernandez MD.   -----------------------------  Davey Vega MD, PGY-1  7/15/2022  7:50 PM

## 2022-07-15 NOTE — ED PROVIDER NOTES
ED Attending Attestation Note     Date of evaluation: 7/15/2022    This patient was seen by the resident. I have seen and examined the patient, agree with the workup, evaluation, management and diagnosis. The care plan has been discussed. I have reviewed the ECG and concur with the resident's interpretation. My assessment reveals patient in respiratory distress and O2 sat in the mid 90s on a nonrebreather. ABG demonstrated a PaO2 of only 155 on nonrebreather, as such we placed the patient on bilevel positive pressure ventilation. Chest x-ray demonstrated significant worsening of right-sided pleural effusion. The patient does appear volume overloaded on exam.  Worsening right-sided effusion likely contributing to respiratory failure. We have spoken to the patient's nephrologist Dr. Merlyn Victoria who will arrange dialysis but also wanted the patient to have thoracentesis performed by IR. Given that the patient is on bilevel with difficulty sitting up, IR guided thoracentesis would likely be the safest approach. The patient's VBG has improved from his CO2 retention standpoint after an hour on bilevel. We will work to get him admitted as well as arrange thoracentesis, nephrology is working on dialysis. Due to the patient's potential for life-threatening deterioration secondary to acute respiratory failure and volume overload I performed 30 minutes of bedside critical care in initial assessment of the patient ordering diagnostics, ordering bilevel ventilation and monitoring response to treatment, performing chart review, coordination of care.      Paulino Rios MD  07/15/22 6915

## 2022-07-15 NOTE — PROGRESS NOTES
4 Eyes Admission Assessment     I agree as the admission nurse that 2 RN's have performed a thorough Head to Toe Skin Assessment on the patient. ALL assessment sites listed below have been assessed on admission. Areas assessed by both nurses:   [x]   Head, Face, and Ears   [x]   Shoulders, Back, and Chest  [x]   Arms, Elbows, and Hands   [x]   Coccyx, Sacrum, and Ischium  [x]   Legs, Feet, and Heels        Does the Patient have Skin Breakdown?   Yes a wound was noted on the Admission Assessment and an LDA was Initiated documentation include the Saritha-wound, Wound Assessment, Measurements, Dressing Treatment, Drainage, and Color\", Stage 2 open wound L ankle, Stage 2 open wound L foot        Tung Prevention initiated:  Yes   Wound Care Orders initiated:  Yes      [address]  nurse consulted for Pressure Injury (Stage 3,4, Unstageable, DTI, NWPT, and Complex wounds) or Tung score 18 or lower:  No      Nurse 1 eSignature: Electronically signed by Gardenia Brothers RN on 7/15/22 at 5:54 PM EDT    **SHARE this note so that the co-signing nurse is able to place an eSignature**    Nurse 2 eSignature: Electronically signed by Marguerite Starr RN on 7/17/22 at 2:09 PM EDT

## 2022-07-15 NOTE — ED PROVIDER NOTES
[id number] [facility] RESIDENT NOTE       Date of evaluation: 7/15/2022    Chief Complaint     Shortness of Breath      of Present Illness     Bassem Weiss is a 74 y.o. male with past medical history of ESRD, CHF, cirrhosis who presents respiratory distress. Patient reports 2 days worsening dyspnea. No fevers or chills. He is scheduled for dialysis today. On arrival at dialysis he was found to be hypoxic in the 70s, with hypotension in the 76M systolic. EMS was called. Patient was placed on nonrebreather with improvement of O2 saturations to the 90s. Report improvement of his systolic blood pressure to the 100s. Patient denies any other symptoms including chest pain, abdominal pain, nausea, vomiting, diarrhea. Per review, he has chronic right-sided pleural effusion. He has severely reduced EF at 20 to 25%. Review of Systems     Review of Systems   Unable to perform ROS: Acuity of condition     Past Medical, Surgical, Family, and Social History     He has a past medical history of Anemia, Cardiomegaly, CHF (congestive heart failure) ([address] Cirrhosis ([address] Diabetes type 2, uncontrolled ([address] Diabetic nephropathy, ED (erectile dysfunction), Essential hypertension, benign, Hemodialysis access, fistula mature ([address] Hyperlipidemia, Noncompliance, and Respiratory failure ([address] He has a past surgical history that includes eye surgery; IR NONTUNNELED VASCULAR CATHETER > 5 YEARS (04/04/2022); IR TUNNELED CVC PLACE WO SQ PORT/PUMP > 5 YEARS (04/08/2022); IR NONTUNNELED VASCULAR CATHETER > 5 YEARS (Left, 05/26/2022); IR NONTUNNELED VASCULAR CATHETER > 5 YEARS (05/26/2022); IR TUNNELED CVC PLACE WO SQ PORT/PUMP > 5 YEARS (Left, 05/31/2022); IR TUNNELED CVC PLACE WO SQ PORT/PUMP > 5 YEARS (5/31/2022); and Cardiac defibrillator placement. His family history includes Diabetes in his brother, mother, and sister; High Blood Pressure in his mother.   He reports that he quit smoking about 41 years ago. He has a 10.50 pack-year smoking history. He has never used smokeless tobacco. He reports current alcohol use of about 1.0 standard drink per week. He reports that he does not use drugs. Medications     Current Discharge Medication List        CONTINUE these medications which have NOT CHANGED    Details   apixaban (ELIQUIS) 2.5 MG TABS tablet Take 2.5 mg by mouth 2 times daily      midodrine (PROAMATINE) 10 MG tablet Take 1 tablet by mouth 3 times daily (with meals)  Qty: 90 tablet, Refills: 0      clopidogrel (PLAVIX) 75 MG tablet Take 75 mg by mouth daily      glucose monitoring (FREESTYLE FREEDOM) kit 1 kit by Does not apply route daily  Qty: 1 kit, Refills: 0      levothyroxine (SYNTHROID) 50 MCG tablet Take 1 tablet by mouth every morning (before breakfast)  Qty: 30 tablet, Refills: 3      pantoprazole (PROTONIX) 40 MG tablet Take 1 tablet by mouth 2 times daily (before meals)  Qty: 30 tablet, Refills: 3      allopurinol (ZYLOPRIM) 100 MG tablet Take 1 tablet by mouth daily  Qty: 30 tablet, Refills: 3      Multiple Vitamins-Minerals (THERAPEUTIC MULTIVITAMIN-MINERALS) tablet Take 1 tablet by mouth daily      atorvastatin (LIPITOR) 80 MG tablet Take 80 mg by mouth at bedtime              Allergies     He has No Known Allergies. Physical Exam     INITIAL VITALS: BP: 121/89, Temp: 96.8 °F (36 °C), Heart Rate: (!) 106, Resp: 28, SpO2: 97 %   Physical Exam  Vitals and nursing note reviewed. Constitutional:       General: He is in acute distress. Appearance: He is ill-appearing. HENT:      Head: Normocephalic and atraumatic. Comments: Nonpitting edema and swelling over the right-sided face     Mouth/Throat:      Mouth: Mucous membranes are moist.   Eyes:      Extraocular Movements: Extraocular movements intact. Pupils: Pupils are equal, round, and reactive to light. Cardiovascular:      Rate and Rhythm: Regular rhythm. Tachycardia present. Heart sounds:  No murmur heard. No friction rub. No gallop. Pulmonary:      Effort: Tachypnea, accessory muscle usage and respiratory distress present. Comments: Absent breath sounds in right lower lung fields, coarse breath sounds on the left  Abdominal:      Palpations: Abdomen is soft. Tenderness: There is no abdominal tenderness. There is no guarding or rebound. Musculoskeletal:         General: Normal range of motion. Cervical back: Normal range of motion and neck supple. Comments: 2+ pitting edema right lower extremity and right upper extremity, 1+ pitting edema left lower extremity   Skin:     General: Skin is warm. Comments: Moist   Neurological:      General: No focal deficit present. Mental Status: He is oriented to person, place, and time. Comments: Somnolent on arrival, improved mentation with increased flow rate of nonrebreather       DiagnosticResults     EKG   Interpreted in conjunction with emergencydepartment physician Shahnaz*  Rhythm: sinus tachycardia and 1 degree AV block  Rate: 100-110  Axis: normal  Ectopy: none  Conduction: nonspecific interventricular conduction block  ST Segments: no acute change  T Waves:no acute change  Q Waves: none  Clinical Impression: no acute changes  Comparison: When compared this previous EKG 6/24/2022, no significant change present    RADIOLOGY:  XR CHEST PORTABLE   Final Result   1. No change. XR CHEST PORTABLE   Final Result   1. Decreased right-sided effusion with no pneumothorax identified. Left effusion and left basilar airspace disease unchanged. XR CHEST PORTABLE   Final Result     Congestive heart failure       Moderate cardiomegaly. Underlying pericardial effusion cannot be    excluded          XR CHEST PORTABLE   Final Result   1. Significant decrease in right-sided effusion with no pneumothorax identified. US THORACENTESIS Which side should the procedure be performed?  Right   Final Result IMPRESSION :    Ultrasound guided drainage of 1000 mL of straw-colored pleural effusion. XR CHEST PORTABLE   Final Result   1. Significant worsening of right-sided effusion and right-sided airspace disease in the interval.          LABS:   Results for orders placed or performed during the hospital encounter of 07/15/22   Culture, Blood 1    Specimen: Blood   Result Value Ref Range    Blood Culture, Routine       No Growth to date. Any change in status will be called. Culture, Blood 2    Specimen: Blood   Result Value Ref Range    Culture, Blood 2       No Growth to date. Any change in status will be called.    CBC with Auto Differential   Result Value Ref Range    WBC 4.6 4.0 - 11.0 K/uL    RBC 2.93 (L) 4.20 - 5.90 M/uL    Hemoglobin 9.8 (L) 13.5 - 17.5 g/dL    Hematocrit 30.3 (L) 40.5 - 52.5 %    .3 (H) 80.0 - 100.0 fL    MCH 33.3 26.0 - 34.0 pg    MCHC 32.2 31.0 - 36.0 g/dL    RDW 20.1 (H) 12.4 - 15.4 %    Platelets 002 600 - 178 K/uL    MPV 9.1 5.0 - 10.5 fL    Neutrophils % 70.0 %    Lymphocytes % 12.6 %    Monocytes % 15.0 %    Eosinophils % 0.7 %    Basophils % 1.7 %    Neutrophils Absolute 3.2 1.7 - 7.7 K/uL    Lymphocytes Absolute 0.6 (L) 1.0 - 5.1 K/uL    Monocytes Absolute 0.7 0.0 - 1.3 K/uL    Eosinophils Absolute 0.0 0.0 - 0.6 K/uL    Basophils Absolute 0.1 0.0 - 0.2 K/uL   Comprehensive Metabolic Panel w/ Reflex to MG   Result Value Ref Range    Sodium 133 (L) 136 - 145 mmol/L    Potassium reflex Magnesium 4.6 3.5 - 5.1 mmol/L    Chloride 96 (L) 99 - 110 mmol/L    CO2 23 21 - 32 mmol/L    Anion Gap 14 3 - 16    Glucose 50 (L) 70 - 99 mg/dL    BUN 25 (H) 7 - 20 mg/dL    CREATININE 2.9 (H) 0.8 - 1.3 mg/dL    GFR Non-African American 22 (A) >60    GFR  27 (A) >60    Calcium 8.4 8.3 - 10.6 mg/dL    Total Protein 7.1 6.4 - 8.2 g/dL    Albumin 3.0 (L) 3.4 - 5.0 g/dL    Albumin/Globulin Ratio 0.7 (L) 1.1 - 2.2    Total Bilirubin 2.2 (H) 0.0 - 1.0 mg/dL    Alkaline Phosphatase 185 (H) 40 - 129 U/L    ALT 47 (H) 10 - 40 U/L     (H) 15 - 37 U/L   Troponin   Result Value Ref Range    Troponin 0.39 (H) <0.01 ng/mL   Lactic Acid   Result Value Ref Range    Lactic Acid 2.8 (H) 0.4 - 2.0 mmol/L   Blood Gas, Venous   Result Value Ref Range    pH, Kang 7.264 (L) 7.350 - 7.450    pCO2, Kagn 62.2 (H) 41.0 - 51.0 mmHg    pO2, Kang <30.0 25.0 - 40.0 mmHg    HCO3, Venous 28.1 (H) 24.0 - 28.0 mmol/L    Base Excess, Kang 0.3 -2.0 - 3.0 mmol/L    O2 Sat, Kang 33 Not established %    Carboxyhemoglobin 1.1 0.0 - 1.5 %    MetHgb, Kang 0.7 0.0 - 1.5 %    TC02 (Calc), Kang 30 mmol/L    Hemoglobin, Kang, Reduced 65.70 %   Brain Natriuretic Peptide   Result Value Ref Range    Pro-BNP 68,313 (H) 0 - 124 pg/mL   Protime-INR   Result Value Ref Range    Protime 28.6 (H) 11.7 - 14.5 sec    INR 2.68 (H) 0.87 - 1.14   APTT   Result Value Ref Range    aPTT 62.0 (H) 23.0 - 34.3 sec   Blood Gas, Arterial   Result Value Ref Range    pH, Arterial 7.350 7.350 - 7.450    pCO2, Arterial 49.7 (H) 35.0 - 45.0 mmHg    pO2, Arterial 155.0 (H) 75.0 - 108.0 mmHg    HCO3, Arterial 27 21 - 29 mmol/L    Base Excess, Arterial 1.4 -3.0 - 3.0 mmol/L    Hemoglobin, Art, Extended 9.10 g/dL    O2 Sat, Arterial >100 93 - 100 %    Carboxyhgb, Arterial 1.6 (H) 0.0 - 1.5 %    Methemoglobin, Arterial 0.4 0.0 - 1.4 %    TCO2, Arterial 29 mmol/L   Blood Gas, Venous   Result Value Ref Range    pH, Kang 7.279 (L) 7.350 - 7.450    pCO2, Kang 58.9 (H) 41.0 - 51.0 mmHg    pO2, Kang 32.3 25.0 - 40.0 mmHg    HCO3, Venous 27.6 24.0 - 28.0 mmol/L    Base Excess, Kang 0.0 -2.0 - 3.0 mmol/L    O2 Sat, Kang 45 Not established %    Carboxyhemoglobin 1.1 0.0 - 1.5 %    MetHgb, Kang 0.5 0.0 - 1.5 %    TC02 (Calc), Kang 29 mmol/L    Hemoglobin, Kang, Reduced 54.40 %   Phosphorus   Result Value Ref Range    Phosphorus 4.5 2.5 - 4.9 mg/dL   Magnesium   Result Value Ref Range    Magnesium 1.80 1.80 - 2.40 mg/dL   Magnesium   Result Value Ref Range    Magnesium 1. 90 1.80 - 2.40 mg/dL   Lactic Acid   Result Value Ref Range    Lactic Acid 2.7 (H) 0.4 - 2.0 mmol/L   Troponin   Result Value Ref Range    Troponin 0.17 (H) <0.01 ng/mL   Renal Function Panel   Result Value Ref Range    Sodium 135 (L) 136 - 145 mmol/L    Potassium 4.4 3.5 - 5.1 mmol/L    Chloride 98 (L) 99 - 110 mmol/L    CO2 25 21 - 32 mmol/L    Anion Gap 12 3 - 16    Glucose 115 (H) 70 - 99 mg/dL    BUN 21 (H) 7 - 20 mg/dL    CREATININE 2.6 (H) 0.8 - 1.3 mg/dL    GFR Non-African American 25 (A) >60    GFR  30 (A) >60    Calcium 8.0 (L) 8.3 - 10.6 mg/dL    Phosphorus 4.1 2.5 - 4.9 mg/dL    Albumin 2.7 (L) 3.4 - 5.0 g/dL   CBC with Auto Differential   Result Value Ref Range    WBC 2.8 (L) 4.0 - 11.0 K/uL    RBC 2.52 (L) 4.20 - 5.90 M/uL    Hemoglobin 8.2 (L) 13.5 - 17.5 g/dL    Hematocrit 26.2 (L) 40.5 - 52.5 %    .0 (H) 80.0 - 100.0 fL    MCH 32.4 26.0 - 34.0 pg    MCHC 31.2 31.0 - 36.0 g/dL    RDW 20.0 (H) 12.4 - 15.4 %    Platelets 569 468 - 803 K/uL    MPV 8.9 5.0 - 10.5 fL    Neutrophils % 64.3 %    Lymphocytes % 20.2 %    Monocytes % 13.4 %    Eosinophils % 1.0 %    Basophils % 1.1 %    Neutrophils Absolute 1.8 1.7 - 7.7 K/uL    Lymphocytes Absolute 0.6 (L) 1.0 - 5.1 K/uL    Monocytes Absolute 0.4 0.0 - 1.3 K/uL    Eosinophils Absolute 0.0 0.0 - 0.6 K/uL    Basophils Absolute 0.0 0.0 - 0.2 K/uL   Calcium, Ionized   Result Value Ref Range    Calcium, Ion 1.07 (L) 1.12 - 1.32 mmol/L    pH, Kang 7.255 (L) 7.350 - 7.450   Calcium, Ionized   Result Value Ref Range    Calcium, Ion 1.05 (L) 1.12 - 1.32 mmol/L    pH, Kang 7.282 (L) 7.350 - 7.450   Lactic Acid   Result Value Ref Range    Lactic Acid 2.6 (H) 0.4 - 2.0 mmol/L   Calcium, Ionized   Result Value Ref Range    Calcium, Ion 1.06 (L) 1.12 - 1.32 mmol/L    pH, Kang 7.357 7.350 - 7.450   Phosphorus   Result Value Ref Range    Phosphorus 4.1 2.5 - 4.9 mg/dL   Magnesium   Result Value Ref Range    Magnesium 1.90 1.80 - 2.40 mg/dL   TSH with Reflex   Result Value Ref Range    TSH 21.57 (H) 0.27 - 4.20 uIU/mL   Lactic Acid   Result Value Ref Range    Lactic Acid 2.3 (H) 0.4 - 2.0 mmol/L   Cortisol Total   Result Value Ref Range    Cortisol 7.8 ug/dL   Lactic Acid   Result Value Ref Range    Lactic Acid 3.0 (H) 0.4 - 2.0 mmol/L   Magnesium   Result Value Ref Range    Magnesium 2.00 1.80 - 2.40 mg/dL   CBC with Auto Differential   Result Value Ref Range    WBC 3.6 (L) 4.0 - 11.0 K/uL    RBC 2.54 (L) 4.20 - 5.90 M/uL    Hemoglobin 8.4 (L) 13.5 - 17.5 g/dL    Hematocrit 26.4 (L) 40.5 - 52.5 %    .2 (H) 80.0 - 100.0 fL    MCH 33.1 26.0 - 34.0 pg    MCHC 31.8 31.0 - 36.0 g/dL    RDW 20.4 (H) 12.4 - 15.4 %    Platelets 300 945 - 507 K/uL    MPV 9.0 5.0 - 10.5 fL    Neutrophils % 91.6 %    Lymphocytes % 4.2 %    Monocytes % 3.5 %    Eosinophils % 0.0 %    Basophils % 0.7 %    Neutrophils Absolute 3.3 1.7 - 7.7 K/uL    Lymphocytes Absolute 0.2 (L) 1.0 - 5.1 K/uL    Monocytes Absolute 0.1 0.0 - 1.3 K/uL    Eosinophils Absolute 0.0 0.0 - 0.6 K/uL    Basophils Absolute 0.0 0.0 - 0.2 K/uL   Vancomycin Level, Random   Result Value Ref Range    Vancomycin Rm 19.2 ug/mL   Lactic Acid   Result Value Ref Range    Lactic Acid 3.8 (H) 0.4 - 2.0 mmol/L   Renal Function Panel   Result Value Ref Range    Sodium 133 (L) 136 - 145 mmol/L    Potassium 4.8 3.5 - 5.1 mmol/L    Chloride 100 99 - 110 mmol/L    CO2 18 (L) 21 - 32 mmol/L    Anion Gap 15 3 - 16    Glucose 115 (H) 70 - 99 mg/dL    BUN 15 7 - 20 mg/dL    CREATININE 1.9 (H) 0.8 - 1.3 mg/dL    GFR Non- 36 (A) >60    GFR  43 (A) >60    Calcium 8.4 8.3 - 10.6 mg/dL    Phosphorus 3.5 2.5 - 4.9 mg/dL    Albumin 2.8 (L) 3.4 - 5.0 g/dL   Calcium, Ionized   Result Value Ref Range    Calcium, Ion 1.03 (L) 1.12 - 1.32 mmol/L    pH, Kang 7.380 7.350 - 7.450   Calcium, Ionized   Result Value Ref Range    Calcium, Ion 1.05 (L) 1.12 - 1.32 mmol/L    pH, Kang 7.360 7.350 - 7.450   Calcium, Ionized Result Value Ref Range    Calcium, Ion 1.11 (L) 1.12 - 1.32 mmol/L    pH, Kang 7.321 (L) 7.350 - 7.450   Lactic Acid   Result Value Ref Range    Lactic Acid 4.3 (HH) 0.4 - 2.0 mmol/L   Renal Function Panel   Result Value Ref Range    Sodium 134 (L) 136 - 145 mmol/L    Potassium 4.8 3.5 - 5.1 mmol/L    Chloride 100 99 - 110 mmol/L    CO2 21 21 - 32 mmol/L    Anion Gap 13 3 - 16    Glucose 189 (H) 70 - 99 mg/dL    BUN 14 7 - 20 mg/dL    CREATININE 1.6 (H) 0.8 - 1.3 mg/dL    GFR Non- 44 (A) >60    GFR  53 (A) >60    Calcium 8.5 8.3 - 10.6 mg/dL    Phosphorus 3.5 2.5 - 4.9 mg/dL    Albumin 2.9 (L) 3.4 - 5.0 g/dL   Magnesium   Result Value Ref Range    Magnesium 2.20 1.80 - 2.40 mg/dL   Calcium, Ionized   Result Value Ref Range    Calcium, Ion 1.10 (L) 1.12 - 1.32 mmol/L    pH, Kang 7.312 (L) 7.350 - 7.450   T4, Free   Result Value Ref Range    T4 Free 1.1 0.9 - 1.8 ng/dL   Calcium, Ionized   Result Value Ref Range    Calcium, Ion 1.14 1.12 - 1.32 mmol/L    pH, Kang 7.292 (L) 7.350 - 7.450   POCT Glucose   Result Value Ref Range    POC Glucose 22 (LL) 70 - 99 mg/dl    Performed on ACCU-CHEK    POCT Glucose   Result Value Ref Range    POC Glucose 40 (LL) 70 - 99 mg/dl    Performed on ACCU-CHEK    POCT Glucose   Result Value Ref Range    POC Glucose 60 (L) 70 - 99 mg/dl    Performed on ACCU-CHEK    POCT Glucose   Result Value Ref Range    POC Glucose 91 70 - 99 mg/dl    Performed on ACCU-CHEK    POCT Glucose   Result Value Ref Range    POC Glucose 82 70 - 99 mg/dl    Performed on ACCU-CHEK    POCT Glucose   Result Value Ref Range    POC Glucose 67 (L) 70 - 99 mg/dl    Performed on ACCU-CHEK    POCT Glucose   Result Value Ref Range    POC Glucose 73 70 - 99 mg/dl    Performed on ACCU-CHEK    POCT Glucose   Result Value Ref Range    POC Glucose 77 70 - 99 mg/dl    Performed on ACCU-CHEK    POCT Arterial   Result Value Ref Range    POC Sodium 137 136 - 145 mmol/L    POC Potassium 4.1 3.5 - 5.1 mmol/L    POC Glucose 51 (L) 70 - 99 mg/dl    Calcium, Ion 1.07 (L) 1.12 - 1.32 mmol/L    pH, Arterial 7.420 7.350 - 7.450    pCO2, Arterial 40.1 35.0 - 45.0 mm Hg    pO2, Arterial 174.5 (H) 75.0 - 108.0 mm Hg    HCO3, Arterial 26.0 21.0 - 29.0 mmol/L    Base Excess, Arterial 2 -3 - 3    O2 Sat, Arterial 100 93 - 100 %    TCO2, Arterial 27 Not Established mmol/L    Lactate 2.10 (H) 0.40 - 2.00 mmol/L    Sample Type ART     Performed on SEE BELOW    POCT Glucose   Result Value Ref Range    POC Glucose 58 (L) 70 - 99 mg/dl    Performed on ACCU-CHEK    POCT Glucose   Result Value Ref Range    POC Glucose 98 70 - 99 mg/dl    Performed on ACCU-CHEK    POCT Glucose   Result Value Ref Range    POC Glucose 112 (H) 70 - 99 mg/dl    Performed on ACCU-CHEK    POCT Glucose   Result Value Ref Range    POC Glucose 107 (H) 70 - 99 mg/dl    Performed on ACCU-CHEK    POCT Glucose   Result Value Ref Range    POC Glucose 79 70 - 99 mg/dl    Performed on ACCU-CHEK    POCT Glucose   Result Value Ref Range    POC Glucose 74 70 - 99 mg/dl    Performed on ACCU-CHEK    POCT Glucose   Result Value Ref Range    POC Glucose 71 70 - 99 mg/dl    Performed on ACCU-CHEK    POCT Glucose   Result Value Ref Range    POC Glucose 75 70 - 99 mg/dl    Performed on ACCU-CHEK    POCT Glucose   Result Value Ref Range    POC Glucose 86 70 - 99 mg/dl    Performed on ACCU-CHEK    POCT Glucose   Result Value Ref Range    POC Glucose 87 70 - 99 mg/dl    Performed on ACCU-CHEK    POCT Glucose   Result Value Ref Range    POC Glucose 77 70 - 99 mg/dl    Performed on ACCU-CHEK    POCT Arterial   Result Value Ref Range    POC Sodium 136 136 - 145 mmol/L    POC Potassium 4.2 3.5 - 5.1 mmol/L    POC Glucose 74 70 - 99 mg/dl    Calcium, Ion 1.09 (L) 1.12 - 1.32 mmol/L    pH, Arterial 7.371 7.350 - 7.450    pCO2, Arterial 39.5 35.0 - 45.0 mm Hg    pO2, Arterial 71.7 (L) 75.0 - 108.0 mm Hg    HCO3, Arterial 22.9 21.0 - 29.0 mmol/L    Base Excess, Arterial -2 -3 - 3    O2 Sat, Arterial 94 93 - 100 %    TCO2, Arterial 24 Not Established mmol/L    Lactate 2.84 (H) 0.40 - 2.00 mmol/L    Sample Type ART     Performed on SEE BELOW    POCT Glucose   Result Value Ref Range    POC Glucose 60 (L) 70 - 99 mg/dl    Performed on ACCU-CHEK    POCT Glucose   Result Value Ref Range    POC Glucose 55 (L) 70 - 99 mg/dl    Performed on ACCU-CHEK    POCT Glucose   Result Value Ref Range    POC Glucose 53 (L) 70 - 99 mg/dl    Performed on ACCU-CHEK    POCT Glucose   Result Value Ref Range    POC Glucose 64 (L) 70 - 99 mg/dl    Performed on ACCU-CHEK    POCT Glucose   Result Value Ref Range    POC Glucose 49 (LL) 70 - 99 mg/dl    Performed on ACCU-CHEK    POCT Glucose   Result Value Ref Range    POC Glucose 62 (L) 70 - 99 mg/dl    Performed on ACCU-CHEK    POCT Glucose   Result Value Ref Range    POC Glucose 67 (L) 70 - 99 mg/dl    Performed on ACCU-CHEK    POCT Glucose   Result Value Ref Range    POC Glucose 85 70 - 99 mg/dl    Performed on ACCU-CHEK    POCT Glucose   Result Value Ref Range    POC Glucose 116 (H) 70 - 99 mg/dl    Performed on ACCU-CHEK    POCT Glucose   Result Value Ref Range    POC Glucose 122 (H) 70 - 99 mg/dl    Performed on ACCU-CHEK    POCT Glucose   Result Value Ref Range    POC Glucose 115 (H) 70 - 99 mg/dl    Performed on ACCU-CHEK    POCT Glucose   Result Value Ref Range    POC Glucose 108 (H) 70 - 99 mg/dl    Performed on ACCU-CHEK    POCT Glucose   Result Value Ref Range    POC Glucose 106 (H) 70 - 99 mg/dl    Performed on ACCU-CHEK    POCT Glucose   Result Value Ref Range    POC Glucose 104 (H) 70 - 99 mg/dl    Performed on ACCU-CHEK    POCT Glucose   Result Value Ref Range    POC Glucose 97 70 - 99 mg/dl    Performed on ACCU-CHEK    POCT Glucose   Result Value Ref Range    POC Glucose 159 (H) 70 - 99 mg/dl    Performed on ACCU-CHEK    POCT Glucose   Result Value Ref Range    POC Glucose 172 (H) 70 - 99 mg/dl    Performed on ACCU-CHEK    POCT Glucose   Result Value Ref Range POC Glucose 223 (H) 70 - 99 mg/dl    Performed on ACCU-CHEK    EKG 12 Lead   Result Value Ref Range    Ventricular Rate 104 BPM    Atrial Rate 104 BPM    P-R Interval 200 ms    QRS Duration 104 ms    Q-T Interval 350 ms    QTc Calculation (Bazett) 460 ms    P Axis 19 degrees    R Axis -14 degrees    T Axis 114 degrees    Diagnosis       EKG performed in ER and to be interpreted by ER physician. Confirmed by MD, ER ([id number]),  Betty Antonia ([phone]) on 7/16/2022 12:43:37 PM       ED BEDSIDE ULTRASOUND:  No results found. RECENT VITALS:  BP: (!) 85/50, Temp: (!) 96.5 °F (35.8 °C), Heart Rate: 84,Resp: 27, SpO2: 100 %     Procedures     None    ED Course     Nursing Notes, Past Medical Hx, Past Surgical Hx, Social Hx, Allergies, and Family Hx were reviewed. The patient was given the followingmedications:  Orders Placed This Encounter   Medications    heparin (porcine) injection 1,000 Units    epoetin roseann-epbx (RETACRIT) injection 10,000 Units    midodrine (PROAMATINE) tablet 10 mg    midodrine (PROAMATINE) tablet 10 mg    albumin human 25 % IV solution 25 g    sodium chloride flush 0.9 % injection 5-40 mL    sodium chloride flush 0.9 % injection 5-40 mL    0.9 % sodium chloride infusion    OR Linked Order Group     ondansetron (ZOFRAN-ODT) disintegrating tablet 4 mg     ondansetron (ZOFRAN) injection 4 mg    polyethylene glycol (GLYCOLAX) packet 17 g    OR Linked Order Group     acetaminophen (TYLENOL) tablet 650 mg     acetaminophen (TYLENOL) suppository 650 mg    DISCONTD: heparin (porcine) injection 5,000 Units    glucose chewable tablet 16 g    OR Linked Order Group     dextrose bolus 10% 125 mL     dextrose bolus 10% 250 mL    glucagon (rDNA) injection 1 mg    dextrose 5 % solution    prismaSATE BGK 4/2.5 dialysis solution     Order Specific Question:   CRRT Fluid Type:      Answer:   Dialysate ([address])    prismaSATE BGK 4/2.5 dialysis solution     Order Specific Question:   CRRT Fluid Type: Answer:   Replacement Fluid-PRE (White Line)    prismaSATE BGK 4/2.5 dialysis solution     Order Specific Question:   CRRT Fluid Type: Answer:   Replacement Fluid-POST (Purple Line)    potassium chloride 20 mEq/50 mL IVPB (Central Line)    magnesium sulfate 1000 mg in dextrose 5% 100 mL IVPB    OR Linked Order Group     calcium gluconate 1000 mg in sodium chloride 100 mL     calcium gluconate 2000 mg in sodium chloride 100 mL     calcium gluconate 3000 mg in dextrose 5% 100 mL IVPB     calcium gluconate 4000 mg in dextrose 5% 100 mL IVPB    OR Linked Order Group     sodium phosphate 6 mmol in sodium chloride 0.9 % 250 mL IVPB     sodium phosphate 12 mmol in sodium chloride 0.9 % 250 mL IVPB     sodium phosphate 18 mmol in sodium chloride 0.9 % 500 mL IVPB     sodium phosphate 24 mmol in sodium chloride 0.9 % 500 mL IVPB    apixaban (ELIQUIS) tablet 2.5 mg     Order Specific Question:   Indication of Use     Answer:   Treatment-DVT/PE    midodrine (PROAMATINE) tablet 10 mg    norepinephrine (LEVOPHED) 1 MG/ML injection     Socorro Dellen: cabinet override    DISCONTD: lidocaine PF 1 % injection     Socorro Dellen: cabinet override    norepinephrine (LEVOPHED) 16 mg in dextrose 5 % 250 mL infusion     Order Specific Question:   Titrate Infusion? Answer:   Yes     Order Specific Question:   Initial Infusion Dose: Answer:   5 mcg/min     Order Specific Question:   Goal of Therapy is: Answer:   MAP greater than 65 mmHg     Order Specific Question:   Contact Provider if:     Answer:   Patient is receiving the maximum dose and is not achieving the goal of therapy    DISCONTD: 0.9 % sodium chloride bolus    0.9 % sodium chloride bolus    DISCONTD: meropenem (MERREM) 1,000 mg in sodium chloride 0.9 % 100 mL IVPB (mini-bag)     Order Specific Question:   Antimicrobial Indications     Answer:    Other     Order Specific Question:   Antimicrobial Indications     Answer:   Sepsis of Unknown Etiology     Order Specific Question:   Other Abx Indication     Answer:   concern for line infxn     Order Specific Question:   Sepsis duration of therapy     Answer:   7 days    DOBUTamine (DOBUTREX) 500 mg in dextrose 5 % 250 mL infusion     Order Specific Question:   Titrate Infusion? Answer:   Yes     Order Specific Question:   Initial Infusion Dose: Answer:   2.5 mcg/kg/min     Order Specific Question:   Titrate in increments of: Answer:   2.5 mcg/kg/min     Order Specific Question:   Titrate no faster than:     Answer:   Every 5 minutes     Order Specific Question:   Goal of Therapy is: Answer:   MAP greater than 65 mmHg     Order Specific Question:   Contact Provider if:     Answer:   Patient is receiving the maximum dose and is not achieving the goal of therapy    hydrocortisone sodium succinate PF (SOLU-CORTEF) injection 100 mg    vancomycin (VANCOCIN) intermittent dosing (placeholder)     Order Specific Question:   Antimicrobial Indications     Answer:   Sepsis of Unknown Etiology     Order Specific Question:   Sepsis duration of therapy     Answer:   7 days     Order Specific Question:   Suspected Organism(s)     Answer:   possible line infection    vancomycin (VANCOCIN) 1,750 mg in dextrose 5 % 500 mL IVPB     Order Specific Question:   Antimicrobial Indications     Answer:   Bloodstream Infection    FOLLOWED BY Linked Order Group     meropenem (MERREM) 1,000 mg in sodium chloride 0.9 % 100 mL IVPB (mini-bag)      Order Specific Question:   Antimicrobial Indications      Answer:   Sepsis of Unknown Etiology      Order Specific Question:   Antimicrobial Indications      Answer:    Other      Order Specific Question:   Other Abx Indication      Answer:   concern for line infection      Order Specific Question:   Sepsis duration of therapy      Answer:   7 days     meropenem (MERREM) 1,000 mg in sodium chloride 0.9 % 100 mL IVPB (mini-bag)      Order Specific Question:   Antimicrobial Indications      Answer: Sepsis of Unknown Etiology      Order Specific Question:   Antimicrobial Indications      Answer: Other      Order Specific Question:   Other Abx Indication      Answer:   Concern for line infection      Order Specific Question:   Sepsis duration of therapy      Answer:   7 days    heparin (porcine) injection 3,600 Units    hydrocortisone sodium succinate PF (SOLU-CORTEF) injection 100 mg    vancomycin (VANCOCIN) 750 mg in dextrose 5 % 250 mL IVPB     Order Specific Question:   Antimicrobial Indications     Answer:   Sepsis of Unknown Etiology     Order Specific Question:   Sepsis duration of therapy     Answer: Other     Order Specific Question:   Other Sepsis Duration     Answer:   x1    midodrine (PROAMATINE) tablet 10 mg    DISCONTD: bisacodyl (DULCOLAX) suppository 10 mg    bisacodyl (DULCOLAX) suppository 10 mg    levothyroxine (SYNTHROID) tablet 75 mcg         CONSULTS:  IP CONSULT TO NEPHROLOGY  IP CONSULT TO HOSPITALIST  IP CONSULT TO PALLIATIVE CARE  IP CONSULT TO NEPHROLOGY  IP CONSULT TO PHARMACY  IP CONSULT TO GENERAL SURGERY  IP CONSULT TO SOCIAL WORK  PHARMACY TO DOSE VANCOMYCIN  IP CONSULT TO CARDIOLOGY  IP CONSULT TO CRITICAL CARE    MEDICAL DECISION MAKING / ASSESSMENT / Tay Woo is a 74 y.o. male with history of CHF, ESRD, cirrhosis who presents in acute hypoxic respiratory failure from dialysis. On initial assessment, he had decreased mentation, unable to obtain O2 saturation, mildly tachycardic, normotensive on 10 L nonrebreather. Flow rate was increased to f flush rate on a nonrebreather with improvement in the patient's mentation. Continue to be unable to obtain accurate O2 saturation with a good waveform. ABG was ordered. He does have some swelling to his right-sided face and arm, he denies any trauma. States he typically swells on the right side when he is volume overloaded. Absent breath sounds on the right in the lower lung fields.   Concern for possible effusion, pneumonia. Will get VBG, CMP, lactate, CBC, BNP, troponin, and CXR. Patient continued to have increased work of breathing with O2 saturations in a good waveform in the low 90s on nonrebreather. VBG shows a respiratory acidosis with a PCO2 of 62 and pH of 7.2. ABG with PO2 in the 150s on nonrebreather. .  O2 saturations in the low 90s on nonrebreather. Decision made to place patient on bilevel noninvasive positive pressure at 10/5. CXR with large right pleural effusion, worsened from previous. Patient tolerating BiPAP well. O2 saturations greater than 85%. Call placed to radiology about IR guided thoracentesis. Given patient's current acuity, would not be sitting up on side of bed for thoracentesis. Order placed for ultrasound thoracentesis. Discussed patient with nephrology. Agreed with plan for thoracentesis. Will arrange dialysis. Bed request placed. Spoke with hospitalist.  Discussed that patient would need PCU admission. Hospitalist accepted patient and will evaluate patient in the ED. During IR performed thoracentesis, patient became hypotensive to the 70s. He was set back up with some improvement in his pressures. After evaluating patient at bedside, hospitalist felt patient would be better served in the ICU. Given now somewhat labile BP, this is reasonable. Discussed patient with ICU. Patient is admitted to the ICU. This patient was also evaluated by the attending physician. All care plans werediscussed and agreed upon. Clinical Impression     1. Hypotension due to hypovolemia    2. Acute respiratory failure with hypoxia and hypercapnia (HCC)    3. Pleural effusion    4. ESRD needing dialysis ([address]    5. Acute hypercapnic respiratory failure (HCC)    6. Hypotension, unspecified hypotension type    7. Other specified hypotension        Disposition     PATIENT REFERRED TO:  No follow-up provider specified.     DISCHARGE MEDICATIONS:  Current Discharge Medication List DISPOSITION Admitted 07/15/2022 03:47:05 PM      Violeta Rivera MD  Resident  07/17/22 [id number]

## 2022-07-15 NOTE — PROGRESS NOTES
Called [facility] where patient resides to learn more information about patient, but no one was able to answer call. Left voicemail with callback number.      Israel Hogue MD

## 2022-07-15 NOTE — CONSULTS
or diarrhea. He has history of cirrhosis with ascites. He also history of right-sided pleural effusion. His EF is reduced at 20%. On arrival creatinine was 2.9 with a BUN of 25.  proBNP was 68,000. Serum albumin was 3.0. Chest x-ray showed right-sided effusion. ROS:     Seen with-resident    positives in bold   Not obtained                  All other remaining systems are negative or unable to obtain        PMH/PSH/SH/Family History:     Past Medical History:   Diagnosis Date    Anemia     Cardiomegaly     CHF (congestive heart failure) (HCC)     Cirrhosis (HCC)     Diabetes type 2, uncontrolled ([facility] [address] 7/28/2014    Diabetic nephropathy 9/12/2013    ED (erectile dysfunction) 9/12/2013    Essential hypertension, benign 9/12/2013    Hemodialysis access, fistula mature ([facility] [address]     Hyperlipidemia 9/12/2013    Noncompliance 7/28/2014    Respiratory failure (HCC)     Tachycardia        Past Surgical History:   Procedure Laterality Date    CARDIAC DEFIBRILLATOR PLACEMENT      EYE SURGERY      IR NONTUNNELED VASCULAR CATHETER  04/04/2022    IR NONTUNNELED VASCULAR CATHETER 4/4/2022 [facility]    IR NONTUNNELED VASCULAR CATHETER Left 05/26/2022    Vascath; LIJ access; 20cm; Dr. Jerome Moise - removed 5/31/22    IR NONTUNNELED VASCULAR CATHETER  05/26/2022    IR NONTUNNELED VASCULAR CATHETER 5/26/2022 [facility]    IR TUNNELED CATHETER PLACEMENT GREATER THAN 5 YEARS  04/08/2022    IR TUNNELED CATHETER PLACEMENT GREATER THAN 5 YEARS 4/8/2022 [facility]    IR TUNNELED CATHETER PLACEMENT GREATER THAN 5 YEARS Left 05/31/2022    Permacath; LIJ access; 23cm; Dr. Jerome Moise    IR TUNNELED Hammad Nunnery 5 YEARS  5/31/2022    IR TUNNELED CATHETER PLACEMENT GREATER THAN 5 YEARS 5/31/2022 [facility]        reports that he quit smoking about 41 years ago. He has a 10.50 pack-year smoking history.  He has never used smokeless tobacco. He reports current alcohol use of about 1.0 standard drink per week. He reports that he does not use drugs. family history includes Diabetes in his brother, mother, and sister; High Blood Pressure in his mother. Medication:     No current facility-administered medications for this encounter. Vitals :     Vitals:    07/15/22 1157   BP:    Pulse: 97   Resp: 28   SpO2: 92%       I & O :     No intake or output data in the 24 hours ending 07/15/22 1242     Physical Examination :     General appearance: in respiratory distress   HEENT: Lips- normal, teeth- ok , oral mucosa- moist  Neck : Mass- no, appears symmetrical, JVD- +  Respiratory: Respiratory effort-  normal, wheeze- no, crackles -   Cardiovascular:  Ausculation- No M/R/G, Edema ++  Abdomen: visible mass- no, distention- no, scar- no, tenderness- no                           hepatosplenomegaly-  no  Musculoskeletal:  clubbing no,cyanosis- no , digital ischemia- no                           muscle strength- grossly normal , tone - grossly normal  Skin: rashes- no , ulcers- no, induration- no, tightening - no  Psychiatric: Not evaluated  Additional finding:      LABS:     Recent Labs     07/15/22  1047   WBC 4.6   HGB 9.8*   HCT 30.3*        Recent Labs     07/15/22  1047   *   K 4.6   CL 96*   CO2 23   BUN 25*   CREATININE 2.9*   GLUCOSE 50*        Nephrology  [address] [facility], [address] [facility]  Office: [phone]  Fax: [phone]

## 2022-07-15 NOTE — PROGRESS NOTES
Pt to ICU from ED via RN and PCA. On bipap, RT at bedside. /86. Pt responsive to voice, however requires stimulation. CHG bath and 4 eyes assessment completed per protocol. ICU residents at bedside. ABG completed. BG 26 on POC- 22 on accucheck. Hypoglycemic protocol initiated. Post D10 bolus neuro status has improved. Bipap removed/switched to O2 high flow. Will cont to monitor.

## 2022-07-15 NOTE — ED TRIAGE NOTES
From HD stopped dialysis for patient c/o SOB Initial sa02 on RA was 71, BP was 80/60/    Arrives on NRB   alert and oriented

## 2022-07-16 NOTE — PLAN OF CARE
Problem: Pain  Goal: Verbalizes/displays adequate comfort level or baseline comfort level  7/16/2022 1000 by Alejandra Odom  Outcome: Progressing     Problem: Chronic Conditions and Co-morbidities  Goal: Patient's chronic conditions and co-morbidity symptoms are monitored and maintained or improved  7/16/2022 1000 by Alejandra Odom  Outcome: Progressing     Problem: Skin/Tissue Integrity  Goal: Absence of new skin breakdown  Description: 1. Monitor for areas of redness and/or skin breakdown  2. Assess vascular access sites hourly  3. Every 4-6 hours minimum:  Change oxygen saturation probe site  4. Every 4-6 hours:  If on nasal continuous positive airway pressure, respiratory therapy assess nares and determine need for appliance change or resting period.   7/16/2022 1000 by Alejandra Odom  Outcome: Progressing     Problem: ABCDS Injury Assessment  Goal: Absence of physical injury  7/16/2022 1000 by Alejandra Odom  Outcome: Progressing

## 2022-07-16 NOTE — CONSULTS
Clinical Pharmacy Progress Note    Vancomycin - Management by Pharmacy    Consult Date(s):  7/16/22  Consulting Provider(s):  Dr. Kar Arrington / Plan:  1)  Sepsis, possible line infection  - Vancomycin  Concurrent Antimicrobials: Meropenem   Day of Vanc Therapy / Ordered Duration: 1 of 7  Current Dosing Method: Intermittent dosing based on levels in the setting of ESRD  Therapeutic Goal: Trough ~ 15 mcg/mL  Pt with ESRD, on HD at home. Now transitioned to CRRT. Will dose intermittently based on levels. Will give loading dose of 1750mg IV x1 today. Will check random level in AM tomorrow. Will continue to monitor clinical condition and make adjustments to regimen as appropriate. Please call with questions--  Thanks--  Erica Kitchen, PharmD, BCPS, BCGP  [id number] ([facility])   7/16/2022 1:59 PM      Interval update:  Meropenem / Vancomycin started for sepsis / possible line infection. Subjective/Objective:   Brunilda Solorio is a 74 y.o. y/o male with a PMHx that includes ESRD on HD, HF, cirrhosis, DM 2, HTN, and HLD who is admitted with hypoxia while receiving outpt HD, acute encephalopathy, hypoglycemia, and acute hypercaneic respiratory failure. Broad spectrum antibiotics were started 7/16 for sepsis and suspected line infection. Pharmacy is consulted to dose Vancomycin.     Ht Readings from Last 1 Encounters:   07/15/22 5' 6.5\" (1.689 m)     Wt Readings from Last 1 Encounters:   07/16/22 163 lb 2.3 oz (74 kg)       Current and Prior Antimicrobials:  Meropenem 1000mg IV q12h (7/16-current)  Vancomycin - Pharmacy to dose   Intermittent dosing (7/16-current)    Date Vancomycin Level Vancomycin Dose   7/16  1750mg x1   7/17 ordered             Recent Labs     07/15/22  1047 07/16/22  0505   CREATININE 2.9* 2.6*   BUN 25* 21*   WBC 4.6 2.8*       Cultures & Sensitivities:  Date Site Micro Susceptibility / Result   7/16 Blood x2 sent            No results for input(s): PROCAL in the last 72 hours.

## 2022-07-16 NOTE — PROCEDURES
Elba Calix is a 74 y.o. male patient. 1. Other specified hypotension    2. Acute respiratory failure with hypoxia and hypercapnia (HCC)    3. Pleural effusion    4. ESRD needing dialysis ([facility] [address]    5. Acute hypercapnic respiratory failure (HCC)    6. Hypotension, unspecified hypotension type      Past Medical History:   Diagnosis Date    Anemia     Cardiomegaly     CHF (congestive heart failure) ([facility] [address]     Cirrhosis ([facility] [address]     Di[facility], [facility] 07/28/2014    Diabetic nephropathy 09/12/2013    ED (erectile dysfunction) 09/12/2013    Essential hypertension, benign 09/12/2013    Hemodialysis access, fistula mature ([facility]     Hyperlipidemia 09/12/2013    Noncompliance 07/28/2014    Respiratory failure (HCC)      Blood pressure (!) 82/55, pulse 86, temperature (!) 95.7 °F (35.4 °C), temperature source Temporal, resp. rate 27, height 5' 6.5\" (1.689 m), weight 163 lb 2.3 oz (74 kg), SpO2 (!) 89 %. Central Line    Date/Time: 7/16/2022 3:18 PM  Performed by: Juhi Tabor DO  Authorized by: Juhi Tabor DO   Consent: Verbal consent obtained.   Risks and benefits: risks, benefits and alternatives were discussed  Consent given by: patient  Patient understanding: patient states understanding of the procedure being performed  Imaging studies: imaging studies available  Patient identity confirmed: verbally with patient  Indications: vascular access and central pressure monitoring    Anesthesia:  Local Anesthetic: lidocaine 1% with epinephrine    Sedation:  Patient sedated: no    Preparation: skin prepped with 2% chlorhexidine  Location details: right internal jugular  Ultrasound guidance: yes  Sterile ultrasound techniques: sterile gel and sterile probe covers were used  Number of attempts: 2  Comments: Failed attempt        Juhi Tabor DO  7/16/2022

## 2022-07-16 NOTE — PROGRESS NOTES
Patient called out wanting to turn onto R side. Patient was then given a partial CHG bath, bed pad changed. Repositioned for comfort. SpO2 waveform then became poor and reading 60%. Probe switched from forehead to ear, O2 flow increased to 15 liters with no significant improvement on SpO2 reading. RT at bedside. Non-re breather applied. ABG drawn. Results WNL. ICU residents at bedside. Chest X-ray done STAT. Results show R sided pneumothorax. Surgery urgently consulted. Patient's son called and was given an update. Plan to place chest tube and A-line. Patient was given 2 boluses of D 10 for hypoglycemia.

## 2022-07-16 NOTE — PROGRESS NOTES
ICU residents made aware of recent BP readings 78/60. MAP 68. Cuff switched and result unchanged. Labs drawn as ordered at 2300. Awaiting results. Blood glucose 70-80. Patient offered apple juice and drank without issue. Will continue to monitor.

## 2022-07-16 NOTE — PROCEDURES
Morteza Mccurdy is a 74 y.o. male patient. 1. Acute respiratory failure with hypoxia and hypercapnia (HCC)    2. Pleural effusion    3. ESRD needing dialysis ([facility] [address]    4. Acute hypercapnic respiratory failure ([facility] [address]    5. Hypotension, unspecified hypotension type      Past Medical History:   Diagnosis Date    Anemia     Cardiomegaly     CHF (congestive heart failure) ([facility] [address]     Cirrhosis ([facility] [address]     Di[facility], [facility] 07/28/2014    Diabetic nephropathy 09/12/2013    ED (erectile dysfunction) 09/12/2013    Essential hypertension, benign 09/12/2013    Hemodialysis access, fistula mature ([facility]     Hyperlipidemia 09/12/2013    Noncompliance 07/28/2014    Respiratory failure (HCC)      Blood pressure (!) 73/50, pulse 83, temperature (!) 95.7 °F (35.4 °C), temperature source Temporal, resp. rate 27, height 5' 6.5\" (1.689 m), weight 163 lb 2.3 oz (74 kg), SpO2 (!) 88 %. Insert Arterial Line    Date/Time: 7/16/2022 1:24 PM  Performed by: Viktoriya Louise DO  Authorized by: Viktoriya Louise DO   Consent: Verbal consent obtained. Risks and benefits: risks, benefits and alternatives were discussed  Consent given by: patient and power of   Patient understanding: patient states understanding of the procedure being performed  Patient consent: the patient's understanding of the procedure matches consent given  Relevant documents: relevant documents present and verified  Imaging studies: imaging studies available  Required items: required blood products, implants, devices, and special equipment available  Patient identity confirmed: verbally with patient and arm band  Time out: Immediately prior to procedure a \"time out\" was called to verify the correct patient, procedure, equipment, support staff and site/side marked as required. Preparation: Patient was prepped and draped in the usual sterile fashion.   Indications: hemodynamic monitoring  Location: left radial  Anesthesia: local infiltration    Anesthesia:  Local Anesthetic: lidocaine 1% without epinephrine  Anesthetic total: 5 mL    Sedation:  Patient sedated: no    Kenyon's test normal: yes  Needle gauge: 18  Seldinger technique: Seldinger technique used  Number of attempts: 1  Post-procedure: line sutured and dressing applied  Post-procedure CMS: normal  Patient tolerance: patient tolerated the procedure well with no immediate complications  Comments: EBL: 5ml        Lorin Tay DO  7/16/2022

## 2022-07-16 NOTE — CONSULTS
Pharmacy Consult Note  - Admission Medication Reconciliation      Pharmacy consulted for reconciliation of [id number] medications. I reviewed faxed med list from [facility]      The following changes made to [id number] medication list:    ADDED:  -no new additions-    Dose or Frequency CHANGE:  -none-     REMOVED:  1) Metoprolol XL       Current Outpatient Medications   Medication Instructions    allopurinol (ZYLOPRIM) 100 mg, Oral, DAILY    apixaban (ELIQUIS) 2.5 mg, Oral, 2 TIMES DAILY    atorvastatin (LIPITOR) 80 mg, Oral, Nightly    clopidogrel (PLAVIX) 75 mg, Oral, DAILY    glucose monitoring (FREESTYLE FREEDOM) kit 1 kit, Does not apply, DAILY    levothyroxine (SYNTHROID) 50 mcg, Oral, DAILY BEFORE BREAKFAST    midodrine (PROAMATINE) 10 mg, Oral, 3 TIMES DAILY WITH MEALS    Multiple Vitamins-Minerals (THERAPEUTIC MULTIVITAMIN-MINERALS) tablet 1 tablet, Oral, DAILY    pantoprazole (PROTONIX) 40 mg, Oral, 2 TIMES DAILY BEFORE MEALS       Thank you for the consult    Please call with any questions:    Bandar Delgado D. [facility]   7/16/2022 8:34 AM    [phone] ([facility])

## 2022-07-16 NOTE — PLAN OF CARE
Problem: Discharge Planning  Goal: Discharge to home or other facility with appropriate resources  Outcome: Progressing  Flowsheets (Taken 7/15/2022 1945)  Discharge to home or other facility with appropriate resources:   Identify barriers to discharge with patient and caregiver   Arrange for needed discharge resources and transportation as appropriate   Identify discharge learning needs (meds, wound care, etc)   Refer to discharge planning if patient needs post-hospital services based on physician order or complex needs related to functional status, cognitive ability or social support system     Problem: Pain  Goal: Verbalizes/displays adequate comfort level or baseline comfort level  Outcome: Progressing  Flowsheets (Taken 7/15/2022 1930)  Verbalizes/displays adequate comfort level or baseline comfort level:   Encourage patient to monitor pain and request assistance   Assess pain using appropriate pain scale   Administer analgesics based on type and severity of pain and evaluate response   Implement non-pharmacological measures as appropriate and evaluate response     Problem: Chronic Conditions and Co-morbidities  Goal: Patient's chronic conditions and co-morbidity symptoms are monitored and maintained or improved  Outcome: Progressing  Flowsheets (Taken 7/15/2022 1945)  Care Plan - Patient's Chronic Conditions and Co-Morbidity Symptoms are Monitored and Maintained or Improved: Monitor and assess patient's chronic conditions and comorbid symptoms for stability, deterioration, or improvement     Problem: Skin/Tissue Integrity  Goal: Absence of new skin breakdown  Description: 1. Monitor for areas of redness and/or skin breakdown  2. Assess vascular access sites hourly  3. Every 4-6 hours minimum:  Change oxygen saturation probe site  4. Every 4-6 hours:  If on nasal continuous positive airway pressure, respiratory therapy assess nares and determine need for appliance change or resting period.   Outcome: Progressing     Problem: Safety - Adult  Goal: Free from fall injury  Outcome: Progressing  Flowsheets (Taken 7/15/2022 2000)  Free From Fall Injury: Instruct family/caregiver on patient safety     Problem: ABCDS Injury Assessment  Goal: Absence of physical injury  Outcome: Progressing  Flowsheets (Taken 7/15/2022 2000)  Absence of Physical Injury: Implement safety measures based on patient assessment

## 2022-07-16 NOTE — PROGRESS NOTES
Interval History and plan: On significant O2     Plan:    Started CRRT  Follow with resp status   Cont volume control   UF as tolerated with CRRT   Follow with trend                     Assessment :        1. ESRD:  Will plan HD per schedule. He gets dialysis Monday, Wednesday and Friday. Access: Hemodialysis catheter  Daily weights  Fluid restriction: 1 L  Nephrocap 1 tab PO daily     2. Anemia:  Erythropoetin dose: Continue Procrit with dialysis to keep hemoglobin between 10-12. 3.  Osteodystrophy:  Phosphate Binder: He is currently not on binders. 4.  Fluid overload/ascites: I will arrange for dialysis to remove volume. He has chronically low blood pressure we will give him midodrine with dialysis. We will support him with albumin as needed. [facility] would like to thank Reginald Matson MD   for opportunity to serve this patient      Please call with questions at-   24 Hrs Answering service [phone] or  7 am- 5 pm via Perfect serve or cell phone  Cynthia Radford MD          CC/reason for consult :     ESRD on hemodialysis     HPI :     Ruddy Griffin is a 74 y.o. male presented to   the hospital on 7/15/2022 with shortness of breath. He has past medical history of congestive heart failure, liver cirrhosis, type 2 diabetes, complications from diabetes including diabetic nephropathy and Retinopathy, hypertension, history of noncompliance and ESRD. He gets dialysis on Monday, Wednesday and Friday. He goes to [facility] home and get dialysis inpatient. He used to go for dialysis dialysis at [facility]. He was recently in the hospital with hypotension and fluid overload. He now came with shortness of breath. On arrival he was hypoxic and hypotensive. He denies complaints of chest pain, nausea vomiting or diarrhea. He has history of cirrhosis with ascites. He also history of right-sided pleural effusion. His EF is reduced at 20%.     On Blood Pressure in his mother.          Medication:     Current Facility-Administered Medications: heparin (porcine) injection 1,000 Units, 1,000 Units, IntraVENous, Once  epoetin roseann-epbx (RETACRIT) injection 10,000 Units, 10,000 Units, IntraVENous, Once per day on Mon Wed Fri  midodrine (PROAMATINE) tablet 10 mg, 10 mg, Oral, Once  midodrine (PROAMATINE) tablet 10 mg, 10 mg, Oral, Q2H PRN  albumin human 25 % IV solution 25 g, 25 g, IntraVENous, Once PRN  sodium chloride flush 0.9 % injection 5-40 mL, 5-40 mL, IntraVENous, 2 times per day  sodium chloride flush 0.9 % injection 5-40 mL, 5-40 mL, IntraVENous, PRN  0.9 % sodium chloride infusion, , IntraVENous, PRN  ondansetron (ZOFRAN-ODT) disintegrating tablet 4 mg, 4 mg, Oral, Q8H PRN **OR** ondansetron (ZOFRAN) injection 4 mg, 4 mg, IntraVENous, Q6H PRN  polyethylene glycol (GLYCOLAX) packet 17 g, 17 g, Oral, Daily PRN  acetaminophen (TYLENOL) tablet 650 mg, 650 mg, Oral, Q6H PRN **OR** acetaminophen (TYLENOL) suppository 650 mg, 650 mg, Rectal, Q6H PRN  glucose chewable tablet 16 g, 4 tablet, Oral, PRN  dextrose bolus 10% 125 mL, 125 mL, IntraVENous, PRN **OR** dextrose bolus 10% 250 mL, 250 mL, IntraVENous, PRN  glucagon (rDNA) injection 1 mg, 1 mg, IntraMUSCular, PRN  dextrose 5 % solution, 100 mL/hr, IntraVENous, PRN  prismaSATE BGK 4/2.5 dialysis solution, , Dialysis, Continuous  prismaSATE BGK 4/2.5 dialysis solution, , Dialysis, Continuous  prismaSATE BGK 4/2.5 dialysis solution, , Dialysis, Continuous  potassium chloride 20 mEq/50 mL IVPB (Central Line), 20 mEq, IntraVENous, PRN  magnesium sulfate 1000 mg in dextrose 5% 100 mL IVPB, 1,000 mg, IntraVENous, PRN  calcium gluconate 1000 mg in sodium chloride 100 mL, 1,000 mg, IntraVENous, PRN **OR** calcium gluconate 2000 mg in sodium chloride 100 mL, 2,000 mg, IntraVENous, PRN **OR** calcium gluconate 3000 mg in dextrose 5% 100 mL IVPB, 3,000 mg, IntraVENous, PRN **OR** calcium gluconate 4000 mg in dextrose 5%

## 2022-07-16 NOTE — PROGRESS NOTES
Hospitalist Progress Note      PCP: Afshan Min DO    Date of Admission: 7/15/2022    Chief Complaint: Shortness of breath    Hospital Course: Patient become hypoxemic during hemodialysis, 911 was called. Transferred to [address] [place] [address] hypotensive and lethargic and admitted to ICU. Also found to be hypoglycemic. Mental status improved with correction of hypoglycemia. Requires BiPAP for respiratory failure  Dialysis switched to CRRT  For respiratory failure and pleural effusion, patient underwent thoracentesis by IR. Following thoracentesis, patient's breathing did not improve. Found to have pneumothorax. Cardiothoracic surgery was consulted and conservative management was chosen. Currently in the ICU on CRRT and oxygen supplementation. Subjective: Short of breath, no chest pain, no nausea vomiting or diarrhea.       Medications:  Reviewed    Infusion Medications    sodium chloride      dextrose      prismaSATE BGK 4/2.5 1,000 mL/hr at 07/16/22 0559    prismaSATE BGK 4/2.5 500 mL/hr at 07/16/22 0559    prismaSATE BGK 4/2.5 500 mL/hr at 07/16/22 0559     Scheduled Medications    norepinephrine        heparin (porcine)  1,000 Units IntraVENous Once    epoetin roseann-epbx  10,000 Units IntraVENous Once per day on Mon Wed Fri    midodrine  10 mg Oral Once    sodium chloride flush  5-40 mL IntraVENous 2 times per day    apixaban  2.5 mg Oral BID     PRN Meds: midodrine, sodium chloride flush, sodium chloride, ondansetron **OR** ondansetron, polyethylene glycol, acetaminophen **OR** acetaminophen, glucose, dextrose bolus **OR** dextrose bolus, glucagon (rDNA), dextrose, potassium chloride, magnesium sulfate, calcium gluconate **OR** calcium gluconate **OR** calcium gluconate **OR** calcium gluconate, sodium phosphate IVPB **OR** sodium phosphate IVPB **OR** sodium phosphate IVPB **OR** sodium phosphate IVPB      Intake/Output Summary (Last 24 hours) at 7/16/2022 1224  Last data filed at 7/16/2022 1200  Gross per 24 hour   Intake 1376.22 ml   Output 419 ml   Net 957.22 ml       Physical Exam Performed:    BP (!) 73/50   Pulse 83   Temp (!) 95.7 °F (35.4 °C) (Temporal)   Resp 27   Ht 5' 6.5\" (1.689 m)   Wt 163 lb 2.3 oz (74 kg)   SpO2 (!) 88%   BMI 25.94 kg/m²     General appearance: No apparent distress, appears stated age and cooperative. HEENT: Pupils equal, round, and reactive to light. Conjunctivae/corneas clear. Neck: Supple, with full range of motion. No jugular venous distention. Trachea midline. Respiratory:  Normal respiratory effort. Clear to auscultation, bilaterally without Rales/Wheezes/Rhonchi. Cardiovascular: Regular rate and rhythm with normal S1/S2 without murmurs, rubs or gallops. Abdomen: Soft, non-tender, non-distended with normal bowel sounds. Musculoskeletal: No clubbing, cyanosis or edema bilaterally. Full range of motion without deformity. Skin: Skin color, texture, turgor normal.  No rashes or lesions.   Neurologic: Cannot fully cooperate due to acute illness, but gives the impression of being nonfocal  Psychiatric: Alert and oriented  Capillary Refill: Brisk,3 seconds, normal   Peripheral Pulses: +2 palpable, equal bilaterally       Labs:   Recent Labs     07/15/22  1047 07/16/22  0505   WBC 4.6 2.8*   HGB 9.8* 8.2*   HCT 30.3* 26.2*    173     Recent Labs     07/15/22  1047 07/15/22  2304 07/16/22  0505   *  --  135*   K 4.6  --  4.4   CL 96*  --  98*   CO2 23  --  25   BUN 25*  --  21*   CREATININE 2.9*  --  2.6*   CALCIUM 8.4  --  8.0*   PHOS  --  4.5 4.1     Recent Labs     07/15/22  1047   *   ALT 47*   BILITOT 2.2*   ALKPHOS 185*     Recent Labs     07/15/22  1047   INR 2.68*     Recent Labs     07/15/22  1047 07/15/22  2304   TROPONINI 0.39* 0.17*       Urinalysis:      Lab Results   Component Value Date/Time    NITRU Negative 04/10/2022 01:55 AM    WBCUA 0-2 04/10/2022 01:55 AM    BACTERIA 2+ 04/09/2022 02:15 PM    RBCUA 0-2 -inpatient stay in the ICU with unclear day of discharge    Radha Thapa MD

## 2022-07-16 NOTE — PROGRESS NOTES
Chest x-ray negative for pneumothorax. Dr. Sarah Rodriguez attempting A-line at bedside. SpO2 probe repositioned and is now reading 96%. Patient appears in no acute distress.

## 2022-07-16 NOTE — PROCEDURES
Eli Roy is a 74 y.o. male patient. 1. Acute respiratory failure with hypoxia and hypercapnia (HCC)    2. Pleural effusion    3. ESRD needing dialysis [facility])      Past Medical History:   Diagnosis Date    Anemia     Cardiomegaly     CHF (congestive heart failure) ([facility]     Cirrhosis ([facility]     Diabetes type 2, uncontrolled ([facility] [facility] 07/28/2014    Diabetic nephropathy 09/12/2013    ED (erectile dysfunction) 09/12/2013    Essential hypertension, benign 09/12/2013    Hemodialysis access, fistula mature ([facility]     Hyperlipidemia 09/12/2013    Noncompliance 07/28/2014    Respiratory failure (HCC)      Blood pressure (!) 75/64, pulse 75, temperature (!) 95.5 °F (35.3 °C), temperature source Temporal, resp. rate (!) 34, height 5' 6.5\" (1.689 m), weight 163 lb 2.3 oz (74 kg), SpO2 (!) 62 %. Insert Arterial Line    Date/Time: 7/16/2022 6:43 AM  Performed by: Brett Villegas MD  Authorized by: Brett Villegas MD   Consent: Verbal consent obtained. Risks and benefits: risks, benefits and alternatives were discussed  Consent given by: guardian and patient  Patient understanding: patient states understanding of the procedure being performed  Patient consent: the patient's understanding of the procedure matches consent given  Procedure consent: procedure consent matches procedure scheduled  Relevant documents: relevant documents present and verified  Test results: test results available and properly labeled  Site marked: the operative site was marked  Imaging studies: imaging studies available  Required items: required blood products, implants, devices, and special equipment available  Patient identity confirmed: verbally with patient  Time out: Immediately prior to procedure a \"time out\" was called to verify the correct patient, procedure, equipment, support staff and site/side marked as required. Preparation: Patient was prepped and draped in the usual sterile fashion.   Indications: multiple ABGs, respiratory failure and hemodynamic monitoring  Location: right radial  Anesthesia: local infiltration    Anesthesia:  Local Anesthetic: lidocaine 1% without epinephrine    Sedation:  Patient sedated: no    Kenyon's test normal: yes  Needle gauge: 20  Number of attempts: 2  Post-procedure CMS: normal and unchanged  Comments: Failed attempt  EBL < 5 cc        Yanet Jovel MD  7/16/2022

## 2022-07-16 NOTE — DISCHARGE INSTRUCTIONS
Extra Heart Failure sites:   [email or web address].Apprion/ --- this is American Heart Association interactive Healthier Living with Heart Failure guidebook. Please copy and paste link into search bar. Use your mouse to scroll through the pages. Lots and lots of info / tips    [address] [address] --- free smart phone yesika- (icon will look like a lopsided pink heart) --Use your phone to track sodium / fluid intake,2 symptoms, weight, etc.    [employer] Int[employer]ct - website-- [employer] is a dialysis company. All dialysis patients follow a renal diet which IS low sodium!! This website offers free seasonal cookbooks.   Each quarter, they will release 25-30 new recipes with a breakdown of calories, sodium, glucose, etc    [email or web address]/recipes -- more free recipes

## 2022-07-16 NOTE — CONSULTS
ICU Progress Note    Admit Date: 7/15/2022  Day: 1  Vent Day: None  IV Access:Peripheral, Central line, left subclavian hemodialysis access   IV Fluids:None  Vasopressors: Norepi, dobutamine                Antibiotics: None  Diet: ADULT DIET; Regular; 3 carb choices (45 gm/meal); Low Sodium (2 gm)    CC: Hypotension during hemodialysis     Interval history: On 7/16, arterial line was placed in left radial location and central line was placed Arterial line was placed on Central line was placed as well. HPI: Flavia Nichols presented to hospital on 7/15/2022 after experiencing hypotension and hypoxia during hemodialysis. He has a past medical history of ESRD, systolic HF (EF < 53%), type 2 diabetes, cirrhosis. He has been placed on CRT in hospital and is requiring pressors to maintain blood pressure. His blood glucose remains low despite receiving D10.      Medications:     Scheduled Meds:   norepinephrine        lidocaine PF        vancomycin (VANCOCIN) intermittent dosing (placeholder)   Other See Admin Instructions    vancomycin  1,750 mg IntraVENous Once    [START ON 7/17/2022] meropenem  1,000 mg IntraVENous Q12H    hydrocortisone sodium succinate PF  100 mg IntraVENous Q8H    heparin (porcine)  1,000 Units IntraVENous Once    epoetin roseann-epbx  10,000 Units IntraVENous Once per day on Mon Wed Fri    midodrine  10 mg Oral Once    sodium chloride flush  5-40 mL IntraVENous 2 times per day    apixaban  2.5 mg Oral BID     Continuous Infusions:   norepinephrine 16 mcg/min (07/16/22 1638)    DOBUTamine 2.5 mcg/kg/min (07/16/22 1638)    sodium chloride      dextrose      prismaSATE BGK 4/2.5 1,000 mL/hr at 07/16/22 0559    prismaSATE BGK 4/2.5 500 mL/hr at 07/16/22 0559    prismaSATE BGK 4/2.5 500 mL/hr at 07/16/22 0559     PRN Meds:heparin (porcine), sodium chloride flush, sodium chloride, ondansetron **OR** ondansetron, polyethylene glycol, acetaminophen **OR** acetaminophen, glucose, dextrose bolus **OR** dextrose bolus, glucagon (rDNA), dextrose, potassium chloride, magnesium sulfate, calcium gluconate **OR** calcium gluconate **OR** calcium gluconate **OR** calcium gluconate, sodium phosphate IVPB **OR** sodium phosphate IVPB **OR** sodium phosphate IVPB **OR** sodium phosphate IVPB    Objective:   Vitals:   T-max:  Patient Vitals for the past 8 hrs:   BP Temp Temp src Pulse Resp SpO2   07/16/22 1600 102/73 (!) 96.4 °F (35.8 °C) Temporal 100 (!) 32 99 %   07/16/22 1545 94/79 -- -- 96 27 99 %   07/16/22 1530 (!) 79/61 -- -- 94 26 --   07/16/22 1515 (!) 98/50 -- -- 98 22 (!) 86 %   07/16/22 1500 -- -- -- 86 27 --   07/16/22 1430 (!) 82/55 -- -- 74 22 (!) 89 %   07/16/22 1415 -- -- -- 79 28 (!) 86 %   07/16/22 1400 (!) 69/55 -- -- 67 26 --   07/16/22 1323 -- -- -- 80 25 93 %   07/16/22 1300 (!) 50/13 -- -- 80 (!) 31 --   07/16/22 1200 (!) 73/50 (!) 95.7 °F (35.4 °C) Temporal 83 27 (!) 88 %   07/16/22 1145 (!) 74/24 -- -- 76 (!) 33 --   07/16/22 1100 (!) 114/92 -- -- 81 29 --   07/16/22 1030 (!) 73/51 -- -- 76 30 --   07/16/22 1000 87/60 -- -- 78 26 --   07/16/22 0945 (!) 77/63 -- -- 80 (!) 31 --   07/16/22 0930 (!) 75/61 -- -- 81 20 --   07/16/22 0915 (!) 72/56 -- -- 81 30 --   07/16/22 0900 (!) 81/59 -- -- 82 26 --   07/16/22 0845 81/66 -- -- 82 (!) 31 --       Intake/Output Summary (Last 24 hours) at 7/16/2022 1644  Last data filed at 7/16/2022 1638  Gross per 24 hour   Intake 3508.29 ml   Output 419 ml   Net 3089.29 ml       Review of Systems   Constitutional:  Negative for chills. Patient looks unwell and has low energy level     Physical Exam  Constitutional:       Appearance: He is ill-appearing. HENT:      Head: Normocephalic. Nose: Nose normal.   Cardiovascular:      Rate and Rhythm: Normal rate and regular rhythm. Pulmonary:      Effort: Pulmonary effort is normal.   Musculoskeletal:      Right lower leg: No edema. Left lower leg: No edema.          LABS:    CBC:   Recent Labs     07/15/22  1047 07/16/22  0505   WBC 4.6 2.8*   HGB 9.8* 8.2*   HCT 30.3* 26.2*    173   .3* 104.0*     Renal:    Recent Labs     07/15/22  1047 07/15/22  2304 07/16/22  0505   *  --  135*   K 4.6  --  4.4   CL 96*  --  98*   CO2 23  --  25   BUN 25*  --  21*   CREATININE 2.9*  --  2.6*   GLUCOSE 50*  --  115*   CALCIUM 8.4  --  8.0*   MG  --  1.80 1.90   PHOS  --  4.5 4.1   ANIONGAP 14  --  12     Hepatic:   Recent Labs     07/15/22  1047 07/16/22  0505   *  --    ALT 47*  --    BILITOT 2.2*  --    PROT 7.1  --    LABALBU 3.0* 2.7*   ALKPHOS 185*  --      Troponin:   Recent Labs     07/15/22  1047 07/15/22  2304   TROPONINI 0.39* 0.17*     BNP: No results for input(s): BNP in the last 72 hours. Lipids: No results for input(s): CHOL, HDL in the last 72 hours. Invalid input(s): LDLCALCU, TRIGLYCERIDE  ABGs:    Recent Labs     07/15/22  1034 07/16/22 0436 07/16/22  1334   PHART 7.350 7.420 7. 371   NHS1SOP 49.7* 40.1 39.5   PO2ART 155.0* 174.5* 71.7*   SLP5EOY 27 26.0 22.9   BEART 1.4 2 -2   U9XCXZHA >100 100 94   ZHK1LBA 29 27 24       INR:   Recent Labs     07/15/22  1047   INR 2.68*     Lactate:   Recent Labs     07/16/22 0436 07/16/22  1334   LACTATE 2.10* 2.84*     Cultures:  -----------------------------------------------------------------  RAD:   XR CHEST PORTABLE   Final Result   1. No change. XR CHEST PORTABLE   Final Result   1. Decreased right-sided effusion with no pneumothorax identified. Left effusion and left basilar airspace disease unchanged. XR CHEST PORTABLE   Final Result     Congestive heart failure       Moderate cardiomegaly. Underlying pericardial effusion cannot be    excluded          XR CHEST PORTABLE   Final Result   1. Significant decrease in right-sided effusion with no pneumothorax identified. US THORACENTESIS Which side should the procedure be performed?  Right   Final Result   IMPRESSION :    Ultrasound guided drainage of 1000 mL of straw-colored pleural effusion. XR CHEST PORTABLE   Final Result   1. Significant worsening of right-sided effusion and right-sided airspace disease in the interval.            Assessment/Plan:   Acute respiratory failure with hypoxia and hypercapnia  -On 13L high flow O2. Adjust O2 rates as required for adequate oxygenation.   -Check lactate Q6hrs    Hypotension  -1L normal saline fluid bolus  -Currently on levo and dobutamine. Manage pressors as needed for MAP > 65.  -Midodrine during CRT as need to maintain blood pressure.   -Antibiotics (merrem) as hemodialysis catheter may be infected (patient reports it was a month ago). Patient is also hypothermic at 96.4. Do not want to miss potential septic shock.  -Will also check urine for UTI. -Cardiology consulted to consider cardiogenic shock as cause of hypotension     Hypoglycemia  -Measure TSH to check for myxedema coma (as can cause hypotension and hypoglycemia). Check cortisol levels for same reason (note that one dose of empiric hydrocortisone did not change his blood pressure). ESRD  -CRT  -Nephrology consulted for further management      Code Status: Full code  FEN: Adult diet  PPX: Heparin  DISPO: ICU    Julienne Bourne MD, PGY-1  07/16/22  4:44 PM    This patient has been staffed and discussed with Dr. Maynor Rand MD      [facility]    Patient seen and examined. I agree with Dr. Shannan Thompson history, physical, lab findings, assessment and plan. Pt developed hypotension and hypoxemia during HD yesterday at outside dialysis center. He has a Hx of ESRD on HD via left HD catheter, Hx of line infection per friend at bedside, CHF with biventricular failure (EF < 20%, RV failure with severe pulm HTN), right sided pleural effusion s/p thora in ED yesterday by IR - no fluid studies sent, and hypotension with HD on midodrine. He was here 3 weeks ago for similar and had negative eval for sepsis. SBP on that admission was 90 - 100.     This admission he has hypoglycemia and hypothermia as well. HR in high 60's. Not on chronic steroids. Has hypothyroidism and is on synthroid 50. Etiology of current shock appears most c/w cardiogenic shock especially with HD. He is preload dependent given his pulm HTN and likely does not tolerate rapid or large fluid shifts. When I saw him at bedside his SBP was mid 60's by art line with HR 70. He was mentating ok and was on 13 L with widely varaible O2 sat that didn't look particularly reliable. ABG was 7.37/40/72 with LA 2.84    Pro-BNP 68,313 (>70,000 on last admission)    - Give 1 L NS over 2 hrs  - Pause CRRT and return blood until MAP can be maintained  -Start Levophed with goal MAP 65  -Start Dobutamine at 5  -Start empiric hydrocortisone 100 IV q 8 hrs as cortisol 7  -Check TSH  -Start empiric vanc/Merrem while awaiting cultures  -Cardiology consult  -D10 drip for BS support  -Wean FIO2 with goal O@ sat  - Code status discussed with patient - he desires to be full code    Pt has a high probability of imminent or life-threatening deterioration requiring close monitoring, and highly complex decision-making and/or interventions of high intensity to assess, manipulate, and support his critical organ systems to prevent a likely inevitable decline which could occur if left untreated. A total critical care time 39 minutes was used. This includes but not limited to examining patient, collaborating with other physicians, monitoring vital signs, telemetry, continuous pulse oximetry, and clinical response to IV medications, documentation time, review and interpretation of laboratory and radiological data, review of nursing notes and old record review. This time excludes any time that may have been spent performing procedures for life threatening organ failure.     Mckenzie Rubin MD

## 2022-07-16 NOTE — PROGRESS NOTES
Orders noted for CRRT. Vascath dressing changed with sterile technique. Machine primed and connected to patient. Treatment initiated at 65. Patient tolerated well.

## 2022-07-16 NOTE — CONSULTS
Cardiothoracic Surgery   Resident Consult Note    Reason for Consult: Pneumothorax on CXR     History of Present Illness:   Morteza Mccurdy is a 74 y.o. male with Hx of systolic HF with EF less than 20%, ESRD, T2DM who underwent thoracentesis yesterday with 1L of straw colored fluid out. The patient experienced oxygen desaturation this AM on pulse oximetry leading to increased supplemental oxygen administration; however, ABG reveals good oxygen saturation/does not correlate to pulse oximetry readings. The patient is alert and is not reporting shortness of breath or chest pain at the time of this consultation visit.      Past Medical History:        Diagnosis Date    Anemia     Cardiomegaly     CHF (congestive heart failure) ([address]     Cirrhosis ([address]     Diabetes type 2, uncontrolled ([address] 07/28/2014    Diabetic nephropathy 09/12/2013    ED (erectile dysfunction) 09/12/2013    Essential hypertension, benign 09/12/2013    Hemodialysis access, fistula mature ([address]     Hyperlipidemia 09/12/2013    Noncompliance 07/28/2014    Respiratory failure ([address]      Past Surgical History:        Procedure Laterality Date    CARDIAC DEFIBRILLATOR PLACEMENT      EYE SURGERY      IR NONTUNNELED VASCULAR CATHETER  04/04/2022    IR NONTUNNELED VASCULAR CATHETER 4/4/2022 [facility]    IR NONTUNNELED VASCULAR CATHETER Left 05/26/2022    Vascath; LIJ access; 20cm; Dr. Jen Gage - removed 5/31/22    IR NONTUNNELED VASCULAR CATHETER  05/26/2022    IR NONTUNNELED VASCULAR CATHETER 5/26/2022 [facility]    IR TUNNELED CATHETER PLACEMENT GREATER THAN 5 YEARS  04/08/2022    IR TUNNELED CATHETER PLACEMENT GREATER THAN 5 YEARS 4/8/2022 [facility]    IR TUNNELED CATHETER PLACEMENT GREATER THAN 5 YEARS Left 05/31/2022    Permacath; LIJ access; 23cm; Dr. Jen Gage    IR TUNNELED [address] 5 YEARS  5/31/2022    IR TUNNELED CATHETER PLACEMENT GREATER THAN 5 YEARS 5/31/2022 [facility] Allergies:  Patient has no known allergies. Medications:   Home Meds  No current facility-administered medications on file prior to encounter.      Current Outpatient Medications on File Prior to Encounter   Medication Sig Dispense Refill    apixaban (ELIQUIS) 2.5 MG TABS tablet Take 2.5 mg by mouth 2 times daily      midodrine (PROAMATINE) 10 MG tablet Take 1 tablet by mouth 3 times daily (with meals) 90 tablet 0    metoprolol succinate (TOPROL XL) 25 MG extended release tablet Take 0.5 tablets by mouth daily 30 tablet 3    clopidogrel (PLAVIX) 75 MG tablet Take 75 mg by mouth daily      glucose monitoring (FREESTYLE FREEDOM) kit 1 kit by Does not apply route daily 1 kit 0    levothyroxine (SYNTHROID) 50 MCG tablet Take 1 tablet by mouth every morning (before breakfast) 30 tablet 3    pantoprazole (PROTONIX) 40 MG tablet Take 1 tablet by mouth 2 times daily (before meals) 30 tablet 3    allopurinol (ZYLOPRIM) 100 MG tablet Take 1 tablet by mouth daily 30 tablet 3    Multiple Vitamins-Minerals (THERAPEUTIC MULTIVITAMIN-MINERALS) tablet Take 1 tablet by mouth daily      atorvastatin (LIPITOR) 80 MG tablet Take 80 mg by mouth at bedtime          Current Meds  heparin (porcine) injection 1,000 Units, Once  epoetin roseann-epbx (RETACRIT) injection 10,000 Units, Once per day on Mon Wed Fri  midodrine (PROAMATINE) tablet 10 mg, Once  midodrine (PROAMATINE) tablet 10 mg, Q2H PRN  sodium chloride flush 0.9 % injection 5-40 mL, 2 times per day  sodium chloride flush 0.9 % injection 5-40 mL, PRN  0.9 % sodium chloride infusion, PRN  ondansetron (ZOFRAN-ODT) disintegrating tablet 4 mg, Q8H PRN   Or  ondansetron (ZOFRAN) injection 4 mg, Q6H PRN  polyethylene glycol (GLYCOLAX) packet 17 g, Daily PRN  acetaminophen (TYLENOL) tablet 650 mg, Q6H PRN   Or  acetaminophen (TYLENOL) suppository 650 mg, Q6H PRN  glucose chewable tablet 16 g, PRN  dextrose bolus 10% 125 mL, PRN   Or  dextrose bolus 10% 250 mL, PRN  glucagon (rDNA) injection 1 mg, PRN  dextrose 5 % solution, PRN  prismaSATE BGK 4/2.5 dialysis solution, Continuous  prismaSATE BGK 4/2.5 dialysis solution, Continuous  prismaSATE BGK 4/2.5 dialysis solution, Continuous  potassium chloride 20 mEq/50 mL IVPB (Central Line), PRN  magnesium sulfate 1000 mg in dextrose 5% 100 mL IVPB, PRN  calcium gluconate 1000 mg in sodium chloride 100 mL, PRN   Or  calcium gluconate 2000 mg in sodium chloride 100 mL, PRN   Or  calcium gluconate 3000 mg in dextrose 5% 100 mL IVPB, PRN   Or  calcium gluconate 4000 mg in dextrose 5% 100 mL IVPB, PRN  sodium phosphate 6 mmol in sodium chloride 0.9 % 250 mL IVPB, PRN   Or  sodium phosphate 12 mmol in sodium chloride 0.9 % 250 mL IVPB, PRN   Or  sodium phosphate 18 mmol in sodium chloride 0.9 % 500 mL IVPB, PRN   Or  sodium phosphate 24 mmol in sodium chloride 0.9 % 500 mL IVPB, PRN  apixaban (ELIQUIS) tablet 2.5 mg, BID      Family History:   Family History   Problem Relation Age of Onset    Diabetes Mother     High Blood Pressure Mother     Diabetes Sister     Diabetes Brother        Social History:   TOBACCO:   reports that he quit smoking about 41 years ago. He has a 10.50 pack-year smoking history. He has never used smokeless tobacco.  ETOH:   reports current alcohol use of about 1.0 standard drink per week. DRUGS:   reports no history of drug use. Review of Systems:   A 14 point review of systems was conducted, significant findings as noted in HPI. All other systems negative.      Physical exam:    Vitals:    07/16/22 0100 07/16/22 0115 07/16/22 0130 07/16/22 0200   BP: (!) 79/62 (!) 79/62 (!) 80/59 97/86   Pulse: 87 82 86 88   Resp: 26 19 20 30   Temp:       TempSrc:       SpO2: 94% 94% 99% 100%   Weight:       Height:           General appearance: alert, no acute distress, grooming appropriate  Neck: trachea midline, no JVD, no lymphadenopathy, neck supple  Chest/Lungs: Normal effort, breathing supplemental oxygen at 15L via non-rebreather mask  Cardiovascular: Regular rate and rhythm  Abdomen: Soft, non-tender, non-distended, no rebound, guarding, or rigidity. Skin: warm and dry, no rashes  Extremities: no edema, no cyanosis  Neuro: A&Ox3, no focal deficits, sensation intact    Labs:    CBC:   Recent Labs     07/15/22  1047   WBC 4.6   HGB 9.8*   HCT 30.3*   .3*        BMP:   Recent Labs     07/15/22  1047 07/15/22  2304   *  --    K 4.6  --    CL 96*  --    CO2 23  --    PHOS  --  4.5   BUN 25*  --    CREATININE 2.9*  --      PT/INR:   Recent Labs     07/15/22  1047   PROTIME 28.6*   INR 2.68*     APTT:   Recent Labs     07/15/22  1047   APTT 62.0*     Liver Profile:   Lab Results   Component Value Date/Time     07/15/2022 10:47 AM    ALT 47 07/15/2022 10:47 AM    BILIDIR 1.3 06/27/2022 05:08 AM    BILITOT 2.2 07/15/2022 10:47 AM    ALKPHOS 185 07/15/2022 10:47 AM     Lab Results   Component Value Date/Time    CHOL 95 02/04/2022 06:20 AM    HDL 48 02/04/2022 06:20 AM    HDL 58 12/13/2011 12:51 AM    TRIG 43 02/04/2022 06:20 AM     UA:   Lab Results   Component Value Date/Time    COLORU Yellow 04/10/2022 01:55 AM    PHUR 5.5 04/10/2022 01:55 AM    WBCUA 0-2 04/10/2022 01:55 AM    RBCUA 0-2 04/10/2022 01:55 AM    MUCUS Rare 04/09/2022 02:15 PM    BACTERIA 2+ 04/09/2022 02:15 PM    CLARITYU Clear 04/10/2022 01:55 AM    SPECGRAV 1.025 04/10/2022 01:55 AM    LEUKOCYTESUR Negative 04/10/2022 01:55 AM    UROBILINOGEN 1.0 04/10/2022 01:55 AM    BILIRUBINUR Negative 04/10/2022 01:55 AM    BLOODU Negative 04/10/2022 01:55 AM    GLUCOSEU Negative 04/10/2022 01:55 AM     Imaging:   XR CHEST PORTABLE   Final Result   1. Significant decrease in right-sided effusion with no pneumothorax identified. US THORACENTESIS Which side should the procedure be performed? Right   Final Result   IMPRESSION :    Ultrasound guided drainage of 1000 mL of straw-colored pleural effusion. XR CHEST PORTABLE   Final Result   1.  Significant worsening of right-sided effusion and right-sided airspace disease in the interval.      XR CHEST PORTABLE    (Results Pending)     Assessment/Plan:  Bassem Weiss is a 74year old male with Hx of systolic HF with EF less than 20%, ESRD, T2DM who underwent IR thoracentesis yesterday, for whom Cardiothoracic Surgery is consulted for concern of PTX on chest xray that was obtained for oxygen desaturation on pulse oximetry. The patient's pulse oximetry values do not correspond to his clinical appearance or his ABG results. Review of the CXR reveals persistent vs recurrent pleural effusion on the right and if anything a small PTX.  Given the discrepancy of the pulse oximetry with other objective as well as subjective findings, the main issue seems to be that of the pulse oximeter and not an issue that is amenable to any surgical procedures as this time.     - No plan for any invasive intervention at this time  - Would recommend against holly for serial paO2 monitoring, but rather titrate clinically and continue to troubleshoot pulse oximeter     - Repeat CXR in 6 hours  - The patient was discussed with Dr. Carlos Joe MD  07/16/22  4:56 AM

## 2022-07-17 PROBLEM — R57.9 SHOCK (HCC): Status: ACTIVE | Noted: 2022-01-01

## 2022-07-17 NOTE — PROCEDURES
Kane Khan is a 74 y.o. male patient. 1. Other specified hypotension    2. Acute respiratory failure with hypoxia and hypercapnia (HCC)    3. Pleural effusion    4. ESRD needing dialysis ([facility]    5. Acute hypercapnic respiratory failure (HCC)    6. Hypotension, unspecified hypotension type    7. Hypotension due to hypovolemia      Past Medical History:   Diagnosis Date    Anemia     Cardiomegaly     CHF (congestive heart failure) ([facility]     Cirrhosis ([facility]     Diabetes type 2, uncontrolled ([facility] 07/28/2014    Diabetic nephropathy 09/12/2013    ED (erectile dysfunction) 09/12/2013    Essential hypertension, benign 09/12/2013    Hemodialysis access, fistula mature ([facility]     Hyperlipidemia 09/12/2013    Noncompliance 07/28/2014    Respiratory failure (HCC)      Blood pressure (!) 71/58, pulse 96, temperature 97.2 °F (36.2 °C), temperature source Temporal, resp. rate (!) 32, height 5' 6.5\" (1.689 m), weight 163 lb 2.3 oz (74 kg), SpO2 93 %. Central Line    Date/Time: 7/16/2022 11:21 PM  Performed by: Eh Watts DO  Authorized by: Eh Watts DO   Consent: Verbal consent obtained. Risks and benefits: risks, benefits and alternatives were discussed  Consent given by: patient and power of   Patient understanding: patient states understanding of the procedure being performed  Patient consent: the patient's understanding of the procedure matches consent given  Imaging studies: imaging studies available  Required items: required blood products, implants, devices, and special equipment available  Patient identity confirmed: verbally with patient and arm band  Time out: Immediately prior to procedure a \"time out\" was called to verify the correct patient, procedure, equipment, support staff and site/side marked as required.   Indications: vascular access  Anesthesia: local infiltration    Anesthesia:  Local Anesthetic: lidocaine 1% without epinephrine  Anesthetic total: 10 mL    Sedation:  Patient sedated: no    Preparation: skin prepped with 2% chlorhexidine  Skin prep agent dried: skin prep agent completely dried prior to procedure  Sterile barriers: all five maximum sterile barriers used - cap, mask, sterile gown, sterile gloves, and large sterile sheet  Hand hygiene: hand hygiene performed prior to central venous catheter insertion  Location details: right femoral  Site selection rationale: patient stability and multiple lines in neck  Patient position: flat  Catheter type: triple lumen  Catheter size: 7 Fr  Pre-procedure: landmarks identified  Ultrasound guidance: yes  Sterile ultrasound techniques: sterile gel and sterile probe covers were used  Number of attempts: 1  Successful placement: yes  Post-procedure: line sutured  Assessment: blood return through all ports and free fluid flow  Patient tolerance: patient tolerated the procedure well with no immediate complications  Comments: EBL:5ml  Line is non tunneled        Radha Hunter DO  7/16/2022

## 2022-07-17 NOTE — PROGRESS NOTES
Interval History and plan: On significant O2     Plan:    Started CRRT  BP still softer  Significant hypotension  Cont stress dose steroids   Ok to keep low but need to get volume controlled   Follow with trend on high-dose pressors we will try to see even if we keep him net even                     Assessment :        1. ESRD:  Will plan HD per schedule. He gets dialysis Monday, Wednesday and Friday. Access: Hemodialysis catheter  Daily weights  Fluid restriction: 1 L  Nephrocap 1 tab PO daily     2. Anemia:  Erythropoetin dose: Continue Procrit with dialysis to keep hemoglobin between 10-12. 3.  Osteodystrophy:  Phosphate Binder: He is currently not on binders. 4.  Fluid overload/ascites: I will arrange for dialysis to remove volume. He has chronically low blood pressure we will give him midodrine with dialysis. We will support him with albumin as needed. [facility] would like to thank Michelle Gaines MD   for opportunity to serve this patient      Please call with questions at-   24 Hrs Answering service [phone] or  7 am- 5 pm via Perfect serve or cell phone  Hessie Severs, MD          CC/reason for consult :     ESRD on hemodialysis     HPI :     Emilio Arellano is a 74 y.o. male presented to   the hospital on 7/15/2022 with shortness of breath. He has past medical history of congestive heart failure, liver cirrhosis, type 2 diabetes, complications from diabetes including diabetic nephropathy and Retinopathy, hypertension, history of noncompliance and ESRD. He gets dialysis on Monday, Wednesday and Friday. He goes to [facility] home and get dialysis inpatient. He used to go for dialysis dialysis at [facility]. He was recently in the hospital with hypotension and fluid overload. He now came with shortness of breath. On arrival he was hypoxic and hypotensive. He denies complaints of chest pain, nausea vomiting or diarrhea.   He has history of cirrhosis with ascites. He also history of right-sided pleural effusion. His EF is reduced at 20%. On arrival creatinine was 2.9 with a BUN of 25.  proBNP was 68,000. Serum albumin was 3.0. Chest x-ray showed right-sided effusion. ROS:     Seen with-resident    positives in bold   Not obtained                  All other remaining systems are negative or unable to obtain        PMH/PSH/SH/Family History:     Past Medical History:   Diagnosis Date    Anemia     Cardiomegaly     CHF (congestive heart failure) (HCC)     Cirrhosis ([facility]     Diabetes type 2, uncontrolled ([facility] 07/28/2014    Diabetic nephropathy 09/12/2013    ED (erectile dysfunction) 09/12/2013    Essential hypertension, benign 09/12/2013    Hemodialysis access, fistula mature ([facility]     Hyperlipidemia 09/12/2013    Noncompliance 07/28/2014    Respiratory failure ([facility]        Past Surgical History:   Procedure Laterality Date    CARDIAC DEFIBRILLATOR PLACEMENT      EYE SURGERY      IR NONTUNNELED VASCULAR CATHETER  04/04/2022    IR NONTUNNELED VASCULAR CATHETER 4/4/2022 [facility]    IR NONTUNNELED VASCULAR CATHETER Left 05/26/2022    Vascath; LIJ access; 20cm; Dr. Cathy Mae - removed 5/31/22    IR NONTUNNELED VASCULAR CATHETER  05/26/2022    IR NONTUNNELED VASCULAR CATHETER 5/26/2022 [facility]    IR TUNNELED CATHETER PLACEMENT GREATER THAN 5 YEARS  04/08/2022    IR TUNNELED CATHETER PLACEMENT GREATER THAN 5 YEARS 4/8/2022 [facility]    IR TUNNELED CATHETER PLACEMENT GREATER THAN 5 YEARS Left 05/31/2022    Permacath; LIJ access; 23cm; Dr. Cathy Mae    IR TUNNELED [address] 5 YEARS  5/31/2022    IR TUNNELED CATHETER PLACEMENT GREATER THAN 5 YEARS 5/31/2022 [facility]        reports that he quit smoking about 41 years ago. He has a 10.50 pack-year smoking history. He has never used smokeless tobacco. He reports current alcohol use of about 1.0 standard drink per week. He reports that he does not use drugs. family history includes Diabetes in his brother, mother, and sister; High Blood Pressure in his mother.          Medication:     Current Facility-Administered Medications: norepinephrine (LEVOPHED) 1 MG/ML injection, , ,   norepinephrine (LEVOPHED) 16 mg in dextrose 5 % 250 mL infusion, 1-100 mcg/min, IntraVENous, Continuous  DOBUTamine (DOBUTREX) 500 mg in dextrose 5 % 250 mL infusion, 2.5-10 mcg/kg/min, IntraVENous, Continuous  vancomycin (VANCOCIN) intermittent dosing (placeholder), , Other, See Admin Instructions  [COMPLETED] meropenem (MERREM) 1,000 mg in sodium chloride 0.9 % 100 mL IVPB (mini-bag), 1,000 mg, IntraVENous, Once **FOLLOWED BY** [START ON 7/17/2022] meropenem (MERREM) 1,000 mg in sodium chloride 0.9 % 100 mL IVPB (mini-bag), 1,000 mg, IntraVENous, Q12H  heparin (porcine) injection 3,600 Units, 3,600 Units, IntraCATHeter, PRN  hydrocortisone sodium succinate PF (SOLU-CORTEF) injection 100 mg, 100 mg, IntraVENous, Q8H  heparin (porcine) injection 1,000 Units, 1,000 Units, IntraVENous, Once  epoetin roseann-epbx (RETACRIT) injection 10,000 Units, 10,000 Units, IntraVENous, Once per day on Mon Wed Fri  midodrine (PROAMATINE) tablet 10 mg, 10 mg, Oral, Once  sodium chloride flush 0.9 % injection 5-40 mL, 5-40 mL, IntraVENous, 2 times per day  sodium chloride flush 0.9 % injection 5-40 mL, 5-40 mL, IntraVENous, PRN  0.9 % sodium chloride infusion, , IntraVENous, PRN  ondansetron (ZOFRAN-ODT) disintegrating tablet 4 mg, 4 mg, Oral, Q8H PRN **OR** ondansetron (ZOFRAN) injection 4 mg, 4 mg, IntraVENous, Q6H PRN  polyethylene glycol (GLYCOLAX) packet 17 g, 17 g, Oral, Daily PRN  acetaminophen (TYLENOL) tablet 650 mg, 650 mg, Oral, Q6H PRN **OR** acetaminophen (TYLENOL) suppository 650 mg, 650 mg, Rectal, Q6H PRN  glucose chewable tablet 16 g, 4 tablet, Oral, PRN  dextrose bolus 10% 125 mL, 125 mL, IntraVENous, PRN **OR** dextrose bolus 10% 250 mL, 250 mL, IntraVENous, PRN  glucagon (rDNA) injection 1 mg, 1 mg, IntraMUSCular, PRN  dextrose 5 % solution, 100 mL/hr, IntraVENous, PRN  prismaSATE BGK 4/2.5 dialysis solution, , Dialysis, Continuous  prismaSATE BGK 4/2.5 dialysis solution, , Dialysis, Continuous  prismaSATE BGK 4/2.5 dialysis solution, , Dialysis, Continuous  potassium chloride 20 mEq/50 mL IVPB (Central Line), 20 mEq, IntraVENous, PRN  magnesium sulfate 1000 mg in dextrose 5% 100 mL IVPB, 1,000 mg, IntraVENous, PRN  calcium gluconate 1000 mg in sodium chloride 100 mL, 1,000 mg, IntraVENous, PRN **OR** calcium gluconate 2000 mg in sodium chloride 100 mL, 2,000 mg, IntraVENous, PRN **OR** calcium gluconate 3000 mg in dextrose 5% 100 mL IVPB, 3,000 mg, IntraVENous, PRN **OR** calcium gluconate 4000 mg in dextrose 5% 100 mL IVPB, 4,000 mg, IntraVENous, PRN  sodium phosphate 6 mmol in sodium chloride 0.9 % 250 mL IVPB, 6 mmol, IntraVENous, PRN **OR** sodium phosphate 12 mmol in sodium chloride 0.9 % 250 mL IVPB, 12 mmol, IntraVENous, PRN **OR** sodium phosphate 18 mmol in sodium chloride 0.9 % 500 mL IVPB, 18 mmol, IntraVENous, PRN **OR** sodium phosphate 24 mmol in sodium chloride 0.9 % 500 mL IVPB, 24 mmol, IntraVENous, PRN  apixaban (ELIQUIS) tablet 2.5 mg, 2.5 mg, Oral, BID       Vitals :     Vitals:    07/16/22 2215   BP:    Pulse: 96   Resp: (!) 32   Temp:    SpO2: 93%       I & O :       Intake/Output Summary (Last 24 hours) at 7/16/2022 2229  Last data filed at 7/16/2022 2200  Gross per 24 hour   Intake 4320.26 ml   Output 590 ml   Net 3730.26 ml          Physical Examination :     General appearance: in respiratory distress   HEENT: Lips- normal, teeth- ok , oral mucosa- moist  Neck : Mass- no, appears symmetrical, JVD- +  Respiratory: Respiratory effort-  normal, wheeze- no, crackles -   Cardiovascular:  Ausculation- No M/R/G, Edema ++  Abdomen: visible mass- no, distention- no, scar- no, tenderness- no                           hepatosplenomegaly- no  Musculoskeletal:  clubbing no,cyanosis- no , digital ischemia- no                           muscle strength- grossly normal , tone - grossly normal  Skin: rashes- no , ulcers- no, induration- no, tightening - no  Psychiatric: Not evaluated  Additional finding:      LABS:     Recent Labs     07/15/22  1047 07/16/22  0505   WBC 4.6 2.8*   HGB 9.8* 8.2*   HCT 30.3* 26.2*    173       Recent Labs     07/15/22  1047 07/15/22  2304 07/16/22  0505 07/16/22  1547   *  --  135*  --    K 4.6  --  4.4  --    CL 96*  --  98*  --    CO2 23  --  25  --    BUN 25*  --  21*  --    CREATININE 2.9*  --  2.6*  --    GLUCOSE 50*  --  115*  --    MG  --  1.80 1.90 1.90   PHOS  --  4.5 4.1 4.1          Nephrology  [address] [facility], [address] [facility]  Office: [phone]  Fax: [phone]

## 2022-07-17 NOTE — PLAN OF CARE
Problem: Discharge Planning  Goal: Discharge to home or other facility with appropriate resources  7/17/2022 1406 by Guillaume Kelly  Outcome: Progressing     Problem: Pain  Goal: Verbalizes/displays adequate comfort level or baseline comfort level  7/17/2022 1406 by Guillaume Kelly  Outcome: Progressing     Problem: Chronic Conditions and Co-morbidities  Goal: Patient's chronic conditions and co-morbidity symptoms are monitored and maintained or improved  7/17/2022 1406 by Guillaume Kelly  Outcome: Progressing     Problem: Skin/Tissue Integrity  Goal: Absence of new skin breakdown  Description: 1. Monitor for areas of redness and/or skin breakdown  2. Assess vascular access sites hourly  3. Every 4-6 hours minimum:  Change oxygen saturation probe site  4. Every 4-6 hours:  If on nasal continuous positive airway pressure, respiratory therapy assess nares and determine need for appliance change or resting period.   7/17/2022 1406 by Guillaume Kelly  Outcome: Progressing     Problem: Safety - Adult  Goal: Free from fall injury  7/17/2022 1406 by Guillaume Kelly  Outcome: Progressing     Problem: ABCDS Injury Assessment  Goal: Absence of physical injury  7/17/2022 1406 by Guillaume Kelly  Outcome: Progressing

## 2022-07-17 NOTE — PROGRESS NOTES
Patient re-started on CRRT at 1900. Started patient on neutral fluid balance and then -50 mL per hour. Remains on Levophed. Titrating to keep MAP above 65. Patient resting quietly. Not as interactive. Tolerating ice chips. Continuing to frequently check blood glucose. No need for D 10 so far this shift. Repositioned for comfort. Bed in lowest position, side rails up X 3, bed alarm on.

## 2022-07-17 NOTE — CONSULTS
Medications:  Were reviewed and are listed in nursing record. and/or listed below  Prior to Admission medications    Medication Sig Start Date End Date Taking?  Authorizing Provider   apixaban (ELIQUIS) 2.5 MG TABS tablet Take 2.5 mg by mouth 2 times daily    Historical Provider, MD   midodrine (PROAMATINE) 10 MG tablet Take 1 tablet by mouth 3 times daily (with meals) 6/16/22   Fatou Whitley MD   clopidogrel (PLAVIX) 75 MG tablet Take 75 mg by mouth daily    Historical Provider, MD   glucose monitoring (FREESTYLE FREEDOM) kit 1 kit by Does not apply route daily 5/6/22   Sabino Denver, DO   levothyroxine (SYNTHROID) 50 MCG tablet Take 1 tablet by mouth every morning (before breakfast) 5/4/22   Irasema Ruiz MD   pantoprazole (PROTONIX) 40 MG tablet Take 1 tablet by mouth 2 times daily (before meals) 4/13/22   Radha Yoder MD   allopurinol (ZYLOPRIM) 100 MG tablet Take 1 tablet by mouth daily 11/30/21   Fatou Whitley MD   Multiple Vitamins-Minerals (THERAPEUTIC MULTIVITAMIN-MINERALS) tablet Take 1 tablet by mouth daily    Historical Provider, MD   atorvastatin (LIPITOR) 80 MG tablet Take 80 mg by mouth at bedtime  1/8/19   Historical Provider, MD        Hospital Medications:   vancomycin (VANCOCIN) intermittent dosing (placeholder)   Other See Admin Instructions    meropenem  1,000 mg IntraVENous Q12H    hydrocortisone sodium succinate PF  100 mg IntraVENous Q8H    heparin (porcine)  1,000 Units IntraVENous Once    epoetin roseann-epbx  10,000 Units IntraVENous Once per day on Mon Wed Fri    midodrine  10 mg Oral Once    sodium chloride flush  5-40 mL IntraVENous 2 times per day    apixaban  2.5 mg Oral BID     heparin (porcine), sodium chloride flush, sodium chloride, ondansetron **OR** ondansetron, polyethylene glycol, acetaminophen **OR** acetaminophen, glucose, dextrose bolus **OR** dextrose bolus, glucagon (rDNA), dextrose, potassium chloride, magnesium sulfate, calcium gluconate **OR** calcium gluconate **OR** calcium gluconate **OR** calcium gluconate, sodium phosphate IVPB **OR** sodium phosphate IVPB **OR** sodium phosphate IVPB **OR** sodium phosphate IVPB   norepinephrine 19 mcg/min (07/17/22 1209)    DOBUTamine 5 mcg/kg/min (07/17/22 1209)    sodium chloride      dextrose      prismaSATE BGK 4/2.5 1,000 mL/hr at 07/17/22 0451    prismaSATE BGK 4/2.5 500 mL/hr at 07/17/22 0451    prismaSATE BGK 4/2.5 500 mL/hr at 07/17/22 2020       Allergies:  Patient has no known allergies. Review of Systems:     A 14 ROS obtained and negative except as mentioned in HPI. Constitutional: there has been no unanticipated weight loss. Weakness. Hypotension  Eyes: No visual changes or diplopia. No scleral icterus. ENT: No Headaches, hearing loss or vertigo. No mouth sores or sore throat. Cardiovascular: No loss of consciousness. No hemoptysis, pleuritic pain, or phlebitis. Profound weakness  Respiratory: No cough or wheezing, no sputum production. No hematemesis. Gastrointestinal: No abdominal pain, appetite loss, blood in stools. No change in bowel or bladder habits. Genitourinary: No dysuria, or hematuria. Musculoskeletal:  No gait disturbance, weakness or joint complaints. Integumentary: No rash or pruritis. Neurological: No headache, diplopia,numbness or tingling. No change in gait, balance, coordination, mood, affect, memory, mentation, behavior.   Psychiatric: No anxiety,  Endocrine: No malaise,  Hematologic/Lymphatic: No abnormal bruising  Allergic/Immunologic: No nasal congestion      Physical Examination:    Vitals:    07/17/22 1200   BP: (!) 88/55   Pulse: 87   Resp: 27   Temp: (!) 96.3 °F (35.7 °C)   SpO2: 95%    Weight: 166 lb 10.7 oz (75.6 kg)         General Appearance:  Alert, cooperative, no distress, appears stated age   Head:  Normocephalic, without obvious abnormality, atraumatic   Eyes:  PERRL   Nose: Nares normal,   Neck: Supple, JVP elevated    Lungs:   Clear to auscultation bilaterally, respirations unlabored   Chest Wall:  No tenderness or deformity   Heart:  Regular rate and rhythm, normal S1, S2 normal, no murmur, I/VI HSM No rub. +S3 Mery Festus   Abdomen:   Soft, non-tender, +bowel sounds   Extremities: no cyanosis, no clubbing , trace  edema   Pulses: Symmetric extremities   Skin: no gross lesions or rashes   Pysch: Normal mood and affect   Neurologic: No gross deficits.   CN II - XII grossly intact        Labs  CBC:   Lab Results   Component Value Date/Time    WBC 3.6 07/17/2022 04:08 AM    RBC 2.54 07/17/2022 04:08 AM    HGB 8.4 07/17/2022 04:08 AM    HCT 26.4 07/17/2022 04:08 AM    .2 07/17/2022 04:08 AM    RDW 20.4 07/17/2022 04:08 AM     07/17/2022 04:08 AM     CMP:    Lab Results   Component Value Date/Time     07/17/2022 04:08 AM    K 4.8 07/17/2022 04:08 AM    K 4.6 07/15/2022 10:47 AM     07/17/2022 04:08 AM    CO2 18 07/17/2022 04:08 AM    BUN 15 07/17/2022 04:08 AM    CREATININE 1.9 07/17/2022 04:08 AM    GFRAA 43 07/17/2022 04:08 AM    GFRAA >60 12/13/2011 12:51 AM    AGRATIO 0.7 07/15/2022 10:47 AM    LABGLOM 36 07/17/2022 04:08 AM    GLUCOSE 115 07/17/2022 04:08 AM    PROT 7.1 07/15/2022 10:47 AM    PROT 7.7 12/13/2011 12:51 AM    CALCIUM 8.4 07/17/2022 04:08 AM    BILITOT 2.2 07/15/2022 10:47 AM    ALKPHOS 185 07/15/2022 10:47 AM     07/15/2022 10:47 AM    ALT 47 07/15/2022 10:47 AM     PT/INR:  No results found for: PTINR  Recent Labs     07/15/22  1047 07/15/22  2304   TROPONINI 0.39* 0.17*       EKG: AV paced    Assessment  Patient Active Problem List   Diagnosis    ED (erectile dysfunction)    Essential hypertension, benign    Hyperlipidemia    Diabetic nephropathy ([facility]    Diabetes type 2, uncontrolled ([facility]    Noncompliance    Elevated blood uric acid level    Coronary artery disease involving native coronary artery    Elevated PSA    Acute on chronic combined systolic and diastolic HF (heart failure) (HCC)    Stage 3 chronic kidney disease ([address]    SONAL (acute kidney injury) ([address]    Biventricular heart failure ([address]    Acute on chronic systolic heart failure, NYHA class 4 (HCC)    Acute on chronic congestive heart failure (HCC)    LV (left ventricular) mural thrombus    Syncope    CHF (congestive heart failure) ([facility])    Syncope and collapse    ICD (implantable cardioverter-defibrillator) in place    Demand ischemia ([address]    Ventricular tachycardia ([facility])    Elevated LFTs    Supratherapeutic INR    Diarrhea    Hyperkalemia    Volume overload    Hypotension    ESRD needing dialysis ([address]    Acute hypercapnic respiratory failure ([facility])          Impression:  dilated cardiomyopathy with LVEF 20% historically. No chest pain. ESRD on HD and now CRRT. Possible line sepsis. History of pacer defibrillator. Presumed sepsis from line infection on antibiotics    Recommendations:    I had the opportunity to review the clinical symptoms and presentation of [facility]. I recommend that the patient undergo further cardiac evaluation with echo. I recommend that the patient be treated with dobutamine and levophed for inotropic and pressor support respectively, continue supportive care. Patient presumably has sepsis from his dialysis line. Primacor has been used in the past on this patient as well    Thank you for allowing to us to participate in the care or [facility]. All questions and concerns were addressed to the patient/family. Alternatives to my treatment were discussed. The note was completed using EMR. Every effort was made to ensure accuracy; however, inadvertent computerized transcription errors may be present.        Caryle Median, MD [facility] [place]

## 2022-07-17 NOTE — PROGRESS NOTES
Hospitalist Progress Note      PCP: Venus Durbin DO    Date of Admission: 7/15/2022    Chief Complaint: Shortness of breath    Hospital Course: Patient become hypoxemic during hemodialysis, 911 was called. Transferred to [address] [place] [address] hypotensive and lethargic and admitted to ICU. Also found to be hypoglycemic. Mental status improved with correction of hypoglycemia. Requires BiPAP for respiratory failure  Dialysis switched to CRRT  For respiratory failure and pleural effusion, patient underwent thoracentesis by IR. Following thoracentesis, patient's breathing did not improve. Found to have pneumothorax. Cardiothoracic surgery was consulted and conservative management was chosen. Currently in the ICU. Alert today. On oxygen supplementation. Eating breakfast with assistance. Subjective: Short of breath, no chest pain, no nausea vomiting or diarrhea.   No significant changes from yesterday      Medications:  Reviewed    Infusion Medications    norepinephrine 19 mcg/min (07/17/22 1209)    DOBUTamine 5 mcg/kg/min (07/17/22 1209)    sodium chloride      dextrose      prismaSATE BGK 4/2.5 1,000 mL/hr at 07/17/22 0451    prismaSATE BGK 4/2.5 500 mL/hr at 07/17/22 0451    prismaSATE BGK 4/2.5 500 mL/hr at 07/17/22 0452     Scheduled Medications    vancomycin (VANCOCIN) intermittent dosing (placeholder)   Other See Admin Instructions    meropenem  1,000 mg IntraVENous Q12H    hydrocortisone sodium succinate PF  100 mg IntraVENous Q8H    heparin (porcine)  1,000 Units IntraVENous Once    epoetin roseann-epbx  10,000 Units IntraVENous Once per day on Mon Wed Fri    midodrine  10 mg Oral Once    sodium chloride flush  5-40 mL IntraVENous 2 times per day    apixaban  2.5 mg Oral BID     PRN Meds: heparin (porcine), sodium chloride flush, sodium chloride, ondansetron **OR** ondansetron, polyethylene glycol, acetaminophen **OR** acetaminophen, glucose, dextrose bolus **OR** dextrose bolus, glucagon Levophed and dopamine started due to hypotension. Pneumothorax  Minor when diagnosed, follow-up chest x-ray shows no pneumothorax. Respiratory status has improved. No intervention required per cardiothoracic surgery. Hypoxia  Pleural effusion might have contributed. Cannot give BiPAP due to pneumothorax that could get worse with positive pressure. Continue oxygen supplementation. Requires less oxygen today. End-stage renal disease  Switched to CRRT because of hypotension. Nephrology following. Noted that intensivist service is following as primary while in the ICU. Discussed with the patient. DVT Prophylaxis: Eliquis  Diet: ADULT DIET; Regular; 3 carb choices (45 gm/meal);  Low Sodium (2 gm)  Code Status: Full Code    PT/OT Eval Status: Consider when able to tolerate    Dispo -inpatient stay in the ICU with unclear day of discharge    Israel Head MD

## 2022-07-17 NOTE — PROGRESS NOTES
ICU Progress Note    Admit Date: 7/15/2022  Day: 2  Vent Day: None  IV Access:Peripheral, CVC triple lumen right femoral, A-line left radial, left subclavian hemodialysis access   IV Fluids:None  Vasopressors:Norepi, dobutamine                Antibiotics: Merrem, vanco  Diet: ADULT DIET; Regular; 3 carb choices (45 gm/meal); Low Sodium (2 gm)    CC: Shortness of breath during hemodialysis    Interval history: Started taking 50 mL / hour on CRT and MAPs have been stable from 63 - 67 during that time. Still requiring  2.5 of dobutamine and 21 of Levo. His hypoglycemia has resolved. HPI: Ruddy Griffin presented to hospital on 7/15/2022 after experiencing hypotension and hypoxia during hemodialysis. He has a past medical history of ESRD, systolic HF (EF < 66%), type 2 diabetes, cirrhosis. He has been placed on CRT in hospital and is requiring pressors to maintain blood pressure.      Medications:     Scheduled Meds:   vancomycin (VANCOCIN) intermittent dosing (placeholder)   Other See Admin Instructions    meropenem  1,000 mg IntraVENous Q12H    hydrocortisone sodium succinate PF  100 mg IntraVENous Q8H    heparin (porcine)  1,000 Units IntraVENous Once    epoetin roseann-epbx  10,000 Units IntraVENous Once per day on Mon Wed Fri    midodrine  10 mg Oral Once    sodium chloride flush  5-40 mL IntraVENous 2 times per day    apixaban  2.5 mg Oral BID     Continuous Infusions:   norepinephrine 21 mcg/min (07/17/22 0736)    DOBUTamine 2.5 mcg/kg/min (07/16/22 1638)    sodium chloride      dextrose      prismaSATE BGK 4/2.5 1,000 mL/hr at 07/17/22 0451    prismaSATE BGK 4/2.5 500 mL/hr at 07/17/22 0451    prismaSATE BGK 4/2.5 500 mL/hr at 07/17/22 0452     PRN Meds:heparin (porcine), sodium chloride flush, sodium chloride, ondansetron **OR** ondansetron, polyethylene glycol, acetaminophen **OR** acetaminophen, glucose, dextrose bolus **OR** dextrose bolus, glucagon (rDNA), dextrose, potassium chloride, magnesium sulfate, calcium gluconate **OR** calcium gluconate **OR** calcium gluconate **OR** calcium gluconate, sodium phosphate IVPB **OR** sodium phosphate IVPB **OR** sodium phosphate IVPB **OR** sodium phosphate IVPB    Objective:   Vitals:   T-max:  Patient Vitals for the past 8 hrs:   BP Temp Temp src Pulse Resp SpO2 Weight   07/17/22 0800 90/60 (!) 96.2 °F (35.7 °C) -- 86 25 -- --   07/17/22 0700 -- -- -- 95 (!) 33 -- --   07/17/22 0645 -- -- -- 95 30 -- --   07/17/22 0630 -- -- -- 88 (!) 31 -- --   07/17/22 0615 -- -- -- 95 (!) 39 -- --   07/17/22 0600 (!) 90/58 -- -- 80 28 -- --   07/17/22 0545 -- -- -- 93 14 -- --   07/17/22 0530 -- -- -- 93 28 -- --   07/17/22 0515 -- -- -- 95 (!) 33 -- --   07/17/22 0500 (!) 81/54 -- -- 92 30 -- --   07/17/22 0445 -- -- -- 84 27 -- --   07/17/22 0430 -- -- -- 94 (!) 35 -- --   07/17/22 0415 -- -- -- 71 26 99 % --   07/17/22 0400 -- -- -- 84 (!) 35 99 % --   07/17/22 0345 -- -- -- 91 19 -- --   07/17/22 0330 -- -- -- 87 25 -- 166 lb 10.7 oz (75.6 kg)   07/17/22 0315 -- -- -- 87 23 -- --   07/17/22 0300 (!) 88/57 (!) 96.6 °F (35.9 °C) Temporal 92 22 -- --   07/17/22 0245 -- -- -- 93 (!) 34 -- --   07/17/22 0230 -- -- -- 90 (!) 34 -- --   07/17/22 0215 -- -- -- 93 27 -- --   07/17/22 0200 (!) 142/61 -- -- 95 27 -- --   07/17/22 0145 -- -- -- 87 28 96 % --   07/17/22 0130 -- -- -- 91 26 -- --   07/17/22 0115 -- -- -- 93 28 -- --   07/17/22 0105 -- -- -- 93 19 -- --   07/17/22 0100 (!) 83/51 -- -- 87 25 100 % --   07/17/22 0045 -- -- -- 90 (!) 31 -- --       Intake/Output Summary (Last 24 hours) at 7/17/2022 0835  Last data filed at 7/17/2022 0800  Gross per 24 hour   Intake 4101.46 ml   Output 1432 ml   Net 2669.46 ml       Review of Systems   Constitutional:  Positive for fatigue. Respiratory:  Negative for cough. Cardiovascular:  Negative for chest pain. Gastrointestinal:  Negative for diarrhea, nausea and vomiting. Endocrine: Positive for cold intolerance.    Neurological: Negative for light-headedness. Physical Exam  Constitutional:       General: He is not in acute distress. Appearance: He is ill-appearing. HENT:      Head: Normocephalic. Cardiovascular:      Rate and Rhythm: Normal rate and regular rhythm. Pulmonary:      Effort: Pulmonary effort is normal.   Abdominal:      General: There is no distension. LABS:    CBC:   Recent Labs     07/15/22  1047 07/16/22  0505 07/17/22  0408   WBC 4.6 2.8* 3.6*   HGB 9.8* 8.2* 8.4*   HCT 30.3* 26.2* 26.4*    173 170   .3* 104.0* 104.2*     Renal:    Recent Labs     07/15/22  1047 07/15/22  2304 07/16/22  0505 07/16/22  1547 07/17/22  0408   *  --  135*  --  133*   K 4.6  --  4.4  --  4.8   CL 96*  --  98*  --  100   CO2 23  --  25  --  18*   BUN 25*  --  21*  --  15   CREATININE 2.9*  --  2.6*  --  1.9*   GLUCOSE 50*  --  115*  --  115*   CALCIUM 8.4  --  8.0*  --  8.4   MG  --    < > 1.90 1.90 2.00   PHOS  --    < > 4.1 4.1 3.5   ANIONGAP 14  --  12  --  15    < > = values in this interval not displayed. Hepatic:   Recent Labs     07/15/22  1047 07/16/22  0505 07/17/22  0408   *  --   --    ALT 47*  --   --    BILITOT 2.2*  --   --    PROT 7.1  --   --    LABALBU 3.0* 2.7* 2.8*   ALKPHOS 185*  --   --      Troponin:   Recent Labs     07/15/22  1047 07/15/22  2304   TROPONINI 0.39* 0.17*     BNP: No results for input(s): BNP in the last 72 hours. Lipids: No results for input(s): CHOL, HDL in the last 72 hours. Invalid input(s): LDLCALCU, TRIGLYCERIDE  ABGs:    Recent Labs     07/15/22  1034 07/16/22  0436 07/16/22  1334   PHART 7.350 7.420 7. 371   PYH6AWJ 49.7* 40.1 39.5   PO2ART 155.0* 174.5* 71.7*   IRI3BFM 27 26.0 22.9   BEART 1.4 2 -2   Q1YCBJUB >100 100 94   QXD4YRL 29 27 24       INR:   Recent Labs     07/15/22  1047   INR 2.68*     Lactate:   Recent Labs     07/16/22  0436 07/16/22  1334   LACTATE 2.10* 2.84* Cultures:  -----------------------------------------------------------------  RAD:   XR CHEST PORTABLE   Final Result   1. No change. XR CHEST PORTABLE   Final Result   1. Decreased right-sided effusion with no pneumothorax identified. Left effusion and left basilar airspace disease unchanged. XR CHEST PORTABLE   Final Result     Congestive heart failure       Moderate cardiomegaly. Underlying pericardial effusion cannot be    excluded          XR CHEST PORTABLE   Final Result   1. Significant decrease in right-sided effusion with no pneumothorax identified. US THORACENTESIS Which side should the procedure be performed? Right   Final Result   IMPRESSION :    Ultrasound guided drainage of 1000 mL of straw-colored pleural effusion. XR CHEST PORTABLE   Final Result   1. Significant worsening of right-sided effusion and right-sided airspace disease in the interval.            Assessment/Plan:   Hypotension  -Maintain pressors as needed. 2.5 of dobutamine and 21 of levo currently. -Midodrine as needed.   -Hydrocortisone Q8 hours as cortisol is 7.  -Abx to cover for septic causes (merrem and vanco). -Taking limited amounts off fluid off with CRT to maintain organ perfusion. Will take off as much as can to maintain MAP > 65. Since he has pum HTN, he is preload dependent and does not tolerate large amounts of fluid removal. Will monitor closely while adjusting amount of fluid removed. Respiratory Failure  -High flow nasal canula O2 requirements have gone down from 15L on 7/15 to 8L today. Will continue to wean O2 as tolerated.  -Lactate has increased to 3.8 (3.0 yesterday - 7/16). Rising level likely due to ischemia from organ hypoperfusion. Hypoglycemia  -Has currently resolved and does not need further treatment unless glucose levels start dropping again.  -Will monitor glucose levels. ESRD  -Continuing CRT  -Per nephro, taking 50mL/hour of fluid off.  Will continue as long as MAPs sat 88%  - Code status discussed with patient - he desires to be full code     Pt has a high probability of imminent or life-threatening deterioration requiring close monitoring, and highly complex decision-making and/or interventions of high intensity to assess, manipulate, and support his critical organ systems to prevent a likely inevitable decline which could occur if left untreated. A total critical care time 32 minutes was used. This includes but not limited to examining patient, collaborating with other physicians, monitoring vital signs, telemetry, continuous pulse oximetry, and clinical response to IV medications, documentation time, review and interpretation of laboratory and radiological data, review of nursing notes and old record review. This time excludes any time that may have been spent performing procedures for life threatening organ failure.      Demar Blake MD

## 2022-07-17 NOTE — PROGRESS NOTES
Cardiothoracic Surgery   Daily Progress Note  Patient: Hardin Settler    CC: SOB    SUBJECTIVE:  Patient requiring CRRT and requiring pressors overnight. Denies SOB. Mildly altered. ROS: A 14 point review of systems was conducted, significant findings as noted above. All other systems negative. OBJECTIVE:   Infusions:   norepinephrine 21 mcg/min (07/17/22 0736)    DOBUTamine 2.5 mcg/kg/min (07/16/22 1638)    sodium chloride      dextrose      prismaSATE BGK 4/2.5 1,000 mL/hr at 07/17/22 0451    prismaSATE BGK 4/2.5 500 mL/hr at 07/17/22 0451    prismaSATE BGK 4/2.5 500 mL/hr at 07/17/22 0452      I/O:I/O last 3 completed shifts: In: 5456.5 [P.O.:240; I.V.:2705.5; IV Piggyback:2511]  Out: 1675            Wt Readings from Last 1 Encounters:   07/17/22 166 lb 10.7 oz (75.6 kg)       Exam:  BP 90/60   Pulse 86   Temp (!) 96.2 °F (35.7 °C)   Resp 25   Ht 5' 6.5\" (1.689 m)   Wt 166 lb 10.7 oz (75.6 kg)   SpO2 99%   BMI 26.50 kg/m²     General appearance: alert, no acute distress, grooming appropriate  Neck: trachea midline, no JVD, no lymphadenopathy, neck supple. L subclavian tunneled catheter. Chest/Lungs: Normal effort, breathing supplemental oxygen at 8L via non-rebreather mask  Cardiovascular: Regular rate and rhythm  Abdomen: Soft, non-tender, non-distended, no rebound, guarding, or rigidity. Skin: warm and dry, no rashes  Extremities: no edema, no cyanosis. L radial A-line. R Femoral vein CVC.      Neuro: A&Ox3, no focal deficits, sensation intact            LABS:   Recent Labs     07/16/22  0505 07/17/22  0408   WBC 2.8* 3.6*   HGB 8.2* 8.4*   HCT 26.2* 26.4*   .0* 104.2*    170        Recent Labs     07/16/22  0505 07/16/22  1547 07/17/22  0408   *  --  133*   K 4.4  --  4.8   CL 98*  --  100   CO2 25  --  18*   PHOS 4.1 4.1 3.5   BUN 21*  --  15   CREATININE 2.6*  --  1.9*        Recent Labs     07/15/22  1047   *   ALT 47*   BILITOT 2.2*   ALKPHOS 185*      No results for input(s): LIPASE, AMYLASE in the last 72 hours. Recent Labs     07/15/22  1047   PROT 7.1   INR 2.68*   APTT 62.0*        Recent Labs     07/15/22  1047 07/15/22  2304   TROPONINI 0.39* 0.17*       ASSESSMENT/PLAN:   Kane Khan is a 74year old male with Hx of systolic HF with EF less than 20%, ESRD, T2DM who underwent IR thoracentesis yesterday, for whom Cardiothoracic Surgery is consulted for concern of PTX on chest xray that was obtained for oxygen desaturation on pulse oximetry. CXR's reviewed showing no PTX. Pt largely improving from respiratory status. - Pt stable on 8L  NC, weaned down from 15L.   - Repeat CXR shows no PTX.   - Please call CT surgery with questions. Kiarra Ricci DO  PGY1, General Surgery  07/17/22  8:55 AM    I am post-call today and will not be on campus. Please contact Dr. Iam Rocha at [phone] or Dr. Aldair Barton at [phone] for questions or concerns regarding this patient.

## 2022-07-17 NOTE — PROGRESS NOTES
Lactic 4.3, Dr. Anmol Osorio notified and dobutamine gtt increased to 5 mcg/kg/min per orders. Will continue to monitor closely.

## 2022-07-17 NOTE — PROGRESS NOTES
Clinical Pharmacy Progress Note    Vancomycin - Management by Pharmacy    Consult Date(s):  7/16/22  Consulting Provider(s):  Dr. Llana Cowden / Plan:  1)  Sepsis, possible line infection  - Vancomycin  Concurrent Antimicrobials: Meropenem   Day of Vanc Therapy / Ordered Duration: 2 of 7  Current Dosing Method: Intermittent dosing based on levels in the setting of ESRD  Therapeutic Goal: Trough ~ 15 mcg/mL  Pt with ESRD, on HD at home. Now transitioned to CRRT. Will dose intermittently based on levels. Random level this AM = 19.2mcg/mL. Will give 750mg IV x1 today. Will check random level in AM tomorrow. Will continue to monitor clinical condition and make adjustments to regimen as appropriate. Please call with questions--  Thanks--  Kristin Albreto, PharmD, BCPS, BCGP  [id number] ([facility])   7/17/2022 9:37 AM      Interval update:  Meropenem / Vancomycin started yesterday afternoon for sepsis / possible line infection. On CRRT. Remains on pressors. Subjective/Objective:   Richi Navarro is a 74 y.o. y/o male with a PMHx that includes ESRD on HD, HF, cirrhosis, DM 2, HTN, and HLD who is admitted with hypoxia while receiving outpt HD, acute encephalopathy, hypoglycemia, and acute hypercaneic respiratory failure. Broad spectrum antibiotics were started 7/16 for sepsis and suspected line infection. Pharmacy is consulted to dose Vancomycin.     Ht Readings from Last 1 Encounters:   07/15/22 5' 6.5\" (1.689 m)     Wt Readings from Last 1 Encounters:   07/17/22 166 lb 10.7 oz (75.6 kg)       Current and Prior Antimicrobials:  Meropenem 1000mg IV q12h (7/16-current)  Vancomycin - Pharmacy to dose   Intermittent dosing (7/16-current)    Date Vancomycin Level Vancomycin Dose   7/16  1750mg x1   7/17 19.2 mcg/mL 750mg x1 ordered            Recent Labs     07/15/22  1047 07/16/22  0505 07/17/22  0408   CREATININE 2.9* 2.6* 1.9*   BUN 25* 21* 15   WBC 4.6 2.8* 3.6*         Cultures & Sensitivities:  Date Site Micro Susceptibility / Result   7/16 Blood x2 No growth to date            No results for input(s): PROCAL in the last 72 hours.

## 2022-07-17 NOTE — PLAN OF CARE
Problem: Discharge Planning  Goal: Discharge to home or other facility with appropriate resources  Outcome: Progressing  Flowsheets (Taken 7/16/2022 1930)  Discharge to home or other facility with appropriate resources:   Identify barriers to discharge with patient and caregiver   Arrange for needed discharge resources and transportation as appropriate   Identify discharge learning needs (meds, wound care, etc)     Problem: Pain  Goal: Verbalizes/displays adequate comfort level or baseline comfort level  Outcome: Progressing  Flowsheets (Taken 7/16/2022 2300)  Verbalizes/displays adequate comfort level or baseline comfort level:   Encourage patient to monitor pain and request assistance   Administer analgesics based on type and severity of pain and evaluate response   Assess pain using appropriate pain scale   Implement non-pharmacological measures as appropriate and evaluate response     Problem: Chronic Conditions and Co-morbidities  Goal: Patient's chronic conditions and co-morbidity symptoms are monitored and maintained or improved  Outcome: Progressing  Flowsheets (Taken 7/16/2022 1930)  Care Plan - Patient's Chronic Conditions and Co-Morbidity Symptoms are Monitored and Maintained or Improved:   Monitor and assess patient's chronic conditions and comorbid symptoms for stability, deterioration, or improvement   Collaborate with multidisciplinary team to address chronic and comorbid conditions and prevent exacerbation or deterioration     Problem: Skin/Tissue Integrity  Goal: Absence of new skin breakdown  Description: 1. Monitor for areas of redness and/or skin breakdown  2. Assess vascular access sites hourly  3. Every 4-6 hours minimum:  Change oxygen saturation probe site  4. Every 4-6 hours:  If on nasal continuous positive airway pressure, respiratory therapy assess nares and determine need for appliance change or resting period.   Outcome: Progressing     Problem: Safety - Adult  Goal: Free from fall injury  Outcome: Progressing  Flowsheets (Taken 7/16/2022 2200)  Free From Fall Injury: Instruct family/caregiver on patient safety

## 2022-07-18 PROBLEM — Z51.5 ENCOUNTER FOR PALLIATIVE CARE: Status: ACTIVE | Noted: 2022-01-01

## 2022-07-18 NOTE — PROGRESS NOTES
Latest Reference Range & Units 7/18/22 01:33   pH, Arterial 7.350 - 7.450  7.321 (L)   pCO2, Arterial 35.0 - 45.0 mm Hg 44.1   pO2, Arterial 75.0 - 108.0 mm Hg 63.2 (L)   HCO3, Arterial 21.0 - 29.0 mmol/L 22.8   TCO2 (calc), Art Not Established mmol/L 24   Base Excess, Arterial -3 - 3  -3   O2 Sat, Arterial 93 - 100 % 90 (L)     Pt becoming increasing confused, unsure why he is at the hospital/ explaining he dropped a cup of water/ stating he is in the dining room. ABG results above. ICU residents aware.

## 2022-07-18 NOTE — PROGRESS NOTES
ICU Progress Note    Admit Date: 7/15/2022  Day: 3  Vent Day: None  IV Access:Peripheral (R hand, R antecubital), CVC R femoral, A-line L radial, L subclavian vasc cath (HD)   IV Fluids:None  Vasopressors: Vaso, Levo, Dobutamine              Antibiotics: Merrem, Vanc  Diet: ADULT DIET; Regular; 3 carb choices (45 gm/meal); Low Sodium (2 gm)    CC: Hypoxia and hypotension during hemodialysis    Interval history:     Patient seen at bedside this AM. He reports no acute complaints but does appear slightly disoriented and inattentive despite answering orientation questions correctly. Denies fevers, chills, H/A, dizziness CP, SOB, abd pain, dysuria. Patient has increasing pressor requirements and is now on Levo 44, Vaso 0.03, Dobutamine 10. Han available but not currently infusing.         Medications:     Scheduled Meds:   vancomycin  1,250 mg IntraVENous Once    bisacodyl  10 mg Rectal Once    levothyroxine  75 mcg Oral Daily    vancomycin (VANCOCIN) intermittent dosing (placeholder)   Other See Admin Instructions    meropenem  1,000 mg IntraVENous Q12H    hydrocortisone sodium succinate PF  100 mg IntraVENous Q8H    epoetin roseann-epbx  10,000 Units IntraVENous Once per day on Mon Wed Fri    sodium chloride flush  5-40 mL IntraVENous 2 times per day    apixaban  2.5 mg Oral BID     Continuous Infusions:   vasopressin (Septic Shock) infusion 0.03 Units/min (07/18/22 0932)    phenylephrine (HAN-SYNEPHRINE) 50mg/250mL infusion      norepinephrine 53 mcg/min (07/18/22 0932)    DOBUTamine 10 mcg/kg/min (07/18/22 0932)    sodium chloride      dextrose      prismaSATE BGK 4/2.5 1,000 mL/hr at 07/18/22 0045    prismaSATE BGK 4/2.5 500 mL/hr at 07/18/22 0045    prismaSATE BGK 4/2.5 500 mL/hr at 07/18/22 0045     PRN Meds:heparin (porcine), sodium chloride flush, sodium chloride, ondansetron **OR** ondansetron, polyethylene glycol, acetaminophen **OR** acetaminophen, glucose, dextrose bolus **OR** dextrose bolus, glucagon (rDNA), dextrose, potassium chloride, magnesium sulfate, calcium gluconate **OR** calcium gluconate **OR** calcium gluconate **OR** calcium gluconate, sodium phosphate IVPB **OR** sodium phosphate IVPB **OR** sodium phosphate IVPB **OR** sodium phosphate IVPB    Objective:   Vitals:   T-max:  Patient Vitals for the past 8 hrs:   BP Temp Temp src Pulse Resp SpO2 Weight   07/18/22 0930 -- -- -- 81 28 100 % --   07/18/22 0915 -- -- -- 86 (!) 37 100 % --   07/18/22 0900 -- -- -- 60 28 100 % --   07/18/22 0848 -- -- -- -- -- 100 % --   07/18/22 0845 -- -- -- 80 30 100 % --   07/18/22 0830 -- -- -- (!) 108 (!) 34 100 % --   07/18/22 0815 -- -- -- 100 25 100 % --   07/18/22 0800 81/66 (!) 96 °F (35.6 °C) Temporal 85 (!) 36 100 % --   07/18/22 0745 -- -- -- 99 (!) 35 100 % --   07/18/22 0730 (!) 93/59 -- -- 94 26 100 % --   07/18/22 0715 -- -- -- (!) 101 (!) 39 100 % --   07/18/22 0700 90/62 -- -- (!) 101 (!) 34 100 % --   07/18/22 0630 -- -- -- 100 25 -- --   07/18/22 0615 -- -- -- 82 25 -- --   07/18/22 0600 83/64 -- -- 81 28 -- --   07/18/22 0549 -- -- -- -- -- -- 159 lb 6.3 oz (72.3 kg)   07/18/22 0545 -- -- -- (!) 102 30 -- --   07/18/22 0530 -- -- -- 94 (!) 37 -- --   07/18/22 0515 -- -- -- 93 25 -- --   07/18/22 0500 -- -- -- 93 (!) 36 -- --   07/18/22 0445 -- -- -- 80 30 -- --   07/18/22 0430 -- -- -- 94 26 -- --   07/18/22 0415 -- -- -- 94 (!) 39 -- --   07/18/22 0400 -- 96.9 °F (36.1 °C) Temporal 92 (!) 39 -- --   07/18/22 0345 -- -- -- 93 (!) 35 -- --   07/18/22 0330 -- -- -- 85 (!) 33 -- --   07/18/22 0316 -- -- -- 100 (!) 38 -- --   07/18/22 0315 -- -- -- 93 28 -- --   07/18/22 0300 95/77 -- -- 92 30 -- --   07/18/22 0245 -- -- -- (!) 102 (!) 33 94 % --   07/18/22 0230 -- -- -- 100 28 -- --   07/18/22 0215 -- -- -- 81 (!) 35 90 % --   07/18/22 0200 93/67 -- -- (!) 101 (!) 35 -- --       Intake/Output Summary (Last 24 hours) at 7/18/2022 0946  Last data filed at 7/18/2022 0932  Gross per 24 hour   Intake 1753. 80 ml   Output 1316 ml   Net 437.85 ml       Review of Systems    Physical Exam  Constitutional:       Appearance: He is ill-appearing. HENT:      Head: Normocephalic and atraumatic. Mouth/Throat:      Mouth: Mucous membranes are dry. Eyes:      Comments: Pupils are deformed bilaterally, do not appear to react to light. Unable to assess EOM as patient inattentive and appears slightly disoriented. Cardiovascular:      Rate and Rhythm: Normal rate and regular rhythm. Pulses: Normal pulses. Heart sounds: Normal heart sounds. Pulmonary:      Effort: Pulmonary effort is normal.      Breath sounds: Normal breath sounds. Abdominal:      Palpations: Abdomen is soft. Tenderness: There is no abdominal tenderness. Musculoskeletal:      Comments: 2+ pitting edema B/L LE and UE   Neurological:      Comments: Oriented to person, place, but not time and situation         LABS:    CBC:   Recent Labs     07/16/22  0505 07/17/22  0408 07/18/22  0315   WBC 2.8* 3.6* 4.3   HGB 8.2* 8.4* 8.0*   HCT 26.2* 26.4* 26.1*    170 148   .0* 104.2* 105.2*     Renal:    Recent Labs     07/17/22  0408 07/17/22  1528 07/18/22  0315   * 134* 134*   K 4.8 4.8 4.8    100 101   CO2 18* 21 20*   BUN 15 14 14   CREATININE 1.9* 1.6* 1.4*   GLUCOSE 115* 189* 230*   CALCIUM 8.4 8.5 8.5   MG 2.00 2.20 2.10   PHOS 3.5 3.5 3.4   ANIONGAP 15 13 13     Hepatic:   Recent Labs     07/15/22  1047 07/16/22  0505 07/17/22  0408 07/17/22  1528 07/18/22  0315   *  --   --   --   --    ALT 47*  --   --   --   --    BILITOT 2.2*  --   --   --   --    PROT 7.1  --   --   --   --    LABALBU 3.0*   < > 2.8* 2.9* 3.0*   ALKPHOS 185*  --   --   --   --     < > = values in this interval not displayed. Troponin:   Recent Labs     07/15/22  1047 07/15/22  2304   TROPONINI 0.39* 0.17*     BNP: No results for input(s): BNP in the last 72 hours.   Lipids: No results for input(s): CHOL, HDL in the last 72 hours.    Invalid input(s): LDLCALCU, TRIGLYCERIDE  ABGs:    Recent Labs     07/16/22  0436 07/16/22  1334 07/18/22  0133   PHART 7.420 7.371 7.321*   VFU1AEZ 40.1 39.5 44.1   PO2ART 174.5* 71.7* 63.2*   UPM1FKJ 26.0 22.9 22.8   BEART 2 -2 -3   I9CVMMCE 100 94 90*   CDO3OZV 27 24 24       INR:   Recent Labs     07/15/22  1047   INR 2.68*     Lactate:   Recent Labs     07/16/22  0436 07/16/22  1334 07/18/22  0133   LACTATE 2.10* 2.84* 4.12*     Cultures:  -----------------------------------------------------------------  RAD:   XR CHEST PORTABLE   Final Result   1. No change. XR CHEST PORTABLE   Final Result   1. Decreased right-sided effusion with no pneumothorax identified. Left effusion and left basilar airspace disease unchanged. XR CHEST PORTABLE   Final Result     Congestive heart failure       Moderate cardiomegaly. Underlying pericardial effusion cannot be    excluded          XR CHEST PORTABLE   Final Result   1. Significant decrease in right-sided effusion with no pneumothorax identified. US THORACENTESIS Which side should the procedure be performed? Right   Final Result   IMPRESSION :    Ultrasound guided drainage of 1000 mL of straw-colored pleural effusion. XR CHEST PORTABLE   Final Result   1. Significant worsening of right-sided effusion and right-sided airspace disease in the interval.            Assessment/Plan:     Hypotensive Shock 2/2 cardiogenic vs septic  HFrEF  Cardiac Cirrhosis  EKG stable  BNP elevated to 68k  Last Echo March 2022 showed EF 20% with diffuse hypokinesis, RA severely dilated and Pulm artery pressure 66 mmHg.  Pt becomes hypotensive on HD due to volume removal on HD in setting of Right Heart Failure  Lactic Acid down to 4 from 4.3; likely due to shock but will r/o infection as cause of sepsis  - CRRT at -50 per Nephro, appreciate recs  - taking limited amounts of  fluid off to maintain MAP >65  - Cardiology on board, appreciate recs  - Pressors - Levo, Vaso, Dobutamine  - goal MAP 65  - On home Midrodine  - f/u Echo  - Daily Lactic acid checks  - Blood cultures NGTD   - f/u Urine Cx  - Palliative consult, appreciate recs    Acute Hypercapnic Respiratory Failure  Pneumothorax appears to be resolved  - on 5L NC - wean with goal O2 sat 88%  - ABG early this AM 7.321/44.1/63.2; bicarb 20    ESRD on HD  Appears grossly volume overloaded  - CRRT planned per Nephro, appreciate recs  - RFP q12 hrs     Hypoglycemia, resolved  Low BG on admission, found to have low cortisol of 7  - Hydrocortisone IV q8hr  - q4hr Glucose checks  - Hypoglycemia protocol in place    Hx of Acute LUE DVT on Eliquis  - Eliquis 2.5 mg BID    Hypothyroidism  TSH 21.57  Free T4 1.1  - Synthroid 75mcg daily     Code Status:Full Code  FEN: Regular, Low sodium  PPX:  Eliquis  DISPO: ICU      Andrea Hernandez MD, PGY-1  07/18/22  9:46 AM    This patient has been staffed and discussed with Dr. Bermudez Maldonado    Patient seen, examined and discussed with the resident and I agree with the assessment and plan. Recent Labs     07/16/22  1334 07/18/22  0133   PHART 7.371 7.321*   VJY3JER 39.5 44.1   PO2ART 71.7* 63.2*     Clinical picture for his hypotension remains confounding as the current theories are cardiogenic, septic vs adrenal insufficiency associated shock. Currently 50 of levophed, 10 of dobutamine, vasopressin at 0.03 and edward-synephrine is on standby. On broad spectrum abx empirically. Hypoglycemia has been better with treatment of presumed relative adrenal insufficiency. On midodrine at baseline. My bias at this point is to favor this being cardiogenic shock and his prognosis is poor. He may benefit from a PA catheter to directly measure cardiac output and see if he needs more than medications to augment it.       Critical care time spent reviewing labs/films, examining patient, collaborating with other physicians but excluding procedures for life threatening organ failure is 43 minutes.       Radha Gilman MD

## 2022-07-18 NOTE — PROGRESS NOTES
Levo at 30 mcg/min along with dobutamine at 10 mcg/kg/min. MAP sustaining high 50s/ low 60s. Residents aware. Vaso ordered and started. Unable to pull fluid from CRRT.

## 2022-07-18 NOTE — PROGRESS NOTES
Clinical Pharmacy Progress Note    Vancomycin - Management by Pharmacy    Consult Date(s):  7/16/22  Consulting Provider(s):  Dr. Brannon Krabbe / Plan:  1)  Sepsis, possible line infection  - Vancomycin  Concurrent Antimicrobials: Meropenem   Day of Vanc Therapy / Ordered Duration: 3 of 7  Current Dosing Method: Intermittent dosing based on levels in the setting of ESRD  Therapeutic Goal: Trough ~ 15 mcg/mL  Pt with ESRD, on HD at home. Patient continues on CRRT. Will dose intermittently based on levels. Random level this AM = 10.4 mcg/mL. Will give 1250 mg IV x1 today. Will check random level in AM tomorrow. Will continue to monitor clinical condition and make adjustments to regimen as appropriate. Please call with questions--  Thanks--  Luiza Nice, PharmD  Main Pharmacy: [id number]  7/18/2022 9:32 AM        Interval update:  Patient continues of norepinephrine, dopamine and vasopressin. Currently on 4L HFNC. Continues on CRRT. Subjective/Objective:   Rodríguez Rizvi is a 74 y.o. y/o male with a PMHx that includes ESRD on HD, HF, cirrhosis, DM 2, HTN, and HLD who is admitted with hypoxia while receiving outpt HD, acute encephalopathy, hypoglycemia, and acute hypercaneic respiratory failure. Broad spectrum antibiotics were started 7/16 for sepsis and suspected line infection. Pharmacy is consulted to dose Vancomycin.     Ht Readings from Last 1 Encounters:   07/15/22 5' 6.5\" (1.689 m)     Wt Readings from Last 1 Encounters:   07/18/22 159 lb 6.3 oz (72.3 kg)       Current and Prior Antimicrobials:  Meropenem 1000mg IV q12h (7/16-current)  Vancomycin - Pharmacy to dose   Intermittent dosing (7/16-current)    Date Vancomycin Level Vancomycin Dose   7/16  1750mg x1   7/17 19.2 mcg/mL 750mg x1    7/18 10.4 mcg/mL 1250mg x 1 ordered       Recent Labs     07/16/22  0505 07/17/22  0408 07/17/22  1528 07/18/22  0315   CREATININE 2.6* 1.9* 1.6* 1.4*   BUN 21* 15 14 14   WBC 2.8* 3.6*  --  4.3 Cultures & Sensitivities:  Date Site Micro Susceptibility / Result   7/16 Blood x2 No growth to date            No results for input(s): PROCAL in the last 72 hours.

## 2022-07-18 NOTE — PROGRESS NOTES
INR 7.05, Dr. Lara Maldonado notified, hold heparin gtt at this time and recheck labs in 6 hrs per orders.

## 2022-07-18 NOTE — PROGRESS NOTES
Levo at 25 mcg/min and dobutamine at 7.5 mcg/kg/min. MAP sustaining between 60-65. Unable to remove 50 ml/hr though CRRT, attempting to keep pt even. ICU residents aware.

## 2022-07-18 NOTE — CONSULTS
[facility]  Palliative Medicine Consultation Note      Date Of Admission:7/15/2022  Date of consult: 07/18/22  Seen by [facility] in the past:  Yes    Recommendations:        Pt is known to the palliative care team. Saw the pt at the bedside, he is alert to self, place but not time or situation. Unable to participate in a goals of care discussion. He does report that he has two sons, per chart review names are Samantha Koo and Maylin. Pt had not yet completed a HCPOA as of June 2022. Called pt's son Maylin, left VM with callback number. Maylin returned phone call. He was given an update on the pt's condition. Maylin is familiar with palliative care. He understands the pt's heart failure is severe, but would like to continue with the current management, as well as remain a full code. Maylin and the pt were planning on completing a HCPOA at [facility], but did not do so. Janna Frank is not decisional at this time to complete the form. D/w ICU team.     Addendum: Met with pt's siblings at the bedside, they report pt actually has three children. Explained legal NOK hierarchy. Provided brief updates and discussed their understanding of pt's condition. They plan on discussing with pt's children. Addendum 2: Met with pt's siblings, son Eric Rose and son Evon Zavala over the phone. Eric Rose does not want the pt to be resuscitated if his heart stops, he does want to continue with current management at this time. Evon Zavala is in agreement with Devin. Will change to [facility], d/w Dr. Otto Thompson. 1. Goals of Care/Advanced Care planning/Code status: Full code, discussed today with pt's son. Continue with aggressive management, palliative care will continue to follow for goals of care discussions. 2. Pain: pt denies  3. SOB: pt denies, on 4LNC, pt p/w pneumothorax which appears to be resolved  4.  Hypotensive Shock 2/2 cardiogenic vs septic: Last Echo March 2022 showed EF 20% with diffuse hypokinesis, RA severely dilated and Pulm artery pressure 66 mmHg. Currently on CRRT, levo, vaso and dobutamine. 5. Disposition: TBD pending hospital course, pt is from [employer]. Reason for Consult:         [x]  Goals of Care  [x]  Code Status Discussion/Advanced Care Planning   []  Psychosocial/Family Support  []  Symptom Management  []  Other (Specify)    Requesting Physician: Dr. Cristi Hinton:  Sob and hypotension    History Obtained From:  patient, electronic medical record    History of Present Illness:         Brenna Mancilla is a 74 y.o. male with PMH of cirrhosis, systolic HF with EF less than 20%, pleural effusion, ESRD on HD who presented with shortness of breath and hypotension from his dialysis treatment. Pt was initially placed on bipap, however became less responsive and more hypotensive, so was admitted to the ICU.      Subjective:         Past Medical History:        Diagnosis Date    Anemia     Cardiomegaly     CHF (congestive heart failure) ([address]     Cirrhosis ([address]     Diabetes type 2, uncontrolled ([address] 07/28/2014    Diabetic nephropathy 09/12/2013    ED (erectile dysfunction) 09/12/2013    Essential hypertension, benign 09/12/2013    Hemodialysis access, fistula mature ([address]     Hyperlipidemia 09/12/2013    Noncompliance 07/28/2014    Respiratory failure ([address]        Past Surgical History:        Procedure Laterality Date    CARDIAC DEFIBRILLATOR PLACEMENT      EYE SURGERY      IR NONTUNNELED VASCULAR CATHETER  04/04/2022    IR NONTUNNELED VASCULAR CATHETER 4/4/2022 [facility]    IR NONTUNNELED VASCULAR CATHETER Left 05/26/2022    Vascath; LIJ access; 20cm; Dr. Daryl Loo - removed 5/31/22    IR NONTUNNELED VASCULAR CATHETER  05/26/2022    IR NONTUNNELED VASCULAR CATHETER 5/26/2022 [facility]    IR TUNNELED CATHETER PLACEMENT GREATER THAN 5 YEARS  04/08/2022    IR TUNNELED CATHETER PLACEMENT GREATER THAN 5 YEARS 4/8/2022 [facility]    IR TUNNELED CATHETER PLACEMENT GREATER THAN 5 YEARS Left 05/31/2022    Permacath; LIJ access; 23cm; Dr. Littlejohn Drivers    IR TUNNELED [address] 5 YEARS  5/31/2022    IR TUNNELED CATHETER PLACEMENT GREATER THAN 5 YEARS 5/31/2022 [facility]       Current Medications:    Medications Prior to Admission: apixaban (ELIQUIS) 2.5 MG TABS tablet, Take 2.5 mg by mouth 2 times daily  midodrine (PROAMATINE) 10 MG tablet, Take 1 tablet by mouth 3 times daily (with meals)  clopidogrel (PLAVIX) 75 MG tablet, Take 75 mg by mouth daily  glucose monitoring (FREESTYLE FREEDOM) kit, 1 kit by Does not apply route daily  levothyroxine (SYNTHROID) 50 MCG tablet, Take 1 tablet by mouth every morning (before breakfast)  pantoprazole (PROTONIX) 40 MG tablet, Take 1 tablet by mouth 2 times daily (before meals)  allopurinol (ZYLOPRIM) 100 MG tablet, Take 1 tablet by mouth daily  Multiple Vitamins-Minerals (THERAPEUTIC MULTIVITAMIN-MINERALS) tablet, Take 1 tablet by mouth daily  atorvastatin (LIPITOR) 80 MG tablet, Take 80 mg by mouth at bedtime     Allergies:  Patient has no known allergies. Social History:    TOBACCO: reports that he quit smoking about 41 years ago. He has a 10.50 pack-year smoking history. He has never used smokeless tobacco.  ETOH:   reports current alcohol use of about 1.0 standard drink per week. Patient currently lives in a nursing home    Review of Systems -   Review of Systems  Limited, pt is a poor historian. Objective:          Physical Exam  Constitutional:       Appearance: He is ill-appearing. Cardiovascular:      Rate and Rhythm: Normal rate and regular rhythm. Heart sounds: Normal heart sounds. Pulmonary:      Breath sounds: Normal breath sounds. Abdominal:      General: Bowel sounds are normal.      Palpations: Abdomen is soft. Musculoskeletal:      Right lower leg: No edema. Left lower leg: No edema. Skin:     General: Skin is warm and dry.       Comments: Baldemarlex to [facility]   Neurological: Mental Status: He is alert. He is disoriented. Palliative Performance Scale:  [] 60% Ambulation reduced; Significant disease; Can't do hobbies/housework; intake normal or reduced; occasional assist; LOC full/confusion  [] 50% Mainly sit/lie; Extensive disease; Can't do any work; Considerable assist; intake normal  Or reduced; LOC full/confusion  [] 40% Mainly in bed; Extensive disease; Mainly assist; intake normal or reduced; occasional assist; LOC full/confusion  [x] 30% Bed Bound; Extensive disease; Total care; intake reduced; LOC full/confusion  [] 20% Bed Bound; Extensive disease; Total care; intake minimal; Drowsy/coma  [] 10% Bed Bound; Extensive disease; Total care; Mouth care only; Drowsy/coma  [] 0% Death    PPS: 30    Vitals:    BP 81/66   Pulse 85   Temp (!) 96 °F (35.6 °C) (Temporal)   Resp (!) 36   Ht 5' 6.5\" (1.689 m)   Wt 159 lb 6.3 oz (72.3 kg)   SpO2 100%   BMI 25.34 kg/m²     Labs:    BMP:   Recent Labs     07/17/22  0408 07/17/22  1528 07/18/22  0315   * 134* 134*   K 4.8 4.8 4.8    100 101   CO2 18* 21 20*   BUN 15 14 14   CREATININE 1.9* 1.6* 1.4*   GLUCOSE 115* 189* 230*     CBC:   Recent Labs     07/16/22  0505 07/17/22  0408 07/18/22  0315   WBC 2.8* 3.6* 4.3   HGB 8.2* 8.4* 8.0*   HCT 26.2* 26.4* 26.1*    170 148       LFT's:   Recent Labs     07/15/22  1047   *   ALT 47*   BILITOT 2.2*   ALKPHOS 185*     Troponin:   Recent Labs     07/15/22  1047 07/15/22  2304   TROPONINI 0.39* 0.17*     BNP: No results for input(s): BNP in the last 72 hours. ABGs:   Recent Labs     07/16/22  1334 07/18/22  0133   PHART 7.371 7.321*   YGT4ATR 39.5 44.1   PO2ART 71.7* 63.2*     INR:   Recent Labs     07/15/22  1047   INR 2.68*       U/A:No results for input(s): NITRITE, COLORU, PHUR, LABCAST, WBCUA, RBCUA, MUCUS, TRICHOMONAS, YEAST, BACTERIA, CLARITYU, SPECGRAV, LEUKOCYTESUR, UROBILINOGEN, BILIRUBINUR, BLOODU, GLUCOSEU, AMORPHOUS in the last 72 hours.     Invalid input(s): [facility]    XR CHEST PORTABLE   Final Result   1. No change. XR CHEST PORTABLE   Final Result   1. Decreased right-sided effusion with no pneumothorax identified. Left effusion and left basilar airspace disease unchanged. XR CHEST PORTABLE   Final Result     Congestive heart failure       Moderate cardiomegaly. Underlying pericardial effusion cannot be    excluded          XR CHEST PORTABLE   Final Result   1. Significant decrease in right-sided effusion with no pneumothorax identified. US THORACENTESIS Which side should the procedure be performed? Right   Final Result   IMPRESSION :    Ultrasound guided drainage of 1000 mL of straw-colored pleural effusion. XR CHEST PORTABLE   Final Result   1. Significant worsening of right-sided effusion and right-sided airspace disease in the interval.            Conclusion/Time spent:         Recommendations see above    Time spent with patient and/or family: 20  Time reviewing records: 10 min   Time communicating with staff: 5 min     A total of 35 minutes spent with the patient and family on unit greater than 50% in counseling regarding palliative care and in goals of care for the patient. Thank you to Dr. Karyna Erwin for this consultation. We will continue to follow Mr. Joaquim Courtney alecia as needed.     [address] [facility]  Inpatient Palliative Care  [phone]

## 2022-07-18 NOTE — PROGRESS NOTES
Interval History and plan: On significant O2   BP is relatively low     Plan:    Continue  CRRT and keep the same prescription   Cont stress dose steroids   Follow with trend on  pressors and we will try to see if we keep him net even                     Assessment :        1. ESRD:  Will plan HD per schedule. He gets dialysis Monday, Wednesday and Friday. Access: Hemodialysis catheter  Daily weights  Fluid restriction: 1 L  Nephrocap 1 tab PO daily     2. Anemia:  Erythropoetin dose: Continue Procrit with dialysis to keep hemoglobin between 10-12. 3.  Osteodystrophy:  Phosphate Binder: He is currently not on binders. 4.  Fluid overload/ascites: I will arrange for dialysis to remove volume. He has chronically low blood pressure we will give him midodrine with dialysis. We will support him with albumin as needed. [facility] would like to thank Kay Kirkpatrick MD   for opportunity to serve this patient      Please call with questions at-   24 Hrs Answering service [phone] or  7 am- 5 pm via Perfect serve or cell phone  Olinda Jasmine MD          CC/reason for consult :     ESRD on hemodialysis     HPI :     Eli Roy is a 74 y.o. male presented to   the hospital on 7/15/2022 with shortness of breath. He has past medical history of congestive heart failure, liver cirrhosis, type 2 diabetes, complications from diabetes including diabetic nephropathy and Retinopathy, hypertension, history of noncompliance and ESRD. He gets dialysis on Monday, Wednesday and Friday. He goes to [facility] home and get dialysis inpatient. He used to go for dialysis dialysis at [facility]. He was recently in the hospital with hypotension and fluid overload. He now came with shortness of breath. On arrival he was hypoxic and hypotensive. He denies complaints of chest pain, nausea vomiting or diarrhea. He has history of cirrhosis with ascites.   He also acetaminophen (TYLENOL) suppository 650 mg, 650 mg, Rectal, Q6H PRN  glucose chewable tablet 16 g, 4 tablet, Oral, PRN  dextrose bolus 10% 125 mL, 125 mL, IntraVENous, PRN **OR** dextrose bolus 10% 250 mL, 250 mL, IntraVENous, PRN  glucagon (rDNA) injection 1 mg, 1 mg, IntraMUSCular, PRN  dextrose 5 % solution, 100 mL/hr, IntraVENous, PRN  prismaSATE BGK 4/2.5 dialysis solution, , Dialysis, Continuous  prismaSATE BGK 4/2.5 dialysis solution, , Dialysis, Continuous  prismaSATE BGK 4/2.5 dialysis solution, , Dialysis, Continuous  potassium chloride 20 mEq/50 mL IVPB (Central Line), 20 mEq, IntraVENous, PRN  magnesium sulfate 1000 mg in dextrose 5% 100 mL IVPB, 1,000 mg, IntraVENous, PRN  calcium gluconate 1000 mg in sodium chloride 100 mL, 1,000 mg, IntraVENous, PRN **OR** calcium gluconate 2000 mg in sodium chloride 100 mL, 2,000 mg, IntraVENous, PRN **OR** calcium gluconate 3000 mg in dextrose 5% 100 mL IVPB, 3,000 mg, IntraVENous, PRN **OR** calcium gluconate 4000 mg in dextrose 5% 100 mL IVPB, 4,000 mg, IntraVENous, PRN  sodium phosphate 6 mmol in sodium chloride 0.9 % 250 mL IVPB, 6 mmol, IntraVENous, PRN **OR** sodium phosphate 12 mmol in sodium chloride 0.9 % 250 mL IVPB, 12 mmol, IntraVENous, PRN **OR** sodium phosphate 18 mmol in sodium chloride 0.9 % 500 mL IVPB, 18 mmol, IntraVENous, PRN **OR** sodium phosphate 24 mmol in sodium chloride 0.9 % 500 mL IVPB, 24 mmol, IntraVENous, PRN  apixaban (ELIQUIS) tablet 2.5 mg, 2.5 mg, Oral, BID       Vitals :     Vitals:    07/18/22 0848   BP:    Pulse:    Resp:    Temp:    SpO2: 100%       I & O :       Intake/Output Summary (Last 24 hours) at 7/18/2022 0857  Last data filed at 7/18/2022 0830  Gross per 24 hour   Intake 1735.89 ml   Output 1392 ml   Net 343.89 ml          Physical Examination :     General appearance: in respiratory distress   HEENT: Lips- normal, teeth- ok , oral mucosa- moist  Neck : Mass- no, appears symmetrical, JVD- +  Respiratory: Respiratory effort-  normal, wheeze- no, crackles -   Cardiovascular:  Ausculation- No M/R/G, Edema ++  Abdomen: visible mass- no, distention- no, scar- no, tenderness- no                           hepatosplenomegaly-  no  Musculoskeletal:  clubbing no,cyanosis- no , digital ischemia- no                           muscle strength- grossly normal , tone - grossly normal  Skin: rashes- no , ulcers- no, induration- no, tightening - no  Psychiatric: Not evaluated  Additional finding:      LABS:     Recent Labs     07/16/22  0505 07/17/22  0408 07/18/22  0315   WBC 2.8* 3.6* 4.3   HGB 8.2* 8.4* 8.0*   HCT 26.2* 26.4* 26.1*    170 148       Recent Labs     07/17/22  0408 07/17/22  1528 07/18/22  0315   * 134* 134*   K 4.8 4.8 4.8    100 101   CO2 18* 21 20*   BUN 15 14 14   CREATININE 1.9* 1.6* 1.4*   GLUCOSE 115* 189* 230*   MG 2.00 2.20 2.10   PHOS 3.5 3.5 3.4          Nephrology  [address] [address] [address] [facility]  Office: [phone]  Fax: [phone]

## 2022-07-18 NOTE — PROGRESS NOTES
(porcine), sodium chloride flush, sodium chloride, ondansetron **OR** ondansetron, polyethylene glycol, acetaminophen **OR** acetaminophen, glucose, dextrose bolus **OR** dextrose bolus, glucagon (rDNA), dextrose, potassium chloride, magnesium sulfate, calcium gluconate **OR** calcium gluconate **OR** calcium gluconate **OR** calcium gluconate, sodium phosphate IVPB **OR** sodium phosphate IVPB **OR** sodium phosphate IVPB **OR** sodium phosphate IVPB      Intake/Output Summary (Last 24 hours) at 7/18/2022 1626  Last data filed at 7/18/2022 1600  Gross per 24 hour   Intake 2106.9 ml   Output 859 ml   Net 1247.9 ml         Physical Exam Performed:    BP 96/79   Pulse 86   Temp (!) 96.1 °F (35.6 °C) (Oral)   Resp (!) 33   Ht 5' 6.5\" (1.689 m)   Wt 159 lb 6.3 oz (72.3 kg)   SpO2 100%   BMI 25.34 kg/m²     General appearance: No apparent distress, appears stated age and cooperative. HEENT: Pupils equal, round, and reactive to light. Conjunctivae/corneas clear. Neck: Supple, with full range of motion. No jugular venous distention. Trachea midline. Respiratory:  Normal respiratory effort. Clear to auscultation, bilaterally without Rales/Wheezes/Rhonchi. Cardiovascular: Regular rate and rhythm with normal S1/S2 without murmurs, rubs or gallops. Abdomen: Soft, non-tender, non-distended with normal bowel sounds. Musculoskeletal: No clubbing, cyanosis or edema bilaterally. Full range of motion without deformity. Skin: Skin color, texture, turgor normal.  No rashes or lesions. Neurologic: Cannot fully cooperate due to acute illness, but gives the impression of being nonfocal  Psychiatric: Alert and oriented  Capillary Refill: Brisk,3 seconds, normal   Peripheral Pulses: +2 palpable, equal bilaterally     I examined the patient today (07/18/22) evening with.  Physical exam is similar to yesterday (7/16)    Labs:   Recent Labs     07/17/22  0408 07/18/22  0315 07/18/22  1040   WBC 3.6* 4.3 4.7   HGB 8.4* 8.0* 8.1* HCT 26.4* 26.1* 26.2*    148 158       Recent Labs     07/17/22  0408 07/17/22  1528 07/18/22  0315   * 134* 134*   K 4.8 4.8 4.8    100 101   CO2 18* 21 20*   BUN 15 14 14   CREATININE 1.9* 1.6* 1.4*   CALCIUM 8.4 8.5 8.5   PHOS 3.5 3.5 3.4       No results for input(s): AST, ALT, BILIDIR, BILITOT, ALKPHOS in the last 72 hours. No results for input(s): INR in the last 72 hours. Recent Labs     07/15/22  2304   TROPONINI 0.17*         Urinalysis:      Lab Results   Component Value Date/Time    NITRU Negative 04/10/2022 01:55 AM    WBCUA 0-2 04/10/2022 01:55 AM    BACTERIA 2+ 04/09/2022 02:15 PM    RBCUA 0-2 04/10/2022 01:55 AM    BLOODU Negative 04/10/2022 01:55 AM    SPECGRAV 1.025 04/10/2022 01:55 AM    GLUCOSEU Negative 04/10/2022 01:55 AM       Radiology:  XR CHEST PORTABLE   Final Result   1. No change. XR CHEST PORTABLE   Final Result   1. Decreased right-sided effusion with no pneumothorax identified. Left effusion and left basilar airspace disease unchanged. XR CHEST PORTABLE   Final Result     Congestive heart failure       Moderate cardiomegaly. Underlying pericardial effusion cannot be    excluded          XR CHEST PORTABLE   Final Result   1. Significant decrease in right-sided effusion with no pneumothorax identified. US THORACENTESIS Which side should the procedure be performed? Right   Final Result   IMPRESSION :    Ultrasound guided drainage of 1000 mL of straw-colored pleural effusion. XR CHEST PORTABLE   Final Result   1. Significant worsening of right-sided effusion and right-sided airspace disease in the interval.              Assessment/Plan:    Active Hospital Problems    Diagnosis     Encounter for palliative care [Z51.5]      Priority: Medium    Shock ([address] [R57.9]      Priority: Medium    Acute hypercapnic respiratory failure ([address] [J96.02]      Priority: Medium     PLAN    Hypoglycemia  Currently resolved. We will continue to monitor. Able to eat with assistance    Hypotension  Unclear reason at this time. Does not look infected or hypovolemic. Levophed and dopamine started due to hypotension. Pneumothorax  Minor when diagnosed, follow-up chest x-ray shows resolved. Respiratory status has improved. No intervention required per cardiothoracic surgery. Hypoxia  Pleural effusion might have contributed. Cannot give BiPAP due to pneumothorax that could get worse with positive pressure. Continue oxygen supplementation. Requires less oxygen today. End-stage renal disease  Switched to CRRT because of hypotension. Nephrology following. Noted that intensivist service is following as primary while in the ICU. CHF: advanced systolic, EF 96%, per Dr. Reynaldo Perry are    \"-Please slowly wean dobutamine drip from 10 to 5 mcg/kg/min and do not uptitrate.  -Continue with norepinephrine and vasopressin drips; may consider phenylephrine drip if necessary for BP support (would avoid dopamine which could precipitate tachyarrhythmias). - Cw broad-spectrum IV antibiotics for possible septic shock  - Fluid removal with CRRT as tolerated. \"    Discussed with the patient. DVT Prophylaxis: Eliquis  Diet: ADULT DIET; Regular; 3 carb choices (45 gm/meal);  Low Sodium (2 gm)  Code Status: DNR-CCA    PT/OT Eval Status: Consider when able to tolerate    Dispo -inpatient stay in the ICU with unclear day of discharge    Duane Shires, MD

## 2022-07-18 NOTE — PLAN OF CARE
Problem: Discharge Planning  Goal: Discharge to home or other facility with appropriate resources  Outcome: Progressing Towards Goal     Problem: Pain  Goal: Verbalizes/displays adequate comfort level or baseline comfort level  Outcome: Progressing Towards Goal     Problem: Chronic Conditions and Co-morbidities  Goal: Patient's chronic conditions and co-morbidity symptoms are monitored and maintained or improved  Outcome: Progressing Towards Goal     Problem: Skin/Tissue Integrity  Goal: Absence of new skin breakdown  Description: 1. Monitor for areas of redness and/or skin breakdown  2. Assess vascular access sites hourly  3. Every 4-6 hours minimum:  Change oxygen saturation probe site  4. Every 4-6 hours:  If on nasal continuous positive airway pressure, respiratory therapy assess nares and determine need for appliance change or resting period.   Outcome: Progressing Towards Goal     Problem: Safety - Adult  Goal: Free from fall injury  Outcome: Progressing Towards Goal     Problem: ABCDS Injury Assessment  Goal: Absence of physical injury  Outcome: Progressing Towards Goal

## 2022-07-18 NOTE — FLOWSHEET NOTE
07/18/22 1646   Encounter Summary   Encounter Overview/Reason  Grief, Loss, and Adjustments   Service Provided For: Patient and family together   Referral/Consult From: Family   Support System Family members   Last Encounter    (es 7/18)   Complexity of Encounter Moderate   Begin Time 1418   End Time  1447   Total Time Calculated 29 min   Assessment/Intervention/Outcome   Assessment Calm;Coping   Intervention Active listening; Other (Comment)  (provided oil for anointing. pt's sister is a . she provided prayer and inspirational instruction.   I provided presence.)   Plan and Referrals   Plan/Referrals No future visits requested

## 2022-07-18 NOTE — CONSULTS
Cardiology Consultation                                                                    Pt Name: Morteza Mccurdy  Age: 74 y.o. Sex: male  : 1955  Location: [facility]/[id number]    Referring Physician: Arin Luna MD  Primary cardiologist: Dr Torres Huber and [facility] cardiology      Reason for Consult:     Reason for Consultation/Chief Complaint: hypotension, AHF    HPI:      Morteza Mccurdy is a 74 y.o. male with a past medical history of ESRD on HD,, anemia, end-stage severe biventricular systolic heart failure s/p ICD, cirrhosis. Echo 2022: LVD 6.9 cm, LVEF less than 20%, severe RV dilatation and dysfunction, severe biatrial enlargement, diastolic grade 2, moderate MR, severe TR.    R/LHC 2018: RA 6, PA 50/24/28, W 18, CI 2.9 on milrinone 0.25 mics per kilo per minute. Mid LAD 99% stenosed status post PCI with MIKE. CMR 2018: Severe LV dilatation and dysfunction with EF 19%, moderate RV dilatation and dysfunction. Patient was brought to the emergency room on 7/15 after he was found to be hypoxic and hypotensive at the hemodialysis center. He was admitted in the ICU for acute hypoxic respiratory failure due to AHF with shock (cardiogenic vs septic), hypoglycemia. He was started on CRRT and for IV pressors and broad spectrum antibiotics, s/p thoracentesis R with 1 liter of fluid removed (transudate). Cardiology consulted for further evaluation. Patient is currently on dobutamine 10 mcg/kg/min, Norepi 52 mcg/min, vasopressin 0.03 units/min. Requirements have improved from 15 L on admission down to 4 L today. BP remains low, patient is hypothermic, NSR. CRRT relatively even (unable to pull significant fluid due to low BP) hemoglobin 8, albumin 3. Weight down 11 pounds. Patient has a R femoral CVC.       Histories     Past Medical History:   has a past medical history of Anemia, Cardiomegaly, CHF (congestive heart failure) ([facility] [address] Cirrhosis ([facility] [address] Diabetes type 2, uncontrolled ([facility] [address] Diabetic nephropathy, ED (erectile dysfunction), Essential hypertension, benign, Hemodialysis access, fistula mature ([facility] Hyperlipidemia, Noncompliance, and Respiratory failure ([address] Surgical History:   has a past surgical history that includes eye surgery; IR NONTUNNELED VASCULAR CATHETER > 5 YEARS (04/04/2022); IR TUNNELED CVC PLACE WO SQ PORT/PUMP > 5 YEARS (04/08/2022); IR NONTUNNELED VASCULAR CATHETER > 5 YEARS (Left, 05/26/2022); IR NONTUNNELED VASCULAR CATHETER > 5 YEARS (05/26/2022); IR TUNNELED CVC PLACE WO SQ PORT/PUMP > 5 YEARS (Left, 05/31/2022); IR TUNNELED CVC PLACE WO SQ PORT/PUMP > 5 YEARS (5/31/2022); and Cardiac defibrillator placement. Social History:   reports that he quit smoking about 41 years ago. He has a 10.50 pack-year smoking history. He has never used smokeless tobacco. He reports current alcohol use of about 1.0 standard drink per week. He reports that he does not use drugs. Family History:  No evidence for sudden cardiac death or premature thoracentesis 1 L out. Medications:       Home Medications  Were reviewed and are listed in nursing record. and/or listed below  Prior to Admission medications    Medication Sig Start Date End Date Taking?  Authorizing Provider   apixaban (ELIQUIS) 2.5 MG TABS tablet Take 2.5 mg by mouth 2 times daily    Historical Provider, MD   midodrine (PROAMATINE) 10 MG tablet Take 1 tablet by mouth 3 times daily (with meals) 6/16/22   Shravan Camejo MD   clopidogrel (PLAVIX) 75 MG tablet Take 75 mg by mouth daily    Historical Provider, MD   glucose monitoring (FREESTYLE FREEDOM) kit 1 kit by Does not apply route daily 5/6/22   Oneal Go DO   levothyroxine (SYNTHROID) 50 MCG tablet Take 1 tablet by mouth every morning (before breakfast) 5/4/22   Scotty Smallwood MD   pantoprazole (PROTONIX) 40 MG tablet Take 1 tablet by mouth 2 times daily (before meals) 4/13/22   Winifred Mccarty MD   allopurinol (ZYLOPRIM) 100 MG tablet Take 1 tablet by mouth daily 11/30/21 Fatou Whitley MD   Multiple Vitamins-Minerals (THERAPEUTIC MULTIVITAMIN-MINERALS) tablet Take 1 tablet by mouth daily    Historical Provider, MD   atorvastatin (LIPITOR) 80 MG tablet Take 80 mg by mouth at bedtime  1/8/19   Historical Provider, MD          Inpatient Medications:   vancomycin  1,250 mg IntraVENous Once    midodrine  10 mg Oral TID WC    [START ON 7/19/2022] pantoprazole  40 mg Oral QAM AC    bisacodyl  10 mg Rectal Once    levothyroxine  75 mcg Oral Daily    vancomycin (VANCOCIN) intermittent dosing (placeholder)   Other See Admin Instructions    meropenem  1,000 mg IntraVENous Q12H    hydrocortisone sodium succinate PF  100 mg IntraVENous Q8H    epoetin roseann-epbx  10,000 Units IntraVENous Once per day on Mon Wed Fri    sodium chloride flush  5-40 mL IntraVENous 2 times per day       IV drips:   vasopressin (Septic Shock) infusion 0.03 Units/min (07/18/22 1241)    phenylephrine (HAN-SYNEPHRINE) 50mg/250mL infusion      heparin (PORCINE) Infusion Stopped (07/18/22 1214)    DOPamine      norepinephrine 52 mcg/min (07/18/22 1241)    DOBUTamine 7.5 mcg/kg/min (07/18/22 1241)    sodium chloride      dextrose      prismaSATE BGK 4/2.5 1,000 mL/hr at 07/18/22 0045    prismaSATE BGK 4/2.5 500 mL/hr at 07/18/22 0045    prismaSATE BGK 4/2.5 500 mL/hr at 07/18/22 0045       PRN:  heparin (porcine), heparin (porcine), heparin (porcine), sodium chloride flush, sodium chloride, ondansetron **OR** ondansetron, polyethylene glycol, acetaminophen **OR** acetaminophen, glucose, dextrose bolus **OR** dextrose bolus, glucagon (rDNA), dextrose, potassium chloride, magnesium sulfate, calcium gluconate **OR** calcium gluconate **OR** calcium gluconate **OR** calcium gluconate, sodium phosphate IVPB **OR** sodium phosphate IVPB **OR** sodium phosphate IVPB **OR** sodium phosphate IVPB    Allergy:     Patient has no known allergies. Review of Systems:     All 12 point review of symptoms completed.  Pertinent positives identified in the HPI, all other review of symptoms negative as below. CONSTITUTIONAL: +  fatigue  SKIN: No rash or pruritis. EYES: No visual changes or diplopia. No scleral icterus. ENT: No Headaches, hearing loss or vertigo. No mouth sores or sore throat. CARDIOVASCULAR: No chest pain/chest pressure/chest discomfort. No palpitations. No edema. RESPIRATORY: +  dyspnea. No cough or wheezing, no sputum production. GASTROINTESTINAL: No N/V/D. No abdominal pain, appetite loss, blood in stools. GENITOURINARY: No dysuria, trouble voiding, or hematuria. MUSCULOSKELETAL:  No gait disturbance, weakness or joint complaints. NEUROLOGICAL: No headache, diplopia, change in muscle strength, numbness or tingling. No change in gait, balance, coordination, mood, affect, memory, mentation, behavior. ENDOCRINE: No excessive thirst, fluid intake, or urination. No tremor. HEMATOLOGIC: No abnormal bruising or bleeding. ALLERGY: No nasal congestion or hives. Physical Examination:     Vitals:    07/18/22 1130 07/18/22 1145 07/18/22 1200 07/18/22 1215   BP:   85/69    Pulse: 99 78 83 90   Resp: (!) 35 28 25 (!) 36   Temp:   (!) 96.1 °F (35.6 °C)    TempSrc:   Oral    SpO2: 100% 100% 100% 100%   Weight:       Height:           Wt Readings from Last 3 Encounters:   07/18/22 159 lb 6.3 oz (72.3 kg)   06/27/22 156 lb 1.4 oz (70.8 kg)   06/17/22 143 lb 15.4 oz (65.3 kg)         General Appearance:  Alert, cooperative, no distress, appears stated age Appropriate weight   Head:  Normocephalic, without obvious abnormality, atraumatic   Eyes:  PERRL, conjunctiva/corneas clear EOM intact  Ears normal   Throat no lesions       Nose: Nares normal, no drainage or sinus tenderness   Throat: Lips, mucosa, and tongue normal   Neck: Supple, symmetrical, trachea midline, no adenopathy, thyroid: not enlarged, symmetric, no tenderness/mass/nodules, no carotid bruit. Lungs:   Respirations unlabored, poor inspiratory sounds.      Chest Wall:  No tenderness or deformity   Heart:  Regular rhythm, rate is controlled, S1, S2 normal, there is no murmur, there is no rub or gallop, cannot assess jvd, 1+  bilateral lower extremity edema   Abdomen:   Soft, non-tender, bowel sounds active all four quadrants,  no masses, no organomegaly       Extremities: Extremities normal, atraumatic, no cyanosis. Pulses: 2+ and symmetric   Skin: Skin color, texture, turgor normal, no rashes or lesions   Pysch: Normal mood and affect   Neurologic: Normal gross motor and sensory exam.  Cranial nerves intact        Labs:     Recent Labs     07/16/22  0505 07/16/22  1547 07/17/22  0408 07/17/22  1528 07/18/22  0315   *  --  133* 134* 134*   K 4.4  --  4.8 4.8 4.8   BUN 21*  --  15 14 14   CREATININE 2.6*  --  1.9* 1.6* 1.4*   CL 98*  --  100 100 101   CO2 25  --  18* 21 20*   GLUCOSE 115*  --  115* 189* 230*   CALCIUM 8.0*  --  8.4 8.5 8.5   MG 1.90 1.90 2.00 2.20 2.10     Recent Labs     07/16/22  0505 07/17/22  0408 07/18/22  0315 07/18/22  1040   WBC 2.8* 3.6* 4.3 4.7   HGB 8.2* 8.4* 8.0* 8.1*   HCT 26.2* 26.4* 26.1* 26.2*    170 148 158   .0* 104.2* 105.2* 106.4*     No results for input(s): CHOLTOT, TRIG, HDL, CHOLHDL, LDL in the last 72 hours. Invalid input(s): Maria Teresa Citron  No results for input(s): PTT, INR in the last 72 hours. Invalid input(s): PT  Recent Labs     07/15/22  2304   TROPONINI 0.17*     No results for input(s): BNP in the last 72 hours. No results for input(s): TSH in the last 72 hours. No results for input(s): CHOL, HDL, LDLCALC, TRIG in the last 72 hours.]    Lab Results   Component Value Date    TROPONINI 0.17 (H) 07/15/2022         Imaging:     Telemetry personally reviewed:  NSR    I have personally reviewed patient's ECG which shows sinus tach 100s, with poor R wave progression, nonspecific T wave abnormalities. Assessment / Plan:     Shock. Septic and/or cardiogenic.   Patient currently remains on broad-spectrum IV antibiotics, pressors, inotropes. Acute on chronic HFrEF. Patient has severe biventricular HFrEF, NYHA FC IV, stage D. Ischemia has contributed but likely mixed in etiology. He remains volume overloaded and hemo unstable per #1  ESRD on HD, now on CRRT due to low BP. Cirrhosis  S/p ICD        -Please slowly wean dobutamine drip from 10 to 5 mcg/kg/min and do not uptitrate.   -Continue with norepinephrine and vasopressin drips; may consider phenylephrine drip if necessary for BP support (would avoid dopamine which could precipitate tachyarrhythmias). - Cw broad-spectrum IV antibiotics for possible septic shock  - Fluid removal with CRRT as tolerated. Due to the high probability of clinically significant life threating deterioration of the patient's condition that required my urgent intervention, a total critical care time 45 minutes was used. This time excludes any time that may have been spent performing procedures. This includes but not limited to vital sign monitoring, telemetry monitoring, continuous pulse oximety, IV medication, clinical response to the IV medications, documentation time, consultation time, interpretation of lab data, review of nursing notes and old record review. I have personally reviewed the reports and images of labs, radiological studies, cardiac studies including ECG's and telemetry, current and old medical records. The note was completed using EMR and Dragon dictation system. Every effort was made to ensure accuracy; however, inadvertent computerized transcription errors may be present. All questions and concerns were addressed to the patient/family. Alternatives to my treatment were discussed. I would like to thank you for providing me the opportunity to participate in the care of your patient. If you have any questions, please do not hesitate to contact me.      Nic Garcia MD, [facility] [place], [address] [id number]  Ph: [phone]  Fax: [phone]

## 2022-07-18 NOTE — CARE COORDINATION
Case management is following for discharge planning. The chart was reviewed. Spoke with Antonia in admissions at [facility]. Confirmed Mr. Kelly Rosario is a long-term care resident there. He receives on-site HD MWF. He can return at discharge but will need [employer] pre-cert. He is still in the ICU on CRRT, Dobutamine, & Levo gtt's.     Electronically signed by Claudene Hoar, MSN RN-[facility]  Case Management  [phone]

## 2022-07-19 NOTE — PROGRESS NOTES
Latest Reference Range & Units 7/18/22 21:15   pH, Arterial 7.350 - 7.450  7.252 (L)   pCO2, Arterial 35.0 - 45.0 mm Hg 41.9   pO2, Arterial 75.0 - 108.0 mm Hg 59.6 (L)   HCO3, Arterial 21.0 - 29.0 mmol/L 18.5 (L)   TCO2 (calc), Art Not Established mmol/L 20   Base Excess, Arterial -3 - 3  -9 (L)   O2 Sat, Arterial 93 - 100 % 86 (L)     ABG results given to ICU residents. Maxed on levo, dobutamine/ vaso parked, and edward at 200 mcg/min. Residents aware. Once maxed on edward, will plan to return CRRT blood.

## 2022-07-19 NOTE — PROGRESS NOTES
[facility] notified, referral number #[id number]. BG, hold on body. Family has chosen to use [address],. Family and RN called to notify them, they are aware of the hold on the body. Post mortem care completed. All lines/catheters removed.

## 2022-07-19 NOTE — PROGRESS NOTES
Maxed on levo, dobutamine, edward, and vaso. Map sustaining in the low 50s. Residents aware. CRRT stopped and 180 ml blood returned. Vascath heparin locked. Residents at bedside talking to son about withdrawing care.

## 2022-07-19 NOTE — SIGNIFICANT EVENT
Point of Care Note:  Internal Medicine  Louise Bowen    11:51 PM  7/18/2022    Patient is maxed on levo, dobutamine, edward and vaso. MAPs on a-line decreasing to low 50s. Cardiology called, advised that may be able to go up on dobutamine, but that this would be a temporizing measure that would only prolong life only by a matter of hours. Son is currently in the room. Discussed the very poor prognosis and imminent end of life course that the patient is currently going through. He agreed that withdraw of care would be the best course at this time. He is calling immediate siblings of the patient in to see be with him for withdraw. In the meantime, will return blood from CRRT to the patient and end CRRT. - Comfort care orders for treatment of pain and anxiety placed.   - Discussed with nursing and charge regarding visiting family members    Georgi Colilns DO  PGY1, Internal Medicine  07/18/22  11:57 PM

## 2022-07-19 NOTE — DEATH NOTES
Notice of Death    Time of Death: [id number] on 7/19/22  Time Pronounced: 1257AM on 7/19/22    I personally evaluated the pt at bedside. Brain stem reflexes were absent. There was no pupillary response. Pupils were fixed and dilated with no corneal or oculocephalic reflex. No gag reflex. Pt was areflexic and without any spontaneous respirations or respiratory movements. No palpable pulses throughout. No response to painful stimuli. GCS of 3.     Physical Exam:  VS:        No palpable pulse, no blood pressure   GEN:     Pallor with no spontaneous movement   HEENT: Pupils fixed and dilated   CVS:      No heart sounds audible   Chest:    No audible lung sounds  Neuro:   Absent reflexes    Oneil Stoddard DO  PGY1, Internal Medicine  07/19/22  1:03 AM Tala

## 2022-07-19 NOTE — PROGRESS NOTES
Pt A/Ox3, disoriented to situation. Pt following commands/denies pain. Pt currently on levo at 80 mcg/min, dobutatmine at 5 mcg/kg/min, vaso at 0.03 units/ min, and edward at 100 mcg/min. HR 80-100s, MAP goal 60. CRRT running. Net positive, unable to pull fluid d/t hypotensive blood pressure. Son at bedside. Pt remains DNR CCA.

## 2022-07-19 NOTE — DISCHARGE SUMMARY
INTERNAL MEDICINE DEPARTMENT AT [address] [place] [address]  DISCHARGE SUMMARY    Patient ID: Tolu De León                                             Discharge Date: 7/20/2022   Patient's PCP: Sabino Denver, DO                                          Discharge Physician: Eleuterio Chilel MD  Admit Date: 7/15/2022   Admitting Physician: Eleuterio Chilel MD    PROBLEMS DURING HOSPITALIZATION:  Present on Admission:   Acute hypercapnic respiratory failure ([address]   Shock ([address]   Encounter for palliative care      DISCHARGE DIAGNOSES:  Preliminary cause of death: Acute Respiratory Failure with Hypercapnia    HPI:  Tolu De León is a 74year old male with PMH of ESRD on HD, CHF, Liver Cirrhosis who presented to [facility] via EMS with SOB. Patient was having Hemodialysis was noted become hypoxic to the 70s and hypotensive to the 80s after which EMS was called. At [facility], patient was satting mid 90s on NRB but reported to become more lethargic and ultimately required BiPAP. Given hypotensive shock, increased lethargy and potential need for CRRT, ICU was consulted for admission into the ICU. BMP was significant for deranged Cr and GFR consistent with ESRD as well as Trops elevated to 0.39. Initial VBG was 7.264/62. 2. Blood glucose 50. Interventions performed in ED were a paracentesis in ED with 1L of straw colored fluid out. Pt was continued on BiPAP and it was unclear whether any intervention for low BG was performed. Mr. Stephanie Palmer was placed on CRRT per nephrology instead of HD given soft blood pressures. His pressures continued to drop while on CRT given history of biventricular heart failure, thus he required the use of pressors. At this time, the patient was also receiving Merrem/Vancomycin for possible septic shock as the cause of hypotensive shock given the continuous deterioration of the patient.  The pressor requirements continued to rise and reached maximum dosages, thus the patients son (POA) and family decided to the patient DNR-CCA after a discussion of the patients very poor life prognosis was held.  A death exam confirmed death and time of death was called on 22 at 12:57 AM.      The following issues were addressed during hospitalization:  Hypotensive Shock 2/2 cardiogenic vs septic  HFrEF  Cardiac Cirrhosis  Acute Hypercapnic Respiratory Failure  ESRD on HD  Hypoglycemia, resolved  Hx of Acute LUE DVT on Eliquis  Hypothyroidism      Consults: cardiology, ID, and nephrology  Significant Diagnostic Studies:  CT Head, CT A/P, XR, Abd U/S, Echo  Treatments: Antibiotics, CRRT  Disposition:   Discharged Condition:     DISCHARGE MEDICATION:       Medication List        CONTINUE taking these medications      glucose monitoring kit  1 kit by Does not apply route daily            ASK your doctor about these medications      allopurinol 100 MG tablet  Commonly known as: ZYLOPRIM  Take 1 tablet by mouth daily     apixaban 2.5 MG Tabs tablet  Commonly known as: ELIQUIS     atorvastatin 80 MG tablet  Commonly known as: LIPITOR     clopidogrel 75 MG tablet  Commonly known as: PLAVIX     levothyroxine 50 MCG tablet  Commonly known as: SYNTHROID  Take 1 tablet by mouth every morning (before breakfast)     midodrine 10 MG tablet  Commonly known as: PROAMATINE  Take 1 tablet by mouth 3 times daily (with meals)     pantoprazole 40 MG tablet  Commonly known as: PROTONIX  Take 1 tablet by mouth 2 times daily (before meals)     therapeutic multivitamin-minerals tablet                 Time Spent on discharge is more than 30 minutes    Signed:  Chantel Smith MD,  MD, PGY1  2022

## 2022-07-19 NOTE — PLAN OF CARE
Problem: Pain  Goal: Verbalizes/displays adequate comfort level or baseline comfort level  7/18/2022 2052 by Vane Monteiro RN  Outcome: Progressing Towards Goal     Problem: Skin/Tissue Integrity  Goal: Absence of new skin breakdown  Description: 1. Monitor for areas of redness and/or skin breakdown  2. Assess vascular access sites hourly  3. Every 4-6 hours minimum:  Change oxygen saturation probe site  4. Every 4-6 hours:  If on nasal continuous positive airway pressure, respiratory therapy assess nares and determine need for appliance change or resting period.   7/18/2022 2052 by Vane Monteiro RN  Outcome: Progressing Towards Goal     Problem: Safety - Adult  Goal: Free from fall injury  7/18/2022 2052 by Vane Monteiro RN  Outcome: Progressing Towards Goal     Problem: ABCDS Injury Assessment  Goal: Absence of physical injury  7/18/2022 2052 by Vane Monteior RN  Outcome: Progressing Towards Goal

## 2022-07-20 LAB
BLOOD CULTURE, ROUTINE: NORMAL
CULTURE, BLOOD 2: NORMAL

## 2022-08-03 NOTE — PROGRESS NOTES
Physician Progress Note      Laura Mercer  [facility] #:                  [id number]  :                       1955  ADMIT DATE:       7/15/2022 10:11 AM  DISCH DATE:        2022 3:50 AM  RESPONDING  PROVIDER #:        Kyra Watkins MD          QUERY TEXT:    Patient admitted with hypotension. Noted documentation of possible sepsis in   22 IM progress note. If possible, please document in progress notes and   discharge summary the source of sepsis:    The medical record reflects the following:  Risk Factors: 74year old male with hx ESRD, cirrhosis, systolic CHF  Clinical Indicators: Temp 95.5, , RR 28, WBC 4.6, LA 2.8. Blood   cultures NGTD. Per critical care consult note - Patient looks unwell and   has low energy level. .. -Antibiotics (merrem) as hemodialysis catheter may be   infected (patient reports it was a month ago). Patient is also hypothermic at   96.4. Do not want to miss potential septic shock. .. He has a Hx of ESRD on HD   via left HD catheter, Hx of line infection per friend at bedside, CHF with   biventricular failure (EF < 20%, RV failure with severe pulm HTN), right sided   pleural effusion s/p thora in ED yesterday b  Treatment: IV merrem and vancomycin, labs, blood cultures, imaging, pressors,   inotropes  Options provided:  -- Sepsis, present on admission, possibly due to unknown bacterial infection  -- Sepsis, present on admission, possibly due to unknown viral infection  -- SIRS with respiratory failure without sepsis, present on admission  -- Other - I will add my own diagnosis  -- Disagree - Not applicable / Not valid  -- Disagree - Clinically unable to determine / Unknown  -- Refer to Clinical Documentation Reviewer    PROVIDER RESPONSE TEXT:    This patient has sepsis which was present on admission possibly due to unknown   bacterial infection. Query created by:  Kye Harris on 2022 7:58 AM      QUERY TEXT:    Pt admitted with hypotension. Noted documentation of cardiac cirrhosis,   possible sepsis and shock, and acute on chronic systolic CHF exacerbation with   cardiogenic shock on 07/18/22 progress note by critical care/pulmonology   consultant. If possible, please document in progress notes and discharge   summary:    The medical record reflects the following:  Risk Factors: 74year old male with hx ESRD, CHF, cirrhosis  Clinical Indicators: Pro-BNP 68,313 on admission. Per pulm progress note   07/18- Last Echo March 2022 showed EF 20% with diffuse hypokinesis, RA   severely dilated and Pulm artery pressure 66 mmHg. Pt becomes hypotensive on   HD due to volume removal on HD in setting of Right Heart Failure. Lactic Acid   down to 4 from 4.3; likely due to shock but will r/o infection as cause of   sepsis. Mery Gip Mery Gip Appears grossly volume overloaded. .. Currently 50 of levophed, 10 of   dobutamine, vasopressin at 0.03 and edward-synephrine is on standby. Treatment: Pressors, inotropes, cardiology consult, pulm/critical care,   imaging, labs, CRRT, heparin gtt, IV Merrem and vancomycin  Options provided:  -- Hypotension due to cardiac cirrhosis  -- Hypotension due to possible sepsis and shock  -- Hypotension due to acute on chronic systolic CHF exacerbation with   cardiogenic shock  -- Hypotension,  due to cardiac cirrhosis, possible sepsis, and acute on   chronic systolic CHF exacerbation with cardiogenic shock  -- Other - I will add my own diagnosis  -- Disagree - Not applicable / Not valid  -- Disagree - Clinically unable to determine / Unknown  -- Refer to Clinical Documentation Reviewer    PROVIDER RESPONSE TEXT:    This patient has hypotension due to possible sepsis and shock. Query created by:  Bean Alberto on 7/20/2022 7:58 AM      Electronically signed by:  Walker Marin MD 8/3/2022 3:58 PM

## 2022-08-28 RX ORDER — LEVOTHYROXINE SODIUM 0.05 MG/1
TABLET ORAL
Qty: 90 TABLET | OUTPATIENT
Start: 2022-08-28

## 2022-10-07 LAB
EKG ATRIAL RATE: 90 BPM
EKG DIAGNOSIS: NORMAL
EKG P AXIS: 51 DEGREES
EKG P-R INTERVAL: 168 MS
EKG Q-T INTERVAL: 390 MS
EKG QRS DURATION: 100 MS
EKG QTC CALCULATION (BAZETT): 477 MS
EKG R AXIS: 35 DEGREES
EKG T AXIS: -3 DEGREES
EKG VENTRICULAR RATE: 90 BPM

## 2023-08-03 NOTE — PROGRESS NOTES
Security at bedside to transport pt to [address] [place] [address]. Rituxan Pregnancy And Lactation Text: This medication is Pregnancy Category C and it isn't know if it is safe during pregnancy. It is unknown if this medication is excreted in breast milk but similar antibodies are known to be excreted.

## 2023-11-21 NOTE — PROGRESS NOTES
Arrived to place midline, while evaluating veins for placement, Deandre MONTERO informed me that the PT will be DCed today and does not need midline palcement. 55-year-old female patient past medical history Crohn's complains of squeezing left-sided chest pain radiating down left arm yesterday that lasted for 15 seconds when she was sitting at home.  Denies fevers, cough, congestion, URI symptoms, recent heavy lifting, shortness of breath, headache, dizziness, nausea, vomiting, diarrhea, abdominal pain, direct trauma.  Patient had chest pain in the past and had a stress test 1.5 years ago; normal.    Will r/o for ACS with EKG, trop, cxr. Low concern for pneumothorax; pt has b/l breath sounds equally. Low concern for pneumonia; no URI sxs and afebrile.  Most likely costochondritis. Low concern for aortic dissection. Pt is hemodynamically stable, radial pulses intact, in no acute distress, neurologically intact.  If workup is neg will d/c pt home with cardio f/u for echo and stress test. 55-year-old female patient past medical history Crohn's complains of squeezing left-sided chest pain radiating down left arm yesterday that lasted for 15 seconds when she was sitting at home.  Denies fevers, cough, congestion, URI symptoms, recent heavy lifting, shortness of breath, headache, dizziness, nausea, vomiting, diarrhea, abdominal pain, direct trauma.  Patient had chest pain in the past and had a stress test 1.5 years ago; normal.    Will r/o for ACS with EKG, trop, cxr. Low concern for pneumothorax; pt has b/l breath sounds equally. Low concern for pneumonia; no URI sxs and afebrile.  Low concern for aortic dissection. Pt is hemodynamically stable, radial pulses intact, in no acute distress, neurologically intact. pt does not have any PE risk factors and does not perc out. Low concern for PE, will not d-dimer.  If workup is neg will d/c pt home with cardio f/u for echo and stress test.

## 2025-07-15 NOTE — CARE COORDINATION
Late dc order placed & no new SNF found yet; call out to [facility] with [facility] to see if they can accept back & assist family in finding a new SNF. Mayte Daniel RN, BSN,   [phone]  Electronically signed by Mayte Daniel RN on 6/16/2022 at 8:16 AM     Received call back from Rhett singh at [address] [place] [address], they are able to accept pt back. Received call back from son with request for [facility], they will review but it may be a few days before they can accept. Called son back & left a  re: dc order placed & if [facility] can not accept today then he needs to go to [address] [place] [address] until [facility] is able to accept. Mayte Daniel RN, BSN,   [phone]  Electronically signed by Mayte Daniel RN on 6/16/2022 at 12:29 PM     Received call back from chandrakant Carlisle, he is in agreement to pt dc today to [address] them helping him to transfer to another facility; preference now is [address]. Called Soo at [facility] updated her on preference in facility; she is going to reach out to son & verify preference & will notify Rhett singh at [address] [place] [address] of family/pt preference. Rebeca Mejias being initiated. Mayte Daniel RN, BSN,   [phone]  Electronically signed by Mayte Daniel RN on 6/16/2022 at 1:55 PM     CASE MANAGEMENT DISCHARGE SUMMARY:    DISCHARGE DATE: 06/16/22    Discharging to Facility/ Agency   · Name: [facility]  · Address:  [address], [address] [place] [address]   · Phone:  [phone]  · Fax:  [phone]      Covid test date: 06/16/22   Results: pending    TRANSP[facility]: [facility]             TIME: 2130    INSURANCE PRECERT OBTAINED: 44/82/76    HENS/PASAAR COMPLETED: not needed, returning    Son in agreement with dc plan.     Mayte Daniel RN, BSN, Case Management  [phone]    Electronically signed by Mayte Daniel RN on 6/16/2022 at 2:15 PM     covid rapid positive, order for PCR placed. Transportation cancelled due to PCR not back in time for ; will reschedule in the morning. [facility], checking to see if he can come back even if PCR is positive. Son aware of above. Ana Espinoza RN, BSN,   [phone]  Electronically signed by Ana Espinoza RN on 6/16/2022 at 4:41 PM     Received call back from [address] [place] [address], if PCR is negative they can accept him back but if PCR is positive they can not.     Ana Espinoza RN, BSN,   [phone]  Electronically signed by Ana Espinoza RN on 6/16/2022 at 4:44 PM 25

## 2025-08-01 NOTE — CONSULTS
[address] [place] [address] Nephrology   [facility]  [phone]  Nephrology Consult Note          Patient ID: Roxie Byrne  Referring/ Physician: Devora Piña MD      HPI/Summary:   Roxie Byrne is being seen by nephrology for SONAL. This is a 19-ROCL-BSG man with systolic heart failure, history of LV thrombus, coronary artery disease, hyperlipidemia who presented the hospital with increasing shortness of breath and worsening lower extremity swelling. Blood pressure 112/71  Satting 95% on room air  He is on milrinone 0.25  He is on Lasix drip 10 mg/h  Urine output 1500 cc yesterday  Weight 191 pounds  Of note his last discharge weight was 169 pounds  His baseline creatinine is around 2 over the past 2 years at least.      Plan:   - anticipate his renal function will continue to improved with diuresis   - currently on lasix gtt at 10 mg/hr  - potassium replacement to keep at 4  - agree with holding entresto until more euvolemic. BID renal panel, daily mag for electrolyet replacement. Add 20 KCL BID. Add aldactone 25 mg daily   Hold  metop      SONAL on Chronic kidney disease stage IIIb  This is likely secondary to diabetic nephropathy and SONAL from cardiorenal syndrome. Baseline creatinine around 2  Cr 2.3 at time of consult  No acute electrolyte abnormalities  Has had diabetes and hypertension for over 10 years and his last EF is less than 20%  He has had event evidence of albuminuria on urinalysis going back to 2016, 2013 had an  mg  Most recent protein creatinine ratio 700 mg   CT abdomen in 2020 showed symmetric kidneys no lesions no masses bilateral perinephric stranding no hydronephrosis. HCV and hepatitis C negative  Prior negative NAHOMY  Prior negative RF factor    Ischemic cardiomyopathy  Decompensated, has evidence of volume overload  proBNP 93718  Managed with Lasix drip and is on milrinone, appreciate cardiology involvement.   Last EF less than 20%  History of coronary artery disease with Medication passed protocol.     Medication: FREESTYLE LIBRE_3 PLUS SENSOR passed protocol.   Last office visit date: 4.24.25  Next appointment scheduled?: Yes 8.14.25      stent in the LAD  Has ICD  entresto held     Diabetes type 2  10 years at least  Longstanding albuminuria  A1c has been ranging between 6 and 6.5 the last few checks. Hypertension  On inotrope BP acceptable. [facility] would like to thank you for the opportunity to serve this patient. Please call with any questions or concerns. Jose Antonio Zimmerman MD  [facility]  [address]  [place], [address]  Fax: [phone]  Office: [phone]         CC/Reason for consult:   Reason for consult: SONAL  Chief Complaint   Patient presents with    Leg Swelling     pt complains of acute on chronic BLE edema. mild sob on exertion. denies cough. Review of Systems:   [address]. All other remaining systems are negative. Constitutional:  fever, chills, weakness, weight change, fatigue,      Skin:  rash, pruritus, hair loss, bruising, dry skin, petechiae. Head, Face, Neck   headaches, swelling,  cervical adenopathy. Respiratory: shortness of breath, cough, or wheezing  Cardiovascular: chest pain, palpitations, dizzy, edema  Gastrointestinal: nausea, vomiting, diarrhea, constipation,belly pain    Yellow skin, blood in stool  Musculoskeletal:  back pain, muscle weakness, gait problems,       joint pain or swelling. Genitourinary:  dysuria, poor urine flow, flank pain, blood in urine  Neurologic:  vertigo, TIA'S, syncope, seizures, focal weakness  Psychosocial:  insomnia, anxiety, or depression. Additional positive findings: -     PMH/SH/FH:    Medical Hx: reviewed and updated as appropriate  Past Medical History:   Diagnosis Date    Diabetes type 2, uncontrolled ([address] 7/28/2014    Diabetic nephropathy 9/12/2013    ED (erectile dysfunction) 9/12/2013    Essential hypertension, benign 9/12/2013    Hyperlipidemia 9/12/2013    Noncompliance 7/28/2014         Surgical Hx: reviewed and updated as appropriate   has a past surgical history that includes eye surgery. Social Hx: reviewed and updated as appropriate  Social History     Tobacco Use    Smoking status: Former Smoker     Packs/day: 1.50     Years: 7.00     Pack years: 10.50     Quit date: 1981     Years since quittin.0    Smokeless tobacco: Never Used   Substance Use Topics    Alcohol use: Yes     Alcohol/week: 1.0 standard drink     Types: 1 Cans of beer per week        Family hx: reviewed and updated as appropriate  family history includes Diabetes in his brother, mother, and sister; High Blood Pressure in his mother. Medications:   allopurinol, 100 mg, Daily  atorvastatin, 80 mg, Daily  isosorbide mononitrate, 30 mg, Daily  levothyroxine, 25 mcg, QAM AC  metoprolol succinate, 12.5 mg, Daily  therapeutic multivitamin-minerals, 1 tablet, Daily  rivaroxaban, 15 mg, Dinner  [Held by provider] sacubitril-valsartan, 1 tablet, BID  sodium chloride flush, 5-40 mL, 2 times per day  insulin lispro, 0-12 Units, TID WC  insulin lispro, 0-6 Units, Nightly       Patient has no known allergies. Allergies:   No Known Allergies      Physical Exam/Objective:   Vitals:    22 0731   BP: 101/71   Pulse: 71   Resp: 16   Temp: 97.5 °F (36.4 °C)   SpO2: 97%       Intake/Output Summary (Last 24 hours) at 2022 0906  Last data filed at 2022 4672  Gross per 24 hour   Intake 360 ml   Output 1505 ml   Net -1145 ml         General appearance:  in no acute distress, comfortable, communicative, awake and alert. HEENT: no icterus, EOM intact, trachea midline. Neck : no masses, appears symmetrical + JVD  Respiratory: Respiratory effort normal, bilateral equal chest rise. No wheeze, + crackles. Cardiovascular: Ausculation shows RRR and  ++ edema   Abdomen: abdomen is soft, non distended, no masses, no pain with palpation. Musculoskeletal:  no joint swelling, no deformity, strength grossly normal.   Skin: no rashes, no induration, no tightening, no jaundice   Neuro:   Follows commands, moves all extremities spontaneously       Data:   CBC:   Recent Labs     02/03/22  1225 02/04/22  0620   WBC 3.2* 3.9*   HGB 11.0* 10.4*   HCT 34.3* 31.8*   * 125*     BMP:    Recent Labs     02/03/22  1225 02/04/22  0620    143   K 3.7 3.4*    106   CO2 24 26   BUN 56* 56*   CREATININE 2.4* 2.3*   GLUCOSE 154* 126*   MG  --  2.20